# Patient Record
Sex: MALE | Race: WHITE | NOT HISPANIC OR LATINO | Employment: OTHER | URBAN - NONMETROPOLITAN AREA
[De-identification: names, ages, dates, MRNs, and addresses within clinical notes are randomized per-mention and may not be internally consistent; named-entity substitution may affect disease eponyms.]

---

## 2019-02-19 ENCOUNTER — HOSPITAL ENCOUNTER (OUTPATIENT)
Dept: LAB | Facility: MEDICAL CENTER | Age: 59
End: 2019-02-19
Attending: NEUROLOGICAL SURGERY
Payer: OTHER GOVERNMENT

## 2019-02-19 LAB
ANION GAP SERPL CALC-SCNC: 12 MMOL/L (ref 0–11.9)
APPEARANCE UR: CLEAR
APTT PPP: 34.4 SEC (ref 24.7–36)
BASOPHILS # BLD AUTO: 0.8 % (ref 0–1.8)
BASOPHILS # BLD: 0.07 K/UL (ref 0–0.12)
BILIRUB UR QL STRIP.AUTO: NEGATIVE
BUN SERPL-MCNC: 14 MG/DL (ref 8–22)
CALCIUM SERPL-MCNC: 10.2 MG/DL (ref 8.5–10.5)
CHLORIDE SERPL-SCNC: 100 MMOL/L (ref 96–112)
CO2 SERPL-SCNC: 26 MMOL/L (ref 20–33)
COLOR UR: YELLOW
CREAT SERPL-MCNC: 0.95 MG/DL (ref 0.5–1.4)
EOSINOPHIL # BLD AUTO: 0.43 K/UL (ref 0–0.51)
EOSINOPHIL NFR BLD: 5 % (ref 0–6.9)
ERYTHROCYTE [DISTWIDTH] IN BLOOD BY AUTOMATED COUNT: 41.8 FL (ref 35.9–50)
GLUCOSE SERPL-MCNC: 90 MG/DL (ref 65–99)
GLUCOSE UR STRIP.AUTO-MCNC: NEGATIVE MG/DL
HCT VFR BLD AUTO: 49.7 % (ref 42–52)
HGB BLD-MCNC: 16.6 G/DL (ref 14–18)
IMM GRANULOCYTES # BLD AUTO: 0.04 K/UL (ref 0–0.11)
IMM GRANULOCYTES NFR BLD AUTO: 0.5 % (ref 0–0.9)
INR PPP: 0.98 (ref 0.87–1.13)
KETONES UR STRIP.AUTO-MCNC: NEGATIVE MG/DL
LEUKOCYTE ESTERASE UR QL STRIP.AUTO: NEGATIVE
LYMPHOCYTES # BLD AUTO: 2.5 K/UL (ref 1–4.8)
LYMPHOCYTES NFR BLD: 29.2 % (ref 22–41)
MCH RBC QN AUTO: 30.9 PG (ref 27–33)
MCHC RBC AUTO-ENTMCNC: 33.4 G/DL (ref 33.7–35.3)
MCV RBC AUTO: 92.6 FL (ref 81.4–97.8)
MICRO URNS: NORMAL
MONOCYTES # BLD AUTO: 0.98 K/UL (ref 0–0.85)
MONOCYTES NFR BLD AUTO: 11.4 % (ref 0–13.4)
NEUTROPHILS # BLD AUTO: 4.54 K/UL (ref 1.82–7.42)
NEUTROPHILS NFR BLD: 53.1 % (ref 44–72)
NITRITE UR QL STRIP.AUTO: NEGATIVE
NRBC # BLD AUTO: 0 K/UL
NRBC BLD-RTO: 0 /100 WBC
PH UR STRIP.AUTO: 5.5 [PH]
PLATELET # BLD AUTO: 335 K/UL (ref 164–446)
PMV BLD AUTO: 12 FL (ref 9–12.9)
POTASSIUM SERPL-SCNC: 3.9 MMOL/L (ref 3.6–5.5)
PROT UR QL STRIP: NEGATIVE MG/DL
PROTHROMBIN TIME: 13.1 SEC (ref 12–14.6)
RBC # BLD AUTO: 5.37 M/UL (ref 4.7–6.1)
RBC UR QL AUTO: NEGATIVE
SODIUM SERPL-SCNC: 138 MMOL/L (ref 135–145)
SP GR UR STRIP.AUTO: 1.02
UROBILINOGEN UR STRIP.AUTO-MCNC: 0.2 MG/DL
WBC # BLD AUTO: 8.6 K/UL (ref 4.8–10.8)

## 2019-02-19 PROCEDURE — 85610 PROTHROMBIN TIME: CPT

## 2019-02-19 PROCEDURE — 85025 COMPLETE CBC W/AUTO DIFF WBC: CPT

## 2019-02-19 PROCEDURE — 80048 BASIC METABOLIC PNL TOTAL CA: CPT

## 2019-02-19 PROCEDURE — 36415 COLL VENOUS BLD VENIPUNCTURE: CPT

## 2019-02-19 PROCEDURE — 85730 THROMBOPLASTIN TIME PARTIAL: CPT

## 2019-02-19 PROCEDURE — 81003 URINALYSIS AUTO W/O SCOPE: CPT

## 2019-02-26 ENCOUNTER — HOSPITAL ENCOUNTER (INPATIENT)
Facility: MEDICAL CENTER | Age: 59
LOS: 3 days | DRG: 460 | End: 2019-03-01
Attending: NEUROLOGICAL SURGERY | Admitting: NEUROLOGICAL SURGERY
Payer: OTHER GOVERNMENT

## 2019-02-26 ENCOUNTER — APPOINTMENT (OUTPATIENT)
Dept: RADIOLOGY | Facility: MEDICAL CENTER | Age: 59
DRG: 460 | End: 2019-02-26
Attending: NEUROLOGICAL SURGERY
Payer: OTHER GOVERNMENT

## 2019-02-26 DIAGNOSIS — M54.5 BILATERAL LOW BACK PAIN, UNSPECIFIED CHRONICITY, WITH SCIATICA PRESENCE UNSPECIFIED: ICD-10-CM

## 2019-02-26 LAB — EKG IMPRESSION: NORMAL

## 2019-02-26 PROCEDURE — 0JH73DZ INSERTION OF MULTIPLE ARRAY STIMULATOR GENERATOR INTO BACK SUBCUTANEOUS TISSUE AND FASCIA, PERCUTANEOUS APPROACH: ICD-10-PCS | Performed by: NEUROLOGICAL SURGERY

## 2019-02-26 PROCEDURE — 700101 HCHG RX REV CODE 250: Performed by: PHYSICIAN ASSISTANT

## 2019-02-26 PROCEDURE — 0SG3071 FUSION OF LUMBOSACRAL JOINT WITH AUTOLOGOUS TISSUE SUBSTITUTE, POSTERIOR APPROACH, POSTERIOR COLUMN, OPEN APPROACH: ICD-10-PCS | Performed by: NEUROLOGICAL SURGERY

## 2019-02-26 PROCEDURE — 770006 HCHG ROOM/CARE - MED/SURG/GYN SEMI*

## 2019-02-26 PROCEDURE — 93005 ELECTROCARDIOGRAM TRACING: CPT | Performed by: NEUROLOGICAL SURGERY

## 2019-02-26 PROCEDURE — 700105 HCHG RX REV CODE 258

## 2019-02-26 PROCEDURE — 700101 HCHG RX REV CODE 250

## 2019-02-26 PROCEDURE — 500885 HCHG PACK, JACKSON TABLE: Performed by: NEUROLOGICAL SURGERY

## 2019-02-26 PROCEDURE — 95925 SOMATOSENSORY TESTING: CPT | Performed by: NEUROLOGICAL SURGERY

## 2019-02-26 PROCEDURE — 95940 IONM IN OPERATNG ROOM 15 MIN: CPT | Performed by: NEUROLOGICAL SURGERY

## 2019-02-26 PROCEDURE — 00UT07Z SUPPLEMENT SPINAL MENINGES WITH AUTOLOGOUS TISSUE SUBSTITUTE, OPEN APPROACH: ICD-10-PCS | Performed by: NEUROLOGICAL SURGERY

## 2019-02-26 PROCEDURE — A9270 NON-COVERED ITEM OR SERVICE: HCPCS | Performed by: PHYSICIAN ASSISTANT

## 2019-02-26 PROCEDURE — A6222 GAUZE <=16 IN NO W/SAL W/O B: HCPCS | Performed by: NEUROLOGICAL SURGERY

## 2019-02-26 PROCEDURE — 72020 X-RAY EXAM OF SPINE 1 VIEW: CPT

## 2019-02-26 PROCEDURE — 502000 HCHG MISC OR IMPLANTS RC 0278: Performed by: NEUROLOGICAL SURGERY

## 2019-02-26 PROCEDURE — 95937 NEUROMUSCULAR JUNCTION TEST: CPT | Performed by: NEUROLOGICAL SURGERY

## 2019-02-26 PROCEDURE — 500367 HCHG DRAIN KIT, HEMOVAC: Performed by: NEUROLOGICAL SURGERY

## 2019-02-26 PROCEDURE — 01NR0ZZ RELEASE SACRAL NERVE, OPEN APPROACH: ICD-10-PCS | Performed by: NEUROLOGICAL SURGERY

## 2019-02-26 PROCEDURE — 500112 HCHG BONE WAX: Performed by: NEUROLOGICAL SURGERY

## 2019-02-26 PROCEDURE — 501838 HCHG SUTURE GENERAL: Performed by: NEUROLOGICAL SURGERY

## 2019-02-26 PROCEDURE — 95861 NEEDLE EMG 2 EXTREMITIES: CPT | Performed by: NEUROLOGICAL SURGERY

## 2019-02-26 PROCEDURE — 700111 HCHG RX REV CODE 636 W/ 250 OVERRIDE (IP): Performed by: PHYSICIAN ASSISTANT

## 2019-02-26 PROCEDURE — 500866 HCHG NEEDLE, SPINAL 25G: Performed by: NEUROLOGICAL SURGERY

## 2019-02-26 PROCEDURE — 160048 HCHG OR STATISTICAL LEVEL 1-5: Performed by: NEUROLOGICAL SURGERY

## 2019-02-26 PROCEDURE — 93010 ELECTROCARDIOGRAM REPORT: CPT | Performed by: INTERNAL MEDICINE

## 2019-02-26 PROCEDURE — 700111 HCHG RX REV CODE 636 W/ 250 OVERRIDE (IP): Performed by: ANESTHESIOLOGY

## 2019-02-26 PROCEDURE — 160002 HCHG RECOVERY MINUTES (STAT): Performed by: NEUROLOGICAL SURGERY

## 2019-02-26 PROCEDURE — 160036 HCHG PACU - EA ADDL 30 MINS PHASE I: Performed by: NEUROLOGICAL SURGERY

## 2019-02-26 PROCEDURE — C1767 GENERATOR, NEURO NON-RECHARG: HCPCS | Performed by: NEUROLOGICAL SURGERY

## 2019-02-26 PROCEDURE — 160042 HCHG SURGERY MINUTES - EA ADDL 1 MIN LEVEL 5: Performed by: NEUROLOGICAL SURGERY

## 2019-02-26 PROCEDURE — 01NB0ZZ RELEASE LUMBAR NERVE, OPEN APPROACH: ICD-10-PCS | Performed by: NEUROLOGICAL SURGERY

## 2019-02-26 PROCEDURE — 160031 HCHG SURGERY MINUTES - 1ST 30 MINS LEVEL 5: Performed by: NEUROLOGICAL SURGERY

## 2019-02-26 PROCEDURE — 700111 HCHG RX REV CODE 636 W/ 250 OVERRIDE (IP)

## 2019-02-26 PROCEDURE — A4314 CATH W/DRAINAGE 2-WAY LATEX: HCPCS | Performed by: NEUROLOGICAL SURGERY

## 2019-02-26 PROCEDURE — 700102 HCHG RX REV CODE 250 W/ 637 OVERRIDE(OP): Performed by: ANESTHESIOLOGY

## 2019-02-26 PROCEDURE — 770001 HCHG ROOM/CARE - MED/SURG/GYN PRIV*

## 2019-02-26 PROCEDURE — 502240 HCHG MISC OR SUPPLY RC 0272: Performed by: NEUROLOGICAL SURGERY

## 2019-02-26 PROCEDURE — 110371 HCHG SHELL REV 272: Performed by: NEUROLOGICAL SURGERY

## 2019-02-26 PROCEDURE — 160009 HCHG ANES TIME/MIN: Performed by: NEUROLOGICAL SURGERY

## 2019-02-26 PROCEDURE — 160035 HCHG PACU - 1ST 60 MINS PHASE I: Performed by: NEUROLOGICAL SURGERY

## 2019-02-26 PROCEDURE — 700102 HCHG RX REV CODE 250 W/ 637 OVERRIDE(OP): Performed by: PHYSICIAN ASSISTANT

## 2019-02-26 PROCEDURE — A9270 NON-COVERED ITEM OR SERVICE: HCPCS | Performed by: ANESTHESIOLOGY

## 2019-02-26 PROCEDURE — 501837 HCHG SUTURE CV: Performed by: NEUROLOGICAL SURGERY

## 2019-02-26 PROCEDURE — 0JPT3MZ REMOVAL OF STIMULATOR GENERATOR FROM TRUNK SUBCUTANEOUS TISSUE AND FASCIA, PERCUTANEOUS APPROACH: ICD-10-PCS | Performed by: NEUROLOGICAL SURGERY

## 2019-02-26 PROCEDURE — 110454 HCHG SHELL REV 250: Performed by: NEUROLOGICAL SURGERY

## 2019-02-26 DEVICE — IMPLANTABLE DEVICE: Type: IMPLANTABLE DEVICE | Site: BACK | Status: FUNCTIONAL

## 2019-02-26 DEVICE — GRAFT DURAMATRIX ONLAY PLUS 1IN X 3IN OR 2.75CM X 7.5CM: Type: IMPLANTABLE DEVICE | Site: BACK | Status: FUNCTIONAL

## 2019-02-26 DEVICE — DURASEAL SEALANT SYSTEM 5ML - (5/BX): Type: IMPLANTABLE DEVICE | Site: BACK | Status: FUNCTIONAL

## 2019-02-26 RX ORDER — VENLAFAXINE 37.5 MG/1
75 TABLET ORAL 2 TIMES DAILY WITH MEALS
Status: DISCONTINUED | OUTPATIENT
Start: 2019-02-26 | End: 2019-03-01 | Stop reason: HOSPADM

## 2019-02-26 RX ORDER — PROMETHAZINE HYDROCHLORIDE 12.5 MG/1
12.5-25 SUPPOSITORY RECTAL EVERY 4 HOURS PRN
Status: DISCONTINUED | OUTPATIENT
Start: 2019-02-26 | End: 2019-03-01 | Stop reason: HOSPADM

## 2019-02-26 RX ORDER — ONDANSETRON 2 MG/ML
4 INJECTION INTRAMUSCULAR; INTRAVENOUS EVERY 4 HOURS PRN
Status: DISCONTINUED | OUTPATIENT
Start: 2019-02-26 | End: 2019-03-01 | Stop reason: HOSPADM

## 2019-02-26 RX ORDER — AMOXICILLIN 250 MG
1 CAPSULE ORAL
Status: DISCONTINUED | OUTPATIENT
Start: 2019-02-26 | End: 2019-03-01 | Stop reason: HOSPADM

## 2019-02-26 RX ORDER — TAMSULOSIN HYDROCHLORIDE 0.4 MG/1
0.4 CAPSULE ORAL EVERY EVENING
Status: DISCONTINUED | OUTPATIENT
Start: 2019-02-26 | End: 2019-03-01 | Stop reason: HOSPADM

## 2019-02-26 RX ORDER — PANTOPRAZOLE SODIUM 40 MG/1
40 TABLET, DELAYED RELEASE ORAL EVERY EVENING
Status: DISCONTINUED | OUTPATIENT
Start: 2019-02-26 | End: 2019-02-26

## 2019-02-26 RX ORDER — PANTOPRAZOLE SODIUM 40 MG/1
40 TABLET, DELAYED RELEASE ORAL EVERY EVENING
Status: ON HOLD | COMMUNITY
End: 2020-08-01

## 2019-02-26 RX ORDER — BISACODYL 10 MG
10 SUPPOSITORY, RECTAL RECTAL
Status: DISCONTINUED | OUTPATIENT
Start: 2019-02-26 | End: 2019-03-01 | Stop reason: HOSPADM

## 2019-02-26 RX ORDER — LORAZEPAM 2 MG/ML
0.5 INJECTION INTRAMUSCULAR
Status: DISCONTINUED | OUTPATIENT
Start: 2019-02-26 | End: 2019-02-26 | Stop reason: HOSPADM

## 2019-02-26 RX ORDER — CARVEDILOL 6.25 MG/1
6.25 TABLET ORAL 2 TIMES DAILY WITH MEALS
Status: ON HOLD | COMMUNITY
End: 2020-08-12

## 2019-02-26 RX ORDER — TELMISARTAN 80 MG/1
80 TABLET ORAL DAILY
Status: ON HOLD | COMMUNITY
End: 2020-08-12

## 2019-02-26 RX ORDER — CARVEDILOL 6.25 MG/1
6.25 TABLET ORAL 2 TIMES DAILY WITH MEALS
Status: DISCONTINUED | OUTPATIENT
Start: 2019-02-26 | End: 2019-03-01 | Stop reason: HOSPADM

## 2019-02-26 RX ORDER — ONDANSETRON 2 MG/ML
4 INJECTION INTRAMUSCULAR; INTRAVENOUS
Status: DISCONTINUED | OUTPATIENT
Start: 2019-02-26 | End: 2019-02-26 | Stop reason: HOSPADM

## 2019-02-26 RX ORDER — VANCOMYCIN HYDROCHLORIDE 500 MG/10ML
INJECTION, POWDER, LYOPHILIZED, FOR SOLUTION INTRAVENOUS
Status: COMPLETED | OUTPATIENT
Start: 2019-02-26 | End: 2019-02-26

## 2019-02-26 RX ORDER — HALOPERIDOL 5 MG/ML
1 INJECTION INTRAMUSCULAR
Status: DISCONTINUED | OUTPATIENT
Start: 2019-02-26 | End: 2019-02-26 | Stop reason: HOSPADM

## 2019-02-26 RX ORDER — TRAZODONE HYDROCHLORIDE 50 MG/1
100 TABLET ORAL
Status: DISCONTINUED | OUTPATIENT
Start: 2019-02-26 | End: 2019-03-01 | Stop reason: HOSPADM

## 2019-02-26 RX ORDER — CLONIDINE HYDROCHLORIDE 0.1 MG/1
0.1 TABLET ORAL EVERY 4 HOURS PRN
Status: DISCONTINUED | OUTPATIENT
Start: 2019-02-26 | End: 2019-03-01 | Stop reason: HOSPADM

## 2019-02-26 RX ORDER — FINASTERIDE 5 MG/1
5 TABLET, FILM COATED ORAL DAILY
COMMUNITY

## 2019-02-26 RX ORDER — GABAPENTIN 300 MG/1
300 CAPSULE ORAL 2 TIMES DAILY
Status: DISCONTINUED | OUTPATIENT
Start: 2019-02-26 | End: 2019-03-01 | Stop reason: HOSPADM

## 2019-02-26 RX ORDER — POLYETHYLENE GLYCOL 3350 17 G/17G
1 POWDER, FOR SOLUTION ORAL 2 TIMES DAILY PRN
Status: DISCONTINUED | OUTPATIENT
Start: 2019-02-26 | End: 2019-03-01 | Stop reason: HOSPADM

## 2019-02-26 RX ORDER — SODIUM CHLORIDE, SODIUM LACTATE, POTASSIUM CHLORIDE, AND CALCIUM CHLORIDE .6; .31; .03; .02 G/100ML; G/100ML; G/100ML; G/100ML
IRRIGANT IRRIGATION
Status: DISCONTINUED | OUTPATIENT
Start: 2019-02-26 | End: 2019-02-26 | Stop reason: HOSPADM

## 2019-02-26 RX ORDER — AMOXICILLIN 250 MG
1 CAPSULE ORAL NIGHTLY
Status: DISCONTINUED | OUTPATIENT
Start: 2019-02-26 | End: 2019-03-01 | Stop reason: HOSPADM

## 2019-02-26 RX ORDER — CEFAZOLIN SODIUM 2 G/100ML
2 INJECTION, SOLUTION INTRAVENOUS EVERY 8 HOURS
Status: COMPLETED | OUTPATIENT
Start: 2019-02-26 | End: 2019-02-27

## 2019-02-26 RX ORDER — DIPHENHYDRAMINE HYDROCHLORIDE 50 MG/ML
25 INJECTION INTRAMUSCULAR; INTRAVENOUS EVERY 6 HOURS PRN
Status: DISCONTINUED | OUTPATIENT
Start: 2019-02-26 | End: 2019-03-01 | Stop reason: HOSPADM

## 2019-02-26 RX ORDER — TRAZODONE HYDROCHLORIDE 100 MG/1
100 TABLET ORAL
Status: ON HOLD | COMMUNITY
End: 2020-08-12

## 2019-02-26 RX ORDER — TIZANIDINE 4 MG/1
2 TABLET ORAL 3 TIMES DAILY PRN
Status: DISCONTINUED | OUTPATIENT
Start: 2019-02-26 | End: 2019-03-01 | Stop reason: HOSPADM

## 2019-02-26 RX ORDER — FINASTERIDE 5 MG/1
5 TABLET, FILM COATED ORAL EVERY EVENING
Status: DISCONTINUED | OUTPATIENT
Start: 2019-02-26 | End: 2019-03-01 | Stop reason: HOSPADM

## 2019-02-26 RX ORDER — SODIUM CHLORIDE, SODIUM LACTATE, POTASSIUM CHLORIDE, CALCIUM CHLORIDE 600; 310; 30; 20 MG/100ML; MG/100ML; MG/100ML; MG/100ML
INJECTION, SOLUTION INTRAVENOUS CONTINUOUS
Status: DISCONTINUED | OUTPATIENT
Start: 2019-02-26 | End: 2019-02-26 | Stop reason: HOSPADM

## 2019-02-26 RX ORDER — DIPHENHYDRAMINE HCL 25 MG
25 TABLET ORAL EVERY 6 HOURS PRN
Status: DISCONTINUED | OUTPATIENT
Start: 2019-02-26 | End: 2019-03-01 | Stop reason: HOSPADM

## 2019-02-26 RX ORDER — GABAPENTIN 300 MG/1
300 CAPSULE ORAL 2 TIMES DAILY
Status: ON HOLD | COMMUNITY
End: 2020-08-01

## 2019-02-26 RX ORDER — DOCUSATE SODIUM 100 MG/1
100 CAPSULE, LIQUID FILLED ORAL 2 TIMES DAILY
Status: DISCONTINUED | OUTPATIENT
Start: 2019-02-26 | End: 2019-03-01 | Stop reason: HOSPADM

## 2019-02-26 RX ORDER — ATORVASTATIN CALCIUM 80 MG/1
80 TABLET, FILM COATED ORAL NIGHTLY
COMMUNITY
End: 2020-08-20 | Stop reason: SDUPTHER

## 2019-02-26 RX ORDER — ONDANSETRON 4 MG/1
4 TABLET, ORALLY DISINTEGRATING ORAL EVERY 4 HOURS PRN
Status: DISCONTINUED | OUTPATIENT
Start: 2019-02-26 | End: 2019-03-01 | Stop reason: HOSPADM

## 2019-02-26 RX ORDER — HYDROMORPHONE HYDROCHLORIDE 1 MG/ML
0.1 INJECTION, SOLUTION INTRAMUSCULAR; INTRAVENOUS; SUBCUTANEOUS
Status: DISCONTINUED | OUTPATIENT
Start: 2019-02-26 | End: 2019-02-26 | Stop reason: HOSPADM

## 2019-02-26 RX ORDER — BUPIVACAINE HYDROCHLORIDE AND EPINEPHRINE 5; 5 MG/ML; UG/ML
INJECTION, SOLUTION EPIDURAL; INTRACAUDAL; PERINEURAL
Status: DISCONTINUED | OUTPATIENT
Start: 2019-02-26 | End: 2019-02-26 | Stop reason: HOSPADM

## 2019-02-26 RX ORDER — TAMSULOSIN HYDROCHLORIDE 0.4 MG/1
0.4 CAPSULE ORAL DAILY
COMMUNITY

## 2019-02-26 RX ORDER — DEXTROSE MONOHYDRATE, SODIUM CHLORIDE, AND POTASSIUM CHLORIDE 50; 1.49; 4.5 G/1000ML; G/1000ML; G/1000ML
INJECTION, SOLUTION INTRAVENOUS CONTINUOUS
Status: DISCONTINUED | OUTPATIENT
Start: 2019-02-26 | End: 2019-03-01 | Stop reason: HOSPADM

## 2019-02-26 RX ORDER — ATORVASTATIN CALCIUM 40 MG/1
80 TABLET, FILM COATED ORAL NIGHTLY
Status: DISCONTINUED | OUTPATIENT
Start: 2019-02-26 | End: 2019-03-01 | Stop reason: HOSPADM

## 2019-02-26 RX ORDER — HYDROMORPHONE HYDROCHLORIDE 1 MG/ML
0.4 INJECTION, SOLUTION INTRAMUSCULAR; INTRAVENOUS; SUBCUTANEOUS
Status: DISCONTINUED | OUTPATIENT
Start: 2019-02-26 | End: 2019-02-26 | Stop reason: HOSPADM

## 2019-02-26 RX ORDER — DIPHENHYDRAMINE HYDROCHLORIDE 50 MG/ML
12.5 INJECTION INTRAMUSCULAR; INTRAVENOUS
Status: DISCONTINUED | OUTPATIENT
Start: 2019-02-26 | End: 2019-02-26 | Stop reason: HOSPADM

## 2019-02-26 RX ORDER — SODIUM CHLORIDE 9 MG/ML
INJECTION, SOLUTION INTRAVENOUS
Status: COMPLETED
Start: 2019-02-26 | End: 2019-02-26

## 2019-02-26 RX ORDER — HYDRALAZINE HYDROCHLORIDE 20 MG/ML
5 INJECTION INTRAMUSCULAR; INTRAVENOUS
Status: DISCONTINUED | OUTPATIENT
Start: 2019-02-26 | End: 2019-02-26 | Stop reason: HOSPADM

## 2019-02-26 RX ORDER — HYDROMORPHONE HYDROCHLORIDE 1 MG/ML
0.2 INJECTION, SOLUTION INTRAMUSCULAR; INTRAVENOUS; SUBCUTANEOUS
Status: DISCONTINUED | OUTPATIENT
Start: 2019-02-26 | End: 2019-02-26 | Stop reason: HOSPADM

## 2019-02-26 RX ORDER — ALPRAZOLAM 0.25 MG/1
0.25 TABLET ORAL 2 TIMES DAILY PRN
Status: DISCONTINUED | OUTPATIENT
Start: 2019-02-26 | End: 2019-03-01 | Stop reason: HOSPADM

## 2019-02-26 RX ORDER — CALCIUM CARBONATE 500 MG/1
500 TABLET, CHEWABLE ORAL 2 TIMES DAILY
Status: DISCONTINUED | OUTPATIENT
Start: 2019-02-26 | End: 2019-03-01 | Stop reason: HOSPADM

## 2019-02-26 RX ORDER — OMEPRAZOLE 20 MG/1
20 CAPSULE, DELAYED RELEASE ORAL EVERY EVENING
Status: DISCONTINUED | OUTPATIENT
Start: 2019-02-26 | End: 2019-03-01 | Stop reason: HOSPADM

## 2019-02-26 RX ORDER — ENEMA 19; 7 G/133ML; G/133ML
1 ENEMA RECTAL
Status: DISCONTINUED | OUTPATIENT
Start: 2019-02-26 | End: 2019-03-01 | Stop reason: HOSPADM

## 2019-02-26 RX ORDER — PROMETHAZINE HYDROCHLORIDE 25 MG/1
12.5-25 TABLET ORAL EVERY 4 HOURS PRN
Status: DISCONTINUED | OUTPATIENT
Start: 2019-02-26 | End: 2019-03-01 | Stop reason: HOSPADM

## 2019-02-26 RX ORDER — TELMISARTAN 80 MG/1
80 TABLET ORAL DAILY
Status: DISCONTINUED | OUTPATIENT
Start: 2019-02-27 | End: 2019-03-01 | Stop reason: HOSPADM

## 2019-02-26 RX ORDER — BACITRACIN 50000 [IU]/1
INJECTION, POWDER, FOR SOLUTION INTRAMUSCULAR
Status: DISCONTINUED | OUTPATIENT
Start: 2019-02-26 | End: 2019-02-26 | Stop reason: HOSPADM

## 2019-02-26 RX ADMIN — SODIUM CHLORIDE, SODIUM LACTATE, POTASSIUM CHLORIDE, CALCIUM CHLORIDE: 600; 310; 30; 20 INJECTION, SOLUTION INTRAVENOUS at 16:55

## 2019-02-26 RX ADMIN — CLONIDINE HYDROCHLORIDE 0.1 MG: 0.1 TABLET ORAL at 20:21

## 2019-02-26 RX ADMIN — TAMSULOSIN HYDROCHLORIDE 0.4 MG: 0.4 CAPSULE ORAL at 19:23

## 2019-02-26 RX ADMIN — CARVEDILOL 6.25 MG: 6.25 TABLET, FILM COATED ORAL at 19:23

## 2019-02-26 RX ADMIN — GABAPENTIN 300 MG: 300 CAPSULE ORAL at 19:23

## 2019-02-26 RX ADMIN — SODIUM CHLORIDE 500 ML: 9 INJECTION, SOLUTION INTRAVENOUS at 19:44

## 2019-02-26 RX ADMIN — CEFAZOLIN SODIUM 2 G: 2 INJECTION, SOLUTION INTRAVENOUS at 19:22

## 2019-02-26 RX ADMIN — PROCHLORPERAZINE EDISYLATE 10 MG: 5 INJECTION INTRAMUSCULAR; INTRAVENOUS at 20:02

## 2019-02-26 RX ADMIN — ANTACID TABLETS 500 MG: 500 TABLET, CHEWABLE ORAL at 19:24

## 2019-02-26 RX ADMIN — POTASSIUM CHLORIDE, DEXTROSE MONOHYDRATE AND SODIUM CHLORIDE: 150; 5; 450 INJECTION, SOLUTION INTRAVENOUS at 19:26

## 2019-02-26 RX ADMIN — Medication: at 19:44

## 2019-02-26 RX ADMIN — OMEPRAZOLE 20 MG: 20 CAPSULE, DELAYED RELEASE ORAL at 19:24

## 2019-02-26 RX ADMIN — ATORVASTATIN CALCIUM 80 MG: 40 TABLET, FILM COATED ORAL at 21:49

## 2019-02-26 RX ADMIN — FINASTERIDE 5 MG: 5 TABLET, FILM COATED ORAL at 19:23

## 2019-02-26 RX ADMIN — VENLAFAXINE 75 MG: 37.5 TABLET ORAL at 19:23

## 2019-02-26 RX ADMIN — TRAZODONE HYDROCHLORIDE 100 MG: 50 TABLET ORAL at 21:49

## 2019-02-26 RX ADMIN — HYDROCODONE BITARTRATE AND ACETAMINOPHEN 30 ML: 2.5; 108 SOLUTION ORAL at 16:01

## 2019-02-26 ASSESSMENT — LIFESTYLE VARIABLES
EVER_SMOKED: YES
ALCOHOL_USE: NO

## 2019-02-26 ASSESSMENT — COGNITIVE AND FUNCTIONAL STATUS - GENERAL
DRESSING REGULAR UPPER BODY CLOTHING: A LITTLE
SUGGESTED CMS G CODE MODIFIER MOBILITY: CK
DAILY ACTIVITIY SCORE: 21
MOVING FROM LYING ON BACK TO SITTING ON SIDE OF FLAT BED: A LITTLE
SUGGESTED CMS G CODE MODIFIER DAILY ACTIVITY: CJ
CLIMB 3 TO 5 STEPS WITH RAILING: A LITTLE
WALKING IN HOSPITAL ROOM: A LITTLE
MOBILITY SCORE: 19
MOVING TO AND FROM BED TO CHAIR: A LITTLE
HELP NEEDED FOR BATHING: A LITTLE
STANDING UP FROM CHAIR USING ARMS: A LITTLE
DRESSING REGULAR LOWER BODY CLOTHING: A LITTLE

## 2019-02-26 ASSESSMENT — PATIENT HEALTH QUESTIONNAIRE - PHQ9
SUM OF ALL RESPONSES TO PHQ9 QUESTIONS 1 AND 2: 0
1. LITTLE INTEREST OR PLEASURE IN DOING THINGS: NOT AT ALL
2. FEELING DOWN, DEPRESSED, IRRITABLE, OR HOPELESS: NOT AT ALL

## 2019-02-26 NOTE — OR NURSING
Fly  With [facility] [phone].Programmed the [employer] phone, call for questions if necessary. The system is OFF per MINERVA De La Rosa.

## 2019-02-26 NOTE — OR SURGEON
Immediate Post OP Note    PreOp Diagnosis: L4-S1 DDD, left radiculopathy, chronic pain.     PostOp Diagnosis: Same.     Procedure(s):  SPINAL CORD STIMULATOR-  STAGE 1:  RIGHT FLANK BATTERY REPLACEMENT/UPGRADE - Wound Class: Clean  LUMBAR FUSION POSTERIOR-  STAGE 2: ONLAY L5-S1 BONE - Wound Class: Clean with Drain  LAMINOTOMY-  STAGE 2:  REDO LEFT L4-S1 - Wound Class: Clean with Drain    Surgeon(s):  Stepan Hawkins M.D.    Anesthesiologist/Type of Anesthesia:  Anesthesiologist: Zheng Churchill M.D./General    Surgical Staff:  Assistant: Toby Cleveland P.A.-C.  Circulator: Bibi Piper R.N.  Relief Circulator: Maria Guadalupe Sargent R.N.  Relief Scrub: Hiral Horta  Scrub Person: Rory Randall  Radiology Technologist: Kendy Pittman    Specimens removed if any:  * No specimens in log *    Estimated Blood Loss: <100 cc     Findings: L4-S1 DDD, durotomy, see dictation.     Complications: None.          2/26/2019 2:37 PM Toby Cleveland P.A.-C.

## 2019-02-27 LAB
ANION GAP SERPL CALC-SCNC: 6 MMOL/L (ref 0–11.9)
BUN SERPL-MCNC: 19 MG/DL (ref 8–22)
CALCIUM SERPL-MCNC: 8.6 MG/DL (ref 8.5–10.5)
CHLORIDE SERPL-SCNC: 102 MMOL/L (ref 96–112)
CO2 SERPL-SCNC: 24 MMOL/L (ref 20–33)
CREAT SERPL-MCNC: 1.29 MG/DL (ref 0.5–1.4)
EKG IMPRESSION: NORMAL
ERYTHROCYTE [DISTWIDTH] IN BLOOD BY AUTOMATED COUNT: 43.3 FL (ref 35.9–50)
GLUCOSE SERPL-MCNC: 134 MG/DL (ref 65–99)
HCT VFR BLD AUTO: 39.1 % (ref 42–52)
HGB BLD-MCNC: 12.9 G/DL (ref 14–18)
MCH RBC QN AUTO: 31 PG (ref 27–33)
MCHC RBC AUTO-ENTMCNC: 33.3 G/DL (ref 33.7–35.3)
MCV RBC AUTO: 93.1 FL (ref 81.4–97.8)
PLATELET # BLD AUTO: 259 K/UL (ref 164–446)
PMV BLD AUTO: 11.4 FL (ref 9–12.9)
POTASSIUM SERPL-SCNC: 4.5 MMOL/L (ref 3.6–5.5)
RBC # BLD AUTO: 4.19 M/UL (ref 4.7–6.1)
SODIUM SERPL-SCNC: 132 MMOL/L (ref 135–145)
WBC # BLD AUTO: 13.5 K/UL (ref 4.8–10.8)

## 2019-02-27 PROCEDURE — 80048 BASIC METABOLIC PNL TOTAL CA: CPT

## 2019-02-27 PROCEDURE — A9270 NON-COVERED ITEM OR SERVICE: HCPCS | Performed by: PHYSICIAN ASSISTANT

## 2019-02-27 PROCEDURE — 85027 COMPLETE CBC AUTOMATED: CPT

## 2019-02-27 PROCEDURE — 93005 ELECTROCARDIOGRAM TRACING: CPT | Performed by: PHYSICIAN ASSISTANT

## 2019-02-27 PROCEDURE — 700101 HCHG RX REV CODE 250: Performed by: PHYSICIAN ASSISTANT

## 2019-02-27 PROCEDURE — 700102 HCHG RX REV CODE 250 W/ 637 OVERRIDE(OP): Performed by: PHYSICIAN ASSISTANT

## 2019-02-27 PROCEDURE — 770006 HCHG ROOM/CARE - MED/SURG/GYN SEMI*

## 2019-02-27 PROCEDURE — 700112 HCHG RX REV CODE 229: Performed by: PHYSICIAN ASSISTANT

## 2019-02-27 PROCEDURE — 93010 ELECTROCARDIOGRAM REPORT: CPT | Performed by: INTERNAL MEDICINE

## 2019-02-27 PROCEDURE — 700111 HCHG RX REV CODE 636 W/ 250 OVERRIDE (IP): Performed by: PHYSICIAN ASSISTANT

## 2019-02-27 PROCEDURE — 770001 HCHG ROOM/CARE - MED/SURG/GYN PRIV*

## 2019-02-27 PROCEDURE — 36415 COLL VENOUS BLD VENIPUNCTURE: CPT

## 2019-02-27 RX ADMIN — FINASTERIDE 5 MG: 5 TABLET, FILM COATED ORAL at 17:10

## 2019-02-27 RX ADMIN — VENLAFAXINE 75 MG: 37.5 TABLET ORAL at 17:10

## 2019-02-27 RX ADMIN — DOCUSATE SODIUM 100 MG: 100 CAPSULE, LIQUID FILLED ORAL at 17:10

## 2019-02-27 RX ADMIN — TRAZODONE HYDROCHLORIDE 100 MG: 50 TABLET ORAL at 20:00

## 2019-02-27 RX ADMIN — ANTACID TABLETS 500 MG: 500 TABLET, CHEWABLE ORAL at 05:18

## 2019-02-27 RX ADMIN — VENLAFAXINE 75 MG: 37.5 TABLET ORAL at 08:31

## 2019-02-27 RX ADMIN — ATORVASTATIN CALCIUM 80 MG: 40 TABLET, FILM COATED ORAL at 21:11

## 2019-02-27 RX ADMIN — POTASSIUM CHLORIDE, DEXTROSE MONOHYDRATE AND SODIUM CHLORIDE: 150; 5; 450 INJECTION, SOLUTION INTRAVENOUS at 05:18

## 2019-02-27 RX ADMIN — CEFAZOLIN SODIUM 2 G: 2 INJECTION, SOLUTION INTRAVENOUS at 03:43

## 2019-02-27 RX ADMIN — ANTACID TABLETS 500 MG: 500 TABLET, CHEWABLE ORAL at 17:09

## 2019-02-27 RX ADMIN — ENOXAPARIN SODIUM 40 MG: 100 INJECTION SUBCUTANEOUS at 15:49

## 2019-02-27 RX ADMIN — GABAPENTIN 300 MG: 300 CAPSULE ORAL at 17:10

## 2019-02-27 RX ADMIN — OMEPRAZOLE 20 MG: 20 CAPSULE, DELAYED RELEASE ORAL at 17:10

## 2019-02-27 RX ADMIN — POTASSIUM CHLORIDE, DEXTROSE MONOHYDRATE AND SODIUM CHLORIDE: 150; 5; 450 INJECTION, SOLUTION INTRAVENOUS at 15:49

## 2019-02-27 RX ADMIN — TAMSULOSIN HYDROCHLORIDE 0.4 MG: 0.4 CAPSULE ORAL at 17:09

## 2019-02-27 RX ADMIN — GABAPENTIN 300 MG: 300 CAPSULE ORAL at 05:19

## 2019-02-27 RX ADMIN — Medication: at 14:50

## 2019-02-27 NOTE — PROGRESS NOTES
Neurosurgery Progress Note    Subjective:  POD1 SCS battery replacement, redo L4-S1 lamis with onlay fusion, durotomy repair  Notes LBP well controlled  Denies LE pain  Notes mild HA  Has had intermittent tachycardia overnight, no cardiac history other than HTN  Denies chest pain/sob/constitutional symptoms  No drain    Exam:  Motor 5/5 LEs  Dressing dry with slight strike through, no evidence of csf leak  No calf tenderness  Pulse irregular    BP  Min: 102/78  Max: 178/109  Pulse  Av.7  Min: 91  Max: 117  Resp  Avg: 15.7  Min: 10  Max: 22  Temp  Av.9 °C (98.5 °F)  Min: 36.4 °C (97.5 °F)  Max: 37.6 °C (99.7 °F)  SpO2  Av.6 %  Min: 90 %  Max: 97 %    No Data Recorded    Recent Labs      192   WBC  13.5*   RBC  4.19*   HEMOGLOBIN  12.9*   HEMATOCRIT  39.1*   MCV  93.1   MCH  31.0   MCHC  33.3*   RDW  43.3   PLATELETCT  259   MPV  11.4     Recent Labs      19   0412   SODIUM  132*   POTASSIUM  4.5   CHLORIDE  102   CO2  24   GLUCOSE  134*   BUN  19   CREATININE  1.29   CALCIUM  8.6               Intake/Output       19 07 - 19 0659 19 07 - 19 0659       6617-2277 Total  2444-5259 Total       Intake    P.O.  300  -- 300  --  -- --    P.O. 300 -- 300 -- -- --    I.V.  2000  22.5  -- 22.5    Crystalloid Intake 2000 -- -- --    PCA End of Shift Total Volume (ml) -- -- -- 22.5 -- 22.5    Total Intake 2300 -- 2300 22.5 -- 22.5       Output    Urine  300  350 650  --  -- --    Urine Void (mL) 300 350 650 -- -- --    Blood  58  -- 58  --  -- --    Est. Blood Loss (mL) 58 -- 58 -- -- --    Total Output 358 350 708 -- -- --       Net I/O     1942 -350 1592 22.5 -- 22.5            Intake/Output Summary (Last 24 hours) at 19 0830  Last data filed at 19 0720   Gross per 24 hour   Intake           2322.5 ml   Output              708 ml   Net           1614.5 ml            • atorvastatin  80 mg Nightly   • carvedilol  6.25 mg  BID WITH MEALS   • finasteride  5 mg Q EVENING   • gabapentin  300 mg BID   • tamsulosin  0.4 mg Q EVENING   • telmisartan  80 mg DAILY   • traZODone  100 mg QHS   • venlafaxine  75 mg BID WITH MEALS   • Pharmacy Consult Request  1 Each PHARMACY TO DOSE   • MD ALERT...DO NOT ADMINISTER NSAIDS or ASPIRIN unless ORDERED By Neurosurgery  1 Each PRN   • docusate sodium  100 mg BID   • senna-docusate  1 Tab Nightly   • senna-docusate  1 Tab Q24HRS PRN   • polyethylene glycol/lytes  1 Packet BID PRN   • magnesium hydroxide  30 mL QDAY PRN   • bisacodyl  10 mg Q24HRS PRN   • fleet  1 Each Once PRN   • dextrose 5 % and 0.45 % NaCl with KCl 20 mEq   Continuous   • enoxaparin  40 mg DAILY   • diphenhydrAMINE  25 mg Q6HRS PRN    Or   • diphenhydrAMINE  25 mg Q6HRS PRN   • ondansetron  4 mg Q4HRS PRN   • ondansetron  4 mg Q4HRS PRN   • promethazine  12.5-25 mg Q4HRS PRN   • promethazine  12.5-25 mg Q4HRS PRN   • prochlorperazine  5-10 mg Q4HRS PRN   • tizanidine  2 mg TID PRN   • ALPRAZolam  0.25 mg BID PRN   • cloNIDine  0.1 mg Q4HRS PRN   • benzocaine-menthol  1 Lozenge Q2HRS PRN   • calcium carbonate  500 mg BID   • HYDROmorphone   Continuous   • omeprazole  20 mg Q EVENING   • Influenza Vaccine Quad pf  0.5 mL Once       Assessment and Plan:  POD #1  Flat x 48 hours, ok to lie on side  Ice to incision as needed  Will check EKG  Plan to get up tomorrow around noon  Reinforce dressing as needed

## 2019-02-27 NOTE — THERAPY
PT consult received. Pt with active bedrest orders in place. Will initiate PT evaluation once bedrest has been lifted and pt cleared for full OOB mobility. Thanks    Susana Tavares, PT, DPT Pager: [phone]

## 2019-02-27 NOTE — PROGRESS NOTES
Pt arrived to floor, pt is A&Ox4, can move side to side independently. Pts two dressings on the back have small amounts of sanguinous drainage on them. Pts son is in the room and will be staying over.

## 2019-02-27 NOTE — OR NURSING
Family updated by phone.Pt voided in urinal 300 ml  urine.Belongings taken from locker and placed on gurney with pt. 1-bagThe pt is awake and oriented. Respirations are regular and easy. Pain is controlled, the pt is comfortable. Dressings x 2  dry and intact.To floor with Stimulator control in white box, and PCA equipment.Pt to remain flat for 2 days as per Dr Hawkins. [place] RN instructed. The pt may turn side-side.

## 2019-02-27 NOTE — THERAPY
OT consult received, pt has bedrest orders until 28th at 2100. Will attempt OT eval post bedrest as appropriate.

## 2019-02-27 NOTE — CARE PLAN
Problem: Safety  Goal: Will remain free from falls  Outcome: PROGRESSING AS EXPECTED  No fall has occurred. Pt is to be flat in bed until the 28th at 2100.     Problem: Infection  Goal: Will remain free from infection  Outcome: PROGRESSING AS EXPECTED  N s/s of infection. Hands are washed before and after entering the room. Prior to IV access the hub is scrubbed for 15 secs and allowed to ry.

## 2019-02-27 NOTE — DISCHARGE PLANNING
Care Transition Team Assessment    NOK spouse Alesia Woodson [phone]    Information Source  Orientation : Oriented x 4  Information Given By: Other (Comments) (medical record)  Who is responsible for making decisions for patient? : Patient    Readmission Evaluation  Is this a readmission?: No    Elopement Risk  Legal Hold: No  Ambulatory or Self Mobile in Wheelchair: No-Not an Elopement Risk  Elopement Risk: Not at Risk for Elopement    Interdisciplinary Discharge Planning  Patient or legal guardian wants to designate a caregiver (see row info): No    Discharge Preparedness  What is your plan after discharge?: Home with help  What are your discharge supports?: Spouse  Prior Functional Level: Ambulatory, Independent with Activities of Daily Living    Functional Assesment  Prior Functional Level: Ambulatory, Independent with Activities of Daily Living    Finances  Financial Barriers to Discharge: No  Prescription Coverage: Yes    Vision / Hearing Impairment  Vision Impairment : Yes  Right Eye Vision: Impaired, Wears Glasses  Left Eye Vision: Impaired, Wears Glasses  Hearing Impairment : Yes  Hearing Impairment: Both Ears, Hearing Device(s) Available    Values / Beliefs / Concerns  Values / Beliefs Concerns : No         Domestic Abuse  Physical Abuse or Sexual Abuse: No  Verbal Abuse or Emotional Abuse: No    Psychological Assessment  History of Substance Abuse: None  History of Psychiatric Problems: No  Non-compliant with Treatment: No  Newly Diagnosed Illness: No    Discharge Risks or Barriers  Discharge risks or barriers?: No    Anticipated Discharge Information  Anticipated discharge disposition: Home  Discharge Address: [address] [place] [address]  Discharge Contact Phone Number: [phone]

## 2019-02-27 NOTE — PROGRESS NOTES
2 RN skin check was done. Pt has a dressing on his back that had moderate sanguinous drainage and a dressing on the right flank that had moderate sanguinous dressing. The bottom of the pts right foot was red and blanchable.

## 2019-02-27 NOTE — CARE PLAN
Problem: Skin Integrity  Goal: Risk for impaired skin integrity will decrease    Intervention: Assess and monitor skin integrity, appearance and/or temperature  Dressings intact, drainage noted, orders to reinforce if needed, no skin breakdown noted.       Problem: Respiratory:  Goal: Respiratory status will improve    Intervention: Educate and encourage incentive spirometry usage  Orders for strict bed rest, flat, for total of 48 hours. Encouraging frequent use of incentive spirometry, patient motivated, patient able to reach up to 2250 ml. Breath sounds clear to auscultation.

## 2019-02-28 LAB
ANION GAP SERPL CALC-SCNC: 8 MMOL/L (ref 0–11.9)
BUN SERPL-MCNC: 11 MG/DL (ref 8–22)
CALCIUM SERPL-MCNC: 8.8 MG/DL (ref 8.5–10.5)
CHLORIDE SERPL-SCNC: 103 MMOL/L (ref 96–112)
CO2 SERPL-SCNC: 23 MMOL/L (ref 20–33)
CREAT SERPL-MCNC: 0.91 MG/DL (ref 0.5–1.4)
ERYTHROCYTE [DISTWIDTH] IN BLOOD BY AUTOMATED COUNT: 45.8 FL (ref 35.9–50)
GLUCOSE SERPL-MCNC: 124 MG/DL (ref 65–99)
HCT VFR BLD AUTO: 41.8 % (ref 42–52)
HGB BLD-MCNC: 13.1 G/DL (ref 14–18)
MCH RBC QN AUTO: 30.3 PG (ref 27–33)
MCHC RBC AUTO-ENTMCNC: 31.3 G/DL (ref 33.7–35.3)
MCV RBC AUTO: 96.8 FL (ref 81.4–97.8)
PLATELET # BLD AUTO: 221 K/UL (ref 164–446)
PMV BLD AUTO: 11.9 FL (ref 9–12.9)
POTASSIUM SERPL-SCNC: 4.4 MMOL/L (ref 3.6–5.5)
RBC # BLD AUTO: 4.32 M/UL (ref 4.7–6.1)
SODIUM SERPL-SCNC: 134 MMOL/L (ref 135–145)
WBC # BLD AUTO: 12.4 K/UL (ref 4.8–10.8)

## 2019-02-28 PROCEDURE — 700101 HCHG RX REV CODE 250: Performed by: PHYSICIAN ASSISTANT

## 2019-02-28 PROCEDURE — A9270 NON-COVERED ITEM OR SERVICE: HCPCS | Performed by: PHYSICIAN ASSISTANT

## 2019-02-28 PROCEDURE — 85027 COMPLETE CBC AUTOMATED: CPT

## 2019-02-28 PROCEDURE — 36415 COLL VENOUS BLD VENIPUNCTURE: CPT

## 2019-02-28 PROCEDURE — 700102 HCHG RX REV CODE 250 W/ 637 OVERRIDE(OP): Performed by: STUDENT IN AN ORGANIZED HEALTH CARE EDUCATION/TRAINING PROGRAM

## 2019-02-28 PROCEDURE — 700102 HCHG RX REV CODE 250 W/ 637 OVERRIDE(OP): Performed by: PHYSICIAN ASSISTANT

## 2019-02-28 PROCEDURE — 700111 HCHG RX REV CODE 636 W/ 250 OVERRIDE (IP): Performed by: PHYSICIAN ASSISTANT

## 2019-02-28 PROCEDURE — A9270 NON-COVERED ITEM OR SERVICE: HCPCS | Performed by: STUDENT IN AN ORGANIZED HEALTH CARE EDUCATION/TRAINING PROGRAM

## 2019-02-28 PROCEDURE — 700112 HCHG RX REV CODE 229: Performed by: PHYSICIAN ASSISTANT

## 2019-02-28 PROCEDURE — 770001 HCHG ROOM/CARE - MED/SURG/GYN PRIV*

## 2019-02-28 PROCEDURE — 99221 1ST HOSP IP/OBS SF/LOW 40: CPT | Mod: GC | Performed by: INTERNAL MEDICINE

## 2019-02-28 PROCEDURE — 80048 BASIC METABOLIC PNL TOTAL CA: CPT

## 2019-02-28 RX ORDER — HYDROCODONE BITARTRATE AND ACETAMINOPHEN 10; 325 MG/1; MG/1
1-2 TABLET ORAL EVERY 4 HOURS PRN
Status: DISCONTINUED | OUTPATIENT
Start: 2019-02-28 | End: 2019-03-01 | Stop reason: HOSPADM

## 2019-02-28 RX ORDER — MORPHINE SULFATE 15 MG/1
15 TABLET, FILM COATED, EXTENDED RELEASE ORAL EVERY 12 HOURS
Status: DISCONTINUED | OUTPATIENT
Start: 2019-02-28 | End: 2019-03-01 | Stop reason: HOSPADM

## 2019-02-28 RX ORDER — HYDROMORPHONE HYDROCHLORIDE 2 MG/1
2-4 TABLET ORAL
Status: DISCONTINUED | OUTPATIENT
Start: 2019-02-28 | End: 2019-03-01 | Stop reason: HOSPADM

## 2019-02-28 RX ADMIN — ANTACID TABLETS 500 MG: 500 TABLET, CHEWABLE ORAL at 17:25

## 2019-02-28 RX ADMIN — VENLAFAXINE 75 MG: 37.5 TABLET ORAL at 17:26

## 2019-02-28 RX ADMIN — Medication: at 05:41

## 2019-02-28 RX ADMIN — MORPHINE SULFATE 15 MG: 15 TABLET, EXTENDED RELEASE ORAL at 07:57

## 2019-02-28 RX ADMIN — OMEPRAZOLE 20 MG: 20 CAPSULE, DELAYED RELEASE ORAL at 17:25

## 2019-02-28 RX ADMIN — HYDROMORPHONE HYDROCHLORIDE 2 MG: 2 TABLET ORAL at 21:53

## 2019-02-28 RX ADMIN — ATORVASTATIN CALCIUM 80 MG: 40 TABLET, FILM COATED ORAL at 21:53

## 2019-02-28 RX ADMIN — ONDANSETRON 4 MG: 2 INJECTION INTRAMUSCULAR; INTRAVENOUS at 18:34

## 2019-02-28 RX ADMIN — HYDROMORPHONE HYDROCHLORIDE 2 MG: 2 TABLET ORAL at 18:30

## 2019-02-28 RX ADMIN — ENOXAPARIN SODIUM 40 MG: 100 INJECTION SUBCUTANEOUS at 14:46

## 2019-02-28 RX ADMIN — VENLAFAXINE 75 MG: 37.5 TABLET ORAL at 07:56

## 2019-02-28 RX ADMIN — POTASSIUM CHLORIDE, DEXTROSE MONOHYDRATE AND SODIUM CHLORIDE: 150; 5; 450 INJECTION, SOLUTION INTRAVENOUS at 04:28

## 2019-02-28 RX ADMIN — POTASSIUM CHLORIDE, DEXTROSE MONOHYDRATE AND SODIUM CHLORIDE: 150; 5; 450 INJECTION, SOLUTION INTRAVENOUS at 14:45

## 2019-02-28 RX ADMIN — CARVEDILOL 6.25 MG: 6.25 TABLET, FILM COATED ORAL at 17:25

## 2019-02-28 RX ADMIN — DOCUSATE SODIUM 100 MG: 100 CAPSULE, LIQUID FILLED ORAL at 17:25

## 2019-02-28 RX ADMIN — HYDROMORPHONE HYDROCHLORIDE 2 MG: 2 TABLET ORAL at 07:56

## 2019-02-28 RX ADMIN — PROCHLORPERAZINE EDISYLATE 10 MG: 5 INJECTION INTRAMUSCULAR; INTRAVENOUS at 19:00

## 2019-02-28 RX ADMIN — FINASTERIDE 5 MG: 5 TABLET, FILM COATED ORAL at 17:25

## 2019-02-28 RX ADMIN — TELMISARTAN 80 MG: 80 TABLET ORAL at 04:28

## 2019-02-28 RX ADMIN — GABAPENTIN 300 MG: 300 CAPSULE ORAL at 04:28

## 2019-02-28 RX ADMIN — TAMSULOSIN HYDROCHLORIDE 0.4 MG: 0.4 CAPSULE ORAL at 17:25

## 2019-02-28 RX ADMIN — MORPHINE SULFATE 15 MG: 15 TABLET, EXTENDED RELEASE ORAL at 17:25

## 2019-02-28 RX ADMIN — HYDROMORPHONE HYDROCHLORIDE 2 MG: 2 TABLET ORAL at 14:48

## 2019-02-28 RX ADMIN — ANTACID TABLETS 500 MG: 500 TABLET, CHEWABLE ORAL at 04:28

## 2019-02-28 RX ADMIN — GABAPENTIN 300 MG: 300 CAPSULE ORAL at 17:27

## 2019-02-28 RX ADMIN — HYDROCODONE BITARTRATE AND ACETAMINOPHEN 1 TABLET: 10; 325 TABLET ORAL at 20:20

## 2019-02-28 NOTE — PROGRESS NOTES
Clarified with MINERVA Cleveland regarding parameters for carvedilol, received verbal orders to change parameters to hold for HR less than 60.

## 2019-02-28 NOTE — CARE PLAN
Problem: Infection  Goal: Will remain free from infection  Outcome: PROGRESSING AS EXPECTED  No s/s of infection. Hands are washed before and after entering the room. Prior to IV access the hub is scrubbed for 15 secs and allowed to dry.     Problem: Pain Management  Goal: Pain level will decrease to patient's comfort goal  Outcome: PROGRESSING AS EXPECTED  With the pts PCA pump he is staying in a tolerable pain range.

## 2019-02-28 NOTE — PROGRESS NOTES
Neurosurgery Progress Note    Subjective:  POD2 SCS battery replacement, redo L4-S1 lamis with onlay fusion, durotomy repair  Notes LBP well controlled on PCA, but is asking to change to oral pain meds.  Denies LE pain  Denies HA.   Has had intermittent tachycardia overnight again, no cardiac history other than HTN  Denies chest pain/sob/constitutional symptoms.  EKG yesterday showed ventricular bigeminy, and cardiology consult is being placed.  No drain    Exam:  Motor 5/5 LEs  Dressing dry with slight strike through, no evidence of csf leak  No calf tenderness  Pulse irregular    BP  Min: 105/63  Max: 155/79  Pulse  Av  Min: 86  Max: 101  Resp  Av.2  Min: 15  Max: 19  Temp  Av.6 °C (97.9 °F)  Min: 36.3 °C (97.3 °F)  Max: 37 °C (98.6 °F)  SpO2  Av.8 %  Min: 91 %  Max: 97 %    No Data Recorded    Recent Labs      19   0412   WBC  13.5*   RBC  4.19*   HEMOGLOBIN  12.9*   HEMATOCRIT  39.1*   MCV  93.1   MCH  31.0   MCHC  33.3*   RDW  43.3   PLATELETCT  259   MPV  11.4     Recent Labs      19   0412  19   0352   SODIUM  132*  134*   POTASSIUM  4.5  4.4   CHLORIDE  102  103   CO2  24  23   GLUCOSE  134*  124*   BUN  19  11   CREATININE  1.29  0.91   CALCIUM  8.6  8.8               Intake/Output       19 0700 - 19 0659 19 07 - 19 0659      0-0659 Total 7965-6037 9428-1094 Total       Intake    P.O.  782  -- 782  --  -- --    P.O. 782 -- 782 -- -- --    I.V.  1222.5  68 1290.5  6  -- 6    PCA End of Shift Total Volume (ml) 22.5 68 90.5 6 -- 6    Volume (mL) (dextrose 5 % and 0.45 % NaCl with KCl 20 mEq) 1200 -- 1200 -- -- --    Total Intake 2004.5 68 2072.5 6 -- 6       Output    Urine  890  -- 890  --  -- --    Number of Times Voided 4 x -- 4 x -- -- --    Urine Void (mL) 890 -- 890 -- -- --    Stool  --  -- --  --  -- --    Number of Times Stooled 0 x -- 0 x -- -- --    Total Output 890 -- 890 -- -- --       Net I/O     1114.5 68 1182.5 6 -- 6             Intake/Output Summary (Last 24 hours) at 02/28/19 0751  Last data filed at 02/28/19 0707   Gross per 24 hour   Intake             2056 ml   Output              890 ml   Net             1166 ml            • morphine ER  15 mg Q12HRS   • HYDROcodone/acetaminophen  1-2 Tab Q4HRS PRN   • HYDROmorphone  2-4 mg Q3HRS PRN   • atorvastatin  80 mg Nightly   • carvedilol  6.25 mg BID WITH MEALS   • finasteride  5 mg Q EVENING   • gabapentin  300 mg BID   • tamsulosin  0.4 mg Q EVENING   • telmisartan  80 mg DAILY   • traZODone  100 mg QHS   • venlafaxine  75 mg BID WITH MEALS   • Pharmacy Consult Request  1 Each PHARMACY TO DOSE   • MD ALERT...DO NOT ADMINISTER NSAIDS or ASPIRIN unless ORDERED By Neurosurgery  1 Each PRN   • docusate sodium  100 mg BID   • senna-docusate  1 Tab Nightly   • senna-docusate  1 Tab Q24HRS PRN   • polyethylene glycol/lytes  1 Packet BID PRN   • magnesium hydroxide  30 mL QDAY PRN   • bisacodyl  10 mg Q24HRS PRN   • fleet  1 Each Once PRN   • dextrose 5 % and 0.45 % NaCl with KCl 20 mEq   Continuous   • enoxaparin  40 mg DAILY   • diphenhydrAMINE  25 mg Q6HRS PRN    Or   • diphenhydrAMINE  25 mg Q6HRS PRN   • ondansetron  4 mg Q4HRS PRN   • ondansetron  4 mg Q4HRS PRN   • promethazine  12.5-25 mg Q4HRS PRN   • promethazine  12.5-25 mg Q4HRS PRN   • prochlorperazine  5-10 mg Q4HRS PRN   • tizanidine  2 mg TID PRN   • ALPRAZolam  0.25 mg BID PRN   • cloNIDine  0.1 mg Q4HRS PRN   • benzocaine-menthol  1 Lozenge Q2HRS PRN   • calcium carbonate  500 mg BID   • omeprazole  20 mg Q EVENING       Assessment and Plan:  POD #2  Flat until 1200 today, ok to lie on side now.  Ok to elevate HOB to 30 degrees at 1200, then may increase HOB 10 degrees/hour as tolerated until upright.   Ice to incision as needed  Cardiology consult today.   Reinforce dressing as needed

## 2019-02-28 NOTE — CONSULTS
Cardiology Consult    CC: Ventricular bigeminy     HPI:  The patient is a 58-year-old male with past medical history of hypertension, degenerative disc disease was admitted to the hospital on 2/26/2019 for spinal cord stimulator battery replacement, lumbar fusion posterior L5-S1 with laminotomy.  -Postoperatively on telemetry monitoring patient was noted to have ventricular bigeminy and cardiology was consulted.  -Patient reports he has no prior cardiac history, no diagnosis of heart disease in the past.  He was diagnosed with hypertension about 2 years ago and has been on different medications since then, was found to have allergy to hydrochlorothiazide and spironolactone.  -Stated he has had echocardiogram twice in the past 2 years which was normal per patient.  -Currently takes telmisartan and carvedilol daily for hypertension.  He has been compliant to all of his medications.  -Denies any family history significant for premature coronary artery disease or sudden cardiac death.  -He is currently asymptomatic and does not have any cardiovascular symptoms of chest pain, chest discomfort, palpitations, blurry vision, lightheadedness, dizziness, lower extremity edema.  -He is a former smoker and smoked for 3 years.  Denies alcohol or illicit drug use.  MEDICATIONS:    Current Facility-Administered Medications:   •  morphine ER (MS CONTIN) tablet 15 mg, 15 mg, Oral, Q12HRS, Toby Cleveland, P.A.-C., 15 mg at 02/28/19 0757  •  HYDROcodone/acetaminophen (NORCO)  MG per tablet 1-2 Tab, 1-2 Tab, Oral, Q4HRS PRN, Toby Cleveland, P.A.-C.  •  HYDROmorphone (DILAUDID) tablet 2-4 mg, 2-4 mg, Oral, Q3HRS PRN, Toby Cleveland, P.A.-C., 2 mg at 02/28/19 0756  •  atorvastatin (LIPITOR) tablet 80 mg, 80 mg, Oral, Nightly, Toby Cleveland, P.A.-C., 80 mg at 02/27/19 2111  •  carvedilol (COREG) tablet 6.25 mg, 6.25 mg, Oral, BID WITH MEALS, Toby Cleveland, P.A.-C., Stopped at 02/27/19 0730  •  finasteride  (PROSCAR) tablet 5 mg, 5 mg, Oral, Q EVENING, Toby Cleveland, P.A.-C., 5 mg at 02/27/19 1710  •  gabapentin (NEURONTIN) capsule 300 mg, 300 mg, Oral, BID, Toby Cleveland, P.A.-C., 300 mg at 02/28/19 0428  •  tamsulosin (FLOMAX) capsule 0.4 mg, 0.4 mg, Oral, Q EVENING, Toby Cleveland, P.A.-C., 0.4 mg at 02/27/19 1709  •  telmisartan (MICARDIS) tablet 80 mg, 80 mg, Oral, DAILY, Toby Cleveland, P.A.-C., 80 mg at 02/28/19 0428  •  traZODone (DESYREL) tablet 100 mg, 100 mg, Oral, QHS, Toby Cleveland, P.A.-C., 100 mg at 02/27/19 2000  •  venlafaxine (EFFEXOR) tablet 75 mg, 75 mg, Oral, BID WITH MEALS, Toby Cleveland, P.A.-C., 75 mg at 02/28/19 0756  •  Pharmacy Consult Request ...Pain Management Review 1 Each, 1 Each, Other, PHARMACY TO DOSE, Toby Cleveland, P.A.-C.  •  MD ALERT...DO NOT ADMINISTER NSAIDS or ASPIRIN unless ORDERED By Neurosurgery 1 Each, 1 Each, Other, PRN, Toby Cleveland, P.A.-C.  •  docusate sodium (COLACE) capsule 100 mg, 100 mg, Oral, BID, Toby Cleveland, P.A.-C., 100 mg at 02/27/19 1710  •  senna-docusate (PERICOLACE or SENOKOT S) 8.6-50 MG per tablet 1 Tab, 1 Tab, Oral, Nightly, Toby Cleveland P.A.-C.  •  senna-docusate (PERICOLACE or SENOKOT S) 8.6-50 MG per tablet 1 Tab, 1 Tab, Oral, Q24HRS PRN, Toby Cleveland P.A.-C.  •  polyethylene glycol/lytes (MIRALAX) PACKET 1 Packet, 1 Packet, Oral, BID PRN, BALJINDER Wells.A.-C.  •  magnesium hydroxide (MILK OF MAGNESIA) suspension 30 mL, 30 mL, Oral, QDAY PRN, BALJINDER Wells.A.-C.  •  bisacodyl (DULCOLAX) suppository 10 mg, 10 mg, Rectal, Q24HRS PRN, BALJINDER Wells.A.-C.  •  fleet enema 133 mL, 1 Each, Rectal, Once PRN, Toby Cleveland P.A.-C.  •  dextrose 5 % and 0.45 % NaCl with KCl 20 mEq, , Intravenous, Continuous, Toby Cleveland P.A.-C., Last Rate: 100 mL/hr at 02/28/19 0428  •  enoxaparin (LOVENOX) inj 40 mg, 40 mg, Subcutaneous, DAILY, Toby Cleveland P.A.-C., 40 mg at  02/27/19 1549  •  diphenhydrAMINE (BENADRYL) tablet/capsule 25 mg, 25 mg, Oral, Q6HRS PRN **OR** diphenhydrAMINE (BENADRYL) injection 25 mg, 25 mg, Intravenous, Q6HRS PRN, Toby Cleveland, P.A.-C.  •  ondansetron (ZOFRAN) syringe/vial injection 4 mg, 4 mg, Intravenous, Q4HRS PRN, Toby Cleveland, P.A.-C.  •  ondansetron (ZOFRAN ODT) dispertab 4 mg, 4 mg, Oral, Q4HRS PRN, Toby Cleveland, P.A.-C.  •  promethazine (PHENERGAN) tablet 12.5-25 mg, 12.5-25 mg, Oral, Q4HRS PRN, Toby Cleveland, P.A.-C.  •  promethazine (PHENERGAN) suppository 12.5-25 mg, 12.5-25 mg, Rectal, Q4HRS PRN, Toby Cleveland, P.A.-C.  •  prochlorperazine (COMPAZINE) injection 5-10 mg, 5-10 mg, Intravenous, Q4HRS PRN, Toby Cleveland, P.A.-C., 10 mg at 02/26/19 2002  •  tizanidine (ZANAFLEX) tablet 2 mg, 2 mg, Oral, TID PRN, Toby Cleveland, P.A.-C.  •  ALPRAZolam (XANAX) tablet 0.25 mg, 0.25 mg, Oral, BID PRN, Toby Cleveland, P.A.-C.  •  cloNIDine (CATAPRES) tablet 0.1 mg, 0.1 mg, Oral, Q4HRS PRN, Toby Cleveland, P.A.-C., 0.1 mg at 02/26/19 2021  •  benzocaine-menthol (CEPACOL) lozenge 1 Lozenge, 1 Lozenge, Mouth/Throat, Q2HRS PRN, Toby LUCINDA Cleveland P.A.-C.  •  calcium carbonate (TUMS) chewable tab 500 mg, 500 mg, Oral, BID, Toby Cleveland P.A.-C., 500 mg at 02/28/19 0428  •  omeprazole (PRILOSEC) capsule 20 mg, 20 mg, Oral, Q EVENING, Toby Cleveland P.A.-C., 20 mg at 02/27/19 1710    ALLERGIES:   Mr. Wiley  is allergic to hctz [hydrochlorothiazide w-spironolactone] and oxycodone.     SURGERIES:  Mr. Wiley   has a past surgical history that includes spinal cord stimulator (2/26/2019); lumbar fusion posterior (2/26/2019); and laminotomy (2/26/2019).     MEDICAL ILLNESSES:  Mr. Wiley   has a past medical history of Depression; Hyperlipidemia; and Hypertension.     SOCIAL HISTORY:  Mr. Wiley   reports that he has been smoking.  He has a 0.75 pack-year smoking history. He has never used smokeless tobacco. He  "reports that he does not drink alcohol.     FAMILY HISTORY:  Mr.'s Wiley  family history includes Cancer in his mother.      ROS:    Constitutional: Patient has had no fevers or chills or fatigue. Patient has has no significant weight change.  Eyes: Patient had no visual changes or vision loss.  ENT: Patient denies any sore throat or allergic rhinitis.  Respiratory: Patient has had no cough or sputum production. Also, no hemoptysis.  Cardiovascular: As noted above.  GI: Patient denies any nausea, vomiting, or diarrhea. Patient has had no hematemesis or melena.  : Patient denies any urinary urgency, frequency, or dysuria. Patient has noted no hematuria.  Orthopedic: Patient has no particular problem with arthritis. Patient is able to walk and exercise without difficulty.  Neurologic: Patient has had no focal numbness or weakness.   Psychiatric: Patient denies any difficulty with anxiety or depression.  Endocrine: Patient denies any history of thyroid disorder or diabetes. Patient denies any heat or cold intolerance. In addition, patient denies any polydipsia or polyuria.  Hematologic: Patient has had no easy bruising or bleeding. Patient denies any history of anemia.  Skin: Patient denies any unusual rashes or skin lesions.  Allergic/immunologic: Patient denies any difficulty with hayfever or other environmental allergens. Patient denies any food allergies.     PHYSICAL EXAM:    /78   Pulse 94   Temp 37.5 °C (99.5 °F) (Temporal)   Resp 18   Ht 1.727 m (5' 8\")   Wt 114 kg (251 lb 5.2 oz)   SpO2 93%   BMI 38.21 kg/m²      General: in no acute distress.  HEENT: NC, AT, PERRL, EOMI, lids and conjunctiva are clear, moist oral mucosa. No JVD  Respit: CTAB, no wheezing or rales  Cardiac: RRR, no murmurs, S2 S2  Abdomen: soft, nontender, nondistended  Extremities: No clubbing, cyanosis, or edema is present.  Skin: warm  Neurologic: AOx3, No focal neurologic abnormality noted.    No results found for: " CHOLSTRLTOT, LDL, HDL, TRIGLYCERIDE    Lab Results   Component Value Date/Time    SODIUM 134 (L) 2019 03:52 AM    POTASSIUM 4.4 2019 03:52 AM    CHLORIDE 103 2019 03:52 AM    CO2 23 2019 03:52 AM    GLUCOSE 124 (H) 2019 03:52 AM    BUN 11 2019 03:52 AM    CREATININE 0.91 2019 03:52 AM     No results found for: ALKPHOSPHAT, ASTSGOT, ALTSGPT, TBILIRUBIN   No results found for: BNPBTYPENAT    There are no active problems to display for this patient.    Results for orders placed or performed during the hospital encounter of 19   EKG   Result Value Ref Range    Report       [facility]    Test Date:  2019  Pt Name:    TOMMY LUORNE                  Department: RST1  MRN:        [id number]                      Room:       [facility]  Gender:     Male                         Technician: [facility]  :        1960                   Requested By:ADALBERTO QUICK  Order #:    [id number]                    Reading MD: Fernando Martinez MD    Measurements  Intervals                                Axis  Rate:       87                           P:          21  AL:         168                          QRS:        -32  QRSD:       86                           T:          49  QT:         368  QTc:        443    Interpretive Statements  SINUS RHYTHM  VENTRICULAR PREMATURE COMPLEX  LEFT AXIS DEVIATION  BASELINE WANDER IN LEAD(S) III,V4  No previous ECG available for comparison    Electronically Signed On 2019 17:33:25 PST by Fernando Martinez MD     EKG   Result Value Ref Range    Report       [facility]    Test Date:  2019  Pt Name:    TOMMY WILLINGHAM                  Department: 131  MRN:        [id number]                      Room:       [facility]  Gender:     Male                         Technician: [facility]  :        1960                   Requested By:HERLINDA ROMEO  Order #:    [id number]                    Reading MD: Presley Perkins  MD    Measurements  Intervals                                Axis  Rate:       99                           P:          57  KY:         190                          QRS:        4  QRSD:       88                           T:          53  QT:         343  QTc:        441    Interpretive Statements  SINUS TACHYCARDIA  VENTRICULAR BIGEMINY  BORDERLINE LOW VOLTAGE, EXTREMITY LEADS  BASELINE WANDER IN LEAD(S) V3  Compared to ECG 02/26/2019 10:57:33  Sinus rhythm no longer present  Left-axis deviation no longer present    Electronically Signed On 2- 13:28:29 PST by Presley Perkins MD       ASSESSMENT & PLAN:    58-year-old male with past medical history of hypertension who is currently asymptomatic was noted to have PVCs on telemetry monitoring.  -No history of cardiac disease.    #Ventricular quadrigeminy:  #Hypertension    -On review of the EKG strips every third QRS coomplex there is a PVC.  Normal QTC, normal [place] interval.  No ST-T wave changes.  -Records from [place] requested as the patient had a prior echo in the past 1 year.  -Continue carvedilol 6.25 mg twice a day, telmisartan daily.  -Continue telemetry monitoring and monitor blood pressures daily.  -No additional intervention needed at this time.    Patient is advised to talk to his PCP regarding Sleep apnea study as outpatient on discharge as he has high likelihood for sleep apnea      -Please do not hesitate to contact the cardiology team for further questions.

## 2019-02-28 NOTE — CARE PLAN
Problem: Communication  Goal: The ability to communicate needs accurately and effectively will improve    Intervention: Educate patient and significant other/support system about the plan of care, procedures, treatments, medications and allow for questions  PA from neurosurgery at bedside, plan to raise head of bed at 30 degrees and 10 degrees every hour after as tolerated, discussed slow elevation to assess for headache, patient verbalized understanding.       Problem: Pain Management  Goal: Pain level will decrease to patient's comfort goal    Intervention: Follow pain managment plan developed in collaboration with patient and Interdisciplinary Team  PCA discontinued, administered PO dilaudid 2 mg according to MAR, pain management in place.

## 2019-03-01 VITALS
HEIGHT: 68 IN | RESPIRATION RATE: 18 BRPM | OXYGEN SATURATION: 91 % | WEIGHT: 251.32 LBS | TEMPERATURE: 98.4 F | DIASTOLIC BLOOD PRESSURE: 83 MMHG | SYSTOLIC BLOOD PRESSURE: 115 MMHG | BODY MASS INDEX: 38.09 KG/M2 | HEART RATE: 111 BPM

## 2019-03-01 LAB
ANION GAP SERPL CALC-SCNC: 6 MMOL/L (ref 0–11.9)
BUN SERPL-MCNC: 8 MG/DL (ref 8–22)
CALCIUM SERPL-MCNC: 9.2 MG/DL (ref 8.5–10.5)
CHLORIDE SERPL-SCNC: 96 MMOL/L (ref 96–112)
CO2 SERPL-SCNC: 31 MMOL/L (ref 20–33)
CREAT SERPL-MCNC: 0.76 MG/DL (ref 0.5–1.4)
ERYTHROCYTE [DISTWIDTH] IN BLOOD BY AUTOMATED COUNT: 40.8 FL (ref 35.9–50)
GLUCOSE SERPL-MCNC: 150 MG/DL (ref 65–99)
HCT VFR BLD AUTO: 41.1 % (ref 42–52)
HGB BLD-MCNC: 13.5 G/DL (ref 14–18)
MCH RBC QN AUTO: 30.4 PG (ref 27–33)
MCHC RBC AUTO-ENTMCNC: 32.8 G/DL (ref 33.7–35.3)
MCV RBC AUTO: 92.6 FL (ref 81.4–97.8)
PLATELET # BLD AUTO: 221 K/UL (ref 164–446)
PMV BLD AUTO: 11.2 FL (ref 9–12.9)
POTASSIUM SERPL-SCNC: 4.3 MMOL/L (ref 3.6–5.5)
RBC # BLD AUTO: 4.44 M/UL (ref 4.7–6.1)
SODIUM SERPL-SCNC: 133 MMOL/L (ref 135–145)
WBC # BLD AUTO: 9.8 K/UL (ref 4.8–10.8)

## 2019-03-01 PROCEDURE — A9270 NON-COVERED ITEM OR SERVICE: HCPCS | Performed by: PHYSICIAN ASSISTANT

## 2019-03-01 PROCEDURE — 85027 COMPLETE CBC AUTOMATED: CPT

## 2019-03-01 PROCEDURE — 700102 HCHG RX REV CODE 250 W/ 637 OVERRIDE(OP): Performed by: STUDENT IN AN ORGANIZED HEALTH CARE EDUCATION/TRAINING PROGRAM

## 2019-03-01 PROCEDURE — 700112 HCHG RX REV CODE 229: Performed by: PHYSICIAN ASSISTANT

## 2019-03-01 PROCEDURE — 97165 OT EVAL LOW COMPLEX 30 MIN: CPT

## 2019-03-01 PROCEDURE — 36415 COLL VENOUS BLD VENIPUNCTURE: CPT

## 2019-03-01 PROCEDURE — 80048 BASIC METABOLIC PNL TOTAL CA: CPT

## 2019-03-01 PROCEDURE — 700101 HCHG RX REV CODE 250: Performed by: PHYSICIAN ASSISTANT

## 2019-03-01 PROCEDURE — 97161 PT EVAL LOW COMPLEX 20 MIN: CPT

## 2019-03-01 PROCEDURE — A9270 NON-COVERED ITEM OR SERVICE: HCPCS | Performed by: STUDENT IN AN ORGANIZED HEALTH CARE EDUCATION/TRAINING PROGRAM

## 2019-03-01 PROCEDURE — 700102 HCHG RX REV CODE 250 W/ 637 OVERRIDE(OP): Performed by: PHYSICIAN ASSISTANT

## 2019-03-01 RX ORDER — HYDROCODONE BITARTRATE AND ACETAMINOPHEN 10; 325 MG/1; MG/1
1 TABLET ORAL EVERY 4 HOURS PRN
Qty: 84 TAB | Refills: 0 | Status: SHIPPED | OUTPATIENT
Start: 2019-03-01 | End: 2019-03-15

## 2019-03-01 RX ORDER — CEPHALEXIN 500 MG/1
500 CAPSULE ORAL 4 TIMES DAILY
Qty: 20 CAP | Refills: 0 | Status: ON HOLD | OUTPATIENT
Start: 2019-03-01 | End: 2020-08-01

## 2019-03-01 RX ORDER — MORPHINE SULFATE 15 MG/1
15 TABLET, FILM COATED, EXTENDED RELEASE ORAL EVERY 12 HOURS
Qty: 60 TAB | Refills: 0 | Status: SHIPPED | OUTPATIENT
Start: 2019-03-01 | End: 2019-03-31

## 2019-03-01 RX ORDER — TIZANIDINE 2 MG/1
2 TABLET ORAL 3 TIMES DAILY PRN
Qty: 90 TAB | Refills: 0 | Status: ON HOLD | OUTPATIENT
Start: 2019-03-01 | End: 2020-08-12

## 2019-03-01 RX ADMIN — ANTACID TABLETS 500 MG: 500 TABLET, CHEWABLE ORAL at 05:16

## 2019-03-01 RX ADMIN — CARVEDILOL 6.25 MG: 6.25 TABLET, FILM COATED ORAL at 08:45

## 2019-03-01 RX ADMIN — DOCUSATE SODIUM 100 MG: 100 CAPSULE, LIQUID FILLED ORAL at 05:17

## 2019-03-01 RX ADMIN — VENLAFAXINE 75 MG: 37.5 TABLET ORAL at 08:46

## 2019-03-01 RX ADMIN — POTASSIUM CHLORIDE, DEXTROSE MONOHYDRATE AND SODIUM CHLORIDE: 150; 5; 450 INJECTION, SOLUTION INTRAVENOUS at 01:10

## 2019-03-01 RX ADMIN — MORPHINE SULFATE 15 MG: 15 TABLET, EXTENDED RELEASE ORAL at 05:17

## 2019-03-01 RX ADMIN — TELMISARTAN 80 MG: 80 TABLET ORAL at 05:17

## 2019-03-01 RX ADMIN — HYDROMORPHONE HYDROCHLORIDE 2 MG: 2 TABLET ORAL at 05:17

## 2019-03-01 RX ADMIN — GABAPENTIN 300 MG: 300 CAPSULE ORAL at 05:17

## 2019-03-01 ASSESSMENT — COGNITIVE AND FUNCTIONAL STATUS - GENERAL
MOVING FROM LYING ON BACK TO SITTING ON SIDE OF FLAT BED: A LITTLE
DAILY ACTIVITIY SCORE: 23
MOBILITY SCORE: 19
CLIMB 3 TO 5 STEPS WITH RAILING: A LITTLE
STANDING UP FROM CHAIR USING ARMS: A LITTLE
WALKING IN HOSPITAL ROOM: A LITTLE
SUGGESTED CMS G CODE MODIFIER DAILY ACTIVITY: CI
SUGGESTED CMS G CODE MODIFIER MOBILITY: CK
MOVING TO AND FROM BED TO CHAIR: A LITTLE
DRESSING REGULAR LOWER BODY CLOTHING: A LITTLE

## 2019-03-01 ASSESSMENT — GAIT ASSESSMENTS
GAIT LEVEL OF ASSIST: SUPERVISED
DEVIATION: BRADYKINETIC
DISTANCE (FEET): 145
ASSISTIVE DEVICE: FRONT WHEEL WALKER

## 2019-03-01 ASSESSMENT — ACTIVITIES OF DAILY LIVING (ADL): TOILETING: INDEPENDENT

## 2019-03-01 NOTE — DISCHARGE SUMMARY
DATE OF ADMISSION:  02/26/2019    DATE OF DISCHARGE:  03/01/2019    DISCHARGE DIAGNOSES:  1.  Low back pain.  2.  Lower extremity pain.  3.  Lower extremity paresthesia.  4.  Chronic pain syndrome.    SURGERY PERFORMED:  Right flank dorsal column stimulator battery replacement   with redo left L4-S1 laminotomies with foraminotomies with onlay fusion,   performed by Dr. Stepan Hawkins.    COMPLICATIONS:  None.    REASON FOR ADMISSION:  This is a very pleasant 58-year-old male who is   actually well known to our office, as he did have placement of permanent   dorsal column stimulator by Dr. Hawkins in 2010.  He did have prior L2-S1   laminectomies with L4-S1 ALIF in 2009 performed by Dr. Holder.  He did okay   following this, but then needed the both column stimulator and has been   dealing with progressive lower extremity pain and back pain that has been   refractory to conservative treatments including physical therapy and epidural   steroid injections.  His preoperative workup showed postsurgical changes with   recurrent stenosis on the left from L4-S1 with evidence of some   pseudoarthrosis at L5-S1 and the patient was hereby indicated for the above   surgery.    HOSPITAL COURSE:  Patient was admitted on the date of surgery.  He underwent   the above surgery without complication and was moved to orthopedics floor.    Actually, at the time of surgery, he was found to have had a prior spinal   fluid leak with Bangs-Dusty sutures in place still, but there was extension of   the durotomy coming from that upon decompression.  This was directly repaired.    The patient was left flat for 24 hours.  Unfortunately, during that time on   the floor, he did develop an irregular heart rate.  EKG was performed that   showed ventricular bigeminy.  Cardiology consult was placed and he was   evaluated and apparently, the patient had had known history of PVCs.    Cardiology worked him up for this and the patient has been stable and    cardiology has not recommended any further treatments or workup for this, as   the patient is stable.  Here now on postoperative day #3, the patient's pain   is controlled with oral medications.  He is ambulating.  He is voiding.  He is   tolerating oral food and medications well.  He is denying any cardiac   symptoms and he is hereby cleared for discharge home under stable condition.    DISCHARGE INSTRUCTIONS:  The patient is to eat a normal diet as tolerated.  He   is to walk as much as tolerated.  He is to avoid repetitive bending at the   waist.  He is to avoid lifting, pushing or pulling greater than 15 pounds.  It   will be okay for the patient to drive in 2 weeks' time when he is comfortable   not taking narcotic pain medications.  He should take an over-the-counter   stool softener while taking narcotic pain medication.  He should avoid NSAIDs   for 3 months.  He is encouraged to ice his incisions for 10-15 minutes   multiple times per day.  He does have followup appointments in the office of   [facility] in 2 and 6 weeks' time.  He is to keep those appointments.  He   should call our office or go to the nearest emergency department with any   emergent changes.  The patient was given these discharge instructions verbally   and he voiced understanding of them.    DISCHARGE MEDICATIONS:  In addition to the patient's normal home medications,   he will be discharged home on:  1.  MS Contin 15 mg 1 tab p.o. b.i.d. for 30 days, dispensed #60, no refills.  2.  Norco 10/325, 1 tab p.o. q. 4-6 hours p.r.n. pain, dispensed #84, no   refills.  3.  Zanaflex 2 mg 1 tab p.o. t.i.d. p.r.n. spasm, dispensed #90, no refills.  4.  Keflex 500 mg 1 tab p.o. q.i.d. x5 days, dispensed #20, no refills.       ____________________________________     JOSE E LANGLEY / PRINCE    DD:  03/01/2019 08:35:48  DT:  03/01/2019 08:58:47    D#:  [id number]  Job#:  [id number]    cc: Darrell Caal PA-C

## 2019-03-01 NOTE — THERAPY
"Occupational Therapy Evaluation completed.   Functional Status:  Supervision supine to sit.  Pt walked to bathroom w/FWW.  Pt donned underwear and pants with SBA.  Pt required min A to don tennis shoes.  Plan of Care: Patient with no further skilled OT needs in the acute care setting at this time  Discharge Recommendations:  Equipment: No Equipment Needed.     Pt is 57 y/o M seen for OT evaluation. Pt underwent L5-S1 fusion and placement of spinal stimulator. Pt has had multiple back surgeries prior. Pt educated on spinal precautions, ADL's/adaptive strategies, and ADL transfers. Pt to DC home with support from family. Pt reports no further concerns with self-care at this time. Pt with no further acute OT needs.    See \"Rehab Therapy-Acute\" Patient Summary Report for complete documentation.    "

## 2019-03-01 NOTE — DISCHARGE INSTRUCTIONS
Discharge Instructions    Ok to shower.  No NSAID's for 3 months.  Avoid repetitive lifting, pushing, pulling greater than 15 pounds. Follow up with [facility] in 2/6 weeks.  Call with questions.    Discharged to home by car with relative. Discharged via wheelchair, hospital escort: Yes.  Special equipment needed: Walker    Be sure to schedule a follow-up appointment with your primary care doctor or any specialists as instructed.     Discharge Plan:   Influenza Vaccine Indication: Indicated: 9 to 64 years of age    I understand that a diet low in cholesterol, fat, and sodium is recommended for good health. Unless I have been given specific instructions below for another diet, I accept this instruction as my diet prescription.   Other diet: Regular    Special Instructions: None    · Is patient discharged on Warfarin / Coumadin?   No     Laminectomy, Care After  Refer to this sheet in the next few weeks. These instructions provide you with information about caring for yourself after your procedure. Your health care provider may also give you more specific instructions. Your treatment has been planned according to current medical practices, but problems sometimes occur. Call your health care provider if you have any problems or questions after your procedure.  WHAT TO EXPECT AFTER THE PROCEDURE  After your procedure, it is common to have some pain around the incision.  HOME CARE INSTRUCTIONS  Bathing  · You may shower after the bandage (dressing) has been removed or as directed by your health care provider.  · Do not take baths, swim, or use a hot tub for 2 weeks or until your incision has healed completely. Check with your health care provider before you start any of these activities.  Incision Care  · There are many different ways to close and cover an incision, including stitches, skin glue, and adhesive strips. Follow instructions from your health care provider about:  ¨ Incision care.  ¨ Dressing changes and  removal.  ¨ Incision closure removal.  · Check your incision area every day for signs of infection. Watch for:  ¨ Redness, swelling, or pain.  ¨ Fluid, blood, or pus.  Activity  · Return to your normal activities as directed by your health care provider. Ask your health care provider what activities are safe for you.  · Perform breathing exercises if directed by your health care provider.  · Avoid bending or twisting at your waist. Always bend at your knees.  · Do not sit for more than 20-30 minutes at a time. Lie down or walk between periods of sitting.  · Do not lift anything that is heavier than 10 lb (4.5 kg).  · Do not drive for 2 weeks after your procedure or as directed by your health care provider. You may be a passenger for 20-30 minute trips.  · Do not drive or operate heavy machinery while taking pain medicine.  General Instructions  · Take medicines only as directed by your health care provider.  · Keep all follow-up visits as directed by your health care provider. This is important.  SEEK MEDICAL CARE IF:  · You have redness, swelling, or increasing pain in the area of your incision.  · You notice a bad smell coming from the incision or dressing.  · You are not able to return to activities or perform exercises as instructed by your health care provider.  SEEK IMMEDIATE MEDICAL CARE IF:  · You develop a rash.  · You have fluid, blood, or pus coming from your incision.  · You have chills or a fever.  · You have episodes of dizziness or fainting while standing.  · You develop shortness of breath or you have difficulty breathing.  · You lose the ability to control your bladder or bowel.  · You become weak.  · You cannot use your legs.     This information is not intended to replace advice given to you by your health care provider. Make sure you discuss any questions you have with your health care provider.     Document Released: 07/07/2006 Document Revised: 05/03/2016 Document Reviewed: 12/14/2015  DocumentCloud  Interactive Patient Education ©2016 [employer]      Depression / Suicide Risk    As you are discharged from this [facility] facility, it is important to learn how to keep safe from harming yourself.    Recognize the warning signs:  · Abrupt changes in personality, positive or negative- including increase in energy   · Giving away possessions  · Change in eating patterns- significant weight changes-  positive or negative  · Change in sleeping patterns- unable to sleep or sleeping all the time   · Unwillingness or inability to communicate  · Depression  · Unusual sadness, discouragement and loneliness  · Talk of wanting to die  · Neglect of personal appearance   · Rebelliousness- reckless behavior  · Withdrawal from people/activities they love  · Confusion- inability to concentrate     If you or a loved one observes any of these behaviors or has concerns about self-harm, here's what you can do:  · Talk about it- your feelings and reasons for harming yourself  · Remove any means that you might use to hurt yourself (examples: pills, rope, extension cords, firearm)  · Get professional help from the community (Mental Health, Substance Abuse, psychological counseling)  · Do not be alone:Call your Safe Contact- someone whom you trust who will be there for you.  · Call your local CRISIS HOTLINE 824-8626 or 684-955-0476  · Call your local Children's Mobile Crisis Response Team [place] (231) 683-6455 or www.[employer]  · Call the toll free National Suicide Prevention Hotlines   · National Suicide Prevention Lifeline 944-349-QJOW (3281)  · National Hope Line Network 800-SUICIDE (716-8963)

## 2019-03-01 NOTE — CARE PLAN
Problem: Discharge Barriers/Planning  Goal: Patient's continuum of care needs will be met    Intervention: Assess potential discharge barriers on admission and throughout hospital stay  Pending PT and OT evaluation prior to home, patient aware with discharge planning.       Problem: Skin Integrity  Goal: Risk for impaired skin integrity will decrease    Intervention: Assess and monitor skin integrity, appearance and/or temperature  Surgical dressings intact, received verbal order from MINERVA Cleveland to change dressing prior to discharge.

## 2019-03-01 NOTE — CARE PLAN
Problem: Infection  Goal: Will remain free from infection  Outcome: PROGRESSING AS EXPECTED  No s/s of infection. Hands are washed before and after entering the room.     Problem: Bowel/Gastric:  Goal: Normal bowel function is maintained or improved  Outcome: PROGRESSING SLOWER THAN EXPECTED  Pt has had no BM since the 26.

## 2019-03-01 NOTE — THERAPY
"Physical Therapy Evaluation completed.   Bed Mobility:  Supine to Sit: Supervised  Transfers: Sit to Stand: Supervised  Gait: Level Of Assist: Supervised with Front-Wheel Walker   145ft    Plan of Care: Patient with no further skilled PT needs in the acute care setting at this time  Discharge Recommendations: Equipment: No Equipment Needed. Post-acute therapy Recommend outpatient  services for continued physical therapy services.    Mr. Wiley is a 59 y/o male who presents to acute secondary to SCS battery replacement, redo L4-S1 lami with onlay fusion, and durotomy repair. He presents with mild R LE weakness and B LE numbness which he reports are baseline. Reviewed spinal precautions and log roll technique, pt aware of these already from prior spinal surgeries. He was able to perform gait, transfers, and bed mobility without physical assist. Able to don LSO without assist as well. Demonstrated 100% compliance with spinal precautions with all mobility. No additional acute physical therapy needs. Recommend  outpatient PT once cleared by MD to address corest weakness.     See \"Rehab Therapy-Acute\" Patient Summary Report for complete documentation.     "

## 2019-03-01 NOTE — PROGRESS NOTES
Received verbal order to change surgical dressing with gauze and tegaderm once prior to discharge per MINERVA Cleveland 3/1/2019

## 2019-03-01 NOTE — PROGRESS NOTES
Discharged patient to home, reviewed discharge instructions and prescriptions, patient verbalized understanding, escorted by LATOYA Queen via wheelchair.

## 2019-03-01 NOTE — PROGRESS NOTES
Neurosurgery Progress Note    Subjective:  POD3 SCS battery replacement, redo L4-S1 lamis with onlay fusion, durotomy repair  Pain well controlled.  Denies LE pain  Denies HA.   Cardiology has seen and has not recommended any additional treatments, but needs to f/u with his PCP regarding his PVC's.    Exam:  Motor 5/5 LEs  Dressing dry with slight strike through, no evidence of csf leak  No calf tenderness      BP  Min: 114/78  Max: 159/112  Pulse  Av.2  Min: 81  Max: 95  Resp  Avg: 15.7  Min: 12  Max: 18  Temp  Av.9 °C (98.5 °F)  Min: 36.2 °C (97.2 °F)  Max: 37.5 °C (99.5 °F)  SpO2  Av.2 %  Min: 91 %  Max: 93 %    No Data Recorded    Recent Labs      19   0312   WBC  13.5*  12.4*  9.8   RBC  4.19*  4.32*  4.44*   HEMOGLOBIN  12.9*  13.1*  13.5*   HEMATOCRIT  39.1*  41.8*  41.1*   MCV  93.1  96.8  92.6   MCH  31.0  30.3  30.4   MCHC  33.3*  31.3*  32.8*   RDW  43.3  45.8  40.8   PLATELETCT  259  221  221   MPV  11.4  11.9  11.2     Recent Labs      192  19   0311   SODIUM  132*  134*  133*   POTASSIUM  4.5  4.4  4.3   CHLORIDE  102  103  96   CO2  24  23  31   GLUCOSE  134*  124*  150*   BUN  19  11  8   CREATININE  1.29  0.91  0.76   CALCIUM  8.6  8.8  9.2               Intake/Output       19 - 19 - 19 Total  Total       Intake    P.O.  1020  -- 1020  --  -- --    P.O. 1020 -- 1020 -- -- --    I.V.  1206  -- 1206  --  -- --    PCA End of Shift Total Volume (ml) 6 -- 6 -- -- --    Volume (mL) (dextrose 5 % and 0.45 % NaCl with KCl 20 mEq) 1200 -- 1200 -- -- --    Total Intake 2226 -- 2226 -- -- --       Output    Urine  1160  -- 1160  --  -- --    Number of Times Voided 3 x -- 3 x -- -- --    Urine Void (mL) 1160 -- 1160 -- -- --    Stool  --  -- --  --  -- --    Number of Times Stooled 0 x -- 0 x -- -- --    Total Output 1160 -- 1160 --  -- --       Net I/O     1066 -- 1066 -- -- --            Intake/Output Summary (Last 24 hours) at 03/01/19 0824  Last data filed at 02/28/19 1830   Gross per 24 hour   Intake             1720 ml   Output             1160 ml   Net              560 ml            • morphine ER  15 mg Q12HRS   • HYDROcodone/acetaminophen  1-2 Tab Q4HRS PRN   • HYDROmorphone  2-4 mg Q3HRS PRN   • atorvastatin  80 mg Nightly   • carvedilol  6.25 mg BID WITH MEALS   • finasteride  5 mg Q EVENING   • gabapentin  300 mg BID   • tamsulosin  0.4 mg Q EVENING   • telmisartan  80 mg DAILY   • traZODone  100 mg QHS   • venlafaxine  75 mg BID WITH MEALS   • Pharmacy Consult Request  1 Each PHARMACY TO DOSE   • MD ALERT...DO NOT ADMINISTER NSAIDS or ASPIRIN unless ORDERED By Neurosurgery  1 Each PRN   • docusate sodium  100 mg BID   • senna-docusate  1 Tab Nightly   • senna-docusate  1 Tab Q24HRS PRN   • polyethylene glycol/lytes  1 Packet BID PRN   • magnesium hydroxide  30 mL QDAY PRN   • bisacodyl  10 mg Q24HRS PRN   • fleet  1 Each Once PRN   • dextrose 5 % and 0.45 % NaCl with KCl 20 mEq   Continuous   • enoxaparin  40 mg DAILY   • diphenhydrAMINE  25 mg Q6HRS PRN    Or   • diphenhydrAMINE  25 mg Q6HRS PRN   • ondansetron  4 mg Q4HRS PRN   • ondansetron  4 mg Q4HRS PRN   • promethazine  12.5-25 mg Q4HRS PRN   • promethazine  12.5-25 mg Q4HRS PRN   • prochlorperazine  5-10 mg Q4HRS PRN   • tizanidine  2 mg TID PRN   • ALPRAZolam  0.25 mg BID PRN   • cloNIDine  0.1 mg Q4HRS PRN   • benzocaine-menthol  1 Lozenge Q2HRS PRN   • calcium carbonate  500 mg BID   • omeprazole  20 mg Q EVENING       Assessment and Plan:  POD #3  PT/OT today.  D/c home later today.  Has FWW at home already.

## 2019-03-03 NOTE — OP REPORT
DATE OF SERVICE:  02/26/2019    PREOPERATIVE DIAGNOSES:  1.  Bilateral L5-S1 radiculopathies.  2.  L4-L5 and L5-S1 lateral recess and foraminal stenosis.  3.  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  4.  Permanent spinal cord stimulator battery dysfunction.    POSTOPERATIVE DIAGNOSES:  1.  Bilateral L5-S1 radiculopathies.  2.  L4-L5 and L5-S1 lateral recess and foraminal stenosis.  3.  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  4.  Permanent spinal cord stimulator battery dysfunction.    PROCEDURES:  STAGE I PROCEDURE:  1.  Redo left L4 laminotomy and foraminotomy, decompression of left L4 nerve   root.  2.  Redo left L5 laminotomy and foraminotomy, decompression of left L5 nerve   root.  3.  Redo left S1 laminotomy and foraminotomy, decompression of left S1 nerve   root.  4.  Left L5 transpedicular approach far lateral decompression left L5 nerve   root.  5.  Left S1 transpedicular approach with far lateral decompression of left S1   nerve root.  6.  L5-S1 posterior lateral arthrodesis.  7.  Modifier-22 for extra degree of difficulty given the patient's body mass   index of 38 with comorbidities of hypertension consistent with morbid obesity,   which added an extra hour to the standard surgical procedure.  8.  Repair of left-sided durotomy, which was a breakdown from the prior   durotomy repair performed elsewhere.    STAGE II PROCEDURE:  1.  Exchange and replacement of the dorsal column stimulator battery.  2.  Complex testing and analysis of the spinal cord stimulator, which was   intact.    SURGEON:  Stepan Hawkins MD, neurosurgery-spine surgery.    ASSISTANT:  Toby Cleveland PA-C    ANESTHESIA:  General endotracheal.    ANESTHESIOLOGIST:  Zheng Churchill MD    COMPLICATIONS:  None.    ESTIMATED BLOOD LOSS:  Less than 100 mL.    PREOPERATIVE NOTE:  This is a very pleasant 58-year-old male who presents with   predominantly left lower extremity radicular leg pain and low back pain.  MRI   showed L4-S1 lumbar  stenosis in the left with a nonunion at L5-S1.  The   patient had a dorsal column stimulator battery, which had failed and the   patient required the battery replacement.  Previous stimulator was placed in   2010.  I discussed surgical procedure, alternatives, goals, risks and   benefits, complications in detail with patient.  He consents to surgery for   the above listed procedure.  Details are well contained in the office visit   notes.    NARRATIVE DICTATION:  The patient was brought to the operating room and placed   under general endotracheal anesthesia.  He was placed prone on the operating   OSI table with care taken about the bony prominences, peripheral nerves.    Lumbar region was prepped and draped in usual sterile fashion.  Following   localization with cross table fluoroscopy, a midline incision was made, the   left side was exposed.  Self-retaining long Zelpi retractors were applied,   given the patient's size.  Excellent exposure was achieved.  Using Midas Bill   AM-8 drillbit, Kerrison rongeurs and curettes, redo left L4 laminotomy and   foraminotomies completed over the left L4 nerve root and redo left L5   laminotomy and foraminotomies completed over the left L5 nerve root and redo   left S1 laminotomy and foraminotomy was completed over the left S1 nerve   roots.  There was considerable stenosis at L5-S1, given the collapse and hence   I drilled down the left L5 pedicle and the left S1 pedicle for transpedicular   decompression of the left L5-S1 nerve root complex.  This required extra   degree of expertise, effort and time and hence a modifier-59 is required for   separate and distinct procedures.  For separate and distinct procedures for   CPT code 66139-17222.  The edges of bone were bone waxed.  Thrombin Gelfoam   placed in epidural gutters for hemostasis.  I noted an incidental durotomy in   the left side and this was essentially where the prior durotomy was repaired   elsewhere.  I can see  the suture breaking down.  Hence, I placed a 5-0   Dardanelle-Dusty suture in the dura with a muscle patch for durotomy repair over this.    The wound was irrigated with bacitracin irrigation.  Duragen was placed over   this and DuraSeal for watertight seal.  Wound was irrigated with bacitracin   irrigation.  Immaculate hemostasis achieved.  I then decorticated the   transverse process of L5-S1 and packed local morcellized autograft and DBM for   posterolateral arthrodesis.  Given the patient's body habitus with body mass   index of 38 and his comorbidities consistent with morbid obesity, an extra   hour for the standard surgical procedure was required and hence a modifier-22   is required.  Wound was irrigated with bacitracin irrigation.  Wound was   closed over a drain brought through a separate incision with 0 Vicryl deep   fascia, 2-0 Vicryl deep dermal layer and running 3-0 nylon to skin.  Small   sterile dressing was applied.    For stage II procedure, I exposed the right flank and infiltrated the skin   with local anesthetic, a small incision was made and dissection carried out   exposing the prior battery.  The battery was removed from its pocket and   disconnected from the stimulator leads.  I then cleaned the leads   appropriately and made a larger pocket for the larger battery and then I   connected the battery to the leads in the appropriate fashion.  We tested and   stimulated the system and then the battery was placed in the pocket and   complex analysis was achieved and the system was working well.  The wound was   irrigated with bacitracin irrigation and the wound was closed in multiple   layers with 2-0 Vicryl deep dermal layers and 2-0 Vicryl, subcuticular with   Steri-Strips to skin.  Small sterile dressing was applied.  Swabs, needles,   instruments correct by 2 count.  No complications were encountered.  The   patient tolerated procedure, was stable and transferred to recovery room.  The   patient was  neurologically intact in recovery room extremely comfortable and   his battery stimulation was tested and turned on in the recovery room.    The patient will be observed at [facility] for several   days until he meets discharge criteria.  The patient will follow up at [facility] as instructed.       ____________________________________     MD ROBERT MARKS / PRINCE    DD:  03/03/2019 07:22:19  DT:  03/03/2019 08:14:23    D#:  [id number]  Job#:  [id number]    cc: Darrell Caal PA-C

## 2019-03-05 ENCOUNTER — HOSPITAL ENCOUNTER (OUTPATIENT)
Dept: RADIOLOGY | Facility: MEDICAL CENTER | Age: 59
End: 2019-03-05

## 2019-04-16 ENCOUNTER — HOSPITAL ENCOUNTER (OUTPATIENT)
Dept: RADIOLOGY | Facility: MEDICAL CENTER | Age: 59
End: 2019-04-16
Attending: PHYSICIAN ASSISTANT
Payer: OTHER GOVERNMENT

## 2019-04-16 DIAGNOSIS — M96.0 PSEUDARTHROSIS AFTER FUSION OR ARTHRODESIS: ICD-10-CM

## 2019-04-16 PROCEDURE — 72100 X-RAY EXAM L-S SPINE 2/3 VWS: CPT

## 2020-08-01 ENCOUNTER — HOSPITAL ENCOUNTER (OUTPATIENT)
Facility: MEDICAL CENTER | Age: 60
DRG: 270 | End: 2020-08-01
Admitting: HOSPITALIST
Payer: OTHER GOVERNMENT

## 2020-08-01 ENCOUNTER — APPOINTMENT (OUTPATIENT)
Dept: RADIOLOGY | Facility: MEDICAL CENTER | Age: 60
DRG: 270 | End: 2020-08-01
Attending: INTERNAL MEDICINE
Payer: OTHER GOVERNMENT

## 2020-08-01 ENCOUNTER — APPOINTMENT (OUTPATIENT)
Dept: CARDIOLOGY | Facility: MEDICAL CENTER | Age: 60
DRG: 270 | End: 2020-08-01
Attending: INTERNAL MEDICINE
Payer: OTHER GOVERNMENT

## 2020-08-01 ENCOUNTER — HOSPITAL ENCOUNTER (INPATIENT)
Facility: MEDICAL CENTER | Age: 60
LOS: 12 days | DRG: 270 | End: 2020-08-13
Attending: HOSPITALIST | Admitting: INTERNAL MEDICINE
Payer: OTHER GOVERNMENT

## 2020-08-01 ENCOUNTER — APPOINTMENT (OUTPATIENT)
Dept: CARDIOLOGY | Facility: MEDICAL CENTER | Age: 60
DRG: 270 | End: 2020-08-01
Attending: NURSE PRACTITIONER
Payer: OTHER GOVERNMENT

## 2020-08-01 DIAGNOSIS — I21.4 NSTEMI (NON-ST ELEVATED MYOCARDIAL INFARCTION) (HCC): ICD-10-CM

## 2020-08-01 PROBLEM — E66.01 CLASS 2 SEVERE OBESITY DUE TO EXCESS CALORIES WITH SERIOUS COMORBIDITY IN ADULT (HCC): Status: ACTIVE | Noted: 2020-08-01

## 2020-08-01 PROBLEM — I10 HYPERTENSIVE DISEASE: Status: ACTIVE | Noted: 2020-08-01

## 2020-08-01 PROBLEM — E66.812 CLASS 2 SEVERE OBESITY DUE TO EXCESS CALORIES WITH SERIOUS COMORBIDITY IN ADULT (HCC): Status: ACTIVE | Noted: 2020-08-01

## 2020-08-01 PROBLEM — I46.9 CARDIAC ARREST (HCC): Status: ACTIVE | Noted: 2020-08-01

## 2020-08-01 PROBLEM — I21.29: Status: ACTIVE | Noted: 2020-08-01

## 2020-08-01 PROBLEM — Z72.0 TOBACCO USE: Status: ACTIVE | Noted: 2020-08-01

## 2020-08-01 PROBLEM — I16.1 HYPERTENSIVE EMERGENCY: Status: ACTIVE | Noted: 2020-08-01

## 2020-08-01 PROBLEM — E78.5 DYSLIPIDEMIA: Status: ACTIVE | Noted: 2020-08-01

## 2020-08-01 PROBLEM — J96.01 ACUTE RESPIRATORY FAILURE WITH HYPOXIA (HCC): Status: ACTIVE | Noted: 2020-08-01

## 2020-08-01 PROBLEM — N40.0 BPH (BENIGN PROSTATIC HYPERPLASIA): Status: ACTIVE | Noted: 2020-08-01

## 2020-08-01 PROBLEM — I95.9 HYPOTENSION: Status: ACTIVE | Noted: 2020-08-01

## 2020-08-01 PROBLEM — R41.3 MEMORY DIFFICULTIES: Status: ACTIVE | Noted: 2020-08-01

## 2020-08-01 PROBLEM — Z87.898 HISTORY OF PREDIABETES: Status: ACTIVE | Noted: 2020-08-01

## 2020-08-01 PROBLEM — G89.29 CHRONIC BACK PAIN: Status: ACTIVE | Noted: 2020-08-01

## 2020-08-01 PROBLEM — M54.9 CHRONIC BACK PAIN: Status: ACTIVE | Noted: 2020-08-01

## 2020-08-01 LAB
ACT BLD: 290 SEC (ref 74–137)
ACT BLD: 335 SEC (ref 74–137)
ACTION RANGE TRIGGERED IACRT: NO
ALBUMIN SERPL BCP-MCNC: 4 G/DL (ref 3.2–4.9)
ALBUMIN/GLOB SERPL: 1.6 G/DL
ALP SERPL-CCNC: 76 U/L (ref 30–99)
ALT SERPL-CCNC: 18 U/L (ref 2–50)
AMPHET UR QL SCN: NEGATIVE
ANION GAP SERPL CALC-SCNC: 13 MMOL/L (ref 7–16)
ANION GAP SERPL CALC-SCNC: 14 MMOL/L (ref 7–16)
ANION GAP SERPL CALC-SCNC: 17 MMOL/L (ref 7–16)
APTT PPP: 116.7 SEC (ref 24.7–36)
APTT PPP: 132.3 SEC (ref 24.7–36)
APTT PPP: 137.4 SEC (ref 24.7–36)
AST SERPL-CCNC: 17 U/L (ref 12–45)
BARBITURATES UR QL SCN: NEGATIVE
BASE EXCESS BLDA CALC-SCNC: -3 MMOL/L (ref -4–3)
BASE EXCESS BLDA CALC-SCNC: -5 MMOL/L (ref -4–3)
BASE EXCESS BLDA CALC-SCNC: -5 MMOL/L (ref -4–3)
BASOPHILS # BLD AUTO: 0.6 % (ref 0–1.8)
BASOPHILS # BLD: 0.04 K/UL (ref 0–0.12)
BENZODIAZ UR QL SCN: POSITIVE
BILIRUB SERPL-MCNC: 0.6 MG/DL (ref 0.1–1.5)
BODY TEMPERATURE: ABNORMAL DEGREES
BUN SERPL-MCNC: 14 MG/DL (ref 8–22)
BZE UR QL SCN: NEGATIVE
CALCIUM SERPL-MCNC: 8.9 MG/DL (ref 8.5–10.5)
CALCIUM SERPL-MCNC: 9 MG/DL (ref 8.5–10.5)
CALCIUM SERPL-MCNC: 9.8 MG/DL (ref 8.5–10.5)
CANNABINOIDS UR QL SCN: NEGATIVE
CHLORIDE SERPL-SCNC: 100 MMOL/L (ref 96–112)
CHLORIDE SERPL-SCNC: 101 MMOL/L (ref 96–112)
CHLORIDE SERPL-SCNC: 97 MMOL/L (ref 96–112)
CO2 BLDA-SCNC: 17 MMOL/L (ref 20–33)
CO2 BLDA-SCNC: 22 MMOL/L (ref 20–33)
CO2 BLDA-SCNC: 24 MMOL/L (ref 20–33)
CO2 SERPL-SCNC: 20 MMOL/L (ref 20–33)
CO2 SERPL-SCNC: 21 MMOL/L (ref 20–33)
CO2 SERPL-SCNC: 23 MMOL/L (ref 20–33)
CREAT SERPL-MCNC: 0.73 MG/DL (ref 0.5–1.4)
CREAT SERPL-MCNC: 0.78 MG/DL (ref 0.5–1.4)
CREAT SERPL-MCNC: 0.89 MG/DL (ref 0.5–1.4)
EKG IMPRESSION: NORMAL
EOSINOPHIL # BLD AUTO: 0.28 K/UL (ref 0–0.51)
EOSINOPHIL NFR BLD: 3.9 % (ref 0–6.9)
ERYTHROCYTE [DISTWIDTH] IN BLOOD BY AUTOMATED COUNT: 41.8 FL (ref 35.9–50)
ERYTHROCYTE [DISTWIDTH] IN BLOOD BY AUTOMATED COUNT: 42.1 FL (ref 35.9–50)
GLOBULIN SER CALC-MCNC: 2.5 G/DL (ref 1.9–3.5)
GLUCOSE BLD-MCNC: 122 MG/DL (ref 65–99)
GLUCOSE SERPL-MCNC: 137 MG/DL (ref 65–99)
GLUCOSE SERPL-MCNC: 143 MG/DL (ref 65–99)
GLUCOSE SERPL-MCNC: 91 MG/DL (ref 65–99)
HCO3 BLDA-SCNC: 16.5 MMOL/L (ref 17–25)
HCO3 BLDA-SCNC: 21.1 MMOL/L (ref 17–25)
HCO3 BLDA-SCNC: 23.1 MMOL/L (ref 17–25)
HCT VFR BLD AUTO: 42.4 % (ref 42–52)
HCT VFR BLD AUTO: 45.1 % (ref 42–52)
HGB BLD-MCNC: 13.7 G/DL (ref 14–18)
HGB BLD-MCNC: 14.9 G/DL (ref 14–18)
HOROWITZ INDEX BLDA+IHG-RTO: 175 MM[HG]
HOROWITZ INDEX BLDA+IHG-RTO: 272 MM[HG]
HOROWITZ INDEX BLDA+IHG-RTO: 81 MM[HG]
IMM GRANULOCYTES # BLD AUTO: 0.02 K/UL (ref 0–0.11)
IMM GRANULOCYTES NFR BLD AUTO: 0.3 % (ref 0–0.9)
INR PPP: 1 (ref 0.87–1.13)
INR PPP: 1.05 (ref 0.87–1.13)
INR PPP: 1.09 (ref 0.87–1.13)
INST. QUALIFIED PATIENT IIQPT: YES
LACTATE BLD-SCNC: 1.7 MMOL/L (ref 0.5–2)
LACTATE BLD-SCNC: 1.8 MMOL/L (ref 0.5–2)
LV EJECT FRACT  99904: 25
LYMPHOCYTES # BLD AUTO: 2.17 K/UL (ref 1–4.8)
LYMPHOCYTES NFR BLD: 29.9 % (ref 22–41)
MAGNESIUM SERPL-MCNC: 1.5 MG/DL (ref 1.5–2.5)
MAGNESIUM SERPL-MCNC: 1.6 MG/DL (ref 1.5–2.5)
MCH RBC QN AUTO: 30.2 PG (ref 27–33)
MCH RBC QN AUTO: 30.8 PG (ref 27–33)
MCHC RBC AUTO-ENTMCNC: 32.3 G/DL (ref 33.7–35.3)
MCHC RBC AUTO-ENTMCNC: 33 G/DL (ref 33.7–35.3)
MCV RBC AUTO: 93.4 FL (ref 81.4–97.8)
MCV RBC AUTO: 93.6 FL (ref 81.4–97.8)
METHADONE UR QL SCN: NEGATIVE
MONOCYTES # BLD AUTO: 0.66 K/UL (ref 0–0.85)
MONOCYTES NFR BLD AUTO: 9.1 % (ref 0–13.4)
NEUTROPHILS # BLD AUTO: 4.09 K/UL (ref 1.82–7.42)
NEUTROPHILS NFR BLD: 56.2 % (ref 44–72)
NRBC # BLD AUTO: 0 K/UL
NRBC BLD-RTO: 0 /100 WBC
NT-PROBNP SERPL IA-MCNC: 466 PG/ML (ref 0–125)
O2/TOTAL GAS SETTING VFR VENT: 100 %
O2/TOTAL GAS SETTING VFR VENT: 100 %
O2/TOTAL GAS SETTING VFR VENT: 80 %
OPIATES UR QL SCN: POSITIVE
OXYCODONE UR QL SCN: NEGATIVE
PCO2 BLDA: 22.4 MMHG (ref 26–37)
PCO2 BLDA: 42.2 MMHG (ref 26–37)
PCO2 BLDA: 43.8 MMHG (ref 26–37)
PCO2 TEMP ADJ BLDA: 20.3 MMHG (ref 26–37)
PCO2 TEMP ADJ BLDA: 40.4 MMHG (ref 26–37)
PCO2 TEMP ADJ BLDA: 42.4 MMHG (ref 26–37)
PCP UR QL SCN: NEGATIVE
PH BLDA: 7.31 [PH] (ref 7.4–7.5)
PH BLDA: 7.33 [PH] (ref 7.4–7.5)
PH BLDA: 7.47 [PH] (ref 7.4–7.5)
PH TEMP ADJ BLDA: 7.32 [PH] (ref 7.4–7.5)
PH TEMP ADJ BLDA: 7.34 [PH] (ref 7.4–7.5)
PH TEMP ADJ BLDA: 7.51 [PH] (ref 7.4–7.5)
PHOSPHATE SERPL-MCNC: 3.6 MG/DL (ref 2.5–4.5)
PLATELET # BLD AUTO: 214 K/UL (ref 164–446)
PLATELET # BLD AUTO: 278 K/UL (ref 164–446)
PMV BLD AUTO: 11.5 FL (ref 9–12.9)
PMV BLD AUTO: 11.6 FL (ref 9–12.9)
PO2 BLDA: 140 MMHG (ref 64–87)
PO2 BLDA: 272 MMHG (ref 64–87)
PO2 BLDA: 81 MMHG (ref 64–87)
PO2 TEMP ADJ BLDA: 127 MMHG (ref 64–87)
PO2 TEMP ADJ BLDA: 267 MMHG (ref 64–87)
PO2 TEMP ADJ BLDA: 78 MMHG (ref 64–87)
POTASSIUM SERPL-SCNC: 3.8 MMOL/L (ref 3.6–5.5)
POTASSIUM SERPL-SCNC: 4 MMOL/L (ref 3.6–5.5)
POTASSIUM SERPL-SCNC: 4.5 MMOL/L (ref 3.6–5.5)
PROPOXYPH UR QL SCN: NEGATIVE
PROT SERPL-MCNC: 6.5 G/DL (ref 6–8.2)
PROTHROMBIN TIME: 13.5 SEC (ref 12–14.6)
PROTHROMBIN TIME: 14.1 SEC (ref 12–14.6)
PROTHROMBIN TIME: 14.4 SEC (ref 12–14.6)
RBC # BLD AUTO: 4.53 M/UL (ref 4.7–6.1)
RBC # BLD AUTO: 4.83 M/UL (ref 4.7–6.1)
SAO2 % BLDA: 100 % (ref 93–99)
SAO2 % BLDA: 95 % (ref 93–99)
SAO2 % BLDA: 99 % (ref 93–99)
SODIUM SERPL-SCNC: 135 MMOL/L (ref 135–145)
SODIUM SERPL-SCNC: 135 MMOL/L (ref 135–145)
SODIUM SERPL-SCNC: 136 MMOL/L (ref 135–145)
SPECIMEN DRAWN FROM PATIENT: ABNORMAL
TRIGL SERPL-MCNC: 155 MG/DL (ref 0–149)
TRIGL SERPL-MCNC: 217 MG/DL (ref 0–149)
TROPONIN T SERPL-MCNC: 221 NG/L (ref 6–19)
TROPONIN T SERPL-MCNC: 565 NG/L (ref 6–19)
TROPONIN T SERPL-MCNC: 63 NG/L (ref 6–19)
TSH SERPL DL<=0.005 MIU/L-ACNC: 2 UIU/ML (ref 0.38–5.33)
WBC # BLD AUTO: 14.1 K/UL (ref 4.8–10.8)
WBC # BLD AUTO: 7.3 K/UL (ref 4.8–10.8)

## 2020-08-01 PROCEDURE — 02HV33Z INSERTION OF INFUSION DEVICE INTO SUPERIOR VENA CAVA, PERCUTANEOUS APPROACH: ICD-10-PCS | Performed by: INTERNAL MEDICINE

## 2020-08-01 PROCEDURE — 36556 INSERT NON-TUNNEL CV CATH: CPT | Mod: RT,GC | Performed by: INTERNAL MEDICINE

## 2020-08-01 PROCEDURE — 92950 HEART/LUNG RESUSCITATION CPR: CPT

## 2020-08-01 PROCEDURE — 700105 HCHG RX REV CODE 258: Performed by: INTERNAL MEDICINE

## 2020-08-01 PROCEDURE — 5A1945Z RESPIRATORY VENTILATION, 24-96 CONSECUTIVE HOURS: ICD-10-PCS | Performed by: EMERGENCY MEDICINE

## 2020-08-01 PROCEDURE — A9270 NON-COVERED ITEM OR SERVICE: HCPCS | Performed by: INTERNAL MEDICINE

## 2020-08-01 PROCEDURE — 700101 HCHG RX REV CODE 250: Performed by: INTERNAL MEDICINE

## 2020-08-01 PROCEDURE — 36600 WITHDRAWAL OF ARTERIAL BLOOD: CPT

## 2020-08-01 PROCEDURE — 80053 COMPREHEN METABOLIC PANEL: CPT

## 2020-08-01 PROCEDURE — 80307 DRUG TEST PRSMV CHEM ANLYZR: CPT

## 2020-08-01 PROCEDURE — 027135Z DILATION OF CORONARY ARTERY, TWO ARTERIES WITH TWO DRUG-ELUTING INTRALUMINAL DEVICES, PERCUTANEOUS APPROACH: ICD-10-PCS | Performed by: INTERNAL MEDICINE

## 2020-08-01 PROCEDURE — 93010 ELECTROCARDIOGRAM REPORT: CPT | Performed by: INTERNAL MEDICINE

## 2020-08-01 PROCEDURE — 700111 HCHG RX REV CODE 636 W/ 250 OVERRIDE (IP): Performed by: INTERNAL MEDICINE

## 2020-08-01 PROCEDURE — 99153 MOD SED SAME PHYS/QHP EA: CPT

## 2020-08-01 PROCEDURE — 85025 COMPLETE CBC W/AUTO DIFF WBC: CPT

## 2020-08-01 PROCEDURE — 93458 L HRT ARTERY/VENTRICLE ANGIO: CPT | Mod: 26,59 | Performed by: INTERNAL MEDICINE

## 2020-08-01 PROCEDURE — 0BH18EZ INSERTION OF ENDOTRACHEAL AIRWAY INTO TRACHEA, VIA NATURAL OR ARTIFICIAL OPENING ENDOSCOPIC: ICD-10-PCS | Performed by: EMERGENCY MEDICINE

## 2020-08-01 PROCEDURE — 85027 COMPLETE CBC AUTOMATED: CPT

## 2020-08-01 PROCEDURE — B2111ZZ FLUOROSCOPY OF MULTIPLE CORONARY ARTERIES USING LOW OSMOLAR CONTRAST: ICD-10-PCS | Performed by: INTERNAL MEDICINE

## 2020-08-01 PROCEDURE — C1751 CATH, INF, PER/CENT/MIDLINE: HCPCS

## 2020-08-01 PROCEDURE — 84100 ASSAY OF PHOSPHORUS: CPT

## 2020-08-01 PROCEDURE — 700102 HCHG RX REV CODE 250 W/ 637 OVERRIDE(OP)

## 2020-08-01 PROCEDURE — 700101 HCHG RX REV CODE 250

## 2020-08-01 PROCEDURE — 93010 ELECTROCARDIOGRAM REPORT: CPT | Mod: 76 | Performed by: INTERNAL MEDICINE

## 2020-08-01 PROCEDURE — 83880 ASSAY OF NATRIURETIC PEPTIDE: CPT

## 2020-08-01 PROCEDURE — 85610 PROTHROMBIN TIME: CPT | Mod: 91

## 2020-08-01 PROCEDURE — A9270 NON-COVERED ITEM OR SERVICE: HCPCS

## 2020-08-01 PROCEDURE — 92928 PRQ TCAT PLMT NTRAC ST 1 LES: CPT | Mod: RC | Performed by: INTERNAL MEDICINE

## 2020-08-01 PROCEDURE — 99152 MOD SED SAME PHYS/QHP 5/>YRS: CPT | Performed by: INTERNAL MEDICINE

## 2020-08-01 PROCEDURE — 31500 INSERT EMERGENCY AIRWAY: CPT | Performed by: INTERNAL MEDICINE

## 2020-08-01 PROCEDURE — 700102 HCHG RX REV CODE 250 W/ 637 OVERRIDE(OP): Performed by: INTERNAL MEDICINE

## 2020-08-01 PROCEDURE — 92950 HEART/LUNG RESUSCITATION CPR: CPT | Performed by: INTERNAL MEDICINE

## 2020-08-01 PROCEDURE — 92978 ENDOLUMINL IVUS OCT C 1ST: CPT | Mod: 26,RC | Performed by: INTERNAL MEDICINE

## 2020-08-01 PROCEDURE — 71045 X-RAY EXAM CHEST 1 VIEW: CPT

## 2020-08-01 PROCEDURE — 302214 HCHG STAT INTUBATION BOX: Performed by: INTERNAL MEDICINE

## 2020-08-01 PROCEDURE — 93005 ELECTROCARDIOGRAM TRACING: CPT | Performed by: NURSE PRACTITIONER

## 2020-08-01 PROCEDURE — 82803 BLOOD GASES ANY COMBINATION: CPT | Mod: 91

## 2020-08-01 PROCEDURE — 99223 1ST HOSP IP/OBS HIGH 75: CPT | Mod: 25 | Performed by: INTERNAL MEDICINE

## 2020-08-01 PROCEDURE — 700111 HCHG RX REV CODE 636 W/ 250 OVERRIDE (IP)

## 2020-08-01 PROCEDURE — 83735 ASSAY OF MAGNESIUM: CPT

## 2020-08-01 PROCEDURE — 94770 HCHG CO2 EXPIRED GAS DETERMINATION: CPT

## 2020-08-01 PROCEDURE — 70450 CT HEAD/BRAIN W/O DYE: CPT

## 2020-08-01 PROCEDURE — 770022 HCHG ROOM/CARE - ICU (200)

## 2020-08-01 PROCEDURE — 93010 ELECTROCARDIOGRAM REPORT: CPT | Mod: 77 | Performed by: INTERNAL MEDICINE

## 2020-08-01 PROCEDURE — 99222 1ST HOSP IP/OBS MODERATE 55: CPT | Performed by: INTERNAL MEDICINE

## 2020-08-01 PROCEDURE — 93306 TTE W/DOPPLER COMPLETE: CPT | Mod: 26 | Performed by: INTERNAL MEDICINE

## 2020-08-01 PROCEDURE — 94002 VENT MGMT INPAT INIT DAY: CPT

## 2020-08-01 PROCEDURE — 5A12012 PERFORMANCE OF CARDIAC OUTPUT, SINGLE, MANUAL: ICD-10-PCS | Performed by: INTERNAL MEDICINE

## 2020-08-01 PROCEDURE — 5A02210 ASSISTANCE WITH CARDIAC OUTPUT USING BALLOON PUMP, CONTINUOUS: ICD-10-PCS | Performed by: INTERNAL MEDICINE

## 2020-08-01 PROCEDURE — 84443 ASSAY THYROID STIM HORMONE: CPT

## 2020-08-01 PROCEDURE — 82962 GLUCOSE BLOOD TEST: CPT | Mod: 91

## 2020-08-01 PROCEDURE — 85730 THROMBOPLASTIN TIME PARTIAL: CPT | Mod: 91

## 2020-08-01 PROCEDURE — 700117 HCHG RX CONTRAST REV CODE 255: Performed by: INTERNAL MEDICINE

## 2020-08-01 PROCEDURE — 83605 ASSAY OF LACTIC ACID: CPT | Mod: 91

## 2020-08-01 PROCEDURE — 33967 INSERT I-AORT PERCUT DEVICE: CPT | Mod: 78,59 | Performed by: INTERNAL MEDICINE

## 2020-08-01 PROCEDURE — 36556 INSERT NON-TUNNEL CV CATH: CPT

## 2020-08-01 PROCEDURE — 93005 ELECTROCARDIOGRAM TRACING: CPT | Performed by: INTERNAL MEDICINE

## 2020-08-01 PROCEDURE — 80048 BASIC METABOLIC PNL TOTAL CA: CPT | Mod: 91

## 2020-08-01 PROCEDURE — 36620 INSERTION CATHETER ARTERY: CPT | Mod: 78,59 | Performed by: INTERNAL MEDICINE

## 2020-08-01 PROCEDURE — 99292 CRITICAL CARE ADDL 30 MIN: CPT | Mod: 25 | Performed by: INTERNAL MEDICINE

## 2020-08-01 PROCEDURE — 4A023N7 MEASUREMENT OF CARDIAC SAMPLING AND PRESSURE, LEFT HEART, PERCUTANEOUS APPROACH: ICD-10-PCS | Performed by: INTERNAL MEDICINE

## 2020-08-01 PROCEDURE — 93454 CORONARY ARTERY ANGIO S&I: CPT | Mod: 26,78 | Performed by: INTERNAL MEDICINE

## 2020-08-01 PROCEDURE — 84484 ASSAY OF TROPONIN QUANT: CPT | Mod: 91

## 2020-08-01 PROCEDURE — 37799 UNLISTED PX VASCULAR SURGERY: CPT

## 2020-08-01 PROCEDURE — 85347 COAGULATION TIME ACTIVATED: CPT | Mod: 91

## 2020-08-01 PROCEDURE — 93306 TTE W/DOPPLER COMPLETE: CPT

## 2020-08-01 PROCEDURE — 700117 HCHG RX CONTRAST REV CODE 255: Performed by: NURSE PRACTITIONER

## 2020-08-01 PROCEDURE — 84478 ASSAY OF TRIGLYCERIDES: CPT | Mod: 91

## 2020-08-01 PROCEDURE — 700111 HCHG RX REV CODE 636 W/ 250 OVERRIDE (IP): Performed by: NURSE PRACTITIONER

## 2020-08-01 PROCEDURE — 94760 N-INVAS EAR/PLS OXIMETRY 1: CPT

## 2020-08-01 PROCEDURE — 99291 CRITICAL CARE FIRST HOUR: CPT | Mod: 25 | Performed by: INTERNAL MEDICINE

## 2020-08-01 PROCEDURE — 3E043XZ INTRODUCTION OF VASOPRESSOR INTO CENTRAL VEIN, PERCUTANEOUS APPROACH: ICD-10-PCS | Performed by: INTERNAL MEDICINE

## 2020-08-01 RX ORDER — ASPIRIN 81 MG/1
324 TABLET, CHEWABLE ORAL DAILY
Status: DISCONTINUED | OUTPATIENT
Start: 2020-08-01 | End: 2020-08-01

## 2020-08-01 RX ORDER — FAMOTIDINE 20 MG/1
20 TABLET, FILM COATED ORAL EVERY 12 HOURS
Status: DISCONTINUED | OUTPATIENT
Start: 2020-08-01 | End: 2020-08-03

## 2020-08-01 RX ORDER — FUROSEMIDE 10 MG/ML
40 INJECTION INTRAMUSCULAR; INTRAVENOUS ONCE
Status: COMPLETED | OUTPATIENT
Start: 2020-08-01 | End: 2020-08-01

## 2020-08-01 RX ORDER — DEXTROSE MONOHYDRATE 25 G/50ML
50 INJECTION, SOLUTION INTRAVENOUS
Status: DISCONTINUED | OUTPATIENT
Start: 2020-08-01 | End: 2020-08-01

## 2020-08-01 RX ORDER — HEPARIN SODIUM 1000 [USP'U]/ML
3200 INJECTION, SOLUTION INTRAVENOUS; SUBCUTANEOUS PRN
Status: DISCONTINUED | OUTPATIENT
Start: 2020-08-01 | End: 2020-08-02

## 2020-08-01 RX ORDER — SODIUM CHLORIDE 9 MG/ML
INJECTION, SOLUTION INTRAVENOUS
Status: ACTIVE
Start: 2020-08-01 | End: 2020-08-02

## 2020-08-01 RX ORDER — HEPARIN SODIUM,PORCINE 1000/ML
VIAL (ML) INJECTION
Status: COMPLETED
Start: 2020-08-01 | End: 2020-08-01

## 2020-08-01 RX ORDER — HEPARIN SODIUM 5000 [USP'U]/100ML
INJECTION, SOLUTION INTRAVENOUS CONTINUOUS
Status: DISCONTINUED | OUTPATIENT
Start: 2020-08-01 | End: 2020-08-02

## 2020-08-01 RX ORDER — ROCURONIUM BROMIDE 10 MG/ML
100 INJECTION, SOLUTION INTRAVENOUS ONCE
Status: COMPLETED | OUTPATIENT
Start: 2020-08-01 | End: 2020-08-01

## 2020-08-01 RX ORDER — PROMETHAZINE HYDROCHLORIDE 25 MG/1
12.5-25 SUPPOSITORY RECTAL EVERY 4 HOURS PRN
Status: DISCONTINUED | OUTPATIENT
Start: 2020-08-01 | End: 2020-08-13 | Stop reason: HOSPADM

## 2020-08-01 RX ORDER — ASPIRIN 300 MG/1
300 SUPPOSITORY RECTAL DAILY
Status: DISCONTINUED | OUTPATIENT
Start: 2020-08-01 | End: 2020-08-01

## 2020-08-01 RX ORDER — SODIUM CHLORIDE 9 MG/ML
INJECTION, SOLUTION INTRAVENOUS CONTINUOUS
Status: DISCONTINUED | OUTPATIENT
Start: 2020-08-01 | End: 2020-08-08

## 2020-08-01 RX ORDER — ASPIRIN 325 MG
325 TABLET ORAL DAILY
Status: DISCONTINUED | OUTPATIENT
Start: 2020-08-01 | End: 2020-08-01

## 2020-08-01 RX ORDER — PRASUGREL 10 MG/1
10 TABLET, FILM COATED ORAL DAILY
Status: DISCONTINUED | OUTPATIENT
Start: 2020-08-02 | End: 2020-08-05

## 2020-08-01 RX ORDER — MEPERIDINE HYDROCHLORIDE 50 MG/ML
25 INJECTION INTRAMUSCULAR; INTRAVENOUS; SUBCUTANEOUS
Status: DISCONTINUED | OUTPATIENT
Start: 2020-08-01 | End: 2020-08-02

## 2020-08-01 RX ORDER — ROCURONIUM BROMIDE 10 MG/ML
INJECTION, SOLUTION INTRAVENOUS
Status: COMPLETED
Start: 2020-08-01 | End: 2020-08-01

## 2020-08-01 RX ORDER — DEXTROSE MONOHYDRATE 25 G/50ML
25-50 INJECTION, SOLUTION INTRAVENOUS PRN
Status: DISCONTINUED | OUTPATIENT
Start: 2020-08-01 | End: 2020-08-02

## 2020-08-01 RX ORDER — HYDROCODONE BITARTRATE AND ACETAMINOPHEN 10; 325 MG/1; MG/1
1 TABLET ORAL
COMMUNITY

## 2020-08-01 RX ORDER — POTASSIUM CHLORIDE 7.45 MG/ML
10 INJECTION INTRAVENOUS ONCE
Status: COMPLETED | OUTPATIENT
Start: 2020-08-01 | End: 2020-08-01

## 2020-08-01 RX ORDER — PRASUGREL 10 MG/1
TABLET, FILM COATED ORAL
Status: COMPLETED
Start: 2020-08-01 | End: 2020-08-01

## 2020-08-01 RX ORDER — VENLAFAXINE HYDROCHLORIDE 75 MG/1
150 CAPSULE, EXTENDED RELEASE ORAL DAILY
Status: DISCONTINUED | OUTPATIENT
Start: 2020-08-01 | End: 2020-08-01

## 2020-08-01 RX ORDER — NOREPINEPHRINE BITARTRATE 0.03 MG/ML
0-30 INJECTION, SOLUTION INTRAVENOUS CONTINUOUS
Status: DISCONTINUED | OUTPATIENT
Start: 2020-08-01 | End: 2020-08-02

## 2020-08-01 RX ORDER — VERAPAMIL HYDROCHLORIDE 2.5 MG/ML
INJECTION, SOLUTION INTRAVENOUS
Status: COMPLETED
Start: 2020-08-01 | End: 2020-08-01

## 2020-08-01 RX ORDER — HYDROCODONE BITARTRATE AND ACETAMINOPHEN 10; 325 MG/1; MG/1
1 TABLET ORAL
Status: DISCONTINUED | OUTPATIENT
Start: 2020-08-01 | End: 2020-08-01

## 2020-08-01 RX ORDER — POLYETHYLENE GLYCOL 3350 17 G/17G
1 POWDER, FOR SOLUTION ORAL
Status: DISCONTINUED | OUTPATIENT
Start: 2020-08-01 | End: 2020-08-05

## 2020-08-01 RX ORDER — CARVEDILOL 6.25 MG/1
6.25 TABLET ORAL 2 TIMES DAILY WITH MEALS
Status: DISCONTINUED | OUTPATIENT
Start: 2020-08-01 | End: 2020-08-01

## 2020-08-01 RX ORDER — TAMSULOSIN HYDROCHLORIDE 0.4 MG/1
0.4 CAPSULE ORAL EVERY EVENING
Status: DISCONTINUED | OUTPATIENT
Start: 2020-08-01 | End: 2020-08-01

## 2020-08-01 RX ORDER — NITROGLYCERIN 20 MG/100ML
INJECTION INTRAVENOUS
Status: COMPLETED
Start: 2020-08-01 | End: 2020-08-01

## 2020-08-01 RX ORDER — ACETAMINOPHEN 325 MG/1
650 TABLET ORAL EVERY 6 HOURS PRN
Status: CANCELLED | OUTPATIENT
Start: 2020-08-01

## 2020-08-01 RX ORDER — TIZANIDINE 4 MG/1
4 TABLET ORAL 4 TIMES DAILY
Status: DISCONTINUED | OUTPATIENT
Start: 2020-08-01 | End: 2020-08-05

## 2020-08-01 RX ORDER — HEPARIN SODIUM 5000 [USP'U]/100ML
INJECTION, SOLUTION INTRAVENOUS CONTINUOUS
Status: DISCONTINUED | OUTPATIENT
Start: 2020-08-01 | End: 2020-08-01

## 2020-08-01 RX ORDER — TRAZODONE HYDROCHLORIDE 50 MG/1
100 TABLET ORAL
Status: DISCONTINUED | OUTPATIENT
Start: 2020-08-01 | End: 2020-08-01

## 2020-08-01 RX ORDER — POLYETHYLENE GLYCOL 3350 17 G/17G
1 POWDER, FOR SOLUTION ORAL
Status: DISCONTINUED | OUTPATIENT
Start: 2020-08-01 | End: 2020-08-01

## 2020-08-01 RX ORDER — MAGNESIUM SULFATE HEPTAHYDRATE 40 MG/ML
2 INJECTION, SOLUTION INTRAVENOUS ONCE
Status: COMPLETED | OUTPATIENT
Start: 2020-08-01 | End: 2020-08-01

## 2020-08-01 RX ORDER — LIDOCAINE HYDROCHLORIDE 20 MG/ML
INJECTION, SOLUTION INFILTRATION; PERINEURAL
Status: COMPLETED
Start: 2020-08-01 | End: 2020-08-01

## 2020-08-01 RX ORDER — ONDANSETRON 2 MG/ML
4 INJECTION INTRAMUSCULAR; INTRAVENOUS EVERY 4 HOURS PRN
Status: DISCONTINUED | OUTPATIENT
Start: 2020-08-01 | End: 2020-08-13 | Stop reason: HOSPADM

## 2020-08-01 RX ORDER — AMOXICILLIN 250 MG
2 CAPSULE ORAL 2 TIMES DAILY
Status: DISCONTINUED | OUTPATIENT
Start: 2020-08-01 | End: 2020-08-01

## 2020-08-01 RX ORDER — MIDAZOLAM HYDROCHLORIDE 1 MG/ML
INJECTION INTRAMUSCULAR; INTRAVENOUS
Status: COMPLETED
Start: 2020-08-01 | End: 2020-08-01

## 2020-08-01 RX ORDER — EPTIFIBATIDE 2 MG/ML
INJECTION, SOLUTION INTRAVENOUS
Status: COMPLETED
Start: 2020-08-01 | End: 2020-08-01

## 2020-08-01 RX ORDER — HEPARIN SODIUM 200 [USP'U]/100ML
INJECTION, SOLUTION INTRAVENOUS
Status: COMPLETED
Start: 2020-08-01 | End: 2020-08-01

## 2020-08-01 RX ORDER — FINASTERIDE 5 MG/1
5 TABLET, FILM COATED ORAL EVERY EVENING
Status: DISCONTINUED | OUTPATIENT
Start: 2020-08-01 | End: 2020-08-01

## 2020-08-01 RX ORDER — BISACODYL 10 MG
10 SUPPOSITORY, RECTAL RECTAL
Status: DISCONTINUED | OUTPATIENT
Start: 2020-08-01 | End: 2020-08-01

## 2020-08-01 RX ORDER — ACETAMINOPHEN 325 MG/1
650 TABLET ORAL EVERY 6 HOURS PRN
Status: DISCONTINUED | OUTPATIENT
Start: 2020-08-01 | End: 2020-08-01

## 2020-08-01 RX ORDER — PROMETHAZINE HYDROCHLORIDE 25 MG/1
12.5-25 TABLET ORAL EVERY 4 HOURS PRN
Status: DISCONTINUED | OUTPATIENT
Start: 2020-08-01 | End: 2020-08-05

## 2020-08-01 RX ORDER — NITROGLYCERIN 0.4 MG/1
0.4 TABLET SUBLINGUAL
Status: DISCONTINUED | OUTPATIENT
Start: 2020-08-01 | End: 2020-08-13 | Stop reason: HOSPADM

## 2020-08-01 RX ORDER — BISACODYL 10 MG
10 SUPPOSITORY, RECTAL RECTAL
Status: DISCONTINUED | OUTPATIENT
Start: 2020-08-01 | End: 2020-08-05

## 2020-08-01 RX ORDER — PROCHLORPERAZINE EDISYLATE 5 MG/ML
5-10 INJECTION INTRAMUSCULAR; INTRAVENOUS EVERY 4 HOURS PRN
Status: DISCONTINUED | OUTPATIENT
Start: 2020-08-01 | End: 2020-08-13 | Stop reason: HOSPADM

## 2020-08-01 RX ORDER — ONDANSETRON 4 MG/1
4 TABLET, ORALLY DISINTEGRATING ORAL EVERY 4 HOURS PRN
Status: DISCONTINUED | OUTPATIENT
Start: 2020-08-01 | End: 2020-08-01

## 2020-08-01 RX ORDER — NOREPINEPHRINE BITARTRATE 0.03 MG/ML
0-30 INJECTION, SOLUTION INTRAVENOUS CONTINUOUS
Status: DISCONTINUED | OUTPATIENT
Start: 2020-08-01 | End: 2020-08-01

## 2020-08-01 RX ORDER — AMOXICILLIN 250 MG
2 CAPSULE ORAL 2 TIMES DAILY
Status: DISCONTINUED | OUTPATIENT
Start: 2020-08-01 | End: 2020-08-05

## 2020-08-01 RX ORDER — LABETALOL HYDROCHLORIDE 5 MG/ML
10 INJECTION, SOLUTION INTRAVENOUS EVERY 4 HOURS PRN
Status: DISCONTINUED | OUTPATIENT
Start: 2020-08-01 | End: 2020-08-13 | Stop reason: HOSPADM

## 2020-08-01 RX ORDER — ATORVASTATIN CALCIUM 80 MG/1
80 TABLET, FILM COATED ORAL NIGHTLY
Status: DISCONTINUED | OUTPATIENT
Start: 2020-08-01 | End: 2020-08-01

## 2020-08-01 RX ORDER — ADENOSINE 3 MG/ML
INJECTION, SOLUTION INTRAVENOUS
Status: COMPLETED
Start: 2020-08-01 | End: 2020-08-01

## 2020-08-01 RX ORDER — ENALAPRILAT 1.25 MG/ML
1.25 INJECTION INTRAVENOUS EVERY 6 HOURS PRN
Status: DISCONTINUED | OUTPATIENT
Start: 2020-08-01 | End: 2020-08-13 | Stop reason: HOSPADM

## 2020-08-01 RX ORDER — METFORMIN HYDROCHLORIDE 750 MG/1
1000 TABLET, EXTENDED RELEASE ORAL DAILY
COMMUNITY
End: 2022-01-13

## 2020-08-01 RX ORDER — ACETAMINOPHEN 325 MG/1
650 TABLET ORAL EVERY 6 HOURS PRN
Status: DISCONTINUED | OUTPATIENT
Start: 2020-08-01 | End: 2020-08-05

## 2020-08-01 RX ORDER — ONDANSETRON 4 MG/1
4 TABLET, ORALLY DISINTEGRATING ORAL EVERY 4 HOURS PRN
Status: DISCONTINUED | OUTPATIENT
Start: 2020-08-01 | End: 2020-08-05

## 2020-08-01 RX ORDER — ATORVASTATIN CALCIUM 80 MG/1
80 TABLET, FILM COATED ORAL NIGHTLY
Status: DISCONTINUED | OUTPATIENT
Start: 2020-08-01 | End: 2020-08-05

## 2020-08-01 RX ORDER — DIPHENHYDRAMINE HYDROCHLORIDE 50 MG/ML
INJECTION INTRAMUSCULAR; INTRAVENOUS
Status: COMPLETED
Start: 2020-08-01 | End: 2020-08-01

## 2020-08-01 RX ORDER — TELMISARTAN 80 MG/1
80 TABLET ORAL DAILY
Status: DISCONTINUED | OUTPATIENT
Start: 2020-08-01 | End: 2020-08-01

## 2020-08-01 RX ORDER — LABETALOL HYDROCHLORIDE 5 MG/ML
INJECTION, SOLUTION INTRAVENOUS
Status: COMPLETED
Start: 2020-08-01 | End: 2020-08-01

## 2020-08-01 RX ORDER — DEXMEDETOMIDINE HYDROCHLORIDE 4 UG/ML
.1-1.5 INJECTION, SOLUTION INTRAVENOUS CONTINUOUS
Status: DISCONTINUED | OUTPATIENT
Start: 2020-08-01 | End: 2020-08-04

## 2020-08-01 RX ORDER — TIZANIDINE 4 MG/1
4 TABLET ORAL 4 TIMES DAILY
Status: DISCONTINUED | OUTPATIENT
Start: 2020-08-01 | End: 2020-08-01

## 2020-08-01 RX ORDER — ASPIRIN 81 MG/1
81 TABLET, CHEWABLE ORAL DAILY
Status: DISCONTINUED | OUTPATIENT
Start: 2020-08-02 | End: 2020-08-05

## 2020-08-01 RX ORDER — ROCURONIUM BROMIDE 10 MG/ML
50 INJECTION, SOLUTION INTRAVENOUS ONCE
Status: COMPLETED | OUTPATIENT
Start: 2020-08-01 | End: 2020-08-01

## 2020-08-01 RX ORDER — PROMETHAZINE HYDROCHLORIDE 25 MG/1
12.5-25 TABLET ORAL EVERY 4 HOURS PRN
Status: DISCONTINUED | OUTPATIENT
Start: 2020-08-01 | End: 2020-08-01

## 2020-08-01 RX ORDER — TELMISARTAN 80 MG/1
80 TABLET ORAL DAILY
Status: DISCONTINUED | OUTPATIENT
Start: 2020-08-02 | End: 2020-08-04

## 2020-08-01 RX ORDER — HEPARIN SODIUM 1000 [USP'U]/ML
3200 INJECTION, SOLUTION INTRAVENOUS; SUBCUTANEOUS PRN
Status: DISCONTINUED | OUTPATIENT
Start: 2020-08-01 | End: 2020-08-01

## 2020-08-01 RX ORDER — CARVEDILOL 12.5 MG/1
12.5 TABLET ORAL 2 TIMES DAILY WITH MEALS
Status: DISCONTINUED | OUTPATIENT
Start: 2020-08-01 | End: 2020-08-04

## 2020-08-01 RX ORDER — LISINOPRIL 5 MG/1
5 TABLET ORAL TWICE DAILY
Status: DISCONTINUED | OUTPATIENT
Start: 2020-08-01 | End: 2020-08-01

## 2020-08-01 RX ORDER — MAGNESIUM SULFATE HEPTAHYDRATE 40 MG/ML
.5-2 INJECTION, SOLUTION INTRAVENOUS CONTINUOUS
Status: DISCONTINUED | OUTPATIENT
Start: 2020-08-01 | End: 2020-08-02

## 2020-08-01 RX ORDER — MIDAZOLAM HYDROCHLORIDE 1 MG/ML
2 INJECTION INTRAMUSCULAR; INTRAVENOUS ONCE
Status: DISCONTINUED | OUTPATIENT
Start: 2020-08-01 | End: 2020-08-02

## 2020-08-01 RX ORDER — EPINEPHRINE IN SOD CHLOR,ISO 1 MG/10 ML
SYRINGE (ML) INTRAVENOUS
Status: COMPLETED | OUTPATIENT
Start: 2020-08-01 | End: 2020-08-01

## 2020-08-01 RX ORDER — ETOMIDATE 2 MG/ML
20 INJECTION INTRAVENOUS ONCE
Status: COMPLETED | OUTPATIENT
Start: 2020-08-01 | End: 2020-08-01

## 2020-08-01 RX ORDER — MORPHINE SULFATE 15 MG/1
15 TABLET, FILM COATED, EXTENDED RELEASE ORAL 3 TIMES DAILY
Status: DISCONTINUED | OUTPATIENT
Start: 2020-08-01 | End: 2020-08-01

## 2020-08-01 RX ORDER — TRAZODONE HYDROCHLORIDE 50 MG/1
100 TABLET ORAL
Status: DISCONTINUED | OUTPATIENT
Start: 2020-08-01 | End: 2020-08-05

## 2020-08-01 RX ORDER — CARVEDILOL 12.5 MG/1
12.5 TABLET ORAL 2 TIMES DAILY WITH MEALS
Status: DISCONTINUED | OUTPATIENT
Start: 2020-08-01 | End: 2020-08-01

## 2020-08-01 RX ORDER — IPRATROPIUM BROMIDE AND ALBUTEROL SULFATE 2.5; .5 MG/3ML; MG/3ML
3 SOLUTION RESPIRATORY (INHALATION)
Status: DISCONTINUED | OUTPATIENT
Start: 2020-08-01 | End: 2020-08-13 | Stop reason: HOSPADM

## 2020-08-01 RX ORDER — DEXMEDETOMIDINE HYDROCHLORIDE 4 UG/ML
.1-1.5 INJECTION, SOLUTION INTRAVENOUS CONTINUOUS
Status: DISCONTINUED | OUTPATIENT
Start: 2020-08-01 | End: 2020-08-01

## 2020-08-01 RX ORDER — VECURONIUM BROMIDE 1 MG/ML
0.1 INJECTION, POWDER, LYOPHILIZED, FOR SOLUTION INTRAVENOUS ONCE
Status: DISCONTINUED | OUTPATIENT
Start: 2020-08-01 | End: 2020-08-02

## 2020-08-01 RX ADMIN — DIPHENHYDRAMINE HYDROCHLORIDE 50 MG: 50 INJECTION, SOLUTION INTRAMUSCULAR; INTRAVENOUS at 08:00

## 2020-08-01 RX ADMIN — HEPARIN SODIUM: 1000 INJECTION, SOLUTION INTRAVENOUS; SUBCUTANEOUS at 07:37

## 2020-08-01 RX ADMIN — FENTANYL CITRATE 100 MCG: 50 INJECTION INTRAMUSCULAR; INTRAVENOUS at 11:35

## 2020-08-01 RX ADMIN — ROCURONIUM BROMIDE 50 MG: 10 INJECTION, SOLUTION INTRAVENOUS at 11:36

## 2020-08-01 RX ADMIN — MIDAZOLAM HYDROCHLORIDE 2 MG: 1 INJECTION, SOLUTION INTRAMUSCULAR; INTRAVENOUS at 08:15

## 2020-08-01 RX ADMIN — ROCURONIUM BROMIDE 100 MG: 10 INJECTION, SOLUTION INTRAVENOUS at 13:03

## 2020-08-01 RX ADMIN — HEPARIN SODIUM 2000 UNITS: 200 INJECTION, SOLUTION INTRAVENOUS at 14:16

## 2020-08-01 RX ADMIN — FAMOTIDINE 20 MG: 10 INJECTION, SOLUTION INTRAVENOUS at 16:39

## 2020-08-01 RX ADMIN — SODIUM CHLORIDE: 9 INJECTION, SOLUTION INTRAVENOUS at 06:00

## 2020-08-01 RX ADMIN — PROPOFOL 30 MCG/KG/MIN: 10 INJECTION, EMULSION INTRAVENOUS at 09:15

## 2020-08-01 RX ADMIN — HEPARIN SODIUM 2000 UNITS: 200 INJECTION, SOLUTION INTRAVENOUS at 07:41

## 2020-08-01 RX ADMIN — FAMOTIDINE 20 MG: 10 INJECTION, SOLUTION INTRAVENOUS at 09:44

## 2020-08-01 RX ADMIN — LIDOCAINE HYDROCHLORIDE: 20 INJECTION, SOLUTION INFILTRATION; PERINEURAL at 07:37

## 2020-08-01 RX ADMIN — PROPOFOL 30 MCG/KG/MIN: 10 INJECTION, EMULSION INTRAVENOUS at 08:21

## 2020-08-01 RX ADMIN — NITROGLYCERIN 50 MG: 20 INJECTION INTRAVENOUS at 08:22

## 2020-08-01 RX ADMIN — FENTANYL CITRATE 100 MCG: 50 INJECTION INTRAMUSCULAR; INTRAVENOUS at 08:25

## 2020-08-01 RX ADMIN — LABETALOL HYDROCHLORIDE 10 MG: 5 INJECTION INTRAVENOUS at 09:24

## 2020-08-01 RX ADMIN — ASPIRIN 325 MG: 325 TABLET, FILM COATED ORAL at 04:23

## 2020-08-01 RX ADMIN — Medication 50 MCG/HR: at 09:39

## 2020-08-01 RX ADMIN — ETOMIDATE 20 MG: 2 INJECTION INTRAVENOUS at 09:00

## 2020-08-01 RX ADMIN — LABETALOL HYDROCHLORIDE: 5 INJECTION, SOLUTION INTRAVENOUS at 08:17

## 2020-08-01 RX ADMIN — HYDROCODONE BITARTRATE AND ACETAMINOPHEN 1 TABLET: 10; 325 TABLET ORAL at 04:23

## 2020-08-01 RX ADMIN — HEPARIN SODIUM 2000 UNITS: 200 INJECTION, SOLUTION INTRAVENOUS at 13:35

## 2020-08-01 RX ADMIN — DEXMEDETOMIDINE HYDROCHLORIDE 1 MCG/KG/HR: 100 INJECTION, SOLUTION INTRAVENOUS at 11:39

## 2020-08-01 RX ADMIN — ATORVASTATIN CALCIUM 80 MG: 80 TABLET, FILM COATED ORAL at 22:31

## 2020-08-01 RX ADMIN — FENTANYL CITRATE 100 MCG: 50 INJECTION INTRAMUSCULAR; INTRAVENOUS at 14:53

## 2020-08-01 RX ADMIN — HEPARIN SODIUM: 1000 INJECTION, SOLUTION INTRAVENOUS; SUBCUTANEOUS at 13:34

## 2020-08-01 RX ADMIN — VERAPAMIL HYDROCHLORIDE 5 MG: 2.5 INJECTION, SOLUTION INTRAVENOUS at 07:37

## 2020-08-01 RX ADMIN — IOHEXOL 22 ML: 350 INJECTION, SOLUTION INTRAVENOUS at 14:45

## 2020-08-01 RX ADMIN — HEPARIN SODIUM: 1000 INJECTION, SOLUTION INTRAVENOUS; SUBCUTANEOUS at 07:40

## 2020-08-01 RX ADMIN — POTASSIUM CHLORIDE 10 MEQ: 10 INJECTION, SOLUTION INTRAVENOUS at 17:00

## 2020-08-01 RX ADMIN — AMIODARONE HYDROCHLORIDE 150 MG: 1.5 INJECTION, SOLUTION INTRAVENOUS at 17:17

## 2020-08-01 RX ADMIN — NITROGLYCERIN 10 ML: 20 INJECTION INTRAVENOUS at 07:37

## 2020-08-01 RX ADMIN — HEPARIN SODIUM 1200 UNITS/HR: 5000 INJECTION, SOLUTION INTRAVENOUS at 16:35

## 2020-08-01 RX ADMIN — LIDOCAINE HYDROCHLORIDE: 20 INJECTION, SOLUTION INFILTRATION; PERINEURAL at 13:33

## 2020-08-01 RX ADMIN — HUMAN ALBUMIN MICROSPHERES AND PERFLUTREN 3 ML: 10; .22 INJECTION, SOLUTION INTRAVENOUS at 14:30

## 2020-08-01 RX ADMIN — IOHEXOL 80 ML: 350 INJECTION, SOLUTION INTRAVENOUS at 08:49

## 2020-08-01 RX ADMIN — EPTIFIBATIDE 20000 MCG: 2 INJECTION, SOLUTION INTRAVENOUS at 08:23

## 2020-08-01 RX ADMIN — DEXMEDETOMIDINE HYDROCHLORIDE 1.5 MCG/KG/HR: 100 INJECTION, SOLUTION INTRAVENOUS at 19:16

## 2020-08-01 RX ADMIN — MAGNESIUM SULFATE 2 G: 2 INJECTION INTRAVENOUS at 17:06

## 2020-08-01 RX ADMIN — ROCURONIUM BROMIDE 100 MG: 10 INJECTION, SOLUTION INTRAVENOUS at 09:00

## 2020-08-01 RX ADMIN — FENTANYL CITRATE 100 MCG: 50 INJECTION INTRAMUSCULAR; INTRAVENOUS at 08:15

## 2020-08-01 RX ADMIN — MIDAZOLAM HYDROCHLORIDE 2 MG: 1 INJECTION, SOLUTION INTRAMUSCULAR; INTRAVENOUS at 07:36

## 2020-08-01 RX ADMIN — NOREPINEPHRINE BITARTRATE 30 MCG/MIN: 1 INJECTION INTRAVENOUS at 12:30

## 2020-08-01 RX ADMIN — MIDAZOLAM HYDROCHLORIDE 2 MG: 1 INJECTION, SOLUTION INTRAMUSCULAR; INTRAVENOUS at 08:25

## 2020-08-01 RX ADMIN — EPINEPHRINE 1 MG: 0.1 INJECTION, SOLUTION ENDOTRACHEAL; INTRACARDIAC; INTRAVENOUS at 12:31

## 2020-08-01 RX ADMIN — TIZANIDINE 4 MG: 4 TABLET ORAL at 17:47

## 2020-08-01 RX ADMIN — TIZANIDINE 4 MG: 4 TABLET ORAL at 22:30

## 2020-08-01 RX ADMIN — NITROGLYCERIN 10 ML: 20 INJECTION INTRAVENOUS at 13:33

## 2020-08-01 RX ADMIN — MINERAL OIL, PETROLATUM 1 APPLICATION: 425; 573 OINTMENT OPHTHALMIC at 16:49

## 2020-08-01 RX ADMIN — FENTANYL CITRATE 100 MCG: 50 INJECTION INTRAMUSCULAR; INTRAVENOUS at 07:36

## 2020-08-01 RX ADMIN — AMIODARONE HYDROCHLORIDE 0.5 MG/MIN: 1.8 INJECTION, SOLUTION INTRAVENOUS at 23:44

## 2020-08-01 RX ADMIN — DEXMEDETOMIDINE HYDROCHLORIDE 1.5 MCG/KG/HR: 100 INJECTION, SOLUTION INTRAVENOUS at 16:30

## 2020-08-01 RX ADMIN — FUROSEMIDE 40 MG: 10 INJECTION, SOLUTION INTRAMUSCULAR; INTRAVENOUS at 10:45

## 2020-08-01 RX ADMIN — AMIODARONE HYDROCHLORIDE 1 MG/MIN: 1.8 INJECTION, SOLUTION INTRAVENOUS at 18:12

## 2020-08-01 RX ADMIN — PRASUGREL 60 MG: 10 TABLET, FILM COATED ORAL at 08:45

## 2020-08-01 RX ADMIN — MINERAL OIL, PETROLATUM 1 APPLICATION: 425; 573 OINTMENT OPHTHALMIC at 22:35

## 2020-08-01 RX ADMIN — DEXMEDETOMIDINE HYDROCHLORIDE 1.5 MCG/KG/HR: 100 INJECTION, SOLUTION INTRAVENOUS at 22:15

## 2020-08-01 RX ADMIN — MIDAZOLAM HYDROCHLORIDE 2 MG: 1 INJECTION, SOLUTION INTRAMUSCULAR; INTRAVENOUS at 07:41

## 2020-08-01 ASSESSMENT — ENCOUNTER SYMPTOMS
DOUBLE VISION: 0
WEIGHT LOSS: 0
WEAKNESS: 0
TREMORS: 0
NECK PAIN: 0
POLYDIPSIA: 0
NAUSEA: 0
ABDOMINAL PAIN: 0
PALPITATIONS: 0
LIGHT-HEADEDNESS: 0
PHOTOPHOBIA: 0
SPEECH CHANGE: 0
COUGH: 0
HALLUCINATIONS: 0
HEARTBURN: 0
VOMITING: 0
SHORTNESS OF BREATH: 0
FLANK PAIN: 0
NERVOUS/ANXIOUS: 0
BACK PAIN: 0
FOCAL WEAKNESS: 0
BRUISES/BLEEDS EASILY: 0
BLURRED VISION: 0
HEADACHES: 0
DIZZINESS: 0
HEMOPTYSIS: 0
CHILLS: 0
SPUTUM PRODUCTION: 0
CHEST TIGHTNESS: 1
ORTHOPNEA: 0
FEVER: 0

## 2020-08-01 ASSESSMENT — LIFESTYLE VARIABLES
HAVE PEOPLE ANNOYED YOU BY CRITICIZING YOUR DRINKING: NO
CONSUMPTION TOTAL: NEGATIVE
DOES PATIENT WANT TO STOP DRINKING: NO
EVER HAD A DRINK FIRST THING IN THE MORNING TO STEADY YOUR NERVES TO GET RID OF A HANGOVER: NO
EVER FELT BAD OR GUILTY ABOUT YOUR DRINKING: NO
TOTAL SCORE: 0
HAVE YOU EVER FELT YOU SHOULD CUT DOWN ON YOUR DRINKING: NO
ALCOHOL_USE: NO
TOTAL SCORE: 0
TOTAL SCORE: 0
SUBSTANCE_ABUSE: 0
AVERAGE NUMBER OF DAYS PER WEEK YOU HAVE A DRINK CONTAINING ALCOHOL: 0
ON A TYPICAL DAY WHEN YOU DRINK ALCOHOL HOW MANY DRINKS DO YOU HAVE: 0
EVER_SMOKED: NEVER
HOW MANY TIMES IN THE PAST YEAR HAVE YOU HAD 5 OR MORE DRINKS IN A DAY: 0

## 2020-08-01 ASSESSMENT — COGNITIVE AND FUNCTIONAL STATUS - GENERAL
SUGGESTED CMS G CODE MODIFIER MOBILITY: CH
DAILY ACTIVITIY SCORE: 24
SUGGESTED CMS G CODE MODIFIER MOBILITY: CH
SUGGESTED CMS G CODE MODIFIER DAILY ACTIVITY: CH
MOBILITY SCORE: 24
MOBILITY SCORE: 24

## 2020-08-01 ASSESSMENT — PATIENT HEALTH QUESTIONNAIRE - PHQ9
1. LITTLE INTEREST OR PLEASURE IN DOING THINGS: NOT AT ALL
SUM OF ALL RESPONSES TO PHQ9 QUESTIONS 1 AND 2: 0
1. LITTLE INTEREST OR PLEASURE IN DOING THINGS: NOT AT ALL
2. FEELING DOWN, DEPRESSED, IRRITABLE, OR HOPELESS: NOT AT ALL
SUM OF ALL RESPONSES TO PHQ9 QUESTIONS 1 AND 2: 0
2. FEELING DOWN, DEPRESSED, IRRITABLE, OR HOPELESS: NOT AT ALL

## 2020-08-01 ASSESSMENT — COPD QUESTIONNAIRES
IN THE PAST 12 MONTHS DO YOU DO LESS THAN YOU USED TO BECAUSE OF YOUR BREATHING PROBLEMS: DISAGREE/UNSURE
HAVE YOU SMOKED AT LEAST 100 CIGARETTES IN YOUR ENTIRE LIFE: NO/DON'T KNOW
DO YOU EVER COUGH UP ANY MUCUS OR PHLEGM?: NO/ONLY WITH OCCASIONAL COLDS OR INFECTIONS
COPD SCREENING SCORE: 2
DURING THE PAST 4 WEEKS HOW MUCH DID YOU FEEL SHORT OF BREATH: NONE/LITTLE OF THE TIME

## 2020-08-01 NOTE — PROCEDURES
"CARDIAC CATHETERIZATION REPORT    Referring Provider: Reed Vázquez M.D.    PROCEDURE PHYSICIAN: Chilo Crowley MD, [facility]  ASSISTANT: None      IMPRESSIONS:  1.  Acute non-STEMI due to culprit right coronary artery  2.  Successful PCI of the mid right coronary artery using 1 drug-eluting stent and IVUS guidance  3.  Successful PCI of the posterior lateral branch using 1 drug-eluting stent  4.  Normal LV systolic function  5.  Moderately elevated LVEDP (21 mmHg)  6.  Poorly controlled hypertension    Recommendations:  Dual platelet therapy  Routine post procedure/post MI care  Cardene infusion until blood pressure control and initiate ACE inhibitor/beta-blocker    Critical care consulted for vent management    Pre-procedure diagnosis: Non-STEMI  Post-procedure diagnosis: Same    Procedure performed  Selective coronary angiography  Left heart catheterization  Percutaneous coronary intervention - Stent Placement  Percutaneous coronary intervention - Stent Placement (NAOMY to PLB)  Intravascular Ultrasound    Conscious sedation was supervised by myself and administered by trained personnel using fentanyl and versed between 719 and 847. The patient tolerated sedation without complication.     Procedure Description  1. Access: 6 Azeri right radial artery Micropuncture technique was utilized following local anesthesia with lidocaine.  A radial cocktail containing verapamil, heparin and saline was administered in the radial artery sheath    2. Diagnostic description: The catheter was passed to the central circulation with the aide of J tipped 0.35\" wire. 5F TIG 4.0 were used to inject the coronary circulationand inject the left ventricle during invasive hemodynamic monitoring.Once all diagnostic information was obtained the equipment was removed from the body.      3. Description of Intervention: After confirming therapeutic anticoagulation intervention proceeded. A 6F Ikari Right guiding catheter was used. The " "lesion was crossed with a 0.014\" Run Through which was positioned in the posterior lateral branch.  I then dilated the mid right coronary artery with a 3.5 x 15 mm noncompliant balloon.  There was continuous watermelon seeding and during the process of repeated inflation the patient became agitated and unable to be sedated.  My emergency medicine colleague was called for intubation so we could administer general anesthesia.  During the patient's agitation the equipment was dislodging the coronary.  The lesion was rewired and 3.5 x 26 mm Raghav drug-eluting stent was deployed in the midsegment of the vessel.  I then dilated the posterior lateral branch with a 2.0 x 12 mm noncompliant balloon and subsequently deployed a 2.5 x 12 mm Biggers drug-eluting stent at 14 rashaun.  I then performed NISREEN of the RCA stent.  This demonstrated excellent sizing and apposition distally but undersizing proximally.  The proximal edge of the stent was therefore postdilated with a 4.0 x 12 mm noncompliant balloon at 12 rashaun.  Post procedure angiograms demonstrated excellent result and equipment removed from the coronary artery and the procedure terminated.    4. Hemostasis: Radial band      Findings   Hemodynamics: Aorta: 167/105 mmHg  LV: 167/21 mmHg    Coronary Anatomy   Left Main: Minimal luminal irregularities   LAD: Diffuse luminal irregularities with long 40% stenosis proximally and mid 50% stenosis.  The first diagonal has a proximal 50% stenosis   LCx: Diffuse luminal irregularities 40% stenosis in the AV groove.  The OM 1 arises high and is normal.  It is a small vessel.  The second OM is moderate in size with minimal luminal irregularities.  The third obtuse marginal has a proximal 50% stenosis   RCA: Dominant, 40% stenosis proximally.  90% stenosis in the midsegment with thrombus.  The PDA is normal the posterior lateral branch has an 80% stenosis.    Results of intervention to the RCA:  Pre: 90% stenosis and KARO III flow  Post: 0% " residual stenosis and KARO III flow. No dissection or distal embolization.    Results of intervention to the posterior lateral branch:  Pre: 80% stenosis and KARO III flow  Post: 0% residual stenosis and KARO III flow. No dissection or distal embolization.    Left Ventriculography: LVEF= 55%.  Normal wall motion.    Technical Factors  1. Complications: None  2. Estimated Blood Loss: <50 cc  3. Specimens: None  4. Contrast Volume: 80 ml  5. Medications: Radial cocktail (Verapamil 2.5 mg, Nitroglycerin 100 mcg) Heparin to maintain ACT >300 Prasugrel 60 mg Integrillin double bolus Intracoronary nitroglycerin  6. Radiation (Air Kerma): 787 mGy

## 2020-08-01 NOTE — DISCHARGE PLANNING
Responded to CODE BLUE. LSW arrived and verified pt & family informaiton. LSW attempted to call pt's wife, Alesia Wiley [phone], however call went straight to  and mailbox was full. LSW then called pt's son, Zurdo Wiley [phone], who answered. Zurdo was with his mother at time of PC so LSW was able to speak with Alesia. LSW updated spouse of change of medical condition and kept her on the phone providing updates until Dr. Crowley was able to personally speak with her. Emotional support was provided then PC ended.    LSW will continue to assist if needs arise.

## 2020-08-01 NOTE — PROGRESS NOTES
Bed control called Charge RN to notify that pt has ICU transfer orders in place. Family notified that pt will transfer to T601.

## 2020-08-01 NOTE — ASSESSMENT & PLAN NOTE
Multifactorial with AF, medication related, mild intravascular volume depletion  WBC up but afebrile and low suspicion for infection/sepsis  beta-blocker and antihypertensives dosing reduced  Follow chest x-ray, PCT, repeat labs  Continue cefepime for now

## 2020-08-01 NOTE — PROGRESS NOTES
Cortrak Placement    Tube Team verified patient name and medical record number prior to tube placement.  Cortrak tube (55 inches, 10 Russian) placed at 75 cm in right nare.  Per Cortrak picture, tube appears to be in the stomach.  Nursing Instructions: Awaiting KUB to confirm placement before use for medications or feeding. Once placement confirmed, flush tube with 30 ml of water, and then remove and save stylet, in patient medication drawer.

## 2020-08-01 NOTE — ASSESSMENT & PLAN NOTE
PEA arrest secondary to sedation/NSTEMI  ROSC after less than 4 minutes CPR and 1 dose of epinephrine

## 2020-08-01 NOTE — PROGRESS NOTES
Received transfer request from [facility]  Sending Physician: Dr. Ashby  Diagnosis: NSTEMI  Type of transfer requested: ED to Direct Admit  Specialist consulted: Dr. Vázquez  Hospitalist consulted: Dr. Crabtree  Patient Accepted  Accepting Physician: Dr. Crabtree  Patient coming via: [facility]  Room Assignment: T835-2  Nursing to notify On Call Hospitalist upon patient arrival to unit

## 2020-08-01 NOTE — PROGRESS NOTES
Chart Check Complete    Monitor Summary:    Rhythm- SB   Rate- 50  Ectopy- na  Measurements- 0.20/0.10/0.40

## 2020-08-01 NOTE — ASSESSMENT & PLAN NOTE
Status post cardiac cath on 8/1 with PCI to RCA and posterior lateral branch  Cardiology following  On asa/ plavix

## 2020-08-01 NOTE — PROGRESS NOTES
PEA at 1230. CPR performed for 4 min. Dr. Crowley and Dr. Mesa at bedside. Please see MAR for administered medications.

## 2020-08-01 NOTE — PROGRESS NOTES
I evaluated and examined the patient at bedside, he was intubated. D/W Dr Mesa. For details see admission H&P w update below.       60M, PMHx DM, HTD, BPH    Admit w CP / NSTEMI, cards consulted with a plan for cath     Got hypoxemic respiratory failure, with pulmonary edema, transferred to ICU for intubation and forced diuresis.

## 2020-08-01 NOTE — PROGRESS NOTES
Heparin has been running at 1000 units/hr before and since patients arrival from [place]. Instructed by pharmacy to follow first dose change protocol for this last ptt of 116.7, we will keep drip at same rate.

## 2020-08-01 NOTE — CONSULTS
"Cardiology Initial Consultation    Date of Service  8/1/2020    Referring Physician  Omega Crabtree M.D.    Reason for Consultation  N STEMI    History of Presenting Illness  Karan Wiley is a 60 y.o. male from [place], [place] with a past medical history of hypertension, \"prediabetes mellitus\" on metformin, dyslipidemia, ventricular ectopy, recent memory problems and chronic low back pain with prior surgeries who was transferred from [facility] 8/1/2020 for management of a non-ST elevation myocardial infarction.    The patient states that earlier last evening while sitting and watching television with his wife he developed severe nonradiating left parasternal \"crushing\" chest discomfort.  His wife drove him to the emergency room.    In the emergency room the patient's blood pressure was severely elevated with a systolic pressure of up to 230.  He received 1 sublingual nitroglycerin with complete resolution of his chest discomfort.  Serial troponin levels showed slight increased trend.  He was placed on IV heparin.  He is on chronic aspirin therapy.  While waiting to be transferred around 2:00 this morning he again had another episode of chest discomfort which was promptly relieved with nitroglycerin.    The patient has no prior history of coronary artery disease or prior myocardial infarction.  The patient states that he has had 2 episodes of \"atrial flutter\".  The first was noted after his lumbar surgery in February 2019 at [facility] however medical records indicate that he was seen by [facility] for ventricular ectopy with no mention of atrial flutter.  More recently on 7/3/2020 the patient had done some strenuous work loading heavy crates of water.  Shortly afterwards while driving back home with his wife he states that his wife had to yell at him because he was driving erratically.  They went to the local ER in [place] where he was hospitalized overnight " "was told that he had \"atrial flutter\" and \"bigeminy and trigeminy\".  He was told that his cardiac arrhythmia was related to \"heat exhaustion\".    Over the past year the patient reports having memory problems and \"early dementia\" and had been referred to [facility] neurology but the recent COVID-19 restrictions has delayed that evaluation.    Additionally the patient has significant snoring history based on what his wife reports to him.  They both believe that he has \"sleep apnea\" this is not been formally evaluated.    Review of Systems  Review of Systems   Respiratory: Positive for chest tightness. Negative for shortness of breath.    Cardiovascular: Positive for chest pain. Negative for palpitations.   Gastrointestinal: Negative for abdominal pain and nausea.   Neurological: Negative for dizziness, weakness, light-headedness and headaches.   All other systems reviewed and are negative.      Past Medical History   has a past medical history of Depression, Hyperlipidemia, and Hypertension.    Surgical History   has a past surgical history that includes spinal cord stimulator (2/26/2019); lumbar fusion posterior (2/26/2019); and laminotomy (2/26/2019).    Family History  family history includes Cancer in his mother.    Social History   reports that he has been smoking. He has a 0.75 pack-year smoking history. He has never used smokeless tobacco. He reports that he does not drink alcohol.    Medications  Prior to Admission Medications   Prescriptions Last Dose Informant Patient Reported? Taking?   HYDROcodone/acetaminophen (NORCO)  MG Tab 7/31/2020 at 2030  Yes Yes   Sig: Take 1 Tab by mouth 6 Times a Day.   Morphine Sulfate ER 15 MG Tablet Extended Release 12 hour Abuse-Deterrent 7/31/2020 at 2030  Yes Yes   Sig: Take 15 mg by mouth 3 times a day. Abuse deterrent? He takes morphine 15mg er tid   aspirin EC (ECOTRIN) 81 MG TBEC 7/31/2020 at 2030 Patient Yes No   Sig: Take 81 mg by mouth every " day.   atorvastatin (LIPITOR) 80 MG tablet 7/31/2020 at 2030 Patient Yes No   Sig: Take 80 mg by mouth every evening.   carvedilol (COREG) 6.25 MG Tab 7/31/2020 at 2030 Patient Yes No   Sig: Take 6.25 mg by mouth 2 times a day, with meals.   finasteride (PROSCAR) 5 MG Tab 7/31/2020 at 2030 Patient Yes No   Sig: Take 5 mg by mouth every evening.   metFORMIN ER (GLUCOPHAGE XR) 750 MG TABLET SR 24 HR 7/31/2020 at 2030  Yes Yes   Sig: Take 750 mg by mouth every day.   tamsulosin (FLOMAX) 0.4 MG capsule 7/31/2020 at 2030 Patient Yes No   Sig: Take 0.4 mg by mouth every evening.   telmisartan (MICARDIS) 80 MG Tab 7/31/2020 at 2030 Patient Yes No   Sig: Take 80 mg by mouth every day.   tizanidine (ZANAFLEX) 2 MG tablet 7/31/2020 at 2030  No No   Sig: Take 1 Tab by mouth 3 times a day as needed.   Patient taking differently: Take 4 mg by mouth 4 times a day.   traZODone (DESYREL) 100 MG Tab 7/31/2020 at 2030 Patient Yes No   Sig: Take 100 mg by mouth every bedtime.   venlafaxine (EFFEXOR XR) 150 MG XR capsule 7/31/2020 at 2030 Patient Yes No   Sig: Take 150 mg by mouth every day.      Facility-Administered Medications: None       Allergies  Allergies   Allergen Reactions   • Hctz [Hydrochlorothiazide W-Spironolactone]      Cannot breathe   • Oxycodone Swelling     Throat swelling       Vital signs in last 24 hours  Temp:  [36.3 °C (97.3 °F)] 36.3 °C (97.3 °F)  Pulse:  [60] 60  Resp:  [20] 20  BP: (162)/(87) 162/87  SpO2:  [95 %] 95 %    Physical Exam  Physical Exam  Constitutional:       General: He is not in acute distress.  HENT:      Head: Normocephalic.      Mouth/Throat:      Comments: Edentulous.  Eyes:      General: No scleral icterus.     Conjunctiva/sclera: Conjunctivae normal.      Pupils: Pupils are equal, round, and reactive to light.   Neck:      Thyroid: No thyromegaly.      Vascular: No carotid bruit.      Comments: Normal jugular venous pressure.  Cardiovascular:      Rate and Rhythm: Normal rate and regular  rhythm.      Pulses:           Carotid pulses are 2+ on the right side and 2+ on the left side.       Radial pulses are 2+ on the right side and 2+ on the left side.        Femoral pulses are 2+ on the right side and 2+ on the left side.       Posterior tibial pulses are 2+ on the right side and 2+ on the left side.      Heart sounds: S1 normal and S2 normal. No murmur. No friction rub. No gallop.       Comments: No femoral bruits.  Pulmonary:      Effort: Pulmonary effort is normal.      Breath sounds: Normal breath sounds. No wheezing, rhonchi or rales.   Abdominal:      General: Bowel sounds are normal. There is no abdominal bruit.      Palpations: Abdomen is soft. There is no mass or pulsatile mass.      Tenderness: There is no abdominal tenderness.      Comments: Protuberant.   Lymphadenopathy:      Cervical: No cervical adenopathy.   Skin:     General: Skin is warm and dry.      Nails: There is no clubbing.     Neurological:      Mental Status: He is alert and oriented to person, place, and time.   Psychiatric:         Behavior: Behavior normal.       Lab Review  Lab Results   Component Value Date/Time    WBC 7.3 08/01/2020 04:09 AM    RBC 4.53 (L) 08/01/2020 04:09 AM    HEMOGLOBIN 13.7 (L) 08/01/2020 04:09 AM    HEMATOCRIT 42.4 08/01/2020 04:09 AM    MCV 93.6 08/01/2020 04:09 AM    MCH 30.2 08/01/2020 04:09 AM    MCHC 32.3 (L) 08/01/2020 04:09 AM    MPV 11.6 08/01/2020 04:09 AM      Lab Results   Component Value Date/Time    SODIUM 136 08/01/2020 04:09 AM    POTASSIUM 4.5 08/01/2020 04:09 AM    CHLORIDE 100 08/01/2020 04:09 AM    CO2 23 08/01/2020 04:09 AM    GLUCOSE 91 08/01/2020 04:09 AM    BUN 14 08/01/2020 04:09 AM    CREATININE 0.73 08/01/2020 04:09 AM      Lab Results   Component Value Date/Time    ASTSGOT 17 08/01/2020 04:09 AM    ALTSGPT 18 08/01/2020 04:09 AM     Lab Results   Component Value Date/Time    TROPONINT 63 (H) 08/01/2020 04:09 AM       No results for input(s): NTPROBNP in the last 72  "hours.    Cardiac Imaging and Procedures Review  EKG:  My personal interpretation of the EKG dated 08/01/2020 is normal sinus rhythm, rate 53    Imaging  Chest X-Ray: Ending    Assessment/Plan  1.  NSTEMI  2.  Ventricular ectopy  3.  History of \"atrial flutter\"  4.  Hypertension chronic and acute.  5.  \"Prediabetes mellitus\" on metformin  6.  Dyslipidemia  7.  Sleep disorder consistent with obstructive sleep apnea.  8.  Memory problems with early dementia.  9.  Obesity  10.  Lumbar disc disease, laminectomy, spinal cord stimulator.  11.  Chronic low back pain  12.  Chronic narcotic therapy for therapeutic purposes.  13.  Depression    Recommendation Discussion  1.  I did detailed discussion with the patient concerning his current significant cardiac problem.  2.  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  3.  I recommended a cardiac catheterization explained to him the reason for the procedure, the procedure itself and the attendant risks as well as the potential therapeutic scenarios all of which he understood and wished to proceed.  4.  Continue IV heparin along with guideline directed medical therapy    Thank you for allowing me to participate in the care of this patient.    Please contact me with any questions.    Reed Vázquez M.D.   Cardiologist, [facility]  [phone]        "

## 2020-08-01 NOTE — H&P
Hospital Medicine History & Physical Note    Date of Service  8/1/2020    Primary Care Physician  Darrell Caal P.A.-C.    Code Status  Full code    Chief Complaint  Chest pain    History of Presenting Illness  60 y.o. male with history of hypertension, dyslipidemia who presented 8/1/2020 with complaints of chest pain.  Patient was transferred from [facility] in [place] emergency department, where he presented for evaluation of chest pain, 8 out of 10, left sided, crushing, nonradiating, alleviated by nitroglycerin, without aggravating factors.  He presented with hypertension 230/130, on room air, heart rate 56.  EKG was negative for ischemia, sinus rhythm.  Blood pressure and chest pain improved after nitroglycerin.  He was also started on heparin drip and received aspirin.  Initial troponin was negative, repeat troponin was in the indeterminate range 0.05.  Transferring physician consulted with Dr. Serna, who accepted patient for consult.  Currently patient resting in his bed without signs of distress, denies chest pain shortness of breath or palpitation.  Blood pressure is under control.  Review of Systems  Review of Systems   Constitutional: Negative for chills, fever and weight loss.   HENT: Negative for ear pain, hearing loss and tinnitus.    Eyes: Negative for blurred vision, double vision and photophobia.   Respiratory: Negative for cough, hemoptysis and sputum production.    Cardiovascular: Positive for chest pain. Negative for palpitations and orthopnea.   Gastrointestinal: Negative for heartburn, nausea and vomiting.   Genitourinary: Negative for dysuria, flank pain, frequency and hematuria.   Musculoskeletal: Positive for joint pain. Negative for back pain and neck pain.   Skin: Negative for itching and rash.   Neurological: Negative for tremors, speech change, focal weakness and headaches.   Endo/Heme/Allergies: Negative for environmental allergies and polydipsia. Does not bruise/bleed  easily.   Psychiatric/Behavioral: Negative for hallucinations and substance abuse. The patient is not nervous/anxious.        Past Medical History   has a past medical history of Depression, Hyperlipidemia, and Hypertension.    Surgical History   has a past surgical history that includes spinal cord stimulator (2/26/2019); lumbar fusion posterior (2/26/2019); and laminotomy (2/26/2019).     Family History  family history includes Cancer in his mother.     Social History   reports that he has been smoking. He has a 0.75 pack-year smoking history. He has never used smokeless tobacco. He reports that he does not drink alcohol.    Allergies  Allergies   Allergen Reactions   • Hctz [Hydrochlorothiazide W-Spironolactone]      Cannot breathe   • Oxycodone Swelling     Throat swelling       Medications  Prior to Admission Medications   Prescriptions Last Dose Informant Patient Reported? Taking?   aspirin EC (ECOTRIN) 81 MG TBEC  Patient Yes No   Sig: Take 81 mg by mouth every day.   atorvastatin (LIPITOR) 80 MG tablet  Patient Yes No   Sig: Take 80 mg by mouth every evening.   carvedilol (COREG) 6.25 MG Tab  Patient Yes No   Sig: Take 6.25 mg by mouth 2 times a day, with meals.   finasteride (PROSCAR) 5 MG Tab  Patient Yes No   Sig: Take 5 mg by mouth every evening.   tamsulosin (FLOMAX) 0.4 MG capsule  Patient Yes No   Sig: Take 0.4 mg by mouth every evening.   telmisartan (MICARDIS) 80 MG Tab  Patient Yes No   Sig: Take 80 mg by mouth every day.   tizanidine (ZANAFLEX) 2 MG tablet   No No   Sig: Take 1 Tab by mouth 3 times a day as needed.   traZODone (DESYREL) 100 MG Tab  Patient Yes No   Sig: Take 100 mg by mouth every bedtime.   venlafaxine (EFFEXOR XR) 150 MG XR capsule  Patient Yes No   Sig: Take 150 mg by mouth every day.      Facility-Administered Medications: None       Physical Exam       Physical Exam  Vitals signs and nursing note reviewed.   Constitutional:       General: He is not in acute distress.      Appearance: Normal appearance.   HENT:      Head: Normocephalic and atraumatic.      Nose: Nose normal.      Mouth/Throat:      Mouth: Mucous membranes are moist.   Eyes:      Extraocular Movements: Extraocular movements intact.      Pupils: Pupils are equal, round, and reactive to light.   Neck:      Musculoskeletal: Normal range of motion and neck supple.   Cardiovascular:      Rate and Rhythm: Normal rate and regular rhythm.   Pulmonary:      Effort: Pulmonary effort is normal.      Breath sounds: Normal breath sounds.   Abdominal:      General: Abdomen is flat. There is no distension.      Tenderness: There is no abdominal tenderness.   Musculoskeletal: Normal range of motion.         General: No swelling or deformity.   Skin:     General: Skin is warm and dry.   Neurological:      General: No focal deficit present.      Mental Status: He is alert and oriented to person, place, and time.   Psychiatric:         Mood and Affect: Mood normal.         Behavior: Behavior normal.         Laboratory:          No results for input(s): ALTSGPT, ASTSGOT, ALKPHOSPHAT, TBILIRUBIN, DBILIRUBIN, GAMMAGT, AMYLASE, LIPASE, ALB, PREALBUMIN, GLUCOSE in the last 72 hours.      No results for input(s): NTPROBNP in the last 72 hours.      No results for input(s): TROPONINT in the last 72 hours.    Imaging:  No orders to display         Assessment/Plan:  I anticipate this patient will require at least two midnights for appropriate medical management, necessitating inpatient admission.    NSTEMI (non-ST elevated myocardial infarction) (HCC)  Assessment & Plan  Transferred from outside facility with possible non-ST elevation MI, crushing chest pain and elevated troponin, in the setting of hypertensive emergency  Ordered troponin and EKG stat.  We will continue heparin, nitroglycerin as needed for chest pain  Cardiology has been consulted    Hypertensive emergency  Assessment & Plan  Resolved at outside facility  Continue home  medications  IV labetalol as needed

## 2020-08-01 NOTE — ASSESSMENT & PLAN NOTE
New left ventricular thrombus identified via echocardiogram post initial catheterization and subsequent brief PEA arrest  IV heparin drip now discontinued  Follow-up echocardiogram showed improved EF and LV thrombus no longer visualized

## 2020-08-01 NOTE — PROGRESS NOTES
Patient arrived at [facility] from cath lab around 0900. Report received at bedside. Dr. Mesa at bedside.   Pt on Propofol at 30mcg/kg/min, nitro at 24ml/hr.  Dr. Red ordered to stop Nitro and discontinue heparin.   New orders from Dr. Mesa: Fentanyl drip  Currently pt has fentanyl 150mcg/hr and propofol at 30mcg/kg/min

## 2020-08-01 NOTE — PROCEDURES
"CARDIAC CATHETERIZATION REPORT    REFERRING: myself    PROCEDURE PHYSICIAN: Chilo Crowley MD, [facility], [facility]  ASSISTANT: None    IMPRESSIONS:  1. Widely patent coronary arteries  2. Cardiogenic shock, likely due to stress cardiomyopathy  3. Successful placement of IABP  4. Successful placement of left radial artery line    Recommendations:  Maintain IABP. Supportive ICU care. Continue DAPT. Heparin infusion    Pre-procedure diagnosis: PEA arrest, no LV systolic dysfunction  Post-procedure diagnosis: Stress induced cardiomyopathy    Procedure performed  Selective coronary angiography  Insertion of IABP  Insertion of left radial artery line    Deep sedation was supervised by myself and administered by trained personnel using propofol between 1314 and 215. The patient tolerated sedation without complication.     Procedure Description  1. Access: 6 Danish right femoral artery Micropuncture technique was utilized following local anesthesia with lidocaine.  Fluoroscopic guidance was utilized for femoral access A micropuncture catheter was placed in the left radial artery.     2. Diagnostic description: The catheter was passed to the central circulation with the aide of J tipped 0.35\" wire. 6F JR4 and 6F JL4 were used to inject the coronary circulation .Once all diagnostic information was obtained the equipment was removed from the body. The femoral artery was injected with dye in anticipation of closure device. There was marked stagnation in femoral artery. I therefor placed an IABP. I then accessed the left radial artery and secured the micropuncture catheter in place for hemodynamic monitoring.     3. Hemostasis: Sheath secured in place      Findings   Hemodynamics: 114/68    Coronary Anatomy - slow flow throughout     Left Main: Minimal luminal irregularities              LAD: Diffuse luminal irregularities with long 40% stenosis proximally and mid 50% stenosis.  The first diagonal has a proximal 50% stenosis            "   LCx: Diffuse luminal irregularities 40% stenosis in the AV groove.  The OM 1 arises high and is normal.  It is a small vessel.  The second OM is moderate in size with minimal luminal irregularities.  The third obtuse marginal has a proximal 50% stenosis              RCA: Dominant, widely patent stents in the mid RCA and PLB.      Technical Factors  1. Complications: None  2. Estimated Blood Loss: <50 cc  3. Specimens: None  4. Contrast Volume: 22 ml  5. Medications: Heparin 5000 Units

## 2020-08-01 NOTE — PROCEDURES
RCC    Code Blue note    I was present at the bedside post placement of a central line.  Line was placed secondary to hypotension which was in part related to sedation for this patient with NSTEMI status post stenting in the Cath Lab.  On propofol 30 he dropped his pressure and all sedation was stopped and he is blood pressure came back up to very hypertensive range and he was agitated again and was not safe.  He was given fentanyl 100 mcg push and started back on a 50 mcg fentanyl drip along with dexmedetomidine at 0.5.  Propofol has been started back at 20 briefly until we are able to get fentanyl and dexmedetomidine started.  Administered Norcuron 50 mg because he was so agitated and we needed him to be calm for a right IJ central line which was placed without any difficulty once he was relaxed.  Postprocedure his blood pressure dropped into the 50-60s again and all sedation once again was stopped.  Ultrasound of chest revealed normal lung sliding bilaterally and no pneumothorax.  Prasad-Synephrine 100 mcg was ordered IV.  He was being bagged easily without any effort.  Norepinephrine drip at 10 was ordered and subsequently increased to 30.  Ultrasound of his heart revealed decreased or limited movement and CODE BLUE called and CPR initiated.  The patient had less than 2 cycles or 4 minutes of CPR when we obtained ROSC.  He received 1 amp of epinephrine ASAP since I believe he needed catecholamines to counteract his sedation primarily.  Blood pressure was shortly thereafter severely elevated again as he was in the ER in the Cath Lab.  We titrated norepinephrine down and he was briefly off and then restarted at 5 mics.  Fentanyl and dexmedetomidine were started at 50 mcg and 0.5 again respectively.  Echocardiogram was then obtained along with EKG.  EKG did not look like a new STEMI however there were some subtle ST changes.  Echocardiogram however revealed a new LV thrombus and anterior wall now looked down.   Cardiology was present for this evaluation and opted to take him back to the Cath Lab and he is headed there now.  See the epic note for timing and doses of all medications and other actions.

## 2020-08-01 NOTE — PROGRESS NOTES
Patient with PEA during central line insertion. Successfully revived by ICU team. Echo showing new apical akinesis and thrombus in the LV apex. Will plan on re look coronary angiogram.

## 2020-08-01 NOTE — PROGRESS NOTES
Phone call placed to pt's wife to update her on pt's transfer to cath lab for further assessment by Dr. Crowley.

## 2020-08-01 NOTE — PROGRESS NOTES
Patient with ongoing respiratory distress, likely flash pulmonary edema following percutaneous coronary intervention.  He is receiving excellent care in the cardiac intensive care unit with the assistance of the critical care team.  I have tried to contact the patient's wife to update her, unfortunately do not have a reliable telephone number.

## 2020-08-01 NOTE — PROGRESS NOTES
60-year-old male transferring from [facility] due to NSTEMI.  Cardiology has been consulted and accepting for consultation.  Dr. Knapp.

## 2020-08-01 NOTE — PROGRESS NOTES
Pt's sister called for updates. Pt's sister informed that pt is currently getting settled and his tachycardia and hypertension is being treated. Family will be informed when pt is ready to receive visitors.

## 2020-08-01 NOTE — ASSESSMENT & PLAN NOTE
Weight loss to be encouraged when clinically appropriate  Weight loss may help with management of AMPARO, hypertension, dyslipidemia and chronic pain

## 2020-08-01 NOTE — ASSESSMENT & PLAN NOTE
Status post PCI 8/1   DAPT  Statin/beta-blocker/ACE  Monitor for cardiac dysrhythmias, some history of ventricular ectopy/atrial flutter?  Currently in sinus tachycardia with frequent PVCs  Optimize electrolytes  EF 25%  Complicated by LV apical thrombus  Follow-up echocardiogram shows improved EF and resolution of LV thrombus  Heparin held due to hematuria

## 2020-08-01 NOTE — PROGRESS NOTES
Patient taken down to cath procedure. Received report from NOC RN. Per chart review and report, heparin running at 1000 units/hr.

## 2020-08-01 NOTE — PROGRESS NOTES
EMERGENT INTUBATION PROCEDURE NOTE      I was called emergently to the catheterization lab for intubation.  Mr. Wong was having a coronary catheterization.  He was in significant discomfort and requiring increasingly high doses of medications to provide comfort during the procedure.  Due to requirement of multiple sedating medications I was emergently asked to provide services for endotracheal intubation, for airway protection during procedure.    INDICATION: Airway protection during procedure  CONSENT: I was not able to personally consent for intubation due to him being sedated and this being an emergent situation  TECHNIQUE: Direct laryngoscopy  After pre-oxygenating the patient for 5 minutes as best as possible, saturation at time of intubation 78%, a modified rapid-sequence induction was performed using Etomidate and rocuronium with cricoid pressure. Using a Mac 4 blade, a grade 1 view was obtained, and a 7.5 endotracheal tube was placed and secured.  Placement was confirmed with auscultation, CO2 detector, chest rise, saturation 100%  COMPLICATIONS: Not a difficulty intubation. No complications    Luis Batista II, M.D. 8/1/2020 8:26 AM

## 2020-08-01 NOTE — CONSULTS
Critical Care Consultation  (H&P)    Date of consult: 8/1/2020    Referring Physician  Meño Red    Reason for Consultation  NSTEMI intubated in Cath lab    History of Presenting Illness  60 y.o. male with a history of ongoing tobacco abuse, chronic pain and narcotic dependence with spinal stimulator and multiple prior surgeries, hypertension, dyslipidemia, AMPARO, prediabetes and atrial flutter who presented 8/1/2020 with chest pain alleviated with nitroglycerin and associated with severe hypertension (/130) and relative bradycardia HR 50s).  EKG was consistent with NSTEMI cardiology felt appropriate to take him to the Cath Lab.  In the Cath Lab he remained hypertensive and was agitated and hypoxic and was intubated by the emergency room physician and catheterization was performed and he had successful PCI mid right coronary and posterior lateral branch.  He had normal LV systolic function but mildly elevated LVEDP at 21.  Arrival to the [facility] he was still paralyzed from intubation and sedated on propofol but his blood pressure was still well over 200 systolic.  He was hypoxic requiring increase of PEEP from 8->10 and FiO2 was set at 100%.  First blood gas in the ICU was 7.34/42/78.  Multiple antihypertensive medications were ordered and he fortunately responded to a dose of labetalol.  Lasix has been ordered and we will see how he responds to diuresis.  Case reviewed the bedside with cardiology at length, Dr. Crowley.  He was on a heparin drip which was started in the emergency room and this was discontinued on arrival to [facility].    Code Status  Full Code    Review of Systems  Review of Systems   Unable to perform ROS: Acuity of condition     Past Medical History   has a past medical history of Depression, Hyperlipidemia, and Hypertension.  Chronic pain syndrome, narcotic dependence, tobacco abuse, AMPARO, prediabetes    Surgical History   has a past surgical history that includes spinal cord stimulator  (2/26/2019); lumbar fusion posterior (2/26/2019); and laminotomy (2/26/2019).    Family History  family history includes Cancer in his mother.    Social History   reports that he has been smoking. He has a 0.75 pack-year smoking history. He has never used smokeless tobacco. He reports that he does not drink alcohol.    Medications  Home Medications     Reviewed by Donell Lyons R.N. (Registered Nurse) on 08/01/20 at 0335  Med List Status: Complete   Medication Last Dose Status   aspirin EC (ECOTRIN) 81 MG TBEC 7/31/2020 Active   atorvastatin (LIPITOR) 80 MG tablet 7/31/2020 Active   carvedilol (COREG) 6.25 MG Tab 7/31/2020 Active   finasteride (PROSCAR) 5 MG Tab 7/31/2020 Active   HYDROcodone/acetaminophen (NORCO)  MG Tab 7/31/2020 Active   metFORMIN ER (GLUCOPHAGE XR) 750 MG TABLET SR 24 HR 7/31/2020 Active   Morphine Sulfate ER 15 MG Tablet Extended Release 12 hour Abuse-Deterrent 7/31/2020 Active   tamsulosin (FLOMAX) 0.4 MG capsule 7/31/2020 Active   telmisartan (MICARDIS) 80 MG Tab 7/31/2020 Active   tizanidine (ZANAFLEX) 2 MG tablet 7/31/2020 Active   traZODone (DESYREL) 100 MG Tab 7/31/2020 Active   venlafaxine (EFFEXOR XR) 150 MG XR capsule 7/31/2020 Active              Current Facility-Administered Medications   Medication Dose Route Frequency Provider Last Rate Last Dose   • acetaminophen (TYLENOL) tablet 650 mg  650 mg Oral Q6HRS PRN Omega Crabtree M.D.       • senna-docusate (PERICOLACE or SENOKOT S) 8.6-50 MG per tablet 2 Tab  2 Tab Oral BID Meño Red M.D.        And   • polyethylene glycol/lytes (MIRALAX) PACKET 1 Packet  1 Packet Oral QDAY PRN Meño Red M.D.        And   • magnesium hydroxide (MILK OF MAGNESIA) suspension 30 mL  30 mL Oral QDAY PRN Meño Red M.D.        And   • bisacodyl (DULCOLAX) suppository 10 mg  10 mg Rectal QDAY PRN Meño Red M.D.       • ondansetron (ZOFRAN) syringe/vial injection 4 mg  4 mg Intravenous Q4HRS PRN Meño Red M.D.        • ondansetron (ZOFRAN ODT) dispertab 4 mg  4 mg Oral Q4HRS PRN Meño Red M.D.       • promethazine (PHENERGAN) tablet 12.5-25 mg  12.5-25 mg Oral Q4HRS PRN Meño Red M.D.       • promethazine (PHENERGAN) suppository 12.5-25 mg  12.5-25 mg Rectal Q4HRS PRN Meño Red M.D.       • prochlorperazine (COMPAZINE) injection 5-10 mg  5-10 mg Intravenous Q4HRS PRN Meño Red M.D.       • labetalol (NORMODYNE/TRANDATE) injection 10 mg  10 mg Intravenous Q4HRS PRN Meño Red M.D.   10 mg at 08/01/20 0924   • nitroglycerin (NITROSTAT) tablet 0.4 mg  0.4 mg Sublingual Q5 MIN PRN Meño Red M.D.       • atorvastatin (LIPITOR) tablet 80 mg  80 mg Oral Nightly Meño Red M.D.       • tizanidine (ZANAFLEX) tablet 4 mg  4 mg Oral 4X/DAY Meño Red M.D.   Stopped at 08/01/20 0900   • traZODone (DESYREL) tablet 100 mg  100 mg Oral QHS Meño Red M.D.       • insulin regular (HumuLIN R,NovoLIN R) injection  1-6 Units Subcutaneous 4X/DAY CATHERINE Red M.D.   Stopped at 08/01/20 1100    And   • glucose 4 g chewable tablet 16 g  16 g Oral Q15 MIN PRN Meño Red M.D.        And   • dextrose 50% (D50W) injection 50 mL  50 mL Intravenous Q15 MIN PRN Meño Red M.D.       • HYDROcodone/acetaminophen (NORCO)  MG per tablet 1 Tab  1 Tab Oral 6X/DAY Meño Red M.D.   Stopped at 08/01/20 1000   • NS infusion   Intravenous Continuous Reed Mesa M.D. 10 mL/hr at 08/01/20 1024     • niCARdipine (CARDENE) 25 mg in  mL Infusion  0-15 mg/hr Intravenous Continuous Chilo Crowley M.D.   Stopped at 08/01/20 0900   • carvedilol (COREG) tablet 12.5 mg  12.5 mg Oral BID WITH MEALS Chilo Crowley M.D.   Stopped at 08/01/20 0915   • Respiratory Therapy Consult   Nebulization Continuous RT Reed Mesa M.D.       • ipratropium-albuterol (DUONEB) nebulizer solution  3 mL Nebulization Q2HRS PRN (RT) Reed Mesa M.D.       •  famotidine (PEPCID) tablet 20 mg  20 mg Enteral Tube Q12HRS Reed Mesa M.D.        Or   • famotidine (PEPCID) injection 20 mg  20 mg Intravenous Q12HRS Reed Mesa M.D.   20 mg at 08/01/20 0944   • MD Alert...ICU Electrolyte Replacement per Pharmacy   Other PHARMACY TO DOSE Reed Mesa M.D.       • lidocaine (XYLOCAINE) 1 % injection 1-2 mL  1-2 mL Tracheal Tube Q30 MIN PRN Reed Mesa M.D.       • enoxaparin (LOVENOX) inj 40 mg  40 mg Subcutaneous QHS Reed Mesa M.D.       • fentaNYL (SUBLIMAZE) injection 25 mcg  25 mcg Intravenous Q HOUR PRN Reed Mesa M.D.        Or   • fentaNYL (SUBLIMAZE) injection 50 mcg  50 mcg Intravenous Q HOUR PRN Reed Mesa M.D.        Or   • fentaNYL (SUBLIMAZE) injection 100 mcg  100 mcg Intravenous Q HOUR PRN Reed Mesa M.D.   100 mcg at 08/01/20 1135   • fentaNYL (SUBLIMAZE) 50 mcg/mL in 50mL   Intravenous Continuous Reed Mesa M.D. 1 mL/hr at 08/01/20 1140 50 mcg/hr at 08/01/20 1140   • telmisartan (MICARDIS) tablet 80 mg  80 mg Oral DAILY Chilo Crowley M.D.   Stopped at 08/01/20 0945   • enalaprilat (VASOTEC) injection 1.25 mg  1.25 mg Intravenous Q6HRS PRN Reed Mesa M.D.       • [START ON 8/2/2020] aspirin (ASA) chewable tab 81 mg  81 mg Enteral Tube DAILY Geraldo Montana, A.P.N.       • furosemide (LASIX) injection 40 mg  40 mg Intravenous Once Chilo Crowley M.D.       • dexmedetomidine (PRECEDEX) 400 mcg/100mL NS premix infusion  0.1-1.5 mcg/kg/hr Intravenous Continuous Reed Mesa M.D. 13 mL/hr at 08/01/20 1256 0.5 mcg/kg/hr at 08/01/20 1256   • norepinephrine (Levophed) infusion 8 mg/250 mL (premix)  0-30 mcg/min Intravenous Continuous Reed Mesa M.D.   Stopped at 08/01/20 1422   • SODIUM CHLORIDE 0.9 % IV SOLN            • magnesium sulfate IVPB premix 2 g  2 g Intravenous Once Reed Mesa M.D.       • K+ Scale: Goal of 4.5  1 Each Intravenous Q6HRS Reed Mesa M.D.        • ROCURONIUM BROMIDE 50 MG/5ML IV SOLN            • iohexol (OMNIPAQUE) 350 mg/mL  22 mL Intra-arterial Once Chilo Crowley M.D.       • heparin infusion 25,000 units in 500 mL 0.45% NACL   Intravenous Continuous Chilo Crowley M.D.           Allergies  Allergies   Allergen Reactions   • Hctz [Hydrochlorothiazide W-Spironolactone]      Cannot breathe   • Oxycodone Swelling     Throat swelling       Vital Signs last 24 hours  Temp:  [36.3 °C (97.3 °F)-36.6 °C (97.9 °F)] 36.6 °C (97.9 °F)  Pulse:  [] 121  Resp:  [12-31] 22  BP: (102-243)/() 123/96  SpO2:  [88 %-98 %] 88 %    Physical Exam  Physical Exam  Vitals signs reviewed.   Constitutional:       Appearance: He is obese. He is ill-appearing. He is not toxic-appearing.      Interventions: He is intubated.   HENT:      Head: Normocephalic and atraumatic.      Mouth/Throat:      Mouth: Mucous membranes are moist.   Eyes:      General: No scleral icterus.     Pupils: Pupils are equal, round, and reactive to light.   Neck:      Musculoskeletal: Neck supple. No neck rigidity.   Cardiovascular:      Rate and Rhythm: Tachycardia present. Rhythm irregular. Frequent extrasystoles are present.     Pulses: Normal pulses.      Comments: ST  Pulmonary:      Effort: No accessory muscle usage. He is intubated.      Breath sounds: Examination of the right-lower field reveals rales. Examination of the left-lower field reveals rales. Rales present. No wheezing or rhonchi.   Abdominal:      General: Abdomen is protuberant. Bowel sounds are decreased.      Palpations: Abdomen is soft.      Tenderness: There is no guarding.   Genitourinary:     Comments: keith  Lymphadenopathy:      Cervical: No cervical adenopathy.   Skin:     General: Skin is warm and dry.      Capillary Refill: Capillary refill takes less than 2 seconds.      Nails: There is no clubbing.     Neurological:      Comments: Sedate, on the ventilator, cranial nerves intact, starting to move all 4  extremities but not following commands, had received etomidate/paralytic and propofol/fentanyl in the Cath Lab   Psychiatric:      Comments: Unable to assess         Fluids    Intake/Output Summary (Last 24 hours) at 8/1/2020 1436  Last data filed at 8/1/2020 1200  Gross per 24 hour   Intake 358.46 ml   Output 150 ml   Net 208.46 ml       Laboratory  Recent Results (from the past 48 hour(s))   Comp Metabolic Panel    Collection Time: 08/01/20  4:09 AM   Result Value Ref Range    Sodium 136 135 - 145 mmol/L    Potassium 4.5 3.6 - 5.5 mmol/L    Chloride 100 96 - 112 mmol/L    Co2 23 20 - 33 mmol/L    Anion Gap 13.0 7.0 - 16.0    Glucose 91 65 - 99 mg/dL    Bun 14 8 - 22 mg/dL    Creatinine 0.73 0.50 - 1.40 mg/dL    Calcium 9.8 8.5 - 10.5 mg/dL    AST(SGOT) 17 12 - 45 U/L    ALT(SGPT) 18 2 - 50 U/L    Alkaline Phosphatase 76 30 - 99 U/L    Total Bilirubin 0.6 0.1 - 1.5 mg/dL    Albumin 4.0 3.2 - 4.9 g/dL    Total Protein 6.5 6.0 - 8.2 g/dL    Globulin 2.5 1.9 - 3.5 g/dL    A-G Ratio 1.6 g/dL   CBC WITH DIFFERENTIAL    Collection Time: 08/01/20  4:09 AM   Result Value Ref Range    WBC 7.3 4.8 - 10.8 K/uL    RBC 4.53 (L) 4.70 - 6.10 M/uL    Hemoglobin 13.7 (L) 14.0 - 18.0 g/dL    Hematocrit 42.4 42.0 - 52.0 %    MCV 93.6 81.4 - 97.8 fL    MCH 30.2 27.0 - 33.0 pg    MCHC 32.3 (L) 33.7 - 35.3 g/dL    RDW 41.8 35.9 - 50.0 fL    Platelet Count 214 164 - 446 K/uL    MPV 11.6 9.0 - 12.9 fL    Neutrophils-Polys 56.20 44.00 - 72.00 %    Lymphocytes 29.90 22.00 - 41.00 %    Monocytes 9.10 0.00 - 13.40 %    Eosinophils 3.90 0.00 - 6.90 %    Basophils 0.60 0.00 - 1.80 %    Immature Granulocytes 0.30 0.00 - 0.90 %    Nucleated RBC 0.00 /100 WBC    Neutrophils (Absolute) 4.09 1.82 - 7.42 K/uL    Lymphs (Absolute) 2.17 1.00 - 4.80 K/uL    Monos (Absolute) 0.66 0.00 - 0.85 K/uL    Eos (Absolute) 0.28 0.00 - 0.51 K/uL    Baso (Absolute) 0.04 0.00 - 0.12 K/uL    Immature Granulocytes (abs) 0.02 0.00 - 0.11 K/uL    NRBC (Absolute) 0.00  K/uL   APTT    Collection Time: 20  4:09 AM   Result Value Ref Range    APTT 116.7 (HH) 24.7 - 36.0 sec   Prothrombin Time    Collection Time: 20  4:09 AM   Result Value Ref Range    PT 13.5 12.0 - 14.6 sec    INR 1.00 0.87 - 1.13   TROPONIN    Collection Time: 20  4:09 AM   Result Value Ref Range    Troponin T 63 (H) 6 - 19 ng/L   ESTIMATED GFR    Collection Time: 20  4:09 AM   Result Value Ref Range    GFR If African American >60 >60 mL/min/1.73 m 2    GFR If Non African American >60 >60 mL/min/1.73 m 2   EKG - STAT Once    Collection Time: 20  4:17 AM   Result Value Ref Range    Report       [facility]    Test Date:  2020  Pt Name:    TOMMY WILLINGHAM                  Department: 183  MRN:        [id number]                      Room:       [facility]  Gender:     Male                         Technician: YVETTE  :        1960                   Requested By:ALEXANDER DOW  Order #:    [id number]                    Reading MD: Reed Vázquez MD    Measurements  Intervals                                Axis  Rate:       53                           P:          16  NE:         196                          QRS:        -4  QRSD:       92                           T:          57  QT:         431  QTc:        405    Interpretive Statements  SINUS BRADYCARDIA  Compared to ECG 2019 08:48:27  Sinus tachycardia no longer present  Ventricular premature complex(es) no longer present  Electronically Signed On 2020 7:19:24 PDT by Reed Vázquez MD     POC ACT (resulted by nursing)    Collection Time: 20  7:40 AM   Result Value Ref Range    Istat Activated Clotting Time 335 (H) 74 - 137 sec   POC ACT (resulted by nursing)    Collection Time: 20  8:40 AM   Result Value Ref Range    Istat Activated Clotting Time 290 (H) 74 - 137 sec   EKG in four (4) hours    Collection Time: 20  9:11 AM   Result Value Ref Range    Report       [facility]    Test Date:   2020  Pt Name:    TOMMY WILLINGHAM                  Department: 183  MRN:        [id number]                      Room:       T601  Gender:     Male                         Technician: PEP  :        1960                   Requested By:ALEXANDER DOW  Order #:    [id number]                    Reading MD: Cricket Nye MD    Measurements  Intervals                                Axis  Rate:       125                          P:          105  NH:         165                          QRS:        116  QRSD:       82                           T:          -8  QT:         297  QTc:        429    Interpretive Statements  SINUS TACHYCARDIA  LOW VOLTAGE, EXTREMITY LEADS  ANTEROSEPTAL INFARCT, OLD  Compared to ECG 2020 04:17:21  Low QRS voltage now present  Myocardial infarct finding now present  Sinus bradycardia no longer present  Electronically Signed On 2020 13:24:33 PDT by Cricket Nye MD     ISTAT ARTERIAL BLOOD GAS    Collection Time: 20  9:29 AM   Result Value Ref Range    Ph 7.330 (L) 7.400 - 7.500    Pco2 43.8 (H) 26.0 - 37.0 mmHg    Po2 81 64 - 87 mmHg    Tco2 24 20 - 33 mmol/L    S02 95 93 - 99 %    Hco3 23.1 17.0 - 25.0 mmol/L    BE -3 -4 - 3 mmol/L    Body Temp 97.3 F degrees    O2 Therapy 100 %    iPF Ratio 81     Ph Temp [facility] 7.340 (L) 7.400 - 7.500    Pco2 Temp Co 42.4 (H) 26.0 - 37.0 mmHg    Po2 Temp Cor 78 64 - 87 mmHg    Specimen Arterial     Action Range Triggered NO     Inst. Qualified Patient YES    Urine Drug Screen    Collection Time: 20 11:00 AM   Result Value Ref Range    Amphetamines Urine Negative Negative    Barbiturates Negative Negative    Benzodiazepines Positive (A) Negative    Cocaine Metabolite Negative Negative    Methadone Negative Negative    Opiates Positive (A) Negative    Oxycodone Negative Negative    Phencyclidine -Pcp Negative Negative    Propoxyphene Negative Negative    Cannabinoid Metab Negative Negative   Basic Metabolic Panel    Collection Time:  20 11:00 AM   Result Value Ref Range    Sodium 135 135 - 145 mmol/L    Potassium 3.8 3.6 - 5.5 mmol/L    Chloride 101 96 - 112 mmol/L    Co2 20 20 - 33 mmol/L    Glucose 137 (H) 65 - 99 mg/dL    Bun 14 8 - 22 mg/dL    Creatinine 0.78 0.50 - 1.40 mg/dL    Calcium 8.9 8.5 - 10.5 mg/dL    Anion Gap 14.0 7.0 - 16.0   MAGNESIUM    Collection Time: 20 11:00 AM   Result Value Ref Range    Magnesium 1.6 1.5 - 2.5 mg/dL   TSH    Collection Time: 20 11:00 AM   Result Value Ref Range    TSH 2.000 0.380 - 5.330 uIU/mL   proBrain Natriuretic Peptide, NT    Collection Time: 20 11:00 AM   Result Value Ref Range    NT-proBNP 466 (H) 0 - 125 pg/mL   TROPONIN    Collection Time: 20 11:00 AM   Result Value Ref Range    Troponin T 221 (H) 6 - 19 ng/L   Triglyceride    Collection Time: 20 11:00 AM   Result Value Ref Range    Triglycerides 155 (H) 0 - 149 mg/dL   ESTIMATED GFR    Collection Time: 20 11:00 AM   Result Value Ref Range    GFR If African American >60 >60 mL/min/1.73 m 2    GFR If Non African American >60 >60 mL/min/1.73 m 2   EKG    Collection Time: 20 12:37 PM   Result Value Ref Range    Report       [facility]    Test Date:  2020  Pt Name:    TOMMY WILLINGHAM                  Department: 161  MRN:        [id number]                      Room:       [facility]  Gender:     Male                         Technician: PEP  :        1960                   Requested By:JUAN MANUEL HILARIO  Order #:    [id number]                    Reading MD: Cricket Nye MD    Measurements  Intervals                                Axis  Rate:       123                          P:          68  NH:         182                          QRS:        -32  QRSD:       81                           T:          64  QT:         285  QTc:        408    Interpretive Statements  SINUS TACHYCARDIA  PAIRED VENTRICULAR PREMATURE COMPLEXES  LOW VOLTAGE, EXTREMITY LEADS  ABNORMAL INFERIOR Q WAVES  ST ELEVATION,  CONSIDER INFERIOR INJURY  Compared to ECG 08/01/2020 09:11:55  Ventricular premature complex(es) now present  Myocardial infarct finding still present  Electronically Signed On 8-1-2020 13:22:45 PDT by Cricket rainey MD         Imaging  EC-ECHOCARDIOGRAM COMPLETE W/ CONT         DX-CHEST-LIMITED (1 VIEW)   Final Result      Right central line projects over the SVC. No pneumothorax.      Interstitial prominence likely represents interstitial edema.      Right basilar opacity may represent atelectasis or consolidation.      Atherosclerotic plaque.            DX-CHEST-PORTABLE (1 VIEW)   Final Result      Right basilar opacity likely represents atelectasis.      Endotracheal tube projects at the level of the clavicular heads.      Interstitial opacities likely present interstitial edema or pneumonitis.      Atherosclerotic plaque.         CL-LEFT HEART CATHETERIZATION WITH POSSIBLE INTERVENTION    (Results Pending)   DX-ABDOMEN FOR TUBE PLACEMENT    (Results Pending)   CL-LEFT HEART CATHETERIZATION WITH POSSIBLE INTERVENTION    (Results Pending)   CL-LEFT HEART CATHETERIZATION WITH POSSIBLE INTERVENTION    (Results Pending)       Assessment/Plan  * Acute respiratory failure with hypoxia (HCC)  Assessment & Plan  Intubated and Cath Lab  Pulmonary edema on chest x-ray-admitted for NSTEMI and severe hypertension  RT protocols  Ventilator bundle  Serial ABG/chest x-ray/ventilator mechanics review  Titrating up PEEP and diuresing with IV Lasix as BP allows  Control extreme blood pressure  SAT/SBT when clinically prudent    Left ventricular thrombus with acute MI (HCC)  Assessment & Plan  New left ventricular thrombus identified via echocardiogram post initial catheterization and subsequent brief PEA arrest  IV heparin drip  Serial imaging as per cardiology with echocardiogram    Hypotension  Assessment & Plan  In part related to sedation and in part related to NSTEMI  No other clear etiology  Response to holding sedation or  adding catecholamines  Titrate norepinephrine  Bedside cardiac ultrasound as needed, serial reassess volume status  Think is she has sepsis or GI bleeding or other etiology of hypotension      Cardiac arrest (HCC)  Assessment & Plan  PEA arrest secondary to sedation/NSTEMI, query other etiology  ROSC after less than 4 minutes CPR and 1 dose of epinephrine and initiating norepinephrine drip  Odd scenario because the patient was severely agitated and hypertensive with systolic blood pressure over 250 without normal or low-dose sedation.  Echocardiogram after event revealed the anterior wall down which had been good earlier with initial catheterization and probable new LV thrombus and the patient was taken back to the Cath Lab  Follow-up cardiac catheterization revealed open coronary arteries but low blood pressure and poor aortic flow, intra-aortic balloon pump is being placed by cardiology.  Heparin drip being initiated for thrombus.      Hypertensive emergency  Assessment & Plan  No clear hypertensive crisis neurologically from reviewing ER notes, monitor  Transition IV nitro to IV nicardipine if necessary  PRN labetalol/enalapril IV  Lisinopril/Coreg enterally-adjust medicines as clinically prudent  Lasix for pulmonary edema may help blood pressures well  Sedation/analgesia ongoing as well    NSTEMI (non-ST elevated myocardial infarction) (HCC)  Assessment & Plan  Status post PCI times two 8/1   Heparin drip stopped  Prophylactic Lovenox initiated  Dual antiplatelet therapy per cardiology  Nitro drip weaning off  Control blood pressure with ACE/labetalol as needed-add nicardipine infusion if necessary  Statin/beta-blocker/ACE  Monitor for cardiac dysrhythmias, some history of ventricular ectopy/atrial flutter?  Currently in sinus tachycardia with frequent PVCs  Optimize electrolytes    Hypertensive disease  Assessment & Plan  Resume home regimen is clinically appropriate  Post MI patient needs to be on  ACE/beta-blocker per cardiology    Memory difficulties  Assessment & Plan  Gibson issues per records, no formal diagnosis of dementia, work-up delayed secondary to current COVID pandemic apparently    BPH (benign prostatic hyperplasia)  Assessment & Plan  Unknown if symptomatic  Resume Proscar and Flomax when clinically appropriate    Tobacco use  Assessment & Plan  Reportedly smoking daily  RT protocols  Monitor for the need for nebulized treatments  Smoking cessation to be encouraged when clinically appropriate  Eval for COPD?    Class 2 severe obesity due to excess calories with serious comorbidity in adult (HCC)  Assessment & Plan  Weight loss to be encouraged when clinically appropriate  Weight loss may help with management of AMPARO, hypertension, dyslipidemia and chronic pain    Chronic back pain  Assessment & Plan  Patient with multiple prior surgeries  Patient with nerve stimulator  Associated depression?  Resume home regimen including extended release morphine/hydrocodone, Desyrel, Effexor and Zanaflex as clinically appropriate  Mobilize when able  PT if clinically indicated  Weight loss to be encouraged when clinically appropriate    History of prediabetes  Assessment & Plan  SSI/hypoglycemia protocols as needed  Check hemoglobin A1c  Hold metformin for now    Dyslipidemia  Assessment & Plan  Continue statin      Discussed patient condition and risk of morbidity and/or mortality with RN, RT, Pharmacy, Charge nurse / hot rounds, cardiology and hospitalist and Echo technician and Cath Lab team.      Addendum:  Patient had problems with hypotension with low levels of sedation.  Agitation is bad enough he had to receive paralytics again.  Patient had a PEA arrest for less than 4 minutes before we retained ROSC secondary to hypotension.  Blood pressure acceptable now on low-dose norepinephrine.  CVC was placed but there was no pneumothorax.  Follow-up echocardiogram was performed and his anterior wall looked down  and he had a new LV thrombus.  He went to the Cath Lab and coronaries appeared okay but he had persisting hypotension and poor aortic flow per cardiology and a balloon pump was placed.  IV heparin was being started by cardiology as well.  He was purposeful earlier, waiting for paralytic to wear off now and reassess for the need for code chill.  Chest x-ray is consistent pulmonary edema and will diuresis as hemodynamics allow.  Blood gas pending on his return from the Cath Lab to readjust the ventilator.  Case reviewed at length with cardiology at the bedside times several and the patient was serially examined times several.    The patient remains critically ill.  Critical care time = 125 minutes in directly providing and coordinating critical care and extensive data review.  No time overlap and excludes procedures.

## 2020-08-02 ENCOUNTER — APPOINTMENT (OUTPATIENT)
Dept: RADIOLOGY | Facility: MEDICAL CENTER | Age: 60
DRG: 270 | End: 2020-08-02
Attending: INTERNAL MEDICINE
Payer: OTHER GOVERNMENT

## 2020-08-02 LAB
ACTION RANGE TRIGGERED IACRT: NO
ACTION RANGE TRIGGERED IACRT: NO
ANION GAP SERPL CALC-SCNC: 15 MMOL/L (ref 7–16)
ANION GAP SERPL CALC-SCNC: 17 MMOL/L (ref 7–16)
ANION GAP SERPL CALC-SCNC: 20 MMOL/L (ref 7–16)
APTT PPP: 186.7 SEC (ref 24.7–36)
APTT PPP: 22.8 SEC (ref 24.7–36)
APTT PPP: 73.3 SEC (ref 24.7–36)
APTT PPP: 96.7 SEC (ref 24.7–36)
BASE EXCESS BLDA CALC-SCNC: -4 MMOL/L (ref -4–3)
BASE EXCESS BLDA CALC-SCNC: -7 MMOL/L (ref -4–3)
BASOPHILS # BLD AUTO: 0.3 % (ref 0–1.8)
BASOPHILS # BLD AUTO: 0.3 % (ref 0–1.8)
BASOPHILS # BLD: 0.03 K/UL (ref 0–0.12)
BASOPHILS # BLD: 0.03 K/UL (ref 0–0.12)
BODY TEMPERATURE: ABNORMAL DEGREES
BODY TEMPERATURE: ABNORMAL DEGREES
BUN SERPL-MCNC: 12 MG/DL (ref 8–22)
BUN SERPL-MCNC: 12 MG/DL (ref 8–22)
BUN SERPL-MCNC: 13 MG/DL (ref 8–22)
CALCIUM SERPL-MCNC: 8.9 MG/DL (ref 8.5–10.5)
CALCIUM SERPL-MCNC: 8.9 MG/DL (ref 8.5–10.5)
CALCIUM SERPL-MCNC: 9 MG/DL (ref 8.5–10.5)
CHLORIDE SERPL-SCNC: 100 MMOL/L (ref 96–112)
CHLORIDE SERPL-SCNC: 99 MMOL/L (ref 96–112)
CHLORIDE SERPL-SCNC: 99 MMOL/L (ref 96–112)
CO2 BLDA-SCNC: 17 MMOL/L (ref 20–33)
CO2 BLDA-SCNC: 18 MMOL/L (ref 20–33)
CO2 SERPL-SCNC: 15 MMOL/L (ref 20–33)
CO2 SERPL-SCNC: 17 MMOL/L (ref 20–33)
CO2 SERPL-SCNC: 18 MMOL/L (ref 20–33)
CREAT SERPL-MCNC: 0.66 MG/DL (ref 0.5–1.4)
CREAT SERPL-MCNC: 0.67 MG/DL (ref 0.5–1.4)
CREAT SERPL-MCNC: 0.76 MG/DL (ref 0.5–1.4)
EOSINOPHIL # BLD AUTO: 0.02 K/UL (ref 0–0.51)
EOSINOPHIL # BLD AUTO: 0.04 K/UL (ref 0–0.51)
EOSINOPHIL NFR BLD: 0.2 % (ref 0–6.9)
EOSINOPHIL NFR BLD: 0.4 % (ref 0–6.9)
ERYTHROCYTE [DISTWIDTH] IN BLOOD BY AUTOMATED COUNT: 38.7 FL (ref 35.9–50)
ERYTHROCYTE [DISTWIDTH] IN BLOOD BY AUTOMATED COUNT: 39 FL (ref 35.9–50)
GLUCOSE BLD-MCNC: 100 MG/DL (ref 65–99)
GLUCOSE BLD-MCNC: 109 MG/DL (ref 65–99)
GLUCOSE BLD-MCNC: 124 MG/DL (ref 65–99)
GLUCOSE BLD-MCNC: 124 MG/DL (ref 65–99)
GLUCOSE BLD-MCNC: 127 MG/DL (ref 65–99)
GLUCOSE BLD-MCNC: 128 MG/DL (ref 65–99)
GLUCOSE BLD-MCNC: 134 MG/DL (ref 65–99)
GLUCOSE BLD-MCNC: 136 MG/DL (ref 65–99)
GLUCOSE BLD-MCNC: 143 MG/DL (ref 65–99)
GLUCOSE BLD-MCNC: 96 MG/DL (ref 65–99)
GLUCOSE BLD-MCNC: 97 MG/DL (ref 65–99)
GLUCOSE SERPL-MCNC: 132 MG/DL (ref 65–99)
GLUCOSE SERPL-MCNC: 141 MG/DL (ref 65–99)
GLUCOSE SERPL-MCNC: 146 MG/DL (ref 65–99)
HCO3 BLDA-SCNC: 16.2 MMOL/L (ref 17–25)
HCO3 BLDA-SCNC: 16.9 MMOL/L (ref 17–25)
HCT VFR BLD AUTO: 38.4 % (ref 42–52)
HCT VFR BLD AUTO: 40.7 % (ref 42–52)
HGB BLD-MCNC: 13.3 G/DL (ref 14–18)
HGB BLD-MCNC: 13.9 G/DL (ref 14–18)
HOROWITZ INDEX BLDA+IHG-RTO: 180 MM[HG]
HOROWITZ INDEX BLDA+IHG-RTO: 185 MM[HG]
IMM GRANULOCYTES # BLD AUTO: 0.03 K/UL (ref 0–0.11)
IMM GRANULOCYTES # BLD AUTO: 0.04 K/UL (ref 0–0.11)
IMM GRANULOCYTES NFR BLD AUTO: 0.3 % (ref 0–0.9)
IMM GRANULOCYTES NFR BLD AUTO: 0.4 % (ref 0–0.9)
INR PPP: 1.08 (ref 0.87–1.13)
INR PPP: 1.1 (ref 0.87–1.13)
INR PPP: 1.1 (ref 0.87–1.13)
INR PPP: 1.12 (ref 0.87–1.13)
INST. QUALIFIED PATIENT IIQPT: YES
INST. QUALIFIED PATIENT IIQPT: YES
LACTATE BLD-SCNC: 1.1 MMOL/L (ref 0.5–2)
LACTATE BLD-SCNC: 1.5 MMOL/L (ref 0.5–2)
LYMPHOCYTES # BLD AUTO: 0.86 K/UL (ref 1–4.8)
LYMPHOCYTES # BLD AUTO: 1.13 K/UL (ref 1–4.8)
LYMPHOCYTES NFR BLD: 11.6 % (ref 22–41)
LYMPHOCYTES NFR BLD: 9.4 % (ref 22–41)
MAGNESIUM SERPL-MCNC: 2 MG/DL (ref 1.5–2.5)
MAGNESIUM SERPL-MCNC: 2.4 MG/DL (ref 1.5–2.5)
MAGNESIUM SERPL-MCNC: 2.7 MG/DL (ref 1.5–2.5)
MCH RBC QN AUTO: 30.5 PG (ref 27–33)
MCH RBC QN AUTO: 30.9 PG (ref 27–33)
MCHC RBC AUTO-ENTMCNC: 34.2 G/DL (ref 33.7–35.3)
MCHC RBC AUTO-ENTMCNC: 34.6 G/DL (ref 33.7–35.3)
MCV RBC AUTO: 89.1 FL (ref 81.4–97.8)
MCV RBC AUTO: 89.3 FL (ref 81.4–97.8)
MONOCYTES # BLD AUTO: 0.69 K/UL (ref 0–0.85)
MONOCYTES # BLD AUTO: 0.71 K/UL (ref 0–0.85)
MONOCYTES NFR BLD AUTO: 7.3 % (ref 0–13.4)
MONOCYTES NFR BLD AUTO: 7.5 % (ref 0–13.4)
NEUTROPHILS # BLD AUTO: 7.52 K/UL (ref 1.82–7.42)
NEUTROPHILS # BLD AUTO: 7.84 K/UL (ref 1.82–7.42)
NEUTROPHILS NFR BLD: 80.1 % (ref 44–72)
NEUTROPHILS NFR BLD: 82.2 % (ref 44–72)
NRBC # BLD AUTO: 0 K/UL
NRBC # BLD AUTO: 0 K/UL
NRBC BLD-RTO: 0 /100 WBC
NRBC BLD-RTO: 0 /100 WBC
O2/TOTAL GAS SETTING VFR VENT: 50 %
O2/TOTAL GAS SETTING VFR VENT: 60 %
PCO2 BLDA: 22 MMHG (ref 26–37)
PCO2 BLDA: 25.3 MMHG (ref 26–37)
PCO2 TEMP ADJ BLDA: 21.5 MMHG (ref 26–37)
PCO2 TEMP ADJ BLDA: 23.2 MMHG (ref 26–37)
PH BLDA: 7.42 [PH] (ref 7.4–7.5)
PH BLDA: 7.5 [PH] (ref 7.4–7.5)
PH TEMP ADJ BLDA: 7.44 [PH] (ref 7.4–7.5)
PH TEMP ADJ BLDA: 7.5 [PH] (ref 7.4–7.5)
PHOSPHATE SERPL-MCNC: 2.4 MG/DL (ref 2.5–4.5)
PLATELET # BLD AUTO: 195 K/UL (ref 164–446)
PLATELET # BLD AUTO: 204 K/UL (ref 164–446)
PMV BLD AUTO: 11.5 FL (ref 9–12.9)
PMV BLD AUTO: 11.7 FL (ref 9–12.9)
PO2 BLDA: 111 MMHG (ref 64–87)
PO2 BLDA: 90 MMHG (ref 64–87)
PO2 TEMP ADJ BLDA: 108 MMHG (ref 64–87)
PO2 TEMP ADJ BLDA: 79 MMHG (ref 64–87)
POTASSIUM SERPL-SCNC: 4 MMOL/L (ref 3.6–5.5)
POTASSIUM SERPL-SCNC: 4 MMOL/L (ref 3.6–5.5)
POTASSIUM SERPL-SCNC: 4.1 MMOL/L (ref 3.6–5.5)
PROTHROMBIN TIME: 14.3 SEC (ref 12–14.6)
PROTHROMBIN TIME: 14.5 SEC (ref 12–14.6)
PROTHROMBIN TIME: 14.5 SEC (ref 12–14.6)
PROTHROMBIN TIME: 14.7 SEC (ref 12–14.6)
RBC # BLD AUTO: 4.31 M/UL (ref 4.7–6.1)
RBC # BLD AUTO: 4.56 M/UL (ref 4.7–6.1)
SAO2 % BLDA: 97 % (ref 93–99)
SAO2 % BLDA: 99 % (ref 93–99)
SODIUM SERPL-SCNC: 133 MMOL/L (ref 135–145)
SODIUM SERPL-SCNC: 133 MMOL/L (ref 135–145)
SODIUM SERPL-SCNC: 134 MMOL/L (ref 135–145)
SPECIMEN DRAWN FROM PATIENT: ABNORMAL
SPECIMEN DRAWN FROM PATIENT: ABNORMAL
TROPONIN T SERPL-MCNC: 465 NG/L (ref 6–19)
TROPONIN T SERPL-MCNC: 599 NG/L (ref 6–19)
WBC # BLD AUTO: 9.2 K/UL (ref 4.8–10.8)
WBC # BLD AUTO: 9.8 K/UL (ref 4.8–10.8)

## 2020-08-02 PROCEDURE — 99233 SBSQ HOSP IP/OBS HIGH 50: CPT | Performed by: INTERNAL MEDICINE

## 2020-08-02 PROCEDURE — 37799 UNLISTED PX VASCULAR SURGERY: CPT

## 2020-08-02 PROCEDURE — 700105 HCHG RX REV CODE 258: Performed by: INTERNAL MEDICINE

## 2020-08-02 PROCEDURE — 770022 HCHG ROOM/CARE - ICU (200)

## 2020-08-02 PROCEDURE — 82803 BLOOD GASES ANY COMBINATION: CPT | Mod: 91

## 2020-08-02 PROCEDURE — 306637 HCHG MISC ORTHO ITEM RC 0274

## 2020-08-02 PROCEDURE — 84100 ASSAY OF PHOSPHORUS: CPT

## 2020-08-02 PROCEDURE — 83605 ASSAY OF LACTIC ACID: CPT

## 2020-08-02 PROCEDURE — 700111 HCHG RX REV CODE 636 W/ 250 OVERRIDE (IP): Performed by: INTERNAL MEDICINE

## 2020-08-02 PROCEDURE — 85610 PROTHROMBIN TIME: CPT | Mod: 91

## 2020-08-02 PROCEDURE — 83735 ASSAY OF MAGNESIUM: CPT | Mod: 91

## 2020-08-02 PROCEDURE — 700101 HCHG RX REV CODE 250: Performed by: INTERNAL MEDICINE

## 2020-08-02 PROCEDURE — 700102 HCHG RX REV CODE 250 W/ 637 OVERRIDE(OP): Performed by: NURSE PRACTITIONER

## 2020-08-02 PROCEDURE — 85025 COMPLETE CBC W/AUTO DIFF WBC: CPT | Mod: 91

## 2020-08-02 PROCEDURE — 99292 CRITICAL CARE ADDL 30 MIN: CPT | Performed by: INTERNAL MEDICINE

## 2020-08-02 PROCEDURE — 33968 REMOVE AORTIC ASSIST DEVICE: CPT | Performed by: INTERNAL MEDICINE

## 2020-08-02 PROCEDURE — 99291 CRITICAL CARE FIRST HOUR: CPT | Performed by: INTERNAL MEDICINE

## 2020-08-02 PROCEDURE — 82962 GLUCOSE BLOOD TEST: CPT | Mod: 91

## 2020-08-02 PROCEDURE — A9270 NON-COVERED ITEM OR SERVICE: HCPCS | Performed by: NURSE PRACTITIONER

## 2020-08-02 PROCEDURE — 84484 ASSAY OF TROPONIN QUANT: CPT

## 2020-08-02 PROCEDURE — 85730 THROMBOPLASTIN TIME PARTIAL: CPT | Mod: 91

## 2020-08-02 PROCEDURE — 94770 HCHG CO2 EXPIRED GAS DETERMINATION: CPT

## 2020-08-02 PROCEDURE — C1760 CLOSURE DEV, VASC: HCPCS

## 2020-08-02 PROCEDURE — 71045 X-RAY EXAM CHEST 1 VIEW: CPT

## 2020-08-02 PROCEDURE — 94003 VENT MGMT INPAT SUBQ DAY: CPT

## 2020-08-02 PROCEDURE — A9270 NON-COVERED ITEM OR SERVICE: HCPCS | Performed by: INTERNAL MEDICINE

## 2020-08-02 PROCEDURE — 700111 HCHG RX REV CODE 636 W/ 250 OVERRIDE (IP)

## 2020-08-02 PROCEDURE — 700102 HCHG RX REV CODE 250 W/ 637 OVERRIDE(OP): Performed by: INTERNAL MEDICINE

## 2020-08-02 PROCEDURE — 80048 BASIC METABOLIC PNL TOTAL CA: CPT

## 2020-08-02 RX ORDER — HEPARIN SODIUM 1000 [USP'U]/ML
3200 INJECTION, SOLUTION INTRAVENOUS; SUBCUTANEOUS PRN
Status: DISCONTINUED | OUTPATIENT
Start: 2020-08-02 | End: 2020-08-03

## 2020-08-02 RX ORDER — DIPHENHYDRAMINE HYDROCHLORIDE 50 MG/ML
INJECTION INTRAMUSCULAR; INTRAVENOUS
Status: COMPLETED
Start: 2020-08-02 | End: 2020-08-02

## 2020-08-02 RX ORDER — POTASSIUM CHLORIDE 7.45 MG/ML
10 INJECTION INTRAVENOUS ONCE
Status: COMPLETED | OUTPATIENT
Start: 2020-08-02 | End: 2020-08-02

## 2020-08-02 RX ORDER — DIPHENHYDRAMINE HYDROCHLORIDE 50 MG/ML
50 INJECTION INTRAMUSCULAR; INTRAVENOUS ONCE
Status: COMPLETED | OUTPATIENT
Start: 2020-08-02 | End: 2020-08-02

## 2020-08-02 RX ORDER — HALOPERIDOL 5 MG/ML
2-5 INJECTION INTRAMUSCULAR EVERY 4 HOURS PRN
Status: DISCONTINUED | OUTPATIENT
Start: 2020-08-02 | End: 2020-08-04

## 2020-08-02 RX ORDER — LORAZEPAM 2 MG/ML
INJECTION INTRAMUSCULAR
Status: COMPLETED
Start: 2020-08-02 | End: 2020-08-02

## 2020-08-02 RX ORDER — HALOPERIDOL 5 MG/ML
INJECTION INTRAMUSCULAR
Status: COMPLETED
Start: 2020-08-02 | End: 2020-08-02

## 2020-08-02 RX ORDER — NOREPINEPHRINE BITARTRATE 0.03 MG/ML
0-30 INJECTION, SOLUTION INTRAVENOUS CONTINUOUS
Status: DISCONTINUED | OUTPATIENT
Start: 2020-08-02 | End: 2020-08-04

## 2020-08-02 RX ORDER — HEPARIN SODIUM 5000 [USP'U]/100ML
INJECTION, SOLUTION INTRAVENOUS CONTINUOUS
Status: DISCONTINUED | OUTPATIENT
Start: 2020-08-02 | End: 2020-08-03

## 2020-08-02 RX ORDER — LORAZEPAM 2 MG/ML
2 INJECTION INTRAMUSCULAR ONCE
Status: COMPLETED | OUTPATIENT
Start: 2020-08-02 | End: 2020-08-02

## 2020-08-02 RX ADMIN — DIPHENHYDRAMINE HYDROCHLORIDE 50 MG: 50 INJECTION INTRAMUSCULAR; INTRAVENOUS at 18:26

## 2020-08-02 RX ADMIN — DOCUSATE SODIUM 50 MG AND SENNOSIDES 8.6 MG 2 TABLET: 8.6; 5 TABLET, FILM COATED ORAL at 05:52

## 2020-08-02 RX ADMIN — FENTANYL CITRATE 100 MCG: 50 INJECTION INTRAMUSCULAR; INTRAVENOUS at 04:15

## 2020-08-02 RX ADMIN — DEXMEDETOMIDINE HYDROCHLORIDE 1.5 MCG/KG/HR: 100 INJECTION, SOLUTION INTRAVENOUS at 21:23

## 2020-08-02 RX ADMIN — LORAZEPAM 1 MG: 2 INJECTION INTRAMUSCULAR; INTRAVENOUS at 18:30

## 2020-08-02 RX ADMIN — TIZANIDINE 4 MG: 4 TABLET ORAL at 12:09

## 2020-08-02 RX ADMIN — FENTANYL CITRATE 100 MCG: 50 INJECTION INTRAMUSCULAR; INTRAVENOUS at 10:08

## 2020-08-02 RX ADMIN — TRAZODONE HYDROCHLORIDE 100 MG: 50 TABLET ORAL at 21:39

## 2020-08-02 RX ADMIN — ACETAMINOPHEN 650 MG: 325 TABLET, FILM COATED ORAL at 21:54

## 2020-08-02 RX ADMIN — DEXMEDETOMIDINE HYDROCHLORIDE 1.2 MCG/KG/HR: 100 INJECTION, SOLUTION INTRAVENOUS at 03:48

## 2020-08-02 RX ADMIN — Medication 300 MCG/HR: at 12:07

## 2020-08-02 RX ADMIN — HALOPERIDOL LACTATE 5 MG: 5 INJECTION, SOLUTION INTRAMUSCULAR at 18:16

## 2020-08-02 RX ADMIN — DEXMEDETOMIDINE HYDROCHLORIDE 1.5 MCG/KG/HR: 100 INJECTION, SOLUTION INTRAVENOUS at 18:16

## 2020-08-02 RX ADMIN — TIZANIDINE 4 MG: 4 TABLET ORAL at 21:39

## 2020-08-02 RX ADMIN — HALOPERIDOL LACTATE 5 MG: 5 INJECTION, SOLUTION INTRAMUSCULAR at 22:58

## 2020-08-02 RX ADMIN — DEXMEDETOMIDINE HYDROCHLORIDE 1.5 MCG/KG/HR: 100 INJECTION, SOLUTION INTRAVENOUS at 00:57

## 2020-08-02 RX ADMIN — ATORVASTATIN CALCIUM 80 MG: 80 TABLET, FILM COATED ORAL at 21:39

## 2020-08-02 RX ADMIN — DOCUSATE SODIUM 50 MG AND SENNOSIDES 8.6 MG 2 TABLET: 8.6; 5 TABLET, FILM COATED ORAL at 16:40

## 2020-08-02 RX ADMIN — TIZANIDINE 4 MG: 4 TABLET ORAL at 09:37

## 2020-08-02 RX ADMIN — HEPARIN SODIUM 1200 UNITS/HR: 5000 INJECTION, SOLUTION INTRAVENOUS at 16:00

## 2020-08-02 RX ADMIN — LORAZEPAM 1 MG: 2 INJECTION INTRAMUSCULAR at 18:30

## 2020-08-02 RX ADMIN — MAGNESIUM SULFATE IN WATER 0.5 G/HR: 40 INJECTION, SOLUTION INTRAVENOUS at 00:32

## 2020-08-02 RX ADMIN — ASPIRIN 81 MG 81 MG: 81 TABLET ORAL at 05:52

## 2020-08-02 RX ADMIN — FENTANYL CITRATE 50 MCG: 50 INJECTION INTRAMUSCULAR; INTRAVENOUS at 23:00

## 2020-08-02 RX ADMIN — FAMOTIDINE 20 MG: 20 TABLET, FILM COATED ORAL at 16:40

## 2020-08-02 RX ADMIN — TIZANIDINE 4 MG: 4 TABLET ORAL at 16:40

## 2020-08-02 RX ADMIN — POTASSIUM CHLORIDE 10 MEQ: 7.46 INJECTION, SOLUTION INTRAVENOUS at 00:58

## 2020-08-02 RX ADMIN — Medication 200 MCG/HR: at 04:18

## 2020-08-02 RX ADMIN — DEXMEDETOMIDINE HYDROCHLORIDE 0.8 MCG/KG/HR: 100 INJECTION, SOLUTION INTRAVENOUS at 09:37

## 2020-08-02 RX ADMIN — FAMOTIDINE 20 MG: 20 TABLET, FILM COATED ORAL at 05:52

## 2020-08-02 RX ADMIN — HEPARIN SODIUM 1200 UNITS/HR: 5000 INJECTION, SOLUTION INTRAVENOUS at 23:17

## 2020-08-02 RX ADMIN — PRASUGREL 10 MG: 10 TABLET, FILM COATED ORAL at 05:52

## 2020-08-02 NOTE — PROGRESS NOTES
"CARDIOLOGY FOLLOW UP    Impressions:  #. Acute NSTEMI due to thrombotic RCA/PLB lesions   - Successful PCI with NAOMY 8/1  #. Flash pulmonary edema, vent dependent respiratory failure related to agitation during cath 8/1   - O2 requirements substantially less today  #. Stress induced cardiomyopathy- related to agitation during cath 8/1   - LVEF 25%   - cardiogenic shock resolved  #. LV thrombus due to stress cardiomyopathy  #. Intermittent SVT episodes   - No events overnight   - Mild sinus bradycardia  #. Chronic- prediabetes, early dementia, chronic back pain    Recommendations:  IABP out today  Appreciate ICU teams care  Stop amiodarone infusion  Continue ARB, BB    Will follow    SUBJECTIVE/INTERIM EVENTS:  Clinical course stabilizing over the evening. Hemodynamics stable. No further tachyarrhythmias    EXAM:  BP (!) 86/53   Pulse (!) 49   Temp 36.5 °C (97.7 °F) (Bladder)   Resp 20   Ht 1.702 m (5' 7\")   Wt 104.1 kg (229 lb 8 oz)   SpO2 99%   BMI 35.94 kg/m²   Gen: acutely ill appearing  HEENT: Symmetric face. Anicteric sclerae. Moist mucus membranes  NECK: No JVD- RIJ line in place. No lymphadenopathy  CARDIAC: Normal PMI, regular, normal S1, S2.   VASCULATURE: Normal carotid amplitude without bruit. Peripheral pulses normal  RESP: Mechanical breath sounds bilaterally  ABD: Soft, non-tender, non-distended  EXT: No edema, no clubbing or cyanosis  SKIN: Warm and dry  NEURO: No gross deficits   PSYCH: Appropriate affect, participates in conversation    Studies  Lab Results   Component Value Date/Time    SODIUM 133 (L) 08/02/2020 08:50 AM    POTASSIUM 4.0 08/02/2020 08:50 AM    CHLORIDE 100 08/02/2020 08:50 AM    CO2 18 (L) 08/02/2020 08:50 AM    GLUCOSE 132 (H) 08/02/2020 08:50 AM    BUN 12 08/02/2020 08:50 AM    CREATININE 0.67 08/02/2020 08:50 AM       For this encounter I directly reviewed ECG tracings, Echo images and catherization films     I provided 35 minutes of non-contiguous, non-procedural critical " care time managing amiodarone infusion, IABP weaning. If untreated the condition would have a high likelihood of progressing to permanent disability or death    Date of Service:   CPT: 22642 (30-74 minutes)          Current Facility-Administered Medications:   •  fentaNYL (SUBLIMAZE) 50 mcg/mL in 50mL (Continuous Infusion), , Intravenous, Continuous, Reed Mesa M.D., Last Rate: 6 mL/hr at 08/02/20 0945, 300 mcg/hr at 08/02/20 0945  •  fentaNYL (SUBLIMAZE) injection 25 mcg, 25 mcg, Intravenous, Q HOUR PRN **OR** fentaNYL (SUBLIMAZE) injection 50 mcg, 50 mcg, Intravenous, Q HOUR PRN **OR** fentaNYL (SUBLIMAZE) injection 100 mcg, 100 mcg, Intravenous, Q HOUR PRN, Reed Mesa M.D., 100 mcg at 08/02/20 1008  •  norepinephrine (Levophed) infusion 8 mg/250 mL (premix), 0-30 mcg/min, Intravenous, Continuous, Reed Mesa M.D., Stopped at 08/02/20 1013  •  ondansetron (ZOFRAN) syringe/vial injection 4 mg, 4 mg, Intravenous, Q4HRS PRN, Meño Red M.D.  •  promethazine (PHENERGAN) suppository 12.5-25 mg, 12.5-25 mg, Rectal, Q4HRS PRN, Meño Red M.D.  •  prochlorperazine (COMPAZINE) injection 5-10 mg, 5-10 mg, Intravenous, Q4HRS PRN, Meño Red M.D.  •  labetalol (NORMODYNE/TRANDATE) injection 10 mg, 10 mg, Intravenous, Q4HRS PRN, Meño Red M.D., 10 mg at 08/01/20 0924  •  nitroglycerin (NITROSTAT) tablet 0.4 mg, 0.4 mg, Sublingual, Q5 MIN PRN, Meño Red M.D.  •  NS infusion, , Intravenous, Continuous, Reed Mesa M.D., Last Rate: 10 mL/hr at 08/01/20 1024  •  niCARdipine (CARDENE) 25 mg in  mL Infusion, 0-15 mg/hr, Intravenous, Continuous, Chilo Crowley M.D., Stopped at 08/01/20 0900  •  Respiratory Therapy Consult, , Nebulization, Continuous RT, Reed Mesa M.D.  •  ipratropium-albuterol (DUONEB) nebulizer solution, 3 mL, Nebulization, Q2HRS PRN (RT), Reed Mesa M.D.  •  famotidine (PEPCID) tablet 20 mg, 20 mg, Enteral Tube, Q12HRS, 20 mg  at 08/02/20 0552 **OR** famotidine (PEPCID) injection 20 mg, 20 mg, Intravenous, Q12HRS, Reed Mesa M.D., 20 mg at 08/01/20 1639  •  MD Alert...ICU Electrolyte Replacement per Pharmacy, , Other, PHARMACY TO DOSE, Reed Mesa M.D.  •  lidocaine (XYLOCAINE) 1 % injection 1-2 mL, 1-2 mL, Tracheal Tube, Q30 MIN PRN, Reed Mesa M.D.  •  enalaprilat (VASOTEC) injection 1.25 mg, 1.25 mg, Intravenous, Q6HRS PRN, Reed Mesa M.D.  •  aspirin (ASA) chewable tab 81 mg, 81 mg, Enteral Tube, DAILY, Geraldo Montana, A.P.N., 81 mg at 08/02/20 0552  •  heparin injection 3,200 Units, 3,200 Units, Intravenous, PRN **AND** heparin infusion 25,000 units in 500 mL 0.45% NACL, , Intravenous, Continuous, Stopped at 08/02/20 0857 **AND** Protocol 440 Heparin Weight Based DO NOT GIVE ANY HEPARIN BOLUS TO STROKE PATIENT, , , CONTINUOUS **AND** Protocol 440 Heparin Weight Based Discontinue Enoxaparin (Lovenox), Dabigatran (Pradaxa), Rivaroxaban (Xarelto), Apixaban (Eliquis), Edoxaban (Savaysa, Lixiana), Fondaparinux (Arixtra) and Argatroban prior to heparin administration, , , Once **AND** Protocol 440 Heparin Weight Based Draw baseline aPTT, PT, and platelet count if not already done, , , CONTINUOUS **AND** Protocol 440 Heparin Weight Based Draw aPTT 6 hours after beginning infusion. , , , CONTINUOUS **AND** Protocol 440 Heparin Weight Based Record Patient Data, , , CONTINUOUS **AND** Protocol 440 Heparin Weight Based INSTRUCTIONS, , , CONTINUOUS **AND** Protocol 440 Heparin Weight Based Review aPTT results 6 hours after infusion is begun as detailed, , , CONTINUOUS **AND** Protocol 440 Heparin Weight Based Draw Platelet count every three days. Contact MD if platelet is 50% lower than baseline count., , , CONTINUOUS **AND** Protocol 440 Heparin Weight Based Adjust heparin to maintain aPTT between 55-96 sec, , , CONTINUOUS **AND** Protocol 440 Heparin Weight Based Order aPTT 6 hours after any rate change or hold  until aPTT is therapeutic (55-96 seconds), , , CONTINUOUS **AND** Protocol 440 Heparin Weight Based Documentation and verification, , , CONTINUOUS, Geraldo Montana, A.P.N.  •  prasugrel (EFFIENT) tablet 10 mg, 10 mg, Enteral Tube, DAILY, JORDIN Hall.P.N., 10 mg at 08/02/20 0552  •  ondansetron (ZOFRAN ODT) dispertab 4 mg, 4 mg, Enteral Tube, Q4HRS PRN, Reed Mesa M.D.  •  traZODone (DESYREL) tablet 100 mg, 100 mg, Enteral Tube, QHS, Reed Mesa M.D., Stopped at 08/01/20 2100  •  tizanidine (ZANAFLEX) tablet 4 mg, 4 mg, Per NG Tube, 4X/DAY, Reed Mesa M.D., 4 mg at 08/02/20 0937  •  telmisartan (MICARDIS) tablet 80 mg, 80 mg, Enteral Tube, DAILY, Reed Mesa M.D., Stopped at 08/02/20 0600  •  carvedilol (COREG) tablet 12.5 mg, 12.5 mg, Enteral Tube, BID WITH MEALS, Reed Mesa M.D., Stopped at 08/01/20 1730  •  atorvastatin (LIPITOR) tablet 80 mg, 80 mg, Enteral Tube, Nightly, Reed Mesa M.D., 80 mg at 08/01/20 2231  •  acetaminophen (TYLENOL) tablet 650 mg, 650 mg, Enteral Tube, Q6HRS PRN, Reed Mesa M.D.  •  dexmedetomidine (PRECEDEX) 400 mcg/100mL NS premix infusion, 0.1-1.5 mcg/kg/hr, Intravenous, Continuous, Reed Mesa M.D., Last Rate: 20.8 mL/hr at 08/02/20 0937, 0.8 mcg/kg/hr at 08/02/20 0937  •  senna-docusate (PERICOLACE or SENOKOT S) 8.6-50 MG per tablet 2 Tab, 2 Tab, Enteral Tube, BID, 2 Tab at 08/02/20 0552 **AND** polyethylene glycol/lytes (MIRALAX) PACKET 1 Packet, 1 Packet, Enteral Tube, QDAY PRN **AND** magnesium hydroxide (MILK OF MAGNESIA) suspension 30 mL, 30 mL, Enteral Tube, QDAY PRN **AND** bisacodyl (DULCOLAX) suppository 10 mg, 10 mg, Rectal, QDAY PRN, Reed Mesa M.D.  •  promethazine (PHENERGAN) tablet 12.5-25 mg, 12.5-25 mg, Enteral Tube, Q4HRS PRN, Reed Mesa M.D.    Case discussed with Dr. Mesa

## 2020-08-02 NOTE — PROGRESS NOTES
12 hour chart check    Monitor Summary:    SB/SR with 1st degree HB rate: 40-50s, occasional-frequent PACs/PVCs    .26/.10/.52

## 2020-08-02 NOTE — PROGRESS NOTES
Dr Crowley at bedside to assess the patient. Order received to turn off Heparin Gtt at 0900 and balloon pump will be removed at 1000.

## 2020-08-02 NOTE — FLOWSHEET NOTE
08/02/20 1610   Events/Summary/Plan   Events/Summary/Plan Called to bedside pt aggitated extubate per MD. Extubated to 5 LNC tolerating well    Vital Signs   Pulse 66   Respiration 16   Pulse Oximetry 93 %   Breath Sounds   RUL Breath Sounds Clear   RML Breath Sounds Clear   RLL Breath Sounds Clear   DON Breath Sounds Clear   LLL Breath Sounds Clear   Oxygen   O2 (LPM) 5   O2 Delivery Device Silicone Nasal Cannula

## 2020-08-02 NOTE — PROGRESS NOTES
Critical Care Progress Note    Date of admission  8/1/2020    Chief Complaint  60 y.o. male  PMHx Chronic pain syndrome, HTN, DLD, TOB use, AMPARO and prediabetes admitted 8/1/2020 with NSTEMI, intubated in cath lab for agitation and hypoxemia.  Stent to RCA/PDA in cath lab.  Transfer to ICU on vent very hypertensive.    Hospital Course   8/1 3-4 min PEA arrest from hypotension after he cam back from cath lab - sedation? however repeat ECHO w/ LV thrombus and anterior wall now down -> to cath lab again, no new CAD, IABP placed, code chill initiated after neg CT head       Interval Problem Update  Reviewed last 24 hour events:    D/w Cards at bedside at length    Sedated on vent, followed some Code chill discontinued  Follows w/ SAT  DEX 0.8  [id number]  (chronic pain on Norco/Longacting MS and has nerve stim)  SB 50s  SBp 90-110s  NE weaned off  IABP 1:2  ECHO 8/1 - EF 25%, LV thrombus @ apex, focal wall motion probs  UO good  I/Os neg 1069cc/admit to unit  Amiodarone drip 0.5  Heparin drip 950  VentDay#2  CXR CM w/ mild pul edema  PEEP 8, 50%  Min secretions  Acidosis better - ABGs reviewed  Tm 98.4  WBC 9.8  Glucose 146    Cards pulling IABP after stopping heparin drip for awhile  Drop VT 6cc  D/c code chill, following and rewarming  Replete phos  TF after pump pulled  SATs/SBTs    SAT/SBT initiated and the patient within 5 minutes is very agitated and complaining of shortness of breath.  Fentanyl and dexmedetomidine had to be restarted as for people assisted and keeping him calm and in the bed and not pulling on tubes and lines.  Primarily appeared uncomfortable with pain will focus more on the fentanyl than the dexmedetomidine drip.  Of note his mechanics in the ventilator were not bad with decent tidal volumes at times over a liter on CPAP/pressure support 8/5.  He remains off norepinephrine and has tolerated removal of the intra-aortic balloon pump without any bleeding at the groin entry site.  He struggled with  writing us notes.  Will increase sedation and monitor his ability to continue with SBT, given his last 24 hours from a cardiac standpoint I am inclined to be cautious.    Review of Systems  Review of Systems   Unable to perform ROS: Acuity of condition        Vital Signs for last 24 hours   Temp:  [34.1 °C (93.4 °F)-36.9 °C (98.4 °F)] 35.8 °C (96.4 °F)  Pulse:  [] 46  Resp:  [18-26] 20  BP: ()/(47-93) 94/64  SpO2:  [95 %-100 %] 95 %    Hemodynamic parameters for last 24 hours  CVP:  [2 MM HG-12 MM HG] 9 MM HG    Respiratory Information for the last 24 hours  Vent Mode: APVCMV  Rate (breaths/min): 20  Vt Target (mL): 420  PEEP/CPAP: 8  MAP: 11  Control VTE (exp VT): 466    Physical Exam   Physical Exam  Vitals signs reviewed.   Constitutional:       Appearance: He is obese. He is ill-appearing. He is not toxic-appearing.      Interventions: He is sedated, intubated and restrained.   HENT:      Head: Normocephalic and atraumatic.      Mouth/Throat:      Mouth: Mucous membranes are moist.   Eyes:      General: No scleral icterus.     Pupils: Pupils are equal, round, and reactive to light.   Neck:      Musculoskeletal: Neck supple. No neck rigidity.      Comments: R IJ CVC  Cardiovascular:      Rate and Rhythm: Normal rate and regular rhythm. Occasional extrasystoles are present.     Pulses: Normal pulses.      Heart sounds: No murmur. No gallop.       Comments: SR/ST - PVCs  Pulmonary:      Effort: Pulmonary effort is normal. No respiratory distress. He is intubated.      Breath sounds: Rales present. No wheezing or rhonchi.   Abdominal:      General: Bowel sounds are normal. There is no distension.      Palpations: Abdomen is soft. There is no mass.      Tenderness: There is no abdominal tenderness. There is no right CVA tenderness, left CVA tenderness or guarding.   Genitourinary:     Comments: White  Musculoskeletal:      Right lower leg: No edema.      Left lower leg: No edema.   Lymphadenopathy:       Cervical: No cervical adenopathy.   Skin:     General: Skin is warm and dry.      Capillary Refill: Capillary refill takes less than 2 seconds.      Coloration: Skin is not cyanotic.      Nails: There is no clubbing.     Neurological:      Mental Status: He is lethargic.      GCS: GCS eye subscore is 3. GCS verbal subscore is 1. GCS motor subscore is 6.      Comments: Purposeful, starting to follow   Psychiatric:      Comments: Unable to assess       Medications  Current Facility-Administered Medications   Medication Dose Route Frequency Provider Last Rate Last Dose   • fentaNYL (SUBLIMAZE) 50 mcg/mL in 50mL (Continuous Infusion)   Intravenous Continuous Reed Mesa M.D. 4 mL/hr at 08/02/20 1541 200 mcg/hr at 08/02/20 1541   • fentaNYL (SUBLIMAZE) injection 25 mcg  25 mcg Intravenous Q HOUR PRN Reed Mesa M.D.        Or   • fentaNYL (SUBLIMAZE) injection 50 mcg  50 mcg Intravenous Q HOUR PRN Reed Mesa M.D.        Or   • fentaNYL (SUBLIMAZE) injection 100 mcg  100 mcg Intravenous Q HOUR PRN Reed Mesa M.D.   100 mcg at 08/02/20 1008   • norepinephrine (Levophed) infusion 8 mg/250 mL (premix)  0-30 mcg/min Intravenous Continuous Reed Mesa M.D.   Stopped at 08/02/20 1013   • heparin injection 3,200 Units  3,200 Units Intravenous PRN Chilo Crowley M.D.        And   • heparin infusion 25,000 units in 500 mL 0.45% NACL   Intravenous Continuous Chilo Crowley M.D.       • ondansetron (ZOFRAN) syringe/vial injection 4 mg  4 mg Intravenous Q4HRS PRN Meño Red M.D.       • promethazine (PHENERGAN) suppository 12.5-25 mg  12.5-25 mg Rectal Q4HRS PRN Meño Red M.D.       • prochlorperazine (COMPAZINE) injection 5-10 mg  5-10 mg Intravenous Q4HRS PRN Meño Red M.D.       • labetalol (NORMODYNE/TRANDATE) injection 10 mg  10 mg Intravenous Q4HRS PRN Meño Red M.D.   10 mg at 08/01/20 0924   • nitroglycerin (NITROSTAT) tablet 0.4 mg  0.4 mg Sublingual Q5  MIN PRN Meño Red M.D.       • NS infusion   Intravenous Continuous Reed Mesa M.D. 10 mL/hr at 08/01/20 1024     • niCARdipine (CARDENE) 25 mg in  mL Infusion  0-15 mg/hr Intravenous Continuous Chilo Crowley M.D.   Stopped at 08/01/20 0900   • Respiratory Therapy Consult   Nebulization Continuous RT Reed Mesa M.D.       • ipratropium-albuterol (DUONEB) nebulizer solution  3 mL Nebulization Q2HRS PRN (RT) Reed Mesa M.D.       • famotidine (PEPCID) tablet 20 mg  20 mg Enteral Tube Q12HRS Reed Mesa M.D.   20 mg at 08/02/20 0552    Or   • famotidine (PEPCID) injection 20 mg  20 mg Intravenous Q12HRS Reed Mesa M.D.   20 mg at 08/01/20 1639   • MD Alert...ICU Electrolyte Replacement per Pharmacy   Other PHARMACY TO DOSE Reed Mesa M.D.       • lidocaine (XYLOCAINE) 1 % injection 1-2 mL  1-2 mL Tracheal Tube Q30 MIN PRN Reed Mesa M.D.       • enalaprilat (VASOTEC) injection 1.25 mg  1.25 mg Intravenous Q6HRS PRN Reed Mesa M.D.       • aspirin (ASA) chewable tab 81 mg  81 mg Enteral Tube DAILY Geraldo Montana, A.P.N.   81 mg at 08/02/20 0552   • prasugrel (EFFIENT) tablet 10 mg  10 mg Enteral Tube DAILY Geraldo Montana, A.P.N.   10 mg at 08/02/20 0552   • ondansetron (ZOFRAN ODT) dispertab 4 mg  4 mg Enteral Tube Q4HRS PRN Reed Mesa M.D.       • traZODone (DESYREL) tablet 100 mg  100 mg Enteral Tube QHS Reed Mesa M.D.   Stopped at 08/01/20 2100   • tizanidine (ZANAFLEX) tablet 4 mg  4 mg Per NG Tube 4X/DAY Reed Mesa M.D.   4 mg at 08/02/20 1209   • telmisartan (MICARDIS) tablet 80 mg  80 mg Enteral Tube DAILY Reed Mesa M.D.   Stopped at 08/02/20 0600   • carvedilol (COREG) tablet 12.5 mg  12.5 mg Enteral Tube BID WITH MEALS Reed Mesa M.D.   Stopped at 08/01/20 1730   • atorvastatin (LIPITOR) tablet 80 mg  80 mg Enteral Tube Nightly Reed Mesa M.D.   80 mg at 08/01/20 2231   •  acetaminophen (TYLENOL) tablet 650 mg  650 mg Enteral Tube Q6HRS PRN Reed Mesa M.D.       • dexmedetomidine (PRECEDEX) 400 mcg/100mL NS premix infusion  0.1-1.5 mcg/kg/hr Intravenous Continuous Reed Mesa M.D. 26 mL/hr at 08/02/20 1540 1 mcg/kg/hr at 08/02/20 1540   • senna-docusate (PERICOLACE or SENOKOT S) 8.6-50 MG per tablet 2 Tab  2 Tab Enteral Tube BID Reed Mesa M.D.   2 Tab at 08/02/20 0552    And   • polyethylene glycol/lytes (MIRALAX) PACKET 1 Packet  1 Packet Enteral Tube QDAY PRN Reed Mesa M.D.        And   • magnesium hydroxide (MILK OF MAGNESIA) suspension 30 mL  30 mL Enteral Tube QDAY PRN Reed Mesa M.D.        And   • bisacodyl (DULCOLAX) suppository 10 mg  10 mg Rectal QDAY PRN Reed Mesa M.D.       • promethazine (PHENERGAN) tablet 12.5-25 mg  12.5-25 mg Enteral Tube Q4HRS PRN Reed Mesa M.D.           Fluids    Intake/Output Summary (Last 24 hours) at 8/2/2020 1542  Last data filed at 8/2/2020 1300  Gross per 24 hour   Intake 2055.76 ml   Output 3185 ml   Net -1129.24 ml       Laboratory  Recent Labs     08/01/20  2220 08/02/20  0230 08/02/20  0531   ISTATAPH 7.474 7.495 7.415   ISTATAPCO2 22.4* 22.0* 25.3*   ISTATAPO2 140* 111* 90*   ISTATATCO2 17* 18* 17*   JOPHRVJ5WSX 99 99 97   ISTATARTHCO3 16.5* 16.9* 16.2*   ISTATARTBE -5* -4 -7*   ISTATTEMP 34.7 C 36.5 C 35.0 C   ISTATFIO2 80 60 50   ISTATSPEC Arterial Arterial Arterial   ISTATAPHTC 7.509* 7.502* 7.444   [id number] 127* 108* 79         Recent Labs     08/01/20  1526 08/01/20  2329 08/02/20  0500 08/02/20  0850   SODIUM 135 134* 133* 133*   POTASSIUM 4.0 4.0 4.1 4.0   CHLORIDE 97 99 99 100   CO2 21 15* 17* 18*   BUN 14 13 12 12   CREATININE 0.89 0.76 0.66 0.67   MAGNESIUM 1.5 2.0 2.7* 2.4   PHOSPHORUS 3.6  --  2.4*  --    CALCIUM 9.0 8.9 9.0 8.9     Recent Labs     08/01/20  0409  08/01/20  2329 08/02/20  0500 08/02/20  0850   ALTSGPT 18  --   --   --   --    ASTSGOT 17  --   --    --   --    ALKPHOSPHAT 76  --   --   --   --    TBILIRUBIN 0.6  --   --   --   --    GLUCOSE 91   < > 141* 146* 132*    < > = values in this interval not displayed.     Recent Labs     08/01/20  0409 08/01/20  1526 08/02/20  0500 08/02/20  0850   WBC 7.3 14.1* 9.8 9.2   NEUTSPOLYS 56.20  --  80.10* 82.20*   LYMPHOCYTES 29.90  --  11.60* 9.40*   MONOCYTES 9.10  --  7.30 7.50   EOSINOPHILS 3.90  --  0.40 0.20   BASOPHILS 0.60  --  0.30 0.30   ASTSGOT 17  --   --   --    ALTSGPT 18  --   --   --    ALKPHOSPHAT 76  --   --   --    TBILIRUBIN 0.6  --   --   --      Recent Labs     08/01/20  1526 08/01/20 2115 08/02/20 0405 08/02/20  0500 08/02/20  0850   RBC 4.83  --   --  4.56* 4.31*   HEMOGLOBIN 14.9  --   --  13.9* 13.3*   HEMATOCRIT 45.1  --   --  40.7* 38.4*   PLATELETCT 278  --   --  204 195   PROTHROMBTM 14.1 14.4 14.7*  --  14.5   APTT 132.3* 137.4* 186.7*  --  96.7*   INR 1.05 1.09 1.12  --  1.10       Imaging  X-Ray:  I have personally reviewed the images and compared with prior images.  EKG:  I have personally reviewed the images and compared with prior images.  CT:    Reviewed  Echo:   Reviewed    Assessment/Plan  * Acute respiratory failure with hypoxia (HCC)  Assessment & Plan  Intubated and Cath Lab  Pulmonary edema on chest x-ray-admitted for NSTEMI and severe hypertension  RT protocols  Ventilator bundle  Serial ABG/chest x-ray/ventilator mechanics review  Titrating up PEEP and diuresing with IV Lasix as BP allows  Control extreme blood pressure  SAT/SBT when clinically prudent    Left ventricular thrombus with acute MI (HCC)  Assessment & Plan  New left ventricular thrombus identified via echocardiogram post initial catheterization and subsequent brief PEA arrest  IV heparin drip, transition to oral anti-coag later this week  Serial imaging as per cardiology with echocardiogram, repeat before D/c?    Hypotension  Assessment & Plan  Improved  In part related to sedation and in part related to NSTEMI -  EF 25%  No other clear etiology  Responded to holding sedation or adding catecholamines, NE weaned off PM 8/1  IABP  Resume norepinephrine PRN  Bedside cardiac ultrasound as needed, serial reassess volume status    Cardiac arrest (HCC)  Assessment & Plan  PEA arrest secondary to sedation/NSTEMI, query other etiology  ROSC after less than 4 minutes CPR and 1 dose of epinephrine and initiating norepinephrine drip  Odd scenario because the patient was severely agitated and hypertensive with systolic blood pressure over 250 without normal or low-dose sedation.  Echocardiogram after event revealed the anterior wall down which had been good earlier with initial catheterization and probable new LV thrombus and the patient was taken back to the Cath Lab  Follow-up cardiac catheterization revealed open coronary arteries but low blood pressure and poor aortic flow, intra-aortic balloon pump is being placed by cardiology.  Heparin drip being initiated for thrombus.      Hypertensive emergency  Assessment & Plan  No clear hypertensive crisis neurologically from reviewing ER notes, monitor  Transition IV nitro to IV nicardipine if necessary  PRN labetalol/enalapril IV - helped initially -> dropped Bp later  Lisinopril/Coreg enterally-adjust medicines as clinically prudent  Lasix for pulmonary edema as Bp allows  Sedation/analgesia ongoing as well    NSTEMI (non-ST elevated myocardial infarction) ([facility])  Assessment & Plan  Status post PCI times two 8/1   Heparin drip stopped  Prophylactic Lovenox initiated  Dual antiplatelet therapy per cardiology  Nitro drip weaning off  Control blood pressure with ACE/labetalol as needed-add nicardipine infusion if necessary  Statin/beta-blocker/ACE  Monitor for cardiac dysrhythmias, some history of ventricular ectopy/atrial flutter?  Currently in sinus tachycardia with frequent PVCs  Optimize electrolytes  EF 25%  Complicated by LV apical thrombus    Hypertensive disease  Assessment &  Plan  Resume home regimen is clinically appropriate  Post MI patient needs to be on ACE/beta-blocker per cardiology    Memory difficulties  Assessment & Plan  Hardin issues per records, no formal diagnosis of dementia, work-up delayed secondary to current COVID pandemic apparently - more long-term memory issues per wife    BPH (benign prostatic hyperplasia)  Assessment & Plan  Unknown if symptomatic  Resume Proscar and Flomax when clinically appropriate    Tobacco use  Assessment & Plan  Reportedly smoking daily  RT protocols  Monitor for the need for nebulized treatments  Smoking cessation to be encouraged when clinically appropriate  Eval for COPD?    Class 2 severe obesity due to excess calories with serious comorbidity in adult (HCC)  Assessment & Plan  Weight loss to be encouraged when clinically appropriate  Weight loss may help with management of AMPARO, hypertension, dyslipidemia and chronic pain    Chronic back pain  Assessment & Plan  Patient with multiple prior surgeries  Patient with nerve stimulator  Associated depression?  Resume home regimen including extended release morphine/hydrocodone, Desyrel, Effexor and Zanaflex as clinically appropriate  Mobilize when able  PT if clinically indicated  Weight loss to be encouraged when clinically appropriate    History of prediabetes  Assessment & Plan  SSI/hypoglycemia protocols as needed  Check hemoglobin A1c - pending  Hold metformin for now    Dyslipidemia  Assessment & Plan  Continue statin     VTE:  Heparin  Ulcer: H2 Antagonist  Lines: Central Line  Ongoing indication addressed, Arterial Line  Ongoing indication addressed and White Catheter  Ongoing indication addressed    I have performed a physical exam and reviewed and updated ROS and Plan today (8/2/2020). In review of yesterday's note (8/1/2020), there are no changes except as documented above.     Discussed patient condition and risk of morbidity and/or mortality with Family, RN, RT, Pharmacy, Charge  nurse / hot rounds and cardiology     The patient remains critically ill.  Remains on full vent support, not ready for SBT, hopefully pull IABP later today and reassess for readiness. Remains on IV heparin and IV amiodarone drips. NE drip titrating - off today DANILO, monitor for need to restart after ballon pump discontinued.  Critical care time = 55 minutes in directly providing and coordinating critical care and extensive data review.  No time overlap and excludes procedures.

## 2020-08-02 NOTE — PROGRESS NOTES
Dr Shell at bedside to assess patient, patient opening eyes, and following commands. Per MD okay to d/c code chill. [place] Sun set to rewarm to 36.5, magnesium gtt stopped.     Dr Shell also updated on bradycardia mid 40-low 50s. Per MD continue current POC

## 2020-08-02 NOTE — PROGRESS NOTES
Dr Crowley paged and updated on patient HR ranging . Order received for Amiodarone protocol with bolus.

## 2020-08-02 NOTE — PROGRESS NOTES
Updated Dr Sumaya regarding large fluctuations in Arctic Sun water temperature, per MD continue current POC.

## 2020-08-02 NOTE — PROCEDURES
Procedure: IABP removal    Indication: Resolved cardiogenic shock    Performing physician: Chilo Crowley Md    Procedure description  After discontinuation of anticoagulation for greater than 1 hour and trialing the patient in 1:2 Iabp cycle there is determined that the balloon pump could be removed safely.  The balloon pump was paused and removed utilizing a FemStop device which adequately suppressed access site bleeding.    Complications: None  Specimens: None    Plan: Hold heparin for 6 hours and then resume

## 2020-08-02 NOTE — PROGRESS NOTES
Dr Mesa notified of urine output of 30 per hour. Will assess this afternoon if tube feed will be started for intake.

## 2020-08-02 NOTE — PROGRESS NOTES
Critical care progress note:  Patient following commands.  On sedation.  Code chill stopped and will start rewarming, moves all extremities on command,opens eyes and able to sit partially up.

## 2020-08-02 NOTE — PROGRESS NOTES
Intensivist Dr Connelly updated on fluctuation of arctic sun water temperature over the last few hours, per MD continue current plan of care.

## 2020-08-03 ENCOUNTER — APPOINTMENT (OUTPATIENT)
Dept: CARDIOLOGY | Facility: MEDICAL CENTER | Age: 60
DRG: 270 | End: 2020-08-03
Attending: INTERNAL MEDICINE
Payer: OTHER GOVERNMENT

## 2020-08-03 ENCOUNTER — APPOINTMENT (OUTPATIENT)
Dept: RADIOLOGY | Facility: MEDICAL CENTER | Age: 60
DRG: 270 | End: 2020-08-03
Attending: INTERNAL MEDICINE
Payer: OTHER GOVERNMENT

## 2020-08-03 LAB
ALBUMIN SERPL BCP-MCNC: 3.5 G/DL (ref 3.2–4.9)
ALBUMIN/GLOB SERPL: 1.5 G/DL
ALP SERPL-CCNC: 61 U/L (ref 30–99)
ALT SERPL-CCNC: 78 U/L (ref 2–50)
ANION GAP SERPL CALC-SCNC: 11 MMOL/L (ref 7–16)
ANION GAP SERPL CALC-SCNC: 12 MMOL/L (ref 7–16)
APTT PPP: 78.8 SEC (ref 24.7–36)
AST SERPL-CCNC: 52 U/L (ref 12–45)
BASOPHILS # BLD AUTO: 0.3 % (ref 0–1.8)
BASOPHILS # BLD AUTO: 0.4 % (ref 0–1.8)
BASOPHILS # BLD: 0.02 K/UL (ref 0–0.12)
BASOPHILS # BLD: 0.03 K/UL (ref 0–0.12)
BILIRUB SERPL-MCNC: 0.7 MG/DL (ref 0.1–1.5)
BUN SERPL-MCNC: 12 MG/DL (ref 8–22)
BUN SERPL-MCNC: 9 MG/DL (ref 8–22)
CALCIUM SERPL-MCNC: 7.9 MG/DL (ref 8.5–10.5)
CALCIUM SERPL-MCNC: 8.7 MG/DL (ref 8.5–10.5)
CHLORIDE SERPL-SCNC: 100 MMOL/L (ref 96–112)
CHLORIDE SERPL-SCNC: 103 MMOL/L (ref 96–112)
CO2 SERPL-SCNC: 21 MMOL/L (ref 20–33)
CO2 SERPL-SCNC: 22 MMOL/L (ref 20–33)
CREAT SERPL-MCNC: 0.71 MG/DL (ref 0.5–1.4)
CREAT SERPL-MCNC: 0.76 MG/DL (ref 0.5–1.4)
CRP SERPL HS-MCNC: 10.64 MG/DL (ref 0–0.75)
EOSINOPHIL # BLD AUTO: 0.07 K/UL (ref 0–0.51)
EOSINOPHIL # BLD AUTO: 0.13 K/UL (ref 0–0.51)
EOSINOPHIL NFR BLD: 0.9 % (ref 0–6.9)
EOSINOPHIL NFR BLD: 2 % (ref 0–6.9)
ERYTHROCYTE [DISTWIDTH] IN BLOOD BY AUTOMATED COUNT: 41.2 FL (ref 35.9–50)
ERYTHROCYTE [DISTWIDTH] IN BLOOD BY AUTOMATED COUNT: 41.5 FL (ref 35.9–50)
GLOBULIN SER CALC-MCNC: 2.3 G/DL (ref 1.9–3.5)
GLUCOSE SERPL-MCNC: 116 MG/DL (ref 65–99)
GLUCOSE SERPL-MCNC: 144 MG/DL (ref 65–99)
HCT VFR BLD AUTO: 29.3 % (ref 42–52)
HCT VFR BLD AUTO: 36.2 % (ref 42–52)
HGB BLD-MCNC: 12.2 G/DL (ref 14–18)
HGB BLD-MCNC: 9.8 G/DL (ref 14–18)
IMM GRANULOCYTES # BLD AUTO: 0.01 K/UL (ref 0–0.11)
IMM GRANULOCYTES # BLD AUTO: 0.02 K/UL (ref 0–0.11)
IMM GRANULOCYTES NFR BLD AUTO: 0.1 % (ref 0–0.9)
IMM GRANULOCYTES NFR BLD AUTO: 0.3 % (ref 0–0.9)
LV EJECT FRACT  99904: 60
LYMPHOCYTES # BLD AUTO: 1.08 K/UL (ref 1–4.8)
LYMPHOCYTES # BLD AUTO: 1.28 K/UL (ref 1–4.8)
LYMPHOCYTES NFR BLD: 15.8 % (ref 22–41)
LYMPHOCYTES NFR BLD: 16.7 % (ref 22–41)
MAGNESIUM SERPL-MCNC: 2 MG/DL (ref 1.5–2.5)
MCH RBC QN AUTO: 31 PG (ref 27–33)
MCH RBC QN AUTO: 31 PG (ref 27–33)
MCHC RBC AUTO-ENTMCNC: 33.4 G/DL (ref 33.7–35.3)
MCHC RBC AUTO-ENTMCNC: 33.7 G/DL (ref 33.7–35.3)
MCV RBC AUTO: 92.1 FL (ref 81.4–97.8)
MCV RBC AUTO: 92.7 FL (ref 81.4–97.8)
MONOCYTES # BLD AUTO: 0.59 K/UL (ref 0–0.85)
MONOCYTES # BLD AUTO: 0.74 K/UL (ref 0–0.85)
MONOCYTES NFR BLD AUTO: 9.1 % (ref 0–13.4)
MONOCYTES NFR BLD AUTO: 9.2 % (ref 0–13.4)
NEUTROPHILS # BLD AUTO: 4.61 K/UL (ref 1.82–7.42)
NEUTROPHILS # BLD AUTO: 5.95 K/UL (ref 1.82–7.42)
NEUTROPHILS NFR BLD: 71.6 % (ref 44–72)
NEUTROPHILS NFR BLD: 73.6 % (ref 44–72)
NRBC # BLD AUTO: 0 K/UL
NRBC # BLD AUTO: 0 K/UL
NRBC BLD-RTO: 0 /100 WBC
NRBC BLD-RTO: 0 /100 WBC
PHOSPHATE SERPL-MCNC: 1.9 MG/DL (ref 2.5–4.5)
PLATELET # BLD AUTO: 156 K/UL (ref 164–446)
PLATELET # BLD AUTO: 164 K/UL (ref 164–446)
PMV BLD AUTO: 11.9 FL (ref 9–12.9)
PMV BLD AUTO: 12.1 FL (ref 9–12.9)
POTASSIUM SERPL-SCNC: 3.7 MMOL/L (ref 3.6–5.5)
POTASSIUM SERPL-SCNC: 3.8 MMOL/L (ref 3.6–5.5)
PROT SERPL-MCNC: 5.8 G/DL (ref 6–8.2)
RBC # BLD AUTO: 3.16 M/UL (ref 4.7–6.1)
RBC # BLD AUTO: 3.93 M/UL (ref 4.7–6.1)
SODIUM SERPL-SCNC: 133 MMOL/L (ref 135–145)
SODIUM SERPL-SCNC: 136 MMOL/L (ref 135–145)
WBC # BLD AUTO: 6.5 K/UL (ref 4.8–10.8)
WBC # BLD AUTO: 8.1 K/UL (ref 4.8–10.8)

## 2020-08-03 PROCEDURE — 700117 HCHG RX CONTRAST REV CODE 255: Performed by: INTERNAL MEDICINE

## 2020-08-03 PROCEDURE — 85025 COMPLETE CBC W/AUTO DIFF WBC: CPT

## 2020-08-03 PROCEDURE — 80048 BASIC METABOLIC PNL TOTAL CA: CPT

## 2020-08-03 PROCEDURE — 99232 SBSQ HOSP IP/OBS MODERATE 35: CPT | Performed by: INTERNAL MEDICINE

## 2020-08-03 PROCEDURE — 99291 CRITICAL CARE FIRST HOUR: CPT | Performed by: INTERNAL MEDICINE

## 2020-08-03 PROCEDURE — 700102 HCHG RX REV CODE 250 W/ 637 OVERRIDE(OP): Performed by: NURSE PRACTITIONER

## 2020-08-03 PROCEDURE — 85730 THROMBOPLASTIN TIME PARTIAL: CPT

## 2020-08-03 PROCEDURE — 700111 HCHG RX REV CODE 636 W/ 250 OVERRIDE (IP): Performed by: INTERNAL MEDICINE

## 2020-08-03 PROCEDURE — 700102 HCHG RX REV CODE 250 W/ 637 OVERRIDE(OP): Performed by: INTERNAL MEDICINE

## 2020-08-03 PROCEDURE — 770022 HCHG ROOM/CARE - ICU (200)

## 2020-08-03 PROCEDURE — 84100 ASSAY OF PHOSPHORUS: CPT

## 2020-08-03 PROCEDURE — 83735 ASSAY OF MAGNESIUM: CPT

## 2020-08-03 PROCEDURE — 86140 C-REACTIVE PROTEIN: CPT

## 2020-08-03 PROCEDURE — A9270 NON-COVERED ITEM OR SERVICE: HCPCS | Performed by: NURSE PRACTITIONER

## 2020-08-03 PROCEDURE — 700101 HCHG RX REV CODE 250: Performed by: INTERNAL MEDICINE

## 2020-08-03 PROCEDURE — A9270 NON-COVERED ITEM OR SERVICE: HCPCS | Performed by: INTERNAL MEDICINE

## 2020-08-03 PROCEDURE — 93308 TTE F-UP OR LMTD: CPT

## 2020-08-03 PROCEDURE — 71045 X-RAY EXAM CHEST 1 VIEW: CPT

## 2020-08-03 PROCEDURE — 700105 HCHG RX REV CODE 258: Performed by: INTERNAL MEDICINE

## 2020-08-03 PROCEDURE — 93308 TTE F-UP OR LMTD: CPT | Mod: 26 | Performed by: INTERNAL MEDICINE

## 2020-08-03 PROCEDURE — 80053 COMPREHEN METABOLIC PANEL: CPT

## 2020-08-03 RX ORDER — MORPHINE SULFATE 15 MG/1
15 TABLET ORAL EVERY 8 HOURS
Status: DISCONTINUED | OUTPATIENT
Start: 2020-08-03 | End: 2020-08-04

## 2020-08-03 RX ORDER — LORAZEPAM 2 MG/ML
2 INJECTION INTRAMUSCULAR
Status: COMPLETED | OUTPATIENT
Start: 2020-08-03 | End: 2020-08-03

## 2020-08-03 RX ADMIN — FENTANYL CITRATE 100 MCG: 50 INJECTION INTRAMUSCULAR; INTRAVENOUS at 14:24

## 2020-08-03 RX ADMIN — FENTANYL CITRATE 100 MCG: 50 INJECTION INTRAMUSCULAR; INTRAVENOUS at 15:22

## 2020-08-03 RX ADMIN — DEXMEDETOMIDINE HYDROCHLORIDE 1.5 MCG/KG/HR: 100 INJECTION, SOLUTION INTRAVENOUS at 00:16

## 2020-08-03 RX ADMIN — DEXMEDETOMIDINE HYDROCHLORIDE 1.5 MCG/KG/HR: 100 INJECTION, SOLUTION INTRAVENOUS at 19:45

## 2020-08-03 RX ADMIN — DEXMEDETOMIDINE HYDROCHLORIDE 1.5 MCG/KG/HR: 100 INJECTION, SOLUTION INTRAVENOUS at 14:15

## 2020-08-03 RX ADMIN — DEXMEDETOMIDINE HYDROCHLORIDE 1.5 MCG/KG/HR: 100 INJECTION, SOLUTION INTRAVENOUS at 23:30

## 2020-08-03 RX ADMIN — ASPIRIN 81 MG 81 MG: 81 TABLET ORAL at 04:46

## 2020-08-03 RX ADMIN — TRAZODONE HYDROCHLORIDE 100 MG: 50 TABLET ORAL at 21:36

## 2020-08-03 RX ADMIN — MORPHINE SULFATE 15 MG: 15 TABLET ORAL at 10:17

## 2020-08-03 RX ADMIN — HUMAN ALBUMIN MICROSPHERES AND PERFLUTREN 3 ML: 10; .22 INJECTION, SOLUTION INTRAVENOUS at 10:26

## 2020-08-03 RX ADMIN — DEXMEDETOMIDINE HYDROCHLORIDE 1.5 MCG/KG/HR: 100 INJECTION, SOLUTION INTRAVENOUS at 03:39

## 2020-08-03 RX ADMIN — TIZANIDINE 4 MG: 4 TABLET ORAL at 17:14

## 2020-08-03 RX ADMIN — POTASSIUM PHOSPHATE, MONOBASIC AND POTASSIUM PHOSPHATE, DIBASIC 30 MMOL: 224; 236 INJECTION, SOLUTION, CONCENTRATE INTRAVENOUS at 09:52

## 2020-08-03 RX ADMIN — FAMOTIDINE 20 MG: 20 TABLET, FILM COATED ORAL at 04:47

## 2020-08-03 RX ADMIN — DOCUSATE SODIUM 50 MG AND SENNOSIDES 8.6 MG 2 TABLET: 8.6; 5 TABLET, FILM COATED ORAL at 17:15

## 2020-08-03 RX ADMIN — TIZANIDINE 4 MG: 4 TABLET ORAL at 13:50

## 2020-08-03 RX ADMIN — FENTANYL CITRATE 100 MCG: 50 INJECTION INTRAMUSCULAR; INTRAVENOUS at 01:28

## 2020-08-03 RX ADMIN — TELMISARTAN 80 MG: 80 TABLET ORAL at 04:47

## 2020-08-03 RX ADMIN — TIZANIDINE 4 MG: 4 TABLET ORAL at 09:52

## 2020-08-03 RX ADMIN — ATORVASTATIN CALCIUM 80 MG: 80 TABLET, FILM COATED ORAL at 21:36

## 2020-08-03 RX ADMIN — TIZANIDINE 4 MG: 4 TABLET ORAL at 21:36

## 2020-08-03 RX ADMIN — DOCUSATE SODIUM 50 MG AND SENNOSIDES 8.6 MG 2 TABLET: 8.6; 5 TABLET, FILM COATED ORAL at 04:46

## 2020-08-03 RX ADMIN — HALOPERIDOL LACTATE 5 MG: 5 INJECTION, SOLUTION INTRAMUSCULAR at 08:01

## 2020-08-03 RX ADMIN — DEXMEDETOMIDINE HYDROCHLORIDE 1.5 MCG/KG/HR: 100 INJECTION, SOLUTION INTRAVENOUS at 06:43

## 2020-08-03 RX ADMIN — FENTANYL CITRATE 100 MCG: 50 INJECTION INTRAMUSCULAR; INTRAVENOUS at 11:21

## 2020-08-03 RX ADMIN — FENTANYL CITRATE 100 MCG: 50 INJECTION INTRAMUSCULAR; INTRAVENOUS at 04:20

## 2020-08-03 RX ADMIN — FENTANYL CITRATE 100 MCG: 50 INJECTION INTRAMUSCULAR; INTRAVENOUS at 10:27

## 2020-08-03 RX ADMIN — DEXMEDETOMIDINE HYDROCHLORIDE 1.5 MCG/KG/HR: 100 INJECTION, SOLUTION INTRAVENOUS at 11:16

## 2020-08-03 RX ADMIN — FENTANYL CITRATE 100 MCG: 50 INJECTION INTRAMUSCULAR; INTRAVENOUS at 09:26

## 2020-08-03 RX ADMIN — MORPHINE SULFATE 15 MG: 15 TABLET ORAL at 21:37

## 2020-08-03 RX ADMIN — LORAZEPAM 2 MG: 2 INJECTION INTRAMUSCULAR; INTRAVENOUS at 11:35

## 2020-08-03 RX ADMIN — PRASUGREL 10 MG: 10 TABLET, FILM COATED ORAL at 04:47

## 2020-08-03 RX ADMIN — DEXMEDETOMIDINE HYDROCHLORIDE 1.5 MCG/KG/HR: 100 INJECTION, SOLUTION INTRAVENOUS at 17:17

## 2020-08-03 NOTE — PROGRESS NOTES
Late entry:  Dr Connelly paged to patient room due to patient agitation and patient stating he wanted to leave. Order received for 5 mg Haldol and restraints to be reapplied. Medication given to patient. Patient then continued to be very agitated and attempting to kick staff. 4 point soft restraints applied and Dr Connelly paged to room. See MAR for orders.

## 2020-08-03 NOTE — PROGRESS NOTES
"Pulmonary/Critical Care Medicine   Progress Note    Date of service: 8/2/2020  Time: 1821    Called to bedside for severely agitated and delirious patient requesting to leave AMA.  Patient is confused, demanding to leave so he can \"go to the hospital\".  Extensive amount of time spent discussing current condition with the patient and need to remain in hospital.  The patient became progressively more agitated kicking staff.  Dexmedetomidine increased to maximum dosing.  Additional lorazepam and IV antipsychotic ordered for agitation, staff and patient safety.    I spent 25 min in reviewing the patient's condition, physical examination, laboratory and imaging data, prior documentation, in discussion with RN, RT, pharmacy, and in formulating an assessment/plan.    Critical Care time: 25 min. No time overlap, procedures not included in time.  [id number]  "

## 2020-08-03 NOTE — PROGRESS NOTES
ACS Navigator Consult Note    Diagnosis: Acute NSTEMI due to thrombotic RCA/PLB lesions              - Successful PCI with NAOMY 8/1diagnosis.     Management: PCI    Current assessment of LV Function shows: 25%    Reviewed ACS medications:  · DAPT: aspirin + prasugrel (EFFIENT)   · Beta-Blocker:   carvedilol (COREG)  · Statin:atorvastatin (LIPITOR) tablet 80 mg    (needs to be a high intensity statin unless contraindicated: Atorvastatin 80mg or 40mg, or Rosuvastatin 40mg or 20mg  · Consider for aldosterone blockade? - EF is 25%, no HX of DM or HF  · Consider for ACE-I/ARB/ARNI?  telmisartan  -- EF is 25%    Meds to Beds BEDSIDE NURSING RESPONSIBILITIES:    If not already done, please assess patient for Meds to Beds Opt-In which can be found in the admit navigator (see below). Evidence shows that meds to beds improves medication compliance and patient outcomes.          Intensive Cardiac Rehab (ICR) Referral  Referred on 8/1/20; has current inpatient orders for nutrition consult & PT for Phase I ICR    Smoking Cessation?  Indicated? Current everyday smoker  Documented: documentation found: Smoking cessation to be encouraged when clinically appropriate    Demographics  Patient resides in: [place]  Insurance: "[employer]    Inpatient & Discharge Patient Education  Bedside nursing to continually provide patient education on ACS meds, signs and symptoms to monitor for, and risk factor modification.     Also at discharge please complete the “Acute Coronary Syndrome” special instructions on the AVS:          Follow up  Cardiac rehab info placed in AVS to assist finding cardiac rehab in [place] if pt does not come [facility].  No [facility] cardiology apt yet.    Thank you, Jessy Vargas, Heart Failure Program  Coordinator, ACS reviewer [phone]

## 2020-08-03 NOTE — PROGRESS NOTES
Reason for consultation /admission : Inf STEMI    Normotensive on home dose telmisartan and carvedilol 12.5 mg BID  Still on IV heparin for ? LV thrombus on ECHO immediate post cath  LV wall motion was normal at the beginning of cath    Agitated last PM  Being sedated    Review of systems;    Unable to perform due to sedated    Temp:  [35.8 °C (96.4 °F)-36.7 °C (98.1 °F)] 35.8 °C (96.4 °F)  Pulse:  [] 57  Resp:  [13-22] 20  BP: ()/(53-86) 127/70  SpO2:  [93 %-100 %] 98 %  GENERAL not in acute distress, not dyspnic at rest  Head atraumatic, normocephalic  Eyes EOMI  ENT neck supple, no JVD, no carotid bruits or thyromegaly  Lung good expansion, distant sound, no rales or wheezing  Heart RRR, normal rate, no murmur,   no gallop or rub  Abd soft, no tenderness, mass or bruits  Ext no edema  Skin no ecchymosis or petechiae  Musculoskeletal no deformity  Neuro sedtaed    Monitor reviewed by me showed SR/SB rate ,ostly in the 50s, no significant ventricular or atrial dysrhythmias.    Labs: Cr 0.8, K+ 3.7      Assessment and plans    1. Inferior STEMI D#3 s/p RCA stent with acute LV dysfunction and ? Acute LV thrombus  Doing well  I did review his ECHO. :LV apex was not well filled likely mainly from markedly decreased EF and wall motion abnormality/ flow stagnant and may not be true thrombus  Will repeat ECHO to determine the need of continuing anticoagulation  Cont DAPT, statin    2. HTN  Con telmisartan and carvedilol    3. Chronic low back pain  Per priamry      Will follow    Please note that this dictation was created using voice recognition software. I have worked with consultants from the vendor as well as technical experts from [email or web address] to optimize the interface. I have made every reasonable attempt to correct obvious errors, but I expect that there are errors of grammar and possibly content I did not discover before finalizing the note

## 2020-08-03 NOTE — DIETARY
"Nutrition Support/TF Assessment:  Day 2 of admit.  Karan Wiley is a 60 y.o. male with admitting DX of NSTEMI     Current problem list:  1. NSTEMI (non-ST elevated myocardial infarction)   2. Hypertensive emergency   3.  Acute respiratory failure with hypoxia   4. Cardiac arrest   5. Hypotension     6. Left ventricular thrombus with acute MI   7. Hypertensive disease   8. Dyslipidemia    9. History of prediabetes   10. Chronic back pain    11. Class 2 severe obesity due to excess calories with serious comorbidity in adult   12. Tobacco use     13. BPH   14. Memory difficulties             Assessment:  Estimated Nutritional Needs based on:   Height: 170.2 cm (5' 7\")  Weight: 104.1 kg (229 lb 8 oz)  Weight to Use in Calculations: 104.1 kg (229 lb 8 oz)  Ideal Body Weight: 67.1 kg (148 lb)  Percent Ideal Body Weight: 155.1  Body mass index is 35.94 kg/m²., BMI classification: Class II obestity    Calculation/Equation: MSJ X 1.0=8825  Total Calories / day: 2402-4236 (Calories / k-19)  Total Grams Protein / day: 101 - 146  (Grams Protein / k.0 - 1.4 of actual wt, 1.5-2.2 g pro/kg of IBW)     Evaluation:   1. TF consult received, appropriate as pt very agitated with sedation. SLP evaluation pending. Fibersource HN running at 25 ml/hr per TF protocol.   2. Feeding tube confirmed in stomach on  per KUB.   3. Pertinent Labs: Na+ 133, Phos 1.9.   4. Pertinent Meds: Precedex, Levophed (last given yesterday), Zofran PRN, Potassium Phosphate, Compazine/Phenergan PRN, Bowel protocol.   5. No poor PO/wt loss per admit screen, pt appears to have adequate nourishment per observation.  6. Cardiac diet education consult received, RD to follow up for education when appropriate.      Malnutrition Risk: does not meet criteria.      Recommendations/Plan:  1. Begin TF with Replete with Fiber @ 25 ml/hr and advance per TF protocol to goal rate of 75 ml/hr to provide 1800 kcals/day, 115 g pro/day, and 1449 ml free " water/day.   2. Fluids per MD.   3. Monitor phos and replete PRN.  4. Advance diet when determined safe per SLP.     RD following.

## 2020-08-03 NOTE — PROGRESS NOTES
Critical Care Progress Note    Date of admission  8/1/2020    Chief Complaint  60 y.o. male  PMHx Chronic pain syndrome, HTN, DLD, TOB use, AMPARO and prediabetes admitted 8/1/2020 with NSTEMI, intubated in cath lab for agitation and hypoxemia.  Stent to RCA/PDA in cath lab.  Transfer to ICU on vent very hypertensive.    Hospital Course   8/1 3-4 min PEA arrest from hypotension after he cam back from cath lab - sedation? however repeat ECHO w/ LV thrombus and anterior wall now down -> to cath lab again, no new CAD, IABP placed, code chill initiated after neg CT head  8/3 - extubated yesterday, remains on dexmedetomidine, confused intermittently agitated.  5 L oxygen.  Continue heparin drip for LV thrombus and DAPT.  He pulled his own Keith out and had traumatic hematuria and started on CBI, monitor need for urology evaluation and follow-up hemoglobin.  Heparin held transiently with significant hematuria.  Resumed narcotics as he had been on MS Contin and Norco at home.       Interval Problem Update  Reviewed last 24 hour events:  Extubated yesterday  5 lpm oxy mask  Confused/agitated  ox2  Dex 1.5  Heparin gtt for LV thrombus  DAPT  Pulled his keith  SR    ruben TF at 25  Home MS Contin TID/norco - not getting here  No abx  Replace K and Phos  Start IR MSO4 15 TID  PRN Fentanyl    Review of Systems  Review of Systems   Unable to perform ROS: Acuity of condition        Vital Signs for last 24 hours   Temp:  [35.8 °C (96.4 °F)-36.7 °C (98.1 °F)] 35.8 °C (96.4 °F)  Pulse:  [] 69  Resp:  [13-22] 20  BP: ()/(53-86) 130/77  SpO2:  [93 %-100 %] 97 %    Hemodynamic parameters for last 24 hours  CVP:  [3 MM HG-276 MM HG] 272 MM HG    Respiratory Information for the last 24 hours  Vent Mode: Spont  Rate (breaths/min): 20  Vt Target (mL): 420  PEEP/CPAP: 8  P Support: 5  MAP: 11  Control VTE (exp VT): 451    Physical Exam   Physical Exam  Vitals signs and nursing note reviewed.   Constitutional:       Comments:  Confused and agitated   HENT:      Head: Normocephalic and atraumatic.      Mouth/Throat:      Mouth: Mucous membranes are moist.   Neck:      Musculoskeletal: Neck supple. No neck rigidity.   Cardiovascular:      Rate and Rhythm: Normal rate and regular rhythm.      Pulses: Normal pulses.   Pulmonary:      Effort: Pulmonary effort is normal. No respiratory distress.   Abdominal:      General: There is no distension.      Palpations: Abdomen is soft.   Musculoskeletal:         General: No swelling or deformity.   Skin:     General: Skin is warm.      Capillary Refill: Capillary refill takes less than 2 seconds.   Neurological:      Cranial Nerves: No cranial nerve deficit.      Comments: Confused, agitated moves all extremities briskly, not following commands consistently       Medications  Current Facility-Administered Medications   Medication Dose Route Frequency Provider Last Rate Last Dose   • potassium phosphate IVPB 30 mmol in 500 mL D5W (premix)  30 mmol Intravenous Once Jevon Godwin M.D.       • fentaNYL (SUBLIMAZE) 50 mcg/mL in 50mL (Continuous Infusion)   Intravenous Continuous Reed Mesa M.D.   Stopped at 08/02/20 1605   • fentaNYL (SUBLIMAZE) injection 25 mcg  25 mcg Intravenous Q HOUR PRN Reed Mesa M.D.        Or   • fentaNYL (SUBLIMAZE) injection 50 mcg  50 mcg Intravenous Q HOUR PRN Reed Mesa M.D.   50 mcg at 08/02/20 2300    Or   • fentaNYL (SUBLIMAZE) injection 100 mcg  100 mcg Intravenous Q HOUR PRN Reed Mesa M.D.   100 mcg at 08/03/20 0420   • norepinephrine (Levophed) infusion 8 mg/250 mL (premix)  0-30 mcg/min Intravenous Continuous Reed Mesa M.D.   Stopped at 08/02/20 1900   • heparin injection 3,200 Units  3,200 Units Intravenous PRN Chilo Crowley M.D.        And   • heparin infusion 25,000 units in 500 mL 0.45% NACL   Intravenous Continuous Chilo Crowley M.D. 24 mL/hr at 08/03/20 0717 1,200 Units/hr at 08/03/20 0717   • haloperidol lactate  (HALDOL) injection 2-5 mg  2-5 mg Intravenous Q4HRS PRN Lance Connelly Jr., D.OrGace   5 mg at 08/03/20 0801   • Pharmacy Consult: Enteral tube insertion - review meds/change route/product selection  1 Each Other PHARMACY TO DOSE Lance Connelly Jr., D.O.       • ondansetron (ZOFRAN) syringe/vial injection 4 mg  4 mg Intravenous Q4HRS PRN Meño Red M.D.       • promethazine (PHENERGAN) suppository 12.5-25 mg  12.5-25 mg Rectal Q4HRS PRN Meño Red M.D.       • prochlorperazine (COMPAZINE) injection 5-10 mg  5-10 mg Intravenous Q4HRS PRN Meño Red M.D.       • labetalol (NORMODYNE/TRANDATE) injection 10 mg  10 mg Intravenous Q4HRS PRN Meño Red M.D.   10 mg at 08/01/20 0924   • nitroglycerin (NITROSTAT) tablet 0.4 mg  0.4 mg Sublingual Q5 MIN PRN Meño eRd M.D.       • NS infusion   Intravenous Continuous Reed Mesa M.D. 10 mL/hr at 08/01/20 1024     • niCARdipine (CARDENE) 25 mg in  mL Infusion  0-15 mg/hr Intravenous Continuous Chilo Crowley M.D.   Stopped at 08/01/20 0900   • Respiratory Therapy Consult   Nebulization Continuous RT Reed Mesa M.D.       • ipratropium-albuterol (DUONEB) nebulizer solution  3 mL Nebulization Q2HRS PRN (RT) Reed Mesa M.D.       • famotidine (PEPCID) tablet 20 mg  20 mg Enteral Tube Q12HRS Reed Mesa M.D.   20 mg at 08/03/20 0447    Or   • famotidine (PEPCID) injection 20 mg  20 mg Intravenous Q12HRS Reed Mesa M.D.   20 mg at 08/01/20 1639   • MD Alert...ICU Electrolyte Replacement per Pharmacy   Other PHARMACY TO DOSE Reed Mesa M.D.       • lidocaine (XYLOCAINE) 1 % injection 1-2 mL  1-2 mL Tracheal Tube Q30 MIN PRN Reed Mesa M.D.       • enalaprilat (VASOTEC) injection 1.25 mg  1.25 mg Intravenous Q6HRS PRN Reed Mesa M.D.       • aspirin (ASA) chewable tab 81 mg  81 mg Enteral Tube DAILY Geraldo Montana, A.P.N.   81 mg at 08/03/20 0446   • prasugrel (EFFIENT) tablet 10 mg   10 mg Enteral Tube DAILY JEFF HallPPHILLY   10 mg at 08/03/20 0447   • ondansetron (ZOFRAN ODT) dispertab 4 mg  4 mg Enteral Tube Q4HRS PRN Reed Mesa M.D.       • traZODone (DESYREL) tablet 100 mg  100 mg Enteral Tube QHS Reed Mesa M.D.   100 mg at 08/02/20 2139   • tizanidine (ZANAFLEX) tablet 4 mg  4 mg Per NG Tube 4X/DAY Reed Mesa M.D.   4 mg at 08/02/20 2139   • telmisartan (MICARDIS) tablet 80 mg  80 mg Enteral Tube DAILY Reed Mesa M.D.   80 mg at 08/03/20 0447   • carvedilol (COREG) tablet 12.5 mg  12.5 mg Enteral Tube BID WITH MEALS Reed Mesa M.D.   Stopped at 08/01/20 1730   • atorvastatin (LIPITOR) tablet 80 mg  80 mg Enteral Tube Nightly Reed Mesa M.D.   80 mg at 08/02/20 2139   • acetaminophen (TYLENOL) tablet 650 mg  650 mg Enteral Tube Q6HRS PRN Reed Mesa M.D.   650 mg at 08/02/20 2154   • dexmedetomidine (PRECEDEX) 400 mcg/100mL NS premix infusion  0.1-1.5 mcg/kg/hr Intravenous Continuous Reed Mesa M.D. 39 mL/hr at 08/03/20 0643 1.5 mcg/kg/hr at 08/03/20 0643   • senna-docusate (PERICOLACE or SENOKOT S) 8.6-50 MG per tablet 2 Tab  2 Tab Enteral Tube BID Reed Mesa M.D.   2 Tab at 08/03/20 0446    And   • polyethylene glycol/lytes (MIRALAX) PACKET 1 Packet  1 Packet Enteral Tube QDAY PRN Reed Mesa M.D.        And   • magnesium hydroxide (MILK OF MAGNESIA) suspension 30 mL  30 mL Enteral Tube QDAY PRN Reed Mesa M.D.        And   • bisacodyl (DULCOLAX) suppository 10 mg  10 mg Rectal QDAY PRN Reed Mesa M.D.       • promethazine (PHENERGAN) tablet 12.5-25 mg  12.5-25 mg Enteral Tube Q4HRS PRN Reed Mesa M.D.           Fluids    Intake/Output Summary (Last 24 hours) at 8/3/2020 0901  Last data filed at 8/3/2020 0600  Gross per 24 hour   Intake 1406.65 ml   Output 735 ml   Net 671.65 ml       Laboratory  Recent Labs     08/01/20  2220 08/02/20  0230 08/02/20  0531   ISTATAPH 7.474 7.495  7.415   ISTATAPCO2 22.4* 22.0* 25.3*   ISTATAPO2 140* 111* 90*   ISTATATCO2 17* 18* 17*   BWPHDEP7QYN 99 99 97   ISTATARTHCO3 16.5* 16.9* 16.2*   ISTATARTBE -5* -4 -7*   ISTATTEMP 34.7 C 36.5 C 35.0 C   ISTATFIO2 80 60 50   ISTATSPEC Arterial Arterial Arterial   ISTATAPHTC 7.509* 7.502* 7.444   [id number] 127* 108* 79         Recent Labs     08/01/20  1526  08/02/20  0500 08/02/20  0850 08/03/20  0345   SODIUM 135   < > 133* 133* 133*   POTASSIUM 4.0   < > 4.1 4.0 3.7   CHLORIDE 97   < > 99 100 100   CO2 21   < > 17* 18* 21   BUN 14   < > 12 12 12   CREATININE 0.89   < > 0.66 0.67 0.76   MAGNESIUM 1.5   < > 2.7* 2.4 2.0   PHOSPHORUS 3.6  --  2.4*  --  1.9*   CALCIUM 9.0   < > 9.0 8.9 8.7    < > = values in this interval not displayed.     Recent Labs     08/01/20  0409 08/02/20  0500 08/02/20  0850 08/03/20  0345   ALTSGPT 18  --   --   --  78*   ASTSGOT 17  --   --   --  52*   ALKPHOSPHAT 76  --   --   --  61   TBILIRUBIN 0.6  --   --   --  0.7   GLUCOSE 91   < > 146* 132* 116*    < > = values in this interval not displayed.     Recent Labs     08/01/20  0409  08/02/20  0500 08/02/20  0850 08/03/20  0345   WBC 7.3   < > 9.8 9.2 8.1   NEUTSPOLYS 56.20  --  80.10* 82.20* 73.60*   LYMPHOCYTES 29.90  --  11.60* 9.40* 15.80*   MONOCYTES 9.10  --  7.30 7.50 9.20   EOSINOPHILS 3.90  --  0.40 0.20 0.90   BASOPHILS 0.60  --  0.30 0.30 0.40   ASTSGOT 17  --   --   --  52*   ALTSGPT 18  --   --   --  78*   ALKPHOSPHAT 76  --   --   --  61   TBILIRUBIN 0.6  --   --   --  0.7    < > = values in this interval not displayed.     Recent Labs     08/02/20  0500 08/02/20  0850 08/02/20  1549 08/02/20  2200 08/03/20  0345   RBC 4.56* 4.31*  --   --  3.93*   HEMOGLOBIN 13.9* 13.3*  --   --  12.2*   HEMATOCRIT 40.7* 38.4*  --   --  36.2*   PLATELETCT 204 195  --   --  164   PROTHROMBTM  --  14.5 14.3 14.5  --    APTT  --  96.7* 22.8* 73.3* 78.8*   INR  --  1.10 1.08 1.10  --        Imaging  X-Ray:  I have personally reviewed the  images and compared with prior images.  EKG:  I have personally reviewed the images and compared with prior images.  CT:    Reviewed  Echo:   Reviewed    Assessment/Plan  * Acute respiratory failure with hypoxia (HCC)  Assessment & Plan  Intubated in Cath Lab, liberated 8/2  Continue titrated oxygen, RT protocols, monitor need for reintubation with agitated delirium/altered mentation    Left ventricular thrombus with acute MI (HCC)  Assessment & Plan  New left ventricular thrombus identified via echocardiogram post initial catheterization and subsequent brief PEA arrest  IV heparin drip, transition to oral anti-coag in the near future  Serial imaging as per cardiology with echocardiogram, repeat before D/c?    Hypotension  Assessment & Plan  Improved    Cardiac arrest (HCC)  Assessment & Plan  PEA arrest secondary to sedation/NSTEMI, query other etiology  ROSC after less than 4 minutes CPR and 1 dose of epinephrine and initiating norepinephrine drip  Echocardiogram after event revealed the anterior wall hypokinesis  Follow-up LH catheterization revealed open coronary arteries but low blood pressure and poor aortic flow, IABP placed  Heparin drip for LV thrombus.      Hypertensive emergency- (present on admission)  Assessment & Plan  Continue BP management    NSTEMI (non-ST elevated myocardial infarction) ([facility])- (present on admission)  Assessment & Plan  Status post PCI 8/1   DAPT  Statin/beta-blocker/ACE  Monitor for cardiac dysrhythmias, some history of ventricular ectopy/atrial flutter?  Currently in sinus tachycardia with frequent PVCs  Optimize electrolytes  EF 25%  Complicated by LV apical thrombus    Hypertensive disease  Assessment & Plan  Resume home regimen is clinically appropriate  Post MI patient needs to be on ACE/beta-blocker per cardiology    Memory difficulties  Assessment & Plan       BPH (benign prostatic hyperplasia)  Assessment & Plan  Unknown if symptomatic  Resume Proscar and Flomax when  clinically appropriate    Tobacco use  Assessment & Plan       Chronic back pain  Assessment & Plan  Patient with multiple prior surgeries  Patient with nerve stimulator  Home medications include morphine/hydrocodone, Desyrel, Effexor and Zanaflex as clinically appropriate  Resume scheduled narcotics today    History of prediabetes  Assessment & Plan  Glycemic control    Dyslipidemia  Assessment & Plan  statin     Updated plan:  Extubated yesterday, continue titrated oxygen and close monitoring in ICU for need for reintubation  Ongoing agitated delirium multifactorial.  Resume narcotics as he has been narcotic dependent at home  Traumatic hematuria after self removal of White catheter, CBI, follow hemoglobin, urology consultation if needed  Heparin held transiently for hematuria, continue DAPT  Follow closely in ICU    VTE:  Heparin  Ulcer: H2 Antagonist  Lines: Central Line  Ongoing indication addressed, Arterial Line  Ongoing indication addressed and White Catheter  Ongoing indication addressed    I have performed a physical exam and reviewed and updated ROS and Plan today (8/3/2020). In review of yesterday's note (8/2/2020), there are no changes except as documented above.     Discussed patient condition and risk of morbidity and/or mortality with Family, RN, RT, Pharmacy, Charge nurse / hot rounds and cardiology     The patient remains critically ill.  Critical care time = 35 minutes in directly providing and coordinating critical care and extensive data review.  No time overlap and excludes procedures.

## 2020-08-04 ENCOUNTER — APPOINTMENT (OUTPATIENT)
Dept: RADIOLOGY | Facility: MEDICAL CENTER | Age: 60
DRG: 270 | End: 2020-08-04
Attending: INTERNAL MEDICINE
Payer: OTHER GOVERNMENT

## 2020-08-04 PROBLEM — R31.0 GROSS HEMATURIA: Status: ACTIVE | Noted: 2020-08-04

## 2020-08-04 LAB
ANION GAP SERPL CALC-SCNC: 10 MMOL/L (ref 7–16)
BASOPHILS # BLD AUTO: 0.3 % (ref 0–1.8)
BASOPHILS # BLD: 0.02 K/UL (ref 0–0.12)
BUN SERPL-MCNC: 9 MG/DL (ref 8–22)
CALCIUM SERPL-MCNC: 8.4 MG/DL (ref 8.5–10.5)
CHLORIDE SERPL-SCNC: 103 MMOL/L (ref 96–112)
CO2 SERPL-SCNC: 23 MMOL/L (ref 20–33)
CREAT SERPL-MCNC: 0.67 MG/DL (ref 0.5–1.4)
EKG IMPRESSION: NORMAL
EOSINOPHIL # BLD AUTO: 0.21 K/UL (ref 0–0.51)
EOSINOPHIL NFR BLD: 2.8 % (ref 0–6.9)
ERYTHROCYTE [DISTWIDTH] IN BLOOD BY AUTOMATED COUNT: 41.1 FL (ref 35.9–50)
EST. AVERAGE GLUCOSE BLD GHB EST-MCNC: 128 MG/DL
GLUCOSE SERPL-MCNC: 132 MG/DL (ref 65–99)
HBA1C MFR BLD: 6.1 % (ref 0–5.6)
HCT VFR BLD AUTO: 28 % (ref 42–52)
HGB BLD-MCNC: 9.4 G/DL (ref 14–18)
IMM GRANULOCYTES # BLD AUTO: 0.04 K/UL (ref 0–0.11)
IMM GRANULOCYTES NFR BLD AUTO: 0.5 % (ref 0–0.9)
LYMPHOCYTES # BLD AUTO: 1.09 K/UL (ref 1–4.8)
LYMPHOCYTES NFR BLD: 14.7 % (ref 22–41)
MAGNESIUM SERPL-MCNC: 1.8 MG/DL (ref 1.5–2.5)
MCH RBC QN AUTO: 31 PG (ref 27–33)
MCHC RBC AUTO-ENTMCNC: 33.6 G/DL (ref 33.7–35.3)
MCV RBC AUTO: 92.4 FL (ref 81.4–97.8)
MONOCYTES # BLD AUTO: 0.84 K/UL (ref 0–0.85)
MONOCYTES NFR BLD AUTO: 11.4 % (ref 0–13.4)
NEUTROPHILS # BLD AUTO: 5.2 K/UL (ref 1.82–7.42)
NEUTROPHILS NFR BLD: 70.3 % (ref 44–72)
NRBC # BLD AUTO: 0 K/UL
NRBC BLD-RTO: 0 /100 WBC
PHOSPHATE SERPL-MCNC: 2.2 MG/DL (ref 2.5–4.5)
PLATELET # BLD AUTO: 151 K/UL (ref 164–446)
PMV BLD AUTO: 12.2 FL (ref 9–12.9)
POTASSIUM SERPL-SCNC: 3.9 MMOL/L (ref 3.6–5.5)
RBC # BLD AUTO: 3.03 M/UL (ref 4.7–6.1)
SODIUM SERPL-SCNC: 136 MMOL/L (ref 135–145)
WBC # BLD AUTO: 7.4 K/UL (ref 4.8–10.8)

## 2020-08-04 PROCEDURE — 99233 SBSQ HOSP IP/OBS HIGH 50: CPT | Performed by: HOSPITALIST

## 2020-08-04 PROCEDURE — 93010 ELECTROCARDIOGRAM REPORT: CPT | Performed by: INTERNAL MEDICINE

## 2020-08-04 PROCEDURE — A9270 NON-COVERED ITEM OR SERVICE: HCPCS | Performed by: INTERNAL MEDICINE

## 2020-08-04 PROCEDURE — 80048 BASIC METABOLIC PNL TOTAL CA: CPT

## 2020-08-04 PROCEDURE — 700102 HCHG RX REV CODE 250 W/ 637 OVERRIDE(OP): Performed by: NURSE PRACTITIONER

## 2020-08-04 PROCEDURE — 700101 HCHG RX REV CODE 250: Performed by: INTERNAL MEDICINE

## 2020-08-04 PROCEDURE — 700105 HCHG RX REV CODE 258: Performed by: INTERNAL MEDICINE

## 2020-08-04 PROCEDURE — 700102 HCHG RX REV CODE 250 W/ 637 OVERRIDE(OP): Performed by: INTERNAL MEDICINE

## 2020-08-04 PROCEDURE — 93005 ELECTROCARDIOGRAM TRACING: CPT | Performed by: INTERNAL MEDICINE

## 2020-08-04 PROCEDURE — 306565 RIGID MIT RESTRAINT(PAIR): Performed by: HOSPITALIST

## 2020-08-04 PROCEDURE — 51798 US URINE CAPACITY MEASURE: CPT

## 2020-08-04 PROCEDURE — 700111 HCHG RX REV CODE 636 W/ 250 OVERRIDE (IP): Performed by: INTERNAL MEDICINE

## 2020-08-04 PROCEDURE — A9270 NON-COVERED ITEM OR SERVICE: HCPCS | Performed by: NURSE PRACTITIONER

## 2020-08-04 PROCEDURE — 71045 X-RAY EXAM CHEST 1 VIEW: CPT

## 2020-08-04 PROCEDURE — 83036 HEMOGLOBIN GLYCOSYLATED A1C: CPT

## 2020-08-04 PROCEDURE — 770020 HCHG ROOM/CARE - TELE (206)

## 2020-08-04 PROCEDURE — 84100 ASSAY OF PHOSPHORUS: CPT

## 2020-08-04 PROCEDURE — 700111 HCHG RX REV CODE 636 W/ 250 OVERRIDE (IP): Performed by: NURSE PRACTITIONER

## 2020-08-04 PROCEDURE — 85025 COMPLETE CBC W/AUTO DIFF WBC: CPT

## 2020-08-04 PROCEDURE — 83735 ASSAY OF MAGNESIUM: CPT

## 2020-08-04 PROCEDURE — 99232 SBSQ HOSP IP/OBS MODERATE 35: CPT | Performed by: INTERNAL MEDICINE

## 2020-08-04 PROCEDURE — 99291 CRITICAL CARE FIRST HOUR: CPT | Performed by: INTERNAL MEDICINE

## 2020-08-04 RX ORDER — LORAZEPAM 2 MG/ML
1-2 INJECTION INTRAMUSCULAR
Status: DISCONTINUED | OUTPATIENT
Start: 2020-08-04 | End: 2020-08-13 | Stop reason: HOSPADM

## 2020-08-04 RX ORDER — FINASTERIDE 5 MG/1
5 TABLET, FILM COATED ORAL EVERY EVENING
Status: DISCONTINUED | OUTPATIENT
Start: 2020-08-05 | End: 2020-08-13 | Stop reason: HOSPADM

## 2020-08-04 RX ORDER — TAMSULOSIN HYDROCHLORIDE 0.4 MG/1
0.4 CAPSULE ORAL EVERY EVENING
Status: DISCONTINUED | OUTPATIENT
Start: 2020-08-05 | End: 2020-08-13 | Stop reason: HOSPADM

## 2020-08-04 RX ORDER — TELMISARTAN 80 MG/1
80 TABLET ORAL DAILY
Status: DISCONTINUED | OUTPATIENT
Start: 2020-08-04 | End: 2020-08-07

## 2020-08-04 RX ORDER — HYDROCODONE BITARTRATE AND ACETAMINOPHEN 10; 325 MG/1; MG/1
1 TABLET ORAL
Status: DISCONTINUED | OUTPATIENT
Start: 2020-08-04 | End: 2020-08-13 | Stop reason: HOSPADM

## 2020-08-04 RX ORDER — CARVEDILOL 6.25 MG/1
6.25 TABLET ORAL 2 TIMES DAILY WITH MEALS
Status: DISCONTINUED | OUTPATIENT
Start: 2020-08-04 | End: 2020-08-05

## 2020-08-04 RX ORDER — VENLAFAXINE HYDROCHLORIDE 75 MG/1
150 CAPSULE, EXTENDED RELEASE ORAL DAILY
Status: DISCONTINUED | OUTPATIENT
Start: 2020-08-04 | End: 2020-08-13 | Stop reason: HOSPADM

## 2020-08-04 RX ORDER — MAGNESIUM SULFATE HEPTAHYDRATE 40 MG/ML
2 INJECTION, SOLUTION INTRAVENOUS ONCE
Status: COMPLETED | OUTPATIENT
Start: 2020-08-04 | End: 2020-08-04

## 2020-08-04 RX ORDER — MORPHINE SULFATE 15 MG/1
15 TABLET, FILM COATED, EXTENDED RELEASE ORAL 3 TIMES DAILY
Status: DISCONTINUED | OUTPATIENT
Start: 2020-08-05 | End: 2020-08-13 | Stop reason: HOSPADM

## 2020-08-04 RX ADMIN — MORPHINE SULFATE 15 MG: 15 TABLET ORAL at 04:31

## 2020-08-04 RX ADMIN — TIZANIDINE 4 MG: 4 TABLET ORAL at 07:59

## 2020-08-04 RX ADMIN — VENLAFAXINE HYDROCHLORIDE 150 MG: 75 CAPSULE, EXTENDED RELEASE ORAL at 23:36

## 2020-08-04 RX ADMIN — LABETALOL HYDROCHLORIDE 10 MG: 5 INJECTION INTRAVENOUS at 23:12

## 2020-08-04 RX ADMIN — ASPIRIN 81 MG 81 MG: 81 TABLET ORAL at 04:31

## 2020-08-04 RX ADMIN — LORAZEPAM 2 MG: 2 INJECTION INTRAMUSCULAR; INTRAVENOUS at 22:08

## 2020-08-04 RX ADMIN — FENTANYL CITRATE 50 MCG: 50 INJECTION INTRAMUSCULAR; INTRAVENOUS at 00:44

## 2020-08-04 RX ADMIN — POLYETHYLENE GLYCOL 3350 1 PACKET: 17 POWDER, FOR SOLUTION ORAL at 07:59

## 2020-08-04 RX ADMIN — FENTANYL CITRATE 50 MCG: 50 INJECTION INTRAMUSCULAR; INTRAVENOUS at 04:31

## 2020-08-04 RX ADMIN — POTASSIUM PHOSPHATE, MONOBASIC AND POTASSIUM PHOSPHATE, DIBASIC 15 MMOL: 224; 236 INJECTION, SOLUTION, CONCENTRATE INTRAVENOUS at 09:43

## 2020-08-04 RX ADMIN — PRASUGREL 10 MG: 10 TABLET, FILM COATED ORAL at 04:31

## 2020-08-04 RX ADMIN — ENOXAPARIN SODIUM 40 MG: 40 INJECTION SUBCUTANEOUS at 09:43

## 2020-08-04 RX ADMIN — HYDROCODONE BITARTRATE AND ACETAMINOPHEN 1 TABLET: 10; 325 TABLET ORAL at 23:06

## 2020-08-04 RX ADMIN — MORPHINE SULFATE 15 MG: 15 TABLET ORAL at 14:00

## 2020-08-04 RX ADMIN — DOCUSATE SODIUM 50 MG AND SENNOSIDES 8.6 MG 2 TABLET: 8.6; 5 TABLET, FILM COATED ORAL at 04:31

## 2020-08-04 RX ADMIN — ATORVASTATIN CALCIUM 80 MG: 80 TABLET, FILM COATED ORAL at 19:59

## 2020-08-04 RX ADMIN — DEXMEDETOMIDINE HYDROCHLORIDE 1.5 MCG/KG/HR: 100 INJECTION, SOLUTION INTRAVENOUS at 07:15

## 2020-08-04 RX ADMIN — MAGNESIUM SULFATE 2 G: 2 INJECTION INTRAVENOUS at 09:42

## 2020-08-04 RX ADMIN — DEXMEDETOMIDINE HYDROCHLORIDE 1.5 MCG/KG/HR: 100 INJECTION, SOLUTION INTRAVENOUS at 02:19

## 2020-08-04 RX ADMIN — TELMISARTAN 80 MG: 80 TABLET ORAL at 23:36

## 2020-08-04 RX ADMIN — TIZANIDINE 4 MG: 4 TABLET ORAL at 13:54

## 2020-08-04 RX ADMIN — TRAZODONE HYDROCHLORIDE 100 MG: 50 TABLET ORAL at 19:59

## 2020-08-04 RX ADMIN — MORPHINE SULFATE 15 MG: 15 TABLET ORAL at 21:14

## 2020-08-04 RX ADMIN — FENTANYL CITRATE 50 MCG: 50 INJECTION INTRAMUSCULAR; INTRAVENOUS at 19:37

## 2020-08-04 RX ADMIN — DEXMEDETOMIDINE HYDROCHLORIDE 1.5 MCG/KG/HR: 100 INJECTION, SOLUTION INTRAVENOUS at 08:40

## 2020-08-04 RX ADMIN — TIZANIDINE 4 MG: 4 TABLET ORAL at 17:42

## 2020-08-04 RX ADMIN — CARVEDILOL 6.25 MG: 6.25 TABLET, FILM COATED ORAL at 17:30

## 2020-08-04 RX ADMIN — TIZANIDINE 4 MG: 4 TABLET ORAL at 19:59

## 2020-08-04 ASSESSMENT — ENCOUNTER SYMPTOMS
SHORTNESS OF BREATH: 0
FEVER: 0
PALPITATIONS: 0
ABDOMINAL PAIN: 0
VOMITING: 0
COUGH: 0
CHILLS: 0

## 2020-08-04 NOTE — PROGRESS NOTES
Discussed plan of care in interdisciplinary rounds. Pt in 3 point restraints and on precedex, will titrate as tolerated. VSS. CBI light pink, will D/C when clear. TF at goal.

## 2020-08-04 NOTE — PROGRESS NOTES
Reason for consultation /admission : Inf STEMI    Sedated on high dose Dex  Now also on MS contin  BP has been low sometimes in the 80s systolic  Occ bradycardic as well  No cardiac complaints    ECHO yesterday showed NL EF and no LV thrombus  Off IV heparin now    Review of systems; sedated, difficult to obtain    General: No fever, chills,  HENT: No sore throat  Heart: No palpitation, no leg swelling  Lung: No productive cough, no hemoptysis  Abdomen: No abdominal pain, no nausea vomiting diarrhea   : No dysuria, no hematuria  Musculoskeletal:  back pain better  Hematology: No easy bruising  Skin: No rash or itching  Neurological: No headache, no new focal weakness or numbness  Psychological: Denies depression, anxiety or insomnia  All other review of systems are negative      Temp:  [36.1 °C (97 °F)-36.7 °C (98 °F)] 36.1 °C (97 °F)  Pulse:  [48-69] 58  Resp:  [12-25] 15  BP: ()/(49-87) 98/50  SpO2:  [96 %-100 %] 99 %  GENERAL not in acute distress, not dyspnic at rest  Head atraumatic, normocephalic  Eyes EOMI  ENT neck supple, no JVD, no carotid bruits or thyromegaly  Lung good expansion, distant sound, no rales or wheezing  Heart RRR, bradycardic no murmur,   no gallop or rub  Abd soft, no tenderness, mass or bruits  Ext no edema  Skin no ecchymosis or petechiae  Musculoskeletal no deformity  Neuro grossly intact  Psych sedated    Monitor reviewed by me showed no significant ventricular or atrial dysrhythmias.    Labs: Cr 0.67, K+ 3.9  Hb down to 9, plt 151    Assessment and plans     1. Inferior STEMI D#4 s/p RCA stent with acute LV dysfunction  LV function has normalized  BP somewhat low but sedated and on higher dose carvedilol  Will d/c telmisartan for now and reduce carvedilol dose to home dose  Cont DAPT, statin     2. History of HTN, BP has been low lately likely due to heavy sedation  As above    3. Anemia, acute?  Off IV heparin since yesterday    4.Chronic low back pain  Per primary    Will  follow    Please note that this dictation was created using voice recognition software. I have worked with consultants from the vendor as well as technical experts from [facility] to optimize the interface. I have made every reasonable attempt to correct obvious errors, but I expect that there are errors of grammar and possibly content I did not discover before finalizing the note

## 2020-08-04 NOTE — ASSESSMENT & PLAN NOTE
Due to flash pulmonary edema post PCI  Had required intubation  Continues to improve  Weaned down to 2L  following

## 2020-08-04 NOTE — ASSESSMENT & PLAN NOTE
due to patient traumatically removing White catheter   apixaban held as a result - now restarting  Resolving  following

## 2020-08-04 NOTE — PROGRESS NOTES
Received report and assumed pt care. Pt is in 3 point restraints (bilateral wrist and left ankle). Pt combative overnight. Pt currently resting. VSS. 5L oxymask. CBI in place.

## 2020-08-04 NOTE — PROGRESS NOTES
[employer] OFFICER ICU TRANSFER-OUT ACCEPTANCE NOTE:    - This is a 60 y.o. male with NSTEMI s/p PCI 8/1, PEA arrest, LV thrombus, and agitate delirium. Now off precedex. Has keith trauma on CBI. Patient ready to be transferred out of the ICU. Transfer of service to hospitalists requested by intensivist Dr. Godwin.  - Will assign to Dr. Sears. Attending physician of record updated.   - Please call attending hospitalist for cross-coverage issues.

## 2020-08-04 NOTE — THERAPY
PT cardiac rehab consult received. Per RN, pt is very combative today. Asked to defer PT cardiac rehab eval today. Will reattempt as able and appropriate. Thanks    Susana Tavares, PT, DPT Phone [phone]

## 2020-08-04 NOTE — PROGRESS NOTES
Discussed pt status with Dr. Godwin. Pt is alert and oriented, pt it pleasant. Off dex since 1100. Bedside swallow completed. Okay to remove cor track and order diet per Dr. Godwin. Restraints off and mobility completed.

## 2020-08-04 NOTE — PROGRESS NOTES
Critical Care Progress Note    Date of admission  8/1/2020    Chief Complaint  60 y.o. male  PMHx Chronic pain syndrome, HTN, DLD, TOB use, AMPARO and prediabetes admitted 8/1/2020 with NSTEMI, intubated in cath lab for agitation and hypoxemia.  Stent to RCA/PDA in cath lab.  Transfer to ICU on vent very hypertensive.    Hospital Course   8/1 3-4 min PEA arrest from hypotension after he cam back from cath lab - sedation? however repeat ECHO w/ LV thrombus and anterior wall now down -> to cath lab again, no new CAD, IABP placed, code chill initiated after neg CT head  8/3 - extubated yesterday, remains on dexmedetomidine, confused intermittently agitated.  5 L oxygen.  Continue heparin drip for LV thrombus and DAPT.  He pulled his own White out and had traumatic hematuria and started on CBI, monitor need for urology evaluation and follow-up hemoglobin.  Heparin held transiently with significant hematuria.  Resumed narcotics as he had been on MS Contin and Norco at home.   8/4  -agitation and confusion now resolved.  Dexmedetomidine infusion at 1.5 but titrating down today.      Interval Problem Update  Reviewed last 24 hour events:  Extubated 8/2 and IABP out  Awake, follows, oriented; aggressvie and verbally abusive  On chronic narcotic therapy and short acting morphine added 8/3  fentany prn  Dex gtt 1.5  SR/SB  SBP 90 - 100  ruben TF  I/O = 2.8/2.8  CBI ongoing; clearing, hemoglobin dropped 12.2-9.8 but stable  5 lpm oxy  Heparin gtt off; no thrombus on repeat echo  DAPT  No abx  Replace K, Mg, Phos  Start VTE ppx with lovenox        Review of Systems  Review of Systems   Unable to perform ROS: Acuity of condition        Vital Signs for last 24 hours   Temp:  [36.1 °C (97 °F)-36.7 °C (98 °F)] 36.1 °C (97 °F)  Pulse:  [48-58] 58  Resp:  [12-25] 15  BP: ()/(49-81) 98/50  SpO2:  [96 %-100 %] 99 %    Hemodynamic parameters for last 24 hours       Respiratory Information for the last 24 hours       Physical Exam    Physical Exam  Vitals signs and nursing note reviewed.   Constitutional:       Comments: Now calm and cooperative   HENT:      Head: Normocephalic and atraumatic.      Mouth/Throat:      Mouth: Mucous membranes are moist.   Neck:      Musculoskeletal: Neck supple. No neck rigidity.   Cardiovascular:      Rate and Rhythm: Normal rate and regular rhythm.      Pulses: Normal pulses.   Pulmonary:      Effort: Pulmonary effort is normal. No respiratory distress.   Abdominal:      General: There is no distension.      Palpations: Abdomen is soft.   Musculoskeletal:         General: No swelling or deformity.   Skin:     General: Skin is warm.      Capillary Refill: Capillary refill takes less than 2 seconds.   Neurological:      Cranial Nerves: No cranial nerve deficit.      Comments: More calm and cooperative today, titrating down dexmedetomidine, moves all extremities symmetrically with no focal deficits and answers questions appropriately       Medications  Current Facility-Administered Medications   Medication Dose Route Frequency Provider Last Rate Last Dose   • carvedilol (COREG) tablet 6.25 mg  6.25 mg Enteral Tube BID WITH MEALS Fernando Martinez M.D.       • potassium phosphate 15 mmol in D5W 250 mL ivpb  15 mmol Intravenous Once Jevon Godwin M.D.       • magnesium sulfate IVPB premix 2 g  2 g Intravenous Once Jevon Godwin M.D.       • morphine (MS IR) tablet 15 mg  15 mg Enteral Tube Q8HRS Jevon Godwin M.D.   15 mg at 08/04/20 0431   • fentaNYL (SUBLIMAZE) injection  mcg   mcg Intravenous Q HOUR PRN Jevon Gowdin M.D.   50 mcg at 08/04/20 0431   • norepinephrine (Levophed) infusion 8 mg/250 mL (premix)  0-30 mcg/min Intravenous Continuous Reed Mesa M.D.   Stopped at 08/02/20 1900   • haloperidol lactate (HALDOL) injection 2-5 mg  2-5 mg Intravenous Q4HRS PRN Lance Connelly Jr., D.O.   5 mg at 08/03/20 0801   • Pharmacy Consult: Enteral tube insertion - review meds/change  route/product selection  1 Each Other PHARMACY TO DOSE Lance Connelly Jr., D.O.       • ondansetron (ZOFRAN) syringe/vial injection 4 mg  4 mg Intravenous Q4HRS PRN Meño Red M.D.       • promethazine (PHENERGAN) suppository 12.5-25 mg  12.5-25 mg Rectal Q4HRS PRN Meño Red M.D.       • prochlorperazine (COMPAZINE) injection 5-10 mg  5-10 mg Intravenous Q4HRS PRN Meño Red M.D.       • labetalol (NORMODYNE/TRANDATE) injection 10 mg  10 mg Intravenous Q4HRS PRN Meño Red M.D.   10 mg at 08/01/20 0924   • nitroglycerin (NITROSTAT) tablet 0.4 mg  0.4 mg Sublingual Q5 MIN PRN Meño Red M.D.       • NS infusion   Intravenous Continuous Reed Mesa M.D. 10 mL/hr at 08/01/20 1024     • niCARdipine (CARDENE) 25 mg in  mL Infusion  0-15 mg/hr Intravenous Continuous Chilo Crowley M.D.   Stopped at 08/01/20 0900   • Respiratory Therapy Consult   Nebulization Continuous RT Reed Mesa M.D.       • ipratropium-albuterol (DUONEB) nebulizer solution  3 mL Nebulization Q2HRS PRN (RT) Reed Mesa M.D.       • MD Alert...ICU Electrolyte Replacement per Pharmacy   Other PHARMACY TO DOSE Reed Mesa M.D.       • enalaprilat (VASOTEC) injection 1.25 mg  1.25 mg Intravenous Q6HRS PRN Reed Mesa M.D.       • aspirin (ASA) chewable tab 81 mg  81 mg Enteral Tube DAILY Geraldo Montana, A.P.N.   81 mg at 08/04/20 0431   • prasugrel (EFFIENT) tablet 10 mg  10 mg Enteral Tube DAILY Geraldo Montana, A.P.N.   10 mg at 08/04/20 0431   • ondansetron (ZOFRAN ODT) dispertab 4 mg  4 mg Enteral Tube Q4HRS PRN Reed Mesa M.D.       • traZODone (DESYREL) tablet 100 mg  100 mg Enteral Tube QHS Reed Mesa M.D.   100 mg at 08/03/20 2136   • tizanidine (ZANAFLEX) tablet 4 mg  4 mg Per NG Tube 4X/DAY Reed Mesa M.D.   4 mg at 08/04/20 0759   • atorvastatin (LIPITOR) tablet 80 mg  80 mg Enteral Tube Nightly Reed Mesa M.D.   80 mg at 08/03/20  2136   • acetaminophen (TYLENOL) tablet 650 mg  650 mg Enteral Tube Q6HRS PRN Reed Mesa M.D.   650 mg at 08/02/20 2154   • dexmedetomidine (PRECEDEX) 400 mcg/100mL NS premix infusion  0.1-1.5 mcg/kg/hr Intravenous Continuous Reed Mesa M.D. 39 mL/hr at 08/04/20 0840 1.5 mcg/kg/hr at 08/04/20 0840   • senna-docusate (PERICOLACE or SENOKOT S) 8.6-50 MG per tablet 2 Tab  2 Tab Enteral Tube BID Reed Mesa M.D.   2 Tab at 08/04/20 0431    And   • polyethylene glycol/lytes (MIRALAX) PACKET 1 Packet  1 Packet Enteral Tube QDAY PRN Reed Mesa M.D.   1 Packet at 08/04/20 0759    And   • magnesium hydroxide (MILK OF MAGNESIA) suspension 30 mL  30 mL Enteral Tube QDAY PRN Reed Mesa M.D.        And   • bisacodyl (DULCOLAX) suppository 10 mg  10 mg Rectal QDAY PRN Reed Mesa M.D.       • promethazine (PHENERGAN) tablet 12.5-25 mg  12.5-25 mg Enteral Tube Q4HRS PRN Reed Mesa M.D.           Fluids    Intake/Output Summary (Last 24 hours) at 8/4/2020 0900  Last data filed at 8/4/2020 0600  Gross per 24 hour   Intake 2786.85 ml   Output 2575 ml   Net 211.85 ml       Laboratory  Recent Labs     08/01/20  2220 08/02/20  0230 08/02/20  0531   ISTATAPH 7.474 7.495 7.415   ISTATAPCO2 22.4* 22.0* 25.3*   ISTATAPO2 140* 111* 90*   ISTATATCO2 17* 18* 17*   VPASEEH8IQW 99 99 97   ISTATARTHCO3 16.5* 16.9* 16.2*   ISTATARTBE -5* -4 -7*   ISTATTEMP 34.7 C 36.5 C 35.0 C   ISTATFIO2 80 60 50   ISTATSPEC Arterial Arterial Arterial   ISTATAPHTC 7.509* 7.502* 7.444   YSFQRFHT1MR 127* 108* 79         Recent Labs     08/02/20  0500 08/02/20  0850 08/03/20  0345 08/03/20  1545 08/04/20  0220   SODIUM 133* 133* 133* 136 136   POTASSIUM 4.1 4.0 3.7 3.8 3.9   CHLORIDE 99 100 100 103 103   CO2 17* 18* 21 22 23   BUN 12 12 12 9 9   CREATININE 0.66 0.67 0.76 0.71 0.67   MAGNESIUM 2.7* 2.4 2.0  --  1.8   PHOSPHORUS 2.4*  --  1.9*  --  2.2*   CALCIUM 9.0 8.9 8.7 7.9* 8.4*     Recent Labs      08/03/20  0345 08/03/20  1545 08/04/20  0220   ALTSGPT 78*  --   --    ASTSGOT 52*  --   --    ALKPHOSPHAT 61  --   --    TBILIRUBIN 0.7  --   --    GLUCOSE 116* 144* 132*     Recent Labs     08/03/20  0345 08/03/20  1545 08/04/20  0220   WBC 8.1 6.5 7.4   NEUTSPOLYS 73.60* 71.60 70.30   LYMPHOCYTES 15.80* 16.70* 14.70*   MONOCYTES 9.20 9.10 11.40   EOSINOPHILS 0.90 2.00 2.80   BASOPHILS 0.40 0.30 0.30   ASTSGOT 52*  --   --    ALTSGPT 78*  --   --    ALKPHOSPHAT 61  --   --    TBILIRUBIN 0.7  --   --      Recent Labs     08/02/20  0850 08/02/20  1549 08/02/20  2200 08/03/20 0345 08/03/20  1545 08/04/20  0220   RBC 4.31*  --   --  3.93* 3.16* 3.03*   HEMOGLOBIN 13.3*  --   --  12.2* 9.8* 9.4*   HEMATOCRIT 38.4*  --   --  36.2* 29.3* 28.0*   PLATELETCT 195  --   --  164 156* 151*   PROTHROMBTM 14.5 14.3 14.5  --   --   --    APTT 96.7* 22.8* 73.3* 78.8*  --   --    INR 1.10 1.08 1.10  --   --   --        Imaging  X-Ray:  I have personally reviewed the images and compared with prior images.    Assessment/Plan  * Acute respiratory failure with hypoxia (HCC)  Assessment & Plan  Intubated in Cath Lab, liberated 8/2  Continue titrated oxygen, RT protocols, monitor need for reintubation with agitated delirium/altered mentation    Left ventricular thrombus with acute MI (HCC)  Assessment & Plan  New left ventricular thrombus identified via echocardiogram post initial catheterization and subsequent brief PEA arrest  IV heparin drip now discontinued  Follow-up echocardiogram showed improved EF and LV thrombus no longer visualized    Hypotension  Assessment & Plan  Improved    Cardiac arrest (HCC)  Assessment & Plan  PEA arrest secondary to sedation/NSTEMI, query other etiology  ROSC after less than 4 minutes CPR and 1 dose of epinephrine and initiating norepinephrine drip  Echocardiogram after event revealed the anterior wall hypokinesis  Follow-up LH catheterization revealed open coronary arteries but low blood pressure and  poor aortic flow, IABP placed, removed  Heparin drip for LV thrombus.    EF improved on follow-up echo, LV thrombus no longer visualized, heparin stopped    Hypertensive emergency- (present on admission)  Assessment & Plan  Continue BP management    NSTEMI (non-ST elevated myocardial infarction) (HCC)- (present on admission)  Assessment & Plan  Status post PCI 8/1   DAPT  Statin/beta-blocker/ACE  Monitor for cardiac dysrhythmias, some history of ventricular ectopy/atrial flutter?  Currently in sinus tachycardia with frequent PVCs  Optimize electrolytes  EF 25%  Complicated by LV apical thrombus  Follow-up echocardiogram shows improved EF and resolution of LV thrombus    Hypertensive disease  Assessment & Plan  Resume home regimen is clinically appropriate  Post MI patient needs to be on ACE/beta-blocker per cardiology    Gross hematuria  Assessment & Plan  Secondary to White trauma with severe agitation  CBI initiated 8/4  Some ongoing hematuria and clots but significantly improving  Continue CBI 1 further day then discontinue if continues to clear  Continue antiplatelet therapy, heparin infusion discontinued  Urology consultation if not clearing as expected    Memory difficulties  Assessment & Plan       BPH (benign prostatic hyperplasia)  Assessment & Plan  Unknown if symptomatic  Resume Proscar and Flomax when clinically appropriate    Tobacco use  Assessment & Plan       Chronic back pain  Assessment & Plan  Patient with multiple prior surgeries  Patient with nerve stimulator  Home medications include morphine/hydrocodone, Desyrel, Effexor and Zanaflex as clinically appropriate  Mental status/agitation significantly improved with initiation of scheduled morphine    History of prediabetes  Assessment & Plan  Glycemic control    Dyslipidemia  Assessment & Plan  statin     Updated plan:  Continue titrated dexmedetomidine infusion, CBI, medications reviewed in detail.    VTE:  Heparin  Ulcer: H2 Antagonist  Lines:  Central Line  Ongoing indication addressed, Arterial Line  Ongoing indication addressed and White Catheter  Ongoing indication addressed    I have performed a physical exam and reviewed and updated ROS and Plan today (8/4/2020). In review of yesterday's note (8/3/2020), there are no changes except as documented above.     Discussed patient condition and risk of morbidity and/or mortality with Family, RN, RT, Pharmacy, Charge nurse / hot rounds and cardiology     The patient remains critically ill.  Critical care time = 33 minutes in directly providing and coordinating critical care and extensive data review.  No time overlap and excludes procedures.

## 2020-08-04 NOTE — CARE PLAN
Problem: Pain Management  Goal: Pain level will decrease to patient's comfort goal  Outcome: PROGRESSING AS EXPECTED  Pt on home MS contin dose, tolerating well, denies pain     Problem: Bowel/Gastric:  Goal: Will not experience complications related to bowel motility  Outcome: PROGRESSING SLOWER THAN EXPECTED  Miralax given for constipation, pt now having loose stool, will hold stool softener

## 2020-08-04 NOTE — ASSESSMENT & PLAN NOTE
Initially noted after PEA arrest  Repeat echocardiogram on 8/3 with no further thrombus  Heparin drip was subsequently discontinued  Cardiology following

## 2020-08-04 NOTE — PROGRESS NOTES
[facility] Medicine Daily Progress Note    Date of Service  8/4/2020    Chief Complaint  60 y.o. male admitted 8/1/2020 with chest pain    Hospital Course    60-year-old male with past medical history of hypertension, dyslipidemia presenting with chest pain.  Found to have a systolic blood pressure of 230 on admission with a slightly elevated troponin.  He was admitted for NSTEMI and underwent cardiac cath requiring PCI of RCA and PCI of posterior lateral branch.  He subsequently developed acute hypoxic respiratory failure with flash pulmonary edema following PCI requiring intubation and was transferred to ICU.  Patient had PEA cardiac arrest however obtained Rask within 2 cycles of CPR.  Echocardiogram showed akinetic apex with new LV thrombus.  Patient was again taken to Cath Lab post cardiac arrest and felt to be in cardiogenic shock likely due to stress cardiomyopathy and had successful placement of IABP.  Cardiogenic shock resolved by 8/2 and IABP was removed.  Repeat echocardiogram on 8/3 showed no LV thrombus so his IV heparin was discontinued.  Patient did have agitation while in ICU temporarily requiring Precedex.      Interval Problem Update  Weaned off of Precedex this morning  Having some intermittent chest pain  Denies SOB    Consultants/Specialty  Critical care  Cardiology    Code Status  Full code    Disposition  TBD    Review of Systems  Review of Systems   Constitutional: Negative for chills and fever.   Respiratory: Negative for cough and shortness of breath.    Cardiovascular: Positive for chest pain. Negative for palpitations.   Gastrointestinal: Negative for abdominal pain and vomiting.   Genitourinary: Negative for dysuria and urgency.   All other systems reviewed and are negative.       Physical Exam  Temp:  [36.1 °C (97 °F)-36.4 °C (97.6 °F)] 36.2 °C (97.1 °F)  Pulse:  [48-69] 69  Resp:  [14-25] 17  BP: ()/(49-69) 95/52  SpO2:  [98 %-100 %] 98 %    Physical Exam  Vitals signs and nursing  note reviewed.   Constitutional:       Appearance: Normal appearance.   Cardiovascular:      Rate and Rhythm: Normal rate and regular rhythm.      Heart sounds: No murmur.   Pulmonary:      Effort: Pulmonary effort is normal. No respiratory distress.      Breath sounds: Normal breath sounds.   Neurological:      General: No focal deficit present.      Mental Status: He is alert and oriented to person, place, and time.         Fluids    Intake/Output Summary (Last 24 hours) at 8/4/2020 1549  Last data filed at 8/4/2020 1400  Gross per 24 hour   Intake 3563.06 ml   Output 1275 ml   Net 2288.06 ml       Laboratory  Recent Labs     08/03/20  0345 08/03/20  1545 08/04/20  0220   WBC 8.1 6.5 7.4   RBC 3.93* 3.16* 3.03*   HEMOGLOBIN 12.2* 9.8* 9.4*   HEMATOCRIT 36.2* 29.3* 28.0*   MCV 92.1 92.7 92.4   MCH 31.0 31.0 31.0   MCHC 33.7 33.4* 33.6*   RDW 41.2 41.5 41.1   PLATELETCT 164 156* 151*   MPV 11.9 12.1 12.2     Recent Labs     08/03/20  0345 08/03/20  1545 08/04/20  0220   SODIUM 133* 136 136   POTASSIUM 3.7 3.8 3.9   CHLORIDE 100 103 103   CO2 21 22 23   GLUCOSE 116* 144* 132*   BUN 12 9 9   CREATININE 0.76 0.71 0.67   CALCIUM 8.7 7.9* 8.4*     Recent Labs     08/02/20  0850 08/02/20  1549 08/02/20  2200 08/03/20  0345   APTT 96.7* 22.8* 73.3* 78.8*   INR 1.10 1.08 1.10  --                Imaging  DX-CHEST-PORTABLE (1 VIEW)   Final Result         1.  No acute cardiopulmonary disease.   2.  Atherosclerosis      EC-ECHOCARDIOGRAM LTD W/ CONT   Final Result      DX-CHEST-PORTABLE (1 VIEW)   Final Result         1.  Mild pulmonary edema and/or infiltrates.   2.  Cardiomegaly      DX-CHEST-PORTABLE (1 VIEW)   Final Result         1. No significant interval change.      DX-ABDOMEN FOR TUBE PLACEMENT   Final Result      Enteric tubes project over the stomach.      Calcific densities projecting over the right kidney likely represent renal calculi.      CT-HEAD W/O   Final Result      No acute intracranial abnormality is  identified.      Paranasal sinus disease.         EC-ECHOCARDIOGRAM COMPLETE W/ CONT   Final Result      DX-CHEST-LIMITED (1 VIEW)   Final Result      Right central line projects over the SVC. No pneumothorax.      Interstitial prominence likely represents interstitial edema.      Right basilar opacity may represent atelectasis or consolidation.      Atherosclerotic plaque.            DX-CHEST-PORTABLE (1 VIEW)   Final Result      Right basilar opacity likely represents atelectasis.      Endotracheal tube projects at the level of the clavicular heads.      Interstitial opacities likely present interstitial edema or pneumonitis.      Atherosclerotic plaque.         CL-LEFT HEART CATHETERIZATION WITH POSSIBLE INTERVENTION    (Results Pending)   CL-LEFT HEART CATHETERIZATION WITH POSSIBLE INTERVENTION    (Results Pending)        Assessment/Plan  * Acute respiratory failure with hypoxia ([facility])  Assessment & Plan  Due to flash pulmonary edema post PCI  Had required intubation  Improving, now down to 5 L    Left ventricular thrombus with acute MI ([facility])  Assessment & Plan  Initially noted after PEA arrest  Repeat echocardiogram on 8/3 with no further thrombus  Heparin drip was subsequently discontinued    Cardiac arrest ([facility])  Assessment & Plan  PEA arrest after central line placement  Obtained ROSC within 2 CPR cycles    Hypertensive emergency- (present on admission)  Assessment & Plan  Resolved at outside facility  Continue home medications  IV labetalol as needed    NSTEMI (non-ST elevated myocardial infarction) ([facility])- (present on admission)  Assessment & Plan  Status post cardiac cath on 8/1 with PCI to RCA and posterior lateral branch    Gross hematuria  Assessment & Plan  due to patient traumatically removing White catheter while on heparin drip    Class 2 severe obesity due to excess calories with serious comorbidity in adult ([facility])  Assessment & Plan  Outpatient nutrition referral       VTE prophylaxis: Lovenox

## 2020-08-05 LAB
ANION GAP SERPL CALC-SCNC: 13 MMOL/L (ref 7–16)
BASOPHILS # BLD AUTO: 0.2 % (ref 0–1.8)
BASOPHILS # BLD: 0.02 K/UL (ref 0–0.12)
BUN SERPL-MCNC: 8 MG/DL (ref 8–22)
CALCIUM SERPL-MCNC: 9.1 MG/DL (ref 8.5–10.5)
CHLORIDE SERPL-SCNC: 102 MMOL/L (ref 96–112)
CO2 SERPL-SCNC: 23 MMOL/L (ref 20–33)
CREAT SERPL-MCNC: 0.71 MG/DL (ref 0.5–1.4)
EOSINOPHIL # BLD AUTO: 0.02 K/UL (ref 0–0.51)
EOSINOPHIL NFR BLD: 0.2 % (ref 0–6.9)
ERYTHROCYTE [DISTWIDTH] IN BLOOD BY AUTOMATED COUNT: 40.8 FL (ref 35.9–50)
GLUCOSE SERPL-MCNC: 115 MG/DL (ref 65–99)
HCT VFR BLD AUTO: 29.6 % (ref 42–52)
HGB BLD-MCNC: 10 G/DL (ref 14–18)
IMM GRANULOCYTES # BLD AUTO: 0.03 K/UL (ref 0–0.11)
IMM GRANULOCYTES NFR BLD AUTO: 0.4 % (ref 0–0.9)
LYMPHOCYTES # BLD AUTO: 0.87 K/UL (ref 1–4.8)
LYMPHOCYTES NFR BLD: 10.6 % (ref 22–41)
MAGNESIUM SERPL-MCNC: 1.9 MG/DL (ref 1.5–2.5)
MCH RBC QN AUTO: 30.9 PG (ref 27–33)
MCHC RBC AUTO-ENTMCNC: 33.8 G/DL (ref 33.7–35.3)
MCV RBC AUTO: 91.4 FL (ref 81.4–97.8)
MONOCYTES # BLD AUTO: 0.72 K/UL (ref 0–0.85)
MONOCYTES NFR BLD AUTO: 8.8 % (ref 0–13.4)
NEUTROPHILS # BLD AUTO: 6.51 K/UL (ref 1.82–7.42)
NEUTROPHILS NFR BLD: 79.8 % (ref 44–72)
NRBC # BLD AUTO: 0 K/UL
NRBC BLD-RTO: 0 /100 WBC
PHOSPHATE SERPL-MCNC: 2.8 MG/DL (ref 2.5–4.5)
PLATELET # BLD AUTO: 196 K/UL (ref 164–446)
PMV BLD AUTO: 11.9 FL (ref 9–12.9)
POTASSIUM SERPL-SCNC: 4 MMOL/L (ref 3.6–5.5)
RBC # BLD AUTO: 3.24 M/UL (ref 4.7–6.1)
SODIUM SERPL-SCNC: 138 MMOL/L (ref 135–145)
WBC # BLD AUTO: 8.2 K/UL (ref 4.8–10.8)

## 2020-08-05 PROCEDURE — A9270 NON-COVERED ITEM OR SERVICE: HCPCS | Performed by: INTERNAL MEDICINE

## 2020-08-05 PROCEDURE — 700102 HCHG RX REV CODE 250 W/ 637 OVERRIDE(OP): Performed by: INTERNAL MEDICINE

## 2020-08-05 PROCEDURE — 51798 US URINE CAPACITY MEASURE: CPT

## 2020-08-05 PROCEDURE — 97161 PT EVAL LOW COMPLEX 20 MIN: CPT

## 2020-08-05 PROCEDURE — 99232 SBSQ HOSP IP/OBS MODERATE 35: CPT | Performed by: INTERNAL MEDICINE

## 2020-08-05 PROCEDURE — 700102 HCHG RX REV CODE 250 W/ 637 OVERRIDE(OP): Performed by: NURSE PRACTITIONER

## 2020-08-05 PROCEDURE — A9270 NON-COVERED ITEM OR SERVICE: HCPCS | Performed by: NURSE PRACTITIONER

## 2020-08-05 PROCEDURE — 99232 SBSQ HOSP IP/OBS MODERATE 35: CPT | Performed by: HOSPITALIST

## 2020-08-05 PROCEDURE — 770020 HCHG ROOM/CARE - TELE (206)

## 2020-08-05 PROCEDURE — 83735 ASSAY OF MAGNESIUM: CPT

## 2020-08-05 PROCEDURE — A9270 NON-COVERED ITEM OR SERVICE: HCPCS | Performed by: HOSPITALIST

## 2020-08-05 PROCEDURE — 700102 HCHG RX REV CODE 250 W/ 637 OVERRIDE(OP): Performed by: HOSPITALIST

## 2020-08-05 PROCEDURE — 84100 ASSAY OF PHOSPHORUS: CPT

## 2020-08-05 PROCEDURE — 700111 HCHG RX REV CODE 636 W/ 250 OVERRIDE (IP): Performed by: INTERNAL MEDICINE

## 2020-08-05 PROCEDURE — 80048 BASIC METABOLIC PNL TOTAL CA: CPT

## 2020-08-05 PROCEDURE — 85025 COMPLETE CBC W/AUTO DIFF WBC: CPT

## 2020-08-05 RX ORDER — TRAZODONE HYDROCHLORIDE 100 MG/1
100 TABLET ORAL
Status: DISCONTINUED | OUTPATIENT
Start: 2020-08-05 | End: 2020-08-13 | Stop reason: HOSPADM

## 2020-08-05 RX ORDER — CARVEDILOL 6.25 MG/1
6.25 TABLET ORAL 2 TIMES DAILY WITH MEALS
Status: DISCONTINUED | OUTPATIENT
Start: 2020-08-05 | End: 2020-08-07

## 2020-08-05 RX ORDER — TIZANIDINE 4 MG/1
4 TABLET ORAL 4 TIMES DAILY
Status: DISCONTINUED | OUTPATIENT
Start: 2020-08-05 | End: 2020-08-08

## 2020-08-05 RX ORDER — PRASUGREL 10 MG/1
10 TABLET, FILM COATED ORAL DAILY
Status: DISCONTINUED | OUTPATIENT
Start: 2020-08-06 | End: 2020-08-07

## 2020-08-05 RX ORDER — AMOXICILLIN 250 MG
2 CAPSULE ORAL 2 TIMES DAILY
Status: DISCONTINUED | OUTPATIENT
Start: 2020-08-05 | End: 2020-08-13 | Stop reason: HOSPADM

## 2020-08-05 RX ORDER — ATORVASTATIN CALCIUM 80 MG/1
80 TABLET, FILM COATED ORAL NIGHTLY
Status: DISCONTINUED | OUTPATIENT
Start: 2020-08-05 | End: 2020-08-13 | Stop reason: HOSPADM

## 2020-08-05 RX ORDER — ASPIRIN 81 MG/1
81 TABLET, CHEWABLE ORAL DAILY
Status: DISCONTINUED | OUTPATIENT
Start: 2020-08-06 | End: 2020-08-11

## 2020-08-05 RX ORDER — ACETAMINOPHEN 325 MG/1
650 TABLET ORAL EVERY 6 HOURS PRN
Status: DISCONTINUED | OUTPATIENT
Start: 2020-08-05 | End: 2020-08-13 | Stop reason: HOSPADM

## 2020-08-05 RX ORDER — PROMETHAZINE HYDROCHLORIDE 25 MG/1
12.5-25 TABLET ORAL EVERY 4 HOURS PRN
Status: DISCONTINUED | OUTPATIENT
Start: 2020-08-05 | End: 2020-08-13 | Stop reason: HOSPADM

## 2020-08-05 RX ORDER — POLYETHYLENE GLYCOL 3350 17 G/17G
1 POWDER, FOR SOLUTION ORAL
Status: DISCONTINUED | OUTPATIENT
Start: 2020-08-05 | End: 2020-08-13 | Stop reason: HOSPADM

## 2020-08-05 RX ORDER — BISACODYL 10 MG
10 SUPPOSITORY, RECTAL RECTAL
Status: DISCONTINUED | OUTPATIENT
Start: 2020-08-05 | End: 2020-08-13 | Stop reason: HOSPADM

## 2020-08-05 RX ORDER — ONDANSETRON 4 MG/1
4 TABLET, ORALLY DISINTEGRATING ORAL EVERY 4 HOURS PRN
Status: DISCONTINUED | OUTPATIENT
Start: 2020-08-05 | End: 2020-08-13 | Stop reason: HOSPADM

## 2020-08-05 RX ADMIN — CARVEDILOL 6.25 MG: 6.25 TABLET, FILM COATED ORAL at 17:49

## 2020-08-05 RX ADMIN — VENLAFAXINE HYDROCHLORIDE 150 MG: 75 CAPSULE, EXTENDED RELEASE ORAL at 17:53

## 2020-08-05 RX ADMIN — ASPIRIN 81 MG 81 MG: 81 TABLET ORAL at 05:26

## 2020-08-05 RX ADMIN — ATORVASTATIN CALCIUM 80 MG: 80 TABLET, FILM COATED ORAL at 19:55

## 2020-08-05 RX ADMIN — FINASTERIDE 5 MG: 5 TABLET, FILM COATED ORAL at 17:51

## 2020-08-05 RX ADMIN — PRASUGREL 10 MG: 10 TABLET, FILM COATED ORAL at 06:00

## 2020-08-05 RX ADMIN — TELMISARTAN 80 MG: 80 TABLET ORAL at 17:54

## 2020-08-05 RX ADMIN — TRAZODONE HYDROCHLORIDE 100 MG: 100 TABLET ORAL at 19:55

## 2020-08-05 RX ADMIN — TIZANIDINE 4 MG: 4 TABLET ORAL at 19:55

## 2020-08-05 RX ADMIN — CARVEDILOL 6.25 MG: 6.25 TABLET, FILM COATED ORAL at 08:47

## 2020-08-05 RX ADMIN — TIZANIDINE 4 MG: 4 TABLET ORAL at 17:54

## 2020-08-05 RX ADMIN — DOCUSATE SODIUM 50 MG AND SENNOSIDES 8.6 MG 2 TABLET: 8.6; 5 TABLET, FILM COATED ORAL at 17:47

## 2020-08-05 RX ADMIN — TIZANIDINE 4 MG: 4 TABLET ORAL at 13:13

## 2020-08-05 RX ADMIN — TIZANIDINE 4 MG: 4 TABLET ORAL at 08:48

## 2020-08-05 RX ADMIN — MORPHINE SULFATE 15 MG: 15 TABLET, FILM COATED, EXTENDED RELEASE ORAL at 05:25

## 2020-08-05 RX ADMIN — MORPHINE SULFATE 15 MG: 15 TABLET, FILM COATED, EXTENDED RELEASE ORAL at 17:51

## 2020-08-05 RX ADMIN — ENOXAPARIN SODIUM 40 MG: 40 INJECTION SUBCUTANEOUS at 05:25

## 2020-08-05 RX ADMIN — TAMSULOSIN HYDROCHLORIDE 0.4 MG: 0.4 CAPSULE ORAL at 17:47

## 2020-08-05 RX ADMIN — MORPHINE SULFATE 15 MG: 15 TABLET, FILM COATED, EXTENDED RELEASE ORAL at 13:13

## 2020-08-05 ASSESSMENT — COGNITIVE AND FUNCTIONAL STATUS - GENERAL
CLIMB 3 TO 5 STEPS WITH RAILING: A LITTLE
STANDING UP FROM CHAIR USING ARMS: A LITTLE
MOVING FROM LYING ON BACK TO SITTING ON SIDE OF FLAT BED: A LITTLE
WALKING IN HOSPITAL ROOM: A LITTLE
TURNING FROM BACK TO SIDE WHILE IN FLAT BAD: A LITTLE
MOBILITY SCORE: 18
SUGGESTED CMS G CODE MODIFIER MOBILITY: CK
MOVING TO AND FROM BED TO CHAIR: A LITTLE

## 2020-08-05 ASSESSMENT — ENCOUNTER SYMPTOMS
FEVER: 0
SHORTNESS OF BREATH: 0
VOMITING: 0
CHILLS: 0
ABDOMINAL PAIN: 0
PALPITATIONS: 0
COUGH: 0

## 2020-08-05 ASSESSMENT — GAIT ASSESSMENTS
DISTANCE (FEET): 125
GAIT LEVEL OF ASSIST: MINIMAL ASSIST
ASSISTIVE DEVICE: FRONT WHEEL WALKER

## 2020-08-05 ASSESSMENT — FIBROSIS 4 INDEX: FIB4 SCORE: 1.8

## 2020-08-05 NOTE — DIETARY
Nutrition Services: TF D/c'd. Pt passed BSE 8/4. Cortrak was removed, cardiac diet ordered. PO intake % of dinner last night. No indication of nutritional problems PTA. RD will see pt for cardiac rehab diet education.

## 2020-08-05 NOTE — PROGRESS NOTES
Hospital Medicine Daily Progress Note    Date of Service  8/5/2020    Chief Complaint  60 y.o. male admitted 8/1/2020 with chest pain    Hospital Course    60-year-old male with past medical history of hypertension, dyslipidemia presenting with chest pain.  Found to have a systolic blood pressure of 230 on admission with a slightly elevated troponin.  He was admitted for NSTEMI and underwent cardiac cath requiring PCI of RCA and PCI of posterior lateral branch.  He subsequently developed acute hypoxic respiratory failure with flash pulmonary edema following PCI requiring intubation and was transferred to ICU.  Patient had PEA cardiac arrest however obtained Rask within 2 cycles of CPR.  Echocardiogram showed akinetic apex with new LV thrombus.  Patient was again taken to Cath Lab post cardiac arrest and felt to be in cardiogenic shock likely due to stress cardiomyopathy and had successful placement of IABP.  Cardiogenic shock resolved by 8/2 and IABP was removed.  Repeat echocardiogram on 8/3 showed no LV thrombus so his IV heparin was discontinued.  Patient did have agitation while in ICU temporarily requiring Precedex.      Interval Problem Update  Pt reportedly pulled out his keith twice last night resulting in hematuria and need for CBI, did again this am - nursing is now following, he did void in urinal recently with a few clots, will follow with bladder scans today  He is blunt behavior inappropriate but axox3  He denies chest pain  Denies sob  Denies dizziness, dysuria, no n/v  Ros otherwise negative    Consultants/Specialty  Critical care  Cardiology    Code Status  Full code    Disposition  TBD    Review of Systems  Review of Systems   Constitutional: Negative for chills and fever.   HENT: Negative for hearing loss.    Respiratory: Negative for cough and shortness of breath.    Cardiovascular: Negative for chest pain and palpitations.   Gastrointestinal: Negative for abdominal pain and vomiting.    Genitourinary: Negative for dysuria and urgency.   All other systems reviewed and are negative.       Physical Exam  Temp:  [36 °C (96.8 °F)-37 °C (98.6 °F)] 37 °C (98.6 °F)  Pulse:  [] 89  Resp:  [13-26] 15  BP: ()/() 155/100  SpO2:  [90 %-100 %] 100 %    Physical Exam  Vitals signs and nursing note reviewed.   Constitutional:       Appearance: Normal appearance.   Cardiovascular:      Rate and Rhythm: Normal rate and regular rhythm.      Heart sounds: No murmur.   Pulmonary:      Effort: Pulmonary effort is normal. No respiratory distress.      Breath sounds: Normal breath sounds.   Neurological:      General: No focal deficit present.      Mental Status: He is alert and oriented to person, place, and time.   Psychiatric:         Mood and Affect: Mood is anxious.         Judgment: Judgment is inappropriate.         Fluids    Intake/Output Summary (Last 24 hours) at 8/5/2020 1457  Last data filed at 8/5/2020 0600  Gross per 24 hour   Intake 311.58 ml   Output 750 ml   Net -438.42 ml       Laboratory  Recent Labs     08/03/20  1545 08/04/20  0220 08/05/20  0400   WBC 6.5 7.4 8.2   RBC 3.16* 3.03* 3.24*   HEMOGLOBIN 9.8* 9.4* 10.0*   HEMATOCRIT 29.3* 28.0* 29.6*   MCV 92.7 92.4 91.4   MCH 31.0 31.0 30.9   MCHC 33.4* 33.6* 33.8   RDW 41.5 41.1 40.8   PLATELETCT 156* 151* 196   MPV 12.1 12.2 11.9     Recent Labs     08/03/20  1545 08/04/20  0220 08/05/20  0400   SODIUM 136 136 138   POTASSIUM 3.8 3.9 4.0   CHLORIDE 103 103 102   CO2 22 23 23   GLUCOSE 144* 132* 115*   BUN 9 9 8   CREATININE 0.71 0.67 0.71   CALCIUM 7.9* 8.4* 9.1     Recent Labs     08/02/20  1549 08/02/20  2200 08/03/20  0345   APTT 22.8* 73.3* 78.8*   INR 1.08 1.10  --                Imaging  DX-CHEST-PORTABLE (1 VIEW)   Final Result         1.  No acute cardiopulmonary disease.   2.  Atherosclerosis      EC-ECHOCARDIOGRAM LTD W/ CONT   Final Result      DX-CHEST-PORTABLE (1 VIEW)   Final Result         1.  Mild pulmonary edema  and/or infiltrates.   2.  Cardiomegaly      DX-CHEST-PORTABLE (1 VIEW)   Final Result         1. No significant interval change.      DX-ABDOMEN FOR TUBE PLACEMENT   Final Result      Enteric tubes project over the stomach.      Calcific densities projecting over the right kidney likely represent renal calculi.      CT-HEAD W/O   Final Result      No acute intracranial abnormality is identified.      Paranasal sinus disease.         EC-ECHOCARDIOGRAM COMPLETE W/ CONT   Final Result      DX-CHEST-LIMITED (1 VIEW)   Final Result      Right central line projects over the SVC. No pneumothorax.      Interstitial prominence likely represents interstitial edema.      Right basilar opacity may represent atelectasis or consolidation.      Atherosclerotic plaque.            DX-CHEST-PORTABLE (1 VIEW)   Final Result      Right basilar opacity likely represents atelectasis.      Endotracheal tube projects at the level of the clavicular heads.      Interstitial opacities likely present interstitial edema or pneumonitis.      Atherosclerotic plaque.         CL-LEFT HEART CATHETERIZATION WITH POSSIBLE INTERVENTION    (Results Pending)   CL-LEFT HEART CATHETERIZATION WITH POSSIBLE INTERVENTION    (Results Pending)        Assessment/Plan  * Acute respiratory failure with hypoxia (HCC)  Assessment & Plan  Due to flash pulmonary edema post PCI  Had required intubation  Improving, now down to 5 L    Left ventricular thrombus with acute MI ([facility])  Assessment & Plan  Initially noted after PEA arrest  Repeat echocardiogram on 8/3 with no further thrombus  Heparin drip was subsequently discontinued    Cardiac arrest ([facility])  Assessment & Plan  PEA arrest after central line placement  Obtained ROSC within 2 CPR cycles    Hypertensive emergency- (present on admission)  Assessment & Plan  Resolved at outside facility  Continue home medications  IV labetalol as needed    NSTEMI (non-ST elevated myocardial infarction) ([facility])- (present on  admission)  Assessment & Plan  Status post cardiac cath on 8/1 with PCI to RCA and posterior lateral branch    Gross hematuria  Assessment & Plan  due to patient traumatically removing White catheter while on heparin drip    Class 2 severe obesity due to excess calories with serious comorbidity in adult (HCC)  Assessment & Plan  Outpatient nutrition referral       VTE prophylaxis: Lovenox

## 2020-08-05 NOTE — PROGRESS NOTES
Called to bedside due to worsening agitation.  Patient had pulled out his White, this is the second time he has done so.  Patient was agitated prior, had been on a Precedex drip which was stopped at 10 this morning.  I did look through his home medications, there are multiple he has not been on, resume home meds, adjust his pain meds, he does have chronic pain is on significant pain meds.  PRN Ativan has also been started.  If no improvement with resuming home pain meds, I will consider Seroquel versus Risperdal to see if this calms him down.  At this time, I do not feel he needs a Precedex drip again.  His blood pressure is currently uncontrolled, significant elevation of his systolic blood pressure, resume home telmisartan.  Continue PRN labetalol and enalapril.  Restart home Flomax and Proscar.  Continue White, currently CBI is now working, bedside RN told to flush frequently and try to get CBI working again.  He did have a bladder scan with less than 300 mL present.  If patient's agitation worsens and he does require a drip I will discuss the case with the intensivist.

## 2020-08-05 NOTE — PROGRESS NOTES
Patient pulled out keith catheter yet again but was able to urinate 300mL worth of extremely bloody, clotted urine via urinal. Paged Dr. Horta to update as well as question if he would like me to reinsert new catheter, awaiting response.     Dr. Horta at b/s approximately 2350, okay with keith out as long as patient continues to urinate using urinal.

## 2020-08-05 NOTE — CARE PLAN
Problem: Communication  Goal: The ability to communicate needs accurately and effectively will improve  Outcome: PROGRESSING AS EXPECTED  Note: Patient extremely hard of hearing, L hearing aide present. Patient orientedx4, educated on use of call light, patient voiced understanding. Patient encouraged to voice any questions at this time for which he had none. Call light in reach, patient resting in bed.      Problem: Bowel/Gastric:  Goal: Normal bowel function is maintained or improved  Outcome: PROGRESSING AS EXPECTED  Note: Per day shift, patient had 6 large, loose bowel movements. Stool softeners held. Bowel sounds normoactivex4.

## 2020-08-05 NOTE — PROGRESS NOTES
Dr. Horta at b/s, made aware of patient's changes. Dr. Horta spoke with patient and plans to adjust patient's pain medication to make patient more comfortable, at this time, he is not very concerned with CBI not draining adequately because patient has only 262mL in bladder upon bladder scan. Instructed to frequently irrigate CBI catheter after patient's pain is more under control.

## 2020-08-05 NOTE — THERAPY
Physical Therapy   Initial Evaluation     Patient Name: Karan Wiley  Age:  60 y.o., Sex:  male  Medical Record #: [id number]  Today's Date: 8/5/2020     Precautions: Fall Risk    Assessment  Patient is 60 y.o. male with a diagnosis of NSTEMI s/p PCI with EF of 60%, with very complex medical course. Pt presents to physical therapy with impairments in balance, gait, and attention to task.  Pt demonstrated fair safety awareness in understanding need to use FWW during gait due to lateral LOB. Tolerated ambulation x125 ft with FWW and Min A, multiple LOB during ambulation requiring assist to correct as pt with delayed balance reactions. Initiated PT cardiac rehab education; however, pt easily distracted and tangential during education. Will need follow up regarding cardiac rehab/exercise concepts prior to DC home.  Recommend SLP cognitive linguistic evaluation (due to above and PEA following cath). PT to follow while in house.    Plan    Recommend Physical Therapy 4 times per week until therapy goals are met for the following treatments:  Equipment, Gait Training, Neuro Re-Education / Balance, Self Care/Home Evaluation, Stair Training, Therapeutic Activities and Therapeutic Exercises    DC Equipment Recommendations: Front-Wheel Walker  Discharge Recommendations:  Pt is not safe to DC home from a PT standpoint at this time. Currently, recommend post-acute placement for additional physical therapy services prior to discharge home. However, will continue to assess with subsequent PT visits       08/05/20 4487   Precautions   Precautions Fall Risk   Vitals   O2 (LPM) 4   O2 Delivery Device Silicone Nasal Cannula   Pain 0 - 10 Group   Therapist Pain Assessment During Activity;Nurse Notified  (no pain reported)   Prior Living Situation   Prior Services Home-Independent   Housing / Facility 1 Story House   Steps Into Home 1   Steps In Home 0   Equipment Owned None   Lives with - Patient's Self Care Capacity Spouse;Adult  Children  (Son)   Comments pt is from [place], [place]. Spouse able to assist upon DC home. Pt is retired  ([employer])   Prior Level of Functional Mobility   Bed Mobility Independent   Transfer Status Independent   Ambulation Independent   Distance Ambulation (Feet)   (community)   Assistive Devices Used None   Stairs Independent   Comments pt reports he walks approx 2 miles, 3 days/wk prior to admission   Cognition    Speech/ Communication Delayed Responses   Level of Consciousness Alert   Attention Impaired   Comments slight safety awareness concerns, some impulsivity noted with OOB mobility. Pt does acknowledge need to use FWW during gait   Active ROM Lower Body    Active ROM Lower Body  WDL   Strength Lower Body   Lower Body Strength  WDL   Balance Assessment   Sitting Balance (Static) Fair   Sitting Balance (Dynamic) Fair   Standing Balance (Static) Fair -   Standing Balance (Dynamic) Poor +   Weight Shift Sitting Fair   Weight Shift Standing Poor   Comments w/ FWW. demonstrated 3-4 lateral LOB during gait requiring PT assist to stabilize   Gait Analysis   Gait Level Of Assist Minimal Assist   Assistive Device Front Wheel Walker   Distance (Feet) 125   # of Times Distance was Traveled 1   Deviation   (increased lateral sway with variable step length)   # of Stairs Climbed 0   Weight Bearing Status No restrictions   Comments LOB appeared to occur most when in distracted environment and with turns.    Bed Mobility    Supine to Sit Supervised   Sit to Supine Supervised   Scooting Supervised   Functional Mobility   Sit to Stand Minimal Assist  (slight posterior LOB upon standing at EOB, pt able to correc)   Bed, Chair, Wheelchair Transfer Minimal Assist  (sits before reaching stable surface)   Patient / Family Goals    Patient / Family Goal #1 to return home   Short Term Goals    Short Term Goal # 1 pt will perform sit <> stand and functional transfers with SPV to improve mobility independence in 6 visits    Short Term Goal # 2 pt will ambulate > 200 ft with LRAD and SPV to access community in 6 visits   Short Term Goal # 3 pt will negotiate 1 step to access home environment with SPV in 6 visits   Short Term Goal # 4 pt will verbalize understanding of cardiac rehab education to PT in 2 visits   Anticipated Discharge Equipment and Recommendations   DC Equipment Recommendations Front-Wheel Walker   Discharge Recommendations Recommend post-acute placement for additional physical therapy services prior to discharge home

## 2020-08-05 NOTE — PROGRESS NOTES
Late entry:    Patient extremely agitated, originally ripped CBI keith catheter in half, in which the catheter was no longer patent and warranted a keith catheter replacement. Shortly after keith was placed, patient removed keith yet again, this time removing keith balloon and all. Upon removal, a large, golf-ball sized clot was excreted from the penis. Dr. Erlin Mccullough, Hospitalist service, was paged and notified that patient was placed in BUE soft wrist restraints. 2mg PRN IV ativan ordered and administered with no relief. Patient became increasingly agitated, shouting and as a result, became tachycardic into the 150s. Dr. Larios notified again, she reported she is going to speak with ICU Intensivist about transfer back to ICU services. In addition, CBI no longer patent and bladder scan showed 262 mL in bladder. ICU doctor notified of keith no longer patent as well.

## 2020-08-05 NOTE — PROGRESS NOTES
Reason for consultation /admission : STEMI    BP is now somewhat elevated after off sedation  Back on telmisartan and carvedilol  Awake and alert  Denies CP or SOB with ambulation    Monitor showed frequent PACs but no complexed vent arrhythmias      Review of systems;    General: No fever, chills, no weakness  HENT: No sore throat, no neck pain  Eyes: No blurred vision or double vision  Heart: No palpitation, no PND or orthopnea, no claudication, no leg swelling  Lung: No productive cough, no hemoptysis  Abdomen: No abdominal pain, no nausea vomiting diarrhea or blood in stool  : No dysuria, no frequency or hesitancy, no hematuria  Musculoskeletal: No myalgia, no back pain, some joint pain  Hematology: No easy bruising  Skin: No rash or itching  Neurological: No headache, no new focal weakness or numbness  Psychological: Denies depression, anxiety or insomnia  All other review of systems are negative      Temp:  [36 °C (96.8 °F)-37 °C (98.6 °F)] 37 °C (98.6 °F)  Pulse:  [] 89  Resp:  [13-26] 15  BP: ()/() 155/100  SpO2:  [90 %-100 %] 100 %  GENERAL not in acute distress, not dyspnic at rest  Head atraumatic, normocephalic  Eyes EOMI  ENT neck supple, no JVD, no carotid bruits or thyromegaly  Lung good expansion, distant sound, no rales or wheezing  Heart RRR, normal rate, no murmur,   no gallop or rub  Abd soft, no tenderness, mass or bruits  Ext no edema  Skin no ecchymosis or petechiae  Musculoskeletal no deformity  Neuro grossly intact    Monitor reviewed by me showed no complexed ventricular or atrial dysrhythmias.    Labs: Cr 0.7, K+ 4      Assessment and plans    1. Inferior STEMI D#5 s/p RCA stent with acute LV dysfunction  LV function has normalized  BP now high off sedation  Agree with placing him back on carvedilol and telmisartan  Cont DAPT, statin     2. History of HTN,   As above     3. Anemia, acute?  stable    4.Chronic low back pain  Per primary    May transfer to  telemetry  Should be able to d/c home tomorrow from our standpoint  Will follow    Please note that this dictation was created using voice recognition software. I have worked with consultants from the vendor as well as technical experts from [facility] to optimize the interface. I have made every reasonable attempt to correct obvious errors, but I expect that there are errors of grammar and possibly content I did not discover before finalizing the note

## 2020-08-06 ENCOUNTER — APPOINTMENT (OUTPATIENT)
Dept: RADIOLOGY | Facility: MEDICAL CENTER | Age: 60
DRG: 270 | End: 2020-08-06
Attending: INTERNAL MEDICINE
Payer: OTHER GOVERNMENT

## 2020-08-06 LAB
ANION GAP SERPL CALC-SCNC: 15 MMOL/L (ref 7–16)
BASOPHILS # BLD AUTO: 0.4 % (ref 0–1.8)
BASOPHILS # BLD: 0.04 K/UL (ref 0–0.12)
BUN SERPL-MCNC: 7 MG/DL (ref 8–22)
CALCIUM SERPL-MCNC: 9.4 MG/DL (ref 8.5–10.5)
CHLORIDE SERPL-SCNC: 101 MMOL/L (ref 96–112)
CO2 SERPL-SCNC: 23 MMOL/L (ref 20–33)
CREAT SERPL-MCNC: 0.73 MG/DL (ref 0.5–1.4)
EOSINOPHIL # BLD AUTO: 0.35 K/UL (ref 0–0.51)
EOSINOPHIL NFR BLD: 3.9 % (ref 0–6.9)
ERYTHROCYTE [DISTWIDTH] IN BLOOD BY AUTOMATED COUNT: 42.7 FL (ref 35.9–50)
GLUCOSE SERPL-MCNC: 114 MG/DL (ref 65–99)
HCT VFR BLD AUTO: 29.7 % (ref 42–52)
HGB BLD-MCNC: 9.9 G/DL (ref 14–18)
IMM GRANULOCYTES # BLD AUTO: 0.03 K/UL (ref 0–0.11)
IMM GRANULOCYTES NFR BLD AUTO: 0.3 % (ref 0–0.9)
LYMPHOCYTES # BLD AUTO: 1.58 K/UL (ref 1–4.8)
LYMPHOCYTES NFR BLD: 17.6 % (ref 22–41)
MCH RBC QN AUTO: 31 PG (ref 27–33)
MCHC RBC AUTO-ENTMCNC: 33.3 G/DL (ref 33.7–35.3)
MCV RBC AUTO: 93.1 FL (ref 81.4–97.8)
MONOCYTES # BLD AUTO: 0.95 K/UL (ref 0–0.85)
MONOCYTES NFR BLD AUTO: 10.6 % (ref 0–13.4)
NEUTROPHILS # BLD AUTO: 6.01 K/UL (ref 1.82–7.42)
NEUTROPHILS NFR BLD: 67.2 % (ref 44–72)
NRBC # BLD AUTO: 0 K/UL
NRBC BLD-RTO: 0 /100 WBC
PLATELET # BLD AUTO: 236 K/UL (ref 164–446)
PMV BLD AUTO: 11.4 FL (ref 9–12.9)
POTASSIUM SERPL-SCNC: 3.6 MMOL/L (ref 3.6–5.5)
RBC # BLD AUTO: 3.19 M/UL (ref 4.7–6.1)
SODIUM SERPL-SCNC: 139 MMOL/L (ref 135–145)
WBC # BLD AUTO: 9 K/UL (ref 4.8–10.8)

## 2020-08-06 PROCEDURE — 700102 HCHG RX REV CODE 250 W/ 637 OVERRIDE(OP): Performed by: INTERNAL MEDICINE

## 2020-08-06 PROCEDURE — A9270 NON-COVERED ITEM OR SERVICE: HCPCS | Performed by: INTERNAL MEDICINE

## 2020-08-06 PROCEDURE — 97166 OT EVAL MOD COMPLEX 45 MIN: CPT

## 2020-08-06 PROCEDURE — 99232 SBSQ HOSP IP/OBS MODERATE 35: CPT | Performed by: HOSPITALIST

## 2020-08-06 PROCEDURE — A9270 NON-COVERED ITEM OR SERVICE: HCPCS | Performed by: HOSPITALIST

## 2020-08-06 PROCEDURE — 700111 HCHG RX REV CODE 636 W/ 250 OVERRIDE (IP): Performed by: INTERNAL MEDICINE

## 2020-08-06 PROCEDURE — 51798 US URINE CAPACITY MEASURE: CPT

## 2020-08-06 PROCEDURE — 80048 BASIC METABOLIC PNL TOTAL CA: CPT

## 2020-08-06 PROCEDURE — 85025 COMPLETE CBC W/AUTO DIFF WBC: CPT

## 2020-08-06 PROCEDURE — 700102 HCHG RX REV CODE 250 W/ 637 OVERRIDE(OP): Performed by: HOSPITALIST

## 2020-08-06 PROCEDURE — 770020 HCHG ROOM/CARE - TELE (206)

## 2020-08-06 PROCEDURE — 99232 SBSQ HOSP IP/OBS MODERATE 35: CPT | Performed by: INTERNAL MEDICINE

## 2020-08-06 RX ADMIN — PRASUGREL 10 MG: 10 TABLET, FILM COATED ORAL at 05:26

## 2020-08-06 RX ADMIN — ATORVASTATIN CALCIUM 80 MG: 80 TABLET, FILM COATED ORAL at 20:05

## 2020-08-06 RX ADMIN — TRAZODONE HYDROCHLORIDE 100 MG: 100 TABLET ORAL at 20:05

## 2020-08-06 RX ADMIN — TIZANIDINE 4 MG: 4 TABLET ORAL at 16:50

## 2020-08-06 RX ADMIN — TIZANIDINE 4 MG: 4 TABLET ORAL at 12:51

## 2020-08-06 RX ADMIN — CARVEDILOL 6.25 MG: 6.25 TABLET, FILM COATED ORAL at 07:29

## 2020-08-06 RX ADMIN — MORPHINE SULFATE 15 MG: 15 TABLET, FILM COATED, EXTENDED RELEASE ORAL at 05:26

## 2020-08-06 RX ADMIN — FINASTERIDE 5 MG: 5 TABLET, FILM COATED ORAL at 16:50

## 2020-08-06 RX ADMIN — TIZANIDINE 4 MG: 4 TABLET ORAL at 20:04

## 2020-08-06 RX ADMIN — ASPIRIN 81 MG 81 MG: 81 TABLET ORAL at 05:26

## 2020-08-06 RX ADMIN — TELMISARTAN 80 MG: 80 TABLET ORAL at 16:51

## 2020-08-06 RX ADMIN — VENLAFAXINE HYDROCHLORIDE 150 MG: 75 CAPSULE, EXTENDED RELEASE ORAL at 16:51

## 2020-08-06 RX ADMIN — MORPHINE SULFATE 15 MG: 15 TABLET, FILM COATED, EXTENDED RELEASE ORAL at 17:00

## 2020-08-06 RX ADMIN — CARVEDILOL 6.25 MG: 6.25 TABLET, FILM COATED ORAL at 16:50

## 2020-08-06 RX ADMIN — ENOXAPARIN SODIUM 40 MG: 40 INJECTION SUBCUTANEOUS at 05:26

## 2020-08-06 RX ADMIN — MORPHINE SULFATE 15 MG: 15 TABLET, FILM COATED, EXTENDED RELEASE ORAL at 13:02

## 2020-08-06 RX ADMIN — TIZANIDINE 4 MG: 4 TABLET ORAL at 07:29

## 2020-08-06 RX ADMIN — TAMSULOSIN HYDROCHLORIDE 0.4 MG: 0.4 CAPSULE ORAL at 16:51

## 2020-08-06 ASSESSMENT — ENCOUNTER SYMPTOMS
FEVER: 0
PALPITATIONS: 0
SHORTNESS OF BREATH: 0
VOMITING: 0
COUGH: 0
ABDOMINAL PAIN: 0
CHILLS: 0

## 2020-08-06 ASSESSMENT — COGNITIVE AND FUNCTIONAL STATUS - GENERAL
DAILY ACTIVITIY SCORE: 17
SUGGESTED CMS G CODE MODIFIER DAILY ACTIVITY: CK
HELP NEEDED FOR BATHING: A LITTLE
PERSONAL GROOMING: A LITTLE
EATING MEALS: A LITTLE
DRESSING REGULAR UPPER BODY CLOTHING: A LITTLE
DRESSING REGULAR LOWER BODY CLOTHING: A LOT
TOILETING: A LITTLE

## 2020-08-06 ASSESSMENT — FIBROSIS 4 INDEX: FIB4 SCORE: 1.5

## 2020-08-06 ASSESSMENT — ACTIVITIES OF DAILY LIVING (ADL): TOILETING: INDEPENDENT

## 2020-08-06 NOTE — PROGRESS NOTES
Received care of pt from NOC RN this am. Pt is A+O x 4, needs some prompts for date. Denies need for pain medication. He is noted to be impulsive, very active in bed, frequently jumping on to his knees, HR elevated. Educated to call for RN and move slowly while in bed, he verbalizes understanding. Reinforcing education throughout the day as necessary. Found red tinged urine in urinal at start of shift, pt has voided twice yellow urine, once w/ some small red clots. Bladder scan ordered to check for retention.

## 2020-08-06 NOTE — DISCHARGE PLANNING
"Anticipated Discharge Disposition: SNF    Action: spoke w/ pt at bedside. Pt [place] w/ hearing aid in. Pt states ok to talk to wife. Explain SNF to wife. [place] choice obtained ([facility] [employer]) and faxed to [facility]    Barriers to Discharge: SNF acceptance    Plan: 1619: pt accepted by [facility] for 2 weeks. Attempted to tiger text Dr. Abdi but status says \"do not disturb.\"  Care Transition Team Assessment  Spoke w/ wife Alesia [phone]. Pt lives w/ wife and son Zurdo [phone]. PCP in [facility]. Independent w/ ADL/IADL's. Wife states pt had hx of falls due to pt trying to get out of bed after taking his meds that may cause drowsiness. Wife states pt stubborn and won't stay in bed after taking his meds. Verified facesheet info w/ wife.    Information Source  Orientation : Oriented x 4  Information Given By: Spouse         Elopement Risk  Legal Hold: No  Ambulatory or Self Mobile in Wheelchair: Yes  Disoriented: No  Psychiatric Symptoms: None  History of Wandering: No  Elopement this Admit: No  Vocalizing Wanting to Leave: No  Displays Behaviors, Body Language Wanting to Leave: No-Not at Risk for Elopement  Elopement Risk: Not at Risk for Elopement    Interdisciplinary Discharge Planning  Primary Care Physician: [facility] in [place]  Lives with - Patient's Self Care Capacity: Spouse, Adult Children  Patient or legal guardian wants to designate a caregiver (see row info): No  Caregiver name: Alesia Emerson Wiley  Caregiver relationship to patient: Wife  Support Systems: Children, Family Member(s), Spouse / Significant Other  Housing / Facility: 1 Story House  Able to Return to Previous ADL's: Future Time w/Therapy  Mobility Issues: No  Prior Services: Home-Independent    Discharge Preparedness  What is your plan after discharge?: Skilled nursing facility  What are your discharge supports?: Child, Spouse  Prior Functional Level: Ambulatory, Independent with Activities of Daily " Living    Functional Assesment  Prior Functional Level: Ambulatory, Independent with Activities of Daily Living    Finances  Financial Barriers to Discharge: No    Vision / Hearing Impairment  Vision Impairment : Yes  Right Eye Vision: Impaired, Wears Glasses  Left Eye Vision: Impaired, Wears Glasses  Hearing Impairment : Yes  Hearing Impairment: Right Ear, Hearing Device Not Available, Left Ear, Hearing Device(s) Available  Does Pt Need Special Equipment for the Hearing Impaired?: No         Advance Directive  Advance Directive?: None    Domestic Abuse  Have you ever been the victim of abuse or violence?: No  Physical Abuse or Sexual Abuse: No  Verbal Abuse or Emotional Abuse: No  Possible Abuse Reported to:: Not Applicable         Discharge Risks or Barriers  Discharge risks or barriers?: No    Anticipated Discharge Information  Discharge Address: [address] [place] [address]  Discharge Contact Phone Number: [phone]

## 2020-08-06 NOTE — CARE PLAN
Problem: Knowledge Deficit  Goal: Knowledge of disease process/condition, treatment plan, diagnostic tests, and medications will improve  Outcome: PROGRESSING AS EXPECTED    Discussed plan of care for today w/ pt this am, he is A+O x 4 with prompts, he verbalizes understanding of his plan of care, no questions. Emotional support given. Reinforcing education as necessary. Rounds done with cardiology today, discussed pt's HR with cardiology       Problem: Pain Management  Goal: Pain level will decrease to patient's comfort goal  Outcome: PROGRESSING AS EXPECTED    Pt denies need for pain medication. Assessing every four hrs for pain per protocol; see doc flowsheets

## 2020-08-06 NOTE — CARE PLAN
Problem: Safety  Goal: Will remain free from injury  Outcome: PROGRESSING AS EXPECTED  Note: Fall precautions in place. Bed in lowest position. Non-skid socks in place. Personal possessions within reach. Mobility sign on door. Bed-alarm on. Call light within reach. Pt educated regarding fall prevention and states understanding.     Problem: Mobility  Goal: Risk for activity intolerance will decrease  Outcome: PROGRESSING AS EXPECTED  Note: PT/OT ordered. Ambulation as tolerated. Mobility encouraged

## 2020-08-06 NOTE — PROGRESS NOTES
Reason for consultation /admission : STEMI, RCA stent    The patient is doing well from cardiac standpoint.   Resting  Per RN HR high at time when agitated  No reported chest pain, palpitation or heart failure type symptoms.  Ambulate with a walker usually    Review of systems;    General: No fever, chills, + weakness  HENT: No sore throat, no neck pain  Eyes: No blurred vision or double vision  Heart: No palpitation, no PND or orthopnea, no claudication, no leg swelling  Lung: No productive cough, no hemoptysis  Abdomen: No abdominal pain, no nausea vomiting diarrhea or blood in stool  : No dysuria, no frequency or hesitancy, no hematuria  Musculoskeletal: + back pain, some joint pain  Hematology: No easy bruising  Skin: No rash or itching  Neurological: No headache,   Psychological: Denies depression, anxiety or insomnia  All other review of systems are negative      Temp:  [36.1 °C (97 °F)-36.9 °C (98.4 °F)] 36.5 °C (97.7 °F)  Pulse:  [] 109  Resp:  [20-27] 20  BP: (122-166)/() 127/80  SpO2:  [93 %-100 %] 94 %  GENERAL not in acute distress, not dyspnic at rest  Head atraumatic, normocephalic  Eyes EOMI  ENT neck supple, no JVD, no carotid bruits or thyromegaly  Lung good expansion, distant sound, no rales or wheezing  Heart RRR, normal rate, no murmur,   no gallop or rub  Abd soft, no tenderness, mass or bruits  Ext no edema  Skin no ecchymosis or petechiae  Musculoskeletal no deformity  Neuro grossly intact  Psych normal mood, normal affect    Monitor reviewed by me showed no significant ventricular or atrial dysrhythmias.    Labs: Cr 0.7, K+ 3.6  Hb 10    Assessment and plans    1. Inferior STEMI D#5 s/p RCA stent with acute LV dysfunction  LV function has normalized  Back on carvedilol and telmisartan  BP and HR in acceptable range  Cont DAPT, statin and current BP meds     2. History of HTN,   As above     3. Anemia, acute blood loss  stable     4.Chronic low back pain  Per primary    May d/c  from our standpoint  Will sign off  Please arrange for f/u in 1-2 weeks    Please note that this dictation was created using voice recognition software. I have worked with consultants from the vendor as well as technical experts from [facility] [employer] to optimize the interface. I have made every reasonable attempt to correct obvious errors, but I expect that there are errors of grammar and possibly content I did not discover before finalizing the note

## 2020-08-06 NOTE — PROGRESS NOTES
Pt resting in bed, eyes closed, resps even, no s/s of distress. Rounds done with Dr. Abdi, Hospitalist. Discussed pt's care with her.

## 2020-08-06 NOTE — PROGRESS NOTES
Hospital Medicine Daily Progress Note    Date of Service  8/6/2020    Chief Complaint  60 y.o. male admitted 8/1/2020 with chest pain    Hospital Course    60-year-old male with past medical history of hypertension, dyslipidemia presenting with chest pain.  Found to have a systolic blood pressure of 230 on admission with a slightly elevated troponin.  He was admitted for NSTEMI and underwent cardiac cath requiring PCI of RCA and PCI of posterior lateral branch.  He subsequently developed acute hypoxic respiratory failure with flash pulmonary edema following PCI requiring intubation and was transferred to ICU.  Patient had PEA cardiac arrest however obtained Rask within 2 cycles of CPR.  Echocardiogram showed akinetic apex with new LV thrombus.  Patient was again taken to Cath Lab post cardiac arrest and felt to be in cardiogenic shock likely due to stress cardiomyopathy and had successful placement of IABP.  Cardiogenic shock resolved by 8/2 and IABP was removed.  Repeat echocardiogram on 8/3 showed no LV thrombus so his IV heparin was discontinued.  Patient did have agitation while in ICU temporarily requiring Precedex.      Interval Problem Update  No significant hematuria today, voiding without difficulty  Hr improved  Behavior odd but currently axox3, pending cognitive eval  Denies pain, no sob  Ros otherwise negative    Consultants/Specialty  Critical care  Cardiology    Code Status  Full code    Disposition  TBD    Review of Systems  Review of Systems   Constitutional: Negative for chills and fever.   HENT: Negative for hearing loss.    Respiratory: Negative for cough and shortness of breath.    Cardiovascular: Negative for chest pain and palpitations.   Gastrointestinal: Negative for abdominal pain and vomiting.   Genitourinary: Negative for dysuria and urgency.   All other systems reviewed and are negative.       Physical Exam  Temp:  [36.1 °C (96.9 °F)-36.5 °C (97.7 °F)] 36.4 °C (97.5 °F)  Pulse:  []  87  Resp:  [20-22] 20  BP: (122-168)/(43-94) 168/91  SpO2:  [92 %-99 %] 97 %    Physical Exam  Vitals signs and nursing note reviewed.   Constitutional:       Appearance: Normal appearance.   Cardiovascular:      Rate and Rhythm: Normal rate and regular rhythm.      Heart sounds: No murmur.   Pulmonary:      Effort: Pulmonary effort is normal. No respiratory distress.      Breath sounds: Normal breath sounds.   Neurological:      General: No focal deficit present.      Mental Status: He is alert and oriented to person, place, and time.   Psychiatric:         Mood and Affect: Mood is not anxious.         Judgment: Judgment is inappropriate.         Fluids    Intake/Output Summary (Last 24 hours) at 8/6/2020 1658  Last data filed at 8/6/2020 1400  Gross per 24 hour   Intake 400 ml   Output 1550 ml   Net -1150 ml       Laboratory  Recent Labs     08/04/20 0220 08/05/20  0400 08/06/20  0330   WBC 7.4 8.2 9.0   RBC 3.03* 3.24* 3.19*   HEMOGLOBIN 9.4* 10.0* 9.9*   HEMATOCRIT 28.0* 29.6* 29.7*   MCV 92.4 91.4 93.1   MCH 31.0 30.9 31.0   MCHC 33.6* 33.8 33.3*   RDW 41.1 40.8 42.7   PLATELETCT 151* 196 236   MPV 12.2 11.9 11.4     Recent Labs     08/04/20 0220 08/05/20  0400 08/06/20  0330   SODIUM 136 138 139   POTASSIUM 3.9 4.0 3.6   CHLORIDE 103 102 101   CO2 23 23 23   GLUCOSE 132* 115* 114*   BUN 9 8 7*   CREATININE 0.67 0.71 0.73   CALCIUM 8.4* 9.1 9.4                   Imaging  DX-CHEST-PORTABLE (1 VIEW)   Final Result         1.  No acute cardiopulmonary disease.   2.  Atherosclerosis      EC-ECHOCARDIOGRAM LTD W/ CONT   Final Result      DX-CHEST-PORTABLE (1 VIEW)   Final Result         1.  Mild pulmonary edema and/or infiltrates.   2.  Cardiomegaly      DX-CHEST-PORTABLE (1 VIEW)   Final Result         1. No significant interval change.      DX-ABDOMEN FOR TUBE PLACEMENT   Final Result      Enteric tubes project over the stomach.      Calcific densities projecting over the right kidney likely represent renal  calculi.      CT-HEAD W/O   Final Result      No acute intracranial abnormality is identified.      Paranasal sinus disease.         EC-ECHOCARDIOGRAM COMPLETE W/ CONT   Final Result      DX-CHEST-LIMITED (1 VIEW)   Final Result      Right central line projects over the SVC. No pneumothorax.      Interstitial prominence likely represents interstitial edema.      Right basilar opacity may represent atelectasis or consolidation.      Atherosclerotic plaque.            DX-CHEST-PORTABLE (1 VIEW)   Final Result      Right basilar opacity likely represents atelectasis.      Endotracheal tube projects at the level of the clavicular heads.      Interstitial opacities likely present interstitial edema or pneumonitis.      Atherosclerotic plaque.         CL-LEFT HEART CATHETERIZATION WITH POSSIBLE INTERVENTION    (Results Pending)   CL-LEFT HEART CATHETERIZATION WITH POSSIBLE INTERVENTION    (Results Pending)        Assessment/Plan  * Acute respiratory failure with hypoxia (HCC)  Assessment & Plan  Due to flash pulmonary edema post PCI  Had required intubation  Continues to improve  Weaned down to 2L  following    Left ventricular thrombus with acute MI ([facility])  Assessment & Plan  Initially noted after PEA arrest  Repeat echocardiogram on 8/3 with no further thrombus  Heparin drip was subsequently discontinued  Cardiology following    Cardiac arrest ([facility])  Assessment & Plan  PEA arrest after central line placement  Obtained ROSC within 2 CPR cycles  Cardiology following    Hypertensive emergency- (present on admission)  Assessment & Plan  Resolved at outside facility  Bp much improved  Continue to follow  Continue current regimen    NSTEMI (non-ST elevated myocardial infarction) ([facility])- (present on admission)  Assessment & Plan  Status post cardiac cath on 8/1 with PCI to RCA and posterior lateral branch  Cardiology following    Gross hematuria  Assessment & Plan  due to patient traumatically removing White catheter while on  heparin drip  Resolved  Continue to follow    Tobacco use  Assessment & Plan  Cessation discussed and recommended    Class 2 severe obesity due to excess calories with serious comorbidity in adult (HCC)  Assessment & Plan  Outpatient nutrition referral       VTE prophylaxis: Lovenox

## 2020-08-06 NOTE — DISCHARGE PLANNING
Received [facility] form at 1330  Agency/Facility Name: [facility] referral sent for [facility]  Referral sent per [facility] form at 1334    1605- [place] accepted

## 2020-08-07 ENCOUNTER — APPOINTMENT (OUTPATIENT)
Dept: RADIOLOGY | Facility: MEDICAL CENTER | Age: 60
DRG: 270 | End: 2020-08-07
Attending: INTERNAL MEDICINE
Payer: OTHER GOVERNMENT

## 2020-08-07 PROBLEM — I48.0 PAROXYSMAL ATRIAL FIBRILLATION (HCC): Status: ACTIVE | Noted: 2020-08-07

## 2020-08-07 PROBLEM — I21.29: Status: RESOLVED | Noted: 2020-08-01 | Resolved: 2020-08-07

## 2020-08-07 PROBLEM — Z79.01 CURRENT USE OF LONG TERM ANTICOAGULATION: Status: ACTIVE | Noted: 2020-08-07

## 2020-08-07 PROBLEM — I48.91 ATRIAL FIBRILLATION WITH RVR (HCC): Status: ACTIVE | Noted: 2020-08-07

## 2020-08-07 LAB
ALBUMIN SERPL BCP-MCNC: 3.4 G/DL (ref 3.2–4.9)
ALBUMIN/GLOB SERPL: 1.4 G/DL
ALP SERPL-CCNC: 57 U/L (ref 30–99)
ALT SERPL-CCNC: 23 U/L (ref 2–50)
ANION GAP SERPL CALC-SCNC: 12 MMOL/L (ref 7–16)
ANION GAP SERPL CALC-SCNC: 12 MMOL/L (ref 7–16)
ANION GAP SERPL CALC-SCNC: 17 MMOL/L (ref 7–16)
APTT PPP: 32.2 SEC (ref 24.7–36)
AST SERPL-CCNC: 15 U/L (ref 12–45)
BASOPHILS # BLD AUTO: 0.3 % (ref 0–1.8)
BASOPHILS # BLD AUTO: 0.3 % (ref 0–1.8)
BASOPHILS # BLD: 0.04 K/UL (ref 0–0.12)
BASOPHILS # BLD: 0.05 K/UL (ref 0–0.12)
BILIRUB SERPL-MCNC: 0.8 MG/DL (ref 0.1–1.5)
BUN SERPL-MCNC: 10 MG/DL (ref 8–22)
BUN SERPL-MCNC: 12 MG/DL (ref 8–22)
BUN SERPL-MCNC: 8 MG/DL (ref 8–22)
CALCIUM SERPL-MCNC: 9.1 MG/DL (ref 8.5–10.5)
CALCIUM SERPL-MCNC: 9.4 MG/DL (ref 8.5–10.5)
CALCIUM SERPL-MCNC: 9.7 MG/DL (ref 8.5–10.5)
CHLORIDE SERPL-SCNC: 103 MMOL/L (ref 96–112)
CHLORIDE SERPL-SCNC: 103 MMOL/L (ref 96–112)
CHLORIDE SERPL-SCNC: 99 MMOL/L (ref 96–112)
CO2 SERPL-SCNC: 23 MMOL/L (ref 20–33)
CO2 SERPL-SCNC: 23 MMOL/L (ref 20–33)
CO2 SERPL-SCNC: 25 MMOL/L (ref 20–33)
CREAT SERPL-MCNC: 0.81 MG/DL (ref 0.5–1.4)
CREAT SERPL-MCNC: 0.95 MG/DL (ref 0.5–1.4)
CREAT SERPL-MCNC: 1 MG/DL (ref 0.5–1.4)
EKG IMPRESSION: NORMAL
EOSINOPHIL # BLD AUTO: 0.03 K/UL (ref 0–0.51)
EOSINOPHIL # BLD AUTO: 0.1 K/UL (ref 0–0.51)
EOSINOPHIL NFR BLD: 0.2 % (ref 0–6.9)
EOSINOPHIL NFR BLD: 0.7 % (ref 0–6.9)
ERYTHROCYTE [DISTWIDTH] IN BLOOD BY AUTOMATED COUNT: 41.9 FL (ref 35.9–50)
ERYTHROCYTE [DISTWIDTH] IN BLOOD BY AUTOMATED COUNT: 43.7 FL (ref 35.9–50)
GLOBULIN SER CALC-MCNC: 2.5 G/DL (ref 1.9–3.5)
GLUCOSE BLD-MCNC: 114 MG/DL (ref 65–99)
GLUCOSE SERPL-MCNC: 104 MG/DL (ref 65–99)
GLUCOSE SERPL-MCNC: 149 MG/DL (ref 65–99)
GLUCOSE SERPL-MCNC: 165 MG/DL (ref 65–99)
HCT VFR BLD AUTO: 28 % (ref 42–52)
HCT VFR BLD AUTO: 30.4 % (ref 42–52)
HGB BLD-MCNC: 10.1 G/DL (ref 14–18)
HGB BLD-MCNC: 9.3 G/DL (ref 14–18)
IMM GRANULOCYTES # BLD AUTO: 0.06 K/UL (ref 0–0.11)
IMM GRANULOCYTES # BLD AUTO: 0.07 K/UL (ref 0–0.11)
IMM GRANULOCYTES NFR BLD AUTO: 0.4 % (ref 0–0.9)
IMM GRANULOCYTES NFR BLD AUTO: 0.5 % (ref 0–0.9)
INR PPP: 1.09 (ref 0.87–1.13)
LACTATE BLD-SCNC: 0.7 MMOL/L (ref 0.5–2)
LACTATE BLD-SCNC: 1.2 MMOL/L (ref 0.5–2)
LACTATE BLD-SCNC: 1.5 MMOL/L (ref 0.5–2)
LACTATE BLD-SCNC: 1.6 MMOL/L (ref 0.5–2)
LYMPHOCYTES # BLD AUTO: 0.54 K/UL (ref 1–4.8)
LYMPHOCYTES # BLD AUTO: 0.74 K/UL (ref 1–4.8)
LYMPHOCYTES NFR BLD: 3.5 % (ref 22–41)
LYMPHOCYTES NFR BLD: 5.1 % (ref 22–41)
MAGNESIUM SERPL-MCNC: 2 MG/DL (ref 1.5–2.5)
MAGNESIUM SERPL-MCNC: 2.1 MG/DL (ref 1.5–2.5)
MCH RBC QN AUTO: 31 PG (ref 27–33)
MCH RBC QN AUTO: 31.3 PG (ref 27–33)
MCHC RBC AUTO-ENTMCNC: 33.2 G/DL (ref 33.7–35.3)
MCHC RBC AUTO-ENTMCNC: 33.2 G/DL (ref 33.7–35.3)
MCV RBC AUTO: 93.3 FL (ref 81.4–97.8)
MCV RBC AUTO: 94.3 FL (ref 81.4–97.8)
MONOCYTES # BLD AUTO: 1.12 K/UL (ref 0–0.85)
MONOCYTES # BLD AUTO: 1.18 K/UL (ref 0–0.85)
MONOCYTES NFR BLD AUTO: 7.7 % (ref 0–13.4)
MONOCYTES NFR BLD AUTO: 7.7 % (ref 0–13.4)
NEUTROPHILS # BLD AUTO: 12.42 K/UL (ref 1.82–7.42)
NEUTROPHILS # BLD AUTO: 13.43 K/UL (ref 1.82–7.42)
NEUTROPHILS NFR BLD: 85.7 % (ref 44–72)
NEUTROPHILS NFR BLD: 87.9 % (ref 44–72)
NRBC # BLD AUTO: 0 K/UL
NRBC # BLD AUTO: 0 K/UL
NRBC BLD-RTO: 0 /100 WBC
NRBC BLD-RTO: 0 /100 WBC
PHOSPHATE SERPL-MCNC: 2.2 MG/DL (ref 2.5–4.5)
PLATELET # BLD AUTO: 250 K/UL (ref 164–446)
PLATELET # BLD AUTO: 260 K/UL (ref 164–446)
PMV BLD AUTO: 11.1 FL (ref 9–12.9)
PMV BLD AUTO: 11.1 FL (ref 9–12.9)
POTASSIUM SERPL-SCNC: 3.2 MMOL/L (ref 3.6–5.5)
POTASSIUM SERPL-SCNC: 3.8 MMOL/L (ref 3.6–5.5)
POTASSIUM SERPL-SCNC: 3.9 MMOL/L (ref 3.6–5.5)
PROCALCITONIN SERPL-MCNC: 1.27 NG/ML
PROT SERPL-MCNC: 5.9 G/DL (ref 6–8.2)
PROTHROMBIN TIME: 14.5 SEC (ref 12–14.6)
RBC # BLD AUTO: 2.97 M/UL (ref 4.7–6.1)
RBC # BLD AUTO: 3.26 M/UL (ref 4.7–6.1)
SODIUM SERPL-SCNC: 138 MMOL/L (ref 135–145)
SODIUM SERPL-SCNC: 139 MMOL/L (ref 135–145)
SODIUM SERPL-SCNC: 140 MMOL/L (ref 135–145)
UFH PPP CHRO-ACNC: <0.1 IU/ML
WBC # BLD AUTO: 14.5 K/UL (ref 4.8–10.8)
WBC # BLD AUTO: 15.3 K/UL (ref 4.8–10.8)

## 2020-08-07 PROCEDURE — 80048 BASIC METABOLIC PNL TOTAL CA: CPT

## 2020-08-07 PROCEDURE — 84145 PROCALCITONIN (PCT): CPT | Mod: 91

## 2020-08-07 PROCEDURE — 93010 ELECTROCARDIOGRAM REPORT: CPT | Mod: 76 | Performed by: INTERNAL MEDICINE

## 2020-08-07 PROCEDURE — 700102 HCHG RX REV CODE 250 W/ 637 OVERRIDE(OP): Performed by: HOSPITALIST

## 2020-08-07 PROCEDURE — 83735 ASSAY OF MAGNESIUM: CPT | Mod: 91

## 2020-08-07 PROCEDURE — 700111 HCHG RX REV CODE 636 W/ 250 OVERRIDE (IP): Performed by: INTERNAL MEDICINE

## 2020-08-07 PROCEDURE — 84100 ASSAY OF PHOSPHORUS: CPT

## 2020-08-07 PROCEDURE — 85730 THROMBOPLASTIN TIME PARTIAL: CPT

## 2020-08-07 PROCEDURE — 770022 HCHG ROOM/CARE - ICU (200)

## 2020-08-07 PROCEDURE — 85610 PROTHROMBIN TIME: CPT

## 2020-08-07 PROCEDURE — A9270 NON-COVERED ITEM OR SERVICE: HCPCS | Performed by: INTERNAL MEDICINE

## 2020-08-07 PROCEDURE — 700101 HCHG RX REV CODE 250: Performed by: NURSE PRACTITIONER

## 2020-08-07 PROCEDURE — 83605 ASSAY OF LACTIC ACID: CPT | Mod: 91

## 2020-08-07 PROCEDURE — 700111 HCHG RX REV CODE 636 W/ 250 OVERRIDE (IP): Performed by: NURSE PRACTITIONER

## 2020-08-07 PROCEDURE — 80053 COMPREHEN METABOLIC PANEL: CPT

## 2020-08-07 PROCEDURE — 700102 HCHG RX REV CODE 250 W/ 637 OVERRIDE(OP): Performed by: INTERNAL MEDICINE

## 2020-08-07 PROCEDURE — 99291 CRITICAL CARE FIRST HOUR: CPT | Performed by: INTERNAL MEDICINE

## 2020-08-07 PROCEDURE — 82962 GLUCOSE BLOOD TEST: CPT

## 2020-08-07 PROCEDURE — 93005 ELECTROCARDIOGRAM TRACING: CPT | Performed by: INTERNAL MEDICINE

## 2020-08-07 PROCEDURE — A9270 NON-COVERED ITEM OR SERVICE: HCPCS | Performed by: HOSPITALIST

## 2020-08-07 PROCEDURE — 85025 COMPLETE CBC W/AUTO DIFF WBC: CPT

## 2020-08-07 PROCEDURE — 93005 ELECTROCARDIOGRAM TRACING: CPT | Performed by: HOSPITALIST

## 2020-08-07 PROCEDURE — 99233 SBSQ HOSP IP/OBS HIGH 50: CPT | Performed by: INTERNAL MEDICINE

## 2020-08-07 PROCEDURE — 700111 HCHG RX REV CODE 636 W/ 250 OVERRIDE (IP): Performed by: HOSPITALIST

## 2020-08-07 PROCEDURE — 700101 HCHG RX REV CODE 250: Performed by: INTERNAL MEDICINE

## 2020-08-07 PROCEDURE — 700105 HCHG RX REV CODE 258: Performed by: INTERNAL MEDICINE

## 2020-08-07 PROCEDURE — 71045 X-RAY EXAM CHEST 1 VIEW: CPT

## 2020-08-07 PROCEDURE — 85520 HEPARIN ASSAY: CPT

## 2020-08-07 RX ORDER — CARVEDILOL 12.5 MG/1
12.5 TABLET ORAL 2 TIMES DAILY WITH MEALS
Status: DISCONTINUED | OUTPATIENT
Start: 2020-08-07 | End: 2020-08-07

## 2020-08-07 RX ORDER — POTASSIUM CHLORIDE 20 MEQ/1
40 TABLET, EXTENDED RELEASE ORAL ONCE
Status: COMPLETED | OUTPATIENT
Start: 2020-08-07 | End: 2020-08-07

## 2020-08-07 RX ORDER — CLOPIDOGREL BISULFATE 75 MG/1
300 TABLET ORAL ONCE
Status: COMPLETED | OUTPATIENT
Start: 2020-08-08 | End: 2020-08-08

## 2020-08-07 RX ORDER — CARVEDILOL 3.12 MG/1
3.12 TABLET ORAL 2 TIMES DAILY WITH MEALS
Status: DISCONTINUED | OUTPATIENT
Start: 2020-08-07 | End: 2020-08-08

## 2020-08-07 RX ORDER — POTASSIUM CHLORIDE 29.8 MG/ML
40 INJECTION INTRAVENOUS ONCE
Status: COMPLETED | OUTPATIENT
Start: 2020-08-07 | End: 2020-08-07

## 2020-08-07 RX ORDER — SODIUM CHLORIDE 9 MG/ML
INJECTION, SOLUTION INTRAVENOUS
Status: ACTIVE
Start: 2020-08-07 | End: 2020-08-07

## 2020-08-07 RX ORDER — SODIUM CHLORIDE 9 MG/ML
1000 INJECTION, SOLUTION INTRAVENOUS ONCE
Status: COMPLETED | OUTPATIENT
Start: 2020-08-07 | End: 2020-08-07

## 2020-08-07 RX ORDER — DEXTROSE MONOHYDRATE 50 MG/ML
INJECTION, SOLUTION INTRAVENOUS CONTINUOUS
Status: DISCONTINUED | OUTPATIENT
Start: 2020-08-07 | End: 2020-08-07

## 2020-08-07 RX ORDER — TELMISARTAN 40 MG/1
40 TABLET ORAL DAILY
Status: DISCONTINUED | OUTPATIENT
Start: 2020-08-07 | End: 2020-08-07

## 2020-08-07 RX ORDER — METOPROLOL TARTRATE 1 MG/ML
5 INJECTION, SOLUTION INTRAVENOUS EVERY 6 HOURS PRN
Status: DISCONTINUED | OUTPATIENT
Start: 2020-08-07 | End: 2020-08-13 | Stop reason: HOSPADM

## 2020-08-07 RX ORDER — METOPROLOL TARTRATE 1 MG/ML
5 INJECTION, SOLUTION INTRAVENOUS ONCE
Status: COMPLETED | OUTPATIENT
Start: 2020-08-07 | End: 2020-08-07

## 2020-08-07 RX ORDER — NOREPINEPHRINE BITARTRATE 0.03 MG/ML
0.5-3 INJECTION, SOLUTION INTRAVENOUS CONTINUOUS
Status: DISCONTINUED | OUTPATIENT
Start: 2020-08-07 | End: 2020-08-08

## 2020-08-07 RX ORDER — HEPARIN SODIUM 5000 [USP'U]/100ML
0-25 INJECTION, SOLUTION INTRAVENOUS CONTINUOUS
Status: DISCONTINUED | OUTPATIENT
Start: 2020-08-07 | End: 2020-08-07

## 2020-08-07 RX ORDER — MAGNESIUM SULFATE HEPTAHYDRATE 40 MG/ML
2 INJECTION, SOLUTION INTRAVENOUS ONCE
Status: COMPLETED | OUTPATIENT
Start: 2020-08-07 | End: 2020-08-07

## 2020-08-07 RX ORDER — NOREPINEPHRINE BITARTRATE 0.03 MG/ML
INJECTION, SOLUTION INTRAVENOUS
Status: ACTIVE
Start: 2020-08-07 | End: 2020-08-07

## 2020-08-07 RX ORDER — CLOPIDOGREL BISULFATE 75 MG/1
75 TABLET ORAL DAILY
Status: DISCONTINUED | OUTPATIENT
Start: 2020-08-08 | End: 2020-08-07

## 2020-08-07 RX ORDER — CLOPIDOGREL BISULFATE 75 MG/1
75 TABLET ORAL DAILY
Status: DISCONTINUED | OUTPATIENT
Start: 2020-08-09 | End: 2020-08-13 | Stop reason: HOSPADM

## 2020-08-07 RX ADMIN — HYDROCODONE BITARTRATE AND ACETAMINOPHEN 1 TABLET: 10; 325 TABLET ORAL at 02:56

## 2020-08-07 RX ADMIN — TAMSULOSIN HYDROCHLORIDE 0.4 MG: 0.4 CAPSULE ORAL at 17:12

## 2020-08-07 RX ADMIN — APIXABAN 5 MG: 5 TABLET, FILM COATED ORAL at 17:11

## 2020-08-07 RX ADMIN — MORPHINE SULFATE 15 MG: 15 TABLET, FILM COATED, EXTENDED RELEASE ORAL at 05:06

## 2020-08-07 RX ADMIN — ATORVASTATIN CALCIUM 80 MG: 80 TABLET, FILM COATED ORAL at 20:55

## 2020-08-07 RX ADMIN — TIZANIDINE 4 MG: 4 TABLET ORAL at 09:00

## 2020-08-07 RX ADMIN — ENOXAPARIN SODIUM 40 MG: 40 INJECTION SUBCUTANEOUS at 05:06

## 2020-08-07 RX ADMIN — AMIODARONE HYDROCHLORIDE 0.5 MG/MIN: 1.8 INJECTION, SOLUTION INTRAVENOUS at 17:13

## 2020-08-07 RX ADMIN — AMIODARONE HYDROCHLORIDE 150 MG: 1.5 INJECTION, SOLUTION INTRAVENOUS at 09:16

## 2020-08-07 RX ADMIN — ASPIRIN 81 MG 81 MG: 81 TABLET ORAL at 05:06

## 2020-08-07 RX ADMIN — TRAZODONE HYDROCHLORIDE 100 MG: 100 TABLET ORAL at 20:55

## 2020-08-07 RX ADMIN — METOPROLOL TARTRATE 5 MG: 5 INJECTION, SOLUTION INTRAVENOUS at 01:27

## 2020-08-07 RX ADMIN — NOREPINEPHRINE BITARTRATE 5 MCG/MIN: 1 INJECTION INTRAVENOUS at 10:17

## 2020-08-07 RX ADMIN — CARVEDILOL 6.25 MG: 6.25 TABLET, FILM COATED ORAL at 08:16

## 2020-08-07 RX ADMIN — PRASUGREL 10 MG: 10 TABLET, FILM COATED ORAL at 05:06

## 2020-08-07 RX ADMIN — SODIUM CHLORIDE 1000 ML: 9 INJECTION, SOLUTION INTRAVENOUS at 10:10

## 2020-08-07 RX ADMIN — MAGNESIUM SULFATE 2 G: 2 INJECTION INTRAVENOUS at 08:21

## 2020-08-07 RX ADMIN — VENLAFAXINE HYDROCHLORIDE 150 MG: 75 CAPSULE, EXTENDED RELEASE ORAL at 17:11

## 2020-08-07 RX ADMIN — TIZANIDINE 4 MG: 4 TABLET ORAL at 17:12

## 2020-08-07 RX ADMIN — POTASSIUM CHLORIDE 40 MEQ: 29.8 INJECTION, SOLUTION INTRAVENOUS at 10:46

## 2020-08-07 RX ADMIN — TIZANIDINE 4 MG: 4 TABLET ORAL at 13:24

## 2020-08-07 RX ADMIN — APIXABAN 5 MG: 5 TABLET, FILM COATED ORAL at 09:21

## 2020-08-07 RX ADMIN — FINASTERIDE 5 MG: 5 TABLET, FILM COATED ORAL at 17:11

## 2020-08-07 RX ADMIN — TIZANIDINE 4 MG: 4 TABLET ORAL at 20:55

## 2020-08-07 RX ADMIN — AMIODARONE HYDROCHLORIDE 1 MG/MIN: 1.8 INJECTION, SOLUTION INTRAVENOUS at 09:30

## 2020-08-07 RX ADMIN — POTASSIUM CHLORIDE 40 MEQ: 1500 TABLET, EXTENDED RELEASE ORAL at 08:16

## 2020-08-07 NOTE — PROGRESS NOTES
IV metoprolol given. Patient converted back into SR, rates 90s-100s. Confirmed with EKG. GERARDO brady texted for updates. Heparin gtt order canceled. Will continue to monitor for any further changes

## 2020-08-07 NOTE — PROGRESS NOTES
Pt seen   On exam afib with mild hypotension 80/50  He denies symptoms  He is hard of hearing  axox3  Denies dizziness, no cp  Cardiology adjusted his meds this am and started him on amio  Intermittent hypotension since - cardiology readjusting regimen and will transfer him to ICU care  Discussed with Dr. Staples who has assumed care  Pt now starting levophed

## 2020-08-07 NOTE — PROGRESS NOTES
Critical Care Progress Note    Date of admission  8/1/2020    Chief Complaint  60 y.o. male  PMHx Chronic pain syndrome, HTN, DLD, TOB use, AMPARO and prediabetes admitted 8/1/2020 with NSTEMI, intubated in cath lab for agitation and hypoxemia.  Stent to RCA/PDA in cath lab.  Transfer to ICU on vent very hypertensive.    Hospital Course   8/1 3-4 min PEA arrest from hypotension after he cam back from cath lab - sedation? however repeat ECHO w/ LV thrombus and anterior wall now down -> to cath lab again, no new CAD, IABP placed, code chill initiated after neg CT head  8/3 - extubated yesterday, remains on dexmedetomidine, confused intermittently agitated.  5 L oxygen.  Continue heparin drip for LV thrombus and DAPT.  He pulled his own White out and had traumatic hematuria and started on CBI, monitor need for urology evaluation and follow-up hemoglobin.  Heparin held transiently with significant hematuria.  Resumed narcotics as he had been on MS Contin and Norco at home.   8/4 - agitation and confusion now resolved.  Dexmedetomidine infusion at 1.5 but titrating down today.  8/7 -asked to sign back on today with hypotension, suspect medication related, given IV fluid and vasopressor        Interval Problem Update  Reviewed last 24 hour events:  Sign back on today with hypotension after atrial fibrillation and multiple antihypertensives  I started the patient on norepinephrine and gave an additional liter of saline  Reviewed with cardiology at bedside  Patient is asymptomatic.  Denies chest pain  Started on amiodarone for atrial fibrillation, now converted back to sinus rhythm      Review of Systems  Review of Systems   Unable to perform ROS: Acuity of condition        Vital Signs for last 24 hours   Temp:  [36.4 °C (97.5 °F)-36.9 °C (98.5 °F)] 36.8 °C (98.2 °F)  Pulse:  [] 60  Resp:  [14-39] 26  BP: ()/() 110/66  SpO2:  [89 %-100 %] 100 %    Hemodynamic parameters for last 24 hours  CVP:  [8 MM HG-10 MM  HG] 10 MM HG    Respiratory Information for the last 24 hours       Physical Exam   Physical Exam  Vitals signs and nursing note reviewed.   HENT:      Head: Normocephalic and atraumatic.      Mouth/Throat:      Mouth: Mucous membranes are moist.   Eyes:      Pupils: Pupils are equal, round, and reactive to light.   Neck:      Musculoskeletal: Neck supple. No neck rigidity.   Cardiovascular:      Rate and Rhythm: Normal rate and regular rhythm.      Pulses: Normal pulses.   Pulmonary:      Effort: Pulmonary effort is normal. No respiratory distress.   Abdominal:      General: There is no distension.      Palpations: Abdomen is soft.   Musculoskeletal:         General: No swelling or deformity.   Skin:     General: Skin is warm.      Capillary Refill: Capillary refill takes less than 2 seconds.   Neurological:      Cranial Nerves: No cranial nerve deficit.      Comments: A/O x4       Medications  Current Facility-Administered Medications   Medication Dose Route Frequency Provider Last Rate Last Dose   • apixaban (ELIQUIS) tablet 5 mg  5 mg Oral BID Fernando Martinez M.D.   5 mg at 08/07/20 0921   • [START ON 8/8/2020] clopidogrel (PLAVIX) tablet 300 mg  300 mg Oral Once Fernando Martinez M.D.       • amiodarone (NEXTERONE) 360 mg/200 mL infusion  0.5-1 mg/min Intravenous Continuous Fernando Martinez M.D. 33 mL/hr at 08/07/20 1108 1 mg/min at 08/07/20 1108   • Metoprolol Tartrate (LOPRESSOR) injection 5 mg  5 mg Intravenous Q6HRS PRN Fernando Martinez M.D.       • SODIUM CHLORIDE 0.9 % IV SOLN            • carvedilol (COREG) tablet 3.125 mg  3.125 mg Oral BID WITH MEALS Fernando Martinez M.D.       • SODIUM CHLORIDE 0.9 % IV SOLN            • NOREPINEPHRINE-SODIUM CHLORIDE 8-0.9 MG/250ML-% IV SOLN            • norepinephrine (Levophed) infusion 8 mg/250 mL (premix)  0.5-30 mcg/min Intravenous Continuous Jevon Godwin M.D. 15 mL/hr at 08/07/20 1447 8 mcg/min at 08/07/20 1447   • SODIUM CHLORIDE  0.9 % IV SOLN            • [START ON 8/9/2020] clopidogrel (PLAVIX) tablet 75 mg  75 mg Oral DAILY Fernando Martinez M.D.       • aspirin (ASA) chewable tab 81 mg  81 mg Oral DAILY Tatum Abdi M.D.   81 mg at 08/07/20 0506   • atorvastatin (LIPITOR) tablet 80 mg  80 mg Oral Nightly Tatum Abdi M.D.   80 mg at 08/06/20 2005   • senna-docusate (PERICOLACE or SENOKOT S) 8.6-50 MG per tablet 2 Tab  2 Tab Oral BID Tatum Abdi M.D.   Stopped at 08/06/20 1800    And   • polyethylene glycol/lytes (MIRALAX) PACKET 1 Packet  1 Packet Oral QDAY PRN Tatum Abdi M.D.        And   • magnesium hydroxide (MILK OF MAGNESIA) suspension 30 mL  30 mL Oral QDAY PRN Tatum Abdi M.D.        And   • bisacodyl (DULCOLAX) suppository 10 mg  10 mg Rectal QDAY PRN Tatum Abdi M.D.       • tizanidine (ZANAFLEX) tablet 4 mg  4 mg Oral 4X/DAY Tatum Abdi M.D.   4 mg at 08/07/20 1324   • traZODone (DESYREL) tablet 100 mg  100 mg Oral QHS Tatum Abdi M.D.   100 mg at 08/06/20 2005   • acetaminophen (TYLENOL) tablet 650 mg  650 mg Oral Q6HRS PRN Tatum Abdi M.D.       • ondansetron (ZOFRAN ODT) dispertab 4 mg  4 mg Oral Q4HRS PRN Tatum Abdi M.D.       • promethazine (PHENERGAN) tablet 12.5-25 mg  12.5-25 mg Oral Q4HRS PRN Tatum Abdi M.D.       • fentaNYL (SUBLIMAZE) injection  mcg   mcg Intravenous Q4HRS PRN Jevon Godwin M.D.   50 mcg at 08/04/20 1937   • LORazepam (ATIVAN) injection 1-2 mg  1-2 mg Intravenous Q3HRS PRN Erma Larios, A.P.R.N.   2 mg at 08/04/20 2208   • finasteride (PROSCAR) tablet 5 mg  5 mg Oral Q EVENING BILLY VasquezOGrace   5 mg at 08/06/20 1650   • HYDROcodone/acetaminophen (NORCO)  MG per tablet 1 Tab  1 Tab Oral Q3HRS PRN BILLY VasquezOGrace   1 Tab at 08/07/20 0256   • morphine ER (MS CONTIN) tablet 15 mg  15 mg Oral TID BILLY VasquezO.   Stopped at 08/07/20 1200   • tamsulosin (FLOMAX) capsule 0.4 mg  0.4 mg Oral Q EVENING BILLY VasquezOGrace   0.4 mg at  08/06/20 1651   • venlafaxine XR (EFFEXOR XR) capsule 150 mg  150 mg Oral DAILY Reed Horta D.O.   150 mg at 08/06/20 1651   • ondansetron (ZOFRAN) syringe/vial injection 4 mg  4 mg Intravenous Q4HRS PRN Meño Red M.D.       • promethazine (PHENERGAN) suppository 12.5-25 mg  12.5-25 mg Rectal Q4HRS PRN Meño Red M.D.       • prochlorperazine (COMPAZINE) injection 5-10 mg  5-10 mg Intravenous Q4HRS PRN Meño Red M.D.       • labetalol (NORMODYNE/TRANDATE) injection 10 mg  10 mg Intravenous Q4HRS PRN Meño Red M.D.   10 mg at 08/04/20 2312   • nitroglycerin (NITROSTAT) tablet 0.4 mg  0.4 mg Sublingual Q5 MIN PRN Meño Red M.D.       • NS infusion   Intravenous Continuous Reed Mesa M.D. 10 mL/hr at 08/01/20 1024     • Respiratory Therapy Consult   Nebulization Continuous RT Reed Mesa M.D.       • ipratropium-albuterol (DUONEB) nebulizer solution  3 mL Nebulization Q2HRS PRN (RT) Reed Mesa M.D.       • enalaprilat (VASOTEC) injection 1.25 mg  1.25 mg Intravenous Q6HRS PRN Reed Mesa M.D.           Fluids    Intake/Output Summary (Last 24 hours) at 8/7/2020 1548  Last data filed at 8/7/2020 1200  Gross per 24 hour   Intake 2026.13 ml   Output 650 ml   Net 1376.13 ml       Laboratory          Recent Labs     08/05/20  0400 08/06/20  0330 08/07/20  0130 08/07/20  1325   SODIUM 138 139 139 138   POTASSIUM 4.0 3.6 3.2* 3.9   CHLORIDE 102 101 99 103   CO2 23 23 23 23   BUN 8 7* 8 10   CREATININE 0.71 0.73 0.81 1.00   MAGNESIUM 1.9  --   --  2.0   PHOSPHORUS 2.8  --   --  2.2*   CALCIUM 9.1 9.4 9.7 9.1     Recent Labs     08/06/20  0330 08/07/20  0130 08/07/20  1325   ALTSGPT  --   --  23   ASTSGOT  --   --  15   ALKPHOSPHAT  --   --  57   TBILIRUBIN  --   --  0.8   GLUCOSE 114* 104* 165*     Recent Labs     08/06/20 0330 08/07/20 0130 08/07/20  1325   WBC 9.0 15.3* 14.5*   NEUTSPOLYS 67.20 87.90* 85.70*   LYMPHOCYTES 17.60* 3.50* 5.10*    MONOCYTES 10.60 7.70 7.70   EOSINOPHILS 3.90 0.20 0.70   BASOPHILS 0.40 0.30 0.30   ASTSGOT  --   --  15   ALTSGPT  --   --  23   ALKPHOSPHAT  --   --  57   TBILIRUBIN  --   --  0.8     Recent Labs     08/06/20  0330 08/07/20  0130 08/07/20  1325   RBC 3.19* 3.26* 2.97*   HEMOGLOBIN 9.9* 10.1* 9.3*   HEMATOCRIT 29.7* 30.4* 28.0*   PLATELETCT 236 250 260   PROTHROMBTM  --  14.5  --    APTT  --  32.2  --    INR  --  1.09  --        Imaging  X-Ray:  I have personally reviewed the images and compared with prior images.    Assessment/Plan  * Acute respiratory failure with hypoxia (HCC)  Assessment & Plan  Intubated in Cath Lab, liberated 8/2  Resolved    Hypotension  Assessment & Plan  Multifactorial with AF, medication related, mild intravascular volume depletion  WBC up but afebrile and low suspicion for infection/sepsis  Given additional crystalloid today and dose reduction in beta-blocker and antihypertensives  Norepinephrine today as needed, follow  Follow chest x-ray, PCT, repeat labs    Cardiac arrest ([facility])  Assessment & Plan  PEA arrest secondary to sedation/NSTEMI  ROSC after less than 4 minutes CPR and 1 dose of epinephrine     Hypertensive emergency- (present on admission)  Assessment & Plan  Continue BP management    NSTEMI (non-ST elevated myocardial infarction) ([facility])- (present on admission)  Assessment & Plan  Status post PCI 8/1   DAPT  Statin/beta-blocker/ACE  Monitor for cardiac dysrhythmias, some history of ventricular ectopy/atrial flutter?  Currently in sinus tachycardia with frequent PVCs  Optimize electrolytes  EF 25%  Complicated by LV apical thrombus  Follow-up echocardiogram shows improved EF and resolution of LV thrombus    Hypertensive disease  Assessment & Plan  Beta-blocker dose reduced with hypotension today   Cardiology following    Atrial fibrillation with RVR ([facility])  Assessment & Plan  Back in sinus rhythm, continue amiodarone, cardiology following    Gross hematuria  Assessment &  Plan  Secondary to White trauma with severe agitation  Resolved    Memory difficulties  Assessment & Plan       BPH (benign prostatic hyperplasia)  Assessment & Plan  Proscar and Flomax     Tobacco use  Assessment & Plan       Class 2 severe obesity due to excess calories with serious comorbidity in adult (HCC)  Assessment & Plan       Chronic back pain  Assessment & Plan  Patient with multiple prior surgeries  Medications reviewed    History of prediabetes  Assessment & Plan  Glycemic control    Dyslipidemia  Assessment & Plan  statin     Updated plan:  Patient developed hypotension today requiring vasopressors, crystalloid and reduction in antihypertensives  He is chest pain-free mentating normally  Lower suspicion for infectious etiology/sepsis  Follow closely in ICU with risk for deterioration.  Reviewed with cardiology in detail    VTE:  Heparin  Ulcer: H2 Antagonist  Lines: Central Line  Ongoing indication addressed, Arterial Line  Ongoing indication addressed and White Catheter  Ongoing indication addressed    I have performed a physical exam and reviewed and updated ROS and Plan today (8/7/2020). In review of yesterday's note (8/6/2020), there are no changes except as documented above.     Discussed patient condition and risk of morbidity and/or mortality with Family, RN, RT, Pharmacy, Charge nurse / hot rounds and cardiology     The patient remains critically ill.  Critical care time = 36 minutes in directly providing and coordinating critical care and extensive data review.  No time overlap and excludes procedures.

## 2020-08-07 NOTE — PROGRESS NOTES
Tele:  SR - ST 85 to 110, HR up to 180's this am, cardiology aware. PVC's, bigeminy.     Pt w/o c/o. Wife visiting. OT here to work with pt. Asked pt if he felt clearer after his nap today. Pt agrees. He denies needs. Will pass care to NOC RN @ [facility].

## 2020-08-07 NOTE — PROGRESS NOTES
Patients heart rhythm converted to Afib with the rates of 120-150's.  Patient reports feeling fine, 117/59, 95 on 2 liters.     EKG ordered.      Dr. Carroll paged.      9332- Dr. Carroll at the bedside. Orders received.

## 2020-08-07 NOTE — THERAPY
"Occupational Therapy   Initial Evaluation     Patient Name: Karan Wiley  Age:  60 y.o., Sex:  male  Medical Record #: [id number]  Today's Date: 8/6/2020     Precautions  Precautions: Fall Risk    Assessment  Patient is 60 y.o. male with a diagnosis of NSTEMI reporting I with ADLs and mobility prior to admission. Patient, upon eval, presents with decreased ADLs, mobility, endurance, tolerance, balance, strength necessitating Mod A LB ADLs and Min A with FWW.     Plan    Recommend Occupational Therapy 4 times per week until therapy goals are met for the following treatments:  Self Care/Activities of Daily Living, Therapeutic Activities and Therapeutic Exercises. Recommend post-acute placement for additional occupational therapy services prior to discharge home.    DC Equipment Recommendations: Unable to determine at this time.        Subjective    \"I can't go home because we both would need help then. I like the rehab idea\"     Objective       08/06/20 3575   Prior Living Situation   Prior Services Home-Independent   Housing / Facility 1 Story House   Steps Into Home 1   Steps In Home 0   Bathroom Set up Bathtub / Shower Combination   Equipment Owned None   Lives with - Patient's Self Care Capacity Spouse;Adult Children   Comments pt is from [facility]. Spouse able to assist upon DC home. Pt is retired  ([employer]). Wife arrived during session and verified information.    Prior Level of ADL Function   Self Feeding Independent   Grooming / Hygiene Independent   Bathing Independent   Dressing Independent   Toileting Independent   Prior Level of IADL Function   Comments able to perform at baseline. Wife able to assist as need. Reports son able to assist too   Precautions   Precautions Fall Risk   Vitals   O2 (LPM) 2   O2 Delivery Device Silicone Nasal Cannula   Cognition    Level of Consciousness Alert   Attention Impaired   Comments self reports possible dementia. pleasant, agreeable, motivated. " recognizes safety concerns   Balance Assessment   Weight Shift Standing Poor   Comments FWW   Bed Mobility    Supine to Sit Supervised   ADL Assessment   Eating Supervision   Grooming Minimal Assist;Standing   Upper Body Dressing Minimal Assist   Lower Body Dressing Moderate Assist   Toileting Minimal Assist   Comments endurance paired with wanting to complete to visit with wife   Functional Mobility   Toilet Transfers Minimal Assist   Mobility FWW   Comments Min/CG as session progressed. Patient reports fatigue   Patient / Family Goals   Patient / Family Goal #1 Get better   Short Term Goals   Short Term Goal # 1 S toileting   Short Term Goal # 2 S toilet transfer   Short Term Goal # 3 S tub transfer   Short Term Goal # 4 10 minute stand for adls   Anticipated Discharge Equipment and Recommendations   DC Equipment Recommendations Unable to determine at this time

## 2020-08-07 NOTE — CARE PLAN
Problem: Communication  Goal: The ability to communicate needs accurately and effectively will improve  Outcome: PROGRESSING AS EXPECTED     Problem: Safety  Goal: Will remain free from injury  Outcome: PROGRESSING AS EXPECTED     Problem: Safety  Goal: Will remain free from falls  Outcome: MET

## 2020-08-07 NOTE — DISCHARGE PLANNING
[facility] accepted pt.  [facility] accepted.    1455- Liaison at [facility] came by for an onsite visit.

## 2020-08-07 NOTE — PROGRESS NOTES
Monitor summary:  SB-ST 50s-110s  ([id number]) AFib RVR 140s-170s  Bigeminal PVCs  .20/.08/.30    12 hour chart check

## 2020-08-07 NOTE — PROGRESS NOTES
Reason for consultation /admission : STEMI  Reconsulted for AF with RVR    Went into AF with RVR earlier this AM lasted about 45 minutes, converted to SR after IV metoprolol  But just went back into A. fib again about 15 minutes ago  Denies CP, SOB or palpitations  On 6.25 mg BID carvedilol and telmisartan 80 for HTN (home dose)    ECHO 8/3 EF 60%    Had hematuria a few days ago but from trauma (pulled his White out)  No hematuria the last few days  No other bleeding    Review of systems;    General: No fever, chills, no weakness  HENT: No sore throat, no neck pain  Eyes: No blurred vision or double vision  Heart: No palpitation, no PND or orthopnea, no claudication, no leg swelling  Lung: No productive cough, no hemoptysis  Abdomen: No abdominal pain, no nausea vomiting diarrhea or blood in stool  : No dysuria, no frequency or hesitancy, no hematuria  Musculoskeletal: No myalgia, + back pain  Hematology: No easy bruising  Skin: No rash or itching  Neurological: No headache, no new focal weakness or numbness  Psychological: Denies depression, anxiety or insomnia  All other review of systems are negative      Temp:  [36.1 °C (96.9 °F)-36.4 °C (97.6 °F)] 36.4 °C (97.6 °F)  Pulse:  [] 117  Resp:  [20-22] 20  BP: ()/(59-93) 103/59  SpO2:  [89 %-97 %] 92 %  GENERAL not in acute distress, not dyspnic at rest  Head atraumatic, normocephalic  Eyes EOMI  ENT neck supple, no JVD, no carotid bruits or thyromegaly  Lung good expansion, distant sound, no rales or wheezing  Heart irregularly irregular, tachycardic, no murmur,   no gallop or rub  Abd soft, no tenderness, mass or bruits  Ext no edema  Skin no ecchymosis or petechiae  Musculoskeletal no deformity  Neuro grossly intact  Psych normal mood, normal affect    Monitor reviewed by me showed no significant ventricular or atrial dysrhythmias.    Labs: Cr 0.8, K+ 3.2  Hb 10    Assessment and plans    1. PAF, with RVR  Will start him on amiodarone and up-titrate  carvedilol  We will reduce telmisartan to 40 mg daily and d/c if BP <100  We will start apixaban and monitor for signs of bleeding  Continue on DAPT for now.  Will discontinue aspirin in a couple weeks.     2. S/p Inferior STEMI and RCA stent with transient LV dysfunction  LV function has normalized  On carvedilol and telmisartan, DAPT, statin   Cont Rx with plan on discontinue aspirin in a couple week as above  Adjust as above     3. History of HTN,   As above     4. Anemia, acute blood loss  stable    5. Hypokalemia  Replace  Keep K>4      Will follow    Please note that this dictation was created using voice recognition software. I have worked with consultants from the vendor as well as technical experts from [employer] to optimize the interface. I have made every reasonable attempt to correct obvious errors, but I expect that there are errors of grammar and possibly content I did not discover before finalizing the note

## 2020-08-07 NOTE — DISCHARGE PLANNING
Care Transition Team Discharge Planning    Anticipated Discharge Disposition: d/c to SNF for rehab    Action: Lsw spoke to liaison for [facility]. She indicates they have accepted pt and can take him when he is medically cleared for d/c. She went to pt's room to introduce herself.    Barriers to Discharge: medical clearance, transport     Plan: f/u w/ medical team, etc

## 2020-08-07 NOTE — CARE PLAN
Problem: Safety  Goal: Will remain free from injury  Outcome: PROGRESSING AS EXPECTED  Note: Fall precautions in place. Bed in lowest position. Non-skid socks in place. Personal possessions within reach. Mobility sign on door. Bed-alarm on. Call light within reach. Pt educated regarding fall prevention and states understanding.      Problem: Mobility  Goal: Risk for activity intolerance will decrease  Outcome: PROGRESSING AS EXPECTED  Note: PT/OT following. Ambulation encouraged,

## 2020-08-07 NOTE — PROGRESS NOTES
Patient converted in A Fib RVR, rate 140s-170s. Confirmed with EKG. GERARDO Gee paged. x1 IV metoprolol ordered. Keep HR <130 and start Heparin gtt

## 2020-08-07 NOTE — PROGRESS NOTES
Notified BP dropped into 50 systolic  Received IV amiodarone bolus, still on drip at 1 mg/min  Along with 5 mg IV metoprolol  HR down in the 70 but still in AF  Looked somewhat pale  Denies CP, SOB or dizziness  BP in the low 70s 60 after 1 liter IV fluid bolus  Will change to ICU status  Start vasopressor  Reduce carvedilol and hold telmisartan  WBC up to 15 K with left shift??  Consult intensivist (Dr. Staples)  Repeat ECHO if hypotension persists and no other explanation

## 2020-08-07 NOTE — PROGRESS NOTES
0950- SBP down in the 50-60's. Pt reports feeling fine but looks pale and diaphoretic.  Dr. Abdi paged.  IV amio is off per Dr. CAREY. RN is to restart amio once SBP is established.     0957- Dr. Carroll at the bedside. An order for 1 liter NS received.     1000- Dr Godwin at the bedside and order for levophed received.

## 2020-08-07 NOTE — ASSESSMENT & PLAN NOTE
8/7 EKG  Improved rate control on amiodarone  Cardiology following  apixaban was held, restarted yesterda- monitor for further hematuria, so far stable

## 2020-08-08 ENCOUNTER — APPOINTMENT (OUTPATIENT)
Dept: RADIOLOGY | Facility: MEDICAL CENTER | Age: 60
DRG: 270 | End: 2020-08-08
Attending: INTERNAL MEDICINE
Payer: OTHER GOVERNMENT

## 2020-08-08 PROBLEM — Z95.5 STATUS POST INSERTION OF DRUG-ELUTING STENT INTO RIGHT CORONARY ARTERY FOR CORONARY ARTERY DISEASE: Status: ACTIVE | Noted: 2020-08-08

## 2020-08-08 LAB
ANION GAP SERPL CALC-SCNC: 12 MMOL/L (ref 7–16)
BASOPHILS # BLD AUTO: 0.5 % (ref 0–1.8)
BASOPHILS # BLD: 0.05 K/UL (ref 0–0.12)
BUN SERPL-MCNC: 11 MG/DL (ref 8–22)
CALCIUM SERPL-MCNC: 9.5 MG/DL (ref 8.5–10.5)
CHLORIDE SERPL-SCNC: 99 MMOL/L (ref 96–112)
CO2 SERPL-SCNC: 25 MMOL/L (ref 20–33)
CREAT SERPL-MCNC: 0.84 MG/DL (ref 0.5–1.4)
EOSINOPHIL # BLD AUTO: 0.13 K/UL (ref 0–0.51)
EOSINOPHIL NFR BLD: 1.2 % (ref 0–6.9)
ERYTHROCYTE [DISTWIDTH] IN BLOOD BY AUTOMATED COUNT: 44.1 FL (ref 35.9–50)
GLUCOSE BLD-MCNC: 81 MG/DL (ref 65–99)
GLUCOSE SERPL-MCNC: 135 MG/DL (ref 65–99)
HCT VFR BLD AUTO: 27.3 % (ref 42–52)
HGB BLD-MCNC: 8.9 G/DL (ref 14–18)
IMM GRANULOCYTES # BLD AUTO: 0.05 K/UL (ref 0–0.11)
IMM GRANULOCYTES NFR BLD AUTO: 0.5 % (ref 0–0.9)
LYMPHOCYTES # BLD AUTO: 1.22 K/UL (ref 1–4.8)
LYMPHOCYTES NFR BLD: 11.3 % (ref 22–41)
MAGNESIUM SERPL-MCNC: 1.9 MG/DL (ref 1.5–2.5)
MCH RBC QN AUTO: 30.8 PG (ref 27–33)
MCHC RBC AUTO-ENTMCNC: 32.6 G/DL (ref 33.7–35.3)
MCV RBC AUTO: 94.5 FL (ref 81.4–97.8)
MONOCYTES # BLD AUTO: 1.39 K/UL (ref 0–0.85)
MONOCYTES NFR BLD AUTO: 12.9 % (ref 0–13.4)
NEUTROPHILS # BLD AUTO: 7.95 K/UL (ref 1.82–7.42)
NEUTROPHILS NFR BLD: 73.6 % (ref 44–72)
NRBC # BLD AUTO: 0 K/UL
NRBC BLD-RTO: 0 /100 WBC
PLATELET # BLD AUTO: 240 K/UL (ref 164–446)
PMV BLD AUTO: 11.1 FL (ref 9–12.9)
POTASSIUM SERPL-SCNC: 3.8 MMOL/L (ref 3.6–5.5)
PROCALCITONIN SERPL-MCNC: 0.47 NG/ML
PROCALCITONIN SERPL-MCNC: 0.79 NG/ML
RBC # BLD AUTO: 2.89 M/UL (ref 4.7–6.1)
SODIUM SERPL-SCNC: 136 MMOL/L (ref 135–145)
WBC # BLD AUTO: 10.8 K/UL (ref 4.8–10.8)

## 2020-08-08 PROCEDURE — A9270 NON-COVERED ITEM OR SERVICE: HCPCS | Performed by: HOSPITALIST

## 2020-08-08 PROCEDURE — 83735 ASSAY OF MAGNESIUM: CPT

## 2020-08-08 PROCEDURE — 80048 BASIC METABOLIC PNL TOTAL CA: CPT

## 2020-08-08 PROCEDURE — 700102 HCHG RX REV CODE 250 W/ 637 OVERRIDE(OP): Performed by: INTERNAL MEDICINE

## 2020-08-08 PROCEDURE — A9270 NON-COVERED ITEM OR SERVICE: HCPCS | Performed by: INTERNAL MEDICINE

## 2020-08-08 PROCEDURE — 99233 SBSQ HOSP IP/OBS HIGH 50: CPT | Performed by: INTERNAL MEDICINE

## 2020-08-08 PROCEDURE — 700105 HCHG RX REV CODE 258: Performed by: INTERNAL MEDICINE

## 2020-08-08 PROCEDURE — 84145 PROCALCITONIN (PCT): CPT

## 2020-08-08 PROCEDURE — 71045 X-RAY EXAM CHEST 1 VIEW: CPT

## 2020-08-08 PROCEDURE — 770022 HCHG ROOM/CARE - ICU (200)

## 2020-08-08 PROCEDURE — 82962 GLUCOSE BLOOD TEST: CPT

## 2020-08-08 PROCEDURE — 99291 CRITICAL CARE FIRST HOUR: CPT | Performed by: INTERNAL MEDICINE

## 2020-08-08 PROCEDURE — 93005 ELECTROCARDIOGRAM TRACING: CPT | Performed by: INTERNAL MEDICINE

## 2020-08-08 PROCEDURE — 51798 US URINE CAPACITY MEASURE: CPT

## 2020-08-08 PROCEDURE — 302307 BAG FECAL MANAGEMENT TEMPORARY COLLECTION WITH FILTER - SEAL SIGNAL FMS: Performed by: INTERNAL MEDICINE

## 2020-08-08 PROCEDURE — 700101 HCHG RX REV CODE 250: Performed by: INTERNAL MEDICINE

## 2020-08-08 PROCEDURE — 700111 HCHG RX REV CODE 636 W/ 250 OVERRIDE (IP): Performed by: INTERNAL MEDICINE

## 2020-08-08 PROCEDURE — 85025 COMPLETE CBC W/AUTO DIFF WBC: CPT

## 2020-08-08 PROCEDURE — 700102 HCHG RX REV CODE 250 W/ 637 OVERRIDE(OP): Performed by: HOSPITALIST

## 2020-08-08 RX ORDER — AMIODARONE HYDROCHLORIDE 200 MG/1
400 TABLET ORAL TWICE DAILY
Status: DISCONTINUED | OUTPATIENT
Start: 2020-08-08 | End: 2020-08-12

## 2020-08-08 RX ORDER — NOREPINEPHRINE BITARTRATE 0.03 MG/ML
0-30 INJECTION, SOLUTION INTRAVENOUS CONTINUOUS
Status: DISCONTINUED | OUTPATIENT
Start: 2020-08-08 | End: 2020-08-10

## 2020-08-08 RX ORDER — SODIUM CHLORIDE, SODIUM LACTATE, POTASSIUM CHLORIDE, CALCIUM CHLORIDE 600; 310; 30; 20 MG/100ML; MG/100ML; MG/100ML; MG/100ML
INJECTION, SOLUTION INTRAVENOUS
Status: ACTIVE
Start: 2020-08-08 | End: 2020-08-09

## 2020-08-08 RX ORDER — SODIUM CHLORIDE, SODIUM LACTATE, POTASSIUM CHLORIDE, AND CALCIUM CHLORIDE .6; .31; .03; .02 G/100ML; G/100ML; G/100ML; G/100ML
500 INJECTION, SOLUTION INTRAVENOUS ONCE
Status: COMPLETED | OUTPATIENT
Start: 2020-08-08 | End: 2020-08-08

## 2020-08-08 RX ADMIN — MORPHINE SULFATE 15 MG: 15 TABLET, FILM COATED, EXTENDED RELEASE ORAL at 17:52

## 2020-08-08 RX ADMIN — TIZANIDINE 4 MG: 4 TABLET ORAL at 08:27

## 2020-08-08 RX ADMIN — CLOPIDOGREL BISULFATE 300 MG: 75 TABLET ORAL at 05:02

## 2020-08-08 RX ADMIN — TRAZODONE HYDROCHLORIDE 100 MG: 100 TABLET ORAL at 20:10

## 2020-08-08 RX ADMIN — ASPIRIN 81 MG 81 MG: 81 TABLET ORAL at 05:02

## 2020-08-08 RX ADMIN — NOREPINEPHRINE BITARTRATE 5 MCG/MIN: 1 INJECTION INTRAVENOUS at 10:35

## 2020-08-08 RX ADMIN — VENLAFAXINE HYDROCHLORIDE 150 MG: 75 CAPSULE, EXTENDED RELEASE ORAL at 18:07

## 2020-08-08 RX ADMIN — AMIODARONE HYDROCHLORIDE 400 MG: 200 TABLET ORAL at 17:52

## 2020-08-08 RX ADMIN — FINASTERIDE 5 MG: 5 TABLET, FILM COATED ORAL at 17:51

## 2020-08-08 RX ADMIN — CARVEDILOL 3.12 MG: 3.12 TABLET, FILM COATED ORAL at 07:32

## 2020-08-08 RX ADMIN — ATORVASTATIN CALCIUM 80 MG: 80 TABLET, FILM COATED ORAL at 20:10

## 2020-08-08 RX ADMIN — METOPROLOL TARTRATE 5 MG: 5 INJECTION, SOLUTION INTRAVENOUS at 20:35

## 2020-08-08 RX ADMIN — APIXABAN 5 MG: 5 TABLET, FILM COATED ORAL at 17:52

## 2020-08-08 RX ADMIN — TAMSULOSIN HYDROCHLORIDE 0.4 MG: 0.4 CAPSULE ORAL at 17:51

## 2020-08-08 RX ADMIN — AMIODARONE HYDROCHLORIDE 0.5 MG/MIN: 1.8 INJECTION, SOLUTION INTRAVENOUS at 02:28

## 2020-08-08 RX ADMIN — SODIUM CHLORIDE, POTASSIUM CHLORIDE, SODIUM LACTATE AND CALCIUM CHLORIDE 500 ML: 600; 310; 30; 20 INJECTION, SOLUTION INTRAVENOUS at 17:49

## 2020-08-08 RX ADMIN — APIXABAN 5 MG: 5 TABLET, FILM COATED ORAL at 05:03

## 2020-08-08 RX ADMIN — AMIODARONE HYDROCHLORIDE 400 MG: 200 TABLET ORAL at 10:37

## 2020-08-08 RX ADMIN — MORPHINE SULFATE 15 MG: 15 TABLET, FILM COATED, EXTENDED RELEASE ORAL at 05:03

## 2020-08-08 ASSESSMENT — FIBROSIS 4 INDEX: FIB4 SCORE: 0.78

## 2020-08-08 ASSESSMENT — CHA2DS2 SCORE
AGE 75 OR GREATER: NO
VASCULAR DISEASE: YES
DIABETES: YES
SEX: MALE
HYPERTENSION: YES
CHF OR LEFT VENTRICULAR DYSFUNCTION: NO
PRIOR STROKE OR TIA OR THROMBOEMBOLISM: NO
AGE 65 TO 74: NO
CHA2DS2 VASC SCORE: 3

## 2020-08-08 NOTE — PROGRESS NOTES
Reason for consultation /admission : Inf STEMI    Went into atrial fibrillation with rapid rate today morning.    Became hypotensive required vasopressor and IV fluids temporarily after IV metoprolol during IV amiodarone  Converted back to sinus rhythm before noon and has been in sinus rhythm ever since.    Blood pressure has been in the normal range overnight  Denies chest pain or shortness of breath    Review of systems;    General: No fever, chills, no weakness  HENT: No sore throat, no neck pain  Eyes: No blurred vision or double vision  Heart: No palpitation, no PND or orthopnea, no claudication, no leg swelling  Lung: No productive cough, no hemoptysis  Abdomen: No abdominal pain, no nausea vomiting diarrhea or blood in stool  : No dysuria, no frequency or hesitancy, no hematuria  Musculoskeletal: No myalgia, no back pain, some joint pain  Hematology: No easy bruising  Skin: No rash or itching  Neurological: No headache, no new focal weakness or numbness  Psychological: Denies depression, anxiety or insomnia  All other review of systems are negative      Temp:  [36.3 °C (97.3 °F)-36.8 °C (98.2 °F)] 36.4 °C (97.6 °F)  Pulse:  [] 84  Resp:  [9-39] 18  BP: ()/(38-98) 130/63  SpO2:  [85 %-100 %] 95 %  GENERAL not in acute distress, not dyspnic at rest  Head atraumatic, normocephalic  Eyes EOMI  ENT neck supple, no JVD, no carotid bruits or thyromegaly  Lung good expansion, distant sound, no rales or wheezing  Heart RRR, normal rate, no murmur,   no gallop or rub  Abd soft, no tenderness, mass or bruits  Ext no edema  Skin no ecchymosis or petechiae  Musculoskeletal no deformity  Neuro grossly intact  Psych normal mood, normal affect    Monitor reviewed by me showed no frequent PVCs .    Labs: Cr 0.8, K+ 3.8  Hb 8.9  Assessment and plans    1. PAF with RVR  In SR overnight, will switch to oral amiodarone  Continue carvedilol as BP allows  Hold off on telmisartan for now  Continue apixaban and monitor  for signs of bleeding  Will switch from prasugrel to clopidogrel with loading this AM  Continue on DAPT and discontinue aspirin in a couple weeks.      2. S/p Inferior STEMI and RCA ([place] NAOMY) stent with transient LV dysfunction  LV function has normalized  On carvedilol and DAPT as above  Cont statin      3. Anticoagulation for AF  As above    4. Anemia, acute blood loss  Hb down slightly  Cont monitor, now with triple Rx     5. Hypokalemia, improved    May transfer to telemetry  Will follow    Please note that this dictation was created using voice recognition software. I have worked with consultants from the vendor as well as technical experts from [employer] to optimize the interface. I have made every reasonable attempt to correct obvious errors, but I expect that there are errors of grammar and possibly content I did not discover before finalizing the note

## 2020-08-08 NOTE — PROGRESS NOTES
Critical Care Progress Note    Date of admission  8/1/2020    Chief Complaint  60 y.o. male  PMHx Chronic pain syndrome, HTN, DLD, TOB use, AMPARO and prediabetes admitted 8/1/2020 with NSTEMI, intubated in cath lab for agitation and hypoxemia.  Stent to RCA/PDA in cath lab.  Transfer to ICU on vent very hypertensive.    Hospital Course   8/1 3-4 min PEA arrest from hypotension after he cam back from cath lab - sedation? however repeat ECHO w/ LV thrombus and anterior wall now down -> to cath lab again, no new CAD, IABP placed, code chill initiated after neg CT head  8/3 - extubated yesterday, remains on dexmedetomidine, confused intermittently agitated.  5 L oxygen.  Continue heparin drip for LV thrombus and DAPT.  He pulled his own White out and had traumatic hematuria and started on CBI, monitor need for urology evaluation and follow-up hemoglobin.  Heparin held transiently with significant hematuria.  Resumed narcotics as he had been on MS Contin and Norco at home.   8/4 - agitation and confusion now resolved.  Dexmedetomidine infusion at 1.5 but titrating down today.  8/7 -asked to sign back on today with hypotension, suspect medication related, given IV fluid and vasopressor        Interval Problem Update  Reviewed last 24 hour events:  Tm = 98.2  Titrated off Norepi 2 am  SbP 100 - 130's  WBC 10.8  No abx  Hgb 8.9  LA 1.2 - 1.5 - 0.7 - 1.6  I/O2.5/1.2  Wt admit 104.1 -> current 104  A/o x 4; impulsive  SR/SB, PVCs  amio gtt - to PO  2 lpm  apixaban  Plavix    Addendum: Patient became hypotensive again today.?  Related to Zanaflex.  Will hold Zanaflex, vasopressors as needed today    Review of Systems  Review of Systems   Unable to perform ROS: Acuity of condition        Vital Signs for last 24 hours   Temp:  [36.3 °C (97.3 °F)-36.8 °C (98.2 °F)] 36.3 °C (97.3 °F)  Pulse:  [] 60  Resp:  [9-39] 19  BP: ()/() 116/71  SpO2:  [85 %-100 %] 98 %    Hemodynamic parameters for last 24 hours  CVP:  [2 MM  HG-71 MM HG] 7 MM HG    Respiratory Information for the last 24 hours       Physical Exam   Physical Exam  Vitals signs and nursing note reviewed.   HENT:      Head: Normocephalic and atraumatic.      Mouth/Throat:      Mouth: Mucous membranes are moist.   Eyes:      Pupils: Pupils are equal, round, and reactive to light.   Neck:      Musculoskeletal: Neck supple. No neck rigidity.   Cardiovascular:      Rate and Rhythm: Normal rate and regular rhythm.      Pulses: Normal pulses.   Pulmonary:      Effort: Pulmonary effort is normal. No respiratory distress.   Abdominal:      General: There is no distension.      Palpations: Abdomen is soft.   Musculoskeletal:         General: No swelling or deformity.   Skin:     General: Skin is warm.      Capillary Refill: Capillary refill takes less than 2 seconds.   Neurological:      Cranial Nerves: No cranial nerve deficit.      Comments: A/O x4       Medications  Current Facility-Administered Medications   Medication Dose Route Frequency Provider Last Rate Last Dose   • apixaban (ELIQUIS) tablet 5 mg  5 mg Oral BID Fernando Martinez M.D.   5 mg at 08/08/20 0503   • amiodarone (NEXTERONE) 360 mg/200 mL infusion  0.5-1 mg/min Intravenous Continuous Fernando Martinez M.D. 17 mL/hr at 08/08/20 0228 0.5 mg/min at 08/08/20 0228   • Metoprolol Tartrate (LOPRESSOR) injection 5 mg  5 mg Intravenous Q6HRS PRN Fernando Martinez M.D.       • carvedilol (COREG) tablet 3.125 mg  3.125 mg Oral BID WITH MEALS Fernando Martinez M.D.   3.125 mg at 08/08/20 0732   • norepinephrine (Levophed) infusion 8 mg/250 mL (premix)  0.5-30 mcg/min Intravenous Continuous Jevon Godwin M.D.   Stopped at 08/08/20 0232   • [START ON 8/9/2020] clopidogrel (PLAVIX) tablet 75 mg  75 mg Oral DAILY Fernando Martinez M.D.       • aspirin (ASA) chewable tab 81 mg  81 mg Oral DAILY Tatum Abdi M.D.   81 mg at 08/08/20 0502   • atorvastatin (LIPITOR) tablet 80 mg  80 mg Oral Nightly Tatum KRUGER  JORJE Abdi   80 mg at 08/07/20 2055   • senna-docusate (PERICOLACE or SENOKOT S) 8.6-50 MG per tablet 2 Tab  2 Tab Oral BID Tatum Abdi M.D.   Stopped at 08/06/20 1800    And   • polyethylene glycol/lytes (MIRALAX) PACKET 1 Packet  1 Packet Oral QDAY PRN Tatum Abdi M.D.        And   • magnesium hydroxide (MILK OF MAGNESIA) suspension 30 mL  30 mL Oral QDAY PRN Tatum Abdi M.D.        And   • bisacodyl (DULCOLAX) suppository 10 mg  10 mg Rectal QDAY PRN Tatum Abdi M.D.       • tizanidine (ZANAFLEX) tablet 4 mg  4 mg Oral 4X/DAY Tatum Abdi M.D.   4 mg at 08/07/20 2055   • traZODone (DESYREL) tablet 100 mg  100 mg Oral QHS Tatum Abdi M.D.   100 mg at 08/07/20 2055   • acetaminophen (TYLENOL) tablet 650 mg  650 mg Oral Q6HRS PRN Tatum Abdi M.D.       • ondansetron (ZOFRAN ODT) dispertab 4 mg  4 mg Oral Q4HRS PRN Tatum Abdi M.D.       • promethazine (PHENERGAN) tablet 12.5-25 mg  12.5-25 mg Oral Q4HRS PRN Tatum Abdi M.D.       • fentaNYL (SUBLIMAZE) injection  mcg   mcg Intravenous Q4HRS PRN Jevon Godwin M.D.   50 mcg at 08/04/20 1937   • LORazepam (ATIVAN) injection 1-2 mg  1-2 mg Intravenous Q3HRS PRN JEFF GalarzaP.RGraceN.   2 mg at 08/04/20 2208   • finasteride (PROSCAR) tablet 5 mg  5 mg Oral Q EVENING BILLY VasquezO.   5 mg at 08/07/20 1711   • HYDROcodone/acetaminophen (NORCO)  MG per tablet 1 Tab  1 Tab Oral Q3HRS PRN BILLY VasquezO.   1 Tab at 08/07/20 0256   • morphine ER (MS CONTIN) tablet 15 mg  15 mg Oral TID STEVEN Vasquez.O.   15 mg at 08/08/20 0503   • tamsulosin (FLOMAX) capsule 0.4 mg  0.4 mg Oral Q EVENING STEVEN Vasquez.O.   0.4 mg at 08/07/20 1712   • venlafaxine XR (EFFEXOR XR) capsule 150 mg  150 mg Oral DAILY BILLY VasquezO.   150 mg at 08/07/20 1711   • ondansetron (ZOFRAN) syringe/vial injection 4 mg  4 mg Intravenous Q4HRS PRN Meño Red M.D.       • promethazine (PHENERGAN) suppository 12.5-25 mg  12.5-25 mg  Rectal Q4HRS PRN Meño Red M.D.       • prochlorperazine (COMPAZINE) injection 5-10 mg  5-10 mg Intravenous Q4HRS PRN Meño Red M.D.       • labetalol (NORMODYNE/TRANDATE) injection 10 mg  10 mg Intravenous Q4HRS PRN Meño Red M.D.   10 mg at 08/04/20 2312   • nitroglycerin (NITROSTAT) tablet 0.4 mg  0.4 mg Sublingual Q5 MIN PRN Meño Red M.D.       • NS infusion   Intravenous Continuous Reed Mesa M.D. 10 mL/hr at 08/01/20 1024     • Respiratory Therapy Consult   Nebulization Continuous RT Reed Mesa M.D.       • ipratropium-albuterol (DUONEB) nebulizer solution  3 mL Nebulization Q2HRS PRN (RT) Reed Mesa M.D.       • enalaprilat (VASOTEC) injection 1.25 mg  1.25 mg Intravenous Q6HRS PRN Reed Mesa M.D.           Fluids    Intake/Output Summary (Last 24 hours) at 8/8/2020 0825  Last data filed at 8/8/2020 0600  Gross per 24 hour   Intake 2259.74 ml   Output 1050 ml   Net 1209.74 ml       Laboratory          Recent Labs     08/07/20  0130 08/07/20  1325 08/07/20  2100   SODIUM 139 138 140   POTASSIUM 3.2* 3.9 3.8   CHLORIDE 99 103 103   CO2 23 23 25   BUN 8 10 12   CREATININE 0.81 1.00 0.95   MAGNESIUM  --  2.0 2.1   PHOSPHORUS  --  2.2*  --    CALCIUM 9.7 9.1 9.4     Recent Labs     08/07/20  0130 08/07/20  1325 08/07/20  2100   ALTSGPT  --  23  --    ASTSGOT  --  15  --    ALKPHOSPHAT  --  57  --    TBILIRUBIN  --  0.8  --    GLUCOSE 104* 165* 149*     Recent Labs     08/07/20  0130 08/07/20  1325 08/08/20  0355   WBC 15.3* 14.5* 10.8   NEUTSPOLYS 87.90* 85.70* 73.60*   LYMPHOCYTES 3.50* 5.10* 11.30*   MONOCYTES 7.70 7.70 12.90   EOSINOPHILS 0.20 0.70 1.20   BASOPHILS 0.30 0.30 0.50   ASTSGOT  --  15  --    ALTSGPT  --  23  --    ALKPHOSPHAT  --  57  --    TBILIRUBIN  --  0.8  --      Recent Labs     08/07/20  0130 08/07/20  1325 08/08/20  0355   RBC 3.26* 2.97* 2.89*   HEMOGLOBIN 10.1* 9.3* 8.9*   HEMATOCRIT 30.4* 28.0* 27.3*   PLATELETCT 250 260  240   PROTHROMBTM 14.5  --   --    APTT 32.2  --   --    INR 1.09  --   --        Imaging  X-Ray:  I have personally reviewed the images and compared with prior images.    Assessment/Plan  * Acute respiratory failure with hypoxia ([facility])  Assessment & Plan  Intubated in Cath Lab, liberated 8/2  Resolved    Hypotension  Assessment & Plan  Multifactorial with AF, medication related, mild intravascular volume depletion  WBC up but afebrile and low suspicion for infection/sepsis  Given additional crystalloid today and dose reduction in beta-blocker and antihypertensives  Norepinephrine today as needed, follow  Follow chest x-ray, PCT, repeat labs    Cardiac arrest ([facility])  Assessment & Plan  PEA arrest secondary to sedation/NSTEMI  ROSC after less than 4 minutes CPR and 1 dose of epinephrine     Hypertensive emergency- (present on admission)  Assessment & Plan  Continue BP management    NSTEMI (non-ST elevated myocardial infarction) ([facility])- (present on admission)  Assessment & Plan  Status post PCI 8/1   DAPT  Statin/beta-blocker/ACE  Monitor for cardiac dysrhythmias, some history of ventricular ectopy/atrial flutter?  Currently in sinus tachycardia with frequent PVCs  Optimize electrolytes  EF 25%  Complicated by LV apical thrombus  Follow-up echocardiogram shows improved EF and resolution of LV thrombus    Hypertensive disease  Assessment & Plan  Beta-blocker dose reduced with hypotension today   Cardiology following    Atrial fibrillation with RVR ([facility])  Assessment & Plan  Back in sinus rhythm, continue amiodarone, cardiology following    Gross hematuria  Assessment & Plan  Secondary to White trauma with severe agitation  Resolved    Memory difficulties  Assessment & Plan       BPH (benign prostatic hyperplasia)  Assessment & Plan  Proscar and Flomax     Tobacco use  Assessment & Plan       Class 2 severe obesity due to excess calories with serious comorbidity in adult ([facility])  Assessment & Plan       Chronic back  pain  Assessment & Plan  Patient with multiple prior surgeries  Medications reviewed    History of prediabetes  Assessment & Plan  Glycemic control    Dyslipidemia  Assessment & Plan  statin     Updated plan:  Hypotensive again today, largely asymptomatic but systolic blood pressures maintaining down in the 70s and 80s, restarted back on norepinephrine infusion.  Will discontinue Zanaflex.  Follow closely in ICU.  Reviewed with cardiology    VTE:  Heparin  Ulcer: H2 Antagonist  Lines: Central Line  Ongoing indication addressed, Arterial Line  Ongoing indication addressed and White Catheter  Ongoing indication addressed    I have performed a physical exam and reviewed and updated ROS and Plan today (8/8/2020). In review of yesterday's note (8/7/2020), there are no changes except as documented above.     Discussed patient condition and risk of morbidity and/or mortality with Family, RN, RT, Pharmacy, Charge nurse / hot rounds and cardiology     The patient remains critically ill.  Critical care time = 32 minutes in directly providing and coordinating critical care and extensive data review.  No time overlap and excludes procedures.

## 2020-08-09 ENCOUNTER — APPOINTMENT (OUTPATIENT)
Dept: CARDIOLOGY | Facility: MEDICAL CENTER | Age: 60
DRG: 270 | End: 2020-08-09
Attending: INTERNAL MEDICINE
Payer: OTHER GOVERNMENT

## 2020-08-09 ENCOUNTER — APPOINTMENT (OUTPATIENT)
Dept: RADIOLOGY | Facility: MEDICAL CENTER | Age: 60
DRG: 270 | End: 2020-08-09
Attending: INTERNAL MEDICINE
Payer: OTHER GOVERNMENT

## 2020-08-09 PROBLEM — R31.9 HEMATURIA: Status: ACTIVE | Noted: 2020-08-09

## 2020-08-09 PROBLEM — I16.1 HYPERTENSIVE EMERGENCY: Status: RESOLVED | Noted: 2020-08-01 | Resolved: 2020-08-09

## 2020-08-09 PROBLEM — J96.01 ACUTE RESPIRATORY FAILURE WITH HYPOXIA (HCC): Status: RESOLVED | Noted: 2020-08-01 | Resolved: 2020-08-09

## 2020-08-09 PROBLEM — I46.9 CARDIAC ARREST (HCC): Status: RESOLVED | Noted: 2020-08-01 | Resolved: 2020-08-09

## 2020-08-09 LAB
ANION GAP SERPL CALC-SCNC: 13 MMOL/L (ref 7–16)
ANISOCYTOSIS BLD QL SMEAR: ABNORMAL
BASOPHILS # BLD AUTO: 0 % (ref 0–1.8)
BASOPHILS # BLD: 0 K/UL (ref 0–0.12)
BUN SERPL-MCNC: 12 MG/DL (ref 8–22)
CALCIUM SERPL-MCNC: 9.1 MG/DL (ref 8.5–10.5)
CHLORIDE SERPL-SCNC: 98 MMOL/L (ref 96–112)
CO2 SERPL-SCNC: 25 MMOL/L (ref 20–33)
CREAT SERPL-MCNC: 0.91 MG/DL (ref 0.5–1.4)
EKG IMPRESSION: NORMAL
EOSINOPHIL # BLD AUTO: 0 K/UL (ref 0–0.51)
EOSINOPHIL NFR BLD: 0 % (ref 0–6.9)
ERYTHROCYTE [DISTWIDTH] IN BLOOD BY AUTOMATED COUNT: 43.4 FL (ref 35.9–50)
GLUCOSE SERPL-MCNC: 123 MG/DL (ref 65–99)
HCT VFR BLD AUTO: 26.6 % (ref 42–52)
HGB BLD-MCNC: 8.9 G/DL (ref 14–18)
LV EJECT FRACT  99904: 60
LV EJECT FRACT MOD 2C 99903: 71.6
LV EJECT FRACT MOD 4C 99902: 59.24
LV EJECT FRACT MOD BP 99901: 67.03
LYMPHOCYTES # BLD AUTO: 0.25 K/UL (ref 1–4.8)
LYMPHOCYTES NFR BLD: 0.9 % (ref 22–41)
MACROCYTES BLD QL SMEAR: ABNORMAL
MANUAL DIFF BLD: ABNORMAL
MCH RBC QN AUTO: 31.1 PG (ref 27–33)
MCHC RBC AUTO-ENTMCNC: 33.5 G/DL (ref 33.7–35.3)
MCV RBC AUTO: 93 FL (ref 81.4–97.8)
METAMYELOCYTES NFR BLD MANUAL: 0.9 %
MICROCYTES BLD QL SMEAR: ABNORMAL
MONOCYTES # BLD AUTO: 0.25 K/UL (ref 0–0.85)
MONOCYTES NFR BLD AUTO: 0.9 % (ref 0–13.4)
MORPHOLOGY BLD-IMP: NORMAL
MRSA DNA SPEC QL NAA+PROBE: NORMAL
MYELOCYTES NFR BLD MANUAL: 1.7 %
NEUTROPHILS # BLD AUTO: 26.89 K/UL (ref 1.82–7.42)
NEUTROPHILS NFR BLD: 77.4 % (ref 44–72)
NEUTS BAND NFR BLD MANUAL: 18.3 % (ref 0–10)
NRBC # BLD AUTO: 0 K/UL
NRBC BLD-RTO: 0 /100 WBC
OVALOCYTES BLD QL SMEAR: NORMAL
PLATELET # BLD AUTO: 267 K/UL (ref 164–446)
PLATELET BLD QL SMEAR: NORMAL
PMV BLD AUTO: 11.1 FL (ref 9–12.9)
POIKILOCYTOSIS BLD QL SMEAR: NORMAL
POLYCHROMASIA BLD QL SMEAR: NORMAL
POTASSIUM SERPL-SCNC: 3.9 MMOL/L (ref 3.6–5.5)
PROCALCITONIN SERPL-MCNC: 17.88 NG/ML
RBC # BLD AUTO: 2.86 M/UL (ref 4.7–6.1)
RBC BLD AUTO: PRESENT
SIGNIFICANT IND 70042: NORMAL
SITE SITE: NORMAL
SODIUM SERPL-SCNC: 136 MMOL/L (ref 135–145)
SOURCE SOURCE: NORMAL
WBC # BLD AUTO: 28.1 K/UL (ref 4.8–10.8)

## 2020-08-09 PROCEDURE — 80048 BASIC METABOLIC PNL TOTAL CA: CPT

## 2020-08-09 PROCEDURE — A9270 NON-COVERED ITEM OR SERVICE: HCPCS | Performed by: INTERNAL MEDICINE

## 2020-08-09 PROCEDURE — 85027 COMPLETE CBC AUTOMATED: CPT

## 2020-08-09 PROCEDURE — 87040 BLOOD CULTURE FOR BACTERIA: CPT

## 2020-08-09 PROCEDURE — 84145 PROCALCITONIN (PCT): CPT

## 2020-08-09 PROCEDURE — A9270 NON-COVERED ITEM OR SERVICE: HCPCS | Performed by: HOSPITALIST

## 2020-08-09 PROCEDURE — 700111 HCHG RX REV CODE 636 W/ 250 OVERRIDE (IP): Performed by: INTERNAL MEDICINE

## 2020-08-09 PROCEDURE — 93010 ELECTROCARDIOGRAM REPORT: CPT | Performed by: INTERNAL MEDICINE

## 2020-08-09 PROCEDURE — 93308 TTE F-UP OR LMTD: CPT | Mod: 26 | Performed by: INTERNAL MEDICINE

## 2020-08-09 PROCEDURE — 770022 HCHG ROOM/CARE - ICU (200)

## 2020-08-09 PROCEDURE — 85007 BL SMEAR W/DIFF WBC COUNT: CPT

## 2020-08-09 PROCEDURE — 87641 MR-STAPH DNA AMP PROBE: CPT

## 2020-08-09 PROCEDURE — 700102 HCHG RX REV CODE 250 W/ 637 OVERRIDE(OP): Performed by: INTERNAL MEDICINE

## 2020-08-09 PROCEDURE — 99233 SBSQ HOSP IP/OBS HIGH 50: CPT | Performed by: INTERNAL MEDICINE

## 2020-08-09 PROCEDURE — 71045 X-RAY EXAM CHEST 1 VIEW: CPT

## 2020-08-09 PROCEDURE — 700105 HCHG RX REV CODE 258: Performed by: INTERNAL MEDICINE

## 2020-08-09 PROCEDURE — 93308 TTE F-UP OR LMTD: CPT

## 2020-08-09 PROCEDURE — 700102 HCHG RX REV CODE 250 W/ 637 OVERRIDE(OP): Performed by: HOSPITALIST

## 2020-08-09 PROCEDURE — 99291 CRITICAL CARE FIRST HOUR: CPT | Performed by: INTERNAL MEDICINE

## 2020-08-09 RX ORDER — DIGOXIN 0.25 MG/ML
500 INJECTION INTRAMUSCULAR; INTRAVENOUS ONCE
Status: COMPLETED | OUTPATIENT
Start: 2020-08-09 | End: 2020-08-09

## 2020-08-09 RX ORDER — SODIUM CHLORIDE 9 MG/ML
INJECTION, SOLUTION INTRAVENOUS
Status: ACTIVE
Start: 2020-08-09 | End: 2020-08-10

## 2020-08-09 RX ORDER — DIGOXIN 0.25 MG/ML
250 INJECTION INTRAMUSCULAR; INTRAVENOUS ONCE
Status: COMPLETED | OUTPATIENT
Start: 2020-08-09 | End: 2020-08-09

## 2020-08-09 RX ADMIN — VANCOMYCIN HYDROCHLORIDE 2500 MG: 500 INJECTION, POWDER, LYOPHILIZED, FOR SOLUTION INTRAVENOUS at 13:09

## 2020-08-09 RX ADMIN — MORPHINE SULFATE 15 MG: 15 TABLET, FILM COATED, EXTENDED RELEASE ORAL at 12:10

## 2020-08-09 RX ADMIN — CLOPIDOGREL BISULFATE 75 MG: 75 TABLET ORAL at 05:43

## 2020-08-09 RX ADMIN — TRAZODONE HYDROCHLORIDE 100 MG: 100 TABLET ORAL at 20:52

## 2020-08-09 RX ADMIN — AMIODARONE HYDROCHLORIDE 400 MG: 200 TABLET ORAL at 17:42

## 2020-08-09 RX ADMIN — DIGOXIN 250 MCG: 0.25 INJECTION INTRAMUSCULAR; INTRAVENOUS at 06:08

## 2020-08-09 RX ADMIN — AMIODARONE HYDROCHLORIDE 400 MG: 200 TABLET ORAL at 05:43

## 2020-08-09 RX ADMIN — CEFEPIME 2 G: 2 INJECTION, POWDER, FOR SOLUTION INTRAVENOUS at 20:52

## 2020-08-09 RX ADMIN — MORPHINE SULFATE 15 MG: 15 TABLET, FILM COATED, EXTENDED RELEASE ORAL at 06:00

## 2020-08-09 RX ADMIN — MORPHINE SULFATE 15 MG: 15 TABLET, FILM COATED, EXTENDED RELEASE ORAL at 17:42

## 2020-08-09 RX ADMIN — CEFEPIME 2 G: 2 INJECTION, POWDER, FOR SOLUTION INTRAVENOUS at 13:08

## 2020-08-09 RX ADMIN — FINASTERIDE 5 MG: 5 TABLET, FILM COATED ORAL at 17:42

## 2020-08-09 RX ADMIN — ATORVASTATIN CALCIUM 80 MG: 80 TABLET, FILM COATED ORAL at 20:52

## 2020-08-09 RX ADMIN — VENLAFAXINE HYDROCHLORIDE 150 MG: 75 CAPSULE, EXTENDED RELEASE ORAL at 17:42

## 2020-08-09 RX ADMIN — TAMSULOSIN HYDROCHLORIDE 0.4 MG: 0.4 CAPSULE ORAL at 17:42

## 2020-08-09 RX ADMIN — DIGOXIN 500 MCG: 0.25 INJECTION INTRAMUSCULAR; INTRAVENOUS at 00:41

## 2020-08-09 RX ADMIN — ASPIRIN 81 MG 81 MG: 81 TABLET ORAL at 05:43

## 2020-08-09 RX ADMIN — APIXABAN 5 MG: 5 TABLET, FILM COATED ORAL at 05:43

## 2020-08-09 ASSESSMENT — FIBROSIS 4 INDEX: FIB4 SCORE: 0.7

## 2020-08-09 NOTE — CARE PLAN
Problem: Knowledge Deficit  Goal: Knowledge of disease process/condition, treatment plan, diagnostic tests, and medications will improve  Outcome: PROGRESSING AS EXPECTED  Intervention: Explain information regarding disease process/condition, treatment plan, diagnostic tests, and medications and document in education  Note: CBI treatment plan discussed with patient and wife.      Problem: Safety  Goal: Will remain free from injury  Intervention: Collaborate with Interdisciplinary Team for safe transfer and mobilization techniques  Flowsheets (Taken 8/8/2020 2200)  Assistive Devices: Hand held assist  Note: Pt will call for assistance ambulating.

## 2020-08-09 NOTE — PROGRESS NOTES
Reason for consultation /admission : Inf STEMI/PAF    Denies chest pain or shortness of breath  Has intermittent A. fib at times with rapid rate with some palpitation  Now on oral amiodarone 40 mg twice a day  Blood pressure borderline this morning currently off carvedilol    Developed niko hematuria yesterday after started on apixaban the day before  Was also taking clopidogrel and aspirin for his stent  Had to have White catheter placed back in  Urine relatively clear this morning      Review of systems;    General: No fever, chills, no weakness  HENT: No sore throat, no neck pain  Eyes: No blurred vision or double vision  Heart: No palpitation, no PND or orthopnea, no leg swelling  Lung: No productive cough, no hemoptysis  Abdomen: No nausea vomiting diarrhea or blood in stool  : No dysuria, + hematuria  Musculoskeletal: No myalgia, + chronic back pain  Hematology: + easy bruising  Skin: No rash or itching  Neurological: No headache, no new focal weakness or numbness  Psychological: Denies depression, anxiety or insomnia  All other review of systems are negative      Temp:  [35.9 °C (96.7 °F)-36.4 °C (97.6 °F)] 36.4 °C (97.6 °F)  Pulse:  [] 88  Resp:  [13-32] 16  BP: ()/() 161/64  SpO2:  [79 %-100 %] 90 %  GENERAL not in acute distress, not dyspnic at rest  Head atraumatic, normocephalic  Eyes EOMI  ENT neck supple, no JVD, no carotid bruits or thyromegaly  Lung good expansion, distant sound, no rales or wheezing  Heart RRR, normal rate, no systolic murmur,   no gallop or rub  Abd soft, no tenderness, mass or bruits  Ext no edema  Skin no ecchymosis or petechiae  Musculoskeletal no deformity  Neuro grossly intact  Psych normal mood, normal affect    Monitor reviewed by me as above.    Labs: Cr 0.9, K+ 3.9  WBC 28,000  Hemoglobin 8.9 platelet 267      Assessment and plans    1. PAF with brief recurrences and RVR, BP borderline low   On loading dose of oral amiodarone  Off carvedilol due to  BP  Will d/c apixaban due to hematuria and monitor for signs of bleeding  Continue on DAPT for now and discontinue aspirin in a couple weeks.   Cont PRN IV metoprolol for high rate     2. S/p Inferior STEMI and RCA ([place] NAOMY) stent with transient LV dysfunction  LV function has normalized  OnDAPT as above  Cont statin      3. Need for anticoagulation for AF  As above off currently     4. Hematuria  Per PCP    5. Hypokalemia, improved    6. Leukocytosis  Procalcitonin markedly elevated  Per primary      Will follow    Please note that this dictation was created using voice recognition software. I have worked with consultants from the vendor as well as technical experts from [facility] to optimize the interface. I have made every reasonable attempt to correct obvious errors, but I expect that there are errors of grammar and possibly content I did not discover before finalizing the note

## 2020-08-09 NOTE — DISCHARGE PLANNING
Anticipated Discharge Disposition: SNF    Action: RN JOHN discussed patient with BS RN, Caryn, and he is not medically cleared to go to SNF at this time.  Patient has been accepted to [facility] and [facility].    Barriers to Discharge: medical clearance    Plan: Case coordination to f/u with treatment team for medical clearance

## 2020-08-09 NOTE — PROGRESS NOTES
Patient wife came out and got this RN. Stated  is complaining of lower back pain, he is shakey and complaining of being very closed. This RN assessed patient found him to be pale, diaphoretic, shaking and BP was unable to cycle. Notified MD. Assessment complete. New orders placed in EPIC

## 2020-08-09 NOTE — PROGRESS NOTES
Critical Care Progress Note    Date of admission  8/1/2020    Chief Complaint  60 y.o. male  PMHx Chronic pain syndrome, HTN, DLD, TOB use, AMPARO and prediabetes admitted 8/1/2020 with NSTEMI, intubated in cath lab for agitation and hypoxemia.  Stent to RCA/PDA in cath lab 8/1.  Transfer to ICU on vent very hypertensive.      Hospital Course      8/1 3-4 min PEA arrest from hypotension after he cam back from cath lab - sedation? however repeat ECHO w/ LV thrombus and anterior wall now down -> to cath lab again, no new CAD, IABP placed, code chill initiated after neg CT head  8/3 - extubated yesterday, remains on dexmedetomidine, confused intermittently agitated.  5 L oxygen.  Continue heparin drip for LV thrombus and DAPT.  He pulled his own White out and had traumatic hematuria and started on CBI, monitor need for urology evaluation and follow-up hemoglobin.  Heparin held transiently with significant hematuria.  Resumed narcotics as he had been on MS Contin and Norco at home.   8/4 - agitation and confusion now resolved.  Dexmedetomidine infusion at 1.5 but titrating down today.  8/7 -asked to sign back on today with hypotension, suspect medication related, given IV fluid and vasopressor  8/8 - transient hypotension; briefly back on norepi; Zanaflex stopped as temporally related to hypotension;   8/9 - Afebrile but WBC up to 28.1 with 18% bands; hematuria on CBI; CXR with LLL atx (mild), 2 lpm oxygen; PCT 17.8; start abx with cefepime, vanco x 1 dose to cover for ? pna vs UTI.  Sent nasal MRSA, BCs, UA when off CBI.        Interval Problem Update  Reviewed last 24 hour events:  Hematuria overnight; CBI  Int AF overnight; dig  Tm = 97.6  WBC up 14.8 -> 10.8 -> 28.1 18% bands  Hypotensive yesterday again; briefly on norepi  Zanaflex stopped ? Worsening hypotension  Labile BP  Cr 0.91  I/O = 800/2.4 (-1.6L) - on CBI  No diuretic  eliquis stopped for hematuria  Repeat CXR, PCT, limited echo  Hgb stable  2 lpm  oxygen  DAPT for stent      Review of Systems  Review of Systems   Unable to perform ROS: Acuity of condition        Vital Signs for last 24 hours   Temp:  [35.9 °C (96.7 °F)-36.4 °C (97.6 °F)] (P) 36.4 °C (97.6 °F)  Pulse:  [] (P) 109  Resp:  [13-32] (P) 26  BP: ()/() (P) 99/70  SpO2:  [91 %-100 %] (P) 96 %    Hemodynamic parameters for last 24 hours       Respiratory Information for the last 24 hours       Physical Exam   Physical Exam  Vitals signs and nursing note reviewed.   HENT:      Head: Normocephalic and atraumatic.      Mouth/Throat:      Mouth: Mucous membranes are moist.   Eyes:      Pupils: Pupils are equal, round, and reactive to light.   Neck:      Musculoskeletal: Neck supple. No neck rigidity.   Cardiovascular:      Rate and Rhythm: Normal rate and regular rhythm.      Pulses: Normal pulses.   Pulmonary:      Effort: Pulmonary effort is normal. No respiratory distress.   Abdominal:      General: There is no distension.      Palpations: Abdomen is soft.   Musculoskeletal:         General: No swelling or deformity.   Skin:     General: Skin is warm.      Capillary Refill: Capillary refill takes less than 2 seconds.   Neurological:      Cranial Nerves: No cranial nerve deficit.      Comments: A/O x4       Medications  Current Facility-Administered Medications   Medication Dose Route Frequency Provider Last Rate Last Dose   • amiodarone (CORDARONE) tablet 400 mg  400 mg Enteral Tube TWICE DAILY Fernando Martinez M.D.   400 mg at 08/09/20 0543   • norepinephrine (Levophed) infusion 8 mg/250 mL (premix)  0-30 mcg/min Intravenous Continuous Jevon Godwin M.D.   Stopped at 08/08/20 1330   • Metoprolol Tartrate (LOPRESSOR) injection 5 mg  5 mg Intravenous Q6HRS PRN Fernando Martinez M.D.   5 mg at 08/08/20 2035   • clopidogrel (PLAVIX) tablet 75 mg  75 mg Oral DAILY Fernando Martinez M.D.   75 mg at 08/09/20 0543   • aspirin (ASA) chewable tab 81 mg  81 mg Oral DAILY Tatum KRUGER  JORJE Abdi   81 mg at 08/09/20 0543   • atorvastatin (LIPITOR) tablet 80 mg  80 mg Oral Nightly Tautm Abdi M.D.   80 mg at 08/08/20 2010   • senna-docusate (PERICOLACE or SENOKOT S) 8.6-50 MG per tablet 2 Tab  2 Tab Oral BID Tatum Abdi M.D.   Stopped at 08/06/20 1800    And   • polyethylene glycol/lytes (MIRALAX) PACKET 1 Packet  1 Packet Oral QDAY PRN Tatum Abdi M.D.        And   • magnesium hydroxide (MILK OF MAGNESIA) suspension 30 mL  30 mL Oral QDAY PRN Tatum Abdi M.D.        And   • bisacodyl (DULCOLAX) suppository 10 mg  10 mg Rectal QDAY PRN Tatum Abdi M.D.       • traZODone (DESYREL) tablet 100 mg  100 mg Oral QHS Tatum Abdi M.D.   100 mg at 08/08/20 2010   • acetaminophen (TYLENOL) tablet 650 mg  650 mg Oral Q6HRS PRN Tatum Abdi M.D.       • ondansetron (ZOFRAN ODT) dispertab 4 mg  4 mg Oral Q4HRS PRN Tatum Abdi M.D.       • promethazine (PHENERGAN) tablet 12.5-25 mg  12.5-25 mg Oral Q4HRS PRN Tatum Abdi M.D.       • fentaNYL (SUBLIMAZE) injection  mcg   mcg Intravenous Q4HRS PRN Jevno Godwin M.D.   50 mcg at 08/04/20 1937   • LORazepam (ATIVAN) injection 1-2 mg  1-2 mg Intravenous Q3HRS PRN JEFF GalarzaP.RGraceN.   2 mg at 08/04/20 2208   • finasteride (PROSCAR) tablet 5 mg  5 mg Oral Q EVENING BILLY VasquezOGrace   5 mg at 08/08/20 1751   • HYDROcodone/acetaminophen (NORCO)  MG per tablet 1 Tab  1 Tab Oral Q3HRS PRN BILLY VasquezO.   1 Tab at 08/07/20 0256   • morphine ER (MS CONTIN) tablet 15 mg  15 mg Oral TID BILLY VasquezO.   15 mg at 08/09/20 0600   • tamsulosin (FLOMAX) capsule 0.4 mg  0.4 mg Oral Q EVENING BILLY VasquezO.   0.4 mg at 08/08/20 1751   • venlafaxine XR (EFFEXOR XR) capsule 150 mg  150 mg Oral DAILY BILLY VasquezO.   150 mg at 08/08/20 1807   • ondansetron (ZOFRAN) syringe/vial injection 4 mg  4 mg Intravenous Q4HRS PRN Meño Red M.D.       • promethazine (PHENERGAN) suppository 12.5-25 mg   12.5-25 mg Rectal Q4HRS PRN Meño Red M.D.       • prochlorperazine (COMPAZINE) injection 5-10 mg  5-10 mg Intravenous Q4HRS PRN Meño Red M.D.       • labetalol (NORMODYNE/TRANDATE) injection 10 mg  10 mg Intravenous Q4HRS PRN Meño Red M.D.   10 mg at 08/04/20 2312   • nitroglycerin (NITROSTAT) tablet 0.4 mg  0.4 mg Sublingual Q5 MIN PRN Meño Red M.D.       • Respiratory Therapy Consult   Nebulization Continuous RT Reed Mesa M.D.       • ipratropium-albuterol (DUONEB) nebulizer solution  3 mL Nebulization Q2HRS PRN (RT) Reed Mesa M.D.       • enalaprilat (VASOTEC) injection 1.25 mg  1.25 mg Intravenous Q6HRS PRN Reed Mesa M.D.           Fluids    Intake/Output Summary (Last 24 hours) at 8/9/2020 0824  Last data filed at 8/9/2020 0600  Gross per 24 hour   Intake 565.49 ml   Output 2202 ml   Net -1636.51 ml       Laboratory          Recent Labs     08/07/20  1325 08/07/20  2100 08/08/20  0828 08/09/20  0558   SODIUM 138 140 136 136   POTASSIUM 3.9 3.8 3.8 3.9   CHLORIDE 103 103 99 98   CO2 23 25 25 25   BUN 10 12 11 12   CREATININE 1.00 0.95 0.84 0.91   MAGNESIUM 2.0 2.1 1.9  --    PHOSPHORUS 2.2*  --   --   --    CALCIUM 9.1 9.4 9.5 9.1     Recent Labs     08/07/20  1325 08/07/20  2100 08/08/20  0828 08/09/20  0558   ALTSGPT 23  --   --   --    ASTSGOT 15  --   --   --    ALKPHOSPHAT 57  --   --   --    TBILIRUBIN 0.8  --   --   --    GLUCOSE 165* 149* 135* 123*     Recent Labs     08/07/20  1325 08/08/20  0355 08/09/20  0558   WBC 14.5* 10.8 28.1*   NEUTSPOLYS 85.70* 73.60* 77.40*   LYMPHOCYTES 5.10* 11.30* 0.90*   MONOCYTES 7.70 12.90 0.90   EOSINOPHILS 0.70 1.20 0.00   BASOPHILS 0.30 0.50 0.00   ASTSGOT 15  --   --    ALTSGPT 23  --   --    ALKPHOSPHAT 57  --   --    TBILIRUBIN 0.8  --   --      Recent Labs     08/07/20  0130 08/07/20  1325 08/08/20  0355 08/09/20  0558   RBC 3.26* 2.97* 2.89* 2.86*   HEMOGLOBIN 10.1* 9.3* 8.9* 8.9*   HEMATOCRIT 30.4*  28.0* 27.3* 26.6*   PLATELETCT 250 260 240 267   PROTHROMBTM 14.5  --   --   --    APTT 32.2  --   --   --    INR 1.09  --   --   --        Imaging  X-Ray:  I have personally reviewed the images and compared with prior images.    Assessment/Plan  Hypotension  Assessment & Plan  Multifactorial with AF, medication related, mild intravascular volume depletion  WBC up but afebrile and low suspicion for infection/sepsis  Given additional crystalloid today and dose reduction in beta-blocker and antihypertensives  Norepinephrine today as needed, follow  Follow chest x-ray, PCT, repeat labs    NSTEMI (non-ST elevated myocardial infarction) ([facility])- (present on admission)  Assessment & Plan  Status post PCI 8/1   DAPT  Statin/beta-blocker/ACE  Monitor for cardiac dysrhythmias, some history of ventricular ectopy/atrial flutter?  Currently in sinus tachycardia with frequent PVCs  Optimize electrolytes  EF 25%  Complicated by LV apical thrombus  Follow-up echocardiogram shows improved EF and resolution of LV thrombus    Hypertensive disease  Assessment & Plan  Beta-blocker dose reduced with hypotension today   Cardiology following    Atrial fibrillation with RVR ([facility])  Assessment & Plan  Back in sinus rhythm, continue amiodarone, cardiology following    Gross hematuria  Assessment & Plan  Secondary to White trauma with severe agitation  Resolved    Memory difficulties  Assessment & Plan       BPH (benign prostatic hyperplasia)  Assessment & Plan  Proscar and Flomax     Tobacco use  Assessment & Plan       Class 2 severe obesity due to excess calories with serious comorbidity in adult ([facility])  Assessment & Plan       Chronic back pain  Assessment & Plan  Patient with multiple prior surgeries  Medications reviewed    History of prediabetes  Assessment & Plan  Glycemic control    Dyslipidemia  Assessment & Plan  statin     Updated plan:  He developed significant hypotension of unclear etiology last 72 hours intermittently on  norepinephrine.  He is now developed significant leukocytosis WBC up to 20.1 today with 18% bands, PCT 17.8  He is 8 days post RCA stent x2  Chest x-ray shows minimal left lower lobe atelectasis and he remains on 2 L oxygen via nasal cannula without respiratory distress  He did develop new hematuria  I am initiating broad-spectrum antibiotics today with cefepime and vancomycin x1,  Blood cultures x2 urinalysis when off CBI, nasal swab for MRSA  Repeat limited echocardiogram to evaluate for ventricular function, effusion, valvular function  Continue to follow closely in ICU.  Meds reviewed and reviewed with cardiology    VTE:  Heparin  Ulcer: H2 Antagonist  Lines: Central Line  Ongoing indication addressed, Arterial Line  Ongoing indication addressed and White Catheter  Ongoing indication addressed    I have performed a physical exam and reviewed and updated ROS and Plan today (8/9/2020). In review of yesterday's note (8/8/2020), there are no changes except as documented above.     Discussed patient condition and risk of morbidity and/or mortality with Family, RN, RT, Pharmacy, Charge nurse / hot rounds and cardiology     The patient remains critically ill.  Critical care time = 41 minutes in directly providing and coordinating critical care and extensive data review.  No time overlap and excludes procedures.

## 2020-08-09 NOTE — PROGRESS NOTES
Lab called with critical result of Procalcitonin 17.54  at 1128. Critical lab result read back to .   Dr. Staples notified of critical lab result at 1130.  Critical lab result read back by Dr. Staples.

## 2020-08-10 PROBLEM — E87.6 HYPOKALEMIA: Status: ACTIVE | Noted: 2020-08-10

## 2020-08-10 PROBLEM — I69.911 MEMORY DEFICIT AFTER CEREBROVASCULAR DISEASE: Status: ACTIVE | Noted: 2020-08-01

## 2020-08-10 PROBLEM — J18.9 PNEUMONIA: Status: ACTIVE | Noted: 2020-08-10

## 2020-08-10 LAB
ANION GAP SERPL CALC-SCNC: 11 MMOL/L (ref 7–16)
BASOPHILS # BLD AUTO: 0.6 % (ref 0–1.8)
BASOPHILS # BLD: 0.1 K/UL (ref 0–0.12)
BUN SERPL-MCNC: 14 MG/DL (ref 8–22)
CALCIUM SERPL-MCNC: 8.8 MG/DL (ref 8.5–10.5)
CHLORIDE SERPL-SCNC: 101 MMOL/L (ref 96–112)
CO2 SERPL-SCNC: 26 MMOL/L (ref 20–33)
CREAT SERPL-MCNC: 0.74 MG/DL (ref 0.5–1.4)
EOSINOPHIL # BLD AUTO: 0.29 K/UL (ref 0–0.51)
EOSINOPHIL NFR BLD: 1.6 % (ref 0–6.9)
ERYTHROCYTE [DISTWIDTH] IN BLOOD BY AUTOMATED COUNT: 43 FL (ref 35.9–50)
GLUCOSE SERPL-MCNC: 129 MG/DL (ref 65–99)
HCT VFR BLD AUTO: 26.3 % (ref 42–52)
HGB BLD-MCNC: 8.8 G/DL (ref 14–18)
IMM GRANULOCYTES # BLD AUTO: 0.05 K/UL (ref 0–0.11)
IMM GRANULOCYTES NFR BLD AUTO: 0.3 % (ref 0–0.9)
LYMPHOCYTES # BLD AUTO: 1.28 K/UL (ref 1–4.8)
LYMPHOCYTES NFR BLD: 7.2 % (ref 22–41)
MCH RBC QN AUTO: 30.9 PG (ref 27–33)
MCHC RBC AUTO-ENTMCNC: 33.5 G/DL (ref 33.7–35.3)
MCV RBC AUTO: 92.3 FL (ref 81.4–97.8)
MONOCYTES # BLD AUTO: 1.45 K/UL (ref 0–0.85)
MONOCYTES NFR BLD AUTO: 8.2 % (ref 0–13.4)
NEUTROPHILS # BLD AUTO: 14.62 K/UL (ref 1.82–7.42)
NEUTROPHILS NFR BLD: 82.1 % (ref 44–72)
NRBC # BLD AUTO: 0.02 K/UL
NRBC BLD-RTO: 0.1 /100 WBC
PLATELET # BLD AUTO: 268 K/UL (ref 164–446)
PMV BLD AUTO: 11 FL (ref 9–12.9)
POTASSIUM SERPL-SCNC: 3.2 MMOL/L (ref 3.6–5.5)
RBC # BLD AUTO: 2.85 M/UL (ref 4.7–6.1)
SODIUM SERPL-SCNC: 138 MMOL/L (ref 135–145)
WBC # BLD AUTO: 17.8 K/UL (ref 4.8–10.8)

## 2020-08-10 PROCEDURE — 700105 HCHG RX REV CODE 258: Performed by: INTERNAL MEDICINE

## 2020-08-10 PROCEDURE — A9270 NON-COVERED ITEM OR SERVICE: HCPCS | Performed by: INTERNAL MEDICINE

## 2020-08-10 PROCEDURE — 92523 SPEECH SOUND LANG COMPREHEN: CPT

## 2020-08-10 PROCEDURE — 700102 HCHG RX REV CODE 250 W/ 637 OVERRIDE(OP): Performed by: INTERNAL MEDICINE

## 2020-08-10 PROCEDURE — 99233 SBSQ HOSP IP/OBS HIGH 50: CPT | Performed by: INTERNAL MEDICINE

## 2020-08-10 PROCEDURE — 99232 SBSQ HOSP IP/OBS MODERATE 35: CPT | Performed by: HOSPITALIST

## 2020-08-10 PROCEDURE — 700102 HCHG RX REV CODE 250 W/ 637 OVERRIDE(OP): Performed by: HOSPITALIST

## 2020-08-10 PROCEDURE — 700111 HCHG RX REV CODE 636 W/ 250 OVERRIDE (IP): Performed by: INTERNAL MEDICINE

## 2020-08-10 PROCEDURE — 80048 BASIC METABOLIC PNL TOTAL CA: CPT

## 2020-08-10 PROCEDURE — A9270 NON-COVERED ITEM OR SERVICE: HCPCS | Performed by: HOSPITALIST

## 2020-08-10 PROCEDURE — 85025 COMPLETE CBC W/AUTO DIFF WBC: CPT

## 2020-08-10 PROCEDURE — 770020 HCHG ROOM/CARE - TELE (206)

## 2020-08-10 PROCEDURE — 700101 HCHG RX REV CODE 250: Performed by: INTERNAL MEDICINE

## 2020-08-10 PROCEDURE — 97535 SELF CARE MNGMENT TRAINING: CPT

## 2020-08-10 PROCEDURE — 97116 GAIT TRAINING THERAPY: CPT

## 2020-08-10 RX ORDER — POTASSIUM CHLORIDE 20 MEQ/1
40 TABLET, EXTENDED RELEASE ORAL 2 TIMES DAILY
Status: COMPLETED | OUTPATIENT
Start: 2020-08-10 | End: 2020-08-10

## 2020-08-10 RX ADMIN — CEFEPIME 2 G: 2 INJECTION, POWDER, FOR SOLUTION INTRAVENOUS at 05:07

## 2020-08-10 RX ADMIN — MORPHINE SULFATE 15 MG: 15 TABLET, FILM COATED, EXTENDED RELEASE ORAL at 11:37

## 2020-08-10 RX ADMIN — TAMSULOSIN HYDROCHLORIDE 0.4 MG: 0.4 CAPSULE ORAL at 18:10

## 2020-08-10 RX ADMIN — POTASSIUM CHLORIDE 40 MEQ: 1500 TABLET, EXTENDED RELEASE ORAL at 18:10

## 2020-08-10 RX ADMIN — ATORVASTATIN CALCIUM 80 MG: 80 TABLET, FILM COATED ORAL at 20:15

## 2020-08-10 RX ADMIN — CEFEPIME 2 G: 2 INJECTION, POWDER, FOR SOLUTION INTRAVENOUS at 20:18

## 2020-08-10 RX ADMIN — TRAZODONE HYDROCHLORIDE 100 MG: 100 TABLET ORAL at 20:15

## 2020-08-10 RX ADMIN — MORPHINE SULFATE 15 MG: 15 TABLET, FILM COATED, EXTENDED RELEASE ORAL at 05:08

## 2020-08-10 RX ADMIN — MORPHINE SULFATE 15 MG: 15 TABLET, FILM COATED, EXTENDED RELEASE ORAL at 18:10

## 2020-08-10 RX ADMIN — CLOPIDOGREL BISULFATE 75 MG: 75 TABLET ORAL at 05:07

## 2020-08-10 RX ADMIN — AMIODARONE HYDROCHLORIDE 400 MG: 200 TABLET ORAL at 05:07

## 2020-08-10 RX ADMIN — AMIODARONE HYDROCHLORIDE 400 MG: 200 TABLET ORAL at 18:10

## 2020-08-10 RX ADMIN — VENLAFAXINE HYDROCHLORIDE 150 MG: 75 CAPSULE, EXTENDED RELEASE ORAL at 20:15

## 2020-08-10 RX ADMIN — ASPIRIN 81 MG 81 MG: 81 TABLET ORAL at 05:08

## 2020-08-10 RX ADMIN — POTASSIUM CHLORIDE 40 MEQ: 1500 TABLET, EXTENDED RELEASE ORAL at 10:22

## 2020-08-10 RX ADMIN — HYDROCODONE BITARTRATE AND ACETAMINOPHEN 1 TABLET: 10; 325 TABLET ORAL at 01:52

## 2020-08-10 RX ADMIN — FINASTERIDE 5 MG: 5 TABLET, FILM COATED ORAL at 18:10

## 2020-08-10 RX ADMIN — CEFEPIME 2 G: 2 INJECTION, POWDER, FOR SOLUTION INTRAVENOUS at 14:43

## 2020-08-10 RX ADMIN — METOPROLOL TARTRATE 5 MG: 5 INJECTION, SOLUTION INTRAVENOUS at 03:54

## 2020-08-10 ASSESSMENT — GAIT ASSESSMENTS
DISTANCE (FEET): 470
ASSISTIVE DEVICE: FRONT WHEEL WALKER;NONE
GAIT LEVEL OF ASSIST: MINIMAL ASSIST

## 2020-08-10 ASSESSMENT — COGNITIVE AND FUNCTIONAL STATUS - GENERAL
TURNING FROM BACK TO SIDE WHILE IN FLAT BAD: A LITTLE
SUGGESTED CMS G CODE MODIFIER DAILY ACTIVITY: CK
MOBILITY SCORE: 18
CLIMB 3 TO 5 STEPS WITH RAILING: A LITTLE
HELP NEEDED FOR BATHING: A LITTLE
SUGGESTED CMS G CODE MODIFIER MOBILITY: CK
DAILY ACTIVITIY SCORE: 19
DRESSING REGULAR LOWER BODY CLOTHING: A LOT
WALKING IN HOSPITAL ROOM: A LITTLE
DRESSING REGULAR UPPER BODY CLOTHING: A LITTLE
MOVING TO AND FROM BED TO CHAIR: A LITTLE
TOILETING: A LITTLE
MOVING FROM LYING ON BACK TO SITTING ON SIDE OF FLAT BED: A LITTLE
STANDING UP FROM CHAIR USING ARMS: A LITTLE

## 2020-08-10 ASSESSMENT — ENCOUNTER SYMPTOMS
PALPITATIONS: 0
RESPIRATORY NEGATIVE: 1
VOMITING: 0
HALLUCINATIONS: 0
BRUISES/BLEEDS EASILY: 0
WEAKNESS: 0
ABDOMINAL PAIN: 0
EYES NEGATIVE: 1
HEADACHES: 0
NAUSEA: 0
GASTROINTESTINAL NEGATIVE: 1
PSYCHIATRIC NEGATIVE: 1
DIZZINESS: 0
CONSTITUTIONAL NEGATIVE: 1
CARDIOVASCULAR NEGATIVE: 1
WEAKNESS: 1
MYALGIAS: 0
NERVOUS/ANXIOUS: 0
SHORTNESS OF BREATH: 0
CHILLS: 0
NEUROLOGICAL NEGATIVE: 1
COUGH: 0
MUSCULOSKELETAL NEGATIVE: 1
FEVER: 0

## 2020-08-10 ASSESSMENT — FIBROSIS 4 INDEX: FIB4 SCORE: 0.7

## 2020-08-10 NOTE — THERAPY
Physical Therapy   Daily Treatment     Patient Name: Karan Wiley  Age:  60 y.o., Sex:  male  Medical Record #: [id number]  Today's Date: 8/10/2020     Precautions: (P) Fall Risk    Assessment    Pt seen for PT treatment session, with OT in bathroom upon PT arrival. Pt movtivate to work with therapy and demonstrated improved balance, gait and overall activity tolerance. Pt ambulated ~470 ft with FWW, steady gait with decreased step length. Trialed ambulation w/o FWW x30 ft with same short step length but wider FRANCISCO to accomodate balance deficits. Pt continues to demonstrate delayed responses and processing of commands. PT will continue to follow while in house; however, pt appears more functionally capable of DC home with spouse assist.     Plan    Continue current treatment plan.    DC Equipment Recommendations: (P) Front-Wheel Walker  Discharge Recommendations: Recommend home health for continued physical therapy services         08/10/20 1205   Precautions   Precautions Fall Risk   Vitals   O2 (LPM) 2   O2 Delivery Device Silicone Nasal Cannula   Pain 0 - 10 Group   Therapist Pain Assessment During Activity;Nurse Notified  (no pain reported)   Cognition    Speech/ Communication Hard of Hearing;Delayed Responses  (hears best in L ear)   Level of Consciousness Alert   Comments delayed initiation, unknown if due to hearing or cognitive delay.   Active ROM Lower Body    Active ROM Lower Body  WDL   Strength Lower Body   Lower Body Strength  WDL   Balance   Sitting Balance (Static) Fair +   Sitting Balance (Dynamic) Fair +   Standing Balance (Static) Fair   Standing Balance (Dynamic) Fair -   Weight Shift Sitting Fair   Weight Shift Standing Fair   Skilled Intervention Compensatory Strategies;Verbal Cuing   Comments w/ FWW   Gait Analysis   Gait Level Of Assist Minimal Assist  (w/o FWW to SPV with FWW)   Assistive Device Front Wheel Walker;None   Distance (Feet) 470  (with FWW, 30 ft without AD)   Deviation    (decreased step length. Wide FRANCISCO w/o FWW)   # of Stairs Climbed 0   Weight Bearing Status No restrictions   Skilled Intervention Verbal Cuing;Compensatory Strategies   Comments Demonstrated overall improved gait mechanics and balance with FWW today. No overt LOB without FWW for short distances. Recommend continued use of FWW at this time   Bed Mobility    Supine to Sit   (up with OT upon PT arrival)   Sit to Supine   (seated in chair at end of session)   Scooting Supervised   Functional Mobility   Bed, Chair, Wheelchair Transfer Supervised   Transfer Method Stand Pivot   Patient / Family Goals    Patient / Family Goal #1 to return home   Goal #1 Outcome Goal not met   Short Term Goals    Short Term Goal # 1 pt will perform sit <> stand and functional transfers with SPV to improve mobility independence in 6 visits   Goal Outcome # 1 Progressing as expected   Short Term Goal # 2 pt will ambulate > 200 ft with LRAD and SPV to access community in 6 visits   Goal Outcome # 2 Goal met, new goal added   Short Term Goal # 2 B  Pt will ambulate > 200 ft without AD and SPV to return to independent PLOF baseline in 6 visits   Short Term Goal # 3 pt will negotiate 1 step to access home environment with SPV in 6 visits   Goal Outcome # 3 Goal not met  (NT, pt reports no concerns with step negotation upon DC  giovanna)   Anticipated Discharge Equipment and Recommendations   DC Equipment Recommendations Front-Wheel Walker   Discharge Recommendations Recommend home health for continued physical therapy services

## 2020-08-10 NOTE — CARE PLAN
Problem: Communication  Goal: The ability to communicate needs accurately and effectively will improve  Outcome: PROGRESSING AS EXPECTED     Problem: Safety  Goal: Will remain free from injury  Outcome: PROGRESSING AS EXPECTED     Problem: Infection  Goal: Will remain free from infection  Outcome: PROGRESSING AS EXPECTED     Problem: Venous Thromboembolism (VTW)/Deep Vein Thrombosis (DVT) Prevention:  Goal: Patient will participate in Venous Thrombosis (VTE)/Deep Vein Thrombosis (DVT)Prevention Measures  Outcome: PROGRESSING AS EXPECTED     Problem: Bowel/Gastric:  Goal: Will not experience complications related to bowel motility  Outcome: PROGRESSING AS EXPECTED     Problem: Knowledge Deficit  Goal: Knowledge of disease process/condition, treatment plan, diagnostic tests, and medications will improve  Outcome: PROGRESSING AS EXPECTED

## 2020-08-10 NOTE — PROGRESS NOTES
Critical Care Progress Note    Date of admission  8/1/2020    Chief Complaint  60 y.o. male  PMHx Chronic pain syndrome, HTN, DLD, TOB use, AMPARO and prediabetes admitted 8/1/2020 with NSTEMI, intubated in cath lab for agitation and hypoxemia.  Stent to RCA/PDA in cath lab 8/1.  Transfer to ICU on vent very hypertensive.      Hospital Course    8/1 3-4 min PEA arrest from hypotension after he cam back from cath lab - sedation? however repeat ECHO w/ LV thrombus and anterior wall now down -> to cath lab again, no new CAD, IABP placed, code chill initiated after neg CT head  8/3 - extubated yesterday, remains on dexmedetomidine, confused intermittently agitated.  5 L oxygen.  Continue heparin drip for LV thrombus and DAPT.  He pulled his own White out and had traumatic hematuria and started on CBI, monitor need for urology evaluation and follow-up hemoglobin.  Heparin held transiently with significant hematuria.  Resumed narcotics as he had been on MS Contin and Norco at home.   8/4 - agitation and confusion now resolved.  Dexmedetomidine infusion at 1.5 but titrating down today.  8/7 -asked to sign back on today with hypotension, suspect medication related, given IV fluid and vasopressor  8/8 - transient hypotension; briefly back on norepi; Zanaflex stopped as temporally related to hypotension;   8/9 - Afebrile but WBC up to 28.1 with 18% bands; hematuria on CBI; CXR with LLL atx (mild), 2 lpm oxygen; PCT 17.8; start abx with cefepime, vanco x 1 dose to cover for ? pna vs UTI.  Sent nasal MRSA, BCs, UA when off CBI.       Interval Problem Update  Reviewed last 24 hour events:   - off pressors since 8/8/20, WBC down trending 28.1 -> 17.8   - Neuro: AOx4   - HR: 70s-130s   - SBP: 100s-160s   - GI: tolerating diet   - UOP: CBI clear -> stop CBI today   - White: yes, CBI   - Tm: 36.4   - Lines: CVC -> out today   - PPx: GI not indicated, DVT held due to hematuria   - 3L NC   - CXR (personally reviewed and compared to  prior): LLL opacity   - cefepime day 2/5, MRSA nares negative   - Kdur 40 mEq for hypokalemia    Yesterday  Hematuria overnight; CBI  Int AF overnight; dig  Tm = 97.6  WBC up 14.8 -> 10.8 -> 28.1 18% bands  Hypotensive yesterday again; briefly on norepi  Zanaflex stopped ? Worsening hypotension  Labile BP  Cr 0.91  I/O = 800/2.4 (-1.6L) - on CBI  No diuretic  eliquis stopped for hematuria  Repeat CXR, PCT, limited echo  Hgb stable  2 lpm oxygen  DAPT for stent      Review of Systems  Review of Systems   Constitutional: Negative for chills and fever.   HENT: Negative for hearing loss.    Respiratory: Negative for cough and shortness of breath.    Cardiovascular: Negative for chest pain and palpitations.   Gastrointestinal: Negative for abdominal pain and vomiting.   Genitourinary: Negative for dysuria and urgency.   Neurological: Positive for weakness (generalized).   Psychiatric/Behavioral: Negative for hallucinations.        Vital Signs for last 24 hours   Temp:  [36.1 °C (97 °F)-36.4 °C (97.6 °F)] 36.2 °C (97.2 °F)  Pulse:  [] 76  Resp:  [11-25] 17  BP: ()/() 106/69  SpO2:  [84 %-99 %] 95 %    Hemodynamic parameters for last 24 hours       Respiratory Information for the last 24 hours       Physical Exam   Physical Exam  Vitals signs and nursing note reviewed.   HENT:      Head: Normocephalic and atraumatic.      Right Ear: External ear normal.      Left Ear: External ear normal.      Nose: Nose normal.      Mouth/Throat:      Mouth: Mucous membranes are moist.   Eyes:      Extraocular Movements: Extraocular movements intact.      Conjunctiva/sclera: Conjunctivae normal.   Neck:      Musculoskeletal: Neck supple. No neck rigidity.   Cardiovascular:      Rate and Rhythm: Normal rate and regular rhythm.      Pulses: Normal pulses.   Pulmonary:      Effort: Pulmonary effort is normal. No respiratory distress.   Abdominal:      General: There is no distension.      Palpations: Abdomen is soft.    Musculoskeletal:         General: No swelling or deformity.   Skin:     General: Skin is warm.      Capillary Refill: Capillary refill takes less than 2 seconds.   Neurological:      General: No focal deficit present.      Mental Status: He is oriented to person, place, and time.      Cranial Nerves: No cranial nerve deficit.   Psychiatric:         Mood and Affect: Mood normal.         Behavior: Behavior normal.       Medications  Current Facility-Administered Medications   Medication Dose Route Frequency Provider Last Rate Last Dose   • cefepime (MAXIPIME) 2 g in  mL IVPB  2 g Intravenous Q8HRS Jevon Godwin M.D.   Stopped at 08/10/20 0537   • amiodarone (CORDARONE) tablet 400 mg  400 mg Enteral Tube TWICE DAILY Fernando Martinez M.D.   400 mg at 08/10/20 0507   • norepinephrine (Levophed) infusion 8 mg/250 mL (premix)  0-30 mcg/min Intravenous Continuous Jevon Godwin M.D.   Stopped at 08/08/20 1330   • Metoprolol Tartrate (LOPRESSOR) injection 5 mg  5 mg Intravenous Q6HRS PRN Fernando Martinez M.D.   5 mg at 08/10/20 0354   • clopidogrel (PLAVIX) tablet 75 mg  75 mg Oral DAILY Fernando Martinez M.D.   75 mg at 08/10/20 0507   • aspirin (ASA) chewable tab 81 mg  81 mg Oral DAILY Tatum Abdi M.D.   81 mg at 08/10/20 0508   • atorvastatin (LIPITOR) tablet 80 mg  80 mg Oral Nightly Tatum Abdi M.D.   80 mg at 08/09/20 2052   • senna-docusate (PERICOLACE or SENOKOT S) 8.6-50 MG per tablet 2 Tab  2 Tab Oral BID Tatum Abdi M.D.   Stopped at 08/06/20 1800    And   • polyethylene glycol/lytes (MIRALAX) PACKET 1 Packet  1 Packet Oral QDAY PRN Tatum Abdi M.D.        And   • magnesium hydroxide (MILK OF MAGNESIA) suspension 30 mL  30 mL Oral QDAY PRN Tatum Abdi M.D.        And   • bisacodyl (DULCOLAX) suppository 10 mg  10 mg Rectal QDAY PRN Tatum Abdi M.D.       • traZODone (DESYREL) tablet 100 mg  100 mg Oral QHS Tatum Abdi M.D.   100 mg at 08/09/20 2052   • acetaminophen  (TYLENOL) tablet 650 mg  650 mg Oral Q6HRS PRN Tatum Abdi M.D.       • ondansetron (ZOFRAN ODT) dispertab 4 mg  4 mg Oral Q4HRS PRN Tatum Abdi M.D.       • promethazine (PHENERGAN) tablet 12.5-25 mg  12.5-25 mg Oral Q4HRS PRN Tatum Abdi M.D.       • fentaNYL (SUBLIMAZE) injection  mcg   mcg Intravenous Q4HRS PRN Jevon Godwin M.D.   50 mcg at 08/04/20 1937   • LORazepam (ATIVAN) injection 1-2 mg  1-2 mg Intravenous Q3HRS PRN JEFF GalarzaP.RGraceNGrace   2 mg at 08/04/20 2208   • finasteride (PROSCAR) tablet 5 mg  5 mg Oral Q EVENING STEVEN Vasquez.O.   5 mg at 08/09/20 1742   • HYDROcodone/acetaminophen (NORCO)  MG per tablet 1 Tab  1 Tab Oral Q3HRS PRN Reed Horta D.O.   1 Tab at 08/10/20 0152   • morphine ER (MS CONTIN) tablet 15 mg  15 mg Oral TID STEVEN Vasquez.O.   15 mg at 08/10/20 0508   • tamsulosin (FLOMAX) capsule 0.4 mg  0.4 mg Oral Q EVENING STEVEN Vasquez.O.   0.4 mg at 08/09/20 1742   • venlafaxine XR (EFFEXOR XR) capsule 150 mg  150 mg Oral DAILY Reed Horta D.O.   150 mg at 08/09/20 1742   • ondansetron (ZOFRAN) syringe/vial injection 4 mg  4 mg Intravenous Q4HRS PRN Meño Red M.D.       • promethazine (PHENERGAN) suppository 12.5-25 mg  12.5-25 mg Rectal Q4HRS PRN Meño Red M.D.       • prochlorperazine (COMPAZINE) injection 5-10 mg  5-10 mg Intravenous Q4HRS PRN Meño Red M.D.       • labetalol (NORMODYNE/TRANDATE) injection 10 mg  10 mg Intravenous Q4HRS PRN Meño Red M.D.   10 mg at 08/04/20 7574   • nitroglycerin (NITROSTAT) tablet 0.4 mg  0.4 mg Sublingual Q5 MIN PRN Meño Red M.D.       • Respiratory Therapy Consult   Nebulization Continuous RT Reed Mesa M.D.       • ipratropium-albuterol (DUONEB) nebulizer solution  3 mL Nebulization Q2HRS PRN (RT) Reed Mesa M.D.       • enalaprilat (VASOTEC) injection 1.25 mg  1.25 mg Intravenous Q6HRS PRN Reed Mesa M.D.            Fluids    Intake/Output Summary (Last 24 hours) at 8/10/2020 0919  Last data filed at 8/10/2020 0800  Gross per 24 hour   Intake 1430.11 ml   Output 3150 ml   Net -1719.89 ml       Laboratory          Recent Labs     08/07/20  1325 08/07/20  2100 08/08/20  0828 08/09/20 0558 08/10/20  0514   SODIUM 138 140 136 136 138   POTASSIUM 3.9 3.8 3.8 3.9 3.2*   CHLORIDE 103 103 99 98 101   CO2 23 25 25 25 26   BUN 10 12 11 12 14   CREATININE 1.00 0.95 0.84 0.91 0.74   MAGNESIUM 2.0 2.1 1.9  --   --    PHOSPHORUS 2.2*  --   --   --   --    CALCIUM 9.1 9.4 9.5 9.1 8.8     Recent Labs     08/07/20 1325 08/08/20 0828 08/09/20 0558 08/10/20  0514   ALTSGPT 23  --   --   --   --    ASTSGOT 15  --   --   --   --    ALKPHOSPHAT 57  --   --   --   --    TBILIRUBIN 0.8  --   --   --   --    GLUCOSE 165*   < > 135* 123* 129*    < > = values in this interval not displayed.     Recent Labs     08/07/20  1325 08/08/20  0355 08/09/20  0558 08/10/20  0514   WBC 14.5* 10.8 28.1* 17.8*   NEUTSPOLYS 85.70* 73.60* 77.40* 82.10*   LYMPHOCYTES 5.10* 11.30* 0.90* 7.20*   MONOCYTES 7.70 12.90 0.90 8.20   EOSINOPHILS 0.70 1.20 0.00 1.60   BASOPHILS 0.30 0.50 0.00 0.60   ASTSGOT 15  --   --   --    ALTSGPT 23  --   --   --    ALKPHOSPHAT 57  --   --   --    TBILIRUBIN 0.8  --   --   --      Recent Labs     08/08/20  0355 08/09/20  0558 08/10/20  0514   RBC 2.89* 2.86* 2.85*   HEMOGLOBIN 8.9* 8.9* 8.8*   HEMATOCRIT 27.3* 26.6* 26.3*   PLATELETCT 240 267 268       Imaging  X-Ray:  I have personally reviewed the images and compared with prior images.    Assessment/Plan  Hypotension  Assessment & Plan  Multifactorial with AF, medication related, mild intravascular volume depletion  WBC up but afebrile and low suspicion for infection/sepsis  beta-blocker and antihypertensives dosing reduced  Follow chest x-ray, PCT, repeat labs  Continue cefepime for now    NSTEMI (non-ST elevated myocardial infarction) (HCC)- (present on admission)  Assessment  & Plan  Status post PCI 8/1   DAPT  Statin/beta-blocker/ACE  Monitor for cardiac dysrhythmias, some history of ventricular ectopy/atrial flutter?  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  Assessment & Plan       BPH (benign prostatic hyperplasia)- (present on admission)  Assessment & Plan  Continue Proscar and Flomax    Tobacco use  Assessment & Plan       Class 2 severe obesity due to excess calories with serious comorbidity in adult ([facility])  Assessment & Plan       Chronic back pain- (present on admission)  Assessment & Plan  Patient with multiple prior surgeries  Continue PRN Norco, morphine ER, Effexor and PRN fentanyl    History of prediabetes  Assessment & Plan  Glycemic control    Dyslipidemia- (present on admission)  Assessment & Plan  Continue Lipitor 80 mg       VTE:  Heparin  Ulcer: H2 Antagonist  Lines: Central Line  Ongoing indication addressed, Arterial Line  Ongoing indication addressed and White Catheter  Ongoing indication addressed    I have performed a physical exam and reviewed and updated ROS and Plan today (8/10/2020). In review of yesterday's note (8/9/2020), there are no changes except as documented above.     Patient will be transferred out of ICU today.  I have discussed this case with hospitalist Dr. Abdi she will assume care at this time. [facility] will sign off. Please call with any questions.    Discussed  patient condition and risk of morbidity and/or mortality with Family, RN, RT, Pharmacy, Charge nurse / hot rounds and cardiology

## 2020-08-10 NOTE — CARE PLAN
Problem: Safety  Goal: Will remain free from injury  Outcome: PROGRESSING AS EXPECTED  Intervention: Provide assistance with mobility  Note: Pt will call for assistance ambulating.     Problem: Infection  Goal: Will remain free from infection  Outcome: PROGRESSING SLOWER THAN EXPECTED  Intervention: Give CDC handouts for infection prevention (infection prevention/hand washing, disease specific, and device specific) and document in Education  Note: Hand washing completed before and after patient care. Proper PPE worn.

## 2020-08-10 NOTE — THERAPY
Occupational Therapy  Daily Treatment     Patient Name: Karan Wiley  Age:  60 y.o., Sex:  male  Medical Record #: [id number]  Today's Date: 8/10/2020     Precautions  Precautions: Fall Risk    Assessment    Pt demonstrated improvement with ADL participation, standing balance, and activity tolerance this session. Pt will benefit from continued acute OT to address LE dressing, toileting, and ADL transfers.    Plan    Continue current treatment plan.    DC Equipment Recommendations: Unable to determine at this time  Discharge Recommendations: Recommend home health for continued occupational therapy services    Subjective    Pt alert, pleasant, and cooperative.      Objective       08/10/20 1150   Cognition    Speech/ Communication Hard of Hearing   Level of Consciousness Alert   Comments Pleasant, cooperative, receptive to education   Balance   Comments improved standing balance this session   Activities of Daily Living   Grooming Supervision;Standing   Functional Mobility   Sit to Stand Supervised   Mobility in room/bathroom with spv and FWW   Patient / Family Goals   Patient / Family Goal #1 Get better   Short Term Goals   Short Term Goal # 1 S toileting   Goal Outcome # 1 Goal not met   Short Term Goal # 2 S toilet transfer   Goal Outcome # 2 Goal not met   Short Term Goal # 3 S tub transfer   Goal Outcome # 3 Goal not met   Short Term Goal # 4 10 minute stand for adls   Goal Outcome # 4 Progressing as expected

## 2020-08-10 NOTE — PROGRESS NOTES
Cardiology Follow Up Progress Note    Date of Service  8/10/2020    Attending Physician  Tatum Abdi M.D.    Chief Complaint   NSTEMI, percutaneous intervention with stent placement, transient left ventricular dysystolic function, atrial fibrillation, hematuria with keith catheter.    MAYKEL Wiley is a 60 y.o. male admitted 8/1/2020 with above.    Interim Events  No significant changes noted from cardiac standpoint within the past 24 hours.    Review of Systems  Review of Systems   Constitutional: Negative.  Negative for chills and fever.   HENT: Negative.  Negative for hearing loss.    Eyes: Negative.    Respiratory: Negative.  Negative for cough and shortness of breath.    Cardiovascular: Negative.  Negative for chest pain, palpitations and leg swelling.   Gastrointestinal: Negative.  Negative for abdominal pain, nausea and vomiting.   Genitourinary: Negative.  Negative for dysuria and urgency.   Musculoskeletal: Negative.  Negative for myalgias.   Skin: Negative.  Negative for rash.   Neurological: Negative.  Negative for dizziness, weakness and headaches.   Hematological: Does not bruise/bleed easily.   Psychiatric/Behavioral: Negative.  The patient is not nervous/anxious.        Vital signs in last 24 hours  Temp:  [36.1 °C (97 °F)-36.5 °C (97.7 °F)] 36.5 °C (97.7 °F)  Pulse:  [] 87  Resp:  [11-25] 20  BP: ()/() 122/85  SpO2:  [84 %-99 %] 92 %    Physical Exam  Physical Exam  Constitutional:       Appearance: He is well-developed.   HENT:      Head: Normocephalic and atraumatic.   Eyes:      Conjunctiva/sclera: Conjunctivae normal.      Pupils: Pupils are equal, round, and reactive to light.   Neck:      Musculoskeletal: Normal range of motion and neck supple.   Cardiovascular:      Rate and Rhythm: Normal rate and regular rhythm.   Pulmonary:      Effort: Pulmonary effort is normal.      Breath sounds: Normal breath sounds.   Abdominal:      General: Bowel sounds are normal.       Palpations: Abdomen is soft.   Musculoskeletal: Normal range of motion.   Skin:     General: Skin is warm and dry.   Neurological:      Mental Status: He is alert and oriented to person, place, and time.         Lab Review  Lab Results   Component Value Date/Time    WBC 17.8 (H) 08/10/2020 05:14 AM    RBC 2.85 (L) 08/10/2020 05:14 AM    HEMOGLOBIN 8.8 (L) 08/10/2020 05:14 AM    HEMATOCRIT 26.3 (L) 08/10/2020 05:14 AM    MCV 92.3 08/10/2020 05:14 AM    MCH 30.9 08/10/2020 05:14 AM    MCHC 33.5 (L) 08/10/2020 05:14 AM    MPV 11.0 08/10/2020 05:14 AM      Lab Results   Component Value Date/Time    SODIUM 138 08/10/2020 05:14 AM    POTASSIUM 3.2 (L) 08/10/2020 05:14 AM    CHLORIDE 101 08/10/2020 05:14 AM    CO2 26 08/10/2020 05:14 AM    GLUCOSE 129 (H) 08/10/2020 05:14 AM    BUN 14 08/10/2020 05:14 AM    CREATININE 0.74 08/10/2020 05:14 AM      Lab Results   Component Value Date/Time    ASTSGOT 15 08/07/2020 01:25 PM    ALTSGPT 23 08/07/2020 01:25 PM     Lab Results   Component Value Date/Time    TRIGLYCERIDE 217 (H) 08/01/2020 03:26 PM    TROPONINT 465 (H) 08/02/2020 08:50 AM       No results for input(s): NTPROBNP in the last 72 hours.  (Above labs reviewed.)       Current Facility-Administered Medications:   •  potassium chloride SA (Kdur) tablet 40 mEq, 40 mEq, Oral, BID, Lance Connelly Jr., D.O., 40 mEq at 08/10/20 1022  •  cefepime (MAXIPIME) 2 g in  mL IVPB, 2 g, Intravenous, Q8HRS, Jevon Godwin M.D., Stopped at 08/10/20 0537  •  amiodarone (CORDARONE) tablet 400 mg, 400 mg, Enteral Tube, TWICE DAILY, Fernando Martinez M.D., 400 mg at 08/10/20 0507  •  Metoprolol Tartrate (LOPRESSOR) injection 5 mg, 5 mg, Intravenous, Q6HRS PRN, Fernando Martinez M.D., 5 mg at 08/10/20 0354  •  clopidogrel (PLAVIX) tablet 75 mg, 75 mg, Oral, DAILY, Fernando Martinez M.D., 75 mg at 08/10/20 0507  •  aspirin (ASA) chewable tab 81 mg, 81 mg, Oral, DAILY, Tatum Abdi M.D., 81 mg at 08/10/20 0508  •   atorvastatin (LIPITOR) tablet 80 mg, 80 mg, Oral, Nightly, Tatum Abdi M.D., 80 mg at 08/09/20 2052  •  senna-docusate (PERICOLACE or SENOKOT S) 8.6-50 MG per tablet 2 Tab, 2 Tab, Oral, BID, Stopped at 08/06/20 1800 **AND** polyethylene glycol/lytes (MIRALAX) PACKET 1 Packet, 1 Packet, Oral, QDAY PRN **AND** magnesium hydroxide (MILK OF MAGNESIA) suspension 30 mL, 30 mL, Oral, QDAY PRN **AND** bisacodyl (DULCOLAX) suppository 10 mg, 10 mg, Rectal, QDAY PRN, Tatum Abdi M.D.  •  traZODone (DESYREL) tablet 100 mg, 100 mg, Oral, QHS, Tatum Abdi M.D., 100 mg at 08/09/20 2052  •  acetaminophen (TYLENOL) tablet 650 mg, 650 mg, Oral, Q6HRS PRN, Tatum Abdi M.D.  •  ondansetron (ZOFRAN ODT) dispertab 4 mg, 4 mg, Oral, Q4HRS PRN, Tatum Abdi M.D.  •  promethazine (PHENERGAN) tablet 12.5-25 mg, 12.5-25 mg, Oral, Q4HRS PRN, Tatum Abdi M.D.  •  fentaNYL (SUBLIMAZE) injection  mcg,  mcg, Intravenous, Q4HRS PRN, Jevon Godwin M.D., 50 mcg at 08/04/20 1937  •  LORazepam (ATIVAN) injection 1-2 mg, 1-2 mg, Intravenous, Q3HRS PRN, JEFF GalarzaP.RGraceN., 2 mg at 08/04/20 2208  •  finasteride (PROSCAR) tablet 5 mg, 5 mg, Oral, Q EVENING, BILLY VasquezO., 5 mg at 08/09/20 1742  •  HYDROcodone/acetaminophen (NORCO)  MG per tablet 1 Tab, 1 Tab, Oral, Q3HRS PRN, BILLY VasquezO., 1 Tab at 08/10/20 0152  •  morphine ER (MS CONTIN) tablet 15 mg, 15 mg, Oral, TID, BILLY VasquezO., 15 mg at 08/10/20 1137  •  tamsulosin (FLOMAX) capsule 0.4 mg, 0.4 mg, Oral, Q EVENING, BILLY VasquezO., 0.4 mg at 08/09/20 1742  •  venlafaxine XR (EFFEXOR XR) capsule 150 mg, 150 mg, Oral, DAILY, BILLY VasquezO., 150 mg at 08/09/20 1742  •  ondansetron (ZOFRAN) syringe/vial injection 4 mg, 4 mg, Intravenous, Q4HRS PRN, Meño Red M.D.  •  promethazine (PHENERGAN) suppository 12.5-25 mg, 12.5-25 mg, Rectal, Q4HRS PRN, Meño Red M.D.  •  prochlorperazine (COMPAZINE) injection 5-10  mg, 5-10 mg, Intravenous, Q4HRS PRN, Meño Red M.D.  •  labetalol (NORMODYNE/TRANDATE) injection 10 mg, 10 mg, Intravenous, Q4HRS PRN, Meño Red M.D., 10 mg at 08/04/20 4237  •  nitroglycerin (NITROSTAT) tablet 0.4 mg, 0.4 mg, Sublingual, Q5 MIN PRN, Meño Red M.D.  •  Respiratory Therapy Consult, , Nebulization, Continuous RT, Reed Mesa M.D.  •  ipratropium-albuterol (DUONEB) nebulizer solution, 3 mL, Nebulization, Q2HRS PRN (RT), Rede Mesa M.D.  •  enalaprilat (VASOTEC) injection 1.25 mg, 1.25 mg, Intravenous, Q6HRS PRN, Reed Mesa M.D.  (Medications reviewed.)    Cardiac Imaging and Procedures Review  CARDIAC STUDIES/PROCEDURES:    CARDIAC CATHETERIZATION CONCLUSIONS by Chilo Solis (08/01/20)  1. Widely patent coronary arteries  2. Cardiogenic shock, likely due to stress cardiomyopathy  3. Successful placement of IABP  4. Successful placement of left radial artery line  (study result reviewed)    CARDIAC CATHETERIZATION CONCLUSIONS by Chilo Solis (08/01/20)  1.  Acute non-STEMI due to culprit right coronary artery  2.  Successful PCI of the mid right coronary artery using 1 drug-eluting stent and IVUS guidance  3.  Successful PCI of the posterior lateral branch using 1 drug-eluting stent  4.  Normal LV systolic function  5.  Moderately elevated LVEDP (21 mmHg)  6.  Poorly controlled hypertension  (study result reviewed)    ECHOCARDIOGRAM CONCLUSIONS (08/09/20)  Compared to the images of the prior study done 8/3/2020, the EF appears improved.  Left ventricular ejection fraction is visually estimated to be 60%.  (study result reviewed)    ECHOCARDIOGRAM CONCLUSIONS (08/01/20)  Technically difficult due to body habitus, positioning, pt vented and   in restraints but adequate study for interpretation.   Contrast was used to enhance definition of the endocardial borders.   Severely reduced left ventricular systolic function.  Left ventricular ejection  fraction is visually estimated to be 25%.  The apex is akinetic.  Thrombus is observed in the left ventricular apex.  (study result reviewed)    EKG performed on (08/08/20) was reviewed: EKG personally interpreted shows sinus tachycardia and trigeminy.    Assessment/Plan  1. NSTEMI, Coronary artery disease with stent placement: He is clinically doing well without recurrence of his angina.  We will continue with current medical care including aspirin, clopidogrel, atorvastatin.  2. Atrial fibrillation off chronic anticoagulation therapy due to hematuria: Sinus rhythm is maintained without evidence of atrial fibrillation episodes on amiodarone.  3. Hyperlipidemia: He is doing well on statin therapy without myalgia symptoms.    Thank you for allowing me to participate in the care of this patient.  I will continue to follow this patient    Please contact me with any questions.    Servando Mckinney M.D.   Cardiologist, [facility]  [phone]

## 2020-08-10 NOTE — PROGRESS NOTES
ICU rounds - plan to d/c CVC after peripheral access; clamp SBI and re-assess later today for discontinuation; replace potassium. Transfer to [facility] status.

## 2020-08-10 NOTE — THERAPY
"Speech Language Pathology   Initial Assessment     Patient Name: Karan Wiley  AGE:  60 y.o., SEX:  male  Medical Record #: [id number]  Today's Date: 8/10/2020     Precautions  Precautions: (P) Fall Risk    Assessment    60-year-old male presented with chest pain.  Found to have a systolic blood pressure of 230 on admission with a slightly elevated troponin.  He was admitted for NSTEMI and underwent cardiac cath requiring PCI of RCA and PCI of posterior lateral branch.  He developed acute hypoxic respiratory failure with flash pulmonary edema following PCI requiring intubation.  Patient had PEA cardiac arrest however obtained Rask within 2 cycles of CPR.    RN reports pt was confused and combative s/p extubation, however he is now alert and oriented.  Upon entering the room, pt was pleasant and agreeable to participate.  He was AAOx4, and reported, \"I was confused at first but now I feel normal\".  The Cognistat was administered in whole, which assesses functioning in the following cognitive domains: LOC, Orientation, Attention, Language (Comprehension, Repetition and Naming), Visual Construction, Memory, Calculations and Reasoning (Similarities and Judgement).  Pt was WNL for all domains.  In addition to Cognistat, pt was given non-standardized assessments of functional reading, in which pt was also WNL.  Of note, however, pt reported at the end of the evaluation that he is scheduled to go for testing to rule out dementia as his father had it, and he is concerned about getting it as well.  He reports being forgetful at times, however also reports good support from his spouse at home.)  Given results of evaluation today and the fact that pt appears to be at his baseline, he does not need any further skilled SLP services in the acute care setting.        Plan    Recommend Speech Therapy for Evaluation only.    Discharge Recommendations: (P) Anticipate that the patient will have no further speech therapy needs after " discharge from the hospital      Objective     08/10/20 7865   Prior Living Situation   Prior Services Home-Independent   Housing / Facility 1 Story House   Lives with - Patient's Self Care Capacity Spouse;Adult Children   Comments Pt is from [place], [place]. Spouse able to assist upon DC home. Pt is retired  ([employer]).    Prior Level Of Function   Communication Within Functional Limits   Swallow Within Functional Limits   Hearing Impaired Both Ears   Hearing Aid Left   Vision Wears Corrective Lenses   Patient's Primary Language English   Occupation (Pre-Hospital Vocational) Retired Due To Age   Verbal Expression   Verbal Expression / Aphasia Eval (WDL) WDL   Verbal Output Conversation Within Functional Limits (6-7)   Verbal Output Functional Within Functional Limits (6-7)   Repetition: Sentences Within Functional Limits (6-7)   Naming Within Functional Limits (6-7)   Auditory Comprehension   Auditory Comprehension (WDL) WDL   Yes / No Questions: Personal Information Within Functional Limits (6-7)   Yes / No Questions: General Information Within Functional Limits (6-7)   Yes / No Questions: Abstract Within Functional Limits (6-7)   Identifies Objects Within Functional Limits (6-7)   Identifies Pictures Within Functional Limits (6-7)   Follows Three Unit Commands Within Functional Limits (6-7)   Understands Simple, Structured Conversation  Within Functional Limits (6-7)   Understands Complex Conversation Within Functional Limits (6-7)   Reading Comprehension   Reading Comprehension (WDL) WDL   Reading Long / Multi Paragraph Material Within Functional Limits (6-7)   Cognitive-Linguistic   Cognitive-Linguistic (WDL) WDL   Level of Consciousness Alert   Orientation Level Oriented x 4   Sustained Attention Within Functional Limits (6-7)   Short Term Memory Within Functional Limits (6-7)   Simple Reasoning / Problem Solving Within Functional Limits (6-7)   Complex Reasoning  / Problem Solving Within Functional  Limits (6-7)   Safety Awareness Within Functional Limits (6-7)   Insight into Deficits Within Functional Limits (6-7)   Social / Pragmatic Communication   Social / Pragmatic Communication WDL   OTHER   Visual Short Term Memory Within Functional Limits (6-7)

## 2020-08-10 NOTE — PROGRESS NOTES
"Hospital Medicine Daily Progress Note    Date of Service  8/10/2020    Chief Complaint  60 y.o. male admitted 8/1/2020 with chest pain    Hospital Course    60-year-old male with past medical history of hypertension, dyslipidemia presenting with chest pain.  Found to have a systolic blood pressure of 230 on admission with a slightly elevated troponin.  He was admitted for NSTEMI and underwent cardiac cath requiring PCI of RCA and PCI of posterior lateral branch.  He subsequently developed acute hypoxic respiratory failure with flash pulmonary edema following PCI requiring intubation and was transferred to ICU.  Patient had PEA cardiac arrest however obtained Rask within 2 cycles of CPR.  Echocardiogram showed akinetic apex with new LV thrombus.  Patient was again taken to Cath Lab post cardiac arrest and felt to be in cardiogenic shock likely due to stress cardiomyopathy and had successful placement of IABP.  Cardiogenic shock resolved by 8/2 and IABP was removed.  Repeat echocardiogram on 8/3 showed no LV thrombus so his IV heparin was discontinued.  Patient did have agitation while in ICU temporarily requiring Precedex.  He developed hypotension and several days of IV pressors and this then improved. During his ICU stay he was found to have suspected left sided pneumonia for which he was started on cefepime.  His afib has been rate controlled on amiodaroone.  He did develop hematuria so his anticoagulation was held and CBI was initiated, this is now improving.      Interval Problem Update  No significant hematuria overnight  Confusion has resolved  Hearing aids in place  He is axox3  He reports mild chest wall pain with palpation \"where they did cpr\" but otherwise okay  Minimal cough, no productive  Denies n/v  No h/a  Discussed with critical care and nursing  Ros otherwise negative    Consultants/Specialty  Critical care  Cardiology    Code Status  Full code    Disposition  TBD    Review of Systems  Review of " Systems   Constitutional: Negative for chills and fever.   HENT: Negative for hearing loss.    Respiratory: Negative for cough and shortness of breath.    Cardiovascular: Negative for chest pain and palpitations.   Gastrointestinal: Negative for abdominal pain and vomiting.   Genitourinary: Negative for dysuria and urgency.   All other systems reviewed and are negative.       Physical Exam  Temp:  [36.1 °C (97 °F)-36.4 °C (97.5 °F)] 36.2 °C (97.2 °F)  Pulse:  [] 86  Resp:  [11-25] 13  BP: ()/() 120/95  SpO2:  [84 %-99 %] 95 %    Physical Exam  Vitals signs and nursing note reviewed. Exam conducted with a chaperone present.   Constitutional:       General: He is not in acute distress.     Appearance: Normal appearance. He is not diaphoretic.   HENT:      Head: Normocephalic.      Nose: Nose normal.      Mouth/Throat:      Mouth: Mucous membranes are moist.   Eyes:      Pupils: Pupils are equal, round, and reactive to light.   Cardiovascular:      Rate and Rhythm: Normal rate. Rhythm irregular.      Pulses: Normal pulses.      Heart sounds: Normal heart sounds. No murmur.   Pulmonary:      Effort: Pulmonary effort is normal. No respiratory distress.      Comments: Decreased at bases  Abdominal:      General: Abdomen is flat. Bowel sounds are normal.      Palpations: Abdomen is soft.   Genitourinary:     Comments: keith  Musculoskeletal: Normal range of motion.         General: No swelling or deformity.   Skin:     General: Skin is warm and dry.      Capillary Refill: Capillary refill takes less than 2 seconds.   Neurological:      General: No focal deficit present.      Mental Status: He is alert and oriented to person, place, and time.      Cranial Nerves: No cranial nerve deficit.   Psychiatric:         Mood and Affect: Mood normal. Mood is not anxious.         Behavior: Behavior normal.         Judgment: Judgment is not inappropriate.         Fluids    Intake/Output Summary (Last 24 hours) at  8/10/2020 1209  Last data filed at 8/10/2020 0935  Gross per 24 hour   Intake 1580.11 ml   Output 2700 ml   Net -1119.89 ml       Laboratory  Recent Labs     08/08/20  0355 08/09/20  0558 08/10/20  0514   WBC 10.8 28.1* 17.8*   RBC 2.89* 2.86* 2.85*   HEMOGLOBIN 8.9* 8.9* 8.8*   HEMATOCRIT 27.3* 26.6* 26.3*   MCV 94.5 93.0 92.3   MCH 30.8 31.1 30.9   MCHC 32.6* 33.5* 33.5*   RDW 44.1 43.4 43.0   PLATELETCT 240 267 268   MPV 11.1 11.1 11.0     Recent Labs     08/08/20  0828 08/09/20 0558 08/10/20  0514   SODIUM 136 136 138   POTASSIUM 3.8 3.9 3.2*   CHLORIDE 99 98 101   CO2 25 25 26   GLUCOSE 135* 123* 129*   BUN 11 12 14   CREATININE 0.84 0.91 0.74   CALCIUM 9.5 9.1 8.8                   Imaging  EC-ECHOCARDIOGRAM LTD W/O CONT   Final Result      DX-CHEST-PORTABLE (1 VIEW)   Final Result      1.  Linear atelectasis within the left lung base.      2.  Mild cardiomegaly.      DX-CHEST-PORTABLE (1 VIEW)   Final Result      Stable chest without acute/new abnormality.      DX-CHEST-PORTABLE (1 VIEW)   Final Result         1.  No acute cardiopulmonary disease.   2.  Atherosclerosis      EC-ECHOCARDIOGRAM LTD W/ CONT   Final Result      DX-CHEST-PORTABLE (1 VIEW)   Final Result         1.  Mild pulmonary edema and/or infiltrates.   2.  Cardiomegaly      DX-CHEST-PORTABLE (1 VIEW)   Final Result         1. No significant interval change.      DX-ABDOMEN FOR TUBE PLACEMENT   Final Result      Enteric tubes project over the stomach.      Calcific densities projecting over the right kidney likely represent renal calculi.      CT-HEAD W/O   Final Result      No acute intracranial abnormality is identified.      Paranasal sinus disease.         EC-ECHOCARDIOGRAM COMPLETE W/ CONT   Final Result      DX-CHEST-LIMITED (1 VIEW)   Final Result      Right central line projects over the SVC. No pneumothorax.      Interstitial prominence likely represents interstitial edema.      Right basilar opacity may represent atelectasis or  consolidation.      Atherosclerotic plaque.            DX-CHEST-PORTABLE (1 VIEW)   Final Result      Right basilar opacity likely represents atelectasis.      Endotracheal tube projects at the level of the clavicular heads.      Interstitial opacities likely present interstitial edema or pneumonitis.      Atherosclerotic plaque.         CL-LEFT HEART CATHETERIZATION WITH POSSIBLE INTERVENTION    (Results Pending)   CL-LEFT HEART CATHETERIZATION WITH POSSIBLE INTERVENTION    (Results Pending)        Assessment/Plan  Hypotension  Assessment & Plan  Resolved, now off pressors  following    NSTEMI (non-ST elevated myocardial infarction) ([facility])- (present on admission)  Assessment & Plan  Status post cardiac cath on 8/1 with PCI to RCA and posterior lateral branch  Cardiology following  On asa/ plavix    Hypokalemia  Assessment & Plan  Replacing  following    Pneumonia  Assessment & Plan  Suspected left sided  On cefepime  Following  Continue supportive care    Atrial fibrillation with RVR ([facility])  Assessment & Plan  8/7 EKG  Improved rate control on amiodarone  Cardiology following  apixaban held due to hematuria, following    Gross hematuria  Assessment & Plan  due to patient traumatically removing White catheter   apixaban held as a result  Resolving  following    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

## 2020-08-11 LAB
ANION GAP SERPL CALC-SCNC: 11 MMOL/L (ref 7–16)
BASOPHILS # BLD AUTO: 0.5 % (ref 0–1.8)
BASOPHILS # BLD: 0.05 K/UL (ref 0–0.12)
BUN SERPL-MCNC: 11 MG/DL (ref 8–22)
CALCIUM SERPL-MCNC: 8.8 MG/DL (ref 8.5–10.5)
CHLORIDE SERPL-SCNC: 102 MMOL/L (ref 96–112)
CO2 SERPL-SCNC: 24 MMOL/L (ref 20–33)
CREAT SERPL-MCNC: 0.69 MG/DL (ref 0.5–1.4)
EOSINOPHIL # BLD AUTO: 0.27 K/UL (ref 0–0.51)
EOSINOPHIL NFR BLD: 2.5 % (ref 0–6.9)
ERYTHROCYTE [DISTWIDTH] IN BLOOD BY AUTOMATED COUNT: 43.6 FL (ref 35.9–50)
GLUCOSE SERPL-MCNC: 129 MG/DL (ref 65–99)
HCT VFR BLD AUTO: 26.9 % (ref 42–52)
HGB BLD-MCNC: 8.7 G/DL (ref 14–18)
IMM GRANULOCYTES # BLD AUTO: 0.11 K/UL (ref 0–0.11)
IMM GRANULOCYTES NFR BLD AUTO: 1 % (ref 0–0.9)
LYMPHOCYTES # BLD AUTO: 1.45 K/UL (ref 1–4.8)
LYMPHOCYTES NFR BLD: 13.3 % (ref 22–41)
MAGNESIUM SERPL-MCNC: 1.9 MG/DL (ref 1.5–2.5)
MCH RBC QN AUTO: 30.3 PG (ref 27–33)
MCHC RBC AUTO-ENTMCNC: 32.3 G/DL (ref 33.7–35.3)
MCV RBC AUTO: 93.7 FL (ref 81.4–97.8)
MONOCYTES # BLD AUTO: 0.95 K/UL (ref 0–0.85)
MONOCYTES NFR BLD AUTO: 8.7 % (ref 0–13.4)
NEUTROPHILS # BLD AUTO: 8.11 K/UL (ref 1.82–7.42)
NEUTROPHILS NFR BLD: 74 % (ref 44–72)
NRBC # BLD AUTO: 0 K/UL
NRBC BLD-RTO: 0 /100 WBC
PLATELET # BLD AUTO: 265 K/UL (ref 164–446)
PMV BLD AUTO: 11.1 FL (ref 9–12.9)
POTASSIUM SERPL-SCNC: 3.6 MMOL/L (ref 3.6–5.5)
RBC # BLD AUTO: 2.87 M/UL (ref 4.7–6.1)
SODIUM SERPL-SCNC: 137 MMOL/L (ref 135–145)
WBC # BLD AUTO: 10.9 K/UL (ref 4.8–10.8)

## 2020-08-11 PROCEDURE — A9270 NON-COVERED ITEM OR SERVICE: HCPCS | Performed by: PHYSICIAN ASSISTANT

## 2020-08-11 PROCEDURE — 700102 HCHG RX REV CODE 250 W/ 637 OVERRIDE(OP): Performed by: HOSPITALIST

## 2020-08-11 PROCEDURE — 99232 SBSQ HOSP IP/OBS MODERATE 35: CPT | Performed by: HOSPITALIST

## 2020-08-11 PROCEDURE — 700111 HCHG RX REV CODE 636 W/ 250 OVERRIDE (IP): Performed by: INTERNAL MEDICINE

## 2020-08-11 PROCEDURE — A9270 NON-COVERED ITEM OR SERVICE: HCPCS | Performed by: INTERNAL MEDICINE

## 2020-08-11 PROCEDURE — 85025 COMPLETE CBC W/AUTO DIFF WBC: CPT

## 2020-08-11 PROCEDURE — 83735 ASSAY OF MAGNESIUM: CPT

## 2020-08-11 PROCEDURE — A9270 NON-COVERED ITEM OR SERVICE: HCPCS | Performed by: HOSPITALIST

## 2020-08-11 PROCEDURE — 700102 HCHG RX REV CODE 250 W/ 637 OVERRIDE(OP): Performed by: PHYSICIAN ASSISTANT

## 2020-08-11 PROCEDURE — 80048 BASIC METABOLIC PNL TOTAL CA: CPT

## 2020-08-11 PROCEDURE — 700105 HCHG RX REV CODE 258: Performed by: INTERNAL MEDICINE

## 2020-08-11 PROCEDURE — 770020 HCHG ROOM/CARE - TELE (206)

## 2020-08-11 PROCEDURE — 99232 SBSQ HOSP IP/OBS MODERATE 35: CPT | Performed by: INTERNAL MEDICINE

## 2020-08-11 PROCEDURE — 700102 HCHG RX REV CODE 250 W/ 637 OVERRIDE(OP): Performed by: INTERNAL MEDICINE

## 2020-08-11 RX ORDER — AMIODARONE HYDROCHLORIDE 200 MG/1
200 TABLET ORAL
Status: DISCONTINUED | OUTPATIENT
Start: 2020-08-15 | End: 2020-08-13 | Stop reason: HOSPADM

## 2020-08-11 RX ADMIN — DOCUSATE SODIUM 50 MG AND SENNOSIDES 8.6 MG 2 TABLET: 8.6; 5 TABLET, FILM COATED ORAL at 18:19

## 2020-08-11 RX ADMIN — VENLAFAXINE HYDROCHLORIDE 150 MG: 75 CAPSULE, EXTENDED RELEASE ORAL at 19:40

## 2020-08-11 RX ADMIN — APIXABAN 5 MG: 5 TABLET, FILM COATED ORAL at 18:19

## 2020-08-11 RX ADMIN — CLOPIDOGREL BISULFATE 75 MG: 75 TABLET ORAL at 05:33

## 2020-08-11 RX ADMIN — MORPHINE SULFATE 15 MG: 15 TABLET, FILM COATED, EXTENDED RELEASE ORAL at 05:33

## 2020-08-11 RX ADMIN — CEFEPIME 2 G: 2 INJECTION, POWDER, FOR SOLUTION INTRAVENOUS at 21:23

## 2020-08-11 RX ADMIN — ASPIRIN 81 MG 81 MG: 81 TABLET ORAL at 05:33

## 2020-08-11 RX ADMIN — FINASTERIDE 5 MG: 5 TABLET, FILM COATED ORAL at 18:19

## 2020-08-11 RX ADMIN — CEFEPIME 2 G: 2 INJECTION, POWDER, FOR SOLUTION INTRAVENOUS at 05:33

## 2020-08-11 RX ADMIN — ATORVASTATIN CALCIUM 80 MG: 80 TABLET, FILM COATED ORAL at 21:23

## 2020-08-11 RX ADMIN — HYDROCODONE BITARTRATE AND ACETAMINOPHEN 1 TABLET: 10; 325 TABLET ORAL at 01:27

## 2020-08-11 RX ADMIN — MORPHINE SULFATE 15 MG: 15 TABLET, FILM COATED, EXTENDED RELEASE ORAL at 18:19

## 2020-08-11 RX ADMIN — MORPHINE SULFATE 15 MG: 15 TABLET, FILM COATED, EXTENDED RELEASE ORAL at 13:28

## 2020-08-11 RX ADMIN — AMIODARONE HYDROCHLORIDE 400 MG: 200 TABLET ORAL at 05:33

## 2020-08-11 RX ADMIN — TRAZODONE HYDROCHLORIDE 100 MG: 100 TABLET ORAL at 21:23

## 2020-08-11 RX ADMIN — TAMSULOSIN HYDROCHLORIDE 0.4 MG: 0.4 CAPSULE ORAL at 18:18

## 2020-08-11 RX ADMIN — CEFEPIME 2 G: 2 INJECTION, POWDER, FOR SOLUTION INTRAVENOUS at 13:28

## 2020-08-11 RX ADMIN — AMIODARONE HYDROCHLORIDE 400 MG: 200 TABLET ORAL at 18:18

## 2020-08-11 ASSESSMENT — ENCOUNTER SYMPTOMS
PALPITATIONS: 0
MUSCULOSKELETAL NEGATIVE: 1
RESPIRATORY NEGATIVE: 1
PSYCHIATRIC NEGATIVE: 1
COUGH: 0
NEUROLOGICAL NEGATIVE: 1
CARDIOVASCULAR NEGATIVE: 1
GASTROINTESTINAL NEGATIVE: 1
WEAKNESS: 0
CHILLS: 0
MYALGIAS: 0
FEVER: 0
BRUISES/BLEEDS EASILY: 0
EYES NEGATIVE: 1
CONSTITUTIONAL NEGATIVE: 1
ABDOMINAL PAIN: 0
DIZZINESS: 0
HEADACHES: 0
NERVOUS/ANXIOUS: 0
VOMITING: 0
NAUSEA: 0
SHORTNESS OF BREATH: 0

## 2020-08-11 ASSESSMENT — FIBROSIS 4 INDEX: FIB4 SCORE: 0.71

## 2020-08-11 NOTE — PROGRESS NOTES
Hospital Medicine Daily Progress Note    Date of Service  8/11/2020    Chief Complaint  60 y.o. male admitted 8/1/2020 with chest pain    Hospital Course    60-year-old male with past medical history of hypertension, dyslipidemia presenting with chest pain.  Found to have a systolic blood pressure of 230 on admission with a slightly elevated troponin.  He was admitted for NSTEMI and underwent cardiac cath requiring PCI of RCA and PCI of posterior lateral branch.  He subsequently developed acute hypoxic respiratory failure with flash pulmonary edema following PCI requiring intubation and was transferred to ICU.  Patient had PEA cardiac arrest however obtained Rask within 2 cycles of CPR.  Echocardiogram showed akinetic apex with new LV thrombus.  Patient was again taken to Cath Lab post cardiac arrest and felt to be in cardiogenic shock likely due to stress cardiomyopathy and had successful placement of IABP.  Cardiogenic shock resolved by 8/2 and IABP was removed.  Repeat echocardiogram on 8/3 showed no LV thrombus so his IV heparin was discontinued.  Patient did have agitation while in ICU temporarily requiring Precedex.  He developed hypotension and several days of IV pressors and this then improved. During his ICU stay he was found to have suspected left sided pneumonia for which he was started on cefepime.  His afib has been rate controlled on amiodaroone.  He did develop hematuria so his anticoagulation was held and CBI was initiated, this is now improving.      Interval Problem Update  Remains with no further hematuria - AC restarted today, will leave keith in to monitor for further bleeding  Reports mild chest wall pain from site of cpr  Denies sob, no cough  Did well with PT  Did well with cognitive veronika  No n/v  Ros otherwise negative  axox3    Consultants/Specialty  Critical care  Cardiology    Code Status  Full code    Disposition  TBD    Review of Systems  Review of Systems   Constitutional: Negative for  chills and fever.   HENT: Negative for hearing loss.    Respiratory: Negative for cough and shortness of breath.    Cardiovascular: Negative for chest pain and palpitations.   Gastrointestinal: Negative for abdominal pain and vomiting.   Genitourinary: Negative for dysuria and urgency.   All other systems reviewed and are negative.       Physical Exam  Temp:  [36.3 °C (97.4 °F)-37 °C (98.6 °F)] 36.9 °C (98.5 °F)  Pulse:  [76-97] 76  Resp:  [16-20] 16  BP: (133-169)/(71-93) 169/93  SpO2:  [92 %-97 %] 93 %    Physical Exam  Vitals signs and nursing note reviewed. Exam conducted with a chaperone present.   Constitutional:       General: He is not in acute distress.     Appearance: Normal appearance. He is not diaphoretic.   HENT:      Head: Normocephalic.      Nose: Nose normal.      Mouth/Throat:      Mouth: Mucous membranes are moist.   Eyes:      Pupils: Pupils are equal, round, and reactive to light.   Cardiovascular:      Rate and Rhythm: Normal rate. Rhythm irregular.      Pulses: Normal pulses.      Heart sounds: Normal heart sounds. No murmur.   Pulmonary:      Effort: Pulmonary effort is normal. No respiratory distress.      Comments: Decreased at bases  Abdominal:      General: Abdomen is flat. Bowel sounds are normal.      Palpations: Abdomen is soft.   Genitourinary:     Comments: keith  Musculoskeletal: Normal range of motion.         General: No swelling or deformity.   Skin:     General: Skin is warm and dry.      Capillary Refill: Capillary refill takes less than 2 seconds.   Neurological:      General: No focal deficit present.      Mental Status: He is alert and oriented to person, place, and time.      Cranial Nerves: No cranial nerve deficit.   Psychiatric:         Mood and Affect: Mood normal. Mood is not anxious.         Behavior: Behavior normal.         Judgment: Judgment is not inappropriate.         Fluids    Intake/Output Summary (Last 24 hours) at 8/11/2020 1534  Last data filed at 8/11/2020  0900  Gross per 24 hour   Intake 850 ml   Output 1105 ml   Net -255 ml       Laboratory  Recent Labs     08/09/20  0558 08/10/20  0514 08/11/20  0137   WBC 28.1* 17.8* 10.9*   RBC 2.86* 2.85* 2.87*   HEMOGLOBIN 8.9* 8.8* 8.7*   HEMATOCRIT 26.6* 26.3* 26.9*   MCV 93.0 92.3 93.7   MCH 31.1 30.9 30.3   MCHC 33.5* 33.5* 32.3*   RDW 43.4 43.0 43.6   PLATELETCT 267 268 265   MPV 11.1 11.0 11.1     Recent Labs     08/09/20  0558 08/10/20  0514 08/11/20  0137   SODIUM 136 138 137   POTASSIUM 3.9 3.2* 3.6   CHLORIDE 98 101 102   CO2 25 26 24   GLUCOSE 123* 129* 129*   BUN 12 14 11   CREATININE 0.91 0.74 0.69   CALCIUM 9.1 8.8 8.8                   Imaging  EC-ECHOCARDIOGRAM LTD W/O CONT   Final Result      DX-CHEST-PORTABLE (1 VIEW)   Final Result      1.  Linear atelectasis within the left lung base.      2.  Mild cardiomegaly.      DX-CHEST-PORTABLE (1 VIEW)   Final Result      Stable chest without acute/new abnormality.      DX-CHEST-PORTABLE (1 VIEW)   Final Result         1.  No acute cardiopulmonary disease.   2.  Atherosclerosis      EC-ECHOCARDIOGRAM LTD W/ CONT   Final Result      DX-CHEST-PORTABLE (1 VIEW)   Final Result         1.  Mild pulmonary edema and/or infiltrates.   2.  Cardiomegaly      DX-CHEST-PORTABLE (1 VIEW)   Final Result         1. No significant interval change.      DX-ABDOMEN FOR TUBE PLACEMENT   Final Result      Enteric tubes project over the stomach.      Calcific densities projecting over the right kidney likely represent renal calculi.      CT-HEAD W/O   Final Result      No acute intracranial abnormality is identified.      Paranasal sinus disease.         EC-ECHOCARDIOGRAM COMPLETE W/ CONT   Final Result      DX-CHEST-LIMITED (1 VIEW)   Final Result      Right central line projects over the SVC. No pneumothorax.      Interstitial prominence likely represents interstitial edema.      Right basilar opacity may represent atelectasis or consolidation.      Atherosclerotic plaque.             DX-CHEST-PORTABLE (1 VIEW)   Final Result      Right basilar opacity likely represents atelectasis.      Endotracheal tube projects at the level of the clavicular heads.      Interstitial opacities likely present interstitial edema or pneumonitis.      Atherosclerotic plaque.         CL-LEFT HEART CATHETERIZATION WITH POSSIBLE INTERVENTION    (Results Pending)   CL-LEFT HEART CATHETERIZATION WITH POSSIBLE INTERVENTION    (Results Pending)        Assessment/Plan  Hypotension  Assessment & Plan  Resolved, now off pressors  following    NSTEMI (non-ST elevated myocardial infarction) ([facility])- (present on admission)  Assessment & Plan  Status post cardiac cath on 8/1 with PCI to RCA and posterior lateral branch  Cardiology following  On asa/ plavix    Hypokalemia  Assessment & Plan  Resolved  following    Pneumonia  Assessment & Plan  Suspected left sided  Now off abx  Sx resolved  Continue to follow  Continue supportive care    Atrial fibrillation with RVR ([facility])- (present on admission)  Assessment & Plan  8/7 EKG  Improved rate control on amiodarone  Cardiology following  apixaban was held, restarted this am - monitor for further hematuria    Gross hematuria  Assessment & Plan  due to patient traumatically removing White catheter   apixaban held as a result - now restarting  Resolving  following    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

## 2020-08-11 NOTE — CARE PLAN
Problem: Communication  Goal: The ability to communicate needs accurately and effectively will improve  Outcome: PROGRESSING AS EXPECTED     Problem: Safety  Goal: Will remain free from injury  Outcome: PROGRESSING AS EXPECTED  Intervention: Provide assistance with mobility  Flowsheets (Taken 8/10/2020 2114)  Ambulation Tolerance: General Weakness  Note: Instructed call light use prior to getting oob to prevent fall.      Problem: Venous Thromboembolism (VTW)/Deep Vein Thrombosis (DVT) Prevention:  Goal: Patient will participate in Venous Thrombosis (VTE)/Deep Vein Thrombosis (DVT)Prevention Measures  Outcome: PROGRESSING AS EXPECTED  Flowsheets (Taken 8/10/2020 2114)  SCDs, Sequential Compression Device: On  Note: Scd's on. Plavix per MD.

## 2020-08-11 NOTE — PROGRESS NOTES
Assumed care of patient and report received from previous RN. Patient educated on importance of calling, bed controls on, bed locked and in lowest position, bed alarm on, call light with patient. Appropriate fall precautions in place. Belongings and bedside table within reach. No needs at this time

## 2020-08-11 NOTE — PROGRESS NOTES
Pt transported with RN and CNA via wheelchair, on tele with oxygen and belongings. No issues. Bedside hand off with CAYETANO Marlow.

## 2020-08-11 NOTE — PROGRESS NOTES
Pt a&o x4. On 2L o2 nc. Respirations equal and unlabored. Denies pain. Generalized weakness. Up x1 assist with walker per report. Generalized edema noted. Keith to dd with qs aparna output. No hematuria noted. Repositions self in bed. SR 77 on tele monitor. Good po intake without any s/sx of aspiration noted. HUDSON.  Plan of care reviewed including: IV antibiotics, keith care, fall precautions, labs, vitals frequency, and medications. Verbalized understanding and agrees. White board updated. Instructed to use call light prior to getting oob to prevent falls. Able to make needs known.  Call light within reach.

## 2020-08-11 NOTE — PROGRESS NOTES
Report called to 8 tele nurse - CAYETANO Marlow. Report given and questions answered. Will transport pt on tele box with belongings and oxygen via wheelchair when transport arrives.

## 2020-08-11 NOTE — CARE PLAN
Problem: Communication  Goal: The ability to communicate needs accurately and effectively will improve  Outcome: PROGRESSING AS EXPECTED     Problem: Safety  Goal: Will remain free from injury  Outcome: PROGRESSING AS EXPECTED     Problem: Infection  Goal: Will remain free from infection  Outcome: PROGRESSING AS EXPECTED     Problem: Venous Thromboembolism (VTW)/Deep Vein Thrombosis (DVT) Prevention:  Goal: Patient will participate in Venous Thrombosis (VTE)/Deep Vein Thrombosis (DVT)Prevention Measures  Outcome: PROGRESSING AS EXPECTED     Problem: Bowel/Gastric:  Goal: Normal bowel function is maintained or improved  Outcome: PROGRESSING AS EXPECTED  Goal: Will not experience complications related to bowel motility  Outcome: PROGRESSING AS EXPECTED     Problem: Knowledge Deficit  Goal: Knowledge of disease process/condition, treatment plan, diagnostic tests, and medications will improve  Outcome: PROGRESSING AS EXPECTED  Goal: Knowledge of the prescribed therapeutic regimen will improve  Outcome: PROGRESSING AS EXPECTED     Problem: Discharge Barriers/Planning  Goal: Patient's continuum of care needs will be met  Outcome: PROGRESSING AS EXPECTED     Problem: Fluid Volume:  Goal: Will maintain balanced intake and output  Outcome: PROGRESSING AS EXPECTED     Problem: Pain Management  Goal: Pain level will decrease to patient's comfort goal  Outcome: PROGRESSING AS EXPECTED     Problem: Respiratory:  Goal: Respiratory status will improve  Outcome: PROGRESSING AS EXPECTED     Problem: Skin Integrity  Goal: Risk for impaired skin integrity will decrease  Outcome: PROGRESSING AS EXPECTED     Problem: Safety - Medical Restraint  Goal: Remains free of injury from restraints (Restraint for Interference with Medical Device)  Description: INTERVENTIONS:  1. Determine that other, less restrictive measures have been tried or would not be effective before applying the restraint  2. Evaluate the patient's condition at the time  of restraint application  3. Inform patient/family regarding the reason for restraint  4. Q2H: Monitor safety, psychosocial status, comfort, nutrition and hydration  Outcome: PROGRESSING AS EXPECTED  Goal: Free from restraint(s) (Restraint for Interference with Medical Device)  Description: INTERVENTIONS:  1. ONCE/SHIFT or MINIMUM Q12H: Assess and document the continuing need for restraints  2. Q24H: Continued use of restraint requires LIP to perform face to face examination and written order  3. Identify and implement measures to help patient regain control  Outcome: PROGRESSING AS EXPECTED     Problem: Psychosocial Needs:  Goal: Level of anxiety will decrease  Outcome: PROGRESSING AS EXPECTED     Problem: Mobility  Goal: Risk for activity intolerance will decrease  Outcome: PROGRESSING AS EXPECTED

## 2020-08-11 NOTE — DISCHARGE PLANNING
Anticipated Discharge Disposition: home health    Action: spoke w/ lloyd Peterson at [facility] yesterday and insurance authorization was still pending. Received message from [facility]ET[facility]aya stating Susan davis/ Gerald ([phone]) has approved authorization for SNF. Per PT/OT notes 8/10, home health is recommended. Updated Dr. Abdi. Pt being transferred to T8.    Barriers to Discharge: TBD    Plan: TBD

## 2020-08-11 NOTE — FACE TO FACE
Face to Face Supporting Documentation - Home Health    The encounter with this patient was in whole or in part the primary reason for home health admission.    Date of encounter:   Patient:                    MRN:                       YOB: 2020  Karan Wiley  [id number]  1960     Home health to see patient for:  Skilled Nursing care for assessment, interventions & education    Skilled need for:  Recent Deterioration of Health Status PEA    Skilled nursing interventions to include:  Comment: vitals, exam, PT    Homebound status evidenced by:  Needs the assistance of another person in order to leave the home. Leaving home requires a considerable and taxing effort. There is a normal inability to leave the home.    Community Physician to provide follow up care: Darrell Caal P.A.-C.     Optional Interventions? No      I certify the face to face encounter for this home health care referral meets the CMS requirements and the encounter/clinical assessment with the patient was, in whole, or in part, for the medical condition(s) listed above, which is the primary reason for home health care. Based on my clinical findings: the service(s) are medically necessary, support the need for home health care, and the homebound criteria are met.  I certify that this patient has had a face to face encounter by myself.  Tatum Abdi M.D. - NPI: [id number]

## 2020-08-11 NOTE — PROGRESS NOTES
Cardiology Follow Up Progress Note    Date of Service  8/11/2020    Attending Physician  Tatum Abdi M.D.    Chief Complaint   NSTEMI, percutaneous intervention with stent placement, transient left ventricular dysystolic function, atrial fibrillation, hematuria with keith catheter.     HPI  Karan Wiley is a 60 y.o. male admitted 8/1/2020 with above.    Interim Events  Hematuria has improved.    Review of Systems  Review of Systems   Constitutional: Negative.  Negative for chills and fever.   HENT: Negative.  Negative for hearing loss.    Eyes: Negative.    Respiratory: Negative.  Negative for cough and shortness of breath.    Cardiovascular: Negative.  Negative for chest pain, palpitations and leg swelling.   Gastrointestinal: Negative.  Negative for abdominal pain, nausea and vomiting.   Genitourinary: Negative.  Negative for dysuria and urgency.   Musculoskeletal: Negative.  Negative for myalgias.   Skin: Negative.  Negative for rash.   Neurological: Negative.  Negative for dizziness, weakness and headaches.   Hematological: Does not bruise/bleed easily.   Psychiatric/Behavioral: Negative.  The patient is not nervous/anxious.        Vital signs in last 24 hours  Temp:  [36.3 °C (97.4 °F)-37 °C (98.6 °F)] 36.6 °C (97.8 °F)  Pulse:  [84-99] 88  Resp:  [16-20] 19  BP: (119-156)/(71-95) 152/73  SpO2:  [92 %-97 %] 95 %    Physical Exam  Physical Exam  Constitutional:       Appearance: He is well-developed.   HENT:      Head: Normocephalic and atraumatic.   Eyes:      Conjunctiva/sclera: Conjunctivae normal.      Pupils: Pupils are equal, round, and reactive to light.   Neck:      Musculoskeletal: Normal range of motion and neck supple.   Cardiovascular:      Rate and Rhythm: Normal rate and regular rhythm.   Pulmonary:      Effort: Pulmonary effort is normal.      Breath sounds: Normal breath sounds.   Abdominal:      General: Bowel sounds are normal.      Palpations: Abdomen is soft.   Musculoskeletal: Normal  range of motion.   Skin:     General: Skin is warm and dry.   Neurological:      Mental Status: He is alert and oriented to person, place, and time.         Lab Review  Lab Results   Component Value Date/Time    WBC 10.9 (H) 08/11/2020 01:37 AM    RBC 2.87 (L) 08/11/2020 01:37 AM    HEMOGLOBIN 8.7 (L) 08/11/2020 01:37 AM    HEMATOCRIT 26.9 (L) 08/11/2020 01:37 AM    MCV 93.7 08/11/2020 01:37 AM    MCH 30.3 08/11/2020 01:37 AM    MCHC 32.3 (L) 08/11/2020 01:37 AM    MPV 11.1 08/11/2020 01:37 AM      Lab Results   Component Value Date/Time    SODIUM 137 08/11/2020 01:37 AM    POTASSIUM 3.6 08/11/2020 01:37 AM    CHLORIDE 102 08/11/2020 01:37 AM    CO2 24 08/11/2020 01:37 AM    GLUCOSE 129 (H) 08/11/2020 01:37 AM    BUN 11 08/11/2020 01:37 AM    CREATININE 0.69 08/11/2020 01:37 AM      Lab Results   Component Value Date/Time    ASTSGOT 15 08/07/2020 01:25 PM    ALTSGPT 23 08/07/2020 01:25 PM     Lab Results   Component Value Date/Time    TRIGLYCERIDE 217 (H) 08/01/2020 03:26 PM    TROPONINT 465 (H) 08/02/2020 08:50 AM       No results for input(s): NTPROBNP in the last 72 hours.  (Above labs reviewed.)       Current Facility-Administered Medications:   •  apixaban (ELIQUIS) tablet 5 mg, 5 mg, Oral, BID, KACY SaundersA.-C.  •  [START ON 8/15/2020] amiodarone (CORDARONE) tablet 200 mg, 200 mg, Oral, Q DAY, KACY SaundersA.-C.  •  cefepime (MAXIPIME) 2 g in  mL IVPB, 2 g, Intravenous, Q8HRS, Jevon Godwin M.D., Stopped at 08/11/20 0603  •  amiodarone (CORDARONE) tablet 400 mg, 400 mg, Enteral Tube, TWICE DAILY, MIKEY Saunders.-CGrace, 400 mg at 08/11/20 0533  •  Metoprolol Tartrate (LOPRESSOR) injection 5 mg, 5 mg, Intravenous, Q6HRS PRN, Fernando Martinez M.D., 5 mg at 08/10/20 0354  •  clopidogrel (PLAVIX) tablet 75 mg, 75 mg, Oral, DAILY, Fernando Martinez M.D., 75 mg at 08/11/20 0522  •  atorvastatin (LIPITOR) tablet 80 mg, 80 mg, Oral, Nightly, Tatum Abdi M.D., 80 mg at  08/10/20 2015  •  senna-docusate (PERICOLACE or SENOKOT S) 8.6-50 MG per tablet 2 Tab, 2 Tab, Oral, BID, Stopped at 08/06/20 1800 **AND** polyethylene glycol/lytes (MIRALAX) PACKET 1 Packet, 1 Packet, Oral, QDAY PRN **AND** magnesium hydroxide (MILK OF MAGNESIA) suspension 30 mL, 30 mL, Oral, QDAY PRN **AND** bisacodyl (DULCOLAX) suppository 10 mg, 10 mg, Rectal, QDAY PRN, Tatum Abdi M.D.  •  traZODone (DESYREL) tablet 100 mg, 100 mg, Oral, QHS, Tatum Abdi M.D., 100 mg at 08/10/20 2015  •  acetaminophen (TYLENOL) tablet 650 mg, 650 mg, Oral, Q6HRS PRN, Tatum Abdi M.D.  •  ondansetron (ZOFRAN ODT) dispertab 4 mg, 4 mg, Oral, Q4HRS PRN, Tatum Abdi M.D.  •  promethazine (PHENERGAN) tablet 12.5-25 mg, 12.5-25 mg, Oral, Q4HRS PRN, Tatum Abdi M.D.  •  fentaNYL (SUBLIMAZE) injection  mcg,  mcg, Intravenous, Q4HRS PRN, Jevon Godwin M.D., 50 mcg at 08/04/20 1937  •  LORazepam (ATIVAN) injection 1-2 mg, 1-2 mg, Intravenous, Q3HRS PRN, JULIO GalarzaRGraceNGrace, 2 mg at 08/04/20 2208  •  finasteride (PROSCAR) tablet 5 mg, 5 mg, Oral, Q EVENING, BILLY VasquezO., 5 mg at 08/10/20 1810  •  HYDROcodone/acetaminophen (NORCO)  MG per tablet 1 Tab, 1 Tab, Oral, Q3HRS PRN, BILLY VasquezO., 1 Tab at 08/11/20 0127  •  morphine ER (MS CONTIN) tablet 15 mg, 15 mg, Oral, TID, Reed Horta D.O., 15 mg at 08/11/20 0533  •  tamsulosin (FLOMAX) capsule 0.4 mg, 0.4 mg, Oral, Q EVENING, Reed Horta D.O., 0.4 mg at 08/10/20 1810  •  venlafaxine XR (EFFEXOR XR) capsule 150 mg, 150 mg, Oral, DAILY, Reed Horta D.O., 150 mg at 08/10/20 2015  •  ondansetron (ZOFRAN) syringe/vial injection 4 mg, 4 mg, Intravenous, Q4HRS PRN, Meño Red M.D.  •  promethazine (PHENERGAN) suppository 12.5-25 mg, 12.5-25 mg, Rectal, Q4HRS PRN, Meño Red M.D.  •  prochlorperazine (COMPAZINE) injection 5-10 mg, 5-10 mg, Intravenous, Q4HRS PRN, Meño Red M.D.  •  labetalol  (NORMODYNE/TRANDATE) injection 10 mg, 10 mg, Intravenous, Q4HRS PRN, Meño Red M.D., 10 mg at 08/04/20 2312  •  nitroglycerin (NITROSTAT) tablet 0.4 mg, 0.4 mg, Sublingual, Q5 MIN PRN, Meño Red M.D.  •  Respiratory Therapy Consult, , Nebulization, Continuous RT, Reed Mesa M.D.  •  ipratropium-albuterol (DUONEB) nebulizer solution, 3 mL, Nebulization, Q2HRS PRN (RT), Reed Mesa M.D.  •  enalaprilat (VASOTEC) injection 1.25 mg, 1.25 mg, Intravenous, Q6HRS PRN, Reed Mesa M.D.  (Medications reviewed.)    Cardiac Imaging and Procedures Review  CARDIAC STUDIES/PROCEDURES:     CARDIAC CATHETERIZATION CONCLUSIONS by Chilo Solis (08/01/20)  1. Widely patent coronary arteries  2. Cardiogenic shock, likely due to stress cardiomyopathy  3. Successful placement of IABP  4. Successful placement of left radial artery line     CARDIAC CATHETERIZATION CONCLUSIONS by Chilo Solis (08/01/20)  1.  Acute non-STEMI due to culprit right coronary artery  2.  Successful PCI of the mid right coronary artery using 1 drug-eluting stent and IVUS guidance  3.  Successful PCI of the posterior lateral branch using 1 drug-eluting stent  4.  Normal LV systolic function  5.  Moderately elevated LVEDP (21 mmHg)  6.  Poorly controlled hypertension    ECHOCARDIOGRAM CONCLUSIONS (08/09/20)  Compared to the images of the prior study done 8/3/2020, the EF appears improved.  Left ventricular ejection fraction is visually estimated to be 60%.    ECHOCARDIOGRAM CONCLUSIONS (08/01/20)  Technically difficult due to body habitus, positioning, pt vented and   in restraints but adequate study for interpretation.   Contrast was used to enhance definition of the endocardial borders.   Severely reduced left ventricular systolic function.  Left ventricular ejection fraction is visually estimated to be 25%.  The apex is akinetic.  Thrombus is observed in the left ventricular apex.    EKG performed on (08/08/20)  EKG shows sinus tachycardia and trigeminy.    Assessment/Plan  1. NSTEMI, coronary artery disease with stent placement: He is clinically doing well without recurrence of his angina.  We will continue with current medical care including clopidogrel and atorvastatin.  2. Atrial fibrillation on chronic anticoagulation therapy (Eliquis): Sinus rhythm is maintained without evidence of atrial fibrillation episodes on amiodarone. NOAC has been started.  3. Hyperlipidemia: He is doing well on statin therapy without myalgia symptoms.    Thank you for allowing me to participate in the care of this patient.  Cardiology will sign off on this patient    Please contact me with any questions.    Servando Mckinney M.D.   Cardiologist, [facility]  [phone]

## 2020-08-12 PROBLEM — R31.0 GROSS HEMATURIA: Status: RESOLVED | Noted: 2020-08-04 | Resolved: 2020-08-12

## 2020-08-12 PROBLEM — I95.9 HYPOTENSION: Status: RESOLVED | Noted: 2020-08-01 | Resolved: 2020-08-12

## 2020-08-12 PROBLEM — I21.4 NSTEMI (NON-ST ELEVATED MYOCARDIAL INFARCTION) (HCC): Status: RESOLVED | Noted: 2020-08-01 | Resolved: 2020-08-12

## 2020-08-12 PROBLEM — R31.9 HEMATURIA: Status: RESOLVED | Noted: 2020-08-09 | Resolved: 2020-08-12

## 2020-08-12 PROBLEM — J18.9 PNEUMONIA: Status: RESOLVED | Noted: 2020-08-10 | Resolved: 2020-08-12

## 2020-08-12 LAB
ANION GAP SERPL CALC-SCNC: 13 MMOL/L (ref 7–16)
BASOPHILS # BLD AUTO: 0.6 % (ref 0–1.8)
BASOPHILS # BLD: 0.05 K/UL (ref 0–0.12)
BUN SERPL-MCNC: 7 MG/DL (ref 8–22)
CALCIUM SERPL-MCNC: 9.8 MG/DL (ref 8.5–10.5)
CHLORIDE SERPL-SCNC: 100 MMOL/L (ref 96–112)
CO2 SERPL-SCNC: 27 MMOL/L (ref 20–33)
CREAT SERPL-MCNC: 0.71 MG/DL (ref 0.5–1.4)
EKG IMPRESSION: NORMAL
EOSINOPHIL # BLD AUTO: 0.21 K/UL (ref 0–0.51)
EOSINOPHIL NFR BLD: 2.3 % (ref 0–6.9)
ERYTHROCYTE [DISTWIDTH] IN BLOOD BY AUTOMATED COUNT: 41.9 FL (ref 35.9–50)
GLUCOSE SERPL-MCNC: 94 MG/DL (ref 65–99)
HCT VFR BLD AUTO: 28.5 % (ref 42–52)
HGB BLD-MCNC: 9.4 G/DL (ref 14–18)
IMM GRANULOCYTES # BLD AUTO: 0.24 K/UL (ref 0–0.11)
IMM GRANULOCYTES NFR BLD AUTO: 2.7 % (ref 0–0.9)
LYMPHOCYTES # BLD AUTO: 1.79 K/UL (ref 1–4.8)
LYMPHOCYTES NFR BLD: 19.8 % (ref 22–41)
MAGNESIUM SERPL-MCNC: 2 MG/DL (ref 1.5–2.5)
MCH RBC QN AUTO: 30.5 PG (ref 27–33)
MCHC RBC AUTO-ENTMCNC: 33 G/DL (ref 33.7–35.3)
MCV RBC AUTO: 92.5 FL (ref 81.4–97.8)
MONOCYTES # BLD AUTO: 0.91 K/UL (ref 0–0.85)
MONOCYTES NFR BLD AUTO: 10.1 % (ref 0–13.4)
NEUTROPHILS # BLD AUTO: 5.84 K/UL (ref 1.82–7.42)
NEUTROPHILS NFR BLD: 64.5 % (ref 44–72)
NRBC # BLD AUTO: 0.02 K/UL
NRBC BLD-RTO: 0.2 /100 WBC
PLATELET # BLD AUTO: 332 K/UL (ref 164–446)
PMV BLD AUTO: 10.8 FL (ref 9–12.9)
POTASSIUM SERPL-SCNC: 3.6 MMOL/L (ref 3.6–5.5)
RBC # BLD AUTO: 3.08 M/UL (ref 4.7–6.1)
SODIUM SERPL-SCNC: 140 MMOL/L (ref 135–145)
WBC # BLD AUTO: 9 K/UL (ref 4.8–10.8)

## 2020-08-12 PROCEDURE — 83735 ASSAY OF MAGNESIUM: CPT

## 2020-08-12 PROCEDURE — 700102 HCHG RX REV CODE 250 W/ 637 OVERRIDE(OP): Performed by: NURSE PRACTITIONER

## 2020-08-12 PROCEDURE — 700102 HCHG RX REV CODE 250 W/ 637 OVERRIDE(OP): Performed by: INTERNAL MEDICINE

## 2020-08-12 PROCEDURE — 700102 HCHG RX REV CODE 250 W/ 637 OVERRIDE(OP): Performed by: HOSPITALIST

## 2020-08-12 PROCEDURE — 85025 COMPLETE CBC W/AUTO DIFF WBC: CPT

## 2020-08-12 PROCEDURE — 93005 ELECTROCARDIOGRAM TRACING: CPT | Performed by: INTERNAL MEDICINE

## 2020-08-12 PROCEDURE — A9270 NON-COVERED ITEM OR SERVICE: HCPCS | Performed by: NURSE PRACTITIONER

## 2020-08-12 PROCEDURE — 700102 HCHG RX REV CODE 250 W/ 637 OVERRIDE(OP): Performed by: PHYSICIAN ASSISTANT

## 2020-08-12 PROCEDURE — A9270 NON-COVERED ITEM OR SERVICE: HCPCS | Performed by: PHYSICIAN ASSISTANT

## 2020-08-12 PROCEDURE — 36415 COLL VENOUS BLD VENIPUNCTURE: CPT

## 2020-08-12 PROCEDURE — 93010 ELECTROCARDIOGRAM REPORT: CPT | Performed by: INTERNAL MEDICINE

## 2020-08-12 PROCEDURE — 80048 BASIC METABOLIC PNL TOTAL CA: CPT

## 2020-08-12 PROCEDURE — A9270 NON-COVERED ITEM OR SERVICE: HCPCS | Performed by: INTERNAL MEDICINE

## 2020-08-12 PROCEDURE — A9270 NON-COVERED ITEM OR SERVICE: HCPCS | Performed by: HOSPITALIST

## 2020-08-12 PROCEDURE — 700111 HCHG RX REV CODE 636 W/ 250 OVERRIDE (IP): Performed by: INTERNAL MEDICINE

## 2020-08-12 PROCEDURE — 700105 HCHG RX REV CODE 258: Performed by: INTERNAL MEDICINE

## 2020-08-12 PROCEDURE — 97535 SELF CARE MNGMENT TRAINING: CPT

## 2020-08-12 PROCEDURE — 770020 HCHG ROOM/CARE - TELE (206)

## 2020-08-12 PROCEDURE — 99232 SBSQ HOSP IP/OBS MODERATE 35: CPT | Performed by: HOSPITALIST

## 2020-08-12 PROCEDURE — 97116 GAIT TRAINING THERAPY: CPT

## 2020-08-12 RX ORDER — CLOPIDOGREL BISULFATE 75 MG/1
75 TABLET ORAL DAILY
Qty: 30 TAB | Refills: 0 | Status: SHIPPED | OUTPATIENT
Start: 2020-08-13 | End: 2020-08-20 | Stop reason: SDUPTHER

## 2020-08-12 RX ORDER — POTASSIUM CHLORIDE 20 MEQ/1
40 TABLET, EXTENDED RELEASE ORAL ONCE
Status: COMPLETED | OUTPATIENT
Start: 2020-08-12 | End: 2020-08-12

## 2020-08-12 RX ORDER — AMIODARONE HYDROCHLORIDE 200 MG/1
200 TABLET ORAL DAILY
Qty: 30 TAB | Refills: 0 | Status: SHIPPED | OUTPATIENT
Start: 2020-08-15 | End: 2020-08-20 | Stop reason: SDUPTHER

## 2020-08-12 RX ORDER — AMIODARONE HYDROCHLORIDE 200 MG/1
400 TABLET ORAL TWICE DAILY
Status: DISCONTINUED | OUTPATIENT
Start: 2020-08-12 | End: 2020-08-13 | Stop reason: HOSPADM

## 2020-08-12 RX ORDER — AMIODARONE HYDROCHLORIDE 400 MG/1
400 TABLET ORAL 2 TIMES DAILY
Qty: 6 TAB | Refills: 0 | Status: SHIPPED
Start: 2020-08-12 | End: 2020-08-13

## 2020-08-12 RX ADMIN — HYDROCODONE BITARTRATE AND ACETAMINOPHEN 1 TABLET: 10; 325 TABLET ORAL at 01:09

## 2020-08-12 RX ADMIN — VENLAFAXINE HYDROCHLORIDE 150 MG: 75 CAPSULE, EXTENDED RELEASE ORAL at 17:55

## 2020-08-12 RX ADMIN — APIXABAN 5 MG: 5 TABLET, FILM COATED ORAL at 17:56

## 2020-08-12 RX ADMIN — TAMSULOSIN HYDROCHLORIDE 0.4 MG: 0.4 CAPSULE ORAL at 17:55

## 2020-08-12 RX ADMIN — DOCUSATE SODIUM 50 MG AND SENNOSIDES 8.6 MG 2 TABLET: 8.6; 5 TABLET, FILM COATED ORAL at 17:54

## 2020-08-12 RX ADMIN — APIXABAN 5 MG: 5 TABLET, FILM COATED ORAL at 06:00

## 2020-08-12 RX ADMIN — AMIODARONE HYDROCHLORIDE 400 MG: 200 TABLET ORAL at 17:55

## 2020-08-12 RX ADMIN — FINASTERIDE 5 MG: 5 TABLET, FILM COATED ORAL at 17:54

## 2020-08-12 RX ADMIN — AMIODARONE HYDROCHLORIDE 400 MG: 200 TABLET ORAL at 04:08

## 2020-08-12 RX ADMIN — MORPHINE SULFATE 15 MG: 15 TABLET, FILM COATED, EXTENDED RELEASE ORAL at 11:48

## 2020-08-12 RX ADMIN — ATORVASTATIN CALCIUM 80 MG: 80 TABLET, FILM COATED ORAL at 19:58

## 2020-08-12 RX ADMIN — CEFEPIME 2 G: 2 INJECTION, POWDER, FOR SOLUTION INTRAVENOUS at 20:48

## 2020-08-12 RX ADMIN — CEFEPIME 2 G: 2 INJECTION, POWDER, FOR SOLUTION INTRAVENOUS at 14:45

## 2020-08-12 RX ADMIN — MORPHINE SULFATE 15 MG: 15 TABLET, FILM COATED, EXTENDED RELEASE ORAL at 04:08

## 2020-08-12 RX ADMIN — CLOPIDOGREL BISULFATE 75 MG: 75 TABLET ORAL at 04:09

## 2020-08-12 RX ADMIN — CEFEPIME 2 G: 2 INJECTION, POWDER, FOR SOLUTION INTRAVENOUS at 04:10

## 2020-08-12 RX ADMIN — TRAZODONE HYDROCHLORIDE 100 MG: 100 TABLET ORAL at 20:06

## 2020-08-12 RX ADMIN — MORPHINE SULFATE 15 MG: 15 TABLET, FILM COATED, EXTENDED RELEASE ORAL at 18:02

## 2020-08-12 RX ADMIN — DOCUSATE SODIUM 50 MG AND SENNOSIDES 8.6 MG 2 TABLET: 8.6; 5 TABLET, FILM COATED ORAL at 04:08

## 2020-08-12 RX ADMIN — ACETAMINOPHEN 650 MG: 325 TABLET, FILM COATED ORAL at 21:34

## 2020-08-12 RX ADMIN — POTASSIUM CHLORIDE 40 MEQ: 1500 TABLET, EXTENDED RELEASE ORAL at 04:10

## 2020-08-12 ASSESSMENT — COGNITIVE AND FUNCTIONAL STATUS - GENERAL
CLIMB 3 TO 5 STEPS WITH RAILING: A LITTLE
DRESSING REGULAR LOWER BODY CLOTHING: A LITTLE
SUGGESTED CMS G CODE MODIFIER DAILY ACTIVITY: CJ
SUGGESTED CMS G CODE MODIFIER MOBILITY: CK
HELP NEEDED FOR BATHING: A LITTLE
WALKING IN HOSPITAL ROOM: A LITTLE
DAILY ACTIVITIY SCORE: 22
MOVING FROM LYING ON BACK TO SITTING ON SIDE OF FLAT BED: A LITTLE
MOBILITY SCORE: 19
TURNING FROM BACK TO SIDE WHILE IN FLAT BAD: A LITTLE
MOVING TO AND FROM BED TO CHAIR: A LITTLE

## 2020-08-12 ASSESSMENT — ENCOUNTER SYMPTOMS
CHILLS: 0
PALPITATIONS: 0
ABDOMINAL PAIN: 0
FEVER: 0
VOMITING: 0
SHORTNESS OF BREATH: 0
COUGH: 0

## 2020-08-12 ASSESSMENT — GAIT ASSESSMENTS
GAIT LEVEL OF ASSIST: SUPERVISED
DISTANCE (FEET): 400
DEVIATION: NO DEVIATION
ASSISTIVE DEVICE: FRONT WHEEL WALKER

## 2020-08-12 ASSESSMENT — FIBROSIS 4 INDEX: FIB4 SCORE: 0.57

## 2020-08-12 NOTE — CARE PLAN
Problem: Communication  Goal: The ability to communicate needs accurately and effectively will improve  Outcome: PROGRESSING AS EXPECTED     Problem: Safety  Goal: Will remain free from injury  Outcome: PROGRESSING AS EXPECTED     Problem: Bowel/Gastric:  Goal: Normal bowel function is maintained or improved  Outcome: PROGRESSING AS EXPECTED     Problem: Pain Management  Goal: Pain level will decrease to patient's comfort goal  Outcome: PROGRESSING AS EXPECTED     Problem: Communication  Goal: The ability to communicate needs accurately and effectively will improve  Outcome: PROGRESSING AS EXPECTED     Problem: Safety  Goal: Will remain free from injury  Outcome: PROGRESSING AS EXPECTED     Problem: Bowel/Gastric:  Goal: Normal bowel function is maintained or improved  Outcome: PROGRESSING AS EXPECTED     Problem: Pain Management  Goal: Pain level will decrease to patient's comfort goal  Outcome: PROGRESSING AS EXPECTED

## 2020-08-12 NOTE — PROGRESS NOTES
Report received from day shift RN. Pt is in bed in lowest locked position with bed side table and call light within reach. Assessment performed. No further needs stated at this time. Pt is A&Ox4 on 2 L. Bed alarm on, fall education provided. Hourly rounding in place.

## 2020-08-12 NOTE — DIETARY
Nutrition Services: Update   Day 11 of admit.  Karan Wiley is a 60 y.o. male with admitting DX of NSTEMI  NSTEMI (non-ST elevated myocardial infarction)    Pt is currently on Cardiac diet. Wt 8/11: 104kg kg via bed scale - remains stable since Tele 8 admin. 1+ edema present. Pt seen by RD for LOS. Missing recent PO information. Recorded dietary intake of % x 2,  25-50%. Pt reported no concerns regarding appetite or meals and stated eating most of the food. Per RN, pt ate <50% of lunch and no breakfast. Added boost plus supplement order.    Malnutrition Risk: Criteria not met    Recommendations/Plan:  1. Continue Cardiac diet with boost plus at meals.    2. Encourage intake of meals.  3. Document intake of all meals and supplements as % taken in ADL's to provide interdisciplinary communication across all shifts.   4. Monitor weight.  5. Nutrition rep will continue to see patient for ongoing meal and snack preferences.    RD following

## 2020-08-12 NOTE — PROGRESS NOTES
"Hospital Medicine Daily Progress Note    Date of Service  8/12/2020    Chief Complaint  60 y.o. male admitted 8/1/2020 with chest pain    Hospital Course    60-year-old male with past medical history of hypertension, dyslipidemia presenting with chest pain.  Found to have a systolic blood pressure of 230 on admission with a slightly elevated troponin.  He was admitted for NSTEMI and underwent cardiac cath requiring PCI of RCA and PCI of posterior lateral branch.  He subsequently developed acute hypoxic respiratory failure with flash pulmonary edema following PCI requiring intubation and was transferred to ICU.  Patient had PEA cardiac arrest however obtained Rask within 2 cycles of CPR.  Echocardiogram showed akinetic apex with new LV thrombus.  Patient was again taken to Cath Lab post cardiac arrest and felt to be in cardiogenic shock likely due to stress cardiomyopathy and had successful placement of IABP.  Cardiogenic shock resolved by 8/2 and IABP was removed.  Repeat echocardiogram on 8/3 showed no LV thrombus so his IV heparin was discontinued.  Patient did have agitation while in ICU temporarily requiring Precedex.  He developed hypotension and several days of IV pressors and this then improved. During his ICU stay he was found to have suspected left sided pneumonia for which he was started on cefepime.  His afib has been rate controlled on amiodaroone.  He did develop hematuria so his anticoagulation was held and CBI was initiated, this is now improving.      Interval Problem Update  No pain, denies sob, no cp  No further bleeding overnight  Discussed with nursing, trail without keith today  No n/v  Remains axox3  Ros otherwise negative  Discussed with bedside nursing - wife called and asked for an update, I tried twice to reach her Bee at [phone] - no answer and message \"no voice mail set up\"    Consultants/Specialty  Critical care  Cardiology    Code Status  Full code    Disposition  Home tomorrow " with home health if stable    Review of Systems  Review of Systems   Constitutional: Negative for chills and fever.   HENT: Negative for hearing loss.    Respiratory: Negative for cough and shortness of breath.    Cardiovascular: Negative for chest pain and palpitations.   Gastrointestinal: Negative for abdominal pain and vomiting.   Genitourinary: Negative for dysuria and urgency.   All other systems reviewed and are negative.       Physical Exam  Temp:  [36.2 °C (97.2 °F)-37.3 °C (99.1 °F)] 36.7 °C (98 °F)  Pulse:  [] 90  Resp:  [16-17] 16  BP: (143-169)/() 143/85  SpO2:  [92 %-97 %] 95 %    Physical Exam  Vitals signs and nursing note reviewed. Exam conducted with a chaperone present.   Constitutional:       General: He is not in acute distress.     Appearance: Normal appearance. He is not diaphoretic.   HENT:      Head: Normocephalic.      Nose: Nose normal.      Mouth/Throat:      Mouth: Mucous membranes are moist.   Eyes:      Pupils: Pupils are equal, round, and reactive to light.   Cardiovascular:      Rate and Rhythm: Normal rate. Rhythm irregular.      Pulses: Normal pulses.      Heart sounds: Normal heart sounds. No murmur.   Pulmonary:      Effort: Pulmonary effort is normal. No respiratory distress.      Comments: Decreased at bases  Abdominal:      General: Abdomen is flat. Bowel sounds are normal.      Palpations: Abdomen is soft.   Genitourinary:     Comments: keith  Musculoskeletal: Normal range of motion.         General: No swelling or deformity.   Skin:     General: Skin is warm and dry.      Capillary Refill: Capillary refill takes less than 2 seconds.   Neurological:      General: No focal deficit present.      Mental Status: He is alert and oriented to person, place, and time.      Cranial Nerves: No cranial nerve deficit.   Psychiatric:         Mood and Affect: Mood normal. Mood is not anxious.         Behavior: Behavior normal.         Judgment: Judgment is not inappropriate.          Fluids    Intake/Output Summary (Last 24 hours) at 8/12/2020 1211  Last data filed at 8/12/2020 0700  Gross per 24 hour   Intake --   Output 600 ml   Net -600 ml       Laboratory  Recent Labs     08/10/20  0514 08/11/20 0137 08/12/20  0029   WBC 17.8* 10.9* 9.0   RBC 2.85* 2.87* 3.08*   HEMOGLOBIN 8.8* 8.7* 9.4*   HEMATOCRIT 26.3* 26.9* 28.5*   MCV 92.3 93.7 92.5   MCH 30.9 30.3 30.5   MCHC 33.5* 32.3* 33.0*   RDW 43.0 43.6 41.9   PLATELETCT 268 265 332   MPV 11.0 11.1 10.8     Recent Labs     08/10/20  0514 08/11/20  0137 08/12/20  0031   SODIUM 138 137 140   POTASSIUM 3.2* 3.6 3.6   CHLORIDE 101 102 100   CO2 26 24 27   GLUCOSE 129* 129* 94   BUN 14 11 7*   CREATININE 0.74 0.69 0.71   CALCIUM 8.8 8.8 9.8                   Imaging  EC-ECHOCARDIOGRAM LTD W/O CONT   Final Result      DX-CHEST-PORTABLE (1 VIEW)   Final Result      1.  Linear atelectasis within the left lung base.      2.  Mild cardiomegaly.      DX-CHEST-PORTABLE (1 VIEW)   Final Result      Stable chest without acute/new abnormality.      DX-CHEST-PORTABLE (1 VIEW)   Final Result         1.  No acute cardiopulmonary disease.   2.  Atherosclerosis      EC-ECHOCARDIOGRAM LTD W/ CONT   Final Result      DX-CHEST-PORTABLE (1 VIEW)   Final Result         1.  Mild pulmonary edema and/or infiltrates.   2.  Cardiomegaly      DX-CHEST-PORTABLE (1 VIEW)   Final Result         1. No significant interval change.      DX-ABDOMEN FOR TUBE PLACEMENT   Final Result      Enteric tubes project over the stomach.      Calcific densities projecting over the right kidney likely represent renal calculi.      CT-HEAD W/O   Final Result      No acute intracranial abnormality is identified.      Paranasal sinus disease.         EC-ECHOCARDIOGRAM COMPLETE W/ CONT   Final Result      DX-CHEST-LIMITED (1 VIEW)   Final Result      Right central line projects over the SVC. No pneumothorax.      Interstitial prominence likely represents interstitial edema.      Right  basilar opacity may represent atelectasis or consolidation.      Atherosclerotic plaque.            DX-CHEST-PORTABLE (1 VIEW)   Final Result      Right basilar opacity likely represents atelectasis.      Endotracheal tube projects at the level of the clavicular heads.      Interstitial opacities likely present interstitial edema or pneumonitis.      Atherosclerotic plaque.         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

## 2020-08-12 NOTE — PROGRESS NOTES
Spoke with wife at length regarding pt. She states she would like an update from the doctor. Dr Abdi given pts number and states she will call her.

## 2020-08-12 NOTE — DISCHARGE PLANNING
Received [employer] form at 2191  Agency/Facility Name: [facility]  Referral sent per Choice form @ 5003

## 2020-08-12 NOTE — CARE PLAN
Problem: Nutritional:  Goal: Achieve adequate nutritional intake  Description: Patient will consume > 50% of meals  Outcome: NOT MET   See RD note.

## 2020-08-12 NOTE — DISCHARGE PLANNING
"Hospital Care Management Discharge Planning Note       Anticipated Discharge Disposition:  · Home with HHC     Action:  · This RN CM spoke with with patient at bedside to obtain HHC choice.   · Per choice form, pt's preference is as follows:  · (1) [facility].  · (2) [facility] ([place] Office).  · This RN CM faxed choice form to [facility].  · Fax confirmation received at [phone].     Barriers to Discharge:  · Medical Clearance.  · HHC acceptance.     Plan:  · Patient to be discharged home with home health once accepted and medically cleared.  · Follow up with [facility] regarding acceptance as needed.   · Hospital Care Management Team to continue to provide support services and assistance with discharge planning as needed.       Current Expected Discharge Date: Unknown      For further assistance please contact the assigned RN Case Manager or  at the extension listed under \"Treatment Teams\".          "

## 2020-08-12 NOTE — PROGRESS NOTES
2 RN skin check complete with Adonis  Devices in place -none  Right thigh & chest bruising, right ear scabbed at top-no drainage, right neck & right grin Tegaderm & gauze dsg cd&I.

## 2020-08-12 NOTE — CARE PLAN
Problem: Communication  Goal: The ability to communicate needs accurately and effectively will improve  Outcome: PROGRESSING AS EXPECTED  Intervention: [place] patient and significant other/support system to call light to alert staff of needs  Note: Pt educated on POC and medications. Pt verbalized understanding.      Problem: Safety  Goal: Will remain free from injury  Outcome: PROGRESSING AS EXPECTED  Note: Pt bedside table and call-light are within reach, bed in lowest position and locked. Treaded socks on and pt has been educated on call light use and asked to call before getting up.

## 2020-08-12 NOTE — PROGRESS NOTES
Pt converted from sinus rhythm to Aflutter. Notified MD of changes. Stat EKG ordered to confirm rhythm change.

## 2020-08-12 NOTE — THERAPY
Physical Therapy   Daily Treatment     Patient Name: Karan Wiley  Age:  60 y.o., Sex:  male  Medical Record #: [id number]  Today's Date: 8/12/2020     Precautions: Fall Risk    Assessment    Pt seen for follow up PT tx. Pt demonstrated improved ambulation with FWW and SPV. Pt ambulated in hallway with no LOB and no safety concerns. Pt does not have any steps at home and prefers to ambulate with FWW rather than no AD, therefore pt will no longer be followed by PT services. Patient will not be actively followed for physical therapy services at this time, however may be seen if requested by physician for 1 more visit within 30 days to address any discharge or equipment needs      Plan    Discharge secondary to goals met.    DC Equipment Recommendations: Front-Wheel Walker  Discharge Recommendations: Recommend home health for continued physical therapy services       08/12/20 1511   Cognition    Speech/ Communication Hard of Hearing   Level of Consciousness Alert   Gait Analysis   Gait Level Of Assist Supervised   Assistive Device Front Wheel Walker   Distance (Feet) 400   # of Times Distance was Traveled 1   Deviation No deviation   Weight Bearing Status No restrictions   Comments no LOB, pt very safe ambulating with FWW and pt has 4WW at home   Bed Mobility    Supine to Sit   (in chair)   Sit to Supine   (in chair)   Scooting Supervised   Functional Mobility   Sit to Stand Supervised   Bed, Chair, Wheelchair Transfer Supervised   Mobility in hallway with FWW   Patient / Family Goals    Patient / Family Goal #1 to return home   Goal #1 Outcome Progressing as expected   Short Term Goals    Short Term Goal # 1 pt will perform sit <> stand and functional transfers with SPV to improve mobility independence in 6 visits   Goal Outcome # 1 Goal met   Short Term Goal # 2 pt will ambulate > 200 ft with LRAD and SPV to access community in 6 visits   Goal Outcome # 2 Goal met   Short Term Goal # 2 B  Pt will ambulate > 200 ft  without AD and SPV to return to independent PLOF baseline in 6 visits   Goal Outcome # 2 B Goal not met   Short Term Goal # 3 pt will negotiate 1 step to access home environment with SPV in 6 visits   Goal Outcome # 3 Goal not met   Short Term Goal # 4 pt will verbalize understanding of cardiac rehab education to PT in 2 visits   Goal Outcome # 4 Goal met   Anticipated Discharge Equipment and Recommendations   DC Equipment Recommendations Front-Wheel Walker   Discharge Recommendations Recommend home health for continued physical therapy services

## 2020-08-12 NOTE — THERAPY
"Occupational Therapy  Daily Treatment     Patient Name: Karan Wiley  Age:  60 y.o., Sex:  male  Medical Record #: [id number]  Today's Date: 8/12/2020     Precautions  Precautions: Fall Risk    Assessment    Pt making functional progress towards acute OT goals. Pt demo'd LB dressing, toileting, toilet txf w/ SPV. Recommend DC home w/ HH.     Plan    Continue current treatment plan.    DC Equipment Recommendations: Unable to determine at this time  Discharge Recommendations: (P) Recommend home health for continued occupational therapy services    Subjective    \"I have no concern about going home\"     Objective       08/12/20 1501   Cognition    Speech/ Communication Hard of Hearing;Delayed Responses   Level of Consciousness Alert   Comments pleasent and cooperative   Balance   Sitting Balance (Static) Good   Sitting Balance (Dynamic) Good   Standing Balance (Static) Fair +   Standing Balance (Dynamic) Fair   Weight Shift Sitting Good   Weight Shift Standing Fair   Skilled Intervention Verbal Cuing   Comments w/ FWW   Activities of Daily Living   Grooming Supervision;Standing   Lower Body Dressing Supervision   Toileting Supervision   Skilled Intervention Verbal Cuing   Functional Mobility   Sit to Stand Supervised   Toilet Transfers Supervised   Transfer Method Stand Step   Mobility within room and bathroom    Skilled Intervention Tactile Cuing;Verbal Cuing;Facilitation   Activity Tolerance   Sitting in Chair functional    Standing 6 min   Patient / Family Goals   Patient / Family Goal #1 Get better   Short Term Goals   Short Term Goal # 1 S toileting   Goal Outcome # 1 Goal met   Short Term Goal # 2 S toilet transfer   Goal Outcome # 2 Goal met   Short Term Goal # 3 S tub transfer   Goal Outcome # 3 Goal not met   Short Term Goal # 4 10 minute stand for adls   Goal Outcome # 4 Goal met   Education Group   Role of Occupational Therapist Patient Response Patient;Acceptance;Explanation;Verbal Demonstration   ADL " Patient Response Patient;Acceptance;Explanation;Verbal Demonstration;Action Demonstration   Anticipated Discharge Equipment and Recommendations   Discharge Recommendations Recommend home health for continued occupational therapy services   Interdisciplinary Plan of Care Collaboration   IDT Collaboration with  Nursing   Patient Position at End of Therapy Call Light within Reach;Tray Table within Reach;Phone within Reach;In Bed;Bed Alarm On   Collaboration Comments report given   Session Information   Date / Session Number  8/12, 3 (3/4, 8/12)   Priority 2

## 2020-08-13 ENCOUNTER — PATIENT OUTREACH (OUTPATIENT)
Dept: HEALTH INFORMATION MANAGEMENT | Facility: OTHER | Age: 60
End: 2020-08-13

## 2020-08-13 VITALS
TEMPERATURE: 98.4 F | SYSTOLIC BLOOD PRESSURE: 146 MMHG | HEART RATE: 67 BPM | DIASTOLIC BLOOD PRESSURE: 96 MMHG | OXYGEN SATURATION: 91 % | HEIGHT: 67 IN | WEIGHT: 217.59 LBS | BODY MASS INDEX: 34.15 KG/M2 | RESPIRATION RATE: 16 BRPM

## 2020-08-13 LAB
BASOPHILS # BLD AUTO: 0.6 % (ref 0–1.8)
BASOPHILS # BLD: 0.06 K/UL (ref 0–0.12)
EOSINOPHIL # BLD AUTO: 0.26 K/UL (ref 0–0.51)
EOSINOPHIL NFR BLD: 2.8 % (ref 0–6.9)
ERYTHROCYTE [DISTWIDTH] IN BLOOD BY AUTOMATED COUNT: 41.3 FL (ref 35.9–50)
HCT VFR BLD AUTO: 31.8 % (ref 42–52)
HGB BLD-MCNC: 10.4 G/DL (ref 14–18)
IMM GRANULOCYTES # BLD AUTO: 0.44 K/UL (ref 0–0.11)
IMM GRANULOCYTES NFR BLD AUTO: 4.7 % (ref 0–0.9)
LYMPHOCYTES # BLD AUTO: 2.11 K/UL (ref 1–4.8)
LYMPHOCYTES NFR BLD: 22.5 % (ref 22–41)
MCH RBC QN AUTO: 30.1 PG (ref 27–33)
MCHC RBC AUTO-ENTMCNC: 32.7 G/DL (ref 33.7–35.3)
MCV RBC AUTO: 91.9 FL (ref 81.4–97.8)
MONOCYTES # BLD AUTO: 1.03 K/UL (ref 0–0.85)
MONOCYTES NFR BLD AUTO: 11 % (ref 0–13.4)
NEUTROPHILS # BLD AUTO: 5.49 K/UL (ref 1.82–7.42)
NEUTROPHILS NFR BLD: 58.4 % (ref 44–72)
NRBC # BLD AUTO: 0.03 K/UL
NRBC BLD-RTO: 0.3 /100 WBC
PLATELET # BLD AUTO: 400 K/UL (ref 164–446)
PMV BLD AUTO: 10.5 FL (ref 9–12.9)
RBC # BLD AUTO: 3.46 M/UL (ref 4.7–6.1)
WBC # BLD AUTO: 9.4 K/UL (ref 4.8–10.8)

## 2020-08-13 PROCEDURE — A9270 NON-COVERED ITEM OR SERVICE: HCPCS | Performed by: PHYSICIAN ASSISTANT

## 2020-08-13 PROCEDURE — 700102 HCHG RX REV CODE 250 W/ 637 OVERRIDE(OP): Performed by: INTERNAL MEDICINE

## 2020-08-13 PROCEDURE — 36415 COLL VENOUS BLD VENIPUNCTURE: CPT

## 2020-08-13 PROCEDURE — 700102 HCHG RX REV CODE 250 W/ 637 OVERRIDE(OP): Performed by: HOSPITALIST

## 2020-08-13 PROCEDURE — 99239 HOSP IP/OBS DSCHRG MGMT >30: CPT | Performed by: HOSPITALIST

## 2020-08-13 PROCEDURE — 700101 HCHG RX REV CODE 250: Performed by: INTERNAL MEDICINE

## 2020-08-13 PROCEDURE — 700102 HCHG RX REV CODE 250 W/ 637 OVERRIDE(OP): Performed by: PHYSICIAN ASSISTANT

## 2020-08-13 PROCEDURE — 700111 HCHG RX REV CODE 636 W/ 250 OVERRIDE (IP): Performed by: INTERNAL MEDICINE

## 2020-08-13 PROCEDURE — A9270 NON-COVERED ITEM OR SERVICE: HCPCS | Performed by: INTERNAL MEDICINE

## 2020-08-13 PROCEDURE — 85025 COMPLETE CBC W/AUTO DIFF WBC: CPT

## 2020-08-13 PROCEDURE — A9270 NON-COVERED ITEM OR SERVICE: HCPCS | Performed by: HOSPITALIST

## 2020-08-13 PROCEDURE — 700105 HCHG RX REV CODE 258: Performed by: INTERNAL MEDICINE

## 2020-08-13 PROCEDURE — 51798 US URINE CAPACITY MEASURE: CPT

## 2020-08-13 RX ORDER — AMIODARONE HYDROCHLORIDE 400 MG/1
400 TABLET ORAL 2 TIMES DAILY
Qty: 2 TAB | Refills: 0 | Status: SHIPPED | OUTPATIENT
Start: 2020-08-13 | End: 2020-08-14

## 2020-08-13 RX ADMIN — HYDROCODONE BITARTRATE AND ACETAMINOPHEN 1 TABLET: 10; 325 TABLET ORAL at 01:30

## 2020-08-13 RX ADMIN — CEFEPIME 2 G: 2 INJECTION, POWDER, FOR SOLUTION INTRAVENOUS at 04:31

## 2020-08-13 RX ADMIN — DOCUSATE SODIUM 50 MG AND SENNOSIDES 8.6 MG 2 TABLET: 8.6; 5 TABLET, FILM COATED ORAL at 04:32

## 2020-08-13 RX ADMIN — AMIODARONE HYDROCHLORIDE 400 MG: 200 TABLET ORAL at 04:32

## 2020-08-13 RX ADMIN — APIXABAN 5 MG: 5 TABLET, FILM COATED ORAL at 04:31

## 2020-08-13 RX ADMIN — MORPHINE SULFATE 15 MG: 15 TABLET, FILM COATED, EXTENDED RELEASE ORAL at 04:32

## 2020-08-13 RX ADMIN — LABETALOL HYDROCHLORIDE 10 MG: 5 INJECTION INTRAVENOUS at 04:31

## 2020-08-13 RX ADMIN — CLOPIDOGREL BISULFATE 75 MG: 75 TABLET ORAL at 04:32

## 2020-08-13 NOTE — PROGRESS NOTES
Assumed care of patient and report received from previous RN. Patient educated on importance of calling, bed controls on, bed locked and in lowest position, call light with patient. Appropriate fall precautions in place. Belongings and bedside table within reach. No needs at this time

## 2020-08-13 NOTE — DISCHARGE SUMMARY
Discharge Summary    CHIEF COMPLAINT ON ADMISSION  No chief complaint on file.      Reason for Admission  NSTEMI     Admission Date  8/1/2020    CODE STATUS  Prior    HPI & HOSPITAL COURSE   60-year-old male with past medical history of hypertension, dyslipidemia presenting with chest pain.  Found to have a systolic blood pressure of 230 on admission with a slightly elevated troponin.  He was admitted for NSTEMI and underwent cardiac cath requiring PCI of RCA and PCI of posterior lateral branch.  He subsequently developed acute hypoxic respiratory failure with flash pulmonary edema following PCI requiring intubation and was transferred to ICU.  Patient had PEA cardiac arrest however obtained Rask within 2 cycles of CPR.  Echocardiogram showed akinetic apex with new LV thrombus.  Patient was again taken to Cath Lab post cardiac arrest and felt to be in cardiogenic shock likely due to stress cardiomyopathy and had successful placement of IABP.  Cardiogenic shock resolved by 8/2 and IABP was removed.  Repeat echocardiogram on 8/3 showed no LV thrombus so his IV heparin was discontinued.  Patient did have agitation while in ICU temporarily requiring Precedex.  He developed hypotension and several days of IV pressors and this then improved. During his ICU stay he was found to have suspected left sided pneumonia for which he was started on cefepime.  His afib has been rate controlled on amiodaroone.  He did develop hematuria so his anticoagulation was held and CBI was initiated - this resolved.  He is doing well without a keith and his anticoagulation was subsequently restarted and he has had no further signs of bleeding.  He has some confusion while in the ICU and this has completely resolved, he did very well with a cognitive evaluation.  His afib is currently rate controlled and he is on an amiodarone taper with plans for close cardiology follow up.  At time of discharge he is stable on room air and will be discharged  with home health.  He agrees to return to the er if needed and agrees to watch for recurrent bleeding.    Aspirin was not added to his regimen due to high bleeding risk and already on eliquis and plavix and he did not tolerate beta blockers during this hospital stay.  Cardiology to re evaluate regimen in clinic.    Therefore, he is discharged in fair and stable condition to home with organized home healthcare and close outpatient follow-up.    The patient met 2-midnight criteria for an inpatient stay at the time of discharge.    Discharge Date  8/13/20    FOLLOW UP ITEMS POST DISCHARGE  Cardiology  Pcp    DISCHARGE DIAGNOSES  Principal Problem (Resolved):    Acute respiratory failure with hypoxia (HCC) POA: Unknown  Active Problems:    Current use of long term anticoagulation POA: No      Overview: For AF    Hypertensive disease POA: Unknown    Paroxysmal atrial fibrillation (HCC) POA: No    Atrial fibrillation with RVR ([facility]) POA: Yes    Status post insertion of drug-eluting stent into right coronary artery for coronary artery disease POA: Unknown      Overview: 8/1/20 for inf STEMI      Raghav 3.5 mm to mid RCA, 2.5 mm to PLB    Hypokalemia POA: Unknown    Dyslipidemia POA: Yes    History of prediabetes POA: Unknown    Chronic back pain POA: Yes    Class 2 severe obesity due to excess calories with serious comorbidity in adult ([facility]) POA: Unknown    Tobacco use POA: Unknown    BPH (benign prostatic hyperplasia) POA: Yes    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  [facility]  [address]  [address]  [place]  [id number] [phone]  Call  Your doctor has referred you to Cardiac Rehab, which is important to your recovery. Please call to make an appointment, or speak to your local doctor to find a program in your area.    Darrell Caal P.A.-C.  [address]  [address]  [place] [address]  [phone]    In 1 week  Please call to schedule your hospital follow up with your primary care physician as discussed. Thank you       MEDICATIONS ON DISCHARGE     Medication List      START taking these medications      Instructions   * amiodarone 400 MG tablet  Commonly known as: PACERONE   Take 1 Tab by mouth 2 Times a Day for 1 day.  Dose: 400 mg     * amiodarone 200 MG Tabs  Start taking on: August 15, 2020  Commonly known as: CORDARONE   Take 1 Tab by mouth every day.  Dose: 200 mg     apixaban 5mg Tabs  Commonly known as: ELIQUIS   Take 1 Tab by mouth 2 Times a Day. Indications: Thromboembolism secondary to Atrial Fibrillation  Dose: 5 mg     clopidogrel 75 MG Tabs  Commonly known as: PLAVIX   Take 1 Tab by mouth every day.  Dose: 75 mg         * This list has 2 medication(s) that are the same as other medications prescribed for you. Read the directions carefully, and ask your doctor or other care provider to review them with you.            CONTINUE taking these medications      Instructions   Effexor  MG extended-release capsule  Generic drug: venlafaxine   Take 150 mg by mouth every day.  Dose: 150 mg     finasteride 5 MG Tabs  Commonly known as: PROSCAR   Take 5 mg by mouth every evening.  Dose: 5 mg     HYDROcodone/acetaminophen  MG Tabs  Commonly known as: NORCO   Take 1 Tab by mouth 6 Times a Day.  Dose: 1 Tab     Lipitor 80 MG tablet  Generic drug: atorvastatin   Take 80 mg by mouth every evening.  Dose: 80 mg     metFORMIN  MG Tb24  Commonly known as: GLUCOPHAGE XR   Take 750 mg by mouth every day.  Dose: 750 mg     Morphine Sulfate ER 15 MG T12a   Take 15 mg by mouth 3 times a day. Abuse  deterrent? He takes morphine 15mg er tid  Dose: 15 mg     tamsulosin 0.4 MG capsule  Commonly known as: FLOMAX   Take 0.4 mg by mouth every evening.  Dose: 0.4 mg        STOP taking these medications    aspirin EC 81 MG Tbec  Commonly known as: ECOTRIN     carvedilol 6.25 MG Tabs  Commonly known as: COREG     telmisartan 80 MG Tabs  Commonly known as: MICARDIS     tizanidine 2 MG tablet  Commonly known as: ZANAFLEX     traZODone 100 MG Tabs  Commonly known as: DESYREL            Allergies  Allergies   Allergen Reactions   • Hctz [Hydrochlorothiazide W-Spironolactone]      Cannot breathe   • Oxycodone Swelling     Throat swelling       DIET  cardiac  No orders of the defined types were placed in this encounter.      ACTIVITY  As tolerated.  Weight bearing as tolerated    CONSULTATIONS  Cardiology   Critical care    PROCEDURES  EC-ECHOCARDIOGRAM LTD W/O CONT   Final Result      DX-CHEST-PORTABLE (1 VIEW)   Final Result      1.  Linear atelectasis within the left lung base.      2.  Mild cardiomegaly.      DX-CHEST-PORTABLE (1 VIEW)   Final Result      Stable chest without acute/new abnormality.      DX-CHEST-PORTABLE (1 VIEW)   Final Result         1.  No acute cardiopulmonary disease.   2.  Atherosclerosis      EC-ECHOCARDIOGRAM LTD W/ CONT   Final Result      DX-CHEST-PORTABLE (1 VIEW)   Final Result         1.  Mild pulmonary edema and/or infiltrates.   2.  Cardiomegaly      DX-CHEST-PORTABLE (1 VIEW)   Final Result         1. No significant interval change.      DX-ABDOMEN FOR TUBE PLACEMENT   Final Result      Enteric tubes project over the stomach.      Calcific densities projecting over the right kidney likely represent renal calculi.      CT-HEAD W/O   Final Result      No acute intracranial abnormality is identified.      Paranasal sinus disease.         EC-ECHOCARDIOGRAM COMPLETE W/ CONT   Final Result      DX-CHEST-LIMITED (1 VIEW)   Final Result      Right central line projects over the SVC. No  pneumothorax.      Interstitial prominence likely represents interstitial edema.      Right basilar opacity may represent atelectasis or consolidation.      Atherosclerotic plaque.            DX-CHEST-PORTABLE (1 VIEW)   Final Result      Right basilar opacity likely represents atelectasis.      Endotracheal tube projects at the level of the clavicular heads.      Interstitial opacities likely present interstitial edema or pneumonitis.      Atherosclerotic plaque.         CL-LEFT HEART CATHETERIZATION WITH POSSIBLE INTERVENTION    (Results Pending)   CL-LEFT HEART CATHETERIZATION WITH POSSIBLE INTERVENTION    (Results Pending)     r    LABORATORY  Lab Results   Component Value Date    SODIUM 140 08/12/2020    POTASSIUM 3.6 08/12/2020    CHLORIDE 100 08/12/2020    CO2 27 08/12/2020    GLUCOSE 94 08/12/2020    BUN 7 (L) 08/12/2020    CREATININE 0.71 08/12/2020        Lab Results   Component Value Date    WBC 9.4 08/13/2020    HEMOGLOBIN 10.4 (L) 08/13/2020    HEMATOCRIT 31.8 (L) 08/13/2020    PLATELETCT 400 08/13/2020        Total time of the discharge process exceeds 45 minutes.

## 2020-08-13 NOTE — DISCHARGE INSTRUCTIONS
Discharge Instructions    Discharged to home by car with relative. Discharged via wheelchair, hospital escort: Yes.  Special equipment needed: Not Applicable    Be sure to schedule a follow-up appointment with your primary care doctor or any specialists as instructed.     Discharge Plan:   Diet Plan: Discussed  Activity Level: Discussed  Confirmed Follow up Appointment: Patient to Call and Schedule Appointment  Confirmed Symptoms Management: Discussed  Medication Reconciliation Updated: Yes    I understand that a diet low in cholesterol, fat, and sodium is recommended for good health. Unless I have been given specific instructions below for another diet, I accept this instruction as my diet prescription.   Other diet: Cardiac    Special Instructions: Diagnosis:  Acute Coronary Syndrome (ACS) is a diagnosis that encompasses cardiac-related chest pain and heart attack. ACS occurs when the blood flow to the heart muscle is severely reduced or cut off completely due to a slow process called atherosclerosis.  Atherosclerosis is a disease in which the coronary arteries become narrow from a buildup of fat, cholesterol, and other substances that combine to form plaque. If the plaque breaks, a blood clot will form and block the blood flow to the heart muscle. This lack of blood flow can cause damage or death to the heart muscle which is called a heart attack or Myocardial Infarction (MI). There are two different types of MIs:  ST Elevation Myocardial Infarction or STEMI (the most severe type of heart attack) and Non-ST Elevation Myocardial Infarction or NSTEMI.    Treatment Plan:  · Cardiac Diet  - Low fat, low salt, low cholesterol   · Cardiac Rehab  - Your doctor has ordered you a referral to Psychiatric Rehab.  Call [phone] to schedule an appointment.  · Attend my follow-up appointment with my Cardiologist.  · Take my medications as prescribed by my doctor  · Exercise daily  · Lower my bad cholesterol and raise my good  cholesterol and lower my blood pressure    Medications:  Certain medications are used to treat ACS.  Remember to always take medications as prescribed and never stop talking medications unless told by your doctor.    You have been prescribed the following medicatons:    Statin - Statin *Lipitor** is used to lower cholesterol.    · Is patient discharged on Warfarin / Coumadin?   No     Depression / Suicide Risk    As you are discharged from this [facility], it is important to learn how to keep safe from harming yourself.    Recognize the warning signs:  · Abrupt changes in personality, positive or negative- including increase in energy   · Giving away possessions  · Change in eating patterns- significant weight changes-  positive or negative  · Change in sleeping patterns- unable to sleep or sleeping all the time   · Unwillingness or inability to communicate  · Depression  · Unusual sadness, discouragement and loneliness  · Talk of wanting to die  · Neglect of personal appearance   · Rebelliousness- reckless behavior  · Withdrawal from people/activities they love  · Confusion- inability to concentrate     If you or a loved one observes any of these behaviors or has concerns about self-harm, here's what you can do:  · Talk about it- your feelings and reasons for harming yourself  · Remove any means that you might use to hurt yourself (examples: pills, rope, extension cords, firearm)  · Get professional help from the community (Mental Health, Substance Abuse, psychological counseling)  · Do not be alone:Call your Safe Contact- someone whom you trust who will be there for you.  · Call your local CRISIS HOTLINE 553-5036 or 748-308-2912  · Call your local Children's Mobile Crisis Response Team [place] (401) 329-8786 or www.[place]  · Call the toll free National Suicide Prevention Hotlines   · National Suicide Prevention Lifeline 236-515-AZPG (6665)  · National Hope Line Network 800-MHIHEAT  (610-4476)        Heart Attack  The heart is a muscle that needs oxygen to survive. A heart attack is a condition that occurs when your heart does not get enough oxygen. When this happens, the heart muscle begins to die. This can cause permanent damage if not treated right away. A heart attack is a medical emergency.  This condition may be called a myocardial infarction, or MI. It is also known as acute coronary syndrome (ACS). ACS is a term used to describe a group of conditions that affect blood flow to the heart.  What are the causes?  This condition may be caused by:  · Atherosclerosis. This occurs when a fatty substance called plaque builds up in the arteries and blocks or reduces blood supply to the heart.  · A blood clot. A blood clot can develop suddenly when plaque breaks up within an artery and blocks blood flow to the heart.  · Low blood pressure.  · An abnormal heartbeat (arrhythmia).  · Conditions that cause a decrease of oxygen to the heart, such as anemiaorrespiratory failure.  · A spasm, or severe tightening, of a blood vessel that cuts off blood flow to the heart.  · Tearing of a coronary artery (spontaneous coronary artery dissection).  · High blood pressure.  What increases the risk?  The following factors may make you more likely to develop this condition:  · Aging. The older you are, the higher your risk.  · Having a personal or family history of chest pain, heart attack, stroke, or narrowing of the arteries in the legs, arms, head, or stomach (peripheral artery disease).  · Being male.  · Smoking.  · Not getting regular exercise.  · Being overweight or obese.  · Having high blood pressure.  · Having high cholesterol (hypercholesterolemia).  · Having diabetes.  · Drinking too much alcohol.  · Using illegal drugs, such as cocaine or methamphetamine.  What are the signs or symptoms?  Symptoms of this condition may vary, depending on factors like gender and age. Symptoms may include:  · Chest pain.  It may feel like:  ? Crushing or squeezing.  ? Tightness, pressure, fullness, or heaviness.  · Pain in the arm, neck, jaw, back, or upper body.  · Shortness of breath.  · Heartburn or upset stomach.  · Nausea.  · Sudden cold sweats.  · Feeling tired.  · Sudden light-headedness.  How is this diagnosed?  This condition may be diagnosed through tests, such as:  · Electrocardiogram (ECG) to measure the electrical activity of your heart.  · Blood tests to check for cardiac markers. These chemicals are released by a damaged heart muscle.  · A test to evaluate blood flow and heart function (coronary angiogram).  · CT scan to see the heart more clearly.  · A test to evaluate the pumping action of the heart (echocardiogram).  How is this treated?  A heart attack must be treated as soon as possible. Treatment may include:  · Medicines to:  ? Break up or dissolve blood clots (fibrinolytic therapy).  ? Thin blood and help prevent blood clots.  ? Treat blood pressure.  ? Improve blood flow to the heart.  ? Reduce pain.  ? Reduce cholesterol.  · Angioplasty and stent placement. These are procedures to widen a blocked artery and keep it open.  · Coronary artery bypass graft, CABG, or open heart surgery. This enables blood to flow to the heart by going around the blocked part of the artery.  · Oxygen therapy if needed.  · Cardiac rehabilitation. This improves your health and well-being through exercise, education, and counseling.  Follow these instructions at home:  Medicines  · Take over-the-counter and prescription medicines only as told by your health care provider.  · Do not take the following medicines unless your health care provider says it is okay to take them:  ? NSAIDs, such as ibuprofen.  ? Supplements that contain vitamin A, vitamin E, or both.  ? Hormone replacement therapy that contains estrogen with or without progestin.  Lifestyle    · Do not use any products that contain nicotine or tobacco, such as cigarettes,  e-cigarettes, and chewing tobacco. If you need help quitting, ask your health care provider.  · Avoid secondhand smoke.  · Exercise regularly. Ask your health care provider about participating in a cardiac rehabilitation program that helps you start exercising safely after a heart attack.  · Eat a heart-healthy diet. Your health care provider will tell you what foods to eat.  · Maintain a healthy weight.  · Learn ways to manage stress.  · Do not use illegal drugs.  Alcohol use  · Do not drink alcohol if:  ? Your health care provider tells you not to drink.  ? You are pregnant, may be pregnant, or are planning to become pregnant.  · If you drink alcohol:  ? Limit how much you use to:  § 0-1 drink a day for women.  § 0-2 drinks a day for men.  ? Be aware of how much alcohol is in your drink. In the [place] one drink equals one 12 oz bottle of beer (355 mL), one 5 oz glass of wine (148 mL), or one 1½ oz glass of hard liquor (44 mL).  General instructions  · Work with your health care provider to manage any other conditions you have, such as high blood pressure or diabetes. These conditions affect your heart.  · Get screened for depression, and seek treatment if needed.  · Keep your vaccinations up to date. Get the flu vaccine every year.  · Keep all follow-up visits as told by your health care provider. This is important.  Contact a health care provider if:  · You feel overwhelmed or sad.  · You have trouble doing your daily activities.  Get help right away if:  · You have sudden, unexplained discomfort in your chest, arms, back, neck, jaw, or upper body.  · You have shortness of breath.  · You suddenly start to sweat or your skin gets clammy.  · You feel nauseous or you vomit.  · You have unexplained tiredness or weakness.  · You suddenly feel light-headed or dizzy.  · You notice your heart starts to beat fast or feels like it is skipping beats.  · You have blood pressure that is higher than 180/120.  These symptoms may  represent a serious problem that is an emergency. Do not wait to see if the symptoms will go away. Get medical help right away. Call your local emergency services (911 in the [place] Do not drive yourself to the hospital.  Summary  · A heart attack, also called myocardial infarction, is a condition that occurs when your heart does not get enough oxygen. This is caused by anything that blocks or reduces blood flow to the heart.  · Treatment is a combination of medicines and surgeries, if needed, to open the blocked arteries and restore blood flow to the heart.  · A heart attack is an emergency. Get help right away if you have sudden discomfort in your chest, arms, back, neck, jaw, or upper body. Seek help if you feel nauseous, you vomit, or you feel light-headed or dizzy.  This information is not intended to replace advice given to you by your health care provider. Make sure you discuss any questions you have with your health care provider.  Document Released: 12/18/2006 Document Revised: 03/26/2020 Document Reviewed: 03/30/2020  Elsevier Patient Education © 2020 [employer]      Cardiac Rehabilitation  What is cardiac rehabilitation?  Cardiac rehabilitation is a treatment program that helps improve the health and well-being of people who have heart problems. Cardiac rehabilitation includes exercise training, education, and counseling to help you get stronger and return to an active lifestyle. This program can help you get better faster and reduce any future hospital stays.  Why might I need cardiac rehabilitation?  Cardiac rehabilitation programs can help when you have or have had:  · A heart attack.  · Heart failure.  · Peripheral artery disease.  · Coronary artery disease.  · Angina.  · Lung or breathing problems.  Cardiac rehabilitation programs are also used when you have had:  · Coronary artery bypass graft surgery.  · Heart valve replacement.  · Heart stent placement.  · Heart transplant.  · Aneurysm  repair.  What are the benefits of cardiac rehabilitation?  Cardiac rehabilitation can help you:  · Reduce problems like chest pain and trouble breathing.  · Change risk factors that contribute to heart disease, such as:  ? Smoking.  ? High blood pressure.  ? High cholesterol.  ? Diabetes.  ? Being inactive.  ? Weighing over 30% more than your ideal weight.  ? Diet.  · Improve your emotional outlook so you feel:  ? More hopeful.  ? Better about yourself.  ? More confident about taking care of yourself.  · Get support from health experts as well as other people with similar problems.  · Learn healthy ways to manage stress.  · Learn how to manage and understand your medicines.  · Teach your family about your condition and how to participate in your recovery.  What happens in cardiac rehabilitation?  You will be assessed by a cardiac rehabilitation team. They will check your health history and do a physical exam. You may need blood tests, exercise stress tests, and other evaluations to make sure that you are ready to start cardiac rehabilitation.  The cardiac rehabilitation team works with you to make a plan based on your health and goals. Your program will be tailored to fit you and your needs and may change as you progress. You may work with a health care team that includes:  · Doctors.  · Nurses.  · Dietitians.  · Psychologists.  · Exercise specialists.  · Physical and occupational therapists.  What are the phases of cardiac rehabilitation?  A cardiac rehabilitation program is often divided into phases. You advance from one phase to the next.  Phase 1  This phase starts while you are still in the hospital. You may:  · Start by walking in your room and then in the rendon.  · Do some simple exercises with a therapist.    Phase 2  This phase begins when you go home or to another facility. You will travel to a cardiac rehabilitation center or another place where rehabilitation is offered. This phase may last 8-12 weeks.  During this phase:  · You will slowly increase your activity level while being closely watched by a nurse or therapist.  · You will have medical tests and exams to monitor your progress.  · Your exercises may include strength or resistance training along with activities that cause your heart to beat faster (aerobic exercises), such as walking on a treadmill.  · Your condition will determine how often and how long these sessions last.  · You may learn how to:  ? Cook heart-healthy meals.  ? Control your blood sugar, if this applies.  ? Stop smoking.  ? Manage your medicines. You may need help with scheduling or planning how and when to take your medicines. If you have questions about your medicines, it is very important that you talk with your health care provider.    Phase 3  This phase continues for the rest of your life. In this phase:  · There will be less supervision.  · You may continue to participate in cardiac rehabilitation activities or become part of a group in your community.  · You may benefit from talking about your experience with other people who are facing similar challenges.  Follow these instructions at home:  · Take over-the-counter and prescription medicines only as told by your health care provider.  · Keep all follow-up visits as told by your health care provider. This is important.  Get help right away if:  · You have severe chest discomfort, especially if the pain is crushing or pressure-like and spreads to your arms, back, neck, or jaw. Do not wait to see if the pain will go away.  · You have weakness or numbness in your face, arms, or legs, especially on one side of the body.  · Your speech is slurred.  · You are confused.  · You have a sudden, severe headache or loss of vision.  · You have shortness of breath.  · You are sweating and have nausea.  · You feel dizzy or faint.  · You are fatigued.  These symptoms may represent a serious problem that is an emergency. Do not wait to see if the  symptoms will go away. Get medical help right away. Call your local emergency services (911 in the [place] Do not drive yourself to the hospital.  Summary  · Cardiac rehabilitation is a treatment program that helps improve the health and well-being of people who have heart problems.  · A cardiac rehabilitation program is often divided into phases. You advance from one phase to the next.  · The cardiac rehabilitation team works with you to make a plan based on your health and goals.  · Cardiac rehabilitation includes exercise training, education, and counseling to help you get stronger and return to an active lifestyle.  This information is not intended to replace advice given to you by your health care provider. Make sure you discuss any questions you have with your health care provider.  Document Released: 09/26/2009 Document Revised: 04/08/2020 Document Reviewed: 10/17/2019  Elsevier Patient Education © 2020 [employer]  Heart Attack  The heart is a muscle that needs oxygen to survive. A heart attack is a condition that occurs when your heart does not get enough oxygen. When this happens, the heart muscle begins to die. This can cause permanent damage if not treated right away. A heart attack is a medical emergency.  This condition may be called a myocardial infarction, or MI. It is also known as acute coronary syndrome (ACS). ACS is a term used to describe a group of conditions that affect blood flow to the heart.  What are the causes?  This condition may be caused by:  · Atherosclerosis. This occurs when a fatty substance called plaque builds up in the arteries and blocks or reduces blood supply to the heart.  · A blood clot. A blood clot can develop suddenly when plaque breaks up within an artery and blocks blood flow to the heart.  · Low blood pressure.  · An abnormal heartbeat (arrhythmia).  · Conditions that cause a decrease of oxygen to the heart, such as anemiaorrespiratory failure.  · A spasm, or severe  tightening, of a blood vessel that cuts off blood flow to the heart.  · Tearing of a coronary artery (spontaneous coronary artery dissection).  · High blood pressure.  What increases the risk?  The following factors may make you more likely to develop this condition:  · Aging. The older you are, the higher your risk.  · Having a personal or family history of chest pain, heart attack, stroke, or narrowing of the arteries in the legs, arms, head, or stomach (peripheral artery disease).  · Being male.  · Smoking.  · Not getting regular exercise.  · Being overweight or obese.  · Having high blood pressure.  · Having high cholesterol (hypercholesterolemia).  · Having diabetes.  · Drinking too much alcohol.  · Using illegal drugs, such as cocaine or methamphetamine.  What are the signs or symptoms?  Symptoms of this condition may vary, depending on factors like gender and age. Symptoms may include:  · Chest pain. It may feel like:  ? Crushing or squeezing.  ? Tightness, pressure, fullness, or heaviness.  · Pain in the arm, neck, jaw, back, or upper body.  · Shortness of breath.  · Heartburn or upset stomach.  · Nausea.  · Sudden cold sweats.  · Feeling tired.  · Sudden light-headedness.  How is this diagnosed?  This condition may be diagnosed through tests, such as:  · Electrocardiogram (ECG) to measure the electrical activity of your heart.  · Blood tests to check for cardiac markers. These chemicals are released by a damaged heart muscle.  · A test to evaluate blood flow and heart function (coronary angiogram).  · CT scan to see the heart more clearly.  · A test to evaluate the pumping action of the heart (echocardiogram).  How is this treated?  A heart attack must be treated as soon as possible. Treatment may include:  · Medicines to:  ? Break up or dissolve blood clots (fibrinolytic therapy).  ? Thin blood and help prevent blood clots.  ? Treat blood pressure.  ? Improve blood flow to the heart.  ? Reduce  pain.  ? Reduce cholesterol.  · Angioplasty and stent placement. These are procedures to widen a blocked artery and keep it open.  · Coronary artery bypass graft, CABG, or open heart surgery. This enables blood to flow to the heart by going around the blocked part of the artery.  · Oxygen therapy if needed.  · Cardiac rehabilitation. This improves your health and well-being through exercise, education, and counseling.  Follow these instructions at home:  Medicines  · Take over-the-counter and prescription medicines only as told by your health care provider.  · Do not take the following medicines unless your health care provider says it is okay to take them:  ? NSAIDs, such as ibuprofen.  ? Supplements that contain vitamin A, vitamin E, or both.  ? Hormone replacement therapy that contains estrogen with or without progestin.  Lifestyle    · Do not use any products that contain nicotine or tobacco, such as cigarettes, e-cigarettes, and chewing tobacco. If you need help quitting, ask your health care provider.  · Avoid secondhand smoke.  · Exercise regularly. Ask your health care provider about participating in a cardiac rehabilitation program that helps you start exercising safely after a heart attack.  · Eat a heart-healthy diet. Your health care provider will tell you what foods to eat.  · Maintain a healthy weight.  · Learn ways to manage stress.  · Do not use illegal drugs.  Alcohol use  · Do not drink alcohol if:  ? Your health care provider tells you not to drink.  ? You are pregnant, may be pregnant, or are planning to become pregnant.  · If you drink alcohol:  ? Limit how much you use to:  § 0-1 drink a day for women.  § 0-2 drinks a day for men.  ? Be aware of how much alcohol is in your drink. In the [place] one drink equals one 12 oz bottle of beer (355 mL), one 5 oz glass of wine (148 mL), or one 1½ oz glass of hard liquor (44 mL).  General instructions  · Work with your health care provider to manage any  other conditions you have, such as high blood pressure or diabetes. These conditions affect your heart.  · Get screened for depression, and seek treatment if needed.  · Keep your vaccinations up to date. Get the flu vaccine every year.  · Keep all follow-up visits as told by your health care provider. This is important.  Contact a health care provider if:  · You feel overwhelmed or sad.  · You have trouble doing your daily activities.  Get help right away if:  · You have sudden, unexplained discomfort in your chest, arms, back, neck, jaw, or upper body.  · You have shortness of breath.  · You suddenly start to sweat or your skin gets clammy.  · You feel nauseous or you vomit.  · You have unexplained tiredness or weakness.  · You suddenly feel light-headed or dizzy.  · You notice your heart starts to beat fast or feels like it is skipping beats.  · You have blood pressure that is higher than 180/120.  These symptoms may represent a serious problem that is an emergency. Do not wait to see if the symptoms will go away. Get medical help right away. Call your local emergency services (911 in the [place] Do not drive yourself to the hospital.  Summary  · A heart attack, also called myocardial infarction, is a condition that occurs when your heart does not get enough oxygen. This is caused by anything that blocks or reduces blood flow to the heart.  · Treatment is a combination of medicines and surgeries, if needed, to open the blocked arteries and restore blood flow to the heart.  · A heart attack is an emergency. Get help right away if you have sudden discomfort in your chest, arms, back, neck, jaw, or upper body. Seek help if you feel nauseous, you vomit, or you feel light-headed or dizzy.  This information is not intended to replace advice given to you by your health care provider. Make sure you discuss any questions you have with your health care provider.  Document Released: 12/18/2006 Document Revised: 03/26/2020  Document Reviewed: 03/30/2020  Elsevier Patient Education © 2020 [employer]

## 2020-08-13 NOTE — CARE PLAN
Problem: Communication  Goal: The ability to communicate needs accurately and effectively will improve  Outcome: PROGRESSING AS EXPECTED  Intervention: [place] patient and significant other/support system to call light to alert staff of needs  Note: Pt educated on POC and medications. Pt verbalized understanding.      Problem: Safety  Goal: Will remain free from injury  Outcome: PROGRESSING AS EXPECTED  Intervention: Provide assistance with mobility  Note: Pt bedside table and call-light are within reach, bed in lowest position and locked. Treaded socks on and pt has been educated on call light use and asked to call before getting up.

## 2020-08-13 NOTE — PROGRESS NOTES
Report received from day shift RN. Pt is in bed in lowest locked position with bed side table and call light within reach. Assessment performed. No further needs stated at this time. Pt is A&Ox4. Hourly rounding in place.

## 2020-08-13 NOTE — DISCHARGE PLANNING
"Hospital Care Management Discharge Planning       Anticipated Discharge Disposition:   · Home with Home Health     Action:   · Patient discharging home today.   · Home health has been arranged with [facility]. This RN CM requested BEV Flores to update [facility] about patient's discharge date.   · No further DC planning needs identified at this time. CM team to sign off.      Barriers to Discharge:   · None.     Plan:   · Patient to discharge home on service with [facility] today.   · Hospital Care Management Team to continue to provide support services and assistance with discharge planning as needed.       Current Expected Day of Discharge: 8/13/2020      Thank you for allowing me the pleasure of participating in this patient's care coordination and discharge planning.       For further assistance please contact the assigned RN Case Manager or  at the extension listed under \"Treatment Teams\".      "

## 2020-08-13 NOTE — PROGRESS NOTES
Reviewed d/c paperwork, meds and rx's with pt and wife. Pt instructed to f/u with doctor and pickup rx's. Stent information and packet given. Pt wheeled out to car

## 2020-08-14 LAB
BACTERIA BLD CULT: NORMAL
BACTERIA BLD CULT: NORMAL
SIGNIFICANT IND 70042: NORMAL
SIGNIFICANT IND 70042: NORMAL
SITE SITE: NORMAL
SITE SITE: NORMAL
SOURCE SOURCE: NORMAL
SOURCE SOURCE: NORMAL

## 2020-08-19 ENCOUNTER — HOSPITAL ENCOUNTER (OUTPATIENT)
Dept: RADIOLOGY | Facility: MEDICAL CENTER | Age: 60
End: 2020-08-19
Payer: OTHER GOVERNMENT

## 2020-08-20 ENCOUNTER — OFFICE VISIT (OUTPATIENT)
Dept: CARDIOLOGY | Facility: PHYSICIAN GROUP | Age: 60
End: 2020-08-20
Payer: OTHER GOVERNMENT

## 2020-08-20 VITALS
BODY MASS INDEX: 33.27 KG/M2 | HEIGHT: 67 IN | SYSTOLIC BLOOD PRESSURE: 108 MMHG | HEART RATE: 104 BPM | DIASTOLIC BLOOD PRESSURE: 78 MMHG | RESPIRATION RATE: 16 BRPM | WEIGHT: 212 LBS | OXYGEN SATURATION: 94 %

## 2020-08-20 DIAGNOSIS — E78.5 DYSLIPIDEMIA: ICD-10-CM

## 2020-08-20 DIAGNOSIS — E66.01 CLASS 2 SEVERE OBESITY DUE TO EXCESS CALORIES WITH SERIOUS COMORBIDITY AND BODY MASS INDEX (BMI) OF 35.0 TO 35.9 IN ADULT (HCC): ICD-10-CM

## 2020-08-20 DIAGNOSIS — I10 ESSENTIAL HYPERTENSION: ICD-10-CM

## 2020-08-20 DIAGNOSIS — M54.9 CHRONIC BACK PAIN, UNSPECIFIED BACK LOCATION, UNSPECIFIED BACK PAIN LATERALITY: ICD-10-CM

## 2020-08-20 DIAGNOSIS — Z79.01 CURRENT USE OF LONG TERM ANTICOAGULATION: ICD-10-CM

## 2020-08-20 DIAGNOSIS — I48.0 PAROXYSMAL ATRIAL FIBRILLATION (HCC): ICD-10-CM

## 2020-08-20 DIAGNOSIS — Z95.5 STATUS POST INSERTION OF DRUG-ELUTING STENT INTO RIGHT CORONARY ARTERY FOR CORONARY ARTERY DISEASE: ICD-10-CM

## 2020-08-20 DIAGNOSIS — I25.10 CORONARY ARTERY DISEASE INVOLVING NATIVE CORONARY ARTERY OF NATIVE HEART WITHOUT ANGINA PECTORIS: ICD-10-CM

## 2020-08-20 DIAGNOSIS — G89.29 CHRONIC BACK PAIN, UNSPECIFIED BACK LOCATION, UNSPECIFIED BACK PAIN LATERALITY: ICD-10-CM

## 2020-08-20 PROBLEM — E87.6 HYPOKALEMIA: Status: RESOLVED | Noted: 2020-08-10 | Resolved: 2020-08-20

## 2020-08-20 PROBLEM — I48.91 ATRIAL FIBRILLATION WITH RVR (HCC): Status: RESOLVED | Noted: 2020-08-07 | Resolved: 2020-08-20

## 2020-08-20 PROCEDURE — 99214 OFFICE O/P EST MOD 30 MIN: CPT | Performed by: NURSE PRACTITIONER

## 2020-08-20 RX ORDER — NITROGLYCERIN 0.4 MG/1
0.4 TABLET SUBLINGUAL PRN
Qty: 25 TAB | Refills: 3 | Status: SHIPPED | OUTPATIENT
Start: 2020-08-20

## 2020-08-20 RX ORDER — CARVEDILOL 6.25 MG/1
6.25 TABLET ORAL
Qty: 90 TAB | Refills: 3 | Status: SHIPPED | OUTPATIENT
Start: 2020-08-20 | End: 2021-07-08

## 2020-08-20 RX ORDER — CLOPIDOGREL BISULFATE 75 MG/1
75 TABLET ORAL DAILY
Qty: 90 TAB | Refills: 3 | Status: SHIPPED | OUTPATIENT
Start: 2020-08-20 | End: 2021-07-08

## 2020-08-20 RX ORDER — AMIODARONE HYDROCHLORIDE 200 MG/1
200 TABLET ORAL DAILY
Qty: 90 TAB | Refills: 3 | Status: SHIPPED
Start: 2020-08-20 | End: 2021-01-07

## 2020-08-20 RX ORDER — ATORVASTATIN CALCIUM 80 MG/1
80 TABLET, FILM COATED ORAL DAILY
Qty: 90 TAB | Refills: 3 | Status: SHIPPED | OUTPATIENT
Start: 2020-08-20 | End: 2022-07-21

## 2020-08-20 ASSESSMENT — ENCOUNTER SYMPTOMS
ABDOMINAL PAIN: 0
BRUISES/BLEEDS EASILY: 0
BACK PAIN: 1
NAUSEA: 0
COUGH: 0
INSOMNIA: 0
SHORTNESS OF BREATH: 0
FEVER: 0
ORTHOPNEA: 0
CHILLS: 0
HEADACHES: 0
PALPITATIONS: 0
LOSS OF CONSCIOUSNESS: 0
DIZZINESS: 0
PND: 0
MYALGIAS: 0

## 2020-08-20 ASSESSMENT — FIBROSIS 4 INDEX: FIB4 SCORE: 0.47

## 2020-08-20 NOTE — PROGRESS NOTES
"Chief Complaint   Patient presents with   • Hospital Follow-up   • Coronary Artery Disease   • Atrial Fibrillation   • HTN (Controlled)   • Hyperlipidemia       Subjective:   Karan Wiley is a 60 y.o. male who presents today for hospital follow-up of STEMI and PCI/NAOMY x 2.    Karan is a 60 year old male with history of hypertension, dyslipidemia who presented to [facility] (transferred from [facility]) on 8/1/2020 with chest pain and /130. He was admitted for STEMI and underwent cardiac cath requiring PCI of RCA and PCI of posterior lateral branch.  He developed acute hypoxic respiratory failure with flash pulmonary edema following PCI requiring intubation and was transferred to ICU.  Patient had PEA cardiac arrest however obtained ROSC within 2 cycles of CPR.  Echocardiogram showed akinetic apex with new LV thrombus.  Patient was again taken to Cath Lab post cardiac arrest and felt to be in cardiogenic shock likely due to stress cardiomyopathy and had successful placement of IABP.  Cardiogenic shock resolved by 8/2 and IABP was removed.  Repeat echocardiogram on 8/3 showed no LV thrombus so his IV heparin was discontinued. He did also have left sided pneumonia treated with antibiotics.  His afib has been rate controlled on amiodaroone. Aspirin was not added to his regimen due to high bleeding risk and already on eliquis and plavix and he did not tolerate beta blockers during this hospital stay.     He is here today for follow-up. He did see his PCP this Tuesday, who said he \"saw something on his EKG\" but he went to the ER at [facility] and was told everything was fine. Patient denies any chest pain, pressure or discomfort; no symptomatic palpitations; no shortness of breath, orthopnea or PND; no dizziness or syncope; no LE edema. HR has been running 100-110bpm. He is not currently on a BB.    Past Medical History:   Diagnosis Date   • CAD (coronary artery disease) 08/2020    STEMI. PCI/NAOMY (Raghav 3.5 x 25mm) the mid RCA, " and PCI/NAOMY (Raghav 2.5 x 12mm) the PDA.   • Chronic anticoagulation    • Depression    • Hyperlipidemia    • Hypertension    • Low back pain    • Paroxysmal atrial fibrillation (HCC)      Past Surgical History:   Procedure Laterality Date   • SPINAL CORD STIMULATOR  2019    Procedure: SPINAL CORD STIMULATOR-  STAGE 1:  RIGHT FLANK BATTERY REPLACEMENT/UPGRADE;  Surgeon: Stepan Hawkins M.D.;  Location: SURGERY [facility];  Service: Neurosurgery   • LUMBAR FUSION POSTERIOR  2019    Procedure: LUMBAR FUSION POSTERIOR-  STAGE 2: ONLAY L5-S1 BONE;  Surgeon: Stepan Hawkins M.D.;  Location: SURGERY [facility];  Service: Neurosurgery   • LAMINOTOMY  2019    Procedure: LAMINOTOMY-  STAGE 2:  REDO LEFT L4-S1;  Surgeon: Stepan Hawkins M.D.;  Location: SURGERY [facility];  Service: Neurosurgery     Family History   Problem Relation Age of Onset   • Cancer Mother         breast     Social History     Socioeconomic History   • Marital status:      Spouse name: Not on file   • Number of children: Not on file   • Years of education: Not on file   • Highest education level: Not on file   Occupational History   • Not on file   Social Needs   • Financial resource strain: Not on file   • Food insecurity     Worry: Not on file     Inability: Not on file   • Transportation needs     Medical: Not on file     Non-medical: Not on file   Tobacco Use   • Smoking status: Former Smoker     Packs/day: 0.25     Years: 3.00     Pack years: 0.75     Quit date:      Years since quittin.6   • Smokeless tobacco: Never Used   Substance and Sexual Activity   • Alcohol use: No   • Drug use: Not on file   • Sexual activity: Not on file   Lifestyle   • Physical activity     Days per week: Not on file     Minutes per session: Not on file   • Stress: Not on file   Relationships   • Social connections     Talks on phone: Not on file     Gets together: Not on file     Attends Alevism service: Not on file     Active  member of club or organization: Not on file     Attends meetings of clubs or organizations: Not on file     Relationship status: Not on file   • Intimate partner violence     Fear of current or ex partner: Not on file     Emotionally abused: Not on file     Physically abused: Not on file     Forced sexual activity: Not on file   Other Topics Concern   • Not on file   Social History Narrative   • Not on file     Allergies   Allergen Reactions   • Hctz [Hydrochlorothiazide W-Spironolactone]      Cannot breathe   • Oxycodone Swelling     Throat swelling     Outpatient Encounter Medications as of 8/20/2020   Medication Sig Dispense Refill   • amiodarone (CORDARONE) 200 MG Tab Take 1 Tab by mouth every day. 90 Tab 3   • apixaban (ELIQUIS) 5mg Tab Take 1 Tab by mouth 2 Times a Day. Indications: Thromboembolism secondary to Atrial Fibrillation 180 Tab 3   • clopidogrel (PLAVIX) 75 MG Tab Take 1 Tab by mouth every day. 90 Tab 3   • atorvastatin (LIPITOR) 80 MG tablet Take 1 Tab by mouth every day. 90 Tab 3   • nitroglycerin (NITROSTAT) 0.4 MG SL Tab Place 1 Tab under tongue as needed for Chest Pain. 25 Tab 3   • carvedilol (COREG) 6.25 MG Tab Take 1 Tab by mouth every bedtime. 90 Tab 3   • Morphine Sulfate ER 15 MG Tablet Extended Release 12 hour Abuse-Deterrent Take 15 mg by mouth 3 times a day. Abuse deterrent? He takes morphine 15mg er tid     • HYDROcodone/acetaminophen (NORCO)  MG Tab Take 1 Tab by mouth 6 Times a Day.     • metFORMIN ER (GLUCOPHAGE XR) 750 MG TABLET SR 24 HR Take 750 mg by mouth every day.     • finasteride (PROSCAR) 5 MG Tab Take 5 mg by mouth every evening.     • tamsulosin (FLOMAX) 0.4 MG capsule Take 0.4 mg by mouth every evening.     • venlafaxine (EFFEXOR XR) 150 MG XR capsule Take 150 mg by mouth every day.     • [DISCONTINUED] amiodarone (CORDARONE) 200 MG Tab Take 1 Tab by mouth every day. 30 Tab 0   • [DISCONTINUED] apixaban (ELIQUIS) 5mg Tab Take 1 Tab by mouth 2 Times a Day.  "Indications: Thromboembolism secondary to Atrial Fibrillation 60 Tab 0   • [DISCONTINUED] clopidogrel (PLAVIX) 75 MG Tab Take 1 Tab by mouth every day. 30 Tab 0   • [DISCONTINUED] atorvastatin (LIPITOR) 80 MG tablet Take 80 mg by mouth every evening.       No facility-administered encounter medications on file as of 8/20/2020.      Review of Systems   Constitutional: Positive for malaise/fatigue. Negative for chills and fever.   HENT: Negative for congestion.    Respiratory: Negative for cough and shortness of breath.    Cardiovascular: Negative for chest pain, palpitations, orthopnea, leg swelling and PND.   Gastrointestinal: Negative for abdominal pain and nausea.   Musculoskeletal: Positive for back pain and joint pain. Negative for myalgias.   Skin: Negative for rash.   Neurological: Negative for dizziness, loss of consciousness and headaches.   Endo/Heme/Allergies: Does not bruise/bleed easily.   Psychiatric/Behavioral: The patient does not have insomnia.         Objective:   /78 (BP Location: Left arm, Patient Position: Sitting, BP Cuff Size: Adult)   Pulse (!) 104   Resp 16   Ht 1.702 m (5' 7\")   Wt 96.2 kg (212 lb)   SpO2 94%   BMI 33.20 kg/m²     Physical Exam   Constitutional: He is oriented to person, place, and time. He appears well-developed and well-nourished.   HENT:   Head: Normocephalic.   Eyes: EOM are normal.   Neck: Normal range of motion. Neck supple. No JVD present.   Cardiovascular: Normal rate, regular rhythm and normal heart sounds.   Pulmonary/Chest: Effort normal and breath sounds normal. No respiratory distress. He has no wheezes. He has no rales.   Abdominal: Soft. Bowel sounds are normal. He exhibits no distension. There is no abdominal tenderness.   Musculoskeletal: Normal range of motion.         General: No edema.   Neurological: He is alert and oriented to person, place, and time.   Skin: Skin is warm and dry. No rash noted.   Psychiatric: He has a normal mood and " affect.     EKG ordered and interpreted by me today reveals sinus tachycardia of 101bpm.    IMPRESSIONS OF CORONARY ANGIOGRAM OF 8/1/2020:  1.  Acute non-STEMI due to culprit right coronary artery  2.  Successful PCI of the mid right coronary artery using 1 drug-eluting stent and IVUS guidance  3.  Successful PCI of the posterior lateral branch using 1 drug-eluting stent  4.  Normal LV systolic function  5.  Moderately elevated LVEDP (21 mmHg)  6.  Poorly controlled hypertension     CONCLUSIONS OF ECHOCARDIOGRAM OF 8/9/2020:  Compared to the images of the prior study done 8/3/2020, the EF appears   improved.  Left ventricular ejection fraction is visually estimated to be 60%.    CONCLUSIONS OF ECHOCARDIOGRAM OF 8/3/2020:  Limited contrast study to reassess for LV thrombus.  Thrombus is not observed in the left ventricular apex.  Left ventricular ejection fraction is visually estimated to be 60%.  Mild hypokinesis of the apex.    Lab Results   Component Value Date/Time    WBC 9.4 08/13/2020 12:28 AM    RBC 3.46 (L) 08/13/2020 12:28 AM    HEMOGLOBIN 10.4 (L) 08/13/2020 12:28 AM    HEMATOCRIT 31.8 (L) 08/13/2020 12:28 AM    MCV 91.9 08/13/2020 12:28 AM    MCH 30.1 08/13/2020 12:28 AM    MCHC 32.7 (L) 08/13/2020 12:28 AM    MPV 10.5 08/13/2020 12:28 AM      Lab Results   Component Value Date/Time    SODIUM 140 08/12/2020 12:31 AM    POTASSIUM 3.6 08/12/2020 12:31 AM    CHLORIDE 100 08/12/2020 12:31 AM    CO2 27 08/12/2020 12:31 AM    GLUCOSE 94 08/12/2020 12:31 AM    BUN 7 (L) 08/12/2020 12:31 AM    CREATININE 0.71 08/12/2020 12:31 AM     Lab Results   Component Value Date/Time    TRIGLYCERIDE 217 (H) 08/01/2020 03:26 PM         Assessment:     1. Coronary artery disease involving native coronary artery of native heart without angina pectoris     2. Status post insertion of drug-eluting stent into right coronary artery for coronary artery disease  clopidogrel (PLAVIX) 75 MG Tab    atorvastatin (LIPITOR) 80 MG tablet     nitroglycerin (NITROSTAT) 0.4 MG SL Tab    carvedilol (COREG) 6.25 MG Tab   3. Paroxysmal atrial fibrillation (HCC)  amiodarone (CORDARONE) 200 MG Tab    apixaban (ELIQUIS) 5mg Tab   4. Current use of long term anticoagulation     5. Essential hypertension  CBC WITH DIFFERENTIAL    Basic Metabolic Panel   6. Dyslipidemia     7. Class 2 severe obesity due to excess calories with serious comorbidity and body mass index (BMI) of 35.0 to 35.9 in adult (HCC)     8. Chronic back pain, unspecified back location, unspecified back pain laterality         Medical Decision Making:  Today's Assessment / Status / Plan:     1. Recent NSTEMI, with PCI/NAOMY to the mid RCA and PLB, on Plavix and statin. Will eventually consider cardiac rehab.    2. Paroxysmal atrial fibrillation, in sinus rhythm on Amiodarone 200mg once daily. To restart Coreg 6.25mg once daily QHS to help with rate control.    3. Chronic anticoagulation with Eliquis. No bleeding issues.    4. Hypertension, not currently on any therapy. As above, to restart Coreg 6.25mg once daily.    5. Hyperlipidemia, treated with Lipitor.    6. Obesity, to work on weight loss and diet.    7. Chronic back pain, on Norco and muscle relaxers.    8. Insomnia, can OTC Melatonin.    To check CBC and BMP today. As above, to restart Coreg 6.25mg QHS. FU with me in 3 weeks, sooner if clinical condition changes. Will discuss cardiac rehab at that visit.    Collaborating MD: Luisito

## 2020-08-21 DIAGNOSIS — I10 ESSENTIAL HYPERTENSION: ICD-10-CM

## 2020-08-24 ENCOUNTER — TELEPHONE (OUTPATIENT)
Dept: CARDIOLOGY | Facility: MEDICAL CENTER | Age: 60
End: 2020-08-24

## 2020-08-24 DIAGNOSIS — E87.6 HYPOKALEMIA: ICD-10-CM

## 2020-08-24 RX ORDER — POTASSIUM CHLORIDE 750 MG/1
10 CAPSULE, EXTENDED RELEASE ORAL DAILY
Qty: 90 CAP | Refills: 3 | Status: SHIPPED | OUTPATIENT
Start: 2020-08-24 | End: 2022-07-21

## 2020-08-24 NOTE — TELEPHONE ENCOUNTER
----- Message from MARCELA Richardson sent at 8/24/2020 11:49 AM PDT -----  CBC is better, but K+ is low. Start KCL 10mEq once daily, and repeat BMP in 1-2 weeks.  Thanks, AB

## 2020-08-24 NOTE — TELEPHONE ENCOUNTER
Called pt., spoke to wife to give results and recommendations. New RX sent to pharmacy. Lab order mailed.

## 2020-08-25 NOTE — TELEPHONE ENCOUNTER
"Pharmacy needs clarification on the order, she'd like to change the \"Extended Release\" [phone]  "

## 2020-08-28 DIAGNOSIS — E87.6 HYPOKALEMIA: ICD-10-CM

## 2020-08-31 ENCOUNTER — TELEPHONE (OUTPATIENT)
Dept: CARDIOLOGY | Facility: MEDICAL CENTER | Age: 60
End: 2020-08-31

## 2020-08-31 NOTE — TELEPHONE ENCOUNTER
AB pt reports he's having chest pain, he has already taken 2 nitro meds & doesn't know if he needs to go to the hospital. [phone]

## 2020-08-31 NOTE — TELEPHONE ENCOUNTER
Pt. Had previous NSTEMI 30 days ago.  He said he felt pain like his previous chest pain (substernal, non-radiating). He took first dose of Nitro. Pt. Waited 5 min. , pain persisted, so he took a second dose. Now chest pain has resolved. He denied dyspnea, diaphoresis.   Nurse asked pt. To have his wife  check his BP. Pt. Was lying in bed but when he tried to sit up he became very lightheaded. Pt. Was advised to call EMS for paramedics to evaluate him.

## 2020-09-01 ENCOUNTER — TELEPHONE (OUTPATIENT)
Dept: CARDIOLOGY | Facility: MEDICAL CENTER | Age: 60
End: 2020-09-01

## 2020-09-01 NOTE — TELEPHONE ENCOUNTER
----- Message from MARCELA Richardson sent at 8/31/2020 11:51 AM PDT -----  BMP is all stable - this was the patient that was to consult the paramedics (earlier message).  Thanks, AB

## 2020-09-01 NOTE — TELEPHONE ENCOUNTER
Pt. Called 911 and was taken to [facility] ER yesterday. (Records will be obtained and scanned.) Per pt., EKG was okay. Troponins were normal/stable X 2. He is feeling much better today and was happy with his care at [facility].

## 2020-09-01 NOTE — TELEPHONE ENCOUNTER
AB/kinsey    Pt calling to give you details of what happened at the E/R yesterday.    Please call Karan for details, [phone]

## 2020-09-16 ENCOUNTER — TELEPHONE (OUTPATIENT)
Dept: CARDIOLOGY | Facility: MEDICAL CENTER | Age: 60
End: 2020-09-16

## 2020-09-16 NOTE — TELEPHONE ENCOUNTER
"Patient calls.  He has had \"very low BP\" all day and is lightheaded/dizzy.  BP is currently 88/67.  Does not know HR.  He is SOB speaking to me.  His wife is with him.  I asked him to call 911 and get them to assess him and he will comply.    "

## 2020-09-17 ENCOUNTER — OFFICE VISIT (OUTPATIENT)
Dept: CARDIOLOGY | Facility: PHYSICIAN GROUP | Age: 60
End: 2020-09-17
Payer: OTHER GOVERNMENT

## 2020-09-17 VITALS
SYSTOLIC BLOOD PRESSURE: 124 MMHG | OXYGEN SATURATION: 94 % | DIASTOLIC BLOOD PRESSURE: 80 MMHG | HEART RATE: 86 BPM | BODY MASS INDEX: 34.21 KG/M2 | HEIGHT: 67 IN | WEIGHT: 218 LBS

## 2020-09-17 DIAGNOSIS — M54.9 CHRONIC BACK PAIN, UNSPECIFIED BACK LOCATION, UNSPECIFIED BACK PAIN LATERALITY: ICD-10-CM

## 2020-09-17 DIAGNOSIS — G47.00 INSOMNIA, UNSPECIFIED TYPE: ICD-10-CM

## 2020-09-17 DIAGNOSIS — Z79.01 CURRENT USE OF LONG TERM ANTICOAGULATION: ICD-10-CM

## 2020-09-17 DIAGNOSIS — I48.0 PAROXYSMAL ATRIAL FIBRILLATION (HCC): ICD-10-CM

## 2020-09-17 DIAGNOSIS — G89.29 CHRONIC BACK PAIN, UNSPECIFIED BACK LOCATION, UNSPECIFIED BACK PAIN LATERALITY: ICD-10-CM

## 2020-09-17 DIAGNOSIS — I10 ESSENTIAL HYPERTENSION: ICD-10-CM

## 2020-09-17 DIAGNOSIS — Z95.5 STATUS POST INSERTION OF DRUG-ELUTING STENT INTO RIGHT CORONARY ARTERY FOR CORONARY ARTERY DISEASE: ICD-10-CM

## 2020-09-17 DIAGNOSIS — E87.6 HYPOKALEMIA: ICD-10-CM

## 2020-09-17 DIAGNOSIS — I25.10 CORONARY ARTERY DISEASE INVOLVING NATIVE CORONARY ARTERY OF NATIVE HEART WITHOUT ANGINA PECTORIS: ICD-10-CM

## 2020-09-17 DIAGNOSIS — D64.9 ANEMIA, UNSPECIFIED TYPE: ICD-10-CM

## 2020-09-17 DIAGNOSIS — E78.5 DYSLIPIDEMIA: ICD-10-CM

## 2020-09-17 PROCEDURE — 99214 OFFICE O/P EST MOD 30 MIN: CPT | Performed by: NURSE PRACTITIONER

## 2020-09-17 ASSESSMENT — ENCOUNTER SYMPTOMS
NAUSEA: 0
BRUISES/BLEEDS EASILY: 0
CHILLS: 0
PND: 0
HEADACHES: 0
COUGH: 0
ORTHOPNEA: 0
PALPITATIONS: 0
MYALGIAS: 0
BACK PAIN: 1
DIZZINESS: 0
INSOMNIA: 1
LOSS OF CONSCIOUSNESS: 0
SHORTNESS OF BREATH: 0
ABDOMINAL PAIN: 0
FEVER: 0

## 2020-09-17 ASSESSMENT — FIBROSIS 4 INDEX: FIB4 SCORE: 0.47

## 2020-09-17 NOTE — TELEPHONE ENCOUNTER
I called and spoke to his wife.  They called 911 yesterday but did not go to the hospital.  She said that his EKG was ok and BP was fine, and patient was feeling better.  They will see Caryn today in [place].

## 2020-09-17 NOTE — LETTER
[facility] [address]  Phone: [phone]  Fax: [phone]              Karan Wiley  1960    Encounter Date: 9/17/2020    MARCELA Richardson          PROGRESS NOTE:  Chief Complaint   Patient presents with   • Follow-Up   • Coronary Artery Disease   • Atrial Fibrillation   • Anticoagulation   • Hyperlipidemia   • Insomnia       Subjective:   Karan Wiley is a 60 y.o. male who presents today for four week follow-up of CAD and recent PCI.    Karan is a 60 year old male with history of hypertension, dyslipidemia who presented to [facility] on 8/1/2020 with STEMI and underwent cardiac cath requiring PCI of RCA and PCI of posterior lateral branch.  He did have post-procedural complications, including acute hypoxic respiratory failure with flash pulmonary edema, and PEA cardiac arrest, however obtained ROSC within 2 cycles of CPR.  Echocardiogram showed akinetic apex with new LV thrombus.  Patient was again taken to Cath Lab post cardiac arrest and felt to be in cardiogenic shock likely due to stress cardiomyopathy and had successful placement of IABP.  Cardiogenic shock resolved and IABP was removed.  Repeat echocardiogram on 8/3 showed no LV thrombus so his IV heparin was discontinued. He did also have left sided pneumonia treated with antibiotics. Amiodarone was added for AFib. Aspirin was not added to his regimen due to high bleeding risk and already on eliquis and plavix.    At follow-up with me, I added Coreg 6.25mg QHS to help with rate control and BP.     He is here today for four week follow-up. BP is running  systolic at home. HR is running 60-120bpm. Patient denies any chest pain, pressure or discomfort; occasional symptomatic palpitations; no shortness of breath, orthopnea or PND; no dizziness or syncope; no LE edema. He does have chronic back pain and takes Morphine and Norco; he does also have trouble  sleeping. He uses Trazodone 100mg; his PCP is wondering if this can be increased. He did call 911 yesterday due to dizziness; rhythm strip was normal, and vital signs were normal. He did NOT go to the hospital. He is feeling better today.    Past Medical History:   Diagnosis Date   • CAD (coronary artery disease) 2020    STEMI. PCI/NAOMY ([place] 3.5 x 25mm) the mid RCA, and PCI/NAOMY ([place] 2.5 x 12mm) the PDA.   • Chronic anticoagulation    • Depression    • Hyperlipidemia    • Hypertension    • Low back pain    • Paroxysmal atrial fibrillation (HCC)      Past Surgical History:   Procedure Laterality Date   • SPINAL CORD STIMULATOR  2019    Procedure: SPINAL CORD STIMULATOR-  STAGE 1:  RIGHT FLANK BATTERY REPLACEMENT/UPGRADE;  Surgeon: Stepan Hawkins M.D.;  Location: SURGERY [facility];  Service: Neurosurgery   • LUMBAR FUSION POSTERIOR  2019    Procedure: LUMBAR FUSION POSTERIOR-  STAGE 2: ONLAY L5-S1 BONE;  Surgeon: Stepan Hawkins M.D.;  Location: SURGERY [facility];  Service: Neurosurgery   • LAMINOTOMY  2019    Procedure: LAMINOTOMY-  STAGE 2:  REDO LEFT L4-S1;  Surgeon: Stepan Hawkins M.D.;  Location: SURGERY [facility];  Service: Neurosurgery     Family History   Problem Relation Age of Onset   • Cancer Mother         breast     Social History     Socioeconomic History   • Marital status:      Spouse name: Not on file   • Number of children: Not on file   • Years of education: Not on file   • Highest education level: Not on file   Occupational History   • Not on file   Social Needs   • Financial resource strain: Not on file   • Food insecurity     Worry: Not on file     Inability: Not on file   • Transportation needs     Medical: Not on file     Non-medical: Not on file   Tobacco Use   • Smoking status: Former Smoker     Packs/day: 0.25     Years: 3.00     Pack years: 0.75     Quit date: 1980     Years since quittin.7   • Smokeless tobacco: Never Used   Substance and  Sexual Activity   • Alcohol use: No   • Drug use: Not on file   • Sexual activity: Not on file   Lifestyle   • Physical activity     Days per week: Not on file     Minutes per session: Not on file   • Stress: Not on file   Relationships   • Social connections     Talks on phone: Not on file     Gets together: Not on file     Attends Caodaism service: Not on file     Active member of club or organization: Not on file     Attends meetings of clubs or organizations: Not on file     Relationship status: Not on file   • Intimate partner violence     Fear of current or ex partner: Not on file     Emotionally abused: Not on file     Physically abused: Not on file     Forced sexual activity: Not on file   Other Topics Concern   • Not on file   Social History Narrative   • Not on file     Allergies   Allergen Reactions   • Hctz [Hydrochlorothiazide W-Spironolactone]      Cannot breathe   • Oxycodone Swelling     Throat swelling     Outpatient Encounter Medications as of 9/17/2020   Medication Sig Dispense Refill   • potassium chloride (MICRO-K) 10 MEQ capsule Take 1 Cap by mouth every day. 90 Cap 3   • amiodarone (CORDARONE) 200 MG Tab Take 1 Tab by mouth every day. 90 Tab 3   • apixaban (ELIQUIS) 5mg Tab Take 1 Tab by mouth 2 Times a Day. Indications: Thromboembolism secondary to Atrial Fibrillation 180 Tab 3   • clopidogrel (PLAVIX) 75 MG Tab Take 1 Tab by mouth every day. 90 Tab 3   • atorvastatin (LIPITOR) 80 MG tablet Take 1 Tab by mouth every day. 90 Tab 3   • nitroglycerin (NITROSTAT) 0.4 MG SL Tab Place 1 Tab under tongue as needed for Chest Pain. 25 Tab 3   • carvedilol (COREG) 6.25 MG Tab Take 1 Tab by mouth every bedtime. 90 Tab 3   • Morphine Sulfate ER 15 MG Tablet Extended Release 12 hour Abuse-Deterrent Take 15 mg by mouth 3 times a day. Abuse deterrent? He takes morphine 15mg er tid     • HYDROcodone/acetaminophen (NORCO)  MG Tab Take 1 Tab by mouth 6 Times a Day.     • metFORMIN ER (GLUCOPHAGE XR) 750  "MG TABLET SR 24 HR Take 750 mg by mouth every day.     • finasteride (PROSCAR) 5 MG Tab Take 5 mg by mouth every evening.     • tamsulosin (FLOMAX) 0.4 MG capsule Take 0.4 mg by mouth every evening.     • venlafaxine (EFFEXOR XR) 150 MG XR capsule Take 150 mg by mouth every day.       No facility-administered encounter medications on file as of 9/17/2020.      Review of Systems   Constitutional: Positive for malaise/fatigue. Negative for chills and fever.   HENT: Negative for congestion.    Respiratory: Negative for cough and shortness of breath.    Cardiovascular: Negative for chest pain, palpitations, orthopnea, leg swelling and PND.   Gastrointestinal: Negative for abdominal pain and nausea.   Musculoskeletal: Positive for back pain and joint pain. Negative for myalgias.   Skin: Negative for rash.   Neurological: Negative for dizziness, loss of consciousness and headaches.   Endo/Heme/Allergies: Does not bruise/bleed easily.   Psychiatric/Behavioral: The patient has insomnia.         Objective:   /80 (BP Location: Left arm, Patient Position: Sitting, BP Cuff Size: Adult)   Pulse 86   Ht 1.702 m (5' 7\")   Wt 98.9 kg (218 lb)   SpO2 94%   BMI 34.14 kg/m²     Physical Exam   Constitutional: He is oriented to person, place, and time. He appears well-developed and well-nourished.   Color looks better.   HENT:   Head: Normocephalic.   Eyes: EOM are normal.   Neck: Normal range of motion. Neck supple. No JVD present.   Cardiovascular: Normal rate, regular rhythm and normal heart sounds.   Pulmonary/Chest: Effort normal and breath sounds normal. No respiratory distress. He has no wheezes. He has no rales.   Abdominal: Soft. Bowel sounds are normal. He exhibits no distension. There is no abdominal tenderness.   Musculoskeletal: Normal range of motion.         General: No edema.   Neurological: He is alert and oriented to person, place, and time.   Skin: Skin is warm and dry. No rash noted.   Psychiatric: He has " a normal mood and affect.     Rhythm strip from EMS from yesterday: sinus rhythm at 60bpm.    LABS AS OF 8/20/2020:  WBC 5.5  RBC 3.96  Hgb 12.2  Hct 37.4  Platelets 539  Glucose 128  BUN 13  Creatinine 1.21  GFR 65  Potassium 3.1    Lab Results   Component Value Date/Time    WBC 9.4 08/13/2020 12:28 AM    RBC 3.46 (L) 08/13/2020 12:28 AM    HEMOGLOBIN 10.4 (L) 08/13/2020 12:28 AM    HEMATOCRIT 31.8 (L) 08/13/2020 12:28 AM    MCV 91.9 08/13/2020 12:28 AM    MCH 30.1 08/13/2020 12:28 AM    MCHC 32.7 (L) 08/13/2020 12:28 AM    MPV 10.5 08/13/2020 12:28 AM      Lab Results   Component Value Date/Time    SODIUM 140 08/12/2020 12:31 AM    POTASSIUM 3.6 08/12/2020 12:31 AM    CHLORIDE 100 08/12/2020 12:31 AM    CO2 27 08/12/2020 12:31 AM    GLUCOSE 94 08/12/2020 12:31 AM    BUN 7 (L) 08/12/2020 12:31 AM    CREATININE 0.71 08/12/2020 12:31 AM       IMPRESSIONS OF CORONARY ANGIOGRAM OF 8/1/2020:  1.  Acute non-STEMI due to culprit right coronary artery  2.  Successful PCI of the mid right coronary artery using 1 drug-eluting stent and IVUS guidance  3.  Successful PCI of the posterior lateral branch using 1 drug-eluting stent  4.  Normal LV systolic function  5.  Moderately elevated LVEDP (21 mmHg)  6.  Poorly controlled hypertension     CONCLUSIONS OF ECHOCARDIOGRAM OF 8/9/2020:  Compared to the images of the prior study done 8/3/2020, the EF appears   improved.  Left ventricular ejection fraction is visually estimated to be 60%.     CONCLUSIONS OF ECHOCARDIOGRAM OF 8/3/2020:  Limited contrast study to reassess for LV thrombus.  Thrombus is not observed in the left ventricular apex.  Left ventricular ejection fraction is visually estimated to be 60%.  Mild hypokinesis of the apex.    Assessment:     1. Hypokalemia  Basic Metabolic Panel   2. Anemia, unspecified type  CBC WITH DIFFERENTIAL   3. Coronary artery disease involving native coronary artery of native heart without angina pectoris     4. Status post insertion of  drug-eluting stent into right coronary artery for coronary artery disease     5. Paroxysmal atrial fibrillation (HCC)     6. Current use of long term anticoagulation     7. Essential hypertension     8. Dyslipidemia     9. Chronic back pain, unspecified back location, unspecified back pain laterality     10. Insomnia, unspecified type         Medical Decision Making:  Today's Assessment / Status / Plan:     1. Hypokalemia, now on KCl. To repeat BMP.    2. Anemia, improving. To repeat CBC.    3. CAD, status post PCI/NAOMY to the RCA and PL branch. On Plavix, BB and statin. He has been referred to cardiac rehab at [facility], which I agree with.    4. Paroxysmal atrial fibrillation, in sinus rhythm on Amiodarone. Will discuss titrating this downwards at next follow-up. Goal is to get him off of this.    5. Chronic anticoagulation with Eliquis. No current bleeding issues.    6. History of HTN, currently stable on Coreg 6.25mg once daily. To monitor BP at home. To make sure he stays well hydrated when BP is low.    7. Chronic back pain, on Morphine and Norco.    8. Insomnia, on Trazodone 100mg. Can increase to 150mg QHS, but wouldn't go higher than that.    As above, repeat BMP and CBC today. Monitor BP and HR at home. FU with me in 3-4 weeks. Will work on discontinuing Amiodarone after that.      No Recipients

## 2020-09-17 NOTE — PROGRESS NOTES
Chief Complaint   Patient presents with   • Follow-Up   • Coronary Artery Disease   • Atrial Fibrillation   • Anticoagulation   • Hyperlipidemia   • Insomnia       Subjective:   Karan Wiley is a 60 y.o. male who presents today for four week follow-up of CAD and recent PCI.    Karan is a 60 year old male with history of hypertension, dyslipidemia who presented to [facility] on 8/1/2020 with STEMI and underwent cardiac cath requiring PCI of RCA and PCI of posterior lateral branch.  He did have post-procedural complications, including acute hypoxic respiratory failure with flash pulmonary edema, and PEA cardiac arrest, however obtained ROSC within 2 cycles of CPR.  Echocardiogram showed akinetic apex with new LV thrombus.  Patient was again taken to Cath Lab post cardiac arrest and felt to be in cardiogenic shock likely due to stress cardiomyopathy and had successful placement of IABP.  Cardiogenic shock resolved and IABP was removed.  Repeat echocardiogram on 8/3 showed no LV thrombus so his IV heparin was discontinued. He did also have left sided pneumonia treated with antibiotics. Amiodarone was added for AFib. Aspirin was not added to his regimen due to high bleeding risk and already on eliquis and plavix.    At follow-up with me, I added Coreg 6.25mg QHS to help with rate control and BP.     He is here today for four week follow-up. BP is running  systolic at home. HR is running 60-120bpm. Patient denies any chest pain, pressure or discomfort; occasional symptomatic palpitations; no shortness of breath, orthopnea or PND; no dizziness or syncope; no LE edema. He does have chronic back pain and takes Morphine and Norco; he does also have trouble sleeping. He uses Trazodone 100mg; his PCP is wondering if this can be increased. He did call 911 yesterday due to dizziness; rhythm strip was normal, and vital signs were normal. He did NOT go to the hospital. He is feeling better today.    Past Medical History:    Diagnosis Date   • CAD (coronary artery disease) 2020    STEMI. PCI/NAOMY ([place] 3.5 x 25mm) the mid RCA, and PCI/NAOMY ([place] 2.5 x 12mm) the PDA.   • Chronic anticoagulation    • Depression    • Hyperlipidemia    • Hypertension    • Low back pain    • Paroxysmal atrial fibrillation (HCC)      Past Surgical History:   Procedure Laterality Date   • SPINAL CORD STIMULATOR  2019    Procedure: SPINAL CORD STIMULATOR-  STAGE 1:  RIGHT FLANK BATTERY REPLACEMENT/UPGRADE;  Surgeon: Stepan Hawkins M.D.;  Location: SURGERY [facility];  Service: Neurosurgery   • LUMBAR FUSION POSTERIOR  2019    Procedure: LUMBAR FUSION POSTERIOR-  STAGE 2: ONLAY L5-S1 BONE;  Surgeon: Stepan Hawkins M.D.;  Location: SURGERY [facility];  Service: Neurosurgery   • LAMINOTOMY  2019    Procedure: LAMINOTOMY-  STAGE 2:  REDO LEFT L4-S1;  Surgeon: Stepan Hawkins M.D.;  Location: SURGERY [facility];  Service: Neurosurgery     Family History   Problem Relation Age of Onset   • Cancer Mother         breast     Social History     Socioeconomic History   • Marital status:      Spouse name: Not on file   • Number of children: Not on file   • Years of education: Not on file   • Highest education level: Not on file   Occupational History   • Not on file   Social Needs   • Financial resource strain: Not on file   • Food insecurity     Worry: Not on file     Inability: Not on file   • Transportation needs     Medical: Not on file     Non-medical: Not on file   Tobacco Use   • Smoking status: Former Smoker     Packs/day: 0.25     Years: 3.00     Pack years: 0.75     Quit date: 1980     Years since quittin.7   • Smokeless tobacco: Never Used   Substance and Sexual Activity   • Alcohol use: No   • Drug use: Not on file   • Sexual activity: Not on file   Lifestyle   • Physical activity     Days per week: Not on file     Minutes per session: Not on file   • Stress: Not on file   Relationships   • Social connections      Talks on phone: Not on file     Gets together: Not on file     Attends Yarsanism service: Not on file     Active member of club or organization: Not on file     Attends meetings of clubs or organizations: Not on file     Relationship status: Not on file   • Intimate partner violence     Fear of current or ex partner: Not on file     Emotionally abused: Not on file     Physically abused: Not on file     Forced sexual activity: Not on file   Other Topics Concern   • Not on file   Social History Narrative   • Not on file     Allergies   Allergen Reactions   • Hctz [Hydrochlorothiazide W-Spironolactone]      Cannot breathe   • Oxycodone Swelling     Throat swelling     Outpatient Encounter Medications as of 9/17/2020   Medication Sig Dispense Refill   • potassium chloride (MICRO-K) 10 MEQ capsule Take 1 Cap by mouth every day. 90 Cap 3   • amiodarone (CORDARONE) 200 MG Tab Take 1 Tab by mouth every day. 90 Tab 3   • apixaban (ELIQUIS) 5mg Tab Take 1 Tab by mouth 2 Times a Day. Indications: Thromboembolism secondary to Atrial Fibrillation 180 Tab 3   • clopidogrel (PLAVIX) 75 MG Tab Take 1 Tab by mouth every day. 90 Tab 3   • atorvastatin (LIPITOR) 80 MG tablet Take 1 Tab by mouth every day. 90 Tab 3   • nitroglycerin (NITROSTAT) 0.4 MG SL Tab Place 1 Tab under tongue as needed for Chest Pain. 25 Tab 3   • carvedilol (COREG) 6.25 MG Tab Take 1 Tab by mouth every bedtime. 90 Tab 3   • Morphine Sulfate ER 15 MG Tablet Extended Release 12 hour Abuse-Deterrent Take 15 mg by mouth 3 times a day. Abuse deterrent? He takes morphine 15mg er tid     • HYDROcodone/acetaminophen (NORCO)  MG Tab Take 1 Tab by mouth 6 Times a Day.     • metFORMIN ER (GLUCOPHAGE XR) 750 MG TABLET SR 24 HR Take 750 mg by mouth every day.     • finasteride (PROSCAR) 5 MG Tab Take 5 mg by mouth every evening.     • tamsulosin (FLOMAX) 0.4 MG capsule Take 0.4 mg by mouth every evening.     • venlafaxine (EFFEXOR XR) 150 MG XR capsule Take 150 mg by  "mouth every day.       No facility-administered encounter medications on file as of 9/17/2020.      Review of Systems   Constitutional: Positive for malaise/fatigue. Negative for chills and fever.   HENT: Negative for congestion.    Respiratory: Negative for cough and shortness of breath.    Cardiovascular: Negative for chest pain, palpitations, orthopnea, leg swelling and PND.   Gastrointestinal: Negative for abdominal pain and nausea.   Musculoskeletal: Positive for back pain and joint pain. Negative for myalgias.   Skin: Negative for rash.   Neurological: Negative for dizziness, loss of consciousness and headaches.   Endo/Heme/Allergies: Does not bruise/bleed easily.   Psychiatric/Behavioral: The patient has insomnia.         Objective:   /80 (BP Location: Left arm, Patient Position: Sitting, BP Cuff Size: Adult)   Pulse 86   Ht 1.702 m (5' 7\")   Wt 98.9 kg (218 lb)   SpO2 94%   BMI 34.14 kg/m²     Physical Exam   Constitutional: He is oriented to person, place, and time. He appears well-developed and well-nourished.   Color looks better.   HENT:   Head: Normocephalic.   Eyes: EOM are normal.   Neck: Normal range of motion. Neck supple. No JVD present.   Cardiovascular: Normal rate, regular rhythm and normal heart sounds.   Pulmonary/Chest: Effort normal and breath sounds normal. No respiratory distress. He has no wheezes. He has no rales.   Abdominal: Soft. Bowel sounds are normal. He exhibits no distension. There is no abdominal tenderness.   Musculoskeletal: Normal range of motion.         General: No edema.   Neurological: He is alert and oriented to person, place, and time.   Skin: Skin is warm and dry. No rash noted.   Psychiatric: He has a normal mood and affect.     Rhythm strip from EMS from yesterday: sinus rhythm at 60bpm.    LABS AS OF 8/20/2020:  WBC 5.5  RBC 3.96  Hgb 12.2  Hct 37.4  Platelets 539  Glucose 128  BUN 13  Creatinine 1.21  GFR 65  Potassium 3.1    Lab Results   Component " Value Date/Time    WBC 9.4 08/13/2020 12:28 AM    RBC 3.46 (L) 08/13/2020 12:28 AM    HEMOGLOBIN 10.4 (L) 08/13/2020 12:28 AM    HEMATOCRIT 31.8 (L) 08/13/2020 12:28 AM    MCV 91.9 08/13/2020 12:28 AM    MCH 30.1 08/13/2020 12:28 AM    MCHC 32.7 (L) 08/13/2020 12:28 AM    MPV 10.5 08/13/2020 12:28 AM      Lab Results   Component Value Date/Time    SODIUM 140 08/12/2020 12:31 AM    POTASSIUM 3.6 08/12/2020 12:31 AM    CHLORIDE 100 08/12/2020 12:31 AM    CO2 27 08/12/2020 12:31 AM    GLUCOSE 94 08/12/2020 12:31 AM    BUN 7 (L) 08/12/2020 12:31 AM    CREATININE 0.71 08/12/2020 12:31 AM       IMPRESSIONS OF CORONARY ANGIOGRAM OF 8/1/2020:  1.  Acute non-STEMI due to culprit right coronary artery  2.  Successful PCI of the mid right coronary artery using 1 drug-eluting stent and IVUS guidance  3.  Successful PCI of the posterior lateral branch using 1 drug-eluting stent  4.  Normal LV systolic function  5.  Moderately elevated LVEDP (21 mmHg)  6.  Poorly controlled hypertension     CONCLUSIONS OF ECHOCARDIOGRAM OF 8/9/2020:  Compared to the images of the prior study done 8/3/2020, the EF appears   improved.  Left ventricular ejection fraction is visually estimated to be 60%.     CONCLUSIONS OF ECHOCARDIOGRAM OF 8/3/2020:  Limited contrast study to reassess for LV thrombus.  Thrombus is not observed in the left ventricular apex.  Left ventricular ejection fraction is visually estimated to be 60%.  Mild hypokinesis of the apex.    Assessment:     1. Hypokalemia  Basic Metabolic Panel   2. Anemia, unspecified type  CBC WITH DIFFERENTIAL   3. Coronary artery disease involving native coronary artery of native heart without angina pectoris     4. Status post insertion of drug-eluting stent into right coronary artery for coronary artery disease     5. Paroxysmal atrial fibrillation (HCC)     6. Current use of long term anticoagulation     7. Essential hypertension     8. Dyslipidemia     9. Chronic back pain, unspecified back  location, unspecified back pain laterality     10. Insomnia, unspecified type         Medical Decision Making:  Today's Assessment / Status / Plan:     1. Hypokalemia, now on KCl. To repeat BMP.    2. Anemia, improving. To repeat CBC.    3. CAD, status post PCI/NAOMY to the RCA and PL branch. On Plavix, BB and statin. He has been referred to cardiac rehab at [facility], which I agree with.    4. Paroxysmal atrial fibrillation, in sinus rhythm on Amiodarone. Will discuss titrating this downwards at next follow-up. Goal is to get him off of this.    5. Chronic anticoagulation with Eliquis. No current bleeding issues.    6. History of HTN, currently stable on Coreg 6.25mg once daily. To monitor BP at home. To make sure he stays well hydrated when BP is low.    7. Chronic back pain, on Morphine and Norco.    8. Insomnia, on Trazodone 100mg. Can increase to 150mg QHS, but wouldn't go higher than that.    As above, repeat BMP and CBC today. Monitor BP and HR at home. FU with me in 3-4 weeks. Will work on discontinuing Amiodarone after that.

## 2020-09-18 DIAGNOSIS — E87.6 HYPOKALEMIA: ICD-10-CM

## 2020-09-18 DIAGNOSIS — D64.9 ANEMIA, UNSPECIFIED TYPE: ICD-10-CM

## 2020-09-21 ENCOUNTER — TELEPHONE (OUTPATIENT)
Dept: CARDIOLOGY | Facility: MEDICAL CENTER | Age: 60
End: 2020-09-21

## 2020-09-21 NOTE — TELEPHONE ENCOUNTER
----- Message from MARCELA Richardson sent at 9/21/2020  8:52 AM PDT -----  Anemia is much improved - good news!  Continue same meds and keep FU as scheduled.  Thanks, AB

## 2020-10-05 ENCOUNTER — TELEPHONE (OUTPATIENT)
Dept: CARDIOLOGY | Facility: MEDICAL CENTER | Age: 60
End: 2020-10-05

## 2020-10-05 NOTE — TELEPHONE ENCOUNTER
"Spoke to pt. He was admitted overnight to [facility] for chest pain, \"likely non-cardiac\" per records. Troponins were negative and EKGs showed no acute changes. Apparently pt. Had a large BM the evening of admission and chest pain resolved. Records will be scanned.   Pt. Has FV with Amt next week in [place]. He will see PCP Dr. Kong in [place] this week. If PCP thinks he should see a cardiologist this week pt. Will call and get scheduled for appointment here in [place].  "

## 2020-10-05 NOTE — TELEPHONE ENCOUNTER
AB/kinsey    Pt calling to report hospitalized over the weekend at [facility] in [place] for chest pain, needs to discuss what transpired and get AB's opinion/advice.    Please call Karan .

## 2020-10-15 ENCOUNTER — OFFICE VISIT (OUTPATIENT)
Dept: CARDIOLOGY | Facility: PHYSICIAN GROUP | Age: 60
End: 2020-10-15
Payer: OTHER GOVERNMENT

## 2020-10-15 VITALS
DIASTOLIC BLOOD PRESSURE: 80 MMHG | HEART RATE: 64 BPM | SYSTOLIC BLOOD PRESSURE: 132 MMHG | WEIGHT: 216 LBS | BODY MASS INDEX: 33.9 KG/M2 | HEIGHT: 67 IN | OXYGEN SATURATION: 97 %

## 2020-10-15 DIAGNOSIS — Z79.01 CURRENT USE OF LONG TERM ANTICOAGULATION: ICD-10-CM

## 2020-10-15 DIAGNOSIS — Z95.5 STATUS POST INSERTION OF DRUG-ELUTING STENT INTO RIGHT CORONARY ARTERY FOR CORONARY ARTERY DISEASE: ICD-10-CM

## 2020-10-15 DIAGNOSIS — I48.0 PAROXYSMAL ATRIAL FIBRILLATION (HCC): ICD-10-CM

## 2020-10-15 DIAGNOSIS — E78.5 DYSLIPIDEMIA: ICD-10-CM

## 2020-10-15 DIAGNOSIS — I25.10 CORONARY ARTERY DISEASE INVOLVING NATIVE CORONARY ARTERY OF NATIVE HEART WITHOUT ANGINA PECTORIS: ICD-10-CM

## 2020-10-15 DIAGNOSIS — Z91.89 OTHER SPECIFIED PERSONAL RISK FACTORS, NOT ELSEWHERE CLASSIFIED: ICD-10-CM

## 2020-10-15 DIAGNOSIS — I10 ESSENTIAL HYPERTENSION: ICD-10-CM

## 2020-10-15 PROCEDURE — 99214 OFFICE O/P EST MOD 30 MIN: CPT | Performed by: NURSE PRACTITIONER

## 2020-10-15 RX ORDER — MORPHINE SULFATE 15 MG/1
TABLET, FILM COATED, EXTENDED RELEASE ORAL
COMMUNITY
Start: 2020-10-09 | End: 2020-10-15

## 2020-10-15 ASSESSMENT — ENCOUNTER SYMPTOMS
CHILLS: 0
NAUSEA: 0
DIZZINESS: 1
MYALGIAS: 0
PALPITATIONS: 0
INSOMNIA: 1
HEADACHES: 0
SHORTNESS OF BREATH: 0
COUGH: 0
LOSS OF CONSCIOUSNESS: 0
PND: 0
ORTHOPNEA: 0
BACK PAIN: 1
ABDOMINAL PAIN: 0
BRUISES/BLEEDS EASILY: 0
FEVER: 0

## 2020-10-15 ASSESSMENT — FIBROSIS 4 INDEX: FIB4 SCORE: 0.47

## 2020-10-15 NOTE — PROGRESS NOTES
Chief Complaint   Patient presents with   • Hospital Follow-up   • Chest Pain   • Coronary Artery Disease   • HTN (Controlled)   • Hyperlipidemia       Subjective:   Karan Wiley is a 60 y.o. male who presents today for ER follow-up of chest pain.    Karan is a 60 year old male with history of hypertension, dyslipidemia who presented to [facility] [place] in August 2020 with STEMI and underwent cardiac cath requiring PCI of RCA and PCI of posterior lateral branch.  He did have post-procedural complications, including acute hypoxic respiratory failure with flash pulmonary edema, and PEA cardiac arrest, however obtained ROSC within 2 cycles of CPR.  Echocardiogram showed akinetic apex with new LV thrombus.  Patient was again taken to Cath Lab post cardiac arrest and felt to be in cardiogenic shock likely due to stress cardiomyopathy and had successful placement of IABP.  Cardiogenic shock resolved and IABP was removed.  Repeat echocardiogram on 8/3 showed no LV thrombus. Amiodarone was added for AFib. Aspirin was not added to his regimen due to high bleeding risk and already on eliquis and plavix.    I have since seen him twice on 8/20/2020 and 9/17/2020. Coreg was added for better HR and BP control.    On 10/2/2020, he presented to [facility] ER with chest pain and dizziness. EKG was normal and troponins were also normal. He did have a large bowel movement, which seems to help his symptoms. He was declined transfer to [facility] [place], as his symptoms did not sounds cardiac in etiology. Outpatient stress test was mentioned.     He is here today for follow-up. Patient is still having a lot of dizziness; he does note some occasional palpitations.No recurrence of chest pain, pressure or discomfort; no shortness of breath, orthopnea or PND; no LE edema. He does have chronic back pain and takes Morphine and Norco.    Past Medical History:   Diagnosis Date   • CAD (coronary artery disease) 08/2020    STEMI. PCI/NAOMY ([place] 3.5 x 25mm) the mid RCA,  and PCI/NAOMY ([place]aghav 2.5 x 12mm) the PDA.   • Chronic anticoagulation    • Depression    • Hyperlipidemia    • Hypertension    • Low back pain    • Paroxysmal atrial fibrillation (HCC)      Past Surgical History:   Procedure Laterality Date   • SPINAL CORD STIMULATOR  2019    Procedure: SPINAL CORD STIMULATOR-  STAGE 1:  RIGHT FLANK BATTERY REPLACEMENT/UPGRADE;  Surgeon: Stepan Hawkins M.D.;  Location: SURGERY [facility];  Service: Neurosurgery   • LUMBAR FUSION POSTERIOR  2019    Procedure: LUMBAR FUSION POSTERIOR-  STAGE 2: ONLAY L5-S1 BONE;  Surgeon: Stepan Hawkins M.D.;  Location: SURGERY [facility];  Service: Neurosurgery   • LAMINOTOMY  2019    Procedure: LAMINOTOMY-  STAGE 2:  REDO LEFT L4-S1;  Surgeon: Stepan Hawkins M.D.;  Location: SURGERY [facility];  Service: Neurosurgery     Family History   Problem Relation Age of Onset   • Cancer Mother         breast     Social History     Socioeconomic History   • Marital status:      Spouse name: Not on file   • Number of children: Not on file   • Years of education: Not on file   • Highest education level: Not on file   Occupational History   • Not on file   Social Needs   • Financial resource strain: Not on file   • Food insecurity     Worry: Not on file     Inability: Not on file   • Transportation needs     Medical: Not on file     Non-medical: Not on file   Tobacco Use   • Smoking status: Former Smoker     Packs/day: 0.25     Years: 3.00     Pack years: 0.75     Quit date:      Years since quittin.8   • Smokeless tobacco: Never Used   Substance and Sexual Activity   • Alcohol use: No   • Drug use: Not on file   • Sexual activity: Not on file   Lifestyle   • Physical activity     Days per week: Not on file     Minutes per session: Not on file   • Stress: Not on file   Relationships   • Social connections     Talks on phone: Not on file     Gets together: Not on file     Attends Catholic service: Not on file     Active  member of club or organization: Not on file     Attends meetings of clubs or organizations: Not on file     Relationship status: Not on file   • Intimate partner violence     Fear of current or ex partner: Not on file     Emotionally abused: Not on file     Physically abused: Not on file     Forced sexual activity: Not on file   Other Topics Concern   • Not on file   Social History Narrative   • Not on file     Allergies   Allergen Reactions   • Hctz [Hydrochlorothiazide W-Spironolactone]      Cannot breathe   • Oxycodone Swelling     Throat swelling     Outpatient Encounter Medications as of 10/15/2020   Medication Sig Dispense Refill   • potassium chloride (MICRO-K) 10 MEQ capsule Take 1 Cap by mouth every day. 90 Cap 3   • amiodarone (CORDARONE) 200 MG Tab Take 1 Tab by mouth every day. 90 Tab 3   • apixaban (ELIQUIS) 5mg Tab Take 1 Tab by mouth 2 Times a Day. Indications: Thromboembolism secondary to Atrial Fibrillation 180 Tab 3   • clopidogrel (PLAVIX) 75 MG Tab Take 1 Tab by mouth every day. 90 Tab 3   • atorvastatin (LIPITOR) 80 MG tablet Take 1 Tab by mouth every day. 90 Tab 3   • nitroglycerin (NITROSTAT) 0.4 MG SL Tab Place 1 Tab under tongue as needed for Chest Pain. 25 Tab 3   • carvedilol (COREG) 6.25 MG Tab Take 1 Tab by mouth every bedtime. 90 Tab 3   • Morphine Sulfate ER 15 MG Tablet Extended Release 12 hour Abuse-Deterrent Take 15 mg by mouth 3 times a day. Abuse deterrent? He takes morphine 15mg er tid     • HYDROcodone/acetaminophen (NORCO)  MG Tab Take 1 Tab by mouth 6 Times a Day.     • metFORMIN ER (GLUCOPHAGE XR) 750 MG TABLET SR 24 HR Take 750 mg by mouth every day.     • finasteride (PROSCAR) 5 MG Tab Take 5 mg by mouth every evening.     • tamsulosin (FLOMAX) 0.4 MG capsule Take 0.4 mg by mouth every evening.     • venlafaxine (EFFEXOR XR) 150 MG XR capsule Take 150 mg by mouth every day.     • [DISCONTINUED] morphine ER (MS CONTIN) 15 MG Tab CR tablet        No  "facility-administered encounter medications on file as of 10/15/2020.      Review of Systems   Constitutional: Positive for malaise/fatigue. Negative for chills and fever.   HENT: Negative for congestion.    Respiratory: Negative for cough and shortness of breath.    Cardiovascular: Negative for chest pain, palpitations, orthopnea, leg swelling and PND.   Gastrointestinal: Negative for abdominal pain and nausea.   Musculoskeletal: Positive for back pain and joint pain. Negative for myalgias.   Skin: Negative for rash.   Neurological: Positive for dizziness. Negative for loss of consciousness and headaches.   Endo/Heme/Allergies: Does not bruise/bleed easily.   Psychiatric/Behavioral: The patient has insomnia.         Objective:   /80 (BP Location: Left arm, Patient Position: Sitting, BP Cuff Size: Adult)   Pulse 64   Ht 1.702 m (5' 7\")   Wt 98 kg (216 lb)   SpO2 97%   BMI 33.83 kg/m²     Physical Exam   Constitutional: He is oriented to person, place, and time. He appears well-developed and well-nourished.   Color looks better.   HENT:   Head: Normocephalic.   Eyes: EOM are normal.   Neck: Normal range of motion. Neck supple. No JVD present.   Cardiovascular: Normal rate, regular rhythm and normal heart sounds.   Pulmonary/Chest: Effort normal and breath sounds normal. No respiratory distress. He has no wheezes. He has no rales.   Abdominal: Soft. Bowel sounds are normal. He exhibits no distension. There is no abdominal tenderness.   Musculoskeletal: Normal range of motion.         General: No edema.   Neurological: He is alert and oriented to person, place, and time.   Skin: Skin is warm and dry. No rash noted.   Psychiatric: He has a normal mood and affect.     Reviewed hospital/ER notes of 10/2-10/3/2020:    LABS AS OF 10/3/2020:  GLUCOSE 98  Bun 10  Creatinine 0.84  AST 16  ALT 24  WBC 6.2  RBC 4.32  Hgb 12.5  Hct 38.6  Troponin <0.02    LABS AS OF 9/17/2020:  Glucose 101  BUN 11  Creatinine 0.94  GFR " 88  Potassium 4.1  WBC 7.9  RBC 4.38  Hgb 12.9  Hct 40.3  Platelets 386    IMPRESSIONS OF CORONARY ANGIOGRAM OF 8/1/2020:  1.  Acute non-STEMI due to culprit right coronary artery  2.  Successful PCI of the mid right coronary artery using 1 drug-eluting stent and IVUS guidance  3.  Successful PCI of the posterior lateral branch using 1 drug-eluting stent  4.  Normal LV systolic function  5.  Moderately elevated LVEDP (21 mmHg)  6.  Poorly controlled hypertension     CONCLUSIONS OF ECHOCARDIOGRAM OF 8/9/2020:  Compared to the images of the prior study done 8/3/2020, the EF appears   improved.  Left ventricular ejection fraction is visually estimated to be 60%.     CONCLUSIONS OF ECHOCARDIOGRAM OF 8/3/2020:  Limited contrast study to reassess for LV thrombus.  Thrombus is not observed in the left ventricular apex.  Left ventricular ejection fraction is visually estimated to be 60%.  Mild hypokinesis of the apex.       Assessment:     1. Coronary artery disease involving native coronary artery of native heart without angina pectoris  NM-HEART MUSCLE IMAGE,SPECT MULT   2. Status post insertion of drug-eluting stent into right coronary artery for coronary artery disease  NM-HEART MUSCLE IMAGE,SPECT MULT   3. Paroxysmal atrial fibrillation (HCC)  Cardiac Event Monitor   4. Current use of long term anticoagulation     5. Essential hypertension     6. Dyslipidemia     7. Other specified personal risk factors, not elsewhere classified  NM-HEART MUSCLE IMAGE,SPECT MULT       Medical Decision Making:  Today's Assessment / Status / Plan:     1. Ongoing dizziness and fatigue, to obtain ZioPatch to assess for AFib burden. He is on Amiodarone and Eliquis. IF no AFib episodes, consider stopping Amiodarone.    2. CAD, status post PCI/NAOMY to the RCA, with ongoing chest pain, but negative EKG and troponins in ER. To check MPI here at [facility]. He remains on Plavix, BB, and statin.    3. Chronic anticoagulation with Eliquis. No bleeding  problems. CBC is normal/stable.    4. Hypertension, treated and stable. BP was initially elevated today, but came down nicely.    5. Hyperlipidemia, treated with statin.    Will determine follow-up based on above studies. Same medications for now.

## 2020-10-26 ENCOUNTER — TELEPHONE (OUTPATIENT)
Dept: CARDIOLOGY | Facility: MEDICAL CENTER | Age: 60
End: 2020-10-26

## 2020-10-26 NOTE — TELEPHONE ENCOUNTER
Spoke with pt regarding Heart Monitor. Let him know we could not get auth. Pt states he will get auth for upcoming appts. Looked in chart today, still not authed.

## 2020-11-09 ENCOUNTER — APPOINTMENT (OUTPATIENT)
Dept: CARDIOLOGY | Facility: PHYSICIAN GROUP | Age: 60
End: 2020-11-09
Payer: OTHER GOVERNMENT

## 2020-11-09 ENCOUNTER — TELEPHONE (OUTPATIENT)
Dept: CARDIOLOGY | Facility: PHYSICIAN GROUP | Age: 60
End: 2020-11-09

## 2020-11-09 NOTE — TELEPHONE ENCOUNTER
Patient has been enrolled in the 14 day Zio patch program. Ordered per NEDRA Richardson  In office hook up, currently pending EOS.

## 2020-11-20 ENCOUNTER — TELEPHONE (OUTPATIENT)
Dept: CARDIOLOGY | Facility: MEDICAL CENTER | Age: 60
End: 2020-11-20

## 2020-11-20 NOTE — TELEPHONE ENCOUNTER
Calledpt., spoke with wife to give results. FV scheduled 1-7-20 to discuss monitor and MPI results.    FW: Edit  Received: 2 days ago  Message Contents   MARCELA Richardson L.P.N.             Please let patient know that MPI was normal - no ischemia or infarct noted.   Suggest FU with me next month, just to assess symptoms.   Thanks, AB

## 2020-11-20 NOTE — TELEPHONE ENCOUNTER
FW: Edit  Received: 2 days ago  Message Contents   VANDANA Richardson.  Vilma Klein L.P.N.             Please let patient know that MPI was normal - no ischemia or infarct noted.   Suggest FU with me next month, just to assess symptoms.   Thanks, AB

## 2020-12-01 ENCOUNTER — TELEPHONE (OUTPATIENT)
Dept: CARDIOLOGY | Facility: MEDICAL CENTER | Age: 60
End: 2020-12-01

## 2020-12-01 NOTE — TELEPHONE ENCOUNTER
----- Message from MARCELA Richardson sent at 12/1/2020  3:27 PM PST -----  ZioPatch showed sinus rhythm and 2 brief episodes of SVT.  He can try stopping Amiodarone, and keep FU with me in January 2021.  Thanks, AB

## 2020-12-01 NOTE — TELEPHONE ENCOUNTER
Called pt. To give results, recommendations.  Pt. Asked if he can return to cardiac rehab.  To Dimple

## 2020-12-02 NOTE — TELEPHONE ENCOUNTER
Abdulaziz from [facility] in [place] called to give us the fax # for the clearance. F# [phone].  Ph# [phone]    Thanks

## 2020-12-02 NOTE — TELEPHONE ENCOUNTER
Called pt. To advise.  He was enrolled in cardiac rehab at [facility] [phone] phone.   Left message with Abdulaziz, director,  to call to advise (requested fax in case he needs a faxed letter/order).

## 2021-01-07 ENCOUNTER — OFFICE VISIT (OUTPATIENT)
Dept: CARDIOLOGY | Facility: PHYSICIAN GROUP | Age: 61
End: 2021-01-07
Payer: OTHER GOVERNMENT

## 2021-01-07 VITALS
DIASTOLIC BLOOD PRESSURE: 72 MMHG | OXYGEN SATURATION: 96 % | WEIGHT: 219 LBS | BODY MASS INDEX: 34.37 KG/M2 | HEIGHT: 67 IN | SYSTOLIC BLOOD PRESSURE: 126 MMHG | HEART RATE: 64 BPM

## 2021-01-07 DIAGNOSIS — M54.9 CHRONIC BACK PAIN, UNSPECIFIED BACK LOCATION, UNSPECIFIED BACK PAIN LATERALITY: ICD-10-CM

## 2021-01-07 DIAGNOSIS — N52.9 ERECTILE DYSFUNCTION, UNSPECIFIED ERECTILE DYSFUNCTION TYPE: ICD-10-CM

## 2021-01-07 DIAGNOSIS — Z95.5 STATUS POST INSERTION OF DRUG-ELUTING STENT INTO RIGHT CORONARY ARTERY FOR CORONARY ARTERY DISEASE: ICD-10-CM

## 2021-01-07 DIAGNOSIS — I25.10 CORONARY ARTERY DISEASE INVOLVING NATIVE CORONARY ARTERY OF NATIVE HEART WITHOUT ANGINA PECTORIS: ICD-10-CM

## 2021-01-07 DIAGNOSIS — I10 ESSENTIAL HYPERTENSION: ICD-10-CM

## 2021-01-07 DIAGNOSIS — G47.00 INSOMNIA, UNSPECIFIED TYPE: ICD-10-CM

## 2021-01-07 DIAGNOSIS — G89.29 CHRONIC BACK PAIN, UNSPECIFIED BACK LOCATION, UNSPECIFIED BACK PAIN LATERALITY: ICD-10-CM

## 2021-01-07 DIAGNOSIS — E78.5 DYSLIPIDEMIA: ICD-10-CM

## 2021-01-07 DIAGNOSIS — I48.0 PAROXYSMAL ATRIAL FIBRILLATION (HCC): ICD-10-CM

## 2021-01-07 DIAGNOSIS — Z79.01 CURRENT USE OF LONG TERM ANTICOAGULATION: ICD-10-CM

## 2021-01-07 PROCEDURE — 99214 OFFICE O/P EST MOD 30 MIN: CPT | Performed by: NURSE PRACTITIONER

## 2021-01-07 RX ORDER — SILDENAFIL 50 MG/1
50 TABLET, FILM COATED ORAL PRN
Qty: 10 TAB | Refills: 3 | Status: ON HOLD | OUTPATIENT
Start: 2021-01-07 | End: 2022-08-25

## 2021-01-07 RX ORDER — TRAZODONE HYDROCHLORIDE 100 MG/1
150 TABLET ORAL
Qty: 135 TAB | Refills: 1 | Status: SHIPPED | OUTPATIENT
Start: 2021-01-07 | End: 2022-10-31

## 2021-01-07 ASSESSMENT — ENCOUNTER SYMPTOMS
FEVER: 0
ABDOMINAL PAIN: 0
PALPITATIONS: 0
HEADACHES: 0
ORTHOPNEA: 0
COUGH: 0
DIZZINESS: 0
LOSS OF CONSCIOUSNESS: 0
CHILLS: 0
SHORTNESS OF BREATH: 0
PND: 0
BACK PAIN: 1
INSOMNIA: 1
NAUSEA: 0
MYALGIAS: 0
BRUISES/BLEEDS EASILY: 0

## 2021-01-07 ASSESSMENT — FIBROSIS 4 INDEX: FIB4 SCORE: 0.47

## 2021-01-07 NOTE — PROGRESS NOTES
Chief Complaint   Patient presents with   • Follow-Up   • Coronary Artery Disease   • Atrial Fibrillation   • Anticoagulation   • Hyperlipidemia   • Low Back Pain   • Insomnia       Subjective:   Karan Wiley is a 60 y.o. male who presents today for three month follow-up of CAD, post-procedure atrial fibrillation, anticoagulation, hypertension and hyperlipidemia.    Karan is a 60 year old male with history of hypertension, dyslipidemia who presented to [facility] in August 2020 with STEMI with PCI of RCA and PCI of posterior lateral branch.  He did have post-procedural complications, including acute hypoxic respiratory failure with flash pulmonary edema, and PEA cardiac arrest, however obtained ROSC within 2 cycles of CPR.  Echocardiogram showed akinetic apex with new LV thrombus.  Patient was again taken to Cath Lab post cardiac arrest and felt to be in cardiogenic shock likely due to stress cardiomyopathy and had successful placement of IABP.  Cardiogenic shock resolved and IABP was removed.  Repeat echocardiogram on 8/3 showed no LV thrombus. Amiodarone was added for AFib. Aspirin was not added to his regimen due to high bleeding risk and already on eliquis and plavix.    I have followed him closely in August, September and October 2020.     In October 2020, he presented to [facility] ER with chest pain and dizziness. EKG was normal and troponins were also normal. He did have a large bowel movement and his symptoms resolved. Outpatient stress test was done and came back normal.     He is here today for three month follow-up. Patient is finally feeling better. No recurrence of chest pain, pressure or discomfort; no shortness of breath, orthopnea or PND; no LE edema. He does continue to struggle with chronic back pain for which he takes pain medication. He is considering a nerve block/ablation.n Increased dose of Trazodone is helping him sleep better.    Past Medical History:   Diagnosis Date   • CAD (coronary artery  disease) 2020    STEMI. PCI/NAOMY ([place] 3.5 x 25mm) the mid RCA, and PCI/NAOMY (Raghav 2.5 x 12mm) the PDA.   • Chronic anticoagulation    • Depression    • Hyperlipidemia    • Hypertension    • Low back pain    • Paroxysmal atrial fibrillation (HCC)      Past Surgical History:   Procedure Laterality Date   • SPINAL CORD STIMULATOR  2019    Procedure: SPINAL CORD STIMULATOR-  STAGE 1:  RIGHT FLANK BATTERY REPLACEMENT/UPGRADE;  Surgeon: Stepan Hawkins M.D.;  Location: SURGERY [facility];  Service: Neurosurgery   • LUMBAR FUSION POSTERIOR  2019    Procedure: LUMBAR FUSION POSTERIOR-  STAGE 2: ONLAY L5-S1 BONE;  Surgeon: Stepan Hawkins M.D.;  Location: SURGERY [facility];  Service: Neurosurgery   • LAMINOTOMY  2019    Procedure: LAMINOTOMY-  STAGE 2:  REDO LEFT L4-S1;  Surgeon: Stepan Hawkins M.D.;  Location: SURGERY [facility];  Service: Neurosurgery     Family History   Problem Relation Age of Onset   • Cancer Mother         breast     Social History     Socioeconomic History   • Marital status:      Spouse name: Not on file   • Number of children: Not on file   • Years of education: Not on file   • Highest education level: Not on file   Occupational History   • Not on file   Social Needs   • Financial resource strain: Not on file   • Food insecurity     Worry: Not on file     Inability: Not on file   • Transportation needs     Medical: Not on file     Non-medical: Not on file   Tobacco Use   • Smoking status: Former Smoker     Packs/day: 0.25     Years: 3.00     Pack years: 0.75     Quit date:      Years since quittin.0   • Smokeless tobacco: Never Used   Substance and Sexual Activity   • Alcohol use: No   • Drug use: Not on file   • Sexual activity: Not on file   Lifestyle   • Physical activity     Days per week: Not on file     Minutes per session: Not on file   • Stress: Not on file   Relationships   • Social connections     Talks on phone: Not on file     Gets together:  Not on file     Attends Hinduism service: Not on file     Active member of club or organization: Not on file     Attends meetings of clubs or organizations: Not on file     Relationship status: Not on file   • Intimate partner violence     Fear of current or ex partner: Not on file     Emotionally abused: Not on file     Physically abused: Not on file     Forced sexual activity: Not on file   Other Topics Concern   • Not on file   Social History Narrative   • Not on file     Allergies   Allergen Reactions   • Hctz [Hydrochlorothiazide W-Spironolactone]      Cannot breathe   • Oxycodone Swelling     Throat swelling     Outpatient Encounter Medications as of 1/7/2021   Medication Sig Dispense Refill   • traZODone (DESYREL) 100 MG Tab Take 1.5 Tabs by mouth every bedtime. 135 Tab 1   • sildenafil citrate (VIAGRA) 50 MG tablet Take 1 Tab by mouth as needed for Erectile Dysfunction. 10 Tab 3   • potassium chloride (MICRO-K) 10 MEQ capsule Take 1 Cap by mouth every day. 90 Cap 3   • apixaban (ELIQUIS) 5mg Tab Take 1 Tab by mouth 2 Times a Day. Indications: Thromboembolism secondary to Atrial Fibrillation 180 Tab 3   • clopidogrel (PLAVIX) 75 MG Tab Take 1 Tab by mouth every day. 90 Tab 3   • atorvastatin (LIPITOR) 80 MG tablet Take 1 Tab by mouth every day. 90 Tab 3   • nitroglycerin (NITROSTAT) 0.4 MG SL Tab Place 1 Tab under tongue as needed for Chest Pain. 25 Tab 3   • carvedilol (COREG) 6.25 MG Tab Take 1 Tab by mouth every bedtime. (Patient taking differently: Take 6.25 mg by mouth 2 times a day.) 90 Tab 3   • Morphine Sulfate ER 15 MG Tablet Extended Release 12 hour Abuse-Deterrent Take 15 mg by mouth 2 (two) times a day. Abuse deterrent? He takes morphine 15mg er tid      • HYDROcodone/acetaminophen (NORCO)  MG Tab Take 1 Tab by mouth 6 Times a Day.     • metFORMIN ER (GLUCOPHAGE XR) 750 MG TABLET SR 24 HR Take 1,000 mg by mouth every day.     • finasteride (PROSCAR) 5 MG Tab Take 5 mg by mouth every  "evening.     • tamsulosin (FLOMAX) 0.4 MG capsule Take 0.4 mg by mouth every evening.     • venlafaxine (EFFEXOR XR) 150 MG XR capsule Take 150 mg by mouth every day.     • [DISCONTINUED] amiodarone (CORDARONE) 200 MG Tab Take 1 Tab by mouth every day. (Patient not taking: Reported on 1/7/2021) 90 Tab 3     No facility-administered encounter medications on file as of 1/7/2021.      Review of Systems   Constitutional: Negative for chills, fever and malaise/fatigue.   HENT: Negative for congestion.    Respiratory: Negative for cough and shortness of breath.    Cardiovascular: Negative for chest pain, palpitations, orthopnea, leg swelling and PND.   Gastrointestinal: Negative for abdominal pain and nausea.   Musculoskeletal: Positive for back pain and joint pain. Negative for myalgias.   Skin: Negative for rash.   Neurological: Negative for dizziness, loss of consciousness and headaches.   Endo/Heme/Allergies: Does not bruise/bleed easily.   Psychiatric/Behavioral: The patient has insomnia.         Objective:   /72 (BP Location: Left arm, Patient Position: Sitting, BP Cuff Size: Adult)   Pulse 64   Ht 1.702 m (5' 7\")   Wt 99.3 kg (219 lb)   SpO2 96%   BMI 34.30 kg/m²     Physical Exam   Constitutional: He is oriented to person, place, and time. He appears well-developed and well-nourished.   Color looks better.   HENT:   Head: Normocephalic.   Eyes: EOM are normal.   Neck: Normal range of motion. Neck supple. No JVD present.   Cardiovascular: Normal rate, regular rhythm and normal heart sounds.   Pulmonary/Chest: Effort normal and breath sounds normal. No respiratory distress. He has no wheezes. He has no rales.   Abdominal: Soft. Bowel sounds are normal. He exhibits no distension. There is no abdominal tenderness.   Musculoskeletal: Normal range of motion.         General: No edema.   Neurological: He is alert and oriented to person, place, and time.   Skin: Skin is warm and dry. No rash noted. "   Psychiatric: He has a normal mood and affect.     RESULTS OF MPI OF 11/17/2020:  Normal MPI, normal wall motion, no ischemia or infarct.    ZIOPATCH OF November 2020:  Sinus rhythm with 2 brief episodes of SVT  No AT/AFib    LABS AS OF 10/3/2020:  GLUCOSE 98  Bun 10  Creatinine 0.84  AST 16  ALT 24  WBC 6.2  RBC 4.32  Hgb 12.5  Hct 38.6  Troponin <0.02     LABS AS OF 9/17/2020:  Glucose 101  BUN 11  Creatinine 0.94  GFR 88  Potassium 4.1  WBC 7.9  RBC 4.38  Hgb 12.9  Hct 40.3  Platelets 386    IMPRESSIONS OF CORONARY ANGIOGRAM OF 8/1/2020:  1.  Acute non-STEMI due to culprit right coronary artery  2.  Successful PCI of the mid right coronary artery using 1 drug-eluting stent and IVUS guidance  3.  Successful PCI of the posterior lateral branch using 1 drug-eluting stent  4.  Normal LV systolic function  5.  Moderately elevated LVEDP (21 mmHg)  6.  Poorly controlled hypertension     CONCLUSIONS OF ECHOCARDIOGRAM OF 8/9/2020:  Compared to the images of the prior study done 8/3/2020, the EF appears   improved.  Left ventricular ejection fraction is visually estimated to be 60%.    Assessment:     1. Coronary artery disease involving native coronary artery of native heart without angina pectoris  Comp Metabolic Panel    Lipid Profile   2. Status post insertion of drug-eluting stent into right coronary artery for coronary artery disease     3. Paroxysmal atrial fibrillation (HCC)     4. Current use of long term anticoagulation     5. Essential hypertension     6. Dyslipidemia     7. Chronic back pain, unspecified back location, unspecified back pain laterality     8. Insomnia, unspecified type  traZODone (DESYREL) 100 MG Tab   9. Erectile dysfunction, unspecified erectile dysfunction type  sildenafil citrate (VIAGRA) 50 MG tablet       Medical Decision Making:  Today's Assessment / Status / Plan:     1. CAD, status post PCI/NAOMY x 2 to the mid RCA and PDA, stable. No further angina or shortness of breath. He remains on  Plavix, Coreg, and LIpitor.    2. Paroxysmal atrial fibrillation, in sinus rhythm on Coreg.    3. Chronic anticoagulation with Eliquis. No bleeding problems.    4. Hypertension, treated with Coreg, stable. BP is good.    5. Hyperlipidemia, treated with Lipitor. To repeat lipid panel before next follow-up.    6. Chronic back pain, considering nerve ablation/block. To have neurosurgery contact us for clearance.    7. Erectile dysfunction, can use Viagra sparingly, and NOT with NTG. He states understanding.    8. Insomnia, good response to Trazodone 150mg once at bedtime PRN.    Same medications for now. FU with me in 6 months, sooner if clinical condition changes.

## 2021-02-17 ENCOUNTER — TELEPHONE (OUTPATIENT)
Dept: CARDIOLOGY | Facility: MEDICAL CENTER | Age: 61
End: 2021-02-17

## 2021-02-17 NOTE — TELEPHONE ENCOUNTER
Zanaflex is a muscle relaxer, so this should be fine. Just make sure he watches his BP (so it doesn't go too low, with all of his other meds).  Thanks, AB

## 2021-02-17 NOTE — TELEPHONE ENCOUNTER
AB    Patient called to see if it was okay to go back on tizanidine (ZANAFLEX) tablet 4 mg. Please call Pt back at [phone].    Thank you,  Caryn MACE

## 2021-03-26 NOTE — TELEPHONE ENCOUNTER
PT called back looking for an update. Please call Pt back to see if okay to take the testostrone topical gel, rub on shoulders? PT says they have reservation on it because of the heart attack.  Pt can be reached at [phone].

## 2021-03-26 NOTE — TELEPHONE ENCOUNTER
Generally, we recommend against testosterone in patients with CAD, but topical is better than oral formulation.  IF he decides to use it, just understand there is a possibility of clot development, but he is also on Eliquis (DOAC).  Thanks, AB

## 2021-06-21 ENCOUNTER — TELEPHONE (OUTPATIENT)
Dept: CARDIOLOGY | Facility: MEDICAL CENTER | Age: 61
End: 2021-06-21

## 2021-06-21 NOTE — TELEPHONE ENCOUNTER
Called patient in regards to upcoming appt scheduled on 07/08/2021 with AB.  Friendly reminder to please complete fasting blood work before visit with provider. Patient was aware and stated he will complete sometime this week at [facility] before visit. Patient was appreciative of call.     Confirmed appointment date, time, & location. Advised to please wear a mask and to call main office if their were any questions or concerns.

## 2021-07-08 ENCOUNTER — TELEPHONE (OUTPATIENT)
Dept: CARDIOLOGY | Facility: MEDICAL CENTER | Age: 61
End: 2021-07-08

## 2021-07-08 ENCOUNTER — OFFICE VISIT (OUTPATIENT)
Dept: CARDIOLOGY | Facility: PHYSICIAN GROUP | Age: 61
End: 2021-07-08
Payer: OTHER GOVERNMENT

## 2021-07-08 VITALS
HEART RATE: 79 BPM | OXYGEN SATURATION: 94 % | DIASTOLIC BLOOD PRESSURE: 66 MMHG | SYSTOLIC BLOOD PRESSURE: 102 MMHG | HEIGHT: 67 IN | BODY MASS INDEX: 34.69 KG/M2 | WEIGHT: 221 LBS

## 2021-07-08 DIAGNOSIS — I48.0 PAROXYSMAL ATRIAL FIBRILLATION (HCC): ICD-10-CM

## 2021-07-08 DIAGNOSIS — Z87.898 HISTORY OF PREDIABETES: ICD-10-CM

## 2021-07-08 DIAGNOSIS — E78.5 DYSLIPIDEMIA: ICD-10-CM

## 2021-07-08 DIAGNOSIS — Z95.5 STATUS POST INSERTION OF DRUG-ELUTING STENT INTO RIGHT CORONARY ARTERY FOR CORONARY ARTERY DISEASE: ICD-10-CM

## 2021-07-08 DIAGNOSIS — Z79.01 CURRENT USE OF LONG TERM ANTICOAGULATION: ICD-10-CM

## 2021-07-08 DIAGNOSIS — I25.10 CORONARY ARTERY DISEASE INVOLVING NATIVE CORONARY ARTERY OF NATIVE HEART WITHOUT ANGINA PECTORIS: ICD-10-CM

## 2021-07-08 DIAGNOSIS — I10 ESSENTIAL HYPERTENSION: ICD-10-CM

## 2021-07-08 PROCEDURE — 99214 OFFICE O/P EST MOD 30 MIN: CPT | Performed by: NURSE PRACTITIONER

## 2021-07-08 RX ORDER — ASPIRIN 81 MG/1
81 TABLET, CHEWABLE ORAL DAILY
Qty: 100 TABLET | Refills: 3 | Status: ON HOLD | OUTPATIENT
Start: 2021-07-08 | End: 2022-09-09

## 2021-07-08 RX ORDER — CARVEDILOL 6.25 MG/1
6.25 TABLET ORAL 2 TIMES DAILY
Qty: 200 TABLET | Refills: 3
Start: 2021-07-08 | End: 2022-07-21

## 2021-07-08 ASSESSMENT — ENCOUNTER SYMPTOMS
NAUSEA: 0
CHILLS: 0
BRUISES/BLEEDS EASILY: 0
DIZZINESS: 0
LOSS OF CONSCIOUSNESS: 0
SHORTNESS OF BREATH: 0
INSOMNIA: 1
PALPITATIONS: 0
ABDOMINAL PAIN: 0
MYALGIAS: 0
ORTHOPNEA: 0
COUGH: 0
FEVER: 0
BACK PAIN: 1
PND: 0
HEADACHES: 0

## 2021-07-08 ASSESSMENT — FIBROSIS 4 INDEX: FIB4 SCORE: 0.47

## 2021-07-08 NOTE — PROGRESS NOTES
Chief Complaint   Patient presents with   • Follow-Up   • Coronary Artery Disease   • HTN (Controlled)   • Hyperlipidemia       Subjective:   Karan Wiley is a 60 y.o. male who presents today for six month follow-up of CAD, hypertension and hyperlipidemia.    Karan is a 60 year old male with history of hypertension, dyslipidemia and CAD, with STEMI in August 2020 with PCI of RCA and PCI of posterior lateral branch.  He did have post-procedural complications, including acute hypoxic respiratory failure with flash pulmonary edema, and PEA cardiac arrest.  Amiodarone was added, which was stopped in December 2020, after ZioPatch showed no arrythmias. He is still on Plavix and Eliquis.     Outpatient stress test in November 2020 came back normal.     He is here today for six month follow-up. Patient is generally feeling very good. He did have one episode of chest pressure on 6/26/2021, that woke him up, relieved with 3 NTG. He had had a quite stressful event the night before, that was very upsetting to him (involving a misunderstanding at the [facility]). No recurrence of chest pain, pressure or discomfort; no shortness of breath, orthopnea or PND; no LE edema. He does continue to struggle with chronic back pain for which he takes pain medication. He does also have a history of kidney stones, followed by Dr. Gayle (urology), so he very occasionally has some hematuria, but none recently.    Past Medical History:   Diagnosis Date   • CAD (coronary artery disease) 08/2020    STEMI. PCI/NAOMY (Raghav 3.5 x 25mm) the mid RCA, and PCI/NAOMY (Raghav 2.5 x 12mm) the PDA.   • Chronic anticoagulation    • Depression    • Hyperlipidemia    • Hypertension    • Low back pain    • Paroxysmal atrial fibrillation (HCC)      Past Surgical History:   Procedure Laterality Date   • SPINAL CORD STIMULATOR  2/26/2019    Procedure: SPINAL CORD STIMULATOR-  STAGE 1:  RIGHT FLANK BATTERY REPLACEMENT/UPGRADE;  Surgeon: Stepan Hawkins M.D.;   Location: SURGERY [facility];  Service: Neurosurgery   • LUMBAR FUSION POSTERIOR  2019    Procedure: LUMBAR FUSION POSTERIOR-  STAGE 2: ONLAY L5-S1 BONE;  Surgeon: Stepan Hawkins M.D.;  Location: SURGERY [facility];  Service: Neurosurgery   • LAMINOTOMY  2019    Procedure: LAMINOTOMY-  STAGE 2:  REDO LEFT L4-S1;  Surgeon: Stepan Hawkins M.D.;  Location: SURGERY [facility];  Service: Neurosurgery     Family History   Problem Relation Age of Onset   • Cancer Mother         breast     Social History     Socioeconomic History   • Marital status:      Spouse name: Not on file   • Number of children: Not on file   • Years of education: Not on file   • Highest education level: Not on file   Occupational History   • Not on file   Tobacco Use   • Smoking status: Former Smoker     Packs/day: 0.25     Years: 3.00     Pack years: 0.75     Quit date:      Years since quittin.5   • Smokeless tobacco: Never Used   Substance and Sexual Activity   • Alcohol use: No   • Drug use: Not on file   • Sexual activity: Not on file   Other Topics Concern   • Not on file   Social History Narrative   • Not on file     Social Determinants of Health     Financial Resource Strain:    • Difficulty of Paying Living Expenses:    Food Insecurity:    • Worried About Running Out of Food in the Last Year:    • Ran Out of Food in the Last Year:    Transportation Needs:    • Lack of Transportation (Medical):    • Lack of Transportation (Non-Medical):    Physical Activity:    • Days of Exercise per Week:    • Minutes of Exercise per Session:    Stress:    • Feeling of Stress :    Social Connections:    • Frequency of Communication with Friends and Family:    • Frequency of Social Gatherings with Friends and Family:    • Attends [employer] Services:    • Active Member of Clubs or Organizations:    • Attends Club or Organization Meetings:    • Marital Status:    Intimate Partner Violence:    • Fear of Current or  Ex-Partner:    • Emotionally Abused:    • Physically Abused:    • Sexually Abused:      Allergies   Allergen Reactions   • Hctz [Hydrochlorothiazide W-Spironolactone]      Cannot breathe   • Oxycodone Swelling     Throat swelling     Outpatient Encounter Medications as of 7/8/2021   Medication Sig Dispense Refill   • carvedilol (COREG) 6.25 MG Tab Take 1 tablet by mouth 2 times a day. 200 tablet 3   • aspirin (ASA) 81 MG Chew Tab chewable tablet Chew 1 tablet every day. 100 tablet 3   • traZODone (DESYREL) 100 MG Tab Take 1.5 Tabs by mouth every bedtime. 135 Tab 1   • sildenafil citrate (VIAGRA) 50 MG tablet Take 1 Tab by mouth as needed for Erectile Dysfunction. 10 Tab 3   • potassium chloride (MICRO-K) 10 MEQ capsule Take 1 Cap by mouth every day. 90 Cap 3   • apixaban (ELIQUIS) 5mg Tab Take 1 Tab by mouth 2 Times a Day. Indications: Thromboembolism secondary to Atrial Fibrillation 180 Tab 3   • atorvastatin (LIPITOR) 80 MG tablet Take 1 Tab by mouth every day. 90 Tab 3   • nitroglycerin (NITROSTAT) 0.4 MG SL Tab Place 1 Tab under tongue as needed for Chest Pain. 25 Tab 3   • [DISCONTINUED] clopidogrel (PLAVIX) 75 MG Tab Take 1 Tab by mouth every day. 90 Tab 3   • [DISCONTINUED] carvedilol (COREG) 6.25 MG Tab Take 1 Tab by mouth every bedtime. (Patient taking differently: Take 6.25 mg by mouth 2 times a day.) 90 Tab 3   • Morphine Sulfate ER 15 MG Tablet Extended Release 12 hour Abuse-Deterrent Take 15 mg by mouth 2 (two) times a day. Abuse deterrent? He takes morphine 15mg er tid      • HYDROcodone/acetaminophen (NORCO)  MG Tab Take 1 Tab by mouth 6 Times a Day.     • metFORMIN ER (GLUCOPHAGE XR) 750 MG TABLET SR 24 HR Take 1,000 mg by mouth every day.     • finasteride (PROSCAR) 5 MG Tab Take 5 mg by mouth every evening.     • tamsulosin (FLOMAX) 0.4 MG capsule Take 0.4 mg by mouth every evening.     • venlafaxine (EFFEXOR XR) 150 MG XR capsule Take 150 mg by mouth every day.       No facility-administered  "encounter medications on file as of 7/8/2021.     Review of Systems   Constitutional: Negative for chills, fever and malaise/fatigue.   HENT: Negative for congestion.    Respiratory: Negative for cough and shortness of breath.    Cardiovascular: Negative for chest pain, palpitations, orthopnea, leg swelling and PND.   Gastrointestinal: Negative for abdominal pain and nausea.   Musculoskeletal: Positive for back pain and joint pain. Negative for myalgias.   Skin: Negative for rash.   Neurological: Negative for dizziness, loss of consciousness and headaches.   Endo/Heme/Allergies: Does not bruise/bleed easily.   Psychiatric/Behavioral: The patient has insomnia.         Objective:   /66 (BP Location: Left arm, Patient Position: Sitting)   Pulse 79   Ht 1.702 m (5' 7\")   Wt 100 kg (221 lb)   SpO2 94%   BMI 34.61 kg/m²     Physical Exam   Constitutional: He is oriented to person, place, and time. He appears well-developed.   Color looks better.   HENT:   Head: Normocephalic.   Neck: No JVD present.   Cardiovascular: Normal rate, regular rhythm and normal heart sounds.   Pulmonary/Chest: Effort normal and breath sounds normal. No respiratory distress. He has no wheezes. He has no rales.   Abdominal: Soft. Bowel sounds are normal. He exhibits no distension. There is no abdominal tenderness.   Musculoskeletal:         General: Normal range of motion.      Cervical back: Normal range of motion and neck supple.   Neurological: He is alert and oriented to person, place, and time.   Skin: Skin is warm and dry. No rash noted.     Cardiac Event Recorder; Debbie 11/9/2020-11/23/2020:  1.  Sinus rhythm, average rate 63 bpm.   2.  Rare PVCs   3.  Rare PACs.   4.  Symptoms of heart racing, fluttering, skipping dizziness associated with sinus rhythm and normal rate.   5.  One triggered non-specific event associated with singular PVCs.   6.  No atrial fibrillation.     MPI OF 11/17/2020:  Normal myocardial perfusion " study  No ischemia or infarct, normal wall motion    IMPRESSIONS OF CORONARY ANGIOGRAM OF 8/1/2020:  1.  Acute non-STEMI due to culprit right coronary artery  2.  Successful PCI of the mid right coronary artery using 1 drug-eluting stent and IVUS guidance  3.  Successful PCI of the posterior lateral branch using 1 drug-eluting stent  4.  Normal LV systolic function  5.  Moderately elevated LVEDP (21 mmHg)  6.  Poorly controlled hypertension     CONCLUSIONS OF ECHOCARDIOGRAM OF 8/9/2020:  Compared to the images of the prior study done 8/3/2020, the EF appears   improved.  Left ventricular ejection fraction is visually estimated to be 60%.     CONCLUSIONS OF ECHOCARDIOGRAM OF 8/3/2020:  Limited contrast study to reassess for LV thrombus.  Thrombus is not observed in the left ventricular apex.  Left ventricular ejection fraction is visually estimated to be 60%.  Mild hypokinesis of the apex.    Assessment:     1. Coronary artery disease involving native coronary artery of native heart without angina pectoris  carvedilol (COREG) 6.25 MG Tab    aspirin (ASA) 81 MG Chew Tab chewable tablet   2. Status post insertion of drug-eluting stent into right coronary artery for coronary artery disease  carvedilol (COREG) 6.25 MG Tab    aspirin (ASA) 81 MG Chew Tab chewable tablet   3. Paroxysmal atrial fibrillation (HCC)     4. Current use of long term anticoagulation     5. Essential hypertension     6. Dyslipidemia     7. History of prediabetes         Medical Decision Making:  Today's Assessment / Status / Plan:     1. CAD, status post PCI/NAOMY x 2 in August 2020, with normal MPI in November 2020. One recent episode of chest pain/angina, mostly likely due to stressful/emotional event. No further episodes. He is on Plavix, but can stop next month, and replace with ASA 81mg once daily. Continue Coreg and Lipitor. IF he has any further episodes of angina, to notify our office. Consider addition of long acting nitrate (Imdur).    2.  Paroxysmal atrial fibrillation, none seen on ZioPatch recently.    3. Chronic anticoagulation with Eliquis. He is [id number] score 4 (CAD, HTN, DM and CHF).    4. Hypertension, treated with Coreg, stable. BP is good today.    5. Hyperlipidemia, treated with Lipitor. He is reminded about getting labs done.    6. Diabetes mellitus, treated with Metformin, followed by PCP.    As above, can stop Plavix next month, and replace with ASA 81mg once daily. Continue other medications for now. Labs as ordered previously; we will make changes based on these results. FU in 6 months, sooner if clinical condition changes.

## 2021-07-08 NOTE — TELEPHONE ENCOUNTER
Patient Update  Received: Today  MARCELA Richardson R.N. Alex,     I saw this patient when Epic was done, so I didn't have access to his info.     Now that Epic is back up, Can you please call him and let him know that he can STOP Plavix at the end of August 2021 (as it will have been a year since PCI). He should add ASA 81mg once daily.     Continue Eliquis.     Thank you!   Caryn   ---------------------------------------------------------------------    Contacted pt and notified him of the above. He states understanding and all of his questions were answered. .

## 2021-10-21 ENCOUNTER — TELEPHONE (OUTPATIENT)
Dept: SCHEDULING | Facility: IMAGING CENTER | Age: 61
End: 2021-10-21

## 2021-10-21 NOTE — TELEPHONE ENCOUNTER
AB        Pt called and wants some clarification on his medication apixaban (ELIQUIS) 5mg Tab.      Best contact for pt:  PH:[phone]    Thank you,  Jen HERNANDEZ

## 2021-10-21 NOTE — TELEPHONE ENCOUNTER
Called pt [phone]  Verified order is for Eliquis 5mg 2x daily.  Pt verbalized understanding and is appreciative of call.

## 2022-01-13 ENCOUNTER — OFFICE VISIT (OUTPATIENT)
Dept: CARDIOLOGY | Facility: PHYSICIAN GROUP | Age: 62
End: 2022-01-13
Payer: OTHER GOVERNMENT

## 2022-01-13 VITALS
SYSTOLIC BLOOD PRESSURE: 126 MMHG | DIASTOLIC BLOOD PRESSURE: 88 MMHG | HEART RATE: 98 BPM | RESPIRATION RATE: 16 BRPM | BODY MASS INDEX: 34.53 KG/M2 | WEIGHT: 220 LBS | OXYGEN SATURATION: 94 % | HEIGHT: 67 IN

## 2022-01-13 DIAGNOSIS — I25.10 CORONARY ARTERY DISEASE INVOLVING NATIVE CORONARY ARTERY OF NATIVE HEART WITHOUT ANGINA PECTORIS: ICD-10-CM

## 2022-01-13 DIAGNOSIS — E66.01 CLASS 2 SEVERE OBESITY DUE TO EXCESS CALORIES WITH SERIOUS COMORBIDITY AND BODY MASS INDEX (BMI) OF 35.0 TO 35.9 IN ADULT (HCC): ICD-10-CM

## 2022-01-13 DIAGNOSIS — I10 ESSENTIAL HYPERTENSION: ICD-10-CM

## 2022-01-13 DIAGNOSIS — Z79.01 CURRENT USE OF LONG TERM ANTICOAGULATION: ICD-10-CM

## 2022-01-13 DIAGNOSIS — Z95.5 STATUS POST INSERTION OF DRUG-ELUTING STENT INTO RIGHT CORONARY ARTERY FOR CORONARY ARTERY DISEASE: ICD-10-CM

## 2022-01-13 DIAGNOSIS — Z87.898 HISTORY OF PREDIABETES: ICD-10-CM

## 2022-01-13 DIAGNOSIS — I48.0 PAROXYSMAL ATRIAL FIBRILLATION (HCC): ICD-10-CM

## 2022-01-13 DIAGNOSIS — E78.5 DYSLIPIDEMIA: ICD-10-CM

## 2022-01-13 PROCEDURE — 99214 OFFICE O/P EST MOD 30 MIN: CPT | Performed by: NURSE PRACTITIONER

## 2022-01-13 RX ORDER — TIZANIDINE HYDROCHLORIDE 4 MG/1
4 CAPSULE, GELATIN COATED ORAL 3 TIMES DAILY
COMMUNITY
Start: 2022-01-07

## 2022-01-13 RX ORDER — MORPHINE SULFATE 15 MG/1
TABLET, FILM COATED, EXTENDED RELEASE ORAL
COMMUNITY
Start: 2021-12-28 | End: 2022-01-13

## 2022-01-13 RX ORDER — ONDANSETRON HYDROCHLORIDE 8 MG/1
TABLET, FILM COATED ORAL
COMMUNITY
Start: 2021-11-02 | End: 2022-01-13

## 2022-01-13 RX ORDER — POTASSIUM CHLORIDE 750 MG/1
TABLET, FILM COATED, EXTENDED RELEASE ORAL
COMMUNITY
Start: 2021-11-19 | End: 2022-01-13

## 2022-01-13 RX ORDER — METFORMIN HYDROCHLORIDE 500 MG/1
1000 TABLET, EXTENDED RELEASE ORAL DAILY
COMMUNITY
Start: 2021-11-23 | End: 2022-10-31

## 2022-01-13 RX ORDER — OFLOXACIN 3 MG/ML
SOLUTION AURICULAR (OTIC)
COMMUNITY
Start: 2021-10-29 | End: 2022-01-13

## 2022-01-13 RX ORDER — SULFAMETHOXAZOLE AND TRIMETHOPRIM 800; 160 MG/1; MG/1
1 TABLET ORAL 2 TIMES DAILY
COMMUNITY
Start: 2021-11-05 | End: 2022-01-13

## 2022-01-13 RX ORDER — PROMETHAZINE HYDROCHLORIDE 25 MG/1
TABLET ORAL
COMMUNITY
Start: 2021-11-11 | End: 2022-01-13

## 2022-01-13 ASSESSMENT — ENCOUNTER SYMPTOMS
LOSS OF CONSCIOUSNESS: 0
DIZZINESS: 0
BACK PAIN: 1
CHILLS: 0
FEVER: 0
NAUSEA: 0
ABDOMINAL PAIN: 0
PND: 0
ORTHOPNEA: 0
INSOMNIA: 1
BRUISES/BLEEDS EASILY: 0
COUGH: 0
PALPITATIONS: 0
MYALGIAS: 0
HEADACHES: 0
SHORTNESS OF BREATH: 0

## 2022-01-13 ASSESSMENT — FIBROSIS 4 INDEX: FIB4 SCORE: 0.48

## 2022-01-13 NOTE — PROGRESS NOTES
Chief Complaint   Patient presents with   • Follow-Up   • Coronary Artery Disease   • Atrial Fibrillation   • Anticoagulation   • HTN (Controlled)   • Hyperlipidemia   • Diabetes Mellitus       Subjective     Karan Wiley is a 61 y.o. male who presents today for six month follow-up of CAD, PAFib, anticoagulation, HTN, and hyperlipidemia.    Karan is a 61 year old male with history of hypertension, dyslipidemia and CAD, with STEMI in August 2020 with PCI of RCA and PCI of posterior lateral branch.  He did have post-procedural complications, including acute hypoxic respiratory failure with flash pulmonary edema, and PEA cardiac arrest.  Amiodarone was added, which was stopped in December 2020, after ZioPatch showed no arrythmias.    Outpatient stress test in November 2020 came back normal.    At last follow-up, he was able to stop Plavix in August 2021; he is on ASA 81mg once daily and Eliquis.    He did have a colonoscopy in October 2021, which included a couple of biopsies, which are precancerous; he will have FU colonoscopy in 5 years.     He is here today for six month follow-up. At our last follow-up, he had an isolated episode of chest pain in June 2021, after a stressful/emotional encounter. Since then, no recurrence of chest pain, pressure or discomfort; no shortness of breath, dyspnea, orthopnea or PND; no LE edema. No dizziness or syncope. BP is stable at home. He is compliant with his medications.    He does still struggle with chronic back pain, for which he takes Norco. He is also followed closely by his PCP at the [facility].    He and his wife are trying to possibly buy a home, but this has been very stressful. They are considering relocating closer to family, but they haven't made any decisions yet.    Past Medical History:   Diagnosis Date   • CAD (coronary artery disease) 08/2020    STEMI. PCI/NAOMY (Raghav 3.5 x 25mm) the mid RCA, and PCI/NAOMY (Raghav 2.5 x 12mm) the PDA.   • Chronic  anticoagulation    • Depression    • Hyperlipidemia    • Hypertension    • Low back pain    • Paroxysmal atrial fibrillation (HCC)      Past Surgical History:   Procedure Laterality Date   • SPINAL CORD STIMULATOR  2019    Procedure: SPINAL CORD STIMULATOR-  STAGE 1:  RIGHT FLANK BATTERY REPLACEMENT/UPGRADE;  Surgeon: Stepan Hawkins M.D.;  Location: SURGERY [facility];  Service: Neurosurgery   • FUSION, SPINE, LUMBAR, PLIF  2019    Procedure: LUMBAR FUSION POSTERIOR-  STAGE 2: ONLAY L5-S1 BONE;  Surgeon: Stepan Hawkins M.D.;  Location: SURGERY [facility];  Service: Neurosurgery   • LAMINOTOMY  2019    Procedure: LAMINOTOMY-  STAGE 2:  REDO LEFT L4-S1;  Surgeon: Stepan Hawkins M.D.;  Location: SURGERY [facility];  Service: Neurosurgery     Family History   Problem Relation Age of Onset   • Cancer Mother         breast     Social History     Socioeconomic History   • Marital status:      Spouse name: Not on file   • Number of children: Not on file   • Years of education: Not on file   • Highest education level: Not on file   Occupational History   • Not on file   Tobacco Use   • Smoking status: Former Smoker     Packs/day: 0.25     Years: 3.00     Pack years: 0.75     Quit date:      Years since quittin.0   • Smokeless tobacco: Never Used   Substance and Sexual Activity   • Alcohol use: No   • Drug use: Never   • Sexual activity: Not on file   Other Topics Concern   • Not on file   Social History Narrative   • Not on file     Social Determinants of Health     Financial Resource Strain:    • Difficulty of Paying Living Expenses: Not on file   Food Insecurity:    • Worried About Running Out of Food in the Last Year: Not on file   • Ran Out of Food in the Last Year: Not on file   Transportation Needs:    • Lack of Transportation (Medical): Not on file   • Lack of Transportation (Non-Medical): Not on file   Physical Activity:    • Days of Exercise per Week: Not on file   •  Minutes of Exercise per Session: Not on file   Stress:    • Feeling of Stress : Not on file   Social Connections:    • Frequency of Communication with Friends and Family: Not on file   • Frequency of Social Gatherings with Friends and Family: Not on file   • Attends Faith Services: Not on file   • Active Member of Clubs or Organizations: Not on file   • Attends Club or Organization Meetings: Not on file   • Marital Status: Not on file   Intimate Partner Violence:    • Fear of Current or Ex-Partner: Not on file   • Emotionally Abused: Not on file   • Physically Abused: Not on file   • Sexually Abused: Not on file   Housing Stability:    • Unable to Pay for Housing in the Last Year: Not on file   • Number of Places Lived in the Last Year: Not on file   • Unstable Housing in the Last Year: Not on file     Allergies   Allergen Reactions   • Hctz [Hydrochlorothiazide W-Spironolactone]      Cannot breathe   • Oxycodone Swelling     Throat swelling     Outpatient Encounter Medications as of 1/13/2022   Medication Sig Dispense Refill   • tizanidine (ZANAFLEX) 4 MG capsule TAKE 1 CAPSULE BY MOUTH UP TO THREE TIMES DAILY AS NEEDED FOR MUSCLE SPASM     • metFORMIN ER (GLUCOPHAGE XR) 500 MG TABLET SR 24 HR Take 1,000 mg by mouth every day.     • carvedilol (COREG) 6.25 MG Tab Take 1 tablet by mouth 2 times a day. 200 tablet 3   • aspirin (ASA) 81 MG Chew Tab chewable tablet Chew 1 tablet every day. 100 tablet 3   • traZODone (DESYREL) 100 MG Tab Take 1.5 Tabs by mouth every bedtime. 135 Tab 1   • sildenafil citrate (VIAGRA) 50 MG tablet Take 1 Tab by mouth as needed for Erectile Dysfunction. 10 Tab 3   • potassium chloride (MICRO-K) 10 MEQ capsule Take 1 Cap by mouth every day. 90 Cap 3   • apixaban (ELIQUIS) 5mg Tab Take 1 Tab by mouth 2 Times a Day. Indications: Thromboembolism secondary to Atrial Fibrillation 180 Tab 3   • atorvastatin (LIPITOR) 80 MG tablet Take 1 Tab by mouth every day. 90 Tab 3   • nitroglycerin  (NITROSTAT) 0.4 MG SL Tab Place 1 Tab under tongue as needed for Chest Pain. 25 Tab 3   • Morphine Sulfate ER 15 MG Tablet Extended Release 12 hour Abuse-Deterrent Take 15 mg by mouth 2 (two) times a day. Abuse deterrent? He takes morphine 15mg er tid      • HYDROcodone/acetaminophen (NORCO)  MG Tab Take 1 Tab by mouth 6 Times a Day.     • finasteride (PROSCAR) 5 MG Tab Take 5 mg by mouth every evening.     • tamsulosin (FLOMAX) 0.4 MG capsule Take 0.4 mg by mouth every evening.     • venlafaxine (EFFEXOR XR) 150 MG XR capsule Take 150 mg by mouth every day.     • [DISCONTINUED] ofloxacin otic sol (FLOXIN OTIC) 0.3 % Solution INSTILL 5 DROPS INTO BOTH EARS TWICE DAILY (Patient not taking: Reported on 1/13/2022)     • [DISCONTINUED] ondansetron (ZOFRAN) 8 MG Tab  (Patient not taking: Reported on 1/13/2022)     • [DISCONTINUED] promethazine (PHENERGAN) 25 MG Tab  (Patient not taking: Reported on 1/13/2022)     • [DISCONTINUED] sulfamethoxazole-trimethoprim (BACTRIM DS) 800-160 MG tablet Take 1 Tablet by mouth 2 times a day. (Patient not taking: Reported on 1/13/2022)     • [DISCONTINUED] morphine ER (MS CONTIN) 15 MG Tab CR tablet  (Patient not taking: Reported on 1/13/2022)     • [DISCONTINUED] KLOR-CON 10 10 MEQ tablet  (Patient not taking: Reported on 1/13/2022)     • [DISCONTINUED] metFORMIN ER (GLUCOPHAGE XR) 750 MG TABLET SR 24 HR Take 1,000 mg by mouth every day. (Patient not taking: Reported on 1/13/2022)       No facility-administered encounter medications on file as of 1/13/2022.     Review of Systems   Constitutional: Negative for chills, fever and malaise/fatigue.   HENT: Negative for congestion.    Respiratory: Negative for cough and shortness of breath.    Cardiovascular: Negative for chest pain, palpitations, orthopnea, leg swelling and PND.   Gastrointestinal: Negative for abdominal pain and nausea.   Musculoskeletal: Positive for back pain and joint pain. Negative for myalgias.   Skin: Negative  "for rash.   Neurological: Negative for dizziness, loss of consciousness and headaches.   Endo/Heme/Allergies: Does not bruise/bleed easily.   Psychiatric/Behavioral: The patient has insomnia.               Objective     /88 (BP Location: Left arm, Patient Position: Sitting, BP Cuff Size: Adult)   Pulse 98   Resp 16   Ht 1.702 m (5' 7\")   Wt 99.8 kg (220 lb)   SpO2 94%   BMI 34.46 kg/m²     Physical Exam  Constitutional:       Appearance: He is well-developed.   HENT:      Head: Normocephalic.   Neck:      Vascular: No JVD.   Cardiovascular:      Rate and Rhythm: Normal rate and regular rhythm.      Heart sounds: Normal heart sounds.   Pulmonary:      Effort: Pulmonary effort is normal. No respiratory distress.      Breath sounds: Normal breath sounds. No wheezing or rales.   Abdominal:      General: Bowel sounds are normal. There is no distension.      Palpations: Abdomen is soft.      Tenderness: There is no abdominal tenderness.   Musculoskeletal:         General: Normal range of motion.      Cervical back: Normal range of motion and neck supple.   Skin:     General: Skin is warm and dry.      Findings: No rash.   Neurological:      Mental Status: He is alert and oriented to person, place, and time.       LABS AS OF 9/15/2021:  WBC 6.1  RBC 4.85  Hgb 14.7  Hct 44.1  Platelets 287  Glucose 131  BUN 14  Creatinine 0.88  GFR 93  Potassium 4.0  AST 15  ALT 26  Cholesterol 188  Triglycerides 144  HDL 75  LDL 87  PSA 0.29    Cardiac Event Recorder; Preen.Me 11/9/2020-11/23/2020:  1.  Sinus rhythm, average rate 63 bpm.   2.  Rare PVCs   3.  Rare PACs.   4.  Symptoms of heart racing, fluttering, skipping dizziness associated with sinus rhythm and normal rate.   5.  One triggered non-specific event associated with singular PVCs.   6.  No atrial fibrillation.      MPI OF 11/17/2020:  Normal myocardial perfusion study  No ischemia or infarct, normal wall motion     IMPRESSIONS OF CORONARY ANGIOGRAM OF " 8/1/2020:  1.  Acute non-STEMI due to culprit right coronary artery  2.  Successful PCI of the mid right coronary artery using 1 drug-eluting stent and IVUS guidance  3.  Successful PCI of the posterior lateral branch using 1 drug-eluting stent  4.  Normal LV systolic function  5.  Moderately elevated LVEDP (21 mmHg)  6.  Poorly controlled hypertension     CONCLUSIONS OF ECHOCARDIOGRAM OF 8/9/2020:  Compared to the images of the prior study done 8/3/2020, the EF appears   improved.  Left ventricular ejection fraction is visually estimated to be 60%.     CONCLUSIONS OF ECHOCARDIOGRAM OF 8/3/2020:  Limited contrast study to reassess for LV thrombus.  Thrombus is not observed in the left ventricular apex.  Left ventricular ejection fraction is visually estimated to be 60%.  Mild hypokinesis of the apex.         Assessment & Plan     1. Coronary artery disease involving native coronary artery of native heart without angina pectoris  EC-ECHOCARDIOGRAM COMPLETE W/O CONT   2. Status post insertion of drug-eluting stent into right coronary artery for coronary artery disease  EC-ECHOCARDIOGRAM COMPLETE W/O CONT   3. Paroxysmal atrial fibrillation (HCC)  EC-ECHOCARDIOGRAM COMPLETE W/O CONT   4. Current use of long term anticoagulation     5. Essential hypertension     6. Dyslipidemia     7. History of prediabetes     8. Class 2 severe obesity due to excess calories with serious comorbidity and body mass index (BMI) of 35.0 to 35.9 in adult ([facility])         Medical Decision Making: Today's Assessment/Status/Plan:      1. CAD, status post PCI/NAOMY x 2 in August 2020, with normal MPI in November 2020. He has not had any further angina. He remains on:  ASA 81mg once daily  Coreg 6.25mg twice daily  Lipitor 80mg once daily    2. Paroxysmal atrial fibrillation, in the setting of cardiac arrest, now on Coreg only. Follow-up echocardiogram showed normal LV size and function. Will repeat echo prior to next follow-up.    3. Chronic  anticoagulation with Eliquis, no bleeding problems.    4. Hypertension, treated with Coreg 6.25mg twice daily. He is NOT on ACEI or ARB. Consider addition of low dose ACEI or ARB. He was previously on Micardis, but it was stopped due to low BP.    5. Diabetes mellitus, treated with Metformin, stable.    6. Chronic low back pain, ongoing, managed by pain management.    As above, same medications for now. If BP is borderline, consider re-addition of ACEI or ARB. Echocardiogram prior to next follow-up. Follow-up sooner if clinical condition changes.

## 2022-02-04 ENCOUNTER — TELEPHONE (OUTPATIENT)
Dept: CARDIOLOGY | Facility: MEDICAL CENTER | Age: 62
End: 2022-02-04

## 2022-02-04 NOTE — TELEPHONE ENCOUNTER
AB    Morning Jody,    This patient called and wanted to know if its okay to use a arthritis cream prescribed by his PCP for his arthritis. He wants to know if it will affect his blood thinners. Can you please call him back at [phone].      Thank you,    SARBJIT

## 2022-02-04 NOTE — TELEPHONE ENCOUNTER
Called pt [phone]  No answer, cannot leave VM, no VM set up  Regarding arthritis cream, need more info(name/dose/frequency)

## 2022-02-11 NOTE — TELEPHONE ENCOUNTER
Called pt and unable to reach or leave a voicemail. [employer] message sent that most topical creams are okay as they are very minimally absorbed into the blood stream.

## 2022-05-17 ENCOUNTER — TELEPHONE (OUTPATIENT)
Dept: CARDIOLOGY | Facility: MEDICAL CENTER | Age: 62
End: 2022-05-17

## 2022-05-17 NOTE — TELEPHONE ENCOUNTER
AB    Caller: Karan  Topic/issue: Karan, wanted to let AB know, that he was in [facility] ER today with Chest Pain  Callback Number: [phone]    Thank you,   Elida ALLEN

## 2022-07-13 ENCOUNTER — TELEPHONE (OUTPATIENT)
Dept: CARDIOLOGY | Facility: MEDICAL CENTER | Age: 62
End: 2022-07-13
Payer: OTHER GOVERNMENT

## 2022-07-13 NOTE — TELEPHONE ENCOUNTER
Records of patient's echocardiogram and last ER visit requested from [facility] at fax # [phone]. Fax confirmation received. Records pending.

## 2022-07-21 ENCOUNTER — TELEPHONE (OUTPATIENT)
Dept: CARDIOLOGY | Facility: MEDICAL CENTER | Age: 62
End: 2022-07-21

## 2022-07-21 ENCOUNTER — OFFICE VISIT (OUTPATIENT)
Dept: CARDIOLOGY | Facility: PHYSICIAN GROUP | Age: 62
End: 2022-07-21
Payer: OTHER GOVERNMENT

## 2022-07-21 VITALS
OXYGEN SATURATION: 95 % | HEART RATE: 82 BPM | DIASTOLIC BLOOD PRESSURE: 98 MMHG | HEIGHT: 67 IN | SYSTOLIC BLOOD PRESSURE: 138 MMHG | RESPIRATION RATE: 16 BRPM | BODY MASS INDEX: 35.31 KG/M2 | WEIGHT: 225 LBS

## 2022-07-21 DIAGNOSIS — I25.10 CORONARY ARTERY DISEASE INVOLVING NATIVE CORONARY ARTERY OF NATIVE HEART WITHOUT ANGINA PECTORIS: ICD-10-CM

## 2022-07-21 DIAGNOSIS — Z95.5 STATUS POST INSERTION OF DRUG-ELUTING STENT INTO RIGHT CORONARY ARTERY FOR CORONARY ARTERY DISEASE: ICD-10-CM

## 2022-07-21 DIAGNOSIS — I48.0 PAROXYSMAL ATRIAL FIBRILLATION (HCC): ICD-10-CM

## 2022-07-21 PROCEDURE — 99214 OFFICE O/P EST MOD 30 MIN: CPT | Performed by: INTERNAL MEDICINE

## 2022-07-21 RX ORDER — CARVEDILOL 6.25 MG/1
6.25 TABLET ORAL 2 TIMES DAILY
Qty: 200 TABLET | Refills: 3 | Status: SHIPPED | OUTPATIENT
Start: 2022-07-21 | End: 2022-10-31

## 2022-07-21 RX ORDER — DOCUSATE SODIUM 100 MG/1
100 CAPSULE, LIQUID FILLED ORAL 2 TIMES DAILY
COMMUNITY
Start: 2022-07-11

## 2022-07-21 RX ORDER — POTASSIUM CHLORIDE 750 MG/1
10 TABLET, EXTENDED RELEASE ORAL 2 TIMES DAILY
COMMUNITY
Start: 2022-05-20 | End: 2022-10-31

## 2022-07-21 RX ORDER — ATORVASTATIN CALCIUM 80 MG/1
80 TABLET, FILM COATED ORAL DAILY
Qty: 90 TABLET | Refills: 3 | Status: SHIPPED | OUTPATIENT
Start: 2022-07-21 | End: 2022-12-01

## 2022-07-21 RX ORDER — MORPHINE SULFATE 15 MG/1
15 TABLET, FILM COATED, EXTENDED RELEASE ORAL EVERY 12 HOURS
COMMUNITY
Start: 2022-06-23 | End: 2022-10-31

## 2022-07-21 ASSESSMENT — FIBROSIS 4 INDEX: FIB4 SCORE: 0.48

## 2022-07-21 ASSESSMENT — ENCOUNTER SYMPTOMS
MEMORY LOSS: 1
BACK PAIN: 1

## 2022-07-21 NOTE — LETTER
[facility] [address]  Phone: [phone]  Fax: [phone]              Karan Wiley  1960    Encounter Date: 7/21/2022    Omega Zhou M.D.          PROGRESS NOTE:      Cardiology Follow-up Consultation Note    Date of note:    7/21/2022    Primary Care Provider: Henri Kong M.D.  Referring Provider: Henri Kong M.D.      Patient Name: Karan Wiley   YOB: 1960  MRN:              [id number]    Chief Complaint: chest pan    History of Present Illness: Karan Wiley is a 61 y.o. male who is here for follow-up.     History of hypertension, dyslipidemia and CAD, with STEMI in August 2020 with PCI of RCA and PCI of posterior lateral branch.  He did have post-procedural complications, including acute hypoxic respiratory failure with flash pulmonary edema, and PEA cardiac arrest.  Amiodarone was added, which was stopped in December 2020, after ZioPatch showed no arrythmias.    Outpatient stress test in November 2020 came back normal.    Last seen by Caryn Mckinney on 1/13/2022.     Interim Events:  Presented to  [facility] 5/2022 with chest pain. Did have two negative troponins. Pain improved with toradol, no affect by nitroglycerin.     No further chest pain. Can walk into the store and walk around with a cart without symptoms. Does not exercise.     Did have some BPPV, rare symptoms.     Bps in the 120s/80s normally.       Review of Systems   HENT: Positive for hearing loss.    Musculoskeletal: Positive for back pain.   Psychiatric/Behavioral: Positive for memory loss.     All other systems reviewed and discussed using a comprehensive questionnaire and are negative.       Past Medical History:   Diagnosis Date   • CAD (coronary artery disease) 08/2020    STEMI. PCI/NAOMY (Rag[place] 3.5 x 25mm) the mid RCA, and PCI/NAOMY (R[place]hav 2.5 x 12mm) the PDA.   • Chronic anticoagulation    • Depression    •  Hyperlipidemia    • Hypertension    • Low back pain    • Paroxysmal atrial fibrillation (HCC)          Past Surgical History:   Procedure Laterality Date   • SPINAL CORD STIMULATOR  2/26/2019    Procedure: SPINAL CORD STIMULATOR-  STAGE 1:  RIGHT FLANK BATTERY REPLACEMENT/UPGRADE;  Surgeon: Stepan Hawkins M.D.;  Location: SURGERY [facility];  Service: Neurosurgery   • FUSION, SPINE, LUMBAR, PLIF  2/26/2019    Procedure: LUMBAR FUSION POSTERIOR-  STAGE 2: ONLAY L5-S1 BONE;  Surgeon: Stepan Hawkins M.D.;  Location: SURGERY [facility];  Service: Neurosurgery   • LAMINOTOMY  2/26/2019    Procedure: LAMINOTOMY-  STAGE 2:  REDO LEFT L4-S1;  Surgeon: Stepan Hawkins M.D.;  Location: SURGERY [facility];  Service: Neurosurgery         Current Outpatient Medications   Medication Sig Dispense Refill   • docusate sodium (COLACE) 100 MG Cap Take 100 mg by mouth 2 times a day.     • morphine ER (MS CONTIN) 15 MG Tab CR tablet Take 15 mg by mouth every 12 hours.     • tizanidine (ZANAFLEX) 4 MG capsule Take 4 mg by mouth 3 times a day.     • metFORMIN ER (GLUCOPHAGE XR) 500 MG TABLET SR 24 HR Take 1,000 mg by mouth every day.     • carvedilol (COREG) 6.25 MG Tab Take 1 tablet by mouth 2 times a day. 200 tablet 3   • aspirin (ASA) 81 MG Chew Tab chewable tablet Chew 1 tablet every day. 100 tablet 3   • traZODone (DESYREL) 100 MG Tab Take 1.5 Tabs by mouth every bedtime. 135 Tab 1   • sildenafil citrate (VIAGRA) 50 MG tablet Take 1 Tab by mouth as needed for Erectile Dysfunction. 10 Tab 3   • potassium chloride (MICRO-K) 10 MEQ capsule Take 1 Cap by mouth every day. 90 Cap 3   • apixaban (ELIQUIS) 5mg Tab Take 1 Tab by mouth 2 Times a Day. Indications: Thromboembolism secondary to Atrial Fibrillation 180 Tab 3   • atorvastatin (LIPITOR) 80 MG tablet Take 1 Tab by mouth every day. 90 Tab 3   • nitroglycerin (NITROSTAT) 0.4 MG SL Tab Place 1 Tab under tongue as needed for Chest Pain. 25 Tab 3   •  HYDROcodone/acetaminophen (NORCO)  MG Tab Take 1 Tab by mouth 6 Times a Day.     • finasteride (PROSCAR) 5 MG Tab Take 5 mg by mouth every evening.     • tamsulosin (FLOMAX) 0.4 MG capsule Take 0.4 mg by mouth every evening.     • venlafaxine (EFFEXOR-XR) 150 MG extended-release capsule Take 150 mg by mouth every day.     • KLOR-CON M10 10 MEQ Tab CR  (Patient not taking: Reported on 2022)     • Morphine Sulfate ER 15 MG Tablet Extended Release 12 hour Abuse-Deterrent Take 15 mg by mouth 2 (two) times a day. Abuse deterrent? He takes morphine 15mg er tid  (Patient not taking: Reported on 2022)       No current facility-administered medications for this visit.         Allergies   Allergen Reactions   • Hctz [Hydrochlorothiazide W-Spironolactone]      Cannot breathe   • Oxycodone Swelling     Throat swelling         Family History   Problem Relation Age of Onset   • Cancer Mother         breast         Social History     Socioeconomic History   • Marital status:      Spouse name: Not on file   • Number of children: Not on file   • Years of education: Not on file   • Highest education level: Not on file   Occupational History   • Not on file   Tobacco Use   • Smoking status: Former Smoker     Packs/day: 0.25     Years: 3.00     Pack years: 0.75     Quit date:      Years since quittin.5   • Smokeless tobacco: Never Used   Substance and Sexual Activity   • Alcohol use: No   • Drug use: Never   • Sexual activity: Not on file   Other Topics Concern   • Not on file   Social History Narrative   • Not on file     Social Determinants of Health     Financial Resource Strain: Not on file   Food Insecurity: Not on file   Transportation Needs: Not on file   Physical Activity: Not on file   Stress: Not on file   Social Connections: Not on file   Intimate Partner Violence: Not on file   Housing Stability: Not on file         Physical Exam:  Ambulatory Vitals  BP (!) 138/98 (BP Location: Left arm,  "Patient Position: Sitting, BP Cuff Size: Adult)   Pulse 82   Resp 16   Ht 1.702 m (5' 7\")   Wt 102 kg (225 lb)   SpO2 95%    Oxygen Therapy:  Pulse Oximetry: 95 %  BP Readings from Last 4 Encounters:   07/21/22 (!) 138/98   01/13/22 126/88   07/08/21 102/66   01/07/21 126/72       Weight/BMI: Body mass index is 35.24 kg/m².  Wt Readings from Last 4 Encounters:   07/21/22 102 kg (225 lb)   01/13/22 99.8 kg (220 lb)   07/08/21 100 kg (221 lb)   01/07/21 99.3 kg (219 lb)       General: No apparent distress  Eyes: nl conjunctiva  ENT: OP covered by mask.   Neck: JVP <8 cm H2O, no carotid bruits  Lungs: normal respiratory effort, CTAB  Heart: RRR, no murmurs, no rubs or gallops, trace edema bilateral lower extremities. No LV/RV heave on cardiac palpatation. 2+ bilateral radial pulses.    Abdomen: soft, non tender, non distended, no masses, normal bowel sounds.  No HSM.  Extremities/MSK: no clubbing, no cyanosis  Neurological: No focal sensory deficits  Psychiatric: Appropriate affect, A/O x 3, intact judgement and insight  Skin: Warm extremities    Lab Data Review:  Lab Results   Component Value Date/Time    TRIGLYCERIDE 217 (H) 08/01/2020 03:26 PM       Lab Results   Component Value Date/Time    SODIUM 140 08/12/2020 12:31 AM    POTASSIUM 3.6 08/12/2020 12:31 AM    CHLORIDE 100 08/12/2020 12:31 AM    CO2 27 08/12/2020 12:31 AM    GLUCOSE 94 08/12/2020 12:31 AM    BUN 7 (L) 08/12/2020 12:31 AM    CREATININE 0.71 08/12/2020 12:31 AM     Lab Results   Component Value Date/Time    ALKPHOSPHAT 57 08/07/2020 01:25 PM    ASTSGOT 15 08/07/2020 01:25 PM    ALTSGPT 23 08/07/2020 01:25 PM    TBILIRUBIN 0.8 08/07/2020 01:25 PM      Lab Results   Component Value Date/Time    WBC 9.4 08/13/2020 12:28 AM     No components found for: HBGA1C  No components found for: TROPONIN  No results found for: BNP      Cardiac Imaging and Procedures Review:    LABS AS OF 9/15/2021:  WBC 6.1  RBC 4.85  Hgb 14.7  Hct 44.1  Platelets 287  Glucose " 131  BUN 14  Creatinine 0.88  GFR 93  Potassium 4.0  AST 15  ALT 26  Cholesterol 188  Triglycerides 144  HDL 75  LDL 87  PSA 0.29     Cardiac Event Recorder; Debbie 2020-2020:  1.  Sinus rhythm, average rate 63 bpm.   2.  Rare PVCs   3.  Rare PACs.   4.  Symptoms of heart racing, fluttering, skipping dizziness associated with sinus rhythm and normal rate.   5.  One triggered non-specific event associated with singular PVCs.   6.  No atrial fibrillation.      MPI OF 2020:  Normal myocardial perfusion study  No ischemia or infarct, normal wall motion     IMPRESSIONS OF CORONARY ANGIOGRAM OF 2020:  1.  Acute non-STEMI due to culprit right coronary artery  2.  Successful PCI of the mid right coronary artery using 1 drug-eluting stent and IVUS guidance  3.  Successful PCI of the posterior lateral branch using 1 drug-eluting stent  4.  Normal LV systolic function  5.  Moderately elevated LVEDP (21 mmHg)  6.  Poorly controlled hypertension     CONCLUSIONS OF ECHOCARDIOGRAM OF 2020:  Compared to the images of the prior study done 8/3/2020, the EF appears   improved.  Left ventricular ejection fraction is visually estimated to be 60%.     CONCLUSIONS OF ECHOCARDIOGRAM OF 8/3/2020:  Limited contrast study to reassess for LV thrombus.  Thrombus is not observed in the left ventricular apex.  Left ventricular ejection fraction is visually estimated to be 60%.  Mild hypokinesis of the apex.    Echo dated 2022 from [facility]:  Nl LV/RV function and wall motion per report.     Medical Decision Makin. CAD, status post PCI/NAOMY x 2 in 2020, with normal MPI in 2020. He has not had any further angina. One chest pain episode 2022 which was not consistent with angina, likely MSK He remains on:  -ASA 81mg once daily (mainly for rectal polyp prevention, he prefers to stay on this despite being on eliquis). ;  -Coreg 6.25mg twice daily  -Lipitor 80mg once daily. Check [facility] labs to ensure adequate  cholesterol control.      2. Paroxysmal atrial fibrillation, in the setting of cardiac arrest for 2+ weeks in the hospital.  Also FH of a fib.  -continue coreg, eliquis.   -[id number] score of 3. May hold anticoagulation for 2-3 days prior to any procedure without bridge and restart as soon as possible after procedure or blood draws.      3. Hypertension, treated with Coreg 6.25mg twice daily. He is NOT on ACEI or ARB due to hypotension previously.      5. Diabetes mellitus, treated with Metformin, stable.     6. Chronic low back pain, ongoing, managed by pain management.    F[facility]u [facility] blood tests.   Also sees Dr. Jace [facility].     Return in about 1 year (around 7/21/2023).      Omega Zhou MD, [facility]  [facility]  [address] [address]  Phone: [phone]  Fax: [phone]                    Henri Kong M.D.  [address] [address]  Via Fax: [phone]

## 2022-07-21 NOTE — TELEPHONE ENCOUNTER
Records requested from Dr. Gayle at [facility] at fax # [phone]. Records of lab work requested from [facility] and the [facility] at fax # [phone]. Fax confirmations received. Records pending.

## 2022-07-21 NOTE — PROGRESS NOTES
Cardiology Follow-up Consultation Note    Date of note:    7/21/2022    Primary Care Provider: Henri Kong M.D.  Referring Provider: Henri Kong M.D.      Patient Name: Karan Wiley   YOB: 1960  MRN:              [id number]    Chief Complaint: chest pan    History of Present Illness: Karan Wiley is a 61 y.o. male who is here for follow-up.     History of hypertension, dyslipidemia and CAD, with STEMI in August 2020 with PCI of RCA and PCI of posterior lateral branch.  He did have post-procedural complications, including acute hypoxic respiratory failure with flash pulmonary edema, and PEA cardiac arrest.  Amiodarone was added, which was stopped in December 2020, after ZioPatch showed no arrythmias.    Outpatient stress test in November 2020 came back normal.    Last seen by Caryn Mckinney on 1/13/2022.     Interim Events:  Presented to  [facility] 5/2022 with chest pain. Did have two negative troponins. Pain improved with toradol, no affect by nitroglycerin.     No further chest pain. Can walk into the store and walk around with a cart without symptoms. Does not exercise.     Did have some BPPV, rare symptoms.     Bps in the 120s/80s normally.       Review of Systems   HENT: Positive for hearing loss.    Musculoskeletal: Positive for back pain.   Psychiatric/Behavioral: Positive for memory loss.     All other systems reviewed and discussed using a comprehensive questionnaire and are negative.       Past Medical History:   Diagnosis Date   • CAD (coronary artery disease) 08/2020    STEMI. PCI/NAOMY ([place] 3.5 x 25mm) the mid RCA, and PCI/NAOMY (Raghav 2.5 x 12mm) the PDA.   • Chronic anticoagulation    • Depression    • Hyperlipidemia    • Hypertension    • Low back pain    • Paroxysmal atrial fibrillation (HCC)          Past Surgical History:   Procedure Laterality Date   • SPINAL CORD STIMULATOR  2/26/2019    Procedure: SPINAL CORD STIMULATOR-  STAGE 1:  RIGHT FLANK BATTERY REPLACEMENT/UPGRADE;   Surgeon: Stepan Hawkins M.D.;  Location: SURGERY [facility];  Service: Neurosurgery   • FUSION, SPINE, LUMBAR, PLIF  2/26/2019    Procedure: LUMBAR FUSION POSTERIOR-  STAGE 2: ONLAY L5-S1 BONE;  Surgeon: Stepan Hawkins M.D.;  Location: SURGERY [facility];  Service: Neurosurgery   • LAMINOTOMY  2/26/2019    Procedure: LAMINOTOMY-  STAGE 2:  REDO LEFT L4-S1;  Surgeon: Stepan Hawkins M.D.;  Location: SURGERY [facility];  Service: Neurosurgery         Current Outpatient Medications   Medication Sig Dispense Refill   • docusate sodium (COLACE) 100 MG Cap Take 100 mg by mouth 2 times a day.     • morphine ER (MS CONTIN) 15 MG Tab CR tablet Take 15 mg by mouth every 12 hours.     • tizanidine (ZANAFLEX) 4 MG capsule Take 4 mg by mouth 3 times a day.     • metFORMIN ER (GLUCOPHAGE XR) 500 MG TABLET SR 24 HR Take 1,000 mg by mouth every day.     • carvedilol (COREG) 6.25 MG Tab Take 1 tablet by mouth 2 times a day. 200 tablet 3   • aspirin (ASA) 81 MG Chew Tab chewable tablet Chew 1 tablet every day. 100 tablet 3   • traZODone (DESYREL) 100 MG Tab Take 1.5 Tabs by mouth every bedtime. 135 Tab 1   • sildenafil citrate (VIAGRA) 50 MG tablet Take 1 Tab by mouth as needed for Erectile Dysfunction. 10 Tab 3   • potassium chloride (MICRO-K) 10 MEQ capsule Take 1 Cap by mouth every day. 90 Cap 3   • apixaban (ELIQUIS) 5mg Tab Take 1 Tab by mouth 2 Times a Day. Indications: Thromboembolism secondary to Atrial Fibrillation 180 Tab 3   • atorvastatin (LIPITOR) 80 MG tablet Take 1 Tab by mouth every day. 90 Tab 3   • nitroglycerin (NITROSTAT) 0.4 MG SL Tab Place 1 Tab under tongue as needed for Chest Pain. 25 Tab 3   • HYDROcodone/acetaminophen (NORCO)  MG Tab Take 1 Tab by mouth 6 Times a Day.     • finasteride (PROSCAR) 5 MG Tab Take 5 mg by mouth every evening.     • tamsulosin (FLOMAX) 0.4 MG capsule Take 0.4 mg by mouth every evening.     • venlafaxine (EFFEXOR-XR) 150 MG extended-release capsule Take 150 mg  "by mouth every day.     • KLOR-CON M10 10 MEQ Tab CR  (Patient not taking: Reported on 2022)     • Morphine Sulfate ER 15 MG Tablet Extended Release 12 hour Abuse-Deterrent Take 15 mg by mouth 2 (two) times a day. Abuse deterrent? He takes morphine 15mg er tid  (Patient not taking: Reported on 2022)       No current facility-administered medications for this visit.         Allergies   Allergen Reactions   • Hctz [Hydrochlorothiazide W-Spironolactone]      Cannot breathe   • Oxycodone Swelling     Throat swelling         Family History   Problem Relation Age of Onset   • Cancer Mother         breast         Social History     Socioeconomic History   • Marital status:      Spouse name: Not on file   • Number of children: Not on file   • Years of education: Not on file   • Highest education level: Not on file   Occupational History   • Not on file   Tobacco Use   • Smoking status: Former Smoker     Packs/day: 0.25     Years: 3.00     Pack years: 0.75     Quit date:      Years since quittin.5   • Smokeless tobacco: Never Used   Substance and Sexual Activity   • Alcohol use: No   • Drug use: Never   • Sexual activity: Not on file   Other Topics Concern   • Not on file   Social History Narrative   • Not on file     Social Determinants of Health     Financial Resource Strain: Not on file   Food Insecurity: Not on file   Transportation Needs: Not on file   Physical Activity: Not on file   Stress: Not on file   Social Connections: Not on file   Intimate Partner Violence: Not on file   Housing Stability: Not on file         Physical Exam:  Ambulatory Vitals  BP (!) 138/98 (BP Location: Left arm, Patient Position: Sitting, BP Cuff Size: Adult)   Pulse 82   Resp 16   Ht 1.702 m (5' 7\")   Wt 102 kg (225 lb)   SpO2 95%    Oxygen Therapy:  Pulse Oximetry: 95 %  BP Readings from Last 4 Encounters:   22 (!) 138/98   22 126/88   21 102/66   21 126/72       Weight/BMI: Body mass " index is 35.24 kg/m².  Wt Readings from Last 4 Encounters:   07/21/22 102 kg (225 lb)   01/13/22 99.8 kg (220 lb)   07/08/21 100 kg (221 lb)   01/07/21 99.3 kg (219 lb)       General: No apparent distress  Eyes: nl conjunctiva  ENT: OP covered by mask.   Neck: JVP <8 cm H2O, no carotid bruits  Lungs: normal respiratory effort, CTAB  Heart: RRR, no murmurs, no rubs or gallops, trace edema bilateral lower extremities. No LV/RV heave on cardiac palpatation. 2+ bilateral radial pulses.    Abdomen: soft, non tender, non distended, no masses, normal bowel sounds.  No HSM.  Extremities/MSK: no clubbing, no cyanosis  Neurological: No focal sensory deficits  Psychiatric: Appropriate affect, A/O x 3, intact judgement and insight  Skin: Warm extremities    Lab Data Review:  Lab Results   Component Value Date/Time    TRIGLYCERIDE 217 (H) 08/01/2020 03:26 PM       Lab Results   Component Value Date/Time    SODIUM 140 08/12/2020 12:31 AM    POTASSIUM 3.6 08/12/2020 12:31 AM    CHLORIDE 100 08/12/2020 12:31 AM    CO2 27 08/12/2020 12:31 AM    GLUCOSE 94 08/12/2020 12:31 AM    BUN 7 (L) 08/12/2020 12:31 AM    CREATININE 0.71 08/12/2020 12:31 AM     Lab Results   Component Value Date/Time    ALKPHOSPHAT 57 08/07/2020 01:25 PM    ASTSGOT 15 08/07/2020 01:25 PM    ALTSGPT 23 08/07/2020 01:25 PM    TBILIRUBIN 0.8 08/07/2020 01:25 PM      Lab Results   Component Value Date/Time    WBC 9.4 08/13/2020 12:28 AM     No components found for: HBGA1C  No components found for: TROPONIN  No results found for: BNP      Cardiac Imaging and Procedures Review:    LABS AS OF 9/15/2021:  WBC 6.1  RBC 4.85  Hgb 14.7  Hct 44.1  Platelets 287  Glucose 131  BUN 14  Creatinine 0.88  GFR 93  Potassium 4.0  AST 15  ALT 26  Cholesterol 188  Triglycerides 144  HDL 75  LDL 87  PSA 0.29     Cardiac Event Recorder; Debbie 11/9/2020-11/23/2020:  1.  Sinus rhythm, average rate 63 bpm.   2.  Rare PVCs   3.  Rare PACs.   4.  Symptoms of heart racing, fluttering,  skipping dizziness associated with sinus rhythm and normal rate.   5.  One triggered non-specific event associated with singular PVCs.   6.  No atrial fibrillation.      MPI OF 2020:  Normal myocardial perfusion study  No ischemia or infarct, normal wall motion     IMPRESSIONS OF CORONARY ANGIOGRAM OF 2020:  1.  Acute non-STEMI due to culprit right coronary artery  2.  Successful PCI of the mid right coronary artery using 1 drug-eluting stent and IVUS guidance  3.  Successful PCI of the posterior lateral branch using 1 drug-eluting stent  4.  Normal LV systolic function  5.  Moderately elevated LVEDP (21 mmHg)  6.  Poorly controlled hypertension     CONCLUSIONS OF ECHOCARDIOGRAM OF 2020:  Compared to the images of the prior study done 8/3/2020, the EF appears   improved.  Left ventricular ejection fraction is visually estimated to be 60%.     CONCLUSIONS OF ECHOCARDIOGRAM OF 8/3/2020:  Limited contrast study to reassess for LV thrombus.  Thrombus is not observed in the left ventricular apex.  Left ventricular ejection fraction is visually estimated to be 60%.  Mild hypokinesis of the apex.    Echo dated 2022 from [facility]:  Nl LV/RV function and wall motion per report.     Medical Decision Makin. CAD, status post PCI/NAOMY x 2 in 2020, with normal MPI in 2020. He has not had any further angina. One chest pain episode 2022 which was not consistent with angina, likely MSK He remains on:  -ASA 81mg once daily (mainly for rectal polyp prevention, he prefers to stay on this despite being on eliquis). ;  -Coreg 6.25mg twice daily  -Lipitor 80mg once daily. Check [facility] labs to ensure adequate cholesterol control.      2. Paroxysmal atrial fibrillation, in the setting of cardiac arrest for 2+ weeks in the hospital.  Also FH of a fib.  -continue coreg, eliquis.   -[id number] score of 3. May hold anticoagulation for 2-3 days prior to any procedure without bridge and restart as soon as possible  after procedure or blood draws.      3. Hypertension, treated with Coreg 6.25mg twice daily. He is NOT on ACEI or ARB due to hypotension previously.      5. Diabetes mellitus, treated with Metformin, stable.     6. Chronic low back pain, ongoing, managed by pain management.    F[facility]u [facility] tests.   Also sees Dr. Jace Gayle, [facility].     Return in about 1 year (around 7/21/2023).      Omega Zhou MD, [facility], [facility]  [address] [address]  Phone: [phone]  Fax: [phone]

## 2022-07-29 DIAGNOSIS — I48.0 PAROXYSMAL ATRIAL FIBRILLATION (HCC): ICD-10-CM

## 2022-07-29 DIAGNOSIS — Z95.5 STATUS POST INSERTION OF DRUG-ELUTING STENT INTO RIGHT CORONARY ARTERY FOR CORONARY ARTERY DISEASE: ICD-10-CM

## 2022-07-29 DIAGNOSIS — I25.10 CORONARY ARTERY DISEASE INVOLVING NATIVE CORONARY ARTERY OF NATIVE HEART WITHOUT ANGINA PECTORIS: ICD-10-CM

## 2022-08-25 ENCOUNTER — ANESTHESIA (OUTPATIENT)
Dept: SURGERY | Facility: MEDICAL CENTER | Age: 62
DRG: 003 | End: 2022-08-25
Payer: OTHER GOVERNMENT

## 2022-08-25 ENCOUNTER — HOSPITAL ENCOUNTER (INPATIENT)
Facility: MEDICAL CENTER | Age: 62
LOS: 15 days | DRG: 003 | End: 2022-09-09
Attending: EMERGENCY MEDICINE | Admitting: NEUROLOGICAL SURGERY
Payer: OTHER GOVERNMENT

## 2022-08-25 ENCOUNTER — APPOINTMENT (OUTPATIENT)
Dept: RADIOLOGY | Facility: MEDICAL CENTER | Age: 62
DRG: 003 | End: 2022-08-25
Attending: STUDENT IN AN ORGANIZED HEALTH CARE EDUCATION/TRAINING PROGRAM
Payer: OTHER GOVERNMENT

## 2022-08-25 ENCOUNTER — HOSPITAL ENCOUNTER (OUTPATIENT)
Dept: RADIOLOGY | Facility: MEDICAL CENTER | Age: 62
End: 2022-08-25

## 2022-08-25 ENCOUNTER — APPOINTMENT (OUTPATIENT)
Dept: RADIOLOGY | Facility: MEDICAL CENTER | Age: 62
DRG: 003 | End: 2022-08-25
Attending: NEUROLOGICAL SURGERY
Payer: OTHER GOVERNMENT

## 2022-08-25 ENCOUNTER — APPOINTMENT (OUTPATIENT)
Dept: RADIOLOGY | Facility: MEDICAL CENTER | Age: 62
DRG: 003 | End: 2022-08-25
Attending: EMERGENCY MEDICINE
Payer: OTHER GOVERNMENT

## 2022-08-25 ENCOUNTER — APPOINTMENT (OUTPATIENT)
Dept: RADIOLOGY | Facility: MEDICAL CENTER | Age: 62
DRG: 003 | End: 2022-08-25
Attending: SURGERY
Payer: OTHER GOVERNMENT

## 2022-08-25 DIAGNOSIS — Z86.79 HISTORY OF CORONARY ARTERY DISEASE: ICD-10-CM

## 2022-08-25 DIAGNOSIS — J95.821 RESPIRATORY FAILURE FOLLOWING TRAUMA AND SURGERY (HCC): ICD-10-CM
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
## 2022-08-25 DIAGNOSIS — R13.12 OROPHARYNGEAL DYSPHAGIA: ICD-10-CM

## 2022-08-25 DIAGNOSIS — R55 SYNCOPE, UNSPECIFIED SYNCOPE TYPE: ICD-10-CM

## 2022-08-25 DIAGNOSIS — I10 UNCONTROLLED HYPERTENSION: ICD-10-CM

## 2022-08-25 DIAGNOSIS — Z86.79 HISTORY OF ATRIAL FIBRILLATION: ICD-10-CM

## 2022-08-25 DIAGNOSIS — Z86.39 HISTORY OF DIABETES MELLITUS: ICD-10-CM

## 2022-08-25 DIAGNOSIS — S06.5X0A SUBDURAL HEMATOMA WITHOUT COMA, WITHOUT LOSS OF CONSCIOUSNESS, INITIAL ENCOUNTER (HCC): ICD-10-CM

## 2022-08-25 DIAGNOSIS — S06.5XAA TRAUMATIC SUBDURAL HEMATOMA, INITIAL ENCOUNTER (HCC): ICD-10-CM

## 2022-08-25 PROBLEM — Z79.02 ANTIPLATELET OR ANTITHROMBOTIC LONG-TERM USE: Status: ACTIVE | Noted: 2022-08-25

## 2022-08-25 PROBLEM — Z53.09 CONTRAINDICATION TO ANTICOAGULATION THERAPY: Status: ACTIVE | Noted: 2022-08-25

## 2022-08-25 PROBLEM — E11.9 TYPE 2 DIABETES MELLITUS WITHOUT COMPLICATION, WITHOUT LONG-TERM CURRENT USE OF INSULIN (HCC): Status: ACTIVE | Noted: 2020-08-01

## 2022-08-25 PROBLEM — T14.90XA TRAUMA: Status: ACTIVE | Noted: 2022-08-25

## 2022-08-25 LAB
ABO + RH BLD: NORMAL
ABO GROUP BLD: NORMAL
ALBUMIN SERPL BCP-MCNC: 5.1 G/DL (ref 3.2–4.9)
ALBUMIN/GLOB SERPL: 1.8 G/DL
ALP SERPL-CCNC: 80 U/L (ref 30–99)
ALT SERPL-CCNC: 14 U/L (ref 2–50)
ANION GAP SERPL CALC-SCNC: 17 MMOL/L (ref 7–16)
ANION GAP SERPL CALC-SCNC: 18 MMOL/L (ref 7–16)
APTT PPP: 36.3 SEC (ref 24.7–36)
APTT PPP: 37.9 SEC (ref 24.7–36)
AST SERPL-CCNC: 23 U/L (ref 12–45)
BASE EXCESS BLDA CALC-SCNC: -7 MMOL/L (ref -4–3)
BASOPHILS # BLD AUTO: 0 % (ref 0–1.8)
BASOPHILS # BLD: 0 K/UL (ref 0–0.12)
BILIRUB SERPL-MCNC: 0.6 MG/DL (ref 0.1–1.5)
BLD GP AB SCN SERPL QL: NORMAL
BODY TEMPERATURE: ABNORMAL DEGREES
BREATHS SETTING VENT: 26
BUN SERPL-MCNC: 12 MG/DL (ref 8–22)
BUN SERPL-MCNC: 18 MG/DL (ref 8–22)
CALCIUM SERPL-MCNC: 10.5 MG/DL (ref 8.5–10.5)
CALCIUM SERPL-MCNC: 9.2 MG/DL (ref 8.5–10.5)
CFT BLD TEG: 8 MIN (ref 4.6–9.1)
CFT BLD TEG: 9.2 MIN (ref 4.6–9.1)
CFT BLD TEG: 9.3 MIN (ref 4.6–9.1)
CFT P HPASE BLD TEG: 7.6 MIN (ref 4.3–8.3)
CFT P HPASE BLD TEG: 8.2 MIN (ref 4.3–8.3)
CFT P HPASE BLD TEG: 8.7 MIN (ref 4.3–8.3)
CHLORIDE SERPL-SCNC: 103 MMOL/L (ref 96–112)
CHLORIDE SERPL-SCNC: 104 MMOL/L (ref 96–112)
CLOT ANGLE BLD TEG: 75.9 DEGREES (ref 63–78)
CLOT ANGLE BLD TEG: 76.1 DEGREES (ref 63–78)
CLOT ANGLE BLD TEG: 76.8 DEGREES (ref 63–78)
CLOT LYSIS 30M P MA LENFR BLD TEG: 0 % (ref 0–2.6)
CLOT LYSIS 30M P MA LENFR BLD TEG: 1.4 % (ref 0–2.6)
CLOT LYSIS 30M P MA LENFR BLD TEG: 2.1 % (ref 0–2.6)
CO2 BLDA-SCNC: 18 MMOL/L (ref 20–33)
CO2 SERPL-SCNC: 16 MMOL/L (ref 20–33)
CO2 SERPL-SCNC: 18 MMOL/L (ref 20–33)
CORTIS SERPL-MCNC: 29.9 UG/DL (ref 0–23)
CREAT SERPL-MCNC: 0.77 MG/DL (ref 0.5–1.4)
CREAT SERPL-MCNC: 0.98 MG/DL (ref 0.5–1.4)
CT.EXTRINSIC BLD ROTEM: 0.9 MIN (ref 0.8–2.1)
CT.EXTRINSIC BLD ROTEM: 1 MIN (ref 0.8–2.1)
CT.EXTRINSIC BLD ROTEM: 1.1 MIN (ref 0.8–2.1)
DELSYS IDSYS: ABNORMAL
EKG IMPRESSION: NORMAL
END TIDAL CARBON DIOXIDE IECO2: 30 MMHG
EOSINOPHIL # BLD AUTO: 0 K/UL (ref 0–0.51)
EOSINOPHIL NFR BLD: 0 % (ref 0–6.9)
ERYTHROCYTE [DISTWIDTH] IN BLOOD BY AUTOMATED COUNT: 44.3 FL (ref 35.9–50)
ERYTHROCYTE [DISTWIDTH] IN BLOOD BY AUTOMATED COUNT: 45.7 FL (ref 35.9–50)
EST. AVERAGE GLUCOSE BLD GHB EST-MCNC: 131 MG/DL
ETHANOL BLD-MCNC: <10.1 MG/DL
FIBRINOGEN PPP-MCNC: 444 MG/DL (ref 215–460)
GFR SERPLBLD CREATININE-BSD FMLA CKD-EPI: 101 ML/MIN/1.73 M 2
GFR SERPLBLD CREATININE-BSD FMLA CKD-EPI: 87 ML/MIN/1.73 M 2
GLOBULIN SER CALC-MCNC: 2.9 G/DL (ref 1.9–3.5)
GLUCOSE BLD STRIP.AUTO-MCNC: 147 MG/DL (ref 65–99)
GLUCOSE SERPL-MCNC: 116 MG/DL (ref 65–99)
GLUCOSE SERPL-MCNC: 166 MG/DL (ref 65–99)
HBA1C MFR BLD: 6.2 % (ref 4–5.6)
HCO3 BLDA-SCNC: 17.5 MMOL/L (ref 17–25)
HCT VFR BLD AUTO: 38.9 % (ref 42–52)
HCT VFR BLD AUTO: 42.3 % (ref 42–52)
HGB BLD-MCNC: 13.1 G/DL (ref 14–18)
HGB BLD-MCNC: 14.2 G/DL (ref 14–18)
HOROWITZ INDEX BLDA+IHG-RTO: 243 MM[HG]
IMM GRANULOCYTES # BLD AUTO: 0.05 K/UL (ref 0–0.11)
IMM GRANULOCYTES NFR BLD AUTO: 0.6 % (ref 0–0.9)
INR PPP: 1.12 (ref 0.87–1.13)
INR PPP: 1.34 (ref 0.87–1.13)
LACTATE SERPL-SCNC: 0.8 MMOL/L (ref 0.5–2)
LYMPHOCYTES # BLD AUTO: 0.48 K/UL (ref 1–4.8)
LYMPHOCYTES NFR BLD: 5.6 % (ref 22–41)
MAGNESIUM SERPL-MCNC: 1.7 MG/DL (ref 1.5–2.5)
MCF BLD TEG: 64.9 MM (ref 52–69)
MCF BLD TEG: 66.4 MM (ref 52–69)
MCF BLD TEG: 67.3 MM (ref 52–69)
MCF.PLATELET INHIB BLD ROTEM: 28.6 MM (ref 15–32)
MCF.PLATELET INHIB BLD ROTEM: 28.7 MM (ref 15–32)
MCF.PLATELET INHIB BLD ROTEM: 30.8 MM (ref 15–32)
MCH RBC QN AUTO: 29.6 PG (ref 27–33)
MCH RBC QN AUTO: 30.1 PG (ref 27–33)
MCHC RBC AUTO-ENTMCNC: 33.6 G/DL (ref 33.7–35.3)
MCHC RBC AUTO-ENTMCNC: 33.7 G/DL (ref 33.7–35.3)
MCV RBC AUTO: 88.1 FL (ref 81.4–97.8)
MCV RBC AUTO: 89.4 FL (ref 81.4–97.8)
MODE IMODE: ABNORMAL
MONOCYTES # BLD AUTO: 0.17 K/UL (ref 0–0.85)
MONOCYTES NFR BLD AUTO: 2 % (ref 0–13.4)
NEUTROPHILS # BLD AUTO: 7.81 K/UL (ref 1.82–7.42)
NEUTROPHILS NFR BLD: 91.8 % (ref 44–72)
NRBC # BLD AUTO: 0 K/UL
NRBC BLD-RTO: 0 /100 WBC
O2/TOTAL GAS SETTING VFR VENT: 40 %
PA AA BLD-ACNC: 95.7 % (ref 0–11)
PA AA BLD-ACNC: 95.8 % (ref 0–11)
PA AA BLD-ACNC: 96.1 % (ref 0–11)
PA ADP BLD-ACNC: 20 % (ref 0–17)
PA ADP BLD-ACNC: 43.5 % (ref 0–17)
PA ADP BLD-ACNC: 64.1 % (ref 0–17)
PCO2 BLDA: 33.3 MMHG (ref 26–37)
PCO2 TEMP ADJ BLDA: 33.7 MMHG (ref 26–37)
PEEP END EXPIRATORY PRESSURE IPEEP: 8 CMH20
PH BLDA: 7.33 [PH] (ref 7.4–7.5)
PH TEMP ADJ BLDA: 7.33 [PH] (ref 7.4–7.5)
PHOSPHATE SERPL-MCNC: 2.5 MG/DL (ref 2.5–4.5)
PLATELET # BLD AUTO: 301 K/UL (ref 164–446)
PLATELET # BLD AUTO: 302 K/UL (ref 164–446)
PMV BLD AUTO: 11.1 FL (ref 9–12.9)
PMV BLD AUTO: 11.4 FL (ref 9–12.9)
PO2 BLDA: 97 MMHG (ref 64–87)
PO2 TEMP ADJ BLDA: 98 MMHG (ref 64–87)
POTASSIUM SERPL-SCNC: 3.9 MMOL/L (ref 3.6–5.5)
POTASSIUM SERPL-SCNC: 3.9 MMOL/L (ref 3.6–5.5)
PROT SERPL-MCNC: 8 G/DL (ref 6–8.2)
PROTHROMBIN TIME: 14.3 SEC (ref 12–14.6)
PROTHROMBIN TIME: 16.3 SEC (ref 12–14.6)
RBC # BLD AUTO: 4.35 M/UL (ref 4.7–6.1)
RBC # BLD AUTO: 4.8 M/UL (ref 4.7–6.1)
RH BLD: NORMAL
SAO2 % BLDA: 97 % (ref 93–99)
SODIUM SERPL-SCNC: 136 MMOL/L (ref 135–145)
SODIUM SERPL-SCNC: 140 MMOL/L (ref 135–145)
SPECIMEN DRAWN FROM PATIENT: ABNORMAL
TEG ALGORITHM TGALG: ABNORMAL
TIDAL VOLUME IVT: 400 ML
TRIGL SERPL-MCNC: 233 MG/DL (ref 0–149)
WBC # BLD AUTO: 12.4 K/UL (ref 4.8–10.8)
WBC # BLD AUTO: 8.5 K/UL (ref 4.8–10.8)

## 2022-08-25 PROCEDURE — 30233R1 TRANSFUSION OF NONAUTOLOGOUS PLATELETS INTO PERIPHERAL VEIN, PERCUTANEOUS APPROACH: ICD-10-PCS | Performed by: NEUROLOGICAL SURGERY

## 2022-08-25 PROCEDURE — 94002 VENT MGMT INPAT INIT DAY: CPT

## 2022-08-25 PROCEDURE — 160048 HCHG OR STATISTICAL LEVEL 1-5: Performed by: NEUROLOGICAL SURGERY

## 2022-08-25 PROCEDURE — 96375 TX/PRO/DX INJ NEW DRUG ADDON: CPT | Mod: XU

## 2022-08-25 PROCEDURE — 36620 INSERTION CATHETER ARTERY: CPT | Mod: 59,RT | Performed by: ANESTHESIOLOGY

## 2022-08-25 PROCEDURE — A9270 NON-COVERED ITEM OR SERVICE: HCPCS | Performed by: SURGERY

## 2022-08-25 PROCEDURE — 700105 HCHG RX REV CODE 258: Performed by: NURSE PRACTITIONER

## 2022-08-25 PROCEDURE — 700111 HCHG RX REV CODE 636 W/ 250 OVERRIDE (IP): Mod: JG | Performed by: EMERGENCY MEDICINE

## 2022-08-25 PROCEDURE — 700105 HCHG RX REV CODE 258: Performed by: ANESTHESIOLOGY

## 2022-08-25 PROCEDURE — 700111 HCHG RX REV CODE 636 W/ 250 OVERRIDE (IP)

## 2022-08-25 PROCEDURE — 94799 UNLISTED PULMONARY SVC/PX: CPT

## 2022-08-25 PROCEDURE — 700102 HCHG RX REV CODE 250 W/ 637 OVERRIDE(OP): Performed by: NURSE PRACTITIONER

## 2022-08-25 PROCEDURE — 93005 ELECTROCARDIOGRAM TRACING: CPT | Performed by: SURGERY

## 2022-08-25 PROCEDURE — 80053 COMPREHEN METABOLIC PANEL: CPT

## 2022-08-25 PROCEDURE — 93005 ELECTROCARDIOGRAM TRACING: CPT | Performed by: NEUROLOGICAL SURGERY

## 2022-08-25 PROCEDURE — 85384 FIBRINOGEN ACTIVITY: CPT

## 2022-08-25 PROCEDURE — 85025 COMPLETE CBC W/AUTO DIFF WBC: CPT

## 2022-08-25 PROCEDURE — 85730 THROMBOPLASTIN TIME PARTIAL: CPT

## 2022-08-25 PROCEDURE — G0390 TRAUMA RESPONS W/HOSP CRITI: HCPCS

## 2022-08-25 PROCEDURE — 110454 HCHG SHELL REV 250: Performed by: NEUROLOGICAL SURGERY

## 2022-08-25 PROCEDURE — 85027 COMPLETE CBC AUTOMATED: CPT

## 2022-08-25 PROCEDURE — 82533 TOTAL CORTISOL: CPT

## 2022-08-25 PROCEDURE — 93010 ELECTROCARDIOGRAM REPORT: CPT | Performed by: INTERNAL MEDICINE

## 2022-08-25 PROCEDURE — 86900 BLOOD TYPING SEROLOGIC ABO: CPT

## 2022-08-25 PROCEDURE — 96366 THER/PROPH/DIAG IV INF ADDON: CPT | Mod: XU

## 2022-08-25 PROCEDURE — 94003 VENT MGMT INPAT SUBQ DAY: CPT

## 2022-08-25 PROCEDURE — 700101 HCHG RX REV CODE 250: Performed by: NEUROLOGICAL SURGERY

## 2022-08-25 PROCEDURE — 99233 SBSQ HOSP IP/OBS HIGH 50: CPT | Performed by: SURGERY

## 2022-08-25 PROCEDURE — 36430 TRANSFUSION BLD/BLD COMPNT: CPT

## 2022-08-25 PROCEDURE — 700101 HCHG RX REV CODE 250: Performed by: ANESTHESIOLOGY

## 2022-08-25 PROCEDURE — 82803 BLOOD GASES ANY COMBINATION: CPT

## 2022-08-25 PROCEDURE — 700101 HCHG RX REV CODE 250: Performed by: EMERGENCY MEDICINE

## 2022-08-25 PROCEDURE — 700111 HCHG RX REV CODE 636 W/ 250 OVERRIDE (IP): Performed by: ANESTHESIOLOGY

## 2022-08-25 PROCEDURE — 110371 HCHG SHELL REV 272: Performed by: NEUROLOGICAL SURGERY

## 2022-08-25 PROCEDURE — 83036 HEMOGLOBIN GLYCOSYLATED A1C: CPT

## 2022-08-25 PROCEDURE — 85384 FIBRINOGEN ACTIVITY: CPT | Mod: 91

## 2022-08-25 PROCEDURE — 160029 HCHG SURGERY MINUTES - 1ST 30 MINS LEVEL 4: Performed by: NEUROLOGICAL SURGERY

## 2022-08-25 PROCEDURE — 700111 HCHG RX REV CODE 636 W/ 250 OVERRIDE (IP): Performed by: SURGERY

## 2022-08-25 PROCEDURE — P9034 PLATELETS, PHERESIS: HCPCS

## 2022-08-25 PROCEDURE — 86901 BLOOD TYPING SEROLOGIC RH(D): CPT

## 2022-08-25 PROCEDURE — 80048 BASIC METABOLIC PNL TOTAL CA: CPT

## 2022-08-25 PROCEDURE — 85610 PROTHROMBIN TIME: CPT

## 2022-08-25 PROCEDURE — 160009 HCHG ANES TIME/MIN: Performed by: NEUROLOGICAL SURGERY

## 2022-08-25 PROCEDURE — 160041 HCHG SURGERY MINUTES - EA ADDL 1 MIN LEVEL 4: Performed by: NEUROLOGICAL SURGERY

## 2022-08-25 PROCEDURE — 5A1955Z RESPIRATORY VENTILATION, GREATER THAN 96 CONSECUTIVE HOURS: ICD-10-PCS | Performed by: NEUROLOGICAL SURGERY

## 2022-08-25 PROCEDURE — 700102 HCHG RX REV CODE 250 W/ 637 OVERRIDE(OP): Performed by: SURGERY

## 2022-08-25 PROCEDURE — 00C40ZZ EXTIRPATION OF MATTER FROM INTRACRANIAL SUBDURAL SPACE, OPEN APPROACH: ICD-10-PCS | Performed by: NEUROLOGICAL SURGERY

## 2022-08-25 PROCEDURE — 99291 CRITICAL CARE FIRST HOUR: CPT | Performed by: SURGERY

## 2022-08-25 PROCEDURE — 700111 HCHG RX REV CODE 636 W/ 250 OVERRIDE (IP): Performed by: NEUROLOGICAL SURGERY

## 2022-08-25 PROCEDURE — 00211 ANES ICR PX CRNEC/CRNOT HMTM: CPT | Performed by: ANESTHESIOLOGY

## 2022-08-25 PROCEDURE — 36415 COLL VENOUS BLD VENIPUNCTURE: CPT

## 2022-08-25 PROCEDURE — A9270 NON-COVERED ITEM OR SERVICE: HCPCS | Performed by: NEUROLOGICAL SURGERY

## 2022-08-25 PROCEDURE — 70450 CT HEAD/BRAIN W/O DYE: CPT

## 2022-08-25 PROCEDURE — 82962 GLUCOSE BLOOD TEST: CPT | Mod: 91

## 2022-08-25 PROCEDURE — 700101 HCHG RX REV CODE 250: Performed by: NURSE PRACTITIONER

## 2022-08-25 PROCEDURE — C1713 ANCHOR/SCREW BN/BN,TIS/BN: HCPCS | Performed by: NEUROLOGICAL SURGERY

## 2022-08-25 PROCEDURE — 700101 HCHG RX REV CODE 250

## 2022-08-25 PROCEDURE — 84478 ASSAY OF TRIGLYCERIDES: CPT

## 2022-08-25 PROCEDURE — 37799 UNLISTED PX VASCULAR SURGERY: CPT

## 2022-08-25 PROCEDURE — 700105 HCHG RX REV CODE 258: Performed by: EMERGENCY MEDICINE

## 2022-08-25 PROCEDURE — 99291 CRITICAL CARE FIRST HOUR: CPT

## 2022-08-25 PROCEDURE — 85576 BLOOD PLATELET AGGREGATION: CPT | Mod: 91

## 2022-08-25 PROCEDURE — 96365 THER/PROPH/DIAG IV INF INIT: CPT | Mod: XU

## 2022-08-25 PROCEDURE — 82077 ASSAY SPEC XCP UR&BREATH IA: CPT

## 2022-08-25 PROCEDURE — 700111 HCHG RX REV CODE 636 W/ 250 OVERRIDE (IP): Performed by: NURSE PRACTITIONER

## 2022-08-25 PROCEDURE — 85347 COAGULATION TIME ACTIVATED: CPT | Mod: 91

## 2022-08-25 PROCEDURE — 86850 RBC ANTIBODY SCREEN: CPT

## 2022-08-25 PROCEDURE — 83735 ASSAY OF MAGNESIUM: CPT

## 2022-08-25 PROCEDURE — 770022 HCHG ROOM/CARE - ICU (200)

## 2022-08-25 PROCEDURE — 700101 HCHG RX REV CODE 250: Performed by: SURGERY

## 2022-08-25 PROCEDURE — 83605 ASSAY OF LACTIC ACID: CPT

## 2022-08-25 PROCEDURE — 700102 HCHG RX REV CODE 250 W/ 637 OVERRIDE(OP): Performed by: NEUROLOGICAL SURGERY

## 2022-08-25 PROCEDURE — 30283B1 TRANSFUSION OF NONAUTOLOGOUS 4-FACTOR PROTHROMBIN COMPLEX CONCENTRATE INTO VEIN, PERCUTANEOUS APPROACH: ICD-10-PCS | Performed by: EMERGENCY MEDICINE

## 2022-08-25 PROCEDURE — 84100 ASSAY OF PHOSPHORUS: CPT

## 2022-08-25 DEVICE — SCREW STRYK NC 1.5X5MM (6NCX40=240) (80EA/PK) CONSIGNED QTY 240 PRE-LOAD: Type: IMPLANTABLE DEVICE | Site: CRANIAL | Status: FUNCTIONAL

## 2022-08-25 DEVICE — PLATE NC BURR HOLE COVER 10MM (6NCX6=36): Type: IMPLANTABLE DEVICE | Site: CRANIAL | Status: FUNCTIONAL

## 2022-08-25 DEVICE — PLATE NC DOGBONE 2-HOLE RIGID GOLD 0.6MM (6NCX4=24): Type: IMPLANTABLE DEVICE | Site: CRANIAL | Status: FUNCTIONAL

## 2022-08-25 RX ORDER — LIDOCAINE HYDROCHLORIDE 20 MG/ML
INJECTION, SOLUTION EPIDURAL; INFILTRATION; INTRACAUDAL; PERINEURAL PRN
Status: DISCONTINUED | OUTPATIENT
Start: 2022-08-25 | End: 2022-08-25 | Stop reason: SURG

## 2022-08-25 RX ORDER — VENLAFAXINE HYDROCHLORIDE 75 MG/1
150 CAPSULE, EXTENDED RELEASE ORAL DAILY
Status: DISCONTINUED | OUTPATIENT
Start: 2022-08-25 | End: 2022-08-25

## 2022-08-25 RX ORDER — ACETAMINOPHEN 650 MG/1
975 SUPPOSITORY RECTAL EVERY 6 HOURS PRN
Status: DISCONTINUED | OUTPATIENT
Start: 2022-08-30 | End: 2022-08-26

## 2022-08-25 RX ORDER — CEFAZOLIN SODIUM 2 G/100ML
2 INJECTION, SOLUTION INTRAVENOUS EVERY 8 HOURS
Status: COMPLETED | OUTPATIENT
Start: 2022-08-25 | End: 2022-08-26

## 2022-08-25 RX ORDER — ONDANSETRON 4 MG/1
4 TABLET, ORALLY DISINTEGRATING ORAL EVERY 4 HOURS PRN
Status: DISCONTINUED | OUTPATIENT
Start: 2022-08-25 | End: 2022-09-09 | Stop reason: HOSPADM

## 2022-08-25 RX ORDER — AMOXICILLIN 250 MG
1 CAPSULE ORAL NIGHTLY
Status: DISCONTINUED | OUTPATIENT
Start: 2022-08-25 | End: 2022-08-25

## 2022-08-25 RX ORDER — HYDROMORPHONE HYDROCHLORIDE 1 MG/ML
INJECTION, SOLUTION INTRAMUSCULAR; INTRAVENOUS; SUBCUTANEOUS
Status: COMPLETED | OUTPATIENT
Start: 2022-08-25 | End: 2022-08-25

## 2022-08-25 RX ORDER — TAMSULOSIN HYDROCHLORIDE 0.4 MG/1
0.4 CAPSULE ORAL EVERY EVENING
Status: DISCONTINUED | OUTPATIENT
Start: 2022-08-25 | End: 2022-08-25

## 2022-08-25 RX ORDER — LABETALOL HYDROCHLORIDE 5 MG/ML
10 INJECTION, SOLUTION INTRAVENOUS EVERY 4 HOURS PRN
Status: DISCONTINUED | OUTPATIENT
Start: 2022-08-25 | End: 2022-09-08

## 2022-08-25 RX ORDER — AMOXICILLIN 250 MG
1 CAPSULE ORAL
Status: DISCONTINUED | OUTPATIENT
Start: 2022-08-25 | End: 2022-08-25

## 2022-08-25 RX ORDER — LEVETIRACETAM 500 MG/5ML
1000 INJECTION, SOLUTION, CONCENTRATE INTRAVENOUS EVERY 12 HOURS
Status: DISCONTINUED | OUTPATIENT
Start: 2022-08-25 | End: 2022-08-27

## 2022-08-25 RX ORDER — ATORVASTATIN CALCIUM 80 MG/1
80 TABLET, FILM COATED ORAL EVERY EVENING
Status: DISCONTINUED | OUTPATIENT
Start: 2022-08-26 | End: 2022-09-09 | Stop reason: HOSPADM

## 2022-08-25 RX ORDER — LABETALOL HYDROCHLORIDE 5 MG/ML
INJECTION, SOLUTION INTRAVENOUS
Status: COMPLETED | OUTPATIENT
Start: 2022-08-25 | End: 2022-08-25

## 2022-08-25 RX ORDER — ACETAMINOPHEN 325 MG/1
650 TABLET ORAL EVERY 6 HOURS
Status: DISCONTINUED | OUTPATIENT
Start: 2022-08-25 | End: 2022-08-25

## 2022-08-25 RX ORDER — VANCOMYCIN HYDROCHLORIDE 1 G/20ML
INJECTION, POWDER, LYOPHILIZED, FOR SOLUTION INTRAVENOUS
Status: COMPLETED | OUTPATIENT
Start: 2022-08-25 | End: 2022-08-25

## 2022-08-25 RX ORDER — DEXAMETHASONE SODIUM PHOSPHATE 4 MG/ML
INJECTION, SOLUTION INTRA-ARTICULAR; INTRALESIONAL; INTRAMUSCULAR; INTRAVENOUS; SOFT TISSUE PRN
Status: DISCONTINUED | OUTPATIENT
Start: 2022-08-25 | End: 2022-08-25 | Stop reason: SURG

## 2022-08-25 RX ORDER — BISACODYL 10 MG
10 SUPPOSITORY, RECTAL RECTAL
Status: DISCONTINUED | OUTPATIENT
Start: 2022-08-25 | End: 2022-09-09 | Stop reason: HOSPADM

## 2022-08-25 RX ORDER — ACETAMINOPHEN 650 MG/1
650 SUPPOSITORY RECTAL EVERY 4 HOURS PRN
Status: DISCONTINUED | OUTPATIENT
Start: 2022-08-25 | End: 2022-08-26

## 2022-08-25 RX ORDER — FINASTERIDE 5 MG/1
5 TABLET, FILM COATED ORAL EVERY EVENING
Status: DISCONTINUED | OUTPATIENT
Start: 2022-08-25 | End: 2022-08-26

## 2022-08-25 RX ORDER — ACETAMINOPHEN 325 MG/1
650 TABLET ORAL EVERY 6 HOURS PRN
Status: DISCONTINUED | OUTPATIENT
Start: 2022-08-30 | End: 2022-08-25

## 2022-08-25 RX ORDER — ACETAMINOPHEN 650 MG/1
975 SUPPOSITORY RECTAL EVERY 6 HOURS
Status: DISCONTINUED | OUTPATIENT
Start: 2022-08-25 | End: 2022-08-26

## 2022-08-25 RX ORDER — ACETAMINOPHEN 325 MG/1
650 TABLET ORAL EVERY 4 HOURS PRN
Status: DISCONTINUED | OUTPATIENT
Start: 2022-08-25 | End: 2022-08-25

## 2022-08-25 RX ORDER — DOCUSATE SODIUM 50 MG/5ML
100 LIQUID ORAL 2 TIMES DAILY
Status: DISCONTINUED | OUTPATIENT
Start: 2022-08-26 | End: 2022-09-09 | Stop reason: HOSPADM

## 2022-08-25 RX ORDER — HYDROMORPHONE HYDROCHLORIDE 1 MG/ML
0.5 INJECTION, SOLUTION INTRAMUSCULAR; INTRAVENOUS; SUBCUTANEOUS
Status: DISCONTINUED | OUTPATIENT
Start: 2022-08-25 | End: 2022-08-25

## 2022-08-25 RX ORDER — SODIUM CHLORIDE AND POTASSIUM CHLORIDE 150; 900 MG/100ML; MG/100ML
INJECTION, SOLUTION INTRAVENOUS CONTINUOUS
Status: DISCONTINUED | OUTPATIENT
Start: 2022-08-25 | End: 2022-09-03

## 2022-08-25 RX ORDER — LEVETIRACETAM 500 MG/5ML
500 INJECTION, SOLUTION, CONCENTRATE INTRAVENOUS EVERY 12 HOURS
Status: DISCONTINUED | OUTPATIENT
Start: 2022-08-25 | End: 2022-08-25

## 2022-08-25 RX ORDER — LEVETIRACETAM 500 MG/5ML
1000 INJECTION, SOLUTION, CONCENTRATE INTRAVENOUS ONCE
Status: COMPLETED | OUTPATIENT
Start: 2022-08-25 | End: 2022-08-25

## 2022-08-25 RX ORDER — DOCUSATE SODIUM 100 MG/1
100 CAPSULE, LIQUID FILLED ORAL 2 TIMES DAILY
Status: DISCONTINUED | OUTPATIENT
Start: 2022-08-25 | End: 2022-08-25

## 2022-08-25 RX ORDER — MIDAZOLAM HYDROCHLORIDE 1 MG/ML
INJECTION INTRAMUSCULAR; INTRAVENOUS PRN
Status: DISCONTINUED | OUTPATIENT
Start: 2022-08-25 | End: 2022-08-25 | Stop reason: SURG

## 2022-08-25 RX ORDER — MIDAZOLAM HYDROCHLORIDE 1 MG/ML
2 INJECTION INTRAMUSCULAR; INTRAVENOUS ONCE
Status: COMPLETED | OUTPATIENT
Start: 2022-08-25 | End: 2022-08-25

## 2022-08-25 RX ORDER — BUPIVACAINE HYDROCHLORIDE AND EPINEPHRINE 5; 5 MG/ML; UG/ML
INJECTION, SOLUTION EPIDURAL; INTRACAUDAL; PERINEURAL
Status: DISCONTINUED | OUTPATIENT
Start: 2022-08-25 | End: 2022-08-25 | Stop reason: HOSPADM

## 2022-08-25 RX ORDER — PHENYLEPHRINE HYDROCHLORIDE 10 MG/ML
INJECTION, SOLUTION INTRAMUSCULAR; INTRAVENOUS; SUBCUTANEOUS PRN
Status: DISCONTINUED | OUTPATIENT
Start: 2022-08-25 | End: 2022-08-25 | Stop reason: SURG

## 2022-08-25 RX ORDER — VENLAFAXINE 75 MG/1
75 TABLET ORAL 2 TIMES DAILY
Status: DISCONTINUED | OUTPATIENT
Start: 2022-08-26 | End: 2022-09-09 | Stop reason: HOSPADM

## 2022-08-25 RX ORDER — LABETALOL HYDROCHLORIDE 5 MG/ML
INJECTION, SOLUTION INTRAVENOUS
Status: COMPLETED
Start: 2022-08-25 | End: 2022-08-25

## 2022-08-25 RX ORDER — MIDAZOLAM HYDROCHLORIDE 1 MG/ML
INJECTION INTRAMUSCULAR; INTRAVENOUS
Status: ACTIVE
Start: 2022-08-25 | End: 2022-08-25

## 2022-08-25 RX ORDER — ACETAMINOPHEN 325 MG/1
650 TABLET ORAL EVERY 4 HOURS PRN
Status: DISCONTINUED | OUTPATIENT
Start: 2022-08-25 | End: 2022-08-26

## 2022-08-25 RX ORDER — METOPROLOL TARTRATE 1 MG/ML
5 INJECTION, SOLUTION INTRAVENOUS
Status: DISCONTINUED | OUTPATIENT
Start: 2022-08-25 | End: 2022-08-26

## 2022-08-25 RX ORDER — AMOXICILLIN 250 MG
1 CAPSULE ORAL NIGHTLY
Status: DISCONTINUED | OUTPATIENT
Start: 2022-08-26 | End: 2022-09-09 | Stop reason: HOSPADM

## 2022-08-25 RX ORDER — CLONIDINE HYDROCHLORIDE 0.1 MG/1
0.1 TABLET ORAL EVERY 4 HOURS PRN
Status: DISCONTINUED | OUTPATIENT
Start: 2022-08-25 | End: 2022-09-05

## 2022-08-25 RX ORDER — CEFAZOLIN SODIUM 1 G/3ML
INJECTION, POWDER, FOR SOLUTION INTRAMUSCULAR; INTRAVENOUS PRN
Status: DISCONTINUED | OUTPATIENT
Start: 2022-08-25 | End: 2022-08-25 | Stop reason: SURG

## 2022-08-25 RX ORDER — MIDAZOLAM HYDROCHLORIDE 1 MG/ML
INJECTION INTRAMUSCULAR; INTRAVENOUS
Status: COMPLETED
Start: 2022-08-25 | End: 2022-08-25

## 2022-08-25 RX ORDER — HYDRALAZINE HYDROCHLORIDE 20 MG/ML
INJECTION INTRAMUSCULAR; INTRAVENOUS
Status: COMPLETED
Start: 2022-08-25 | End: 2022-08-25

## 2022-08-25 RX ORDER — ACETAMINOPHEN 650 MG/1
650 SUPPOSITORY RECTAL EVERY 4 HOURS PRN
Status: DISCONTINUED | OUTPATIENT
Start: 2022-08-25 | End: 2022-08-25

## 2022-08-25 RX ORDER — ONDANSETRON 2 MG/ML
4 INJECTION INTRAMUSCULAR; INTRAVENOUS EVERY 4 HOURS PRN
Status: DISCONTINUED | OUTPATIENT
Start: 2022-08-25 | End: 2022-09-09 | Stop reason: HOSPADM

## 2022-08-25 RX ORDER — POLYETHYLENE GLYCOL 3350 17 G/17G
1 POWDER, FOR SOLUTION ORAL 2 TIMES DAILY
Status: DISCONTINUED | OUTPATIENT
Start: 2022-08-26 | End: 2022-09-09 | Stop reason: HOSPADM

## 2022-08-25 RX ORDER — ATORVASTATIN CALCIUM 40 MG/1
80 TABLET, FILM COATED ORAL EVERY EVENING
Status: DISCONTINUED | OUTPATIENT
Start: 2022-08-25 | End: 2022-08-25

## 2022-08-25 RX ORDER — HYDRALAZINE HYDROCHLORIDE 20 MG/ML
INJECTION INTRAMUSCULAR; INTRAVENOUS
Status: COMPLETED | OUTPATIENT
Start: 2022-08-25 | End: 2022-08-25

## 2022-08-25 RX ORDER — SODIUM CHLORIDE 9 MG/ML
INJECTION, SOLUTION INTRAVENOUS CONTINUOUS
Status: DISCONTINUED | OUTPATIENT
Start: 2022-08-25 | End: 2022-08-25

## 2022-08-25 RX ORDER — MORPHINE SULFATE 4 MG/ML
INJECTION INTRAVENOUS
Status: COMPLETED | OUTPATIENT
Start: 2022-08-25 | End: 2022-08-25

## 2022-08-25 RX ORDER — CARVEDILOL 6.25 MG/1
6.25 TABLET ORAL 2 TIMES DAILY
Status: DISCONTINUED | OUTPATIENT
Start: 2022-08-25 | End: 2022-08-25

## 2022-08-25 RX ORDER — FAMOTIDINE 20 MG/1
20 TABLET, FILM COATED ORAL 2 TIMES DAILY
Status: DISCONTINUED | OUTPATIENT
Start: 2022-08-25 | End: 2022-08-27

## 2022-08-25 RX ORDER — ENEMA 19; 7 G/133ML; G/133ML
1 ENEMA RECTAL
Status: DISCONTINUED | OUTPATIENT
Start: 2022-08-25 | End: 2022-09-09 | Stop reason: HOSPADM

## 2022-08-25 RX ORDER — ROCURONIUM BROMIDE 10 MG/ML
INJECTION, SOLUTION INTRAVENOUS PRN
Status: DISCONTINUED | OUTPATIENT
Start: 2022-08-25 | End: 2022-08-25 | Stop reason: SURG

## 2022-08-25 RX ORDER — POLYETHYLENE GLYCOL 3350 17 G/17G
1 POWDER, FOR SOLUTION ORAL 2 TIMES DAILY
Status: DISCONTINUED | OUTPATIENT
Start: 2022-08-25 | End: 2022-08-25

## 2022-08-25 RX ORDER — HYDROMORPHONE HYDROCHLORIDE 1 MG/ML
1 INJECTION, SOLUTION INTRAMUSCULAR; INTRAVENOUS; SUBCUTANEOUS
Status: DISCONTINUED | OUTPATIENT
Start: 2022-08-25 | End: 2022-08-25

## 2022-08-25 RX ORDER — LEVETIRACETAM 500 MG/1
500 TABLET ORAL EVERY 12 HOURS
Status: DISCONTINUED | OUTPATIENT
Start: 2022-08-25 | End: 2022-08-25

## 2022-08-25 RX ORDER — CARVEDILOL 6.25 MG/1
6.25 TABLET ORAL 2 TIMES DAILY
Status: DISCONTINUED | OUTPATIENT
Start: 2022-08-26 | End: 2022-08-25

## 2022-08-25 RX ORDER — AMOXICILLIN 250 MG
1 CAPSULE ORAL
Status: DISCONTINUED | OUTPATIENT
Start: 2022-08-25 | End: 2022-09-09 | Stop reason: HOSPADM

## 2022-08-25 RX ORDER — ONDANSETRON 4 MG/1
4 TABLET, ORALLY DISINTEGRATING ORAL EVERY 4 HOURS PRN
Status: DISCONTINUED | OUTPATIENT
Start: 2022-08-25 | End: 2022-08-25

## 2022-08-25 RX ORDER — HYDRALAZINE HYDROCHLORIDE 20 MG/ML
10 INJECTION INTRAMUSCULAR; INTRAVENOUS
Status: DISCONTINUED | OUTPATIENT
Start: 2022-08-25 | End: 2022-09-09 | Stop reason: HOSPADM

## 2022-08-25 RX ORDER — CLONIDINE HYDROCHLORIDE 0.1 MG/1
0.1 TABLET ORAL EVERY 4 HOURS PRN
Status: DISCONTINUED | OUTPATIENT
Start: 2022-08-25 | End: 2022-08-25

## 2022-08-25 RX ORDER — CARVEDILOL 6.25 MG/1
6.25 TABLET ORAL 2 TIMES DAILY
Status: DISCONTINUED | OUTPATIENT
Start: 2022-08-25 | End: 2022-08-29

## 2022-08-25 RX ADMIN — HYDROMORPHONE HYDROCHLORIDE 1 MG: 1 INJECTION, SOLUTION INTRAMUSCULAR; INTRAVENOUS; SUBCUTANEOUS at 05:25

## 2022-08-25 RX ADMIN — PHENYLEPHRINE HYDROCHLORIDE 200 MCG: 10 INJECTION INTRAVENOUS at 18:06

## 2022-08-25 RX ADMIN — HYDRALAZINE HYDROCHLORIDE: 20 INJECTION INTRAMUSCULAR; INTRAVENOUS at 05:30

## 2022-08-25 RX ADMIN — PROPOFOL 150 MG: 10 INJECTION, EMULSION INTRAVENOUS at 17:53

## 2022-08-25 RX ADMIN — CEFAZOLIN SODIUM 2 G: 2 INJECTION, SOLUTION INTRAVENOUS at 22:26

## 2022-08-25 RX ADMIN — LEVETIRACETAM 1000 MG: 100 INJECTION, SOLUTION INTRAVENOUS at 17:56

## 2022-08-25 RX ADMIN — CARVEDILOL 6.25 MG: 6.25 TABLET, FILM COATED ORAL at 23:28

## 2022-08-25 RX ADMIN — LABETALOL HYDROCHLORIDE: 5 INJECTION, SOLUTION INTRAVENOUS at 05:30

## 2022-08-25 RX ADMIN — DEXAMETHASONE SODIUM PHOSPHATE 10 MG: 4 INJECTION, SOLUTION INTRA-ARTICULAR; INTRALESIONAL; INTRAMUSCULAR; INTRAVENOUS; SOFT TISSUE at 17:53

## 2022-08-25 RX ADMIN — HYDROMORPHONE HYDROCHLORIDE 1 MG: 1 INJECTION, SOLUTION INTRAMUSCULAR; INTRAVENOUS; SUBCUTANEOUS at 09:10

## 2022-08-25 RX ADMIN — CEFAZOLIN 2 G: 330 INJECTION, POWDER, FOR SOLUTION INTRAMUSCULAR; INTRAVENOUS at 17:53

## 2022-08-25 RX ADMIN — HYDROMORPHONE HYDROCHLORIDE 1 MG: 1 INJECTION, SOLUTION INTRAMUSCULAR; INTRAVENOUS; SUBCUTANEOUS at 21:05

## 2022-08-25 RX ADMIN — FENTANYL CITRATE 50 MCG: 50 INJECTION, SOLUTION INTRAMUSCULAR; INTRAVENOUS at 23:54

## 2022-08-25 RX ADMIN — ACETAMINOPHEN 975 MG: 650 SUPPOSITORY RECTAL at 23:13

## 2022-08-25 RX ADMIN — LABETALOL HYDROCHLORIDE 10 MG: 5 INJECTION, SOLUTION INTRAVENOUS at 20:36

## 2022-08-25 RX ADMIN — PROPOFOL 50 MG: 10 INJECTION, EMULSION INTRAVENOUS at 18:23

## 2022-08-25 RX ADMIN — LABETALOL HYDROCHLORIDE 20 MG: 5 INJECTION, SOLUTION INTRAVENOUS at 05:26

## 2022-08-25 RX ADMIN — MIDAZOLAM 2 MG: 1 INJECTION INTRAMUSCULAR; INTRAVENOUS at 10:30

## 2022-08-25 RX ADMIN — FENTANYL CITRATE 150 MCG: 50 INJECTION, SOLUTION INTRAMUSCULAR; INTRAVENOUS at 18:26

## 2022-08-25 RX ADMIN — LEVETIRACETAM 1000 MG: 100 INJECTION, SOLUTION INTRAVENOUS at 06:29

## 2022-08-25 RX ADMIN — PROTHROMBIN, COAGULATION FACTOR VII HUMAN, COAGULATION FACTOR IX HUMAN, COAGULATION FACTOR X HUMAN, PROTEIN C, PROTEIN S HUMAN, AND WATER 5000 UNITS: KIT at 05:55

## 2022-08-25 RX ADMIN — ROCURONIUM BROMIDE 50 MG: 10 INJECTION, SOLUTION INTRAVENOUS at 18:40

## 2022-08-25 RX ADMIN — MIDAZOLAM HYDROCHLORIDE 2 MG: 1 INJECTION INTRAMUSCULAR; INTRAVENOUS at 10:30

## 2022-08-25 RX ADMIN — LIDOCAINE HYDROCHLORIDE 100 MG: 20 INJECTION, SOLUTION EPIDURAL; INFILTRATION; INTRACAUDAL at 17:53

## 2022-08-25 RX ADMIN — HYDRALAZINE HYDROCHLORIDE 10 MG: 20 INJECTION INTRAMUSCULAR; INTRAVENOUS at 05:20

## 2022-08-25 RX ADMIN — ROCURONIUM BROMIDE 50 MG: 10 INJECTION, SOLUTION INTRAVENOUS at 19:17

## 2022-08-25 RX ADMIN — MINERAL OIL, PETROLATUM 1 APPLICATION: 425; 573 OINTMENT OPHTHALMIC at 22:26

## 2022-08-25 RX ADMIN — POTASSIUM CHLORIDE AND SODIUM CHLORIDE: 900; 150 INJECTION, SOLUTION INTRAVENOUS at 22:26

## 2022-08-25 RX ADMIN — ROCURONIUM BROMIDE 50 MG: 10 INJECTION, SOLUTION INTRAVENOUS at 17:53

## 2022-08-25 RX ADMIN — HYDROMORPHONE HYDROCHLORIDE 1 MG: 1 INJECTION, SOLUTION INTRAMUSCULAR; INTRAVENOUS; SUBCUTANEOUS at 14:47

## 2022-08-25 RX ADMIN — MORPHINE SULFATE 4 MG: 4 INJECTION INTRAVENOUS at 05:20

## 2022-08-25 RX ADMIN — HYDROMORPHONE HYDROCHLORIDE 1 MG: 1 INJECTION, SOLUTION INTRAMUSCULAR; INTRAVENOUS; SUBCUTANEOUS at 10:51

## 2022-08-25 RX ADMIN — NICARDIPINE HYDROCHLORIDE 7.5 MG/HR: 25 INJECTION, SOLUTION INTRAVENOUS at 11:39

## 2022-08-25 RX ADMIN — MIDAZOLAM HYDROCHLORIDE 2 MG: 1 INJECTION, SOLUTION INTRAMUSCULAR; INTRAVENOUS at 17:51

## 2022-08-25 RX ADMIN — HYDROMORPHONE HYDROCHLORIDE 1 MG: 1 INJECTION, SOLUTION INTRAMUSCULAR; INTRAVENOUS; SUBCUTANEOUS at 17:00

## 2022-08-25 RX ADMIN — PROPOFOL 50 MCG/KG/MIN: 10 INJECTION, EMULSION INTRAVENOUS at 18:03

## 2022-08-25 RX ADMIN — FENTANYL CITRATE 100 MCG: 50 INJECTION, SOLUTION INTRAMUSCULAR; INTRAVENOUS at 17:51

## 2022-08-25 RX ADMIN — SODIUM CHLORIDE: 9 INJECTION, SOLUTION INTRAVENOUS at 13:54

## 2022-08-25 RX ADMIN — PHENYLEPHRINE HYDROCHLORIDE 100 MCG/MIN: 10 INJECTION INTRAVENOUS at 18:00

## 2022-08-25 RX ADMIN — HYDROMORPHONE HYDROCHLORIDE 1 MG: 1 INJECTION, SOLUTION INTRAMUSCULAR; INTRAVENOUS; SUBCUTANEOUS at 16:03

## 2022-08-25 RX ADMIN — NICARDIPINE HYDROCHLORIDE 5 MG/HR: 25 INJECTION, SOLUTION INTRAVENOUS at 20:51

## 2022-08-25 RX ADMIN — PROPOFOL 15 MCG/KG/MIN: 10 INJECTION, EMULSION INTRAVENOUS at 20:15

## 2022-08-25 RX ADMIN — FAMOTIDINE 20 MG: 10 INJECTION, SOLUTION INTRAVENOUS at 13:54

## 2022-08-25 RX ADMIN — HYDROMORPHONE HYDROCHLORIDE 1 MG: 1 INJECTION, SOLUTION INTRAMUSCULAR; INTRAVENOUS; SUBCUTANEOUS at 22:55

## 2022-08-25 RX ADMIN — PHENYLEPHRINE HYDROCHLORIDE 200 MCG: 10 INJECTION INTRAVENOUS at 17:56

## 2022-08-25 RX ADMIN — NICARDIPINE HYDROCHLORIDE 7.5 MG/HR: 25 INJECTION, SOLUTION INTRAVENOUS at 15:28

## 2022-08-25 RX ADMIN — INSULIN HUMAN 1 UNITS: 100 INJECTION, SOLUTION PARENTERAL at 23:18

## 2022-08-25 RX ADMIN — NICARDIPINE HYDROCHLORIDE 5 MG/HR: 25 INJECTION, SOLUTION INTRAVENOUS at 05:42

## 2022-08-25 RX ADMIN — METOPROLOL TARTRATE 5 MG: 1 INJECTION, SOLUTION INTRAVENOUS at 23:13

## 2022-08-25 ASSESSMENT — COGNITIVE AND FUNCTIONAL STATUS - GENERAL
TOILETING: A LITTLE
DRESSING REGULAR UPPER BODY CLOTHING: A LITTLE
SUGGESTED CMS G CODE MODIFIER DAILY ACTIVITY: CK
DRESSING REGULAR LOWER BODY CLOTHING: A LITTLE
EATING MEALS: A LOT
PERSONAL GROOMING: A LOT
WALKING IN HOSPITAL ROOM: A LITTLE
STANDING UP FROM CHAIR USING ARMS: A LITTLE
HELP NEEDED FOR BATHING: A LITTLE
SUGGESTED CMS G CODE MODIFIER MOBILITY: CJ
MOBILITY SCORE: 22
DAILY ACTIVITIY SCORE: 16

## 2022-08-25 ASSESSMENT — PAIN DESCRIPTION - PAIN TYPE
TYPE: ACUTE PAIN
TYPE: ACUTE PAIN
TYPE: ACUTE PAIN;SURGICAL PAIN
TYPE: ACUTE PAIN
TYPE: ACUTE PAIN
TYPE: ACUTE PAIN;SURGICAL PAIN
TYPE: ACUTE PAIN
TYPE: ACUTE PAIN;SURGICAL PAIN
TYPE: ACUTE PAIN
TYPE: ACUTE PAIN;SURGICAL PAIN
TYPE: ACUTE PAIN;SURGICAL PAIN
TYPE: ACUTE PAIN
TYPE: ACUTE PAIN;SURGICAL PAIN
TYPE: ACUTE PAIN
TYPE: ACUTE PAIN;SURGICAL PAIN
TYPE: ACUTE PAIN

## 2022-08-25 ASSESSMENT — ENCOUNTER SYMPTOMS: HEADACHES: 1

## 2022-08-25 ASSESSMENT — LIFESTYLE VARIABLES
TOTAL SCORE: 0
EVER FELT BAD OR GUILTY ABOUT YOUR DRINKING: NO
CONSUMPTION TOTAL: NEGATIVE
DOES PATIENT WANT TO STOP DRINKING: NO
TOTAL SCORE: 0
ON A TYPICAL DAY WHEN YOU DRINK ALCOHOL HOW MANY DRINKS DO YOU HAVE: 0
HAVE PEOPLE ANNOYED YOU BY CRITICIZING YOUR DRINKING: NO
ALCOHOL_USE: NO
EVER HAD A DRINK FIRST THING IN THE MORNING TO STEADY YOUR NERVES TO GET RID OF A HANGOVER: NO
AVERAGE NUMBER OF DAYS PER WEEK YOU HAVE A DRINK CONTAINING ALCOHOL: 0
HAVE YOU EVER FELT YOU SHOULD CUT DOWN ON YOUR DRINKING: NO
TOTAL SCORE: 0
HOW MANY TIMES IN THE PAST YEAR HAVE YOU HAD 5 OR MORE DRINKS IN A DAY: 0

## 2022-08-25 ASSESSMENT — FIBROSIS 4 INDEX: FIB4 SCORE: 1.26

## 2022-08-25 ASSESSMENT — PAIN SCALES - GENERAL: PAIN_LEVEL: 0

## 2022-08-25 NOTE — ASSESSMENT & PLAN NOTE
Chronic condition treated with carvedilol and apixaban (Eliquis) anticoagulation.  Carvedilol resumed upon admission.  Systemic anticoagulation held on admission.

## 2022-08-25 NOTE — ED NOTES
Pt found walking around room, states that he was told he if fine and his wife is coming to pick him up. Pt educated on his results and that he is being admitted. Pt reoriented, cooperative and back in [facility]. Iv on left AC was accidentally pulled out while getting up. New IV placed.

## 2022-08-25 NOTE — ASSESSMENT & PLAN NOTE
Drug-eluting stents placed in the mid RCA and PDA August 2020.   Daily aspirin administration.  Maintenance medication held on admission

## 2022-08-25 NOTE — ED NOTES
Unable to obtain med rec at this time  Patient states his wife, Alesia, handles all his medications and we will need to contact her. Patient provided number: [phone]

## 2022-08-25 NOTE — H&P
CHIEF COMPLAINT: Ground-level fall, intracranial hemorrhage.     HISTORY OF PRESENT ILLNESS: The patient is a 62 year-old man who was injured in a fall. The patient suffered a mechanical ground-level fall. The patient had an unknown loss of consciousness. The patient is chronically anticoagulated with apixaban (Eliquis) for atrial fibrillation and takes aspirin antiplatelet therapy for history of coronary artery disease and prior placement of drug-eluting cardiac stents. His last dose of medication was taken yesterday.  He complains of headache.    TRIAGE CATEGORY: The patient was triaged as a Trauma Yellow Transfer activation. The patient was initially evaluated at [facility] in [place], [place] where CT imaging demonstrated an acute traumatic subdural hemorrhage with shift and mass-effect. The patient was transported to [facility] in [place], [place] for a definitive neurotrauma evaluation. An expeditious primary and secondary survey with required adjuncts was conducted. See Trauma Narrator for full details.    PAST MEDICAL HISTORY:  has a past medical history of CAD (coronary artery disease) (08/2020), Chronic anticoagulation, Depression, Hyperlipidemia, Hypertension, Low back pain, and Paroxysmal atrial fibrillation (HCC).    PAST SURGICAL HISTORY:  has a past surgical history that includes spinal cord stimulator (2/26/2019); fusion, spine, lumbar, plif (2/26/2019); and laminotomy (2/26/2019).    ALLERGIES:   Allergies   Allergen Reactions    Hctz [Hydrochlorothiazide W-Spironolactone]      Cannot breathe    Oxycodone Swelling     Throat swelling     CURRENT MEDICATIONS:   Home Medications    **Home medications have not yet been reviewed for this encounter**       FAMILY HISTORY: family history includes Cancer in his mother.    SOCIAL HISTORY:  reports that he quit smoking about 42 years ago. He has a 0.75 pack-year smoking history. He has never used smokeless tobacco. He  "reports that he does not drink alcohol and does not use drugs.    REVIEW OF SYSTEMS: Comprehensive review of systems is negative with the exception of the aforementioned HPI, PMH, and PSH bullets in accordance with CMS guidelines.    PHYSICAL EXAMINATION:      Vital Signs: BP (!) 177/101   Pulse 74   Temp 35.9 °C (96.7 °F)   Resp (!) 9   Ht 1.702 m (5' 7\")   Wt 97.5 kg (215 lb)   SpO2 95%   Physical Exam  Vitals and nursing note reviewed.   HENT:      Head: Normocephalic.      Right Ear: Tympanic membrane normal.      Left Ear: Tympanic membrane normal.      Nose: Nose normal.      Mouth/Throat:      Mouth: Mucous membranes are moist.      Pharynx: Oropharynx is clear.   Eyes:      Extraocular Movements: Extraocular movements intact.      Conjunctiva/sclera: Conjunctivae normal.      Pupils: Pupils are equal, round, and reactive to light.   Cardiovascular:      Rate and Rhythm: Tachycardia present. Rhythm irregular.      Pulses: Normal pulses.   Pulmonary:      Effort: Pulmonary effort is normal.      Breath sounds: Normal breath sounds.   Chest:      Chest wall: No tenderness.   Abdominal:      Palpations: Abdomen is soft.      Tenderness: There is no abdominal tenderness. There is no guarding.   Musculoskeletal:         General: No swelling, deformity or signs of injury. Normal range of motion.      Cervical back: Normal range of motion and neck supple. No tenderness.   Skin:     General: Skin is warm and dry.      Capillary Refill: Capillary refill takes less than 2 seconds.   Neurological:      Mental Status: He is alert and oriented to person, place, and time.      GCS: GCS eye subscore is 4. GCS verbal subscore is 5. GCS motor subscore is 6.      Cranial Nerves: Cranial nerves 2-12 are intact.      Sensory: Sensation is intact.      Motor: Motor function is intact.      Coordination: Coordination is intact.   Psychiatric:         Mood and Affect: Mood normal.         Behavior: Behavior normal. "     LABORATORY VALUES:   Recent Labs     08/25/22 0521   WBC 12.4*   RBC 4.80   HEMOGLOBIN 14.2   HEMATOCRIT 42.3   MCV 88.1   MCH 29.6   MCHC 33.6*   RDW 44.3   PLATELETCT 302   MPV 11.4     Recent Labs     08/25/22 0521   SODIUM 140   POTASSIUM 3.9   CHLORIDE 104   CO2 18*   GLUCOSE 116*   BUN 18   CREATININE 0.98   CALCIUM 10.5     Recent Labs     08/25/22 0521   ASTSGOT 23   ALTSGPT 14   TBILIRUBIN 0.6   ALKPHOSPHAT 80   GLOBULIN 2.9   INR 1.34*     Recent Labs     08/25/22 0521   APTT 37.9*   INR 1.34*        IMAGING:   Review of the patient's transfer CT imaging of the brain demonstrating left-sided holohemispheric subdural hemorrhage measuring approximately 9 mm in greatest dimensions with some mild or early mass-effect.    ASSESSMENT AND PLAN:     Trauma  Mechanical ground-level fall.  Intracranial hemorrhage with systemic anticoagulation.  Trauma Yellow Transfer Activation from [facility].  Macario Winn MD. Trauma Surgery.    Contraindication to anticoagulation therapy  Prophylactic anticoagulation for thrombotic prevention initially contraindicated secondary to elevated bleeding risk.  8/25 Trauma surveillance venous duplex scanning ordered.    CAD (coronary artery disease)  History of coronary artery disease with STEMI in August 2020.  Drug-eluting stents placed in the mid RCA and PDA.    Daily aspirin administration.    Status post insertion of drug-eluting stent into right coronary artery for coronary artery disease  Drug-eluting stents placed in the mid RCA and PDA August 2020.     Daily aspirin administration.  Admission thromboelastography with platelet mapping to assess platelet function.    Paroxysmal atrial fibrillation (HCC)  Chronic condition treated with carvedilol and apixaban (Eliquis) anticoagulation.  Apixaban reversed with Kcentra on admission.  Systemic anticoagulation held on admission.     Dyslipidemia  Chronic condition treated with  atorvastatin.  Resumed maintenance medication on admission.    Hypertensive disease  Chronic condition treated with carvedilol.  Resumed maintenance medication on admission.    Type 2 diabetes mellitus without complication, without long-term current use of insulin ([facility])  Chronic condition treated with metformin.  Hemoglobin A1c sent on admission.  Resumed maintenance medication on admission.    Traumatic subdural hematoma, initial encounter ([facility])  Left-sided holohemispheric traumatic subdural hemorrhage associated with systemic anticoagulation and antiplatelet therapy.  Apixaban anticoagulation reversed with Kcentra on admission.  Thromboelastography with platelet mapping pending.  Definitive plan pending.  Post traumatic pharmacologic seizure prophylaxis for 1 week.  Speech Language Pathology cognitive evaluation.  Tesfaye Luther MD. Neurosurgeon. [facility].      DISPOSITION: Trauma ICU.  Trauma tertiary survey.    CRITICAL CARE TIME: 38 minutes excluding procedures.       ____________________________________     Macario Winn M.D.    DD: 8/25/2022  4:58 AM

## 2022-08-25 NOTE — OR NURSING
Alesia Wiley-wife [phone], son, Zurdo [phone],  Wife's cell # [phone] does not work at this time, have updated wife regarding her  having surgery later today, will telephone wife to obtain consent.

## 2022-08-25 NOTE — PROGRESS NOTES
Neurosurgery Progress Note    Subjective:  62 y.o. male on Eliquis (reversal agent given) who presented 2022 with an acute left SDH and transfer from an outside hospital.    In ED he reported a fell at approximately 2200 hrs on 2022.    There was new expressive aphasia this AM in the ED noted by nursing at about 0700.     Subjective history unattainable given agitation and increasing expressive aphasia.   Per nursing, patient is also not following commands now, new within the last 30 minutes.     Initial head CT with modest sized SDH, 2.5 mm midline shift.      Exam:  GCS: E4V3M5.  Patient awake, agitated, intermittent thrashing, tremulous in all 4 extremities.   Soft restraints in place.    Face symmetric.  Pupils 3mm, PERRL.  Incoherent single words randomly spoken, not in response to questions, expressive aphasia.   Moving all extremities spontaneously.   Not following commands.      BP  Min: 117/73  Max: 242/124  Pulse  Av.7  Min: 74  Max: 102  Resp  Avg: 15.8  Min: 8  Max: 31  Temp  Av.5 °C (97.7 °F)  Min: 35.9 °C (96.7 °F)  Max: 37 °C (98.6 °F)  SpO2  Av.6 %  Min: 89 %  Max: 100 %    No data recorded    Recent Labs     22  05   WBC 12.4*   RBC 4.80   HEMOGLOBIN 14.2   HEMATOCRIT 42.3   MCV 88.1   MCH 29.6   MCHC 33.6*   RDW 44.3   PLATELETCT 302   MPV 11.4     Recent Labs     22  0521   SODIUM 140   POTASSIUM 3.9   CHLORIDE 104   CO2 18*   GLUCOSE 116*   BUN 18   CREATININE 0.98   CALCIUM 10.5     Recent Labs     22  0521   APTT 37.9*   INR 1.34*     Recent Labs     22  0521   REACTMIN 9.3*   CLOTKINET 0.9   CLOTANGL 76.8   MAXCLOTS 67.3   [id number] 2.1   PRCINADP 64.1*   PRCINAA 96.1*       Intake/Output                         22 07 - 22 0659 (Not Admitted) 22 - 22 0659     [id number] Total [id number] [id number] Total                 Intake    I.V.  --  0 0  --  -- --    Pre-Hospital Volume -- 0 0 -- -- --    Trauma  Resuscitation Volume -- 0 0 -- -- --    Blood  --  0 0  --  -- --    PRBC Total Volume (Non-Barcoded) -- 0 0 -- -- --    FFP Total Volume (Non-Barcoded) -- 0 0 -- -- --    Platelets Total Volume (Non-Barcoded) -- 0 0 -- -- --    Cryoprecipitate (Pooled) Total Volume (Non-Barcoded) -- 0 0 -- -- --    Total Intake -- 0 0 -- -- --       Output    Other  --  0 0  --  -- --    Pre-Hospital Output -- 0 0 -- -- --    Trauma Resuscitation Output -- 0 0 -- -- --    Blood  --  0 0  --  -- --    Est. Blood Loss -- 0 0 -- -- --    Total Output -- 0 0 -- -- --       Net I/O     -- 0 0 -- -- --              Intake/Output Summary (Last 24 hours) at 8/25/2022 0970  Last data filed at 8/25/2022 0515  Gross per 24 hour   Intake 0 ml   Output 0 ml   Net 0 ml             niCARdipine infusion  0-15 mg/hr Continuous    atorvastatin  80 mg Q EVENING    carvedilol  6.25 mg BID    finasteride  5 mg Q EVENING    tamsulosin  0.4 mg Q EVENING    venlafaxine XR  150 mg DAILY    levETIRAcetam (Keppra) IV  1,000 mg Q12HRS    Respiratory Therapy Consult   Continuous RT    Pharmacy Consult Request  1 Each PHARMACY TO DOSE    ondansetron  4 mg Q4HRS PRN    ondansetron  4 mg Q4HRS PRN    docusate sodium  100 mg BID    senna-docusate  1 Tablet Nightly    senna-docusate  1 Tablet Q24HRS PRN    polyethylene glycol/lytes  1 Packet BID    magnesium hydroxide  30 mL DAILY    bisacodyl  10 mg Q24HRS PRN    sodium phosphate  1 Each Once PRN    famotidine  20 mg BID    Or    famotidine  20 mg BID    acetaminophen  650 mg Q4HRS PRN    Or    acetaminophen  650 mg Q4HRS PRN    insulin regular  1-6 Units Q6HRS    And    dextrose bolus  25 g Q15 MIN PRN    NS   Continuous    acetaminophen  650 mg Q6HRS    Followed by    [START ON 8/30/2022] acetaminophen  650 mg Q6HRS PRN    HYDROmorphone  0.5 mg Q HOUR PRN    Or    HYDROmorphone  1 mg Q HOUR PRN    labetalol  10 mg Q4HRS PRN       Assessment and Plan:  Hospital day # 2  Patient with left SDH s/p fall on Eliquis.    Decline in neuro exam with worsening expressive aphasia, agitation, not following commands.   STAT head CT ordered, radiology called.  Urgent brain MRI ordered prior for risk of embolic stroke, pending for now, will obtain CT first.   Keppra 1000 mg BID.    Q1 neuro checks.   NPO.  Following.      Chemical prophylactic DVT therapy: No  Start date/time: Pending

## 2022-08-25 NOTE — THERAPY
"Missed Therapy     Patient Name: Karan Wiley  Age:  62 y.o., Sex:  male  Medical Record #: [id number]  Today's Date: 8/25/2022    Per chart review, pt to have surgery later today. Reported \"progression of the left SDH and slight increase in MLS.\" SLP will follow to assess for cognition and aphasia when pt is medically appropriate.      08/25/22 1342   Treatment Variance   Reason For Missed Therapy Medical - Patient  in Procedure   Interdisciplinary Plan of Care Collaboration   Collaboration Comments Per chart review     "

## 2022-08-25 NOTE — PROGRESS NOTES
"0840 - Pt arrived from ED via gurney on monitor and nicardipine gtt @ 2.5 mg/hr. Pt moved self over to ICU w/ moderate assistance and placed on icu monitor. Pt repetitive and yelling \"lets go!\" And expletives. Placed in vest, bilateral wrist restraints and LLE ankle    4 Eyes Skin Assessment Completed by Rupal RN and Rita RN.    Head WDL  Ears WDL  Nose WDL  Mouth WDL  Neck WDL  Breast/Chest WDL  Shoulder Blades WDL  Spine WDL  (R) Arm/Elbow/Hand Bruising  (L) Arm/Elbow/Hand Redness, Blanching, and Abrasion  Abdomen Abrasion  Groin WDL  Scrotum/Coccyx/Buttocks WDL  (R) Leg WDL  (L) Leg WDL  (R) Heel/Foot/Toe WDL  (L) Heel/Foot/Toe WDL          Devices In Places ECG, Blood Pressure Cuff, Pulse Ox, and SCD's      Interventions In Place TAP System, Pillows, Q2 Turns, Heels Loaded W/Pillows, and Pressure Redistribution Mattress    Possible Skin Injury No    Pictures Uploaded Into Epic N/A  Wound Consult Placed N/A  RN Wound Prevention Protocol Ordered No       Temp: 98.4F  Weight: 98.3kg    Belongings:   blue tank top  2 slippers   Black shorts  "

## 2022-08-25 NOTE — ASSESSMENT & PLAN NOTE
History of coronary artery disease with STEMI in August 2020.  Drug-eluting stents placed in the mid RCA and PDA.  Daily aspirin administration.

## 2022-08-25 NOTE — PROGRESS NOTES
Repeat head CT reviewed by Dr. Luther and myself. There is some progression of the left SDH and slight increase in MLS, though exact measurement of progression is somewhat difficult with motion artifact.   Neuro exam remains unchanged from my prior exam at this point.   Will require decompression per Dr. Luther given decline in exam from admission, plan pending but will likely go at 1730 this evening.  Keep NPO.   Q1 neuro checks.   Hold sedation as able for reliable neuro exam.     Please call me on Voalte for any concern regarding decline in neuro exam. Thank you.

## 2022-08-25 NOTE — PROGRESS NOTES
Trauma / Surgical Daily Progress Note    Date of Service  8/25/2022    Chief Complaint  62 y.o. male admitted 8/25/2022 with GLF w/SDH    Interval Events  Worsening mental status.    GCS E4V3M5  BP nicardiipine.  144.    IV at 100  K-centra   reversed.    Eliquis   Transfused platelets.    Tremulous,  Place inna  Pre op craniotomy        Review of Systems  Review of Systems     Vital Signs for last 24 hours  Temp:  [35.9 °C (96.7 °F)-37 °C (98.6 °F)] 37 °C (98.6 °F)  Pulse:  [] 102  Resp:  [8-31] 20  BP: (117-242)/() 126/78  SpO2:  [89 %-100 %] 94 %    Hemodynamic parameters for last 24 hours       Respiratory Data     Respiration: 20, Pulse Oximetry: 94 %             Physical Exam  Physical Exam  HENT:      Head: Normocephalic.   Eyes:      Pupils: Pupils are equal, round, and reactive to light.   Cardiovascular:      Rate and Rhythm: Regular rhythm.   Pulmonary:      Effort: No respiratory distress.   Abdominal:      Palpations: Abdomen is soft.   Neurological:      Mental Status: He is alert.      GCS: GCS eye subscore is 4. GCS verbal subscore is 3. GCS motor subscore is 5.       Laboratory  Recent Results (from the past 24 hour(s))   HEMOGLOBIN A1C    Collection Time: 08/25/22  5:21 AM   Result Value Ref Range    Glycohemoglobin 6.2 (H) 4.0 - 5.6 %    Est Avg Glucose 131 mg/dL   DIAGNOSTIC ALCOHOL    Collection Time: 08/25/22  5:21 AM   Result Value Ref Range    Diagnostic Alcohol <10.1 <10.1 mg/dL   CBC WITHOUT DIFFERENTIAL    Collection Time: 08/25/22  5:21 AM   Result Value Ref Range    WBC 12.4 (H) 4.8 - 10.8 K/uL    RBC 4.80 4.70 - 6.10 M/uL    Hemoglobin 14.2 14.0 - 18.0 g/dL    Hematocrit 42.3 42.0 - 52.0 %    MCV 88.1 81.4 - 97.8 fL    MCH 29.6 27.0 - 33.0 pg    MCHC 33.6 (L) 33.7 - 35.3 g/dL    RDW 44.3 35.9 - 50.0 fL    Platelet Count 302 164 - 446 K/uL    MPV 11.4 9.0 - 12.9 fL   Comp Metabolic Panel    Collection Time: 08/25/22  5:21 AM   Result Value Ref Range    Sodium 140 135 - 145  mmol/L    Potassium 3.9 3.6 - 5.5 mmol/L    Chloride 104 96 - 112 mmol/L    Co2 18 (L) 20 - 33 mmol/L    Anion Gap 18.0 (H) 7.0 - 16.0    Glucose 116 (H) 65 - 99 mg/dL    Bun 18 8 - 22 mg/dL    Creatinine 0.98 0.50 - 1.40 mg/dL    Calcium 10.5 8.5 - 10.5 mg/dL    AST(SGOT) 23 12 - 45 U/L    ALT(SGPT) 14 2 - 50 U/L    Alkaline Phosphatase 80 30 - 99 U/L    Total Bilirubin 0.6 0.1 - 1.5 mg/dL    Albumin 5.1 (H) 3.2 - 4.9 g/dL    Total Protein 8.0 6.0 - 8.2 g/dL    Globulin 2.9 1.9 - 3.5 g/dL    A-G Ratio 1.8 g/dL   Prothrombin Time    Collection Time: 08/25/22  5:21 AM   Result Value Ref Range    PT 16.3 (H) 12.0 - 14.6 sec    INR 1.34 (H) 0.87 - 1.13   APTT    Collection Time: 08/25/22  5:21 AM   Result Value Ref Range    APTT 37.9 (H) 24.7 - 36.0 sec   PLATELET MAPPING WITH BASIC TEG    Collection Time: 08/25/22  5:21 AM   Result Value Ref Range    Reaction Time Initial-R 9.3 (H) 4.6 - 9.1 min    React Time Initial Hep 8.2 4.3 - 8.3 min    Clot Kinetics-K 0.9 0.8 - 2.1 min    Clot Angle-Angle 76.8 63.0 - 78.0 degrees    Maximum Clot Strength-MA 67.3 52.0 - 69.0 mm    TEG Functional Fibrinogen(MA) 30.8 15.0 - 32.0 mm    Lysis 30 minutes-LY30 2.1 0.0 - 2.6 %    % Inhibition ADP 64.1 (H) 0.0 - 17.0 %    % Inhibition AA 96.1 (H) 0.0 - 11.0 %    TEG Algorithm Link Algorithm    COD - Adult (Type and Screen)    Collection Time: 08/25/22  5:21 AM   Result Value Ref Range    ABO Grouping Only O     Rh Grouping Only POS     Antibody Screen-Cod NEG    ESTIMATED GFR    Collection Time: 08/25/22  5:21 AM   Result Value Ref Range    GFR (CKD-EPI) 87 >60 mL/min/1.73 m 2   ABO Rh Confirm    Collection Time: 08/25/22  5:23 AM   Result Value Ref Range    ABO Rh Confirm O POS    PLATELETS REQUEST    Collection Time: 08/25/22  7:09 AM   Result Value Ref Range    Component P       P71                 Plts,Pheresis       [id number]   issued       08/25/22   07:29      Product Type Platelets Pheresis LR     Dispense Status issued      Unit Number (Barcoded) [id number]     Product Code (Barcoded) N1341P06     Blood Type (Barcoded) 0600        Fluids    Intake/Output Summary (Last 24 hours) at 8/25/2022 1042  Last data filed at 8/25/2022 0515  Gross per 24 hour   Intake 0 ml   Output 0 ml   Net 0 ml       Core Measures & Quality Metrics  Labs reviewed, Medications reviewed and Radiology images reviewed  White catheter: Critically Ill - Requiring Accurate Measurement of Urinary Output      DVT Prophylaxis: Contraindicated - High bleeding risk          RAP Score Total: 6  CAGE Results: not completed Blood Alcohol>0.08: no     Assessment/Plan  Traumatic subdural hematoma, initial encounter (HCC)- (present on admission)  Assessment & Plan  Left-sided holohemispheric traumatic subdural hemorrhage associated with systemic anticoagulation and antiplatelet therapy.  Definitive plan pending.  Post traumatic pharmacologic seizure prophylaxis for 1 week.  Speech Language Pathology cognitive evaluation.  Tesfaye Luther MD. Neurosurgeon. [facility].    Antiplatelet or antithrombotic long-term use- (present on admission)  Assessment & Plan  Daily ASA and Apixaban.  Received KCentra on admit.  AA 96.1%, transfused 1 unit platelet pheresis in ED.  Follow up TEG with platelet mapping ordered.    Status post insertion of drug-eluting stent into right coronary artery for coronary artery disease- (present on admission)  Assessment & Plan  Drug-eluting stents placed in the mid RCA and PDA August 2020.   Daily aspirin administration.  Maintenance medication held on admission.    Paroxysmal atrial fibrillation (HCC)- (present on admission)  Assessment & Plan  Chronic condition treated with carvedilol and apixaban (Eliquis) anticoagulation.  Carvedilol resumed upon admission.  Systemic anticoagulation held on admission.     CAD (coronary artery disease)  Assessment & Plan  History of coronary artery disease with STEMI in August 2020.  Drug-eluting stents placed  in the mid RCA and PDA.  Daily aspirin administration.    Type 2 diabetes mellitus without complication, without long-term current use of insulin (HCC)- (present on admission)  Assessment & Plan  Chronic condition treated with metformin.  Glycohemoglobin 6.2 (131).  Holding maintenance metformin for 48 hours following intravenous contrast administration.  Insulin sliding scale coverage.    Dyslipidemia- (present on admission)  Assessment & Plan  Chronic condition treated with atorvastatin.  Resumed maintenance medication on admission.    Hypertensive disease- (present on admission)  Assessment & Plan  Chronic condition treated with carvedilol.  Resumed maintenance medication on admission.    Depression- (present on admission)  Assessment & Plan  Chronic condition treated with Effexor.  Resumed maintenance medication on admission.    Trauma- (present on admission)  Assessment & Plan  Mechanical ground-level fall.  Intracranial hemorrhage with systemic anticoagulation.  Trauma Yellow Transfer Activation from [facility].  Macario Winn MD. Trauma Surgery.    BPH (benign prostatic hyperplasia)- (present on admission)  Assessment & Plan  Chronic condition treated with finasteride and tamsulosin.  Resumed maintenance medication on admission.        Discussed patient condition with RN, RT, Pharmacy, and Dietary.  CRITICAL CARE TIME EXCLUDING PROCEDURES: 35    minutes

## 2022-08-25 NOTE — ED NOTES
BIB [facility], patient fell at home, [facility] found SDH with midline shift, c/o pain in head 9/10 and nausea, neuro exam intact   No meds given in route

## 2022-08-25 NOTE — ASSESSMENT & PLAN NOTE
Chronic condition treated with finasteride and tamsulosin.  Resumed maintenance medication on admission.

## 2022-08-25 NOTE — ANESTHESIA PREPROCEDURE EVALUATION
Case: [id number] Date/Time: 08/25/22 1754    Procedure: CRANIOTOMY FOR SUBDURAL HEMATOMA (Left)    Location: [facility] OR 04 / SURGERY [facility]    Surgeons: Tesfaye Luther M.D.        63y/o M with SDH for decompresion    Relevant Problems   CARDIAC   (positive) CAD (coronary artery disease)   (positive) Hypertensive disease   (positive) Paroxysmal atrial fibrillation (HCC)      ENDO   (positive) Type 2 diabetes mellitus without complication, without long-term current use of insulin (HCC)      Other   (positive) Antiplatelet or antithrombotic long-term use   (positive) Chronic back pain   (positive) Dyslipidemia   (positive) Status post insertion of drug-eluting stent into right coronary artery for coronary artery disease   (positive) Tobacco use   (positive) Traumatic subdural hematoma, initial encounter ([facility])   - CAD, NAOMY placed 8/2020  - Echo 8/2020  CONCLUSIONS  Compared to the images of the prior study done 8/3/2020, the EF appears   improved.  Left ventricular ejection fraction is visually estimated to be 60%.    Physical Exam    Airway - unable to assess       Cardiovascular   Rhythm: irregular  Rate: abnormal  (-) murmur     Dental     Unable to assess dental       Pulmonary - normal exam  (+) decreased breath sounds     Abdominal   (+) obese     Neurological     Comments: Confused/agitated             Anesthesia Plan    ASA 3   ASA physical status 3 criteria: CAD/stents (> 3 months)    Plan - general       Airway plan will be ETT    (Arterial line. Likely to remain intubated postoperatively.)      Induction: intravenous    Postoperative Plan: Postoperative administration of opioids is intended.    Pertinent diagnostic labs and testing reviewed    Informed Consent:    Anesthetic plan and risks discussed with spouse.    Use of blood products discussed with: spouse whom consented to blood products.

## 2022-08-25 NOTE — ASSESSMENT & PLAN NOTE
Chronic condition treated with metformin.  Glycohemoglobin 6.2 (131).  Insulin sliding scale coverage.

## 2022-08-25 NOTE — ASSESSMENT & PLAN NOTE
Mechanical ground-level fall.    Intracranial hemorrhage with systemic anticoagulation.  Trauma Yellow Transfer Activation from [facility].  Macario Winn MD. Trauma Surgery.

## 2022-08-25 NOTE — ED NOTES
Report from Sai MONTEIRO, will assume care at this time. Pt is lying on gurney resting comfortable. Pt awaiting consult and admission bed assignment.

## 2022-08-25 NOTE — ASSESSMENT & PLAN NOTE
Left-sided holohemispheric traumatic subdural hemorrhage associated with systemic anticoagulation and antiplatelet therapy.  8/25 Left craniotomy with evacuation of SDH.  8/28 Repeat CT overall improved.  Unable to obtain MRI brain due to SCS.  8/31 CT improved / No neurologic improvement / start amantadine  9/2 Increased amantadine. Post traumatic pharmacologic seizure prophylaxis for 1 week completed.  Speech Language Pathology cognitive evaluation when able.  Tesfaye Luther MD. Neurosurgeon. [facility].

## 2022-08-25 NOTE — CONSULTS
Surgery Neurosurgery Consultation    Date of Service  8/25/2022    Referring Physician  Licha Brewer D.O.    Consulting Physician  Tesfaye Luther M.D.    Reason for Consultation  Acute left subdural hematoma    History of Presenting Illness  62 y.o. male who presented 8/25/2022 with an acute left subdural hematoma and transfer from an outside hospital.  He states that he fell at approximately 2200 hrs. on 08/24/2022.  He does endorse headache.  He does have some expressive aphasia which the nurse states is new, within the last 30 to 60 minutes.  Currently reversal for Eliquis is being given.    Review of Systems  Review of Systems   Unable to perform ROS: Mental acuity   Neurological:  Positive for headaches.     Past Medical History   has a past medical history of CAD (coronary artery disease) (08/2020), Chronic anticoagulation, Depression, Hyperlipidemia, Hypertension, Low back pain, and Paroxysmal atrial fibrillation (HCC).    Surgical History   has a past surgical history that includes spinal cord stimulator (2/26/2019); fusion, spine, lumbar, plif (2/26/2019); and laminotomy (2/26/2019).    Family History  family history includes Cancer in his mother.    Social History   reports that he quit smoking about 42 years ago. He has a 0.75 pack-year smoking history. He has never used smokeless tobacco. He reports that he does not drink alcohol and does not use drugs.    Medications  Prior to Admission Medications   Prescriptions Last Dose Informant Patient Reported? Taking?   HYDROcodone/acetaminophen (NORCO)  MG Tab   Yes No   Sig: Take 1 Tab by mouth 6 Times a Day.   KLOR-CON M10 10 MEQ Tab CR   Yes No   Morphine Sulfate ER 15 MG Tablet Extended Release 12 hour Abuse-Deterrent   Yes No   Sig: Take 15 mg by mouth 2 (two) times a day. Abuse deterrent? He takes morphine 15mg er tid    Patient not taking: Reported on 7/21/2022   apixaban (ELIQUIS) 5mg Tab 8/24/2022 at PM  No No   Sig: Take 1 Tablet by mouth 2  times a day. Indications: Thromboembolism secondary to Atrial Fibrillation   aspirin (ASA) 81 MG Chew Tab chewable tablet 8/24/2022 at AM  No No   Sig: Chew 1 tablet every day.   atorvastatin (LIPITOR) 80 MG tablet   No No   Sig: Take 1 Tablet by mouth every day.   carvedilol (COREG) 6.25 MG Tab   No No   Sig: Take 1 Tablet by mouth 2 times a day.   docusate sodium (COLACE) 100 MG Cap   Yes No   Sig: Take 100 mg by mouth 2 times a day.   finasteride (PROSCAR) 5 MG Tab  Patient Yes No   Sig: Take 5 mg by mouth every evening.   metFORMIN ER (GLUCOPHAGE XR) 500 MG TABLET SR 24 HR   Yes No   Sig: Take 1,000 mg by mouth every day.   morphine ER (MS CONTIN) 15 MG Tab CR tablet   Yes No   Sig: Take 15 mg by mouth every 12 hours.   nitroglycerin (NITROSTAT) 0.4 MG SL Tab   No No   Sig: Place 1 Tab under tongue as needed for Chest Pain.   sildenafil citrate (VIAGRA) 50 MG tablet   No No   Sig: Take 1 Tab by mouth as needed for Erectile Dysfunction.   tamsulosin (FLOMAX) 0.4 MG capsule  Patient Yes No   Sig: Take 0.4 mg by mouth every evening.   tizanidine (ZANAFLEX) 4 MG capsule   Yes No   Sig: Take 4 mg by mouth 3 times a day.   traZODone (DESYREL) 100 MG Tab   No No   Sig: Take 1.5 Tabs by mouth every bedtime.   venlafaxine (EFFEXOR-XR) 150 MG extended-release capsule  Patient Yes No   Sig: Take 150 mg by mouth every day.      Facility-Administered Medications: None       Allergies  Allergies   Allergen Reactions    Hctz [Hydrochlorothiazide W-Spironolactone]      Cannot breathe    Oxycodone Anaphylaxis and Swelling     Throat swelling    Pectin Anaphylaxis       Physical Exam  Temp:  [35.9 °C (96.7 °F)-37 °C (98.6 °F)] 37 °C (98.6 °F)  Pulse:  [] 102  Resp:  [8-22] 22  BP: (121-242)/() 124/71  SpO2:  [89 %-100 %] 93 %    Physical Exam  Constitutional:       Appearance: Normal appearance. He is obese.   HENT:      Head: Normocephalic and atraumatic.      Right Ear: Ear canal and external ear normal.      Left  Ear: Ear canal and external ear normal.      Mouth/Throat:      Mouth: Mucous membranes are moist.      Pharynx: Oropharynx is clear.   Eyes:      Extraocular Movements: Extraocular movements intact.      Conjunctiva/sclera: Conjunctivae normal.      Pupils: Pupils are equal, round, and reactive to light.   Musculoskeletal:         General: Normal range of motion.      Cervical back: Normal range of motion and neck supple.   Neurological:      Mental Status: He is alert and oriented to person, place, and time.      GCS: GCS eye subscore is 4. GCS verbal subscore is 5. GCS motor subscore is 6.      Sensory: Sensation is intact.      Motor: Motor function is intact.      Comments: Answering yes and no questions appropriately but does have expressive aphasia with more complex words       Fluids      Laboratory  Recent Labs     08/25/22  0521   WBC 12.4*   RBC 4.80   HEMOGLOBIN 14.2   HEMATOCRIT 42.3   MCV 88.1   MCH 29.6   MCHC 33.6*   RDW 44.3   PLATELETCT 302   MPV 11.4     Recent Labs     08/25/22 0521   SODIUM 140   POTASSIUM 3.9   CHLORIDE 104   CO2 18*   GLUCOSE 116*   BUN 18   CREATININE 0.98   CALCIUM 10.5     Recent Labs     08/25/22  0521   APTT 37.9*   INR 1.34*                 Imaging  OUTSIDE IMAGES-CT HEAD   Final Result      US-ABORTED US PROCEDURE    (Results Pending)   MR-BRAIN-W/O    (Results Pending)       Assessment/Plan  Acute left subdural hematoma, status post fall, patient on Eliquis.  He is currently a GCS of 15 although expressive aphasia is noted with more complex conversation.  He is able to answer yes/no questions appropriately.  Nurse in the ER states that this is new, starting after 7 AM.  His initial evaluation noted him to be neurologically intact.  Given this new finding and his risk for embolic stroke, will order MRI.  Other etiologies include compression related from the subdural hematoma or seizure related.  I have started Keppra, 1000 mg twice a day.  The CT from the outside  hospital shows a modest sized subdural hematoma with 2.5 mm of shift at the level of the third ventricle.    Admit to ICU with acute 2-hour neuro exam    N.p.o. until follow-up imaging obtained    His outside head CT and exam would generally be managed nonoperatively but we will have to see what his follow-up imaging shows.    We will continue to monitor closely    I spoke with Dr. Brewer regarding this patient at 0545, saw the patient at bedside at 0750

## 2022-08-25 NOTE — ED PROVIDER NOTES
ED Provider Note     Scribed for Licha Brewer D.O. by Ce Lewis. 8/25/2022, 5:21 AM.     Primary care provider: Henri Kong M.D.  Means of arrival: EMS         History obtained from: Patient and EMS  History limited by: None    CHIEF COMPLAINT  Trauma yellow Transfer    HPI  Karan Wiley is a 62 y.o. male, with prior history of Hypertension, CAD, and DM, who presents to the emergency Department via EMS to trauma bay as a trauma yellow transfer from [facility], following a mechanical ground level fall and subsequent subdural diagnosis. The patient has been experiencing fatigue, weakness, syncopal episodes, and multiple falls over the past week. Tonight he had another fall and had positive head strike. He presented to [facility] ED complaining of nausea, vomiting, and head pain that was exacerbated with movement. On outside imaging he was found to have a 9 mm subdural with 8 mm midline shift. He is on blood thinners daily. He is currently complaining of 9/10 head pain. He was administered 4 mg Zofran at outside facility, but he was denied pain management to evaluate ongoing mental status. En route he was GCS 15, CMS was intact, and he was A&O x4. The last blood pressure recorded by EMS was 201/111. He was not given any medications to bring his blood pressure down in outside facility or en route. He remembers the incidents from the day, and denies drinking any alcohol. He further denies any neck pain, numbness or tingling.     REVIEW OF SYSTEMS  Pertinent positives include nausea, vomiting, headache, fall, fatigue, weakness. Pertinent negatives include no neck pain, numbness or tingling.   See HPI for further details. All other systems are negative.    PAST MEDICAL HISTORY  Past Medical History:   Diagnosis Date    CAD (coronary artery disease) 08/2020    STEMI. PCI/NAOMY (Raghav 3.5 x 25mm) the mid RCA, and PCI/NAOMY ([place] 2.5 x 12mm) the PDA.    Chronic anticoagulation     Depression      Hyperlipidemia     Hypertension     Low back pain     Paroxysmal atrial fibrillation (HCC)        FAMILY HISTORY  Family History   Problem Relation Age of Onset    Cancer Mother         breast       SOCIAL HISTORY  Social History     Tobacco Use    Smoking status: Former     Packs/day: 0.25     Years: 3.00     Pack years: 0.75     Types: Cigarettes     Quit date:      Years since quittin.6    Smokeless tobacco: Never   Substance Use Topics    Alcohol use: No    Drug use: Never      Social History     Substance and Sexual Activity   Drug Use Never       SURGICAL HISTORY  Past Surgical History:   Procedure Laterality Date    SPINAL CORD STIMULATOR  2019    Procedure: SPINAL CORD STIMULATOR-  STAGE 1:  RIGHT FLANK BATTERY REPLACEMENT/UPGRADE;  Surgeon: Stepan Hawkins M.D.;  Location: SURGERY [facility];  Service: Neurosurgery    FUSION, SPINE, LUMBAR, PLIF  2019    Procedure: LUMBAR FUSION POSTERIOR-  STAGE 2: ONLAY L5-S1 BONE;  Surgeon: Stepan Hawkins M.D.;  Location: SURGERY [facility];  Service: Neurosurgery    LAMINOTOMY  2019    Procedure: LAMINOTOMY-  STAGE 2:  REDO LEFT L4-S1;  Surgeon: Stepan Hawkins M.D.;  Location: SURGERY [facility];  Service: Neurosurgery       CURRENT MEDICATIONS    Current Facility-Administered Medications:     morphine 4 MG/ML injection, , Intravenous, ED ONCE PRN, Licha Brewer D.O., 4 mg at 22 0520    HYDRALAZINE HCL 20 MG/ML INJ SOLN, , , ,     prothrombin complex conc human (Kcentra) 1000 units KIT 5,000 Units, 50 Units/kg, Intravenous, Once, Licha Brewer D.O.    hydrALAZINE (APRESOLINE) injection, , , ED ONCE PRN, Licha Brewer D.O., 10 mg at 22 0520    Current Outpatient Medications:     docusate sodium (COLACE) 100 MG Cap, Take 100 mg by mouth 2 times a day., Disp: , Rfl:     KLOR-CON M10 10 MEQ Tab CR, , Disp: , Rfl:     morphine ER (MS CONTIN) 15 MG Tab CR tablet, Take 15 mg by mouth every 12 hours., Disp: , Rfl:      "carvedilol (COREG) 6.25 MG Tab, Take 1 Tablet by mouth 2 times a day., Disp: 200 Tablet, Rfl: 3    atorvastatin (LIPITOR) 80 MG tablet, Take 1 Tablet by mouth every day., Disp: 90 Tablet, Rfl: 3    apixaban (ELIQUIS) 5mg Tab, Take 1 Tablet by mouth 2 times a day. Indications: Thromboembolism secondary to Atrial Fibrillation, Disp: 180 Tablet, Rfl: 3    tizanidine (ZANAFLEX) 4 MG capsule, Take 4 mg by mouth 3 times a day., Disp: , Rfl:     metFORMIN ER (GLUCOPHAGE XR) 500 MG TABLET SR 24 HR, Take 1,000 mg by mouth every day., Disp: , Rfl:     aspirin (ASA) 81 MG Chew Tab chewable tablet, Chew 1 tablet every day., Disp: 100 tablet, Rfl: 3    traZODone (DESYREL) 100 MG Tab, Take 1.5 Tabs by mouth every bedtime., Disp: 135 Tab, Rfl: 1    sildenafil citrate (VIAGRA) 50 MG tablet, Take 1 Tab by mouth as needed for Erectile Dysfunction., Disp: 10 Tab, Rfl: 3    nitroglycerin (NITROSTAT) 0.4 MG SL Tab, Place 1 Tab under tongue as needed for Chest Pain., Disp: 25 Tab, Rfl: 3    Morphine Sulfate ER 15 MG Tablet Extended Release 12 hour Abuse-Deterrent, Take 15 mg by mouth 2 (two) times a day. Abuse deterrent? He takes morphine 15mg er tid  (Patient not taking: Reported on 7/21/2022), Disp: , Rfl:     HYDROcodone/acetaminophen (NORCO)  MG Tab, Take 1 Tab by mouth 6 Times a Day., Disp: , Rfl:     finasteride (PROSCAR) 5 MG Tab, Take 5 mg by mouth every evening., Disp: , Rfl:     tamsulosin (FLOMAX) 0.4 MG capsule, Take 0.4 mg by mouth every evening., Disp: , Rfl:     venlafaxine (EFFEXOR-XR) 150 MG extended-release capsule, Take 150 mg by mouth every day., Disp: , Rfl:     ALLERGIES  Allergies   Allergen Reactions    Hctz [Hydrochlorothiazide W-Spironolactone]      Cannot breathe    Oxycodone Swelling     Throat swelling       PHYSICAL EXAM  VITAL SIGNS: BP (!) 229/119   Pulse 94   Temp 35.9 °C (96.7 °F)   Resp 18   Ht 1.702 m (5' 7\")   Wt 97.5 kg (215 lb)   SpO2 98%   BMI 33.67 kg/m²   Constitutional: Patient is " well developed, well nourished.  Severe distress from his headache.  HENT: Normocephalic, atraumatic. Nose normal with no mucosal edema or drainage. Oropharynx moist without erythema or exudates.  Eyes: pupils are 2 mm and reactive to light, EOMI, Conjunctiva without subconjunctival hemorrhage  Neck: Supple with Normal range of motion in flexion, extension and lateral rotation. No tenderness along the bony prominences or paraspinal muscles.   Lymphatic: No lymphadenopathy noted.   Cardiovascular: Normal heart rate and Regular rhythm. No murmur  Thorax & Lungs: Clear and equal breath sounds with good excursion. No respiratory distress, no rhonchi, wheezing or rales. No chest tenderness, or signs of trauma .  Abdomen: Bowel sounds normal in all four quadrants. Soft,nontender, no rebound , guarding, palpable masses.   Skin: Warm, Dry, No contusions or abrasions.  Back: No cervical, thoracic, or lumbosacral tenderness.  Extremities: Peripheral pulses 4/4 No edema, No tenderness  Musculoskeletal: Normal range of motion in all major joints. No tenderness to palpation or major deformities noted.   Neurologic: Alert & oriented x 3, Normal motor function, Normal sensory function, No lateralizing or focal deficits noted. DTR's 4/4 bilaterally.  No facial droop, normal biceps, triceps, normal dorsi and plantar flexion.  Psychiatric: Affect normal, Judgment normal, Mood normal.     DIAGNOSTICS/PROCEDURES    LABS  Results for orders placed or performed during the hospital encounter of 08/25/22   HEMOGLOBIN A1C   Result Value Ref Range    Glycohemoglobin 6.2 (H) 4.0 - 5.6 %    Est Avg Glucose 131 mg/dL   DIAGNOSTIC ALCOHOL   Result Value Ref Range    Diagnostic Alcohol <10.1 <10.1 mg/dL   CBC WITHOUT DIFFERENTIAL   Result Value Ref Range    WBC 12.4 (H) 4.8 - 10.8 K/uL    RBC 4.80 4.70 - 6.10 M/uL    Hemoglobin 14.2 14.0 - 18.0 g/dL    Hematocrit 42.3 42.0 - 52.0 %    MCV 88.1 81.4 - 97.8 fL    MCH 29.6 27.0 - 33.0 pg    MCHC  33.6 (L) 33.7 - 35.3 g/dL    RDW 44.3 35.9 - 50.0 fL    Platelet Count 302 164 - 446 K/uL    MPV 11.4 9.0 - 12.9 fL   Comp Metabolic Panel   Result Value Ref Range    Sodium 140 135 - 145 mmol/L    Potassium 3.9 3.6 - 5.5 mmol/L    Chloride 104 96 - 112 mmol/L    Co2 18 (L) 20 - 33 mmol/L    Anion Gap 18.0 (H) 7.0 - 16.0    Glucose 116 (H) 65 - 99 mg/dL    Bun 18 8 - 22 mg/dL    Creatinine 0.98 0.50 - 1.40 mg/dL    Calcium 10.5 8.5 - 10.5 mg/dL    AST(SGOT) 23 12 - 45 U/L    ALT(SGPT) 14 2 - 50 U/L    Alkaline Phosphatase 80 30 - 99 U/L    Total Bilirubin 0.6 0.1 - 1.5 mg/dL    Albumin 5.1 (H) 3.2 - 4.9 g/dL    Total Protein 8.0 6.0 - 8.2 g/dL    Globulin 2.9 1.9 - 3.5 g/dL    A-G Ratio 1.8 g/dL   Prothrombin Time   Result Value Ref Range    PT 16.3 (H) 12.0 - 14.6 sec    INR 1.34 (H) 0.87 - 1.13   APTT   Result Value Ref Range    APTT 37.9 (H) 24.7 - 36.0 sec   COD - Adult (Type and Screen)   Result Value Ref Range    ABO Grouping Only O     Rh Grouping Only POS     Antibody Screen-Cod NEG    ABO Rh Confirm   Result Value Ref Range    ABO Rh Confirm O POS    ESTIMATED GFR   Result Value Ref Range    GFR (CKD-EPI) 87 >60 mL/min/1.73 m 2     Labs reviewed by me    RADIOLOGY/PROCEDURES  OUTSIDE IMAGES-CT HEAD   Final Result      US-ABORTED US PROCEDURE    (Results Pending)       Results and radiologist interpretation reviewed by me.     COURSE & MEDICAL DECISION MAKING  Pertinent Labs & Imaging studies reviewed. (See chart for details)    5:16 AM - Patient seen and evaluated at trauma bay. Patient will be treated with Prothrombin Complex conc human, Cardene 25 mg, Labetalol, Hydromorphone injection, Apresoline injection, and Morphine 4 mg/ml for his symptoms. Differential diagnoses include, but are not limited to, subdural. Dr. Winn (Trauma surgery) will hospitalize the patient following neurosurgery consult.     5:19 AM- Paged Neuro surgery.    5:39 AM I discussed the patient's case and the above findings with   Ashkan (Neuro) who wants us to make sure the reversal agent is given, and agrees to come evaluate the patient.     6:40 am- Patient required several doses of pain medication to get his headache under control but he is comfortable at this time.  He is currently on a nicardipine drip titrating for blood pressure systolic around 150.    DISPOSITION:  Patient will be hospitalized by Dr. Winn in critical condition.    FINAL IMPRESSION  1. Fall, initial encounter    2. Syncope, unspecified syncope type    3. Subdural hematoma without coma, without loss of consciousness, initial encounter ([facility])    4. Uncontrolled hypertension    5. History of diabetes mellitus    6. History of coronary artery disease    7. History of atrial fibrillation    8.  Critical care time 45 minutes.     Ce CARIAS (Scribe), am scribing for, and in the presence of, Licha Brewer D.O..    Electronically signed by: Ce Lewis (Scribe), 8/25/2022    ILicha D.O. personally performed the services described in this documentation, as scribed by Ce Lewis in my presence, and it is both accurate and complete.    The note accurately reflects work and decisions made by me.  Licha Brewer D.O.  8/25/2022  6:51 AM

## 2022-08-26 ENCOUNTER — APPOINTMENT (OUTPATIENT)
Dept: RADIOLOGY | Facility: MEDICAL CENTER | Age: 62
DRG: 003 | End: 2022-08-26
Attending: SURGERY
Payer: OTHER GOVERNMENT

## 2022-08-26 PROBLEM — J95.821 RESPIRATORY FAILURE FOLLOWING TRAUMA AND SURGERY (HCC): Status: ACTIVE | Noted: 2022-08-26

## 2022-08-26 LAB
ALBUMIN SERPL BCP-MCNC: 4.5 G/DL (ref 3.2–4.9)
ALBUMIN/GLOB SERPL: 1.6 G/DL
ALP SERPL-CCNC: 69 U/L (ref 30–99)
ALT SERPL-CCNC: 12 U/L (ref 2–50)
ANION GAP SERPL CALC-SCNC: 15 MMOL/L (ref 7–16)
AST SERPL-CCNC: 18 U/L (ref 12–45)
BARCODED ABORH UBTYP: 600
BARCODED ABORH UBTYP: 600
BARCODED ABORH UBTYP: 7300
BARCODED ABORH UBTYP: 7300
BARCODED PRD CODE UBPRD: NORMAL
BARCODED UNIT NUM UBUNT: NORMAL
BASE EXCESS BLDA CALC-SCNC: -5 MMOL/L (ref -4–3)
BASE EXCESS BLDA CALC-SCNC: -8 MMOL/L (ref -4–3)
BASOPHILS # BLD AUTO: 0 % (ref 0–1.8)
BASOPHILS # BLD: 0 K/UL (ref 0–0.12)
BILIRUB SERPL-MCNC: 0.4 MG/DL (ref 0.1–1.5)
BODY TEMPERATURE: ABNORMAL DEGREES
BODY TEMPERATURE: ABNORMAL DEGREES
BREATHS SETTING VENT: 24
BREATHS SETTING VENT: 24
BUN SERPL-MCNC: 11 MG/DL (ref 8–22)
CALCIUM SERPL-MCNC: 9.4 MG/DL (ref 8.5–10.5)
CFT BLD TEG: 6.8 MIN (ref 4.6–9.1)
CFT P HPASE BLD TEG: 5.9 MIN (ref 4.3–8.3)
CHLORIDE SERPL-SCNC: 104 MMOL/L (ref 96–112)
CLOT ANGLE BLD TEG: 77.8 DEGREES (ref 63–78)
CLOT LYSIS 30M P MA LENFR BLD TEG: 0 % (ref 0–2.6)
CO2 BLDA-SCNC: 17 MMOL/L (ref 20–33)
CO2 BLDA-SCNC: 19 MMOL/L (ref 20–33)
CO2 SERPL-SCNC: 17 MMOL/L (ref 20–33)
COMPONENT P 8504P: NORMAL
CREAT SERPL-MCNC: 0.75 MG/DL (ref 0.5–1.4)
CRP SERPL HS-MCNC: 2.5 MG/DL (ref 0–0.75)
CT.EXTRINSIC BLD ROTEM: 0.9 MIN (ref 0.8–2.1)
DELSYS IDSYS: ABNORMAL
DELSYS IDSYS: ABNORMAL
EKG IMPRESSION: NORMAL
END TIDAL CARBON DIOXIDE IECO2: 27 MMHG
END TIDAL CARBON DIOXIDE IECO2: 28 MMHG
EOSINOPHIL # BLD AUTO: 0 K/UL (ref 0–0.51)
EOSINOPHIL NFR BLD: 0 % (ref 0–6.9)
ERYTHROCYTE [DISTWIDTH] IN BLOOD BY AUTOMATED COUNT: 46 FL (ref 35.9–50)
GFR SERPLBLD CREATININE-BSD FMLA CKD-EPI: 102 ML/MIN/1.73 M 2
GLOBULIN SER CALC-MCNC: 2.9 G/DL (ref 1.9–3.5)
GLUCOSE BLD STRIP.AUTO-MCNC: 128 MG/DL (ref 65–99)
GLUCOSE BLD STRIP.AUTO-MCNC: 129 MG/DL (ref 65–99)
GLUCOSE BLD STRIP.AUTO-MCNC: 130 MG/DL (ref 65–99)
GLUCOSE BLD STRIP.AUTO-MCNC: 154 MG/DL (ref 65–99)
GLUCOSE SERPL-MCNC: 153 MG/DL (ref 65–99)
HCO3 BLDA-SCNC: 16.5 MMOL/L (ref 17–25)
HCO3 BLDA-SCNC: 17.9 MMOL/L (ref 17–25)
HCT VFR BLD AUTO: 36.6 % (ref 42–52)
HGB BLD-MCNC: 12.3 G/DL (ref 14–18)
HOROWITZ INDEX BLDA+IHG-RTO: 267 MM[HG]
HOROWITZ INDEX BLDA+IHG-RTO: 273 MM[HG]
IMM GRANULOCYTES # BLD AUTO: 0.05 K/UL (ref 0–0.11)
IMM GRANULOCYTES NFR BLD AUTO: 0.5 % (ref 0–0.9)
LYMPHOCYTES # BLD AUTO: 0.77 K/UL (ref 1–4.8)
LYMPHOCYTES NFR BLD: 8.1 % (ref 22–41)
MCF BLD TEG: 67.6 MM (ref 52–69)
MCF.PLATELET INHIB BLD ROTEM: 32 MM (ref 15–32)
MCH RBC QN AUTO: 30.1 PG (ref 27–33)
MCHC RBC AUTO-ENTMCNC: 33.6 G/DL (ref 33.7–35.3)
MCV RBC AUTO: 89.5 FL (ref 81.4–97.8)
MODE IMODE: ABNORMAL
MODE IMODE: ABNORMAL
MONOCYTES # BLD AUTO: 0.52 K/UL (ref 0–0.85)
MONOCYTES NFR BLD AUTO: 5.5 % (ref 0–13.4)
NEUTROPHILS # BLD AUTO: 8.16 K/UL (ref 1.82–7.42)
NEUTROPHILS NFR BLD: 85.9 % (ref 44–72)
NRBC # BLD AUTO: 0 K/UL
NRBC BLD-RTO: 0 /100 WBC
O2/TOTAL GAS SETTING VFR VENT: 30 %
O2/TOTAL GAS SETTING VFR VENT: 30 %
PA AA BLD-ACNC: 91.9 % (ref 0–11)
PA ADP BLD-ACNC: 21.2 % (ref 0–17)
PCO2 BLDA: 28.1 MMHG (ref 26–37)
PCO2 BLDA: 30 MMHG (ref 26–37)
PCO2 TEMP ADJ BLDA: 28.8 MMHG (ref 26–37)
PCO2 TEMP ADJ BLDA: 30.8 MMHG (ref 26–37)
PEEP END EXPIRATORY PRESSURE IPEEP: 8 CMH20
PEEP END EXPIRATORY PRESSURE IPEEP: 8 CMH20
PH BLDA: 7.35 [PH] (ref 7.4–7.5)
PH BLDA: 7.41 [PH] (ref 7.4–7.5)
PH TEMP ADJ BLDA: 7.34 [PH] (ref 7.4–7.5)
PH TEMP ADJ BLDA: 7.41 [PH] (ref 7.4–7.5)
PLATELET # BLD AUTO: 313 K/UL (ref 164–446)
PMV BLD AUTO: 11.3 FL (ref 9–12.9)
PO2 BLDA: 80 MMHG (ref 64–87)
PO2 BLDA: 82 MMHG (ref 64–87)
PO2 TEMP ADJ BLDA: 83 MMHG (ref 64–87)
PO2 TEMP ADJ BLDA: 85 MMHG (ref 64–87)
POTASSIUM SERPL-SCNC: 3.9 MMOL/L (ref 3.6–5.5)
PREALB SERPL-MCNC: 24.6 MG/DL (ref 18–38)
PRODUCT TYPE UPROD: NORMAL
PROT SERPL-MCNC: 7.4 G/DL (ref 6–8.2)
RBC # BLD AUTO: 4.09 M/UL (ref 4.7–6.1)
SAO2 % BLDA: 96 % (ref 93–99)
SAO2 % BLDA: 96 % (ref 93–99)
SODIUM SERPL-SCNC: 136 MMOL/L (ref 135–145)
SPECIMEN DRAWN FROM PATIENT: ABNORMAL
SPECIMEN DRAWN FROM PATIENT: ABNORMAL
TEG ALGORITHM TGALG: ABNORMAL
TIDAL VOLUME IVT: 400 ML
TIDAL VOLUME IVT: 400 ML
UNIT STATUS USTAT: NORMAL
WBC # BLD AUTO: 9.5 K/UL (ref 4.8–10.8)

## 2022-08-26 PROCEDURE — 94799 UNLISTED PULMONARY SVC/PX: CPT

## 2022-08-26 PROCEDURE — 86140 C-REACTIVE PROTEIN: CPT

## 2022-08-26 PROCEDURE — 82962 GLUCOSE BLOOD TEST: CPT | Mod: 91

## 2022-08-26 PROCEDURE — 71045 X-RAY EXAM CHEST 1 VIEW: CPT

## 2022-08-26 PROCEDURE — 770022 HCHG ROOM/CARE - ICU (200)

## 2022-08-26 PROCEDURE — 700102 HCHG RX REV CODE 250 W/ 637 OVERRIDE(OP): Performed by: NEUROLOGICAL SURGERY

## 2022-08-26 PROCEDURE — 700102 HCHG RX REV CODE 250 W/ 637 OVERRIDE(OP): Performed by: SURGERY

## 2022-08-26 PROCEDURE — A9270 NON-COVERED ITEM OR SERVICE: HCPCS | Performed by: SURGERY

## 2022-08-26 PROCEDURE — 82803 BLOOD GASES ANY COMBINATION: CPT

## 2022-08-26 PROCEDURE — 99291 CRITICAL CARE FIRST HOUR: CPT | Performed by: SURGERY

## 2022-08-26 PROCEDURE — 700111 HCHG RX REV CODE 636 W/ 250 OVERRIDE (IP): Performed by: SURGERY

## 2022-08-26 PROCEDURE — 700102 HCHG RX REV CODE 250 W/ 637 OVERRIDE(OP): Performed by: NURSE PRACTITIONER

## 2022-08-26 PROCEDURE — 80053 COMPREHEN METABOLIC PANEL: CPT

## 2022-08-26 PROCEDURE — 37799 UNLISTED PX VASCULAR SURGERY: CPT

## 2022-08-26 PROCEDURE — 85347 COAGULATION TIME ACTIVATED: CPT

## 2022-08-26 PROCEDURE — 700105 HCHG RX REV CODE 258: Performed by: EMERGENCY MEDICINE

## 2022-08-26 PROCEDURE — 93010 ELECTROCARDIOGRAM REPORT: CPT | Performed by: INTERNAL MEDICINE

## 2022-08-26 PROCEDURE — 94003 VENT MGMT INPAT SUBQ DAY: CPT

## 2022-08-26 PROCEDURE — 85384 FIBRINOGEN ACTIVITY: CPT

## 2022-08-26 PROCEDURE — 36430 TRANSFUSION BLD/BLD COMPNT: CPT

## 2022-08-26 PROCEDURE — 700101 HCHG RX REV CODE 250: Performed by: NEUROLOGICAL SURGERY

## 2022-08-26 PROCEDURE — 700101 HCHG RX REV CODE 250: Performed by: SURGERY

## 2022-08-26 PROCEDURE — A9270 NON-COVERED ITEM OR SERVICE: HCPCS | Performed by: NEUROLOGICAL SURGERY

## 2022-08-26 PROCEDURE — 84134 ASSAY OF PREALBUMIN: CPT

## 2022-08-26 PROCEDURE — 85025 COMPLETE CBC W/AUTO DIFF WBC: CPT

## 2022-08-26 PROCEDURE — 85576 BLOOD PLATELET AGGREGATION: CPT | Mod: 91

## 2022-08-26 PROCEDURE — 700101 HCHG RX REV CODE 250: Performed by: EMERGENCY MEDICINE

## 2022-08-26 PROCEDURE — A9270 NON-COVERED ITEM OR SERVICE: HCPCS | Performed by: NURSE PRACTITIONER

## 2022-08-26 PROCEDURE — P9034 PLATELETS, PHERESIS: HCPCS

## 2022-08-26 PROCEDURE — 700111 HCHG RX REV CODE 636 W/ 250 OVERRIDE (IP): Performed by: NEUROLOGICAL SURGERY

## 2022-08-26 RX ORDER — ACETAMINOPHEN 500 MG
1000 TABLET ORAL EVERY 6 HOURS
Status: DISPENSED | OUTPATIENT
Start: 2022-08-26 | End: 2022-08-29

## 2022-08-26 RX ORDER — HYDROMORPHONE HYDROCHLORIDE 2 MG/1
2 TABLET ORAL
Status: DISCONTINUED | OUTPATIENT
Start: 2022-08-26 | End: 2022-09-09 | Stop reason: HOSPADM

## 2022-08-26 RX ORDER — HYDROMORPHONE HYDROCHLORIDE 2 MG/1
1 TABLET ORAL
Status: DISCONTINUED | OUTPATIENT
Start: 2022-08-26 | End: 2022-09-09 | Stop reason: HOSPADM

## 2022-08-26 RX ORDER — METOPROLOL TARTRATE 1 MG/ML
5 INJECTION, SOLUTION INTRAVENOUS EVERY 4 HOURS PRN
Status: DISCONTINUED | OUTPATIENT
Start: 2022-08-26 | End: 2022-09-09 | Stop reason: HOSPADM

## 2022-08-26 RX ORDER — AMLODIPINE BESYLATE 10 MG/1
5 TABLET ORAL
Status: DISCONTINUED | OUTPATIENT
Start: 2022-08-26 | End: 2022-08-29

## 2022-08-26 RX ORDER — ACETAMINOPHEN 500 MG
1000 TABLET ORAL EVERY 6 HOURS PRN
Status: ACTIVE | OUTPATIENT
Start: 2022-08-29 | End: 2022-09-01

## 2022-08-26 RX ADMIN — MAGNESIUM HYDROXIDE 30 ML: 400 SUSPENSION ORAL at 05:07

## 2022-08-26 RX ADMIN — METOPROLOL TARTRATE 5 MG: 5 INJECTION INTRAVENOUS at 15:00

## 2022-08-26 RX ADMIN — FENTANYL CITRATE 50 MCG: 50 INJECTION, SOLUTION INTRAMUSCULAR; INTRAVENOUS at 04:00

## 2022-08-26 RX ADMIN — PROPOFOL 65 MCG/KG/MIN: 10 INJECTION, EMULSION INTRAVENOUS at 23:47

## 2022-08-26 RX ADMIN — FENTANYL CITRATE 100 MCG: 50 INJECTION, SOLUTION INTRAMUSCULAR; INTRAVENOUS at 13:24

## 2022-08-26 RX ADMIN — LEVETIRACETAM 1000 MG: 100 INJECTION, SOLUTION INTRAVENOUS at 18:39

## 2022-08-26 RX ADMIN — HYDROMORPHONE HYDROCHLORIDE 2 MG: 2 TABLET ORAL at 18:40

## 2022-08-26 RX ADMIN — PROPOFOL 65 MCG/KG/MIN: 10 INJECTION, EMULSION INTRAVENOUS at 16:19

## 2022-08-26 RX ADMIN — POTASSIUM CHLORIDE AND SODIUM CHLORIDE: 900; 150 INJECTION, SOLUTION INTRAVENOUS at 07:58

## 2022-08-26 RX ADMIN — CEFAZOLIN SODIUM 2 G: 2 INJECTION, SOLUTION INTRAVENOUS at 05:07

## 2022-08-26 RX ADMIN — FENTANYL CITRATE 50 MCG: 50 INJECTION, SOLUTION INTRAMUSCULAR; INTRAVENOUS at 06:28

## 2022-08-26 RX ADMIN — FENTANYL CITRATE 100 MCG: 50 INJECTION, SOLUTION INTRAMUSCULAR; INTRAVENOUS at 16:19

## 2022-08-26 RX ADMIN — PROPOFOL 65 MCG/KG/MIN: 10 INJECTION, EMULSION INTRAVENOUS at 04:14

## 2022-08-26 RX ADMIN — MINERAL OIL, PETROLATUM 1 APPLICATION: 425; 573 OINTMENT OPHTHALMIC at 13:24

## 2022-08-26 RX ADMIN — PROPOFOL 65 MCG/KG/MIN: 10 INJECTION, EMULSION INTRAVENOUS at 06:33

## 2022-08-26 RX ADMIN — VENLAFAXINE 75 MG: 75 TABLET ORAL at 18:41

## 2022-08-26 RX ADMIN — FENTANYL CITRATE 50 MCG: 50 INJECTION, SOLUTION INTRAMUSCULAR; INTRAVENOUS at 01:37

## 2022-08-26 RX ADMIN — VENLAFAXINE 75 MG: 75 TABLET ORAL at 05:09

## 2022-08-26 RX ADMIN — ACETAMINOPHEN 975 MG: 650 SUPPOSITORY RECTAL at 05:08

## 2022-08-26 RX ADMIN — PROPOFOL 65 MCG/KG/MIN: 10 INJECTION, EMULSION INTRAVENOUS at 12:42

## 2022-08-26 RX ADMIN — POTASSIUM CHLORIDE AND SODIUM CHLORIDE: 900; 150 INJECTION, SOLUTION INTRAVENOUS at 18:08

## 2022-08-26 RX ADMIN — ATORVASTATIN CALCIUM 80 MG: 80 TABLET, FILM COATED ORAL at 18:39

## 2022-08-26 RX ADMIN — ACETAMINOPHEN 1000 MG: 500 TABLET ORAL at 23:48

## 2022-08-26 RX ADMIN — MINERAL OIL, PETROLATUM 1 APPLICATION: 425; 573 OINTMENT OPHTHALMIC at 05:09

## 2022-08-26 RX ADMIN — ACETAMINOPHEN 1000 MG: 500 TABLET ORAL at 18:00

## 2022-08-26 RX ADMIN — MINERAL OIL, PETROLATUM 1 APPLICATION: 425; 573 OINTMENT OPHTHALMIC at 22:00

## 2022-08-26 RX ADMIN — PROPOFOL 65 MCG/KG/MIN: 10 INJECTION, EMULSION INTRAVENOUS at 18:40

## 2022-08-26 RX ADMIN — ACETAMINOPHEN 650 MG: 325 TABLET ORAL at 12:42

## 2022-08-26 RX ADMIN — AMLODIPINE BESYLATE 5 MG: 10 TABLET ORAL at 10:49

## 2022-08-26 RX ADMIN — FAMOTIDINE 20 MG: 20 TABLET, FILM COATED ORAL at 05:08

## 2022-08-26 RX ADMIN — POLYETHYLENE GLYCOL 3350 1 PACKET: 17 POWDER, FOR SOLUTION ORAL at 05:07

## 2022-08-26 RX ADMIN — CARVEDILOL 6.25 MG: 6.25 TABLET, FILM COATED ORAL at 05:08

## 2022-08-26 RX ADMIN — PROPOFOL 65 MCG/KG/MIN: 10 INJECTION, EMULSION INTRAVENOUS at 21:08

## 2022-08-26 RX ADMIN — NICARDIPINE HYDROCHLORIDE 7.5 MG/HR: 25 INJECTION, SOLUTION INTRAVENOUS at 13:20

## 2022-08-26 RX ADMIN — PROPOFOL 65 MCG/KG/MIN: 10 INJECTION, EMULSION INTRAVENOUS at 09:33

## 2022-08-26 RX ADMIN — METOPROLOL TARTRATE 5 MG: 5 INJECTION INTRAVENOUS at 02:02

## 2022-08-26 RX ADMIN — LEVETIRACETAM 1000 MG: 100 INJECTION, SOLUTION INTRAVENOUS at 05:09

## 2022-08-26 RX ADMIN — NICARDIPINE HYDROCHLORIDE 5 MG/HR: 25 INJECTION, SOLUTION INTRAVENOUS at 07:58

## 2022-08-26 RX ADMIN — DOCUSATE SODIUM 100 MG: 50 LIQUID ORAL at 05:07

## 2022-08-26 RX ADMIN — NICARDIPINE HYDROCHLORIDE 2.5 MG/HR: 25 INJECTION, SOLUTION INTRAVENOUS at 19:42

## 2022-08-26 RX ADMIN — METOPROLOL TARTRATE 5 MG: 5 INJECTION INTRAVENOUS at 10:49

## 2022-08-26 RX ADMIN — CARVEDILOL 6.25 MG: 6.25 TABLET, FILM COATED ORAL at 18:40

## 2022-08-26 RX ADMIN — METOPROLOL TARTRATE 5 MG: 5 INJECTION INTRAVENOUS at 06:27

## 2022-08-26 RX ADMIN — PROPOFOL 45 MCG/KG/MIN: 10 INJECTION, EMULSION INTRAVENOUS at 00:47

## 2022-08-26 RX ADMIN — FAMOTIDINE 20 MG: 20 TABLET, FILM COATED ORAL at 18:40

## 2022-08-26 ASSESSMENT — PAIN DESCRIPTION - PAIN TYPE
TYPE: ACUTE PAIN;SURGICAL PAIN
TYPE: ACUTE PAIN
TYPE: ACUTE PAIN;SURGICAL PAIN
TYPE: ACUTE PAIN
TYPE: ACUTE PAIN;SURGICAL PAIN
TYPE: ACUTE PAIN
TYPE: ACUTE PAIN
TYPE: ACUTE PAIN;SURGICAL PAIN
TYPE: ACUTE PAIN;SURGICAL PAIN
TYPE: ACUTE PAIN
TYPE: ACUTE PAIN
TYPE: ACUTE PAIN;SURGICAL PAIN
TYPE: INTRACTABLE PAIN
TYPE: ACUTE PAIN
TYPE: ACUTE PAIN;SURGICAL PAIN
TYPE: ACUTE PAIN

## 2022-08-26 ASSESSMENT — FIBROSIS 4 INDEX: FIB4 SCORE: 1.27

## 2022-08-26 NOTE — PROGRESS NOTES
Trauma / Surgical Daily Progress Note    Date of Service  8/26/2022    Chief Complaint  62 y.o. male admitted 8/25/2022 with GLF w/SDH on anti-coagulation. Required emergent craniotomy    Interval Events  Hospital day # 2  Critically ill  Requires continued ICU and hospital admission  Seen on rounds and discussed with multidisciplinary team  Injuries and physiologic derangements pose a significant risk of mortality and long term morbidity  Critical care interventions include:  integration of multiple data points and associated complex medical decisions    Mgt of ventilator-weaning as able  Pain control  Supportive care for TBI  Ongoing nutrition with tube feeds    Review of Systems  Review of Systems   Unable to perform ROS: Intubated      Vital Signs for last 24 hours  Temp:  [36.6 °C (97.8 °F)-37.7 °C (99.9 °F)] 37.7 °C (99.9 °F)  Pulse:  [] 77  Resp:  [14-26] 24  BP: ()/() 110/69  SpO2:  [94 %-100 %] 95 %    Hemodynamic parameters for last 24 hours       Respiratory Data     Respiration: (!) 24, Pulse Oximetry: 95 %     Work Of Breathing / Effort: Vented  RUL Breath Sounds: Clear, RML Breath Sounds: Clear, RLL Breath Sounds: Diminished, DON Breath Sounds: Clear, LLL Breath Sounds: Diminished    Physical Exam  Physical Exam  Constitutional:       Appearance: He is ill-appearing. He is not toxic-appearing.   HENT:      Head:      Comments: Dressings in place     Right Ear: External ear normal.      Left Ear: External ear normal.   Eyes:      General: No scleral icterus.     Extraocular Movements: Extraocular movements intact.      Pupils: Pupils are equal, round, and reactive to light.   Cardiovascular:      Rate and Rhythm: Normal rate and regular rhythm.      Pulses: Normal pulses.   Pulmonary:      Breath sounds: No wheezing.      Comments: On vent  Abdominal:      General: There is no distension.      Palpations: Abdomen is soft.      Tenderness: no abdominal tenderness   Genitourinary:      Comments: White in place  Musculoskeletal:         General: Normal range of motion.      Cervical back: Normal range of motion.   Skin:     General: Skin is warm and dry.   Neurological:      General: No focal deficit present.      Mental Status: He is alert.      Cranial Nerves: No cranial nerve deficit.      Comments: Minimal following   Psychiatric:      Comments: Unable to assess       Laboratory  Recent Results (from the past 24 hour(s))   Triglycerides Starting now and then Every 3 Days    Collection Time: 22  8:30 PM   Result Value Ref Range    Triglycerides 233 (H) 0 - 149 mg/dL   POCT arterial blood gas device results    Collection Time: 22  8:33 PM   Result Value Ref Range    Ph 7.328 (L) 7.400 - 7.500    Pco2 33.3 26.0 - 37.0 mmHg    Po2 97 (H) 64 - 87 mmHg    Tco2 18 (L) 20 - 33 mmol/L    S02 97 93 - 99 %    Hco3 17.5 17.0 - 25.0 mmol/L    BE -7 (L) -4 - 3 mmol/L    Body Temp 99.0 F degrees    O2 Therapy 40 %    iPF Ratio 243     Ph Temp Kaiser 7.325 (L) 7.400 - 7.500    Pco2 Temp Co 33.7 26.0 - 37.0 mmHg    Po2 Temp Cor 98 (H) 64 - 87 mmHg    Specimen Arterial     DelSys Vent     End Tidal Carbon Dioxide 30 mmhg    Tidal Volume 400 mL    Peep End Expiratory Pressure 8 cmh20    Set Rate 26     Mode APV-CMV    EKG    Collection Time: 22  9:23 PM   Result Value Ref Range    Report       [facility]    Test Date:  2022  Pt Name:    TOMMY WILLINGHAM                  Department: 19  MRN:        [id number]                      Room:       [facility]  Gender:     Male                         Technician: TAYLOR  :        1960                   Requested By:TOMMY COLLINS  Order #:    [id number]                    Reading MD: Titus Fritz MD    Measurements  Intervals                                Axis  Rate:       122                          P:  NM:                                      QRS:        -49  QRSD:       145                          T:          -18  QT:         316  QTc:         451    Interpretive Statements  Atrial flutter/fibrillation  Nonspecific IVCD with LAD  Electronically Signed On 8- 8:24:59 PDT by Titus Fritz MD     CBC With Differential    Collection Time: 08/25/22  9:53 PM   Result Value Ref Range    WBC 8.5 4.8 - 10.8 K/uL    RBC 4.35 (L) 4.70 - 6.10 M/uL    Hemoglobin 13.1 (L) 14.0 - 18.0 g/dL    Hematocrit 38.9 (L) 42.0 - 52.0 %    MCV 89.4 81.4 - 97.8 fL    MCH 30.1 27.0 - 33.0 pg    MCHC 33.7 33.7 - 35.3 g/dL    RDW 45.7 35.9 - 50.0 fL    Platelet Count 301 164 - 446 K/uL    MPV 11.1 9.0 - 12.9 fL    Neutrophils-Polys 91.80 (H) 44.00 - 72.00 %    Lymphocytes 5.60 (L) 22.00 - 41.00 %    Monocytes 2.00 0.00 - 13.40 %    Eosinophils 0.00 0.00 - 6.90 %    Basophils 0.00 0.00 - 1.80 %    Immature Granulocytes 0.60 0.00 - 0.90 %    Nucleated RBC 0.00 /100 WBC    Neutrophils (Absolute) 7.81 (H) 1.82 - 7.42 K/uL    Lymphs (Absolute) 0.48 (L) 1.00 - 4.80 K/uL    Monos (Absolute) 0.17 0.00 - 0.85 K/uL    Eos (Absolute) 0.00 0.00 - 0.51 K/uL    Baso (Absolute) 0.00 0.00 - 0.12 K/uL    Immature Granulocytes (abs) 0.05 0.00 - 0.11 K/uL    NRBC (Absolute) 0.00 K/uL   Magnesium    Collection Time: 08/25/22  9:53 PM   Result Value Ref Range    Magnesium 1.7 1.5 - 2.5 mg/dL   Phosphorus    Collection Time: 08/25/22  9:53 PM   Result Value Ref Range    Phosphorus 2.5 2.5 - 4.5 mg/dL   aPTT    Collection Time: 08/25/22  9:53 PM   Result Value Ref Range    APTT 36.3 (H) 24.7 - 36.0 sec   Fibrinogen    Collection Time: 08/25/22  9:53 PM   Result Value Ref Range    Fibrinogen 444 215 - 460 mg/dL   Lactic Acid    Collection Time: 08/25/22  9:53 PM   Result Value Ref Range    Lactic Acid 0.8 0.5 - 2.0 mmol/L   Cortisol    Collection Time: 08/25/22  9:53 PM   Result Value Ref Range    Cortisol 29.9 (H) 0.0 - 23.0 ug/dL   Prothrombin Time    Collection Time: 08/25/22  9:53 PM   Result Value Ref Range    PT 14.3 12.0 - 14.6 sec    INR 1.12 0.87 - 1.13   Platelet Mapping (TEG) - Order if  platelet inhibitions are required    Collection Time: 08/25/22  9:53 PM   Result Value Ref Range    Reaction Time Initial-R 9.2 (H) 4.6 - 9.1 min    React Time Initial Hep 8.7 (H) 4.3 - 8.3 min    Clot Kinetics-K 1.1 0.8 - 2.1 min    Clot Angle-Angle 75.9 63.0 - 78.0 degrees    Maximum Clot Strength-MA 64.9 52.0 - 69.0 mm    TEG Functional Fibrinogen(MA) 28.6 15.0 - 32.0 mm    Lysis 30 minutes-LY30 1.4 0.0 - 2.6 %    % Inhibition ADP 43.5 (H) 0.0 - 17.0 %    % Inhibition AA 95.8 (H) 0.0 - 11.0 %    TEG Algorithm Link Algorithm    Basic Metabolic Panel    Collection Time: 08/25/22  9:53 PM   Result Value Ref Range    Sodium 136 135 - 145 mmol/L    Potassium 3.9 3.6 - 5.5 mmol/L    Chloride 103 96 - 112 mmol/L    Co2 16 (L) 20 - 33 mmol/L    Glucose 166 (H) 65 - 99 mg/dL    Bun 12 8 - 22 mg/dL    Creatinine 0.77 0.50 - 1.40 mg/dL    Calcium 9.2 8.5 - 10.5 mg/dL    Anion Gap 17.0 (H) 7.0 - 16.0   ESTIMATED GFR    Collection Time: 08/25/22  9:53 PM   Result Value Ref Range    GFR (CKD-EPI) 101 >60 mL/min/1.73 m 2   POCT glucose device results    Collection Time: 08/25/22 11:17 PM   Result Value Ref Range    POC Glucose, Blood 154 (H) 65 - 99 mg/dL   POCT arterial blood gas device results    Collection Time: 08/25/22 11:47 PM   Result Value Ref Range    Ph 7.350 (L) 7.400 - 7.500    Pco2 30.0 26.0 - 37.0 mmHg    Po2 82 64 - 87 mmHg    Tco2 17 (L) 20 - 33 mmol/L    S02 96 93 - 99 %    Hco3 16.5 (L) 17.0 - 25.0 mmol/L    BE -8 (L) -4 - 3 mmol/L    Body Temp 99.7 F degrees    O2 Therapy 30 %    iPF Ratio 273     Ph Temp Kaiser 7.341 (L) 7.400 - 7.500    Pco2 Temp Co 30.8 26.0 - 37.0 mmHg    Po2 Temp Cor 85 64 - 87 mmHg    Specimen Arterial     DelSys Vent     End Tidal Carbon Dioxide 28 mmhg    Tidal Volume 400 mL    Peep End Expiratory Pressure 8 cmh20    Set Rate 24     Mode APV-CMV    POCT arterial blood gas device results    Collection Time: 08/26/22  1:23 AM   Result Value Ref Range    Ph 7.414 7.400 - 7.500    Pco2 28.1  26.0 - 37.0 mmHg    Po2 80 64 - 87 mmHg    Tco2 19 (L) 20 - 33 mmol/L    S02 96 93 - 99 %    Hco3 17.9 17.0 - 25.0 mmol/L    BE -5 (L) -4 - 3 mmol/L    Body Temp 99.7 F degrees    O2 Therapy 30 %    iPF Ratio 267     Ph Temp Kaiser 7.405 7.400 - 7.500    Pco2 Temp Co 28.8 26.0 - 37.0 mmHg    Po2 Temp Cor 83 64 - 87 mmHg    Specimen Arterial     DelSys Vent     End Tidal Carbon Dioxide 27 mmhg    Tidal Volume 400 mL    Peep End Expiratory Pressure 8 cmh20    Set Rate 24     Mode APV-CMV    CBC with Differential: Tomorrow AM    Collection Time: 08/26/22  4:15 AM   Result Value Ref Range    WBC 9.5 4.8 - 10.8 K/uL    RBC 4.09 (L) 4.70 - 6.10 M/uL    Hemoglobin 12.3 (L) 14.0 - 18.0 g/dL    Hematocrit 36.6 (L) 42.0 - 52.0 %    MCV 89.5 81.4 - 97.8 fL    MCH 30.1 27.0 - 33.0 pg    MCHC 33.6 (L) 33.7 - 35.3 g/dL    RDW 46.0 35.9 - 50.0 fL    Platelet Count 313 164 - 446 K/uL    MPV 11.3 9.0 - 12.9 fL    Neutrophils-Polys 85.90 (H) 44.00 - 72.00 %    Lymphocytes 8.10 (L) 22.00 - 41.00 %    Monocytes 5.50 0.00 - 13.40 %    Eosinophils 0.00 0.00 - 6.90 %    Basophils 0.00 0.00 - 1.80 %    Immature Granulocytes 0.50 0.00 - 0.90 %    Nucleated RBC 0.00 /100 WBC    Neutrophils (Absolute) 8.16 (H) 1.82 - 7.42 K/uL    Lymphs (Absolute) 0.77 (L) 1.00 - 4.80 K/uL    Monos (Absolute) 0.52 0.00 - 0.85 K/uL    Eos (Absolute) 0.00 0.00 - 0.51 K/uL    Baso (Absolute) 0.00 0.00 - 0.12 K/uL    Immature Granulocytes (abs) 0.05 0.00 - 0.11 K/uL    NRBC (Absolute) 0.00 K/uL   Comp Metabolic Panel (CMP): Tomorrow AM    Collection Time: 08/26/22  4:15 AM   Result Value Ref Range    Sodium 136 135 - 145 mmol/L    Potassium 3.9 3.6 - 5.5 mmol/L    Chloride 104 96 - 112 mmol/L    Co2 17 (L) 20 - 33 mmol/L    Anion Gap 15.0 7.0 - 16.0    Glucose 153 (H) 65 - 99 mg/dL    Bun 11 8 - 22 mg/dL    Creatinine 0.75 0.50 - 1.40 mg/dL    Calcium 9.4 8.5 - 10.5 mg/dL    AST(SGOT) 18 12 - 45 U/L    ALT(SGPT) 12 2 - 50 U/L    Alkaline Phosphatase 69 30 - 99 U/L     Total Bilirubin 0.4 0.1 - 1.5 mg/dL    Albumin 4.5 3.2 - 4.9 g/dL    Total Protein 7.4 6.0 - 8.2 g/dL    Globulin 2.9 1.9 - 3.5 g/dL    A-G Ratio 1.6 g/dL   PLATELET MAPPING WITH BASIC TEG    Collection Time: 08/26/22  4:15 AM   Result Value Ref Range    Reaction Time Initial-R 6.8 4.6 - 9.1 min    React Time Initial Hep 5.9 4.3 - 8.3 min    Clot Kinetics-K 0.9 0.8 - 2.1 min    Clot Angle-Angle 77.8 63.0 - 78.0 degrees    Maximum Clot Strength-MA 67.6 52.0 - 69.0 mm    TEG Functional Fibrinogen(MA) 32.0 15.0 - 32.0 mm    Lysis 30 minutes-LY30 0.0 0.0 - 2.6 %    % Inhibition ADP 21.2 (H) 0.0 - 17.0 %    % Inhibition AA 91.9 (H) 0.0 - 11.0 %    TEG Algorithm Link Algorithm    CRP QUANTITIVE (NON-CARDIAC)    Collection Time: 08/26/22  4:15 AM   Result Value Ref Range    Stat C-Reactive Protein 2.50 (H) 0.00 - 0.75 mg/dL   PREALBUMIN    Collection Time: 08/26/22  4:15 AM   Result Value Ref Range    Pre-Albumin 24.6 18.0 - 38.0 mg/dL   ESTIMATED GFR    Collection Time: 08/26/22  4:15 AM   Result Value Ref Range    GFR (CKD-EPI) 102 >60 mL/min/1.73 m 2   POCT glucose device results    Collection Time: 08/26/22  5:25 AM   Result Value Ref Range    POC Glucose, Blood 129 (H) 65 - 99 mg/dL   POCT glucose device results    Collection Time: 08/26/22 12:27 PM   Result Value Ref Range    POC Glucose, Blood 130 (H) 65 - 99 mg/dL       Fluids    Intake/Output Summary (Last 24 hours) at 8/26/2022 1556  Last data filed at 8/26/2022 1400  Gross per 24 hour   Intake 5393.88 ml   Output 4075 ml   Net 1318.88 ml       Core Measures & Quality Metrics  Labs reviewed and Radiology images reviewed  White catheter: Critically Ill - Requiring Accurate Measurement of Urinary Output      DVT Prophylaxis: Contraindicated - High bleeding risk  DVT prophylaxis - mechanical: SCDs  Ulcer prophylaxis: Yes      RAP Score Total: 6  CAGE Results: not completed Blood Alcohol>0.08: no     Assessment/Plan  Traumatic subdural hematoma, initial encounter  (HCC)- (present on admission)  Assessment & Plan  Left-sided holohemispheric traumatic subdural hemorrhage associated with systemic anticoagulation and antiplatelet therapy.  craniotomy  Post traumatic pharmacologic seizure prophylaxis for 1 week.  Speech Language Pathology cognitive evaluation.  Tesfaye Luther MD. Neurosurgeon. [facility].    Antiplatelet or antithrombotic long-term use- (present on admission)  Assessment & Plan  Daily ASA and Apixaban.  Received KCentra on admit.  AA 96.1%, transfused 1 unit platelet pheresis in ED.  Follow up TEG with platelet mapping ordered.    Status post insertion of drug-eluting stent into right coronary artery for coronary artery disease- (present on admission)  Assessment & Plan  Drug-eluting stents placed in the mid RCA and PDA August 2020.   Daily aspirin administration.  Maintenance medication held on admission.    Paroxysmal atrial fibrillation (HCC)- (present on admission)  Assessment & Plan  Chronic condition treated with carvedilol and apixaban (Eliquis) anticoagulation.  Carvedilol resumed upon admission.  Systemic anticoagulation held on admission.     CAD (coronary artery disease)  Assessment & Plan  History of coronary artery disease with STEMI in August 2020.  Drug-eluting stents placed in the mid RCA and PDA.  Daily aspirin administration.    Type 2 diabetes mellitus without complication, without long-term current use of insulin (HCC)- (present on admission)  Assessment & Plan  Chronic condition treated with metformin.  Glycohemoglobin 6.2 (131).  Holding maintenance metformin for 48 hours following intravenous contrast administration.  Insulin sliding scale coverage.    Dyslipidemia- (present on admission)  Assessment & Plan  Chronic condition treated with atorvastatin.  Resumed maintenance medication on admission.    Hypertensive disease- (present on admission)  Assessment & Plan  Chronic condition treated with carvedilol.  Resumed maintenance  medication on admission.    Respiratory failure following trauma and surgery (HCC)  Assessment & Plan  Returned from OR on vent  Weaning as able    Depression- (present on admission)  Assessment & Plan  Chronic condition treated with Effexor.  Resumed maintenance medication on admission.    Trauma- (present on admission)  Assessment & Plan  Mechanical ground-level fall.  Intracranial hemorrhage with systemic anticoagulation.  Trauma Yellow [facility] from [facility].  Macario Winn MD. Trauma Surgery.    BPH (benign prostatic hyperplasia)- (present on admission)  Assessment & Plan  Chronic condition treated with finasteride and tamsulosin.  Resumed maintenance medication on admission.        Discussed patient condition with RN, RT, Therapies, Pharmacy, Dietary, and .  CRITICAL CARE TIME EXCLUDING PROCEDURES: 33    minutes

## 2022-08-26 NOTE — PROGRESS NOTES
Neurosurgery Progress Note    Subjective:  62 y.o. male on Eliquis (reversal agent given) who presented 2022 with an acute left SDH and transfer from an outside hospital. Initially presented with GCS 15. Neuro exam subsequently declined with worsening expressive aphasia, agitation, no command following. Repeat CT with progression of SDH and worsening MLS.   POD#1 Left craniotomy for evacuation of acute SDH.   Post-op head CT  with evacuation of SDH and decreased MLS.    No acute events overnight.   Patient remains intubated and sedated.   Per nursing, when sedation held patient opens eyes spontaneously, withdraws x4, occasional spontaneous extremity movement.       Exam:  GCS: [id number].  Intubated, sedation held >10 min for exam.   Patient woke up slowly and appears relatively alert when given time off sedation.   Soft restraints in place.    Face symmetric.  Spontaneous eye opening.   Pupils 3mm, PERRL.  Some trace spontaneous movement to LUE and RLE.   Withdraws x4, trace reaction to RUE and LLE.   Not following commands.  +grimace to central and peripheral stimulation.   +gag, +cough.   Subgaleal Hemovac with 160cc/8 hours.   Dressing CDI with moderate strikethrough.       BP  Min: 99/70  Max: 172/75  Pulse  Av.2  Min: 63  Max: 146  Resp  Av.3  Min: 13  Max: 34  Temp  Av.4 °C (99.3 °F)  Min: 36.6 °C (97.8 °F)  Max: 37.7 °C (99.9 °F)  Monitored Temp 2  Av.6 °C (99.6 °F)  Min: 37.2 °C (99 °F)  Max: 37.9 °C (100.2 °F)  SpO2  Av.9 %  Min: 89 %  Max: 100 %    No data recorded    Recent Labs     22  0521 22  2153 22  0415   WBC 12.4* 8.5 9.5   RBC 4.80 4.35* 4.09*   HEMOGLOBIN 14.2 13.1* 12.3*   HEMATOCRIT 42.3 38.9* 36.6*   MCV 88.1 89.4 89.5   MCH 29.6 30.1 30.1   MCHC 33.6* 33.7 33.6*   RDW 44.3 45.7 46.0   PLATELETCT 302 301 313   MPV 11.4 11.1 11.3       Recent Labs     22  0521 22  2153 22  0415   SODIUM 140 136 136   POTASSIUM 3.9 3.9 3.9    CHLORIDE 104 103 104   CO2 18* 16* 17*   GLUCOSE 116* 166* 153*   BUN 18 12 11   CREATININE 0.98 0.77 0.75   CALCIUM 10.5 9.2 9.4       Recent Labs     08/25/22  0521 08/25/22 2153   APTT 37.9* 36.3*   INR 1.34* 1.12       Recent Labs     08/25/22  1300 08/25/22  2153 08/26/22  0415   REACTMIN 8.0 9.2* 6.8   CLOTKINET 1.0 1.1 0.9   CLOTANGL 76.1 75.9 77.8   MAXCLOTS 66.4 64.9 67.6   CKM52AKI 0.0 1.4 0.0   PRCINADP 20.0* 43.5* 21.2*   PRCINAA 95.7* 95.8* 91.9*         Intake/Output                         08/25/22 0700 - 08/26/22 0659 08/26/22 0700 - 08/27/22 0659     0700-1859 1900-0659 Total 0700-1859 1900-0659 Total                 Intake    I.V.  1062.5  2364.1 3426.6  --  -- --    Cardene Volume  -- -- --    Propofol Volume -- 367.5 367.5 -- -- --    Volume (mL) (NS infusion) 387.5 700 1087.5 -- -- --    Volume (mL) (0.9 % NaCl with KCl 20 mEq infusion) -- 756.7 756.7 -- -- --    Blood  181.7  350 531.7  100  -- 100    Volume (RELEASE PLATELET PHERESIS) 56.7 -- 56.7 -- -- --    Volume (RELEASE PLATELET PHERESIS) 125 -- 125 -- -- --    Volume (RELEASE PLATELET PHERESIS) -- 350 350 -- -- --    Volume (RELEASE PLATELET PHERESIS) -- -- -- 100 -- 100    IV Piggyback  --  276.7 276.7  --  -- --    Volume (mL) (ceFAZolin in dextrose (Ancef) IVPB premix 2 g) -- 276.7 276.7 -- -- --    Enteral  --  150 150  --  -- --    Free Water / Tube Flush -- 150 150 -- -- --    Total Intake 1244.2 3140.8 4385 100 -- 100       Output    Urine  1300  2075 3375  --  -- --    Output (mL) (Urethral Catheter Non-latex;Temperature probe 16 Fr.) 1300 2075 3375 -- -- --    Drains  --  160 160  --  -- --    Output (mL) (Closed/Suction Drain 1 Left Scalp Hemovac 10 Fr.) -- 160 160 -- -- --    Stool  --  -- --  --  -- --    Number of Times Stooled -- 1 x 1 x -- -- --    Blood  --  200 200  --  -- --    Est. Blood Loss -- 200 200 -- -- --    Total Output 1300 2435 3735 -- -- --       Net I/O     -55.8 705.8 650 100 -- 100               Intake/Output Summary (Last 24 hours) at 8/26/2022 1053  Last data filed at 8/26/2022 0900  Gross per 24 hour   Intake 4278.31 ml   Output 3735 ml   Net 543.31 ml               Metoprolol Tartrate  5 mg Q4HRS PRN    fentaNYL  50 mcg Q HOUR PRN    Or    fentaNYL  100 mcg Q HOUR PRN    HYDROmorphone  1 mg Q3HRS PRN    Or    HYDROmorphone  2 mg Q3HRS PRN    amLODIPine  5 mg Q DAY    niCARdipine infusion  0-15 mg/hr Continuous    levETIRAcetam (Keppra) IV  1,000 mg Q12HRS    ondansetron  4 mg Q4HRS PRN    bisacodyl  10 mg Q24HRS PRN    sodium phosphate  1 Each Once PRN    famotidine  20 mg BID    Or    famotidine  20 mg BID    insulin regular  1-6 Units Q6HRS    And    dextrose bolus  25 g Q15 MIN PRN    labetalol  10 mg Q4HRS PRN    Pharmacy Consult Request  1 Each PHARMACY TO DOSE    MD ALERT...DO NOT ADMINISTER NSAIDS or ASPIRIN unless ORDERED By Neurosurgery  1 Each PRN    hydrALAZINE  10 mg Q HOUR PRN    artificial tears  1 Application Q8HRS    Respiratory Therapy Consult   Continuous RT    0.9 % NaCl with KCl 20 mEq 1,000 mL   Continuous    acetaminophen  975 mg Q6HRS    Followed by    [START ON 8/30/2022] acetaminophen  975 mg Q6HRS PRN    propofol  0-80 mcg/kg/min Continuous    Pharmacy  1 Each PHARMACY TO DOSE    acetaminophen  650 mg Q4HRS PRN    Or    acetaminophen  650 mg Q4HRS PRN    atorvastatin  80 mg Q EVENING    cloNIDine  0.1 mg Q4HRS PRN    magnesium hydroxide  30 mL DAILY    ondansetron  4 mg Q4HRS PRN    polyethylene glycol/lytes  1 Packet BID    senna-docusate  1 Tablet Nightly    senna-docusate  1 Tablet Q24HRS PRN    docusate sodium  100 mg BID    venlafaxine  75 mg BID    carvedilol  6.25 mg BID       Assessment and Plan:  Hospital day #3  POD#1   Post-op head CT improved.   Keep subgaleal Hemovac today, ice incision.   Q1 neuro checks.    Keppra 1000 mg BID.    OK for tube feeds.  Wean sedation and extubate as able.   Following.      Chemical prophylactic DVT therapy: Yes  Start  date/time: Tomorrow, 8/27, per intensivist.

## 2022-08-26 NOTE — DIETARY
"Nutrition Support Assessment:  Day 1 of admit.  Karan Wiley is a 62 y.o. male with admitting DX of Trauma, Subdural hematoma.     Current problem list:  Trauma - GLF  Traumatic subdural hematoma  CAD (coronary artery disease)  Paroxysmal atrial fibrillation  Dyslipidemia  Hypertensive disease  Type 2 diabetes mellitus     Assessment:  Estimated Nutritional Needs based on:   Height: 170.2 cm (5' 7\")  Weight: 100 kg (220 lb 7.4 oz)  Weight to Use in Calculations: 98.3 kg (216 lb 11.4 oz) (first bed scale weight.)  Ideal Body Weight: 67.1 kg (148 lb)  Body mass index is 34.53 kg/m²., BMI classification: Class 1 Obesity    Calculation/Equation: PSU (VE 7.5, Tmax 37.9C) = 1853 kcal, 11 -14 kcal/kg per ASPEN guidelines for critically ill obese = 1081 - 1375 kcal  Total Calories / day: 1080 - 1375  (Calories / k - 14)  Total Grams Protein / day: 134+  (Grams Protein / kg IBW: 2+)     Evaluation:   Pt intubated. Strict NPO. OK to start tube feed per MD in IDT rounds.  Gastric cortrak placed .  Medications include Propofol at 65 mcg/kg/min providing 1011 kcal in 24 hours, Cardene drip, SSI.  Labs  include Na 136, K+ 3.9, Glu 153 (H), BUN 11, Creat 0.75, Alb 4.5, Pre-Alb 24.6, CRP 2.5 (H). : A1C 6.2 (H)  LBM   IV fluids: 0.9% NaCl with KCl 20 mEq at 100 ml/hour.  High protein formula Peptamen Intense VHP appropriate to meet protein needs while pt sedated on high rate of propofol.     Malnutrition Risk: Criteria not noted, but unable to fully assess.     Recommendations/Plan:  Start Peptamen Intense VHP and advance to goal rate of 60 ml/hour to provide 1440 kcal, 132 gm protein, 109 gm CHO, and 1209 ml free water. Same goal rate with and without propofol.  Fluids per MD.  Monitor weight, labs, propofol rate.    RD following            "

## 2022-08-26 NOTE — DISCHARGE PLANNING
Care Transition Team Assessment     LMSW met with patient's sister, Xenia Brunson, and obtained the following information used in this assessment. Verified accuracy of facesheet.      Patient is a  62 year old male who was admitted after a ground level fall. Xenia reports that pt and spouse recent moved into a new home in [place], [place]. They were at their new house when pt accidentally ran into a wall causing him to fall backwards and hitting hit head. Spouse & adult son are involved with care & will support patient upon dc. Xenia is a [employer] employee ([facility]) and will also assist upon dc. No ACP documents.     Prior to current hospitalization, pt was completely independent in ADLS/IADLS. Patient has  coverage. PCP and pharmacy are updated in chart. No SA or MH dx.    Plan: Continue to assist with social/dc needs     Care Transition Team Assessment    Information Source  Orientation Level: Unable to assess  Information Given By: Relative  Informant's Name: sister  Who is responsible for making decisions for patient? : Legal next of kin  Name(s) of Primary Decision Maker: spouse    Readmission Evaluation  Is this a readmission?: No    Elopement Risk  Legal Hold: No  Ambulatory or Self Mobile in Wheelchair: No-Not an Elopement Risk  Elopement Risk: Not at Risk for Elopement    Interdisciplinary Discharge Planning  Primary Care Physician: Henri Kong  Lives with - Patient's Self Care Capacity: Spouse  Patient or legal guardian wants to designate a caregiver: No  Support Systems: Family Member(s), Friends / Neighbors, Spouse / Significant Other  Housing / Facility: [address]  Durable Medical Equipment: Not Applicable    Discharge Preparedness  What is your plan after discharge?: Uncertain - pending medical team collaboration  What are your discharge supports?: Spouse, Sibling  Prior Functional Level: Ambulatory, Independent with Activities of Daily Living, Independent with Medication  Management    Functional Assesment  Prior Functional Level: Ambulatory, Independent with Activities of Daily Living, Independent with Medication Management    Finances  Financial Barriers to Discharge: No  Prescription Coverage: Yes    Vision / Hearing Impairment  Vision Impairment : No  Hearing Impairment : Yes  Hearing Impairment: Both Ears, Hearing Device(s) Available    Advance Directive  Advance Directive?: Clinically incapacitated / Inappropriate    Domestic Abuse  Have you ever been the victim of abuse or violence?: No  Physical Abuse or Sexual Abuse: No  Verbal Abuse or Emotional Abuse: No  Possible Abuse/Neglect Reported to:: Not Applicable    Psychological Assessment  History of Substance Abuse: None  History of Psychiatric Problems: No  Non-compliant with Treatment: No  Newly Diagnosed Illness: Yes    Discharge Risks or Barriers  Discharge risks or barriers?: Post-acute placement / services, Complex medical needs  Patient risk factors: Complex medical needs    Anticipated Discharge Information  Discharge Disposition: Disch to  rehab facility or distinct part unit

## 2022-08-26 NOTE — THERAPY
Occupational Therapy Contact Note      Patient Name: Karan Wiley  Age:  62 y.o., Sex:  male  Medical Record #: [id number]  Today's Date: 8/26/2022 08/26/22 0734   Interdisciplinary Plan of Care Collaboration   Collaboration Comments OT order received, per chart pt on strict bed rest, will monitor and re approach as able and appropriate to participate in therapy

## 2022-08-26 NOTE — PROGRESS NOTES
"    Limited tertiary exam due to patient's mental status and clinical condition.     Mental status adequate for full examination?: No    Spine cleared (radiologically and/or clinically): Yes    PHYSICAL EXAMINATION:  Vitals: /71   Pulse (!) 120   Temp 36.6 °C (97.8 °F) (Temporal)   Resp (!) 24   Ht 1.702 m (5' 7.01\")   Wt 98.3 kg (216 lb 11.4 oz)   SpO2 100%   BMI 33.93 kg/m²   Constitutional:     General Appearance: appears stated age, is well developed and well nourished, intubated and sedated at the time of exam.  HEENT:    Surgical post craniotomy dressing in place. Hemovac in place . The pupils are equal, round, and reactive to light bilaterally. The midface and jaw are stable.  Neck:    Unable to assess for c spine tenderness .  Respiratory:   Inspection: Mechanically ventilated.   Palpation:  The chest is . The clavicles are non deformed bilaterally..   Auscultation: normal, clear to auscultation.  Cardiovascular:   Auscultation: irregularly irregular.   Peripheral Pulses: Normal.   Abdomen:   Abdomen is soft, without organomegaly or masses.  Genitourinary:   (MALE): White catheter in place.  Musculoskeletal:   The pelvis is stable.  No significant angulation, deformity, or soft tissue injury involving the upper and lower extremities. Normal range of motion.   Back:   The thoracolumbar spine was examined. Examination is remarkable for no significant swelling, or deformity in the thoracolumbar region.  Skin:   The skin is warm and dry.  Neurologic:    Trinh Coma Scale (GCS) 6 E1V1M5. Neurologic examination revealed withdrawal of all extremities to painful stimuli. Intact cranial reflexes.  Psychiatric:   The patient is intubated and sedated.    IMAGING:  DX-ABDOMEN FOR TUBE PLACEMENT   Final Result         1.  Nonspecific bowel gas pattern.   2.  Dobbhoff tube tip terminates overlying the expected location of the gastric body.   3.  Atherosclerosis      CT-HEAD W/O   Final Result       "   Postsurgical change from left frontal craniotomy and evacuation of the left subdural hematoma.      Small residual hematoma and gas in the subdural space. Decreased mass effect and left-to-right midline shift.                        CT-HEAD W/O   Final Result      1.  Stable LEFT hemispheric acute subdural hematoma with associated mass effect and 8 mm LEFT to RIGHT midline shift.   2.  No significant change from prior.         OUTSIDE IMAGES-CT HEAD   Final Result      US-ABORTED US PROCEDURE    (Results Pending)   MR-BRAIN-W/O    (Results Pending)   US-TRAUMA VEIN SCREEN LOWER BILAT EXTREMITY    (Results Pending)     All current laboratory studies/radiology exams reviewed: Yes    Completed Consultations:  Neurosurgery      Pending Consultations:  none    Newly Identified Diagnoses and Injuries:  none    RAP Score Total: 6    CAGE Results: not completed Blood Alcohol>0.08: no  CAGE not completed / patient intubated

## 2022-08-26 NOTE — ANESTHESIA POSTPROCEDURE EVALUATION
Patient: Karan Wiley    Procedure Summary     Date: 08/25/22 Room / Location: [facility] 04 / SURGERY [facility]    Anesthesia Start: 1743 Anesthesia Stop: 1947    Procedure: CRANIOTOMY FOR SUBDURAL HEMATOMA (Left: Head) Diagnosis: (LEFT SUBDURAL HEMATOMA)    Surgeons: Tesfaye Luther M.D. Responsible Provider: Luis Angel Justice M.D.    Anesthesia Type: general ASA Status: 3          Final Anesthesia Type: general  Last vitals  BP   Blood Pressure: 130/71    Temp   36.6 °C (97.8 °F)    Pulse   (!) 112   Resp   18    SpO2   99 %      Anesthesia Post Evaluation    Patient location during evaluation: ICU  Patient participation: complete - patient cannot participate  Level of consciousness: obtunded/minimal responses  Pain score: 0    Airway patency: patent  Anesthetic complications: no  Cardiovascular status: tachycardic and hemodynamically stable  Respiratory status: ETT, intubated and ventilator  Hydration status: balanced    PONV: none          There were no known notable events for this encounter.     Nurse Pain Score: 6 (NPRS)

## 2022-08-26 NOTE — PROGRESS NOTES
Cortrak Placement    Tube Team verified patient name and medical record number prior to tube placement.  Cortrak tube (55 inches, 10 Cameroonian) placed at 65 cm in right nare.  Per Cortrak picture, tube appears to be in the stomach.    Nursing Instructions: Awaiting KUB to confirm placement before use for medications or feeding. Once placement confirmed, flush tube with 30 ml of water, and then remove and save stylet, in patient medication drawer.

## 2022-08-26 NOTE — ASSESSMENT & PLAN NOTE
Mechanically ventilated following admission craniotomy.  9/4 Percutaneous tracheostomy.  9/6 T piece.  9/8 Capping and speaking valve trials.

## 2022-08-26 NOTE — PROGRESS NOTES
12 Hour Chart Check   Agree with measurements above.   -150 PVCs  Paroxysmal Afib up to 180 at times.  MD aware.

## 2022-08-26 NOTE — ANESTHESIA PROCEDURE NOTES
Arterial Line  Performed by: Luis Angel Justice M.D.  Authorized by: Luis Angel Justice M.D.     Start Time:  8/25/2022 6:01 PM  Localization: ultrasound guidance  Image captured, interpreted and electronically stored.  Patient Location:  OR  Indication: continuous blood pressure monitoring        Catheter Size:  20 G  Seldinger Technique?: Yes    Site:  Radial artery  Line Secured:  Antimicrobial disc, tape and transparent dressing  Events: patient tolerated procedure well with no complications     Placed with one attempt

## 2022-08-26 NOTE — ANESTHESIA TIME REPORT
Anesthesia Start and Stop Event Times     Date Time Event    8/25/2022 1737 Ready for Procedure     1743 Anesthesia Start     1947 Anesthesia Stop        Responsible Staff  08/25/22    Name Role Begin End    Luis Angel Justice M.D. Anesth 1743 1947        Overtime Reason:  no overtime (within assigned shift)    Comments:

## 2022-08-26 NOTE — OR NURSING
Wife, Alesia Wiley, telephoned states she does not want her  to have visitors at this time, she states she does not want pt sister, Xenia Nguyen,to visit pt or have information regarding pt care, without her consent.

## 2022-08-26 NOTE — PROGRESS NOTES
Patient arrived from OR with OR team on transport monitor.      Vital signs:      /86 Art line  Resp 22  SpO2 99 Vented  Temp         4 Eye skin assessment with Rita primary RN       Lt Crani site,  No other new changes since last skin check

## 2022-08-26 NOTE — OP REPORT
Date of surgery: 08/25/2022    Surgeon: Tesfaye Luther MD    Anesthesiologist: Luis Angel Justice MD    Anesthesia: GETA     Preoperative diagnosis  1.  Left acute subdural hematoma  2.  Cerebral herniation    Postoperative diagnosis  1.  Left acute subdural hematoma  2.  Cerebral herniation    Procedure performed   1.  Left craniotomy, evacuation of acute left subdural hematoma    Indication for procedure  This is a 62-year-old male who presented approximately 12 hours ago to the hospital in transfer from an outside facility.  He had a modest acute left-sided subdural hematoma and he was on Eliquis preoperatively for atrial fibrillation.  He had sustained a fall at home.  He had minimal shift at the level of the third ventricle on his initial head CT, 2.5 mm.  While in the emergency department, he started experiencing expressive aphasia, see my consult note for details.  I had ordered him an MRI at that point however, he started to get agitated and we ordered him a stat follow-up head CT.  The radiologist read this is unchanged however he clearly had coalescence and enlargement of his subdural hematoma and I measure his shift at the level of the third ventricle at 7 to 8 mm as opposed to his outside hospital scan which was 2.5 mm.  He was given reversal for his Xarelto.  He remained stable.  I was able to speak to his wife regarding his imaging findings, neurologic exam and surgical recommendations.  She understood risk, benefits, alternatives to surgery and wished to proceed    Procedure in detail  Patient was brought to the operating room and intubated by the anesthesiologist.  He was placed supine on a regular OR table with his head in a donut, the left side of his head turned upwards.  Left side of his head was shaved and marked for a standard left frontotemporoparietal craniotomy.  He was prepped and draped in usual sterile fashion.  Preprocedure timeout was performed.  0.5% Marcaine with epinephrine was  infiltrated in the skin.  A 10 blade was used to sharply incise the skin and subcutaneous tissue, down through the galea to the periosteum of the skull.  Bipolar electrocautery and Vonnie clips were used for meticulous hemostasis.  A myocutaneous flap was elevated anteriorly with monopolar electrocautery.  The myocutaneous flap was held anteriorly with fishhooks.  A high-speed [employer] drill, perforating drill bit was used to make a bur hole in the left frontal bone as well as a left temporal bone.  A craniotome was used to make a craniotomy flap over the left frontal, temporal, parietal bones.  The dura was intimately adhered to the overlying skull.  I was able to elevate the craniotomy flap.  The dura was elevated on the undersurface of the flap as well.  The dura was so adherent that there was no significant epidural space.  Bipolar electrocautery was used for hemostasis along the middle meningeal artery.  Surgiflo was used along the bone edge for hemostasis after it was thoroughly irrigated.  The subdural hematoma was well coalesced.  The hematoma was removed with a combination of suction and irrigation.  I was able to use patties circumferentially around the edges of the frontal, parietal, temporal lobes in order to very gently depress the brain, resecting the subdural hematoma with suction and irrigation.  There is no acute bleeding noted.  The brain was noted to be slack by the completion of the evacuation of the hematoma.  There was no acute bleeding.  The craniotomy flap was cleaned and a Leibinger cranial plating system was used to affix bur hole covers of the bur hole sites and a dog bone posteriorly.  The subdural space was evaluated again on there was no bleeding whatsoever noted.  A piece of Gelfoam, the size of the craniotomy flap was placed against the undersurface of the craniotomy flap.  The craniotomy flap was reapproximated to the native skull with the Leibinger cranial plating system.  A medium  Hemovac was placed in the subgaleal space, tunneled through a separate stab incision and affixed to the skin.  The galea was reapproximated and closed with 2-0 interrupted Vicryl suture.  Staples were used to reapproximate the scalp edges.  A sterile dressing was applied.  The patient was left intubated due to his preoperative agitation, aphasia.  After discussion with Dr. Justice, we felt that this was the safest decision moving forward given his preoperative neurologic status.  He was transferred back to the ICU in critical but stable condition.  He will receive a stat head CT postoperatively    Estimated blood loss: 50 mL    Complications: None noted

## 2022-08-26 NOTE — ANESTHESIA PROCEDURE NOTES
Airway    Date/Time: 8/25/2022 5:54 PM  Performed by: Luis Angel Justice M.D.  Authorized by: Luis Angel Justice M.D.     Location:  OR  Urgency:  Elective  Indications for Airway Management:  Anesthesia      Spontaneous Ventilation: absent    Sedation Level:  Deep  Preoxygenated: Yes    Patient Position:  Sniffing  Final Airway Type:  Endotracheal airway  Final Endotracheal Airway:  ETT  Cuffed: Yes    Technique Used for Successful ETT Placement:  Video laryngoscopy  Devices/Methods Used in Placement:  Intubating stylet    Insertion Site:  Oral  Blade Type:  Glide  Laryngoscope Blade/Videolaryngoscope Blade Size:  4  ETT Size (mm):  8.0  Measured from:  Gums  ETT to Gums (cm):  23  Placement Verified by: auscultation and capnometry    Cormack-Lehane Classification:  Grade I - full view of glottis  Number of Attempts at Approach:  1

## 2022-08-26 NOTE — THERAPY
Missed Therapy     Patient Name: Karan Wiley  Age:  62 y.o., Sex:  male  Medical Record #: [id number]  Today's Date: 8/26/2022    Discussed missed therapy with RN. Pt intubated and not medically stable for cognitive evaluation at this time. Speech will follow.       08/26/22 0896   Treatment Variance   Reason For Missed Therapy Medical - Patient Is Not Medically Stable   Interdisciplinary Plan of Care Collaboration   IDT Collaboration with  Nursing

## 2022-08-26 NOTE — THERAPY
Physical Therapy Contact Note    PT order received and chart reviewed. Pt with strict bed rest orders. Will follow up as able and appropriate to participate.    Lani Valentin, PT, DPT

## 2022-08-26 NOTE — CARE PLAN
Problem: Pain - Standard  Goal: Alleviation of pain or a reduction in pain to the patient’s comfort goal  Outcome: Progressing     Problem: Knowledge Deficit - Standard  Goal: Patient and family/care givers will demonstrate understanding of plan of care, disease process/condition, diagnostic tests and medications  Outcome: Progressing     Problem: Safety - Medical Restraint  Goal: Remains free of injury from restraints (Restraint for Interference with Medical Device)  Outcome: Progressing  Goal: Free from restraint(s) (Restraint for Interference with Medical Device)  Outcome: Progressing     Problem: Skin Integrity  Goal: Skin integrity is maintained or improved  Outcome: Progressing     Problem: Fall Risk  Goal: Patient will remain free from falls  Outcome: Progressing   The patient is Watcher - Medium risk of patient condition declining or worsening    Shift Goals  Clinical Goals: Safety, OR  Patient Goals: FELIPA  Family Goals: FELIPA    Progress made toward(s) clinical / shift goals:  Patient remained safe during my shift.  Continue to maintain all safety precautions.    Patient taken to OR.

## 2022-08-26 NOTE — CARE PLAN
Ventilator Daily Summary    Vent Day # 2    ETT 8.0 @ 24    Ventilator settings changed this shift:yes based on ABGs    APVcmv 24 400 8 30%    Weaning trials:no    Respiratory Procedures:no    Plan: Continue current ventilator settings and wean mechanical ventilation as tolerated per physician orders.

## 2022-08-27 ENCOUNTER — APPOINTMENT (OUTPATIENT)
Dept: RADIOLOGY | Facility: MEDICAL CENTER | Age: 62
DRG: 003 | End: 2022-08-27
Attending: SURGERY
Payer: OTHER GOVERNMENT

## 2022-08-27 LAB
ALBUMIN SERPL BCP-MCNC: 4 G/DL (ref 3.2–4.9)
ALBUMIN/GLOB SERPL: 1.5 G/DL
ALP SERPL-CCNC: 64 U/L (ref 30–99)
ALT SERPL-CCNC: 11 U/L (ref 2–50)
ANION GAP SERPL CALC-SCNC: 11 MMOL/L (ref 7–16)
AST SERPL-CCNC: 19 U/L (ref 12–45)
BASE EXCESS BLDA CALC-SCNC: -4 MMOL/L (ref -4–3)
BASOPHILS # BLD AUTO: 0.2 % (ref 0–1.8)
BASOPHILS # BLD: 0.02 K/UL (ref 0–0.12)
BILIRUB SERPL-MCNC: 0.3 MG/DL (ref 0.1–1.5)
BODY TEMPERATURE: ABNORMAL DEGREES
BREATHS SETTING VENT: 24
BUN SERPL-MCNC: 12 MG/DL (ref 8–22)
CALCIUM SERPL-MCNC: 9 MG/DL (ref 8.5–10.5)
CHLORIDE SERPL-SCNC: 109 MMOL/L (ref 96–112)
CO2 BLDA-SCNC: 20 MMOL/L (ref 20–33)
CO2 SERPL-SCNC: 19 MMOL/L (ref 20–33)
CREAT SERPL-MCNC: 0.63 MG/DL (ref 0.5–1.4)
DELSYS IDSYS: ABNORMAL
END TIDAL CARBON DIOXIDE IECO2: 32 MMHG
EOSINOPHIL # BLD AUTO: 0.01 K/UL (ref 0–0.51)
EOSINOPHIL NFR BLD: 0.1 % (ref 0–6.9)
ERYTHROCYTE [DISTWIDTH] IN BLOOD BY AUTOMATED COUNT: 46.9 FL (ref 35.9–50)
GFR SERPLBLD CREATININE-BSD FMLA CKD-EPI: 107 ML/MIN/1.73 M 2
GLOBULIN SER CALC-MCNC: 2.7 G/DL (ref 1.9–3.5)
GLUCOSE BLD STRIP.AUTO-MCNC: 108 MG/DL (ref 65–99)
GLUCOSE BLD STRIP.AUTO-MCNC: 113 MG/DL (ref 65–99)
GLUCOSE BLD STRIP.AUTO-MCNC: 118 MG/DL (ref 65–99)
GLUCOSE BLD STRIP.AUTO-MCNC: 91 MG/DL (ref 65–99)
GLUCOSE SERPL-MCNC: 127 MG/DL (ref 65–99)
HCO3 BLDA-SCNC: 19.4 MMOL/L (ref 17–25)
HCT VFR BLD AUTO: 34.2 % (ref 42–52)
HGB BLD-MCNC: 11.6 G/DL (ref 14–18)
HOROWITZ INDEX BLDA+IHG-RTO: 300 MM[HG]
IMM GRANULOCYTES # BLD AUTO: 0.04 K/UL (ref 0–0.11)
IMM GRANULOCYTES NFR BLD AUTO: 0.3 % (ref 0–0.9)
LYMPHOCYTES # BLD AUTO: 2.12 K/UL (ref 1–4.8)
LYMPHOCYTES NFR BLD: 16.9 % (ref 22–41)
MCH RBC QN AUTO: 30.1 PG (ref 27–33)
MCHC RBC AUTO-ENTMCNC: 33.9 G/DL (ref 33.7–35.3)
MCV RBC AUTO: 88.6 FL (ref 81.4–97.8)
MODE IMODE: ABNORMAL
MONOCYTES # BLD AUTO: 1.91 K/UL (ref 0–0.85)
MONOCYTES NFR BLD AUTO: 15.3 % (ref 0–13.4)
NEUTROPHILS # BLD AUTO: 8.41 K/UL (ref 1.82–7.42)
NEUTROPHILS NFR BLD: 67.2 % (ref 44–72)
NRBC # BLD AUTO: 0 K/UL
NRBC BLD-RTO: 0 /100 WBC
O2/TOTAL GAS SETTING VFR VENT: 30 %
PCO2 BLDA: 28.8 MMHG (ref 26–37)
PCO2 TEMP ADJ BLDA: 29.6 MMHG (ref 26–37)
PEEP END EXPIRATORY PRESSURE IPEEP: 8 CMH20
PH BLDA: 7.43 [PH] (ref 7.4–7.5)
PH TEMP ADJ BLDA: 7.43 [PH] (ref 7.4–7.5)
PLATELET # BLD AUTO: 356 K/UL (ref 164–446)
PMV BLD AUTO: 11.2 FL (ref 9–12.9)
PO2 BLDA: 90 MMHG (ref 64–87)
PO2 TEMP ADJ BLDA: 93 MMHG (ref 64–87)
POTASSIUM SERPL-SCNC: 3.7 MMOL/L (ref 3.6–5.5)
PROT SERPL-MCNC: 6.7 G/DL (ref 6–8.2)
RBC # BLD AUTO: 3.86 M/UL (ref 4.7–6.1)
SAO2 % BLDA: 97 % (ref 93–99)
SODIUM SERPL-SCNC: 139 MMOL/L (ref 135–145)
SPECIMEN DRAWN FROM PATIENT: ABNORMAL
TIDAL VOLUME IVT: 400 ML
TRIGL SERPL-MCNC: 203 MG/DL (ref 0–149)
WBC # BLD AUTO: 12.5 K/UL (ref 4.8–10.8)

## 2022-08-27 PROCEDURE — 80053 COMPREHEN METABOLIC PANEL: CPT

## 2022-08-27 PROCEDURE — 99291 CRITICAL CARE FIRST HOUR: CPT | Performed by: SURGERY

## 2022-08-27 PROCEDURE — 700101 HCHG RX REV CODE 250: Performed by: EMERGENCY MEDICINE

## 2022-08-27 PROCEDURE — 700105 HCHG RX REV CODE 258: Performed by: EMERGENCY MEDICINE

## 2022-08-27 PROCEDURE — 700102 HCHG RX REV CODE 250 W/ 637 OVERRIDE(OP): Performed by: SURGERY

## 2022-08-27 PROCEDURE — 700101 HCHG RX REV CODE 250: Performed by: NEUROLOGICAL SURGERY

## 2022-08-27 PROCEDURE — A9270 NON-COVERED ITEM OR SERVICE: HCPCS | Performed by: SURGERY

## 2022-08-27 PROCEDURE — 700102 HCHG RX REV CODE 250 W/ 637 OVERRIDE(OP): Performed by: NURSE PRACTITIONER

## 2022-08-27 PROCEDURE — 700102 HCHG RX REV CODE 250 W/ 637 OVERRIDE(OP): Performed by: NEUROLOGICAL SURGERY

## 2022-08-27 PROCEDURE — 84478 ASSAY OF TRIGLYCERIDES: CPT

## 2022-08-27 PROCEDURE — 94799 UNLISTED PULMONARY SVC/PX: CPT

## 2022-08-27 PROCEDURE — 700111 HCHG RX REV CODE 636 W/ 250 OVERRIDE (IP): Performed by: NEUROLOGICAL SURGERY

## 2022-08-27 PROCEDURE — 94003 VENT MGMT INPAT SUBQ DAY: CPT

## 2022-08-27 PROCEDURE — 82962 GLUCOSE BLOOD TEST: CPT | Mod: 91

## 2022-08-27 PROCEDURE — 71045 X-RAY EXAM CHEST 1 VIEW: CPT

## 2022-08-27 PROCEDURE — 37799 UNLISTED PX VASCULAR SURGERY: CPT

## 2022-08-27 PROCEDURE — A9270 NON-COVERED ITEM OR SERVICE: HCPCS | Performed by: NURSE PRACTITIONER

## 2022-08-27 PROCEDURE — 700111 HCHG RX REV CODE 636 W/ 250 OVERRIDE (IP): Performed by: SURGERY

## 2022-08-27 PROCEDURE — A9270 NON-COVERED ITEM OR SERVICE: HCPCS | Performed by: NEUROLOGICAL SURGERY

## 2022-08-27 PROCEDURE — 770022 HCHG ROOM/CARE - ICU (200)

## 2022-08-27 PROCEDURE — 700101 HCHG RX REV CODE 250: Performed by: SURGERY

## 2022-08-27 PROCEDURE — 82803 BLOOD GASES ANY COMBINATION: CPT

## 2022-08-27 PROCEDURE — 85025 COMPLETE CBC W/AUTO DIFF WBC: CPT

## 2022-08-27 RX ORDER — LEVETIRACETAM 500 MG/1
1000 TABLET ORAL 2 TIMES DAILY
Status: DISCONTINUED | OUTPATIENT
Start: 2022-08-27 | End: 2022-09-01

## 2022-08-27 RX ORDER — FAMOTIDINE 20 MG/1
20 TABLET, FILM COATED ORAL 2 TIMES DAILY
Status: DISCONTINUED | OUTPATIENT
Start: 2022-08-27 | End: 2022-09-09 | Stop reason: HOSPADM

## 2022-08-27 RX ADMIN — MINERAL OIL, PETROLATUM 1 APPLICATION: 425; 573 OINTMENT OPHTHALMIC at 05:49

## 2022-08-27 RX ADMIN — FENTANYL CITRATE 100 MCG: 50 INJECTION, SOLUTION INTRAMUSCULAR; INTRAVENOUS at 09:58

## 2022-08-27 RX ADMIN — PROPOFOL 45 MCG/KG/MIN: 10 INJECTION, EMULSION INTRAVENOUS at 11:09

## 2022-08-27 RX ADMIN — MINERAL OIL, PETROLATUM 1 APPLICATION: 425; 573 OINTMENT OPHTHALMIC at 13:20

## 2022-08-27 RX ADMIN — HYDROMORPHONE HYDROCHLORIDE 1 MG: 2 TABLET ORAL at 00:44

## 2022-08-27 RX ADMIN — NICARDIPINE HYDROCHLORIDE 2.5 MG/HR: 25 INJECTION, SOLUTION INTRAVENOUS at 07:22

## 2022-08-27 RX ADMIN — PROPOFOL 80 MCG/KG/MIN: 10 INJECTION, EMULSION INTRAVENOUS at 17:20

## 2022-08-27 RX ADMIN — PROPOFOL 65 MCG/KG/MIN: 10 INJECTION, EMULSION INTRAVENOUS at 22:59

## 2022-08-27 RX ADMIN — CARVEDILOL 6.25 MG: 6.25 TABLET, FILM COATED ORAL at 17:19

## 2022-08-27 RX ADMIN — FENTANYL CITRATE 100 MCG: 50 INJECTION, SOLUTION INTRAMUSCULAR; INTRAVENOUS at 13:17

## 2022-08-27 RX ADMIN — HYDROMORPHONE HYDROCHLORIDE 1 MG: 2 TABLET ORAL at 14:49

## 2022-08-27 RX ADMIN — LEVETIRACETAM 1000 MG: 100 INJECTION, SOLUTION INTRAVENOUS at 05:48

## 2022-08-27 RX ADMIN — FAMOTIDINE 20 MG: 20 TABLET, FILM COATED ORAL at 05:48

## 2022-08-27 RX ADMIN — METOPROLOL TARTRATE 5 MG: 5 INJECTION INTRAVENOUS at 08:37

## 2022-08-27 RX ADMIN — FENTANYL CITRATE 50 MCG: 50 INJECTION, SOLUTION INTRAMUSCULAR; INTRAVENOUS at 15:40

## 2022-08-27 RX ADMIN — POTASSIUM CHLORIDE AND SODIUM CHLORIDE: 900; 150 INJECTION, SOLUTION INTRAVENOUS at 13:53

## 2022-08-27 RX ADMIN — LEVETIRACETAM 1000 MG: 500 TABLET, FILM COATED ORAL at 17:19

## 2022-08-27 RX ADMIN — FENTANYL CITRATE 100 MCG: 50 INJECTION, SOLUTION INTRAMUSCULAR; INTRAVENOUS at 03:57

## 2022-08-27 RX ADMIN — POTASSIUM CHLORIDE AND SODIUM CHLORIDE: 900; 150 INJECTION, SOLUTION INTRAVENOUS at 04:27

## 2022-08-27 RX ADMIN — VENLAFAXINE 75 MG: 75 TABLET ORAL at 05:52

## 2022-08-27 RX ADMIN — METOPROLOL TARTRATE 5 MG: 5 INJECTION INTRAVENOUS at 00:43

## 2022-08-27 RX ADMIN — FENTANYL CITRATE 100 MCG: 50 INJECTION, SOLUTION INTRAMUSCULAR; INTRAVENOUS at 17:02

## 2022-08-27 RX ADMIN — METOPROLOL TARTRATE 5 MG: 5 INJECTION INTRAVENOUS at 14:55

## 2022-08-27 RX ADMIN — VENLAFAXINE 75 MG: 75 TABLET ORAL at 17:19

## 2022-08-27 RX ADMIN — MINERAL OIL, PETROLATUM 1 APPLICATION: 425; 573 OINTMENT OPHTHALMIC at 22:10

## 2022-08-27 RX ADMIN — CARVEDILOL 6.25 MG: 6.25 TABLET, FILM COATED ORAL at 05:49

## 2022-08-27 RX ADMIN — AMLODIPINE BESYLATE 5 MG: 10 TABLET ORAL at 05:49

## 2022-08-27 RX ADMIN — PROPOFOL 65 MCG/KG/MIN: 10 INJECTION, EMULSION INTRAVENOUS at 02:56

## 2022-08-27 RX ADMIN — FAMOTIDINE 20 MG: 20 TABLET, FILM COATED ORAL at 17:27

## 2022-08-27 RX ADMIN — PROPOFOL 55 MCG/KG/MIN: 10 INJECTION, EMULSION INTRAVENOUS at 14:54

## 2022-08-27 RX ADMIN — ACETAMINOPHEN 1000 MG: 500 TABLET ORAL at 05:48

## 2022-08-27 RX ADMIN — ACETAMINOPHEN 1000 MG: 500 TABLET ORAL at 17:19

## 2022-08-27 RX ADMIN — ACETAMINOPHEN 1000 MG: 500 TABLET ORAL at 11:42

## 2022-08-27 RX ADMIN — HYDRALAZINE HYDROCHLORIDE 10 MG: 20 INJECTION INTRAMUSCULAR; INTRAVENOUS at 14:44

## 2022-08-27 RX ADMIN — PROPOFOL 65 MCG/KG/MIN: 10 INJECTION, EMULSION INTRAVENOUS at 05:48

## 2022-08-27 RX ADMIN — PROPOFOL 65 MCG/KG/MIN: 10 INJECTION, EMULSION INTRAVENOUS at 19:42

## 2022-08-27 RX ADMIN — ATORVASTATIN CALCIUM 80 MG: 80 TABLET, FILM COATED ORAL at 17:19

## 2022-08-27 RX ADMIN — HYDROMORPHONE HYDROCHLORIDE 2 MG: 2 TABLET ORAL at 21:26

## 2022-08-27 ASSESSMENT — PAIN DESCRIPTION - PAIN TYPE
TYPE: ACUTE PAIN
TYPE: CHRONIC PAIN
TYPE: ACUTE PAIN
TYPE: CHRONIC PAIN;ACUTE PAIN
TYPE: ACUTE PAIN

## 2022-08-27 ASSESSMENT — FIBROSIS 4 INDEX: FIB4 SCORE: 1.03

## 2022-08-27 NOTE — CARE PLAN
Problem: Pain - Standard  Goal: Alleviation of pain or a reduction in pain to the patient’s comfort goal  Outcome: Progressing     Problem: Safety - Medical Restraint  Goal: Remains free of injury from restraints (Restraint for Interference with Medical Device)  Outcome: Progressing  Goal: Free from restraint(s) (Restraint for Interference with Medical Device)  Outcome: Not Progressing   The patient is Watcher - Medium risk of patient condition declining or worsening    Shift Goals  Clinical Goals: Neuro stability  Patient Goals: FELIPA  Family Goals: no family present    Progress made toward(s) clinical / shift goals:  Neuro assessment every two hours. Restraints assessed every two hours. Pt pain assessed every two hours. Pt given pain medication per scale. Pt resting comfortably after pain medication.     Patient is not progressing towards the following goals:      Problem: Safety - Medical Restraint  Goal: Free from restraint(s) (Restraint for Interference with Medical Device)  Outcome: Not Progressing

## 2022-08-27 NOTE — CARE PLAN
Problem: Pain - Standard  Goal: Alleviation of pain or a reduction in pain to the patient’s comfort goal  Outcome: Progressing     Problem: Knowledge Deficit - Standard  Goal: Patient and family/care givers will demonstrate understanding of plan of care, disease process/condition, diagnostic tests and medications  Outcome: Progressing     Problem: Safety - Medical Restraint  Goal: Remains free of injury from restraints (Restraint for Interference with Medical Device)  Outcome: Progressing  Goal: Free from restraint(s) (Restraint for Interference with Medical Device)  Outcome: Progressing     Problem: Skin Integrity  Goal: Skin integrity is maintained or improved  Outcome: Progressing     Problem: Fall Risk  Goal: Patient will remain free from falls  Outcome: Progressing   The patient is Watcher - Medium risk of patient condition declining or worsening    Shift Goals  Clinical Goals: Improve Neuro Exam  Patient Goals: FELIPA  Family Goals: FELIPA    Progress made toward(s) clinical / shift goals:  Stable Neuro Exam.  Continue q 2 H neuro checks.

## 2022-08-27 NOTE — PROGRESS NOTES
Trauma / Surgical Daily Progress Note    Date of Service  8/27/2022    Chief Complaint  62 y.o. male admitted 8/25/2022 with GLF w/SDH on anti-coagulation. Required emergent craniotomu    Interval Events  Hospital day #3  Critically ill  Requires continued ICU and hospital admission  Seen on rounds and discussed with multidisciplinary team  Injuries and physiologic derangements pose a significant risk of mortality and long term morbidity  Critical care interventions include:  integration of multiple data points and associated complex medical decisions    Mgt of ventilator-weaning as able-slow progress  Pain control  Supportive care for TBI  Ongoing nutrition with tube feeds.    Review of Systems  Review of Systems   Unable to perform ROS: Intubated      Vital Signs for last 24 hours  Pulse:  [] 90  Resp:  [9-38] 15  BP: (110-149)/(66-92) 119/72  SpO2:  [94 %-100 %] 97 %    Hemodynamic parameters for last 24 hours       Respiratory Data     Respiration: 15, Pulse Oximetry: 97 %     Work Of Breathing / Effort: Vented  RUL Breath Sounds: Clear, RML Breath Sounds: Clear, RLL Breath Sounds: Diminished, DON Breath Sounds: Clear, LLL Breath Sounds: Diminished    Physical Exam  Physical Exam  Constitutional:       Appearance: He is ill-appearing. He is not toxic-appearing.   HENT:      Head:      Comments: Dressings in place     Right Ear: External ear normal.      Left Ear: External ear normal.   Eyes:      General:         Right eye: No discharge.         Left eye: No discharge.      Extraocular Movements: Extraocular movements intact.      Pupils: Pupils are equal, round, and reactive to light.   Cardiovascular:      Rate and Rhythm: Normal rate and regular rhythm.      Pulses: Normal pulses.      Heart sounds: Normal heart sounds.   Pulmonary:      Breath sounds: No wheezing or rales.      Comments: On vent  Abdominal:      General: There is no distension.      Palpations: Abdomen is soft.      Tenderness: no  abdominal tenderness   Genitourinary:     Comments: White in place  Musculoskeletal:         General: Normal range of motion.      Cervical back: Normal range of motion.   Skin:     General: Skin is warm and dry.      Coloration: Skin is not jaundiced.      Findings: No bruising.   Neurological:      General: No focal deficit present.      Mental Status: He is alert.      Cranial Nerves: No cranial nerve deficit.      Comments: Minimal following  Moves all   Psychiatric:      Comments: Unable to assess       Laboratory  Recent Results (from the past 24 hour(s))   POCT glucose device results    Collection Time: 08/26/22  6:13 PM   Result Value Ref Range    POC Glucose, Blood 128 (H) 65 - 99 mg/dL   POCT glucose device results    Collection Time: 08/26/22 11:40 PM   Result Value Ref Range    POC Glucose, Blood 108 (H) 65 - 99 mg/dL   POCT arterial blood gas device results    Collection Time: 08/27/22  1:29 AM   Result Value Ref Range    Ph 7.435 7.400 - 7.500    Pco2 28.8 26.0 - 37.0 mmHg    Po2 90 (H) 64 - 87 mmHg    Tco2 20 20 - 33 mmol/L    S02 97 93 - 99 %    Hco3 19.4 17.0 - 25.0 mmol/L    BE -4 -4 - 3 mmol/L    Body Temp 99.7 F degrees    O2 Therapy 30 %    iPF Ratio 300     Ph Temp Kaiser 7.426 7.400 - 7.500    Pco2 Temp Co 29.6 26.0 - 37.0 mmHg    Po2 Temp Cor 93 (H) 64 - 87 mmHg    Specimen Arterial     DelSys Vent     End Tidal Carbon Dioxide 32 mmhg    Tidal Volume 400 mL    Peep End Expiratory Pressure 8 cmh20    Set Rate 24     Mode APV-CMV    CBC with Differential: Tomorrow AM    Collection Time: 08/27/22  6:10 AM   Result Value Ref Range    WBC 12.5 (H) 4.8 - 10.8 K/uL    RBC 3.86 (L) 4.70 - 6.10 M/uL    Hemoglobin 11.6 (L) 14.0 - 18.0 g/dL    Hematocrit 34.2 (L) 42.0 - 52.0 %    MCV 88.6 81.4 - 97.8 fL    MCH 30.1 27.0 - 33.0 pg    MCHC 33.9 33.7 - 35.3 g/dL    RDW 46.9 35.9 - 50.0 fL    Platelet Count 356 164 - 446 K/uL    MPV 11.2 9.0 - 12.9 fL    Neutrophils-Polys 67.20 44.00 - 72.00 %    Lymphocytes  16.90 (L) 22.00 - 41.00 %    Monocytes 15.30 (H) 0.00 - 13.40 %    Eosinophils 0.10 0.00 - 6.90 %    Basophils 0.20 0.00 - 1.80 %    Immature Granulocytes 0.30 0.00 - 0.90 %    Nucleated RBC 0.00 /100 WBC    Neutrophils (Absolute) 8.41 (H) 1.82 - 7.42 K/uL    Lymphs (Absolute) 2.12 1.00 - 4.80 K/uL    Monos (Absolute) 1.91 (H) 0.00 - 0.85 K/uL    Eos (Absolute) 0.01 0.00 - 0.51 K/uL    Baso (Absolute) 0.02 0.00 - 0.12 K/uL    Immature Granulocytes (abs) 0.04 0.00 - 0.11 K/uL    NRBC (Absolute) 0.00 K/uL   Comp Metabolic Panel (CMP): Tomorrow AM    Collection Time: 08/27/22  6:10 AM   Result Value Ref Range    Sodium 139 135 - 145 mmol/L    Potassium 3.7 3.6 - 5.5 mmol/L    Chloride 109 96 - 112 mmol/L    Co2 19 (L) 20 - 33 mmol/L    Anion Gap 11.0 7.0 - 16.0    Glucose 127 (H) 65 - 99 mg/dL    Bun 12 8 - 22 mg/dL    Creatinine 0.63 0.50 - 1.40 mg/dL    Calcium 9.0 8.5 - 10.5 mg/dL    AST(SGOT) 19 12 - 45 U/L    ALT(SGPT) 11 2 - 50 U/L    Alkaline Phosphatase 64 30 - 99 U/L    Total Bilirubin 0.3 0.1 - 1.5 mg/dL    Albumin 4.0 3.2 - 4.9 g/dL    Total Protein 6.7 6.0 - 8.2 g/dL    Globulin 2.7 1.9 - 3.5 g/dL    A-G Ratio 1.5 g/dL   Triglyceride    Collection Time: 08/27/22  6:10 AM   Result Value Ref Range    Triglycerides 203 (H) 0 - 149 mg/dL   ESTIMATED GFR    Collection Time: 08/27/22  6:10 AM   Result Value Ref Range    GFR (CKD-EPI) 107 >60 mL/min/1.73 m 2   POCT glucose device results    Collection Time: 08/27/22  6:15 AM   Result Value Ref Range    POC Glucose, Blood 118 (H) 65 - 99 mg/dL   POCT glucose device results    Collection Time: 08/27/22 11:36 AM   Result Value Ref Range    POC Glucose, Blood 113 (H) 65 - 99 mg/dL       Fluids    Intake/Output Summary (Last 24 hours) at 8/27/2022 1359  Last data filed at 8/27/2022 1307  Gross per 24 hour   Intake 3975.55 ml   Output 2575 ml   Net 1400.55 ml         Core Measures & Quality Metrics  Labs reviewed and Radiology images reviewed  White catheter: Critically  Ill - Requiring Accurate Measurement of Urinary Output      DVT Prophylaxis: Contraindicated - High bleeding risk  DVT prophylaxis - mechanical: SCDs  Ulcer prophylaxis: Yes      RAP Score Total: 6  CAGE Results: not completed Blood Alcohol>0.08: no     Assessment/Plan  Traumatic subdural hematoma, initial encounter ([facility])- (present on admission)  Assessment & Plan  Left-sided holohemispheric traumatic subdural hemorrhage associated with systemic anticoagulation and antiplatelet therapy.  Craniotomy  8/27 significant deficits persist  Post traumatic pharmacologic seizure prophylaxis for 1 week.  Speech Language Pathology cognitive evaluation.  Tesfaye Luther MD. Neurosurgeon. [facility].    Antiplatelet or antithrombotic long-term use- (present on admission)  Assessment & Plan  Daily ASA and Apixaban.  Received KCentra on admit.  AA 96.1%, transfused 1 unit platelet pheresis in ED.  Follow up TEG with platelet mapping ordered.    Status post insertion of drug-eluting stent into right coronary artery for coronary artery disease- (present on admission)  Assessment & Plan  Drug-eluting stents placed in the mid RCA and PDA August 2020.   Daily aspirin administration.  Maintenance medication held on admission.    Paroxysmal atrial fibrillation ([facility])- (present on admission)  Assessment & Plan  Chronic condition treated with carvedilol and apixaban (Eliquis) anticoagulation.  Carvedilol resumed upon admission.  Systemic anticoagulation held on admission.     CAD (coronary artery disease)  Assessment & Plan  History of coronary artery disease with STEMI in August 2020.  Drug-eluting stents placed in the mid RCA and PDA.  Daily aspirin administration.    Type 2 diabetes mellitus without complication, without long-term current use of insulin ([facility])- (present on admission)  Assessment & Plan  Chronic condition treated with metformin.  Glycohemoglobin 6.2 (131).  Holding maintenance metformin for 48 hours following  intravenous contrast administration.  Insulin sliding scale coverage.    Dyslipidemia- (present on admission)  Assessment & Plan  Chronic condition treated with atorvastatin.  Resumed maintenance medication on admission.    Hypertensive disease- (present on admission)  Assessment & Plan  Chronic condition treated with carvedilol.  Resumed maintenance medication on admission.    Respiratory failure following trauma and surgery (HCC)  Assessment & Plan  Returned from OR on vent  Weaning as able    Depression- (present on admission)  Assessment & Plan  Chronic condition treated with Effexor.  Resumed maintenance medication on admission.    Trauma- (present on admission)  Assessment & Plan  Mechanical ground-level fall.  Intracranial hemorrhage with systemic anticoagulation.  Trauma Yellow Transfer Activation from [facility].  Macario Winn MD. Trauma Surgery.    BPH (benign prostatic hyperplasia)- (present on admission)  Assessment & Plan  Chronic condition treated with finasteride and tamsulosin.  Resumed maintenance medication on admission.        Discussed patient condition with RN, RT, Therapies, Pharmacy, Dietary, and .  CRITICAL CARE TIME EXCLUDING PROCEDURES: 32    minutes

## 2022-08-27 NOTE — PROGRESS NOTES
Neurosurgery Progress Note    Subjective:  62 y.o. male on Eliquis (reversal agent given) who presented 2022 with an acute left SDH and transfer from an outside hospital. Initially presented with GCS 15. Neuro exam subsequently declined with worsening expressive aphasia, agitation, no command following. Repeat CT with progression of SDH and worsening MLS.     POD#2 Left craniotomy for evacuation of acute SDH.   Post-op head CT  with evacuation of SDH and decreased MLS.    No acute events overnight.   Patient remains intubated and sedated.     Exam:  GCS: [id number].  Intubated  Soft restraints in place.    Face symmetric.  Spontaneous eye opening.   Pupils 3mm, PERRL.  Withdraws x4, trace reaction to RUE and LLE.   Not following commands.  +grimace to central and peripheral stimulation.   +gag, +cough.   Subgaleal Hemovac with 30cc/8 hours.   Dressing CDI       BP  Min: 106/67  Max: 149/84  Pulse  Av.5  Min: 54  Max: 118  Resp  Av  Min: 9  Max: 35  Monitored Temp 2  Av.4 °C (99.3 °F)  Min: 37 °C (98.6 °F)  Max: 38.1 °C (100.6 °F)  SpO2  Av.5 %  Min: 94 %  Max: 100 %    No data recorded    Recent Labs     225 22  0610   WBC 8.5 9.5 12.5*   RBC 4.35* 4.09* 3.86*   HEMOGLOBIN 13.1* 12.3* 11.6*   HEMATOCRIT 38.9* 36.6* 34.2*   MCV 89.4 89.5 88.6   MCH 30.1 30.1 30.1   MCHC 33.7 33.6* 33.9   RDW 45.7 46.0 46.9   PLATELETCT 301 313 356   MPV 11.1 11.3 11.2       Recent Labs     22  0415 22  0610   SODIUM 136 136 139   POTASSIUM 3.9 3.9 3.7   CHLORIDE 103 104 109   CO2 16* 17* 19*   GLUCOSE 166* 153* 127*   BUN 12 11 12   CREATININE 0.77 0.75 0.63   CALCIUM 9.2 9.4 9.0       Recent Labs     22  0521 22   APTT 37.9* 36.3*   INR 1.34* 1.12       Recent Labs     22  1300 223 22  0415   REACTMIN 8.0 9.2* 6.8   CLOTKINET 1.0 1.1 0.9   CLOTANGL 76.1 75.9 77.8   MAXCLOTS 66.4 64.9 67.6   FHC40HVR 0.0 1.4  0.0   PRCINADP 20.0* 43.5* 21.2*   PRCINAA 95.7* 95.8* 91.9*         Intake/Output                         08/26/22 0700 - 08/27/22 0659 08/27/22 0700 - 08/28/22 0659     0700-1859 1900-0659 Total 0700-1859 1900-0659 Total                 Intake    I.V.  2276.3  1941.6 4217.9  62.5  -- 62.5    Cardene Volume 616.7 292.5 909.2 62.5 -- 62.5    Propofol Volume 459.6 449.1 908.7 0 -- 0    Volume (mL) (0.9 % NaCl with KCl 20 mEq infusion) 1200 1200 2400 -- -- --    Blood  100  -- 100  --  -- --    Volume (RELEASE PLATELET PHERESIS) 100 -- 100 -- -- --    Other  --  -- --  --  -- --    Balloon Inflated (mL) (Rectal Tube) -- 2 x 2 x -- -- --    NG/GT  55  400 455  100  -- 100    Intake (mL) (Enteral Tube 08/26/22 Cortrak - Gastric 10 Fr. Right nare) 55 400 455 100 -- 100    Enteral  --  150 150  --  -- --    Free Water / Tube Flush -- 150 150 -- -- --    Total Intake 2431.3 2491.6 4922.9 162.5 -- 162.5       Output    Urine  1900  975 2875  400  -- 400    Output (mL) (Urethral Catheter Non-latex;Temperature probe 16 Fr.) 6359 791 1470 400 -- 400    Drains  140  30 170  --  -- --    Output (mL) (Closed/Suction Drain 1 Left Scalp Hemovac 10 Fr.) 140 30 170 -- -- --    Stool  --  -- --  --  -- --    Number of Times Stooled 2 x 5 x 7 x -- -- --    Total Output 2040 1005 3045 400 -- 400       Net I/O     391.3 1486.6 1877.9 -237.5 -- -237.5              Intake/Output Summary (Last 24 hours) at 8/27/2022 1102  Last data filed at 8/27/2022 0800  Gross per 24 hour   Intake 4202.15 ml   Output 2815 ml   Net 1387.15 ml               Pharmacy  1 Each PHARMACY TO DOSE    Respiratory Therapy Consult   Continuous RT    propofol  0-80 mcg/kg/min Continuous    famotidine  20 mg BID    Or    famotidine  20 mg BID    Metoprolol Tartrate  5 mg Q4HRS PRN    fentaNYL  50 mcg Q HOUR PRN    Or    fentaNYL  100 mcg Q HOUR PRN    HYDROmorphone  1 mg Q3HRS PRN    Or    HYDROmorphone  2 mg Q3HRS PRN    amLODIPine  5 mg Q DAY    acetaminophen  1,000  mg Q6HRS    Followed by    [START ON 8/29/2022] acetaminophen  1,000 mg Q6HRS PRN    niCARdipine infusion  0-15 mg/hr Continuous    levETIRAcetam (Keppra) IV  1,000 mg Q12HRS    ondansetron  4 mg Q4HRS PRN    bisacodyl  10 mg Q24HRS PRN    sodium phosphate  1 Each Once PRN    insulin regular  1-6 Units Q6HRS    And    dextrose bolus  25 g Q15 MIN PRN    labetalol  10 mg Q4HRS PRN    Pharmacy Consult Request  1 Each PHARMACY TO DOSE    MD ALERT...DO NOT ADMINISTER NSAIDS or ASPIRIN unless ORDERED By Neurosurgery  1 Each PRN    hydrALAZINE  10 mg Q HOUR PRN    artificial tears  1 Application Q8HRS    0.9 % NaCl with KCl 20 mEq 1,000 mL   Continuous    atorvastatin  80 mg Q EVENING    cloNIDine  0.1 mg Q4HRS PRN    magnesium hydroxide  30 mL DAILY    ondansetron  4 mg Q4HRS PRN    polyethylene glycol/lytes  1 Packet BID    senna-docusate  1 Tablet Nightly    senna-docusate  1 Tablet Q24HRS PRN    docusate sodium  100 mg BID    venlafaxine  75 mg BID    carvedilol  6.25 mg BID       Assessment and Plan:  Hospital day #4  POD#2  Post-op head CT improved.   Keep subgaleal Hemovac today, ice incision.   Q2 neuro checks.    Keppra 1000 mg BID.    OK for tube feeds.  Wean sedation and extubate as able.   Following.      Chemical prophylactic DVT therapy: Yes  Start date/time: Today is ok from a neurosurgery standpoint

## 2022-08-27 NOTE — CARE PLAN
Ventilator Daily Summary    Vent Day # 3    ETT 8.0 @ 24    Ventilator settings changed this shift:no    APVcmv  24 400 8 30%    Weaning trials:no    Respiratory Procedures:no    Plan: Continue current ventilator settings and wean mechanical ventilation as tolerated per physician orders.

## 2022-08-27 NOTE — WOUND TEAM
Received consult BMS has been placed.  No contraindications seen per charge review.  Orders in.  Consult completed.

## 2022-08-27 NOTE — FLOWSHEET NOTE
08/27/22 0638   Spontaneous Breathing Trial (SBT)   Safety screen spontaneous breathing trial (SBT) Proceed with SBT - no exclusion criteria met   Spontaneous breathing trial (SBT) outcome Pt weaned for 1 hour and returned to rest settings per protocol - SBT Pass   Length of Weaning Trial (Hours) 1.5   Pt passed SBT but not ready to extubate Not ready - continue daily assessments for extubation   Weaning Parameters   RR (bpm) 12   $ FVC / Vital Capacity (liters)    (unable to follow commands)   NIF (cm H2O)    (unable to follow)   Rapid Shallow Breathing Index (RR/VT) 12   Spontaneous VE 8.4   Spontaneous

## 2022-08-28 ENCOUNTER — APPOINTMENT (OUTPATIENT)
Dept: RADIOLOGY | Facility: MEDICAL CENTER | Age: 62
DRG: 003 | End: 2022-08-28
Attending: SURGERY
Payer: OTHER GOVERNMENT

## 2022-08-28 ENCOUNTER — APPOINTMENT (OUTPATIENT)
Dept: RADIOLOGY | Facility: MEDICAL CENTER | Age: 62
DRG: 003 | End: 2022-08-28
Attending: NURSE PRACTITIONER
Payer: OTHER GOVERNMENT

## 2022-08-28 ENCOUNTER — APPOINTMENT (OUTPATIENT)
Dept: RADIOLOGY | Facility: MEDICAL CENTER | Age: 62
DRG: 003 | End: 2022-08-28
Attending: PHYSICIAN ASSISTANT
Payer: OTHER GOVERNMENT

## 2022-08-28 PROBLEM — Z53.09: Status: ACTIVE | Noted: 2022-08-28

## 2022-08-28 LAB
ALBUMIN SERPL BCP-MCNC: 3.6 G/DL (ref 3.2–4.9)
ALBUMIN/GLOB SERPL: 1.4 G/DL
ALP SERPL-CCNC: 64 U/L (ref 30–99)
ALT SERPL-CCNC: 11 U/L (ref 2–50)
ANION GAP SERPL CALC-SCNC: 11 MMOL/L (ref 7–16)
AST SERPL-CCNC: 15 U/L (ref 12–45)
BASOPHILS # BLD AUTO: 0.3 % (ref 0–1.8)
BASOPHILS # BLD: 0.03 K/UL (ref 0–0.12)
BILIRUB SERPL-MCNC: 0.2 MG/DL (ref 0.1–1.5)
BUN SERPL-MCNC: 15 MG/DL (ref 8–22)
CALCIUM SERPL-MCNC: 9.3 MG/DL (ref 8.5–10.5)
CHLORIDE SERPL-SCNC: 109 MMOL/L (ref 96–112)
CO2 SERPL-SCNC: 19 MMOL/L (ref 20–33)
CREAT SERPL-MCNC: 0.63 MG/DL (ref 0.5–1.4)
EOSINOPHIL # BLD AUTO: 0.03 K/UL (ref 0–0.51)
EOSINOPHIL NFR BLD: 0.3 % (ref 0–6.9)
ERYTHROCYTE [DISTWIDTH] IN BLOOD BY AUTOMATED COUNT: 46.1 FL (ref 35.9–50)
GFR SERPLBLD CREATININE-BSD FMLA CKD-EPI: 107 ML/MIN/1.73 M 2
GLOBULIN SER CALC-MCNC: 2.6 G/DL (ref 1.9–3.5)
GLUCOSE BLD STRIP.AUTO-MCNC: 103 MG/DL (ref 65–99)
GLUCOSE BLD STRIP.AUTO-MCNC: 83 MG/DL (ref 65–99)
GLUCOSE BLD STRIP.AUTO-MCNC: 83 MG/DL (ref 65–99)
GLUCOSE BLD STRIP.AUTO-MCNC: 99 MG/DL (ref 65–99)
GLUCOSE SERPL-MCNC: 119 MG/DL (ref 65–99)
HCT VFR BLD AUTO: 32.8 % (ref 42–52)
HGB BLD-MCNC: 11.1 G/DL (ref 14–18)
IMM GRANULOCYTES # BLD AUTO: 0.05 K/UL (ref 0–0.11)
IMM GRANULOCYTES NFR BLD AUTO: 0.5 % (ref 0–0.9)
LYMPHOCYTES # BLD AUTO: 2.18 K/UL (ref 1–4.8)
LYMPHOCYTES NFR BLD: 23 % (ref 22–41)
MCH RBC QN AUTO: 29.9 PG (ref 27–33)
MCHC RBC AUTO-ENTMCNC: 33.8 G/DL (ref 33.7–35.3)
MCV RBC AUTO: 88.4 FL (ref 81.4–97.8)
MONOCYTES # BLD AUTO: 1.25 K/UL (ref 0–0.85)
MONOCYTES NFR BLD AUTO: 13.2 % (ref 0–13.4)
NEUTROPHILS # BLD AUTO: 5.95 K/UL (ref 1.82–7.42)
NEUTROPHILS NFR BLD: 62.7 % (ref 44–72)
NRBC # BLD AUTO: 0 K/UL
NRBC BLD-RTO: 0 /100 WBC
PLATELET # BLD AUTO: 306 K/UL (ref 164–446)
PMV BLD AUTO: 10.9 FL (ref 9–12.9)
POTASSIUM SERPL-SCNC: 3.4 MMOL/L (ref 3.6–5.5)
PROT SERPL-MCNC: 6.2 G/DL (ref 6–8.2)
RBC # BLD AUTO: 3.71 M/UL (ref 4.7–6.1)
SODIUM SERPL-SCNC: 139 MMOL/L (ref 135–145)
WBC # BLD AUTO: 9.5 K/UL (ref 4.8–10.8)

## 2022-08-28 PROCEDURE — 94799 UNLISTED PULMONARY SVC/PX: CPT

## 2022-08-28 PROCEDURE — 700102 HCHG RX REV CODE 250 W/ 637 OVERRIDE(OP): Performed by: SURGERY

## 2022-08-28 PROCEDURE — A9270 NON-COVERED ITEM OR SERVICE: HCPCS | Performed by: SURGERY

## 2022-08-28 PROCEDURE — 94003 VENT MGMT INPAT SUBQ DAY: CPT

## 2022-08-28 PROCEDURE — 82962 GLUCOSE BLOOD TEST: CPT

## 2022-08-28 PROCEDURE — 700101 HCHG RX REV CODE 250: Performed by: NEUROLOGICAL SURGERY

## 2022-08-28 PROCEDURE — 99291 CRITICAL CARE FIRST HOUR: CPT | Performed by: SURGERY

## 2022-08-28 PROCEDURE — 93970 EXTREMITY STUDY: CPT

## 2022-08-28 PROCEDURE — 94760 N-INVAS EAR/PLS OXIMETRY 1: CPT

## 2022-08-28 PROCEDURE — 700102 HCHG RX REV CODE 250 W/ 637 OVERRIDE(OP): Performed by: NEUROLOGICAL SURGERY

## 2022-08-28 PROCEDURE — 700111 HCHG RX REV CODE 636 W/ 250 OVERRIDE (IP): Performed by: SURGERY

## 2022-08-28 PROCEDURE — 80053 COMPREHEN METABOLIC PANEL: CPT

## 2022-08-28 PROCEDURE — 700101 HCHG RX REV CODE 250: Performed by: NURSE PRACTITIONER

## 2022-08-28 PROCEDURE — 770022 HCHG ROOM/CARE - ICU (200)

## 2022-08-28 PROCEDURE — 71045 X-RAY EXAM CHEST 1 VIEW: CPT

## 2022-08-28 PROCEDURE — 700101 HCHG RX REV CODE 250: Performed by: SURGERY

## 2022-08-28 PROCEDURE — 85025 COMPLETE CBC W/AUTO DIFF WBC: CPT

## 2022-08-28 PROCEDURE — 93970 EXTREMITY STUDY: CPT | Mod: 26,GZ | Performed by: INTERNAL MEDICINE

## 2022-08-28 PROCEDURE — 70450 CT HEAD/BRAIN W/O DYE: CPT

## 2022-08-28 PROCEDURE — A9270 NON-COVERED ITEM OR SERVICE: HCPCS | Performed by: NEUROLOGICAL SURGERY

## 2022-08-28 RX ORDER — ENOXAPARIN SODIUM 100 MG/ML
30 INJECTION SUBCUTANEOUS EVERY 12 HOURS
Status: DISCONTINUED | OUTPATIENT
Start: 2022-08-28 | End: 2022-09-09 | Stop reason: HOSPADM

## 2022-08-28 RX ADMIN — POTASSIUM CHLORIDE AND SODIUM CHLORIDE: 900; 150 INJECTION, SOLUTION INTRAVENOUS at 10:05

## 2022-08-28 RX ADMIN — MINERAL OIL, PETROLATUM 1 APPLICATION: 425; 573 OINTMENT OPHTHALMIC at 21:53

## 2022-08-28 RX ADMIN — PROPOFOL 65 MCG/KG/MIN: 10 INJECTION, EMULSION INTRAVENOUS at 01:34

## 2022-08-28 RX ADMIN — ENOXAPARIN SODIUM 30 MG: 30 INJECTION SUBCUTANEOUS at 18:40

## 2022-08-28 RX ADMIN — LEVETIRACETAM 1000 MG: 500 TABLET, FILM COATED ORAL at 17:51

## 2022-08-28 RX ADMIN — PROPOFOL 50 MCG/KG/MIN: 10 INJECTION, EMULSION INTRAVENOUS at 16:30

## 2022-08-28 RX ADMIN — VENLAFAXINE 75 MG: 75 TABLET ORAL at 05:37

## 2022-08-28 RX ADMIN — POTASSIUM BICARBONATE 50 MEQ: 977.5 TABLET, EFFERVESCENT ORAL at 10:53

## 2022-08-28 RX ADMIN — FAMOTIDINE 20 MG: 20 TABLET, FILM COATED ORAL at 05:36

## 2022-08-28 RX ADMIN — FAMOTIDINE 20 MG: 20 TABLET, FILM COATED ORAL at 17:50

## 2022-08-28 RX ADMIN — PROPOFOL 60 MCG/KG/MIN: 10 INJECTION, EMULSION INTRAVENOUS at 10:53

## 2022-08-28 RX ADMIN — HYDROMORPHONE HYDROCHLORIDE 2 MG: 2 TABLET ORAL at 13:20

## 2022-08-28 RX ADMIN — PROPOFOL 65 MCG/KG/MIN: 10 INJECTION, EMULSION INTRAVENOUS at 04:15

## 2022-08-28 RX ADMIN — HYDROMORPHONE HYDROCHLORIDE 2 MG: 2 TABLET ORAL at 21:17

## 2022-08-28 RX ADMIN — LABETALOL HYDROCHLORIDE 10 MG: 5 INJECTION, SOLUTION INTRAVENOUS at 07:58

## 2022-08-28 RX ADMIN — ACETAMINOPHEN 1000 MG: 500 TABLET ORAL at 13:21

## 2022-08-28 RX ADMIN — PROPOFOL 60 MCG/KG/MIN: 10 INJECTION, EMULSION INTRAVENOUS at 13:22

## 2022-08-28 RX ADMIN — ACETAMINOPHEN 1000 MG: 500 TABLET ORAL at 02:56

## 2022-08-28 RX ADMIN — FENTANYL CITRATE 100 MCG: 50 INJECTION, SOLUTION INTRAMUSCULAR; INTRAVENOUS at 23:43

## 2022-08-28 RX ADMIN — PROPOFOL 50 MCG/KG/MIN: 10 INJECTION, EMULSION INTRAVENOUS at 21:52

## 2022-08-28 RX ADMIN — ATORVASTATIN CALCIUM 80 MG: 80 TABLET, FILM COATED ORAL at 17:51

## 2022-08-28 RX ADMIN — FENTANYL CITRATE 50 MCG: 50 INJECTION, SOLUTION INTRAMUSCULAR; INTRAVENOUS at 07:57

## 2022-08-28 RX ADMIN — MINERAL OIL, PETROLATUM 1 APPLICATION: 425; 573 OINTMENT OPHTHALMIC at 05:36

## 2022-08-28 RX ADMIN — CARVEDILOL 6.25 MG: 6.25 TABLET, FILM COATED ORAL at 05:36

## 2022-08-28 RX ADMIN — ACETAMINOPHEN 1000 MG: 500 TABLET ORAL at 17:52

## 2022-08-28 RX ADMIN — POTASSIUM CHLORIDE AND SODIUM CHLORIDE: 900; 150 INJECTION, SOLUTION INTRAVENOUS at 20:33

## 2022-08-28 RX ADMIN — VENLAFAXINE 75 MG: 75 TABLET ORAL at 17:51

## 2022-08-28 RX ADMIN — HYDROMORPHONE HYDROCHLORIDE 2 MG: 2 TABLET ORAL at 17:50

## 2022-08-28 RX ADMIN — AMLODIPINE BESYLATE 5 MG: 10 TABLET ORAL at 05:37

## 2022-08-28 RX ADMIN — HYDROMORPHONE HYDROCHLORIDE 1 MG: 2 TABLET ORAL at 01:58

## 2022-08-28 RX ADMIN — ENOXAPARIN SODIUM 30 MG: 30 INJECTION SUBCUTANEOUS at 13:21

## 2022-08-28 RX ADMIN — FENTANYL CITRATE 100 MCG: 50 INJECTION, SOLUTION INTRAMUSCULAR; INTRAVENOUS at 10:52

## 2022-08-28 RX ADMIN — MINERAL OIL, PETROLATUM 1 APPLICATION: 425; 573 OINTMENT OPHTHALMIC at 16:31

## 2022-08-28 RX ADMIN — METOPROLOL TARTRATE 5 MG: 5 INJECTION INTRAVENOUS at 01:13

## 2022-08-28 RX ADMIN — POTASSIUM CHLORIDE AND SODIUM CHLORIDE: 900; 150 INJECTION, SOLUTION INTRAVENOUS at 00:19

## 2022-08-28 RX ADMIN — LEVETIRACETAM 1000 MG: 500 TABLET, FILM COATED ORAL at 05:36

## 2022-08-28 RX ADMIN — PROPOFOL 65 MCG/KG/MIN: 10 INJECTION, EMULSION INTRAVENOUS at 06:20

## 2022-08-28 RX ADMIN — PROPOFOL 50 MCG/KG/MIN: 10 INJECTION, EMULSION INTRAVENOUS at 18:45

## 2022-08-28 RX ADMIN — CARVEDILOL 6.25 MG: 6.25 TABLET, FILM COATED ORAL at 17:50

## 2022-08-28 ASSESSMENT — PAIN DESCRIPTION - PAIN TYPE
TYPE: ACUTE PAIN

## 2022-08-28 ASSESSMENT — FIBROSIS 4 INDEX: FIB4 SCORE: 0.92

## 2022-08-28 NOTE — CARE PLAN
The patient is Watcher - Medium risk of patient condition declining or worsening    Shift Goals  Clinical Goals: hemodynamic stability, pain control, stable or improved neuro status  Patient Goals: FELIPA  Family Goals: no family present    Progress made toward(s) clinical / shift goals:      Patients pain is controlled with scheduled and PRN medications in addition to repositioning and promoting rest.

## 2022-08-28 NOTE — ASSESSMENT & PLAN NOTE
Prophylactic anticoagulation for thrombotic prevention initially contraindicated secondary to elevated bleeding risk.  8/28 Trauma screening bilateral lower extremity venous duplex negative for above knee DVT.  8/28 Prophylactic dose enoxaparin initiated.

## 2022-08-28 NOTE — CARE PLAN
Problem: Pain - Standard  Goal: Alleviation of pain or a reduction in pain to the patient’s comfort goal  Outcome: Progressing     Problem: Knowledge Deficit - Standard  Goal: Patient and family/care givers will demonstrate understanding of plan of care, disease process/condition, diagnostic tests and medications  Outcome: Progressing     Problem: Safety - Medical Restraint  Goal: Remains free of injury from restraints (Restraint for Interference with Medical Device)  Outcome: Progressing  Goal: Free from restraint(s) (Restraint for Interference with Medical Device)  Outcome: Progressing     Problem: Skin Integrity  Goal: Skin integrity is maintained or improved  Outcome: Progressing     Problem: Fall Risk  Goal: Patient will remain free from falls  Outcome: Progressing   The patient is Watcher - Medium risk of patient condition declining or worsening    Shift Goals  Clinical Goals: hemodynamic stability, pain control, stable or improved neuro status  Patient Goals: FELIPA  Family Goals: no family present    Progress made toward(s) clinical / shift goals:  Patient remains hemodynamically stable.  Patient still needing improved neuro exam to be extubated.  With continue with sedation vacations.     Patient is not progressing towards the following goals:

## 2022-08-28 NOTE — PROGRESS NOTES
Trauma / Surgical Daily Progress Note    Date of Service  8/28/2022    Chief Complaint  62 y.o. male admitted 8/25/2022 with GLF w/SDH on anti-coagulation. Required emergent craniotomu    Interval Events  Hospital day #4  Critically ill  Requires continued ICU and hospital admission  Seen on rounds and discussed with multidisciplinary team  Injuries and physiologic derangements pose a significant risk of mortality and long term morbidity  Critical care interventions include:  integration of multiple data points and associated complex medical decisions    Mgt of ventilator-weaning as able-slow progress  Pain control  Supportive care for TBI  Repeat CT today with improvement  Lovenox initiated  Ongoing nutrition with tube feeds.    Review of Systems  Review of Systems   Unable to perform ROS: Intubated      Vital Signs for last 24 hours  Pulse:  [] 93  Resp:  [10-40] 31  BP: ()/(53-97) 159/97  SpO2:  [94 %-100 %] 98 %    Hemodynamic parameters for last 24 hours       Respiratory Data     Respiration: (!) 31, Pulse Oximetry: 98 %     Work Of Breathing / Effort: Vented  RUL Breath Sounds: Clear, RML Breath Sounds: Clear;Diminished, RLL Breath Sounds: Clear;Diminished, DON Breath Sounds: Clear, LLL Breath Sounds: Clear;Diminished    Physical Exam  Physical Exam  Constitutional:       Appearance: He is ill-appearing. He is not toxic-appearing.   HENT:      Head:      Comments: Dressings in place     Right Ear: External ear normal.      Left Ear: External ear normal.   Eyes:      General:         Right eye: No discharge.         Left eye: No discharge.      Extraocular Movements: Extraocular movements intact.      Pupils: Pupils are equal, round, and reactive to light.   Cardiovascular:      Rate and Rhythm: Normal rate and regular rhythm.      Pulses: Normal pulses.      Heart sounds: Normal heart sounds.   Pulmonary:      Breath sounds: No wheezing or rales.      Comments: On vent  Abdominal:      General:  There is no distension.      Palpations: Abdomen is soft.      Tenderness: There is no abdominal tenderness. There is no guarding.   Genitourinary:     Comments: White in place  Musculoskeletal:         General: Normal range of motion.      Cervical back: Normal range of motion.   Skin:     General: Skin is warm and dry.      Coloration: Skin is not jaundiced.      Findings: No bruising.   Neurological:      General: No focal deficit present.      Mental Status: He is alert.      Cranial Nerves: No cranial nerve deficit.      Comments: Not following  Moves all  Not purposeful   Psychiatric:      Comments: Unable to assess       Laboratory  Recent Results (from the past 24 hour(s))   POCT glucose device results    Collection Time: 08/27/22  5:29 PM   Result Value Ref Range    POC Glucose, Blood 91 65 - 99 mg/dL   POCT glucose device results    Collection Time: 08/27/22 11:47 PM   Result Value Ref Range    POC Glucose, Blood 99 65 - 99 mg/dL   CBC with Differential: Tomorrow AM    Collection Time: 08/28/22  5:27 AM   Result Value Ref Range    WBC 9.5 4.8 - 10.8 K/uL    RBC 3.71 (L) 4.70 - 6.10 M/uL    Hemoglobin 11.1 (L) 14.0 - 18.0 g/dL    Hematocrit 32.8 (L) 42.0 - 52.0 %    MCV 88.4 81.4 - 97.8 fL    MCH 29.9 27.0 - 33.0 pg    MCHC 33.8 33.7 - 35.3 g/dL    RDW 46.1 35.9 - 50.0 fL    Platelet Count 306 164 - 446 K/uL    MPV 10.9 9.0 - 12.9 fL    Neutrophils-Polys 62.70 44.00 - 72.00 %    Lymphocytes 23.00 22.00 - 41.00 %    Monocytes 13.20 0.00 - 13.40 %    Eosinophils 0.30 0.00 - 6.90 %    Basophils 0.30 0.00 - 1.80 %    Immature Granulocytes 0.50 0.00 - 0.90 %    Nucleated RBC 0.00 /100 WBC    Neutrophils (Absolute) 5.95 1.82 - 7.42 K/uL    Lymphs (Absolute) 2.18 1.00 - 4.80 K/uL    Monos (Absolute) 1.25 (H) 0.00 - 0.85 K/uL    Eos (Absolute) 0.03 0.00 - 0.51 K/uL    Baso (Absolute) 0.03 0.00 - 0.12 K/uL    Immature Granulocytes (abs) 0.05 0.00 - 0.11 K/uL    NRBC (Absolute) 0.00 K/uL   Comp Metabolic Panel (CMP):  Tomorrow AM    Collection Time: 08/28/22  5:27 AM   Result Value Ref Range    Sodium 139 135 - 145 mmol/L    Potassium 3.4 (L) 3.6 - 5.5 mmol/L    Chloride 109 96 - 112 mmol/L    Co2 19 (L) 20 - 33 mmol/L    Anion Gap 11.0 7.0 - 16.0    Glucose 119 (H) 65 - 99 mg/dL    Bun 15 8 - 22 mg/dL    Creatinine 0.63 0.50 - 1.40 mg/dL    Calcium 9.3 8.5 - 10.5 mg/dL    AST(SGOT) 15 12 - 45 U/L    ALT(SGPT) 11 2 - 50 U/L    Alkaline Phosphatase 64 30 - 99 U/L    Total Bilirubin 0.2 0.1 - 1.5 mg/dL    Albumin 3.6 3.2 - 4.9 g/dL    Total Protein 6.2 6.0 - 8.2 g/dL    Globulin 2.6 1.9 - 3.5 g/dL    A-G Ratio 1.4 g/dL   ESTIMATED GFR    Collection Time: 08/28/22  5:27 AM   Result Value Ref Range    GFR (CKD-EPI) 107 >60 mL/min/1.73 m 2   POCT glucose device results    Collection Time: 08/28/22  5:29 AM   Result Value Ref Range    POC Glucose, Blood 103 (H) 65 - 99 mg/dL   POCT glucose device results    Collection Time: 08/28/22  1:11 PM   Result Value Ref Range    POC Glucose, Blood 83 65 - 99 mg/dL       Fluids    Intake/Output Summary (Last 24 hours) at 8/28/2022 1553  Last data filed at 8/28/2022 0600  Gross per 24 hour   Intake 4763.95 ml   Output 1850 ml   Net 2913.95 ml         Core Measures & Quality Metrics  Labs reviewed and Radiology images reviewed  White catheter: Critically Ill - Requiring Accurate Measurement of Urinary Output      DVT Prophylaxis: Enoxaparin (Lovenox)  DVT prophylaxis - mechanical: SCDs  Ulcer prophylaxis: Yes      RAP Score Total: 6  CAGE Results: not completed Blood Alcohol>0.08: no     Assessment/Plan  Contraindications for pharmacologic prophylaxis  Assessment & Plan  Prophylactic anticoagulation for thrombotic prevention initially contraindicated secondary to elevated bleeding risk.  8/28 Trauma screening bilateral lower extremity venous duplex negative for above knee DVT.   8/28 cleared for lovenox    Traumatic subdural hematoma, initial encounter (HCC)- (present on admission)  Assessment &  Plan  Left-sided holohemispheric traumatic subdural hemorrhage associated with systemic anticoagulation and antiplatelet therapy.  Craniotomy  8/28 significant deficits persist.  8/28 f/u CT overall improved  Post traumatic pharmacologic seizure prophylaxis for 1 week.  Speech Language Pathology cognitive evaluation.  Tesfaye Luther MD. Neurosurgeon. [facility].    Antiplatelet or antithrombotic long-term use- (present on admission)  Assessment & Plan  Daily ASA and Apixaban.  Received KCentra on admit.  AA 96.1%, transfused 1 unit platelet pheresis in ED.  Follow up TEG with platelet mapping ordered.    Status post insertion of drug-eluting stent into right coronary artery for coronary artery disease- (present on admission)  Assessment & Plan  Drug-eluting stents placed in the mid RCA and PDA August 2020.   Daily aspirin administration.  Maintenance medication held on admission    Paroxysmal atrial fibrillation (HCC)- (present on admission)  Assessment & Plan  Chronic condition treated with carvedilol and apixaban (Eliquis) anticoagulation.  Carvedilol resumed upon admission.  Systemic anticoagulation held on admission.     CAD (coronary artery disease)  Assessment & Plan  History of coronary artery disease with STEMI in August 2020.  Drug-eluting stents placed in the mid RCA and PDA.  Daily aspirin administration.    Type 2 diabetes mellitus without complication, without long-term current use of insulin (HCC)- (present on admission)  Assessment & Plan  Chronic condition treated with metformin.  Glycohemoglobin 6.2 (131).  Holding maintenance metformin for 48 hours following intravenous contrast administration.  Insulin sliding scale coverage.    Dyslipidemia- (present on admission)  Assessment & Plan  Chronic condition treated with atorvastatin.  Resumed maintenance medication on admission.    Hypertensive disease- (present on admission)  Assessment & Plan  Chronic condition treated with carvedilol.  Resumed  maintenance medication on admission.    Respiratory failure following trauma and surgery (HCC)  Assessment & Plan  Returned from OR on vent  Weaning as able    Depression- (present on admission)  Assessment & Plan  Chronic condition treated with Effexor.  Resumed maintenance medication on admission.    Trauma- (present on admission)  Assessment & Plan  Mechanical ground-level fall.  Intracranial hemorrhage with systemic anticoagulation.  Trauma Yellow Transfer Activation from [facility].  Macario Winn MD. Trauma Surgery.    BPH (benign prostatic hyperplasia)- (present on admission)  Assessment & Plan  Chronic condition treated with finasteride and tamsulosin.  Resumed maintenance medication on admission.        Discussed patient condition with RN, RT, Therapies, Pharmacy, Dietary, and .  CRITICAL CARE TIME EXCLUDING PROCEDURES: 31   minutes

## 2022-08-28 NOTE — CARE PLAN
The patient is Watcher - Medium risk of patient condition declining or worsening    Shift Goals  Clinical Goals: hemodynamic stability, pain control, stable or improved neuro status  Patient Goals: FELIPA  Family Goals: no family present    Progress made toward(s) clinical / shift goals:      Patient remains free from injury from restraints with frequent assessments per protocol. Skin integrity maintained with assessments and positioning.     Problem: Safety - Medical Restraint  Goal: Remains free of injury from restraints (Restraint for Interference with Medical Device)  Outcome: Progressing     Problem: Skin Integrity  Goal: Skin integrity is maintained or improved  Outcome: Progressing

## 2022-08-28 NOTE — CARE PLAN
Ventilator Daily Summary    Vent Day # 4    ETT 8.0 @ 24    Ventilator settings changed this shift:no     8 30%    Weaning trials:no    Respiratory Procedures:no    Plan: Continue current ventilator settings and wean mechanical ventilation as tolerated per physician orders.

## 2022-08-28 NOTE — PROGRESS NOTES
Neurosurgery Progress Note    Subjective:  62 y.o. male on Eliquis (reversal agent given) who presented 2022 with an acute left SDH and transfer from an outside hospital. Initially presented with GCS 15. Neuro exam subsequently declined with worsening expressive aphasia, agitation, no command following. Repeat CT with progression of SDH and worsening MLS.     POD#3 Left craniotomy for evacuation of acute SDH.   Post-op head CT  with evacuation of SDH and decreased MLS.    No acute events overnight.   Patient remains intubated and sedated.   Per RN, patient moves LUE and RUE spontaneously when off sedation, slightly weaker response on the left    Exam:  Intubated  Soft restraints in place.    Face symmetric.  Spontaneous eye opening.   Pupils 3mm, PERRL.  Not tracking  Withdraws x4, trace reaction to RUE and LLE.   Not following commands.  +grimace to central and peripheral stimulation.   +gag, +cough.   Subgaleal Hemovac with 15cc/8 hours.   Dressing with strikethrough      BP  Min: 88/53  Max: 158/85  Pulse  Av.4  Min: 57  Max: 117  Resp  Av  Min: 11  Max: 38  Monitored Temp 2  Av.4 °C (99.4 °F)  Min: 37 °C (98.6 °F)  Max: 38 °C (100.4 °F)  SpO2  Av.1 %  Min: 94 %  Max: 100 %    No data recorded    Recent Labs     22  0610 22  0527   WBC 9.5 12.5* 9.5   RBC 4.09* 3.86* 3.71*   HEMOGLOBIN 12.3* 11.6* 11.1*   HEMATOCRIT 36.6* 34.2* 32.8*   MCV 89.5 88.6 88.4   MCH 30.1 30.1 29.9   MCHC 33.6* 33.9 33.8   RDW 46.0 46.9 46.1   PLATELETCT 313 356 306   MPV 11.3 11.2 10.9       Recent Labs     22  0610 22  0527   SODIUM 136 139 139   POTASSIUM 3.9 3.7 3.4*   CHLORIDE 104 109 109   CO2 17* 19* 19*   GLUCOSE 153* 127* 119*   BUN 11 12 15   CREATININE 0.75 0.63 0.63   CALCIUM 9.4 9.0 9.3       Recent Labs     22  2153   APTT 36.3*   INR 1.12       Recent Labs     22  1300 223 22  0415   REACTMIN 8.0 9.2* 6.8    CLOTKINET 1.0 1.1 0.9   CLOTANGL 76.1 75.9 77.8   MAXCLOTS 66.4 64.9 67.6   [id number] 0.0 1.4 0.0   PRCINADP 20.0* 43.5* 21.2*   PRCINAA 95.7* 95.8* 91.9*         Intake/Output                         08/27/22 0700 - 08/28/22 0659 08/28/22 0700 - 08/29/22 0659     0700-1859 1900-0659 Total 0700-1859 1900-0659 Total                 Intake    I.V.  1710.3  1726.2 3436.5  --  -- --    Cardene Volume 247.1 -- 247.1 -- -- --    Propofol Volume 294.9 494.5 789.4 -- -- --    Volume (mL) (0.9 % NaCl with KCl 20 mEq infusion) 1168.3 1231.7 2400 -- -- --    Other  200  -- 200  --  -- --    Medications (PO/Enteral Liquids) 200 -- 200 -- -- --    NG/GT  600  720 1320  --  -- --    Intake (mL) (Enteral Tube 08/26/22 Cortrak - Gastric 10 Fr. Right nare)  -- -- --    Enteral  330  90 420  --  -- --    Free Water / Tube Flush 330 90 420 -- -- --    Total Intake 2840.3 2536.2 5376.5 -- -- --       Output    Urine  1750  860 2610  --  -- --    Output (mL) (Urethral Catheter Non-latex;Temperature probe 16 Fr.) 0172 349 9358 -- -- --    Drains  5  10 15  --  -- --    Output (mL) (Closed/Suction Drain 1 Left Scalp Hemovac 10 Fr.) 5 10 15 -- -- --    Stool  250  175 425  --  -- --    Rectal Tube Output (Rectal Tube) 250 175 425 -- -- --    Total Output 2005 1045 3050 -- -- --       Net I/O     835.3 1491.2 2326.5 -- -- --              Intake/Output Summary (Last 24 hours) at 8/28/2022 0955  Last data filed at 8/28/2022 0600  Gross per 24 hour   Intake 5183.95 ml   Output 2650 ml   Net 2533.95 ml               Pharmacy  1 Each PHARMACY TO DOSE    Respiratory Therapy Consult   Continuous RT    propofol  0-80 mcg/kg/min Continuous    famotidine  20 mg BID    Or    famotidine  20 mg BID    levETIRAcetam  1,000 mg BID    Metoprolol Tartrate  5 mg Q4HRS PRN    fentaNYL  50 mcg Q HOUR PRN    Or    fentaNYL  100 mcg Q HOUR PRN    HYDROmorphone  1 mg Q3HRS PRN    Or    HYDROmorphone  2 mg Q3HRS PRN    amLODIPine  5 mg Q DAY     acetaminophen  1,000 mg Q6HRS    Followed by    [START ON 8/29/2022] acetaminophen  1,000 mg Q6HRS PRN    niCARdipine infusion  0-15 mg/hr Continuous    ondansetron  4 mg Q4HRS PRN    bisacodyl  10 mg Q24HRS PRN    sodium phosphate  1 Each Once PRN    insulin regular  1-6 Units Q6HRS    And    dextrose bolus  25 g Q15 MIN PRN    labetalol  10 mg Q4HRS PRN    Pharmacy Consult Request  1 Each PHARMACY TO DOSE    MD ALERT...DO NOT ADMINISTER NSAIDS or ASPIRIN unless ORDERED By Neurosurgery  1 Each PRN    hydrALAZINE  10 mg Q HOUR PRN    artificial tears  1 Application Q8HRS    0.9 % NaCl with KCl 20 mEq 1,000 mL   Continuous    atorvastatin  80 mg Q EVENING    cloNIDine  0.1 mg Q4HRS PRN    magnesium hydroxide  30 mL DAILY    ondansetron  4 mg Q4HRS PRN    polyethylene glycol/lytes  1 Packet BID    senna-docusate  1 Tablet Nightly    senna-docusate  1 Tablet Q24HRS PRN    docusate sodium  100 mg BID    venlafaxine  75 mg BID    carvedilol  6.25 mg BID       Assessment and Plan:  Hospital day #5  POD#3  Post-op head CT improved, repeat CT ordered for today as patient is not improving neurologically   Keep subgaleal Hemovac today, ice incision.   Q2 neuro checks.    Keppra 1000 mg BID.    OK for tube feeds.  Wean sedation and extubate as able.   Following.      Chemical prophylactic DVT therapy: Yes  Start date/time: Today is ok from a neurosurgery standpoint

## 2022-08-28 NOTE — CARE PLAN
Problem: Pain - Standard  Goal: Alleviation of pain or a reduction in pain to the patient’s comfort goal  8/28/2022 0501 by Jen Bledsoe, R.N.  Outcome: Progressing  8/28/2022 0458 by Jen Bledsoe, R.N.  Outcome: Progressing     The patient is Watcher - Medium risk of patient condition declining or worsening    Shift Goals  Clinical Goals: hemodynamic stability, pain control, stable or improved neuro status  Patient Goals: FELIPA  Family Goals: no family present    Progress made toward(s) clinical / shift goals:      Patient's pain is controlled with scheduled and PRN medication in addition to repositioning and frequent assessment.

## 2022-08-29 ENCOUNTER — APPOINTMENT (OUTPATIENT)
Dept: RADIOLOGY | Facility: MEDICAL CENTER | Age: 62
DRG: 003 | End: 2022-08-29
Attending: SURGERY
Payer: OTHER GOVERNMENT

## 2022-08-29 PROBLEM — E66.9 OBESE: Status: ACTIVE | Noted: 2020-08-01

## 2022-08-29 LAB
ALBUMIN SERPL BCP-MCNC: 3.3 G/DL (ref 3.2–4.9)
ALBUMIN/GLOB SERPL: 1.3 G/DL
ALP SERPL-CCNC: 55 U/L (ref 30–99)
ALT SERPL-CCNC: 12 U/L (ref 2–50)
ANION GAP SERPL CALC-SCNC: 9 MMOL/L (ref 7–16)
AST SERPL-CCNC: 16 U/L (ref 12–45)
BASOPHILS # BLD AUTO: 0.4 % (ref 0–1.8)
BASOPHILS # BLD: 0.03 K/UL (ref 0–0.12)
BILIRUB SERPL-MCNC: 0.2 MG/DL (ref 0.1–1.5)
BUN SERPL-MCNC: 15 MG/DL (ref 8–22)
CALCIUM SERPL-MCNC: 9.1 MG/DL (ref 8.5–10.5)
CHLORIDE SERPL-SCNC: 109 MMOL/L (ref 96–112)
CO2 SERPL-SCNC: 20 MMOL/L (ref 20–33)
CREAT SERPL-MCNC: 0.55 MG/DL (ref 0.5–1.4)
EOSINOPHIL # BLD AUTO: 0.08 K/UL (ref 0–0.51)
EOSINOPHIL NFR BLD: 1.2 % (ref 0–6.9)
ERYTHROCYTE [DISTWIDTH] IN BLOOD BY AUTOMATED COUNT: 45.9 FL (ref 35.9–50)
GFR SERPLBLD CREATININE-BSD FMLA CKD-EPI: 112 ML/MIN/1.73 M 2
GLOBULIN SER CALC-MCNC: 2.6 G/DL (ref 1.9–3.5)
GLUCOSE BLD STRIP.AUTO-MCNC: 107 MG/DL (ref 65–99)
GLUCOSE BLD STRIP.AUTO-MCNC: 109 MG/DL (ref 65–99)
GLUCOSE BLD STRIP.AUTO-MCNC: 113 MG/DL (ref 65–99)
GLUCOSE BLD STRIP.AUTO-MCNC: 84 MG/DL (ref 65–99)
GLUCOSE BLD STRIP.AUTO-MCNC: 85 MG/DL (ref 65–99)
GLUCOSE SERPL-MCNC: 127 MG/DL (ref 65–99)
HCT VFR BLD AUTO: 28.8 % (ref 42–52)
HGB BLD-MCNC: 9.9 G/DL (ref 14–18)
IMM GRANULOCYTES # BLD AUTO: 0.02 K/UL (ref 0–0.11)
IMM GRANULOCYTES NFR BLD AUTO: 0.3 % (ref 0–0.9)
LYMPHOCYTES # BLD AUTO: 1.82 K/UL (ref 1–4.8)
LYMPHOCYTES NFR BLD: 27.1 % (ref 22–41)
MAGNESIUM SERPL-MCNC: 1.7 MG/DL (ref 1.5–2.5)
MCH RBC QN AUTO: 30.7 PG (ref 27–33)
MCHC RBC AUTO-ENTMCNC: 34.4 G/DL (ref 33.7–35.3)
MCV RBC AUTO: 89.4 FL (ref 81.4–97.8)
MONOCYTES # BLD AUTO: 0.8 K/UL (ref 0–0.85)
MONOCYTES NFR BLD AUTO: 11.9 % (ref 0–13.4)
NEUTROPHILS # BLD AUTO: 3.97 K/UL (ref 1.82–7.42)
NEUTROPHILS NFR BLD: 59.1 % (ref 44–72)
NRBC # BLD AUTO: 0 K/UL
NRBC BLD-RTO: 0 /100 WBC
PHOSPHATE SERPL-MCNC: 3.1 MG/DL (ref 2.5–4.5)
PLATELET # BLD AUTO: 231 K/UL (ref 164–446)
PMV BLD AUTO: 11.3 FL (ref 9–12.9)
POTASSIUM SERPL-SCNC: 3.9 MMOL/L (ref 3.6–5.5)
PROT SERPL-MCNC: 5.9 G/DL (ref 6–8.2)
RBC # BLD AUTO: 3.22 M/UL (ref 4.7–6.1)
SODIUM SERPL-SCNC: 138 MMOL/L (ref 135–145)
WBC # BLD AUTO: 6.7 K/UL (ref 4.8–10.8)

## 2022-08-29 PROCEDURE — 85025 COMPLETE CBC W/AUTO DIFF WBC: CPT

## 2022-08-29 PROCEDURE — 700111 HCHG RX REV CODE 636 W/ 250 OVERRIDE (IP): Performed by: NEUROLOGICAL SURGERY

## 2022-08-29 PROCEDURE — 700111 HCHG RX REV CODE 636 W/ 250 OVERRIDE (IP): Performed by: SURGERY

## 2022-08-29 PROCEDURE — 94760 N-INVAS EAR/PLS OXIMETRY 1: CPT

## 2022-08-29 PROCEDURE — A9270 NON-COVERED ITEM OR SERVICE: HCPCS | Performed by: SURGERY

## 2022-08-29 PROCEDURE — 700101 HCHG RX REV CODE 250: Performed by: PHYSICIAN ASSISTANT

## 2022-08-29 PROCEDURE — 97167 OT EVAL HIGH COMPLEX 60 MIN: CPT

## 2022-08-29 PROCEDURE — 700101 HCHG RX REV CODE 250: Performed by: NEUROLOGICAL SURGERY

## 2022-08-29 PROCEDURE — 94799 UNLISTED PULMONARY SVC/PX: CPT

## 2022-08-29 PROCEDURE — 700102 HCHG RX REV CODE 250 W/ 637 OVERRIDE(OP): Performed by: NEUROLOGICAL SURGERY

## 2022-08-29 PROCEDURE — 700102 HCHG RX REV CODE 250 W/ 637 OVERRIDE(OP): Performed by: SURGERY

## 2022-08-29 PROCEDURE — 80053 COMPREHEN METABOLIC PANEL: CPT

## 2022-08-29 PROCEDURE — 97163 PT EVAL HIGH COMPLEX 45 MIN: CPT

## 2022-08-29 PROCEDURE — 83735 ASSAY OF MAGNESIUM: CPT

## 2022-08-29 PROCEDURE — 94003 VENT MGMT INPAT SUBQ DAY: CPT

## 2022-08-29 PROCEDURE — 82962 GLUCOSE BLOOD TEST: CPT | Mod: 91

## 2022-08-29 PROCEDURE — 71045 X-RAY EXAM CHEST 1 VIEW: CPT

## 2022-08-29 PROCEDURE — A9270 NON-COVERED ITEM OR SERVICE: HCPCS | Performed by: NEUROLOGICAL SURGERY

## 2022-08-29 PROCEDURE — 84100 ASSAY OF PHOSPHORUS: CPT

## 2022-08-29 PROCEDURE — 770022 HCHG ROOM/CARE - ICU (200)

## 2022-08-29 PROCEDURE — 99291 CRITICAL CARE FIRST HOUR: CPT | Performed by: SURGERY

## 2022-08-29 PROCEDURE — 302307 BAG FECAL MANAGEMENT TEMPORARY COLLECTION WITH FILTER - SEAL SIGNAL FMS: Performed by: SURGERY

## 2022-08-29 RX ORDER — AMLODIPINE BESYLATE 5 MG/1
10 TABLET ORAL
Status: DISCONTINUED | OUTPATIENT
Start: 2022-08-30 | End: 2022-09-09 | Stop reason: HOSPADM

## 2022-08-29 RX ORDER — CARVEDILOL 12.5 MG/1
12.5 TABLET ORAL 2 TIMES DAILY
Status: DISCONTINUED | OUTPATIENT
Start: 2022-08-29 | End: 2022-09-04

## 2022-08-29 RX ORDER — BACITRACIN ZINC AND POLYMYXIN B SULFATE 500; 1000 [USP'U]/G; [USP'U]/G
OINTMENT TOPICAL 2 TIMES DAILY
Status: DISPENSED | OUTPATIENT
Start: 2022-08-29 | End: 2022-08-31

## 2022-08-29 RX ORDER — AMLODIPINE BESYLATE 10 MG/1
5 TABLET ORAL ONCE
Status: COMPLETED | OUTPATIENT
Start: 2022-08-29 | End: 2022-08-29

## 2022-08-29 RX ORDER — MAGNESIUM SULFATE HEPTAHYDRATE 40 MG/ML
2 INJECTION, SOLUTION INTRAVENOUS ONCE
Status: COMPLETED | OUTPATIENT
Start: 2022-08-29 | End: 2022-08-29

## 2022-08-29 RX ADMIN — AMLODIPINE BESYLATE 5 MG: 10 TABLET ORAL at 12:09

## 2022-08-29 RX ADMIN — MAGNESIUM SULFATE HEPTAHYDRATE 2 G: 40 INJECTION, SOLUTION INTRAVENOUS at 09:30

## 2022-08-29 RX ADMIN — ACETAMINOPHEN 1000 MG: 500 TABLET ORAL at 00:26

## 2022-08-29 RX ADMIN — PROPOFOL 70 MCG/KG/MIN: 10 INJECTION, EMULSION INTRAVENOUS at 18:49

## 2022-08-29 RX ADMIN — AMLODIPINE BESYLATE 5 MG: 10 TABLET ORAL at 05:21

## 2022-08-29 RX ADMIN — HYDROMORPHONE HYDROCHLORIDE 2 MG: 2 TABLET ORAL at 02:03

## 2022-08-29 RX ADMIN — FENTANYL CITRATE 100 MCG: 50 INJECTION, SOLUTION INTRAMUSCULAR; INTRAVENOUS at 13:11

## 2022-08-29 RX ADMIN — VENLAFAXINE 75 MG: 75 TABLET ORAL at 17:10

## 2022-08-29 RX ADMIN — PROPOFOL 60 MCG/KG/MIN: 10 INJECTION, EMULSION INTRAVENOUS at 21:07

## 2022-08-29 RX ADMIN — VENLAFAXINE 75 MG: 75 TABLET ORAL at 05:24

## 2022-08-29 RX ADMIN — POTASSIUM CHLORIDE AND SODIUM CHLORIDE: 900; 150 INJECTION, SOLUTION INTRAVENOUS at 06:17

## 2022-08-29 RX ADMIN — POTASSIUM CHLORIDE AND SODIUM CHLORIDE: 900; 150 INJECTION, SOLUTION INTRAVENOUS at 15:53

## 2022-08-29 RX ADMIN — PROPOFOL 60 MCG/KG/MIN: 10 INJECTION, EMULSION INTRAVENOUS at 09:30

## 2022-08-29 RX ADMIN — HYDROMORPHONE HYDROCHLORIDE 2 MG: 2 TABLET ORAL at 16:53

## 2022-08-29 RX ADMIN — ACETAMINOPHEN 1000 MG: 500 TABLET ORAL at 05:23

## 2022-08-29 RX ADMIN — ATORVASTATIN CALCIUM 80 MG: 80 TABLET, FILM COATED ORAL at 17:09

## 2022-08-29 RX ADMIN — LEVETIRACETAM 1000 MG: 500 TABLET, FILM COATED ORAL at 17:09

## 2022-08-29 RX ADMIN — CARVEDILOL 12.5 MG: 12.5 TABLET, FILM COATED ORAL at 17:09

## 2022-08-29 RX ADMIN — PROPOFOL 60 MCG/KG/MIN: 10 INJECTION, EMULSION INTRAVENOUS at 03:31

## 2022-08-29 RX ADMIN — FAMOTIDINE 20 MG: 20 TABLET, FILM COATED ORAL at 17:09

## 2022-08-29 RX ADMIN — LABETALOL HYDROCHLORIDE 10 MG: 5 INJECTION, SOLUTION INTRAVENOUS at 10:05

## 2022-08-29 RX ADMIN — ENOXAPARIN SODIUM 30 MG: 30 INJECTION SUBCUTANEOUS at 05:21

## 2022-08-29 RX ADMIN — MINERAL OIL, PETROLATUM 1 APPLICATION: 425; 573 OINTMENT OPHTHALMIC at 14:00

## 2022-08-29 RX ADMIN — PROPOFOL 50 MCG/KG/MIN: 10 INJECTION, EMULSION INTRAVENOUS at 00:38

## 2022-08-29 RX ADMIN — MINERAL OIL, PETROLATUM 1 APPLICATION: 425; 573 OINTMENT OPHTHALMIC at 05:23

## 2022-08-29 RX ADMIN — HYDRALAZINE HYDROCHLORIDE 10 MG: 20 INJECTION INTRAMUSCULAR; INTRAVENOUS at 11:24

## 2022-08-29 RX ADMIN — LABETALOL HYDROCHLORIDE 10 MG: 5 INJECTION, SOLUTION INTRAVENOUS at 03:01

## 2022-08-29 RX ADMIN — LEVETIRACETAM 1000 MG: 500 TABLET, FILM COATED ORAL at 05:21

## 2022-08-29 RX ADMIN — PROPOFOL 60 MCG/KG/MIN: 10 INJECTION, EMULSION INTRAVENOUS at 11:27

## 2022-08-29 RX ADMIN — FENTANYL CITRATE 100 MCG: 50 INJECTION, SOLUTION INTRAMUSCULAR; INTRAVENOUS at 06:03

## 2022-08-29 RX ADMIN — MINERAL OIL, PETROLATUM 1 APPLICATION: 425; 573 OINTMENT OPHTHALMIC at 21:08

## 2022-08-29 RX ADMIN — FAMOTIDINE 20 MG: 20 TABLET, FILM COATED ORAL at 05:23

## 2022-08-29 RX ADMIN — PROPOFOL 60 MCG/KG/MIN: 10 INJECTION, EMULSION INTRAVENOUS at 23:30

## 2022-08-29 RX ADMIN — ENOXAPARIN SODIUM 30 MG: 30 INJECTION SUBCUTANEOUS at 17:09

## 2022-08-29 RX ADMIN — DOCUSATE SODIUM 50 MG AND SENNOSIDES 8.6 MG 1 TABLET: 8.6; 5 TABLET, FILM COATED ORAL at 20:26

## 2022-08-29 RX ADMIN — CARVEDILOL 6.25 MG: 6.25 TABLET, FILM COATED ORAL at 05:23

## 2022-08-29 RX ADMIN — FENTANYL CITRATE 100 MCG: 50 INJECTION, SOLUTION INTRAMUSCULAR; INTRAVENOUS at 03:33

## 2022-08-29 RX ADMIN — HYDROMORPHONE HYDROCHLORIDE 2 MG: 2 TABLET ORAL at 10:12

## 2022-08-29 RX ADMIN — PROPOFOL 70 MCG/KG/MIN: 10 INJECTION, EMULSION INTRAVENOUS at 14:00

## 2022-08-29 RX ADMIN — CLONIDINE HYDROCHLORIDE 0.1 MG: 0.1 TABLET ORAL at 13:14

## 2022-08-29 RX ADMIN — PROPOFOL 70 MCG/KG/MIN: 10 INJECTION, EMULSION INTRAVENOUS at 15:52

## 2022-08-29 RX ADMIN — Medication 1 EACH: at 20:27

## 2022-08-29 ASSESSMENT — COGNITIVE AND FUNCTIONAL STATUS - GENERAL
MOBILITY SCORE: 6
TOILETING: TOTAL
MOVING FROM LYING ON BACK TO SITTING ON SIDE OF FLAT BED: UNABLE
STANDING UP FROM CHAIR USING ARMS: TOTAL
DAILY ACTIVITIY SCORE: 6
MOVING TO AND FROM BED TO CHAIR: UNABLE
PERSONAL GROOMING: TOTAL
CLIMB 3 TO 5 STEPS WITH RAILING: TOTAL
DRESSING REGULAR UPPER BODY CLOTHING: TOTAL
EATING MEALS: TOTAL
TURNING FROM BACK TO SIDE WHILE IN FLAT BAD: UNABLE
SUGGESTED CMS G CODE MODIFIER DAILY ACTIVITY: CN
SUGGESTED CMS G CODE MODIFIER MOBILITY: CN
HELP NEEDED FOR BATHING: TOTAL
WALKING IN HOSPITAL ROOM: TOTAL
DRESSING REGULAR LOWER BODY CLOTHING: TOTAL

## 2022-08-29 ASSESSMENT — PAIN DESCRIPTION - PAIN TYPE
TYPE: ACUTE PAIN

## 2022-08-29 ASSESSMENT — ACTIVITIES OF DAILY LIVING (ADL): TOILETING: UNABLE TO DETERMINE AT THIS TIME

## 2022-08-29 ASSESSMENT — GAIT ASSESSMENTS: GAIT LEVEL OF ASSIST: UNABLE TO PARTICIPATE

## 2022-08-29 NOTE — CARE PLAN
Problem: Ventilation  Goal: Ability to achieve and maintain unassisted ventilation or tolerate decreased levels of ventilator support  Description: Target End Date:  4 days     Document on Vent flowsheet    1.  Support and monitor invasive and noninvasive mechanical ventilation  2.  Monitor ventilator weaning response  3.  Perform ventilator associated pneumonia prevention interventions  4.  Manage ventilation therapy by monitoring diagnostic test results  Outcome: Not Met      Ventilator Daily Summary    Vent Day #5    Ventilator settings changed this shift: No (130/+8/30%)    Weaning trials: SBT x 1, not following.     Respiratory Procedures: No    Plan: Continue current ventilator settings and wean mechanical ventilation as tolerated per physician orders.

## 2022-08-29 NOTE — PROGRESS NOTES
Neurosurgery Progress Note    Subjective:  62 y.o. male on Eliquis (reversal agent given) who presented 2022 with an acute left SDH and transfer from an outside hospital. Initially presented with GCS 15. Neuro exam subsequently declined with worsening expressive aphasia, agitation, no command following. Repeat CT with progression of SDH and worsening MLS.     POD#4 Left craniotomy for evacuation of acute SDH.   Post-op head CT  with evacuation of SDH and decreased MLS.    No acute events overnight.   Patient remains intubated and sedated.   Per RN, patient moves LUE and RUE spontaneously when off sedation, slightly weaker response on the left    Exam:  Intubated  Soft restraints in place.    Face symmetric.  Spontaneous eye opening.   Pupils 3mm, PERRL.  Not tracking  Withdraws x4, trace reaction to RUE and LLE.   Not following commands.  +grimace to central and peripheral stimulation.   +gag, +cough.   Subgaleal Hemovac with 5cc/8 hours.   Dressing with strikethrough      BP  Min: 116/77  Max: 188/95  Pulse  Av  Min: 70  Max: 99  Resp  Av.9  Min: 12  Max: 34  Monitored Temp 2  Av.4 °C (99.3 °F)  Min: 36.8 °C (98.2 °F)  Max: 38 °C (100.4 °F)  SpO2  Av %  Min: 93 %  Max: 100 %    No data recorded    Recent Labs     22  0610 22  0527 22  0520   WBC 12.5* 9.5 6.7   RBC 3.86* 3.71* 3.22*   HEMOGLOBIN 11.6* 11.1* 9.9*   HEMATOCRIT 34.2* 32.8* 28.8*   MCV 88.6 88.4 89.4   MCH 30.1 29.9 30.7   MCHC 33.9 33.8 34.4   RDW 46.9 46.1 45.9   PLATELETCT 356 306 231   MPV 11.2 10.9 11.3       Recent Labs     22  0610 22  0527 22  0520   SODIUM 139 139 138   POTASSIUM 3.7 3.4* 3.9   CHLORIDE 109 109 109   CO2 19* 19* 20   GLUCOSE 127* 119* 127*   BUN 12 15 15   CREATININE 0.63 0.63 0.55   CALCIUM 9.0 9.3 9.1                     Intake/Output                         22 - 22 0659 22 - 22 [address] 8489-7467 Total 3465-4985  8910-3939 Total                 Intake    I.V.  1538.6  1578.8 3117.4  450.7  -- 450.7    Magnesium Sulfate Volume -- -- -- 12.5 -- 12.5    Propofol Volume 338.6 378.8 717.4 38.2 -- 38.2    Volume (mL) (0.9 % NaCl with KCl 20 mEq infusion) 1200 1200 2400 400 -- 400    Other  --  -- --  --  -- --    Balloon Inflated (mL) (Rectal Tube) 1 x -- 1 x -- -- --    NG/GT  720  720 1440  240  -- 240    Intake (mL) (Enteral Tube 08/26/22 Cortrak - Gastric 10 Fr. Right nare)  240 -- 240    Enteral  --  150 150  30  -- 30    Free Water / Tube Flush -- 150 150 30 -- 30    Total Intake 2258.6 2448.8 4707.4 720.7 -- 720.7       Output    Urine  1300  1065 2365  1000  -- 1000    Output (mL) (Urethral Catheter Non-latex;Temperature probe 16 Fr.) 1300 1065 2365 1000 -- 1000    Drains  30  10 40  --  -- --    Output (mL) (Closed/Suction Drain 1 Left Scalp Hemovac 10 Fr.) 30 10 40 -- -- --    Stool  400  25 425  --  -- --    Rectal Tube Output (Rectal Tube) 400 25 425 -- -- --    Total Output 1730 1100 2830 1000 -- 1000       Net I/O     528.6 1348.8 1877.4 -279.3 -- -279.3              Intake/Output Summary (Last 24 hours) at 8/29/2022 1043  Last data filed at 8/29/2022 1000  Gross per 24 hour   Intake 4727.48 ml   Output 3500 ml   Net 1227.48 ml               magnesium sulfate  2 g Once    bacitracin-polymyxin b   BID    [START ON 8/30/2022] MD Alert...Total Body Iron Replacement per Pharmacy   PRN    enoxaparin (LOVENOX) injection  30 mg Q12HRS    Pharmacy  1 Each PHARMACY TO DOSE    Respiratory Therapy Consult   Continuous RT    propofol  0-80 mcg/kg/min Continuous    famotidine  20 mg BID    Or    famotidine  20 mg BID    levETIRAcetam  1,000 mg BID    Metoprolol Tartrate  5 mg Q4HRS PRN    fentaNYL  50 mcg Q HOUR PRN    Or    fentaNYL  100 mcg Q HOUR PRN    HYDROmorphone  1 mg Q3HRS PRN    Or    HYDROmorphone  2 mg Q3HRS PRN    amLODIPine  5 mg Q DAY    acetaminophen  1,000 mg Q6HRS    Followed by    acetaminophen   1,000 mg Q6HRS PRN    ondansetron  4 mg Q4HRS PRN    bisacodyl  10 mg Q24HRS PRN    sodium phosphate  1 Each Once PRN    insulin regular  1-6 Units Q6HRS    And    dextrose bolus  25 g Q15 MIN PRN    labetalol  10 mg Q4HRS PRN    Pharmacy Consult Request  1 Each PHARMACY TO DOSE    MD ALERT...DO NOT ADMINISTER NSAIDS or ASPIRIN unless ORDERED By Neurosurgery  1 Each PRN    hydrALAZINE  10 mg Q HOUR PRN    artificial tears  1 Application Q8HRS    0.9 % NaCl with KCl 20 mEq 1,000 mL   Continuous    atorvastatin  80 mg Q EVENING    cloNIDine  0.1 mg Q4HRS PRN    magnesium hydroxide  30 mL DAILY    ondansetron  4 mg Q4HRS PRN    polyethylene glycol/lytes  1 Packet BID    senna-docusate  1 Tablet Nightly    senna-docusate  1 Tablet Q24HRS PRN    docusate sodium  100 mg BID    venlafaxine  75 mg BID    carvedilol  6.25 mg BID       Assessment and Plan:  Hospital day #6  POD#4  Head CT reviewed, shows improvement in midline shift  Drain removed  Q2 neuro checks.    Keppra 1000 mg BID.    OK for tube feeds.  Wean sedation and extubate as able.  Brain MRI pending    Following.      Chemical prophylactic DVT therapy: Yes  Start date/time: OK from a neurosurgery standpoint

## 2022-08-29 NOTE — CARE PLAN
The patient is Watcher - Medium risk of patient condition declining or worsening    Shift Goals  Clinical Goals: hemodynamic stability, stable or improved neuro exam  Patient Goals: FELIPA  Family Goals: no family at bedside    Progress made toward(s) clinical / shift goals:      Patient's pain is controlled with PRN pain medication in addition to repositioning. Patient remains free from injury and maintains overall skin integrity with frequent assessments, repositioning, and elimination containment devices.     Problem: Pain - Standard  Goal: Alleviation of pain or a reduction in pain to the patient’s comfort goal  Outcome: Progressing     Problem: Safety - Medical Restraint  Goal: Remains free of injury from restraints (Restraint for Interference with Medical Device)  Outcome: Progressing     Problem: Skin Integrity  Goal: Skin integrity is maintained or improved  Outcome: Progressing

## 2022-08-29 NOTE — DIETARY
Nutrition Services Brief Update:    Problem: Nutritional:  Goal: Nutrition support tolerated and meeting greater than 85% of estimated needs  Outcome: Progressing    Peptamen Intense VHP running at goal of 60 ml/hr on propofol.

## 2022-08-29 NOTE — CARE PLAN
Problem: Ventilation  Goal: Ability to achieve and maintain unassisted ventilation or tolerate decreased levels of ventilator support  Description: Target End Date:  4 days     Document on Vent flowsheet    1.  Support and monitor invasive and noninvasive mechanical ventilation  2.  Monitor ventilator weaning response  3.  Perform ventilator associated pneumonia prevention interventions  4.  Manage ventilation therapy by monitoring diagnostic test results  Outcome: Not Progressing      Ventilator Daily Summary    Vent Day #4    Ventilator settings changed this shift: No (130%/+8/30%)    Weaning trials: SBT x 1    Respiratory Procedures: No    Plan: Continue current ventilator settings and wean mechanical ventilation as tolerated per physician orders.

## 2022-08-29 NOTE — PROGRESS NOTES
Trauma / Surgical Daily Progress Note    Date of Service  8/29/2022    Chief Complaint  62 y.o. male admitted 8/25/2022 with GLF w/SDH    Interval Events    No significant neurologic change  Neuro status limiting vent weaning  If no improvement will need a  trach later in week  Increase Norvasc and Coreg  On lovenox    Review of Systems  Review of Systems   Unable to perform ROS: Intubated      Vital Signs for last 24 hours  Pulse:  [70-99] 83  Resp:  [12-34] 14  BP: (102-193)/() 102/66  SpO2:  [93 %-100 %] 97 %    Hemodynamic parameters for last 24 hours       Respiratory Data     Respiration: 14, Pulse Oximetry: 97 %     Work Of Breathing / Effort: Vented  RUL Breath Sounds: Crackles, RML Breath Sounds: Crackles;Diminished, RLL Breath Sounds: Crackles;Diminished, DON Breath Sounds: Crackles, LLL Breath Sounds: Crackles;Diminished    Physical Exam  Physical Exam  Vitals and nursing note reviewed.   Constitutional:       Appearance: He is obese. He is ill-appearing. He is not toxic-appearing.   HENT:      Head:      Comments: Dressings in place     Right Ear: External ear normal.      Left Ear: External ear normal.      Mouth/Throat:      Mouth: Mucous membranes are moist.   Eyes:      General: No scleral icterus.        Right eye: No discharge.         Left eye: No discharge.      Extraocular Movements: Extraocular movements intact.      Pupils: Pupils are equal, round, and reactive to light.   Cardiovascular:      Rate and Rhythm: Normal rate and regular rhythm.      Pulses: Normal pulses.      Heart sounds: Normal heart sounds.   Pulmonary:      Effort: Pulmonary effort is normal.      Breath sounds: Normal breath sounds. No wheezing or rales.   Abdominal:      General: Abdomen is flat. Bowel sounds are normal. There is no distension.      Palpations: Abdomen is soft.      Tenderness: There is no abdominal tenderness. There is no guarding or rebound.   Genitourinary:     Comments: Christopher to  gravity.  Musculoskeletal:         General: Normal range of motion.      Cervical back: Normal range of motion.   Skin:     General: Skin is warm and dry.      Capillary Refill: Capillary refill takes 2 to 3 seconds.      Coloration: Skin is not jaundiced.      Findings: No bruising.   Neurological:      General: No focal deficit present.      Mental Status: He is alert.      Cranial Nerves: No cranial nerve deficit.      Comments: Not following commands  Moves all  Not purposeful   Psychiatric:      Comments: Unable to assess       Laboratory  Recent Results (from the past 24 hour(s))   POCT glucose device results    Collection Time: 08/28/22  5:49 PM   Result Value Ref Range    POC Glucose, Blood 83 65 - 99 mg/dL   POCT glucose device results    Collection Time: 08/29/22 12:27 AM   Result Value Ref Range    POC Glucose, Blood 109 (H) 65 - 99 mg/dL   CBC with Differential: Tomorrow AM    Collection Time: 08/29/22  5:20 AM   Result Value Ref Range    WBC 6.7 4.8 - 10.8 K/uL    RBC 3.22 (L) 4.70 - 6.10 M/uL    Hemoglobin 9.9 (L) 14.0 - 18.0 g/dL    Hematocrit 28.8 (L) 42.0 - 52.0 %    MCV 89.4 81.4 - 97.8 fL    MCH 30.7 27.0 - 33.0 pg    MCHC 34.4 33.7 - 35.3 g/dL    RDW 45.9 35.9 - 50.0 fL    Platelet Count 231 164 - 446 K/uL    MPV 11.3 9.0 - 12.9 fL    Neutrophils-Polys 59.10 44.00 - 72.00 %    Lymphocytes 27.10 22.00 - 41.00 %    Monocytes 11.90 0.00 - 13.40 %    Eosinophils 1.20 0.00 - 6.90 %    Basophils 0.40 0.00 - 1.80 %    Immature Granulocytes 0.30 0.00 - 0.90 %    Nucleated RBC 0.00 /100 WBC    Neutrophils (Absolute) 3.97 1.82 - 7.42 K/uL    Lymphs (Absolute) 1.82 1.00 - 4.80 K/uL    Monos (Absolute) 0.80 0.00 - 0.85 K/uL    Eos (Absolute) 0.08 0.00 - 0.51 K/uL    Baso (Absolute) 0.03 0.00 - 0.12 K/uL    Immature Granulocytes (abs) 0.02 0.00 - 0.11 K/uL    NRBC (Absolute) 0.00 K/uL   Comp Metabolic Panel (CMP): Tomorrow AM    Collection Time: 08/29/22  5:20 AM   Result Value Ref Range    Sodium 138 135 -  145 mmol/L    Potassium 3.9 3.6 - 5.5 mmol/L    Chloride 109 96 - 112 mmol/L    Co2 20 20 - 33 mmol/L    Anion Gap 9.0 7.0 - 16.0    Glucose 127 (H) 65 - 99 mg/dL    Bun 15 8 - 22 mg/dL    Creatinine 0.55 0.50 - 1.40 mg/dL    Calcium 9.1 8.5 - 10.5 mg/dL    AST(SGOT) 16 12 - 45 U/L    ALT(SGPT) 12 2 - 50 U/L    Alkaline Phosphatase 55 30 - 99 U/L    Total Bilirubin 0.2 0.1 - 1.5 mg/dL    Albumin 3.3 3.2 - 4.9 g/dL    Total Protein 5.9 (L) 6.0 - 8.2 g/dL    Globulin 2.6 1.9 - 3.5 g/dL    A-G Ratio 1.3 g/dL   Magnesium: Every Monday and Thursday AM    Collection Time: 08/29/22  5:20 AM   Result Value Ref Range    Magnesium 1.7 1.5 - 2.5 mg/dL   Phosphorus: Every Monday and Thursday AM    Collection Time: 08/29/22  5:20 AM   Result Value Ref Range    Phosphorus 3.1 2.5 - 4.5 mg/dL   ESTIMATED GFR    Collection Time: 08/29/22  5:20 AM   Result Value Ref Range    GFR (CKD-EPI) 112 >60 mL/min/1.73 m 2   POCT glucose device results    Collection Time: 08/29/22  5:26 AM   Result Value Ref Range    POC Glucose, Blood 113 (H) 65 - 99 mg/dL   POCT glucose device results    Collection Time: 08/29/22 11:21 AM   Result Value Ref Range    POC Glucose, Blood 107 (H) 65 - 99 mg/dL       Fluids    Intake/Output Summary (Last 24 hours) at 8/29/2022 1505  Last data filed at 8/29/2022 1400  Gross per 24 hour   Intake 4782.97 ml   Output 4775 ml   Net 7.97 ml       Core Measures & Quality Metrics  Core Measures & Quality Metrics  RAP Score Total: 6  CAGE Results: not completed Blood Alcohol>0.08: no     Assessment/Plan  Respiratory failure following trauma and surgery (HCC)- (present on admission)  Assessment & Plan  Continue full mechanical ventilatory support.   Ventilator bundle and Trauma weaning protocol.  Returned from OR on vent  Weaning as able    Traumatic subdural hematoma, initial encounter (HCC)- (present on admission)  Assessment & Plan  Left-sided holohemispheric traumatic subdural hemorrhage associated with systemic  anticoagulation and antiplatelet therapy.  8/'25  Left craniotomy with evacuation of SDH  8/28 significant deficits persist.  8/28 f/u CT overall improved  MRI brain pending  Post traumatic pharmacologic seizure prophylaxis for 1 week.  Speech Language Pathology cognitive evaluation.  Tesfaye Luther MD. Neurosurgeon. [facility].    Antiplatelet or antithrombotic long-term use- (present on admission)  Assessment & Plan  Daily ASA and Apixaban.  Received KCentra on admit.  AA 96.1%, transfused 1 unit platelets in ED.    CAD (coronary artery disease)  Assessment & Plan  History of coronary artery disease with STEMI in August 2020.  Drug-eluting stents placed in the mid RCA and PDA.  Daily aspirin administration.    Status post insertion of drug-eluting stent into right coronary artery for coronary artery disease- (present on admission)  Assessment & Plan  Drug-eluting stents placed in the mid RCA and PDA August 2020.   Daily aspirin administration.  Maintenance medication held on admission    Paroxysmal atrial fibrillation (HCC)- (present on admission)  Assessment & Plan  Chronic condition treated with carvedilol and apixaban (Eliquis) anticoagulation.  Carvedilol resumed upon admission.  Systemic anticoagulation held on admission.     Obese- (present on admission)  Assessment & Plan  8/29 BMI 34.94    Type 2 diabetes mellitus without complication, without long-term current use of insulin (HCC)- (present on admission)  Assessment & Plan  Chronic condition treated with metformin.  Glycohemoglobin 6.2 (131).  Holding maintenance metformin for 48 hours following intravenous contrast administration.  Insulin sliding scale coverage.    Hypertensive disease- (present on admission)  Assessment & Plan  Chronic condition treated with carvedilol.  Resumed maintenance medication on admission.    Contraindications for pharmacologic prophylaxis- (present on admission)  Assessment & Plan  Prophylactic anticoagulation for  thrombotic prevention initially contraindicated secondary to elevated bleeding risk.  8/28 Trauma screening bilateral lower extremity venous duplex negative for above knee DVT.   8/28 cleared for lovenox    Depression- (present on admission)  Assessment & Plan  Chronic condition treated with Effexor.  Resumed maintenance medication on admission.    Trauma- (present on admission)  Assessment & Plan  Mechanical ground-level fall.    Intracranial hemorrhage with systemic anticoagulation.  Trauma Yellow Transfer Activation from [facility].  Macario Winn MD. Trauma Surgery.    BPH (benign prostatic hyperplasia)- (present on admission)  Assessment & Plan  Chronic condition treated with finasteride and tamsulosin.  Resumed maintenance medication on admission.    Dyslipidemia- (present on admission)  Assessment & Plan  Chronic condition treated with atorvastatin.  Resumed maintenance medication on admission.      Discussed patient condition with RN, RT, Pharmacy, , and Patient.  CRITICAL CARE TIME EXCLUDING PROCEDURES: 40  minutes

## 2022-08-29 NOTE — CARE PLAN
Problem: Ventilation  Goal: Ability to achieve and maintain unassisted ventilation or tolerate decreased levels of ventilator support  Description: Target End Date:  4 days     Document on Vent flowsheet    1.  Support and monitor invasive and noninvasive mechanical ventilation  2.  Monitor ventilator weaning response  3.  Perform ventilator associated pneumonia prevention interventions  4.  Manage ventilation therapy by monitoring diagnostic test results  Outcome: Not Progressing                                     Ventilator Daily Summary     Vent Day #5     Ventilator settings changed this shift: No (130%/+8/30%)     Weaning trials: none this shift     Respiratory Procedures: No     Plan: Continue current ventilator settings and wean mechanical ventilation as tolerated per physician orders.

## 2022-08-29 NOTE — CARE PLAN
The patient is Watcher - Medium risk of patient condition declining or worsening    Shift Goals  Clinical Goals: Stable/improved neuro exam, hemodynamic stability  Patient Goals: Unable to assess  Family Goals: No family present    Progress made toward(s) clinical / shift goals:    Problem: Pain - Standard  Goal: Alleviation of pain or a reduction in pain to the patient’s comfort goal  Outcome: Progressing   Medicated for pain per MAR.     Problem: Safety - Medical Restraint  Goal: Remains free of injury from restraints (Restraint for Interference with Medical Device)  Outcome: Progressing   Q2h restraint monitoring.     Problem: Skin Integrity  Goal: Skin integrity is maintained or improved  Outcome: Progressing   Q2h turns. Pillows and wedges in use for support/positioning. Tubing and equipment repositioned as needed to prevent unnecessary pressure on patient's skin.     Problem: Fall Risk  Goal: Patient will remain free from falls  Outcome: Progressing   Fall precautions in place.

## 2022-08-29 NOTE — THERAPY
Occupational Therapy   Initial Evaluation     Patient Name: Karan Wiley  Age:  62 y.o., Sex:  male  Medical Record #: [id number]  Today's Date: 8/29/2022     Precautions: Fall Risk  Comments: orally intubated    Assessment  Patient is 62 y.o. male admitted from outside hospital 2/2 GLF found to have L SDH now s/p emergent L craniotomy for evacuation of hematoma with Hx of CAD, s/p drug eluting stents, HTN, T2DM. Pt presented at total A with mobility and self cares. He responds to pain and demonstrated BUE spontaneous movements. Pt not following commands at this time. PLOF obtained via chart, need to verify. Will follow for skilled OT services.    Plan    Recommend Occupational Therapy 3 times per week until therapy goals are met for the following treatments:  Adaptive Equipment, Cognitive Skill Development, Neuro Re-Education / Balance, Self Care/Activities of Daily Living, Therapeutic Activities, and Therapeutic Exercises.    DC Equipment Recommendations: (P) Unable to determine at this time  Discharge Recommendations: (P) Recommend post-acute placement for additional occupational therapy services prior to discharge home        08/29/22 0920   Prior Living Situation   Housing / Facility [facility]   Lives with - Patient's Self Care Capacity Spouse   Comments Per chart, pt and his spouse recently moved to [place], and pt's spouse and son are his support   Prior Level of ADL Function   Self Feeding Unable To Determine At This Time   Grooming / Hygiene Unable To Determine At This Time   Bathing Unable To Determine At This Time   Dressing Unable To Determine At This Time   Toileting Unable To Determine At This Time   Prior Level of IADL Function   Medication Management Unable To Determine At This Time   History of Falls   History of Falls Yes   Date of Last Fall   (reason for admit, and hx of recent falls)   Precautions   Precautions Fall Risk   Cognition    Level of Consciousness Responds to pain   Comments Not  following commands, eyes wandering, not tracking consistently   Active ROM Upper Body   Active ROM Upper Body  X   Comments Spontaneous BUE movements, no purposeful movements noted   Strength Upper Body   Upper Body Strength  X   Comments same as above   Balance Assessment   Sitting Balance (Static) Dependent   Sitting Balance (Dynamic) Dependent   Weight Shift Sitting Absent   Bed Mobility    Supine to Sit Total Assist   Sit to Supine Total Assist   Scooting Total Assist   ADL Assessment   Upper Body Dressing Total Assist   Lower Body Dressing Total Assist   Toileting Total Assist   How much help from another person does the patient currently need...   6 Clicks Daily Activity Score 6   Functional Mobility   Sit to Stand Unable to Participate   Bed, Chair, Wheelchair Transfer Unable to Participate   Patient / Family Goals   Patient / Family Goal #1 none stated   Short Term Goals   Short Term Goal # 1 Pt will follow 1 step commands with 100% accuracy   Short Term Goal # 2 Pt will demonstrate purposeful movement of BUE   Short Term Goal # 3 Pt will accurately visually track items in the L and R field   Education Group   Role of Occupational Therapist Patient Response Patient;Acceptance   Problem List   Problem List Decreased Active Daily Living Skills;Decreased Homemaking Skills;Decreased Upper Extremity Strength Right;Decreased Upper Extremity Strength Left;Decreased Upper Extremity AROM Right;Decreased Upper Extremity AROM Left;Decreased Functional Mobility;Decreased Activity Tolerance;Safety Awareness Deficits / Cognition;Impaired Posture / Trunk Alignment;Impaired Cognitive Function;Impaired Postural Control / Balance;Impaired Vision   Anticipated Discharge Equipment and Recommendations   DC Equipment Recommendations Unable to determine at this time   Discharge Recommendations Recommend post-acute placement for additional occupational therapy services prior to discharge home

## 2022-08-29 NOTE — THERAPY
Physical Therapy   Initial Evaluation     Patient Name: Karan Wiley  Age:  62 y.o., Sex:  male  Medical Record #: [id number]  Today's Date: 8/29/2022     Precautions  Precautions: Fall Risk    Assessment  Patient is 62 y.o. male admitted post GLF with acute L SDH now POD #4 L craniotomy for evacuation of SDH. PMH includes CAD s/p stents, HTN, DM2. No purposeful command following noted during evaluation. Occasional spontaneous movement noted in B UE> LE. Total assist required for supine<>sitting and EOB seated balance. PT will cont while pt is in acute care setting to address strength, ROM, balance, activity tolerance, and mobility.     Plan    Recommend Physical Therapy 3 times per week until therapy goals are met for the following treatments:  Bed Mobility, Gait Training, Neuro Re-Education / Balance, Self Care/Home Evaluation, Therapeutic Activities, and Therapeutic Exercises    DC Equipment Recommendations: Unable to determine at this time  Discharge Recommendations: Recommend post-acute placement for additional physical therapy services prior to discharge home        08/29/22 0921   Vitals   O2 Delivery Device Ventilator   Prior Living Situation   Prior Services Unable To Determine At This Time   Housing / Facility [facility]   Lives with - Patient's Self Care Capacity Spouse   Comments per chart   Prior Level of Functional Mobility   Bed Mobility Unable To Determine At This Time   Comments suspect ambulatory   History of Falls   History of Falls Yes   Cognition    Cognition / Consciousness X   Speech/ Communication Intubated / Trached   Level of Consciousness Unresponsive   Ability To Follow Commands Unable to Follow 1 Step Commands   Initiation Impaired   Comments no command following noted   Passive ROM Lower Body   Passive ROM Lower Body X   Comments lacking terminal knee extension in B LE   Active ROM Lower Body    Active ROM Lower Body  X   Comments occaisional spontaneous movements noted but  nothing purposeful   Strength Lower Body   Lower Body Strength  X   Comments unable to assess due to lack of command following   Sensation Lower Body   Lower Extremity Sensation   Not Tested   Lower Body Muscle Tone   Lower Body Muscle Tone  X   Rt Lower Extremity Muscle Tone Rigidity   Lt Lower Extremity Muscle Tone Rigidity   Coordination Lower Body    Coordination Lower Body  Not Tested   Balance Assessment   Sitting Balance (Static) Dependent   Sitting Balance (Dynamic) Dependent   Weight Shift Sitting Absent   Gait Analysis   Gait Level Of Assist Unable to Participate   Bed Mobility    Supine to Sit Total Assist   Sit to Supine Total Assist   Scooting Total Assist   Functional Mobility   Sit to Stand Unable to Participate   Bed, Chair, Wheelchair Transfer Unable to Participate   Mobility EOB only   Edema / Skin Assessment   Edema / Skin  Not Assessed   Patient / Family Goals    Patient / Family Goal #1 unable to state   Short Term Goals    Short Term Goal # 1 pt will be able to complete supine<>Sitting with mod assist in 6tx in order to demonstrate progress   Short Term Goal # 2 pt will be able to sit EOB with fair - balance in 6tx in order to decrease fall risk   Short Term Goal # 3 pt will be able to complete functional transfers with mod assist in 6tx in order to increase OOB time   Education Group   Education Provided Role of Physical Therapist   Role of Physical Therapist Patient Response Patient;Nonacceptance;Explanation;No Learning Evidence   Anticipated Discharge Equipment and Recommendations   DC Equipment Recommendations Unable to determine at this time   Discharge Recommendations Recommend post-acute placement for additional physical therapy services prior to discharge home

## 2022-08-30 ENCOUNTER — APPOINTMENT (OUTPATIENT)
Dept: RADIOLOGY | Facility: MEDICAL CENTER | Age: 62
DRG: 003 | End: 2022-08-30
Attending: SURGERY
Payer: OTHER GOVERNMENT

## 2022-08-30 LAB
ALBUMIN SERPL BCP-MCNC: 3.1 G/DL (ref 3.2–4.9)
ALBUMIN/GLOB SERPL: 1.2 G/DL
ALP SERPL-CCNC: 60 U/L (ref 30–99)
ALT SERPL-CCNC: 13 U/L (ref 2–50)
ANION GAP SERPL CALC-SCNC: 10 MMOL/L (ref 7–16)
AST SERPL-CCNC: 20 U/L (ref 12–45)
BASOPHILS # BLD AUTO: 0.6 % (ref 0–1.8)
BASOPHILS # BLD: 0.04 K/UL (ref 0–0.12)
BILIRUB SERPL-MCNC: 0.2 MG/DL (ref 0.1–1.5)
BUN SERPL-MCNC: 16 MG/DL (ref 8–22)
CALCIUM SERPL-MCNC: 9.2 MG/DL (ref 8.5–10.5)
CHLORIDE SERPL-SCNC: 107 MMOL/L (ref 96–112)
CO2 SERPL-SCNC: 21 MMOL/L (ref 20–33)
CREAT SERPL-MCNC: 0.47 MG/DL (ref 0.5–1.4)
EOSINOPHIL # BLD AUTO: 0.23 K/UL (ref 0–0.51)
EOSINOPHIL NFR BLD: 3.4 % (ref 0–6.9)
ERYTHROCYTE [DISTWIDTH] IN BLOOD BY AUTOMATED COUNT: 46.2 FL (ref 35.9–50)
GFR SERPLBLD CREATININE-BSD FMLA CKD-EPI: 117 ML/MIN/1.73 M 2
GLOBULIN SER CALC-MCNC: 2.6 G/DL (ref 1.9–3.5)
GLUCOSE BLD STRIP.AUTO-MCNC: 122 MG/DL (ref 65–99)
GLUCOSE BLD STRIP.AUTO-MCNC: 89 MG/DL (ref 65–99)
GLUCOSE BLD STRIP.AUTO-MCNC: 94 MG/DL (ref 65–99)
GLUCOSE SERPL-MCNC: 111 MG/DL (ref 65–99)
HCT VFR BLD AUTO: 30.5 % (ref 42–52)
HGB BLD-MCNC: 10.3 G/DL (ref 14–18)
IMM GRANULOCYTES # BLD AUTO: 0.04 K/UL (ref 0–0.11)
IMM GRANULOCYTES NFR BLD AUTO: 0.6 % (ref 0–0.9)
IRON SATN MFR SERPL: 40 % (ref 15–55)
IRON SERPL-MCNC: 98 UG/DL (ref 50–180)
LYMPHOCYTES # BLD AUTO: 1.64 K/UL (ref 1–4.8)
LYMPHOCYTES NFR BLD: 24.2 % (ref 22–41)
MAGNESIUM SERPL-MCNC: 1.9 MG/DL (ref 1.5–2.5)
MCH RBC QN AUTO: 30.1 PG (ref 27–33)
MCHC RBC AUTO-ENTMCNC: 33.8 G/DL (ref 33.7–35.3)
MCV RBC AUTO: 89.2 FL (ref 81.4–97.8)
MONOCYTES # BLD AUTO: 0.91 K/UL (ref 0–0.85)
MONOCYTES NFR BLD AUTO: 13.4 % (ref 0–13.4)
NEUTROPHILS # BLD AUTO: 3.92 K/UL (ref 1.82–7.42)
NEUTROPHILS NFR BLD: 57.8 % (ref 44–72)
NRBC # BLD AUTO: 0 K/UL
NRBC BLD-RTO: 0 /100 WBC
PHOSPHATE SERPL-MCNC: 3 MG/DL (ref 2.5–4.5)
PLATELET # BLD AUTO: 273 K/UL (ref 164–446)
PMV BLD AUTO: 11.6 FL (ref 9–12.9)
POTASSIUM SERPL-SCNC: 3.9 MMOL/L (ref 3.6–5.5)
PROT SERPL-MCNC: 5.7 G/DL (ref 6–8.2)
RBC # BLD AUTO: 3.42 M/UL (ref 4.7–6.1)
SODIUM SERPL-SCNC: 138 MMOL/L (ref 135–145)
SP GR UR STRIP.AUTO: 1.01
TIBC SERPL-MCNC: 246 UG/DL (ref 250–450)
UIBC SERPL-MCNC: 148 UG/DL (ref 110–370)
WBC # BLD AUTO: 6.8 K/UL (ref 4.8–10.8)

## 2022-08-30 PROCEDURE — 85025 COMPLETE CBC W/AUTO DIFF WBC: CPT

## 2022-08-30 PROCEDURE — 84100 ASSAY OF PHOSPHORUS: CPT

## 2022-08-30 PROCEDURE — 700102 HCHG RX REV CODE 250 W/ 637 OVERRIDE(OP): Performed by: SURGERY

## 2022-08-30 PROCEDURE — 700101 HCHG RX REV CODE 250: Performed by: SURGERY

## 2022-08-30 PROCEDURE — 83735 ASSAY OF MAGNESIUM: CPT

## 2022-08-30 PROCEDURE — 94799 UNLISTED PULMONARY SVC/PX: CPT

## 2022-08-30 PROCEDURE — 83540 ASSAY OF IRON: CPT

## 2022-08-30 PROCEDURE — 80053 COMPREHEN METABOLIC PANEL: CPT

## 2022-08-30 PROCEDURE — 700111 HCHG RX REV CODE 636 W/ 250 OVERRIDE (IP): Performed by: SURGERY

## 2022-08-30 PROCEDURE — 99291 CRITICAL CARE FIRST HOUR: CPT | Performed by: SURGERY

## 2022-08-30 PROCEDURE — 700101 HCHG RX REV CODE 250: Performed by: NEUROLOGICAL SURGERY

## 2022-08-30 PROCEDURE — 82962 GLUCOSE BLOOD TEST: CPT

## 2022-08-30 PROCEDURE — 94760 N-INVAS EAR/PLS OXIMETRY 1: CPT

## 2022-08-30 PROCEDURE — A9270 NON-COVERED ITEM OR SERVICE: HCPCS | Performed by: SURGERY

## 2022-08-30 PROCEDURE — 700101 HCHG RX REV CODE 250: Performed by: PHYSICIAN ASSISTANT

## 2022-08-30 PROCEDURE — 700102 HCHG RX REV CODE 250 W/ 637 OVERRIDE(OP): Performed by: NEUROLOGICAL SURGERY

## 2022-08-30 PROCEDURE — 700111 HCHG RX REV CODE 636 W/ 250 OVERRIDE (IP): Performed by: NEUROLOGICAL SURGERY

## 2022-08-30 PROCEDURE — 770022 HCHG ROOM/CARE - ICU (200)

## 2022-08-30 PROCEDURE — 71045 X-RAY EXAM CHEST 1 VIEW: CPT

## 2022-08-30 PROCEDURE — 94003 VENT MGMT INPAT SUBQ DAY: CPT

## 2022-08-30 PROCEDURE — 81002 URINALYSIS NONAUTO W/O SCOPE: CPT

## 2022-08-30 PROCEDURE — 83550 IRON BINDING TEST: CPT

## 2022-08-30 PROCEDURE — A9270 NON-COVERED ITEM OR SERVICE: HCPCS | Performed by: NEUROLOGICAL SURGERY

## 2022-08-30 RX ADMIN — ENOXAPARIN SODIUM 30 MG: 30 INJECTION SUBCUTANEOUS at 05:32

## 2022-08-30 RX ADMIN — AMLODIPINE BESYLATE 10 MG: 10 TABLET ORAL at 05:31

## 2022-08-30 RX ADMIN — ATORVASTATIN CALCIUM 80 MG: 80 TABLET, FILM COATED ORAL at 17:35

## 2022-08-30 RX ADMIN — MINERAL OIL, PETROLATUM 1 APPLICATION: 425; 573 OINTMENT OPHTHALMIC at 23:42

## 2022-08-30 RX ADMIN — HYDRALAZINE HYDROCHLORIDE 10 MG: 20 INJECTION INTRAMUSCULAR; INTRAVENOUS at 10:12

## 2022-08-30 RX ADMIN — MAGNESIUM HYDROXIDE 30 ML: 400 SUSPENSION ORAL at 05:31

## 2022-08-30 RX ADMIN — PROPOFOL 70 MCG/KG/MIN: 10 INJECTION, EMULSION INTRAVENOUS at 01:54

## 2022-08-30 RX ADMIN — FAMOTIDINE 20 MG: 20 TABLET, FILM COATED ORAL at 17:35

## 2022-08-30 RX ADMIN — FENTANYL CITRATE 100 MCG: 50 INJECTION, SOLUTION INTRAMUSCULAR; INTRAVENOUS at 02:49

## 2022-08-30 RX ADMIN — VENLAFAXINE 75 MG: 75 TABLET ORAL at 17:35

## 2022-08-30 RX ADMIN — LEVETIRACETAM 1000 MG: 500 TABLET, FILM COATED ORAL at 05:31

## 2022-08-30 RX ADMIN — DOCUSATE SODIUM 100 MG: 50 LIQUID ORAL at 05:31

## 2022-08-30 RX ADMIN — PROPOFOL 40 MCG/KG/MIN: 10 INJECTION, EMULSION INTRAVENOUS at 19:25

## 2022-08-30 RX ADMIN — VENLAFAXINE 75 MG: 75 TABLET ORAL at 05:37

## 2022-08-30 RX ADMIN — FENTANYL CITRATE 100 MCG: 50 INJECTION, SOLUTION INTRAMUSCULAR; INTRAVENOUS at 11:26

## 2022-08-30 RX ADMIN — MINERAL OIL, PETROLATUM 1 APPLICATION: 425; 573 OINTMENT OPHTHALMIC at 14:15

## 2022-08-30 RX ADMIN — LEVETIRACETAM 1000 MG: 500 TABLET, FILM COATED ORAL at 17:35

## 2022-08-30 RX ADMIN — PROPOFOL 70 MCG/KG/MIN: 10 INJECTION, EMULSION INTRAVENOUS at 13:55

## 2022-08-30 RX ADMIN — PROPOFOL 60 MCG/KG/MIN: 10 INJECTION, EMULSION INTRAVENOUS at 04:11

## 2022-08-30 RX ADMIN — Medication 1 EACH: at 05:31

## 2022-08-30 RX ADMIN — POTASSIUM CHLORIDE AND SODIUM CHLORIDE: 900; 150 INJECTION, SOLUTION INTRAVENOUS at 11:29

## 2022-08-30 RX ADMIN — PROPOFOL 60 MCG/KG/MIN: 10 INJECTION, EMULSION INTRAVENOUS at 16:35

## 2022-08-30 RX ADMIN — PROPOFOL 60 MCG/KG/MIN: 10 INJECTION, EMULSION INTRAVENOUS at 11:31

## 2022-08-30 RX ADMIN — LABETALOL HYDROCHLORIDE 10 MG: 5 INJECTION, SOLUTION INTRAVENOUS at 01:23

## 2022-08-30 RX ADMIN — FENTANYL CITRATE 100 MCG: 50 INJECTION, SOLUTION INTRAMUSCULAR; INTRAVENOUS at 08:11

## 2022-08-30 RX ADMIN — ENOXAPARIN SODIUM 30 MG: 30 INJECTION SUBCUTANEOUS at 17:36

## 2022-08-30 RX ADMIN — HYDROMORPHONE HYDROCHLORIDE 2 MG: 2 TABLET ORAL at 09:31

## 2022-08-30 RX ADMIN — POLYETHYLENE GLYCOL 3350 1 PACKET: 17 POWDER, FOR SOLUTION ORAL at 05:31

## 2022-08-30 RX ADMIN — FAMOTIDINE 20 MG: 20 TABLET, FILM COATED ORAL at 05:32

## 2022-08-30 RX ADMIN — MINERAL OIL, PETROLATUM 1 APPLICATION: 425; 573 OINTMENT OPHTHALMIC at 05:37

## 2022-08-30 RX ADMIN — CLONIDINE HYDROCHLORIDE 0.1 MG: 0.1 TABLET ORAL at 06:51

## 2022-08-30 RX ADMIN — CARVEDILOL 12.5 MG: 12.5 TABLET, FILM COATED ORAL at 17:35

## 2022-08-30 RX ADMIN — CARVEDILOL 12.5 MG: 12.5 TABLET, FILM COATED ORAL at 05:32

## 2022-08-30 RX ADMIN — PROPOFOL 40 MCG/KG/MIN: 10 INJECTION, EMULSION INTRAVENOUS at 23:54

## 2022-08-30 RX ADMIN — POTASSIUM CHLORIDE AND SODIUM CHLORIDE: 900; 150 INJECTION, SOLUTION INTRAVENOUS at 01:54

## 2022-08-30 RX ADMIN — Medication 1 EACH: at 17:35

## 2022-08-30 RX ADMIN — LABETALOL HYDROCHLORIDE 10 MG: 5 INJECTION, SOLUTION INTRAVENOUS at 09:01

## 2022-08-30 RX ADMIN — HYDROMORPHONE HYDROCHLORIDE 2 MG: 2 TABLET ORAL at 16:38

## 2022-08-30 ASSESSMENT — PAIN DESCRIPTION - PAIN TYPE
TYPE: ACUTE PAIN

## 2022-08-30 NOTE — CARE PLAN
The patient is Watcher - Medium risk of patient condition declining or worsening    Shift Goals  Clinical Goals: Improved neuro exam, hemodynamic stability, pain control  Patient Goals: Unable to assess  Family Goals: No family present    Progress made toward(s) clinical / shift goals:    Problem: Pain - Standard  Goal: Alleviation of pain or a reduction in pain to the patient’s comfort goal  Outcome: Progressing   Medicated for pain per CPOT assessment.     Problem: Safety - Medical Restraint  Goal: Remains free of injury from restraints (Restraint for Interference with Medical Device)  Outcome: Progressing   Q2h restraint monitoring.     Problem: Skin Integrity  Goal: Skin integrity is maintained or improved  Outcome: Progressing   Q2h turns. Pillows, wedges, z-flow, and bilateral heel float boots in use for support and positioning. Tubing and equipment repositioned as needed to prevent unnecessary pressure on patient's skin.     Problem: Fall Risk  Goal: Patient will remain free from falls  Outcome: Progressing   Fall precautions in place.

## 2022-08-30 NOTE — CARE PLAN
Problem: Ventilation  Goal: Ability to achieve and maintain unassisted ventilation or tolerate decreased levels of ventilator support  Description: Target End Date:  4 days     Document on Vent flowsheet    1.  Support and monitor invasive and noninvasive mechanical ventilation  2.  Monitor ventilator weaning response  3.  Perform ventilator associated pneumonia prevention interventions  4.  Manage ventilation therapy by monitoring diagnostic test results  Outcome: Not Met      Ventilator Daily Summary    Vent Day #6    Ventilator settings changed this shift:  No (130%/+8/30%)    Weaning trials: SBT x 1 (not following)    Respiratory Procedures: No    Plan: Continue current ventilator settings and wean mechanical ventilation as tolerated per physician orders.

## 2022-08-30 NOTE — CARE PLAN
The patient is Watcher - Medium risk of patient condition declining or worsening    Shift Goals  Clinical Goals: Stable/Improving neuro exam  Patient Goals: FELIPA  Family Goals: FELIPA    Progress made toward(s) clinical / shift goals:  No acute events overnight. Pt HUDSON, Withdraws to pain, does not follow commands.  Adequate urine output.  BMS draining brown liquid stool.    Patient is not progressing towards the following goals:      Problem: Knowledge Deficit - Standard  Goal: Patient and family/care givers will demonstrate understanding of plan of care, disease process/condition, diagnostic tests and medications  Outcome: Not Progressing         Problem: Pain - Standard  Goal: Alleviation of pain or a reduction in pain to the patient’s comfort goal  Outcome: Progressing     Problem: Safety - Medical Restraint  Goal: Remains free of injury from restraints (Restraint for Interference with Medical Device)  Outcome: Progressing  Goal: Free from restraint(s) (Restraint for Interference with Medical Device)  Outcome: Progressing     Problem: Skin Integrity  Goal: Skin integrity is maintained or improved  Outcome: Progressing     Problem: Fall Risk  Goal: Patient will remain free from falls  Outcome: Progressing

## 2022-08-30 NOTE — CARE PLAN
Problem: Ventilation  Goal: Ability to achieve and maintain unassisted ventilation or tolerate decreased levels of ventilator support  Description: Target End Date:  4 days     Document on Vent flowsheet    1.  Support and monitor invasive and noninvasive mechanical ventilation  2.  Monitor ventilator weaning response  3.  Perform ventilator associated pneumonia prevention interventions  4.  Manage ventilation therapy by monitoring diagnostic test results  Outcome: Not Met                                   Ventilator Daily Summary     Vent Day #6     Ventilator settings changed this shift: No (130/+8/30%)     Weaning trials: None this shift     Respiratory Procedures: No     Plan: Continue current ventilator settings and wean mechanical ventilation as tolerated per physician orders.

## 2022-08-30 NOTE — PROGRESS NOTES
Neurosurgery Progress Note    Subjective:  62 y.o. male on Eliquis (reversal agent given) who presented 2022 with an acute left SDH and transfer from an outside hospital. Initially presented with GCS 15. Neuro exam subsequently declined with worsening expressive aphasia, agitation, no command following. Repeat CT with progression of SDH and worsening MLS.     POD#5 Left craniotomy for evacuation of acute SDH.   Post-op head CT  with evacuation of SDH and decreased MLS.    No acute events overnight.   Patient remains intubated and sedated.   Per RN, patient moves LUE and RUE spontaneously when off sedation, slightly weaker response on the left. Pt was breathing spontaneously yesterday but not fc    Unable to perform MRI due to SCS    Exam:  Intubated  Soft restraints in place.    Face symmetric.  Spontaneous eye opening.   Pupils 1mm, PERRL.  Not tracking  Withdraws x4, trace reaction to RUE and LLE.   Not following commands.  +grimace to central and peripheral stimulation.   +gag, +cough.   Incision CDI with staples in place      BP  Min: 96/63  Max: 193/94  Pulse  Av.7  Min: 72  Max: 101  Resp  Av.2  Min: 13  Max: 30  Monitored Temp 2  Av.3 °C (99.1 °F)  Min: 37 °C (98.6 °F)  Max: 37.6 °C (99.7 °F)  SpO2  Av.1 %  Min: 93 %  Max: 100 %    No data recorded    Recent Labs     22  0520 22  0605   WBC 9.5 6.7 6.8   RBC 3.71* 3.22* 3.42*   HEMOGLOBIN 11.1* 9.9* 10.3*   HEMATOCRIT 32.8* 28.8* 30.5*   MCV 88.4 89.4 89.2   MCH 29.9 30.7 30.1   MCHC 33.8 34.4 33.8   RDW 46.1 45.9 46.2   PLATELETCT 306 231 273   MPV 10.9 11.3 11.6       Recent Labs     22  0522  0520 22  0605   SODIUM 139 138 138   POTASSIUM 3.4* 3.9 3.9   CHLORIDE 109 109 107   CO2 19* 20 21   GLUCOSE 119* 127* 111*   BUN 15 15 16   CREATININE 0.63 0.55 0.47*   CALCIUM 9.3 9.1 9.2                     Intake/Output                         22 0700 - 22 0659 22 0700 -  08/31/22 0659     [id number] 1900-0659 Total [id number] 1900-0659 Total                 Intake    I.V.  1618.2  1669.2 3287.4  423.1  -- 423.1    Magnesium Sulfate Volume 50 -- 50 -- -- --    Propofol Volume 368.2 469.2 837.4 23.1 -- 23.1    Volume (mL) (0.9 % NaCl with KCl 20 mEq infusion) 1200 1200 2400 400 -- 400    NG/GT  720  600 1320  240  -- 240    Intake (mL) (Enteral Tube 08/26/22 Cortrak - Gastric 10 Fr. Right nare)  240 -- 240    Enteral  90  110 200  --  -- --    Free Water / Tube Flush 90 110 200 -- -- --    Total Intake 2428.2 2379.2 4807.4 663.1 -- 663.1       Output    Urine  3150  1400 4550  2100  -- 2100    Output (mL) (Urethral Catheter Non-latex;Temperature probe 16 Fr.) 3150 1400 4550 2100 -- 2100    Stool  50  -- 50  --  -- --    Rectal Tube Output (Rectal Tube) 50 -- 50 -- -- --    Total Output 3200 1400 4600 2100 -- 2100       Net I/O     -771.8 979.2 207.4 -1437 -- -1437              Intake/Output Summary (Last 24 hours) at 8/30/2022 1212  Last data filed at 8/30/2022 1000  Gross per 24 hour   Intake 4324.15 ml   Output 4700 ml   Net -375.85 ml               bacitracin-polymyxin b   BID    amLODIPine  10 mg Q DAY    carvedilol  12.5 mg BID    enoxaparin (LOVENOX) injection  30 mg Q12HRS    Pharmacy  1 Each PHARMACY TO DOSE    Respiratory Therapy Consult   Continuous RT    propofol  0-80 mcg/kg/min Continuous    famotidine  20 mg BID    Or    famotidine  20 mg BID    levETIRAcetam  1,000 mg BID    Metoprolol Tartrate  5 mg Q4HRS PRN    fentaNYL  50 mcg Q HOUR PRN    Or    fentaNYL  100 mcg Q HOUR PRN    HYDROmorphone  1 mg Q3HRS PRN    Or    HYDROmorphone  2 mg Q3HRS PRN    acetaminophen  1,000 mg Q6HRS PRN    ondansetron  4 mg Q4HRS PRN    bisacodyl  10 mg Q24HRS PRN    sodium phosphate  1 Each Once PRN    insulin regular  1-6 Units Q6HRS    And    dextrose bolus  25 g Q15 MIN PRN    labetalol  10 mg Q4HRS PRN    Pharmacy Consult Request  1 Each PHARMACY TO DOSE    MD ALERT...DO  NOT ADMINISTER NSAIDS or ASPIRIN unless ORDERED By Neurosurgery  1 Each PRN    hydrALAZINE  10 mg Q HOUR PRN    artificial tears  1 Application Q8HRS    0.9 % NaCl with KCl 20 mEq 1,000 mL   Continuous    atorvastatin  80 mg Q EVENING    cloNIDine  0.1 mg Q4HRS PRN    magnesium hydroxide  30 mL DAILY    ondansetron  4 mg Q4HRS PRN    polyethylene glycol/lytes  1 Packet BID    senna-docusate  1 Tablet Nightly    senna-docusate  1 Tablet Q24HRS PRN    docusate sodium  100 mg BID    venlafaxine  75 mg BID       Assessment and Plan:  Hospital day #7  POD#5  Head CT reviewed, shows improvement in midline shift  Q4 neuro checks.    Keppra 1000 mg BID.    OK for tube feeds.  Wean sedation and extubate as able.  Brain MRI cancelled due to incompatible SCS  Remove staples POD #14  Following.      Chemical prophylactic DVT therapy: Yes  Start date/time: OK from a neurosurgery standpoint

## 2022-08-31 ENCOUNTER — APPOINTMENT (OUTPATIENT)
Dept: RADIOLOGY | Facility: MEDICAL CENTER | Age: 62
DRG: 003 | End: 2022-08-31
Attending: SURGERY
Payer: OTHER GOVERNMENT

## 2022-08-31 ENCOUNTER — APPOINTMENT (OUTPATIENT)
Dept: RADIOLOGY | Facility: MEDICAL CENTER | Age: 62
DRG: 003 | End: 2022-08-31
Attending: PHYSICIAN ASSISTANT
Payer: OTHER GOVERNMENT

## 2022-08-31 LAB
ALBUMIN SERPL BCP-MCNC: 3.7 G/DL (ref 3.2–4.9)
ALBUMIN/GLOB SERPL: 1.3 G/DL
ALP SERPL-CCNC: 69 U/L (ref 30–99)
ALT SERPL-CCNC: 16 U/L (ref 2–50)
ANION GAP SERPL CALC-SCNC: 12 MMOL/L (ref 7–16)
AST SERPL-CCNC: 19 U/L (ref 12–45)
BASOPHILS # BLD AUTO: 0.6 % (ref 0–1.8)
BASOPHILS # BLD: 0.05 K/UL (ref 0–0.12)
BILIRUB SERPL-MCNC: 0.2 MG/DL (ref 0.1–1.5)
BUN SERPL-MCNC: 17 MG/DL (ref 8–22)
CALCIUM SERPL-MCNC: 9.7 MG/DL (ref 8.5–10.5)
CHLORIDE SERPL-SCNC: 103 MMOL/L (ref 96–112)
CO2 SERPL-SCNC: 22 MMOL/L (ref 20–33)
CREAT SERPL-MCNC: 0.53 MG/DL (ref 0.5–1.4)
EOSINOPHIL # BLD AUTO: 0.14 K/UL (ref 0–0.51)
EOSINOPHIL NFR BLD: 1.6 % (ref 0–6.9)
ERYTHROCYTE [DISTWIDTH] IN BLOOD BY AUTOMATED COUNT: 45.3 FL (ref 35.9–50)
GFR SERPLBLD CREATININE-BSD FMLA CKD-EPI: 113 ML/MIN/1.73 M 2
GLOBULIN SER CALC-MCNC: 2.8 G/DL (ref 1.9–3.5)
GLUCOSE BLD STRIP.AUTO-MCNC: 118 MG/DL (ref 65–99)
GLUCOSE SERPL-MCNC: 132 MG/DL (ref 65–99)
HCT VFR BLD AUTO: 32.8 % (ref 42–52)
HGB BLD-MCNC: 11.1 G/DL (ref 14–18)
IMM GRANULOCYTES # BLD AUTO: 0.07 K/UL (ref 0–0.11)
IMM GRANULOCYTES NFR BLD AUTO: 0.8 % (ref 0–0.9)
LYMPHOCYTES # BLD AUTO: 1.4 K/UL (ref 1–4.8)
LYMPHOCYTES NFR BLD: 15.9 % (ref 22–41)
MAGNESIUM SERPL-MCNC: 2 MG/DL (ref 1.5–2.5)
MCH RBC QN AUTO: 30.2 PG (ref 27–33)
MCHC RBC AUTO-ENTMCNC: 33.8 G/DL (ref 33.7–35.3)
MCV RBC AUTO: 89.1 FL (ref 81.4–97.8)
MONOCYTES # BLD AUTO: 1.01 K/UL (ref 0–0.85)
MONOCYTES NFR BLD AUTO: 11.5 % (ref 0–13.4)
NEUTROPHILS # BLD AUTO: 6.14 K/UL (ref 1.82–7.42)
NEUTROPHILS NFR BLD: 69.6 % (ref 44–72)
NRBC # BLD AUTO: 0 K/UL
NRBC BLD-RTO: 0 /100 WBC
PHOSPHATE SERPL-MCNC: 3.2 MG/DL (ref 2.5–4.5)
PLATELET # BLD AUTO: 319 K/UL (ref 164–446)
PMV BLD AUTO: 11.4 FL (ref 9–12.9)
POTASSIUM SERPL-SCNC: 3.6 MMOL/L (ref 3.6–5.5)
PROT SERPL-MCNC: 6.5 G/DL (ref 6–8.2)
RBC # BLD AUTO: 3.68 M/UL (ref 4.7–6.1)
SODIUM SERPL-SCNC: 137 MMOL/L (ref 135–145)
WBC # BLD AUTO: 8.8 K/UL (ref 4.8–10.8)

## 2022-08-31 PROCEDURE — 70450 CT HEAD/BRAIN W/O DYE: CPT

## 2022-08-31 PROCEDURE — 84100 ASSAY OF PHOSPHORUS: CPT

## 2022-08-31 PROCEDURE — 71045 X-RAY EXAM CHEST 1 VIEW: CPT

## 2022-08-31 PROCEDURE — A9270 NON-COVERED ITEM OR SERVICE: HCPCS | Performed by: SURGERY

## 2022-08-31 PROCEDURE — 700102 HCHG RX REV CODE 250 W/ 637 OVERRIDE(OP): Performed by: SURGERY

## 2022-08-31 PROCEDURE — 82962 GLUCOSE BLOOD TEST: CPT

## 2022-08-31 PROCEDURE — 94799 UNLISTED PULMONARY SVC/PX: CPT

## 2022-08-31 PROCEDURE — 770022 HCHG ROOM/CARE - ICU (200)

## 2022-08-31 PROCEDURE — 85025 COMPLETE CBC W/AUTO DIFF WBC: CPT

## 2022-08-31 PROCEDURE — 94760 N-INVAS EAR/PLS OXIMETRY 1: CPT

## 2022-08-31 PROCEDURE — 80053 COMPREHEN METABOLIC PANEL: CPT

## 2022-08-31 PROCEDURE — 700101 HCHG RX REV CODE 250: Performed by: SURGERY

## 2022-08-31 PROCEDURE — 700111 HCHG RX REV CODE 636 W/ 250 OVERRIDE (IP): Performed by: SURGERY

## 2022-08-31 PROCEDURE — 83735 ASSAY OF MAGNESIUM: CPT

## 2022-08-31 PROCEDURE — A9270 NON-COVERED ITEM OR SERVICE: HCPCS | Performed by: NEUROLOGICAL SURGERY

## 2022-08-31 PROCEDURE — 95816 EEG AWAKE AND DROWSY: CPT | Mod: 26 | Performed by: PSYCHIATRY & NEUROLOGY

## 2022-08-31 PROCEDURE — 94150 VITAL CAPACITY TEST: CPT

## 2022-08-31 PROCEDURE — 94003 VENT MGMT INPAT SUBQ DAY: CPT

## 2022-08-31 PROCEDURE — 700102 HCHG RX REV CODE 250 W/ 637 OVERRIDE(OP): Performed by: NEUROLOGICAL SURGERY

## 2022-08-31 PROCEDURE — 4A00X4Z MEASUREMENT OF CENTRAL NERVOUS ELECTRICAL ACTIVITY, EXTERNAL APPROACH: ICD-10-PCS | Performed by: PSYCHIATRY & NEUROLOGY

## 2022-08-31 PROCEDURE — 99291 CRITICAL CARE FIRST HOUR: CPT | Performed by: SURGERY

## 2022-08-31 PROCEDURE — 95816 EEG AWAKE AND DROWSY: CPT | Performed by: PSYCHIATRY & NEUROLOGY

## 2022-08-31 RX ORDER — AMANTADINE HYDROCHLORIDE 100 MG/1
100 TABLET ORAL 2 TIMES DAILY
Status: DISCONTINUED | OUTPATIENT
Start: 2022-08-31 | End: 2022-09-02

## 2022-08-31 RX ORDER — HYDRALAZINE HYDROCHLORIDE 25 MG/1
25 TABLET, FILM COATED ORAL EVERY 8 HOURS
Status: DISCONTINUED | OUTPATIENT
Start: 2022-08-31 | End: 2022-09-09 | Stop reason: HOSPADM

## 2022-08-31 RX ADMIN — PROPOFOL 40 MCG/KG/MIN: 10 INJECTION, EMULSION INTRAVENOUS at 12:28

## 2022-08-31 RX ADMIN — CARVEDILOL 12.5 MG: 12.5 TABLET, FILM COATED ORAL at 05:54

## 2022-08-31 RX ADMIN — VENLAFAXINE 75 MG: 75 TABLET ORAL at 05:54

## 2022-08-31 RX ADMIN — CARVEDILOL 12.5 MG: 12.5 TABLET, FILM COATED ORAL at 17:13

## 2022-08-31 RX ADMIN — VENLAFAXINE 75 MG: 75 TABLET ORAL at 17:14

## 2022-08-31 RX ADMIN — MINERAL OIL, PETROLATUM 1 APPLICATION: 425; 573 OINTMENT OPHTHALMIC at 05:55

## 2022-08-31 RX ADMIN — LEVETIRACETAM 1000 MG: 500 TABLET, FILM COATED ORAL at 05:54

## 2022-08-31 RX ADMIN — LABETALOL HYDROCHLORIDE 10 MG: 5 INJECTION, SOLUTION INTRAVENOUS at 10:35

## 2022-08-31 RX ADMIN — PROPOFOL 60 MCG/KG/MIN: 10 INJECTION, EMULSION INTRAVENOUS at 05:56

## 2022-08-31 RX ADMIN — HYDROMORPHONE HYDROCHLORIDE 2 MG: 2 TABLET ORAL at 00:11

## 2022-08-31 RX ADMIN — FAMOTIDINE 20 MG: 20 TABLET, FILM COATED ORAL at 05:54

## 2022-08-31 RX ADMIN — HYDRALAZINE HYDROCHLORIDE 25 MG: 25 TABLET, FILM COATED ORAL at 14:39

## 2022-08-31 RX ADMIN — LEVETIRACETAM 1000 MG: 500 TABLET, FILM COATED ORAL at 17:13

## 2022-08-31 RX ADMIN — AMLODIPINE BESYLATE 10 MG: 10 TABLET ORAL at 05:54

## 2022-08-31 RX ADMIN — PROPOFOL 40 MCG/KG/MIN: 10 INJECTION, EMULSION INTRAVENOUS at 23:12

## 2022-08-31 RX ADMIN — ENOXAPARIN SODIUM 30 MG: 30 INJECTION SUBCUTANEOUS at 17:14

## 2022-08-31 RX ADMIN — POTASSIUM CHLORIDE AND SODIUM CHLORIDE: 900; 150 INJECTION, SOLUTION INTRAVENOUS at 08:44

## 2022-08-31 RX ADMIN — FAMOTIDINE 20 MG: 20 TABLET, FILM COATED ORAL at 17:13

## 2022-08-31 RX ADMIN — LABETALOL HYDROCHLORIDE 10 MG: 5 INJECTION, SOLUTION INTRAVENOUS at 17:02

## 2022-08-31 RX ADMIN — PROPOFOL 40 MCG/KG/MIN: 10 INJECTION, EMULSION INTRAVENOUS at 17:24

## 2022-08-31 RX ADMIN — PROPOFOL 60 MCG/KG/MIN: 10 INJECTION, EMULSION INTRAVENOUS at 03:01

## 2022-08-31 RX ADMIN — ENOXAPARIN SODIUM 30 MG: 30 INJECTION SUBCUTANEOUS at 05:54

## 2022-08-31 RX ADMIN — HYDRALAZINE HYDROCHLORIDE 25 MG: 25 TABLET, FILM COATED ORAL at 21:51

## 2022-08-31 RX ADMIN — AMANTADINE HYDROCHLORIDE 100 MG: 100 TABLET ORAL at 17:19

## 2022-08-31 RX ADMIN — MINERAL OIL, PETROLATUM 1 APPLICATION: 425; 573 OINTMENT OPHTHALMIC at 21:50

## 2022-08-31 RX ADMIN — ATORVASTATIN CALCIUM 80 MG: 80 TABLET, FILM COATED ORAL at 17:13

## 2022-08-31 RX ADMIN — AMANTADINE HYDROCHLORIDE 100 MG: 100 TABLET ORAL at 12:29

## 2022-08-31 RX ADMIN — MINERAL OIL, PETROLATUM 1 APPLICATION: 425; 573 OINTMENT OPHTHALMIC at 14:40

## 2022-08-31 ASSESSMENT — PAIN DESCRIPTION - PAIN TYPE
TYPE: ACUTE PAIN

## 2022-08-31 ASSESSMENT — PULMONARY FUNCTION TESTS: FVC: 0

## 2022-08-31 ASSESSMENT — FIBROSIS 4 INDEX: FIB4 SCORE: 1.26

## 2022-08-31 NOTE — PROGRESS NOTES
1040: CEDRIC PA at bedside to assess patient.     1055:  Patient presented in interdisciplinary trauma rounds with Dr. Nichols and trauma team.  Plan to start patient on Amantadine.    Patient's wife called for an update.  Discussed plan.  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  RN updated Susannah, .  Susannah will follow up with Alesia.  Alesia will come to see Karan on Friday.     1135:  Called CT.  Head CT scheduled for 1230.

## 2022-08-31 NOTE — CARE PLAN
Problem: Ventilation  Goal: Ability to achieve and maintain unassisted ventilation or tolerate decreased levels of ventilator support  Description: Target End Date:  4 days     Document on Vent flowsheet    1.  Support and monitor invasive and noninvasive mechanical ventilation  2.  Monitor ventilator weaning response  3.  Perform ventilator associated pneumonia prevention interventions  4.  Manage ventilation therapy by monitoring diagnostic test results  Outcome: Not Met                                   Ventilator Daily Summary     Vent Day #7     Ventilator settings changed this shift:  No (130%/+8/30%)     Weaning trials: none this shift     Respiratory Procedures: No     Plan: Continue current ventilator settings and wean mechanical ventilation as tolerated per physician orders.

## 2022-08-31 NOTE — CARE PLAN
Problem: Ventilation  Goal: Ability to achieve and maintain unassisted ventilation or tolerate decreased levels of ventilator support  Description: Target End Date:  4 days     Document on Vent flowsheet    1.  Support and monitor invasive and noninvasive mechanical ventilation  2.  Monitor ventilator weaning response  3.  Perform ventilator associated pneumonia prevention interventions  4.  Manage ventilation therapy by monitoring diagnostic test results  Outcome: Not Progressing      Ventilator Daily Summary    Vent Day # 7    Ventilator settings changed this shift: no    Weaning trials: sbt, does not follow    Respiratory Procedures: no    Plan: Continue current ventilator settings and wean mechanical ventilation as tolerated per physician orders.

## 2022-08-31 NOTE — CARE PLAN
The patient is Watcher - Medium risk of patient condition declining or worsening    Shift Goals  Clinical Goals: Hemodynamic stability, pain control.  Patient Goals: FELIPA  Family Goals: FELIPA    Progress made toward(s) clinical / shift goals:  The patient has been maintaining under heart rate and systolic blood pressure goals.     Problem: Pain - Standard  Goal: Alleviation of pain or a reduction in pain to the patient’s comfort goal  Outcome: Progressing     Problem: Safety - Medical Restraint  Goal: Remains free of injury from restraints (Restraint for Interference with Medical Device)  Outcome: Progressing     Problem: Skin Integrity  Goal: Skin integrity is maintained or improved  Outcome: Progressing     Problem: Fall Risk  Goal: Patient will remain free from falls  Outcome: Progressing       Patient is not progressing towards the following goals:    Problem: Knowledge Deficit - Standard  Goal: Patient and family/care givers will demonstrate understanding of plan of care, disease process/condition, diagnostic tests and medications  Outcome: Not Progressing     Problem: Safety - Medical Restraint  Goal: Free from restraint(s) (Restraint for Interference with Medical Device)  Outcome: Not Progressing

## 2022-08-31 NOTE — CARE PLAN
The patient is Watcher - Medium risk of patient condition declining or worsening    Shift Goals  Clinical Goals: SBP , improve mentation  Patient Goals: unable to assess, intubated  Family Goals: no family present    Progress made toward(s) clinical / shift goals:  Patient hypertensive.  Increased oral antihypertensives.  PRN labetalol administered.     Patient is not progressing towards the following goals:    Problem: Knowledge Deficit - Standard  Goal: Patient and family/care givers will demonstrate understanding of plan of care, disease process/condition, diagnostic tests and medications  Outcome: Not Progressing

## 2022-08-31 NOTE — PROGRESS NOTES
Neurosurgery Progress Note    Subjective:  62 y.o. male on Eliquis (reversal agent given) who presented 2022 with an acute left SDH and transfer from an outside hospital. Initially presented with GCS 15. Neuro exam subsequently declined with worsening expressive aphasia, agitation, no command following. Repeat CT with progression of SDH and worsening MLS.     POD#6 Left craniotomy for evacuation of acute SDH.   Post-op head CT  with evacuation of SDH and decreased MLS.    No acute events overnight.   Patient remains intubated and sedated.   Per RN, patient moves LUE and RUE spontaneously when off sedation, slightly weaker response on the left. Pt was breathing spontaneously yesterday but not fc    Unable to perform MRI due to SCS    Exam:  Intubated  Soft restraints in place.    Face symmetric.  Spontaneous eye opening.   Pupils 1mm, PERRL.  Not tracking  Withdraws x4, trace reaction to RUE and LLE.   Not following commands.  +grimace to central and peripheral stimulation.   +gag, +cough.   Incision CDI with staples in place      BP  Min: 108/64  Max: 193/89  Pulse  Av.4  Min: 85  Max: 101  Resp  Av.7  Min: 14  Max: 32  Monitored Temp 2  Av.7 °C (99.8 °F)  Min: 37.1 °C (98.8 °F)  Max: 38.2 °C (100.8 °F)  SpO2  Av.4 %  Min: 90 %  Max: 99 %    No data recorded    Recent Labs     22  0520 22  0605 22  0615   WBC 6.7 6.8 8.8   RBC 3.22* 3.42* 3.68*   HEMOGLOBIN 9.9* 10.3* 11.1*   HEMATOCRIT 28.8* 30.5* 32.8*   MCV 89.4 89.2 89.1   MCH 30.7 30.1 30.2   MCHC 34.4 33.8 33.8   RDW 45.9 46.2 45.3   PLATELETCT 231 273 319   MPV 11.3 11.6 11.4       Recent Labs     22  0520 22  0605 22  0615   SODIUM 138 138 137   POTASSIUM 3.9 3.9 3.6   CHLORIDE 109 107 103   CO2 20 21 22   GLUCOSE 127* 111* 132*   BUN 15 16 17   CREATININE 0.55 0.47* 0.53   CALCIUM 9.1 9.2 9.7                     Intake/Output                         22 0700 - 22 0659 22 0700  - 09/01/22 0659     [id number] 1900-0659 Total 0700-1859 1900-0659 Total                 Intake    I.V.  1127.9  957.1 2085  --  -- --    Propofol Volume 274.5 357.1 631.6 -- -- --    Volume (mL) (0.9 % NaCl with KCl 20 mEq infusion) 853.3 600 1453.3 -- -- --    Other  --  180 180  --  -- --    Medications (PO/Enteral Liquids) -- 180 180 -- -- --    Balloon Inflated (mL) (Rectal Tube) -- 1 x 1 x -- -- --    NG/GT  720  720 1440  --  -- --    Intake (mL) (Enteral Tube 08/26/22 Cortrak - Gastric 10 Fr. Right nare)  -- -- --    Enteral  90  -- 90  --  -- --    Free Water / Tube Flush 90 -- 90 -- -- --    Total Intake 1937.9 1857.1 3795 -- -- --       Output    Urine  3650  1200 4850  --  -- --    Output (mL) (Urethral Catheter Non-latex;Temperature probe 16 Fr.) 3650 1200 4850 -- -- --    Stool  400  150 550  --  -- --    Rectal Tube Output (Rectal Tube) 400 150 550 -- -- --    Total Output 4050 1350 5400 -- -- --       Net I/O     -2112.1 507.1 -1605 -- -- --              Intake/Output Summary (Last 24 hours) at 8/31/2022 1042  Last data filed at 8/31/2022 0600  Gross per 24 hour   Intake 3101.91 ml   Output 3300 ml   Net -198.09 ml               amLODIPine  10 mg Q DAY    carvedilol  12.5 mg BID    enoxaparin (LOVENOX) injection  30 mg Q12HRS    Pharmacy  1 Each PHARMACY TO DOSE    Respiratory Therapy Consult   Continuous RT    propofol  0-80 mcg/kg/min Continuous    famotidine  20 mg BID    levETIRAcetam  1,000 mg BID    Metoprolol Tartrate  5 mg Q4HRS PRN    fentaNYL  50 mcg Q HOUR PRN    Or    fentaNYL  100 mcg Q HOUR PRN    HYDROmorphone  1 mg Q3HRS PRN    Or    HYDROmorphone  2 mg Q3HRS PRN    acetaminophen  1,000 mg Q6HRS PRN    ondansetron  4 mg Q4HRS PRN    bisacodyl  10 mg Q24HRS PRN    sodium phosphate  1 Each Once PRN    labetalol  10 mg Q4HRS PRN    Pharmacy Consult Request  1 Each PHARMACY TO DOSE    MD ALERT...DO NOT ADMINISTER NSAIDS or ASPIRIN unless ORDERED By Neurosurgery  1 Each PRN     hydrALAZINE  10 mg Q HOUR PRN    artificial tears  1 Application Q8HRS    0.9 % NaCl with KCl 20 mEq 1,000 mL   Continuous    atorvastatin  80 mg Q EVENING    cloNIDine  0.1 mg Q4HRS PRN    magnesium hydroxide  30 mL DAILY    ondansetron  4 mg Q4HRS PRN    polyethylene glycol/lytes  1 Packet BID    senna-docusate  1 Tablet Nightly    senna-docusate  1 Tablet Q24HRS PRN    docusate sodium  100 mg BID    venlafaxine  75 mg BID       Assessment and Plan:  Hospital day #8  POD#6  Head CT reviewed, shows improvement in midline shift  Q4 neuro checks.    Keppra 1000 mg BID.    OK for tube feeds.  Wean sedation and extubate as able.  Brain MRI cancelled due to incompatible SCS  Repeat CT scan today   EEG after CT scan complete   Remove staples POD #14  Following.      Chemical prophylactic DVT therapy: Yes  Start date/time: OK from a neurosurgery standpoint

## 2022-08-31 NOTE — PROGRESS NOTES
1250:  Patient transported to CT accompanied by RN x2 and RT.      1315:  Patient transported to S103. Tolerated CT scan well.     1530:  EEG in progress

## 2022-08-31 NOTE — PROGRESS NOTES
Trauma / Surgical Daily Progress Note    Date of Service  8/31/2022    Chief Complaint  62 y.o. male admitted 8/25/2022 with GLF w/SDH    Interval Events    No significant neurologic improvement  Trial vent weaning - SBT  Trial amantadine  CT head stable  EEG today   If no improvement will need a trach    Review of Systems  Review of Systems   Unable to perform ROS: Intubated      Vital Signs for last 24 hours  Pulse:  [] 90  Resp:  [15-32] 17  BP: (129-193)/() 169/97  SpO2:  [94 %-99 %] 98 %    Hemodynamic parameters for last 24 hours       Respiratory Data     Respiration: 17, Pulse Oximetry: 98 %     Work Of Breathing / Effort: Vented  RUL Breath Sounds: Crackles, RML Breath Sounds: Crackles, RLL Breath Sounds: Diminished, DON Breath Sounds: Crackles, LLL Breath Sounds: Diminished    Physical Exam  Physical Exam  Vitals and nursing note reviewed.   Constitutional:       Appearance: He is obese. He is not ill-appearing or toxic-appearing.   HENT:      Head:      Comments: Dressings in place     Right Ear: External ear normal.      Left Ear: External ear normal.      Mouth/Throat:      Mouth: Mucous membranes are moist.   Eyes:      General: No scleral icterus.        Right eye: No discharge.         Left eye: No discharge.      Extraocular Movements: Extraocular movements intact.      Pupils: Pupils are equal, round, and reactive to light.   Cardiovascular:      Rate and Rhythm: Normal rate and regular rhythm.      Pulses: Normal pulses.      Heart sounds: Normal heart sounds.   Pulmonary:      Effort: Pulmonary effort is normal.      Breath sounds: Normal breath sounds. No wheezing or rales.   Abdominal:      General: Abdomen is flat. Bowel sounds are normal. There is no distension.      Palpations: Abdomen is soft.      Tenderness: There is no abdominal tenderness. There is no guarding or rebound.   Genitourinary:     Comments: White to gravity.  Musculoskeletal:         General: Normal range of  motion.      Cervical back: Normal range of motion.   Skin:     General: Skin is warm and dry.      Capillary Refill: Capillary refill takes 2 to 3 seconds.      Coloration: Skin is not jaundiced.      Findings: No bruising.   Neurological:      General: No focal deficit present.      Mental Status: He is alert.      Cranial Nerves: No cranial nerve deficit.      Comments: Not following commands  Intermittent eye opening  Non purposeful       Laboratory  Recent Results (from the past 24 hour(s))   POCT glucose device results    Collection Time: 08/30/22  5:48 PM   Result Value Ref Range    POC Glucose, Blood 89 65 - 99 mg/dL   POCT glucose device results    Collection Time: 08/31/22 12:00 AM   Result Value Ref Range    POC Glucose, Blood 118 (H) 65 - 99 mg/dL   CBC with Differential: Tomorrow AM    Collection Time: 08/31/22  6:15 AM   Result Value Ref Range    WBC 8.8 4.8 - 10.8 K/uL    RBC 3.68 (L) 4.70 - 6.10 M/uL    Hemoglobin 11.1 (L) 14.0 - 18.0 g/dL    Hematocrit 32.8 (L) 42.0 - 52.0 %    MCV 89.1 81.4 - 97.8 fL    MCH 30.2 27.0 - 33.0 pg    MCHC 33.8 33.7 - 35.3 g/dL    RDW 45.3 35.9 - 50.0 fL    Platelet Count 319 164 - 446 K/uL    MPV 11.4 9.0 - 12.9 fL    Neutrophils-Polys 69.60 44.00 - 72.00 %    Lymphocytes 15.90 (L) 22.00 - 41.00 %    Monocytes 11.50 0.00 - 13.40 %    Eosinophils 1.60 0.00 - 6.90 %    Basophils 0.60 0.00 - 1.80 %    Immature Granulocytes 0.80 0.00 - 0.90 %    Nucleated RBC 0.00 /100 WBC    Neutrophils (Absolute) 6.14 1.82 - 7.42 K/uL    Lymphs (Absolute) 1.40 1.00 - 4.80 K/uL    Monos (Absolute) 1.01 (H) 0.00 - 0.85 K/uL    Eos (Absolute) 0.14 0.00 - 0.51 K/uL    Baso (Absolute) 0.05 0.00 - 0.12 K/uL    Immature Granulocytes (abs) 0.07 0.00 - 0.11 K/uL    NRBC (Absolute) 0.00 K/uL   Comp Metabolic Panel (CMP): Tomorrow AM    Collection Time: 08/31/22  6:15 AM   Result Value Ref Range    Sodium 137 135 - 145 mmol/L    Potassium 3.6 3.6 - 5.5 mmol/L    Chloride 103 96 - 112 mmol/L    Co2  22 20 - 33 mmol/L    Anion Gap 12.0 7.0 - 16.0    Glucose 132 (H) 65 - 99 mg/dL    Bun 17 8 - 22 mg/dL    Creatinine 0.53 0.50 - 1.40 mg/dL    Calcium 9.7 8.5 - 10.5 mg/dL    AST(SGOT) 19 12 - 45 U/L    ALT(SGPT) 16 2 - 50 U/L    Alkaline Phosphatase 69 30 - 99 U/L    Total Bilirubin 0.2 0.1 - 1.5 mg/dL    Albumin 3.7 3.2 - 4.9 g/dL    Total Protein 6.5 6.0 - 8.2 g/dL    Globulin 2.8 1.9 - 3.5 g/dL    A-G Ratio 1.3 g/dL   MAGNESIUM    Collection Time: 08/31/22  6:15 AM   Result Value Ref Range    Magnesium 2.0 1.5 - 2.5 mg/dL   PHOSPHORUS    Collection Time: 08/31/22  6:15 AM   Result Value Ref Range    Phosphorus 3.2 2.5 - 4.5 mg/dL   ESTIMATED GFR    Collection Time: 08/31/22  6:15 AM   Result Value Ref Range    GFR (CKD-EPI) 113 >60 mL/min/1.73 m 2       Fluids    Intake/Output Summary (Last 24 hours) at 8/31/2022 1504  Last data filed at 8/31/2022 1200  Gross per 24 hour   Intake 2758.52 ml   Output 2425 ml   Net 333.52 ml       Core Measures & Quality Metrics  Radiology images reviewed, Labs reviewed and Medications reviewed  White catheter: Critically Ill - Requiring Accurate Measurement of Urinary Output      DVT Prophylaxis: Enoxaparin (Lovenox)  DVT prophylaxis - mechanical: Contra-indicated and SCDs  Ulcer prophylaxis: Yes      RAP Score Total: 6  CAGE Results: not completed Blood Alcohol>0.08: no     Assessment/Plan  Respiratory failure following trauma and surgery (HCC)- (present on admission)  Assessment & Plan  Mechanically ventilated following admission craniotomy.  Continue full mechanical ventilatory support.   Ventilator bundle and Trauma weaning protocol.    Traumatic subdural hematoma, initial encounter (HCC)- (present on admission)  Assessment & Plan  Left-sided holohemispheric traumatic subdural hemorrhage associated with systemic anticoagulation and antiplatelet therapy.  8/'25  Left craniotomy with evacuation of SDH  8/28 significant deficits persist.  8/28 f/u CT overall improved  MRI brain  unable due to SCS  8/31CT improved / No neurologic improvement  Post traumatic pharmacologic seizure prophylaxis for 1 week.  Speech Language Pathology cognitive evaluation.  Tesfaye Luther MD. Neurosurgeon. [facility].    Antiplatelet or antithrombotic long-term use- (present on admission)  Assessment & Plan  Daily ASA and Apixaban.  Received KCentra on admit.  AA 96.1%, transfused 1 unit platelets in ED.    CAD (coronary artery disease)  Assessment & Plan  History of coronary artery disease with STEMI in August 2020.  Drug-eluting stents placed in the mid RCA and PDA.  Daily aspirin administration.    Status post insertion of drug-eluting stent into right coronary artery for coronary artery disease- (present on admission)  Assessment & Plan  Drug-eluting stents placed in the mid RCA and PDA August 2020.   Daily aspirin administration.  Maintenance medication held on admission    Paroxysmal atrial fibrillation (HCC)- (present on admission)  Assessment & Plan  Chronic condition treated with carvedilol and apixaban (Eliquis) anticoagulation.  Carvedilol resumed upon admission.  Systemic anticoagulation held on admission.     Obese- (present on admission)  Assessment & Plan  8/29 BMI 34.94    Type 2 diabetes mellitus without complication, without long-term current use of insulin (HCC)- (present on admission)  Assessment & Plan  Chronic condition treated with metformin.  Glycohemoglobin 6.2 (131).  Holding maintenance metformin for 48 hours following intravenous contrast administration.  Insulin sliding scale coverage.    Hypertensive disease- (present on admission)  Assessment & Plan  Chronic condition treated with carvedilol.  Resumed maintenance medication on admission.    Contraindications for pharmacologic prophylaxis- (present on admission)  Assessment & Plan  Prophylactic anticoagulation for thrombotic prevention initially contraindicated secondary to elevated bleeding risk.  8/28 Trauma screening  bilateral lower extremity venous duplex negative for above knee DVT.  8/28 Prophylactic dose enoxaparin initiated.      Depression- (present on admission)  Assessment & Plan  Chronic condition treated with Effexor.  Resumed maintenance medication on admission.    Trauma- (present on admission)  Assessment & Plan  Mechanical ground-level fall.    Intracranial hemorrhage with systemic anticoagulation.  Trauma Yellow Transfer Activation from [facility].  Macario Winn MD. Trauma Surgery.    BPH (benign prostatic hyperplasia)- (present on admission)  Assessment & Plan  Chronic condition treated with finasteride and tamsulosin.  Resumed maintenance medication on admission.    Dyslipidemia- (present on admission)  Assessment & Plan  Chronic condition treated with atorvastatin.  Resumed maintenance medication on admission.        Discussed patient condition with RN, RT, Pharmacy, , and trauma surgery.  CRITICAL CARE TIME EXCLUDING PROCEDURES: 40  minutes

## 2022-08-31 NOTE — PROGRESS NOTES
Trauma / Surgical Daily Progress Note    Date of Service  8/30/2022    Chief Complaint  62 y.o. male admitted 8/25/2022 with GLF w/SDH    Interval Events    Some eye-opening today but no following commands  No significant neurologic improvement  Neurologic status limiting ventilator weaning  If no improvement will need a trach later this week  MRI canceled due to the presence of noncompatible spinal cord stimulator    Review of Systems  Review of Systems   Unable to perform ROS: Intubated      Vital Signs for last 24 hours  Pulse:  [] 96  Resp:  [13-30] 19  BP: (108-191)/(64-96) 156/84  SpO2:  [90 %-100 %] 97 %    Hemodynamic parameters for last 24 hours       Respiratory Data     Respiration: 19, Pulse Oximetry: 97 %     Work Of Breathing / Effort: Vented  RUL Breath Sounds: Crackles, RML Breath Sounds: Crackles;Diminished, RLL Breath Sounds: Crackles;Diminished, DON Breath Sounds: Crackles, LLL Breath Sounds: Crackles;Diminished    Physical Exam  Physical Exam  Vitals and nursing note reviewed.   Constitutional:       Appearance: He is obese. He is not ill-appearing or toxic-appearing.   HENT:      Head:      Comments: Dressings in place     Right Ear: External ear normal.      Left Ear: External ear normal.      Mouth/Throat:      Mouth: Mucous membranes are moist.   Eyes:      General: No scleral icterus.        Right eye: No discharge.         Left eye: No discharge.      Extraocular Movements: Extraocular movements intact.      Pupils: Pupils are equal, round, and reactive to light.   Cardiovascular:      Rate and Rhythm: Normal rate and regular rhythm.      Pulses: Normal pulses.      Heart sounds: Normal heart sounds.   Pulmonary:      Effort: Pulmonary effort is normal.      Breath sounds: Normal breath sounds. No wheezing or rales.   Abdominal:      General: Abdomen is flat. Bowel sounds are normal. There is no distension.      Palpations: Abdomen is soft.      Tenderness: There is no abdominal  tenderness. There is no guarding or rebound.   Genitourinary:     Comments: White to gravity.  Musculoskeletal:         General: Normal range of motion.      Cervical back: Normal range of motion.   Skin:     General: Skin is warm and dry.      Capillary Refill: Capillary refill takes 2 to 3 seconds.      Coloration: Skin is not jaundiced.      Findings: No bruising.   Neurological:      General: No focal deficit present.      Mental Status: He is alert.      Cranial Nerves: No cranial nerve deficit.      Comments: Not following commands  Intermittent eye opening  Moves all  Not purposeful       Laboratory  Recent Results (from the past 24 hour(s))   POCT glucose device results    Collection Time: 08/29/22  5:03 PM   Result Value Ref Range    POC Glucose, Blood 85 65 - 99 mg/dL   POCT glucose device results    Collection Time: 08/29/22 11:32 PM   Result Value Ref Range    POC Glucose, Blood 84 65 - 99 mg/dL   CBC with Differential: Tomorrow AM    Collection Time: 08/30/22  6:05 AM   Result Value Ref Range    WBC 6.8 4.8 - 10.8 K/uL    RBC 3.42 (L) 4.70 - 6.10 M/uL    Hemoglobin 10.3 (L) 14.0 - 18.0 g/dL    Hematocrit 30.5 (L) 42.0 - 52.0 %    MCV 89.2 81.4 - 97.8 fL    MCH 30.1 27.0 - 33.0 pg    MCHC 33.8 33.7 - 35.3 g/dL    RDW 46.2 35.9 - 50.0 fL    Platelet Count 273 164 - 446 K/uL    MPV 11.6 9.0 - 12.9 fL    Neutrophils-Polys 57.80 44.00 - 72.00 %    Lymphocytes 24.20 22.00 - 41.00 %    Monocytes 13.40 0.00 - 13.40 %    Eosinophils 3.40 0.00 - 6.90 %    Basophils 0.60 0.00 - 1.80 %    Immature Granulocytes 0.60 0.00 - 0.90 %    Nucleated RBC 0.00 /100 WBC    Neutrophils (Absolute) 3.92 1.82 - 7.42 K/uL    Lymphs (Absolute) 1.64 1.00 - 4.80 K/uL    Monos (Absolute) 0.91 (H) 0.00 - 0.85 K/uL    Eos (Absolute) 0.23 0.00 - 0.51 K/uL    Baso (Absolute) 0.04 0.00 - 0.12 K/uL    Immature Granulocytes (abs) 0.04 0.00 - 0.11 K/uL    NRBC (Absolute) 0.00 K/uL   Comp Metabolic Panel (CMP): Tomorrow AM    Collection Time:  08/30/22  6:05 AM   Result Value Ref Range    Sodium 138 135 - 145 mmol/L    Potassium 3.9 3.6 - 5.5 mmol/L    Chloride 107 96 - 112 mmol/L    Co2 21 20 - 33 mmol/L    Anion Gap 10.0 7.0 - 16.0    Glucose 111 (H) 65 - 99 mg/dL    Bun 16 8 - 22 mg/dL    Creatinine 0.47 (L) 0.50 - 1.40 mg/dL    Calcium 9.2 8.5 - 10.5 mg/dL    AST(SGOT) 20 12 - 45 U/L    ALT(SGPT) 13 2 - 50 U/L    Alkaline Phosphatase 60 30 - 99 U/L    Total Bilirubin 0.2 0.1 - 1.5 mg/dL    Albumin 3.1 (L) 3.2 - 4.9 g/dL    Total Protein 5.7 (L) 6.0 - 8.2 g/dL    Globulin 2.6 1.9 - 3.5 g/dL    A-G Ratio 1.2 g/dL   MAGNESIUM    Collection Time: 08/30/22  6:05 AM   Result Value Ref Range    Magnesium 1.9 1.5 - 2.5 mg/dL   PHOSPHORUS    Collection Time: 08/30/22  6:05 AM   Result Value Ref Range    Phosphorus 3.0 2.5 - 4.5 mg/dL   IRON/TOTAL IRON BIND    Collection Time: 08/30/22  6:05 AM   Result Value Ref Range    Iron 98 50 - 180 ug/dL   ESTIMATED GFR    Collection Time: 08/30/22  6:05 AM   Result Value Ref Range    GFR (CKD-EPI) 117 >60 mL/min/1.73 m 2   POCT glucose device results    Collection Time: 08/30/22  6:49 AM   Result Value Ref Range    POC Glucose, Blood 94 65 - 99 mg/dL   SPECIFIC GRAVITY    Collection Time: 08/30/22 11:35 AM   Result Value Ref Range    Specific Gravity 1.010 <1.035   POCT glucose device results    Collection Time: 08/30/22 12:46 PM   Result Value Ref Range    POC Glucose, Blood 122 (H) 65 - 99 mg/dL       Fluids    Intake/Output Summary (Last 24 hours) at 8/30/2022 1702  Last data filed at 8/30/2022 1600  Gross per 24 hour   Intake 4336.07 ml   Output 5300 ml   Net -963.93 ml       Core Measures & Quality Metrics  Radiology images reviewed, Labs reviewed and Medications reviewed  White catheter: Critically Ill - Requiring Accurate Measurement of Urinary Output      DVT Prophylaxis: Enoxaparin (Lovenox)  DVT prophylaxis - mechanical: Contra-indicated and SCDs  Ulcer prophylaxis: Yes      RAP Score Total: 6  CAGE Results:  not completed Blood Alcohol>0.08: no     Assessment/Plan  Respiratory failure following trauma and surgery (HCC)- (present on admission)  Assessment & Plan  Mechanically ventilated following admission craniotomy.  Continue full mechanical ventilatory support.   Ventilator bundle and Trauma weaning protocol.    Traumatic subdural hematoma, initial encounter ([facility])- (present on admission)  Assessment & Plan  Left-sided holohemispheric traumatic subdural hemorrhage associated with systemic anticoagulation and antiplatelet therapy.  8/'25  Left craniotomy with evacuation of SDH  8/28 significant deficits persist.  8/28 f/u CT overall improved  MRI brain unable due to SCS  Post traumatic pharmacologic seizure prophylaxis for 1 week.  Speech Language Pathology cognitive evaluation.  Tesfaye Luther MD. Neurosurgeon. [facility].    Antiplatelet or antithrombotic long-term use- (present on admission)  Assessment & Plan  Daily ASA and Apixaban.  Received KCentra on admit.  AA 96.1%, transfused 1 unit platelets in ED.    CAD (coronary artery disease)  Assessment & Plan  History of coronary artery disease with STEMI in August 2020.  Drug-eluting stents placed in the mid RCA and PDA.  Daily aspirin administration.    Status post insertion of drug-eluting stent into right coronary artery for coronary artery disease- (present on admission)  Assessment & Plan  Drug-eluting stents placed in the mid RCA and PDA August 2020.   Daily aspirin administration.  Maintenance medication held on admission    Paroxysmal atrial fibrillation (HCC)- (present on admission)  Assessment & Plan  Chronic condition treated with carvedilol and apixaban (Eliquis) anticoagulation.  Carvedilol resumed upon admission.  Systemic anticoagulation held on admission.     Obese- (present on admission)  Assessment & Plan  8/29 BMI 34.94    Type 2 diabetes mellitus without complication, without long-term current use of insulin ([facility])- (present on  admission)  Assessment & Plan  Chronic condition treated with metformin.  Glycohemoglobin 6.2 (131).  Holding maintenance metformin for 48 hours following intravenous contrast administration.  Insulin sliding scale coverage.    Hypertensive disease- (present on admission)  Assessment & Plan  Chronic condition treated with carvedilol.  Resumed maintenance medication on admission.    Contraindications for pharmacologic prophylaxis- (present on admission)  Assessment & Plan  Prophylactic anticoagulation for thrombotic prevention initially contraindicated secondary to elevated bleeding risk.  8/28 Trauma screening bilateral lower extremity venous duplex negative for above knee DVT.  8/28 Prophylactic dose enoxaparin initiated.      Depression- (present on admission)  Assessment & Plan  Chronic condition treated with Effexor.  Resumed maintenance medication on admission.    Trauma- (present on admission)  Assessment & Plan  Mechanical ground-level fall.    Intracranial hemorrhage with systemic anticoagulation.  Trauma Yellow Transfer Activation from [facility].  Macario Winn MD. Trauma Surgery.    BPH (benign prostatic hyperplasia)- (present on admission)  Assessment & Plan  Chronic condition treated with finasteride and tamsulosin.  Resumed maintenance medication on admission.    Dyslipidemia- (present on admission)  Assessment & Plan  Chronic condition treated with atorvastatin.  Resumed maintenance medication on admission.        Discussed patient condition with RN, RT, Pharmacy, and neurosurgery and trauma surgery.  CRITICAL CARE TIME EXCLUDING PROCEDURES: 40  minutes

## 2022-08-31 NOTE — PROCEDURES
VIDEO ELECTROENCEPHALOGRAM REPORT      Referring provider: Dr. Nichols    DOS:  08/31/22  (total recording of 25  minutes).     INDICATION:  Karan Wiley 62 y.o. male presenting with altered mental status     CURRENT ANTIEPILEPTIC REGIMEN: LEV    TECHNIQUE: 30 channel video electroencephalogram (EEG) was performed in accordance with the international 10-20 system. The study was reviewed in bipolar and referential montages. The recording examined the patient during obtunded state.     DESCRIPTION OF THE RECORD:  The background consisted of diffuse theta range slowing at 5-7 Hz.No well-formed posterior dominant rhythm or anterior-posterior gradient was seen.  No drowsiness or sleep attained.     ACTIVATION PROCEDURE:  hyperventilation was not performed    Intermittent Photic stimulation was performed in a stepwise fashion from 1 to 30 Hz and elicited no photic driving response.     ICTAL AND/OR INTERICTAL FINDINGS:   No focal or generalized epileptiform activity noted.   Left hemisphere intermittent regional slowing was seen during this routine study.  No clinical events or seizures were reported or recorded during the study.     EKG: sampling of the EKG recording demonstrated sinus rhythm.     EVENTS: upward gaze and sometime left gaze deviation were noted, no ictal EEG correlate.     INTERPRETATION:    This is an abnormal video EEG recording in the obtunded state.   1)The diffuse background slowing is consistent with moderate encephalopathy.   2)The presence of intermittent left hemisphere slowing is suggestive of focal neuronal dysfunction.   3) Upward gaze and sometime left gaze deviation were noted, no ictal EEG correlate. No seizure seen.   4) No epileptiform discharges or seizures were seen. This does not preclude a diagnosis of epilepsy.         Marco Stevenson MD  Diplomate in Neurology&Epilepsy  Office: [phone]  Fax: [phone]

## 2022-09-01 ENCOUNTER — APPOINTMENT (OUTPATIENT)
Dept: RADIOLOGY | Facility: MEDICAL CENTER | Age: 62
DRG: 003 | End: 2022-09-01
Attending: SURGERY
Payer: OTHER GOVERNMENT

## 2022-09-01 PROBLEM — R13.12 OROPHARYNGEAL DYSPHAGIA: Status: ACTIVE | Noted: 2022-09-01

## 2022-09-01 PROBLEM — Z78.9 NO CONTRAINDICATION TO DEEP VEIN THROMBOSIS (DVT) PROPHYLAXIS: Status: ACTIVE | Noted: 2022-08-28

## 2022-09-01 LAB
ALBUMIN SERPL BCP-MCNC: 4 G/DL (ref 3.2–4.9)
ALBUMIN/GLOB SERPL: 1.3 G/DL
ALP SERPL-CCNC: 78 U/L (ref 30–99)
ALT SERPL-CCNC: 22 U/L (ref 2–50)
ANION GAP SERPL CALC-SCNC: 9 MMOL/L (ref 7–16)
AST SERPL-CCNC: 27 U/L (ref 12–45)
BASOPHILS # BLD AUTO: 0.5 % (ref 0–1.8)
BASOPHILS # BLD: 0.05 K/UL (ref 0–0.12)
BILIRUB SERPL-MCNC: 0.3 MG/DL (ref 0.1–1.5)
BUN SERPL-MCNC: 21 MG/DL (ref 8–22)
CALCIUM SERPL-MCNC: 10.1 MG/DL (ref 8.5–10.5)
CHLORIDE SERPL-SCNC: 101 MMOL/L (ref 96–112)
CO2 SERPL-SCNC: 26 MMOL/L (ref 20–33)
CREAT SERPL-MCNC: 0.63 MG/DL (ref 0.5–1.4)
EOSINOPHIL # BLD AUTO: 0.19 K/UL (ref 0–0.51)
EOSINOPHIL NFR BLD: 1.9 % (ref 0–6.9)
ERYTHROCYTE [DISTWIDTH] IN BLOOD BY AUTOMATED COUNT: 45.5 FL (ref 35.9–50)
GFR SERPLBLD CREATININE-BSD FMLA CKD-EPI: 107 ML/MIN/1.73 M 2
GLOBULIN SER CALC-MCNC: 3 G/DL (ref 1.9–3.5)
GLUCOSE SERPL-MCNC: 133 MG/DL (ref 65–99)
HCT VFR BLD AUTO: 37 % (ref 42–52)
HGB BLD-MCNC: 12.5 G/DL (ref 14–18)
IMM GRANULOCYTES # BLD AUTO: 0.1 K/UL (ref 0–0.11)
IMM GRANULOCYTES NFR BLD AUTO: 1 % (ref 0–0.9)
LYMPHOCYTES # BLD AUTO: 1.62 K/UL (ref 1–4.8)
LYMPHOCYTES NFR BLD: 16.1 % (ref 22–41)
MAGNESIUM SERPL-MCNC: 2 MG/DL (ref 1.5–2.5)
MCH RBC QN AUTO: 30.1 PG (ref 27–33)
MCHC RBC AUTO-ENTMCNC: 33.8 G/DL (ref 33.7–35.3)
MCV RBC AUTO: 89.2 FL (ref 81.4–97.8)
MONOCYTES # BLD AUTO: 1.28 K/UL (ref 0–0.85)
MONOCYTES NFR BLD AUTO: 12.7 % (ref 0–13.4)
NEUTROPHILS # BLD AUTO: 6.84 K/UL (ref 1.82–7.42)
NEUTROPHILS NFR BLD: 67.8 % (ref 44–72)
NRBC # BLD AUTO: 0 K/UL
NRBC BLD-RTO: 0 /100 WBC
PHOSPHATE SERPL-MCNC: 3.3 MG/DL (ref 2.5–4.5)
PLATELET # BLD AUTO: 373 K/UL (ref 164–446)
PMV BLD AUTO: 10.9 FL (ref 9–12.9)
POTASSIUM SERPL-SCNC: 3.6 MMOL/L (ref 3.6–5.5)
PROT SERPL-MCNC: 7 G/DL (ref 6–8.2)
RBC # BLD AUTO: 4.15 M/UL (ref 4.7–6.1)
SODIUM SERPL-SCNC: 136 MMOL/L (ref 135–145)
TRIGL SERPL-MCNC: 229 MG/DL (ref 0–149)
WBC # BLD AUTO: 10.1 K/UL (ref 4.8–10.8)

## 2022-09-01 PROCEDURE — 97530 THERAPEUTIC ACTIVITIES: CPT

## 2022-09-01 PROCEDURE — 83735 ASSAY OF MAGNESIUM: CPT

## 2022-09-01 PROCEDURE — 71045 X-RAY EXAM CHEST 1 VIEW: CPT

## 2022-09-01 PROCEDURE — 770022 HCHG ROOM/CARE - ICU (200)

## 2022-09-01 PROCEDURE — 94760 N-INVAS EAR/PLS OXIMETRY 1: CPT

## 2022-09-01 PROCEDURE — 84100 ASSAY OF PHOSPHORUS: CPT

## 2022-09-01 PROCEDURE — 700101 HCHG RX REV CODE 250: Performed by: SURGERY

## 2022-09-01 PROCEDURE — A9270 NON-COVERED ITEM OR SERVICE: HCPCS | Performed by: SURGERY

## 2022-09-01 PROCEDURE — 85025 COMPLETE CBC W/AUTO DIFF WBC: CPT

## 2022-09-01 PROCEDURE — 94799 UNLISTED PULMONARY SVC/PX: CPT

## 2022-09-01 PROCEDURE — 80053 COMPREHEN METABOLIC PANEL: CPT

## 2022-09-01 PROCEDURE — A9270 NON-COVERED ITEM OR SERVICE: HCPCS | Performed by: NEUROLOGICAL SURGERY

## 2022-09-01 PROCEDURE — 700102 HCHG RX REV CODE 250 W/ 637 OVERRIDE(OP): Performed by: NEUROLOGICAL SURGERY

## 2022-09-01 PROCEDURE — 700102 HCHG RX REV CODE 250 W/ 637 OVERRIDE(OP): Performed by: SURGERY

## 2022-09-01 PROCEDURE — 84478 ASSAY OF TRIGLYCERIDES: CPT

## 2022-09-01 PROCEDURE — 94003 VENT MGMT INPAT SUBQ DAY: CPT

## 2022-09-01 PROCEDURE — 700111 HCHG RX REV CODE 636 W/ 250 OVERRIDE (IP): Performed by: SURGERY

## 2022-09-01 PROCEDURE — 99291 CRITICAL CARE FIRST HOUR: CPT | Performed by: SURGERY

## 2022-09-01 RX ADMIN — CARVEDILOL 12.5 MG: 12.5 TABLET, FILM COATED ORAL at 06:00

## 2022-09-01 RX ADMIN — FAMOTIDINE 20 MG: 20 TABLET, FILM COATED ORAL at 05:59

## 2022-09-01 RX ADMIN — MINERAL OIL, PETROLATUM 1 APPLICATION: 425; 573 OINTMENT OPHTHALMIC at 14:14

## 2022-09-01 RX ADMIN — LEVETIRACETAM 1000 MG: 500 TABLET, FILM COATED ORAL at 05:59

## 2022-09-01 RX ADMIN — PROPOFOL 40 MCG/KG/MIN: 10 INJECTION, EMULSION INTRAVENOUS at 02:35

## 2022-09-01 RX ADMIN — VENLAFAXINE 75 MG: 75 TABLET ORAL at 06:00

## 2022-09-01 RX ADMIN — CARVEDILOL 12.5 MG: 12.5 TABLET, FILM COATED ORAL at 17:17

## 2022-09-01 RX ADMIN — ATORVASTATIN CALCIUM 80 MG: 80 TABLET, FILM COATED ORAL at 17:17

## 2022-09-01 RX ADMIN — AMANTADINE HYDROCHLORIDE 100 MG: 100 TABLET ORAL at 17:17

## 2022-09-01 RX ADMIN — HYDRALAZINE HYDROCHLORIDE 25 MG: 25 TABLET, FILM COATED ORAL at 14:14

## 2022-09-01 RX ADMIN — HYDRALAZINE HYDROCHLORIDE 25 MG: 25 TABLET, FILM COATED ORAL at 06:00

## 2022-09-01 RX ADMIN — VENLAFAXINE 75 MG: 75 TABLET ORAL at 17:17

## 2022-09-01 RX ADMIN — AMLODIPINE BESYLATE 10 MG: 10 TABLET ORAL at 06:01

## 2022-09-01 RX ADMIN — HYDRALAZINE HYDROCHLORIDE 25 MG: 25 TABLET, FILM COATED ORAL at 21:47

## 2022-09-01 RX ADMIN — ENOXAPARIN SODIUM 30 MG: 30 INJECTION SUBCUTANEOUS at 17:17

## 2022-09-01 RX ADMIN — FAMOTIDINE 20 MG: 20 TABLET, FILM COATED ORAL at 17:17

## 2022-09-01 RX ADMIN — POTASSIUM BICARBONATE 50 MEQ: 977.5 TABLET, EFFERVESCENT ORAL at 09:55

## 2022-09-01 RX ADMIN — MINERAL OIL, PETROLATUM 1 APPLICATION: 425; 573 OINTMENT OPHTHALMIC at 21:47

## 2022-09-01 RX ADMIN — HYDROMORPHONE HYDROCHLORIDE 2 MG: 2 TABLET ORAL at 23:06

## 2022-09-01 RX ADMIN — ENOXAPARIN SODIUM 30 MG: 30 INJECTION SUBCUTANEOUS at 05:59

## 2022-09-01 RX ADMIN — AMANTADINE HYDROCHLORIDE 100 MG: 100 TABLET ORAL at 06:01

## 2022-09-01 RX ADMIN — PROPOFOL 40 MCG/KG/MIN: 10 INJECTION, EMULSION INTRAVENOUS at 06:01

## 2022-09-01 RX ADMIN — MINERAL OIL, PETROLATUM 1 APPLICATION: 425; 573 OINTMENT OPHTHALMIC at 06:00

## 2022-09-01 RX ADMIN — POTASSIUM CHLORIDE AND SODIUM CHLORIDE: 900; 150 INJECTION, SOLUTION INTRAVENOUS at 22:24

## 2022-09-01 ASSESSMENT — PAIN DESCRIPTION - PAIN TYPE
TYPE: ACUTE PAIN

## 2022-09-01 ASSESSMENT — COGNITIVE AND FUNCTIONAL STATUS - GENERAL
DRESSING REGULAR LOWER BODY CLOTHING: TOTAL
PERSONAL GROOMING: TOTAL
MOVING FROM LYING ON BACK TO SITTING ON SIDE OF FLAT BED: UNABLE
CLIMB 3 TO 5 STEPS WITH RAILING: TOTAL
TOILETING: TOTAL
MOBILITY SCORE: 6
STANDING UP FROM CHAIR USING ARMS: TOTAL
SUGGESTED CMS G CODE MODIFIER MOBILITY: CN
HELP NEEDED FOR BATHING: TOTAL
DAILY ACTIVITIY SCORE: 6
DRESSING REGULAR UPPER BODY CLOTHING: TOTAL
WALKING IN HOSPITAL ROOM: TOTAL
TURNING FROM BACK TO SIDE WHILE IN FLAT BAD: UNABLE
EATING MEALS: TOTAL
MOVING TO AND FROM BED TO CHAIR: UNABLE
SUGGESTED CMS G CODE MODIFIER DAILY ACTIVITY: CN

## 2022-09-01 ASSESSMENT — GAIT ASSESSMENTS: GAIT LEVEL OF ASSIST: UNABLE TO PARTICIPATE

## 2022-09-01 ASSESSMENT — FIBROSIS 4 INDEX: FIB4 SCORE: 0.92

## 2022-09-01 NOTE — CARE PLAN
The patient is Watcher - Medium risk of patient condition declining or worsening    Shift Goals  Clinical Goals: Systolic blood pressures within parameters and pain management.  Patient Goals: FELIPA  Family Goals: FELIPA    Progress made toward(s) clinical / shift goals:  The patient's blood pressure and heart rate have been less than goal. The patient is not showing any signs of pain at this time.     Problem: Pain - Standard  Goal: Alleviation of pain or a reduction in pain to the patient’s comfort goal  Outcome: Progressing     Problem: Safety - Medical Restraint  Goal: Remains free of injury from restraints (Restraint for Interference with Medical Device)  Outcome: Progressing     Problem: Skin Integrity  Goal: Skin integrity is maintained or improved  Outcome: Progressing     Problem: Fall Risk  Goal: Patient will remain free from falls  Outcome: Progressing     Patient is not progressing towards the following goals:    Problem: Safety - Medical Restraint  Goal: Free from restraint(s) (Restraint for Interference with Medical Device)  Outcome: Not Progressing   The patient is still in restraints due to risk of removal of ETT.

## 2022-09-01 NOTE — THERAPY
Occupational Therapy  Daily Treatment     Patient Name: Karan Wiley  Age:  62 y.o., Sex:  male  Medical Record #: [id number]  Today's Date: 9/1/2022     Precautions  Precautions: Fall Risk    Assessment    Pt currently limited by decreased functional mobility, activity tolerance, cognition, sensation, strength, AROM, coordination, balance, and pain which are affecting pt's ability to complete ADLs/IADLs at baseline. Pt would benefit from OT services in the acute care setting to maximize functional recovery.      Plan    Continue current treatment plan.    DC Equipment Recommendations: Unable to determine at this time  Discharge Recommendations: (P) Recommend post-acute placement for additional occupational therapy services prior to discharge home         09/01/22 1011   Activities of Daily Living   Upper Body Dressing Total Assist   Lower Body Dressing Total Assist   Toileting Total Assist   Functional Mobility   Sit to Stand Unable to Participate   Bed, Chair, Wheelchair Transfer Unable to Participate   Short Term Goals   Short Term Goal # 1 Pt will follow 1 step commands with 100% accuracy   Goal Outcome # 1 Progressing slower than expected   Short Term Goal # 2 Pt will demonstrate purposeful movement of BUE   Goal Outcome # 2 Progressing slower than expected   Short Term Goal # 3 Pt will accurately visually track items in the L and R field   Goal Outcome # 3 Goal not met   Anticipated Discharge Equipment and Recommendations   Discharge Recommendations Recommend post-acute placement for additional occupational therapy services prior to discharge home

## 2022-09-01 NOTE — DISCHARGE PLANNING
LMSW updated that patient's wife, Alesia, expressed some concerns and is appears very overwhelmed regarding pt's hospitalization. LMSW called Alesia (P: [phone]) who confirmed she had questions for this writer. Alesia reports that she is planning on coming to the hospital tomorrow afternoon and would like to speak in person about her needs. She denied any immediate concerns at time of phone call. Phone call ended.

## 2022-09-01 NOTE — CARE PLAN
The patient is Watcher - Medium risk of patient condition declining or worsening    Shift Goals  Clinical Goals: Systolic blood pressures within parameters and pain management.  Patient Goals: FELIPA  Family Goals: FELIPA    Progress made toward(s) clinical / shift goals:    Problem: Pain - Standard  Goal: Alleviation of pain or a reduction in pain to the patient’s comfort goal  Outcome: Progressing     Problem: Knowledge Deficit - Standard  Goal: Patient and family/care givers will demonstrate understanding of plan of care, disease process/condition, diagnostic tests and medications  Outcome: Progressing     Problem: Safety - Medical Restraint  Goal: Remains free of injury from restraints (Restraint for Interference with Medical Device)  Outcome: Progressing  Goal: Free from restraint(s) (Restraint for Interference with Medical Device)  Outcome: Progressing     Problem: Skin Integrity  Goal: Skin integrity is maintained or improved  Outcome: Progressing     Problem: Fall Risk  Goal: Patient will remain free from falls  Outcome: Progressing

## 2022-09-01 NOTE — PROGRESS NOTES
1. Caller Name: Nell                      Call Back Number: [phone]     2. Message: pt called and stated that the higher dosage of the omeprazole 40 mgs is not working. She is still having acid reflux.  Can something else be prescribed?  Pt is leaving on vacation early tomorrow morning and would like to  new prescription today if posible.  Please advise     3. Patient approves office to leave a detailed voicemail/MyChart message: N\A       Neurosurgery Progress Note    Subjective:  62 y.o. male on Eliquis (reversal agent given) who presented 2022 with an acute left SDH and transfer from an outside hospital. Initially presented with GCS 15. Neuro exam subsequently declined with worsening expressive aphasia, agitation, no command following. Repeat CT with progression of SDH and worsening MLS.     POD#7 Left craniotomy for evacuation of acute SDH.   Post-op head CT stable x 3  No acute events overnight.   Patient remains intubated and sedated.   Unable to perform MRI due to SCS  EEG completed and reviewed, no seizure activity appreciated     Exam:  Intubated  Soft restraints in place.    Face symmetric.  Spontaneous eye opening.   Pupils 2mm, PERRL.  Tracking  Withdraws x4  Following commands in LLE  Moves upper extremities spontaneously   +grimace to central and peripheral stimulation.   +gag, +cough.   Incision CDI with staples in place      BP  Min: 127/84  Max: 192/104  Pulse  Av.6  Min: 74  Max: 94  Resp  Av.5  Min: 13  Max: 29  Monitored Temp 2  Av.6 °C (99.7 °F)  Min: 37.4 °C (99.3 °F)  Max: 37.9 °C (100.2 °F)  SpO2  Av %  Min: 93 %  Max: 100 %    No data recorded    Recent Labs     22  0605 22  0615 22  0440   WBC 6.8 8.8 10.1   RBC 3.42* 3.68* 4.15*   HEMOGLOBIN 10.3* 11.1* 12.5*   HEMATOCRIT 30.5* 32.8* 37.0*   MCV 89.2 89.1 89.2   MCH 30.1 30.2 30.1   MCHC 33.8 33.8 33.8   RDW 46.2 45.3 45.5   PLATELETCT 273 319 373   MPV 11.6 11.4 10.9       Recent Labs     22  0605 22  0615 22  0440   SODIUM 138 137 136   POTASSIUM 3.9 3.6 3.6   CHLORIDE 107 103 101   CO2 21 22 26   GLUCOSE 111* 132* 133*   BUN 16 17 21   CREATININE 0.47* 0.53 0.63   CALCIUM 9.2 9.7 10.1                     Intake/Output                         22 0700 - 22 - 22 0659     1900-0659 Total  Total                 Intake    I.V.  627.4  590.4 1217.8  56.1   -- 56.1    Propofol Volume 206.6 290.4 497 6.1 -- 6.1    Volume (mL) (0.9 % NaCl with KCl 20 mEq infusion) 420.8 300 720.8 50 -- 50    Other  --  220 220  --  -- --    Medications (PO/Enteral Liquids) -- 220 220 -- -- --    NG/GT  240  720 960  240  -- 240    Intake (mL) (Enteral Tube 08/26/22 Cortrak - Gastric 10 Fr. Right nare) 240 720 960 240 -- 240    Total Intake 867.4 1530.4 2397.8 296.1 -- 296.1       Output    Urine  1815  1000 2815  135  -- 135    Output (mL) (Urethral Catheter Non-latex;Temperature probe 16 Fr.) 1815 1000 2815 135 -- 135    Stool  25  100 125  --  -- --    Rectal Tube Output ([REMOVED] Rectal Tube) 25 100 125 -- -- --    Total Output 1840 1100 2940 135 -- 135       Net I/O     -972.6 430.4 -542.2 161.1 -- 161.1              Intake/Output Summary (Last 24 hours) at 9/1/2022 1038  Last data filed at 9/1/2022 0900  Gross per 24 hour   Intake 2445.39 ml   Output 2420 ml   Net 25.39 ml               potassium bicarbonate  25 mEq Once    amantadine  100 mg BID    hydrALAZINE  25 mg Q8HRS    amLODIPine  10 mg Q DAY    carvedilol  12.5 mg BID    enoxaparin (LOVENOX) injection  30 mg Q12HRS    Pharmacy  1 Each PHARMACY TO DOSE    Respiratory Therapy Consult   Continuous RT    propofol  0-80 mcg/kg/min Continuous    famotidine  20 mg BID    levETIRAcetam  1,000 mg BID    Metoprolol Tartrate  5 mg Q4HRS PRN    fentaNYL  50 mcg Q HOUR PRN    Or    fentaNYL  100 mcg Q HOUR PRN    HYDROmorphone  1 mg Q3HRS PRN    Or    HYDROmorphone  2 mg Q3HRS PRN    acetaminophen  1,000 mg Q6HRS PRN    ondansetron  4 mg Q4HRS PRN    bisacodyl  10 mg Q24HRS PRN    sodium phosphate  1 Each Once PRN    labetalol  10 mg Q4HRS PRN    Pharmacy Consult Request  1 Each PHARMACY TO DOSE    MD ALERT...DO NOT ADMINISTER NSAIDS or ASPIRIN unless ORDERED By Neurosurgery  1 Each PRN    hydrALAZINE  10 mg Q HOUR PRN    artificial tears  1 Application Q8HRS    0.9 % NaCl with KCl 20 mEq 1,000 mL   Continuous    atorvastatin  80 mg Q  EVENING    cloNIDine  0.1 mg Q4HRS PRN    magnesium hydroxide  30 mL DAILY    ondansetron  4 mg Q4HRS PRN    polyethylene glycol/lytes  1 Packet BID    senna-docusate  1 Tablet Nightly    senna-docusate  1 Tablet Q24HRS PRN    docusate sodium  100 mg BID    venlafaxine  75 mg BID       Assessment and Plan:  Hospital day #9  POD#7  Head CT reviewed, shows improvement in midline shift  Q4 neuro checks.    OK to stop keppra  OK for tube feeds.  Wean sedation and extubate as able.  Brain MRI cancelled due to incompatible SCS  EEG and CT reviewed   No further surgical intervention anticipated  OK for BP goals per primary team   Remove staples POD #14  Following.      Chemical prophylactic DVT therapy: Yes  Start date/time: OK from a neurosurgery standpoint

## 2022-09-01 NOTE — THERAPY
Physical Therapy   Daily Treatment     Patient Name: Karan Wiley  Age:  62 y.o., Sex:  male  Medical Record #: [id number]  Today's Date: 9/1/2022     Precautions  Precautions: Fall Risk    Assessment    Pt seen for PT treatment session. Pt alert, but still following < 25% of commands throughout session. Volitionally moving all extremities, not necessarily to command, with R > L. Impaired balance reaction with no trunk activation noted. PT will continue to follow.    Plan    Continue current treatment plan.    DC Equipment Recommendations: Unable to determine at this time  Discharge Recommendations: Recommend post-acute placement for additional physical therapy services prior to discharge home     09/01/22 1048   Precautions   Precautions Fall Risk   Vitals   O2 Delivery Device Ventilator   Pain 0 - 10 Group   Therapist Pain Assessment During Activity;Nurse Notified  (no expression of pain noted during session)   Cognition    Cognition / Consciousness X   Speech/ Communication Intubated / Trached   Level of Consciousness Alert   Ability To Follow Commands 1 Step  (approx 25% of time)   Initiation Impaired   Comments intermittent, and brief, visual fixation to command but no visual tracking. followed command to kick R LE > L but volitionally moving both again R > L.   Active ROM Lower Body    Comments limited by weakness and tone   Strength Lower Body   Lower Body Strength  X   Comments R LE demonstrated 3-/5 with LAQ, L LE 2+/5. Unable to formally assess due to impaired command following.   Neuro-Muscular Treatments   Neuro-Muscular Treatments Weight Shift Left;Weight Shift Right;Anterior weight shift;Verbal Cuing   Comments Volitionally moving B UE, primarily scapular retraction and extension in apparent balance reaction but unable to actively recover balance   Balance   Sitting Balance (Static) Dependent   Sitting Balance (Dynamic) Dependent   Weight Shift Sitting Poor   Skilled Intervention Verbal  Cuing;Postural Facilitation;Compensatory Strategies   Gait Analysis   Gait Level Of Assist Unable to Participate   Bed Mobility    Supine to Sit Total Assist   Sit to Supine Total Assist   Scooting Total Assist   Skilled Intervention Verbal Cuing;Compensatory Strategies   Comments x2 people   Functional Mobility   Sit to Stand Unable to Participate   ICU Target Mobility Level   ICU Mobility - Targeted Level Level 2   How much difficulty does the patient currently have...   Turning over in bed (including adjusting bedclothes, sheets and blankets)? 1   Sitting down on and standing up from a chair with arms (e.g., wheelchair, bedside commode, etc.) 1   Moving from lying on back to sitting on the side of the bed? 1   How much help from another person does the patient currently need...   Moving to and from a bed to a chair (including a wheelchair)? 1   Need to walk in a hospital room? 1   Climbing 3-5 steps with a railing? 1   6 clicks Mobility Score 6   Activity Tolerance   Sitting Edge of Bed 15 min   Short Term Goals    Short Term Goal # 1 pt will be able to complete supine<>Sitting with mod assist in 6tx in order to demonstrate progress   Goal Outcome # 1 goal not met   Short Term Goal # 2 pt will be able to sit EOB with fair - balance in 6tx in order to decrease fall risk   Goal Outcome # 2 Goal not met   Short Term Goal # 3 pt will be able to complete functional transfers with mod assist in 6tx in order to increase OOB time   Goal Outcome # 3 Goal not met   Anticipated Discharge Equipment and Recommendations   Discharge Recommendations Recommend post-acute placement for additional physical therapy services prior to discharge home

## 2022-09-01 NOTE — PROGRESS NOTES
Trauma / Surgical Daily Progress Note    Date of Service  9/1/2022    Chief Complaint  62 y.o. male admitted 8/25/2022 with GLF w/SDH    Interval Events    More alert and intermittently following  commands since addition of amantadine  Tolerating tube feeds at goal  Tolerating vent weaning - SBT  I expect will need a trach - wife will be here tomorrow    Review of Systems  Review of Systems   Unable to perform ROS: Intubated      Vital Signs for last 24 hours  Pulse:  [75-94] 79  Resp:  [13-29] 21  BP: (127-192)/() 150/90  SpO2:  [93 %-100 %] 94 %    Hemodynamic parameters for last 24 hours       Respiratory Data     Respiration: (!) 21, Pulse Oximetry: 94 %     Work Of Breathing / Effort: Vented  RUL Breath Sounds: Clear, RML Breath Sounds: Clear, RLL Breath Sounds: Diminished, DON Breath Sounds: Clear, LLL Breath Sounds: Diminished    Physical Exam  Physical Exam  Vitals and nursing note reviewed.   Constitutional:       Appearance: He is obese. He is not ill-appearing or toxic-appearing.   HENT:      Head:      Comments: Dressings in place     Right Ear: External ear normal.      Left Ear: External ear normal.      Mouth/Throat:      Mouth: Mucous membranes are moist.   Eyes:      General: No scleral icterus.        Right eye: No discharge.         Left eye: No discharge.      Extraocular Movements: Extraocular movements intact.      Pupils: Pupils are equal, round, and reactive to light.   Cardiovascular:      Rate and Rhythm: Normal rate. Rhythm irregular.      Pulses: Normal pulses.      Heart sounds: Normal heart sounds.   Pulmonary:      Effort: Pulmonary effort is normal.      Breath sounds: Normal breath sounds. No wheezing or rales.   Abdominal:      General: Abdomen is flat. Bowel sounds are normal. There is no distension.      Palpations: Abdomen is soft.      Tenderness: There is no abdominal tenderness. There is no guarding or rebound.   Genitourinary:     Comments: Christopher hernandez  gravity.  Musculoskeletal:         General: Normal range of motion.      Cervical back: Normal range of motion.   Skin:     General: Skin is warm and dry.      Capillary Refill: Capillary refill takes 2 to 3 seconds.      Coloration: Skin is not jaundiced.      Findings: No bruising.   Neurological:      General: No focal deficit present.      Mental Status: He is alert.      Cranial Nerves: No cranial nerve deficit.      Comments: Increased eye opening  Tracks  Intermittent following commands       Laboratory  Recent Results (from the past 24 hour(s))   CBC with Differential: Tomorrow AM    Collection Time: 09/01/22  4:40 AM   Result Value Ref Range    WBC 10.1 4.8 - 10.8 K/uL    RBC 4.15 (L) 4.70 - 6.10 M/uL    Hemoglobin 12.5 (L) 14.0 - 18.0 g/dL    Hematocrit 37.0 (L) 42.0 - 52.0 %    MCV 89.2 81.4 - 97.8 fL    MCH 30.1 27.0 - 33.0 pg    MCHC 33.8 33.7 - 35.3 g/dL    RDW 45.5 35.9 - 50.0 fL    Platelet Count 373 164 - 446 K/uL    MPV 10.9 9.0 - 12.9 fL    Neutrophils-Polys 67.80 44.00 - 72.00 %    Lymphocytes 16.10 (L) 22.00 - 41.00 %    Monocytes 12.70 0.00 - 13.40 %    Eosinophils 1.90 0.00 - 6.90 %    Basophils 0.50 0.00 - 1.80 %    Immature Granulocytes 1.00 (H) 0.00 - 0.90 %    Nucleated RBC 0.00 /100 WBC    Neutrophils (Absolute) 6.84 1.82 - 7.42 K/uL    Lymphs (Absolute) 1.62 1.00 - 4.80 K/uL    Monos (Absolute) 1.28 (H) 0.00 - 0.85 K/uL    Eos (Absolute) 0.19 0.00 - 0.51 K/uL    Baso (Absolute) 0.05 0.00 - 0.12 K/uL    Immature Granulocytes (abs) 0.10 0.00 - 0.11 K/uL    NRBC (Absolute) 0.00 K/uL   Comp Metabolic Panel (CMP): Tomorrow AM    Collection Time: 09/01/22  4:40 AM   Result Value Ref Range    Sodium 136 135 - 145 mmol/L    Potassium 3.6 3.6 - 5.5 mmol/L    Chloride 101 96 - 112 mmol/L    Co2 26 20 - 33 mmol/L    Anion Gap 9.0 7.0 - 16.0    Glucose 133 (H) 65 - 99 mg/dL    Bun 21 8 - 22 mg/dL    Creatinine 0.63 0.50 - 1.40 mg/dL    Calcium 10.1 8.5 - 10.5 mg/dL    AST(SGOT) 27 12 - 45 U/L     ALT(SGPT) 22 2 - 50 U/L    Alkaline Phosphatase 78 30 - 99 U/L    Total Bilirubin 0.3 0.1 - 1.5 mg/dL    Albumin 4.0 3.2 - 4.9 g/dL    Total Protein 7.0 6.0 - 8.2 g/dL    Globulin 3.0 1.9 - 3.5 g/dL    A-G Ratio 1.3 g/dL   Magnesium: Every Monday and Thursday AM    Collection Time: 09/01/22  4:40 AM   Result Value Ref Range    Magnesium 2.0 1.5 - 2.5 mg/dL   Phosphorus: Every Monday and Thursday AM    Collection Time: 09/01/22  4:40 AM   Result Value Ref Range    Phosphorus 3.3 2.5 - 4.5 mg/dL   Triglyceride    Collection Time: 09/01/22  4:40 AM   Result Value Ref Range    Triglycerides 229 (H) 0 - 149 mg/dL   ESTIMATED GFR    Collection Time: 09/01/22  4:40 AM   Result Value Ref Range    GFR (CKD-EPI) 107 >60 mL/min/1.73 m 2       Fluids    Intake/Output Summary (Last 24 hours) at 9/1/2022 1156  Last data filed at 9/1/2022 1000  Gross per 24 hour   Intake 2615.39 ml   Output 2620 ml   Net -4.61 ml       Core Measures & Quality Metrics  Radiology images reviewed, Labs reviewed and Medications reviewed  White catheter: Critically Ill - Requiring Accurate Measurement of Urinary Output      DVT Prophylaxis: Enoxaparin (Lovenox)  DVT prophylaxis - mechanical: Contra-indicated and SCDs  Ulcer prophylaxis: Yes      RAP Score Total: 6  CAGE Results: not completed Blood Alcohol>0.08: no     Assessment/Plan  Respiratory failure following trauma and surgery (HCC)- (present on admission)  Assessment & Plan  Mechanically ventilated following admission craniotomy.  Continue full mechanical ventilatory support.   Ventilator bundle and Trauma weaning protocol.  9/1 not awake enough / will need trach / wife will be here on Friday      Traumatic subdural hematoma, initial encounter (HCC)- (present on admission)  Assessment & Plan  Left-sided holohemispheric traumatic subdural hemorrhage associated with systemic anticoagulation and antiplatelet therapy.  8/'25  Left craniotomy with evacuation of SDH  8/28 significant deficits  persist.  8/28 f/u CT overall improved  MRI brain unable due to SCS  8/31 CT improved / No neurologic improvement / start amantadine  9/1 eye opening, tracking, some following with BLE  Post traumatic pharmacologic seizure prophylaxis for 1 week.  Speech Language Pathology cognitive evaluation.  Tesfaye Luther MD. Neurosurgeon. [facility].    Antiplatelet or antithrombotic long-term use- (present on admission)  Assessment & Plan  Daily ASA and Apixaban.  Received KCentra on admit.  AA 96.1%, transfused 1 unit platelets in ED.    CAD (coronary artery disease)  Assessment & Plan  History of coronary artery disease with STEMI in August 2020.  Drug-eluting stents placed in the mid RCA and PDA.  Daily aspirin administration.    Status post insertion of drug-eluting stent into right coronary artery for coronary artery disease- (present on admission)  Assessment & Plan  Drug-eluting stents placed in the mid RCA and PDA August 2020.   Daily aspirin administration.  Maintenance medication held on admission    Paroxysmal atrial fibrillation (HCC)- (present on admission)  Assessment & Plan  Chronic condition treated with carvedilol and apixaban (Eliquis) anticoagulation.  Carvedilol resumed upon admission.  Systemic anticoagulation held on admission.     Obese- (present on admission)  Assessment & Plan  8/29 BMI 34.94    Type 2 diabetes mellitus without complication, without long-term current use of insulin (HCC)- (present on admission)  Assessment & Plan  Chronic condition treated with metformin.  Glycohemoglobin 6.2 (131).  Holding maintenance metformin for 48 hours following intravenous contrast administration.    Insulin sliding scale coverage.    Hypertensive disease- (present on admission)  Assessment & Plan  Chronic condition treated with carvedilol.  Resumed maintenance medication on admission.    Contraindications for pharmacologic prophylaxis- (present on admission)  Assessment & Plan  Prophylactic  anticoagulation for thrombotic prevention initially contraindicated secondary to elevated bleeding risk.  8/28 Trauma screening bilateral lower extremity venous duplex negative for above knee DVT.  8/28 Prophylactic dose enoxaparin initiated.      Depression- (present on admission)  Assessment & Plan  Chronic condition treated with Effexor.  Resumed maintenance medication on admission.    Trauma- (present on admission)  Assessment & Plan  Mechanical ground-level fall.    Intracranial hemorrhage with systemic anticoagulation.  Trauma Yellow Transfer Activation from [facility].  Macario Winn MD. Trauma Surgery.    BPH (benign prostatic hyperplasia)- (present on admission)  Assessment & Plan  Chronic condition treated with finasteride and tamsulosin.  Resumed maintenance medication on admission.    Dyslipidemia- (present on admission)  Assessment & Plan  Chronic condition treated with atorvastatin.  Resumed maintenance medication on admission.        Discussed patient condition with RN, RT, Pharmacy, , and neurosurgery and trauma surgery.  CRITICAL CARE TIME EXCLUDING PROCEDURES: 40  minutes

## 2022-09-01 NOTE — PROGRESS NOTES
Neil from Lab called with critical result of AST of 1041 at 0650. Critical lab result read back to Neil. Within paramettes ( Trending down).

## 2022-09-01 NOTE — PROGRESS NOTES
Cortrak Placement    1310:  Tube Team verified patient name and medical record number prior to tube placement.  Cortrak tube (55 inches, 10 Burkinan) placed at 60 cm in right nare.  Per Cortrak picture, tube appears to be in the stomach.  Nursing Instructions: Awaiting KUB to confirm placement before use for medications or feeding. Once placement confirmed, flush tube with 30 ml of water, and then remove and save stylet, in patient medication drawer.     1440:  Abdomen xray shows cortrak at GE juncture.  Advanced cortrak to 100cm.  Cortrak appears to be in small bowel.  Abdomen xray ordered.

## 2022-09-02 ENCOUNTER — APPOINTMENT (OUTPATIENT)
Dept: RADIOLOGY | Facility: MEDICAL CENTER | Age: 62
DRG: 003 | End: 2022-09-02
Attending: SURGERY
Payer: OTHER GOVERNMENT

## 2022-09-02 LAB
ALBUMIN SERPL BCP-MCNC: 4.1 G/DL (ref 3.2–4.9)
ALBUMIN/GLOB SERPL: 1.4 G/DL
ALP SERPL-CCNC: 83 U/L (ref 30–99)
ALT SERPL-CCNC: 23 U/L (ref 2–50)
ANION GAP SERPL CALC-SCNC: 14 MMOL/L (ref 7–16)
AST SERPL-CCNC: 22 U/L (ref 12–45)
BASE EXCESS BLDA CALC-SCNC: 3 MMOL/L (ref -4–3)
BASOPHILS # BLD AUTO: 0.6 % (ref 0–1.8)
BASOPHILS # BLD: 0.07 K/UL (ref 0–0.12)
BILIRUB SERPL-MCNC: 0.4 MG/DL (ref 0.1–1.5)
BODY TEMPERATURE: ABNORMAL DEGREES
BUN SERPL-MCNC: 23 MG/DL (ref 8–22)
CALCIUM SERPL-MCNC: 9.8 MG/DL (ref 8.5–10.5)
CHLORIDE SERPL-SCNC: 102 MMOL/L (ref 96–112)
CO2 BLDA-SCNC: 27 MMOL/L (ref 20–33)
CO2 SERPL-SCNC: 23 MMOL/L (ref 20–33)
CREAT SERPL-MCNC: 0.48 MG/DL (ref 0.5–1.4)
DELSYS IDSYS: ABNORMAL
END TIDAL CARBON DIOXIDE IECO2: 32 MMHG
EOSINOPHIL # BLD AUTO: 0.17 K/UL (ref 0–0.51)
EOSINOPHIL NFR BLD: 1.4 % (ref 0–6.9)
ERYTHROCYTE [DISTWIDTH] IN BLOOD BY AUTOMATED COUNT: 45.1 FL (ref 35.9–50)
GFR SERPLBLD CREATININE-BSD FMLA CKD-EPI: 117 ML/MIN/1.73 M 2
GLOBULIN SER CALC-MCNC: 3 G/DL (ref 1.9–3.5)
GLUCOSE SERPL-MCNC: 137 MG/DL (ref 65–99)
HCO3 BLDA-SCNC: 26.2 MMOL/L (ref 17–25)
HCT VFR BLD AUTO: 35.7 % (ref 42–52)
HGB BLD-MCNC: 12 G/DL (ref 14–18)
HOROWITZ INDEX BLDA+IHG-RTO: 230 MM[HG]
IMM GRANULOCYTES # BLD AUTO: 0.11 K/UL (ref 0–0.11)
IMM GRANULOCYTES NFR BLD AUTO: 0.9 % (ref 0–0.9)
LYMPHOCYTES # BLD AUTO: 1.87 K/UL (ref 1–4.8)
LYMPHOCYTES NFR BLD: 15.8 % (ref 22–41)
MCH RBC QN AUTO: 29.9 PG (ref 27–33)
MCHC RBC AUTO-ENTMCNC: 33.6 G/DL (ref 33.7–35.3)
MCV RBC AUTO: 88.8 FL (ref 81.4–97.8)
MODE IMODE: ABNORMAL
MONOCYTES # BLD AUTO: 1.42 K/UL (ref 0–0.85)
MONOCYTES NFR BLD AUTO: 12 % (ref 0–13.4)
NEUTROPHILS # BLD AUTO: 8.2 K/UL (ref 1.82–7.42)
NEUTROPHILS NFR BLD: 69.3 % (ref 44–72)
NRBC # BLD AUTO: 0 K/UL
NRBC BLD-RTO: 0 /100 WBC
O2/TOTAL GAS SETTING VFR VENT: 30 %
PCO2 BLDA: 34.2 MMHG (ref 26–37)
PEEP END EXPIRATORY PRESSURE IPEEP: 8 CMH20
PERCENT MINUTE VOLUME IPMV: 130
PH BLDA: 7.49 [PH] (ref 7.4–7.5)
PLATELET # BLD AUTO: 426 K/UL (ref 164–446)
PMV BLD AUTO: 11.2 FL (ref 9–12.9)
PO2 BLDA: 69 MMHG (ref 64–87)
POTASSIUM SERPL-SCNC: 3.3 MMOL/L (ref 3.6–5.5)
PROT SERPL-MCNC: 7.1 G/DL (ref 6–8.2)
RBC # BLD AUTO: 4.02 M/UL (ref 4.7–6.1)
SAO2 % BLDA: 95 % (ref 93–99)
SODIUM SERPL-SCNC: 139 MMOL/L (ref 135–145)
SPECIMEN DRAWN FROM PATIENT: ABNORMAL
WBC # BLD AUTO: 11.8 K/UL (ref 4.8–10.8)

## 2022-09-02 PROCEDURE — 99291 CRITICAL CARE FIRST HOUR: CPT | Performed by: SURGERY

## 2022-09-02 PROCEDURE — A9270 NON-COVERED ITEM OR SERVICE: HCPCS | Performed by: SURGERY

## 2022-09-02 PROCEDURE — 700102 HCHG RX REV CODE 250 W/ 637 OVERRIDE(OP): Performed by: NEUROLOGICAL SURGERY

## 2022-09-02 PROCEDURE — 80053 COMPREHEN METABOLIC PANEL: CPT

## 2022-09-02 PROCEDURE — 770022 HCHG ROOM/CARE - ICU (200)

## 2022-09-02 PROCEDURE — 700102 HCHG RX REV CODE 250 W/ 637 OVERRIDE(OP): Performed by: SURGERY

## 2022-09-02 PROCEDURE — 36600 WITHDRAWAL OF ARTERIAL BLOOD: CPT

## 2022-09-02 PROCEDURE — A9270 NON-COVERED ITEM OR SERVICE: HCPCS | Performed by: NEUROLOGICAL SURGERY

## 2022-09-02 PROCEDURE — 82803 BLOOD GASES ANY COMBINATION: CPT

## 2022-09-02 PROCEDURE — 85025 COMPLETE CBC W/AUTO DIFF WBC: CPT

## 2022-09-02 PROCEDURE — 94003 VENT MGMT INPAT SUBQ DAY: CPT

## 2022-09-02 PROCEDURE — 71045 X-RAY EXAM CHEST 1 VIEW: CPT

## 2022-09-02 PROCEDURE — 97530 THERAPEUTIC ACTIVITIES: CPT

## 2022-09-02 PROCEDURE — 700111 HCHG RX REV CODE 636 W/ 250 OVERRIDE (IP): Performed by: NEUROLOGICAL SURGERY

## 2022-09-02 PROCEDURE — 94799 UNLISTED PULMONARY SVC/PX: CPT

## 2022-09-02 PROCEDURE — 700111 HCHG RX REV CODE 636 W/ 250 OVERRIDE (IP): Performed by: SURGERY

## 2022-09-02 RX ORDER — AMANTADINE HYDROCHLORIDE 100 MG/1
100 TABLET ORAL 2 TIMES DAILY
Status: DISCONTINUED | OUTPATIENT
Start: 2022-09-02 | End: 2022-09-09 | Stop reason: HOSPADM

## 2022-09-02 RX ORDER — ACETAMINOPHEN 650 MG/1
650 SUPPOSITORY RECTAL EVERY 4 HOURS PRN
Status: DISCONTINUED | OUTPATIENT
Start: 2022-09-02 | End: 2022-09-03

## 2022-09-02 RX ORDER — ACETAMINOPHEN 325 MG/1
650 TABLET ORAL EVERY 4 HOURS PRN
Status: DISCONTINUED | OUTPATIENT
Start: 2022-09-02 | End: 2022-09-03

## 2022-09-02 RX ADMIN — CARVEDILOL 12.5 MG: 12.5 TABLET, FILM COATED ORAL at 17:20

## 2022-09-02 RX ADMIN — DOCUSATE SODIUM 50 MG AND SENNOSIDES 8.6 MG 1 TABLET: 8.6; 5 TABLET, FILM COATED ORAL at 20:15

## 2022-09-02 RX ADMIN — MINERAL OIL, PETROLATUM 1 APPLICATION: 425; 573 OINTMENT OPHTHALMIC at 05:52

## 2022-09-02 RX ADMIN — MINERAL OIL, PETROLATUM 1 APPLICATION: 425; 573 OINTMENT OPHTHALMIC at 22:18

## 2022-09-02 RX ADMIN — HYDRALAZINE HYDROCHLORIDE 25 MG: 25 TABLET, FILM COATED ORAL at 05:52

## 2022-09-02 RX ADMIN — LABETALOL HYDROCHLORIDE 10 MG: 5 INJECTION, SOLUTION INTRAVENOUS at 14:47

## 2022-09-02 RX ADMIN — POTASSIUM BICARBONATE 50 MEQ: 977.5 TABLET, EFFERVESCENT ORAL at 11:31

## 2022-09-02 RX ADMIN — CARVEDILOL 12.5 MG: 12.5 TABLET, FILM COATED ORAL at 05:52

## 2022-09-02 RX ADMIN — VENLAFAXINE 75 MG: 75 TABLET ORAL at 17:20

## 2022-09-02 RX ADMIN — AMLODIPINE BESYLATE 10 MG: 10 TABLET ORAL at 05:52

## 2022-09-02 RX ADMIN — AMANTADINE HYDROCHLORIDE 100 MG: 100 TABLET ORAL at 11:31

## 2022-09-02 RX ADMIN — FAMOTIDINE 20 MG: 20 TABLET, FILM COATED ORAL at 05:52

## 2022-09-02 RX ADMIN — HYDROMORPHONE HYDROCHLORIDE 2 MG: 2 TABLET ORAL at 20:14

## 2022-09-02 RX ADMIN — HYDRALAZINE HYDROCHLORIDE 25 MG: 25 TABLET, FILM COATED ORAL at 14:23

## 2022-09-02 RX ADMIN — AMANTADINE HYDROCHLORIDE 100 MG: 100 TABLET ORAL at 05:53

## 2022-09-02 RX ADMIN — VENLAFAXINE 75 MG: 75 TABLET ORAL at 05:52

## 2022-09-02 RX ADMIN — HYDRALAZINE HYDROCHLORIDE 25 MG: 25 TABLET, FILM COATED ORAL at 22:18

## 2022-09-02 RX ADMIN — FAMOTIDINE 20 MG: 20 TABLET, FILM COATED ORAL at 17:20

## 2022-09-02 RX ADMIN — LABETALOL HYDROCHLORIDE 10 MG: 5 INJECTION, SOLUTION INTRAVENOUS at 00:07

## 2022-09-02 RX ADMIN — ATORVASTATIN CALCIUM 80 MG: 80 TABLET, FILM COATED ORAL at 17:20

## 2022-09-02 RX ADMIN — HYDRALAZINE HYDROCHLORIDE 10 MG: 20 INJECTION INTRAMUSCULAR; INTRAVENOUS at 15:49

## 2022-09-02 RX ADMIN — MINERAL OIL, PETROLATUM 1 APPLICATION: 425; 573 OINTMENT OPHTHALMIC at 14:23

## 2022-09-02 RX ADMIN — HYDRALAZINE HYDROCHLORIDE 10 MG: 20 INJECTION INTRAMUSCULAR; INTRAVENOUS at 17:32

## 2022-09-02 RX ADMIN — ENOXAPARIN SODIUM 30 MG: 30 INJECTION SUBCUTANEOUS at 05:51

## 2022-09-02 RX ADMIN — ENOXAPARIN SODIUM 30 MG: 30 INJECTION SUBCUTANEOUS at 17:20

## 2022-09-02 ASSESSMENT — PAIN DESCRIPTION - PAIN TYPE
TYPE: ACUTE PAIN

## 2022-09-02 ASSESSMENT — COGNITIVE AND FUNCTIONAL STATUS - GENERAL
MOBILITY SCORE: 6
TURNING FROM BACK TO SIDE WHILE IN FLAT BAD: UNABLE
CLIMB 3 TO 5 STEPS WITH RAILING: TOTAL
MOVING FROM LYING ON BACK TO SITTING ON SIDE OF FLAT BED: UNABLE
MOVING TO AND FROM BED TO CHAIR: UNABLE
STANDING UP FROM CHAIR USING ARMS: TOTAL
WALKING IN HOSPITAL ROOM: TOTAL
SUGGESTED CMS G CODE MODIFIER MOBILITY: CN

## 2022-09-02 ASSESSMENT — GAIT ASSESSMENTS: GAIT LEVEL OF ASSIST: UNABLE TO PARTICIPATE

## 2022-09-02 ASSESSMENT — FIBROSIS 4 INDEX: FIB4 SCORE: 0.96

## 2022-09-02 NOTE — PROGRESS NOTES
Neurosurgery Progress Note    Subjective:  62 y.o. male on Eliquis (reversal agent given) who presented 2022 with an acute left SDH and transfer from an outside hospital. Initially presented with GCS 15. Neuro exam subsequently declined with worsening expressive aphasia, agitation, no command following. Repeat CT with progression of SDH and worsening MLS.     POD#8 Left craniotomy for evacuation of acute SDH.   Post-op head CT stable x 3  No acute events overnight.   Patient remains intubated, sedation weaned yesterday  Unable to perform MRI due to SCS  EEG completed and reviewed, no seizure activity appreciated     Exam:  Intubated  Soft restraints in place.    Face symmetric.  Spontaneous eye opening.   Pupils 2mm, PERRL.  Tracking  Withdraws x4  Following commands in LLE  Moves upper extremities spontaneously   +grimace to central and peripheral stimulation.   +gag, +cough.   Incision CDI with staples in place      BP  Min: 128/76  Max: 173/106  Pulse  Av.7  Min: 64  Max: 90  Resp  Av.2  Min: 12  Max: 27  Monitored Temp 2  Av.5 °C (99.5 °F)  Min: 36.9 °C (98.4 °F)  Max: 37.8 °C (100 °F)  SpO2  Av.5 %  Min: 92 %  Max: 100 %    No data recorded    Recent Labs     22  0435   WBC 8.8 10.1 11.8*   RBC 3.68* 4.15* 4.02*   HEMOGLOBIN 11.1* 12.5* 12.0*   HEMATOCRIT 32.8* 37.0* 35.7*   MCV 89.1 89.2 88.8   MCH 30.2 30.1 29.9   MCHC 33.8 33.8 33.6*   RDW 45.3 45.5 45.1   PLATELETCT 319 373 426   MPV 11.4 10.9 11.2       Recent Labs     22  0435   SODIUM 137 136 139   POTASSIUM 3.6 3.6 3.3*   CHLORIDE 103 101 102   CO2 22 26 23   GLUCOSE 132* 133* 137*   BUN 17 21 23*   CREATININE 0.53 0.63 0.48*   CALCIUM 9.7 10.1 9.8                     Intake/Output                         09700 - 22 - 22 0659     1900-0659 Total 1900-0659 Total                 Intake    I.V.  256.1   350 606.1  100  -- 100    Propofol Volume 6.1 -- 6.1 -- -- --    Volume (mL) (0.9 % NaCl with KCl 20 mEq infusion) 250 350 600 100 -- 100    Other  120  280 400  --  -- --    Medications (PO/Enteral Liquids) 120 280 400 -- -- --    NG/GT  480  720 1200  240  -- 240    Intake (mL) ([REMOVED] Enteral Tube 08/26/22 Cortrak - Gastric 10 Fr. Right nare) 360 -- 360 -- -- --    Intake (mL) (Enteral Tube 09/01/22 Cortrak - Small Bowel/Transpyloric 10 Fr. Right nare) 120 720 840 240 -- 240    Total Intake 856.1 1350 2206.1 340 -- 340       Output    Urine  910  900 1810  340  -- 340    Output (mL) (Urethral Catheter Non-latex;Temperature probe 16 Fr.)  340 -- 340    Stool  --  -- --  --  -- --    Number of Times Stooled 3 x 1 x 4 x -- -- --    Total Output  340 -- 340       Net I/O     -54 450 396.1 0 -- 0              Intake/Output Summary (Last 24 hours) at 9/2/2022 1139  Last data filed at 9/2/2022 1000  Gross per 24 hour   Intake 2080 ml   Output 1815 ml   Net 265 ml               amantadine  100 mg BID    hydrALAZINE  25 mg Q8HRS    amLODIPine  10 mg Q DAY    carvedilol  12.5 mg BID    enoxaparin (LOVENOX) injection  30 mg Q12HRS    Pharmacy  1 Each PHARMACY TO DOSE    Respiratory Therapy Consult   Continuous RT    famotidine  20 mg BID    Metoprolol Tartrate  5 mg Q4HRS PRN    HYDROmorphone  1 mg Q3HRS PRN    Or    HYDROmorphone  2 mg Q3HRS PRN    ondansetron  4 mg Q4HRS PRN    bisacodyl  10 mg Q24HRS PRN    sodium phosphate  1 Each Once PRN    labetalol  10 mg Q4HRS PRN    Pharmacy Consult Request  1 Each PHARMACY TO DOSE    MD ALERT...DO NOT ADMINISTER NSAIDS or ASPIRIN unless ORDERED By Neurosurgery  1 Each PRN    hydrALAZINE  10 mg Q HOUR PRN    artificial tears  1 Application Q8HRS    0.9 % NaCl with KCl 20 mEq 1,000 mL   Continuous    atorvastatin  80 mg Q EVENING    cloNIDine  0.1 mg Q4HRS PRN    magnesium hydroxide  30 mL DAILY    ondansetron  4 mg Q4HRS PRN    polyethylene glycol/lytes   1 Packet BID    senna-docusate  1 Tablet Nightly    senna-docusate  1 Tablet Q24HRS PRN    docusate sodium  100 mg BID    venlafaxine  75 mg BID       Assessment and Plan:  Hospital day #10  POD#8  Head CT reviewed, no re accumulation of hematoma or compression on the brain  Q4 neuro checks.    Keppra stopped POD7  OK for tube feeds.  Brain MRI cancelled due to incompatible SCS  EEG negative for ongoing seizure activity  No further surgical intervention anticipated  OK for BP goals per primary team   Per Dr Luther, options at this point include tracheostomy and PEG with continued time for recovery versus discussion with palliative care  Remove staples POD #14   NS to sign off, please re-consult with questions, concerns, or changes       Chemical prophylactic DVT therapy: Yes  Start date/time: OK from a neurosurgery standpoint

## 2022-09-02 NOTE — CARE PLAN
The patient is Watcher - Medium risk of patient condition declining or worsening    Shift Goals  Clinical Goals: Systolic blood pressures and heart rate within parameters.  Patient Goals: FELIPA  Family Goals: FELIPA    Progress made toward(s) clinical / shift goals:  Family updated on plan of care.     Problem: Pain - Standard  Goal: Alleviation of pain or a reduction in pain to the patient’s comfort goal  Outcome: Progressing     Problem: Knowledge Deficit - Standard  Goal: Patient and family/care givers will demonstrate understanding of plan of care, disease process/condition, diagnostic tests and medications  Outcome: Progressing     Problem: Safety - Medical Restraint  Goal: Remains free of injury from restraints (Restraint for Interference with Medical Device)  Outcome: Progressing     Problem: Skin Integrity  Goal: Skin integrity is maintained or improved  Outcome: Progressing     Problem: Fall Risk  Goal: Patient will remain free from falls  Outcome: Progressing       Patient is not progressing towards the following goals:    Problem: Safety - Medical Restraint  Goal: Free from restraint(s) (Restraint for Interference with Medical Device)  Outcome: Not Progressing   Patient still in restraints due to risk for ETT removal.

## 2022-09-02 NOTE — THERAPY
Physical Therapy   Daily Treatment     Patient Name: Karan Wiley  Age:  62 y.o., Sex:  male  Medical Record #: [id number]  Today's Date: 9/2/2022     Precautions  Precautions: Fall Risk  Comments: ETT    Assessment    Pt received in bed and alert throughout PT tx session. Pt continues to be intubated and intermittently following commands. Pt lifting BLE against gravity volitionally in bed. Pt required total A for bed mobility to achieve seated position. Pt was able to progress with sitting balance with SBA using UE for support consistently. Pt was limited by fatigue and ended up leaning himself to lay back down. Will continue to follow for acute PT to progress.    Plan    Continue current treatment plan.    DC Equipment Recommendations: Unable to determine at this time  Discharge Recommendations: Recommend post-acute placement for additional physical therapy services prior to discharge home     Objective       09/02/22 1001   Precautions   Precautions Fall Risk   Comments ETT   Vitals   O2 Delivery Device Ventilator   Vitals Comments Vitals stable during session   Pain 0 - 10 Group   Therapist Pain Assessment During Activity;Nurse Notified  (no signs of pain during session)   Cognition    Speech/ Communication Intubated / Trached   Level of Consciousness Alert   Ability To Follow Commands Unable to Follow 1 Step Commands   Comments Able to track visually and make eye contact. Intermittent following of commands, better with visual and tactile cues than verbal cues. Moving BLE volitionally, R>L   Neuro-Muscular Treatments   Neuro-Muscular Treatments Weight Shift Left;Weight Shift Right;Anterior weight shift;Verbal Cuing   Comments Able to use UE to maintain seated balance this session via propping back on extended arms.   Balance   Sitting Balance (Static) Fair -   Sitting Balance (Dynamic) Poor +   Weight Shift Sitting Fair   Skilled Intervention Verbal Cuing;Tactile Cuing   Comments Initially required min to mod  A for maintaining sitting balance. Once pt using arms to sit, he was able to maintain with close SBA without loss of balance.   Gait Analysis   Gait Level Of Assist Unable to Participate   Bed Mobility    Supine to Sit Total Assist   Sit to Supine Total Assist   Scooting Total Assist   Skilled Intervention Tactile Cuing;Facilitation   Comments 2P assist for safety.   ICU Target Mobility Level   ICU Mobility - Targeted Level Level 2   How much difficulty does the patient currently have...   Turning over in bed (including adjusting bedclothes, sheets and blankets)? 1   Sitting down on and standing up from a chair with arms (e.g., wheelchair, bedside commode, etc.) 1   Moving from lying on back to sitting on the side of the bed? 1   How much help from another person does the patient currently need...   Moving to and from a bed to a chair (including a wheelchair)? 1   Need to walk in a hospital room? 1   Climbing 3-5 steps with a railing? 1   6 clicks Mobility Score 6   Activity Tolerance   Sitting Edge of Bed 12 min   Short Term Goals    Short Term Goal # 1 pt will be able to complete supine<>Sitting with mod assist in 6tx in order to demonstrate progress   Goal Outcome # 1 goal not met   Short Term Goal # 2 pt will be able to sit EOB with fair - balance in 6tx in order to decrease fall risk   Goal Outcome # 2 Progressing as expected  (will keep unmet to assess for consistency)   Short Term Goal # 3 pt will be able to complete functional transfers with mod assist in 6tx in order to increase OOB time   Goal Outcome # 3 Goal not met   Anticipated Discharge Equipment and Recommendations   DC Equipment Recommendations Unable to determine at this time   Discharge Recommendations Recommend post-acute placement for additional physical therapy services prior to discharge home   Interdisciplinary Plan of Care Collaboration   IDT Collaboration with  Nursing   Patient Position at End of Therapy In Bed;Wrist Restraints  Applied;Call Light within Reach   Collaboration Comments RN present during session   Session Information   Date / Session Number  9/2-3(3/3, 9/4)

## 2022-09-02 NOTE — DIETARY
Nutrition support weekly update:  Day 8 of admit.  Karan Wiley is a 62 y.o. male with admitting DX of Trauma, subdural hematoma.  Tube feeding initiated on 8/26. Current TF via small bowel cortrak is Peptamen Intense VHP at goal rate of 60 ml/hour providing 1440 kcal, 132 gm protein, 109 gm CHO, and 1209 ml free water in 24 hours.    Assessment:  Weight 9/2: 98.1 kg via bed scale. Wt stable from admit.  Re-estimate of nutritional needs not indicated at this time.     Evaluation:   Tube feed with Peptamen Intense VHP running at goal rate of 60 ml/hour.  Labs 9/2 include Na 139, K+ 3.3 (L), Glu 137 (H), BUN 23 (H), Creat 0.48 (L), Alb 4.1.   Medications include Amantadine, Norvasc, Lipitor. Propofol discontinued today.  LBM 9/2, loose.  Pt remains intubated. May need trach per MD note.  Current specialized formula remains appropriate for critically ill obese pt.    Malnutrition risk: No new criteria noted.    Recommendations/Plan:  Continue Peptamen Intense VHP at goal rate of 60 ml/hour.  Fluids per MD.  Monitor weight.    Problem: Nutritional:  Goal: Nutrition support tolerated and meeting greater than 85% of estimated needs  Outcome: Met    RD following

## 2022-09-02 NOTE — DISCHARGE PLANNING
Family conference held with wife, sister and son. Dr. Cortez updated family and discussed plan and goals of care. Family is agreeable with trach. MD answered & addressed all their questions/concerns. LMSW then met with family after and discussed anticipated dc options more in dept. Family agreeable with LTAC or IPR depending on what patient will need.      [facility] choice - [facility]   IP[facility] choice - [facility]   Choice forms obtained.   Will forward to DPA once final determination of dc plan will be.     Alesia then inquired about medical & financial POA. LMSW educated her that at this time POA can not be completed. Educated the family on temp/emergency guardianship which they voiced understanding to. Family plans to gt temp guardianship to help manage pt's finances while he recovers.     LMSW provided family with contact number in case future needs arise.   Meeting ended.

## 2022-09-02 NOTE — CARE PLAN
The patient is Watcher - Medium risk of patient condition declining or worsening    Shift Goals  Clinical Goals: hemodynamic stability, mobility  Patient Goals: FELIPA  Family Goals: FELIPA    Progress made toward(s) clinical / shift goals:    Problem: Pain - Standard  Goal: Alleviation of pain or a reduction in pain to the patient’s comfort goal  Outcome: Progressing     Problem: Knowledge Deficit - Standard  Goal: Patient and family/care givers will demonstrate understanding of plan of care, disease process/condition, diagnostic tests and medications  Outcome: Progressing     Problem: Safety - Medical Restraint  Goal: Remains free of injury from restraints (Restraint for Interference with Medical Device)  Outcome: Progressing     Problem: Skin Integrity  Goal: Skin integrity is maintained or improved  Outcome: Progressing       Patient is not progressing towards the following goals:

## 2022-09-03 ENCOUNTER — APPOINTMENT (OUTPATIENT)
Dept: RADIOLOGY | Facility: MEDICAL CENTER | Age: 62
DRG: 003 | End: 2022-09-03
Attending: SURGERY
Payer: OTHER GOVERNMENT

## 2022-09-03 LAB
ALBUMIN SERPL BCP-MCNC: 4.1 G/DL (ref 3.2–4.9)
ALBUMIN/GLOB SERPL: 1.3 G/DL
ALP SERPL-CCNC: 85 U/L (ref 30–99)
ALT SERPL-CCNC: 32 U/L (ref 2–50)
ANION GAP SERPL CALC-SCNC: 14 MMOL/L (ref 7–16)
AST SERPL-CCNC: 27 U/L (ref 12–45)
BASOPHILS # BLD AUTO: 0.5 % (ref 0–1.8)
BASOPHILS # BLD: 0.07 K/UL (ref 0–0.12)
BILIRUB SERPL-MCNC: 0.4 MG/DL (ref 0.1–1.5)
BUN SERPL-MCNC: 25 MG/DL (ref 8–22)
CALCIUM SERPL-MCNC: 9.9 MG/DL (ref 8.5–10.5)
CHLORIDE SERPL-SCNC: 102 MMOL/L (ref 96–112)
CO2 SERPL-SCNC: 23 MMOL/L (ref 20–33)
CREAT SERPL-MCNC: 0.55 MG/DL (ref 0.5–1.4)
EOSINOPHIL # BLD AUTO: 0.11 K/UL (ref 0–0.51)
EOSINOPHIL NFR BLD: 0.8 % (ref 0–6.9)
ERYTHROCYTE [DISTWIDTH] IN BLOOD BY AUTOMATED COUNT: 44.7 FL (ref 35.9–50)
GFR SERPLBLD CREATININE-BSD FMLA CKD-EPI: 112 ML/MIN/1.73 M 2
GLOBULIN SER CALC-MCNC: 3.2 G/DL (ref 1.9–3.5)
GLUCOSE SERPL-MCNC: 135 MG/DL (ref 65–99)
HCT VFR BLD AUTO: 37.9 % (ref 42–52)
HGB BLD-MCNC: 12.7 G/DL (ref 14–18)
IMM GRANULOCYTES # BLD AUTO: 0.13 K/UL (ref 0–0.11)
IMM GRANULOCYTES NFR BLD AUTO: 0.9 % (ref 0–0.9)
LYMPHOCYTES # BLD AUTO: 1.6 K/UL (ref 1–4.8)
LYMPHOCYTES NFR BLD: 10.9 % (ref 22–41)
MCH RBC QN AUTO: 30 PG (ref 27–33)
MCHC RBC AUTO-ENTMCNC: 33.5 G/DL (ref 33.7–35.3)
MCV RBC AUTO: 89.4 FL (ref 81.4–97.8)
MONOCYTES # BLD AUTO: 1.82 K/UL (ref 0–0.85)
MONOCYTES NFR BLD AUTO: 12.4 % (ref 0–13.4)
NEUTROPHILS # BLD AUTO: 10.92 K/UL (ref 1.82–7.42)
NEUTROPHILS NFR BLD: 74.5 % (ref 44–72)
NRBC # BLD AUTO: 0 K/UL
NRBC BLD-RTO: 0 /100 WBC
PLATELET # BLD AUTO: 491 K/UL (ref 164–446)
PMV BLD AUTO: 11.4 FL (ref 9–12.9)
POTASSIUM SERPL-SCNC: 3.4 MMOL/L (ref 3.6–5.5)
PROT SERPL-MCNC: 7.3 G/DL (ref 6–8.2)
RBC # BLD AUTO: 4.24 M/UL (ref 4.7–6.1)
SODIUM SERPL-SCNC: 139 MMOL/L (ref 135–145)
WBC # BLD AUTO: 14.7 K/UL (ref 4.8–10.8)

## 2022-09-03 PROCEDURE — 85025 COMPLETE CBC W/AUTO DIFF WBC: CPT

## 2022-09-03 PROCEDURE — 700111 HCHG RX REV CODE 636 W/ 250 OVERRIDE (IP): Performed by: NEUROLOGICAL SURGERY

## 2022-09-03 PROCEDURE — 71045 X-RAY EXAM CHEST 1 VIEW: CPT

## 2022-09-03 PROCEDURE — 80053 COMPREHEN METABOLIC PANEL: CPT

## 2022-09-03 PROCEDURE — 99291 CRITICAL CARE FIRST HOUR: CPT | Performed by: SURGERY

## 2022-09-03 PROCEDURE — A9270 NON-COVERED ITEM OR SERVICE: HCPCS | Performed by: SURGERY

## 2022-09-03 PROCEDURE — 700102 HCHG RX REV CODE 250 W/ 637 OVERRIDE(OP): Performed by: SURGERY

## 2022-09-03 PROCEDURE — 94003 VENT MGMT INPAT SUBQ DAY: CPT

## 2022-09-03 PROCEDURE — 700111 HCHG RX REV CODE 636 W/ 250 OVERRIDE (IP): Performed by: SURGERY

## 2022-09-03 PROCEDURE — 770022 HCHG ROOM/CARE - ICU (200)

## 2022-09-03 PROCEDURE — 94799 UNLISTED PULMONARY SVC/PX: CPT

## 2022-09-03 PROCEDURE — A9270 NON-COVERED ITEM OR SERVICE: HCPCS | Performed by: NEUROLOGICAL SURGERY

## 2022-09-03 PROCEDURE — 700102 HCHG RX REV CODE 250 W/ 637 OVERRIDE(OP): Performed by: NEUROLOGICAL SURGERY

## 2022-09-03 RX ORDER — ACETAMINOPHEN 325 MG/1
650 TABLET ORAL EVERY 4 HOURS PRN
Status: DISCONTINUED | OUTPATIENT
Start: 2022-09-03 | End: 2022-09-09 | Stop reason: HOSPADM

## 2022-09-03 RX ORDER — ROCURONIUM BROMIDE 10 MG/ML
100 INJECTION, SOLUTION INTRAVENOUS
Status: DISCONTINUED | OUTPATIENT
Start: 2022-09-03 | End: 2022-09-04

## 2022-09-03 RX ORDER — ACETAMINOPHEN 650 MG/1
650 SUPPOSITORY RECTAL EVERY 4 HOURS PRN
Status: DISCONTINUED | OUTPATIENT
Start: 2022-09-03 | End: 2022-09-05

## 2022-09-03 RX ADMIN — HYDROMORPHONE HYDROCHLORIDE 2 MG: 2 TABLET ORAL at 22:18

## 2022-09-03 RX ADMIN — MINERAL OIL, PETROLATUM 1 APPLICATION: 425; 573 OINTMENT OPHTHALMIC at 14:52

## 2022-09-03 RX ADMIN — ENOXAPARIN SODIUM 30 MG: 30 INJECTION SUBCUTANEOUS at 05:08

## 2022-09-03 RX ADMIN — CARVEDILOL 12.5 MG: 12.5 TABLET, FILM COATED ORAL at 05:07

## 2022-09-03 RX ADMIN — ACETAMINOPHEN 650 MG: 325 TABLET ORAL at 23:58

## 2022-09-03 RX ADMIN — AMANTADINE HYDROCHLORIDE 100 MG: 100 TABLET ORAL at 05:06

## 2022-09-03 RX ADMIN — VENLAFAXINE 75 MG: 75 TABLET ORAL at 05:08

## 2022-09-03 RX ADMIN — DOCUSATE SODIUM 50 MG AND SENNOSIDES 8.6 MG 1 TABLET: 8.6; 5 TABLET, FILM COATED ORAL at 22:18

## 2022-09-03 RX ADMIN — HYDROMORPHONE HYDROCHLORIDE 2 MG: 2 TABLET ORAL at 01:08

## 2022-09-03 RX ADMIN — HYDROMORPHONE HYDROCHLORIDE 2 MG: 2 TABLET ORAL at 14:52

## 2022-09-03 RX ADMIN — FAMOTIDINE 20 MG: 20 TABLET, FILM COATED ORAL at 05:06

## 2022-09-03 RX ADMIN — CARVEDILOL 12.5 MG: 12.5 TABLET, FILM COATED ORAL at 17:00

## 2022-09-03 RX ADMIN — HYDROMORPHONE HYDROCHLORIDE 2 MG: 2 TABLET ORAL at 08:44

## 2022-09-03 RX ADMIN — HYDRALAZINE HYDROCHLORIDE 25 MG: 25 TABLET, FILM COATED ORAL at 14:50

## 2022-09-03 RX ADMIN — PROPOFOL 20 MCG/KG/MIN: 10 INJECTION, EMULSION INTRAVENOUS at 11:23

## 2022-09-03 RX ADMIN — AMLODIPINE BESYLATE 10 MG: 10 TABLET ORAL at 05:06

## 2022-09-03 RX ADMIN — HYDRALAZINE HYDROCHLORIDE 10 MG: 20 INJECTION INTRAMUSCULAR; INTRAVENOUS at 23:46

## 2022-09-03 RX ADMIN — HYDRALAZINE HYDROCHLORIDE 25 MG: 25 TABLET, FILM COATED ORAL at 05:07

## 2022-09-03 RX ADMIN — POTASSIUM BICARBONATE 50 MEQ: 977.5 TABLET, EFFERVESCENT ORAL at 11:51

## 2022-09-03 RX ADMIN — HYDRALAZINE HYDROCHLORIDE 10 MG: 20 INJECTION INTRAMUSCULAR; INTRAVENOUS at 15:39

## 2022-09-03 RX ADMIN — MINERAL OIL, PETROLATUM 1 APPLICATION: 425; 573 OINTMENT OPHTHALMIC at 22:18

## 2022-09-03 RX ADMIN — ATORVASTATIN CALCIUM 80 MG: 80 TABLET, FILM COATED ORAL at 17:00

## 2022-09-03 RX ADMIN — VENLAFAXINE 75 MG: 75 TABLET ORAL at 17:00

## 2022-09-03 RX ADMIN — HYDROMORPHONE HYDROCHLORIDE 2 MG: 2 TABLET ORAL at 04:21

## 2022-09-03 RX ADMIN — ACETAMINOPHEN 650 MG: 325 TABLET ORAL at 11:51

## 2022-09-03 RX ADMIN — FAMOTIDINE 20 MG: 20 TABLET, FILM COATED ORAL at 17:00

## 2022-09-03 RX ADMIN — HYDRALAZINE HYDROCHLORIDE 10 MG: 20 INJECTION INTRAMUSCULAR; INTRAVENOUS at 01:07

## 2022-09-03 RX ADMIN — MINERAL OIL, PETROLATUM 1 APPLICATION: 425; 573 OINTMENT OPHTHALMIC at 22:19

## 2022-09-03 RX ADMIN — HYDRALAZINE HYDROCHLORIDE 25 MG: 25 TABLET, FILM COATED ORAL at 22:18

## 2022-09-03 RX ADMIN — ENOXAPARIN SODIUM 30 MG: 30 INJECTION SUBCUTANEOUS at 17:00

## 2022-09-03 ASSESSMENT — PAIN DESCRIPTION - PAIN TYPE
TYPE: ACUTE PAIN

## 2022-09-03 ASSESSMENT — FIBROSIS 4 INDEX: FIB4 SCORE: 0.67

## 2022-09-03 NOTE — PROGRESS NOTES
Trauma / Surgical Daily Progress Note    Date of Service  9/2/2022    Chief Complaint  62 y.o. male admitted 8/25/2022 with GLF w/SDH status postcraniotomy    Interval Events  CRITICAL CARE DECISION MAKING:    Patient examined and discussed with team at bedside.    Last CT scan reviewed.  Multiple age layering hygroma and old subdural hematoma in addition to craniotomy.  No significant compression.  Mental status reported to be improving after starting amantadine.  We will allow this to ramp up another day before increasing dose.  Continuing serial neuro assessments.  Will need transition to long-term acute care before rehab.    Addressed pulmonary hygiene concerns as well as oxygenation/ventilation.  Difficult making progress of ventilator secondary to mental status.  Plan for tracheostomy tomorrow.  This was discussed with the family including its attendant risks and then will sign consent.    Labs reviewed, electrolytes addressed, renal function assessed: Labs reviewed  Reviewed nutrition strategies, recent indices: Tolerating core track feeding.  May need durable feeding access prior to transition to rehab  Addressed GI prophylaxis and bowel frequency: Pepcid and bowel protocol  Assessed/discussed/titrated analgesics and need for sedatives: Minimize sedatives/analgesics  Addressed DVT prophylaxis: SCDs and Lovenox  Addressed line days, keith catheter days, access needs  Addressed family and discharge concerns: Family meeting today with discussion regarding multiple issues including evidence of chronic intracranial injuries after falls.  Patient is on long-term Eliquis for atrial fibrillation and this will continue to be problematic.  Wife does corroborate that patient falls frequently at home.  She attributes this to multiple issues including his medications and chronic spine issues.  She states he has frequent orthostasis at home 2.  This will need to be addressed in the long-term.  I explained that  continuing care will likely result in patient transition to long-term acute care hospital which could potentially lead to nursing home or rehab depending on how he progresses.  We are full treat for now.  CODE STATUS not changed.      Review of Systems  Review of Systems   Unable to perform ROS: Intubated      Vital Signs for last 24 hours  Pulse:  [] 103  Resp:  [12-48] 17  BP: (136-181)/() 147/78  SpO2:  [92 %-99 %] 93 %    Hemodynamic parameters for last 24 hours       Respiratory Data     Respiration: 17, Pulse Oximetry: 93 %     Work Of Breathing / Effort: Vented  RUL Breath Sounds: Clear, RML Breath Sounds: Diminished, RLL Breath Sounds: Diminished, DON Breath Sounds: Clear, LLL Breath Sounds: Diminished    Physical Exam  Physical Exam  Vitals reviewed.   Constitutional:       General: He is awake.      Interventions: He is intubated and restrained.   HENT:      Head:      Comments: Wound CDI     Nose:      Comments: Core track     Mouth/Throat:      Mouth: Mucous membranes are dry.      Pharynx: Oropharynx is clear.   Eyes:      Extraocular Movements: Extraocular movements intact.      Conjunctiva/sclera: Conjunctivae normal.      Pupils: Pupils are equal, round, and reactive to light.   Cardiovascular:      Rate and Rhythm: Tachycardia present. Rhythm irregular.      Pulses: Normal pulses.   Pulmonary:      Effort: He is intubated.   Abdominal:      General: There is no distension.      Palpations: Abdomen is soft.      Tenderness: There is no abdominal tenderness.   Genitourinary:     Comments: Christopher  Musculoskeletal:         General: Normal range of motion.      Cervical back: Neck supple.      Right lower leg: Edema present.      Left lower leg: Edema present.   Skin:     General: Skin is warm and dry.      Coloration: Skin is not pale.   Neurological:      Mental Status: He is alert. He is disoriented.      GCS: GCS eye subscore is 4. GCS verbal subscore is 1. GCS motor subscore is 5.    Psychiatric:         Behavior: Behavior is uncooperative.       Laboratory  Recent Results (from the past 24 hour(s))   POCT arterial blood gas device results    Collection Time: 09/02/22  3:40 AM   Result Value Ref Range    Ph 7.491 7.400 - 7.500    Pco2 34.2 26.0 - 37.0 mmHg    Po2 69 64 - 87 mmHg    Tco2 27 20 - 33 mmol/L    S02 95 93 - 99 %    Hco3 26.2 (H) 17.0 - 25.0 mmol/L    BE 3 -4 - 3 mmol/L    Body Temp see below degrees    O2 Therapy 30 %    iPF Ratio 230     Specimen Arterial     DelSys Vent     End Tidal Carbon Dioxide 32 mmhg    Peep End Expiratory Pressure 8 cmh20    Percent Minute Volume 130     Mode ASV    CBC with Differential: Tomorrow AM    Collection Time: 09/02/22  4:35 AM   Result Value Ref Range    WBC 11.8 (H) 4.8 - 10.8 K/uL    RBC 4.02 (L) 4.70 - 6.10 M/uL    Hemoglobin 12.0 (L) 14.0 - 18.0 g/dL    Hematocrit 35.7 (L) 42.0 - 52.0 %    MCV 88.8 81.4 - 97.8 fL    MCH 29.9 27.0 - 33.0 pg    MCHC 33.6 (L) 33.7 - 35.3 g/dL    RDW 45.1 35.9 - 50.0 fL    Platelet Count 426 164 - 446 K/uL    MPV 11.2 9.0 - 12.9 fL    Neutrophils-Polys 69.30 44.00 - 72.00 %    Lymphocytes 15.80 (L) 22.00 - 41.00 %    Monocytes 12.00 0.00 - 13.40 %    Eosinophils 1.40 0.00 - 6.90 %    Basophils 0.60 0.00 - 1.80 %    Immature Granulocytes 0.90 0.00 - 0.90 %    Nucleated RBC 0.00 /100 WBC    Neutrophils (Absolute) 8.20 (H) 1.82 - 7.42 K/uL    Lymphs (Absolute) 1.87 1.00 - 4.80 K/uL    Monos (Absolute) 1.42 (H) 0.00 - 0.85 K/uL    Eos (Absolute) 0.17 0.00 - 0.51 K/uL    Baso (Absolute) 0.07 0.00 - 0.12 K/uL    Immature Granulocytes (abs) 0.11 0.00 - 0.11 K/uL    NRBC (Absolute) 0.00 K/uL   Comp Metabolic Panel (CMP): Tomorrow AM    Collection Time: 09/02/22  4:35 AM   Result Value Ref Range    Sodium 139 135 - 145 mmol/L    Potassium 3.3 (L) 3.6 - 5.5 mmol/L    Chloride 102 96 - 112 mmol/L    Co2 23 20 - 33 mmol/L    Anion Gap 14.0 7.0 - 16.0    Glucose 137 (H) 65 - 99 mg/dL    Bun 23 (H) 8 - 22 mg/dL    Creatinine  0.48 (L) 0.50 - 1.40 mg/dL    Calcium 9.8 8.5 - 10.5 mg/dL    AST(SGOT) 22 12 - 45 U/L    ALT(SGPT) 23 2 - 50 U/L    Alkaline Phosphatase 83 30 - 99 U/L    Total Bilirubin 0.4 0.1 - 1.5 mg/dL    Albumin 4.1 3.2 - 4.9 g/dL    Total Protein 7.1 6.0 - 8.2 g/dL    Globulin 3.0 1.9 - 3.5 g/dL    A-G Ratio 1.4 g/dL   ESTIMATED GFR    Collection Time: 09/02/22  4:35 AM   Result Value Ref Range    GFR (CKD-EPI) 117 >60 mL/min/1.73 m 2       Fluids    Intake/Output Summary (Last 24 hours) at 9/2/2022 1932  Last data filed at 9/2/2022 1800  Gross per 24 hour   Intake 2460 ml   Output 2165 ml   Net 295 ml       Core Measures & Quality Metrics  Labs reviewed, Medications reviewed, EKG reviewed and Radiology images reviewed  White catheter: Critically Ill - Requiring Accurate Measurement of Urinary Output      DVT Prophylaxis: Enoxaparin (Lovenox)  DVT prophylaxis - mechanical: SCDs  Ulcer prophylaxis: Yes    Assessed for rehab: Patient was assess for and/or received rehabilitation services during this hospitalization  RAP Score Total: 6  CAGE Results: not completed Blood Alcohol>0.08: no     Assessment/Plan  Respiratory failure following trauma and surgery (HCC)- (present on admission)  Assessment & Plan  Mechanically ventilated following admission craniotomy.  Continue full mechanical ventilatory support.   Ventilator bundle and Trauma weaning protocol.  9/1 not awake enough / will need trach / wife will be here on Friday      Traumatic subdural hematoma, initial encounter (HCC)- (present on admission)  Assessment & Plan  Left-sided holohemispheric traumatic subdural hemorrhage associated with systemic anticoagulation and antiplatelet therapy.  8/'25  Left craniotomy with evacuation of SDH  8/28 significant deficits persist.  8/28 f/u CT overall improved  MRI brain unable due to SCS  8/31 CT improved / No neurologic improvement / start amantadine  9/1 eye opening, tracking, some following with BLE  9/2 Increased  amantadine.  Post traumatic pharmacologic seizure prophylaxis for 1 week.  Speech Language Pathology cognitive evaluation.  Tesfaye Luther MD. Neurosurgeon. [facility].    Oropharyngeal dysphagia- (present on admission)  Assessment & Plan  Cortrak with TF initiated while intubated.    Antiplatelet or antithrombotic long-term use- (present on admission)  Assessment & Plan  Daily ASA and Apixaban.  Received KCentra on admit.  AA 96.1%, transfused 1 unit platelets in ED.    CAD (coronary artery disease)  Assessment & Plan  History of coronary artery disease with STEMI in August 2020.  Drug-eluting stents placed in the mid RCA and PDA.  Daily aspirin administration.    Hypokalemia- (present on admission)  Assessment & Plan  9/2 Replete and trend.    Status post insertion of drug-eluting stent into right coronary artery for coronary artery disease- (present on admission)  Assessment & Plan  Drug-eluting stents placed in the mid RCA and PDA August 2020.   Daily aspirin administration.  Maintenance medication held on admission    Paroxysmal atrial fibrillation (HCC)- (present on admission)  Assessment & Plan  Chronic condition treated with carvedilol and apixaban (Eliquis) anticoagulation.  Carvedilol resumed upon admission.  Systemic anticoagulation held on admission.     Obese- (present on admission)  Assessment & Plan  8/29 BMI 34.94    Type 2 diabetes mellitus without complication, without long-term current use of insulin (HCC)- (present on admission)  Assessment & Plan  Chronic condition treated with metformin.  Glycohemoglobin 6.2 (131).  Holding maintenance metformin for 48 hours following intravenous contrast administration.    Insulin sliding scale coverage.    Hypertensive disease- (present on admission)  Assessment & Plan  Chronic condition treated with carvedilol.  Resumed maintenance medication on admission.    No contraindication to deep vein thrombosis (DVT) prophylaxis- (present on  admission)  Assessment & Plan  Prophylactic anticoagulation for thrombotic prevention initially contraindicated secondary to elevated bleeding risk.  8/28 Trauma screening bilateral lower extremity venous duplex negative for above knee DVT.  8/28 Prophylactic dose enoxaparin initiated.      Depression- (present on admission)  Assessment & Plan  Chronic condition treated with Effexor.  Resumed maintenance medication on admission.    Trauma- (present on admission)  Assessment & Plan  Mechanical ground-level fall.    Intracranial hemorrhage with systemic anticoagulation.  Trauma Yellow Transfer Activation from [facility].  Macario Winn MD. Trauma Surgery.    BPH (benign prostatic hyperplasia)- (present on admission)  Assessment & Plan  Chronic condition treated with finasteride and tamsulosin.  Resumed maintenance medication on admission.    Dyslipidemia- (present on admission)  Assessment & Plan  Chronic condition treated with atorvastatin.  Resumed maintenance medication on admission.        Discussed patient condition with Family, RN, RT, Pharmacy, and Dietary.  CRITICAL CARE TIME EXCLUDING PROCEDURES: 38    minutes  Family meeting was 30 minutes

## 2022-09-03 NOTE — CARE PLAN
The patient is Watcher - Medium risk of patient condition declining or worsening    Shift Goals  Clinical Goals: stable neuro, sleep/wake cycle, pain control  Patient Goals: FELIPA  Family Goals: FELIPA    Progress made toward(s) clinical / shift goals:    Problem: Pain - Standard  Goal: Alleviation of pain or a reduction in pain to the patient’s comfort goal  Outcome: Progressing     Problem: Safety - Medical Restraint  Goal: Remains free of injury from restraints (Restraint for Interference with Medical Device)  Outcome: Progressing     Problem: Skin Integrity  Goal: Skin integrity is maintained or improved  Outcome: Progressing     Problem: Nutrition  Goal: Patient's nutritional and fluid intake will be adequate or improve  Outcome: Progressing       Patient is not progressing towards the following goals:

## 2022-09-03 NOTE — PROGRESS NOTES
Trauma / Surgical Daily Progress Note    Date of Service  9/3/2022    Chief Complaint  62 y.o. male admitted 8/25/2022 with GLF w/SDH    Interval Events  CRITICAL CARE DECISION MAKING:    Patient examined and discussed with team at bedside.    Patient seems more awake today but is still not reliably following commands.  He is now tracking but not overly interactive.  Concern that extubation will result in lack of hygiene and gradual respiratory deterioration with potential hypoxia and hypercarbia so decision made to proceed with tracheostomy.  Schedule today is somewhat full so we will delay this until tomorrow.  Continue neurochecks.  Continue amantadine at current dose.  Continue as needed propofol.    Addressed pulmonary hygiene concerns as well as oxygenation/ventilation.  Mental status is primary barrier to ventilator liberation.    White count slowly increasing: X-ray reasonably clear.  Continue to trend.  If increasing or patient starts to have fevers, will plan for cultures/antibiotics    Labs reviewed, electrolytes addressed, renal function assessed: Potassium replacement  Reviewed nutrition strategies, recent indices: Core track feeding  Addressed GI prophylaxis and bowel frequency: Pepcid and bowel protocol with  Assessed/discussed/titrated analgesics and need for sedatives  Addressed DVT prophylaxis: Lovenox and SCDs  Addressed line days, keith catheter days, access needs  Addressed family and discharge concerns    Review of Systems  Review of Systems   Unable to perform ROS: Intubated      Vital Signs for last 24 hours  Pulse:  [] 90  Resp:  [12-41] 15  BP: (110-167)/(68-97) 110/68  SpO2:  [92 %-96 %] 94 %    Hemodynamic parameters for last 24 hours       Respiratory Data     Respiration: 15, Pulse Oximetry: 94 %     Work Of Breathing / Effort: Vented  RUL Breath Sounds: Crackles;Clear After Suction, RML Breath Sounds: Diminished, RLL Breath Sounds: Diminished, DON Breath Sounds: Crackles;Clear  After Suction, LLL Breath Sounds: Diminished    Physical Exam  Physical Exam  Vitals and nursing note reviewed.   Constitutional:       General: He is awake.      Interventions: He is intubated and restrained.   HENT:      Head:      Comments: Flap soft, wound intact     Nose:      Comments: Core track     Mouth/Throat:      Mouth: Mucous membranes are moist.      Pharynx: Oropharynx is clear.   Eyes:      Extraocular Movements: Extraocular movements intact.      Conjunctiva/sclera: Conjunctivae normal.      Pupils: Pupils are equal, round, and reactive to light.   Cardiovascular:      Rate and Rhythm: Normal rate and regular rhythm.      Pulses: Normal pulses.   Pulmonary:      Effort: No respiratory distress. He is intubated.      Breath sounds: No stridor. No wheezing.   Abdominal:      General: There is no distension.      Palpations: Abdomen is soft.      Tenderness: There is no abdominal tenderness.   Genitourinary:     Comments: White  Musculoskeletal:         General: No deformity or signs of injury. Normal range of motion.      Cervical back: Neck supple.   Skin:     General: Skin is warm and dry.      Coloration: Skin is not pale.   Neurological:      Mental Status: He is disoriented.      GCS: GCS eye subscore is 4. GCS verbal subscore is 1. GCS motor subscore is 5.      Comments: Intermittently following commands   Psychiatric:         Behavior: Behavior is cooperative.       Laboratory  Recent Results (from the past 24 hour(s))   CBC with Differential: Tomorrow AM    Collection Time: 09/03/22  5:21 AM   Result Value Ref Range    WBC 14.7 (H) 4.8 - 10.8 K/uL    RBC 4.24 (L) 4.70 - 6.10 M/uL    Hemoglobin 12.7 (L) 14.0 - 18.0 g/dL    Hematocrit 37.9 (L) 42.0 - 52.0 %    MCV 89.4 81.4 - 97.8 fL    MCH 30.0 27.0 - 33.0 pg    MCHC 33.5 (L) 33.7 - 35.3 g/dL    RDW 44.7 35.9 - 50.0 fL    Platelet Count 491 (H) 164 - 446 K/uL    MPV 11.4 9.0 - 12.9 fL    Neutrophils-Polys 74.50 (H) 44.00 - 72.00 %     Lymphocytes 10.90 (L) 22.00 - 41.00 %    Monocytes 12.40 0.00 - 13.40 %    Eosinophils 0.80 0.00 - 6.90 %    Basophils 0.50 0.00 - 1.80 %    Immature Granulocytes 0.90 0.00 - 0.90 %    Nucleated RBC 0.00 /100 WBC    Neutrophils (Absolute) 10.92 (H) 1.82 - 7.42 K/uL    Lymphs (Absolute) 1.60 1.00 - 4.80 K/uL    Monos (Absolute) 1.82 (H) 0.00 - 0.85 K/uL    Eos (Absolute) 0.11 0.00 - 0.51 K/uL    Baso (Absolute) 0.07 0.00 - 0.12 K/uL    Immature Granulocytes (abs) 0.13 (H) 0.00 - 0.11 K/uL    NRBC (Absolute) 0.00 K/uL   Comp Metabolic Panel (CMP): Tomorrow AM    Collection Time: 09/03/22  5:21 AM   Result Value Ref Range    Sodium 139 135 - 145 mmol/L    Potassium 3.4 (L) 3.6 - 5.5 mmol/L    Chloride 102 96 - 112 mmol/L    Co2 23 20 - 33 mmol/L    Anion Gap 14.0 7.0 - 16.0    Glucose 135 (H) 65 - 99 mg/dL    Bun 25 (H) 8 - 22 mg/dL    Creatinine 0.55 0.50 - 1.40 mg/dL    Calcium 9.9 8.5 - 10.5 mg/dL    AST(SGOT) 27 12 - 45 U/L    ALT(SGPT) 32 2 - 50 U/L    Alkaline Phosphatase 85 30 - 99 U/L    Total Bilirubin 0.4 0.1 - 1.5 mg/dL    Albumin 4.1 3.2 - 4.9 g/dL    Total Protein 7.3 6.0 - 8.2 g/dL    Globulin 3.2 1.9 - 3.5 g/dL    A-G Ratio 1.3 g/dL   ESTIMATED GFR    Collection Time: 09/03/22  5:21 AM   Result Value Ref Range    GFR (CKD-EPI) 112 >60 mL/min/1.73 m 2       Fluids    Intake/Output Summary (Last 24 hours) at 9/3/2022 1514  Last data filed at 9/3/2022 0800  Gross per 24 hour   Intake 1680 ml   Output 1015 ml   Net 665 ml       Core Measures & Quality Metrics  Labs reviewed, Medications reviewed, EKG reviewed and Radiology images reviewed  White catheter: Critically Ill - Requiring Accurate Measurement of Urinary Output      DVT Prophylaxis: Enoxaparin (Lovenox)  DVT prophylaxis - mechanical: SCDs  Ulcer prophylaxis: Yes    Assessed for rehab: Patient was assess for and/or received rehabilitation services during this hospitalization  RAP Score Total: 6  CAGE Results: not completed Blood Alcohol>0.08: no      Assessment/Plan  Respiratory failure following trauma and surgery (HCC)- (present on admission)  Assessment & Plan  Mechanically ventilated following admission craniotomy.  Continue full mechanical ventilatory support.   Ventilator bundle and Trauma weaning protocol.  9/1 not awake enough / will need trach / wife will be here on Friday      Traumatic subdural hematoma, initial encounter (HCC)- (present on admission)  Assessment & Plan  Left-sided holohemispheric traumatic subdural hemorrhage associated with systemic anticoagulation and antiplatelet therapy.  8/'25  Left craniotomy with evacuation of SDH  8/28 significant deficits persist.  8/28 f/u CT overall improved  MRI brain unable due to SCS  8/31 CT improved / No neurologic improvement / start amantadine  9/1 eye opening, tracking, some following with BLE  9/2 Increased amantadine.  Post traumatic pharmacologic seizure prophylaxis for 1 week.  Speech Language Pathology cognitive evaluation.  Tesfaye Luther MD. Neurosurgeon. [facility].    Oropharyngeal dysphagia- (present on admission)  Assessment & Plan  Cortrak with TF initiated while intubated.    Antiplatelet or antithrombotic long-term use- (present on admission)  Assessment & Plan  Daily ASA and Apixaban.  Received KCentra on admit.  AA 96.1%, transfused 1 unit platelets in ED.    CAD (coronary artery disease)  Assessment & Plan  History of coronary artery disease with STEMI in August 2020.  Drug-eluting stents placed in the mid RCA and PDA.  Daily aspirin administration.    Hypokalemia- (present on admission)  Assessment & Plan  9/2 Replete and trend.    Status post insertion of drug-eluting stent into right coronary artery for coronary artery disease- (present on admission)  Assessment & Plan  Drug-eluting stents placed in the mid RCA and PDA August 2020.   Daily aspirin administration.  Maintenance medication held on admission    Paroxysmal atrial fibrillation (HCC)- (present on  admission)  Assessment & Plan  Chronic condition treated with carvedilol and apixaban (Eliquis) anticoagulation.  Carvedilol resumed upon admission.  Systemic anticoagulation held on admission.     Obese- (present on admission)  Assessment & Plan  8/29 BMI 34.94    Type 2 diabetes mellitus without complication, without long-term current use of insulin (HCC)- (present on admission)  Assessment & Plan  Chronic condition treated with metformin.  Glycohemoglobin 6.2 (131).  Holding maintenance metformin for 48 hours following intravenous contrast administration.    Insulin sliding scale coverage.    Hypertensive disease- (present on admission)  Assessment & Plan  Chronic condition treated with carvedilol.  Resumed maintenance medication on admission.    No contraindication to deep vein thrombosis (DVT) prophylaxis- (present on admission)  Assessment & Plan  Prophylactic anticoagulation for thrombotic prevention initially contraindicated secondary to elevated bleeding risk.  8/28 Trauma screening bilateral lower extremity venous duplex negative for above knee DVT.  8/28 Prophylactic dose enoxaparin initiated.      Depression- (present on admission)  Assessment & Plan  Chronic condition treated with Effexor.  Resumed maintenance medication on admission.    Trauma- (present on admission)  Assessment & Plan  Mechanical ground-level fall.    Intracranial hemorrhage with systemic anticoagulation.  Trauma Yellow Transfer Activation from [facility].  Macario Winn MD. Trauma Surgery.    BPH (benign prostatic hyperplasia)- (present on admission)  Assessment & Plan  Chronic condition treated with finasteride and tamsulosin.  Resumed maintenance medication on admission.    Dyslipidemia- (present on admission)  Assessment & Plan  Chronic condition treated with atorvastatin.  Resumed maintenance medication on admission.        Discussed patient condition with Family, RN, RT, and Pharmacy.  CRITICAL CARE  TIME EXCLUDING PROCEDURES: 37    minutes

## 2022-09-04 ENCOUNTER — APPOINTMENT (OUTPATIENT)
Dept: RADIOLOGY | Facility: MEDICAL CENTER | Age: 62
DRG: 003 | End: 2022-09-04
Attending: SURGERY
Payer: OTHER GOVERNMENT

## 2022-09-04 LAB
ALBUMIN SERPL BCP-MCNC: 4.1 G/DL (ref 3.2–4.9)
ALBUMIN/GLOB SERPL: 1.1 G/DL
ALP SERPL-CCNC: 96 U/L (ref 30–99)
ALT SERPL-CCNC: 46 U/L (ref 2–50)
ANION GAP SERPL CALC-SCNC: 15 MMOL/L (ref 7–16)
AST SERPL-CCNC: 37 U/L (ref 12–45)
BASOPHILS # BLD AUTO: 0.4 % (ref 0–1.8)
BASOPHILS # BLD: 0.08 K/UL (ref 0–0.12)
BILIRUB SERPL-MCNC: 0.5 MG/DL (ref 0.1–1.5)
BUN SERPL-MCNC: 22 MG/DL (ref 8–22)
CALCIUM SERPL-MCNC: 10.1 MG/DL (ref 8.5–10.5)
CHLORIDE SERPL-SCNC: 99 MMOL/L (ref 96–112)
CO2 SERPL-SCNC: 23 MMOL/L (ref 20–33)
CREAT SERPL-MCNC: 0.62 MG/DL (ref 0.5–1.4)
EKG IMPRESSION: NORMAL
EOSINOPHIL # BLD AUTO: 0.12 K/UL (ref 0–0.51)
EOSINOPHIL NFR BLD: 0.6 % (ref 0–6.9)
ERYTHROCYTE [DISTWIDTH] IN BLOOD BY AUTOMATED COUNT: 44.8 FL (ref 35.9–50)
GFR SERPLBLD CREATININE-BSD FMLA CKD-EPI: 108 ML/MIN/1.73 M 2
GLOBULIN SER CALC-MCNC: 3.7 G/DL (ref 1.9–3.5)
GLUCOSE SERPL-MCNC: 150 MG/DL (ref 65–99)
GRAM STN SPEC: NORMAL
HCT VFR BLD AUTO: 39.8 % (ref 42–52)
HGB BLD-MCNC: 13.3 G/DL (ref 14–18)
IMM GRANULOCYTES # BLD AUTO: 0.13 K/UL (ref 0–0.11)
IMM GRANULOCYTES NFR BLD AUTO: 0.7 % (ref 0–0.9)
LYMPHOCYTES # BLD AUTO: 1.52 K/UL (ref 1–4.8)
LYMPHOCYTES NFR BLD: 7.7 % (ref 22–41)
MCH RBC QN AUTO: 30 PG (ref 27–33)
MCHC RBC AUTO-ENTMCNC: 33.4 G/DL (ref 33.7–35.3)
MCV RBC AUTO: 89.6 FL (ref 81.4–97.8)
MONOCYTES # BLD AUTO: 2.2 K/UL (ref 0–0.85)
MONOCYTES NFR BLD AUTO: 11.1 % (ref 0–13.4)
NEUTROPHILS # BLD AUTO: 15.69 K/UL (ref 1.82–7.42)
NEUTROPHILS NFR BLD: 79.5 % (ref 44–72)
NRBC # BLD AUTO: 0 K/UL
NRBC BLD-RTO: 0 /100 WBC
PLATELET # BLD AUTO: 506 K/UL (ref 164–446)
PMV BLD AUTO: 11.3 FL (ref 9–12.9)
POTASSIUM SERPL-SCNC: 4.1 MMOL/L (ref 3.6–5.5)
PROT SERPL-MCNC: 7.8 G/DL (ref 6–8.2)
RBC # BLD AUTO: 4.44 M/UL (ref 4.7–6.1)
RHODAMINE-AURAMINE STN SPEC: NORMAL
SCCMEC + MECA PNL NOSE NAA+PROBE: NEGATIVE
SIGNIFICANT IND 70042: NORMAL
SIGNIFICANT IND 70042: NORMAL
SITE SITE: NORMAL
SITE SITE: NORMAL
SODIUM SERPL-SCNC: 137 MMOL/L (ref 135–145)
SOURCE SOURCE: NORMAL
SOURCE SOURCE: NORMAL
WBC # BLD AUTO: 19.7 K/UL (ref 4.8–10.8)

## 2022-09-04 PROCEDURE — 700102 HCHG RX REV CODE 250 W/ 637 OVERRIDE(OP): Performed by: SURGERY

## 2022-09-04 PROCEDURE — A9270 NON-COVERED ITEM OR SERVICE: HCPCS | Performed by: NEUROLOGICAL SURGERY

## 2022-09-04 PROCEDURE — 87186 SC STD MICRODIL/AGAR DIL: CPT

## 2022-09-04 PROCEDURE — 80053 COMPREHEN METABOLIC PANEL: CPT

## 2022-09-04 PROCEDURE — 71045 X-RAY EXAM CHEST 1 VIEW: CPT

## 2022-09-04 PROCEDURE — 700111 HCHG RX REV CODE 636 W/ 250 OVERRIDE (IP): Performed by: SURGERY

## 2022-09-04 PROCEDURE — A9270 NON-COVERED ITEM OR SERVICE: HCPCS | Performed by: SURGERY

## 2022-09-04 PROCEDURE — 93005 ELECTROCARDIOGRAM TRACING: CPT | Performed by: SURGERY

## 2022-09-04 PROCEDURE — 31600 PLANNED TRACHEOSTOMY: CPT | Performed by: SURGERY

## 2022-09-04 PROCEDURE — 87116 MYCOBACTERIA CULTURE: CPT

## 2022-09-04 PROCEDURE — 85025 COMPLETE CBC W/AUTO DIFF WBC: CPT

## 2022-09-04 PROCEDURE — 99152 MOD SED SAME PHYS/QHP 5/>YRS: CPT

## 2022-09-04 PROCEDURE — 700105 HCHG RX REV CODE 258: Performed by: SURGERY

## 2022-09-04 PROCEDURE — 700102 HCHG RX REV CODE 250 W/ 637 OVERRIDE(OP): Performed by: NEUROLOGICAL SURGERY

## 2022-09-04 PROCEDURE — 87040 BLOOD CULTURE FOR BACTERIA: CPT

## 2022-09-04 PROCEDURE — 0B113F4 BYPASS TRACHEA TO CUTANEOUS WITH TRACHEOSTOMY DEVICE, PERCUTANEOUS APPROACH: ICD-10-PCS | Performed by: SURGERY

## 2022-09-04 PROCEDURE — 87641 MR-STAPH DNA AMP PROBE: CPT

## 2022-09-04 PROCEDURE — 87206 SMEAR FLUORESCENT/ACID STAI: CPT

## 2022-09-04 PROCEDURE — 0B9M8ZX DRAINAGE OF BILATERAL LUNGS, VIA NATURAL OR ARTIFICIAL OPENING ENDOSCOPIC, DIAGNOSTIC: ICD-10-PCS | Performed by: SURGERY

## 2022-09-04 PROCEDURE — 87205 SMEAR GRAM STAIN: CPT

## 2022-09-04 PROCEDURE — 94003 VENT MGMT INPAT SUBQ DAY: CPT

## 2022-09-04 PROCEDURE — 31624 DX BRONCHOSCOPE/LAVAGE: CPT | Performed by: SURGERY

## 2022-09-04 PROCEDURE — 700111 HCHG RX REV CODE 636 W/ 250 OVERRIDE (IP): Performed by: NEUROLOGICAL SURGERY

## 2022-09-04 PROCEDURE — 700101 HCHG RX REV CODE 250: Performed by: SURGERY

## 2022-09-04 PROCEDURE — 87077 CULTURE AEROBIC IDENTIFY: CPT

## 2022-09-04 PROCEDURE — 99291 CRITICAL CARE FIRST HOUR: CPT | Mod: 25 | Performed by: SURGERY

## 2022-09-04 PROCEDURE — 302132 K THERMIA MOTOR: Performed by: SURGERY

## 2022-09-04 PROCEDURE — 93010 ELECTROCARDIOGRAM REPORT: CPT | Performed by: INTERNAL MEDICINE

## 2022-09-04 PROCEDURE — 302978 HCHG BRONCHOSCOPY-DIAGNOSTIC

## 2022-09-04 PROCEDURE — 87070 CULTURE OTHR SPECIMN AEROBIC: CPT

## 2022-09-04 PROCEDURE — 87015 SPECIMEN INFECT AGNT CONCNTJ: CPT

## 2022-09-04 PROCEDURE — 770022 HCHG ROOM/CARE - ICU (200)

## 2022-09-04 PROCEDURE — 94799 UNLISTED PULMONARY SVC/PX: CPT

## 2022-09-04 PROCEDURE — 31600 PLANNED TRACHEOSTOMY: CPT

## 2022-09-04 RX ORDER — CARVEDILOL 6.25 MG/1
25 TABLET ORAL 2 TIMES DAILY
Status: DISCONTINUED | OUTPATIENT
Start: 2022-09-04 | End: 2022-09-09 | Stop reason: HOSPADM

## 2022-09-04 RX ORDER — ROCURONIUM BROMIDE 10 MG/ML
50 INJECTION, SOLUTION INTRAVENOUS ONCE
Status: COMPLETED | OUTPATIENT
Start: 2022-09-04 | End: 2022-09-04

## 2022-09-04 RX ORDER — PROPOFOL 10 MG/ML
200 INJECTION, EMULSION INTRAVENOUS ONCE
Status: COMPLETED | OUTPATIENT
Start: 2022-09-04 | End: 2022-09-04

## 2022-09-04 RX ADMIN — LABETALOL HYDROCHLORIDE 10 MG: 5 INJECTION, SOLUTION INTRAVENOUS at 05:38

## 2022-09-04 RX ADMIN — MINERAL OIL, PETROLATUM 1 APPLICATION: 425; 573 OINTMENT OPHTHALMIC at 05:31

## 2022-09-04 RX ADMIN — CARVEDILOL 12.5 MG: 12.5 TABLET, FILM COATED ORAL at 05:30

## 2022-09-04 RX ADMIN — ENOXAPARIN SODIUM 30 MG: 30 INJECTION SUBCUTANEOUS at 05:32

## 2022-09-04 RX ADMIN — FENTANYL CITRATE 100 MCG: 50 INJECTION, SOLUTION INTRAMUSCULAR; INTRAVENOUS at 11:02

## 2022-09-04 RX ADMIN — HYDRALAZINE HYDROCHLORIDE 25 MG: 25 TABLET, FILM COATED ORAL at 22:20

## 2022-09-04 RX ADMIN — PIPERACILLIN AND TAZOBACTAM 4.5 G: 4; .5 INJECTION, POWDER, LYOPHILIZED, FOR SOLUTION INTRAVENOUS; PARENTERAL at 20:16

## 2022-09-04 RX ADMIN — ENOXAPARIN SODIUM 30 MG: 30 INJECTION SUBCUTANEOUS at 17:25

## 2022-09-04 RX ADMIN — PIPERACILLIN AND TAZOBACTAM 4.5 G: 4; .5 INJECTION, POWDER, LYOPHILIZED, FOR SOLUTION INTRAVENOUS; PARENTERAL at 12:00

## 2022-09-04 RX ADMIN — PROPOFOL 100 MG: 10 INJECTION, EMULSION INTRAVENOUS at 11:02

## 2022-09-04 RX ADMIN — FAMOTIDINE 20 MG: 20 TABLET, FILM COATED ORAL at 17:25

## 2022-09-04 RX ADMIN — FAMOTIDINE 20 MG: 20 TABLET, FILM COATED ORAL at 05:29

## 2022-09-04 RX ADMIN — AMLODIPINE BESYLATE 10 MG: 10 TABLET ORAL at 05:30

## 2022-09-04 RX ADMIN — HYDRALAZINE HYDROCHLORIDE 25 MG: 25 TABLET, FILM COATED ORAL at 13:25

## 2022-09-04 RX ADMIN — VENLAFAXINE 75 MG: 75 TABLET ORAL at 17:25

## 2022-09-04 RX ADMIN — HYDRALAZINE HYDROCHLORIDE 10 MG: 20 INJECTION INTRAMUSCULAR; INTRAVENOUS at 03:41

## 2022-09-04 RX ADMIN — AMANTADINE HYDROCHLORIDE 100 MG: 100 TABLET ORAL at 05:30

## 2022-09-04 RX ADMIN — PIPERACILLIN AND TAZOBACTAM 4.5 G: 4; .5 INJECTION, POWDER, LYOPHILIZED, FOR SOLUTION INTRAVENOUS; PARENTERAL at 16:28

## 2022-09-04 RX ADMIN — ACETAMINOPHEN 650 MG: 325 TABLET ORAL at 04:24

## 2022-09-04 RX ADMIN — MINERAL OIL, PETROLATUM 1 APPLICATION: 425; 573 OINTMENT OPHTHALMIC at 22:20

## 2022-09-04 RX ADMIN — CARVEDILOL 25 MG: 6.25 TABLET, FILM COATED ORAL at 17:26

## 2022-09-04 RX ADMIN — VANCOMYCIN HYDROCHLORIDE 2500 MG: 500 INJECTION, POWDER, LYOPHILIZED, FOR SOLUTION INTRAVENOUS at 11:16

## 2022-09-04 RX ADMIN — HYDRALAZINE HYDROCHLORIDE 25 MG: 25 TABLET, FILM COATED ORAL at 05:30

## 2022-09-04 RX ADMIN — AMANTADINE HYDROCHLORIDE 100 MG: 100 TABLET ORAL at 13:25

## 2022-09-04 RX ADMIN — METOPROLOL TARTRATE 5 MG: 5 INJECTION INTRAVENOUS at 07:52

## 2022-09-04 RX ADMIN — PROPOFOL 50 MG: 10 INJECTION, EMULSION INTRAVENOUS at 11:12

## 2022-09-04 RX ADMIN — VENLAFAXINE 75 MG: 75 TABLET ORAL at 05:31

## 2022-09-04 RX ADMIN — ATORVASTATIN CALCIUM 80 MG: 80 TABLET, FILM COATED ORAL at 17:25

## 2022-09-04 RX ADMIN — ROCURONIUM BROMIDE 50 MG: 10 INJECTION, SOLUTION INTRAVENOUS at 11:05

## 2022-09-04 RX ADMIN — VANCOMYCIN HYDROCHLORIDE 1500 MG: 500 INJECTION, POWDER, LYOPHILIZED, FOR SOLUTION INTRAVENOUS at 23:50

## 2022-09-04 RX ADMIN — NYSTATIN 500000 UNITS: 100000 SUSPENSION ORAL at 20:16

## 2022-09-04 ASSESSMENT — PAIN DESCRIPTION - PAIN TYPE: TYPE: ACUTE PAIN

## 2022-09-04 ASSESSMENT — FIBROSIS 4 INDEX: FIB4 SCORE: 0.67

## 2022-09-04 NOTE — PROGRESS NOTES
"  Trauma / Surgical Daily Progress Note    Date of Service  9/4/2022    Chief Complaint  62 y.o. male admitted 8/25/2022 with GLF w/SDH    Interval Events  CRITICAL CARE DECISION MAKING:    Patient examined and discussed with team at bedside.    Severe traumatic brain injury: Patient seems to be waking up more but still unable to participate with pulmonary hygiene and weaning strategies.  Continue to trend, increase therapies as tolerated.  Continue serial neuro assessments.    Addressed pulmonary hygiene concerns as well as oxygenation/ventilation.  Percutaneous tracheostomy done.  More aggressive vent weaning.  Strategy discussed with respiratory therapy    Labs reviewed, electrolytes addressed, renal function assessed  Reviewed nutrition strategies, recent indices  Addressed GI prophylaxis and bowel frequency  Assessed/discussed/titrated analgesics and need for sedatives  Addressed DVT prophylaxis  Addressed line days, keith catheter days, access needs  Addressed family and discharge concerns    Review of Systems  Review of Systems     Vital Signs for last 24 hours  Pulse:  [] 94  Resp:  [14-27] 14  BP: ()/() 134/77  SpO2:  [92 %-100 %] 100 %    Hemodynamic parameters for last 24 hours   /77   Pulse 94   Temp 37.7 °C (99.9 °F)   Resp 14   Ht 1.702 m (5' 7.01\")   Wt 101 kg (222 lb 3.6 oz)   SpO2 100%   BMI 34.80 kg/m²       Respiratory Data     Respiration: 14, Pulse Oximetry: 100 %     Work Of Breathing / Effort: Vented  RUL Breath Sounds: Clear, RML Breath Sounds: Clear;Diminished, RLL Breath Sounds: Clear;Diminished, DON Breath Sounds: Clear, LLL Breath Sounds: Clear;Diminished    Physical Exam  Physical Exam  Vitals and nursing note reviewed.   Constitutional:       General: He is awake.      Interventions: He is intubated and restrained.   HENT:      Head:      Comments: Flap soft  Incision intact with staples     Nose:      Comments: Core track     Mouth/Throat:      Mouth: " Mucous membranes are moist.      Pharynx: Oropharynx is clear.   Eyes:      Extraocular Movements: Extraocular movements intact.      Conjunctiva/sclera: Conjunctivae normal.      Pupils: Pupils are equal, round, and reactive to light.   Cardiovascular:      Rate and Rhythm: Normal rate and regular rhythm.      Pulses: Normal pulses.   Pulmonary:      Effort: He is intubated.   Genitourinary:     Comments: White  Musculoskeletal:         General: No deformity or signs of injury. Normal range of motion.      Cervical back: Neck supple.   Skin:     General: Skin is warm and dry.   Neurological:      Mental Status: He is disoriented.      GCS: GCS eye subscore is 4. GCS verbal subscore is 1. GCS motor subscore is 5.      Motor: No weakness.   Psychiatric:         Behavior: Behavior is uncooperative.       Laboratory  Recent Results (from the past 24 hour(s))   CBC with Differential: Tomorrow AM    Collection Time: 09/04/22  4:48 AM   Result Value Ref Range    WBC 19.7 (H) 4.8 - 10.8 K/uL    RBC 4.44 (L) 4.70 - 6.10 M/uL    Hemoglobin 13.3 (L) 14.0 - 18.0 g/dL    Hematocrit 39.8 (L) 42.0 - 52.0 %    MCV 89.6 81.4 - 97.8 fL    MCH 30.0 27.0 - 33.0 pg    MCHC 33.4 (L) 33.7 - 35.3 g/dL    RDW 44.8 35.9 - 50.0 fL    Platelet Count 506 (H) 164 - 446 K/uL    MPV 11.3 9.0 - 12.9 fL    Neutrophils-Polys 79.50 (H) 44.00 - 72.00 %    Lymphocytes 7.70 (L) 22.00 - 41.00 %    Monocytes 11.10 0.00 - 13.40 %    Eosinophils 0.60 0.00 - 6.90 %    Basophils 0.40 0.00 - 1.80 %    Immature Granulocytes 0.70 0.00 - 0.90 %    Nucleated RBC 0.00 /100 WBC    Neutrophils (Absolute) 15.69 (H) 1.82 - 7.42 K/uL    Lymphs (Absolute) 1.52 1.00 - 4.80 K/uL    Monos (Absolute) 2.20 (H) 0.00 - 0.85 K/uL    Eos (Absolute) 0.12 0.00 - 0.51 K/uL    Baso (Absolute) 0.08 0.00 - 0.12 K/uL    Immature Granulocytes (abs) 0.13 (H) 0.00 - 0.11 K/uL    NRBC (Absolute) 0.00 K/uL   Comp Metabolic Panel (CMP): Tomorrow AM    Collection Time: 09/04/22  4:48 AM    Result Value Ref Range    Sodium 137 135 - 145 mmol/L    Potassium 4.1 3.6 - 5.5 mmol/L    Chloride 99 96 - 112 mmol/L    Co2 23 20 - 33 mmol/L    Anion Gap 15.0 7.0 - 16.0    Glucose 150 (H) 65 - 99 mg/dL    Bun 22 8 - 22 mg/dL    Creatinine 0.62 0.50 - 1.40 mg/dL    Calcium 10.1 8.5 - 10.5 mg/dL    AST(SGOT) 37 12 - 45 U/L    ALT(SGPT) 46 2 - 50 U/L    Alkaline Phosphatase 96 30 - 99 U/L    Total Bilirubin 0.5 0.1 - 1.5 mg/dL    Albumin 4.1 3.2 - 4.9 g/dL    Total Protein 7.8 6.0 - 8.2 g/dL    Globulin 3.7 (H) 1.9 - 3.5 g/dL    A-G Ratio 1.1 g/dL   ESTIMATED GFR    Collection Time: 22  4:48 AM   Result Value Ref Range    GFR (CKD-EPI) 108 >60 mL/min/1.73 m 2   EKG    Collection Time: 22  5:40 AM   Result Value Ref Range    Report       [facility]    Test Date:  2022  Pt Name:    TOMMY WILLINGHAM                  Department: 19  MRN:        [id number]                      Room:       S103  Gender:     Male                         Technician: ROSIE  :        1960                   Requested By:FRANTZ HANSEN  Order #:    [id number]                    Reading MD: Theodore Myles MD    Measurements  Intervals                                Axis  Rate:       135                          P:  CT:                                      QRS:        -53  QRSD:       99                           T:          71  QT:         311  QTc:        467    Interpretive Statements  Atrial fibrillation  Ventricular premature complex  Inferior infarct, old  Baseline wander in lead(s) V5  Compared to ECG 2022 21:23:55  Ventricular premature complex(es) now present  Myocardial infarct finding now present  Atrial flutter no longer present  Intraventricular conduction delay no longer present  Electronically Signed On 2022  7:53:09 PDT by Theodore Myles MD     AFB Culture - BAL    Collection Time: 22 11:10 AM    Specimen: Bronchoalveolar Lavage; Respirate   Result Value Ref Range    Significant  Indicator NEG     Source RESP     Site BRONCHOALVEOLAR LAVAGE     Culture Result Culture in progress.     AFB Smear Results -    QUANT BRONCHIAL WASHING    Collection Time: 09/04/22 11:10 AM    Specimen: Respirate   Result Value Ref Range    Significant Indicator NEG     Source RESP     Site BRONCHOALVEOLAR LAVAGE     Culture Result -     Gram Stain Result -        Fluids    Intake/Output Summary (Last 24 hours) at 9/4/2022 1438  Last data filed at 9/4/2022 0600  Gross per 24 hour   Intake 927.17 ml   Output 930 ml   Net -2.83 ml       Core Measures & Quality Metrics  Labs reviewed  White catheter: Critically Ill - Requiring Accurate Measurement of Urinary Output      DVT Prophylaxis: Enoxaparin (Lovenox)  DVT prophylaxis - mechanical: SCDs  Ulcer prophylaxis: Yes    Assessed for rehab: Patient was assess for and/or received rehabilitation services during this hospitalization  RAP Score Total: 6  CAGE Results: not completed Blood Alcohol>0.08: no     Assessment/Plan  Respiratory failure following trauma and surgery (HCC)- (present on admission)  Assessment & Plan  Mechanically ventilated following admission craniotomy.  9/4 Percutaneous tracheostomy.  Continue full mechanical ventilatory support. Ventilator bundle and Trauma weaning protocol.    Traumatic subdural hematoma, initial encounter ([facility])- (present on admission)  Assessment & Plan  Left-sided holohemispheric traumatic subdural hemorrhage associated with systemic anticoagulation and antiplatelet therapy.  8/25 Left craniotomy with evacuation of SDH.  8/28 Repeat CT overall improved.  Unable to obtain MRI brain due to SCS.  8/31 CT improved / No neurologic improvement / start amantadine  9/2 Increased amantadine. Post traumatic pharmacologic seizure prophylaxis for 1 week completed.  Speech Language Pathology cognitive evaluation when able.  Tesfaye Luther MD. Neurosurgeon. [facility].    Oropharyngeal dysphagia- (present on admission)  Assessment &  Plan  Cortrak with TF initiated while intubated.    CAD (coronary artery disease)  Assessment & Plan  History of coronary artery disease with STEMI in August 2020.  Drug-eluting stents placed in the mid RCA and PDA.  Daily aspirin administration.    Hypokalemia- (present on admission)  Assessment & Plan  9/2 Replete and trend.    Status post insertion of drug-eluting stent into right coronary artery for coronary artery disease- (present on admission)  Assessment & Plan  Drug-eluting stents placed in the mid RCA and PDA August 2020.   Daily aspirin administration.  Maintenance medication held on admission    Paroxysmal atrial fibrillation (HCC)- (present on admission)  Assessment & Plan  Chronic condition treated with carvedilol and apixaban (Eliquis) anticoagulation.  Carvedilol resumed upon admission.  Systemic anticoagulation held on admission.    Obese- (present on admission)  Assessment & Plan  8/29 BMI 34.94    Type 2 diabetes mellitus without complication, without long-term current use of insulin (HCC)- (present on admission)  Assessment & Plan  Chronic condition treated with metformin.  Glycohemoglobin 6.2 (131).  Holding maintenance metformin for 48 hours following intravenous contrast administration.    Insulin sliding scale coverage.    No contraindication to deep vein thrombosis (DVT) prophylaxis- (present on admission)  Assessment & Plan  Prophylactic anticoagulation for thrombotic prevention initially contraindicated secondary to elevated bleeding risk.  8/28 Trauma screening bilateral lower extremity venous duplex negative for above knee DVT.  8/28 Prophylactic dose enoxaparin initiated.      Depression- (present on admission)  Assessment & Plan  Chronic condition treated with Effexor.  Resumed maintenance medication on admission.    Antiplatelet or antithrombotic long-term use- (present on admission)  Assessment & Plan  Daily ASA and Apixaban.  Received KCentra on admit.  AA 96.1%, transfused 1 unit  platelets in ED.    Trauma- (present on admission)  Assessment & Plan  Mechanical ground-level fall.    Intracranial hemorrhage with systemic anticoagulation.  Trauma Yellow Transfer Activation from [facility].  Macario Winn MD. Trauma Surgery.    BPH (benign prostatic hyperplasia)- (present on admission)  Assessment & Plan  Chronic condition treated with finasteride and tamsulosin.  Resumed maintenance medication on admission.    Dyslipidemia- (present on admission)  Assessment & Plan  Chronic condition treated with atorvastatin.  Resumed maintenance medication on admission.    Hypertensive disease- (present on admission)  Assessment & Plan  Chronic condition treated with carvedilol.  Resumed maintenance medication on admission.        Discussed patient condition with RN, RT, Pharmacy, and trauma surgery.  CRITICAL CARE TIME EXCLUDING PROCEDURES: 37    minutes

## 2022-09-04 NOTE — PROCEDURES
DATE OF OPERATION:  9/4/2022    PREOPERATIVE DIAGNOSIS: purulent secretions, fever, leukocytosis, and acute respiratory failure secondary to traumatic brain injury .    POSTOPERATIVE DIAGNOSIS: purulent secretions, fever, leukocytosis, and acute respiratory failure secondary to traumatic brain injury .    PROCEDURE PERFORMED: Diagnostic flexible fiberoptic bronchoscopy with bronchoalveolar lavage. Diagnostic flexible fiberoptic bronchoscopy with direct visualization for percutaneous tracheostomy..    SURGEON:   Michi Awan M.D.    ANESTHESIA:  Anesthesia consisting of intravenous sedation, parenteral analgesia, and pharmacologic restraint was administered.    INDICATIONS:  The patient is a 62 year-old man with acute respiratory failure and purulent secretions, fever, leukocytosis, and acute respiratory failure secondary to traumatic brain injury . Diagnostic flexible fiberoptic bronchoscopy with bronchoalveolar lavage. Diagnostic flexible fiberoptic bronchoscopy with direct visualization for percutaneous tracheostomy. is performed in the ICU.    FINDINGS:  Normal anatomy.  Moderate Secretions: at the alton, right mainstem bronchus, right lower lobe bronchus, left mainstem bronchus, and left lower lobe bronchus.    SPECIMEN:   Bronchoalveolar lavage for quantitative culture. BAL location bilateral lungs..    PROCEDURE: Following informed consent, the patient was properly identified and optimally positioned in bed. He was preoxygenated with 100% oxygen and placed on a regular ventilatory rate. Anesthesia consisting of intravenous sedation, parenteral analgesia, and pharmacologic restraint was administered. A timeout was conducted with the full attention and participation of all procedure personnel.    The fiberoptic bronchoscope was advance through the indwelling endotracheal tube. The endotracheal tube was repositioned just above the vocal cords under direct vision. Satisfactory ventilation and delivered tidal  volumes were confirmed. The anterior neck was transilluminated at the surface landmark site for the tracheostomy. The trachea was cannulated in the midline with an angiocatheter midway between the thyroid cartilage and suprasternal notch under direct bronchoscopic vision.  The percutaneous tracheostomy tube was placed.  Please see Dr Cortez's dictation for full details of the tracheostomy.    The fiberoptic bronchoscope was advanced through the endotracheal tube. Diagnostic bronchoscopy with bronchoalveolar lavage was performed. All airways were evaluated to the sub-segmental level. The airways were systematically and sequentially inspected and lavaged with sterile saline using a wedged alveolar technique.  The pooled specimen effluent was collected in a sterile specimen trap and submitted to the laboratory for quantitative cultures.      The patient tolerated the procedure well. There were no apparent complications.         ____________________________________     Michi Awan M.D.    DD: 9/4/2022  11:33 AM

## 2022-09-04 NOTE — CARE PLAN
Problem: Ventilation  Goal: Ability to achieve and maintain unassisted ventilation or tolerate decreased levels of ventilator support  Description: Target End Date:  4 days     Document on Vent flowsheet    1.  Support and monitor invasive and noninvasive mechanical ventilation  2.  Monitor ventilator weaning response  3.  Perform ventilator associated pneumonia prevention interventions  4.  Manage ventilation therapy by monitoring diagnostic test results  Outcome: Progressing      Ventilator Daily Summary    Vent Day #11   Trach Day #1    Ventilator settings changed this shift: No (130/+8/30%)    Weaning trials: SBT x 2,    Respiratory Procedures: No    Plan: Continue current ventilator settings and wean mechanical ventilation as tolerated per physician orders.

## 2022-09-04 NOTE — CARE PLAN
The patient is Watcher - Medium risk of patient condition declining or worsening    Shift Goals  Clinical Goals: rest, follow commands, bp control  Patient Goals: maliha  Family Goals: malhia    Progress made toward(s) clinical / shift goals:    Problem: Pain - Standard  Goal: Alleviation of pain or a reduction in pain to the patient’s comfort goal  Outcome: Progressing     Problem: Knowledge Deficit - Standard  Goal: Patient and family/care givers will demonstrate understanding of plan of care, disease process/condition, diagnostic tests and medications  Outcome: Not Progressing     Problem: Safety - Medical Restraint  Goal: Remains free of injury from restraints (Restraint for Interference with Medical Device)  Outcome: Not Progressing     Problem: Skin Integrity  Goal: Skin integrity is maintained or improved  Outcome: Progressing     Problem: Mechanical Ventilation  Goal: Safe management of artificial airway and ventilation  Outcome: Progressing       Patient is not progressing towards the following goals:      Problem: Knowledge Deficit - Standard  Goal: Patient and family/care givers will demonstrate understanding of plan of care, disease process/condition, diagnostic tests and medications  Outcome: Not Progressing     Problem: Safety - Medical Restraint  Goal: Remains free of injury from restraints (Restraint for Interference with Medical Device)  Outcome: Not Progressing

## 2022-09-04 NOTE — PROGRESS NOTES
Patient went into a fib with RVR at 0530, BP remained stable-hypertensive. Stat EKG was ordered per protocol and completed at 0540, confirming a fib. Dr Chirinos was contacted 0546- no new orders.

## 2022-09-04 NOTE — PROCEDURES
"Perc Trach Op Note    Date of operation: 9/4/2022    Preoperative diagnosis: Acute on chronic respiratory failure    Postoperative diagnosis: Same    Operation performed: Percutaneous tracheostomy    Surgeon: Dr. Cortez    Assistant/endoscopist: Dr. Awan    Anesthesia: local anesthesia, Intravenous analgesia, sedation, and pharmacologic restraint     Indications: The patient is a 62 y.o. male with persistent ventilator dependent respiratory failure secondary to traumatic brain injury with developing pneumonia. Percutaneous tracheostomy is carried out in the SICU under  bronchoscopic guidance.    Findings: Bronchoscopic findings included     Specimen: purulent sputum (786.4).    Procedure: Following informed consent, the patient was properly identified and optimally positioned in bed.  The patient was preoxygenated with 100% oxygen and placed on a set ventilator rate. A roll was placed between the patient's shoulders and the neck was slightly extended.  A \"time out\" was initiated. The correct patient, procedure and operative site were verified. The patient's allergy status was assessed. Procedural modifications were made as required. local anesthesia, Intravenous analgesia, sedation, and pharmacologic restraint  was administered. The surgical site was prepped with chlorhexidine.     Dr. Awan  performed the bronchoscopy. Please see dictation for full details. The fiberoptic bronchoscope was advanced through the indwelling endotracheal tube. The tube was withdrawn to the level of the vocal cords under direct vision. The anterior trachea was transilluminated through the end of the endotracheal tube and the surface landmarks for the tracheostomy were established. The scope was withdrawn prior to cannulation of the trachea  to prevent damage to the bronchoscope.    The anterior trachea was cannulated percutaneously midway between the thyroid cartilage and the suprasternal notch. The needle was withdrawn from the " angiocatheter and a wire was passed without resistance under direct bronchoscopic vision. A 1 cm vertical incision was made and the starter dilator was passed. The single graduated dilator was passed over the wire under direct vision. An 8mm cuffed Portex  tracheostomy tube was passed over the wire under direct visualization. The tracheostomy tube was connected to the ventilator circuit and the cuff was inflated. Satisfactory oxygenation and ventilation was observed. The tracheostomy tube was secured to the neck with interrupted sutures. A tracheostomy strap was applied.    The patient tolerated the procedure well and there were no apparent complications.

## 2022-09-04 NOTE — PROGRESS NOTES
Pharmacy Vancomycin Kinetics Note for 9/4/2022     62 y.o. male on Vancomycin day # 1     Vancomycin Indication (AUC Dosing): S. aureus pneumonia  Provider specified end date:  (TBD)    Active Antibiotics (From admission, onward)      Ordered     Ordering Provider       Sun Sep 4, 2022 10:50 AM    09/04/22 1050  vancomycin (VANCOCIN) 2,500 mg in  mL IVPB  (vancomycin (VANCOCIN) IV (LD + Maintenance))  ONCE         ACE Alfaro Sep 4, 2022 10:50 AM    09/04/22 1050  MD Alert...Vancomycin per Pharmacy  PHARMACY TO DOSE        Question:  Indication(s) for vancomycin?  Answer:  Pneumonia    Neto Cortez D.O.    09/04/22 1050  piperacillin-tazobactam (Zosyn) 4.5 g in  mL IVPB  (piperacillin-tazobactam (ZOSYN) IV (Extended-infusion) PANEL )  ONCE        See Hyperspace for full Linked Orders Report.    Neto Cortez D.O.    09/04/22 1050  piperacillin-tazobactam (Zosyn) 4.5 g in  mL IVPB  (piperacillin-tazobactam (ZOSYN) IV (Extended-infusion) PANEL )  EVERY 8 HOURS        See Hyperspace for full Linked Orders Report.    Neto Cortez D.O.            Dosing Weight: 101 kg (222 lb 10.6 oz)      Admission History: Admitted on 8/25/2022 for Trauma [T14.90XA]  Subdural hematoma (HCC) [S06.5X9A]  Pertinent history: Admitted 8/25 following a GLF, found to have SDH. Of note, patient was intubated upon admission and has required mechanical ventilatory suport ever since. Broad spectrum antibiotics are being initiated following tracheostomy and bronchoscopy for possible pnuemonia.    Allergies:     Hctz [hydrochlorothiazide w-spironolactone], Oxycodone, and Pectin     Pertinent cultures to date:     Results       Procedure Component Value Units Date/Time    QUANT BRONCHIAL WASHING [[id number]] Collected: 09/04/22 1110    Order Status: No result Specimen: Respirate Updated: 09/04/22 1311     Significant Indicator NEG     Source RESP     Site BRONCHOALVEOLAR LAVAGE     Culture Result -     Gram  "Stain Result -    Narrative:      Collected By: [id number] PHI DUMONT  Collected By: [id number] PHI DUMONT    BLOOD CULTURE [[id number]] Collected: 09/04/22 1244    Order Status: Sent Specimen: Blood from Peripheral Updated: 09/04/22 1311    Narrative:      Per Hospital Policy: Only change Specimen Src: to \"Line\" if  specified by physician order.    AFB Culture - BAL [[id number]] Collected: 09/04/22 1110    Order Status: Sent Specimen: Respirate from Bronchoalveolar Lavage Updated: 09/04/22 1311    Narrative:      Collected By: [id number] PHI DUMONT    BLOOD CULTURE [[id number]] Collected: 09/04/22 1200    Order Status: Sent Specimen: Blood from Peripheral Updated: 09/04/22 1222    Narrative:      Per Hospital Policy: Only change Specimen Src: to \"Line\" if  specified by physician order.    CULTURE RESPIRATORY W/ GRM STN [[id number]] Collected: 09/04/22 1110    Order Status: Canceled Specimen: Other from Bronchoalveolar Lavage     Quant Bronchial Washing [[id number]] Collected: 09/04/22 1110    Order Status: Sent Specimen: Respirate from Bronchoalveolar Lavage     Culture Respiratory W/ Grm Stn - BAL [[id number]] Collected: 09/04/22 1110    Order Status: Completed Specimen: Respirate from Bronchoalveolar Lavage     MRSA By PCR (Amp) [[id number]]     Order Status: No result Specimen: Respirate from Nares             Labs:     Estimated Creatinine Clearance: 140 mL/min (by C-G formula based on SCr of 0.62 mg/dL).  Recent Labs     09/02/22 0435 09/03/22 0521 09/04/22 0448   WBC 11.8* 14.7* 19.7*   NEUTSPOLYS 69.30 74.50* 79.50*     Recent Labs     09/02/22  0435 09/03/22  0521 09/04/22  0448   BUN 23* 25* 22   CREATININE 0.48* 0.55 0.62   ALBUMIN 4.1 4.1 4.1       Intake/Output Summary (Last 24 hours) at 9/4/2022 1403  Last data filed at 9/4/2022 0600  Gross per 24 hour   Intake 927.17 ml   Output 930 ml   Net -2.83 ml      /77   Pulse 86   Temp 37.7 °C (99.9 °F)   Resp 20   Ht 1.702 m (5' 7.01\")   Wt 101 " kg (222 lb 3.6 oz)   SpO2 98%  No data recorded.      List concerns for Vancomycin clearance:     Age;BUN/Scr ratio greater than 20:1    Pharmacokinetics:     AUC kinetics:   Ke (hr ^-1): 0.1339 hr^-1  Half life: 5.18 hr  Clearance: 5.778  Estimated TDD: 2889  Estimated Dose: 867  Estimated interval: 7.2    A/P:     -  Vancomycin dose: 1500mg iv q12h (1100, 2300)    -  Next vancomycin level(s):    -9/6  @ 1400   -9/6 Vt @ 2230     -  Predicted vancomycin AUC from initial AUC test calculator: 519 mg·hr/L    -  Comments: Cultures in process. Renal function at baseline, adequate UO (>0.5mL/kg/hr). Will initiate vancomycin at 15mg/kg iv q12h and tentatively plan to check levels once at steady state. Pharmacy will continue to follow.     Gisella Julio, PharmD, BCCCP

## 2022-09-05 ENCOUNTER — APPOINTMENT (OUTPATIENT)
Dept: RADIOLOGY | Facility: MEDICAL CENTER | Age: 62
DRG: 003 | End: 2022-09-05
Attending: SURGERY
Payer: OTHER GOVERNMENT

## 2022-09-05 LAB
ALBUMIN SERPL BCP-MCNC: 3.7 G/DL (ref 3.2–4.9)
ALBUMIN/GLOB SERPL: 1.2 G/DL
ALP SERPL-CCNC: 86 U/L (ref 30–99)
ALT SERPL-CCNC: 39 U/L (ref 2–50)
ANION GAP SERPL CALC-SCNC: 11 MMOL/L (ref 7–16)
AST SERPL-CCNC: 22 U/L (ref 12–45)
BASOPHILS # BLD AUTO: 0.4 % (ref 0–1.8)
BASOPHILS # BLD: 0.07 K/UL (ref 0–0.12)
BILIRUB SERPL-MCNC: 0.6 MG/DL (ref 0.1–1.5)
BUN SERPL-MCNC: 22 MG/DL (ref 8–22)
CALCIUM SERPL-MCNC: 9.6 MG/DL (ref 8.5–10.5)
CHLORIDE SERPL-SCNC: 101 MMOL/L (ref 96–112)
CO2 SERPL-SCNC: 25 MMOL/L (ref 20–33)
CREAT SERPL-MCNC: 0.7 MG/DL (ref 0.5–1.4)
EOSINOPHIL # BLD AUTO: 0.26 K/UL (ref 0–0.51)
EOSINOPHIL NFR BLD: 1.6 % (ref 0–6.9)
ERYTHROCYTE [DISTWIDTH] IN BLOOD BY AUTOMATED COUNT: 46.6 FL (ref 35.9–50)
GFR SERPLBLD CREATININE-BSD FMLA CKD-EPI: 104 ML/MIN/1.73 M 2
GLOBULIN SER CALC-MCNC: 3.2 G/DL (ref 1.9–3.5)
GLUCOSE SERPL-MCNC: 145 MG/DL (ref 65–99)
HCT VFR BLD AUTO: 36.1 % (ref 42–52)
HGB BLD-MCNC: 12 G/DL (ref 14–18)
IMM GRANULOCYTES # BLD AUTO: 0.13 K/UL (ref 0–0.11)
IMM GRANULOCYTES NFR BLD AUTO: 0.8 % (ref 0–0.9)
LYMPHOCYTES # BLD AUTO: 1.48 K/UL (ref 1–4.8)
LYMPHOCYTES NFR BLD: 9.3 % (ref 22–41)
MAGNESIUM SERPL-MCNC: 1.9 MG/DL (ref 1.5–2.5)
MCH RBC QN AUTO: 30.2 PG (ref 27–33)
MCHC RBC AUTO-ENTMCNC: 33.2 G/DL (ref 33.7–35.3)
MCV RBC AUTO: 90.7 FL (ref 81.4–97.8)
MONOCYTES # BLD AUTO: 1.55 K/UL (ref 0–0.85)
MONOCYTES NFR BLD AUTO: 9.7 % (ref 0–13.4)
NEUTROPHILS # BLD AUTO: 12.45 K/UL (ref 1.82–7.42)
NEUTROPHILS NFR BLD: 78.2 % (ref 44–72)
NRBC # BLD AUTO: 0 K/UL
NRBC BLD-RTO: 0 /100 WBC
PHOSPHATE SERPL-MCNC: 2.8 MG/DL (ref 2.5–4.5)
PLATELET # BLD AUTO: 456 K/UL (ref 164–446)
PMV BLD AUTO: 11.1 FL (ref 9–12.9)
POTASSIUM SERPL-SCNC: 3.3 MMOL/L (ref 3.6–5.5)
PROT SERPL-MCNC: 6.9 G/DL (ref 6–8.2)
RBC # BLD AUTO: 3.98 M/UL (ref 4.7–6.1)
SODIUM SERPL-SCNC: 137 MMOL/L (ref 135–145)
TRIGL SERPL-MCNC: 113 MG/DL (ref 0–149)
WBC # BLD AUTO: 15.9 K/UL (ref 4.8–10.8)

## 2022-09-05 PROCEDURE — 71045 X-RAY EXAM CHEST 1 VIEW: CPT

## 2022-09-05 PROCEDURE — 94640 AIRWAY INHALATION TREATMENT: CPT

## 2022-09-05 PROCEDURE — 85025 COMPLETE CBC W/AUTO DIFF WBC: CPT

## 2022-09-05 PROCEDURE — 80053 COMPREHEN METABOLIC PANEL: CPT

## 2022-09-05 PROCEDURE — 700102 HCHG RX REV CODE 250 W/ 637 OVERRIDE(OP): Performed by: SURGERY

## 2022-09-05 PROCEDURE — 84100 ASSAY OF PHOSPHORUS: CPT

## 2022-09-05 PROCEDURE — 83735 ASSAY OF MAGNESIUM: CPT

## 2022-09-05 PROCEDURE — 94003 VENT MGMT INPAT SUBQ DAY: CPT

## 2022-09-05 PROCEDURE — A9270 NON-COVERED ITEM OR SERVICE: HCPCS | Performed by: SURGERY

## 2022-09-05 PROCEDURE — 700111 HCHG RX REV CODE 636 W/ 250 OVERRIDE (IP): Performed by: SURGERY

## 2022-09-05 PROCEDURE — 86140 C-REACTIVE PROTEIN: CPT

## 2022-09-05 PROCEDURE — 770022 HCHG ROOM/CARE - ICU (200)

## 2022-09-05 PROCEDURE — A9270 NON-COVERED ITEM OR SERVICE: HCPCS | Performed by: NEUROLOGICAL SURGERY

## 2022-09-05 PROCEDURE — 99291 CRITICAL CARE FIRST HOUR: CPT | Performed by: SURGERY

## 2022-09-05 PROCEDURE — 84478 ASSAY OF TRIGLYCERIDES: CPT

## 2022-09-05 PROCEDURE — 97530 THERAPEUTIC ACTIVITIES: CPT

## 2022-09-05 PROCEDURE — 94799 UNLISTED PULMONARY SVC/PX: CPT

## 2022-09-05 PROCEDURE — 700105 HCHG RX REV CODE 258: Performed by: SURGERY

## 2022-09-05 PROCEDURE — 700102 HCHG RX REV CODE 250 W/ 637 OVERRIDE(OP): Performed by: NEUROLOGICAL SURGERY

## 2022-09-05 RX ORDER — MAGNESIUM SULFATE HEPTAHYDRATE 40 MG/ML
2 INJECTION, SOLUTION INTRAVENOUS ONCE
Status: COMPLETED | OUTPATIENT
Start: 2022-09-05 | End: 2022-09-05

## 2022-09-05 RX ADMIN — AMANTADINE HYDROCHLORIDE 100 MG: 100 TABLET ORAL at 05:51

## 2022-09-05 RX ADMIN — AMANTADINE HYDROCHLORIDE 100 MG: 100 TABLET ORAL at 12:01

## 2022-09-05 RX ADMIN — ENOXAPARIN SODIUM 30 MG: 30 INJECTION SUBCUTANEOUS at 05:50

## 2022-09-05 RX ADMIN — CARVEDILOL 25 MG: 6.25 TABLET, FILM COATED ORAL at 05:51

## 2022-09-05 RX ADMIN — PIPERACILLIN AND TAZOBACTAM 4.5 G: 4; .5 INJECTION, POWDER, LYOPHILIZED, FOR SOLUTION INTRAVENOUS; PARENTERAL at 14:57

## 2022-09-05 RX ADMIN — PIPERACILLIN AND TAZOBACTAM 4.5 G: 4; .5 INJECTION, POWDER, LYOPHILIZED, FOR SOLUTION INTRAVENOUS; PARENTERAL at 04:37

## 2022-09-05 RX ADMIN — ENOXAPARIN SODIUM 30 MG: 30 INJECTION SUBCUTANEOUS at 18:12

## 2022-09-05 RX ADMIN — HYDRALAZINE HYDROCHLORIDE 25 MG: 25 TABLET, FILM COATED ORAL at 05:51

## 2022-09-05 RX ADMIN — ATORVASTATIN CALCIUM 80 MG: 80 TABLET, FILM COATED ORAL at 18:10

## 2022-09-05 RX ADMIN — NYSTATIN 500000 UNITS: 100000 SUSPENSION ORAL at 09:00

## 2022-09-05 RX ADMIN — CARVEDILOL 25 MG: 6.25 TABLET, FILM COATED ORAL at 18:12

## 2022-09-05 RX ADMIN — NYSTATIN 500000 UNITS: 100000 SUSPENSION ORAL at 17:00

## 2022-09-05 RX ADMIN — POTASSIUM BICARBONATE 50 MEQ: 977.5 TABLET, EFFERVESCENT ORAL at 12:01

## 2022-09-05 RX ADMIN — HYDROMORPHONE HYDROCHLORIDE 2 MG: 2 TABLET ORAL at 18:10

## 2022-09-05 RX ADMIN — VENLAFAXINE 75 MG: 75 TABLET ORAL at 05:51

## 2022-09-05 RX ADMIN — HYDRALAZINE HYDROCHLORIDE 25 MG: 25 TABLET, FILM COATED ORAL at 14:57

## 2022-09-05 RX ADMIN — FAMOTIDINE 20 MG: 20 TABLET, FILM COATED ORAL at 05:51

## 2022-09-05 RX ADMIN — MAGNESIUM SULFATE HEPTAHYDRATE 2 G: 40 INJECTION, SOLUTION INTRAVENOUS at 12:01

## 2022-09-05 RX ADMIN — VENLAFAXINE 75 MG: 75 TABLET ORAL at 18:12

## 2022-09-05 RX ADMIN — PIPERACILLIN AND TAZOBACTAM 4.5 G: 4; .5 INJECTION, POWDER, LYOPHILIZED, FOR SOLUTION INTRAVENOUS; PARENTERAL at 21:45

## 2022-09-05 RX ADMIN — MINERAL OIL, PETROLATUM 1 APPLICATION: 425; 573 OINTMENT OPHTHALMIC at 05:51

## 2022-09-05 RX ADMIN — NYSTATIN 500000 UNITS: 100000 SUSPENSION ORAL at 13:00

## 2022-09-05 RX ADMIN — AMLODIPINE BESYLATE 10 MG: 10 TABLET ORAL at 05:51

## 2022-09-05 RX ADMIN — HYDROMORPHONE HYDROCHLORIDE 2 MG: 2 TABLET ORAL at 08:32

## 2022-09-05 RX ADMIN — HYDROMORPHONE HYDROCHLORIDE 2 MG: 2 TABLET ORAL at 14:57

## 2022-09-05 RX ADMIN — HYDRALAZINE HYDROCHLORIDE 25 MG: 25 TABLET, FILM COATED ORAL at 21:45

## 2022-09-05 RX ADMIN — NYSTATIN 500000 UNITS: 100000 SUSPENSION ORAL at 21:46

## 2022-09-05 RX ADMIN — FAMOTIDINE 20 MG: 20 TABLET, FILM COATED ORAL at 18:10

## 2022-09-05 ASSESSMENT — COGNITIVE AND FUNCTIONAL STATUS - GENERAL
MOVING TO AND FROM BED TO CHAIR: UNABLE
TURNING FROM BACK TO SIDE WHILE IN FLAT BAD: UNABLE
MOVING FROM LYING ON BACK TO SITTING ON SIDE OF FLAT BED: UNABLE
SUGGESTED CMS G CODE MODIFIER MOBILITY: CM
MOBILITY SCORE: 7
CLIMB 3 TO 5 STEPS WITH RAILING: TOTAL
STANDING UP FROM CHAIR USING ARMS: A LOT
WALKING IN HOSPITAL ROOM: TOTAL

## 2022-09-05 ASSESSMENT — PAIN DESCRIPTION - PAIN TYPE
TYPE: ACUTE PAIN

## 2022-09-05 ASSESSMENT — GAIT ASSESSMENTS: GAIT LEVEL OF ASSIST: UNABLE TO PARTICIPATE

## 2022-09-05 NOTE — PROGRESS NOTES
"  Trauma / Surgical Daily Progress Note    Date of Service  9/5/2022    Chief Complaint  62 y.o. male admitted 8/25/2022 with GLF w/SDH    Interval Events  CRITICAL CARE DECISION MAKING:    Patient examined and discussed with team at bedside.    Cultures pending: Continuing antibiotics for presumed respiratory tract infection    Mental status continues to improve: Continue amantadine.  Continue aggressive mobilization.  Trend neuro exams    Addressed pulmonary hygiene concerns as well as oxygenation/ventilation.  Working more quickly towards ventilator liberation with tracheostomy in place.  Starting T-piece trials.  Labs reviewed, electrolytes addressed, renal function assessed  Reviewed nutrition strategies, recent indices: Core track feeding  Addressed GI prophylaxis and bowel frequency: Pepcid and bowel protocol  Assessed/discussed/titrated analgesics and need for sedatives: Minimize sedatives  Addressed DVT prophylaxis: Lovenox and SCDs  Addressed line days, keith catheter days, access needs  Addressed family and discharge concerns      Review of Systems  Review of Systems   Unable to perform ROS: Intubated      Vital Signs for last 24 hours  Pulse:  [74-97] 86  Resp:  [13-41] 31  BP: ()/(54-96) 141/83  SpO2:  [91 %-98 %] 97 %    Hemodynamic parameters for last 24 hours   BP (!) 141/83   Pulse 86   Temp 37.7 °C (99.9 °F)   Resp (!) 31   Ht 1.702 m (5' 7.01\")   Wt 101 kg (222 lb 3.6 oz)   SpO2 97%   BMI 34.80 kg/m²     Respiratory Data     Respiration: (!) 31, Pulse Oximetry: 97 %     Work Of Breathing / Effort: Within Normal Limits  RUL Breath Sounds: Clear, RML Breath Sounds: Clear;Diminished, RLL Breath Sounds: Clear;Diminished, DON Breath Sounds: Clear, LLL Breath Sounds: Clear;Diminished    Physical Exam  Physical Exam  Constitutional:       General: He is awake.      Interventions: He is intubated and restrained.   HENT:      Nose:      Comments: Core track     Mouth/Throat:      Mouth: Mucous " membranes are moist.      Pharynx: Oropharynx is clear.   Eyes:      Extraocular Movements: Extraocular movements intact.      Conjunctiva/sclera: Conjunctivae normal.      Pupils: Pupils are equal, round, and reactive to light.   Cardiovascular:      Rate and Rhythm: Normal rate and regular rhythm.      Pulses: Normal pulses.   Pulmonary:      Effort: Pulmonary effort is normal. He is intubated.      Breath sounds: No stridor. No wheezing.   Abdominal:      Palpations: Abdomen is soft.      Tenderness: There is no abdominal tenderness.   Genitourinary:     Comments: White  Musculoskeletal:         General: Normal range of motion.      Cervical back: Neck supple.      Right lower leg: No edema.      Left lower leg: No edema.   Skin:     General: Skin is warm and dry.   Neurological:      Mental Status: He is disoriented.      GCS: GCS eye subscore is 4. GCS verbal subscore is 1. GCS motor subscore is 6.   Psychiatric:         Behavior: Behavior is cooperative.       Laboratory  Recent Results (from the past 24 hour(s))   MRSA By PCR (Amp)    Collection Time: 09/04/22  4:25 PM    Specimen: Nares; Respirate   Result Value Ref Range    MRSA by PCR Negative Negative   CBC with Differential: Tomorrow AM    Collection Time: 09/05/22  4:15 AM   Result Value Ref Range    WBC 15.9 (H) 4.8 - 10.8 K/uL    RBC 3.98 (L) 4.70 - 6.10 M/uL    Hemoglobin 12.0 (L) 14.0 - 18.0 g/dL    Hematocrit 36.1 (L) 42.0 - 52.0 %    MCV 90.7 81.4 - 97.8 fL    MCH 30.2 27.0 - 33.0 pg    MCHC 33.2 (L) 33.7 - 35.3 g/dL    RDW 46.6 35.9 - 50.0 fL    Platelet Count 456 (H) 164 - 446 K/uL    MPV 11.1 9.0 - 12.9 fL    Neutrophils-Polys 78.20 (H) 44.00 - 72.00 %    Lymphocytes 9.30 (L) 22.00 - 41.00 %    Monocytes 9.70 0.00 - 13.40 %    Eosinophils 1.60 0.00 - 6.90 %    Basophils 0.40 0.00 - 1.80 %    Immature Granulocytes 0.80 0.00 - 0.90 %    Nucleated RBC 0.00 /100 WBC    Neutrophils (Absolute) 12.45 (H) 1.82 - 7.42 K/uL    Lymphs (Absolute) 1.48 1.00  - 4.80 K/uL    Monos (Absolute) 1.55 (H) 0.00 - 0.85 K/uL    Eos (Absolute) 0.26 0.00 - 0.51 K/uL    Baso (Absolute) 0.07 0.00 - 0.12 K/uL    Immature Granulocytes (abs) 0.13 (H) 0.00 - 0.11 K/uL    NRBC (Absolute) 0.00 K/uL   Comp Metabolic Panel (CMP): Tomorrow AM    Collection Time: 09/05/22  4:15 AM   Result Value Ref Range    Sodium 137 135 - 145 mmol/L    Potassium 3.3 (L) 3.6 - 5.5 mmol/L    Chloride 101 96 - 112 mmol/L    Co2 25 20 - 33 mmol/L    Anion Gap 11.0 7.0 - 16.0    Glucose 145 (H) 65 - 99 mg/dL    Bun 22 8 - 22 mg/dL    Creatinine 0.70 0.50 - 1.40 mg/dL    Calcium 9.6 8.5 - 10.5 mg/dL    AST(SGOT) 22 12 - 45 U/L    ALT(SGPT) 39 2 - 50 U/L    Alkaline Phosphatase 86 30 - 99 U/L    Total Bilirubin 0.6 0.1 - 1.5 mg/dL    Albumin 3.7 3.2 - 4.9 g/dL    Total Protein 6.9 6.0 - 8.2 g/dL    Globulin 3.2 1.9 - 3.5 g/dL    A-G Ratio 1.2 g/dL   Magnesium: Every Monday and Thursday AM    Collection Time: 09/05/22  4:15 AM   Result Value Ref Range    Magnesium 1.9 1.5 - 2.5 mg/dL   Phosphorus: Every Monday and Thursday AM    Collection Time: 09/05/22  4:15 AM   Result Value Ref Range    Phosphorus 2.8 2.5 - 4.5 mg/dL   Triglyceride    Collection Time: 09/05/22  4:15 AM   Result Value Ref Range    Triglycerides 113 0 - 149 mg/dL   ESTIMATED GFR    Collection Time: 09/05/22  4:15 AM   Result Value Ref Range    GFR (CKD-EPI) 104 >60 mL/min/1.73 m 2       Fluids    Intake/Output Summary (Last 24 hours) at 9/5/2022 1457  Last data filed at 9/5/2022 1200  Gross per 24 hour   Intake 2425 ml   Output 1250 ml   Net 1175 ml       Core Measures & Quality Metrics  Labs reviewed  White catheter: Critically Ill - Requiring Accurate Measurement of Urinary Output      DVT Prophylaxis: Enoxaparin (Lovenox)  DVT prophylaxis - mechanical: SCDs  Ulcer prophylaxis: Yes    Assessed for rehab: Patient was assess for and/or received rehabilitation services during this hospitalization  RAP Score Total: 6  CAGE Results: not completed  Blood Alcohol>0.08: no     Assessment/Plan  Respiratory failure following trauma and surgery (HCC)- (present on admission)  Assessment & Plan  Mechanically ventilated following admission craniotomy.  9/4 Percutaneous tracheostomy.  Continue full mechanical ventilatory support. Ventilator bundle and Trauma weaning protocol.    Traumatic subdural hematoma, initial encounter (HCC)- (present on admission)  Assessment & Plan  Left-sided holohemispheric traumatic subdural hemorrhage associated with systemic anticoagulation and antiplatelet therapy.  8/25 Left craniotomy with evacuation of SDH.  8/28 Repeat CT overall improved.  Unable to obtain MRI brain due to SCS.  8/31 CT improved / No neurologic improvement / start amantadine  9/2 Increased amantadine. Post traumatic pharmacologic seizure prophylaxis for 1 week completed.  Speech Language Pathology cognitive evaluation when able.  Tesfaye Luther MD. Neurosurgeon. [facility].    Oropharyngeal dysphagia- (present on admission)  Assessment & Plan  Cortrak with TF initiated while intubated.    CAD (coronary artery disease)  Assessment & Plan  History of coronary artery disease with STEMI in August 2020.  Drug-eluting stents placed in the mid RCA and PDA.  Daily aspirin administration.    Hypokalemia- (present on admission)  Assessment & Plan  9/2 Replete and trend.    Status post insertion of drug-eluting stent into right coronary artery for coronary artery disease- (present on admission)  Assessment & Plan  Drug-eluting stents placed in the mid RCA and PDA August 2020.   Daily aspirin administration.  Maintenance medication held on admission    Paroxysmal atrial fibrillation (HCC)- (present on admission)  Assessment & Plan  Chronic condition treated with carvedilol and apixaban (Eliquis) anticoagulation.  Carvedilol resumed upon admission.  Systemic anticoagulation held on admission.    Obese- (present on admission)  Assessment & Plan  8/29 BMI 34.94    Type 2  diabetes mellitus without complication, without long-term current use of insulin (HCC)- (present on admission)  Assessment & Plan  Chronic condition treated with metformin.  Glycohemoglobin 6.2 (131).  Holding maintenance metformin for 48 hours following intravenous contrast administration.    Insulin sliding scale coverage.    No contraindication to deep vein thrombosis (DVT) prophylaxis- (present on admission)  Assessment & Plan  Prophylactic anticoagulation for thrombotic prevention initially contraindicated secondary to elevated bleeding risk.  8/28 Trauma screening bilateral lower extremity venous duplex negative for above knee DVT.  8/28 Prophylactic dose enoxaparin initiated.      Depression- (present on admission)  Assessment & Plan  Chronic condition treated with Effexor.  Resumed maintenance medication on admission.    Antiplatelet or antithrombotic long-term use- (present on admission)  Assessment & Plan  Daily ASA and Apixaban.  Received KCentra on admit.  AA 96.1%, transfused 1 unit platelets in ED.    Trauma- (present on admission)  Assessment & Plan  Mechanical ground-level fall.    Intracranial hemorrhage with systemic anticoagulation.  Trauma Yellow Transfer Activation from [facility].  Macario Winn MD. Trauma Surgery.    BPH (benign prostatic hyperplasia)- (present on admission)  Assessment & Plan  Chronic condition treated with finasteride and tamsulosin.  Resumed maintenance medication on admission.    Dyslipidemia- (present on admission)  Assessment & Plan  Chronic condition treated with atorvastatin.  Resumed maintenance medication on admission.    Hypertensive disease- (present on admission)  Assessment & Plan  Chronic condition treated with carvedilol.  Resumed maintenance medication on admission.        Discussed patient condition with RN, RT, Pharmacy, and .  CRITICAL CARE TIME EXCLUDING PROCEDURES: 37    minutes

## 2022-09-05 NOTE — THERAPY
Physical Therapy   Daily Treatment     Patient Name: Karan Wiley  Age:  62 y.o., Sex:  male  Medical Record #: [id number]  Today's Date: 9/5/2022     Precautions  Precautions: Fall Risk;Tracheostomy     Assessment    Pt seen for follow up PT tx. Improvement noted in command following and participation with therapy. With max cues pt able to initiate bed mobility requiring max assist to achieve supine>sitting. Once EOB, pt able to support self with UE use. Max assist required for STS but once in standing pt able to hold standing posture for ~15 sec with cues for upright posture. PT will cont while pt is in acute care setting to address deficits.     Plan    Continue current treatment plan.    DC Equipment Recommendations: Unable to determine at this time  Discharge Recommendations: Recommend post-acute placement for additional physical therapy services prior to discharge home         09/05/22 1603   Vitals   O2 Delivery Device T-Piece   Vitals Comments vitals stable throughout   Cognition    Cognition / Consciousness X   Speech/ Communication Intubated / Trached   Level of Consciousness Alert   Ability To Follow Commands 1 Step   Comments improved participation, very [place]   Neuro-Muscular Treatments   Neuro-Muscular Treatments Weight Shift Left;Weight Shift Right;Verbal Cuing;Anterior weight shift;Compensatory Strategies   Comments EOB and standing   Balance   Sitting Balance (Static) Fair -   Sitting Balance (Dynamic) Poor +   Standing Balance (Static) Poor   Standing Balance (Dynamic) Poor   Weight Shift Sitting Fair   Weight Shift Standing Good   Skilled Intervention Verbal Cuing;Compensatory Strategies;Sequencing   Comments cues for balance provided with some improvement in sitting and standing   Gait Analysis   Gait Level Of Assist Unable to Participate   Bed Mobility    Supine to Sit Maximal Assist   Sit to Supine Maximal Assist   Scooting Moderate Assist   Rolling Moderate Assist to Rt.   Skilled  Intervention Verbal Cuing;Compensatory Strategies   Comments poor motor planning and sequencing but able to assist   Functional Mobility   Sit to Stand Maximal Assist   Mobility EOB, STS   Skilled Intervention Verbal Cuing;Compensatory Strategies   Short Term Goals    Short Term Goal # 1 pt will be able to complete supine<>Sitting with mod assist in 6tx in order to demonstrate progress   Goal Outcome # 1 goal not met   Short Term Goal # 2 pt will be able to sit EOB with fair - balance in 6tx in order to decrease fall risk   Goal Outcome # 2 Goal met   Short Term Goal # 3 pt will be able to complete functional transfers with mod assist in 6tx in order to increase OOB time   Goal Outcome # 3 Goal not met   Short Term Goal # 4 pt will be able to ambulate 25ft with min assist and FWW in 6tx in order to progress to home   Anticipated Discharge Equipment and Recommendations   DC Equipment Recommendations Unable to determine at this time   Discharge Recommendations Recommend post-acute placement for additional physical therapy services prior to discharge home

## 2022-09-05 NOTE — CARE PLAN
The patient is Watcher - Medium risk of patient condition declining or worsening    Shift Goals  Clinical Goals: stable vitals, HR < 120, SBP < 160, vent wean, trach/bronch, safety  Patient Goals: rest  Family Goals: haircut    Progress made toward(s) clinical / shift goals: PRN given for HR > 150 with effect. BP stable no PRN meds required. Pt had bedside trach and bronchoscopy and tolerated well.     Patient is not progressing towards the following goals:      Problem: Knowledge Deficit - Standard  Goal: Patient and family/care givers will demonstrate understanding of plan of care, disease process/condition, diagnostic tests and medications  Outcome: Not Met     Problem: Skin Integrity  Goal: Skin integrity is maintained or improved  Outcome: Not Met     Problem: Mechanical Ventilation  Goal: Patient will be able to express needs and understand communication  Outcome: Not Met   SBT x2 and tolerated well.

## 2022-09-05 NOTE — CARE PLAN
Problem: Mechanical Ventilation  Goal: Safe management of artificial airway and ventilation  Description: Target End Date:  when vent discontinued    Document on VAP flowsheet and Airway LDA    1.  Daily awakening trials per provider order/policy  2.  Suctioning and care of ET/Trach tube (document on LDA)  3.  Collaborate with RT to administer medications/treatments  4.  Ambu bag at bedside and available for transport  5.  Trach patient - replacement trach at bedside  6.  Provide communication tools if applicable  Outcome: Progressing  Note:    Ventilator Daily Summary    Vent Day #12 T2  130%/8/30%    Ventilator settings changed this shift:No    Weaning trials: No    Respiratory Procedures: None    Plan: Continue current ventilator settings and wean mechanical ventilation as tolerated per physician orders.

## 2022-09-05 NOTE — CARE PLAN
Problem: Ventilation  Goal: Ability to achieve and maintain unassisted ventilation or tolerate decreased levels of ventilator support  Description: Target End Date:  4 days     Document on Vent flowsheet    1.  Support and monitor invasive and noninvasive mechanical ventilation  2.  Monitor ventilator weaning response  3.  Perform ventilator associated pneumonia prevention interventions  4.  Manage ventilation therapy by monitoring diagnostic test results  Outcome: Progressing      Ventilator Daily Summary    Vent Day #12   Trach Day #2    Ventilator settings changed this shift: No (130/+8/30%)    Weaning trials: Yes, initiated T-piece trial 5 lpm, 30%. Continue T-piece as tolerated per MD orders w/goal of 24 hrs.     Respiratory Procedures: No    Plan: Continue current ventilator settings and wean mechanical ventilation as tolerated per physician orders.

## 2022-09-06 ENCOUNTER — APPOINTMENT (OUTPATIENT)
Dept: RADIOLOGY | Facility: MEDICAL CENTER | Age: 62
DRG: 003 | End: 2022-09-06
Attending: SURGERY
Payer: OTHER GOVERNMENT

## 2022-09-06 LAB
ALBUMIN SERPL BCP-MCNC: 3.6 G/DL (ref 3.2–4.9)
ALBUMIN/GLOB SERPL: 1.1 G/DL
ALP SERPL-CCNC: 100 U/L (ref 30–99)
ALT SERPL-CCNC: 74 U/L (ref 2–50)
ANION GAP SERPL CALC-SCNC: 12 MMOL/L (ref 7–16)
AST SERPL-CCNC: 47 U/L (ref 12–45)
BASOPHILS # BLD AUTO: 0.4 % (ref 0–1.8)
BASOPHILS # BLD: 0.06 K/UL (ref 0–0.12)
BILIRUB SERPL-MCNC: 0.5 MG/DL (ref 0.1–1.5)
BUN SERPL-MCNC: 19 MG/DL (ref 8–22)
CALCIUM SERPL-MCNC: 9.6 MG/DL (ref 8.5–10.5)
CHLORIDE SERPL-SCNC: 100 MMOL/L (ref 96–112)
CO2 SERPL-SCNC: 26 MMOL/L (ref 20–33)
CREAT SERPL-MCNC: 0.63 MG/DL (ref 0.5–1.4)
CRP SERPL HS-MCNC: 8.98 MG/DL (ref 0–0.75)
EOSINOPHIL # BLD AUTO: 0.43 K/UL (ref 0–0.51)
EOSINOPHIL NFR BLD: 2.8 % (ref 0–6.9)
ERYTHROCYTE [DISTWIDTH] IN BLOOD BY AUTOMATED COUNT: 44.8 FL (ref 35.9–50)
GFR SERPLBLD CREATININE-BSD FMLA CKD-EPI: 107 ML/MIN/1.73 M 2
GLOBULIN SER CALC-MCNC: 3.3 G/DL (ref 1.9–3.5)
GLUCOSE SERPL-MCNC: 134 MG/DL (ref 65–99)
HCT VFR BLD AUTO: 33 % (ref 42–52)
HGB BLD-MCNC: 11 G/DL (ref 14–18)
IMM GRANULOCYTES # BLD AUTO: 0.11 K/UL (ref 0–0.11)
IMM GRANULOCYTES NFR BLD AUTO: 0.7 % (ref 0–0.9)
LYMPHOCYTES # BLD AUTO: 1.4 K/UL (ref 1–4.8)
LYMPHOCYTES NFR BLD: 9 % (ref 22–41)
MCH RBC QN AUTO: 30.2 PG (ref 27–33)
MCHC RBC AUTO-ENTMCNC: 33.3 G/DL (ref 33.7–35.3)
MCV RBC AUTO: 90.7 FL (ref 81.4–97.8)
MONOCYTES # BLD AUTO: 1.35 K/UL (ref 0–0.85)
MONOCYTES NFR BLD AUTO: 8.7 % (ref 0–13.4)
NEUTROPHILS # BLD AUTO: 12.15 K/UL (ref 1.82–7.42)
NEUTROPHILS NFR BLD: 78.4 % (ref 44–72)
NRBC # BLD AUTO: 0 K/UL
NRBC BLD-RTO: 0 /100 WBC
PLATELET # BLD AUTO: 467 K/UL (ref 164–446)
PMV BLD AUTO: 11.4 FL (ref 9–12.9)
POTASSIUM SERPL-SCNC: 3.3 MMOL/L (ref 3.6–5.5)
PROT SERPL-MCNC: 6.9 G/DL (ref 6–8.2)
RBC # BLD AUTO: 3.64 M/UL (ref 4.7–6.1)
SODIUM SERPL-SCNC: 138 MMOL/L (ref 135–145)
WBC # BLD AUTO: 15.5 K/UL (ref 4.8–10.8)

## 2022-09-06 PROCEDURE — 700102 HCHG RX REV CODE 250 W/ 637 OVERRIDE(OP): Performed by: SURGERY

## 2022-09-06 PROCEDURE — 700111 HCHG RX REV CODE 636 W/ 250 OVERRIDE (IP): Performed by: SURGERY

## 2022-09-06 PROCEDURE — A9270 NON-COVERED ITEM OR SERVICE: HCPCS | Performed by: NEUROLOGICAL SURGERY

## 2022-09-06 PROCEDURE — 85025 COMPLETE CBC W/AUTO DIFF WBC: CPT

## 2022-09-06 PROCEDURE — 770022 HCHG ROOM/CARE - ICU (200)

## 2022-09-06 PROCEDURE — 80053 COMPREHEN METABOLIC PANEL: CPT

## 2022-09-06 PROCEDURE — 99233 SBSQ HOSP IP/OBS HIGH 50: CPT | Performed by: SURGERY

## 2022-09-06 PROCEDURE — A9270 NON-COVERED ITEM OR SERVICE: HCPCS | Performed by: SURGERY

## 2022-09-06 PROCEDURE — 71045 X-RAY EXAM CHEST 1 VIEW: CPT

## 2022-09-06 PROCEDURE — 700105 HCHG RX REV CODE 258: Performed by: SURGERY

## 2022-09-06 PROCEDURE — 94640 AIRWAY INHALATION TREATMENT: CPT

## 2022-09-06 PROCEDURE — 700102 HCHG RX REV CODE 250 W/ 637 OVERRIDE(OP): Performed by: NEUROLOGICAL SURGERY

## 2022-09-06 RX ADMIN — HYDROMORPHONE HYDROCHLORIDE 2 MG: 2 TABLET ORAL at 12:25

## 2022-09-06 RX ADMIN — VENLAFAXINE 75 MG: 75 TABLET ORAL at 18:53

## 2022-09-06 RX ADMIN — PIPERACILLIN AND TAZOBACTAM 4.5 G: 4; .5 INJECTION, POWDER, LYOPHILIZED, FOR SOLUTION INTRAVENOUS; PARENTERAL at 21:06

## 2022-09-06 RX ADMIN — FAMOTIDINE 20 MG: 20 TABLET, FILM COATED ORAL at 06:18

## 2022-09-06 RX ADMIN — ENOXAPARIN SODIUM 30 MG: 30 INJECTION SUBCUTANEOUS at 06:18

## 2022-09-06 RX ADMIN — ENOXAPARIN SODIUM 30 MG: 30 INJECTION SUBCUTANEOUS at 18:54

## 2022-09-06 RX ADMIN — CARVEDILOL 25 MG: 6.25 TABLET, FILM COATED ORAL at 06:18

## 2022-09-06 RX ADMIN — POTASSIUM BICARBONATE 50 MEQ: 977.5 TABLET, EFFERVESCENT ORAL at 12:27

## 2022-09-06 RX ADMIN — VENLAFAXINE 75 MG: 75 TABLET ORAL at 06:21

## 2022-09-06 RX ADMIN — HYDROMORPHONE HYDROCHLORIDE 1 MG: 2 TABLET ORAL at 00:46

## 2022-09-06 RX ADMIN — HYDRALAZINE HYDROCHLORIDE 25 MG: 25 TABLET, FILM COATED ORAL at 21:07

## 2022-09-06 RX ADMIN — HYDROMORPHONE HYDROCHLORIDE 2 MG: 2 TABLET ORAL at 21:55

## 2022-09-06 RX ADMIN — PIPERACILLIN AND TAZOBACTAM 4.5 G: 4; .5 INJECTION, POWDER, LYOPHILIZED, FOR SOLUTION INTRAVENOUS; PARENTERAL at 04:48

## 2022-09-06 RX ADMIN — NYSTATIN 500000 UNITS: 100000 SUSPENSION ORAL at 13:00

## 2022-09-06 RX ADMIN — HYDROMORPHONE HYDROCHLORIDE 2 MG: 2 TABLET ORAL at 18:52

## 2022-09-06 RX ADMIN — NYSTATIN 500000 UNITS: 100000 SUSPENSION ORAL at 12:26

## 2022-09-06 RX ADMIN — AMLODIPINE BESYLATE 10 MG: 10 TABLET ORAL at 06:18

## 2022-09-06 RX ADMIN — HYDRALAZINE HYDROCHLORIDE 25 MG: 25 TABLET, FILM COATED ORAL at 06:19

## 2022-09-06 RX ADMIN — FAMOTIDINE 20 MG: 20 TABLET, FILM COATED ORAL at 18:52

## 2022-09-06 RX ADMIN — PIPERACILLIN AND TAZOBACTAM 4.5 G: 4; .5 INJECTION, POWDER, LYOPHILIZED, FOR SOLUTION INTRAVENOUS; PARENTERAL at 12:26

## 2022-09-06 RX ADMIN — AMANTADINE HYDROCHLORIDE 100 MG: 100 TABLET ORAL at 06:18

## 2022-09-06 RX ADMIN — HYDROMORPHONE HYDROCHLORIDE 2 MG: 2 TABLET ORAL at 04:46

## 2022-09-06 RX ADMIN — NYSTATIN 500000 UNITS: 100000 SUSPENSION ORAL at 17:00

## 2022-09-06 RX ADMIN — HYDRALAZINE HYDROCHLORIDE 25 MG: 25 TABLET, FILM COATED ORAL at 14:00

## 2022-09-06 RX ADMIN — NYSTATIN 500000 UNITS: 100000 SUSPENSION ORAL at 21:08

## 2022-09-06 RX ADMIN — AMANTADINE HYDROCHLORIDE 100 MG: 100 TABLET ORAL at 12:25

## 2022-09-06 RX ADMIN — ATORVASTATIN CALCIUM 80 MG: 80 TABLET, FILM COATED ORAL at 18:52

## 2022-09-06 RX ADMIN — POTASSIUM BICARBONATE 50 MEQ: 977.5 TABLET, EFFERVESCENT ORAL at 18:53

## 2022-09-06 RX ADMIN — CARVEDILOL 25 MG: 6.25 TABLET, FILM COATED ORAL at 18:52

## 2022-09-06 ASSESSMENT — PAIN DESCRIPTION - PAIN TYPE
TYPE: ACUTE PAIN

## 2022-09-06 ASSESSMENT — FIBROSIS 4 INDEX: FIB4 SCORE: 0.48

## 2022-09-06 NOTE — CARE PLAN
Problem: Pain - Standard  Goal: Alleviation of pain or a reduction in pain to the patient’s comfort goal  Outcome: Progressing     Problem: Knowledge Deficit - Standard  Goal: Patient and family/care givers will demonstrate understanding of plan of care, disease process/condition, diagnostic tests and medications  Outcome: Progressing     Problem: Safety - Medical Restraint  Goal: Remains free of injury from restraints (Restraint for Interference with Medical Device)  Outcome: Progressing  Goal: Free from restraint(s) (Restraint for Interference with Medical Device)  Outcome: Progressing     Problem: Skin Integrity  Goal: Skin integrity is maintained or improved  Outcome: Progressing     Problem: Fall Risk  Goal: Patient will remain free from falls  Outcome: Progressing     Problem: Psychosocial  Goal: Patient's level of anxiety will decrease  Outcome: Progressing  Goal: Patient's ability to verbalize feelings about condition will improve  Outcome: Progressing  Goal: Patient's ability to re-evaluate and adapt role responsibilities will improve  Outcome: Progressing  Goal: Patient and family will demonstrate ability to cope with life altering diagnosis and/or procedure  Outcome: Progressing  Goal: Spiritual and cultural needs incorporated into hospitalization  Outcome: Progressing     Problem: Hemodynamics  Goal: Patient's hemodynamics, fluid balance and neurologic status will be stable or improve  Outcome: Progressing     Problem: Respiratory  Goal: Patient will achieve/maintain optimum respiratory ventilation and gas exchange  Outcome: Progressing     Problem: Mechanical Ventilation  Goal: Safe management of artificial airway and ventilation  Outcome: Progressing  Goal: Successful weaning off mechanical ventilator, spontaneously maintains adequate gas exchange  Outcome: Progressing  Goal: Patient will be able to express needs and understand communication  Outcome: Progressing     Problem: Risk for  Aspiration  Goal: Patient's risk for aspiration will be absent or decrease  Outcome: Progressing     Problem: Urinary - Renal Perfusion  Goal: Ability to achieve and maintain adequate renal perfusion and functioning will improve  Outcome: Progressing     Problem: Venous Thromboembolism (VTE) Prevention  Goal: The patient will remain free from venous thromboembolism (VTE)  Outcome: Progressing     Problem: Nutrition  Goal: Patient's nutritional and fluid intake will be adequate or improve  Outcome: Progressing  Goal: Enteral nutrition will be maintained or improve  Outcome: Progressing  Goal: Enteral nutrition will be maintained or improve  Outcome: Progressing     Problem: Urinary Elimination  Goal: Establish and maintain regular urinary output  Outcome: Progressing     Problem: Bowel Elimination  Goal: Establish and maintain regular bowel function  Outcome: Progressing   The patient is Watcher - Medium risk of patient condition declining or worsening    Shift Goals  Clinical Goals: Wean from Vent, Improve Neuro Status  Patient Goals: FELIPA  Family Goals: FELIPA    Progress made toward(s) clinical / shift goals: Patient successfully weaned from vent.

## 2022-09-06 NOTE — DISCHARGE PLANNING
Spoke with Alexandra with [facility] and clarified her question regarding guardianship. Informed her that spouse is obtaining temp/emergency guardianship to help with their finances and social security. Alexandra reports that they accept patient. DPA aware. Pending bed availability.     Plan: Follow up with [facility] tomorrow for bed availability

## 2022-09-06 NOTE — DISCHARGE PLANNING
RITASW received phone call from patient's sister, Xenia. Xenia reports that her and Alesia are going to the Guardianship office today to file for temp/emergency guardianship. Xenia requested a letter confirming pt's admission. LMSW e-mailed letter to Xenia.     Dr. Cortez also placed LTAC order. Choice for [facility] ([facility]) per family. Choice faxed to DPA for processing. Family updated.     Plan: Follow up with DPA regarding LTAC referral & continue to support & work with family regarding social/dc needs.

## 2022-09-06 NOTE — DISCHARGE PLANNING
Received Choice form at 0647  Agency/Facility Name: [facility]  Referral sent per Choice form @ 8325     1312:  Agency/Facility Name: [facility]   Spoke To: Alexandra   Outcome: Per Alexandra, she left a voicemail regarding Pt's guardianship for LMSW Susannah. DPA was also notified that she will give an answer on referral tomorrow morning.

## 2022-09-06 NOTE — CARE PLAN
The patient is Stable - Low risk of patient condition declining or worsening    Shift Goals  Clinical Goals: wean from vent, sleep  Patient Goals: FELIPA  Family Goals: FELIPA    Progress made toward(s) clinical / shift goals:  PT remains on t-piece without issue, PT slept 2-3 hours    Patient is not progressing towards the following goals:  PT remains confised    Problem: Knowledge Deficit - Standard  Goal: Patient and family/care givers will demonstrate understanding of plan of care, disease process/condition, diagnostic tests and medications  Outcome: Not Progressing

## 2022-09-07 ENCOUNTER — APPOINTMENT (OUTPATIENT)
Dept: RADIOLOGY | Facility: MEDICAL CENTER | Age: 62
DRG: 003 | End: 2022-09-07
Attending: SURGERY
Payer: OTHER GOVERNMENT

## 2022-09-07 LAB
ALBUMIN SERPL BCP-MCNC: 3.5 G/DL (ref 3.2–4.9)
ALBUMIN/GLOB SERPL: 1.1 G/DL
ALP SERPL-CCNC: 103 U/L (ref 30–99)
ALT SERPL-CCNC: 77 U/L (ref 2–50)
ANION GAP SERPL CALC-SCNC: 11 MMOL/L (ref 7–16)
AST SERPL-CCNC: 40 U/L (ref 12–45)
BACTERIA BRONCH AEROBE CULT: ABNORMAL
BACTERIA BRONCH AEROBE CULT: ABNORMAL
BASOPHILS # BLD AUTO: 0.4 % (ref 0–1.8)
BASOPHILS # BLD: 0.05 K/UL (ref 0–0.12)
BILIRUB SERPL-MCNC: 0.4 MG/DL (ref 0.1–1.5)
BUN SERPL-MCNC: 19 MG/DL (ref 8–22)
CALCIUM SERPL-MCNC: 9.6 MG/DL (ref 8.5–10.5)
CHLORIDE SERPL-SCNC: 97 MMOL/L (ref 96–112)
CO2 SERPL-SCNC: 26 MMOL/L (ref 20–33)
CREAT SERPL-MCNC: 0.51 MG/DL (ref 0.5–1.4)
EOSINOPHIL # BLD AUTO: 0.5 K/UL (ref 0–0.51)
EOSINOPHIL NFR BLD: 4.4 % (ref 0–6.9)
ERYTHROCYTE [DISTWIDTH] IN BLOOD BY AUTOMATED COUNT: 44.4 FL (ref 35.9–50)
GFR SERPLBLD CREATININE-BSD FMLA CKD-EPI: 115 ML/MIN/1.73 M 2
GLOBULIN SER CALC-MCNC: 3.1 G/DL (ref 1.9–3.5)
GLUCOSE SERPL-MCNC: 137 MG/DL (ref 65–99)
GRAM STN SPEC: ABNORMAL
HCT VFR BLD AUTO: 34.3 % (ref 42–52)
HGB BLD-MCNC: 11.4 G/DL (ref 14–18)
IMM GRANULOCYTES # BLD AUTO: 0.07 K/UL (ref 0–0.11)
IMM GRANULOCYTES NFR BLD AUTO: 0.6 % (ref 0–0.9)
LYMPHOCYTES # BLD AUTO: 1.38 K/UL (ref 1–4.8)
LYMPHOCYTES NFR BLD: 12 % (ref 22–41)
MCH RBC QN AUTO: 30.1 PG (ref 27–33)
MCHC RBC AUTO-ENTMCNC: 33.2 G/DL (ref 33.7–35.3)
MCV RBC AUTO: 90.5 FL (ref 81.4–97.8)
MONOCYTES # BLD AUTO: 1.12 K/UL (ref 0–0.85)
MONOCYTES NFR BLD AUTO: 9.7 % (ref 0–13.4)
NEUTROPHILS # BLD AUTO: 8.37 K/UL (ref 1.82–7.42)
NEUTROPHILS NFR BLD: 72.9 % (ref 44–72)
NRBC # BLD AUTO: 0 K/UL
NRBC BLD-RTO: 0 /100 WBC
PLATELET # BLD AUTO: 493 K/UL (ref 164–446)
PMV BLD AUTO: 11 FL (ref 9–12.9)
POTASSIUM SERPL-SCNC: 3.7 MMOL/L (ref 3.6–5.5)
PROT SERPL-MCNC: 6.6 G/DL (ref 6–8.2)
RBC # BLD AUTO: 3.79 M/UL (ref 4.7–6.1)
SIGNIFICANT IND 70042: ABNORMAL
SITE SITE: ABNORMAL
SODIUM SERPL-SCNC: 134 MMOL/L (ref 135–145)
SOURCE SOURCE: ABNORMAL
WBC # BLD AUTO: 11.5 K/UL (ref 4.8–10.8)

## 2022-09-07 PROCEDURE — 700102 HCHG RX REV CODE 250 W/ 637 OVERRIDE(OP): Performed by: SURGERY

## 2022-09-07 PROCEDURE — A9270 NON-COVERED ITEM OR SERVICE: HCPCS | Performed by: SURGERY

## 2022-09-07 PROCEDURE — 97530 THERAPEUTIC ACTIVITIES: CPT

## 2022-09-07 PROCEDURE — 80053 COMPREHEN METABOLIC PANEL: CPT

## 2022-09-07 PROCEDURE — 71045 X-RAY EXAM CHEST 1 VIEW: CPT

## 2022-09-07 PROCEDURE — 94640 AIRWAY INHALATION TREATMENT: CPT

## 2022-09-07 PROCEDURE — 770022 HCHG ROOM/CARE - ICU (200)

## 2022-09-07 PROCEDURE — 99233 SBSQ HOSP IP/OBS HIGH 50: CPT | Performed by: SURGERY

## 2022-09-07 PROCEDURE — 700105 HCHG RX REV CODE 258: Performed by: SURGERY

## 2022-09-07 PROCEDURE — 700102 HCHG RX REV CODE 250 W/ 637 OVERRIDE(OP): Performed by: NEUROLOGICAL SURGERY

## 2022-09-07 PROCEDURE — 306565 RIGID MIT RESTRAINT(PAIR): Performed by: SURGERY

## 2022-09-07 PROCEDURE — A9270 NON-COVERED ITEM OR SERVICE: HCPCS | Performed by: NEUROLOGICAL SURGERY

## 2022-09-07 PROCEDURE — 700111 HCHG RX REV CODE 636 W/ 250 OVERRIDE (IP): Performed by: SURGERY

## 2022-09-07 PROCEDURE — 85025 COMPLETE CBC W/AUTO DIFF WBC: CPT

## 2022-09-07 RX ORDER — SODIUM CHLORIDE 1 G/1
1 TABLET ORAL EVERY 8 HOURS
Status: DISCONTINUED | OUTPATIENT
Start: 2022-09-07 | End: 2022-09-08

## 2022-09-07 RX ORDER — CEFAZOLIN SODIUM 2 G/100ML
2 INJECTION, SOLUTION INTRAVENOUS EVERY 8 HOURS
Status: DISCONTINUED | OUTPATIENT
Start: 2022-09-07 | End: 2022-09-09 | Stop reason: HOSPADM

## 2022-09-07 RX ADMIN — NYSTATIN 500000 UNITS: 100000 SUSPENSION ORAL at 10:35

## 2022-09-07 RX ADMIN — VENLAFAXINE 75 MG: 75 TABLET ORAL at 17:25

## 2022-09-07 RX ADMIN — ENOXAPARIN SODIUM 30 MG: 30 INJECTION SUBCUTANEOUS at 05:21

## 2022-09-07 RX ADMIN — NYSTATIN 500000 UNITS: 100000 SUSPENSION ORAL at 21:33

## 2022-09-07 RX ADMIN — HYDRALAZINE HYDROCHLORIDE 25 MG: 25 TABLET, FILM COATED ORAL at 21:57

## 2022-09-07 RX ADMIN — NYSTATIN 500000 UNITS: 100000 SUSPENSION ORAL at 13:03

## 2022-09-07 RX ADMIN — FAMOTIDINE 20 MG: 20 TABLET, FILM COATED ORAL at 05:22

## 2022-09-07 RX ADMIN — PIPERACILLIN AND TAZOBACTAM 4.5 G: 4; .5 INJECTION, POWDER, LYOPHILIZED, FOR SOLUTION INTRAVENOUS; PARENTERAL at 05:21

## 2022-09-07 RX ADMIN — SODIUM CHLORIDE 1 G: 1 TABLET ORAL at 10:34

## 2022-09-07 RX ADMIN — CEFAZOLIN SODIUM 2 G: 2 INJECTION, SOLUTION INTRAVENOUS at 13:03

## 2022-09-07 RX ADMIN — VENLAFAXINE 75 MG: 75 TABLET ORAL at 05:22

## 2022-09-07 RX ADMIN — DOCUSATE SODIUM 50 MG AND SENNOSIDES 8.6 MG 1 TABLET: 8.6; 5 TABLET, FILM COATED ORAL at 21:33

## 2022-09-07 RX ADMIN — CEFAZOLIN SODIUM 2 G: 2 INJECTION, SOLUTION INTRAVENOUS at 21:32

## 2022-09-07 RX ADMIN — AMANTADINE HYDROCHLORIDE 100 MG: 100 TABLET ORAL at 13:03

## 2022-09-07 RX ADMIN — AMLODIPINE BESYLATE 10 MG: 10 TABLET ORAL at 05:22

## 2022-09-07 RX ADMIN — CARVEDILOL 25 MG: 6.25 TABLET, FILM COATED ORAL at 17:25

## 2022-09-07 RX ADMIN — AMANTADINE HYDROCHLORIDE 100 MG: 100 TABLET ORAL at 05:22

## 2022-09-07 RX ADMIN — ATORVASTATIN CALCIUM 80 MG: 80 TABLET, FILM COATED ORAL at 17:25

## 2022-09-07 RX ADMIN — POTASSIUM BICARBONATE 50 MEQ: 977.5 TABLET, EFFERVESCENT ORAL at 10:34

## 2022-09-07 RX ADMIN — SODIUM CHLORIDE 1 G: 1 TABLET ORAL at 21:33

## 2022-09-07 RX ADMIN — FAMOTIDINE 20 MG: 20 TABLET, FILM COATED ORAL at 17:25

## 2022-09-07 RX ADMIN — HYDRALAZINE HYDROCHLORIDE 25 MG: 25 TABLET, FILM COATED ORAL at 05:22

## 2022-09-07 RX ADMIN — HYDRALAZINE HYDROCHLORIDE 25 MG: 25 TABLET, FILM COATED ORAL at 13:08

## 2022-09-07 RX ADMIN — SODIUM CHLORIDE 1 G: 1 TABLET ORAL at 13:08

## 2022-09-07 RX ADMIN — CARVEDILOL 25 MG: 6.25 TABLET, FILM COATED ORAL at 05:22

## 2022-09-07 RX ADMIN — NYSTATIN 500000 UNITS: 100000 SUSPENSION ORAL at 17:25

## 2022-09-07 RX ADMIN — ENOXAPARIN SODIUM 30 MG: 30 INJECTION SUBCUTANEOUS at 17:25

## 2022-09-07 ASSESSMENT — COGNITIVE AND FUNCTIONAL STATUS - GENERAL
TOILETING: TOTAL
MOVING FROM LYING ON BACK TO SITTING ON SIDE OF FLAT BED: A LOT
STANDING UP FROM CHAIR USING ARMS: A LOT
WALKING IN HOSPITAL ROOM: A LOT
PERSONAL GROOMING: A LOT
CLIMB 3 TO 5 STEPS WITH RAILING: TOTAL
MOVING TO AND FROM BED TO CHAIR: UNABLE
DRESSING REGULAR LOWER BODY CLOTHING: TOTAL
SUGGESTED CMS G CODE MODIFIER MOBILITY: CM
SUGGESTED CMS G CODE MODIFIER DAILY ACTIVITY: CM
HELP NEEDED FOR BATHING: TOTAL
EATING MEALS: TOTAL
MOBILITY SCORE: 9
TURNING FROM BACK TO SIDE WHILE IN FLAT BAD: UNABLE
DRESSING REGULAR UPPER BODY CLOTHING: A LOT
DAILY ACTIVITIY SCORE: 8

## 2022-09-07 ASSESSMENT — GAIT ASSESSMENTS
ASSISTIVE DEVICE: HAND HELD ASSIST
DISTANCE (FEET): 5
GAIT LEVEL OF ASSIST: MODERATE ASSIST

## 2022-09-07 ASSESSMENT — PAIN DESCRIPTION - PAIN TYPE
TYPE: ACUTE PAIN

## 2022-09-07 ASSESSMENT — FIBROSIS 4 INDEX: FIB4 SCORE: 0.57

## 2022-09-07 NOTE — CARE PLAN
Problem: Ventilation  Goal: Ability to achieve and maintain unassisted ventilation or tolerate decreased levels of ventilator support  Description: Target End Date:  4 days     Document on Vent flowsheet    1.  Support and monitor invasive and noninvasive mechanical ventilation  2.  Monitor ventilator weaning response  3.  Perform ventilator associated pneumonia prevention interventions  4.  Manage ventilation therapy by monitoring diagnostic test results  Outcome: Met     Reached 24 hour T-piece goal, vent removed.     Problem: Aerosol Therapy  Goal: Improved hydration/ability to mobilize secretions and/or decreased airway edema  Description: Target End Date:  resolve prior to discharge or when underlying condition is resolved/stabilized    1.  Implement heated or cool aerosol therapy  2.  Assessed for optimal hydration, decreased edema and/or improved ability to mobilize secretions  Outcome: Progressing     4 lpm/28%, tolerating well. Minimal secretions.

## 2022-09-07 NOTE — CARE PLAN
The patient is Stable - Low risk of patient condition declining or worsening    Shift Goals  Clinical Goals: Improve sleep wake cycle, mobility, improve alertness  Patient Goals: Unable to assess  Family Goals: No family present at this time    Progress made toward(s) clinical / shift goals:  Lights kept on during the day, pt to work with PT OT. Pt more alert today, tv on to encourage awakeness. Pt requiring medical restraints still at this time for safety and line pulling.    Problem: Safety - Medical Restraint  Goal: Free from restraint(s) (Restraint for Interference with Medical Device)  Outcome: Not Met     Problem: Pain - Standard  Goal: Alleviation of pain or a reduction in pain to the patient’s comfort goal  Outcome: Progressing     Problem: Knowledge Deficit - Standard  Goal: Patient and family/care givers will demonstrate understanding of plan of care, disease process/condition, diagnostic tests and medications  Outcome: Progressing     Problem: Safety - Medical Restraint  Goal: Remains free of injury from restraints (Restraint for Interference with Medical Device)  Outcome: Progressing  Goal: Free from restraint(s) (Restraint for Interference with Medical Device)  Outcome: Not Met     Problem: Skin Integrity  Goal: Skin integrity is maintained or improved  Outcome: Progressing     Problem: Fall Risk  Goal: Patient will remain free from falls  Outcome: Progressing     Problem: Psychosocial  Goal: Patient's level of anxiety will decrease  Outcome: Progressing  Goal: Patient's ability to verbalize feelings about condition will improve  Outcome: Progressing  Goal: Patient's ability to re-evaluate and adapt role responsibilities will improve  Outcome: Progressing  Goal: Patient and family will demonstrate ability to cope with life altering diagnosis and/or procedure  Outcome: Progressing  Goal: Spiritual and cultural needs incorporated into hospitalization  Outcome: Progressing     Problem: Hemodynamics  Goal:  Patient's hemodynamics, fluid balance and neurologic status will be stable or improve  Outcome: Progressing     Problem: Respiratory  Goal: Patient will achieve/maintain optimum respiratory ventilation and gas exchange  Outcome: Progressing     Problem: Urinary Elimination  Goal: Establish and maintain regular urinary output  Outcome: Progressing     Problem: Bowel Elimination  Goal: Establish and maintain regular bowel function  Outcome: Progressing

## 2022-09-07 NOTE — DISCHARGE PLANNING
Patient was discussed in IDT rounds with Dr. Chirinos. MD reports that patient is medically cleared for LTAC placement. DPA was made aware.     Plan: HCM will continue to follow up with [facility] regarding bed availability     1422: Pending [facility] authorization & bed availability

## 2022-09-07 NOTE — THERAPY
Physical Therapy   Daily Treatment     Patient Name: Karan Wiley  Age:  62 y.o., Sex:  male  Medical Record #: [id number]  Today's Date: 9/7/2022     Precautions  Precautions: Fall Risk  Comments: ETT    Assessment    Patient continues to be primarily limited by cognition. He was able to stand and take steps along EOB; he required facilitation of weight shifting and limb advancement. He appeared to follow commands intermittently. Will continue to follow.    Plan    Continue current treatment plan.    DC Equipment Recommendations: Unable to determine at this time  Discharge Recommendations: Recommend post-acute placement for additional physical therapy services prior to discharge home     Objective       09/07/22 1348   Charge Group   Charges  Yes   PT Therapeutic Activities 2  (30 min)   Precautions   Precautions Fall Risk   Comments ETT   Vitals   O2 Delivery Device T-Piece   Vitals Comments VSS   Cognition    Cognition / Consciousness X   Speech/ Communication Intubated / Trached   Level of Consciousness Alert   Ability To Follow Commands   (intermittently appeared to follow commands)   Comments unclear if patient following commands intermittently or able to perform procedural tasks   Balance   Sitting Balance (Static) Fair   Sitting Balance (Dynamic) Fair -   Standing Balance (Static) Fair -   Standing Balance (Dynamic) Poor +   Weight Shift Sitting Fair   Weight Shift Standing Poor   Skilled Intervention Verbal Cuing;Compensatory Strategies;Facilitation;Sequencing;Postural Facilitation   Comments standing with HHA x2, no overt LOB, reaching for UE support as able   Gait Analysis   Gait Level Of Assist Moderate Assist   Assistive Device Hand Held Assist   Distance (Feet) 5   # of Times Distance was Traveled 1   Deviation   (facilitation for weight shifting and limb advancement)   # of Stairs Climbed 0   Weight Bearing Status no restrictions   Vision Deficits Impacting Mobility NT   Skilled Intervention Verbal  Cuing;Compensatory Strategies;Facilitation;Sequencing   Bed Mobility    Supine to Sit Moderate Assist   Sit to Supine Maximal Assist   Scooting Moderate Assist  (seated)   Rolling Moderate Assist to Lt.;Moderate Assist to Rt.   Skilled Intervention Verbal Cuing;Compensatory Strategies;Facilitation   Functional Mobility   Sit to Stand Minimal Assist   Transfer Method Stand Step   Skilled Intervention Verbal Cuing;Compensatory Strategies;Facilitation;Sequencing   ICU Target Mobility Level   ICU Mobility - Targeted Level Level 4   How much difficulty does the patient currently have...   Turning over in bed (including adjusting bedclothes, sheets and blankets)? 1   Sitting down on and standing up from a chair with arms (e.g., wheelchair, bedside commode, etc.) 2   Moving from lying on back to sitting on the side of the bed? 1   How much help from another person does the patient currently need...   Moving to and from a bed to a chair (including a wheelchair)? 2   Need to walk in a hospital room? 2   Climbing 3-5 steps with a railing? 1   6 clicks Mobility Score 9   Activity Tolerance   Sitting in Chair NT   Sitting Edge of Bed 15 min   Standing 10 min   Comments limited by cognition   Short Term Goals    Short Term Goal # 1 pt will be able to complete supine<>Sitting with mod assist in 6tx in order to demonstrate progress   Goal Outcome # 1 Progressing as expected   Short Term Goal # 2 pt will be able to sit EOB with fair - balance in 6tx in order to decrease fall risk   Goal Outcome # 2 Goal met   Short Term Goal # 3 pt will be able to complete functional transfers with mod assist in 6tx in order to increase OOB time   Goal Outcome # 3 Progressing as expected   Short Term Goal # 4 pt will be able to ambulate 25ft with min assist and FWW in 6tx in order to progress to home   Goal Outcome # 4 Progressing as expected   Anticipated Discharge Equipment and Recommendations   DC Equipment Recommendations Unable to determine at  this time   Discharge Recommendations Recommend post-acute placement for additional physical therapy services prior to discharge home   Interdisciplinary Plan of Care Collaboration   IDT Collaboration with  Nursing;Therapy Tech   Patient Position at End of Therapy In Bed;Bed Alarm On;Wrist Restraints Applied;Call Light within Reach   Collaboration Comments RN aware of visit, response   Session Information   Date / Session Number  9/7-5 (2/3, 9/11)

## 2022-09-07 NOTE — DISCHARGE PLANNING
Agency/Facility Name: [facility]   Outcome: DPA left voicemail regarding bed availability today. DPA requesting a call back.     1127:  Agency/Facility Name: [facility]   Spoke To: Alexandra   Outcome: DPA notified Alexandra that Pt is medically cleared today. Per Alexandra, waiting on auth insurance. DPA will follow up when Pt auth is approved.

## 2022-09-07 NOTE — PROGRESS NOTES
Trauma / Surgical Daily Progress Note    Date of Service  9/6/2022    Chief Complaint  62 y.o. male admitted 8/25/2022 with GLF w/SDH on anti-coagulation. Required emergent craniotomu    Interval Events  Hospital day #12  Critically ill  Requires continued ICU and hospital admission  Seen on rounds and discussed with multidisciplinary team  Injuries and physiologic derangements pose a significant risk of mortality and long term morbidity  Critical care interventions include:  integration of multiple data points and associated complex medical decisions    Pulmonary toilet-remains on t-piece  Pain control  Supportive care for TBI  Will need LTAC    Review of Systems  Review of Systems   Unable to perform ROS: Patient nonverbal      Vital Signs for last 24 hours  Temp:  [37.3 °C (99.1 °F)] 37.3 °C (99.1 °F)  Pulse:  [] 77  Resp:  [16-42] 26  BP: (112-167)/(68-94) 129/73  SpO2:  [90 %-98 %] 93 %    Hemodynamic parameters for last 24 hours       Respiratory Data     Respiration: (!) 26, Pulse Oximetry: 93 %     Work Of Breathing / Effort: Within Normal Limits  RUL Breath Sounds: Clear, RML Breath Sounds: Diminished, RLL Breath Sounds: Diminished, DON Breath Sounds: Clear, LLL Breath Sounds: Diminished    Physical Exam  Physical Exam  Constitutional:       General: He is not in acute distress.     Appearance: He is not toxic-appearing.   HENT:      Head: Normocephalic.      Comments: Dressings in place     Right Ear: External ear normal.      Left Ear: External ear normal.      Mouth/Throat:      Mouth: Mucous membranes are moist.   Eyes:      General: No scleral icterus.     Extraocular Movements: Extraocular movements intact.      Pupils: Pupils are equal, round, and reactive to light.   Cardiovascular:      Rate and Rhythm: Normal rate and regular rhythm.      Pulses: Normal pulses.      Heart sounds: Normal heart sounds.   Pulmonary:      Effort: No respiratory distress.      Breath sounds: No wheezing.       Comments: On t-piece  Abdominal:      General: There is no distension.      Palpations: Abdomen is soft.      Tenderness: There is no abdominal tenderness. There is no guarding.   Genitourinary:     Comments: White in place  Musculoskeletal:         General: Normal range of motion.      Cervical back: Normal range of motion.   Skin:     General: Skin is warm and dry.      Coloration: Skin is not jaundiced.      Findings: No bruising.   Neurological:      General: No focal deficit present.      Mental Status: He is alert.      Cranial Nerves: No cranial nerve deficit.      Comments: follows   Psychiatric:      Comments: Unable to assess       Laboratory  Recent Results (from the past 24 hour(s))   CBC with Differential: Tomorrow AM    Collection Time: 09/06/22  5:24 AM   Result Value Ref Range    WBC 15.5 (H) 4.8 - 10.8 K/uL    RBC 3.64 (L) 4.70 - 6.10 M/uL    Hemoglobin 11.0 (L) 14.0 - 18.0 g/dL    Hematocrit 33.0 (L) 42.0 - 52.0 %    MCV 90.7 81.4 - 97.8 fL    MCH 30.2 27.0 - 33.0 pg    MCHC 33.3 (L) 33.7 - 35.3 g/dL    RDW 44.8 35.9 - 50.0 fL    Platelet Count 467 (H) 164 - 446 K/uL    MPV 11.4 9.0 - 12.9 fL    Neutrophils-Polys 78.40 (H) 44.00 - 72.00 %    Lymphocytes 9.00 (L) 22.00 - 41.00 %    Monocytes 8.70 0.00 - 13.40 %    Eosinophils 2.80 0.00 - 6.90 %    Basophils 0.40 0.00 - 1.80 %    Immature Granulocytes 0.70 0.00 - 0.90 %    Nucleated RBC 0.00 /100 WBC    Neutrophils (Absolute) 12.15 (H) 1.82 - 7.42 K/uL    Lymphs (Absolute) 1.40 1.00 - 4.80 K/uL    Monos (Absolute) 1.35 (H) 0.00 - 0.85 K/uL    Eos (Absolute) 0.43 0.00 - 0.51 K/uL    Baso (Absolute) 0.06 0.00 - 0.12 K/uL    Immature Granulocytes (abs) 0.11 0.00 - 0.11 K/uL    NRBC (Absolute) 0.00 K/uL   Comp Metabolic Panel (CMP): Tomorrow AM    Collection Time: 09/06/22  5:24 AM   Result Value Ref Range    Sodium 138 135 - 145 mmol/L    Potassium 3.3 (L) 3.6 - 5.5 mmol/L    Chloride 100 96 - 112 mmol/L    Co2 26 20 - 33 mmol/L    Anion Gap 12.0 7.0 -  16.0    Glucose 134 (H) 65 - 99 mg/dL    Bun 19 8 - 22 mg/dL    Creatinine 0.63 0.50 - 1.40 mg/dL    Calcium 9.6 8.5 - 10.5 mg/dL    AST(SGOT) 47 (H) 12 - 45 U/L    ALT(SGPT) 74 (H) 2 - 50 U/L    Alkaline Phosphatase 100 (H) 30 - 99 U/L    Total Bilirubin 0.5 0.1 - 1.5 mg/dL    Albumin 3.6 3.2 - 4.9 g/dL    Total Protein 6.9 6.0 - 8.2 g/dL    Globulin 3.3 1.9 - 3.5 g/dL    A-G Ratio 1.1 g/dL   ESTIMATED GFR    Collection Time: 09/06/22  5:24 AM   Result Value Ref Range    GFR (CKD-EPI) 107 >60 mL/min/1.73 m 2       Fluids    Intake/Output Summary (Last 24 hours) at 9/6/2022 1704  Last data filed at 9/6/2022 0800  Gross per 24 hour   Intake 1180 ml   Output 1375 ml   Net -195 ml         Core Measures & Quality Metrics  Labs reviewed and Radiology images reviewed  White catheter: Critically Ill - Requiring Accurate Measurement of Urinary Output      DVT Prophylaxis: Enoxaparin (Lovenox)  DVT prophylaxis - mechanical: SCDs  Ulcer prophylaxis: Yes      RAP Score Total: 6  CAGE Results: not completed Blood Alcohol>0.08: no     Assessment/Plan  Respiratory failure following trauma and surgery (HCC)- (present on admission)  Assessment & Plan  Mechanically ventilated following admission craniotomy.  9/4 Percutaneous tracheostomy.  9/6 on t-piece-liberated from the ventilator    Traumatic subdural hematoma, initial encounter (HCC)- (present on admission)  Assessment & Plan  Left-sided holohemispheric traumatic subdural hemorrhage associated with systemic anticoagulation and antiplatelet therapy.  8/25 Left craniotomy with evacuation of SDH.  8/28 Repeat CT overall improved.  Unable to obtain MRI brain due to SCS.  8/31 CT improved / No neurologic improvement / start amantadine  9/2 Increased amantadine. Post traumatic pharmacologic seizure prophylaxis for 1 week completed.  Speech Language Pathology cognitive evaluation when able  Tesfaye Luther MD. Neurosurgeon. [facility].    Oropharyngeal dysphagia- (present on  admission)  Assessment & Plan  Cortrak with TF initiated while intubated.    CAD (coronary artery disease)  Assessment & Plan  History of coronary artery disease with STEMI in August 2020.  Drug-eluting stents placed in the mid RCA and PDA.  Daily aspirin administration.    Hypokalemia- (present on admission)  Assessment & Plan  9/2 Replete and trend.    Status post insertion of drug-eluting stent into right coronary artery for coronary artery disease- (present on admission)  Assessment & Plan  Drug-eluting stents placed in the mid RCA and PDA August 2020.   Daily aspirin administration.  Maintenance medication held on admission    Paroxysmal atrial fibrillation (HCC)- (present on admission)  Assessment & Plan  Chronic condition treated with carvedilol and apixaban (Eliquis) anticoagulation.  Carvedilol resumed upon admission.  Systemic anticoagulation held on admission.    Obese- (present on admission)  Assessment & Plan  8/29 BMI 34.94    Type 2 diabetes mellitus without complication, without long-term current use of insulin (HCC)- (present on admission)  Assessment & Plan  Chronic condition treated with metformin.  Glycohemoglobin 6.2 (131).  Insulin sliding scale coverage.    No contraindication to deep vein thrombosis (DVT) prophylaxis- (present on admission)  Assessment & Plan  Prophylactic anticoagulation for thrombotic prevention initially contraindicated secondary to elevated bleeding risk.  8/28 Trauma screening bilateral lower extremity venous duplex negative for above knee DVT.  8/28 Prophylactic dose enoxaparin initiated.      Depression- (present on admission)  Assessment & Plan  Chronic condition treated with Effexor.  Resumed maintenance medication on admission.    Antiplatelet or antithrombotic long-term use- (present on admission)  Assessment & Plan  Daily ASA and Apixaban.  Received KCentra on admit.  AA 96.1%, transfused 1 unit platelets in ED.    Trauma- (present on admission)  Assessment &  Plan  Mechanical ground-level fall.    Intracranial hemorrhage with systemic anticoagulation.  Trauma Yellow Transfer Activation from [facility].  Macario Winn MD. Trauma Surgery.    BPH (benign prostatic hyperplasia)- (present on admission)  Assessment & Plan  Chronic condition treated with finasteride and tamsulosin.  Resumed maintenance medication on admission.    Dyslipidemia- (present on admission)  Assessment & Plan  Chronic condition treated with atorvastatin.  Resumed maintenance medication on admission.    Hypertensive disease- (present on admission)  Assessment & Plan  Chronic condition treated with carvedilol.  Resumed maintenance medication on admission.        Discussed patient condition with RN, RT, Therapies, Pharmacy, Dietary, and .  CRITICAL CARE TIME EXCLUDING PROCEDURES: 31   minutes

## 2022-09-07 NOTE — CARE PLAN
Problem: Pain - Standard  Goal: Alleviation of pain or a reduction in pain to the patient’s comfort goal  Outcome: Progressing     Problem: Knowledge Deficit - Standard  Goal: Patient and family/care givers will demonstrate understanding of plan of care, disease process/condition, diagnostic tests and medications  Outcome: Progressing     Problem: Safety - Medical Restraint  Goal: Remains free of injury from restraints (Restraint for Interference with Medical Device)  Outcome: Progressing  Goal: Free from restraint(s) (Restraint for Interference with Medical Device)  Outcome: Progressing     Problem: Skin Integrity  Goal: Skin integrity is maintained or improved  Outcome: Progressing     Problem: Fall Risk  Goal: Patient will remain free from falls  Outcome: Progressing     Problem: Psychosocial  Goal: Patient's level of anxiety will decrease  Outcome: Progressing  Goal: Patient's ability to verbalize feelings about condition will improve  Outcome: Progressing  Goal: Patient's ability to re-evaluate and adapt role responsibilities will improve  Outcome: Progressing  Goal: Patient and family will demonstrate ability to cope with life altering diagnosis and/or procedure  Outcome: Progressing  Goal: Spiritual and cultural needs incorporated into hospitalization  Outcome: Progressing     Problem: Hemodynamics  Goal: Patient's hemodynamics, fluid balance and neurologic status will be stable or improve  Outcome: Progressing     Problem: Respiratory  Goal: Patient will achieve/maintain optimum respiratory ventilation and gas exchange  Outcome: Progressing     Problem: Mechanical Ventilation  Goal: Safe management of artificial airway and ventilation  Outcome: Progressing  Goal: Successful weaning off mechanical ventilator, spontaneously maintains adequate gas exchange  Outcome: Progressing  Goal: Patient will be able to express needs and understand communication  Outcome: Progressing     Problem: Risk for  Aspiration  Goal: Patient's risk for aspiration will be absent or decrease  Outcome: Progressing     Problem: Urinary - Renal Perfusion  Goal: Ability to achieve and maintain adequate renal perfusion and functioning will improve  Outcome: Progressing     Problem: Venous Thromboembolism (VTE) Prevention  Goal: The patient will remain free from venous thromboembolism (VTE)  Outcome: Progressing     Problem: Nutrition  Goal: Patient's nutritional and fluid intake will be adequate or improve  Outcome: Progressing  Goal: Enteral nutrition will be maintained or improve  Outcome: Progressing  Goal: Enteral nutrition will be maintained or improve  Outcome: Progressing     Problem: Urinary Elimination  Goal: Establish and maintain regular urinary output  Outcome: Progressing     Problem: Bowel Elimination  Goal: Establish and maintain regular bowel function  Outcome: Progressing   The patient is Stable - Low risk of patient condition declining or worsening    Shift Goals  Clinical Goals: OOB to chair, Stand  Patient Goals: FELIPA  Family Goals: FELIPA    Progress made toward(s) clinical / shift goals:  Patient was able to stand and transfer to bed.   Due to safety concerns patients stayed in chair limited time. .  Patient tolerating fast wean from vent and doing well on t piece continue with good pulmonary hygiene.

## 2022-09-07 NOTE — THERAPY
Occupational Therapy  Daily Treatment     Patient Name: Karan Wiley  Age:  62 y.o., Sex:  male  Medical Record #: [id number]  Today's Date: 9/7/2022     Precautions  Precautions: Fall Risk, Tracheostomy   Comments: ETT    Assessment     Pt currently limited by decreased functional mobility, activity tolerance, cognition, sensation, strength, AROM, coordination, balance,  which are affecting pt's ability to complete ADLs/IADLs at baseline. Pt would benefit from OT services in the acute care setting to maximize functional recovery.      Plan    Continue current treatment plan.    DC Equipment Recommendations: Unable to determine at this time  Discharge Recommendations: (P) Recommend post-acute placement for additional occupational therapy services prior to discharge home         09/07/22 1142   Activities of Daily Living   Upper Body Dressing Maximal Assist   Lower Body Dressing Total Assist   Toileting Total Assist   Functional Mobility   Sit to Stand Moderate Assist   Bed, Chair, Wheelchair Transfer Maximal Assist   Short Term Goals   Short Term Goal # 1 Pt will follow 1 step commands with 100% accuracy   Goal Outcome # 1 Progressing slower than expected   Short Term Goal # 2 Pt will demonstrate purposeful movement of BUE   Goal Outcome # 2 Progressing slower than expected   Anticipated Discharge Equipment and Recommendations   Discharge Recommendations Recommend post-acute placement for additional occupational therapy services prior to discharge home

## 2022-09-07 NOTE — CARE PLAN
The patient is Stable - Low risk of patient condition declining or worsening    Shift Goals  Clinical Goals: OOB to chair, Stand  Patient Goals: FELIPA  Family Goals: FELIPA    Progress made toward(s) clinical / shift goals:    PT remains on t-piece through shift, PT slept during shift

## 2022-09-08 ENCOUNTER — APPOINTMENT (OUTPATIENT)
Dept: RADIOLOGY | Facility: MEDICAL CENTER | Age: 62
DRG: 003 | End: 2022-09-08
Attending: SURGERY
Payer: OTHER GOVERNMENT

## 2022-09-08 PROBLEM — B37.0 ORAL THRUSH: Status: ACTIVE | Noted: 2022-09-08

## 2022-09-08 PROBLEM — D72.829 LEUKOCYTOSIS: Status: ACTIVE | Noted: 2022-09-08

## 2022-09-08 PROBLEM — E87.1 CEREBRAL HYPONATREMIA: Status: ACTIVE | Noted: 2022-09-08

## 2022-09-08 LAB
ALBUMIN SERPL BCP-MCNC: 3.9 G/DL (ref 3.2–4.9)
ALBUMIN/GLOB SERPL: 1.1 G/DL
ALP SERPL-CCNC: 121 U/L (ref 30–99)
ALT SERPL-CCNC: 80 U/L (ref 2–50)
ANION GAP SERPL CALC-SCNC: 12 MMOL/L (ref 7–16)
AST SERPL-CCNC: 37 U/L (ref 12–45)
BASOPHILS # BLD AUTO: 0.7 % (ref 0–1.8)
BASOPHILS # BLD: 0.1 K/UL (ref 0–0.12)
BILIRUB SERPL-MCNC: 0.4 MG/DL (ref 0.1–1.5)
BUN SERPL-MCNC: 18 MG/DL (ref 8–22)
CALCIUM SERPL-MCNC: 10.3 MG/DL (ref 8.5–10.5)
CHLORIDE SERPL-SCNC: 97 MMOL/L (ref 96–112)
CO2 SERPL-SCNC: 25 MMOL/L (ref 20–33)
CREAT SERPL-MCNC: 0.59 MG/DL (ref 0.5–1.4)
EOSINOPHIL # BLD AUTO: 0.37 K/UL (ref 0–0.51)
EOSINOPHIL NFR BLD: 2.6 % (ref 0–6.9)
ERYTHROCYTE [DISTWIDTH] IN BLOOD BY AUTOMATED COUNT: 44.6 FL (ref 35.9–50)
GFR SERPLBLD CREATININE-BSD FMLA CKD-EPI: 110 ML/MIN/1.73 M 2
GLOBULIN SER CALC-MCNC: 3.6 G/DL (ref 1.9–3.5)
GLUCOSE SERPL-MCNC: 137 MG/DL (ref 65–99)
HCT VFR BLD AUTO: 36.7 % (ref 42–52)
HGB BLD-MCNC: 12.2 G/DL (ref 14–18)
IMM GRANULOCYTES # BLD AUTO: 0.07 K/UL (ref 0–0.11)
IMM GRANULOCYTES NFR BLD AUTO: 0.5 % (ref 0–0.9)
LYMPHOCYTES # BLD AUTO: 1.65 K/UL (ref 1–4.8)
LYMPHOCYTES NFR BLD: 11.7 % (ref 22–41)
MAGNESIUM SERPL-MCNC: 2 MG/DL (ref 1.5–2.5)
MCH RBC QN AUTO: 30 PG (ref 27–33)
MCHC RBC AUTO-ENTMCNC: 33.2 G/DL (ref 33.7–35.3)
MCV RBC AUTO: 90.4 FL (ref 81.4–97.8)
MONOCYTES # BLD AUTO: 0.88 K/UL (ref 0–0.85)
MONOCYTES NFR BLD AUTO: 6.3 % (ref 0–13.4)
NEUTROPHILS # BLD AUTO: 10.98 K/UL (ref 1.82–7.42)
NEUTROPHILS NFR BLD: 78.2 % (ref 44–72)
NRBC # BLD AUTO: 0 K/UL
NRBC BLD-RTO: 0 /100 WBC
PHOSPHATE SERPL-MCNC: 3.1 MG/DL (ref 2.5–4.5)
PLATELET # BLD AUTO: 598 K/UL (ref 164–446)
PMV BLD AUTO: 11.3 FL (ref 9–12.9)
POTASSIUM SERPL-SCNC: 3.3 MMOL/L (ref 3.6–5.5)
PROT SERPL-MCNC: 7.5 G/DL (ref 6–8.2)
RBC # BLD AUTO: 4.06 M/UL (ref 4.7–6.1)
SODIUM SERPL-SCNC: 134 MMOL/L (ref 135–145)
WBC # BLD AUTO: 14.1 K/UL (ref 4.8–10.8)

## 2022-09-08 PROCEDURE — L8501 TRACHEOSTOMY SPEAKING VALVE: HCPCS

## 2022-09-08 PROCEDURE — A9270 NON-COVERED ITEM OR SERVICE: HCPCS | Performed by: SURGERY

## 2022-09-08 PROCEDURE — 99233 SBSQ HOSP IP/OBS HIGH 50: CPT | Performed by: NURSE PRACTITIONER

## 2022-09-08 PROCEDURE — 83735 ASSAY OF MAGNESIUM: CPT

## 2022-09-08 PROCEDURE — 700102 HCHG RX REV CODE 250 W/ 637 OVERRIDE(OP): Performed by: SURGERY

## 2022-09-08 PROCEDURE — 92597 ORAL SPEECH DEVICE EVAL: CPT

## 2022-09-08 PROCEDURE — 84100 ASSAY OF PHOSPHORUS: CPT

## 2022-09-08 PROCEDURE — 700102 HCHG RX REV CODE 250 W/ 637 OVERRIDE(OP): Performed by: NEUROLOGICAL SURGERY

## 2022-09-08 PROCEDURE — 71045 X-RAY EXAM CHEST 1 VIEW: CPT

## 2022-09-08 PROCEDURE — A9270 NON-COVERED ITEM OR SERVICE: HCPCS | Performed by: NEUROLOGICAL SURGERY

## 2022-09-08 PROCEDURE — 700111 HCHG RX REV CODE 636 W/ 250 OVERRIDE (IP): Performed by: SURGERY

## 2022-09-08 PROCEDURE — 85025 COMPLETE CBC W/AUTO DIFF WBC: CPT

## 2022-09-08 PROCEDURE — 700102 HCHG RX REV CODE 250 W/ 637 OVERRIDE(OP): Performed by: NURSE PRACTITIONER

## 2022-09-08 PROCEDURE — 700111 HCHG RX REV CODE 636 W/ 250 OVERRIDE (IP): Performed by: NEUROLOGICAL SURGERY

## 2022-09-08 PROCEDURE — 305246 HCHG SPEAKING VALVE ADAPTER

## 2022-09-08 PROCEDURE — A9270 NON-COVERED ITEM OR SERVICE: HCPCS | Performed by: NURSE PRACTITIONER

## 2022-09-08 PROCEDURE — 770001 HCHG ROOM/CARE - MED/SURG/GYN PRIV*

## 2022-09-08 PROCEDURE — 94640 AIRWAY INHALATION TREATMENT: CPT

## 2022-09-08 PROCEDURE — 80053 COMPREHEN METABOLIC PANEL: CPT

## 2022-09-08 PROCEDURE — 51798 US URINE CAPACITY MEASURE: CPT

## 2022-09-08 RX ORDER — POTASSIUM CHLORIDE 7.45 MG/ML
10 INJECTION INTRAVENOUS ONCE
Status: COMPLETED | OUTPATIENT
Start: 2022-09-08 | End: 2022-09-08

## 2022-09-08 RX ORDER — SODIUM CHLORIDE 1 G/1
1 TABLET ORAL 2 TIMES DAILY
Status: DISCONTINUED | OUTPATIENT
Start: 2022-09-08 | End: 2022-09-09

## 2022-09-08 RX ORDER — POTASSIUM CHLORIDE 7.45 MG/ML
10 INJECTION INTRAVENOUS
Status: DISPENSED | OUTPATIENT
Start: 2022-09-08 | End: 2022-09-08

## 2022-09-08 RX ADMIN — CEFAZOLIN SODIUM 2 G: 2 INJECTION, SOLUTION INTRAVENOUS at 05:38

## 2022-09-08 RX ADMIN — DOCUSATE SODIUM 100 MG: 50 LIQUID ORAL at 05:37

## 2022-09-08 RX ADMIN — CARVEDILOL 25 MG: 6.25 TABLET, FILM COATED ORAL at 05:37

## 2022-09-08 RX ADMIN — CARVEDILOL 25 MG: 6.25 TABLET, FILM COATED ORAL at 18:13

## 2022-09-08 RX ADMIN — AMANTADINE HYDROCHLORIDE 100 MG: 100 TABLET ORAL at 05:37

## 2022-09-08 RX ADMIN — HYDRALAZINE HYDROCHLORIDE 25 MG: 25 TABLET, FILM COATED ORAL at 15:25

## 2022-09-08 RX ADMIN — ENOXAPARIN SODIUM 30 MG: 30 INJECTION SUBCUTANEOUS at 18:13

## 2022-09-08 RX ADMIN — HYDRALAZINE HYDROCHLORIDE 25 MG: 25 TABLET, FILM COATED ORAL at 22:25

## 2022-09-08 RX ADMIN — ATORVASTATIN CALCIUM 80 MG: 80 TABLET, FILM COATED ORAL at 18:13

## 2022-09-08 RX ADMIN — CEFAZOLIN SODIUM 2 G: 2 INJECTION, SOLUTION INTRAVENOUS at 22:26

## 2022-09-08 RX ADMIN — FAMOTIDINE 20 MG: 20 TABLET, FILM COATED ORAL at 18:13

## 2022-09-08 RX ADMIN — NYSTATIN 500000 UNITS: 100000 SUSPENSION ORAL at 16:39

## 2022-09-08 RX ADMIN — VENLAFAXINE 75 MG: 75 TABLET ORAL at 18:13

## 2022-09-08 RX ADMIN — POTASSIUM CHLORIDE 10 MEQ: 7.46 INJECTION, SOLUTION INTRAVENOUS at 11:36

## 2022-09-08 RX ADMIN — POTASSIUM CHLORIDE 10 MEQ: 7.46 INJECTION, SOLUTION INTRAVENOUS at 16:43

## 2022-09-08 RX ADMIN — MAGNESIUM HYDROXIDE 30 ML: 400 SUSPENSION ORAL at 05:37

## 2022-09-08 RX ADMIN — POTASSIUM BICARBONATE 50 MEQ: 978 TABLET, EFFERVESCENT ORAL at 18:13

## 2022-09-08 RX ADMIN — HYDRALAZINE HYDROCHLORIDE 10 MG: 20 INJECTION INTRAMUSCULAR; INTRAVENOUS at 04:05

## 2022-09-08 RX ADMIN — SODIUM CHLORIDE 1 G: 1 TABLET ORAL at 18:16

## 2022-09-08 RX ADMIN — POLYETHYLENE GLYCOL 3350 1 PACKET: 17 POWDER, FOR SOLUTION ORAL at 05:37

## 2022-09-08 RX ADMIN — SODIUM CHLORIDE 1 G: 1 TABLET ORAL at 05:37

## 2022-09-08 RX ADMIN — POTASSIUM CHLORIDE 10 MEQ: 7.46 INJECTION, SOLUTION INTRAVENOUS at 13:05

## 2022-09-08 RX ADMIN — CEFAZOLIN SODIUM 2 G: 2 INJECTION, SOLUTION INTRAVENOUS at 15:18

## 2022-09-08 RX ADMIN — ENOXAPARIN SODIUM 30 MG: 30 INJECTION SUBCUTANEOUS at 05:38

## 2022-09-08 RX ADMIN — POTASSIUM CHLORIDE 10 MEQ: 7.46 INJECTION, SOLUTION INTRAVENOUS at 15:19

## 2022-09-08 RX ADMIN — VENLAFAXINE 75 MG: 75 TABLET ORAL at 06:07

## 2022-09-08 RX ADMIN — NYSTATIN 500000 UNITS: 100000 SUSPENSION ORAL at 09:05

## 2022-09-08 RX ADMIN — NYSTATIN 500000 UNITS: 100000 SUSPENSION ORAL at 15:18

## 2022-09-08 RX ADMIN — HYDRALAZINE HYDROCHLORIDE 25 MG: 25 TABLET, FILM COATED ORAL at 05:37

## 2022-09-08 RX ADMIN — AMLODIPINE BESYLATE 10 MG: 10 TABLET ORAL at 05:37

## 2022-09-08 RX ADMIN — AMANTADINE HYDROCHLORIDE 100 MG: 100 TABLET ORAL at 11:36

## 2022-09-08 RX ADMIN — FAMOTIDINE 20 MG: 20 TABLET, FILM COATED ORAL at 05:36

## 2022-09-08 RX ADMIN — HYDRALAZINE HYDROCHLORIDE 10 MG: 20 INJECTION INTRAMUSCULAR; INTRAVENOUS at 00:10

## 2022-09-08 ASSESSMENT — PAIN DESCRIPTION - PAIN TYPE
TYPE: ACUTE PAIN

## 2022-09-08 ASSESSMENT — ENCOUNTER SYMPTOMS
ABDOMINAL PAIN: 0
MUSCULOSKELETAL NEGATIVE: 1
NAUSEA: 0
DIARRHEA: 0
HEADACHES: 0
CONSTIPATION: 0
RESPIRATORY NEGATIVE: 1
VOMITING: 0
FEVER: 0

## 2022-09-08 NOTE — PROGRESS NOTES
Trauma / Surgical Daily Progress Note    Date of Service  9/8/2022    Chief Complaint  62 y.o. male admitted 8/25/2022 with GLF w/SDH  8/25 Left craniotomy, evacuation of acute left subdural hematoma.   Interval Events  Patient to start capping and speaking valve trials  Remains with leukocytosis, on ABX  On Lovenox for chemical DVT prophylaxis     -Transfer to neurological chirinos.   -Speak for diet and speaking valve    Therapies  Counseled  Disposition to LTACH pending     Review of Systems  Review of Systems   Constitutional:  Negative for fever and malaise/fatigue.   Respiratory: Negative.     Gastrointestinal:  Negative for abdominal pain, constipation, diarrhea, nausea and vomiting.   Musculoskeletal: Negative.    Neurological:  Negative for headaches.      Vital Signs  Temp:  [36.3 °C (97.3 °F)-37.9 °C (100.2 °F)] 36.4 °C (97.5 °F)  Pulse:  [61-94] 74  Resp:  [18-48] 24  BP: (107-175)/() 128/79  SpO2:  [91 %-96 %] 94 %    Physical Exam  Physical Exam  Constitutional:       General: He is not in acute distress.     Appearance: He is not toxic-appearing.   HENT:      Head: Normocephalic.      Comments: Dressings in place     Nose:      Comments: Core trak  Eyes:      General:         Right eye: No discharge.         Left eye: No discharge.   Neck:      Comments: Trach in place with T piece   Cardiovascular:      Rate and Rhythm: Normal rate and regular rhythm.      Pulses: Normal pulses.      Heart sounds: No murmur heard.    No friction rub. No gallop.   Pulmonary:      Effort: No respiratory distress.      Breath sounds: No wheezing.      Comments: On t-piece  Abdominal:      General: There is no distension.      Palpations: Abdomen is soft.      Tenderness: There is no guarding.   Genitourinary:     Comments: Condom cath in place.  Musculoskeletal:         General: Normal range of motion.      Cervical back: Normal range of motion.      Comments: Generalized weakness   Skin:     General: Skin is warm  and dry.      Capillary Refill: Capillary refill takes less than 2 seconds.      Coloration: Skin is not jaundiced or pale.      Findings: No bruising.   Neurological:      General: No focal deficit present.      Mental Status: He is alert.      Cranial Nerves: No cranial nerve deficit.      Comments: follows       Laboratory  Recent Results (from the past 24 hour(s))   CBC with Differential: Tomorrow AM    Collection Time: 09/08/22  5:07 AM   Result Value Ref Range    WBC 14.1 (H) 4.8 - 10.8 K/uL    RBC 4.06 (L) 4.70 - 6.10 M/uL    Hemoglobin 12.2 (L) 14.0 - 18.0 g/dL    Hematocrit 36.7 (L) 42.0 - 52.0 %    MCV 90.4 81.4 - 97.8 fL    MCH 30.0 27.0 - 33.0 pg    MCHC 33.2 (L) 33.7 - 35.3 g/dL    RDW 44.6 35.9 - 50.0 fL    Platelet Count 598 (H) 164 - 446 K/uL    MPV 11.3 9.0 - 12.9 fL    Neutrophils-Polys 78.20 (H) 44.00 - 72.00 %    Lymphocytes 11.70 (L) 22.00 - 41.00 %    Monocytes 6.30 0.00 - 13.40 %    Eosinophils 2.60 0.00 - 6.90 %    Basophils 0.70 0.00 - 1.80 %    Immature Granulocytes 0.50 0.00 - 0.90 %    Nucleated RBC 0.00 /100 WBC    Neutrophils (Absolute) 10.98 (H) 1.82 - 7.42 K/uL    Lymphs (Absolute) 1.65 1.00 - 4.80 K/uL    Monos (Absolute) 0.88 (H) 0.00 - 0.85 K/uL    Eos (Absolute) 0.37 0.00 - 0.51 K/uL    Baso (Absolute) 0.10 0.00 - 0.12 K/uL    Immature Granulocytes (abs) 0.07 0.00 - 0.11 K/uL    NRBC (Absolute) 0.00 K/uL   Comp Metabolic Panel (CMP): Tomorrow AM    Collection Time: 09/08/22  5:07 AM   Result Value Ref Range    Sodium 134 (L) 135 - 145 mmol/L    Potassium 3.3 (L) 3.6 - 5.5 mmol/L    Chloride 97 96 - 112 mmol/L    Co2 25 20 - 33 mmol/L    Anion Gap 12.0 7.0 - 16.0    Glucose 137 (H) 65 - 99 mg/dL    Bun 18 8 - 22 mg/dL    Creatinine 0.59 0.50 - 1.40 mg/dL    Calcium 10.3 8.5 - 10.5 mg/dL    AST(SGOT) 37 12 - 45 U/L    ALT(SGPT) 80 (H) 2 - 50 U/L    Alkaline Phosphatase 121 (H) 30 - 99 U/L    Total Bilirubin 0.4 0.1 - 1.5 mg/dL    Albumin 3.9 3.2 - 4.9 g/dL    Total Protein 7.5 6.0 -  8.2 g/dL    Globulin 3.6 (H) 1.9 - 3.5 g/dL    A-G Ratio 1.1 g/dL   Magnesium: Every Monday and Thursday AM    Collection Time: 09/08/22  5:07 AM   Result Value Ref Range    Magnesium 2.0 1.5 - 2.5 mg/dL   Phosphorus: Every Monday and Thursday AM    Collection Time: 09/08/22  5:07 AM   Result Value Ref Range    Phosphorus 3.1 2.5 - 4.5 mg/dL   ESTIMATED GFR    Collection Time: 09/08/22  5:07 AM   Result Value Ref Range    GFR (CKD-EPI) 110 >60 mL/min/1.73 m 2       Fluids    Intake/Output Summary (Last 24 hours) at 9/8/2022 1336  Last data filed at 9/8/2022 1200  Gross per 24 hour   Intake 2220 ml   Output 2565 ml   Net -345 ml       Core Measures & Quality Metrics  Labs reviewed and Radiology images reviewed  White catheter: No White      DVT Prophylaxis: Enoxaparin (Lovenox)  DVT prophylaxis - mechanical: SCDs  Ulcer prophylaxis: Yes      RAP Score Total: 6  CAGE Results: not completed Blood Alcohol>0.08: no     Assessment/Plan  Respiratory failure following trauma and surgery (HCC)- (present on admission)  Assessment & Plan  Mechanically ventilated following admission craniotomy.  9/4 Percutaneous tracheostomy.  9/6 T piece.  9/8 Capping and speaking valve trials.     Traumatic subdural hematoma, initial encounter (HCC)- (present on admission)  Assessment & Plan  Left-sided holohemispheric traumatic subdural hemorrhage associated with systemic anticoagulation and antiplatelet therapy.  8/25 Left craniotomy with evacuation of SDH.  8/28 Repeat CT overall improved.  Unable to obtain MRI brain due to SCS.  8/31 CT improved / No neurologic improvement / start amantadine  9/2 Increased amantadine. Post traumatic pharmacologic seizure prophylaxis for 1 week completed.  Speech Language Pathology cognitive evaluation when able.  Tesfaye Luther MD. Neurosurgeon. [facility].    Cerebral hyponatremia- (present on admission)  Assessment & Plan  9/7  Na tabs initiated.    Oral thrush  Assessment & Plan  9/4 Nystatin  initiated.     Oropharyngeal dysphagia- (present on admission)  Assessment & Plan  Cortrak with TF initiated while intubated.  SLP evaluation for swallow.     CAD (coronary artery disease)  Assessment & Plan  History of coronary artery disease with STEMI in August 2020.  Drug-eluting stents placed in the mid RCA and PDA.  Daily aspirin administration.    Hypokalemia- (present on admission)  Assessment & Plan  9/2 Replete and trend.    Status post insertion of drug-eluting stent into right coronary artery for coronary artery disease- (present on admission)  Assessment & Plan  Drug-eluting stents placed in the mid RCA and PDA August 2020.   Daily aspirin administration.  Maintenance medication held on admission    Paroxysmal atrial fibrillation (HCC)- (present on admission)  Assessment & Plan  Chronic condition treated with carvedilol and apixaban (Eliquis) anticoagulation.  Carvedilol resumed upon admission.  Systemic anticoagulation held on admission.    Obese- (present on admission)  Assessment & Plan  8/29 BMI 34.94    Type 2 diabetes mellitus without complication, without long-term current use of insulin (HCC)- (present on admission)  Assessment & Plan  Chronic condition treated with metformin.  Glycohemoglobin 6.2 (131).  Insulin sliding scale coverage.    Leukocytosis  Assessment & Plan  Elevated WBC with MSSA noted in BAL      No contraindication to deep vein thrombosis (DVT) prophylaxis- (present on admission)  Assessment & Plan  Prophylactic anticoagulation for thrombotic prevention initially contraindicated secondary to elevated bleeding risk.  8/28 Trauma screening bilateral lower extremity venous duplex negative for above knee DVT.  8/28 Prophylactic dose enoxaparin initiated.      Depression- (present on admission)  Assessment & Plan  Chronic condition treated with Effexor.  Resumed maintenance medication on admission.    Antiplatelet or antithrombotic long-term use- (present on admission)  Assessment &  Plan  Daily ASA and Apixaban.  Received KCentra on admit.  AA 96.1%, transfused 1 unit platelets in ED.    Trauma- (present on admission)  Assessment & Plan  Mechanical ground-level fall.    Intracranial hemorrhage with systemic anticoagulation.  Trauma Yellow Transfer Activation from [facility].  Macario Winn MD. Trauma Surgery.    BPH (benign prostatic hyperplasia)- (present on admission)  Assessment & Plan  Chronic condition treated with finasteride and tamsulosin.  Resumed maintenance medication on admission.    Dyslipidemia- (present on admission)  Assessment & Plan  Chronic condition treated with atorvastatin.  Resumed maintenance medication on admission.    Hypertensive disease- (present on admission)  Assessment & Plan  Chronic condition treated with carvedilol.  Resumed maintenance medication on admission.      Discussed patient condition with RN, Patient, and trauma surgery Dr. Chirinos .

## 2022-09-08 NOTE — CARE PLAN
Problem: Aerosol Therapy  Goal: Improved hydration/ability to mobilize secretions and/or decreased airway edema  Description: Target End Date:  resolve prior to discharge or when underlying condition is resolved/stabilized    1.  Implement heated or cool aerosol therapy  2.  Assessed for optimal hydration, decreased edema and/or improved ability to mobilize secretions  Outcome: Progressing     T-piece: 5 lpm/30%

## 2022-09-08 NOTE — CARE PLAN
The patient is Stable - Low risk of patient condition declining or worsening    Shift Goals  Clinical Goals: improve communication, sit in the chair  Patient Goals: FELIPA  Family Goals: FELIPA    Progress made toward(s) clinical / shift goals:  speaking eval done and approved for 15 minute speaking valve. Sat in the chair for 2 hours.    Patient is not progressing towards the following goals:    Problem: Skin Integrity  Goal: Skin integrity is maintained or improved  Outcome: Not Progressing     Problem: Fall Risk  Goal: Patient will remain free from falls  Outcome: Not Progressing     Problem: Hemodynamics  Goal: Patient's hemodynamics, fluid balance and neurologic status will be stable or improve  Outcome: Not Progressing

## 2022-09-08 NOTE — ASSESSMENT & PLAN NOTE
Elevated WBC with MSSA noted in BAL  9/9 WBC 16.4. Afebrile, nontoxic appearance.  - Urinalysis ordered.  - Continue Ancef.

## 2022-09-08 NOTE — PROGRESS NOTES
Trauma / Surgical Daily Progress Note    Date of Service  9/7/2022    Chief Complaint  62 y.o. male admitted 8/25/2022 with GLF w/SDH on anti-coagulation. Required emergent craniotomu    Interval Events  Hospital day #13  Critically ill  Requires continued ICU and hospital admission  Seen on rounds and discussed with multidisciplinary team  Injuries and physiologic derangements pose a significant risk of mortality and long term morbidity  Critical care interventions include:  integration of multiple data points and associated complex medical decisions    Pulmonary toilet-tolerating t-piece  Pain control  Supportive care for TBI  Awaiting  LTAC    Review of Systems  Review of Systems   Unable to perform ROS: Patient nonverbal      Vital Signs for last 24 hours  Temp:  [36.3 °C (97.3 °F)-37.6 °C (99.7 °F)] 36.8 °C (98.2 °F)  Pulse:  [54-92] 75  Resp:  [15-50] 21  BP: (104-188)/(55-97) 145/83  SpO2:  [90 %-97 %] 95 %    Hemodynamic parameters for last 24 hours       Respiratory Data     Respiration: (!) 21, Pulse Oximetry: 95 %     Work Of Breathing / Effort: (P) Within Normal Limits  RUL Breath Sounds: (P) Expiratory Wheezes;Rhonchi, RML Breath Sounds: (P) Diminished, RLL Breath Sounds: (P) Diminished, DON Breath Sounds: (P) Clear;Expiratory Wheezes, LLL Breath Sounds: (P) Diminished    Physical Exam  Physical Exam  Constitutional:       General: He is not in acute distress.     Appearance: He is not toxic-appearing.   HENT:      Head: Normocephalic.      Comments: Dressings in place     Right Ear: External ear normal.      Left Ear: External ear normal.      Mouth/Throat:      Mouth: Mucous membranes are moist.   Eyes:      General:         Right eye: No discharge.         Left eye: No discharge.      Extraocular Movements: Extraocular movements intact.      Pupils: Pupils are equal, round, and reactive to light.   Cardiovascular:      Rate and Rhythm: Normal rate and regular rhythm.      Pulses: Normal pulses.       Heart sounds: No murmur heard.    No friction rub. No gallop.   Pulmonary:      Effort: No respiratory distress.      Breath sounds: No wheezing.      Comments: On t-piece  Abdominal:      General: There is no distension.      Palpations: Abdomen is soft.      Tenderness: There is no abdominal tenderness. There is no guarding.   Genitourinary:     Comments: White in place  Musculoskeletal:         General: Normal range of motion.      Cervical back: Normal range of motion.   Skin:     General: Skin is warm and dry.      Coloration: Skin is not jaundiced.      Findings: No bruising.   Neurological:      General: No focal deficit present.      Mental Status: He is alert.      Cranial Nerves: No cranial nerve deficit.      Comments: follows   Psychiatric:      Comments: Unable to assess       Laboratory  Recent Results (from the past 24 hour(s))   CBC with Differential: Tomorrow AM    Collection Time: 09/07/22  5:10 AM   Result Value Ref Range    WBC 11.5 (H) 4.8 - 10.8 K/uL    RBC 3.79 (L) 4.70 - 6.10 M/uL    Hemoglobin 11.4 (L) 14.0 - 18.0 g/dL    Hematocrit 34.3 (L) 42.0 - 52.0 %    MCV 90.5 81.4 - 97.8 fL    MCH 30.1 27.0 - 33.0 pg    MCHC 33.2 (L) 33.7 - 35.3 g/dL    RDW 44.4 35.9 - 50.0 fL    Platelet Count 493 (H) 164 - 446 K/uL    MPV 11.0 9.0 - 12.9 fL    Neutrophils-Polys 72.90 (H) 44.00 - 72.00 %    Lymphocytes 12.00 (L) 22.00 - 41.00 %    Monocytes 9.70 0.00 - 13.40 %    Eosinophils 4.40 0.00 - 6.90 %    Basophils 0.40 0.00 - 1.80 %    Immature Granulocytes 0.60 0.00 - 0.90 %    Nucleated RBC 0.00 /100 WBC    Neutrophils (Absolute) 8.37 (H) 1.82 - 7.42 K/uL    Lymphs (Absolute) 1.38 1.00 - 4.80 K/uL    Monos (Absolute) 1.12 (H) 0.00 - 0.85 K/uL    Eos (Absolute) 0.50 0.00 - 0.51 K/uL    Baso (Absolute) 0.05 0.00 - 0.12 K/uL    Immature Granulocytes (abs) 0.07 0.00 - 0.11 K/uL    NRBC (Absolute) 0.00 K/uL   Comp Metabolic Panel (CMP): Tomorrow AM    Collection Time: 09/07/22  5:10 AM   Result Value Ref  Range    Sodium 134 (L) 135 - 145 mmol/L    Potassium 3.7 3.6 - 5.5 mmol/L    Chloride 97 96 - 112 mmol/L    Co2 26 20 - 33 mmol/L    Anion Gap 11.0 7.0 - 16.0    Glucose 137 (H) 65 - 99 mg/dL    Bun 19 8 - 22 mg/dL    Creatinine 0.51 0.50 - 1.40 mg/dL    Calcium 9.6 8.5 - 10.5 mg/dL    AST(SGOT) 40 12 - 45 U/L    ALT(SGPT) 77 (H) 2 - 50 U/L    Alkaline Phosphatase 103 (H) 30 - 99 U/L    Total Bilirubin 0.4 0.1 - 1.5 mg/dL    Albumin 3.5 3.2 - 4.9 g/dL    Total Protein 6.6 6.0 - 8.2 g/dL    Globulin 3.1 1.9 - 3.5 g/dL    A-G Ratio 1.1 g/dL   ESTIMATED GFR    Collection Time: 09/07/22  5:10 AM   Result Value Ref Range    GFR (CKD-EPI) 115 >60 mL/min/1.73 m 2       Fluids    Intake/Output Summary (Last 24 hours) at 9/7/2022 1725  Last data filed at 9/7/2022 1400  Gross per 24 hour   Intake 1850 ml   Output 1855 ml   Net -5 ml         Core Measures & Quality Metrics  Labs reviewed and Radiology images reviewed  White catheter: Critically Ill - Requiring Accurate Measurement of Urinary Output      DVT Prophylaxis: Enoxaparin (Lovenox)  DVT prophylaxis - mechanical: SCDs  Ulcer prophylaxis: Yes      RAP Score Total: 6  CAGE Results: not completed Blood Alcohol>0.08: no     Assessment/Plan  Respiratory failure following trauma and surgery ([facility])- (present on admission)  Assessment & Plan  Mechanically ventilated following admission craniotomy.  9/4 Percutaneous tracheostomy.  9/6 on t-piece-liberated from the ventilator    Traumatic subdural hematoma, initial encounter ([facility])- (present on admission)  Assessment & Plan  Left-sided holohemispheric traumatic subdural hemorrhage associated with systemic anticoagulation and antiplatelet therapy.  8/25 Left craniotomy with evacuation of SDH.  8/28 Repeat CT overall improved.  Unable to obtain MRI brain due to SCS.  8/31 CT improved / No neurologic improvement / start amantadine  9/2 Increased amantadine. Post traumatic pharmacologic seizure prophylaxis for 1 week  completed.  Speech Language Pathology cognitive evaluation when able.  Tesfaye Luther MD. Neurosurgeon. [facility].    Oropharyngeal dysphagia- (present on admission)  Assessment & Plan  Cortrak with TF initiated while intubated.    CAD (coronary artery disease)  Assessment & Plan  History of coronary artery disease with STEMI in August 2020.  Drug-eluting stents placed in the mid RCA and PDA.  Daily aspirin administration.    Hypokalemia- (present on admission)  Assessment & Plan  9/2 Replete and trend.    Status post insertion of drug-eluting stent into right coronary artery for coronary artery disease- (present on admission)  Assessment & Plan  Drug-eluting stents placed in the mid RCA and PDA August 2020.   Daily aspirin administration.  Maintenance medication held on admission    Paroxysmal atrial fibrillation (HCC)- (present on admission)  Assessment & Plan  Chronic condition treated with carvedilol and apixaban (Eliquis) anticoagulation.  Carvedilol resumed upon admission.  Systemic anticoagulation held on admission.    Obese- (present on admission)  Assessment & Plan  8/29 BMI 34.94    Type 2 diabetes mellitus without complication, without long-term current use of insulin (HCC)- (present on admission)  Assessment & Plan  Chronic condition treated with metformin.  Glycohemoglobin 6.2 (131).  Insulin sliding scale coverage.    No contraindication to deep vein thrombosis (DVT) prophylaxis- (present on admission)  Assessment & Plan  Prophylactic anticoagulation for thrombotic prevention initially contraindicated secondary to elevated bleeding risk.  8/28 Trauma screening bilateral lower extremity venous duplex negative for above knee DVT.  8/28 Prophylactic dose enoxaparin initiated.      Depression- (present on admission)  Assessment & Plan  Chronic condition treated with Effexor.  Resumed maintenance medication on admission.    Antiplatelet or antithrombotic long-term use- (present on  admission)  Assessment & Plan  Daily ASA and Apixaban.  Received KCentra on admit.  AA 96.1%, transfused 1 unit platelets in ED.    Trauma- (present on admission)  Assessment & Plan  Mechanical ground-level fall.    Intracranial hemorrhage with systemic anticoagulation.  Trauma Yellow Transfer Activation from [facility].  Macario Winn MD. Trauma Surgery.    BPH (benign prostatic hyperplasia)- (present on admission)  Assessment & Plan  Chronic condition treated with finasteride and tamsulosin.  Resumed maintenance medication on admission.    Dyslipidemia- (present on admission)  Assessment & Plan  Chronic condition treated with atorvastatin.  Resumed maintenance medication on admission.    Hypertensive disease- (present on admission)  Assessment & Plan  Chronic condition treated with carvedilol.  Resumed maintenance medication on admission.        Discussed patient condition with RN, RT, Therapies, Pharmacy, Dietary, and .  CRITICAL CARE TIME EXCLUDING PROCEDURES: 32   minutes

## 2022-09-08 NOTE — CARE PLAN
Problem: Mechanical Ventilation  Goal: Patient will be able to express needs and understand communication  Outcome: Not Progressing     Problem: Safety - Medical Restraint  Goal: Remains free of injury from restraints (Restraint for Interference with Medical Device)  Outcome: Progressing  Restraints remain medically necessary at this time, as patient has demonstrated pulling lines and tubes this shift. However, as he is more alert and directable, he does not require both SWR and mitts. Mitts have been Dc'd this shift. Necessity for SWR and mitts will continuously be evaluated, as patient's neuro status evolves.      Problem: Fall Risk  Goal: Patient will remain free from falls  Outcome: Progressing    Patient remains a fall risk at this time. Bed alarm in place, nonskid footwear when mobilizing and EOB/OOB, room in view of nurse's station, frequent re-orientation, clear concise communication utilized.      Problem: Psychosocial  Goal: Patient's level of anxiety will decrease  Outcome: Progressing  Goal: Patient's ability to verbalize feelings about condition will improve  Outcome: Progressing  Goal: Patient's ability to re-evaluate and adapt role responsibilities will improve  Outcome: Progressing  Goal: Patient and family will demonstrate ability to cope with life altering diagnosis and/or procedure  Outcome: Progressing  Goal: Spiritual and cultural needs incorporated into hospitalization  Outcome: Progressing     Problem: Urinary Elimination  Goal: Establish and maintain regular urinary output  Outcome: Progressing     Problem: Bowel Elimination  Goal: Establish and maintain regular bowel function  Outcome: Progressing     Problem: Mechanical Ventilation  Goal: Successful weaning off mechanical ventilator, spontaneously maintains adequate gas exchange  Outcome: Met   The patient is Stable - Low risk of patient condition declining or worsening    Shift Goals  Clinical Goals: Improve sleep/wake cycle, improve  communication methods with staff and patient  Patient Goals: FELIPA  Family Goals: FELIPA    Progress made toward(s) clinical / shift goals: Patient is more interactive and is able to communicate his needs with thumbs up and head shaking. He also mouthed words successfully this shift. Communication between him and staff improved from last shift.     Patient is not progressing towards the following goals: Was not able to mobilize further than EOB given pt's fatigue and lack of engagement once he was up on the EOB.       Problem: Mechanical Ventilation  Goal: Patient will be able to express needs and understand communication  Outcome: Not Progressing

## 2022-09-09 ENCOUNTER — APPOINTMENT (OUTPATIENT)
Dept: RADIOLOGY | Facility: MEDICAL CENTER | Age: 62
DRG: 003 | End: 2022-09-09
Attending: NURSE PRACTITIONER
Payer: OTHER GOVERNMENT

## 2022-09-09 ENCOUNTER — HOSPITAL ENCOUNTER (INPATIENT)
Dept: RADIOLOGY | Facility: MEDICAL CENTER | Age: 62
DRG: 003 | End: 2022-09-09
Attending: NURSE PRACTITIONER | Admitting: SURGERY
Payer: OTHER GOVERNMENT

## 2022-09-09 VITALS
RESPIRATION RATE: 18 BRPM | HEART RATE: 90 BPM | OXYGEN SATURATION: 96 % | DIASTOLIC BLOOD PRESSURE: 81 MMHG | SYSTOLIC BLOOD PRESSURE: 134 MMHG | BODY MASS INDEX: 34.05 KG/M2 | WEIGHT: 216.93 LBS | HEIGHT: 67 IN | TEMPERATURE: 99.1 F

## 2022-09-09 LAB
APPEARANCE UR: CLEAR
BACTERIA BLD CULT: NORMAL
BACTERIA BLD CULT: NORMAL
BASOPHILS # BLD AUTO: 0.6 % (ref 0–1.8)
BASOPHILS # BLD: 0.1 K/UL (ref 0–0.12)
BILIRUB UR QL STRIP.AUTO: NEGATIVE
COLOR UR: YELLOW
EOSINOPHIL # BLD AUTO: 0.47 K/UL (ref 0–0.51)
EOSINOPHIL NFR BLD: 2.9 % (ref 0–6.9)
ERYTHROCYTE [DISTWIDTH] IN BLOOD BY AUTOMATED COUNT: 44.8 FL (ref 35.9–50)
GLUCOSE UR STRIP.AUTO-MCNC: NEGATIVE MG/DL
GRAM STN SPEC: NORMAL
HCT VFR BLD AUTO: 36.1 % (ref 42–52)
HGB BLD-MCNC: 11.8 G/DL (ref 14–18)
IMM GRANULOCYTES # BLD AUTO: 0.14 K/UL (ref 0–0.11)
IMM GRANULOCYTES NFR BLD AUTO: 0.9 % (ref 0–0.9)
KETONES UR STRIP.AUTO-MCNC: ABNORMAL MG/DL
LEUKOCYTE ESTERASE UR QL STRIP.AUTO: NEGATIVE
LYMPHOCYTES # BLD AUTO: 1.82 K/UL (ref 1–4.8)
LYMPHOCYTES NFR BLD: 11.1 % (ref 22–41)
MCH RBC QN AUTO: 29.9 PG (ref 27–33)
MCHC RBC AUTO-ENTMCNC: 32.7 G/DL (ref 33.7–35.3)
MCV RBC AUTO: 91.6 FL (ref 81.4–97.8)
MICRO URNS: ABNORMAL
MONOCYTES # BLD AUTO: 1.59 K/UL (ref 0–0.85)
MONOCYTES NFR BLD AUTO: 9.7 % (ref 0–13.4)
NEUTROPHILS # BLD AUTO: 12.32 K/UL (ref 1.82–7.42)
NEUTROPHILS NFR BLD: 74.8 % (ref 44–72)
NITRITE UR QL STRIP.AUTO: NEGATIVE
NRBC # BLD AUTO: 0 K/UL
NRBC BLD-RTO: 0 /100 WBC
PH UR STRIP.AUTO: 6 [PH] (ref 5–8)
PLATELET # BLD AUTO: 713 K/UL (ref 164–446)
PMV BLD AUTO: 11 FL (ref 9–12.9)
PROT UR QL STRIP: NEGATIVE MG/DL
RBC # BLD AUTO: 3.94 M/UL (ref 4.7–6.1)
RBC UR QL AUTO: NEGATIVE
SIGNIFICANT IND 70042: NORMAL
SITE SITE: NORMAL
SOURCE SOURCE: NORMAL
SP GR UR STRIP.AUTO: 1.02
UROBILINOGEN UR STRIP.AUTO-MCNC: 0.2 MG/DL
WBC # BLD AUTO: 16.4 K/UL (ref 4.8–10.8)

## 2022-09-09 PROCEDURE — 87070 CULTURE OTHR SPECIMN AEROBIC: CPT

## 2022-09-09 PROCEDURE — A9270 NON-COVERED ITEM OR SERVICE: HCPCS | Performed by: SURGERY

## 2022-09-09 PROCEDURE — A9270 NON-COVERED ITEM OR SERVICE: HCPCS | Performed by: NEUROLOGICAL SURGERY

## 2022-09-09 PROCEDURE — 700102 HCHG RX REV CODE 250 W/ 637 OVERRIDE(OP): Performed by: SURGERY

## 2022-09-09 PROCEDURE — 700111 HCHG RX REV CODE 636 W/ 250 OVERRIDE (IP): Performed by: NURSE PRACTITIONER

## 2022-09-09 PROCEDURE — 87205 SMEAR GRAM STAIN: CPT

## 2022-09-09 PROCEDURE — 700102 HCHG RX REV CODE 250 W/ 637 OVERRIDE(OP): Performed by: NURSE PRACTITIONER

## 2022-09-09 PROCEDURE — A9270 NON-COVERED ITEM OR SERVICE: HCPCS

## 2022-09-09 PROCEDURE — 94640 AIRWAY INHALATION TREATMENT: CPT

## 2022-09-09 PROCEDURE — 81003 URINALYSIS AUTO W/O SCOPE: CPT

## 2022-09-09 PROCEDURE — 85025 COMPLETE CBC W/AUTO DIFF WBC: CPT

## 2022-09-09 PROCEDURE — 87186 SC STD MICRODIL/AGAR DIL: CPT

## 2022-09-09 PROCEDURE — 700102 HCHG RX REV CODE 250 W/ 637 OVERRIDE(OP): Performed by: NEUROLOGICAL SURGERY

## 2022-09-09 PROCEDURE — 71045 X-RAY EXAM CHEST 1 VIEW: CPT

## 2022-09-09 PROCEDURE — 87077 CULTURE AEROBIC IDENTIFY: CPT

## 2022-09-09 PROCEDURE — 99239 HOSP IP/OBS DSCHRG MGMT >30: CPT

## 2022-09-09 PROCEDURE — 700102 HCHG RX REV CODE 250 W/ 637 OVERRIDE(OP)

## 2022-09-09 PROCEDURE — A9270 NON-COVERED ITEM OR SERVICE: HCPCS | Performed by: NURSE PRACTITIONER

## 2022-09-09 PROCEDURE — 700111 HCHG RX REV CODE 636 W/ 250 OVERRIDE (IP): Performed by: SURGERY

## 2022-09-09 PROCEDURE — 36415 COLL VENOUS BLD VENIPUNCTURE: CPT

## 2022-09-09 RX ORDER — ONDANSETRON 2 MG/ML
4 INJECTION INTRAMUSCULAR; INTRAVENOUS EVERY 4 HOURS PRN
Qty: 84 ML | Status: SHIPPED
Start: 2022-09-09 | End: 2022-10-31

## 2022-09-09 RX ORDER — AMLODIPINE BESYLATE 10 MG/1
10 TABLET ORAL DAILY
Qty: 30 TABLET | Status: SHIPPED
Start: 2022-09-10 | End: 2022-11-03

## 2022-09-09 RX ORDER — METOPROLOL TARTRATE 1 MG/ML
5 INJECTION, SOLUTION INTRAVENOUS EVERY 4 HOURS PRN
Qty: 15 ML | Status: SHIPPED
Start: 2022-09-09 | End: 2022-10-31

## 2022-09-09 RX ORDER — CEFAZOLIN SODIUM 2 G/100ML
2 INJECTION, SOLUTION INTRAVENOUS EVERY 8 HOURS
Qty: 3000 ML | Status: SHIPPED
Start: 2022-09-09 | End: 2022-10-31

## 2022-09-09 RX ORDER — AMANTADINE HYDROCHLORIDE 100 MG/1
100 TABLET ORAL 2 TIMES DAILY
Qty: 60 TABLET | Refills: 0 | Status: SHIPPED
Start: 2022-09-10 | End: 2022-12-01

## 2022-09-09 RX ORDER — AMOXICILLIN 250 MG
1 CAPSULE ORAL NIGHTLY
Qty: 30 TABLET | Refills: 0 | Status: SHIPPED
Start: 2022-09-09 | End: 2022-10-31

## 2022-09-09 RX ORDER — BISACODYL 10 MG
10 SUPPOSITORY, RECTAL RECTAL
Refills: 0 | Status: SHIPPED
Start: 2022-09-09 | End: 2022-10-31

## 2022-09-09 RX ORDER — HYDROMORPHONE HYDROCHLORIDE 2 MG/1
1-2 TABLET ORAL
Qty: 24 TABLET | Refills: 0 | Status: SHIPPED | OUTPATIENT
Start: 2022-09-09 | End: 2022-09-12

## 2022-09-09 RX ORDER — SODIUM CHLORIDE 1 G/1
2 TABLET ORAL 2 TIMES DAILY WITH MEALS
Status: DISCONTINUED | OUTPATIENT
Start: 2022-09-09 | End: 2022-09-09 | Stop reason: HOSPADM

## 2022-09-09 RX ORDER — ENOXAPARIN SODIUM 100 MG/ML
30 INJECTION SUBCUTANEOUS EVERY 12 HOURS
Status: SHIPPED
Start: 2022-09-09 | End: 2022-10-31

## 2022-09-09 RX ORDER — POLYETHYLENE GLYCOL 3350 17 G/17G
17 POWDER, FOR SOLUTION ORAL 2 TIMES DAILY
Refills: 3 | Status: SHIPPED
Start: 2022-09-09 | End: 2022-10-31

## 2022-09-09 RX ORDER — AMOXICILLIN 250 MG
1 CAPSULE ORAL
Qty: 30 TABLET | Refills: 0 | Status: SHIPPED
Start: 2022-09-09 | End: 2022-10-31

## 2022-09-09 RX ORDER — SODIUM CHLORIDE 1 G/1
2 TABLET ORAL 2 TIMES DAILY WITH MEALS
Qty: 90 TABLET | Refills: 3 | Status: SHIPPED
Start: 2022-09-09 | End: 2022-10-31

## 2022-09-09 RX ORDER — HYDRALAZINE HYDROCHLORIDE 25 MG/1
25 TABLET, FILM COATED ORAL EVERY 8 HOURS
Qty: 90 TABLET | Status: SHIPPED
Start: 2022-09-09 | End: 2022-10-31

## 2022-09-09 RX ORDER — HYDRALAZINE HYDROCHLORIDE 20 MG/ML
10 INJECTION INTRAMUSCULAR; INTRAVENOUS
Refills: 0 | Status: SHIPPED
Start: 2022-09-09 | End: 2022-10-31

## 2022-09-09 RX ORDER — ENEMA 19; 7 G/133ML; G/133ML
1 ENEMA RECTAL
Status: SHIPPED
Start: 2022-09-09 | End: 2022-10-31

## 2022-09-09 RX ORDER — FAMOTIDINE 20 MG/1
20 TABLET, FILM COATED ORAL 2 TIMES DAILY
Qty: 60 TABLET | Status: SHIPPED
Start: 2022-09-09 | End: 2022-10-31

## 2022-09-09 RX ORDER — ONDANSETRON 4 MG/1
4 TABLET, ORALLY DISINTEGRATING ORAL EVERY 4 HOURS PRN
Qty: 10 TABLET | Refills: 0 | Status: SHIPPED
Start: 2022-09-09 | End: 2022-10-31

## 2022-09-09 RX ORDER — ACETAMINOPHEN 325 MG/1
650 TABLET ORAL EVERY 4 HOURS PRN
Qty: 30 TABLET | Refills: 0 | Status: SHIPPED
Start: 2022-09-09 | End: 2023-01-12

## 2022-09-09 RX ADMIN — VENLAFAXINE 75 MG: 75 TABLET ORAL at 17:27

## 2022-09-09 RX ADMIN — ENOXAPARIN SODIUM 30 MG: 30 INJECTION SUBCUTANEOUS at 17:27

## 2022-09-09 RX ADMIN — SODIUM CHLORIDE 2 G: 1 TABLET ORAL at 17:27

## 2022-09-09 RX ADMIN — HYDROMORPHONE HYDROCHLORIDE 2 MG: 2 TABLET ORAL at 11:37

## 2022-09-09 RX ADMIN — NYSTATIN 500000 UNITS: 100000 SUSPENSION ORAL at 15:50

## 2022-09-09 RX ADMIN — CARVEDILOL 25 MG: 6.25 TABLET, FILM COATED ORAL at 17:26

## 2022-09-09 RX ADMIN — POTASSIUM BICARBONATE 50 MEQ: 978 TABLET, EFFERVESCENT ORAL at 17:28

## 2022-09-09 RX ADMIN — ATORVASTATIN CALCIUM 80 MG: 80 TABLET, FILM COATED ORAL at 17:27

## 2022-09-09 RX ADMIN — AMLODIPINE BESYLATE 10 MG: 10 TABLET ORAL at 08:17

## 2022-09-09 RX ADMIN — CEFAZOLIN SODIUM 2 G: 2 INJECTION, SOLUTION INTRAVENOUS at 07:31

## 2022-09-09 RX ADMIN — NYSTATIN 500000 UNITS: 100000 SUSPENSION ORAL at 08:18

## 2022-09-09 RX ADMIN — AMANTADINE HYDROCHLORIDE 100 MG: 100 TABLET ORAL at 08:17

## 2022-09-09 RX ADMIN — NYSTATIN 500000 UNITS: 100000 SUSPENSION ORAL at 17:26

## 2022-09-09 RX ADMIN — SODIUM CHLORIDE 1 G: 1 TABLET ORAL at 08:17

## 2022-09-09 RX ADMIN — FAMOTIDINE 20 MG: 20 TABLET, FILM COATED ORAL at 17:27

## 2022-09-09 RX ADMIN — POTASSIUM BICARBONATE 50 MEQ: 978 TABLET, EFFERVESCENT ORAL at 08:17

## 2022-09-09 RX ADMIN — HYDROMORPHONE HYDROCHLORIDE 1 MG: 2 TABLET ORAL at 08:17

## 2022-09-09 RX ADMIN — ONDANSETRON 4 MG: 2 INJECTION INTRAMUSCULAR; INTRAVENOUS at 04:18

## 2022-09-09 RX ADMIN — FAMOTIDINE 20 MG: 20 TABLET, FILM COATED ORAL at 08:17

## 2022-09-09 RX ADMIN — CEFAZOLIN SODIUM 2 G: 2 INJECTION, SOLUTION INTRAVENOUS at 15:51

## 2022-09-09 RX ADMIN — CARVEDILOL 25 MG: 6.25 TABLET, FILM COATED ORAL at 08:17

## 2022-09-09 RX ADMIN — HYDRALAZINE HYDROCHLORIDE 25 MG: 25 TABLET, FILM COATED ORAL at 15:57

## 2022-09-09 RX ADMIN — ENOXAPARIN SODIUM 30 MG: 30 INJECTION SUBCUTANEOUS at 07:31

## 2022-09-09 RX ADMIN — VENLAFAXINE 75 MG: 75 TABLET ORAL at 08:17

## 2022-09-09 RX ADMIN — HYDRALAZINE HYDROCHLORIDE 25 MG: 25 TABLET, FILM COATED ORAL at 08:17

## 2022-09-09 RX ADMIN — HYDROMORPHONE HYDROCHLORIDE 2 MG: 2 TABLET ORAL at 15:51

## 2022-09-09 ASSESSMENT — ENCOUNTER SYMPTOMS
SPUTUM PRODUCTION: 1
ABDOMINAL PAIN: 0
MUSCULOSKELETAL NEGATIVE: 1
EYES NEGATIVE: 1
HEADACHES: 0
COUGH: 1
VOMITING: 1
CARDIOVASCULAR NEGATIVE: 1
DIZZINESS: 0
FOCAL WEAKNESS: 0
CHILLS: 0
FEVER: 0
NAUSEA: 0

## 2022-09-09 ASSESSMENT — PAIN DESCRIPTION - PAIN TYPE
TYPE: ACUTE PAIN;CHRONIC PAIN
TYPE: CHRONIC PAIN
TYPE: CHRONIC PAIN
TYPE: ACUTE PAIN;CHRONIC PAIN

## 2022-09-09 NOTE — THERAPY
Physical Therapy Contact Note    PT treatment attempted. RN reported patient pending DC today. Will hold PT today and resume tomorrow if DC unsuccessful.    Melissa Doran, PT, DPT  [phone]

## 2022-09-09 NOTE — CARE PLAN
The patient is Unstable - High likelihood or risk of patient condition declining or worsening    Shift Goals  Clinical Goals: safety; maintain neuro status; improve respiratory status  Patient Goals: maliha  Family Goals: maliha    Progress made toward(s) clinical / shift goals:  Oxygen in use,  in place, head of bed >30°, encouraged deep breathing and coughing, frequent oral care and suctioning in use.   Hourly rounding and Q2 turns in place. ROM performed. Skin assessed under restraints. Pt denies any pain or discomfort from restraints.   Patient at risk for falls due SDH, bed alarm on, walker out of sight, nonskid socks on, call light within reach, personal belongings within reach, toileting offered.   Q4 neuro checks and hourly rounding in place.         Patient is not progressing towards the following goals:      Problem: Knowledge Deficit - Standard  Goal: Patient and family/care givers will demonstrate understanding of plan of care, disease process/condition, diagnostic tests and medications  Outcome: Not Progressing     Problem: Safety - Medical Restraint  Goal: Free from restraint(s) (Restraint for Interference with Medical Device)  Outcome: Not Progressing

## 2022-09-09 NOTE — PROGRESS NOTES
Hold swallow eval and tx today per RN. Pt with increased secretions and had emesis early this AM. Trauma APN aware swallow eval to be held.

## 2022-09-09 NOTE — PROGRESS NOTES
"BP (!) 170/106   Pulse 66   Temp 37.1 °C (98.8 °F) (Temporal)   Resp (!) 22   Ht 1.702 m (5' 7.01\")   Wt 98.4 kg (216 lb 14.9 oz)   SpO2 94%     Notified of patients emesis x2 and coughing with subsequent displacement of cortrak.  Tube feeds stopped.   Chart reviewed.     Patient seen and assessed.   Resting in bed in no respiratory distress. No changes in supplemental oxygen requirements. Zofran given prior to my arrival.     Plan:  Replace cotrak.   Chest xray this AM     Please notify Trauma APRN with any further concerns.  "

## 2022-09-09 NOTE — DISCHARGE PLANNING
DC Transport Scheduled    Received request at: [phone]    Transport Company Scheduled:  [employer]  Spoke with Shilpi at [facility] to schedule transport.      Scheduled Date: 09/09/2022  Scheduled Time: 1815    Destination: [facility]     Notified care team of scheduled transport via Vo[employer].     If there are any changes needed to the DC transportation scheduled, please contact [facility] at ext. [phone] between the hours of 7700-4997 Mon-Fri. If outside those hours, contact the ED Case Manager at ext. [phone].

## 2022-09-09 NOTE — PROGRESS NOTES
Trauma / Surgical Daily Progress Note    Date of Service  9/9/2022    Chief Complaint  62 y.o. male admitted 8/25/2022 with GLF w/SDH    8/25 Left craniotomy, evacuation of acute left subdural hematoma.     Interval Events  Transferred to [facility] from ICU.  Vomited early this morning.  Increased in oxygen demand.  Leukocytosis. Afebrile. Nontoxic in appearance. On Ancef.    - Restart TF at 25 cc/hr. Call for any vomiting.  - Urinalysis ordered.  - Increased dose of salt tabs.  - Mobilize to edge of bed or chair.    Review of Systems  Review of Systems   Constitutional:  Negative for chills and fever.   HENT: Negative.     Eyes: Negative.    Respiratory:  Positive for cough and sputum production.    Cardiovascular: Negative.    Gastrointestinal:  Positive for vomiting. Negative for abdominal pain and nausea.   Musculoskeletal: Negative.    Skin: Negative.    Neurological:  Negative for dizziness, focal weakness and headaches.   All other systems reviewed and are negative.     Vital Signs  Temp:  [36.4 °C (97.5 °F)-37.3 °C (99.1 °F)] 37.3 °C (99.1 °F)  Pulse:  [59-89] 82  Resp:  [16-25] 18  BP: (108-171)/() 170/106  SpO2:  [91 %-97 %] 97 %    Physical Exam  Physical Exam  Vitals and nursing note reviewed.   Constitutional:       General: He is awake. He is not in acute distress.     Appearance: He is not ill-appearing or toxic-appearing.   HENT:      Head:      Comments: Staples to surgical incision. Well approximated.     Nose:      Comments: Core trak     Mouth/Throat:      Comments: White coated  Neck:      Comments: Trach in place with T piece   Cardiovascular:      Rate and Rhythm: Normal rate and regular rhythm.      Pulses: Normal pulses.   Pulmonary:      Effort: No respiratory distress.      Breath sounds: No wheezing.      Comments: On t-piece  Abdominal:      General: Bowel sounds are normal. There is no distension.      Palpations: Abdomen is soft.      Tenderness: There is no abdominal tenderness.       Comments: Last BM 9/8   Genitourinary:     Comments: Condom cath in place.  Musculoskeletal:         General: Normal range of motion.      Cervical back: Normal range of motion.      Comments: Generalized weakness   Skin:     General: Skin is warm and dry.      Capillary Refill: Capillary refill takes less than 2 seconds.      Findings: Bruising present.   Neurological:      General: No focal deficit present.      Mental Status: He is alert.      Comments: follows   Psychiatric:         Behavior: Behavior is cooperative.       Laboratory  Recent Results (from the past 24 hour(s))   CBC with Differential: Tomorrow AM    Collection Time: 09/09/22  2:44 AM   Result Value Ref Range    WBC 16.4 (H) 4.8 - 10.8 K/uL    RBC 3.94 (L) 4.70 - 6.10 M/uL    Hemoglobin 11.8 (L) 14.0 - 18.0 g/dL    Hematocrit 36.1 (L) 42.0 - 52.0 %    MCV 91.6 81.4 - 97.8 fL    MCH 29.9 27.0 - 33.0 pg    MCHC 32.7 (L) 33.7 - 35.3 g/dL    RDW 44.8 35.9 - 50.0 fL    Platelet Count 713 (H) 164 - 446 K/uL    MPV 11.0 9.0 - 12.9 fL    Neutrophils-Polys 74.80 (H) 44.00 - 72.00 %    Lymphocytes 11.10 (L) 22.00 - 41.00 %    Monocytes 9.70 0.00 - 13.40 %    Eosinophils 2.90 0.00 - 6.90 %    Basophils 0.60 0.00 - 1.80 %    Immature Granulocytes 0.90 0.00 - 0.90 %    Nucleated RBC 0.00 /100 WBC    Neutrophils (Absolute) 12.32 (H) 1.82 - 7.42 K/uL    Lymphs (Absolute) 1.82 1.00 - 4.80 K/uL    Monos (Absolute) 1.59 (H) 0.00 - 0.85 K/uL    Eos (Absolute) 0.47 0.00 - 0.51 K/uL    Baso (Absolute) 0.10 0.00 - 0.12 K/uL    Immature Granulocytes (abs) 0.14 (H) 0.00 - 0.11 K/uL    NRBC (Absolute) 0.00 K/uL       Fluids    Intake/Output Summary (Last 24 hours) at 9/9/2022 0929  Last data filed at 9/9/2022 0000  Gross per 24 hour   Intake 828.33 ml   Output 300 ml   Net 528.33 ml       Core Measures & Quality Metrics  Labs reviewed, Radiology images reviewed and Medications reviewed  White catheter: No White      DVT Prophylaxis: Enoxaparin (Lovenox)  DVT prophylaxis  - mechanical: SCDs  Ulcer prophylaxis: Yes    Assessed for rehab: Patient was assess for and/or received rehabilitation services during this hospitalization  RAP Score Total: 6  CAGE Results: negative Blood Alcohol>0.08: no     Assessment/Plan  Leukocytosis  Assessment & Plan  Elevated WBC with MSSA noted in BAL  9/9 WBC 16.4. Afebrile, nontoxic appearance.  - Urinalysis ordered.  - Continue Ancef.    Respiratory failure following trauma and surgery ([facility])- (present on admission)  Assessment & Plan  Mechanically ventilated following admission craniotomy.  9/4 Percutaneous tracheostomy.  9/6 T piece.  9/8 Capping and speaking valve trials.     Traumatic subdural hematoma, initial encounter ([facility])- (present on admission)  Assessment & Plan  Left-sided holohemispheric traumatic subdural hemorrhage associated with systemic anticoagulation and antiplatelet therapy.  8/25 Left craniotomy with evacuation of SDH.  8/28 Repeat CT overall improved.  Unable to obtain MRI brain due to SCS.  8/31 CT improved / No neurologic improvement / start amantadine  9/2 Increased amantadine. Post traumatic pharmacologic seizure prophylaxis for 1 week completed.  Speech Language Pathology cognitive evaluation when able.  Tesfaye Luther MD. Neurosurgeon. [facility].    Cerebral hyponatremia- (present on admission)  Assessment & Plan  9/7  Na tabs initiated.  9/9 Increased Na tabs, 2 grams BID.    Oral thrush  Assessment & Plan  9/4 Nystatin initiated.     Oropharyngeal dysphagia- (present on admission)  Assessment & Plan  Cortrak with TF initiated while intubated.  SLP evaluation for swallow.     CAD (coronary artery disease)  Assessment & Plan  History of coronary artery disease with STEMI in August 2020.  Drug-eluting stents placed in the mid RCA and PDA.  Daily aspirin administration.    Hypokalemia- (present on admission)  Assessment & Plan  9/2 Replete and trend.    Status post insertion of drug-eluting stent into right coronary  artery for coronary artery disease- (present on admission)  Assessment & Plan  Drug-eluting stents placed in the mid RCA and PDA August 2020.   Daily aspirin administration.  Maintenance medication held on admission    Paroxysmal atrial fibrillation (HCC)- (present on admission)  Assessment & Plan  Chronic condition treated with carvedilol and apixaban (Eliquis) anticoagulation.  Carvedilol resumed upon admission.  Systemic anticoagulation held on admission.    Obese- (present on admission)  Assessment & Plan  8/29 BMI 34.94    Type 2 diabetes mellitus without complication, without long-term current use of insulin (HCC)- (present on admission)  Assessment & Plan  Chronic condition treated with metformin.  Glycohemoglobin 6.2 (131).  Insulin sliding scale coverage.    No contraindication to deep vein thrombosis (DVT) prophylaxis- (present on admission)  Assessment & Plan  Prophylactic anticoagulation for thrombotic prevention initially contraindicated secondary to elevated bleeding risk.  8/28 Trauma screening bilateral lower extremity venous duplex negative for above knee DVT.  8/28 Prophylactic dose enoxaparin initiated.      Depression- (present on admission)  Assessment & Plan  Chronic condition treated with Effexor.  Resumed maintenance medication on admission.    Antiplatelet or antithrombotic long-term use- (present on admission)  Assessment & Plan  Daily ASA and Apixaban.  Received KCentra on admit.  AA 96.1%, transfused 1 unit platelets in ED.    Trauma- (present on admission)  Assessment & Plan  Mechanical ground-level fall.    Intracranial hemorrhage with systemic anticoagulation.  Trauma Yellow Transfer Activation from [facility].  Macario Winn MD. Trauma Surgery.    BPH (benign prostatic hyperplasia)- (present on admission)  Assessment & Plan  Chronic condition treated with finasteride and tamsulosin.  Resumed maintenance medication on admission.    Dyslipidemia- (present on  admission)  Assessment & Plan  Chronic condition treated with atorvastatin.  Resumed maintenance medication on admission.    Hypertensive disease- (present on admission)  Assessment & Plan  Chronic condition treated with carvedilol.  Resumed maintenance medication on admission.      Discussed patient condition with RN, Patient, and trauma surgery, Dr. Winn.

## 2022-09-09 NOTE — CARE PLAN
The patient is Watcher - Medium risk of patient condition declining or worsening    Shift Goals  Clinical Goals: safety, stable neuro exam  Patient Goals: maliha  Family Goals: maliha    Progress made toward(s) clinical / shift goals: bed in lowest locked position, appropriate restraints in place, neuro exam remains unchanged   Problem: Safety - Medical Restraint  Goal: Remains free of injury from restraints (Restraint for Interference with Medical Device)  Outcome: Progressing     Problem: Nutrition  Goal: Enteral nutrition will be maintained or improve  Outcome: Progressing  Enteral continuous tube feed running       Patient is not progressing towards the following goals:

## 2022-09-09 NOTE — DIETARY
"Nutrition support weekly update:  Day 15 of admit.  Karan Wiley is a 62 y.o. male with admitting DX of Trauma and SDH.      Tube feeding initiated on 8/26. Current TF via gastric cortrak replaced today is Peptamen Intense VHP with goal rate 60 ml/hr to provide 1440 kcals, 132 gm protein, and 1209 ml free water per day. Current free water order is 75 ml Q 4 hours.    Assessment:  Weight 9/7: 98.4 kg via unknown weight scale. Weight is overall stable from admit.   Height: 5' 7 \" (170.2 cm), BMI: 33.97 (obesity class I)  IBW: 148 lbs (67.3 kg)    Calculation/Equation: MSJ x 1.0 = 1744 kcals  Calories/day: 1800 - 2000 (18 - 20 kcals/kg)  Grams Protein/day: 98 - 118 (1.0 - 1.2 gm/kg of ABW, 1.5 - 1.8 gm/kg of IBW)    Evaluation:   TF stopped overnight 2/2 emesis and coughing with subsequent displacement of cortrak and TF were held. Cortrak replaced and confirmed on KUB in stomach. TF now back at goal.  SLP following for speaking valve therapy not following for CSE yet.  Skin: incision head, no edema noted.  Labs: Na 134, K 3.3, glucose 137, ALT 80, Alk phos 121  Meds: lipitor, colace, pepcid, milk of mag, nystatin, miralax, Klyte, colace, salt tabs, zofran prn, colace,   Last BM: 9/8  CH-controlled formula appropriate to meet estimated needs with blood sugar control. Hx of T2DM.    Malnutrition risk: No new risk identified.     Recommendations/Plan:  Change TF to Diabetisource AC, start at 25 ml/hr with re-introduction of fiber and advance per protocol to goal rate of 65 ml/hr to provide 1872 kcals, 94 gm protein, and 1279 ml free water per day.  Fluids per MD.  Diet upgrades per SLP.    RD following.             "

## 2022-09-09 NOTE — DISCHARGE PLANNING
Agency/Facility Name: [facility]   Outcome: DPA left a voicemail for Alexandra regarding pending insurance auth update. DPA awaiting call back.

## 2022-09-10 NOTE — DISCHARGE PLANNING
Case Management Discharge Planning    Admission Date: 8/25/2022  GMLOS: 22.4  ALOS: 15    6-Clicks ADL Score: 8  6-Clicks Mobility Score: 9    Anticipated Discharge Dispo: Discharge Disposition: Disch to a long term care facility (63)    Action(s) Taken: Per Kathleen, Liaison with Post Acute Medical, pt accepted by GERARDO Hand to [facility].  Transportation confirmed with [facility] for 1830 today.  Pts spouse, Alesia agreeable.  Verbal for Cobra obtained.    Cobra signed by GERARDO Goncalves.    DC packet given to bedside RNMisael.    Medically Clear: Yes    Next Steps: DC    Barriers to Discharge: None

## 2022-09-10 NOTE — DISCHARGE INSTRUCTIONS
- Call or seek medical attention for questions or concerns  - Follow up with the [facility] RETURN: as needed.  - Follow up with Dr. Luis in 4 weeks time, avoid all blood thinners including aspirin or NSAIDs (ibuprophen, Advil, Aleve, Motrin) for at least two weeks  - Follow up with primary care provider within one weeks time  - Resume regular diet  - May take over the counter acetaminophen as needed for pain  - Continue daily over the counter stool softener while on narcotics  - No operation of machinery or motorized vehicles while under the influence of narcotics  - No alcohol, marijuana or illicit drug use while under the influence of narcotics  - In the event of a narcotic overdose naloxone (Narcan) is available without a prescription from any [facility] or [facility]  - No swimming, hot tubs, baths or wound submersion until cleared by outpatient provider. May shower  - No contact sports, strenuous activities, or heavy lifting until cleared by outpatient provider  - If respiratory decompensation, persistent or worsening pain, neurological decompensation, or signs or symptoms of infection occur seek medical attention

## 2022-09-10 NOTE — DISCHARGE SUMMARY
Trauma Discharge Summary    DATE OF ADMISSION: 8/25/2022    DATE OF DISCHARGE: 9/9/2022    LENGTH OF STAY: 15 days    ATTENDING PHYSICIAN: Macario Winn M.D.    CONSULTING PHYSICIAN:   1. Dr. Tesfaye Luis M.D., Neurosurgery.  2. Dr. Marco Stevenson M.D., Neurology.    DISCHARGE DIAGNOSIS:  Active Problems:    Traumatic subdural hematoma, initial encounter ([facility]) POA: Yes    Respiratory failure following trauma and surgery ([facility]) POA: Yes    Leukocytosis POA: Unknown    Type 2 diabetes mellitus without complication, without long-term current use of insulin ([facility]) POA: Yes    Obese POA: Yes    Paroxysmal atrial fibrillation ([facility]) POA: Yes    Status post insertion of drug-eluting stent into right coronary artery for coronary artery disease POA: Yes    Hypokalemia POA: Yes    Oropharyngeal dysphagia POA: Yes    Oral thrush POA: Unknown    Cerebral hyponatremia POA: Yes    Hypertensive disease POA: Yes    Dyslipidemia POA: Yes    BPH (benign prostatic hyperplasia) POA: Yes    Trauma POA: Yes    Antiplatelet or antithrombotic long-term use POA: Yes    Depression POA: Yes    No contraindication to deep vein thrombosis (DVT) prophylaxis POA: Yes  Resolved Problems:    * No resolved hospital problems. *      PROCEDURES:  1. Date of procedure 8/25/2022, performed by Dr. Luis, Neurosurgery.  -  Left craniotomy, evacuation of acute left subdural hematoma.    HISTORY OF PRESENT ILLNESS: The patient is a 62 y.o. male who was reportedly injured in a mechanical ground-level fall.  Unknown loss of consciousness.  He is chronically anticoagulated with apixaban for atrial fibrillation and takes aspirin and antiplatelet therapy for history of coronary artery disease and a prior placement of drug-eluting cardiac stents.  Patient was initially seen at [facility], where CT imaging demonstrated an acute traumatic subdural hemorrhage with a shift and mass-effect. He was transferred to [facility]  in [place], [place].    HOSPITAL COURSE: The patient was triaged as a partial trauma activation. The patient was transported to the trauma intensive care unit. CT imaging demonstrated a left-sided holohemispheric traumatic subdural hemorrhage.  Dr. Luis was consulted for this injury.  The patient was taken to the operating suite for a left craniotomy with evacuation of subdural hemorrhage and returned to the ICU intubated. Following head CT's were stable and improved.  Despite this, there was no neurological improvement therefore amantadine was started.  EEG was completed and reviewed, no seizure activity was noted.  The patient was unable to be extubated therefore a percutaneous tracheostomy was done.  He is currently on T-piece.    His hospital stay was complicated by leukocytosis.  A bronchoalveolar lavage came back with Methicillin sensitive staphylococcus aureus. He was placed on antibiotic therapy.  He has multiple chronic conditions, most of his maintenance medications were resumed on admission.    He received physical and occupational therapy while in the hospital.  They recommended postacute placement for additional therapy services prior to discharge home.    On the day of discharge to the long term facility, the patient was a [place] Coma Score 11 with no focal neurological findings. He has adequate pain control.  He was afebrile and nontoxic in appearance.  He was tolerating tube feeds and the tracheostomy.    HOSPITAL PROBLEM LIST:  Leukocytosis  Assessment & Plan  Elevated WBC with MSSA noted in BAL  9/9 WBC 16.4. Afebrile, nontoxic appearance.  - Urinalysis ordered.  - Continue Ancef.    Respiratory failure following trauma and surgery ([facility])- (present on admission)  Assessment & Plan  Mechanically ventilated following admission craniotomy.  9/4 Percutaneous tracheostomy.  9/6 T piece.  9/8 Capping and speaking valve trials.     Traumatic subdural hematoma, initial encounter ([facility])- (present on  admission)  Assessment & Plan  Left-sided holohemispheric traumatic subdural hemorrhage associated with systemic anticoagulation and antiplatelet therapy.  8/25 Left craniotomy with evacuation of SDH.  8/28 Repeat CT overall improved.  Unable to obtain MRI brain due to SCS.  8/31 CT improved / No neurologic improvement / start amantadine  9/2 Increased amantadine. Post traumatic pharmacologic seizure prophylaxis for 1 week completed.  Speech Language Pathology cognitive evaluation when able.  Tesfaye Luther MD. Neurosurgeon. [facility].    Cerebral hyponatremia- (present on admission)  Assessment & Plan  9/7  Na tabs initiated.  9/9 Increased Na tabs, 2 grams BID.    Oral thrush  Assessment & Plan  9/4 Nystatin initiated.     Oropharyngeal dysphagia- (present on admission)  Assessment & Plan  Cortrak with TF initiated while intubated.  SLP evaluation for swallow.     CAD (coronary artery disease)  Assessment & Plan  History of coronary artery disease with STEMI in August 2020.  Drug-eluting stents placed in the mid RCA and PDA.  Daily aspirin administration.    Hypokalemia- (present on admission)  Assessment & Plan  9/2 Replete and trend.    Status post insertion of drug-eluting stent into right coronary artery for coronary artery disease- (present on admission)  Assessment & Plan  Drug-eluting stents placed in the mid RCA and PDA August 2020.   Daily aspirin administration.  Maintenance medication held on admission    Paroxysmal atrial fibrillation (HCC)- (present on admission)  Assessment & Plan  Chronic condition treated with carvedilol and apixaban (Eliquis) anticoagulation.  Carvedilol resumed upon admission.  Systemic anticoagulation held on admission.    Obese- (present on admission)  Assessment & Plan  8/29 BMI 34.94    Type 2 diabetes mellitus without complication, without long-term current use of insulin (HCC)- (present on admission)  Assessment & Plan  Chronic condition treated with  metformin.  Glycohemoglobin 6.2 (131).  Insulin sliding scale coverage.    No contraindication to deep vein thrombosis (DVT) prophylaxis- (present on admission)  Assessment & Plan  Prophylactic anticoagulation for thrombotic prevention initially contraindicated secondary to elevated bleeding risk.  8/28 Trauma screening bilateral lower extremity venous duplex negative for above knee DVT.  8/28 Prophylactic dose enoxaparin initiated.      Depression- (present on admission)  Assessment & Plan  Chronic condition treated with Effexor.  Resumed maintenance medication on admission.    Antiplatelet or antithrombotic long-term use- (present on admission)  Assessment & Plan  Daily ASA and Apixaban.  Received KCentra on admit.  AA 96.1%, transfused 1 unit platelets in ED.    Trauma- (present on admission)  Assessment & Plan  Mechanical ground-level fall.    Intracranial hemorrhage with systemic anticoagulation.  Trauma Yellow Transfer Activation from [facility].  Macario Winn MD. Trauma Surgery.    BPH (benign prostatic hyperplasia)- (present on admission)  Assessment & Plan  Chronic condition treated with finasteride and tamsulosin.  Resumed maintenance medication on admission.    Dyslipidemia- (present on admission)  Assessment & Plan  Chronic condition treated with atorvastatin.  Resumed maintenance medication on admission.    Hypertensive disease- (present on admission)  Assessment & Plan  Chronic condition treated with carvedilol.  Resumed maintenance medication on admission.        DISPOSITION: Discharged in stable condition to long-term facility, [facility] on 9/9/2022. The patient and family were counseled and questions were answered. Specifically, signs and symptoms of infection, respiratory decompensation, neurological decompensation and persistent or worsening pain were discussed and the patient agrees to seek medical attention if any of these develop.    DISCHARGE MEDICATIONS:  The patients  controlled substance history was reviewed and a controlled substance use informed consent (if applicable) was provided by [facility] and the patient has been prescribed.     Medication List        START taking these medications        Instructions   acetaminophen 325 MG Tabs  Commonly known as: Tylenol   2 Tablets by Enteral Tube route every four hours as needed for Mild Pain or Fever.  Dose: 650 mg     amantadine 100 MG Tabs  Start taking on: September 10, 2022  Commonly known as: Symmetrel   1 Tablet by Enteral Tube route 2 times a day.  Dose: 100 mg     amLODIPine 10 MG Tabs  Start taking on: September 10, 2022  Commonly known as: NORVASC   1 Tablet by Enteral Tube route every day.  Dose: 10 mg     bisacodyl 10 MG Supp  Commonly known as: DULCOLAX   Insert 1 Suppository into the rectum every 24 hours as needed (if magnesium hydroxide ineffective).  Dose: 10 mg     ceFAZolin in dextrose 2-4 GM/100ML-% IVPB  Commonly known as: Ancef   Infuse 100 mL into a venous catheter every 8 hours.  Dose: 2 g     enoxaparin 30 MG/0.3ML Sosy inj  Commonly known as: Lovenox   Inject 0.3 mL under the skin every 12 hours.  Dose: 30 mg     famotidine 20 MG Tabs  Commonly known as: PEPCID   1 Tablet by Enteral Tube route 2 times a day.  Dose: 20 mg     * hydrALAZINE 20 MG/ML Soln  Commonly known as: APRESOLINE   Infuse 0.5 mL into a venous catheter every 1 hour as needed (SBP greater than 160 mmHg if labetalol ineffective, not ordered, or patient unable to tolerate.).  Dose: 10 mg     * hydrALAZINE 25 MG Tabs  Commonly known as: APRESOLINE   1 Tablet by Enteral Tube route every 8 hours.  Dose: 25 mg     HYDROmorphone 2 MG Tabs  Commonly known as: DILAUDID   0.5-1 Tablets by Enteral Tube route every 3 hours as needed for Severe Pain for up to 3 days.  Dose: 1-2 mg     magnesium hydroxide 400 MG/5ML Susp  Start taking on: September 10, 2022  Commonly known as: MILK OF MAGNESIA   30 mL by Enteral Tube route every  day.  Dose: 30 mL     Metoprolol Tartrate 5 MG/5ML Soln  Commonly known as: LOPRESSOR   Infuse 5 mL into a venous catheter every four hours as needed (HR > 120, hold for SBP<100).  Dose: 5 mg     nystatin 846391 UNIT/ML Susp  Commonly known as: MYCOSTATIN   Take 5 mL by mouth 4 times a day.  Dose: 5 mL     ondansetron 4 MG Tbdp  Commonly known as: ZOFRAN ODT   1 Tablet by Enteral Tube route every four hours as needed for Nausea.  Dose: 4 mg     ondansetron 4 MG/2ML Soln injection  Commonly known as: ZOFRAN   Infuse 2 mL into a venous catheter every four hours as needed for Nausea.  Dose: 4 mg     polyethylene glycol/lytes 17 g Pack  Commonly known as: MIRALAX   1 Packet by Enteral Tube route 2 times a day.  Dose: 17 g     potassium bicarbonate 25 MEQ tablet  Commonly known as: KLYTE   2 Tablets by Enteral Tube route 2 times a day.  Dose: 50 mEq     * senna-docusate 8.6-50 MG Tabs  Commonly known as: PERICOLACE or SENOKOT S   1 Tablet by Enteral Tube route every evening.  Dose: 1 Tablet     * senna-docusate 8.6-50 MG Tabs  Commonly known as: PERICOLACE or SENOKOT S   1 Tablet by Enteral Tube route every 24 hours as needed for Constipation.  Dose: 1 Tablet     sodium chloride 1 GM Tabs  Commonly known as: SALT   2 Tablets by Enteral Tube route 2 times a day with meals.  Dose: 2 g     sodium phosphate 7-19 GM/118ML Enem   Insert 133 mL into the rectum one time as needed (if bisacodyl ineffective).  Dose: 1 Each           * This list has 4 medication(s) that are the same as other medications prescribed for you. Read the directions carefully, and ask your doctor or other care provider to review them with you.                CONTINUE taking these medications        Instructions   atorvastatin 80 MG tablet  Commonly known as: Lipitor   Take 1 Tablet by mouth every day.  Dose: 80 mg     carvedilol 6.25 MG Tabs  Commonly known as: COREG   Take 1 Tablet by mouth 2 times a day.  Dose: 6.25 mg     docusate sodium 100 MG  Caps  Commonly known as: COLACE   Take 100 mg by mouth 2 times a day.  Dose: 100 mg     finasteride 5 MG Tabs  Commonly known as: PROSCAR   Take 5 mg by mouth every day.  Dose: 5 mg     HYDROcodone/acetaminophen  MG Tabs  Commonly known as: NORCO   Take 1 Tab by mouth 6 Times a Day.  Dose: 1 Tablet     Klor-Con M10 10 MEQ Tbcr  Generic drug: potassium chloride SA   Take 10 mEq by mouth 2 times a day.  Dose: 10 mEq     metFORMIN  MG Tb24  Commonly known as: GLUCOPHAGE XR   Take 1,000 mg by mouth every day.  Dose: 1,000 mg     morphine ER 15 MG Tbcr tablet  Commonly known as: MS CONTIN   Take 15 mg by mouth every 12 hours.  Dose: 15 mg     nitroglycerin 0.4 MG Subl  Commonly known as: NITROSTAT   Place 1 Tab under tongue as needed for Chest Pain.  Dose: 0.4 mg     tamsulosin 0.4 MG capsule  Commonly known as: FLOMAX   Take 0.4 mg by mouth every day.  Dose: 0.4 mg     tizanidine 4 MG capsule  Commonly known as: ZANAFLEX   Take 4 mg by mouth 3 times a day.  Dose: 4 mg     traZODone 100 MG Tabs  Commonly known as: DESYREL   Take 1.5 Tabs by mouth every bedtime.  Dose: 150 mg     venlafaxine 150 MG extended-release capsule  Commonly known as: EFFEXOR-XR   Take 150 mg by mouth every day.  Dose: 150 mg            STOP taking these medications      apixaban 5mg Tabs  Commonly known as: ELIQUIS     aspirin 81 MG Chew chewable tablet  Commonly known as: ASA              ACTIVITY:  Activity as tolerated.    WOUND CARE:  Keep surgical wound clean and dry.    DIET:  Orders Placed This Encounter   Procedures    Diet: Diet Tube Feed; Formula: Diabetisource AC; Goal Rate (mL/Hour): 65; Duration: 24 HR     Start at 25 ml/hour with re-introduction of fiber and advance per protocol to goal rate.     Standing Status:   Standing     Number of Occurrences:   1     Order Specific Question:   Diet     Answer:   Diet Tube Feed [35]     Order Specific Question:   Formula:     Answer:   Diabetisource AC     Order Specific Question:    Goal Rate (mL/Hour)     Answer:   65     Order Specific Question:   Route     Answer:   Cortrak     Order Specific Question:   Duration     Answer:   24 HR       FOLLOW UP:  Tesfaye Luther M.D.  [address]  [address]  [place] [address]  [phone]    Follow up  Follow up in 4 weeks.    [facility]  [address]  [place] [address]  [phone]      Follow up in Trauma clinic as needed.    Henri Kong M.D.  [address]  [address]  [facility] [address]  [phone]    Follow up  As needed    TIME SPENT ON DISCHARGE: 35 minutes      ____________________________________________  Meera Goncalves D.N.P.    DD: 9/9/2022 5:01 PM

## 2022-09-12 LAB
BACTERIA SPEC RESP CULT: ABNORMAL
BACTERIA SPEC RESP CULT: ABNORMAL
GRAM STN SPEC: ABNORMAL
SIGNIFICANT IND 70042: ABNORMAL
SITE SITE: ABNORMAL
SOURCE SOURCE: ABNORMAL

## 2022-10-16 LAB
MYCOBACTERIUM SPEC CULT: NORMAL
RHODAMINE-AURAMINE STN SPEC: NORMAL
SIGNIFICANT IND 70042: NORMAL
SITE SITE: NORMAL
SOURCE SOURCE: NORMAL

## 2022-10-18 ENCOUNTER — TELEPHONE (OUTPATIENT)
Dept: CARDIOLOGY | Facility: MEDICAL CENTER | Age: 62
End: 2022-10-18
Payer: OTHER GOVERNMENT

## 2022-10-18 NOTE — TELEPHONE ENCOUNTER
TRIP    Caller: - Karan    Topic/issue: Karan has questions on his medications, since having his Subdural Hemo and being in the hospital for quite a while.  They took him off some, and he is not sure what TRIP would like him to continue.     Callback Number: [phone]    Thank you,   Elida ALLEN

## 2022-10-19 NOTE — TELEPHONE ENCOUNTER
Called pt and he discussed the following medication changes. He was in the hospital for a subdural hematoma and was recommended a FV with our office to discuss his medications:    Eliquis d/c  Carvedilol increased  Lipitor decreased  Micardis d/c  Hydralazine started    Other changes and pt follow up with PCP:  Depakote started  Effexor dose changed  Flomax d/c    He was told by his PCP he shouldn't be on hydralazine, should be on ARB/ACE. He is an  [facility] PP in [place] and requested a FV with [facility] odette available. Advised will reach out to schedulers to give him a call set this up. He verbalized understanding and was appreciative.    To Schedulers - please contact pt and schedule soonest available with [facility] in [place], or place him on call back list for any cancellations. Thank you!

## 2022-10-20 NOTE — TELEPHONE ENCOUNTER
LM for PT to schedule w/ [facility] in [place] next available and added PT to providers cancellation list.    SLC

## 2022-10-31 ENCOUNTER — OFFICE VISIT (OUTPATIENT)
Dept: CARDIOLOGY | Facility: MEDICAL CENTER | Age: 62
End: 2022-10-31
Payer: OTHER GOVERNMENT

## 2022-10-31 VITALS
RESPIRATION RATE: 20 BRPM | BODY MASS INDEX: 34.21 KG/M2 | WEIGHT: 218 LBS | OXYGEN SATURATION: 96 % | DIASTOLIC BLOOD PRESSURE: 70 MMHG | SYSTOLIC BLOOD PRESSURE: 120 MMHG | HEART RATE: 72 BPM | HEIGHT: 67 IN

## 2022-10-31 DIAGNOSIS — Z79.01 CURRENT USE OF LONG TERM ANTICOAGULATION: ICD-10-CM

## 2022-10-31 DIAGNOSIS — S06.5XAA TRAUMATIC SUBDURAL HEMATOMA, INITIAL ENCOUNTER (HCC): ICD-10-CM

## 2022-10-31 DIAGNOSIS — I25.10 CORONARY ARTERY DISEASE INVOLVING NATIVE CORONARY ARTERY OF NATIVE HEART WITHOUT ANGINA PECTORIS: ICD-10-CM

## 2022-10-31 DIAGNOSIS — I48.0 PAROXYSMAL ATRIAL FIBRILLATION (HCC): ICD-10-CM

## 2022-10-31 DIAGNOSIS — E78.2 MIXED HYPERLIPIDEMIA: ICD-10-CM

## 2022-10-31 DIAGNOSIS — I10 ESSENTIAL HYPERTENSION, BENIGN: ICD-10-CM

## 2022-10-31 DIAGNOSIS — Z95.5 STATUS POST INSERTION OF DRUG-ELUTING STENT INTO RIGHT CORONARY ARTERY FOR CORONARY ARTERY DISEASE: ICD-10-CM

## 2022-10-31 DIAGNOSIS — E11.9 TYPE 2 DIABETES MELLITUS WITHOUT COMPLICATION, WITHOUT LONG-TERM CURRENT USE OF INSULIN (HCC): ICD-10-CM

## 2022-10-31 PROBLEM — E87.6 HYPOKALEMIA: Status: RESOLVED | Noted: 2020-08-10 | Resolved: 2022-10-31

## 2022-10-31 PROBLEM — M47.816 LUMBAR SPONDYLOSIS: Status: ACTIVE | Noted: 2022-10-04

## 2022-10-31 PROBLEM — E87.1 CEREBRAL HYPONATREMIA: Status: RESOLVED | Noted: 2022-09-08 | Resolved: 2022-10-31

## 2022-10-31 PROBLEM — D72.829 LEUKOCYTOSIS: Status: RESOLVED | Noted: 2022-09-08 | Resolved: 2022-10-31

## 2022-10-31 PROBLEM — R13.12 OROPHARYNGEAL DYSPHAGIA: Status: RESOLVED | Noted: 2022-09-01 | Resolved: 2022-10-31

## 2022-10-31 PROBLEM — B37.0 ORAL THRUSH: Status: RESOLVED | Noted: 2022-09-08 | Resolved: 2022-10-31

## 2022-10-31 PROCEDURE — 99215 OFFICE O/P EST HI 40 MIN: CPT | Performed by: NURSE PRACTITIONER

## 2022-10-31 RX ORDER — CARVEDILOL 25 MG/1
25 TABLET ORAL 2 TIMES DAILY WITH MEALS
COMMUNITY
End: 2022-11-03

## 2022-10-31 RX ORDER — CARVEDILOL 25 MG/1
TABLET ORAL
COMMUNITY
Start: 2022-10-19 | End: 2022-10-31

## 2022-10-31 RX ORDER — TELMISARTAN 40 MG/1
40 TABLET ORAL DAILY
Qty: 30 TABLET | Refills: 3 | Status: SHIPPED | OUTPATIENT
Start: 2022-10-31 | End: 2022-11-21

## 2022-10-31 RX ORDER — VENLAFAXINE 75 MG/1
TABLET ORAL
COMMUNITY
Start: 2022-10-19 | End: 2022-10-31

## 2022-10-31 RX ORDER — VENLAFAXINE 75 MG/1
150 TABLET ORAL DAILY
COMMUNITY
End: 2022-11-03

## 2022-10-31 ASSESSMENT — ENCOUNTER SYMPTOMS
LOSS OF CONSCIOUSNESS: 0
FEVER: 0
BRUISES/BLEEDS EASILY: 0
ABDOMINAL PAIN: 0
PALPITATIONS: 0
COUGH: 0
DIZZINESS: 0
NAUSEA: 0
CHILLS: 0
INSOMNIA: 0
PND: 0
MYALGIAS: 0
ORTHOPNEA: 0
SHORTNESS OF BREATH: 0
HEADACHES: 0

## 2022-10-31 ASSESSMENT — FIBROSIS 4 INDEX: FIB4 SCORE: 0.36

## 2022-10-31 NOTE — PROGRESS NOTES
Chief Complaint   Patient presents with    Hospital Follow-up    Trauma    Coronary Artery Disease    Atrial Fibrillation    Anticoagulation    HTN (Controlled)    Hyperlipidemia       Subjective     Karan Wiley is a 62 y.o. male who presents today for prolonged hospital stay for subdural hematoma.    Karan is a 62 year old male with history of hypertension, dyslipidemia and CAD, with STEMI in August 2020 with PCI of RCA and PCI of posterior lateral branch.  He did have post-procedural complications, including acute hypoxic respiratory failure with flash pulmonary edema, and PEA cardiac arrest.  Amiodarone was added, which was stopped in December 2020, after ZioPatch showed no arrythmias. Outpatient stress test in November 2020 came back normal. He does also PAFib, and is anticoagulated with Eliquis.    He was last seen by Dr. Zhou in July 2022, and things were stable.    On 8/25/2022, he suffered a mechanical ground leve fall, with unknown loss of consciousness; he was first seen at [facility] where CT scan showed subdural hemorrhage with a shift and mass effect; he was transferred to [facility]. There, ASA and Eliquis were held; he had left craniotomy by Dr. Luis, and was transferred to the ICU; serial CT scans of the head shows improvement. He did have some leukocystosis and bronchial levage came back with MSSA, and he was treated with antibiotics.     [facility]. He only stayed there for a few days, as there were some patient issues, and he left early; but he was making good progress with PT/OT.     He is here today for hospital follow-up. He is very concerned about being off of ASA and Eliquis; BP has been mostly stable, and he is taking Coreg 25mg twice daily, Amlodipine 5mg once daily and Hydralazine 25mg three times daily.  He is also quite emotional/tearful. He denies any chest pain, pressure or discomfort; no symptomatic palpitations; breathing is stable with no orthopnea  or PND, but he does have a cough; no fever or chills; no dizziness or syncope; he does have some balance issues; he is using a walker. No LE edema.    He did see neurosurgery earlier today; he did have a CT scan done in [place] recently.    Past Medical History:   Diagnosis Date    CAD (coronary artery disease) 2020    STEMI. PCI/NAOMY (Raghav 3.5 x 25mm) the mid RCA, and PCI/NAOMY (Racine 2.5 x 12mm) the PDA.    Chronic anticoagulation     Depression     Hyperlipidemia     Hypertension     Low back pain     Paroxysmal atrial fibrillation (HCC)      Past Surgical History:   Procedure Laterality Date    CRANIOTOMY Left 2022    Procedure: CRANIOTOMY FOR SUBDURAL HEMATOMA;  Surgeon: Tesfaye Luther M.D.;  Location: SURGERY [facility];  Service: Neurosurgery    SPINAL CORD STIMULATOR  2019    Procedure: SPINAL CORD STIMULATOR-  STAGE 1:  RIGHT FLANK BATTERY REPLACEMENT/UPGRADE;  Surgeon: Stepan Hawkins M.D.;  Location: SURGERY [facility];  Service: Neurosurgery    FUSION, SPINE, LUMBAR, PLIF  2019    Procedure: LUMBAR FUSION POSTERIOR-  STAGE 2: ONLAY L5-S1 BONE;  Surgeon: Stepan Hawkins M.D.;  Location: SURGERY [facility];  Service: Neurosurgery    LAMINOTOMY  2019    Procedure: LAMINOTOMY-  STAGE 2:  REDO LEFT L4-S1;  Surgeon: Stepan Hawkins M.D.;  Location: SURGERY [facility];  Service: Neurosurgery     Family History   Problem Relation Age of Onset    Cancer Mother         breast     Social History     Socioeconomic History    Marital status:      Spouse name: Not on file    Number of children: Not on file    Years of education: Not on file    Highest education level: Not on file   Occupational History    Not on file   Tobacco Use    Smoking status: Former     Packs/day: 0.25     Years: 3.00     Pack years: 0.75     Types: Cigarettes     Quit date:      Years since quittin.8    Smokeless tobacco: Never   Vaping Use    Vaping Use: Never used   Substance and Sexual  Activity    Alcohol use: No    Drug use: Never    Sexual activity: Not on file   Other Topics Concern    Not on file   Social History Narrative    Not on file     Social Determinants of Health     Financial Resource Strain: Not on file   Food Insecurity: Not on file   Transportation Needs: Not on file   Physical Activity: Not on file   Stress: Not on file   Social Connections: Not on file   Intimate Partner Violence: Not on file   Housing Stability: Not on file     Allergies   Allergen Reactions    Hctz [Hydrochlorothiazide W-Spironolactone]      Cannot breathe    Oxycodone Anaphylaxis and Swelling     Throat swelling    Pectin Anaphylaxis    Hydrochlorothiazide     Zolpidem      Outpatient Encounter Medications as of 10/31/2022   Medication Sig Dispense Refill    venlafaxine (EFFEXOR) 75 MG Tab venlafaxine 75 mg tablet      carvedilol (COREG) 25 MG Tab Take 25 mg by mouth 2 times a day with meals.      telmisartan (MICARDIS) 40 MG Tab Take 1 Tablet by mouth every day. 30 Tablet 3    acetaminophen (TYLENOL) 325 MG Tab 2 Tablets by Enteral Tube route every four hours as needed for Mild Pain or Fever. 30 Tablet 0    amLODIPine (NORVASC) 10 MG Tab 1 Tablet by Enteral Tube route every day. (Patient taking differently: Take 5 mg by mouth every day.) 30 Tablet     docusate sodium (COLACE) 100 MG Cap Take 100 mg by mouth 2 times a day.      atorvastatin (LIPITOR) 80 MG tablet Take 1 Tablet by mouth every day. 90 Tablet 3    tizanidine (ZANAFLEX) 4 MG capsule Take 4 mg by mouth 3 times a day.      nitroglycerin (NITROSTAT) 0.4 MG SL Tab Place 1 Tab under tongue as needed for Chest Pain. 25 Tab 3    HYDROcodone/acetaminophen (NORCO)  MG Tab Take 1 Tab by mouth 6 Times a Day.      finasteride (PROSCAR) 5 MG Tab Take 5 mg by mouth every day.      tamsulosin (FLOMAX) 0.4 MG capsule Take 0.4 mg by mouth every day.      [DISCONTINUED] venlafaxine (EFFEXOR) 75 MG Tab  (Patient not taking: Reported on 10/31/2022)       [DISCONTINUED] carvedilol (COREG) 25 MG Tab  (Patient not taking: Reported on 10/31/2022)      amantadine (SYMMETREL) 100 MG Tab 1 Tablet by Enteral Tube route 2 times a day. 60 Tablet 0    [DISCONTINUED] bisacodyl (DULCOLAX) 10 MG Suppos Insert 1 Suppository into the rectum every 24 hours as needed (if magnesium hydroxide ineffective). (Patient not taking: Reported on 10/31/2022)  0    [DISCONTINUED] ceFAZolin in dextrose (ANCEF) 2-4 GM/100ML-% IVPB Infuse 100 mL into a venous catheter every 8 hours. (Patient not taking: Reported on 10/31/2022) 3000 mL     [DISCONTINUED] enoxaparin (LOVENOX) 30 MG/0.3ML Solution Prefilled Syringe inj Inject 0.3 mL under the skin every 12 hours.      [DISCONTINUED] famotidine (PEPCID) 20 MG Tab 1 Tablet by Enteral Tube route 2 times a day. (Patient not taking: Reported on 10/31/2022) 60 Tablet     [DISCONTINUED] hydrALAZINE (APRESOLINE) 20 MG/ML Solution Infuse 0.5 mL into a venous catheter every 1 hour as needed (SBP greater than 160 mmHg if labetalol ineffective, not ordered, or patient unable to tolerate.). (Patient not taking: Reported on 10/31/2022)  0    [DISCONTINUED] hydrALAZINE (APRESOLINE) 25 MG Tab 1 Tablet by Enteral Tube route every 8 hours. (Patient taking differently: Take 25 mg by mouth 3 times a day.) 90 Tablet     [DISCONTINUED] magnesium hydroxide (MILK OF MAGNESIA) 400 MG/5ML Suspension 30 mL by Enteral Tube route every day. (Patient not taking: Reported on 10/31/2022)      [DISCONTINUED] Metoprolol Tartrate (LOPRESSOR) 5 MG/5ML Solution Infuse 5 mL into a venous catheter every four hours as needed (HR > 120, hold for SBP<100). (Patient not taking: Reported on 10/31/2022) 15 mL     [DISCONTINUED] nystatin (MYCOSTATIN) 482640 UNIT/ML Suspension Take 5 mL by mouth 4 times a day. (Patient not taking: Reported on 10/31/2022)      [DISCONTINUED] ondansetron (ZOFRAN ODT) 4 MG TABLET DISPERSIBLE 1 Tablet by Enteral Tube route every four hours as needed for Nausea.  (Patient not taking: Reported on 10/31/2022) 10 Tablet 0    [DISCONTINUED] ondansetron (ZOFRAN) 4 MG/2ML Solution injection Infuse 2 mL into a venous catheter every four hours as needed for Nausea. (Patient not taking: Reported on 10/31/2022) 84 mL     [DISCONTINUED] polyethylene glycol/lytes (MIRALAX) 17 g Pack 1 Packet by Enteral Tube route 2 times a day. (Patient not taking: Reported on 10/31/2022)  3    [DISCONTINUED] potassium bicarbonate (KLYTE) 25 MEQ tablet 2 Tablets by Enteral Tube route 2 times a day. (Patient not taking: Reported on 10/31/2022) 60 Tablet 11    [DISCONTINUED] senna-docusate (PERICOLACE OR SENOKOT S) 8.6-50 MG Tab 1 Tablet by Enteral Tube route every evening. (Patient not taking: Reported on 10/31/2022) 30 Tablet 0    [DISCONTINUED] senna-docusate (PERICOLACE OR SENOKOT S) 8.6-50 MG Tab 1 Tablet by Enteral Tube route every 24 hours as needed for Constipation. (Patient not taking: Reported on 10/31/2022) 30 Tablet 0    [DISCONTINUED] sodium chloride (SALT) 1 GM Tab 2 Tablets by Enteral Tube route 2 times a day with meals. (Patient not taking: Reported on 10/31/2022) 90 Tablet 3    [DISCONTINUED] sodium phosphate (FLEET) 7-19 GM/118ML Enema Insert 133 mL into the rectum one time as needed (if bisacodyl ineffective). (Patient not taking: Reported on 10/31/2022)      [DISCONTINUED] KLOR-CON M10 10 MEQ Tab CR Take 10 mEq by mouth 2 times a day. (Patient not taking: Reported on 10/31/2022)      [DISCONTINUED] morphine ER (MS CONTIN) 15 MG Tab CR tablet Take 15 mg by mouth every 12 hours. (Patient not taking: Reported on 10/31/2022)      [DISCONTINUED] carvedilol (COREG) 6.25 MG Tab Take 1 Tablet by mouth 2 times a day. (Patient taking differently: Take 25 mg by mouth 2 times a day. 50 MG daily) 200 Tablet 3    [DISCONTINUED] metFORMIN ER (GLUCOPHAGE XR) 500 MG TABLET SR 24 HR Take 1,000 mg by mouth every day. (Patient not taking: Reported on 10/31/2022)      [DISCONTINUED] traZODone (DESYREL) 100  "MG Tab Take 1.5 Tabs by mouth every bedtime. (Patient taking differently: Take 50 mg by mouth at bedtime.) 135 Tab 1    [DISCONTINUED] venlafaxine (EFFEXOR-XR) 150 MG extended-release capsule Take 75 mg by mouth every day. (Patient not taking: Reported on 10/31/2022)       No facility-administered encounter medications on file as of 10/31/2022.     Review of Systems   Constitutional:  Positive for malaise/fatigue. Negative for chills and fever.   HENT:  Negative for congestion.    Respiratory:  Negative for cough and shortness of breath.    Cardiovascular:  Negative for chest pain, palpitations, orthopnea, leg swelling and PND.   Gastrointestinal:  Negative for abdominal pain and nausea.   Musculoskeletal:  Negative for myalgias.   Skin:  Negative for rash.   Neurological:  Negative for dizziness, loss of consciousness and headaches.        Balances issues   Endo/Heme/Allergies:  Does not bruise/bleed easily.   Psychiatric/Behavioral:  The patient does not have insomnia.         More tearful than previous            Objective     /70 (BP Location: Left arm, Patient Position: Sitting, BP Cuff Size: Large adult)   Pulse 72   Resp 20   Ht 1.702 m (5' 7.01\")   Wt 98.9 kg (218 lb)   SpO2 96%   BMI 34.13 kg/m²     Physical Exam  Constitutional:       Appearance: He is well-developed.   HENT:      Head: Normocephalic.   Neck:      Vascular: No JVD.   Cardiovascular:      Rate and Rhythm: Normal rate and regular rhythm.      Heart sounds: Normal heart sounds.   Pulmonary:      Effort: Pulmonary effort is normal. No respiratory distress.      Breath sounds: Normal breath sounds. No wheezing or rales.   Abdominal:      General: Bowel sounds are normal. There is no distension.      Palpations: Abdomen is soft.      Tenderness: There is no abdominal tenderness.   Musculoskeletal:         General: Normal range of motion.      Cervical back: Normal range of motion and neck supple.   Skin:     General: Skin is warm and " dry.      Findings: No rash.   Neurological:      Mental Status: He is alert and oriented to person, place, and time.     IMPRESSION OF CT SCAN OF HEAD OF 8/31/2022:  1.  Slightly decreased size of LEFT supratentorial subdural hematoma overlying LEFT frontal, parietal and temporal lobes  2.  Trace extra-axial blood overlying the RIGHT frontal lobe, unchanged  3.  Unchanged 3 mm of LEFT-to-RIGHT midline shift    RESULTS OF ECHOCARDIOGRAM OF 7/12/2022 ([facility]):  Normal LV size  LVEF 55-60%  Normal RA ,LA and RV  Mild MR  Trace TR    CONCLUSIONS OF ECHOCARDIOGRAM OF 8/9/2020:  Compared to the images of the prior study done 8/3/2020, the EF appears   improved.  Left ventricular ejection fraction is visually estimated to be 60%.     CONCLUSIONS OF ECHOCARDIOGRAM OF 8/3/2020:  Limited contrast study to reassess for LV thrombus.  Thrombus is not observed in the left ventricular apex.  Left ventricular ejection fraction is visually estimated to be 60%.  Mild hypokinesis of the apex.     Cardiac Event Recorder; ZoranfromAtoBkevin 11/9/2020-11/23/2020:  1.  Sinus rhythm, average rate 63 bpm.   2.  Rare PVCs   3.  Rare PACs.   4.  Symptoms of heart racing, fluttering, skipping dizziness associated with sinus rhythm and normal rate.   5.  One triggered non-specific event associated with singular PVCs.   6.  No atrial fibrillation.      MPI OF 11/17/2020:  Normal myocardial perfusion study  No ischemia or infarct, normal wall motion     IMPRESSIONS OF CORONARY ANGIOGRAM OF 8/1/2020:  1.  Acute non-STEMI due to culprit right coronary artery  2.  Successful PCI of the mid right coronary artery using 1 drug-eluting stent and IVUS guidance  3.  Successful PCI of the posterior lateral branch using 1 drug-eluting stent  4.  Normal LV systolic function  5.  Moderately elevated LVEDP (21 mmHg)  6.  Poorly controlled hypertension    Lab Results   Component Value Date/Time    SODIUM 134 (L) 09/08/2022 05:07 AM    POTASSIUM 3.3 (L) 09/08/2022 05:07 AM     CHLORIDE 97 09/08/2022 05:07 AM    CO2 25 09/08/2022 05:07 AM    GLUCOSE 137 (H) 09/08/2022 05:07 AM    BUN 18 09/08/2022 05:07 AM    CREATININE 0.59 09/08/2022 05:07 AM      Lab Results   Component Value Date/Time    WBC 16.4 (H) 09/09/2022 02:44 AM    RBC 3.94 (L) 09/09/2022 02:44 AM    HEMOGLOBIN 11.8 (L) 09/09/2022 02:44 AM    HEMATOCRIT 36.1 (L) 09/09/2022 02:44 AM    MCV 91.6 09/09/2022 02:44 AM    MCH 29.9 09/09/2022 02:44 AM    MCHC 32.7 (L) 09/09/2022 02:44 AM    MPV 11.0 09/09/2022 02:44 AM              Assessment & Plan     1. Traumatic subdural hematoma, initial encounter        2. Coronary artery disease involving native coronary artery of native heart without angina pectoris        3. Status post insertion of drug-eluting stent into right coronary artery for coronary artery disease        4. Paroxysmal atrial fibrillation (HCC)  Holter Monitor Study      5. Current use of long term anticoagulation        6. Essential hypertension, benign  telmisartan (MICARDIS) 40 MG Tab      7. Mixed hyperlipidemia        8. Type 2 diabetes mellitus without complication, without long-term current use of insulin ([facility])            Medical Decision Making: Today's Assessment/Status/Plan:      1. Prolonged hospital stay for ground level fall and subsequent subdural hematoma, requiring craniotomy, seen by neurosurgery earlier today. He is getting serial CT scans of head.    2. CAD, status post PCI/NAOMY x 2 in August 2020, with normal MPI in November 2020, and normal echo in July 2022. Continue:  Coreg 25mg twice daily  Lipitor 40mg once daily  Can resume ASA 81mg once daily (per conversation with neurosurgery)    3. Paroxysmal atrial fibrillation, none seen on last holter monitor. To obtain ZioPatch to assess for arrhythmias. He is [id number] score 3 (age, CAD, HTN); will HOLD Eliquis for now.    4. Hypertension, treated. Continue:  Coreg 25mg twice daily  Amlodipine 5mg once daily  STOP Hydralazine  IF BP >135/85,  RESUME Micardis 40mg once daily    5. Diabetes mellitus, currently off of Metformin, followed by PCP.    6. Memory issues, progressive since recent fall/hematoma. He has been referred to neurology by his PCP, and I agree with this.    As above, to RESUME ASA 81mg once daily; obtain ZioPatch on Thursday in [place] to assess for AFib. HOLD Eliquis for now.    STOP Hydralazine, and if BP>135/85, to resume Micardis 40mg once daily    Follow-up with me in 4 weeks in [place].      ADDENDUM 11/2/2022:  Spoke with Aishwarya Ventura (with Neurosurgery Cell: [phone], Work: [phone])) and explained that patient can RESUME ASA 81mg once daily. He will be getting repeat CT scan of head in the near future. We will HOLD off resuming Eliquis at this time, until we get back results of ZioPatch.  She will also communicate with patient's PCP that he will be resuming ASA.  I have sent two [employer] messages communicating the above to the patient. To keep follow-up as scheduled.  GERARDO Oliver

## 2022-11-03 ENCOUNTER — OFFICE VISIT (OUTPATIENT)
Dept: CARDIOLOGY | Facility: PHYSICIAN GROUP | Age: 62
End: 2022-11-03
Payer: OTHER GOVERNMENT

## 2022-11-03 ENCOUNTER — NON-PROVIDER VISIT (OUTPATIENT)
Dept: CARDIOLOGY | Facility: PHYSICIAN GROUP | Age: 62
End: 2022-11-03
Attending: NURSE PRACTITIONER
Payer: OTHER GOVERNMENT

## 2022-11-03 VITALS
HEART RATE: 70 BPM | SYSTOLIC BLOOD PRESSURE: 72 MMHG | BODY MASS INDEX: 34.21 KG/M2 | WEIGHT: 218 LBS | HEIGHT: 67 IN | DIASTOLIC BLOOD PRESSURE: 50 MMHG | RESPIRATION RATE: 16 BRPM | OXYGEN SATURATION: 96 %

## 2022-11-03 DIAGNOSIS — I47.29 NSVT (NONSUSTAINED VENTRICULAR TACHYCARDIA) (HCC): ICD-10-CM

## 2022-11-03 DIAGNOSIS — I25.10 CORONARY ARTERY DISEASE INVOLVING NATIVE CORONARY ARTERY OF NATIVE HEART WITHOUT ANGINA PECTORIS: ICD-10-CM

## 2022-11-03 DIAGNOSIS — I47.10 SVT (SUPRAVENTRICULAR TACHYCARDIA) (HCC): ICD-10-CM

## 2022-11-03 DIAGNOSIS — Z79.01 CURRENT USE OF LONG TERM ANTICOAGULATION: ICD-10-CM

## 2022-11-03 DIAGNOSIS — I95.9 HYPOTENSION, UNSPECIFIED HYPOTENSION TYPE: ICD-10-CM

## 2022-11-03 DIAGNOSIS — E78.2 MIXED HYPERLIPIDEMIA: ICD-10-CM

## 2022-11-03 DIAGNOSIS — S06.5XAA TRAUMATIC SUBDURAL HEMATOMA, INITIAL ENCOUNTER (HCC): ICD-10-CM

## 2022-11-03 DIAGNOSIS — I48.0 PAROXYSMAL ATRIAL FIBRILLATION (HCC): ICD-10-CM

## 2022-11-03 DIAGNOSIS — Z95.5 STATUS POST INSERTION OF DRUG-ELUTING STENT INTO RIGHT CORONARY ARTERY FOR CORONARY ARTERY DISEASE: ICD-10-CM

## 2022-11-03 DIAGNOSIS — E11.9 TYPE 2 DIABETES MELLITUS WITHOUT COMPLICATION, WITHOUT LONG-TERM CURRENT USE OF INSULIN (HCC): ICD-10-CM

## 2022-11-03 PROCEDURE — 99214 OFFICE O/P EST MOD 30 MIN: CPT | Performed by: NURSE PRACTITIONER

## 2022-11-03 RX ORDER — CARVEDILOL 12.5 MG/1
12.5 TABLET ORAL 2 TIMES DAILY WITH MEALS
COMMUNITY
End: 2022-11-04 | Stop reason: SDUPTHER

## 2022-11-03 RX ORDER — VENLAFAXINE HYDROCHLORIDE 150 MG/1
150 CAPSULE, EXTENDED RELEASE ORAL DAILY
COMMUNITY

## 2022-11-03 ASSESSMENT — ENCOUNTER SYMPTOMS
MYALGIAS: 0
HEADACHES: 0
COUGH: 0
NAUSEA: 0
ABDOMINAL PAIN: 0
DIZZINESS: 1
BRUISES/BLEEDS EASILY: 0
PALPITATIONS: 0
ORTHOPNEA: 0
PND: 0
CHILLS: 0
SHORTNESS OF BREATH: 0
INSOMNIA: 0
FEVER: 0
LOSS OF CONSCIOUSNESS: 0

## 2022-11-03 ASSESSMENT — FIBROSIS 4 INDEX: FIB4 SCORE: 0.36

## 2022-11-03 NOTE — PROGRESS NOTES
Chief Complaint   Patient presents with    Follow-Up    Hypotension       Subjective     Karan Wiley is a 62 y.o. male who presents today for follow-up to have ZioPatch applied in clinic; he has dizziness and hypotension.    Karan is a 62 year old male with history of hypertension, dyslipidemia and CAD, with STEMI in August 2020 with PCI of RCA and PCI of posterior lateral branch.  He did have post-procedural complications, including acute hypoxic respiratory failure with flash pulmonary edema, and PEA cardiac arrest.  Amiodarone was added, which was stopped in December 2020, after ZioPatch showed no arrythmias. Outpatient stress test in November 2020 came back normal. He does also PAFib, and is anticoagulated with Eliquis.     I just saw him this past Monday, for follow-up of prolonged hospitalizaton for fall and subdural hematoma. ASA 81mg once daily was resumed, per discussion with neurosurgery.    On 8/25/2022, he suffered a mechanical ground leve fall, with unknown loss of consciousness; he was first seen at [facility] where CT scan showed subdural hemorrhage with a shift and mass effect; he was transferred to [facility]. There, ASA and Eliquis were held; he had left craniotomy by Dr. Luis, and was transferred to the ICU; serial CT scans of the head shows improvement. He did have some leukocystosis and bronchial levage came back with MSSA, and he was treated with antibiotics. [facility]. He only stayed there for a few days, as there were some patient issues, and he left early; but he was making good progress with PT/OT.    He is here today to have ZioPatch applied to assess for AFib burden. He is feeling quite dizzy and lightheaded; no falls or syncope. He denies any active bleeding: No epistaxis, no hemaria or melena.     He denies any chest pain, pressure or discomfort; no symptomatic palpitations; breathing is stable with no orthopnea or PND. No LE edema. BP is has been OK at  home; it just seemed to drop a lot this morning.     Past Medical History:   Diagnosis Date    CAD (coronary artery disease) 2020    STEMI. PCI/NAOMY ([facility] 3.5 x 25mm) the mid RCA, and PCI/NAOMY ([place] 2.5 x 12mm) the PDA.    Chronic anticoagulation     Depression     Hyperlipidemia     Hypertension     Low back pain     Paroxysmal atrial fibrillation (HCC)      Past Surgical History:   Procedure Laterality Date    CRANIOTOMY Left 2022    Procedure: CRANIOTOMY FOR SUBDURAL HEMATOMA;  Surgeon: Tesfaye Luther M.D.;  Location: SURGERY [facility];  Service: Neurosurgery    SPINAL CORD STIMULATOR  2019    Procedure: SPINAL CORD STIMULATOR-  STAGE 1:  RIGHT FLANK BATTERY REPLACEMENT/UPGRADE;  Surgeon: Stepan Hawkins M.D.;  Location: SURGERY [facility];  Service: Neurosurgery    FUSION, SPINE, LUMBAR, PLIF  2019    Procedure: LUMBAR FUSION POSTERIOR-  STAGE 2: ONLAY L5-S1 BONE;  Surgeon: Stepan Hawkins M.D.;  Location: SURGERY [facility];  Service: Neurosurgery    LAMINOTOMY  2019    Procedure: LAMINOTOMY-  STAGE 2:  REDO LEFT L4-S1;  Surgeon: Stepan Hawkins M.D.;  Location: SURGERY [facility];  Service: Neurosurgery     Family History   Problem Relation Age of Onset    Cancer Mother         breast     Social History     Socioeconomic History    Marital status:      Spouse name: Not on file    Number of children: Not on file    Years of education: Not on file    Highest education level: Not on file   Occupational History    Not on file   Tobacco Use    Smoking status: Former     Packs/day: 0.25     Years: 3.00     Pack years: 0.75     Types: Cigarettes     Quit date:      Years since quittin.8    Smokeless tobacco: Never   Vaping Use    Vaping Use: Never used   Substance and Sexual Activity    Alcohol use: No    Drug use: Never    Sexual activity: Not on file   Other Topics Concern    Not on file   Social History Narrative    Not on file     Social Determinants of Health      Financial Resource Strain: Not on file   Food Insecurity: Not on file   Transportation Needs: Not on file   Physical Activity: Not on file   Stress: Not on file   Social Connections: Not on file   Intimate Partner Violence: Not on file   Housing Stability: Not on file     Allergies   Allergen Reactions    Hctz [Hydrochlorothiazide W-Spironolactone]      Cannot breathe    Oxycodone Anaphylaxis and Swelling     Throat swelling    Pectin Anaphylaxis    Hydrochlorothiazide     Zolpidem      Outpatient Encounter Medications as of 11/3/2022   Medication Sig Dispense Refill    ASPIRIN 81 PO Take  by mouth every day.      venlafaxine (EFFEXOR-XR) 150 MG extended-release capsule Take 150 mg by mouth every day.      carvedilol (COREG) 25 MG Tab Take 25 mg by mouth 2 times a day with meals.      telmisartan (MICARDIS) 40 MG Tab Take 1 Tablet by mouth every day. 30 Tablet 3    acetaminophen (TYLENOL) 325 MG Tab 2 Tablets by Enteral Tube route every four hours as needed for Mild Pain or Fever. 30 Tablet 0    amantadine (SYMMETREL) 100 MG Tab 1 Tablet by Enteral Tube route 2 times a day. 60 Tablet 0    docusate sodium (COLACE) 100 MG Cap Take 100 mg by mouth 2 times a day.      atorvastatin (LIPITOR) 80 MG tablet Take 1 Tablet by mouth every day. (Patient taking differently: Take 40 mg by mouth every day.) 90 Tablet 3    tizanidine (ZANAFLEX) 4 MG capsule Take 4 mg by mouth 3 times a day.      nitroglycerin (NITROSTAT) 0.4 MG SL Tab Place 1 Tab under tongue as needed for Chest Pain. 25 Tab 3    HYDROcodone/acetaminophen (NORCO)  MG Tab Take 1 Tab by mouth 6 Times a Day.      finasteride (PROSCAR) 5 MG Tab Take 5 mg by mouth every day.      tamsulosin (FLOMAX) 0.4 MG capsule Take 0.4 mg by mouth every day.      [DISCONTINUED] venlafaxine (EFFEXOR) 75 MG Tab Take 150 mg by mouth every day. (Patient not taking: Reported on 11/3/2022)      [DISCONTINUED] amLODIPine (NORVASC) 10 MG Tab 1 Tablet by Enteral Tube route every  "day. (Patient taking differently: Take 5 mg by mouth every day.) 30 Tablet      No facility-administered encounter medications on file as of 11/3/2022.     Review of Systems   Constitutional:  Positive for malaise/fatigue. Negative for chills and fever.   HENT:  Negative for congestion.    Respiratory:  Negative for cough and shortness of breath.    Cardiovascular:  Negative for chest pain, palpitations, orthopnea, leg swelling and PND.   Gastrointestinal:  Negative for abdominal pain and nausea.   Musculoskeletal:  Negative for myalgias.   Skin:  Negative for rash.   Neurological:  Positive for dizziness. Negative for loss of consciousness and headaches.        Balances issues   Endo/Heme/Allergies:  Does not bruise/bleed easily.   Psychiatric/Behavioral:  The patient does not have insomnia.         More tearful than previous            Objective     BP (!) 72/50 (BP Location: Left arm, Patient Position: Sitting, BP Cuff Size: Adult)   Pulse 70   Resp 16   Ht 1.702 m (5' 7\")   Wt 98.9 kg (218 lb)   SpO2 96%   BMI 34.14 kg/m²     Physical Exam  Constitutional:       Appearance: He is well-developed.      Comments: In a wheelchair today.   HENT:      Head: Normocephalic.   Neck:      Vascular: No JVD.   Cardiovascular:      Rate and Rhythm: Normal rate and regular rhythm.      Heart sounds: Normal heart sounds.   Pulmonary:      Effort: Pulmonary effort is normal. No respiratory distress.      Breath sounds: Normal breath sounds. No wheezing or rales.   Abdominal:      General: Bowel sounds are normal. There is no distension.      Palpations: Abdomen is soft.      Tenderness: There is no abdominal tenderness.   Musculoskeletal:         General: Normal range of motion.      Cervical back: Normal range of motion and neck supple.   Skin:     General: Skin is warm and dry.      Coloration: Skin is pale.      Findings: No rash.   Neurological:      Mental Status: He is alert and oriented to person, place, and time. "     EKG today reveals sinus bradycardia at 62bpm.     IMPRESSION OF CT SCAN OF HEAD OF 8/31/2022:  1.  Slightly decreased size of LEFT supratentorial subdural hematoma overlying LEFT frontal, parietal and temporal lobes  2.  Trace extra-axial blood overlying the RIGHT frontal lobe, unchanged  3.  Unchanged 3 mm of LEFT-to-RIGHT midline shift     RESULTS OF ECHOCARDIOGRAM OF 7/12/2022 ([facility]):  Normal LV size  LVEF 55-60%  Normal RA ,LA and RV  Mild MR  Trace TR     CONCLUSIONS OF ECHOCARDIOGRAM OF 8/9/2020:  Compared to the images of the prior study done 8/3/2020, the EF appears   improved.  Left ventricular ejection fraction is visually estimated to be 60%.     CONCLUSIONS OF ECHOCARDIOGRAM OF 8/3/2020:  Limited contrast study to reassess for LV thrombus.  Thrombus is not observed in the left ventricular apex.  Left ventricular ejection fraction is visually estimated to be 60%.  Mild hypokinesis of the apex.    Cardiac Event Recorder; [facility] 11/9/2020-11/23/2020:  1.  Sinus rhythm, average rate 63 bpm.   2.  Rare PVCs   3.  Rare PACs.   4.  Symptoms of heart racing, fluttering, skipping dizziness associated with sinus rhythm and normal rate.   5.  One triggered non-specific event associated with singular PVCs.   6.  No atrial fibrillation.      MPI OF 11/17/2020:  Normal myocardial perfusion study  No ischemia or infarct, normal wall motion     IMPRESSIONS OF CORONARY ANGIOGRAM OF 8/1/2020:  1.  Acute non-STEMI due to culprit right coronary artery  2.  Successful PCI of the mid right coronary artery using 1 drug-eluting stent and IVUS guidance  3.  Successful PCI of the posterior lateral branch using 1 drug-eluting stent  4.  Normal LV systolic function  5.  Moderately elevated LVEDP (21 mmHg)  6.  Poorly controlled hypertension       Assessment & Plan     1. Hypotension, unspecified hypotension type  CBC WITH DIFFERENTIAL    Basic Metabolic Panel    EKG      2. Traumatic subdural hematoma, initial encounter         3. Coronary artery disease involving native coronary artery of native heart without angina pectoris        4. Status post insertion of drug-eluting stent into right coronary artery for coronary artery disease        5. Paroxysmal atrial fibrillation (HCC)        6. Current use of long term anticoagulation        7. Mixed hyperlipidemia        8. Type 2 diabetes mellitus without complication, without long-term current use of insulin (HCC)            Medical Decision Making: Today's Assessment/Status/Plan:      1. Hypotension, possibly overmedicated. To STOP Amlodipine for now; if BP still <100/60 over the weekend, CUT Coreg from 25mg BID to 12.5mg BID. Stay hydrated. We will also check CBC and BMP today.    2. Subdural hematoma, status post craniotomy, followed by neurosurgery. He will behaving repeat CT scan soon. Per my discussion with Aishwarya (GERARDO), we are resuming ASA 81mg once daily.    3. CAD, status post PCI/NAOMY to the RCA in 2020. NO angina or shortenss of breath. IF he has recurrence of chest pain, shortness of breath, he is to make use of ER at [facility]; he states understanding. Continue:  Coreg (cut to 12.5mg BID if BP is low)  Lipitor 40mg once daily  ASA 81mg once daily    4. PAFib, with ZioPatch applied today, to assess arrhythmia burden. He is currently OFF of Eliquis, due to hematoma.    5. Hypertension, as above, on the LOW side. HOLD Amlodipine; if BP still low, cut Coreg from 25mg BID to 12.5mg BID.    6. Hyperlipidemia, treated with Lipitor 40mg once daily.    7. Diabetes mellitus, currently OFF of Metformin.    8. Memory issues, progressive since hospitalization for subdural hematoma, has been referred to Neurology.    Plan as above: labs today, STOP Amlodipine; Cut Coreg to 12.5mg BID. Watch BP. Keep follow-up as scheduled. IF he has worsening symptoms, to make sure of ER right away.    ADDENDUM:  CBC of 11/3/2022:  WBC 6.4  RBC 3.76  Hgb 9.0  Hct 31.4  Platelets 465    He is advised to take Iron  325mg once daily. Watch for any bleeding. Will follow-up with additional labs; will also watch for BMP. His BP meds have been adjusted as above.

## 2022-11-03 NOTE — NON-PROVIDER
Patient enrolled in 14 day Zio patch program per AB.  In clinic hookup.   >Currently pending EOS.

## 2022-11-04 DIAGNOSIS — I95.9 HYPOTENSION, UNSPECIFIED HYPOTENSION TYPE: ICD-10-CM

## 2022-11-04 DIAGNOSIS — I25.10 CORONARY ARTERY DISEASE INVOLVING NATIVE CORONARY ARTERY OF NATIVE HEART WITHOUT ANGINA PECTORIS: ICD-10-CM

## 2022-11-04 RX ORDER — CARVEDILOL 12.5 MG/1
12.5 TABLET ORAL 2 TIMES DAILY WITH MEALS
Qty: 60 TABLET | Refills: 11 | Status: SHIPPED | OUTPATIENT
Start: 2022-11-04 | End: 2022-11-21

## 2022-11-18 NOTE — ASSESSMENT & PLAN NOTE
9/7  Na tabs initiated.  9/9 Increased Na tabs, 2 grams BID.   The INR is stable and therapeutic.    Continue Coumadin to 5 mg daily except 2.5 mg on Mon-Wed-Fri  Recheck INR in 4 weeks

## 2022-11-21 DIAGNOSIS — I10 ESSENTIAL HYPERTENSION, BENIGN: ICD-10-CM

## 2022-11-21 DIAGNOSIS — I25.10 CORONARY ARTERY DISEASE INVOLVING NATIVE CORONARY ARTERY OF NATIVE HEART WITHOUT ANGINA PECTORIS: ICD-10-CM

## 2022-11-21 RX ORDER — TELMISARTAN 80 MG/1
80 TABLET ORAL DAILY
Qty: 100 TABLET | Refills: 3 | Status: SHIPPED | OUTPATIENT
Start: 2022-11-21 | End: 2022-12-01 | Stop reason: SDUPTHER

## 2022-11-21 RX ORDER — CARVEDILOL 25 MG/1
25 TABLET ORAL 2 TIMES DAILY WITH MEALS
Qty: 200 TABLET | Refills: 3 | Status: SHIPPED | OUTPATIENT
Start: 2022-11-21 | End: 2022-12-01

## 2022-11-22 ENCOUNTER — TELEPHONE (OUTPATIENT)
Dept: CARDIOLOGY | Facility: MEDICAL CENTER | Age: 62
End: 2022-11-22
Payer: OTHER GOVERNMENT

## 2022-11-23 PROCEDURE — 93248 EXT ECG>7D<15D REV&INTERPJ: CPT | Performed by: INTERNAL MEDICINE

## 2022-11-23 PROCEDURE — 93246 EXT ECG>7D<15D RECORDING: CPT | Performed by: INTERNAL MEDICINE

## 2022-12-01 ENCOUNTER — OFFICE VISIT (OUTPATIENT)
Dept: CARDIOLOGY | Facility: PHYSICIAN GROUP | Age: 62
End: 2022-12-01
Payer: OTHER GOVERNMENT

## 2022-12-01 VITALS
DIASTOLIC BLOOD PRESSURE: 90 MMHG | WEIGHT: 232 LBS | HEIGHT: 67 IN | RESPIRATION RATE: 18 BRPM | SYSTOLIC BLOOD PRESSURE: 140 MMHG | HEART RATE: 78 BPM | OXYGEN SATURATION: 95 % | BODY MASS INDEX: 36.41 KG/M2

## 2022-12-01 DIAGNOSIS — D64.9 ANEMIA, UNSPECIFIED TYPE: ICD-10-CM

## 2022-12-01 DIAGNOSIS — J95.821 RESPIRATORY FAILURE FOLLOWING TRAUMA AND SURGERY (HCC): ICD-10-CM

## 2022-12-01 DIAGNOSIS — I10 ESSENTIAL HYPERTENSION, BENIGN: ICD-10-CM

## 2022-12-01 DIAGNOSIS — Z95.5 STATUS POST INSERTION OF DRUG-ELUTING STENT INTO RIGHT CORONARY ARTERY FOR CORONARY ARTERY DISEASE: ICD-10-CM

## 2022-12-01 DIAGNOSIS — S06.5XAA TRAUMATIC SUBDURAL HEMATOMA, INITIAL ENCOUNTER (HCC): ICD-10-CM

## 2022-12-01 DIAGNOSIS — E78.2 MIXED HYPERLIPIDEMIA: ICD-10-CM

## 2022-12-01 DIAGNOSIS — E11.9 TYPE 2 DIABETES MELLITUS WITHOUT COMPLICATION, WITHOUT LONG-TERM CURRENT USE OF INSULIN (HCC): ICD-10-CM

## 2022-12-01 DIAGNOSIS — I99.8 BLOOD PRESSURE INSTABILITY: ICD-10-CM

## 2022-12-01 DIAGNOSIS — I48.0 PAROXYSMAL ATRIAL FIBRILLATION (HCC): ICD-10-CM

## 2022-12-01 DIAGNOSIS — I25.10 CORONARY ARTERY DISEASE INVOLVING NATIVE CORONARY ARTERY OF NATIVE HEART WITHOUT ANGINA PECTORIS: ICD-10-CM

## 2022-12-01 PROCEDURE — 99214 OFFICE O/P EST MOD 30 MIN: CPT | Performed by: NURSE PRACTITIONER

## 2022-12-01 RX ORDER — ATORVASTATIN CALCIUM 40 MG/1
40 TABLET, FILM COATED ORAL DAILY
Qty: 100 TABLET | Refills: 3 | Status: SHIPPED | OUTPATIENT
Start: 2022-12-01 | End: 2023-01-12 | Stop reason: SDUPTHER

## 2022-12-01 RX ORDER — CARVEDILOL 25 MG/1
25 TABLET ORAL 2 TIMES DAILY WITH MEALS
Qty: 200 TABLET | Refills: 3
Start: 2022-12-01

## 2022-12-01 RX ORDER — TRAZODONE HYDROCHLORIDE 50 MG/1
50 TABLET ORAL NIGHTLY
COMMUNITY

## 2022-12-01 RX ORDER — DIVALPROEX SODIUM 125 MG/1
125 TABLET, DELAYED RELEASE ORAL 3 TIMES DAILY
COMMUNITY
Start: 2022-11-26

## 2022-12-01 RX ORDER — VENLAFAXINE HYDROCHLORIDE 75 MG/1
75 CAPSULE, EXTENDED RELEASE ORAL DAILY
COMMUNITY
Start: 2022-11-23

## 2022-12-01 RX ORDER — TELMISARTAN 40 MG/1
40 TABLET ORAL 2 TIMES DAILY
Qty: 200 TABLET | Refills: 3 | Status: SHIPPED | OUTPATIENT
Start: 2022-12-01

## 2022-12-01 RX ORDER — PNV NO.95/FERROUS FUM/FOLIC AC 28MG-0.8MG
TABLET ORAL
COMMUNITY
End: 2023-08-31

## 2022-12-01 ASSESSMENT — ENCOUNTER SYMPTOMS
HEADACHES: 0
ABDOMINAL PAIN: 0
LOSS OF CONSCIOUSNESS: 0
CHILLS: 0
PALPITATIONS: 0
MYALGIAS: 0
COUGH: 0
DIZZINESS: 0
FEVER: 0
PND: 0
INSOMNIA: 0
BRUISES/BLEEDS EASILY: 0
SHORTNESS OF BREATH: 0
ORTHOPNEA: 0
NAUSEA: 0

## 2022-12-01 ASSESSMENT — FIBROSIS 4 INDEX: FIB4 SCORE: 0.36

## 2022-12-01 NOTE — PROGRESS NOTES
Chief Complaint   Patient presents with    Follow-Up    Blood Pressure Problem    Evaluation Of Medication Change       Subjective     Karan Wiley is a 62 y.o. male who presents today for one month follow-up of labile BP.    Karan is a 62 year old male with history of hypertension, dyslipidemia and CAD, with STEMI in August 2020 with PCI of RCA and PCI of posterior lateral branch.  He did have post-procedural complications, including acute hypoxic respiratory failure with flash pulmonary edema, and PEA cardiac arrest.  Amiodarone was added, which was stopped in December 2020, after ZioPatch showed no arrythmias. Outpatient stress test in November 2020 came back normal. He does also PAFib, and is anticoagulated with Eliquis.      In late August 2022, he suffered a mechanical ground leve fall, with unknown loss of consciousness; he was first seen at [facility] where CT scan showed subdural hemorrhage with a shift and mass effect; he was transferred to [facility]. There, ASA and Eliquis were held; he had left craniotomy by Dr. Luis, and was transferred to the ICU; serial CT scans of the head shows improvement. He did have some leukocystosis and bronchial levage came back with MSSA, and he was treated with antibiotics. [facility] was [facility]. He only stayed there for a few days, as there were some patient issues, and he left early.    After discussion with neurology, he was restarted on ASA only; Eliquis is still held.    ZioPatch (11/3/2022-11/17/2022) showed NO Afib, but some short SVT episodes. He is maintained on Coreg 25.mg twice daily. He is also being treated for anemia, with daily Iron.     BP is doing better, generally well controlled, but seems to edis up several hours after taking his medications. He denies any chest pain, pressure or discomfort; no symptomatic palpitations; breathing is stable with no orthopnea or PND. No LE edema. No further falls or syncope.    Past Medical History:    Diagnosis Date    CAD (coronary artery disease) 2020    STEMI. PCI/NAOMY ([facility] 3.5 x 25mm) the mid RCA, and PCI/NAOMY ([facility] 2.5 x 12mm) the PDA.    Chronic anticoagulation     Depression     Hyperlipidemia     Hypertension     Low back pain     Paroxysmal atrial fibrillation (HCC)      Past Surgical History:   Procedure Laterality Date    CRANIOTOMY Left 2022    Procedure: CRANIOTOMY FOR SUBDURAL HEMATOMA;  Surgeon: Tesfaye Luther M.D.;  Location: SURGERY [facility];  Service: Neurosurgery    SPINAL CORD STIMULATOR  2019    Procedure: SPINAL CORD STIMULATOR-  STAGE 1:  RIGHT FLANK BATTERY REPLACEMENT/UPGRADE;  Surgeon: Stepan Hawkins M.D.;  Location: SURGERY [facility];  Service: Neurosurgery    FUSION, SPINE, LUMBAR, PLIF  2019    Procedure: LUMBAR FUSION POSTERIOR-  STAGE 2: ONLAY L5-S1 BONE;  Surgeon: Stepan Hawkins M.D.;  Location: SURGERY [facility];  Service: Neurosurgery    LAMINOTOMY  2019    Procedure: LAMINOTOMY-  STAGE 2:  REDO LEFT L4-S1;  Surgeon: Stepan Hawkins M.D.;  Location: SURGERY [facility];  Service: Neurosurgery     Family History   Problem Relation Age of Onset    Cancer Mother         breast     Social History     Socioeconomic History    Marital status:      Spouse name: Not on file    Number of children: Not on file    Years of education: Not on file    Highest education level: Not on file   Occupational History    Not on file   Tobacco Use    Smoking status: Former     Packs/day: 0.25     Years: 3.00     Pack years: 0.75     Types: Cigarettes     Quit date: 1980     Years since quittin.9    Smokeless tobacco: Never   Vaping Use    Vaping Use: Never used   Substance and Sexual Activity    Alcohol use: No    Drug use: Never    Sexual activity: Not on file   Other Topics Concern    Not on file   Social History Narrative    Not on file     Social Determinants of Health     Financial Resource Strain: Not on file   Food Insecurity: Not on file    Transportation Needs: Not on file   Physical Activity: Not on file   Stress: Not on file   Social Connections: Not on file   Intimate Partner Violence: Not on file   Housing Stability: Not on file     Allergies   Allergen Reactions    Hctz [Hydrochlorothiazide W-Spironolactone]      Cannot breathe    Oxycodone Anaphylaxis and Swelling     Throat swelling    Pectin Anaphylaxis    Hydrochlorothiazide     Zolpidem      Outpatient Encounter Medications as of 12/1/2022   Medication Sig Dispense Refill    divalproex (DEPAKOTE) 125 MG EC tablet Take 125 mg by mouth 3 times a day.      traZODone (DESYREL) 50 MG Tab Take 50 mg by mouth every evening.      Ferrous Sulfate (IRON) 325 (65 Fe) MG Tab Take  by mouth.      carvedilol (COREG) 25 MG Tab Take 1 Tablet by mouth 2 times a day with meals. 200 Tablet 3    telmisartan (MICARDIS) 40 MG Tab Take 1 Tablet by mouth 2 times a day. 200 Tablet 3    atorvastatin (LIPITOR) 40 MG Tab Take 1 Tablet by mouth every day. 100 Tablet 3    ASPIRIN 81 PO Take  by mouth every day.      venlafaxine (EFFEXOR-XR) 150 MG extended-release capsule Take 150 mg by mouth every day.      acetaminophen (TYLENOL) 325 MG Tab 2 Tablets by Enteral Tube route every four hours as needed for Mild Pain or Fever. 30 Tablet 0    docusate sodium (COLACE) 100 MG Cap Take 100 mg by mouth 2 times a day.      tizanidine (ZANAFLEX) 4 MG capsule Take 4 mg by mouth 3 times a day.      nitroglycerin (NITROSTAT) 0.4 MG SL Tab Place 1 Tab under tongue as needed for Chest Pain. 25 Tab 3    HYDROcodone/acetaminophen (NORCO)  MG Tab Take 1 Tab by mouth 6 Times a Day.      finasteride (PROSCAR) 5 MG Tab Take 5 mg by mouth every day.      tamsulosin (FLOMAX) 0.4 MG capsule Take 0.4 mg by mouth every day.      [DISCONTINUED] carvedilol (COREG) 25 MG Tab Take 1 Tablet by mouth 2 times a day with meals. (Patient taking differently: Take 12.5 mg by mouth 2 times a day with meals.) 200 Tablet 3    [DISCONTINUED] telmisartan  "(MICARDIS) 80 MG Tab Take 1 Tablet by mouth every day. (Patient taking differently: Take 40 mg by mouth every day.) 100 Tablet 3    [DISCONTINUED] amantadine (SYMMETREL) 100 MG Tab 1 Tablet by Enteral Tube route 2 times a day. (Patient not taking: Reported on 12/1/2022) 60 Tablet 0    [DISCONTINUED] atorvastatin (LIPITOR) 80 MG tablet Take 1 Tablet by mouth every day. (Patient taking differently: Take 40 mg by mouth every day.) 90 Tablet 3     No facility-administered encounter medications on file as of 12/1/2022.     Review of Systems   Constitutional:  Negative for chills, fever and malaise/fatigue.   HENT:  Negative for congestion.    Respiratory:  Negative for cough and shortness of breath.    Cardiovascular:  Negative for chest pain, palpitations, orthopnea, leg swelling and PND.   Gastrointestinal:  Negative for abdominal pain and nausea.   Musculoskeletal:  Negative for myalgias.   Skin:  Negative for rash.   Neurological:  Negative for dizziness, loss of consciousness and headaches.        Balances issues seems to be better.   Endo/Heme/Allergies:  Does not bruise/bleed easily.   Psychiatric/Behavioral:  The patient does not have insomnia.         Now back on Effexor 225mg XL, which is helping emotional lability.            Objective     BP (!) 140/90 (BP Location: Left arm, Patient Position: Sitting, BP Cuff Size: Adult)   Pulse 78   Resp 18   Ht 1.702 m (5' 7\")   Wt 105 kg (232 lb)   SpO2 95%   BMI 36.34 kg/m²     Physical Exam  Constitutional:       Appearance: He is well-developed.      Comments: Ambulates with a walker.   HENT:      Head: Normocephalic.   Neck:      Vascular: No JVD.   Cardiovascular:      Rate and Rhythm: Normal rate and regular rhythm.      Heart sounds: Normal heart sounds.   Pulmonary:      Effort: Pulmonary effort is normal. No respiratory distress.      Breath sounds: Normal breath sounds. No wheezing or rales.   Abdominal:      General: Bowel sounds are normal. There is no " distension.      Palpations: Abdomen is soft.      Tenderness: There is no abdominal tenderness.   Musculoskeletal:         General: Normal range of motion.      Cervical back: Normal range of motion and neck supple.   Skin:     General: Skin is warm and dry.      Coloration: Skin is not pale.      Findings: No rash.      Comments: Color seems improved.    Neurological:      Mental Status: He is alert and oriented to person, place, and time.     Impressions of ZioPatch of 11/3/2022-11/17/2022:  Predominantly sinus rhythm.   Symptoms associated with sinus rhythm and ectopy.   Asymptomatic, short SVT and NSVT.     IMPRESSION OF CT SCAN OF HEAD OF 8/31/2022:  1.  Slightly decreased size of LEFT supratentorial subdural hematoma overlying LEFT frontal, parietal and temporal lobes  2.  Trace extra-axial blood overlying the RIGHT frontal lobe, unchanged  3.  Unchanged 3 mm of LEFT-to-RIGHT midline shift     RESULTS OF ECHOCARDIOGRAM OF 7/12/2022 ([facility]):  Normal LV size  LVEF 55-60%  Normal RA ,LA and RV  Mild MR  Trace TR     CONCLUSIONS OF ECHOCARDIOGRAM OF 8/9/2020:  Compared to the images of the prior study done 8/3/2020, the EF appears improved.  Left ventricular ejection fraction is visually estimated to be 60%.     CONCLUSIONS OF ECHOCARDIOGRAM OF 8/3/2020:  Limited contrast study to reassess for LV thrombus.  Thrombus is not observed in the left ventricular apex.  Left ventricular ejection fraction is visually estimated to be 60%.  Mild hypokinesis of the apex.    MPI OF 11/17/2020:  Normal myocardial perfusion study  No ischemia or infarct, normal wall motion     IMPRESSIONS OF CORONARY ANGIOGRAM OF 8/1/2020:  1.  Acute non-STEMI due to culprit right coronary artery  2.  Successful PCI of the mid right coronary artery using 1 drug-eluting stent and IVUS guidance  3.  Successful PCI of the posterior lateral branch using 1 drug-eluting stent  4.  Normal LV systolic function  5.  Moderately elevated LVEDP (21  mmHg)  6.  Poorly controlled hypertension     RESULTS OF CBC OF 11/3/2022:  WBC 6.4  RBC 3.76  Hgb 9.0  Hct 31.4  Platelets     Lab Results   Component Value Date/Time    SODIUM 134 (L) 09/08/2022 05:07 AM    POTASSIUM 3.3 (L) 09/08/2022 05:07 AM    CHLORIDE 97 09/08/2022 05:07 AM    CO2 25 09/08/2022 05:07 AM    GLUCOSE 137 (H) 09/08/2022 05:07 AM    BUN 18 09/08/2022 05:07 AM    CREATININE 0.59 09/08/2022 05:07 AM      Lab Results   Component Value Date/Time    WBC 16.4 (H) 09/09/2022 02:44 AM    RBC 3.94 (L) 09/09/2022 02:44 AM    HEMOGLOBIN 11.8 (L) 09/09/2022 02:44 AM    HEMATOCRIT 36.1 (L) 09/09/2022 02:44 AM    MCV 91.6 09/09/2022 02:44 AM    MCH 29.9 09/09/2022 02:44 AM    MCHC 32.7 (L) 09/09/2022 02:44 AM    MPV 11.0 09/09/2022 02:44 AM         Assessment & Plan     1. Blood pressure instability        2. Essential hypertension, benign  carvedilol (COREG) 25 MG Tab    telmisartan (MICARDIS) 40 MG Tab      3. Traumatic subdural hematoma, initial encounter        4. Respiratory failure following trauma and surgery (HCC)        5. Anemia, unspecified type  CBC WITH DIFFERENTIAL      6. Coronary artery disease involving native coronary artery of native heart without angina pectoris  carvedilol (COREG) 25 MG Tab    Comp Metabolic Panel    Lipid Profile      7. Status post insertion of drug-eluting stent into right coronary artery for coronary artery disease  atorvastatin (LIPITOR) 40 MG Tab      8. Paroxysmal atrial fibrillation (HCC)        9. Mixed hyperlipidemia        10. Type 2 diabetes mellitus without complication, without long-term current use of insulin ([facility])            Medical Decision Making: Today's Assessment/Status/Plan:      1. BP lability, but slowly improving. Continue Coreg 25mg BID, and increase Micardis to 40mg BID. Monitor BP at home. ZioPatch last month showed NO AFib.    2. Traumatic subdural hematoma, now back on ASA only. He does have follow-up with neurology in March 2023. [facility]  showed NO AFib, but only short SVT episodes. He remains OFF of DOAC.    3. Respiratory failure during hospitalization, now improved.    4. Anemia, on Iron once daily. He did have CBC done at PCP's office, and he states Hct was 47, which is markedly improved. Repeat CBC before follow-up with me.    5. CAD, status post PCI/NAOMY to the RCA in 2020. No angina or shortness of breath. Echocardiogram in July 2022 was stable/normal. Contniue:  ASA 81mg once daily  Lipitor 40mg once daily  Coreg 25mg twice daily  Micardis 40mg twice daily    6. Hyperlipidemia, treated with Lipitor 40mg, to repeat fasting lipid panel.    7. Diabetes mellitus, currently OFF of Metformin, will monitor over time.    8. Insomnia, on Trazodone, to discuss with PCP.    9. Depression/anxiety, on Effexor XR 225mg, which seems to be helping.    Same medications for now, except increase Micardis to 40mg BID. Labs as above. Follow-up with us in 6 weeks.

## 2023-01-04 DIAGNOSIS — I25.10 CORONARY ARTERY DISEASE INVOLVING NATIVE CORONARY ARTERY OF NATIVE HEART WITHOUT ANGINA PECTORIS: ICD-10-CM

## 2023-01-12 ENCOUNTER — OFFICE VISIT (OUTPATIENT)
Dept: CARDIOLOGY | Facility: PHYSICIAN GROUP | Age: 63
End: 2023-01-12
Payer: OTHER GOVERNMENT

## 2023-01-12 VITALS
HEART RATE: 60 BPM | BODY MASS INDEX: 38.61 KG/M2 | SYSTOLIC BLOOD PRESSURE: 126 MMHG | RESPIRATION RATE: 12 BRPM | OXYGEN SATURATION: 96 % | WEIGHT: 246 LBS | HEIGHT: 67 IN | DIASTOLIC BLOOD PRESSURE: 88 MMHG

## 2023-01-12 DIAGNOSIS — S06.5XAA TRAUMATIC SUBDURAL HEMATOMA, INITIAL ENCOUNTER (HCC): ICD-10-CM

## 2023-01-12 DIAGNOSIS — I48.0 PAROXYSMAL ATRIAL FIBRILLATION (HCC): ICD-10-CM

## 2023-01-12 DIAGNOSIS — Z79.01 CURRENT USE OF LONG TERM ANTICOAGULATION: ICD-10-CM

## 2023-01-12 DIAGNOSIS — E11.9 TYPE 2 DIABETES MELLITUS WITHOUT COMPLICATION, WITHOUT LONG-TERM CURRENT USE OF INSULIN (HCC): ICD-10-CM

## 2023-01-12 DIAGNOSIS — M47.816 LUMBAR SPONDYLOSIS: ICD-10-CM

## 2023-01-12 DIAGNOSIS — I25.10 CORONARY ARTERY DISEASE INVOLVING NATIVE CORONARY ARTERY OF NATIVE HEART WITHOUT ANGINA PECTORIS: ICD-10-CM

## 2023-01-12 DIAGNOSIS — Z95.5 STATUS POST INSERTION OF DRUG-ELUTING STENT INTO RIGHT CORONARY ARTERY FOR CORONARY ARTERY DISEASE: ICD-10-CM

## 2023-01-12 DIAGNOSIS — I99.8 BLOOD PRESSURE INSTABILITY: ICD-10-CM

## 2023-01-12 DIAGNOSIS — E78.2 MIXED HYPERLIPIDEMIA: ICD-10-CM

## 2023-01-12 PROCEDURE — 99214 OFFICE O/P EST MOD 30 MIN: CPT | Performed by: NURSE PRACTITIONER

## 2023-01-12 RX ORDER — ATORVASTATIN CALCIUM 80 MG/1
80 TABLET, FILM COATED ORAL DAILY
Qty: 100 TABLET | Refills: 3 | Status: SHIPPED | OUTPATIENT
Start: 2023-01-12 | End: 2023-08-10

## 2023-01-12 ASSESSMENT — ENCOUNTER SYMPTOMS
LOSS OF CONSCIOUSNESS: 0
INSOMNIA: 0
BRUISES/BLEEDS EASILY: 0
ORTHOPNEA: 0
MYALGIAS: 0
PND: 0
ABDOMINAL PAIN: 0
CHILLS: 0
FEVER: 0
HEADACHES: 0
NAUSEA: 0
PALPITATIONS: 0
SHORTNESS OF BREATH: 0
COUGH: 0
DIZZINESS: 0

## 2023-01-12 ASSESSMENT — FIBROSIS 4 INDEX: FIB4 SCORE: 0.36

## 2023-01-12 NOTE — PROGRESS NOTES
Chief Complaint   Patient presents with    Follow-Up    Hypertension Follow-up    Coronary Artery Disease    Atrial Fibrillation    Anticoagulation    Hyperlipidemia       Subjective     Karan Wiley is a 62 y.o. male who presents today for one month follow-up of blood pressure.    Karan is a 62 year old male with history of hypertension, dyslipidemia and CAD, with STEMI in August 2020 with PCI of RCA and PCI of posterior lateral branch.  He did have post-procedural complications, including acute hypoxic respiratory failure with flash pulmonary edema, and PEA cardiac arrest.  Amiodarone was added, which was stopped in December 2020, after ZioPatch showed no arrythmias. Outpatient stress test in November 2020 came back normal. He does also PAFib, and is anticoagulated with Eliquis.      In late August 2022, he suffered a mechanical ground level fall, with unknown loss of consciousness; he was first seen at [facility] where CT scan showed subdural hemorrhage with a shift and mass effect; he was transferred to [facility]. There, ASA and Eliquis were held; he had left craniotomy by Dr. Luis, and was transferred to the ICU; serial CT scans of the head shows improvement. He did have some leukocystosis and bronchial levage came back with MSSA, and he was treated with antibiotics. He was then [facility]. He only stayed there for a few days, as there were some patient issues, and he left early.     After discussion with neurology, he was restarted on ASA only; Eliquis is still held. He does have follow-up with neurosurgery later this month.     ZioPatch (11/3/2022-11/17/2022) showed NO Afib, but some short SVT episodes. He is maintained on Coreg 25.mg twice daily. He is also being treated for anemia, with daily Iron.    We have been watching BP closely, as it has been quite labile.     He is here today for one month follow-up. BP has been more stable, and no further falls. He denies any chest pain,  pressure, tightness or discomfort; no symptomatic palpitations; breathing is stable with no orthopnea or PND. No LE edema. No syncope. He is compliant with his medications.    Past Medical History:   Diagnosis Date    CAD (coronary artery disease) 2020    STEMI. PCI/NAOMY (Raghav 3.5 x 25mm) the mid RCA, and PCI/NAOMY (Raghav 2.5 x 12mm) the PDA.    Chronic anticoagulation     Depression     Hyperlipidemia     Hypertension     Low back pain     Paroxysmal atrial fibrillation (HCC)      Past Surgical History:   Procedure Laterality Date    CRANIOTOMY Left 2022    Procedure: CRANIOTOMY FOR SUBDURAL HEMATOMA;  Surgeon: Tesfaye Luther M.D.;  Location: SURGERY [facility];  Service: Neurosurgery    SPINAL CORD STIMULATOR  2019    Procedure: SPINAL CORD STIMULATOR-  STAGE 1:  RIGHT FLANK BATTERY REPLACEMENT/UPGRADE;  Surgeon: Stepan Hawkins M.D.;  Location: [facility];  Service: Neurosurgery    FUSION, SPINE, LUMBAR, PLIF  2019    Procedure: LUMBAR FUSION POSTERIOR-  STAGE 2: ONLAY L5-S1 BONE;  Surgeon: Stepan Hawkins M.D.;  Location: [facility];  Service: Neurosurgery    LAMINOTOMY  2019    Procedure: LAMINOTOMY-  STAGE 2:  REDO LEFT L4-S1;  Surgeon: tSepan Hawkins M.D.;  Location: [facility];  Service: Neurosurgery     Family History   Problem Relation Age of Onset    Cancer Mother         breast     Social History     Socioeconomic History    Marital status:      Spouse name: Not on file    Number of children: Not on file    Years of education: Not on file    Highest education level: Not on file   Occupational History    Not on file   Tobacco Use    Smoking status: Former     Packs/day: 0.25     Years: 3.00     Pack years: 0.75     Types: Cigarettes     Quit date:      Years since quittin.0    Smokeless tobacco: Never   Vaping Use    Vaping Use: Never used   Substance and Sexual Activity    Alcohol use: No    Drug use: Never    Sexual activity: Not  on file   Other Topics Concern    Not on file   Social History Narrative    Not on file     Social Determinants of Health     Financial Resource Strain: Not on file   Food Insecurity: Not on file   Transportation Needs: Not on file   Physical Activity: Not on file   Stress: Not on file   Social Connections: Not on file   Intimate Partner Violence: Not on file   Housing Stability: Not on file     Allergies   Allergen Reactions    Hctz [Hydrochlorothiazide W-Spironolactone]      Cannot breathe    Oxycodone Anaphylaxis and Swelling     Throat swelling    Pectin Anaphylaxis    Hydrochlorothiazide     Zolpidem      Outpatient Encounter Medications as of 1/12/2023   Medication Sig Dispense Refill    atorvastatin (LIPITOR) 80 MG tablet Take 1 Tablet by mouth every day. 100 Tablet 3    divalproex (DEPAKOTE) 125 MG EC tablet Take 125 mg by mouth 3 times a day.      traZODone (DESYREL) 50 MG Tab Take 50 mg by mouth every evening.      Ferrous Sulfate (IRON) 325 (65 Fe) MG Tab Take  by mouth.      carvedilol (COREG) 25 MG Tab Take 1 Tablet by mouth 2 times a day with meals. 200 Tablet 3    telmisartan (MICARDIS) 40 MG Tab Take 1 Tablet by mouth 2 times a day. 200 Tablet 3    venlafaxine XR (EFFEXOR XR) 75 MG CAPSULE SR 24 HR Take 75 mg by mouth every day.      ASPIRIN 81 PO Take  by mouth every day.      venlafaxine (EFFEXOR-XR) 150 MG extended-release capsule Take 150 mg by mouth every day.      docusate sodium (COLACE) 100 MG Cap Take 100 mg by mouth 2 times a day.      tizanidine (ZANAFLEX) 4 MG capsule Take 4 mg by mouth 3 times a day.      nitroglycerin (NITROSTAT) 0.4 MG SL Tab Place 1 Tab under tongue as needed for Chest Pain. 25 Tab 3    HYDROcodone/acetaminophen (NORCO)  MG Tab Take 1 Tab by mouth 6 Times a Day.      finasteride (PROSCAR) 5 MG Tab Take 5 mg by mouth every day.      tamsulosin (FLOMAX) 0.4 MG capsule Take 0.4 mg by mouth every day.      [DISCONTINUED] atorvastatin (LIPITOR) 40 MG Tab Take 1  "Tablet by mouth every day. 100 Tablet 3    [DISCONTINUED] acetaminophen (TYLENOL) 325 MG Tab 2 Tablets by Enteral Tube route every four hours as needed for Mild Pain or Fever. 30 Tablet 0     No facility-administered encounter medications on file as of 1/12/2023.     Review of Systems   Constitutional:  Negative for chills, fever and malaise/fatigue.   HENT:  Negative for congestion.    Respiratory:  Negative for cough and shortness of breath.    Cardiovascular:  Negative for chest pain, palpitations, orthopnea, leg swelling and PND.   Gastrointestinal:  Negative for abdominal pain and nausea.   Musculoskeletal:  Negative for myalgias.   Skin:  Negative for rash.   Neurological:  Negative for dizziness, loss of consciousness and headaches.        Balances issues seems to be better.   Endo/Heme/Allergies:  Does not bruise/bleed easily.   Psychiatric/Behavioral:  The patient does not have insomnia.             Objective     /88 (BP Location: Left arm, Patient Position: Sitting, BP Cuff Size: Adult)   Pulse 60   Resp 12   Ht 1.702 m (5' 7\")   Wt 112 kg (246 lb)   SpO2 96%   BMI 38.53 kg/m²     Physical Exam  Constitutional:       Appearance: He is well-developed.      Comments: Ambulates with a walker.   HENT:      Head: Normocephalic.   Neck:      Vascular: No JVD.   Cardiovascular:      Rate and Rhythm: Normal rate and regular rhythm.      Heart sounds: Normal heart sounds.   Pulmonary:      Effort: Pulmonary effort is normal. No respiratory distress.      Breath sounds: Normal breath sounds. No wheezing or rales.   Abdominal:      General: Bowel sounds are normal. There is no distension.      Palpations: Abdomen is soft.      Tenderness: There is no abdominal tenderness.   Musculoskeletal:         General: Normal range of motion.      Cervical back: Normal range of motion and neck supple.   Skin:     General: Skin is warm and dry.      Coloration: Skin is not pale.      Findings: No rash.      Comments: " Color seems improved.    Neurological:      Mental Status: He is alert and oriented to person, place, and time.     Impressions of ZioPatch of 11/3/2022-11/17/2022:  Predominantly sinus rhythm.   Symptoms associated with sinus rhythm and ectopy.   Asymptomatic, short SVT and NSVT.      IMPRESSION OF CT SCAN OF HEAD OF 8/31/2022:  1.  Slightly decreased size of LEFT supratentorial subdural hematoma overlying LEFT frontal, parietal and temporal lobes  2.  Trace extra-axial blood overlying the RIGHT frontal lobe, unchanged  3.  Unchanged 3 mm of LEFT-to-RIGHT midline shift     RESULTS OF ECHOCARDIOGRAM OF 7/12/2022 ([facility]):  Normal LV size  LVEF 55-60%  Normal RA ,LA and RV  Mild MR  Trace TR     CONCLUSIONS OF ECHOCARDIOGRAM OF 8/9/2020:  Compared to the images of the prior study done 8/3/2020, the EF appears improved.  Left ventricular ejection fraction is visually estimated to be 60%.     CONCLUSIONS OF ECHOCARDIOGRAM OF 8/3/2020:  Limited contrast study to reassess for LV thrombus.  Thrombus is not observed in the left ventricular apex.  Left ventricular ejection fraction is visually estimated to be 60%.  Mild hypokinesis of the apex.     MPI OF 11/17/2020:  Normal myocardial perfusion study  No ischemia or infarct, normal wall motion     IMPRESSIONS OF CORONARY ANGIOGRAM OF 8/1/2020:  1.  Acute non-STEMI due to culprit right coronary artery  2.  Successful PCI of the mid right coronary artery using 1 drug-eluting stent and IVUS guidance  3.  Successful PCI of the posterior lateral branch using 1 drug-eluting stent  4.  Normal LV systolic function  5.  Moderately elevated LVEDP (21 mmHg)  6.  Poorly controlled hypertension     LABS AS OF 1/3/2022 ([facility]):  Glucose 115  BUN 17  Creatinine 0.98  GFR 87  Potassium 4.6  Cholesterol 234  Triglycerides 346  HDL 46    Cholesterol HDL ratio 5.0    RESULTS OF CBC OF 11/3/2022:  WBC 6.4  RBC 3.76  Hgb 9.0  Hct 31.4    Assessment & Plan     1. Blood pressure instability   CBC WITH DIFFERENTIAL      2. Coronary artery disease involving native coronary artery of native heart without angina pectoris        3. Status post insertion of drug-eluting stent into right coronary artery for coronary artery disease  atorvastatin (LIPITOR) 80 MG tablet      4. Mixed hyperlipidemia  atorvastatin (LIPITOR) 80 MG tablet    Comp Metabolic Panel    Lipid Profile      5. Paroxysmal atrial fibrillation (HCC)        6. Current use of long term anticoagulation        7. Traumatic subdural hematoma, initial encounter        8. Type 2 diabetes mellitus without complication, without long-term current use of insulin (HCC)        9. Lumbar spondylosis            Medical Decision Making: Today's Assessment/Status/Plan:      1. Hypertension, treated with Micardis 40mg twice daily, Coreg 25mg twice daily, stable. BP is doing better.    2. CAD, status post PCI/NAOMY to the RCA in 2020. No angina or shortness of breath. Continue:  ASA 81mg once daily  Coreg 25mg twice daily  Micardis 40mg twice daily  Lipitor 80mg (increase from 40mg) once daily.    3. Paroxysmal atrial fibrillation, with no episodes seen on ZioPatch in November 2022.    4. Chronic anticoagulation with ASA only, following traumatic subdural hematoma, to be seen by neurosurgery later this month. We will coordinate whether to resume Eliquis or not.    5. Hyperlipidemia, treated with Lipitor 40mg, suboptimal results. To increase to 80mg once daily; repeat lipid panel in 1 month.    6. Diabetes mellitus, off of Metformin for now, stable. Followed by PCP.    7. Anemia, on Iron. To repeat CBC.     8. Back pain, on pain medication (Norco), followed by pain management.    As above, increase Lipitor from 40mg to 80mg; repeat labs in 1 month, to include lipid panel and CBC (to assess anemia). Follow-up with me in 6-8 weeks, sooner if clinical condition changes.

## 2023-03-17 ENCOUNTER — OFFICE VISIT (OUTPATIENT)
Dept: NEUROLOGY | Facility: MEDICAL CENTER | Age: 63
End: 2023-03-17
Attending: PSYCHIATRY & NEUROLOGY
Payer: OTHER GOVERNMENT

## 2023-03-17 VITALS
DIASTOLIC BLOOD PRESSURE: 94 MMHG | HEART RATE: 75 BPM | SYSTOLIC BLOOD PRESSURE: 156 MMHG | TEMPERATURE: 99 F | OXYGEN SATURATION: 96 % | HEIGHT: 67 IN | WEIGHT: 263.89 LBS | BODY MASS INDEX: 41.42 KG/M2

## 2023-03-17 DIAGNOSIS — Z29.89 SEIZURE PROPHYLAXIS: ICD-10-CM

## 2023-03-17 DIAGNOSIS — I69.911 MEMORY DEFICIT AFTER CEREBROVASCULAR DISEASE: ICD-10-CM

## 2023-03-17 PROCEDURE — 99212 OFFICE O/P EST SF 10 MIN: CPT | Performed by: PSYCHIATRY & NEUROLOGY

## 2023-03-17 PROCEDURE — 99205 OFFICE O/P NEW HI 60 MIN: CPT | Performed by: PSYCHIATRY & NEUROLOGY

## 2023-03-17 RX ORDER — BLOOD-GLUCOSE METER
KIT MISCELLANEOUS
COMMUNITY
Start: 2023-03-02

## 2023-03-17 RX ORDER — APIXABAN 5 MG/1
TABLET, FILM COATED ORAL
COMMUNITY
Start: 2023-02-08

## 2023-03-17 RX ORDER — LANCETS 28 GAUGE
EACH MISCELLANEOUS
COMMUNITY
Start: 2023-03-02

## 2023-03-17 ASSESSMENT — ENCOUNTER SYMPTOMS
HEMOPTYSIS: 0
FALLS: 0
BRUISES/BLEEDS EASILY: 0
MEMORY LOSS: 1
LOSS OF CONSCIOUSNESS: 0
DEPRESSION: 0
SPEECH CHANGE: 1
SEIZURES: 0
BLOOD IN STOOL: 0

## 2023-03-17 ASSESSMENT — FIBROSIS 4 INDEX: FIB4 SCORE: 0.36

## 2023-03-17 ASSESSMENT — PATIENT HEALTH QUESTIONNAIRE - PHQ9: CLINICAL INTERPRETATION OF PHQ2 SCORE: 0

## 2023-03-18 NOTE — PROGRESS NOTES
Melisa Wiley is a 62 y.o. male who presents with his wife Alesia, from the office of Dr. Henri Kong MD, for consultation, for follow-up after he suffered from a traumatic left hemispheric SDH status post bur hole and then craniotomy drainage, and who has remained on Depakote for seizure prophylaxis, suffering from persistent cognitive difficulties and gait difficulties.  History is gotten from discussions with the patient, his wife, as well as review of the electronic health record surrounding his hospitalizations and surgery.     MAYKEL Russo is a pleasant 62-year-old right-handed gentleman who had suffered from a fall on August 25, 2022.  He presented to the local hospital in [place], with increasing right-sided weakness and aphasia, CT of the brain (I reviewed all images) revealed a left holohemispheric subdural hematoma involving the frontal, parietal and temporal lobes, extending posteriorly around the cerebellum ipsilaterally.  There was significant right to left shift.  Transferred to [facility], he underwent left craniotomy with evacuation, and initially without neurologic improvement, amantadine was started.  Placed on Depakote for seizure prophylaxis, EEG was performed, but revealed no seizure activity.  The patient eventually began to recover.    At the time of discharge, notes indicated his Glascow coma score was 11 without focal neurologic deficit or findings.  He was discharged to inpatient rehabilitation, transferring to 3 different facilities closer to home.    Already on Eliquis for atrial fibrillation, this was held during hospitalization but has been reintroduced.  At home, he has unfortunately suffered from a couple of falls, the first in early December, CT of the brain again was stable, his last was at the end of December, again CT was repeated without change.  With the spinal cord stimulator, MRI scans have been contraindicated.    He has continued to have problems with gait  ataxia and feelings of unsteadiness.  He denies focal deficits with right-sided sensory, visual or motor loss, other than some left lower extremity weakness related to his chronic lumbosacral radiculopathy and pain.  The cognitive deficits surround language with some difficulty word finding, but also comprehension and learning.  Multitasking is more difficult, because of slowed mental processing but also some newer distractibility.  His personality has not changed drastically.  Fatigue and decreased endurance have remained.    Suffering from chronic low back pain, he has been on chronic doses of Zanaflex, MS Contin, trazodone and Effexor.  Zanaflex and MS Contin have been held pending this evaluation, trazodone was decreased from 150 mg, now 50 mg nightly, but Effexor was originally 150 mg, now increased to 225 mg.  He is not overly convinced that any of these changes has made a big difference from a cognitive standpoint.    He has a history of a left Bell's palsy in 2012, hypertension, paroxysmal A-fib, CAD, dyslipidemia, BPH, and reflux disease.  He has no known history of CVA, neurodegenerative disease, MS, epilepsy, migraine, liver or kidney disease, blood dyscrasia, or autoimmune disease.    He has had multiple surgeries to the lumbosacral spine including tube to decompress and fuse, others to insert the spinal stimulator, replace batteries, etc.    His mother suffered from cancer and symptomatic seizures, his father from stroke.  No one in the family has suffered from dementia, or seizures.  He has 3 siblings.    He does not smoke or drink.  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    He is on Coreg, myocarditis, Lipitor 80 mg daily, Eliquis 5 mg twice daily, Depakote 125 mg 3 times daily, baby aspirin, Effexor  mg daily, trazodone 50 mg nightly, Flomax, Proscar, Colace and ferrous sulfate.  The Zanaflex and Vicodin have been held.    Review of  "Systems   Constitutional:  Positive for malaise/fatigue.   HENT:  Negative for nosebleeds.    Respiratory:  Negative for hemoptysis.    Cardiovascular:  Positive for leg swelling.   Gastrointestinal:  Negative for blood in stool and melena.   Genitourinary:  Negative for hematuria.   Musculoskeletal:  Negative for falls.   Neurological:  Positive for speech change. Negative for seizures and loss of consciousness.   Endo/Heme/Allergies:  Does not bruise/bleed easily.   Psychiatric/Behavioral:  Positive for memory loss. Negative for depression.    All other systems reviewed and are negative.    Objective     BP (!) 156/94 (BP Location: Right arm, Patient Position: Sitting, BP Cuff Size: Adult)   Pulse 75   Temp 37.2 °C (99 °F) (Temporal)   Ht 1.702 m (5' 7\")   Wt 120 kg (263 lb 14.3 oz)   SpO2 96%   BMI 41.33 kg/m²      Physical Exam    He appears in no acute distress.  Vital signs are stable.  There is no malar rash, jaw or temporal tenderness, or jaw claudication.  His neck is supple, range of motion is full, carotid pulses are present bilaterally without asymmetry. Cardiac evaluation is unremarkable, but his rhythm is irregular.     Neurological Exam    He is fully oriented, there is no aphasia, apraxia, inattention,  Stereoagnosia or extinction on DSS.  Mental processing speed is a little slowed, but he can perform multitask appropriately if given time.    PERRLA/EOMI, OS there seems to be an inferior nasal quadrantic loss, OD visual fields are full.  The left facial droop in all 3 divisions including some asymmetry with wrinkling of the forehead and eye closure is seen.  Sensory exam is intact bilaterally.  The tongue and uvula are midline, shoulder shrug and head rotation are symmetric.    Musculoskeletal exam reveals no drift or asterixis.  There is a fine tremulousness with the right upper extremity, seen with sustained posture against gravity and with action only.  It attenuates at rest.  Strength is " intact throughout except some minimal IP weakness bilaterally, and with the left lower extremity where there is some minimal weakness on dorsiflexion, EHL and inversion.    Reflexes reveal a slight right-sided predominance though everything is diminished.  The left ankle jerk is absent, the right trace present.  Both toes are downgoing.    Station is increased, he walks slowly with shortened stride length.  He does not shuffle.  There is some impairment of coordination in the lower extremities but proportion to weakness.  Upper extremities show no dystaxia.  Fine motor control is symmetric in the upper extremities, slightly weakened with the left lower extremity proportionate to weakness.    Sensory evaluation reveals JPS to be intact, vibration diminished below the ankles bilaterally.  Temperature and pinprick are diminished subjectively over the left lateral calf and lateral foot.  All modalities are intact in the upper extremities.    Assessment & Plan     1. 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  He has also been taken off narcotics and Zanaflex without change in mentation, these probably can be reintroduced safely.  Though I suspect his Effexor and trazodone are safe to continue, if the latter can be discontinued without distortion of sleep, although better.    The left hemisphere was more selectively involved, EEG will be helpful in determining whether there is subclinical seizure activity but also if there is a cortical, residual effect from the hemorrhage with disorganization and slowing.  I do not believe a repeat CT scan of the brain is necessary.    Neuropsychological testing will be ordered.  We will follow-up afterwards.    - Referral to Neurology    2. Seizure prophylaxis  Before I discontinued Depakote,  repeat EEG study will be done.  Though this can be helpful to a degree in determining a cause for his persistent cognitive impairment, it is more critical to allow us to discontinue Depakote safely.  Assuming no underlying susceptibility, Depakote can be reduced by a single tablet every week until he is off.  We will contact him once his EEG has been complete so that titration can be started if appropriate.  During that time he was told he cannot drive for 3 months.  We will follow-up in about 5 months.    - Referral to Neurodiagnostics (EEG,EP,EMG/NCS/DBS)    Time: 60 minutes in total spent on patient care including pre-charting, record review, discussion with healthcare staff and documentation.  This includes face-to-face time for exam, review, discussion, as well as counseling and coordinating care.

## 2023-03-22 ENCOUNTER — APPOINTMENT (OUTPATIENT)
Dept: NEUROLOGY | Facility: MEDICAL CENTER | Age: 63
End: 2023-03-22
Attending: PSYCHIATRY & NEUROLOGY
Payer: OTHER GOVERNMENT

## 2023-03-28 ENCOUNTER — NON-PROVIDER VISIT (OUTPATIENT)
Dept: NEUROLOGY | Facility: MEDICAL CENTER | Age: 63
End: 2023-03-28
Attending: PSYCHIATRY & NEUROLOGY
Payer: OTHER GOVERNMENT

## 2023-03-28 ENCOUNTER — TELEPHONE (OUTPATIENT)
Dept: CARDIOLOGY | Facility: MEDICAL CENTER | Age: 63
End: 2023-03-28
Payer: OTHER GOVERNMENT

## 2023-03-28 DIAGNOSIS — I69.911 MEMORY DEFICIT AFTER CEREBROVASCULAR DISEASE: ICD-10-CM

## 2023-03-28 PROCEDURE — 95816 EEG AWAKE AND DROWSY: CPT | Performed by: PSYCHIATRY & NEUROLOGY

## 2023-03-28 PROCEDURE — 95816 EEG AWAKE AND DROWSY: CPT | Mod: 26 | Performed by: PSYCHIATRY & NEUROLOGY

## 2023-03-28 NOTE — TELEPHONE ENCOUNTER
Called patient regarding lab work ordered at last OV by AB. Patient stated he had lab work done yesterday (3/27/23) at [facility]. Lab results requested from [facility]. Records pending.

## 2023-03-29 DIAGNOSIS — I99.8 BLOOD PRESSURE INSTABILITY: ICD-10-CM

## 2023-03-29 DIAGNOSIS — E78.2 MIXED HYPERLIPIDEMIA: ICD-10-CM

## 2023-03-29 NOTE — PROCEDURES
[facility]    Outpatient Standard Video Electroencephalogram Report      Patient Name: Karan Wiley  MRN: [id number]  Date of Service: 03/28/23  Total Recording Time: 0 hours and 35 minutes.  Referring Provider: Ramon Ya M.*    INDICATION:  Karan Wiley 62 y.o. male, with the history of left SDH s/p crani; this standard, outpatient, EEG was requested to evaluate for seizure(s).    CURRENT ANTI-SEIZURE AND OTHER PERTINENT MEDICATIONS:     Current Outpatient Medications:     Eliquis,     FREESTYLE LITE,     FreeStyle Lancets,     atorvastatin, 80 mg, Oral, DAILY    divalproex, 125 mg, Oral, TID    traZODone, 50 mg, Oral, Nightly    Iron, Take  by mouth.    carvedilol, 25 mg, Oral, BID WITH MEALS    telmisartan, 40 mg, Oral, BID    venlafaxine XR, 75 mg, Oral, DAILY    ASPIRIN 81 PO, Take  by mouth every day.    venlafaxine, 150 mg, Oral, DAILY    docusate sodium, 100 mg, Oral, BID    tizanidine, 4 mg, Oral, TID    nitroglycerin, 0.4 mg, Sublingual, PRN    HYDROcodone/acetaminophen, 1 Tablet, Oral, 6X/DAY    finasteride, 5 mg, Oral, DAILY    tamsulosin, 0.4 mg, Oral, DAILY    TECHNIQUE: Standard outpatient video EEG was set up by a Neurodiagnostic technologist who performed education to the patient and staff. A minimum of 23 electrodes and 23 channel recording was setup and performed by Neurodiagnostic technologist, in accordance with the international 10-20 system. Impedence, electrode integrity, and technical impressions were documented. The study was reviewed in bipolar and referential montages. The recording examined the patient in the awake and drowsy state(s).     DESCRIPTION OF THE RECORD:  EEG background: During maximal wakefulness, the background was continuous, asymmetrical, and showed a 9 Hz posterior dominant rhythm, with a mixture of alpha and some theta activity.  Reactivity  was seen. State changes were seen.  During drowsiness, a loss of myogenic artifact  and theta/delta  frequencies were seen.     N2 sleep architecture was not seen.    ACTIVATION PROCEDURES:   Intermittent Photic stimulation was performed in a stepwise fashion from 1 to 30 Hz and did not elicited any abnormalities on EEG.     ICTAL AND INTERICTAL FINDINGS:   There was continuous, left hemispheric, maximal over the left temporal region, focal slowing, with embedded sharply contoured waveforms, as well as the presence of higher amplitudes and faster overriding frequencies.     No electroclinical or electrographic seizures were reported or recorded during the study.     EKG: Sampling of the EKG recording showed occasional PACs and/or PVCs    EVENTS: No clinical events recorded or reported    INTERPRETATION: This was an abnormal EEG during wakefulness and drowsiness due to:  Very mild diffuse slowing of the background, suggestive of diffuse and/or multifocal cerebral dysfunction.  This finding might be seen in a myriad of encephalopathies and in itself is a nonspecific finding.  The presence of continuous, left hemispheric, maximal over the left temporal region, focal slowing, is suggestive of additional cerebral dysfunction in that region.  This finding might be seen in context of structural lesion, among other considerations.  Clinical and radiological correlation is recommended.  The presence of embedded sharply contoured waveforms in the above described left-sided focal slowing might be suggestive of increased propensity toward focal seizures arising from this region.  The presence of higher amplitudes and overriding faster frequencies over the left side is suggestive of breach rhythm, and is consistent with the provided surgical history.  There were no electrographic seizures captured.  Single lead EKG showed occasional PVCs - please correlate clinically.     Raman Hooper MD  Neurology Attending, Epilepsy Program  [facility]

## 2023-03-30 ENCOUNTER — APPOINTMENT (OUTPATIENT)
Dept: CARDIOLOGY | Facility: PHYSICIAN GROUP | Age: 63
End: 2023-03-30
Payer: OTHER GOVERNMENT

## 2023-04-06 ENCOUNTER — OFFICE VISIT (OUTPATIENT)
Dept: CARDIOLOGY | Facility: PHYSICIAN GROUP | Age: 63
End: 2023-04-06
Payer: OTHER GOVERNMENT

## 2023-04-06 VITALS
BODY MASS INDEX: 41.44 KG/M2 | HEIGHT: 67 IN | RESPIRATION RATE: 14 BRPM | OXYGEN SATURATION: 96 % | DIASTOLIC BLOOD PRESSURE: 76 MMHG | WEIGHT: 264 LBS | SYSTOLIC BLOOD PRESSURE: 124 MMHG | HEART RATE: 75 BPM

## 2023-04-06 DIAGNOSIS — I48.0 PAROXYSMAL ATRIAL FIBRILLATION (HCC): ICD-10-CM

## 2023-04-06 DIAGNOSIS — I25.10 CORONARY ARTERY DISEASE INVOLVING NATIVE CORONARY ARTERY OF NATIVE HEART WITHOUT ANGINA PECTORIS: ICD-10-CM

## 2023-04-06 DIAGNOSIS — R63.5 WEIGHT GAIN: ICD-10-CM

## 2023-04-06 DIAGNOSIS — Z29.89 SEIZURE PROPHYLAXIS: ICD-10-CM

## 2023-04-06 DIAGNOSIS — E78.2 MIXED HYPERLIPIDEMIA: ICD-10-CM

## 2023-04-06 DIAGNOSIS — Z79.01 CURRENT USE OF LONG TERM ANTICOAGULATION: ICD-10-CM

## 2023-04-06 DIAGNOSIS — I10 ESSENTIAL HYPERTENSION, BENIGN: ICD-10-CM

## 2023-04-06 DIAGNOSIS — E66.01 CLASS 2 SEVERE OBESITY DUE TO EXCESS CALORIES WITH SERIOUS COMORBIDITY IN ADULT, UNSPECIFIED BMI (HCC): ICD-10-CM

## 2023-04-06 DIAGNOSIS — Z95.5 STATUS POST INSERTION OF DRUG-ELUTING STENT INTO RIGHT CORONARY ARTERY FOR CORONARY ARTERY DISEASE: ICD-10-CM

## 2023-04-06 DIAGNOSIS — G89.29 CHRONIC BACK PAIN, UNSPECIFIED BACK LOCATION, UNSPECIFIED BACK PAIN LATERALITY: ICD-10-CM

## 2023-04-06 DIAGNOSIS — I69.911 MEMORY DEFICIT AFTER CEREBROVASCULAR DISEASE: ICD-10-CM

## 2023-04-06 DIAGNOSIS — S06.5XAA TRAUMATIC SUBDURAL HEMATOMA, INITIAL ENCOUNTER (HCC): ICD-10-CM

## 2023-04-06 DIAGNOSIS — M54.9 CHRONIC BACK PAIN, UNSPECIFIED BACK LOCATION, UNSPECIFIED BACK PAIN LATERALITY: ICD-10-CM

## 2023-04-06 PROCEDURE — 99214 OFFICE O/P EST MOD 30 MIN: CPT | Performed by: NURSE PRACTITIONER

## 2023-04-06 RX ORDER — EZETIMIBE 10 MG/1
10 TABLET ORAL DAILY
Qty: 100 TABLET | Refills: 3 | Status: SHIPPED | OUTPATIENT
Start: 2023-04-06

## 2023-04-06 ASSESSMENT — FIBROSIS 4 INDEX: FIB4 SCORE: 0.36

## 2023-04-06 ASSESSMENT — ENCOUNTER SYMPTOMS
ORTHOPNEA: 0
HEADACHES: 0
COUGH: 0
SHORTNESS OF BREATH: 0
NAUSEA: 0
LOSS OF CONSCIOUSNESS: 0
CHILLS: 0
INSOMNIA: 0
PALPITATIONS: 0
FEVER: 0
ABDOMINAL PAIN: 0
MYALGIAS: 0
PND: 0
BRUISES/BLEEDS EASILY: 0
DIZZINESS: 0
MEMORY LOSS: 1

## 2023-04-06 NOTE — PROGRESS NOTES
Chief Complaint   Patient presents with    Follow-Up    Coronary Artery Disease    Atrial Fibrillation    Anticoagulation    HTN (Controlled)    Hyperlipidemia       Subjective     Karan Wiley is a 62 y.o. male who presents today for three month follow-up of CAD, PAFib, anticoagulation, HTN, and hyperlipidemia.    Karan is a 62 year old male with history of hypertension, dyslipidemia and CAD, with STEMI in August 2020 with PCI of RCA and PCI of posterior lateral branch.  He did have post-procedural complications, including acute hypoxic respiratory failure with flash pulmonary edema, and PEA cardiac arrest.  Amiodarone was added, which was stopped in December 2020, after ZioPatch showed no arrythmias. Outpatient stress test in November 2020 came back normal. He does also PAFib, and is anticoagulated with Eliquis.      In late August 2022, he suffered a mechanical ground level fall, with unknown loss of consciousness; he was first seen at [facility] where CT scan showed subdural hemorrhage with a shift and mass effect; he was transferred to [facility]. There, ASA and Eliquis were held; he had left craniotomy by Dr. Luis, and was transferred to the ICU; serial CT scans of the head shows improvement. He did have some leukocystosis and bronchial levage came back with MSSA, and he was treated with antibiotics. He was then [facility] a [facility]. He only stayed there for a few days, as there were some patient issues, and he left early.     After discussion with neurology, he was restarted on ASA; Eliquis was eventually re-added too. ZioPatch (11/3/2022-11/17/2022) showed NO Afib, but some short SVT episodes. He is maintained on Coreg 25.mg twice daily.      In March 2023, he saw neurology (Dr. Ya) who recommended an EEG, before discontinuing Depakote; patient has not yet been notified of the results.     He is here today for three month follow-up. BP has been more stable with only very rare elevated readings  (140-150s systolic), and he has not had any further falls. He denies any chest pain, pressure, tightness or discomfort; no symptomatic palpitations; breathing is stable with no orthopnea or PND. No LE edema. No syncope. He is compliant with his medications; in January 2023, we increased Lipitor from 40mg to 80mg, for better lipid control.    Past Medical History:   Diagnosis Date    CAD (coronary artery disease) 08/2020    STEMI. PCI/NAOMY (Raghav 3.5 x 25mm) the mid RCA, and PCI/NAOMY (Raghav 2.5 x 12mm) the PDA.    Chronic anticoagulation     Depression     Hyperlipidemia     Hypertension     Low back pain     Paroxysmal atrial fibrillation (HCC)      Past Surgical History:   Procedure Laterality Date    CRANIOTOMY Left 8/25/2022    Procedure: CRANIOTOMY FOR SUBDURAL HEMATOMA;  Surgeon: Tesfaye Luther M.D.;  Location: [facility];  Service: Neurosurgery    SPINAL CORD STIMULATOR  2/26/2019    Procedure: SPINAL CORD STIMULATOR-  STAGE 1:  RIGHT FLANK BATTERY REPLACEMENT/UPGRADE;  Surgeon: Stepan Hawkins M.D.;  Location: [facility];  Service: Neurosurgery    FUSION, SPINE, LUMBAR, PLIF  2/26/2019    Procedure: LUMBAR FUSION POSTERIOR-  STAGE 2: ONLAY L5-S1 BONE;  Surgeon: Stepan Hawkins M.D.;  Location: [facility];  Service: Neurosurgery    LAMINOTOMY  2/26/2019    Procedure: LAMINOTOMY-  STAGE 2:  REDO LEFT L4-S1;  Surgeon: Stepan Hawkins M.D.;  Location: [facility];  Service: Neurosurgery     Family History   Problem Relation Age of Onset    Cancer Mother         breast     Social History     Socioeconomic History    Marital status:      Spouse name: Not on file    Number of children: Not on file    Years of education: Not on file    Highest education level: Not on file   Occupational History    Not on file   Tobacco Use    Smoking status: Former     Packs/day: 0.25     Years: 3.00     Pack years: 0.75     Types: Cigarettes     Quit date: 1980     Years since  quittin.2    Smokeless tobacco: Never   Vaping Use    Vaping Use: Never used   Substance and Sexual Activity    Alcohol use: No    Drug use: Never    Sexual activity: Not Currently     Partners: Female   Other Topics Concern    Not on file   Social History Narrative    Not on file     Social Determinants of Health     Financial Resource Strain: Not on file   Food Insecurity: Not on file   Transportation Needs: Not on file   Physical Activity: Not on file   Stress: Not on file   Social Connections: Not on file   Intimate Partner Violence: Not on file   Housing Stability: Not on file     Allergies   Allergen Reactions    Hctz [Hydrochlorothiazide W-Spironolactone]      Cannot breathe    Oxycodone Anaphylaxis and Swelling     Throat swelling    Pectin Anaphylaxis    Hydrochlorothiazide     Zolpidem      Outpatient Encounter Medications as of 2023   Medication Sig Dispense Refill    ezetimibe (ZETIA) 10 MG Tab Take 1 Tablet by mouth every day. 100 Tablet 3    ELIQUIS 5 MG Tab       FREESTYLE LITE strip       FreeStyle Lancets Misc       atorvastatin (LIPITOR) 80 MG tablet Take 1 Tablet by mouth every day. 100 Tablet 3    divalproex (DEPAKOTE) 125 MG EC tablet Take 125 mg by mouth 3 times a day.      traZODone (DESYREL) 50 MG Tab Take 50 mg by mouth every evening.      Ferrous Sulfate (IRON) 325 (65 Fe) MG Tab Take  by mouth.      carvedilol (COREG) 25 MG Tab Take 1 Tablet by mouth 2 times a day with meals. 200 Tablet 3    telmisartan (MICARDIS) 40 MG Tab Take 1 Tablet by mouth 2 times a day. 200 Tablet 3    venlafaxine XR (EFFEXOR XR) 75 MG CAPSULE SR 24 HR Take 75 mg by mouth every day.      ASPIRIN 81 PO Take  by mouth every day.      venlafaxine (EFFEXOR-XR) 150 MG extended-release capsule Take 150 mg by mouth every day.      docusate sodium (COLACE) 100 MG Cap Take 100 mg by mouth 2 times a day.      tizanidine (ZANAFLEX) 4 MG capsule Take 4 mg by mouth 3 times a day.      nitroglycerin (NITROSTAT) 0.4 MG  "SL Tab Place 1 Tab under tongue as needed for Chest Pain. 25 Tab 3    HYDROcodone/acetaminophen (NORCO)  MG Tab Take 1 Tab by mouth 6 Times a Day.      finasteride (PROSCAR) 5 MG Tab Take 5 mg by mouth every day.      tamsulosin (FLOMAX) 0.4 MG capsule Take 0.4 mg by mouth every day.       No facility-administered encounter medications on file as of 4/6/2023.     Review of Systems   Constitutional:  Negative for chills, fever and malaise/fatigue.   HENT:  Negative for congestion.    Respiratory:  Negative for cough and shortness of breath.    Cardiovascular:  Negative for chest pain, palpitations, orthopnea, leg swelling and PND.   Gastrointestinal:  Negative for abdominal pain and nausea.   Musculoskeletal:  Negative for myalgias.   Skin:  Negative for rash.   Neurological:  Negative for dizziness, loss of consciousness and headaches.        Balances issues seems to be better.   Endo/Heme/Allergies:  Does not bruise/bleed easily.   Psychiatric/Behavioral:  Positive for memory loss. The patient does not have insomnia.             Objective     /76 (BP Location: Left arm, Patient Position: Sitting, BP Cuff Size: Adult)   Pulse 75   Resp 14   Ht 1.702 m (5' 7\")   Wt 120 kg (264 lb)   SpO2 96%   BMI 41.35 kg/m²     Physical Exam  Constitutional:       Appearance: He is well-developed.      Comments: Ambulates with a cane.   HENT:      Head: Normocephalic.   Neck:      Vascular: No JVD.   Cardiovascular:      Rate and Rhythm: Normal rate and regular rhythm.      Heart sounds: Normal heart sounds.   Pulmonary:      Effort: Pulmonary effort is normal. No respiratory distress.      Breath sounds: Normal breath sounds. No wheezing or rales.   Abdominal:      General: Bowel sounds are normal. There is no distension.      Palpations: Abdomen is soft.      Tenderness: There is no abdominal tenderness.   Musculoskeletal:         General: Normal range of motion.      Cervical back: Normal range of motion and " neck supple.   Skin:     General: Skin is warm and dry.      Coloration: Skin is not pale.      Findings: No rash.   Neurological:      Mental Status: He is alert and oriented to person, place, and time.     Impressions of ZioPatch of 11/3/2022-11/17/2022:  Predominantly sinus rhythm.   Symptoms associated with sinus rhythm and ectopy.   Asymptomatic, short SVT and NSVT.      IMPRESSION OF CT SCAN OF HEAD OF 8/31/2022:  1.  Slightly decreased size of LEFT supratentorial subdural hematoma overlying LEFT frontal, parietal and temporal lobes  2.  Trace extra-axial blood overlying the RIGHT frontal lobe, unchanged  3.  Unchanged 3 mm of LEFT-to-RIGHT midline shift     RESULTS OF ECHOCARDIOGRAM OF 7/12/2022 ([facility]):  Normal LV size  LVEF 55-60%  Normal RA ,LA and RV  Mild MR  Trace TR     CONCLUSIONS OF ECHOCARDIOGRAM OF 8/9/2020:  Compared to the images of the prior study done 8/3/2020, the EF appears improved.  Left ventricular ejection fraction is visually estimated to be 60%.     CONCLUSIONS OF ECHOCARDIOGRAM OF 8/3/2020:  Limited contrast study to reassess for LV thrombus.  Thrombus is not observed in the left ventricular apex.  Left ventricular ejection fraction is visually estimated to be 60%.  Mild hypokinesis of the apex.     MPI OF 11/17/2020:  Normal myocardial perfusion study  No ischemia or infarct, normal wall motion     IMPRESSIONS OF CORONARY ANGIOGRAM OF 8/1/2020:  1.  Acute non-STEMI due to culprit right coronary artery  2.  Successful PCI of the mid right coronary artery using 1 drug-eluting stent and IVUS guidance  3.  Successful PCI of the posterior lateral branch using 1 drug-eluting stent  4.  Normal LV systolic function  5.  Moderately elevated LVEDP (21 mmHg)  6.  Poorly controlled hypertension    LABS AS OF 3.27/2023 ([facility]):  Glucose 113  BUN 11  Creatinine 0.96  GFR 89  Potassium 3.9  AST 14  ALT 13  Cholesterol 210  Triglycerides 213  HDL 55    Cholesterol/HDL ratio 4.0    LABS AS OF  1/3/2022 ([facility]):  Glucose 115  BUN 17  Creatinine 0.98  GFR 87  Potassium 4.6  Cholesterol 234  Triglycerides 346  HDL 46    Cholesterol HDL ratio 5.0           Assessment & Plan     1. Coronary artery disease involving native coronary artery of native heart without angina pectoris        2. Status post insertion of drug-eluting stent into right coronary artery for coronary artery disease        3. Paroxysmal atrial fibrillation (HCC)        4. Current use of long term anticoagulation        5. Essential hypertension, benign        6. Mixed hyperlipidemia  ezetimibe (ZETIA) 10 MG Tab    Comp Metabolic Panel    Lipid Profile      7. Weight gain  TSH      8. Class 2 severe obesity due to excess calories with serious comorbidity in adult, unspecified BMI ([facility])        9. Traumatic subdural hematoma, initial encounter ([facility])        10. Seizure prophylaxis        11. 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 Chronic back pain, unspecified back location, unspecified back pain laterality            Medical Decision Making: Today's Assessment/Status/Plan:      1. CAD, status post PCI/NAOMY to the RCA in 2020. No angina or shortness of breath. Continue:  ASA 81mg once daily  Coreg 25mg twice daily  Micardis 40mg twice daily  Lipitor 80mg once daily    2. Paroxysmal atrial fibrillation, with no episodes seen on ZioPatch in November 2022; no symptomatic palpitations on Coreg.    3. Chronic anticoagulation with Eliquis, no bleeding issues.    4. Hypertension, treated with Micardis and Coreg, stable. BP is much better.    5. Hyperlipidemia, treated with Lipitor 80mg. LDL is 121 (not at goal); to ADD some Zetia 10mg once daily; repeat lipid panel in 6 weeks.    6. Weight gain, possibly due to Depakote, to check TSH.    7. History of TBI, due to fall, with some ongoing memory issues, followed by neurology. He did have EEG and is awaiting results.    8. Chronic back pain, followed by pain management.    He is  stable from a cardiac standpoint. BP Is much better; no angina or shortness of breath. As above, add Zetia for better LDL control. Labs in 6 weeks. Follow-up with me in 3 months.

## 2023-05-08 ENCOUNTER — TELEPHONE (OUTPATIENT)
Dept: CARDIOLOGY | Facility: MEDICAL CENTER | Age: 63
End: 2023-05-08
Payer: OTHER GOVERNMENT

## 2023-05-08 NOTE — TELEPHONE ENCOUNTER
Called and spoke to patient's spouse Alesia. Alesia was confused about the call initially as she stated she was sleeping.   She states last year an Echocardiogram was ordered by [facility] and now they are getting a bill for $431.56.  Gerald is telling them it was not ordered correctly, no referral or auth placed. She states they told her [facility] was already paid for this exam and they will not cover at [place]. Patient states that she has already talked to multiple people, billing and gerald about this and she is getting frustrated because no one can help her. She was appreciative of call, however wants to speak to a supervisor.     Cleo, RN Supervisor: Are you able to help with this? Thank you!

## 2023-05-08 NOTE — TELEPHONE ENCOUNTER
[facility]    Caller: Alesia Wiley (Spouse)     Topic/issue: Spouse needs clarification on an EKG order that Caryn Mckinney had sent out last year because they are now getting billed for it. She said it was in June of 2022 when it was ordered but they got it done at [facility] in July of 2022. Patient already called billing and Gerald but she just needs further clarification on when the order was really sent with times and dates.     Callback Number: [phone] (Spouse's cell phone)    Thank you,  -Erica JANE

## 2023-05-09 NOTE — TELEPHONE ENCOUNTER
Phone Number Called: [phone]    Call outcome: Did not leave a detailed message. Requested patient to call back.    Message: Called to follow up with patient wife, Alesia, regarding echocardiogram. According to chart review echocardiogram was ordered on 1/13/22. Echocardiogram was completed at [facility] on 7/12/22.

## 2023-06-02 DIAGNOSIS — Z29.89 SEIZURE PROPHYLAXIS: ICD-10-CM

## 2023-06-29 ENCOUNTER — APPOINTMENT (OUTPATIENT)
Dept: NEUROLOGY | Facility: MEDICAL CENTER | Age: 63
End: 2023-06-29
Attending: PSYCHIATRY & NEUROLOGY
Payer: OTHER GOVERNMENT

## 2023-07-31 ENCOUNTER — APPOINTMENT (OUTPATIENT)
Dept: NEUROLOGY | Facility: MEDICAL CENTER | Age: 63
End: 2023-07-31
Attending: PSYCHIATRY & NEUROLOGY
Payer: OTHER GOVERNMENT

## 2023-08-08 ENCOUNTER — TELEPHONE (OUTPATIENT)
Dept: CARDIOLOGY | Facility: MEDICAL CENTER | Age: 63
End: 2023-08-08
Payer: OTHER GOVERNMENT

## 2023-08-08 LAB
CHOLEST SERPL-MCNC: 169 MG/DL
HDLC SERPL-MCNC: 47 MG/DL
LDLC SERPL CALC-MCNC: 93 MG/DL
TRIGL SERPL-MCNC: 216 MG/DL

## 2023-08-08 NOTE — TELEPHONE ENCOUNTER
[facility]    Caller: Karan Wiley    Where labs will be completed: [facility] in [place], [place]    Fax/Phone number for lab: Fx. [phone]    Upcoming Appointment Date: Patient is currently waiting at the lab right now.    Callback Number: [phone]    Thank you,  -Erica JANE

## 2023-08-09 DIAGNOSIS — E78.2 MIXED HYPERLIPIDEMIA: ICD-10-CM

## 2023-08-10 ENCOUNTER — OFFICE VISIT (OUTPATIENT)
Dept: CARDIOLOGY | Facility: PHYSICIAN GROUP | Age: 63
End: 2023-08-10
Payer: OTHER GOVERNMENT

## 2023-08-10 VITALS
HEART RATE: 80 BPM | RESPIRATION RATE: 12 BRPM | HEIGHT: 67 IN | OXYGEN SATURATION: 93 % | BODY MASS INDEX: 41.28 KG/M2 | SYSTOLIC BLOOD PRESSURE: 90 MMHG | WEIGHT: 263 LBS | DIASTOLIC BLOOD PRESSURE: 58 MMHG

## 2023-08-10 DIAGNOSIS — E78.2 MIXED HYPERLIPIDEMIA: ICD-10-CM

## 2023-08-10 DIAGNOSIS — I25.10 CORONARY ARTERY DISEASE INVOLVING NATIVE CORONARY ARTERY OF NATIVE HEART WITHOUT ANGINA PECTORIS: ICD-10-CM

## 2023-08-10 DIAGNOSIS — I48.0 PAROXYSMAL ATRIAL FIBRILLATION (HCC): ICD-10-CM

## 2023-08-10 DIAGNOSIS — R06.02 SHORTNESS OF BREATH: ICD-10-CM

## 2023-08-10 DIAGNOSIS — I10 ESSENTIAL HYPERTENSION, BENIGN: ICD-10-CM

## 2023-08-10 DIAGNOSIS — Z79.01 CURRENT USE OF LONG TERM ANTICOAGULATION: ICD-10-CM

## 2023-08-10 DIAGNOSIS — Z95.5 STATUS POST INSERTION OF DRUG-ELUTING STENT INTO RIGHT CORONARY ARTERY FOR CORONARY ARTERY DISEASE: ICD-10-CM

## 2023-08-10 DIAGNOSIS — E11.9 TYPE 2 DIABETES MELLITUS WITHOUT COMPLICATION, WITHOUT LONG-TERM CURRENT USE OF INSULIN (HCC): ICD-10-CM

## 2023-08-10 DIAGNOSIS — R53.83 OTHER FATIGUE: ICD-10-CM

## 2023-08-10 PROBLEM — Z78.9 NO CONTRAINDICATION TO DEEP VEIN THROMBOSIS (DVT) PROPHYLAXIS: Status: RESOLVED | Noted: 2022-08-28 | Resolved: 2023-08-10

## 2023-08-10 PROBLEM — Z87.891 HISTORY OF TOBACCO USE: Status: ACTIVE | Noted: 2020-08-01

## 2023-08-10 PROCEDURE — 99214 OFFICE O/P EST MOD 30 MIN: CPT | Performed by: NURSE PRACTITIONER

## 2023-08-10 PROCEDURE — 3078F DIAST BP <80 MM HG: CPT | Performed by: NURSE PRACTITIONER

## 2023-08-10 PROCEDURE — 3074F SYST BP LT 130 MM HG: CPT | Performed by: NURSE PRACTITIONER

## 2023-08-10 RX ORDER — ROSUVASTATIN CALCIUM 40 MG/1
40 TABLET, COATED ORAL DAILY
Qty: 100 TABLET | Refills: 3 | Status: SHIPPED | OUTPATIENT
Start: 2023-08-10

## 2023-08-10 ASSESSMENT — FIBROSIS 4 INDEX: FIB4 SCORE: 0.37

## 2023-08-10 ASSESSMENT — ENCOUNTER SYMPTOMS
ORTHOPNEA: 0
CHILLS: 0
SHORTNESS OF BREATH: 1
INSOMNIA: 0
ABDOMINAL PAIN: 0
HEADACHES: 0
MEMORY LOSS: 1
NAUSEA: 0
DIZZINESS: 0
MYALGIAS: 0
PND: 0
PALPITATIONS: 0
FEVER: 0
BRUISES/BLEEDS EASILY: 0
LOSS OF CONSCIOUSNESS: 0
COUGH: 0

## 2023-08-10 NOTE — LETTER
[facility] [address]  Phone: [phone]  Fax: [phone]              RE:  Karan Wiley  1960    Encounter Date: 8/10/2023    To Whom It May Concern:    Karan is under our care for management of his cardiac conditions. He is not physically able to hunt this year, due to underlying health conditions.     Please allow Mr. Wiley to return his tag, as allowed by law, due to the above.    If you have any questions, please don't hesitate to contact our office.    VANDANA Richardson.

## 2023-08-10 NOTE — PROGRESS NOTES
Chief Complaint   Patient presents with    Follow-Up    Coronary Artery Disease    Atrial Fibrillation    Anticoagulation    HTN (Controlled)    Hyperlipidemia    Diabetes (Controlled)       Subjective     Karan Wiley is a 63 y.o. male who presents today for four month follow-up of CAD, PAFib, anticoagulation, HTN, hyperlipidemia, and DM.    Karan is a 63 year old male with history of hypertension, dyslipidemia and CAD, with STEMI in August 2020 with PCI of RCA and PCI of posterior lateral branch.  He did have post-procedural complications, including acute hypoxic respiratory failure with flash pulmonary edema, and PEA cardiac arrest.  He does also PAFib, and is anticoagulated with Eliquis.      In late August 2022, he suffered a mechanical ground level fall, and had a subdural hemorrhage with a shift and mass effect. ASA and Eliquis were held; he had left craniotomy by Dr. Luis. He did have some leukocystosis and bronchial levage came back with MSSA, and he was treated with antibiotics. After discussion with neurology, he was restarted on ASA and Eliquis. ZioPatch (11/3/2022-11/17/2022) showed NO Afib, but some short SVT episodes. He is maintained on Coreg 25.mg twice daily.       At follow-up with me in April 2023, Zetia was added to Lipitor 80mg for better LDL control. We also finally got BP under control.    He is here today for four month follow-up. BP has been much better, even with some low readings. He has noticed some progressive shortness of breath, a lot of time with lying down. He does sleep propped up.   He denies any chest pain, pressure, tightness or discomfort; no symptomatic palpitations; no LE edema. No syncope.     Past Medical History:   Diagnosis Date    CAD (coronary artery disease) 08/2020    STEMI. PCI/NAOMY (Raghav 3.5 x 25mm) the mid RCA, and PCI/NAOMY (Raghav 2.5 x 12mm) the PDA.    Chronic anticoagulation     Depression     Hyperlipidemia     Hypertension     Low back pain      Paroxysmal atrial fibrillation (HCC)      Past Surgical History:   Procedure Laterality Date    CRANIOTOMY Left 2022    Procedure: CRANIOTOMY FOR SUBDURAL HEMATOMA;  Surgeon: Tesfaye Luther M.D.;  Location: SURGERY [facility];  Service: Neurosurgery    SPINAL CORD STIMULATOR  2019    Procedure: SPINAL CORD STIMULATOR-  STAGE 1:  RIGHT FLANK BATTERY REPLACEMENT/UPGRADE;  Surgeon: Stepan Hawkins M.D.;  Location: SURGERY [facility];  Service: Neurosurgery    FUSION, SPINE, LUMBAR, PLIF  2019    Procedure: LUMBAR FUSION POSTERIOR-  STAGE 2: ONLAY L5-S1 BONE;  Surgeon: Stepan Hawkins M.D.;  Location: SURGERY [facility];  Service: Neurosurgery    LAMINOTOMY  2019    Procedure: LAMINOTOMY-  STAGE 2:  REDO LEFT L4-S1;  Surgeon: Stepan Hawkins M.D.;  Location: SURGERY [facility];  Service: Neurosurgery     Family History   Problem Relation Age of Onset    Cancer Mother         breast     Social History     Socioeconomic History    Marital status:      Spouse name: Not on file    Number of children: Not on file    Years of education: Not on file    Highest education level: Not on file   Occupational History    Not on file   Tobacco Use    Smoking status: Former     Packs/day: 0.25     Years: 3.00     Pack years: 0.75     Types: Cigarettes     Quit date:      Years since quittin.6    Smokeless tobacco: Never   Vaping Use    Vaping Use: Never used   Substance and Sexual Activity    Alcohol use: No    Drug use: Never    Sexual activity: Not Currently     Partners: Female   Other Topics Concern    Not on file   Social History Narrative    Not on file     Social Determinants of Health     Financial Resource Strain: Not on file   Food Insecurity: Not on file   Transportation Needs: Not on file   Physical Activity: Not on file   Stress: Not on file   Social Connections: Not on file   Intimate Partner Violence: Not on file   Housing Stability: Not on file     Allergies   Allergen  Reactions    Hctz [Hydrochlorothiazide W-Spironolactone]      Cannot breathe    Oxycodone Anaphylaxis and Swelling     Throat swelling    Pectin Anaphylaxis    Hydrochlorothiazide     Zolpidem      Outpatient Encounter Medications as of 8/10/2023   Medication Sig Dispense Refill    rosuvastatin (CRESTOR) 40 MG tablet Take 1 Tablet by mouth every day. 100 Tablet 3    ezetimibe (ZETIA) 10 MG Tab Take 1 Tablet by mouth every day. 100 Tablet 3    ELIQUIS 5 MG Tab       FREESTYLE LITE strip       FreeStyle Lancets Misc       divalproex (DEPAKOTE) 125 MG EC tablet Take 125 mg by mouth 3 times a day.      traZODone (DESYREL) 50 MG Tab Take 50 mg by mouth every evening.      Ferrous Sulfate (IRON) 325 (65 Fe) MG Tab Take  by mouth.      carvedilol (COREG) 25 MG Tab Take 1 Tablet by mouth 2 times a day with meals. 200 Tablet 3    telmisartan (MICARDIS) 40 MG Tab Take 1 Tablet by mouth 2 times a day. 200 Tablet 3    venlafaxine XR (EFFEXOR XR) 75 MG CAPSULE SR 24 HR Take 75 mg by mouth every day.      ASPIRIN 81 PO Take  by mouth every day.      venlafaxine (EFFEXOR-XR) 150 MG extended-release capsule Take 150 mg by mouth every day.      docusate sodium (COLACE) 100 MG Cap Take 100 mg by mouth 2 times a day.      tizanidine (ZANAFLEX) 4 MG capsule Take 4 mg by mouth 3 times a day.      nitroglycerin (NITROSTAT) 0.4 MG SL Tab Place 1 Tab under tongue as needed for Chest Pain. 25 Tab 3    HYDROcodone/acetaminophen (NORCO)  MG Tab Take 1 Tab by mouth 6 Times a Day.      finasteride (PROSCAR) 5 MG Tab Take 5 mg by mouth every day.      tamsulosin (FLOMAX) 0.4 MG capsule Take 0.4 mg by mouth every day.      [DISCONTINUED] atorvastatin (LIPITOR) 80 MG tablet Take 1 Tablet by mouth every day. 100 Tablet 3     No facility-administered encounter medications on file as of 8/10/2023.     Review of Systems   Constitutional:  Positive for malaise/fatigue. Negative for chills and fever.   HENT:  Negative for congestion.   "  Respiratory:  Positive for shortness of breath. Negative for cough.    Cardiovascular:  Negative for chest pain, palpitations, orthopnea, leg swelling and PND.   Gastrointestinal:  Negative for abdominal pain and nausea.   Musculoskeletal:  Negative for myalgias.   Skin:  Negative for rash.   Neurological:  Negative for dizziness, loss of consciousness and headaches.        Balances issues seems to be better.   Endo/Heme/Allergies:  Does not bruise/bleed easily.   Psychiatric/Behavioral:  Positive for memory loss. The patient does not have insomnia.               Objective     BP 90/58 (BP Location: Left arm, Patient Position: Sitting, BP Cuff Size: Adult)   Pulse 80   Resp 12   Ht 1.702 m (5' 7\")   Wt 119 kg (263 lb)   SpO2 93%   BMI 41.19 kg/m²     Physical Exam  Constitutional:       Appearance: He is well-developed.      Comments: Ambulates with a cane.   HENT:      Head: Normocephalic.   Neck:      Vascular: No JVD.   Cardiovascular:      Rate and Rhythm: Normal rate and regular rhythm.      Heart sounds: Normal heart sounds.   Pulmonary:      Effort: Pulmonary effort is normal. No respiratory distress.      Breath sounds: Normal breath sounds. No wheezing or rales.   Abdominal:      General: Bowel sounds are normal. There is no distension.      Palpations: Abdomen is soft.      Tenderness: There is no abdominal tenderness.   Musculoskeletal:         General: Normal range of motion.      Cervical back: Normal range of motion and neck supple.   Skin:     General: Skin is warm and dry.      Coloration: Skin is not pale.      Findings: No rash.   Neurological:      Mental Status: He is alert and oriented to person, place, and time.       [facility]:  Predominantly sinus rhythm.   Symptoms associated with sinus rhythm and ectopy.   Asymptomatic, short SVT and NSVT.      IMPRESSION OF CT SCAN OF HEAD OF 8/31/2022:  1.  Slightly decreased size of LEFT supratentorial " subdural hematoma overlying LEFT frontal, parietal and temporal lobes  2.  Trace extra-axial blood overlying the RIGHT frontal lobe, unchanged  3.  Unchanged 3 mm of LEFT-to-RIGHT midline shift     RESULTS OF ECHOCARDIOGRAM OF 7/12/2022 ([facility]):  Normal LV size  LVEF 55-60%  Normal RA ,LA and RV  Mild MR  Trace TR     CONCLUSIONS OF ECHOCARDIOGRAM OF 8/9/2020:  Compared to the images of the prior study done 8/3/2020, the EF appears improved.  Left ventricular ejection fraction is visually estimated to be 60%.     CONCLUSIONS OF ECHOCARDIOGRAM OF 8/3/2020:  Limited contrast study to reassess for LV thrombus.  Thrombus is not observed in the left ventricular apex.  Left ventricular ejection fraction is visually estimated to be 60%.  Mild hypokinesis of the apex.     MPI OF 11/17/2020:  Normal myocardial perfusion study  No ischemia or infarct, normal wall motion     IMPRESSIONS OF CORONARY ANGIOGRAM OF 8/1/2020:  1.  Acute non-STEMI due to culprit right coronary artery  2.  Successful PCI of the mid right coronary artery using 1 drug-eluting stent and IVUS guidance  3.  Successful PCI of the posterior lateral branch using 1 drug-eluting stent  4.  Normal LV systolic function  5.  Moderately elevated LVEDP (21 mmHg)  6.  Poorly controlled hypertension     LABS OF AS 8/9/2023:  Glucose 102  BUN 15  Creatinine 1.04  GFR 81  Potassium 4.3  AST 17  ALT 17  Cholesterol 169  Triglycerides 216  HDL 47  LDL 93  Cholesterol/HDL ration 4.0    Assessment & Plan     1. Shortness of breath  DX-CHEST-2 VIEWS      2. Other fatigue  CBC WITHOUT DIFFERENTIAL      3. Coronary artery disease involving native coronary artery of native heart without angina pectoris  EC-ECHOCARDIOGRAM COMPLETE W/O CONT    DX-CHEST-2 VIEWS      4. Status post insertion of drug-eluting stent into right coronary artery for coronary artery disease        5. Paroxysmal atrial fibrillation (HCC)        6. Current use of long term anticoagulation        7.  Essential hypertension, benign        8. Mixed hyperlipidemia  Lipid Profile    rosuvastatin (CRESTOR) 40 MG tablet      9. Type 2 diabetes mellitus without complication, without long-term current use of insulin (HCC)            Medical Decision Making: Today's Assessment/Status/Plan:      1. Shortness of breath and fatigue, along with orthopnea. To repeat echo, as last one was in July 2022. To also check CBC. Recent TSH was normal.    2. CAD, status post PCI/NAOMY to the RCA in 2020. No angina or shortness of breath. Continue:  ASA 81mg once daily  Coreg 25mg twice daily  Micardis 40mg twice daily  Zetia 10mg once daily  CHANGE Lipitor 80mg to Crestor 40mg     3. Paroxysmal atrial fibrillation, with no episodes seen on ZioPatch in November 2022; no symptomatic palpitations on Coreg.     4. Chronic anticoagulation with Eliquis, no bleeding issues.     5. Hypertension, treated with Micardis and Coreg, stable. BP is on the lower side today, but generally much better.      6. Hyperlipidemia, treated with Lipitor 80mg and Zetia 10mg once daily. LDL is 93 (improved, but still not at goal); to CHANGE Lipitor 80mg to Crestor 40mg; continue Zetia 10mg. Repeat lipid panel in 1 month.     7. History of TBI, due to fall, with some ongoing memory issues, followed by neurology. He had follow-up later this month for repeat EEG with DR. Ya.     8. Chronic back pain, followed by pain management.     As above, to repeat echo and CBC. Change Lipitor to Crestor. Repeat labs in 1 month. Follow-up with me in 6-8 weeks to discuss echo results. Keep follow-up with other providers too.

## 2023-08-14 DIAGNOSIS — I25.10 CORONARY ARTERY DISEASE INVOLVING NATIVE CORONARY ARTERY OF NATIVE HEART WITHOUT ANGINA PECTORIS: ICD-10-CM

## 2023-08-14 DIAGNOSIS — R06.02 SHORTNESS OF BREATH: ICD-10-CM

## 2023-08-17 ENCOUNTER — NON-PROVIDER VISIT (OUTPATIENT)
Dept: NEUROLOGY | Facility: MEDICAL CENTER | Age: 63
End: 2023-08-17
Attending: STUDENT IN AN ORGANIZED HEALTH CARE EDUCATION/TRAINING PROGRAM
Payer: OTHER GOVERNMENT

## 2023-08-17 DIAGNOSIS — Z29.89 SEIZURE PROPHYLAXIS: ICD-10-CM

## 2023-08-17 PROCEDURE — 95819 EEG AWAKE AND ASLEEP: CPT | Performed by: STUDENT IN AN ORGANIZED HEALTH CARE EDUCATION/TRAINING PROGRAM

## 2023-08-17 PROCEDURE — 99999 PR NO CHARGE: CPT | Performed by: PSYCHIATRY & NEUROLOGY

## 2023-08-17 NOTE — PROCEDURES
OUTPATIENT ROUTINE VIDEO ELECTROENCEPHALOGRAM REPORT    REFERRING PROVIDER: Ramon Ya M.D.  [address] [address]  [place] [id number]  DOS: 08/17/2023    STUDY DURATION: 0 hours and 27 minutes of total recording time.     INDICATION:  Karan Wiley 63 y.o. male presenting with  left subdural hematoma s/p crani, with subsequent memory difficulty.     RELEVANT MEDICATIONS/TREATMENTS:   Current Outpatient Medications   Medication Instructions    ASPIRIN 81 PO Oral, DAILY    carvedilol (COREG) 25 mg, Oral, 2 TIMES DAILY WITH MEALS    divalproex (DEPAKOTE) 125 mg, Oral, 3 TIMES DAILY    docusate sodium (COLACE) 100 mg, Oral, 2 TIMES DAILY    ELIQUIS 5 MG Tab No dose, route, or frequency recorded.    ezetimibe (ZETIA) 10 mg, Oral, DAILY    Ferrous Sulfate (IRON) 325 (65 Fe) MG Tab Oral    finasteride (PROSCAR) 5 mg, Oral, DAILY    FreeStyle Lancets Misc No dose, route, or frequency recorded.    FREESTYLE LITE strip No dose, route, or frequency recorded.    HYDROcodone/acetaminophen (NORCO)  MG Tab 1 Tablet, Oral, 6 TIMES DAILY    nitroglycerin (NITROSTAT) 0.4 mg, Sublingual, PRN    rosuvastatin (CRESTOR) 40 mg, Oral, DAILY    tamsulosin (FLOMAX) 0.4 mg, Oral, DAILY    telmisartan (MICARDIS) 40 mg, Oral, 2 TIMES DAILY    tizanidine (ZANAFLEX) 4 mg, Oral, 3 TIMES DAILY    traZODone (DESYREL) 50 mg, Oral, NIGHTLY    venlafaxine (EFFEXOR-XR) 150 mg, Oral, DAILY    venlafaxine XR (EFFEXOR XR) 75 mg, Oral, DAILY        TECHNIQUE:   Routine VEEG was set up by a Neurodiagnostic technologist who performed education to the patient and staff. A minimum of 23 electrodes and 23 channel recording was setup and performed by Neurodiagnostic technologist, in accordance with the international 10-20 system. The study was reviewed in bipolar and referential montages. The recording examined the patient in the  awake, drowsy, and sleep state(s).     DESCRIPTION OF THE RECORD:  During wakefulness, the background was  continuous and showed a 8 Hz posterior dominant rhythm.  There was reactivity to eye closure/opening.  An anterior-posterior gradient was noted with faster beta frequencies seen anteriorly.  During drowsiness, theta/delta frequencies were seen.    There is amplitude asymmetry, with higher amplitude waveforms on the left compared to the right.    EEG Sleep: Sleep was captured and was characterized by diffuse background delta/theta activity with a loss of myogenic artifact.  N2 sleep transients in the form of sleep spindles and vertex waves were seen in the leads over the central regions.     ICTAL AND INTERICTAL FINDINGS:   1) Occasional left parietal sharp waves (maximal C3/P3 > O1). Best example at 09:45:12.    2) No seizures.     ACTIVATION PROCEDURES:   Intermittent Photic stimulation was performed in a stepwise fashion from 1 to 30 Hz and did not elicited any abnormalities on EEG.     EKG: Single lead EKG regular with occasional PVCs.     EVENTS:  None    Prior EEGs:     Routine EEG 3/28/23  INTERPRETATION: This was an abnormal EEG during wakefulness and drowsiness due to:  Very mild diffuse slowing of the background, suggestive of diffuse and/or multifocal cerebral dysfunction.  This finding might be seen in a myriad of encephalopathies and in itself is a nonspecific finding.  The presence of continuous, left hemispheric, maximal over the left temporal region, focal slowing, is suggestive of additional cerebral dysfunction in that region.  This finding might be seen in context of structural lesion, among other considerations.  Clinical and radiological correlation is recommended.  The presence of embedded sharply contoured waveforms in the above described left-sided focal slowing might be suggestive of increased propensity toward focal seizures arising from this region.  The presence of higher amplitudes and overriding faster frequencies over the left side is suggestive of breach rhythm, and is consistent with  the provided surgical history.  There were no electrographic seizures captured.  Single lead EKG showed occasional PVCs - please correlate clinically.        INTERPRETATION:   Abnormal video EEG recording in the awake, drowsy, and sleep state(s) due to:  1) Occasional left parietal sharp waves which suggests a predisposition for seizures to arise from this region.  2) Amplitude asymmetry, with higher amplitudes on left compared to right, a finding consistent with left breach rhythm in the setting of known structural abnormality.  3) Mild continuous generalized slowing of the background suggestive of mild diffuse cerebral dysfunction of a nonspecific etiology.   4) EKG with occasional PVCs.   5) No seizures.      Macy Martínez MD  Department of Neurology at [facility]  General Neurologist and Epileptologist   of Clinical Neurology, [employer].

## 2023-08-31 ENCOUNTER — OFFICE VISIT (OUTPATIENT)
Dept: NEUROLOGY | Facility: MEDICAL CENTER | Age: 63
End: 2023-08-31
Attending: PSYCHIATRY & NEUROLOGY
Payer: OTHER GOVERNMENT

## 2023-08-31 VITALS
DIASTOLIC BLOOD PRESSURE: 88 MMHG | BODY MASS INDEX: 42.73 KG/M2 | SYSTOLIC BLOOD PRESSURE: 128 MMHG | HEIGHT: 67 IN | WEIGHT: 272.27 LBS | TEMPERATURE: 99.2 F | OXYGEN SATURATION: 94 % | HEART RATE: 62 BPM

## 2023-08-31 DIAGNOSIS — I69.911 MEMORY DEFICIT AFTER CEREBROVASCULAR DISEASE: ICD-10-CM

## 2023-08-31 DIAGNOSIS — Z29.89 SEIZURE PROPHYLAXIS: ICD-10-CM

## 2023-08-31 DIAGNOSIS — S06.5XAS TRAUMATIC SUBDURAL HEMATOMA, SEQUELA (HCC): ICD-10-CM

## 2023-08-31 PROBLEM — S06.5XAA TRAUMATIC SUBDURAL HEMATOMA, INITIAL ENCOUNTER (HCC): Status: RESOLVED | Noted: 2022-08-25 | Resolved: 2023-08-31

## 2023-08-31 PROCEDURE — 3074F SYST BP LT 130 MM HG: CPT | Performed by: PSYCHIATRY & NEUROLOGY

## 2023-08-31 PROCEDURE — 99215 OFFICE O/P EST HI 40 MIN: CPT | Performed by: PSYCHIATRY & NEUROLOGY

## 2023-08-31 PROCEDURE — 3079F DIAST BP 80-89 MM HG: CPT | Performed by: PSYCHIATRY & NEUROLOGY

## 2023-08-31 PROCEDURE — 99212 OFFICE O/P EST SF 10 MIN: CPT | Performed by: PSYCHIATRY & NEUROLOGY

## 2023-08-31 ASSESSMENT — ENCOUNTER SYMPTOMS
DIZZINESS: 0
SEIZURES: 0
DEPRESSION: 0
TREMORS: 0
MEMORY LOSS: 1
HEADACHES: 0
SENSORY CHANGE: 0
LOSS OF CONSCIOUSNESS: 0
FALLS: 0
FOCAL WEAKNESS: 1

## 2023-08-31 ASSESSMENT — FIBROSIS 4 INDEX: FIB4 SCORE: 0.37

## 2023-08-31 NOTE — PROGRESS NOTES
Subjective     Karan Wiley is a 63 y.o. male who presents with Issue with his wife Alesia, for follow-up, with a history of a traumatic left subdural hematoma with residual balance difficulties, cognitive impairment, and seizures.  He is now status post EEG.     HPI:    Since he was seen, Karan states that the balance difficulties still remain a problem.  He feels he is even more unsteady, he simply describes it as a feeling that he is going to fall.  There is no directionality, he denies dizziness or vertigo.  He is hanging onto objects still.  Historically had always been a sense that he was going forwards and was falling over his feet.  He no longer has the sense.  He is more aware of it when he first stands up, he will wait a moment until things seem to improve and that he can get along.    The EPS systems review is negative.  He had been placed back on his oxycodone briefly, this made him very unsteady and cognitively slowed, he is back off that.  He has continued his Zanaflex and hydrocodone regimen unchanged.  He denies worsening right-sided weakness.  He denies persistent or worsening headaches with nausea or vomiting, photophobia, worsening language difficulties, new right-sided focal motor or sensory loss.  His last imaging study from August, 2022, revealed postoperative changes and a small residual left subdural hematoma with only minimal 3 mm left-to-right shift.  He has not followed up with his neurosurgeon.    He has had no seizure activity.  He remains on Depakote 125 mg, 3 times daily.  He did undergo 2 EEG studies since he was last seen, though I am not quite sure why the second was done recently.  Regardless, it revealed no underlying active seizure activity but there was a susceptibility on both tracings, from the left hemisphere where there was a more marked rhythmic slowing pattern.  The slowing was seen in more generalized sense.    Memory difficulties are still there, he requires  "frequent reminders, mental processing speed is slowed, word finding difficulties remain.  There have been no major issues with sudden loss of urinary or bowel control that have been continuing.  Personality and behavior have not changed.  He still has difficulty following more complex tasks and routines, distractions also slow him down.  More frequent reminders can be required.  Unfortunately, he was not able to schedule an evaluation with our neuropsychologist.    Medical, surgical and family histories are reviewed, there are no new drug allergies.  His chronic back pain has not worsened, there would always presents a problem for him.  The postoperative weakness in the left foot has not worsened.  The spinal stimulator is now being changed to an MRI compatible device.  He is on no new medications, still Depakote 125 mg, 3 times daily, Zanaflex 4 mg, 3 times daily, Effexor 225 mg daily, trazodone 50 mg nightly, and Vicodin 10/325, 1 tablet 5 times a day.  He is also on Zetia, Crestor, Coreg, my Cardis, baby aspirin, Eliquis, Proscar, and Flomax.    Review of Systems   Constitutional:  Negative for malaise/fatigue.   Musculoskeletal:  Negative for falls.   Neurological:  Positive for focal weakness. Negative for dizziness, tremors, sensory change, seizures, loss of consciousness and headaches.   Psychiatric/Behavioral:  Positive for memory loss. Negative for depression.    All other systems reviewed and are negative.    Objective     /88 (BP Location: Right arm, Patient Position: Sitting, BP Cuff Size: Large adult)   Pulse 62   Temp 37.3 °C (99.2 °F) (Temporal)   Ht 1.702 m (5' 7\")   Wt 124 kg (272 lb 4.3 oz)   SpO2 94%   BMI 42.64 kg/m²      Physical Exam    He appears in no acute distress.  Vital signs are stable.  There is no malar rash, sialorrhea, jaw or temporal tenderness, or jaw claudication.  The neck is supple.  Cardiac evaluation reveals a regular rhythm.     Neurological Exam    He is fully " oriented, he speaks easily, there is no clear reduction in word fluency, he names without paraphasic error, repeats easily, follows verbal commands and indicates good comprehension of verbal questioning.  There is no apraxia or inattention.    PERRLA/EOMI, visual fields are full to movement detection on confrontation bilaterally.  Funduscopic exam on the right does not visualize evidence of papilledema.  There is a subtle flattening of the right nasolabial fold when he smiles, though full movement range is symmetric with the left.  Sensory exam is intact to temperature and pinprick bilaterally.  The tongue deviates slightly to the right, no obvious bulbar function otherwise.  Jaw movements are intact.  Jaw jerk is absent.  Shoulder shrug and head rotation are symmetric.    Musculoskeletal exam reveals normal tone throughout, there is no tremor or drift.  Strength is symmetric in the upper extremities, and lower extremities there is some mild bilateral hip stabilizer weakness still, subtle weakness with dorsiflexion and EHL on the left.    Reflexes still show slight right-sided predominance, ankle jerks are absent bilaterally.  There are no pathologic reflexes.    Repetitive movements are still slowed in the left foot when compared to the right, proportionate to weakness.  There is no bradykinesia, there is no appendicular dystaxia in the upper extremities.  Repetitive movements are symmetric in the hands.    Gait is still notably distorted, he stands slowly, hangs onto an object briefly but then seems to move more easily.  Stride length is shortened but he is not shuffling.  He looks at the ground more consistently.  Station is slightly widened.  Standing on his heels and toes is problematic due to the left foot weakness.  Tandem walking is still an impossibility.    Sensory exam actually is quite good, he shows only subtle decrement of pin in the left lower extremity distally, vibration is intact throughout.  He  actually can stand without assistance, feet together, and his eyes closed.    Assessment & Plan     1. Traumatic subdural hematoma, sequela (HCC)  Though things seem clinically stable, there is nothing to suggest increased intracranial pressure, he has no new or worsening right-sided motor or sensory loss, worsening language deficits, etc.  Still, CT of the brain should be done for thoroughness and to compare to a study that was done a little over 1 year ago.  We will call him with the results if significant.  The unsteadiness is notable, also multifactoral, not just the sequelae of the traumatic subdural but also chronic low back pain and left lower extremity weakness, etc.  Medications or not likely playing a role.  He was offered physical therapy for gait stabilization, he passed on this given that it has already been used with some limited benefit.    - CT-HEAD W/O; Future    2. Seizure prophylaxis  We will need to continue the Depakote into the foreseeable future given the underlying susceptibility seen on EEG.  Fortunately without active seizure activity seen, t nonconvulsive seizure activity would not be a contributing factor for the cognitive symptoms or the balance difficulties he is having.  Depakote level will be checked as a baseline.  Ammonia will also be checked as elevated levels can affect cognition as well as balance.    - FREE VALPROIC ACID (DEPAKOTE)  - AMMONIA; Future    3. 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 For now we simply observe.  He is EEG ruled out nonconvulsive seizures.  Structural imaging is most important now.  This is not medication effect since these have not changed, and the issues started prior to the actual traumatic subdural hematoma from August, 2022.  We will follow-up in 6 months, communication via [facility] in the interim if needed about test results, etc.    Time: 40 minutes in total spent on  patient care including pre-charting, record review, discussion with healthcare staff and documentation.  This includes face-to-face time for exam, review, discussion, as well as counseling and coordinating care.

## 2023-09-12 ENCOUNTER — TELEPHONE (OUTPATIENT)
Dept: CARDIOLOGY | Facility: MEDICAL CENTER | Age: 63
End: 2023-09-12
Payer: OTHER GOVERNMENT

## 2023-09-12 NOTE — TELEPHONE ENCOUNTER
Called to confirm when pt scheduled to complete echo, pt stated not scheduled as [facility]. [place] will call [facility] base at: [phone] to confirm status.

## 2023-09-12 NOTE — TELEPHONE ENCOUNTER
I spoke with Chano who is a referrals manager at the base who stated we can submit request for authorization for pt echo cardiogram. I will route to Abs nurse for assistance

## 2023-09-12 NOTE — TELEPHONE ENCOUNTER
[facility] form [id number] filled out and sent to [facility] for AB signature and to fax to [facility] once signed. Completed form (minus AB sig) scanned in to [employer].     To Ed[place]: please have AB sign and fax form to [facility]. Thanks

## 2023-09-12 NOTE — TELEPHONE ENCOUNTER
Form faxed to [facility] [phone]. Received receipt confirmation and scanned in to [employer].

## 2023-09-14 ENCOUNTER — APPOINTMENT (OUTPATIENT)
Dept: RADIOLOGY | Facility: MEDICAL CENTER | Age: 63
End: 2023-09-14
Attending: PSYCHIATRY & NEUROLOGY
Payer: OTHER GOVERNMENT

## 2023-09-19 ENCOUNTER — HOSPITAL ENCOUNTER (OUTPATIENT)
Dept: LAB | Facility: MEDICAL CENTER | Age: 63
End: 2023-09-19
Attending: PSYCHIATRY & NEUROLOGY
Payer: OTHER GOVERNMENT

## 2023-09-19 DIAGNOSIS — Z29.89 SEIZURE PROPHYLAXIS: ICD-10-CM

## 2023-09-19 LAB
AMMONIA PLAS-SCNC: 23 UMOL/L (ref 11–45)
VALPROATE SERPL-MCNC: 35.2 UG/ML (ref 50–100)

## 2023-09-19 PROCEDURE — 80164 ASSAY DIPROPYLACETIC ACD TOT: CPT

## 2023-09-19 PROCEDURE — 36415 COLL VENOUS BLD VENIPUNCTURE: CPT

## 2023-09-19 PROCEDURE — 82140 ASSAY OF AMMONIA: CPT

## 2023-09-19 NOTE — TELEPHONE ENCOUNTER
"Phone Number Called: [phone] (main line-see Chano's number below)    Call outcome:  Spoke to Chano, referrals manager    Message:     Spoke to Chano (referrals manager) at [phone] to see what is needed for authorization for echo.    Referral for echo was processed Aug 31 by Chano but she does see that the order was sent for an echo but was approved for a \"stress test\" which wasn't ordered. Chano to follow up on this and call us back with more information.  "

## 2023-09-19 NOTE — TELEPHONE ENCOUNTER
Caller:  Alesia (spouse)    Topic/issue: They were told that Karan will need a referral for a echo - stress test.  Please give her a call to discuss.     Callback Number: [phone]    Thank you,   Elida ALLEN

## 2023-09-19 NOTE — TELEPHONE ENCOUNTER
Phone Number Called: [phone]    Call outcome:  Not able to reach pt's wife    Message:     Attempted to call pt's wife back. Phone rang and then went to a message saying voicemail box is not set up. Will try calling pt's phone.       Phone Number Called: [phone]    Call outcome: Spoke to patient regarding message below.    Message:     Spoke to pt's wife, Alesia. No authorization number from 9/12 that was sent per [facility] but there was one on 8/31. Pt's wife called [facility] and was trying to make an appt for the echo, but [facility] states they only have the authorization for a stress test from 8/31, but not for the echo.     Supposed to go to [facility] to [facility] for approval. Plan is to speak to the referrals specialistChano to get more info.     Pt's wife very appreciative of call.

## 2023-09-21 ENCOUNTER — APPOINTMENT (OUTPATIENT)
Dept: CARDIOLOGY | Facility: PHYSICIAN GROUP | Age: 63
End: 2023-09-21
Payer: OTHER GOVERNMENT

## 2023-10-10 ENCOUNTER — TELEPHONE (OUTPATIENT)
Dept: CARDIOLOGY | Facility: PHYSICIAN GROUP | Age: 63
End: 2023-10-10
Payer: OTHER GOVERNMENT

## 2023-10-10 NOTE — TELEPHONE ENCOUNTER
Phone number called: [phone]    Call outcome: Spoke with patient and he states he took his blood pressure when he got home and it was 147/86. He takes his blood pressure everyday twice a day. He states that his blood pressure has been mostly within goal range, with a few outliers. He will bring in his blood pressure log next week. Advised him to call back if he starts to consistently be above 130/80 and patient agreeable. All questions/concerns answered at this time, patient appreciative of information given.

## 2023-10-10 NOTE — TELEPHONE ENCOUNTER
Patient presented for echo in [facility], BP was 193/114, not experiencing symptoms, no complaints. Please advise/Follow up with patient per request

## 2023-10-17 ENCOUNTER — HOSPITAL ENCOUNTER (OUTPATIENT)
Dept: RADIOLOGY | Facility: MEDICAL CENTER | Age: 63
End: 2023-10-17
Attending: PSYCHIATRY & NEUROLOGY
Payer: OTHER GOVERNMENT

## 2023-10-17 DIAGNOSIS — S06.5XAS TRAUMATIC SUBDURAL HEMATOMA, SEQUELA (HCC): ICD-10-CM

## 2023-10-17 PROCEDURE — 70450 CT HEAD/BRAIN W/O DYE: CPT

## 2023-10-20 DIAGNOSIS — I25.10 CORONARY ARTERY DISEASE INVOLVING NATIVE CORONARY ARTERY OF NATIVE HEART WITHOUT ANGINA PECTORIS: ICD-10-CM

## 2023-10-23 ENCOUNTER — PATIENT MESSAGE (OUTPATIENT)
Dept: CARDIOLOGY | Facility: PHYSICIAN GROUP | Age: 63
End: 2023-10-23
Payer: OTHER GOVERNMENT

## 2023-11-01 NOTE — CARE PLAN
The patient is Watcher - Medium risk of patient condition declining or worsening         Progress made toward(s) clinical / shift goals:    Problem: Pain - Standard  Goal: Alleviation of pain or a reduction in pain to the patient’s comfort goal  Outcome: Progressing     Problem: Safety - Medical Restraint  Goal: Remains free of injury from restraints (Restraint for Interference with Medical Device)  Outcome: Progressing     Problem: Skin Integrity  Goal: Skin integrity is maintained or improved  Outcome: Progressing     Problem: Fall Risk  Goal: Patient will remain free from falls  Outcome: Progressing          No

## 2023-12-18 ENCOUNTER — TELEPHONE (OUTPATIENT)
Dept: CARDIOLOGY | Facility: MEDICAL CENTER | Age: 63
End: 2023-12-18
Payer: OTHER GOVERNMENT

## 2023-12-18 NOTE — LETTER
PROCEDURE/SURGERY CLEARANCE FORM    Date: 12/18/2023   Patient Name: Karan Wiley    Dear Surgeon or Proceduralist,      Thank you for your request for cardiac stratification of our mutual patient Karan Wiley 1960. We have reviewed their [facility] records; and to the best of our understanding this patient has not had stenting, ablation, cardiothoracic surgery or hospitalization for cardiovascular reasons in the past 6 months.  Karan Wiley has been seen within the past 18 months and is considered to have non-modifiable cardiac risk for this low-risk procedure/surgery (Spinal cord stimulator battery replacement). They may proceed from a cardiovascular standpoint and may hold their antiplatelet/anticoagulation as briefly as possible. Please have patient resume this medication when hemodynamically stable to do so.     Aspirin or Prasugrel   - hold 7 days prior to procedure/surgery, resume when hemodynamically stable      Clopidrogrel or Ticagrelor  - hold 7 days for all neurological procedures, hold 5 days prior to all other procedure/surgery,  resume when hemodynamically stable     Warfarin - hold 7 days for all neurological procedures, hold 5 days prior to all other procedure/surgery and coordinate with [facility] Anticoagulation [facility] ([phone]) INR testing and dose management.      Pradaxa/Xarelto/Eliquis/Savesya - hold 1 day prior to procedure for low bleeding risk procedure, 2 days for high bleeding risk procedure, or consider holding 3 days or longer for patients with reduced kidney function (CrCl <30mL/min) or spinal/cranial surgeries/procedures.      If they have a mechanical heart valve, please coordinate with [facility] Anticoagulation Service ([phone]) the proper management of their anticoagulant in the periprocedural or perioperative period.      Some patients have higher risk for cardiovascular complications or holding medication. If our patient has had prior complications of  holding antiplatelet or anticoagulants in the past and we have seen them after these events, we have addressed these concerns with the patient. They are at an unknown degree of increased risk for recurrent complication.  You may hold anticoagulation/antiplatelets for the procedure or surgery if the benefits of the procedure or surgery outweigh this nonmodifiable risk.      If Karan Wiley 1960 has new symptoms of heart failure decompensation, unstable arrythmia, or angina please reach out and we will assess the patient.      If you have other patient-specific concerns, please feel free to reach out to the patient's cardiologist directly at [phone].     Thank you,       [facility]

## 2023-12-18 NOTE — TELEPHONE ENCOUNTER
Last OV: 7.21.2022 (with TRIP) 8.10.2023 (with AB)  Proposed Surgery: Spinal cord stimulator battery replacement   Surgery Date: 2.7.2024  Requesting Office Name: [facility]   Fax Number: [phone]  Preference of Location (default is surgery center unless specified by Cardiologist or [facility])  Prior Clearance Addressed: No      Anticoags/Antiplatelets: Aspirin and Apixaban   Outstanding Cardiac Imaging : No  Stent, Cardiac Devices, or Catheterization: No  Ablation, TAVR/Valve (including open heart), Cardioversion: No  Recent Cardiac Hospitalization: No            When: N/A  History (cardiac history):   Past Medical History:   Diagnosis Date    CAD (coronary artery disease) 08/2020    STEMI. PCI/NAOMY ([place] 3.5 x 25mm) the mid RCA, and PCI/NAOMY ([place] 2.5 x 12mm) the PDA.    Chronic anticoagulation     Depression     Hyperlipidemia     Hypertension     Low back pain     Paroxysmal atrial fibrillation (HCC) 10/2023    Echocardiogram with normal LV size, LVEF 55-60%. Normal RA, LA and RV. No valvular problems.             Surgical Clearance Letter Sent: YES   **Scan clearance request letter into [facility].**

## 2024-03-21 ENCOUNTER — APPOINTMENT (OUTPATIENT)
Dept: NEUROLOGY | Facility: MEDICAL CENTER | Age: 64
End: 2024-03-21
Attending: CLINICAL NEUROPSYCHOLOGIST
Payer: OTHER GOVERNMENT

## 2024-06-19 PROBLEM — M17.9 OSTEOARTHRITIS, KNEE: Status: ACTIVE | Noted: 2024-06-19

## 2024-06-20 ENCOUNTER — TELEPHONE (OUTPATIENT)
Dept: CARDIOLOGY | Facility: MEDICAL CENTER | Age: 64
End: 2024-06-20
Payer: OTHER GOVERNMENT

## 2024-06-20 NOTE — TELEPHONE ENCOUNTER
AB    Caller: Karan Wiley    Topic/issue: MEDICAL ADVICE    Karan states that he will be dropping off his clearance form to AB's office in [place], [place]. Please be advsed    Thank you,  Ángel CURTIS    Callback Number: [phone] (home)

## 2024-06-25 ENCOUNTER — TELEPHONE (OUTPATIENT)
Dept: CARDIOLOGY | Facility: MEDICAL CENTER | Age: 64
End: 2024-06-25
Payer: OTHER GOVERNMENT

## 2024-06-25 NOTE — LETTER
PROCEDURE/SURGERY CLEARANCE FORM    Date: 6/25/2024   Patient Name: Karan Wiley    Dear Surgeon or Proceduralist,      Thank you for your request for cardiac stratification of our mutual patient Karan Wiley 1960. We have reviewed their [facility] records; and to the best of our understanding this patient has not had stenting, ablation, cardiothoracic surgery or hospitalization for cardiovascular reasons in the past 6 months.  Karan Wilye has been seen within the past 18 months and is considered to have non-modifiable cardiac risk for this low-risk procedure/surgery. They may proceed from a cardiovascular standpoint and may hold their antiplatelet/anticoagulation as briefly as possible. Please have patient resume this medication when hemodynamically stable to do so.     Aspirin or Prasugrel   - hold 7 days prior to procedure/surgery, resume when hemodynamically stable      Clopidrogrel or Ticagrelor  - hold 7 days for all neurological procedures, hold 5 days prior to all other procedure/surgery,  resume when hemodynamically stable     Warfarin - hold 7 days for all neurological procedures, hold 5 days prior to all other procedure/surgery and coordinate with [facility] Anticoagulation Clinic ([phone]) INR testing and dose management.      Pradaxa/Xarelto/Eliquis/Savesya - hold 1 day prior to procedure for low bleeding risk procedure, 2 days for high bleeding risk procedure, or consider holding 3 days or longer for patients with reduced kidney function (CrCl <30mL/min) or spinal/cranial surgeries/procedures.      If they have a mechanical heart valve, please coordinate with [facility] Anticoagulation Service ([phone]) the proper management of their anticoagulant in the periprocedural or perioperative period.      Some patients have higher risk for cardiovascular complications or holding medication. If our patient has had prior complications of holding antiplatelet or anticoagulants in the past  and we have seen them after these events, we have addressed these concerns with the patient. They are at an unknown degree of increased risk for recurrent complication.  You may hold anticoagulation/antiplatelets for the procedure or surgery if the benefits of the procedure or surgery outweigh this nonmodifiable risk.      If Karan Wiley 1960 has new symptoms of heart failure decompensation, unstable arrythmia, or angina please reach out and we will assess the patient.      If you have other patient-specific concerns, please feel free to reach out to the patient's cardiologist directly at [phone].     Thank you,       [facility]

## 2024-06-25 NOTE — TELEPHONE ENCOUNTER
Last OV: 8/10/23  Proposed Surgery: Right total knee arthroplasty   Surgery Date: TBD  Requesting Office Name: [facility]  Fax Number: [phone]  Preference of Location (default is surgery center unless specified by Cardiologist or [facility])  Prior Clearance Addressed: No      Anticoags/Antiplatelets: Aspirin and Apixaban   Anticoags/Antiplatelet managed by Cardiology? YES    Outstanding Cardiac Imaging : No  Stent, Cardiac Devices, or Catheterization: No  Ablation, TAVR/Valve (including open heart), Cardioversion: No  Recent Cardiac Hospitalization: No            When: N/A  History (cardiac history):   Past Medical History:   Diagnosis Date    CAD (coronary artery disease) 08/2020    STEMI. PCI/NAOYM ([place] 3.5 x 25mm) the mid RCA, and PCI/NAOMY ([place] 2.5 x 12mm) the PDA.    Chronic anticoagulation     Depression     Hyperlipidemia     Hypertension     Low back pain     Paroxysmal atrial fibrillation (HCC) 10/2023    Echocardiogram with normal LV size, LVEF 55-60%. Normal RA, LA and RV. No valvular problems.             Surgical Clearance Letter Sent: YES   **Scan clearance request letter into [facility].**

## 2024-07-10 ENCOUNTER — APPOINTMENT (OUTPATIENT)
Dept: ADMISSIONS | Facility: MEDICAL CENTER | Age: 64
End: 2024-07-10
Attending: ORTHOPAEDIC SURGERY
Payer: OTHER GOVERNMENT

## 2024-07-10 ENCOUNTER — HOSPITAL ENCOUNTER (OUTPATIENT)
Dept: RADIOLOGY | Facility: MEDICAL CENTER | Age: 64
End: 2024-07-10
Attending: STUDENT IN AN ORGANIZED HEALTH CARE EDUCATION/TRAINING PROGRAM
Payer: OTHER GOVERNMENT

## 2024-07-10 DIAGNOSIS — G89.29 CHRONIC PAIN OF RIGHT KNEE: ICD-10-CM

## 2024-07-10 DIAGNOSIS — M25.561 CHRONIC PAIN OF RIGHT KNEE: ICD-10-CM

## 2024-07-10 DIAGNOSIS — M17.11 PRIMARY OSTEOARTHRITIS OF RIGHT KNEE: ICD-10-CM

## 2024-07-10 PROCEDURE — 73700 CT LOWER EXTREMITY W/O DYE: CPT | Mod: RT

## 2024-07-11 ENCOUNTER — PRE-ADMISSION TESTING (OUTPATIENT)
Dept: ADMISSIONS | Facility: MEDICAL CENTER | Age: 64
End: 2024-07-11
Attending: ORTHOPAEDIC SURGERY
Payer: OTHER GOVERNMENT

## 2024-07-11 VITALS — BODY MASS INDEX: 43.07 KG/M2 | HEIGHT: 67 IN

## 2024-07-11 DIAGNOSIS — Z01.812 PRE-OPERATIVE LABORATORY EXAMINATION: ICD-10-CM

## 2024-07-11 DIAGNOSIS — Z01.810 PRE-OPERATIVE CARDIOVASCULAR EXAMINATION: ICD-10-CM

## 2024-07-11 RX ORDER — ALBUTEROL SULFATE 90 UG/1
1-2 AEROSOL, METERED RESPIRATORY (INHALATION) EVERY 4 HOURS PRN
Status: ON HOLD | COMMUNITY

## 2024-07-11 RX ORDER — ATORVASTATIN CALCIUM 40 MG/1
40 TABLET, FILM COATED ORAL DAILY
Status: ON HOLD | COMMUNITY
End: 2024-07-26

## 2024-07-11 RX ORDER — DIAZEPAM 5 MG/1
5 TABLET ORAL
Status: ON HOLD | COMMUNITY
End: 2024-07-26

## 2024-07-16 ENCOUNTER — TELEPHONE (OUTPATIENT)
Dept: CARDIOLOGY | Facility: MEDICAL CENTER | Age: 64
End: 2024-07-16
Payer: OTHER GOVERNMENT

## 2024-07-16 ENCOUNTER — APPOINTMENT (OUTPATIENT)
Dept: ADMISSIONS | Facility: MEDICAL CENTER | Age: 64
End: 2024-07-16
Attending: ORTHOPAEDIC SURGERY
Payer: OTHER GOVERNMENT

## 2024-07-18 ENCOUNTER — HOSPITAL ENCOUNTER (OUTPATIENT)
Dept: RADIOLOGY | Facility: MEDICAL CENTER | Age: 64
End: 2024-07-18
Attending: STUDENT IN AN ORGANIZED HEALTH CARE EDUCATION/TRAINING PROGRAM
Payer: OTHER GOVERNMENT

## 2024-07-18 DIAGNOSIS — M25.561 RIGHT KNEE PAIN, UNSPECIFIED CHRONICITY: ICD-10-CM

## 2024-07-18 PROCEDURE — 73700 CT LOWER EXTREMITY W/O DYE: CPT | Mod: RT

## 2024-07-22 RX ORDER — TRAMADOL HYDROCHLORIDE 50 MG/1
50-75 TABLET ORAL EVERY 6 HOURS PRN
Qty: 35 TABLET | Refills: 0 | Status: ON HOLD | OUTPATIENT
Start: 2024-07-22 | End: 2024-07-29

## 2024-07-23 ENCOUNTER — HOSPITAL ENCOUNTER (OUTPATIENT)
Facility: MEDICAL CENTER | Age: 64
End: 2024-07-23
Attending: ORTHOPAEDIC SURGERY | Admitting: ORTHOPAEDIC SURGERY
Payer: OTHER GOVERNMENT

## 2024-07-23 ENCOUNTER — ANESTHESIA EVENT (OUTPATIENT)
Dept: SURGERY | Facility: MEDICAL CENTER | Age: 64
End: 2024-07-23
Payer: OTHER GOVERNMENT

## 2024-07-23 ENCOUNTER — ANESTHESIA (OUTPATIENT)
Dept: SURGERY | Facility: MEDICAL CENTER | Age: 64
End: 2024-07-23
Payer: OTHER GOVERNMENT

## 2024-07-23 VITALS
HEIGHT: 67 IN | RESPIRATION RATE: 18 BRPM | OXYGEN SATURATION: 90 % | HEART RATE: 75 BPM | TEMPERATURE: 97.2 F | BODY MASS INDEX: 41.11 KG/M2 | DIASTOLIC BLOOD PRESSURE: 86 MMHG | SYSTOLIC BLOOD PRESSURE: 133 MMHG | WEIGHT: 261.91 LBS

## 2024-07-23 DIAGNOSIS — G89.18 POST-OPERATIVE PAIN: ICD-10-CM

## 2024-07-23 DIAGNOSIS — M17.11 ARTHRITIS OF RIGHT KNEE: ICD-10-CM

## 2024-07-23 DIAGNOSIS — M17.11 PRIMARY OSTEOARTHRITIS OF RIGHT KNEE: ICD-10-CM

## 2024-07-23 DIAGNOSIS — Z01.812 PRE-OPERATIVE LABORATORY EXAMINATION: ICD-10-CM

## 2024-07-23 PROCEDURE — 27447 TOTAL KNEE ARTHROPLASTY: CPT | Mod: ASROC,RT | Performed by: STUDENT IN AN ORGANIZED HEALTH CARE EDUCATION/TRAINING PROGRAM

## 2024-07-23 PROCEDURE — 160036 HCHG PACU - EA ADDL 30 MINS PHASE I: Performed by: ORTHOPAEDIC SURGERY

## 2024-07-23 PROCEDURE — C1713 ANCHOR/SCREW BN/BN,TIS/BN: HCPCS | Performed by: ORTHOPAEDIC SURGERY

## 2024-07-23 PROCEDURE — 64447 NJX AA&/STRD FEMORAL NRV IMG: CPT | Performed by: ORTHOPAEDIC SURGERY

## 2024-07-23 PROCEDURE — 160031 HCHG SURGERY MINUTES - 1ST 30 MINS LEVEL 5: Performed by: ORTHOPAEDIC SURGERY

## 2024-07-23 PROCEDURE — C1776 JOINT DEVICE (IMPLANTABLE): HCPCS | Performed by: ORTHOPAEDIC SURGERY

## 2024-07-23 PROCEDURE — 770030 HCHG ROOM/CARE - EXTENDED RECOVERY EACH 15 MIN

## 2024-07-23 PROCEDURE — 160035 HCHG PACU - 1ST 60 MINS PHASE I: Performed by: ORTHOPAEDIC SURGERY

## 2024-07-23 PROCEDURE — 97110 THERAPEUTIC EXERCISES: CPT

## 2024-07-23 PROCEDURE — 700111 HCHG RX REV CODE 636 W/ 250 OVERRIDE (IP): Performed by: ANESTHESIOLOGY

## 2024-07-23 PROCEDURE — 700101 HCHG RX REV CODE 250: Performed by: ANESTHESIOLOGY

## 2024-07-23 PROCEDURE — 97161 PT EVAL LOW COMPLEX 20 MIN: CPT

## 2024-07-23 PROCEDURE — 160002 HCHG RECOVERY MINUTES (STAT): Performed by: ORTHOPAEDIC SURGERY

## 2024-07-23 PROCEDURE — 502714 HCHG ROBOTIC SURGERY SERVICES: Performed by: ORTHOPAEDIC SURGERY

## 2024-07-23 PROCEDURE — 700101 HCHG RX REV CODE 250: Performed by: ORTHOPAEDIC SURGERY

## 2024-07-23 PROCEDURE — 97116 GAIT TRAINING THERAPY: CPT

## 2024-07-23 PROCEDURE — A9270 NON-COVERED ITEM OR SERVICE: HCPCS | Performed by: ANESTHESIOLOGY

## 2024-07-23 PROCEDURE — 700102 HCHG RX REV CODE 250 W/ 637 OVERRIDE(OP): Performed by: ANESTHESIOLOGY

## 2024-07-23 PROCEDURE — 160042 HCHG SURGERY MINUTES - EA ADDL 1 MIN LEVEL 5: Performed by: ORTHOPAEDIC SURGERY

## 2024-07-23 PROCEDURE — 27447 TOTAL KNEE ARTHROPLASTY: CPT | Mod: RT | Performed by: ORTHOPAEDIC SURGERY

## 2024-07-23 PROCEDURE — 20985 CPTR-ASST DIR MS PX: CPT | Mod: ASROC,RT | Performed by: STUDENT IN AN ORGANIZED HEALTH CARE EDUCATION/TRAINING PROGRAM

## 2024-07-23 PROCEDURE — 20985 CPTR-ASST DIR MS PX: CPT | Mod: RT | Performed by: ORTHOPAEDIC SURGERY

## 2024-07-23 PROCEDURE — 160048 HCHG OR STATISTICAL LEVEL 1-5: Performed by: ORTHOPAEDIC SURGERY

## 2024-07-23 PROCEDURE — 502000 HCHG MISC OR IMPLANTS RC 0278: Performed by: ORTHOPAEDIC SURGERY

## 2024-07-23 PROCEDURE — 160009 HCHG ANES TIME/MIN: Performed by: ORTHOPAEDIC SURGERY

## 2024-07-23 PROCEDURE — 700105 HCHG RX REV CODE 258: Performed by: ORTHOPAEDIC SURGERY

## 2024-07-23 PROCEDURE — 700111 HCHG RX REV CODE 636 W/ 250 OVERRIDE (IP): Mod: JZ | Performed by: ORTHOPAEDIC SURGERY

## 2024-07-23 PROCEDURE — 97165 OT EVAL LOW COMPLEX 30 MIN: CPT

## 2024-07-23 DEVICE — IMPLANTABLE DEVICE: Type: IMPLANTABLE DEVICE | Status: FUNCTIONAL

## 2024-07-23 DEVICE — COMPONENT FEMORAL TRIATHLON CRUCIATE RETAIN RIGHT SIZE 4 (1EA): Type: IMPLANTABLE DEVICE | Status: FUNCTIONAL

## 2024-07-23 RX ORDER — SCOLOPAMINE TRANSDERMAL SYSTEM 1 MG/1
1 PATCH, EXTENDED RELEASE TRANSDERMAL
Status: DISCONTINUED | OUTPATIENT
Start: 2024-07-23 | End: 2024-07-23 | Stop reason: HOSPADM

## 2024-07-23 RX ORDER — BISACODYL 10 MG
10 SUPPOSITORY, RECTAL RECTAL
Status: DISCONTINUED | OUTPATIENT
Start: 2024-07-23 | End: 2024-07-23 | Stop reason: HOSPADM

## 2024-07-23 RX ORDER — HALOPERIDOL 5 MG/ML
1 INJECTION INTRAMUSCULAR EVERY 6 HOURS PRN
Status: DISCONTINUED | OUTPATIENT
Start: 2024-07-23 | End: 2024-07-23 | Stop reason: HOSPADM

## 2024-07-23 RX ORDER — ACETAMINOPHEN 500 MG
1000 TABLET ORAL ONCE
Status: COMPLETED | OUTPATIENT
Start: 2024-07-23 | End: 2024-07-23

## 2024-07-23 RX ORDER — HYDROMORPHONE HYDROCHLORIDE 1 MG/ML
0.1 INJECTION, SOLUTION INTRAMUSCULAR; INTRAVENOUS; SUBCUTANEOUS
Status: DISCONTINUED | OUTPATIENT
Start: 2024-07-23 | End: 2024-07-23 | Stop reason: HOSPADM

## 2024-07-23 RX ORDER — ONDANSETRON 2 MG/ML
4 INJECTION INTRAMUSCULAR; INTRAVENOUS EVERY 4 HOURS PRN
Status: DISCONTINUED | OUTPATIENT
Start: 2024-07-23 | End: 2024-07-23 | Stop reason: HOSPADM

## 2024-07-23 RX ORDER — DEXAMETHASONE SODIUM PHOSPHATE 4 MG/ML
4 INJECTION, SOLUTION INTRA-ARTICULAR; INTRALESIONAL; INTRAMUSCULAR; INTRAVENOUS; SOFT TISSUE
Status: DISCONTINUED | OUTPATIENT
Start: 2024-07-23 | End: 2024-07-23 | Stop reason: HOSPADM

## 2024-07-23 RX ORDER — HYDROMORPHONE HYDROCHLORIDE 1 MG/ML
0.2 INJECTION, SOLUTION INTRAMUSCULAR; INTRAVENOUS; SUBCUTANEOUS
Status: DISCONTINUED | OUTPATIENT
Start: 2024-07-23 | End: 2024-07-23 | Stop reason: HOSPADM

## 2024-07-23 RX ORDER — DIPHENHYDRAMINE HYDROCHLORIDE 50 MG/ML
12.5 INJECTION INTRAMUSCULAR; INTRAVENOUS
Status: DISCONTINUED | OUTPATIENT
Start: 2024-07-23 | End: 2024-07-23 | Stop reason: HOSPADM

## 2024-07-23 RX ORDER — VANCOMYCIN HYDROCHLORIDE 1 G/20ML
INJECTION, POWDER, LYOPHILIZED, FOR SOLUTION INTRAVENOUS
Status: COMPLETED | OUTPATIENT
Start: 2024-07-23 | End: 2024-07-23

## 2024-07-23 RX ORDER — DEXAMETHASONE SODIUM PHOSPHATE 4 MG/ML
INJECTION, SOLUTION INTRA-ARTICULAR; INTRALESIONAL; INTRAMUSCULAR; INTRAVENOUS; SOFT TISSUE PRN
Status: DISCONTINUED | OUTPATIENT
Start: 2024-07-23 | End: 2024-07-23 | Stop reason: SURG

## 2024-07-23 RX ORDER — BUPIVACAINE HYDROCHLORIDE AND EPINEPHRINE 2.5; 5 MG/ML; UG/ML
INJECTION, SOLUTION EPIDURAL; INFILTRATION; INTRACAUDAL; PERINEURAL
Status: COMPLETED | OUTPATIENT
Start: 2024-07-23 | End: 2024-07-23

## 2024-07-23 RX ORDER — ENEMA 19; 7 G/133ML; G/133ML
1 ENEMA RECTAL
Status: DISCONTINUED | OUTPATIENT
Start: 2024-07-23 | End: 2024-07-23 | Stop reason: HOSPADM

## 2024-07-23 RX ORDER — KETOROLAC TROMETHAMINE 15 MG/ML
15 INJECTION, SOLUTION INTRAMUSCULAR; INTRAVENOUS EVERY 6 HOURS
Status: DISCONTINUED | OUTPATIENT
Start: 2024-07-23 | End: 2024-07-23 | Stop reason: HOSPADM

## 2024-07-23 RX ORDER — POLYETHYLENE GLYCOL 3350 17 G/17G
1 POWDER, FOR SOLUTION ORAL 2 TIMES DAILY PRN
Status: DISCONTINUED | OUTPATIENT
Start: 2024-07-23 | End: 2024-07-23 | Stop reason: HOSPADM

## 2024-07-23 RX ORDER — HALOPERIDOL 5 MG/ML
1 INJECTION INTRAMUSCULAR
Status: DISCONTINUED | OUTPATIENT
Start: 2024-07-23 | End: 2024-07-23 | Stop reason: HOSPADM

## 2024-07-23 RX ORDER — CELECOXIB 200 MG/1
200 CAPSULE ORAL ONCE
Status: COMPLETED | OUTPATIENT
Start: 2024-07-23 | End: 2024-07-23

## 2024-07-23 RX ORDER — DIPHENHYDRAMINE HYDROCHLORIDE 50 MG/ML
25 INJECTION INTRAMUSCULAR; INTRAVENOUS EVERY 6 HOURS PRN
Status: DISCONTINUED | OUTPATIENT
Start: 2024-07-23 | End: 2024-07-23 | Stop reason: HOSPADM

## 2024-07-23 RX ORDER — VALPROIC ACID 250 MG/5ML
125 SOLUTION ORAL 3 TIMES DAILY
Status: DISCONTINUED | OUTPATIENT
Start: 2024-07-23 | End: 2024-07-23 | Stop reason: HOSPADM

## 2024-07-23 RX ORDER — CEFAZOLIN SODIUM 1 G/3ML
3 INJECTION, POWDER, FOR SOLUTION INTRAMUSCULAR; INTRAVENOUS ONCE
Status: DISCONTINUED | OUTPATIENT
Start: 2024-07-23 | End: 2024-07-23 | Stop reason: HOSPADM

## 2024-07-23 RX ORDER — MEPERIDINE HYDROCHLORIDE 25 MG/ML
12.5 INJECTION INTRAMUSCULAR; INTRAVENOUS; SUBCUTANEOUS
Status: DISCONTINUED | OUTPATIENT
Start: 2024-07-23 | End: 2024-07-23 | Stop reason: HOSPADM

## 2024-07-23 RX ORDER — SODIUM CHLORIDE, SODIUM LACTATE, POTASSIUM CHLORIDE, CALCIUM CHLORIDE 600; 310; 30; 20 MG/100ML; MG/100ML; MG/100ML; MG/100ML
INJECTION, SOLUTION INTRAVENOUS CONTINUOUS
Status: ACTIVE | OUTPATIENT
Start: 2024-07-23 | End: 2024-07-23

## 2024-07-23 RX ORDER — AMOXICILLIN 250 MG
1 CAPSULE ORAL NIGHTLY
Status: DISCONTINUED | OUTPATIENT
Start: 2024-07-23 | End: 2024-07-23 | Stop reason: HOSPADM

## 2024-07-23 RX ORDER — HYDROMORPHONE HYDROCHLORIDE 1 MG/ML
1 INJECTION, SOLUTION INTRAMUSCULAR; INTRAVENOUS; SUBCUTANEOUS
Status: DISCONTINUED | OUTPATIENT
Start: 2024-07-23 | End: 2024-07-23 | Stop reason: HOSPADM

## 2024-07-23 RX ORDER — GABAPENTIN 300 MG/1
300 CAPSULE ORAL ONCE
Status: COMPLETED | OUTPATIENT
Start: 2024-07-23 | End: 2024-07-23

## 2024-07-23 RX ORDER — LIDOCAINE HYDROCHLORIDE 20 MG/ML
INJECTION, SOLUTION EPIDURAL; INFILTRATION; INTRACAUDAL; PERINEURAL PRN
Status: DISCONTINUED | OUTPATIENT
Start: 2024-07-23 | End: 2024-07-23 | Stop reason: SURG

## 2024-07-23 RX ORDER — DIPHENHYDRAMINE HCL 25 MG
25 TABLET ORAL EVERY 6 HOURS PRN
Status: DISCONTINUED | OUTPATIENT
Start: 2024-07-23 | End: 2024-07-23 | Stop reason: HOSPADM

## 2024-07-23 RX ORDER — OMEPRAZOLE 20 MG/1
20 CAPSULE, DELAYED RELEASE ORAL 2 TIMES DAILY PRN
Status: DISCONTINUED | OUTPATIENT
Start: 2024-07-23 | End: 2024-07-23 | Stop reason: HOSPADM

## 2024-07-23 RX ORDER — CARVEDILOL 25 MG/1
25 TABLET ORAL 2 TIMES DAILY WITH MEALS
Status: DISCONTINUED | OUTPATIENT
Start: 2024-07-23 | End: 2024-07-23 | Stop reason: HOSPADM

## 2024-07-23 RX ORDER — ROPIVACAINE HYDROCHLORIDE 5 MG/ML
INJECTION, SOLUTION EPIDURAL; INFILTRATION; PERINEURAL
Status: DISCONTINUED | OUTPATIENT
Start: 2024-07-23 | End: 2024-07-23 | Stop reason: HOSPADM

## 2024-07-23 RX ORDER — KETOROLAC TROMETHAMINE 15 MG/ML
INJECTION, SOLUTION INTRAMUSCULAR; INTRAVENOUS PRN
Status: DISCONTINUED | OUTPATIENT
Start: 2024-07-23 | End: 2024-07-23 | Stop reason: SURG

## 2024-07-23 RX ORDER — DIPHENHYDRAMINE HCL 25 MG
25 TABLET ORAL NIGHTLY PRN
Status: DISCONTINUED | OUTPATIENT
Start: 2024-07-24 | End: 2024-07-23 | Stop reason: HOSPADM

## 2024-07-23 RX ORDER — SODIUM CHLORIDE 9 MG/ML
INJECTION, SOLUTION INTRAMUSCULAR; INTRAVENOUS; SUBCUTANEOUS
Status: DISCONTINUED | OUTPATIENT
Start: 2024-07-23 | End: 2024-07-23 | Stop reason: HOSPADM

## 2024-07-23 RX ORDER — TAMSULOSIN HYDROCHLORIDE 0.4 MG/1
0.4 CAPSULE ORAL 2 TIMES DAILY
Status: DISCONTINUED | OUTPATIENT
Start: 2024-07-23 | End: 2024-07-23 | Stop reason: HOSPADM

## 2024-07-23 RX ORDER — AMOXICILLIN 250 MG
1 CAPSULE ORAL
Status: DISCONTINUED | OUTPATIENT
Start: 2024-07-23 | End: 2024-07-23 | Stop reason: HOSPADM

## 2024-07-23 RX ORDER — KETOROLAC TROMETHAMINE 30 MG/ML
INJECTION, SOLUTION INTRAMUSCULAR; INTRAVENOUS
Status: DISCONTINUED | OUTPATIENT
Start: 2024-07-23 | End: 2024-07-23 | Stop reason: HOSPADM

## 2024-07-23 RX ORDER — TRAMADOL HYDROCHLORIDE 50 MG/1
50 TABLET ORAL EVERY 4 HOURS PRN
Status: DISCONTINUED | OUTPATIENT
Start: 2024-07-23 | End: 2024-07-23 | Stop reason: HOSPADM

## 2024-07-23 RX ORDER — ALBUTEROL SULFATE 90 UG/1
2 AEROSOL, METERED RESPIRATORY (INHALATION) EVERY 4 HOURS PRN
Status: DISCONTINUED | OUTPATIENT
Start: 2024-07-23 | End: 2024-07-23 | Stop reason: HOSPADM

## 2024-07-23 RX ORDER — ONDANSETRON 2 MG/ML
4 INJECTION INTRAMUSCULAR; INTRAVENOUS
Status: DISCONTINUED | OUTPATIENT
Start: 2024-07-23 | End: 2024-07-23 | Stop reason: HOSPADM

## 2024-07-23 RX ORDER — VENLAFAXINE HYDROCHLORIDE 75 MG/1
150 CAPSULE, EXTENDED RELEASE ORAL DAILY
Status: DISCONTINUED | OUTPATIENT
Start: 2024-07-23 | End: 2024-07-23 | Stop reason: HOSPADM

## 2024-07-23 RX ORDER — ACETAMINOPHEN 325 MG/1
650 TABLET ORAL 2 TIMES DAILY
Status: DISCONTINUED | OUTPATIENT
Start: 2024-07-28 | End: 2024-07-23 | Stop reason: HOSPADM

## 2024-07-23 RX ORDER — CEFAZOLIN SODIUM 1 G/3ML
INJECTION, POWDER, FOR SOLUTION INTRAMUSCULAR; INTRAVENOUS PRN
Status: DISCONTINUED | OUTPATIENT
Start: 2024-07-23 | End: 2024-07-23 | Stop reason: SURG

## 2024-07-23 RX ORDER — EPHEDRINE SULFATE 50 MG/ML
INJECTION, SOLUTION INTRAVENOUS PRN
Status: DISCONTINUED | OUTPATIENT
Start: 2024-07-23 | End: 2024-07-23 | Stop reason: SURG

## 2024-07-23 RX ORDER — TRANEXAMIC ACID 100 MG/ML
INJECTION, SOLUTION INTRAVENOUS PRN
Status: DISCONTINUED | OUTPATIENT
Start: 2024-07-23 | End: 2024-07-23 | Stop reason: SURG

## 2024-07-23 RX ORDER — DOCUSATE SODIUM 100 MG/1
100 CAPSULE, LIQUID FILLED ORAL 2 TIMES DAILY
Status: DISCONTINUED | OUTPATIENT
Start: 2024-07-23 | End: 2024-07-23 | Stop reason: HOSPADM

## 2024-07-23 RX ORDER — EPINEPHRINE 1 MG/ML(1)
AMPUL (ML) INJECTION
Status: DISCONTINUED | OUTPATIENT
Start: 2024-07-23 | End: 2024-07-23 | Stop reason: HOSPADM

## 2024-07-23 RX ORDER — BUPIVACAINE HYDROCHLORIDE 2.5 MG/ML
INJECTION, SOLUTION EPIDURAL; INFILTRATION; INTRACAUDAL
Status: COMPLETED | OUTPATIENT
Start: 2024-07-23 | End: 2024-07-23

## 2024-07-23 RX ORDER — METOCLOPRAMIDE HYDROCHLORIDE 5 MG/ML
INJECTION INTRAMUSCULAR; INTRAVENOUS PRN
Status: DISCONTINUED | OUTPATIENT
Start: 2024-07-23 | End: 2024-07-23 | Stop reason: SURG

## 2024-07-23 RX ORDER — VENLAFAXINE HYDROCHLORIDE 75 MG/1
75 CAPSULE, EXTENDED RELEASE ORAL DAILY
Status: DISCONTINUED | OUTPATIENT
Start: 2024-07-23 | End: 2024-07-23 | Stop reason: HOSPADM

## 2024-07-23 RX ORDER — OXYCODONE HYDROCHLORIDE 10 MG/1
20 TABLET ORAL
Status: CANCELLED | OUTPATIENT
Start: 2024-07-23

## 2024-07-23 RX ORDER — ATORVASTATIN CALCIUM 40 MG/1
40 TABLET, FILM COATED ORAL DAILY
Status: DISCONTINUED | OUTPATIENT
Start: 2024-07-23 | End: 2024-07-23 | Stop reason: HOSPADM

## 2024-07-23 RX ORDER — TELMISARTAN 40 MG/1
40 TABLET ORAL 2 TIMES DAILY
Status: DISCONTINUED | OUTPATIENT
Start: 2024-07-23 | End: 2024-07-23 | Stop reason: HOSPADM

## 2024-07-23 RX ORDER — OXYCODONE HYDROCHLORIDE 10 MG/1
10 TABLET ORAL
Status: CANCELLED | OUTPATIENT
Start: 2024-07-23

## 2024-07-23 RX ORDER — HYDROCODONE BITARTRATE AND ACETAMINOPHEN 10; 325 MG/1; MG/1
1 TABLET ORAL EVERY 4 HOURS PRN
Status: DISCONTINUED | OUTPATIENT
Start: 2024-07-23 | End: 2024-07-23 | Stop reason: HOSPADM

## 2024-07-23 RX ORDER — HYDROMORPHONE HYDROCHLORIDE 1 MG/ML
0.4 INJECTION, SOLUTION INTRAMUSCULAR; INTRAVENOUS; SUBCUTANEOUS
Status: DISCONTINUED | OUTPATIENT
Start: 2024-07-23 | End: 2024-07-23 | Stop reason: HOSPADM

## 2024-07-23 RX ORDER — ACETAMINOPHEN 500 MG
1000 TABLET ORAL EVERY 6 HOURS
Status: DISCONTINUED | OUTPATIENT
Start: 2024-07-23 | End: 2024-07-23 | Stop reason: HOSPADM

## 2024-07-23 RX ADMIN — SODIUM CHLORIDE, POTASSIUM CHLORIDE, SODIUM LACTATE AND CALCIUM CHLORIDE: 600; 310; 30; 20 INJECTION, SOLUTION INTRAVENOUS at 11:11

## 2024-07-23 RX ADMIN — METOCLOPRAMIDE 10 MG: 5 INJECTION, SOLUTION INTRAMUSCULAR; INTRAVENOUS at 11:19

## 2024-07-23 RX ADMIN — EPHEDRINE SULFATE 25 MG: 50 INJECTION, SOLUTION INTRAVENOUS at 12:05

## 2024-07-23 RX ADMIN — LIDOCAINE HYDROCHLORIDE 100 MG: 20 INJECTION, SOLUTION EPIDURAL; INFILTRATION; INTRACAUDAL at 11:19

## 2024-07-23 RX ADMIN — ACETAMINOPHEN 1000 MG: 500 TABLET ORAL at 09:06

## 2024-07-23 RX ADMIN — TRANEXAMIC ACID 1000 MG: 100 INJECTION, SOLUTION INTRAVENOUS at 11:19

## 2024-07-23 RX ADMIN — GABAPENTIN 300 MG: 300 CAPSULE ORAL at 09:07

## 2024-07-23 RX ADMIN — SUGAMMADEX 200 MG: 100 INJECTION, SOLUTION INTRAVENOUS at 12:05

## 2024-07-23 RX ADMIN — CELECOXIB 200 MG: 200 CAPSULE ORAL at 09:07

## 2024-07-23 RX ADMIN — PROPOFOL 150 MG: 10 INJECTION, EMULSION INTRAVENOUS at 11:19

## 2024-07-23 RX ADMIN — EPHEDRINE SULFATE 25 MG: 50 INJECTION, SOLUTION INTRAVENOUS at 11:54

## 2024-07-23 RX ADMIN — ROCURONIUM BROMIDE 50 MG: 10 INJECTION, SOLUTION INTRAVENOUS at 11:19

## 2024-07-23 RX ADMIN — BUPIVACAINE HYDROCHLORIDE AND EPINEPHRINE BITARTRATE 10 ML: 2.5; .0091 INJECTION, SOLUTION EPIDURAL; INFILTRATION; INTRACAUDAL; PERINEURAL at 11:05

## 2024-07-23 RX ADMIN — CEFAZOLIN 2 G: 1 INJECTION, POWDER, FOR SOLUTION INTRAMUSCULAR; INTRAVENOUS at 11:19

## 2024-07-23 RX ADMIN — ALBUTEROL SULFATE 2.5 MG: 2.5 SOLUTION RESPIRATORY (INHALATION) at 14:35

## 2024-07-23 RX ADMIN — EPHEDRINE SULFATE 25 MG: 50 INJECTION, SOLUTION INTRAVENOUS at 12:00

## 2024-07-23 RX ADMIN — FENTANYL CITRATE 100 MCG: 50 INJECTION, SOLUTION INTRAMUSCULAR; INTRAVENOUS at 11:05

## 2024-07-23 RX ADMIN — BUPIVACAINE HYDROCHLORIDE 10 ML: 2.5 INJECTION, SOLUTION EPIDURAL; INFILTRATION; INTRACAUDAL at 11:05

## 2024-07-23 RX ADMIN — DEXAMETHASONE SODIUM PHOSPHATE 4 MG: 4 INJECTION INTRA-ARTICULAR; INTRALESIONAL; INTRAMUSCULAR; INTRAVENOUS; SOFT TISSUE at 11:19

## 2024-07-23 RX ADMIN — KETOROLAC TROMETHAMINE 15 MG: 15 INJECTION, SOLUTION INTRAMUSCULAR; INTRAVENOUS at 11:19

## 2024-07-23 RX ADMIN — FENTANYL CITRATE 100 MCG: 50 INJECTION, SOLUTION INTRAMUSCULAR; INTRAVENOUS at 11:19

## 2024-07-23 ASSESSMENT — COGNITIVE AND FUNCTIONAL STATUS - GENERAL
CLIMB 3 TO 5 STEPS WITH RAILING: A LITTLE
DRESSING REGULAR LOWER BODY CLOTHING: A LITTLE
DAILY ACTIVITIY SCORE: 22
SUGGESTED CMS G CODE MODIFIER DAILY ACTIVITY: CJ
MOBILITY SCORE: 21
SUGGESTED CMS G CODE MODIFIER MOBILITY: CJ
HELP NEEDED FOR BATHING: A LITTLE
STANDING UP FROM CHAIR USING ARMS: A LITTLE
WALKING IN HOSPITAL ROOM: A LITTLE

## 2024-07-23 ASSESSMENT — GAIT ASSESSMENTS
ASSISTIVE DEVICE: FRONT WHEEL WALKER
DISTANCE (FEET): 150
GAIT LEVEL OF ASSIST: SUPERVISED
DEVIATION: ANTALGIC

## 2024-07-23 ASSESSMENT — ACTIVITIES OF DAILY LIVING (ADL): TOILETING: INDEPENDENT

## 2024-07-23 ASSESSMENT — FIBROSIS 4 INDEX
FIB4 SCORE: 0.37
FIB4 SCORE: 0.37

## 2024-07-23 ASSESSMENT — PAIN DESCRIPTION - PAIN TYPE: TYPE: CHRONIC PAIN

## 2024-07-25 ENCOUNTER — APPOINTMENT (OUTPATIENT)
Dept: RADIOLOGY | Facility: MEDICAL CENTER | Age: 64
DRG: 682 | End: 2024-07-25
Attending: HOSPITALIST
Payer: OTHER GOVERNMENT

## 2024-07-25 ENCOUNTER — HOSPITAL ENCOUNTER (INPATIENT)
Facility: MEDICAL CENTER | Age: 64
End: 2024-07-25
Attending: HOSPITALIST | Admitting: HOSPITALIST
Payer: OTHER GOVERNMENT

## 2024-07-25 DIAGNOSIS — G47.00 INSOMNIA, UNSPECIFIED TYPE: ICD-10-CM

## 2024-07-25 DIAGNOSIS — Z79.01 CURRENT USE OF LONG TERM ANTICOAGULATION: ICD-10-CM

## 2024-07-25 DIAGNOSIS — R00.0 SINUS TACHYCARDIA: ICD-10-CM

## 2024-07-25 DIAGNOSIS — M17.11 ARTHRITIS OF RIGHT KNEE: ICD-10-CM

## 2024-07-25 DIAGNOSIS — G93.40 ACUTE ENCEPHALOPATHY: ICD-10-CM

## 2024-07-25 DIAGNOSIS — M17.11 PRIMARY OSTEOARTHRITIS OF RIGHT KNEE: ICD-10-CM

## 2024-07-25 DIAGNOSIS — N40.0 BENIGN PROSTATIC HYPERPLASIA, UNSPECIFIED WHETHER LOWER URINARY TRACT SYMPTOMS PRESENT: ICD-10-CM

## 2024-07-25 DIAGNOSIS — J18.9 PNEUMONIA OF RIGHT LOWER LOBE DUE TO INFECTIOUS ORGANISM: ICD-10-CM

## 2024-07-25 DIAGNOSIS — E66.01 CLASS 2 SEVERE OBESITY DUE TO EXCESS CALORIES WITH SERIOUS COMORBIDITY IN ADULT, UNSPECIFIED BMI (HCC): ICD-10-CM

## 2024-07-25 DIAGNOSIS — I48.91 ATRIAL FIBRILLATION WITH RAPID VENTRICULAR RESPONSE (HCC): ICD-10-CM

## 2024-07-25 DIAGNOSIS — I48.0 PAROXYSMAL ATRIAL FIBRILLATION (HCC): ICD-10-CM

## 2024-07-25 DIAGNOSIS — M54.9 CHRONIC BACK PAIN, UNSPECIFIED BACK LOCATION, UNSPECIFIED BACK PAIN LATERALITY: ICD-10-CM

## 2024-07-25 DIAGNOSIS — I69.911 MEMORY DEFICIT AFTER CEREBROVASCULAR DISEASE: ICD-10-CM

## 2024-07-25 DIAGNOSIS — F32.A DEPRESSION, UNSPECIFIED DEPRESSION TYPE: ICD-10-CM

## 2024-07-25 DIAGNOSIS — E78.2 MIXED HYPERLIPIDEMIA: ICD-10-CM

## 2024-07-25 DIAGNOSIS — G31.84 MILD COGNITIVE IMPAIRMENT: ICD-10-CM

## 2024-07-25 DIAGNOSIS — G40.909 SEIZURE DISORDER AS SEQUELA OF CEREBROVASCULAR ACCIDENT (HCC): ICD-10-CM

## 2024-07-25 DIAGNOSIS — R65.10 SIRS (SYSTEMIC INFLAMMATORY RESPONSE SYNDROME) (HCC): ICD-10-CM

## 2024-07-25 DIAGNOSIS — F05: ICD-10-CM

## 2024-07-25 DIAGNOSIS — I25.10 CORONARY ARTERY DISEASE INVOLVING NATIVE CORONARY ARTERY OF NATIVE HEART WITHOUT ANGINA PECTORIS: ICD-10-CM

## 2024-07-25 DIAGNOSIS — L03.115 CELLULITIS OF RIGHT LOWER EXTREMITY: ICD-10-CM

## 2024-07-25 DIAGNOSIS — G89.29 CHRONIC BACK PAIN, UNSPECIFIED BACK LOCATION, UNSPECIFIED BACK PAIN LATERALITY: ICD-10-CM

## 2024-07-25 DIAGNOSIS — Z96.651 STATUS POST RIGHT KNEE REPLACEMENT: ICD-10-CM

## 2024-07-25 DIAGNOSIS — Z79.02 ANTIPLATELET OR ANTITHROMBOTIC LONG-TERM USE: ICD-10-CM
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
## 2024-07-25 DIAGNOSIS — J95.821 RESPIRATORY FAILURE FOLLOWING TRAUMA AND SURGERY (HCC): ICD-10-CM

## 2024-07-25 DIAGNOSIS — I10 ESSENTIAL HYPERTENSION, BENIGN: ICD-10-CM

## 2024-07-25 DIAGNOSIS — Z95.5 STATUS POST INSERTION OF DRUG-ELUTING STENT INTO RIGHT CORONARY ARTERY FOR CORONARY ARTERY DISEASE: ICD-10-CM

## 2024-07-25 DIAGNOSIS — T14.90XA TRAUMA: ICD-10-CM

## 2024-07-25 DIAGNOSIS — Z79.899 POLYPHARMACY: ICD-10-CM

## 2024-07-25 DIAGNOSIS — N17.9 ACUTE KIDNEY INJURY (HCC): ICD-10-CM

## 2024-07-25 DIAGNOSIS — M47.816 LUMBAR SPONDYLOSIS: ICD-10-CM

## 2024-07-25 DIAGNOSIS — I69.398 SEIZURE DISORDER AS SEQUELA OF CEREBROVASCULAR ACCIDENT (HCC): ICD-10-CM

## 2024-07-25 DIAGNOSIS — D64.9 ANEMIA, UNSPECIFIED TYPE: ICD-10-CM

## 2024-07-25 DIAGNOSIS — E11.9 TYPE 2 DIABETES MELLITUS WITHOUT COMPLICATION, WITHOUT LONG-TERM CURRENT USE OF INSULIN (HCC): ICD-10-CM

## 2024-07-25 DIAGNOSIS — E87.6 HYPOKALEMIA: ICD-10-CM

## 2024-07-25 DIAGNOSIS — Z29.89 SEIZURE PROPHYLAXIS: ICD-10-CM

## 2024-07-25 DIAGNOSIS — S06.5XAS TRAUMATIC SUBDURAL HEMATOMA, SEQUELA (HCC): ICD-10-CM

## 2024-07-25 DIAGNOSIS — Z87.891 HISTORY OF TOBACCO USE: ICD-10-CM

## 2024-07-25 DIAGNOSIS — F03.918: ICD-10-CM

## 2024-07-25 LAB
ALBUMIN SERPL BCP-MCNC: 3.4 G/DL (ref 3.2–4.9)
ALBUMIN/GLOB SERPL: 1.1 G/DL
ALP SERPL-CCNC: 63 U/L (ref 30–99)
ALT SERPL-CCNC: 9 U/L (ref 2–50)
ANION GAP SERPL CALC-SCNC: 14 MMOL/L (ref 7–16)
AST SERPL-CCNC: 14 U/L (ref 12–45)
BILIRUB SERPL-MCNC: 0.5 MG/DL (ref 0.1–1.5)
BUN SERPL-MCNC: 42 MG/DL (ref 8–22)
CA-I SERPL-SCNC: 1.24 MMOL/L (ref 1.1–1.3)
CALCIUM ALBUM COR SERPL-MCNC: 9.8 MG/DL (ref 8.5–10.5)
CALCIUM SERPL-MCNC: 9.3 MG/DL (ref 8.4–10.2)
CHLORIDE SERPL-SCNC: 101 MMOL/L (ref 96–112)
CO2 SERPL-SCNC: 21 MMOL/L (ref 20–33)
CREAT SERPL-MCNC: 2.66 MG/DL (ref 0.5–1.4)
CRP SERPL HS-MCNC: 10.72 MG/DL (ref 0–0.75)
ERYTHROCYTE [DISTWIDTH] IN BLOOD BY AUTOMATED COUNT: 44.9 FL (ref 35.9–50)
ERYTHROCYTE [SEDIMENTATION RATE] IN BLOOD BY WESTERGREN METHOD: 35 MM/HOUR (ref 0–20)
GFR SERPLBLD CREATININE-BSD FMLA CKD-EPI: 26 ML/MIN/1.73 M 2
GLOBULIN SER CALC-MCNC: 3 G/DL (ref 1.9–3.5)
GLUCOSE BLD STRIP.AUTO-MCNC: 106 MG/DL (ref 65–99)
GLUCOSE BLD STRIP.AUTO-MCNC: 84 MG/DL (ref 65–99)
GLUCOSE SERPL-MCNC: 100 MG/DL (ref 65–99)
HCT VFR BLD AUTO: 35.3 % (ref 42–52)
HGB BLD-MCNC: 11.5 G/DL (ref 14–18)
LACTATE SERPL-SCNC: 0.9 MMOL/L (ref 0.5–2)
MAGNESIUM SERPL-MCNC: 1.9 MG/DL (ref 1.5–2.5)
MCH RBC QN AUTO: 30.6 PG (ref 27–33)
MCHC RBC AUTO-ENTMCNC: 32.6 G/DL (ref 32.3–36.5)
MCV RBC AUTO: 93.9 FL (ref 81.4–97.8)
PHOSPHATE SERPL-MCNC: 3.4 MG/DL (ref 2.5–4.5)
PLATELET # BLD AUTO: 181 K/UL (ref 164–446)
PMV BLD AUTO: 12.1 FL (ref 9–12.9)
POTASSIUM SERPL-SCNC: 4.1 MMOL/L (ref 3.6–5.5)
PROCALCITONIN SERPL-MCNC: 0.11 NG/ML
PROT SERPL-MCNC: 6.4 G/DL (ref 6–8.2)
RBC # BLD AUTO: 3.76 M/UL (ref 4.7–6.1)
SODIUM SERPL-SCNC: 136 MMOL/L (ref 135–145)
URATE SERPL-MCNC: 6.8 MG/DL (ref 2.5–8.3)
WBC # BLD AUTO: 8.1 K/UL (ref 4.8–10.8)

## 2024-07-25 PROCEDURE — 36415 COLL VENOUS BLD VENIPUNCTURE: CPT

## 2024-07-25 PROCEDURE — 700105 HCHG RX REV CODE 258: Performed by: HOSPITALIST

## 2024-07-25 PROCEDURE — 87040 BLOOD CULTURE FOR BACTERIA: CPT

## 2024-07-25 PROCEDURE — 85652 RBC SED RATE AUTOMATED: CPT

## 2024-07-25 PROCEDURE — 86140 C-REACTIVE PROTEIN: CPT

## 2024-07-25 PROCEDURE — 84550 ASSAY OF BLOOD/URIC ACID: CPT

## 2024-07-25 PROCEDURE — 99223 1ST HOSP IP/OBS HIGH 75: CPT | Performed by: HOSPITALIST

## 2024-07-25 PROCEDURE — A9270 NON-COVERED ITEM OR SERVICE: HCPCS | Performed by: HOSPITALIST

## 2024-07-25 PROCEDURE — 82962 GLUCOSE BLOOD TEST: CPT | Mod: 91

## 2024-07-25 PROCEDURE — 83735 ASSAY OF MAGNESIUM: CPT

## 2024-07-25 PROCEDURE — 84145 PROCALCITONIN (PCT): CPT

## 2024-07-25 PROCEDURE — 700102 HCHG RX REV CODE 250 W/ 637 OVERRIDE(OP): Performed by: HOSPITALIST

## 2024-07-25 PROCEDURE — 76775 US EXAM ABDO BACK WALL LIM: CPT

## 2024-07-25 PROCEDURE — 83605 ASSAY OF LACTIC ACID: CPT

## 2024-07-25 PROCEDURE — 80053 COMPREHEN METABOLIC PANEL: CPT

## 2024-07-25 PROCEDURE — 700111 HCHG RX REV CODE 636 W/ 250 OVERRIDE (IP): Mod: JZ | Performed by: HOSPITALIST

## 2024-07-25 PROCEDURE — 770001 HCHG ROOM/CARE - MED/SURG/GYN PRIV*

## 2024-07-25 PROCEDURE — 82330 ASSAY OF CALCIUM: CPT

## 2024-07-25 PROCEDURE — 84100 ASSAY OF PHOSPHORUS: CPT

## 2024-07-25 PROCEDURE — 94760 N-INVAS EAR/PLS OXIMETRY 1: CPT

## 2024-07-25 PROCEDURE — 85027 COMPLETE CBC AUTOMATED: CPT

## 2024-07-25 RX ORDER — SODIUM CHLORIDE 9 MG/ML
INJECTION, SOLUTION INTRAVENOUS CONTINUOUS
Status: DISCONTINUED | OUTPATIENT
Start: 2024-07-25 | End: 2024-07-27

## 2024-07-25 RX ORDER — PROMETHAZINE HYDROCHLORIDE 25 MG/1
12.5-25 SUPPOSITORY RECTAL EVERY 4 HOURS PRN
Status: DISCONTINUED | OUTPATIENT
Start: 2024-07-25 | End: 2024-08-07 | Stop reason: HOSPADM

## 2024-07-25 RX ORDER — PROMETHAZINE HYDROCHLORIDE 25 MG/1
12.5-25 TABLET ORAL EVERY 4 HOURS PRN
Status: DISCONTINUED | OUTPATIENT
Start: 2024-07-25 | End: 2024-08-07 | Stop reason: HOSPADM

## 2024-07-25 RX ORDER — CARVEDILOL 6.25 MG/1
6.25 TABLET ORAL 2 TIMES DAILY WITH MEALS
Status: DISCONTINUED | OUTPATIENT
Start: 2024-07-25 | End: 2024-07-28

## 2024-07-25 RX ORDER — TAMSULOSIN HYDROCHLORIDE 0.4 MG/1
0.4 CAPSULE ORAL 2 TIMES DAILY
Status: DISCONTINUED | OUTPATIENT
Start: 2024-07-25 | End: 2024-08-07 | Stop reason: HOSPADM

## 2024-07-25 RX ORDER — ONDANSETRON 2 MG/ML
4 INJECTION INTRAMUSCULAR; INTRAVENOUS EVERY 4 HOURS PRN
Status: DISCONTINUED | OUTPATIENT
Start: 2024-07-25 | End: 2024-08-07 | Stop reason: HOSPADM

## 2024-07-25 RX ORDER — ALBUTEROL SULFATE 90 UG/1
1 AEROSOL, METERED RESPIRATORY (INHALATION)
Status: DISCONTINUED | OUTPATIENT
Start: 2024-07-25 | End: 2024-08-07 | Stop reason: HOSPADM

## 2024-07-25 RX ORDER — ACETAMINOPHEN 325 MG/1
650 TABLET ORAL EVERY 6 HOURS PRN
Status: DISCONTINUED | OUTPATIENT
Start: 2024-07-25 | End: 2024-07-26

## 2024-07-25 RX ORDER — VALPROIC ACID 250 MG/5ML
125 SOLUTION ORAL 3 TIMES DAILY
Status: DISCONTINUED | OUTPATIENT
Start: 2024-07-25 | End: 2024-08-07 | Stop reason: HOSPADM

## 2024-07-25 RX ORDER — VENLAFAXINE HYDROCHLORIDE 75 MG/1
150 CAPSULE, EXTENDED RELEASE ORAL NIGHTLY
Status: DISCONTINUED | OUTPATIENT
Start: 2024-07-25 | End: 2024-07-26

## 2024-07-25 RX ORDER — LABETALOL HYDROCHLORIDE 5 MG/ML
10 INJECTION, SOLUTION INTRAVENOUS EVERY 4 HOURS PRN
Status: DISCONTINUED | OUTPATIENT
Start: 2024-07-25 | End: 2024-07-26

## 2024-07-25 RX ORDER — PROCHLORPERAZINE EDISYLATE 5 MG/ML
5-10 INJECTION INTRAMUSCULAR; INTRAVENOUS EVERY 4 HOURS PRN
Status: DISCONTINUED | OUTPATIENT
Start: 2024-07-25 | End: 2024-08-07 | Stop reason: HOSPADM

## 2024-07-25 RX ORDER — ONDANSETRON 4 MG/1
4 TABLET, ORALLY DISINTEGRATING ORAL EVERY 4 HOURS PRN
Status: DISCONTINUED | OUTPATIENT
Start: 2024-07-25 | End: 2024-08-07 | Stop reason: HOSPADM

## 2024-07-25 RX ORDER — FINASTERIDE 5 MG/1
5 TABLET, FILM COATED ORAL DAILY
Status: DISCONTINUED | OUTPATIENT
Start: 2024-07-26 | End: 2024-08-07 | Stop reason: HOSPADM

## 2024-07-25 RX ORDER — DEXTROSE MONOHYDRATE 25 G/50ML
25 INJECTION, SOLUTION INTRAVENOUS
Status: DISCONTINUED | OUTPATIENT
Start: 2024-07-25 | End: 2024-08-02

## 2024-07-25 RX ORDER — ASPIRIN 81 MG/1
81 TABLET ORAL DAILY
Status: DISCONTINUED | OUTPATIENT
Start: 2024-07-26 | End: 2024-07-26

## 2024-07-25 RX ORDER — ATORVASTATIN CALCIUM 40 MG/1
40 TABLET, FILM COATED ORAL NIGHTLY
Status: DISCONTINUED | OUTPATIENT
Start: 2024-07-25 | End: 2024-08-07 | Stop reason: HOSPADM

## 2024-07-25 RX ORDER — LINEZOLID 600 MG/1
600 TABLET, FILM COATED ORAL EVERY 12 HOURS
Status: DISCONTINUED | OUTPATIENT
Start: 2024-07-25 | End: 2024-07-26

## 2024-07-25 RX ORDER — EZETIMIBE 10 MG/1
10 TABLET ORAL NIGHTLY
Status: DISCONTINUED | OUTPATIENT
Start: 2024-07-25 | End: 2024-08-07 | Stop reason: HOSPADM

## 2024-07-25 RX ORDER — DOCUSATE SODIUM 100 MG/1
100 CAPSULE, LIQUID FILLED ORAL 2 TIMES DAILY
Status: DISCONTINUED | OUTPATIENT
Start: 2024-07-25 | End: 2024-08-07 | Stop reason: HOSPADM

## 2024-07-25 RX ADMIN — SODIUM CHLORIDE: 9 INJECTION, SOLUTION INTRAVENOUS at 21:05

## 2024-07-25 RX ADMIN — DOCUSATE SODIUM 100 MG: 100 CAPSULE, LIQUID FILLED ORAL at 21:23

## 2024-07-25 RX ADMIN — AMPICILLIN AND SULBACTAM 3 G: 1; 2 INJECTION, POWDER, FOR SOLUTION INTRAMUSCULAR; INTRAVENOUS at 21:09

## 2024-07-25 RX ADMIN — TAMSULOSIN HYDROCHLORIDE 0.4 MG: 0.4 CAPSULE ORAL at 21:23

## 2024-07-25 RX ADMIN — LINEZOLID 600 MG: 600 TABLET, FILM COATED ORAL at 21:23

## 2024-07-25 RX ADMIN — ATORVASTATIN CALCIUM 40 MG: 40 TABLET, FILM COATED ORAL at 21:23

## 2024-07-25 RX ADMIN — VENLAFAXINE HYDROCHLORIDE 150 MG: 75 CAPSULE, EXTENDED RELEASE ORAL at 21:24

## 2024-07-25 RX ADMIN — EZETIMIBE 10 MG: 10 TABLET ORAL at 21:23

## 2024-07-25 RX ADMIN — CARVEDILOL 6.25 MG: 6.25 TABLET, FILM COATED ORAL at 21:23

## 2024-07-25 RX ADMIN — VALPROIC ACID 125 MG: 250 SOLUTION ORAL at 21:24

## 2024-07-25 SDOH — ECONOMIC STABILITY: TRANSPORTATION INSECURITY
IN THE PAST 12 MONTHS, HAS LACK OF RELIABLE TRANSPORTATION KEPT YOU FROM MEDICAL APPOINTMENTS, MEETINGS, WORK OR FROM GETTING THINGS NEEDED FOR DAILY LIVING?: NO

## 2024-07-25 SDOH — ECONOMIC STABILITY: TRANSPORTATION INSECURITY
IN THE PAST 12 MONTHS, HAS THE LACK OF TRANSPORTATION KEPT YOU FROM MEDICAL APPOINTMENTS OR FROM GETTING MEDICATIONS?: NO

## 2024-07-25 ASSESSMENT — SOCIAL DETERMINANTS OF HEALTH (SDOH)
IN THE PAST 12 MONTHS, HAS THE ELECTRIC, GAS, OIL, OR WATER COMPANY THREATENED TO SHUT OFF SERVICE IN YOUR HOME?: NO
WITHIN THE LAST YEAR, HAVE TO BEEN RAPED OR FORCED TO HAVE ANY KIND OF SEXUAL ACTIVITY BY YOUR PARTNER OR EX-PARTNER?: NO
WITHIN THE LAST YEAR, HAVE YOU BEEN KICKED, HIT, SLAPPED, OR OTHERWISE PHYSICALLY HURT BY YOUR PARTNER OR EX-PARTNER?: NO
WITHIN THE LAST YEAR, HAVE YOU BEEN AFRAID OF YOUR PARTNER OR EX-PARTNER?: NO
WITHIN THE PAST 12 MONTHS, YOU WORRIED THAT YOUR FOOD WOULD RUN OUT BEFORE YOU GOT THE MONEY TO BUY MORE: NEVER TRUE
WITHIN THE PAST 12 MONTHS, THE FOOD YOU BOUGHT JUST DIDN'T LAST AND YOU DIDN'T HAVE MONEY TO GET MORE: NEVER TRUE
WITHIN THE LAST YEAR, HAVE YOU BEEN HUMILIATED OR EMOTIONALLY ABUSED IN OTHER WAYS BY YOUR PARTNER OR EX-PARTNER?: NO

## 2024-07-25 ASSESSMENT — COGNITIVE AND FUNCTIONAL STATUS - GENERAL
DRESSING REGULAR LOWER BODY CLOTHING: A LOT
DRESSING REGULAR UPPER BODY CLOTHING: A LOT
MOBILITY SCORE: 12
CLIMB 3 TO 5 STEPS WITH RAILING: A LOT
HELP NEEDED FOR BATHING: A LOT
TURNING FROM BACK TO SIDE WHILE IN FLAT BAD: A LOT
STANDING UP FROM CHAIR USING ARMS: A LOT
PERSONAL GROOMING: A LOT
TOILETING: A LOT
WALKING IN HOSPITAL ROOM: A LOT
SUGGESTED CMS G CODE MODIFIER DAILY ACTIVITY: CL
EATING MEALS: A LOT
DAILY ACTIVITIY SCORE: 12
MOVING TO AND FROM BED TO CHAIR: A LOT
MOVING FROM LYING ON BACK TO SITTING ON SIDE OF FLAT BED: A LOT
SUGGESTED CMS G CODE MODIFIER MOBILITY: CL

## 2024-07-25 ASSESSMENT — COPD QUESTIONNAIRES
HAVE YOU SMOKED AT LEAST 100 CIGARETTES IN YOUR ENTIRE LIFE: YES
DO YOU EVER COUGH UP ANY MUCUS OR PHLEGM?: NO/ONLY WITH OCCASIONAL COLDS OR INFECTIONS
COPD SCREENING SCORE: 5
DURING THE PAST 4 WEEKS HOW MUCH DID YOU FEEL SHORT OF BREATH: NONE/LITTLE OF THE TIME

## 2024-07-25 ASSESSMENT — LIFESTYLE VARIABLES
HAVE PEOPLE ANNOYED YOU BY CRITICIZING YOUR DRINKING: NO
TOTAL SCORE: 0
AVERAGE NUMBER OF DAYS PER WEEK YOU HAVE A DRINK CONTAINING ALCOHOL: 0
TOTAL SCORE: 0
HAVE YOU EVER FELT YOU SHOULD CUT DOWN ON YOUR DRINKING: NO
DOES PATIENT WANT TO STOP DRINKING: NO
EVER FELT BAD OR GUILTY ABOUT YOUR DRINKING: NO
ON A TYPICAL DAY WHEN YOU DRINK ALCOHOL HOW MANY DRINKS DO YOU HAVE: 0
CONSUMPTION TOTAL: NEGATIVE
HOW MANY TIMES IN THE PAST YEAR HAVE YOU HAD 5 OR MORE DRINKS IN A DAY: 0
TOTAL SCORE: 0
ALCOHOL_USE: NO
EVER HAD A DRINK FIRST THING IN THE MORNING TO STEADY YOUR NERVES TO GET RID OF A HANGOVER: NO

## 2024-07-25 ASSESSMENT — PATIENT HEALTH QUESTIONNAIRE - PHQ9
2. FEELING DOWN, DEPRESSED, IRRITABLE, OR HOPELESS: NOT AT ALL
SUM OF ALL RESPONSES TO PHQ9 QUESTIONS 1 AND 2: 0
1. LITTLE INTEREST OR PLEASURE IN DOING THINGS: NOT AT ALL

## 2024-07-25 ASSESSMENT — PAIN DESCRIPTION - PAIN TYPE
TYPE: ACUTE PAIN
TYPE: ACUTE PAIN

## 2024-07-25 ASSESSMENT — FIBROSIS 4 INDEX: FIB4 SCORE: 0.37

## 2024-07-25 NOTE — PROGRESS NOTES
[facility]PITAL[facility] TRIAGE OFFICER CONSULT REQUEST REPORT  Consult requested by: Dr Jimenez  Reason for consult: Altered mental status unable to manage at [facility]  Is consult for transition of care: Yes  Pertinent history/hospital Course: Patient about 8 days ago had total knee replacement.  He was sent home and at home was doing well but today the wife brought him into the emergency room because of altered mental status.  Patient was evaluated and found to have acute kidney injury with a creatinine over 3 and previously was normal.  Patient's knee at this point is hot to the touch and swollen.  Dr. Barragan who did the previous surgery is aware of the situation will see the patient on consult  Code Status: Full code  Can the hospitalist sign off upon completion of required consult/outcomes requested: No   Assigned hospitalist: Please call hospitalist after releasing signed and held orders, performing med rec, charting weight and height    For any question or concerns regarding the care of this patient, please reach out to the assigned hospitalist.

## 2024-07-26 ENCOUNTER — APPOINTMENT (OUTPATIENT)
Dept: RADIOLOGY | Facility: MEDICAL CENTER | Age: 64
DRG: 682 | End: 2024-07-26
Attending: INTERNAL MEDICINE
Payer: OTHER GOVERNMENT

## 2024-07-26 PROBLEM — D64.9 ANEMIA: Status: ACTIVE | Noted: 2024-07-26

## 2024-07-26 LAB
ALBUMIN SERPL BCP-MCNC: 3.4 G/DL (ref 3.2–4.9)
ALBUMIN/GLOB SERPL: 1.1 G/DL
ALP SERPL-CCNC: 66 U/L (ref 30–99)
ALT SERPL-CCNC: 8 U/L (ref 2–50)
ANION GAP SERPL CALC-SCNC: 15 MMOL/L (ref 7–16)
APPEARANCE UR: ABNORMAL
AST SERPL-CCNC: 15 U/L (ref 12–45)
BACTERIA #/AREA URNS HPF: ABNORMAL /HPF
BILIRUB SERPL-MCNC: 0.5 MG/DL (ref 0.1–1.5)
BILIRUB UR QL STRIP.AUTO: ABNORMAL
BUN SERPL-MCNC: 37 MG/DL (ref 8–22)
CALCIUM ALBUM COR SERPL-MCNC: 9.9 MG/DL (ref 8.5–10.5)
CALCIUM SERPL-MCNC: 9.4 MG/DL (ref 8.4–10.2)
CHLORIDE SERPL-SCNC: 102 MMOL/L (ref 96–112)
CO2 SERPL-SCNC: 19 MMOL/L (ref 20–33)
COLOR UR: YELLOW
CREAT SERPL-MCNC: 2.11 MG/DL (ref 0.5–1.4)
EKG IMPRESSION: NORMAL
EPI CELLS #/AREA URNS HPF: ABNORMAL /HPF
ERYTHROCYTE [DISTWIDTH] IN BLOOD BY AUTOMATED COUNT: 44.4 FL (ref 35.9–50)
GFR SERPLBLD CREATININE-BSD FMLA CKD-EPI: 34 ML/MIN/1.73 M 2
GLOBULIN SER CALC-MCNC: 3 G/DL (ref 1.9–3.5)
GLUCOSE BLD STRIP.AUTO-MCNC: 108 MG/DL (ref 65–99)
GLUCOSE BLD STRIP.AUTO-MCNC: 113 MG/DL (ref 65–99)
GLUCOSE BLD STRIP.AUTO-MCNC: 117 MG/DL (ref 65–99)
GLUCOSE BLD STRIP.AUTO-MCNC: 167 MG/DL (ref 65–99)
GLUCOSE SERPL-MCNC: 95 MG/DL (ref 65–99)
GLUCOSE UR STRIP.AUTO-MCNC: NEGATIVE MG/DL
GRAN CASTS #/AREA URNS LPF: ABNORMAL /LPF
HCT VFR BLD AUTO: 35.3 % (ref 42–52)
HGB BLD-MCNC: 11.4 G/DL (ref 14–18)
KETONES UR STRIP.AUTO-MCNC: 15 MG/DL
LEUKOCYTE ESTERASE UR QL STRIP.AUTO: NEGATIVE
MCH RBC QN AUTO: 30.4 PG (ref 27–33)
MCHC RBC AUTO-ENTMCNC: 32.3 G/DL (ref 32.3–36.5)
MCV RBC AUTO: 94.1 FL (ref 81.4–97.8)
MICRO URNS: ABNORMAL
MUCOUS THREADS #/AREA URNS HPF: ABNORMAL /HPF
NITRITE UR QL STRIP.AUTO: NEGATIVE
PH UR STRIP.AUTO: 6 [PH] (ref 5–8)
PLATELET # BLD AUTO: 198 K/UL (ref 164–446)
PMV BLD AUTO: 12.7 FL (ref 9–12.9)
POTASSIUM SERPL-SCNC: 3.8 MMOL/L (ref 3.6–5.5)
PROT SERPL-MCNC: 6.4 G/DL (ref 6–8.2)
PROT UR QL STRIP: 100 MG/DL
RBC # BLD AUTO: 3.75 M/UL (ref 4.7–6.1)
RBC # URNS HPF: ABNORMAL /HPF
RBC UR QL AUTO: ABNORMAL
SCCMEC + MECA PNL NOSE NAA+PROBE: NEGATIVE
SODIUM SERPL-SCNC: 136 MMOL/L (ref 135–145)
SP GR UR STRIP.AUTO: >=1.03
UNIDENT CRYS URNS QL MICRO: ABNORMAL /HPF
WBC # BLD AUTO: 7.9 K/UL (ref 4.8–10.8)
WBC #/AREA URNS HPF: ABNORMAL /HPF

## 2024-07-26 PROCEDURE — 700105 HCHG RX REV CODE 258: Performed by: INTERNAL MEDICINE

## 2024-07-26 PROCEDURE — 87040 BLOOD CULTURE FOR BACTERIA: CPT

## 2024-07-26 PROCEDURE — 87086 URINE CULTURE/COLONY COUNT: CPT

## 2024-07-26 PROCEDURE — 81001 URINALYSIS AUTO W/SCOPE: CPT

## 2024-07-26 PROCEDURE — 80307 DRUG TEST PRSMV CHEM ANLYZR: CPT

## 2024-07-26 PROCEDURE — 700102 HCHG RX REV CODE 250 W/ 637 OVERRIDE(OP): Performed by: INTERNAL MEDICINE

## 2024-07-26 PROCEDURE — 94760 N-INVAS EAR/PLS OXIMETRY 1: CPT

## 2024-07-26 PROCEDURE — 36415 COLL VENOUS BLD VENIPUNCTURE: CPT

## 2024-07-26 PROCEDURE — 99024 POSTOP FOLLOW-UP VISIT: CPT | Performed by: ORTHOPAEDIC SURGERY

## 2024-07-26 PROCEDURE — 700111 HCHG RX REV CODE 636 W/ 250 OVERRIDE (IP): Performed by: HOSPITALIST

## 2024-07-26 PROCEDURE — 51798 US URINE CAPACITY MEASURE: CPT

## 2024-07-26 PROCEDURE — 700111 HCHG RX REV CODE 636 W/ 250 OVERRIDE (IP): Mod: JZ | Performed by: INTERNAL MEDICINE

## 2024-07-26 PROCEDURE — 93005 ELECTROCARDIOGRAM TRACING: CPT | Performed by: HOSPITALIST

## 2024-07-26 PROCEDURE — 87641 MR-STAPH DNA AMP PROBE: CPT

## 2024-07-26 PROCEDURE — A9270 NON-COVERED ITEM OR SERVICE: HCPCS | Performed by: INTERNAL MEDICINE

## 2024-07-26 PROCEDURE — 85027 COMPLETE CBC AUTOMATED: CPT

## 2024-07-26 PROCEDURE — 93010 ELECTROCARDIOGRAM REPORT: CPT | Performed by: INTERNAL MEDICINE

## 2024-07-26 PROCEDURE — 700105 HCHG RX REV CODE 258: Performed by: HOSPITALIST

## 2024-07-26 PROCEDURE — 80053 COMPREHEN METABOLIC PANEL: CPT

## 2024-07-26 PROCEDURE — 82962 GLUCOSE BLOOD TEST: CPT | Mod: 91

## 2024-07-26 PROCEDURE — 700102 HCHG RX REV CODE 250 W/ 637 OVERRIDE(OP): Performed by: HOSPITALIST

## 2024-07-26 PROCEDURE — 99232 SBSQ HOSP IP/OBS MODERATE 35: CPT | Performed by: INTERNAL MEDICINE

## 2024-07-26 PROCEDURE — A9270 NON-COVERED ITEM OR SERVICE: HCPCS | Performed by: HOSPITALIST

## 2024-07-26 PROCEDURE — 770001 HCHG ROOM/CARE - MED/SURG/GYN PRIV*

## 2024-07-26 PROCEDURE — 73700 CT LOWER EXTREMITY W/O DYE: CPT | Mod: RT

## 2024-07-26 RX ORDER — VENLAFAXINE HYDROCHLORIDE 75 MG/1
225 CAPSULE, EXTENDED RELEASE ORAL NIGHTLY
Status: DISCONTINUED | OUTPATIENT
Start: 2024-07-26 | End: 2024-08-07 | Stop reason: HOSPADM

## 2024-07-26 RX ORDER — OMEPRAZOLE 40 MG/1
40 CAPSULE, DELAYED RELEASE ORAL NIGHTLY
COMMUNITY

## 2024-07-26 RX ORDER — TRAMADOL HYDROCHLORIDE 50 MG/1
50-75 TABLET ORAL EVERY 6 HOURS PRN
Status: DISCONTINUED | OUTPATIENT
Start: 2024-07-26 | End: 2024-07-27

## 2024-07-26 RX ORDER — VENLAFAXINE HYDROCHLORIDE 75 MG/1
75 CAPSULE, EXTENDED RELEASE ORAL EVERY EVENING
Status: DISCONTINUED | OUTPATIENT
Start: 2024-07-26 | End: 2024-07-26

## 2024-07-26 RX ORDER — ACETAMINOPHEN 500 MG
1000 TABLET ORAL EVERY 6 HOURS PRN
Status: DISCONTINUED | OUTPATIENT
Start: 2024-07-31 | End: 2024-07-26

## 2024-07-26 RX ORDER — LABETALOL HYDROCHLORIDE 5 MG/ML
10 INJECTION, SOLUTION INTRAVENOUS EVERY 4 HOURS PRN
Status: DISCONTINUED | OUTPATIENT
Start: 2024-07-26 | End: 2024-07-28

## 2024-07-26 RX ORDER — ACETAMINOPHEN 500 MG
1000 TABLET ORAL EVERY 6 HOURS
Status: DISCONTINUED | OUTPATIENT
Start: 2024-07-26 | End: 2024-07-26

## 2024-07-26 RX ORDER — HYDRALAZINE HYDROCHLORIDE 20 MG/ML
20 INJECTION INTRAMUSCULAR; INTRAVENOUS EVERY 6 HOURS PRN
Status: DISCONTINUED | OUTPATIENT
Start: 2024-07-26 | End: 2024-07-28

## 2024-07-26 RX ORDER — TRAZODONE HYDROCHLORIDE 50 MG/1
150 TABLET, FILM COATED ORAL NIGHTLY
Status: DISCONTINUED | OUTPATIENT
Start: 2024-07-26 | End: 2024-07-27

## 2024-07-26 RX ORDER — QUETIAPINE FUMARATE 25 MG/1
25 TABLET, FILM COATED ORAL
Status: DISCONTINUED | OUTPATIENT
Start: 2024-07-26 | End: 2024-07-30

## 2024-07-26 RX ORDER — HYDROCODONE BITARTRATE AND ACETAMINOPHEN 10; 325 MG/1; MG/1
1 TABLET ORAL EVERY 4 HOURS PRN
Status: DISCONTINUED | OUTPATIENT
Start: 2024-07-26 | End: 2024-08-01

## 2024-07-26 RX ORDER — ROSUVASTATIN CALCIUM 40 MG/1
40 TABLET, COATED ORAL EVERY EVENING
COMMUNITY

## 2024-07-26 RX ORDER — ACETAMINOPHEN 500 MG
1000 TABLET ORAL 2 TIMES DAILY
Status: DISCONTINUED | OUTPATIENT
Start: 2024-07-26 | End: 2024-07-27

## 2024-07-26 RX ORDER — ACETAMINOPHEN 500 MG
500 TABLET ORAL 2 TIMES DAILY
COMMUNITY

## 2024-07-26 RX ORDER — ACETAMINOPHEN 500 MG
1000 TABLET ORAL EVERY 6 HOURS PRN
Status: DISCONTINUED | OUTPATIENT
Start: 2024-07-31 | End: 2024-07-27

## 2024-07-26 RX ADMIN — EZETIMIBE 10 MG: 10 TABLET ORAL at 20:16

## 2024-07-26 RX ADMIN — CARVEDILOL 6.25 MG: 6.25 TABLET, FILM COATED ORAL at 08:13

## 2024-07-26 RX ADMIN — TRAZODONE HYDROCHLORIDE 150 MG: 50 TABLET ORAL at 20:16

## 2024-07-26 RX ADMIN — OMEPRAZOLE 40 MG: 20 CAPSULE, DELAYED RELEASE ORAL at 20:16

## 2024-07-26 RX ADMIN — LABETALOL HYDROCHLORIDE 10 MG: 5 INJECTION INTRAVENOUS at 01:44

## 2024-07-26 RX ADMIN — HYDRALAZINE HYDROCHLORIDE 20 MG: 20 INJECTION INTRAMUSCULAR; INTRAVENOUS at 04:10

## 2024-07-26 RX ADMIN — ACETAMINOPHEN 1000 MG: 500 TABLET ORAL at 17:14

## 2024-07-26 RX ADMIN — AMPICILLIN AND SULBACTAM 3 G: 1; 2 INJECTION, POWDER, FOR SOLUTION INTRAMUSCULAR; INTRAVENOUS at 01:34

## 2024-07-26 RX ADMIN — SODIUM CHLORIDE: 9 INJECTION, SOLUTION INTRAVENOUS at 19:37

## 2024-07-26 RX ADMIN — APIXABAN 2.5 MG: 2.5 TABLET, FILM COATED ORAL at 17:14

## 2024-07-26 RX ADMIN — VALPROIC ACID 125 MG: 250 SOLUTION ORAL at 05:38

## 2024-07-26 RX ADMIN — ATORVASTATIN CALCIUM 40 MG: 40 TABLET, FILM COATED ORAL at 20:16

## 2024-07-26 RX ADMIN — ASPIRIN 81 MG: 81 TABLET, COATED ORAL at 05:38

## 2024-07-26 RX ADMIN — VALPROIC ACID 125 MG: 250 SOLUTION ORAL at 17:14

## 2024-07-26 RX ADMIN — TAMSULOSIN HYDROCHLORIDE 0.4 MG: 0.4 CAPSULE ORAL at 17:14

## 2024-07-26 RX ADMIN — CARVEDILOL 6.25 MG: 6.25 TABLET, FILM COATED ORAL at 17:14

## 2024-07-26 RX ADMIN — HYDRALAZINE HYDROCHLORIDE 20 MG: 20 INJECTION INTRAMUSCULAR; INTRAVENOUS at 05:00

## 2024-07-26 RX ADMIN — QUETIAPINE FUMARATE 25 MG: 25 TABLET ORAL at 21:41

## 2024-07-26 RX ADMIN — VENLAFAXINE HYDROCHLORIDE 225 MG: 75 CAPSULE, EXTENDED RELEASE ORAL at 20:16

## 2024-07-26 RX ADMIN — AMPICILLIN AND SULBACTAM 3 G: 1; 2 INJECTION, POWDER, FOR SOLUTION INTRAMUSCULAR; INTRAVENOUS at 05:36

## 2024-07-26 RX ADMIN — APIXABAN 5 MG: 5 TABLET, FILM COATED ORAL at 08:12

## 2024-07-26 RX ADMIN — VALPROIC ACID 125 MG: 250 SOLUTION ORAL at 11:24

## 2024-07-26 RX ADMIN — FINASTERIDE 5 MG: 5 TABLET, FILM COATED ORAL at 05:38

## 2024-07-26 RX ADMIN — ACETAMINOPHEN 650 MG: 325 TABLET ORAL at 05:38

## 2024-07-26 RX ADMIN — TAMSULOSIN HYDROCHLORIDE 0.4 MG: 0.4 CAPSULE ORAL at 05:38

## 2024-07-26 RX ADMIN — LINEZOLID 600 MG: 600 TABLET, FILM COATED ORAL at 05:38

## 2024-07-26 RX ADMIN — INSULIN HUMAN 1 UNITS: 100 INJECTION, SOLUTION PARENTERAL at 20:14

## 2024-07-26 ASSESSMENT — PAIN DESCRIPTION - PAIN TYPE
TYPE: ACUTE PAIN
TYPE: ACUTE PAIN
TYPE: SURGICAL PAIN
TYPE: ACUTE PAIN
TYPE: ACUTE PAIN
TYPE: SURGICAL PAIN
TYPE: ACUTE PAIN

## 2024-07-26 ASSESSMENT — CHA2DS2 SCORE
CHA2DS2 VASC SCORE: 2
VASCULAR DISEASE: YES
SEX: MALE
PRIOR STROKE OR TIA OR THROMBOEMBOLISM: NO
AGE 65 TO 74: NO
DIABETES: NO
CHF OR LEFT VENTRICULAR DYSFUNCTION: NO
AGE 75 OR GREATER: NO
HYPERTENSION: YES

## 2024-07-26 NOTE — PROGRESS NOTES
4 Eyes Skin Assessment Completed by CAYETANO Rodrigez and Ma, RN.    Head WDL  Ears Redness and Blanching  Nose WDL  Mouth WDL  Neck WDL  Breast/Chest WDL  Shoulder Blades WDL  Spine Scar  (R) Arm/Elbow/Hand Redness and Blanching  (L) Arm/Elbow/Hand Redness and Blanching  Abdomen WDL  Groin WDL  Scrotum/Coccyx/Buttocks WDL  (R) Leg Previous R knee surgical site with honey comb dressing/transparent film/foam, Redness, Blanching, Bruising, and Swelling  (L) Leg WDL  (R) Heel/Foot/Toe WDL  (L) Heel/Foot/Toe WDL          Devices In Places Pulse Ox and Nasal Cannula      Interventions In Place NC W/Ear Foams and Pillows    Possible Skin Injury No    Pictures Uploaded Into Epic N/A  Wound Consult Placed N/A  RN Wound Prevention Protocol Ordered Yes

## 2024-07-26 NOTE — H&P
Hospital Medicine History & Physical Note    Date of Service  7/25/2024    Primary Care Physician  Henri Kong M.D.    Consultants  orthopedics    Specialist Names: Dr. Noe Barragan    Code Status  Full Code    Chief Complaint  No chief complaint on file.      History of Presenting Illness  Karan Wiley is a 63 y.o. male who presented 7/25/2024 on transfer from [facility] where the patient was brought in by his wife with altered mental status.  The patient recently had right knee replacement with Dr. Noe Barragan on Tuesday of this week at [facility].  The patient since then has become confused at home and had a fall.  The altered mental status has progressively gotten worse and family believe that the patient may have had a seizure at home.  Patient's altered mental status may be secondary to unknown source of infection versus possible infection in the right knee which was replaced.  Patient is found to have acute kidney injury at the outside facility with a creatinine of 3.88.  1 week ago it was 1.31 electrolytes are normal.  At this point we will start the patient on Unasyn and Zyvox and obtain blood cultures.  Urinalysis at the outside facility did not show an acute infection.  For now we will hold the Eliquis as Dr. Barragan may have to take the patient back to the OR.  If orthopedics clears the patient then we will resume Eliquis.  If the patient has to go back to the OR we will hold Eliquis and continue holding it..  At this point optimize pain management and monitor mental status.    I discussed the plan of care with patient, bedside RN, and transferring physician .    Review of Systems  Review of Systems   Unable to perform ROS: Acuity of condition       Past Medical History   has a past medical history of Anesthesia, Breath shortness, CAD (coronary artery disease) (08/2020), Chronic anticoagulation, Dental disorder, Depression, History of heart artery stent, Hyperlipidemia,  "Hypertension, Low back pain, Myocardial infarct (HCC), Paroxysmal atrial fibrillation (HCC) (10/2023), Subdural hematoma (HCC), and Type 2 diabetes mellitus (HCC).    Surgical History   has a past surgical history that includes spinal cord stimulator (2/26/2019); fusion, spine, lumbar, plif (2/26/2019); laminotomy (2/26/2019); craniotomy (Left, 8/25/2022); and arthroplasty, knee, robot-assisted (Right, 7/23/2024).     Family History  family history includes Cancer in his mother.   Family history reviewed with patient. There is no family history that is pertinent to the chief complaint.     Social History   reports that he quit smoking about 44 years ago. His smoking use included cigarettes. He started smoking about 47 years ago. He has a 0.8 pack-year smoking history. He has never used smokeless tobacco. He reports that he does not drink alcohol and does not use drugs.    Allergies  Allergies   Allergen Reactions    Hctz [Hydrochlorothiazide W-Spironolactone]      Cannot breathe    Oxycodone Anaphylaxis and Swelling     Throat swelling    Pectin Anaphylaxis    Hydrochlorothiazide     Zolpidem      \"Sleep walking\"        Medications  Prior to Admission Medications   Prescriptions Last Dose Informant Patient Reported? Taking?   ASPIRIN 81 PO 7/25/2024 at 0400  Yes Yes   Sig: Take  by mouth every day. FOLLOW INSTRUCTIONS FROM SURGEON OR SPECIALIST   ELIQUIS 5 MG Tab   Yes No   Sig: FOLLOW INSTRUCTIONS FROM SURGEON OR SPECIALIST   FREESTYLE LITE strip   Yes No   FreeStyle Lancets Misc   Yes No   HYDROcodone/acetaminophen (NORCO)  MG Tab  Patient's Home Pharmacy Yes No   Sig: Take 1 Tablet by mouth 6 Times a Day. MEDICATION INSTRUCTIONS FOR SURGERY/PROCEDURE SCHEDULED FOR 7/23/24   CONTINUE TAKING MED PRIOR TO SURGERY   albuterol 108 (90 Base) MCG/ACT Aero Soln inhalation aerosol   Yes No   Sig: MEDICATION INSTRUCTIONS FOR SURGERY/PROCEDURE SCHEDULED FOR 7/23/24   CONTINUE TAKING MED PRIOR TO SURGERY   apixaban " (ELIQUIS) 2.5mg Tab 2024 at 0400  No Yes   Sig: Take 1 Tablet by mouth 2 times a day for 7 days.   atorvastatin (LIPITOR) 40 MG Tab 2024 at 2000  Yes Yes   Sig: Take 40 mg by mouth every day.   carvedilol (COREG) 25 MG Tab 2024 at 0400  No Yes   Sig: Take 1 Tablet by mouth 2 times a day with meals.   Patient taking differently: Take 25 mg by mouth 2 times a day with meals. MEDICATION INSTRUCTIONS FOR SURGERY/PROCEDURE SCHEDULED FOR 24   CONTINUE TAKING MED PRIOR TO SURGERY   diazePAM (VALIUM) 5 MG Tab   Yes No   Si mg. MEDICATION INSTRUCTIONS FOR SURGERY/PROCEDURE SCHEDULED FOR 24   CONTINUE TAKING MED PRIOR TO SURGERY - Will Check with Prescribing MD   divalproex (DEPAKOTE) 125 MG EC tablet 2024 at 0400  Yes Yes   Sig: Take 125 mg by mouth 3 times a day. MEDICATION INSTRUCTIONS FOR SURGERY/PROCEDURE SCHEDULED FOR 24   CONTINUE TAKING MED PRIOR TO SURGERY   docusate sodium (COLACE) 100 MG Cap 2024 at 0400 Patient's Home Pharmacy Yes Yes   Sig: Take 100 mg by mouth 2 times a day. DO NOT TAKE DAY OF SURGERY   ezetimibe (ZETIA) 10 MG Tab 2024 at 2000  No Yes   Sig: Take 1 Tablet by mouth every day.   Patient taking differently: Take 10 mg by mouth every day. DO NOT TAKE 24 HOURS PRIOR TO SURGERY   finasteride (PROSCAR) 5 MG Tab 2024 at 2000 Patient's Home Pharmacy Yes Yes   Sig: Take 5 mg by mouth every day. MEDICATION INSTRUCTIONS FOR SURGERY/PROCEDURE SCHEDULED FOR 24   CONTINUE TAKING MED PRIOR TO SURGERY   nitroglycerin (NITROSTAT) 0.4 MG SL Tab  Patient's Home Pharmacy No No   Sig: Place 1 Tab under tongue as needed for Chest Pain.   Patient taking differently: Place 0.4 mg under the tongue as needed for Chest Pain.  MEDICATION INSTRUCTIONS FOR SURGERY/PROCEDURE SCHEDULED FOR 24   If NEEDED CONTINUE TAKING MED PRIOR TO SURGERY   tamsulosin (FLOMAX) 0.4 MG capsule 2024 at 2000 Patient's Home Pharmacy Yes Yes   Sig: Take 0.4 mg by mouth 2 times a  day. MEDICATION INSTRUCTIONS FOR SURGERY/PROCEDURE SCHEDULED FOR 7/23/24   CONTINUE TAKING MED PRIOR TO SURGERY   telmisartan (MICARDIS) 40 MG Tab   No No   Sig: Take 1 Tablet by mouth 2 times a day.   Patient taking differently: Take 40 mg by mouth 2 times a day. DO NOT TAKE 24 HOURS PRIOR TO SURGERY   tizanidine (ZANAFLEX) 4 MG capsule  Patient's Home Pharmacy Yes No   Sig: Take 4 mg by mouth 3 times a day. MEDICATION INSTRUCTIONS FOR SURGERY/PROCEDURE SCHEDULED FOR 7/23/24   CONTINUE TAKING MED PRIOR TO SURGERY   traMADol (ULTRAM) 50 MG Tab   No No   Sig: Take 1-1.5 Tablets by mouth every 6 hours as needed for Moderate Pain for up to 7 days. Max 5 tablet per day   traZODone (DESYREL) 50 MG Tab 7/24/2024 at 2000  Yes Yes   Sig: Take 150 mg by mouth every evening. MEDICATION INSTRUCTIONS FOR SURGERY/PROCEDURE SCHEDULED FOR 7/23/24   CONTINUE TAKING MED PRIOR TO SURGERY   venlafaxine (EFFEXOR-XR) 150 MG extended-release capsule 7/24/2024 at 2000  Yes Yes   Sig: Take 150 mg by mouth every day. And 75mg and 150mg for total of 225mg  MEDICATION INSTRUCTIONS FOR SURGERY/PROCEDURE SCHEDULED FOR 7/23/24   CONTINUE TAKING MED PRIOR TO SURGERY   venlafaxine XR (EFFEXOR XR) 75 MG CAPSULE SR 24 HR 7/24/2024 at 2000  Yes Yes   Sig: Take 75 mg by mouth every day.      Facility-Administered Medications: None       Physical Exam  Temp:  [37.6 °C (99.6 °F)] 37.6 °C (99.6 °F)  Pulse:  [77] 77  Resp:  [18] 18  BP: (122)/(79) 122/79  SpO2:  [93 %] 93 %  Blood Pressure: 122/79   Temperature: 37.6 °C (99.6 °F)   Pulse: 77   Respiration: 18   Pulse Oximetry: 93 %       Physical Exam  Vitals and nursing note reviewed. Exam conducted with a chaperone present.   Constitutional:       General: He is not in acute distress.     Appearance: He is well-developed. He is obese. He is ill-appearing.      Interventions: Nasal cannula in place.   HENT:      Head: Normocephalic and atraumatic.      Right Ear: External ear normal.      Left Ear:  External ear normal.      Nose: Nose normal.      Mouth/Throat:      Mouth: Mucous membranes are dry.      Pharynx: Oropharynx is clear. No oropharyngeal exudate or posterior oropharyngeal erythema.   Eyes:      General:         Right eye: No discharge.         Left eye: No discharge.      Extraocular Movements: Extraocular movements intact.      Conjunctiva/sclera: Conjunctivae normal.      Pupils: Pupils are equal, round, and reactive to light.   Neck:      Thyroid: No thyromegaly.      Vascular: No carotid bruit or JVD.   Cardiovascular:      Rate and Rhythm: Normal rate and regular rhythm.      Pulses: Normal pulses.      Heart sounds: Normal heart sounds. No murmur heard.  Pulmonary:      Effort: Pulmonary effort is normal.      Breath sounds: Decreased air movement present. Examination of the right-lower field reveals decreased breath sounds. Examination of the left-lower field reveals decreased breath sounds. Decreased breath sounds present.   Chest:      Chest wall: No tenderness.   Abdominal:      General: Abdomen is flat. Bowel sounds are normal. There is no distension.      Palpations: Abdomen is soft. There is no mass.      Tenderness: There is no abdominal tenderness. There is no guarding or rebound.   Musculoskeletal:      Cervical back: Normal range of motion and neck supple.      Right knee: Swelling present. Decreased range of motion.      Right lower leg: No edema.   Lymphadenopathy:      Cervical: No cervical adenopathy.   Skin:     General: Skin is warm and dry.      Capillary Refill: Capillary refill takes more than 3 seconds.      Findings: Bruising (Right knee) present. No rash.   Neurological:      General: No focal deficit present.      Mental Status: He is oriented to person, place, and time. Mental status is at baseline. He is lethargic.      GCS: GCS eye subscore is 4. GCS verbal subscore is 4. GCS motor subscore is 5.      Cranial Nerves: No cranial nerve deficit.      Motor: Weakness  "present.      Coordination: Coordination abnormal.      Gait: Gait abnormal.      Deep Tendon Reflexes: Reflexes are normal and symmetric.   Psychiatric:         Attention and Perception: He is inattentive.         Mood and Affect: Affect is flat.         Speech: Speech is delayed.         Behavior: Behavior is uncooperative and slowed.         Cognition and Memory: Cognition is impaired. Memory is impaired. He exhibits impaired recent memory and impaired remote memory.         Judgment: Judgment is impulsive.         Laboratory:          No results for input(s): \"ALTSGPT\", \"ASTSGOT\", \"ALKPHOSPHAT\", \"TBILIRUBIN\", \"DBILIRUBIN\", \"GAMMAGT\", \"AMYLASE\", \"LIPASE\", \"ALB\", \"PREALBUMIN\", \"GLUCOSE\" in the last 72 hours.      No results for input(s): \"NTPROBNP\" in the last 72 hours.      No results for input(s): \"TROPONINT\" in the last 72 hours.    Imaging:  OUTSIDE IMAGES-DX CHEST   Final Result      OUTSIDE IMAGES-CT HEAD   Final Result      OUTSIDE IMAGES-DX LOWER EXTREMITY, RIGHT   Final Result      OUTSIDE IMAGES-CT CERVICAL SPINE   Final Result      US-RENAL    (Results Pending)       X-Ray:  I have personally reviewed the images and compared with prior images.  EKG:  I have personally reviewed the images and compared with prior images.    Assessment/Plan:  Justification for Admission Status  I anticipate this patient will require at least two midnights for appropriate medical management, necessitating inpatient admission because patient has acute renal failure and will require at least 48 hours of inpatient management with necessity to adjust his medications and monitor labs patient's altered mental status will also need to be worked up    Patient will need a Med/Surg bed on MEDICAL service .  The need is secondary to acute kidney injury and altered mental status.    * Acute kidney injury (HCC)- (present on admission)  Assessment & Plan  Patient's creatinine is up to 3.88 with a BUN of 41  Previous to this patient had " normal labs  Check renal ultrasound  Fluid resuscitation  Avoid nephrotoxic medications  Patient did receive vancomycin which certainly can cause nephrotoxicity.    Acute encephalopathy- (present on admission)  Assessment & Plan  Patient was brought into the [facility] today because of acute encephalopathy.  The patient's acute encephalopathy seems to be acute kidney injury infection which was recently replaced as postoperative just a few days.  Monitor renal functions  Monitor for infection  Patient does have a history of seizures so we will continue with seizure monitoring.  Check lactic acid level to see if the patient did have a seizure    Status post right knee replacement  Assessment & Plan      Dr. Barragan has been notified and he will reevaluate the patient in the morning  For now patient will be placed on Unasyn and Zyvox to treat possible secondary infection and recent knee prosthesis.  Obtain blood cultures x 2  Obtain procalcitonin level  Obtain lactic acid level  Check CRP and ESR levels    Essential hypertension, benign- (present on admission)  Assessment & Plan  Optimize blood pressure management to keep systolic blood pressure less than 140 diastolic under 90  Continue Coreg 6.25 mg twice daily  As needed labetalol    CAD (coronary artery disease)- (present on admission)  Assessment & Plan  Optimize medication management  2020 patient had PCI x 2  Currently chest pain-free  Continue oxygen support      Paroxysmal atrial fibrillation (HCC)- (present on admission)  Assessment & Plan  Continue with rate control currently anticoagulation with Eliquis held status post surgery and with the potential of resurgery necessary.  If there is no resurgery necessary patient can resume Eliquis tomorrow    Type 2 diabetes mellitus without complication, without long-term current use of insulin (HCC)- (present on admission)  Assessment & Plan  -accus with sliding scale coverage  -diabetic diet  -diabetic  education  -follow glycohemoglobin levels long term, most recent hemoglobin A1c 6.2  -Hold metformin given acute renal failure  -monitor for hypoglycemic episodes and adjust control if he should get low    Body mass index 40.0-44.9, adult (HCC)- (present on admission)  Assessment & Plan  Body mass index is 42.37 kg/m².  Outpatient weight loss management program and lifestyle modification highly recommended    Hyperlipidemia- (present on admission)  Assessment & Plan  Low-fat low-cholesterol diet  Continue Lipitor 40 mg nightly and Zetia 10 mg nightly  Fasting lipid panel    Depression- (present on admission)  Assessment & Plan  Continue Effexor XR  Hold trazodone and Valium given altered mental status    BPH (benign prostatic hyperplasia)- (present on admission)  Assessment & Plan  Continue finasteride and Flomax        VTE prophylaxis: SCDs/TEDs

## 2024-07-26 NOTE — PROGRESS NOTES
Contacted pharmacy regarding Unasyn administration times because medication was given at around 2100. Per pharmacy, okay to give Unasyn again at 0100 then resume standard admin times.

## 2024-07-26 NOTE — PROGRESS NOTES
Hospital Medicine Daily Progress Note    Date of Service  7/26/2024    Chief Complaint  Karan Wiley is a 64 y.o. male admitted 7/25/2024 with AMS    Hospital Course  63 yo with Dm2, CAD, HTN, sp R TKA (7/24) presented to OSH with worsening confusion found to have AQUILES and transferred due to concern for R knee infection. Dr. Barragan his surgeon evaluated wound and did not believe pt had infection. CT of knee also reassuring with expected post op changes. Patient's delirium remains unclear. Polypharmacy likely contributing. However he is requiring narcotics for pain control.    Interval Problem Update  - patient attempted to answer questions but thinks it's 2007  - reports sig pain in R knee  -no pIV as of now, IVF not running    I have discussed this patient's plan of care and discharge plan at IDT rounds today with Case Management, Nursing, Nursing leadership, and other members of the IDT team.    Consultants/Specialty  orthopedics    Code Status  Full Code    Disposition  The patient is not medically cleared for discharge to home or a post-acute facility.  Anticipate discharge to: skilled nursing facility    I have placed the appropriate orders for post-discharge needs.    Review of Systems  Review of Systems   All other systems reviewed and are negative.       Physical Exam  Temp:  [36.4 °C (97.5 °F)-37.6 °C (99.6 °F)] 36.7 °C (98.1 °F)  Pulse:  [] 105  Resp:  [16-20] 20  BP: (122-188)/() 180/106  SpO2:  [91 %-97 %] 93 %    Physical Exam  General: shaky  HEENT: PERRLA, EOMI  Cards: regular, tachycardic  Pulm: normal respiratory effort, CTAB, no wheezes or rhonchi  Abdomen: soft, NTND, + bowel sounds, no rebound tenderness or guarding  MSK: R knee with incision site swollen/red/warm to touch  Neuro: CN II-XII grossly intact, sensation/strength intact, AAOx3  Psych: Appropriate mood   Fluids    Intake/Output Summary (Last 24 hours) at 7/26/2024 1623  Last data filed at 7/26/2024 1553  Gross per 24  hour   Intake 660 ml   Output 615 ml   Net 45 ml       Laboratory  Recent Labs     07/25/24  1931 07/26/24  0030   WBC 8.1 7.9   RBC 3.76* 3.75*   HEMOGLOBIN 11.5* 11.4*   HEMATOCRIT 35.3* 35.3*   MCV 93.9 94.1   MCH 30.6 30.4   MCHC 32.6 32.3   RDW 44.9 44.4   PLATELETCT 181 198   MPV 12.1 12.7     Recent Labs     07/25/24  1931 07/26/24  0030   SODIUM 136 136   POTASSIUM 4.1 3.8   CHLORIDE 101 102   CO2 21 19*   GLUCOSE 100* 95   BUN 42* 37*   CREATININE 2.66* 2.11*   CALCIUM 9.3 9.4                   Imaging  CT-KNEE W/O RIGHT   Final Result      1.  Findings in keeping with interval total knee arthroplasty without specific findings to indicate infection   2.  Osteopenia      US-RENAL   Final Result         1.  Normal renal ultrasound.      OUTSIDE IMAGES-DX CHEST   Final Result      OUTSIDE IMAGES-CT HEAD   Final Result      OUTSIDE IMAGES-DX LOWER EXTREMITY, RIGHT   Final Result      OUTSIDE IMAGES-CT CERVICAL SPINE   Final Result      IR-US GUIDED PIV    (Results Pending)        Assessment/Plan  * Acute kidney injury (HCC)- (present on admission)  Assessment & Plan  Cr up to 3.88 at OSH  Cr to 2.11 here improving with IVFs however pt wo pIV access still and not getting IVFs  Fluid resuscitation  Avoid nephrotoxic medications      Anemia  Assessment & Plan  Likely post op related  Op f/u    Acute encephalopathy- (present on admission)  Assessment & Plan  Patient's AMS is a bit puzzling without clear etkology  CTH reportedly nl at OSH  His AQUILES does not explain this level of confusion  Patient does have a history of seizures but this is not consistent with one  Obtain UA  Seroquel PRN  Avoid polypharmacy    Status post right knee replacement  Assessment & Plan  Patient complains of significant R knee pain  Although does have swelling, some warmth and erythema, I spoke with DR. Barragan who reassured that it looks as expected post op  I obtained CT of knee which looks reassuring  CRP/ESR elev but post op certainly  there is inflammatory state  For now I will discontinue IV unasyn and zyvox    Body mass index 40.0-44.9, adult (HCC)- (present on admission)  Assessment & Plan  Body mass index is 42.37 kg/m².  Outpatient weight loss management program and lifestyle modification highly recommended    Essential hypertension, benign- (present on admission)  Assessment & Plan  Optimize blood pressure management to keep systolic blood pressure less than 140 diastolic under 90  Continue Coreg 6.25 mg twice daily  As needed labetalol    Hyperlipidemia- (present on admission)  Assessment & Plan  Low-fat low-cholesterol diet  Continue Lipitor 40 mg nightly and Zetia 10 mg nightly      Depression- (present on admission)  Assessment & Plan  Continue Effexor XR  Hold trazodone and Valium given altered mental status    CAD (coronary artery disease)- (present on admission)  Assessment & Plan  Optimize medication management  2020 patient had PCI x 2  Currently chest pain-free  Continue oxygen support      Paroxysmal atrial fibrillation (HCC)- (present on admission)  Assessment & Plan  Continue with rate control currently anticoagulation with Eliquis held status post surgery and with the potential of resurgery necessary  Resume Eliquis today    BPH (benign prostatic hyperplasia)- (present on admission)  Assessment & Plan  Continue finasteride and Flomax    Type 2 diabetes mellitus without complication, without long-term current use of insulin (HCC)- (present on admission)  Assessment & Plan  -accus with sliding scale coverage  -diabetic diet  -diabetic education  -follow glycohemoglobin levels long term, most recent hemoglobin A1c 6.2  -Hold metformin given acute renal failure  -monitor for hypoglycemic episodes and adjust control if he should get low         VTE prophylaxis: Resume Eliquis    I have performed a physical exam and reviewed and updated ROS and Plan today (7/26/2024). In review of yesterday's note (7/25/2024), there are no changes  except as documented above.

## 2024-07-26 NOTE — PROGRESS NOTES
Med Rec complete per spouse via phone call   Allergies reviewed  Antibiotics in the past 30 days:no  Anticoagulant in past 14 days:yes  Anticoagulant:Eliquis 2.5 mg Last dose:07/24/24  Pharmacy patient utilizes:[facility] in [place]    Spouse states pt is on a 7 day course of Eliquis 2.5 mg BID started day after surgery which was on 07/23/24. Then is supposed to take Eliquis 5 mg BID thereafter

## 2024-07-26 NOTE — PROGRESS NOTES
BSSR received from night RN. Pt resting in bed not in any distress on RA 94%. AAOx1, confused and only oriented to self. Telesitter at bedside. R knee dressing in place, CDI. CMS noted. Fall risk precautions in place, locked bed in lowest position, strip and bed alarm on and call light within reach. All needs met at this time. Hourly rounding in place.

## 2024-07-26 NOTE — ASSESSMENT & PLAN NOTE
Body mass index is 42.37 kg/m².  Outpatient weight loss management program and lifestyle modification highly recommended

## 2024-07-26 NOTE — PROGRESS NOTES
Patient alert and oriented to self only at this time. Refusing IV insertion. Very adamant that he does not want to do anything with the staff at this time and just wants to be left alone.

## 2024-07-26 NOTE — PROGRESS NOTES
Attempted to call son cecil with number in chart. No answer and unable to leave voicemail. Will attempt again.

## 2024-07-26 NOTE — ASSESSMENT & PLAN NOTE
Likely post op related  Hb down to 10 so relatively stable  Hemoglobin has been stable will resume Eliquis as he is high risk given his recent knee surgery and his A-fib

## 2024-07-26 NOTE — DIETARY
NUTRITION SERVICES: BMI - Pt with BMI >40 (=Body mass index is 42.37 kg/m².), Class III obesity. Weight loss counseling not appropriate in acute care setting. RECOMMEND - If appropriate at DC please refer to outpatient nutrition services for weight management.

## 2024-07-26 NOTE — PROGRESS NOTES
Charge Note:    0352 Pt is confused, pulling lines, and attempting to get up without assistance. Telesitter started. High fall precautions in place.     0430 Pt yelling, requesting to talk to spouse. Spouse contacted and spoke to pt.    0453 Updated by primary RN Rain, pt's BP remained elevated after PRN interventions and pt reports chest discomfort. RRT charge Melissa updated and called at bedside, stat EKG done.

## 2024-07-26 NOTE — PROGRESS NOTES
0144 - Patient's /103 with . PRN labetalol given.    0229 - Pt's /107 with . Charge RN made aware. NOC hospitalist contacted. New orders for PRN labetalol and hydralazine received.

## 2024-07-26 NOTE — PROGRESS NOTES
Bedside report received from CAYETANO Lindo. Patient resting in bed. Call bell within reach, bed alarm on and in place. No further needs at this time.

## 2024-07-26 NOTE — ASSESSMENT & PLAN NOTE
-SSI  -follow glycohemoglobin levels long term, most recent hemoglobin A1c 6.2  -Hold metformin   -monitor for hypoglycemic episodes and adjust control if he should get low

## 2024-07-26 NOTE — PROGRESS NOTES
Report received from Manav RN, assumed care of pt 1915.   POC and medications reviewed with pt. Pt verbalized understanding.   AOx0  Denies pain, SOB, or dizziness at this time.   Safety measures in place.  Hourly rounding in place.

## 2024-07-26 NOTE — PROGRESS NOTES
4 Eyes Skin Assessment Completed by CAYETANO Moncada and CAYETANO Douglas.    Head WDL  Ears Redness and Blanching  Nose WDL  Mouth WDL  Neck WDL  Breast/Chest WDL  Shoulder Blades WDL  Spine Scar  (R) Arm/Elbow/Hand Redness and Blanching  (L) Arm/Elbow/Hand Redness and Blanching  Abdomen WDL  Groin WDL  Scrotum/Coccyx/Buttocks WDL  (R) Leg Incision R knee, dressing in place. Swelling. Redness. Blanching. Warm to touch.   (L) Leg WDL  (R) Heel/Foot/Toe WDL  (L) Heel/Foot/Toe WDL          Devices In Places Pulse Ox      Interventions In Place NC W/Ear Foams and Pillows    Possible Skin Injury No    Pictures Uploaded Into Epic N/A  Wound Consult Placed N/A  RN Wound Prevention Protocol Ordered No

## 2024-07-26 NOTE — ASSESSMENT & PLAN NOTE
Patient has a history of diagnosed dementia and has atrophy noted on outside CT. wife now countering that he does not have a diagnosis of dementia however cognitive impairment is listed on his problem list prior to this admission.  Patient seems improved with Zyprexa but still required soft wrist restraints for safety.  Will try to adjust Zyprexa dosing to hopefully avoid placement of wrist restraints  Wrist restraints are a barrier to discharge.

## 2024-07-26 NOTE — PROGRESS NOTES
"Subjective:    H is POD #3 from an uncomplicated TKA.  Readmitted from outside hospital with AQUILES and AMS.    Objective:  BP (!) 166/109   Pulse (!) 118   Temp 36.4 °C (97.5 °F) (Temporal)   Resp 18   Ht 1.702 m (5' 7\")   Wt 123 kg (270 lb 8.1 oz)   SpO2 95%     Recent Labs     07/25/24 1931 07/26/24  0030   WBC 8.1 7.9   RBC 3.76* 3.75*   HEMOGLOBIN 11.5* 11.4*   HEMATOCRIT 35.3* 35.3*   MCV 93.9 94.1   MCH 30.6 30.4   MCHC 32.6 32.3   RDW 44.9 44.4   PLATELETCT 181 198   MPV 12.1 12.7     Recent Labs     07/25/24 1931 07/26/24  0030   SODIUM 136 136   POTASSIUM 4.1 3.8   CHLORIDE 101 102   CO2 21 19*   GLUCOSE 100* 95   BUN 42* 37*   CREATININE 2.66* 2.11*   CALCIUM 9.3 9.4         Intake/Output Summary (Last 24 hours) at 7/26/2024 0611  Last data filed at 7/26/2024 0309  Gross per 24 hour   Intake 360 ml   Output 415 ml   Net -55 ml       Sleepy this morning  Dressing C/D/I  Bruising/swelling are wnl  No obvious sign of infection, the knee looks normal for POD#3      Assessment:  S/p TKA  AQUILES  Postop delirium    Plan:    No concern for infection at this time.   Continue to mobilize and work with PT, encourage knee ROM.  I placed a pillow under the heel to work on extension and ACE wraps to the leg to control postop swelling.  He would benefit from a polar ice machine as well.    "

## 2024-07-26 NOTE — ASSESSMENT & PLAN NOTE
Optimize medication management  2020 patient had PCI x 2  Currently chest pain-free  Continue oxygen support

## 2024-07-26 NOTE — ASSESSMENT & PLAN NOTE
Patient complains of significant R knee pain  He is only 1 week postoperative and he has extensive ecchymotic changes and swelling in the right leg.  I am confused about his mobility and discussed this with the nurse, awaiting callback from Dr. Barragan.  Apparently an immobilizer was ordered been propping the leg straight was ordered but typically after TKA they like to encourage mobility and flexion-so need clarification  PT/OT - SNF

## 2024-07-27 ENCOUNTER — APPOINTMENT (OUTPATIENT)
Dept: RADIOLOGY | Facility: MEDICAL CENTER | Age: 64
DRG: 682 | End: 2024-07-27
Attending: INTERNAL MEDICINE
Payer: OTHER GOVERNMENT

## 2024-07-27 PROBLEM — Z79.899 POLYPHARMACY: Status: ACTIVE | Noted: 2024-07-27

## 2024-07-27 PROBLEM — R00.0 SINUS TACHYCARDIA: Status: ACTIVE | Noted: 2024-07-27
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
## 2024-07-27 LAB
ALBUMIN SERPL BCP-MCNC: 3 G/DL (ref 3.2–4.9)
AMPHET UR QL SCN: NEGATIVE
BARBITURATES UR QL SCN: NEGATIVE
BASOPHILS # BLD AUTO: 0.2 % (ref 0–1.8)
BASOPHILS # BLD: 0.02 K/UL (ref 0–0.12)
BENZODIAZ UR QL SCN: POSITIVE
BUN SERPL-MCNC: 19 MG/DL (ref 8–22)
BZE UR QL SCN: NEGATIVE
CALCIUM ALBUM COR SERPL-MCNC: 10.4 MG/DL (ref 8.5–10.5)
CALCIUM SERPL-MCNC: 9.6 MG/DL (ref 8.4–10.2)
CANNABINOIDS UR QL SCN: NEGATIVE
CHLORIDE SERPL-SCNC: 103 MMOL/L (ref 96–112)
CO2 SERPL-SCNC: 17 MMOL/L (ref 20–33)
CREAT SERPL-MCNC: 0.92 MG/DL (ref 0.5–1.4)
EOSINOPHIL # BLD AUTO: 0.02 K/UL (ref 0–0.51)
EOSINOPHIL NFR BLD: 0.2 % (ref 0–6.9)
ERYTHROCYTE [DISTWIDTH] IN BLOOD BY AUTOMATED COUNT: 42.7 FL (ref 35.9–50)
FENTANYL UR QL: POSITIVE
FLUAV RNA SPEC QL NAA+PROBE: NEGATIVE
FLUBV RNA SPEC QL NAA+PROBE: NEGATIVE
GFR SERPLBLD CREATININE-BSD FMLA CKD-EPI: 93 ML/MIN/1.73 M 2
GLUCOSE BLD STRIP.AUTO-MCNC: 101 MG/DL (ref 65–99)
GLUCOSE BLD STRIP.AUTO-MCNC: 106 MG/DL (ref 65–99)
GLUCOSE BLD STRIP.AUTO-MCNC: 111 MG/DL (ref 65–99)
GLUCOSE BLD STRIP.AUTO-MCNC: 120 MG/DL (ref 65–99)
GLUCOSE SERPL-MCNC: 116 MG/DL (ref 65–99)
HCT VFR BLD AUTO: 32.7 % (ref 42–52)
HGB BLD-MCNC: 11 G/DL (ref 14–18)
IMM GRANULOCYTES # BLD AUTO: 0.06 K/UL (ref 0–0.11)
IMM GRANULOCYTES NFR BLD AUTO: 0.5 % (ref 0–0.9)
LYMPHOCYTES # BLD AUTO: 1.37 K/UL (ref 1–4.8)
LYMPHOCYTES NFR BLD: 12.1 % (ref 22–41)
MAGNESIUM SERPL-MCNC: 1.7 MG/DL (ref 1.5–2.5)
MCH RBC QN AUTO: 30.6 PG (ref 27–33)
MCHC RBC AUTO-ENTMCNC: 33.6 G/DL (ref 32.3–36.5)
MCV RBC AUTO: 91.1 FL (ref 81.4–97.8)
METHADONE UR QL SCN: NEGATIVE
MONOCYTES # BLD AUTO: 1.41 K/UL (ref 0–0.85)
MONOCYTES NFR BLD AUTO: 12.5 % (ref 0–13.4)
NEUTROPHILS # BLD AUTO: 8.43 K/UL (ref 1.82–7.42)
NEUTROPHILS NFR BLD: 74.5 % (ref 44–72)
NRBC # BLD AUTO: 0 K/UL
NRBC BLD-RTO: 0 /100 WBC (ref 0–0.2)
OPIATES UR QL SCN: POSITIVE
OXYCODONE UR QL SCN: NEGATIVE
PCP UR QL SCN: NEGATIVE
PHOSPHATE SERPL-MCNC: 1.1 MG/DL (ref 2.5–4.5)
PLATELET # BLD AUTO: 242 K/UL (ref 164–446)
PMV BLD AUTO: 12.2 FL (ref 9–12.9)
POTASSIUM SERPL-SCNC: 3.4 MMOL/L (ref 3.6–5.5)
PROPOXYPH UR QL SCN: NEGATIVE
RBC # BLD AUTO: 3.59 M/UL (ref 4.7–6.1)
RSV RNA SPEC QL NAA+PROBE: NEGATIVE
SARS-COV-2 RNA RESP QL NAA+PROBE: NOTDETECTED
SODIUM SERPL-SCNC: 137 MMOL/L (ref 135–145)
SPECIMEN SOURCE: NORMAL
WBC # BLD AUTO: 11.3 K/UL (ref 4.8–10.8)

## 2024-07-27 PROCEDURE — 770001 HCHG ROOM/CARE - MED/SURG/GYN PRIV*

## 2024-07-27 PROCEDURE — 0241U HCHG SARS-COV-2 COVID-19 NFCT DS RESP RNA 4 TRGT MIC: CPT

## 2024-07-27 PROCEDURE — 700102 HCHG RX REV CODE 250 W/ 637 OVERRIDE(OP): Performed by: INTERNAL MEDICINE

## 2024-07-27 PROCEDURE — 94640 AIRWAY INHALATION TREATMENT: CPT

## 2024-07-27 PROCEDURE — 97166 OT EVAL MOD COMPLEX 45 MIN: CPT

## 2024-07-27 PROCEDURE — 71045 X-RAY EXAM CHEST 1 VIEW: CPT

## 2024-07-27 PROCEDURE — A9270 NON-COVERED ITEM OR SERVICE: HCPCS | Performed by: INTERNAL MEDICINE

## 2024-07-27 PROCEDURE — 83735 ASSAY OF MAGNESIUM: CPT

## 2024-07-27 PROCEDURE — 85025 COMPLETE CBC W/AUTO DIFF WBC: CPT

## 2024-07-27 PROCEDURE — 71275 CT ANGIOGRAPHY CHEST: CPT

## 2024-07-27 PROCEDURE — 99232 SBSQ HOSP IP/OBS MODERATE 35: CPT | Performed by: INTERNAL MEDICINE

## 2024-07-27 PROCEDURE — 700105 HCHG RX REV CODE 258: Performed by: INTERNAL MEDICINE

## 2024-07-27 PROCEDURE — 700102 HCHG RX REV CODE 250 W/ 637 OVERRIDE(OP): Performed by: HOSPITALIST

## 2024-07-27 PROCEDURE — 99024 POSTOP FOLLOW-UP VISIT: CPT | Performed by: STUDENT IN AN ORGANIZED HEALTH CARE EDUCATION/TRAINING PROGRAM

## 2024-07-27 PROCEDURE — 700117 HCHG RX CONTRAST REV CODE 255: Performed by: INTERNAL MEDICINE

## 2024-07-27 PROCEDURE — 82962 GLUCOSE BLOOD TEST: CPT | Mod: 91

## 2024-07-27 PROCEDURE — 97162 PT EVAL MOD COMPLEX 30 MIN: CPT

## 2024-07-27 PROCEDURE — 700101 HCHG RX REV CODE 250: Performed by: INTERNAL MEDICINE

## 2024-07-27 PROCEDURE — 80069 RENAL FUNCTION PANEL: CPT

## 2024-07-27 PROCEDURE — A9270 NON-COVERED ITEM OR SERVICE: HCPCS | Performed by: HOSPITALIST

## 2024-07-27 PROCEDURE — 700111 HCHG RX REV CODE 636 W/ 250 OVERRIDE (IP): Mod: JZ | Performed by: INTERNAL MEDICINE

## 2024-07-27 PROCEDURE — 36415 COLL VENOUS BLD VENIPUNCTURE: CPT

## 2024-07-27 RX ORDER — ALBUTEROL SULFATE 5 MG/ML
2.5 SOLUTION RESPIRATORY (INHALATION)
Status: DISCONTINUED | OUTPATIENT
Start: 2024-07-27 | End: 2024-07-28

## 2024-07-27 RX ADMIN — EZETIMIBE 10 MG: 10 TABLET ORAL at 20:31

## 2024-07-27 RX ADMIN — ACETAMINOPHEN 1000 MG: 500 TABLET ORAL at 04:30

## 2024-07-27 RX ADMIN — ALBUTEROL SULFATE 2.5 MG: 2.5 SOLUTION RESPIRATORY (INHALATION) at 18:49

## 2024-07-27 RX ADMIN — SODIUM CHLORIDE: 9 INJECTION, SOLUTION INTRAVENOUS at 04:28

## 2024-07-27 RX ADMIN — ALBUTEROL SULFATE 2.5 MG: 2.5 SOLUTION RESPIRATORY (INHALATION) at 17:05

## 2024-07-27 RX ADMIN — OMEPRAZOLE 40 MG: 20 CAPSULE, DELAYED RELEASE ORAL at 20:30

## 2024-07-27 RX ADMIN — VALPROIC ACID 125 MG: 250 SOLUTION ORAL at 04:30

## 2024-07-27 RX ADMIN — ATORVASTATIN CALCIUM 40 MG: 40 TABLET, FILM COATED ORAL at 20:31

## 2024-07-27 RX ADMIN — HYDRALAZINE HYDROCHLORIDE 20 MG: 20 INJECTION INTRAMUSCULAR; INTRAVENOUS at 00:10

## 2024-07-27 RX ADMIN — VALPROIC ACID 125 MG: 250 SOLUTION ORAL at 13:30

## 2024-07-27 RX ADMIN — TAMSULOSIN HYDROCHLORIDE 0.4 MG: 0.4 CAPSULE ORAL at 17:33

## 2024-07-27 RX ADMIN — IOHEXOL 80 ML: 350 INJECTION, SOLUTION INTRAVENOUS at 10:04

## 2024-07-27 RX ADMIN — TAMSULOSIN HYDROCHLORIDE 0.4 MG: 0.4 CAPSULE ORAL at 04:30

## 2024-07-27 RX ADMIN — VENLAFAXINE HYDROCHLORIDE 225 MG: 75 CAPSULE, EXTENDED RELEASE ORAL at 20:31

## 2024-07-27 RX ADMIN — POTASSIUM PHOSPHATE, MONOBASIC AND POTASSIUM PHOSPHATE, DIBASIC 30 MMOL: 224; 236 INJECTION, SOLUTION, CONCENTRATE INTRAVENOUS at 06:21

## 2024-07-27 RX ADMIN — FINASTERIDE 5 MG: 5 TABLET, FILM COATED ORAL at 04:30

## 2024-07-27 RX ADMIN — APIXABAN 5 MG: 5 TABLET, FILM COATED ORAL at 17:33

## 2024-07-27 RX ADMIN — CARVEDILOL 6.25 MG: 6.25 TABLET, FILM COATED ORAL at 17:33

## 2024-07-27 RX ADMIN — VALPROIC ACID 125 MG: 250 SOLUTION ORAL at 17:32

## 2024-07-27 RX ADMIN — CARVEDILOL 6.25 MG: 6.25 TABLET, FILM COATED ORAL at 09:32

## 2024-07-27 RX ADMIN — APIXABAN 2.5 MG: 2.5 TABLET, FILM COATED ORAL at 04:30

## 2024-07-27 ASSESSMENT — COGNITIVE AND FUNCTIONAL STATUS - GENERAL
DRESSING REGULAR UPPER BODY CLOTHING: A LOT
TOILETING: TOTAL
HELP NEEDED FOR BATHING: A LOT
DRESSING REGULAR LOWER BODY CLOTHING: A LOT
EATING MEALS: A LITTLE
STANDING UP FROM CHAIR USING ARMS: A LOT
DAILY ACTIVITIY SCORE: 12
TURNING FROM BACK TO SIDE WHILE IN FLAT BAD: A LOT
SUGGESTED CMS G CODE MODIFIER DAILY ACTIVITY: CL
MOVING FROM LYING ON BACK TO SITTING ON SIDE OF FLAT BED: A LOT
WALKING IN HOSPITAL ROOM: A LOT
MOBILITY SCORE: 11
PERSONAL GROOMING: A LOT
CLIMB 3 TO 5 STEPS WITH RAILING: TOTAL
MOVING TO AND FROM BED TO CHAIR: A LOT
SUGGESTED CMS G CODE MODIFIER MOBILITY: CL

## 2024-07-27 ASSESSMENT — GAIT ASSESSMENTS
GAIT LEVEL OF ASSIST: MODERATE ASSIST
DEVIATION: SHUFFLED GAIT
ASSISTIVE DEVICE: FRONT WHEEL WALKER
DISTANCE (FEET): 3

## 2024-07-27 ASSESSMENT — ACTIVITIES OF DAILY LIVING (ADL): TOILETING: INDEPENDENT

## 2024-07-27 ASSESSMENT — PAIN DESCRIPTION - PAIN TYPE: TYPE: SURGICAL PAIN

## 2024-07-27 ASSESSMENT — PATIENT HEALTH QUESTIONNAIRE - PHQ9
2. FEELING DOWN, DEPRESSED, IRRITABLE, OR HOPELESS: NOT AT ALL
1. LITTLE INTEREST OR PLEASURE IN DOING THINGS: NOT AT ALL
SUM OF ALL RESPONSES TO PHQ9 QUESTIONS 1 AND 2: 0

## 2024-07-27 NOTE — PROGRESS NOTES
BSSR received from night RN. Pt resting in bed not in any distress on RA 92%. AAOx1, only oriented to self. Soft restraints in place. Telesitter at bedside. R knee dressing in place, old drainage noted. CMS noted. Fall risk precautions in place, locked bed in lowest position, strip and bed alarm on and call light within reach. All needs met at this time. Hourly rounding in place.     0940: Pt transferred to CT via bed with transport in a stable condition, restraints remained in place.   1015: Back on the floor from CT.  1152: PT recommended pt to use knee immobilizer. Voalted [facility] ortho, acknowledged and ordered.     Pt with mild effort in breathing, lung sounds with wheezing. On RA at 92%. Notified MD Arambula, maine IV fluids, CXR ordered.   Voalted RT for the nebulizer.

## 2024-07-27 NOTE — THERAPY
Physical Therapy   Initial Evaluation     Patient Name: Karan Wiley  Age:  64 y.o., Sex:  male  Medical Record #: [id number]  Today's Date: 7/27/2024     Precautions  Precautions: (P) Weight Bearing As Tolerated Right Lower Extremity;Fall Risk    Assessment  Patient is 64 y.o. male with a diagnosis of R TKR,confusion.Pt lives at home with wife and is not very active.S/p R TKR 7/23. Pt home after surgery but became increasingly more  confused and thus readmitted.Acute PT needed to improve bed mob,transfers,ambulation,ROM and strength.He might benefit from an immobiliser to keep knee ext at times    Plan    Physical Therapy Initial Treatment Plan   Treatment Plan : (P) Bed Mobility, Gait Training, Manual Therapy, Neuro Re-Education / Balance, Therapeutic Activities, Therapeutic Exercise  Treatment Frequency: (P) 5 Times per Week    DC Equipment Recommendations: (P) None  Discharge Recommendations: (P) Recommend post-acute placement for additional physical therapy services prior to discharge home        Objective       07/27/24 1100   Charge Group   PT Evaluation PT Evaluation Mod   Total Time Spent   PT Evaluation Time Spent (Mins) 30   Initial Contact Note    Initial Contact Note Order Received and Verified, Physical Therapy Evaluation in Progress with Full Report to Follow.   Precautions   Precautions Weight Bearing As Tolerated Right Lower Extremity;Fall Risk   Prior Living Situation   Prior Services Intermittent Physical Support for ADL Per Family   Housing / Facility 1 Story House   Steps Into Home 0   Steps In Home 0   Equipment Owned Front-Wheel Walker   Lives with - Patient's Self Care Capacity Significant Other   Prior Level of Functional Mobility   Bed Mobility Independent   Transfer Status Independent   Ambulation Independent   Assistive Devices Used None   Cognition    Cognition / Consciousness X   Comments confused   Passive ROM Lower Body   Passive ROM Lower Body X   Rt Knee Flexion Degrees 90   Rt  Knee Extension Degrees 0   Active ROM Lower Body    Active ROM Lower Body  X   Strength Lower Body   Lower Body Strength  X   Coordination Lower Body    Coordination Lower Body  WDL   Balance Assessment   Sitting Balance (Static) Fair +   Sitting Balance (Dynamic) Fair +   Standing Balance (Static) Fair   Standing Balance (Dynamic) Fair   Weight Shift Sitting Fair   Weight Shift Standing Fair   Bed Mobility    Supine to Sit Moderate Assist   Sit to Supine Moderate Assist   Scooting Moderate Assist   Gait Analysis   Gait Level Of Assist Moderate Assist   Assistive Device Front Wheel Walker   Distance (Feet) 3   # of Times Distance was Traveled 1   Deviation Shuffled Gait   Weight Bearing Status wbat R   Functional Mobility   Sit to Stand Moderate Assist   6 Clicks Assessment - How much HELP from from another person do you currently need... (If the patient hasn't done an activity recently, how much help from another person do you think he/she would need if he/she tried?)   Turning from your back to your side while in a flat bed without using bedrails? 2   Moving from lying on your back to sitting on the side of a flat bed without using bedrails? 2   Moving to and from a bed to a chair (including a wheelchair)? 2   Standing up from a chair using your arms (e.g., wheelchair, or bedside chair)? 2   Walking in hospital room? 2   Climbing 3-5 steps with a railing? 1   6 clicks Mobility Score 11   Activity Tolerance   Sitting Edge of Bed 10   Standing 2 x 2 mins   Patient / Family Goals    Patient / Family Goal #1 nt stated   Short Term Goals    Short Term Goal # 1 S with bed mob in 6 V   Short Term Goal # 2 S with transfers in 6 V   Short Term Goal # 3 S with amb x 100 feet in 6 V   Physical Therapy Initial Treatment Plan    Treatment Plan  Bed Mobility;Gait Training;Manual Therapy;Neuro Re-Education / Balance;Therapeutic Activities;Therapeutic Exercise   Treatment Frequency 5 Times per Week   Problem List    Problems  Impaired Bed Mobility;Impaired Transfers;Impaired Ambulation;Functional ROM Deficit;Functional Strength Deficit;Impaired Balance;Decreased Activity Tolerance   Anticipated Discharge Equipment and Recommendations   DC Equipment Recommendations None   Discharge Recommendations Recommend post-acute placement for additional physical therapy services prior to discharge home   Interdisciplinary Plan of Care Collaboration   IDT Collaboration with  Nursing   Session Information   Date / Session Number  7/27-1 1/5 8/2

## 2024-07-27 NOTE — ASSESSMENT & PLAN NOTE
Possibly from agitation  No clear w/d  per wife not drinking excessive etoh  Patient has a spine stimulator in place   CTA neg for PE  7/27 now in afib w rvr

## 2024-07-27 NOTE — CARE PLAN
The patient is Stable - Low risk of patient condition declining or worsening    Shift Goals  Clinical Goals: Pt safety  Patient Goals: maliha  Family Goals: n/a    Progress made toward(s) clinical / shift goals:  Pt is impulsive, A&OX 0-1, sometimes knows name. Pt pulled out IV fluids and slides out of the bed. Soft restraints placed on wrist, per MD order.Telesitter in room. Stimuli minimized. Pt did not sleep, restless all night.   HR in the low 100's, in teens. Hydralazine given once per prn protocol. Elevated BP resolved.          Problem: Pain - Standard  Goal: Alleviation of pain or a reduction in pain to the patient’s comfort goal  7/27/2024 0318 by Macy Landry, R.N.  Outcome: Progressing  7/27/2024 0318 by Macy Landry, R.N.  Outcome: Progressing     Problem: Safety - Medical Restraint  Goal: Remains free of injury from restraints (Restraint for Interference with Medical Device)  Outcome: Progressing

## 2024-07-27 NOTE — ASSESSMENT & PLAN NOTE
As above cf polypharmacy contributing to delirium  Patient now on only Norco and Tylenol for pain  Consider discontinuation of hydrocodone if possible however we will see how he improves on the Zyprexa

## 2024-07-27 NOTE — ASSESSMENT & PLAN NOTE
Had a fall at home  CTH/CT c spine at [facility] without acute findingds  Some contusion and ecchymoses are residual however CBC stable

## 2024-07-27 NOTE — THERAPY
Occupational Therapy   Initial Evaluation     Patient Name: Karan Wiley  Age:  64 y.o., Sex:  male  Medical Record #: [id number]  Today's Date: 7/27/2024     Precautions  Precautions: (P) Weight Bearing As Tolerated Right Lower Extremity, Fall Risk    Assessment  Patient is 64 y.o. male with a diagnosis of AQUILES, AMS. Additional factors influencing patient status / progress: s/p R TKA POD 4. Pt is confused, Ox1. Follows 1-step commands, agreeable to activity. Limited by poor cognition, poor balance, impaired functional mobility and activity tolerance which is impacting ADL performance. Lives with spouse. Pt is a high risk for falls; unsafe to return home at this time. OT will follow while in house.      Plan  Occupational Therapy Initial Treatment Plan   Treatment Interventions: (P) Self Care / Activities of Daily Living, Neuro Re-Education / Balance, Therapeutic Activity  Treatment Frequency: (P) 3 Times per Week  Duration: (P) Until Therapy Goals Met    DC Equipment Recommendations: (P) Unable to determine at this time  Discharge Recommendations: (P) Recommend post-acute placement for additional occupational therapy services prior to discharge home     Subjective  Agreeable     Objective     07/27/24 1135   Prior Living Situation   Prior Services Intermittent Physical Support for ADL Per Family;Skilled Home Health Services   Housing / Facility 1 Story House   Steps Into Home 0   Steps In Home 0   Bathroom Set up Walk In Shower;Grab Bars;Shower Chair   Equipment Owned Front-Wheel Walker;Tub / Shower Seat;Grab Bar(s) In Tub / Shower   Lives with - Patient's Self Care Capacity Spouse   Prior Level of ADL Function   Self Feeding Independent   Grooming / Hygiene Independent   Bathing Independent   Dressing Independent   Toileting Independent   Prior Level of IADL Function   Medication Management Independent   Laundry Requires Assist   Kitchen Mobility Independent   Finances Unable To Determine At This Time   Home  Management Independent   Shopping Requires Assist   Prior Level Of Mobility Independent With Device in Home   Driving / Transportation Unable To Determine At This Time   Occupation (Pre-Hospital Vocational) Unable To Determine At This Time   Leisure Interests Unable To Determine At This Time   Precautions   Precautions Weight Bearing As Tolerated Right Lower Extremity;Fall Risk   Vitals   O2 Delivery Device None - Room Air   Pain 0 - 10 Group   Location Knee   Location Orientation Right   Therapist Pain Assessment Post Activity Pain Same as Prior to Activity;Nurse Notified   Cognition    Cognition / Consciousness X   Level of Consciousness Alert   Ability To Follow Commands 1 Step   Safety Awareness Impaired;Impulsive   Attention Impaired   Comments Ox1   Passive ROM Upper Body   Passive ROM Upper Body WDL   Active ROM Upper Body   Active ROM Upper Body  WDL   Strength Upper Body   Upper Body Strength  WDL   Sensation Upper Body   Upper Extremity Sensation  WDL   Upper Body Muscle Tone   Upper Body Muscle Tone  WDL   Coordination Upper Body   Coordination WDL   Balance Assessment   Sitting Balance (Static) Fair +   Sitting Balance (Dynamic) Fair   Standing Balance (Static) Fair   Standing Balance (Dynamic) Fair -   Weight Shift Sitting Fair   Weight Shift Standing Fair   Bed Mobility    Supine to Sit Moderate Assist   Sit to Supine Moderate Assist   Scooting Moderate Assist   ADL Assessment   Eating Independent   Grooming Contact Guard Assist;Seated   Lower Body Dressing Maximal Assist   Toileting Total Assist   Comments incontinence   How much help from another person does the patient currently need...   Putting on and taking off regular lower body clothing? 2   Bathing (including washing, rinsing, and drying)? 2   Toileting, which includes using a toilet, bedpan, or urinal? 1   Putting on and taking off regular upper body clothing? 2   Taking care of personal grooming such as brushing teeth? 2   Eating meals? 3    6 Clicks Daily Activity Score 12   Functional Mobility   Sit to Stand Moderate Assist   Transfer Method Stand Step   Comments fww. side steps at bedside   Visual Perception   Visual Perception  Not Tested   Activity Tolerance   Sitting Edge of Bed 12   Standing 2x2   Short Term Goals   Short Term Goal # 1 ADL transfers with CGA within 5 days   Short Term Goal # 2 UB dressing with SBA within 5 days   Short Term Goal # 3 Toileting in br with CGA within 5 days   Short Term Goal # 4 Seated grooming with Spv within 5 days   Education Group   Education Provided Activities of Daily Living;Transfers   Role of Occupational Therapist Patient Response Patient;No Learning Evidence;Explanation;Acceptance   Home Safety Patient Response Patient;No Learning Evidence;Explanation;Acceptance   Transfers Patient Response Patient;No Learning Evidence;Explanation;Acceptance   ADL Patient Response Patient;No Learning Evidence;Explanation;Acceptance   Occupational Therapy Initial Treatment Plan    Treatment Interventions Self Care / Activities of Daily Living;Neuro Re-Education / Balance;Therapeutic Activity   Treatment Frequency 3 Times per Week   Duration Until Therapy Goals Met   Problem List   Problem List Decreased Active Daily Living Skills;Decreased Homemaking Skills;Decreased Activity Tolerance;Safety Awareness Deficits / Cognition;Impaired Cognitive Function;Impaired Postural Control / Balance   Anticipated Discharge Equipment and Recommendations   DC Equipment Recommendations Unable to determine at this time   Discharge Recommendations Recommend post-acute placement for additional occupational therapy services prior to discharge home   Interdisciplinary Plan of Care Collaboration   IDT Collaboration with  Certified Nursing Assistant;Nursing   Patient Position at End of Therapy In Bed;Bed Alarm On;Call Light within Reach;Tray Table within Reach;Phone within Reach   Collaboration Comments OT Nila.

## 2024-07-27 NOTE — PROGRESS NOTES
4 Eyes Skin Assessment Completed by CAYETANO Cavazos and CAYETANO Kline.    Head WDL  Ears WDL  Nose WDL  Mouth white sore under tongue  Neck WDL  Breast/Chest WDL  Shoulder Blades WDL  Spine Old scar  (R) Arm/Elbow/Hand WDL  (L) Arm/Elbow/Hand WDL  Abdomen WDL  Groin Redness and Blanching  Scrotum/Coccyx/Buttocks Redness and Blanching  (R) Leg Incision site:redness and swollen, bruising  (L) Leg WDL  (R) Heel/Foot/Toe WDL  (L) Heel/Foot/Toe WDL          Devices In Places pulse ox      Interventions In Place Waffle Overlay, TAP System, and Pillows    Possible Skin Injury Yes    Pictures Uploaded Into Epic N/A  Wound Consult Placed N/A  RN Wound Prevention Protocol Ordered Yes

## 2024-07-27 NOTE — CARE PLAN
The patient is Stable - Low risk of patient condition declining or worsening    Shift Goals  Clinical Goals: Pt's safety and neuro checks will be monitored,  free from falls during this shift  Patient Goals: maliha  Family Goals: n/a    Progress made toward(s) clinical / shift goals:  Pt remained on 4 rails restraints. Telesitter at bedside. Incontinent bowel and urine, cleaned. Aox0 during this shift. Pt did not sustain any fall during this shift.     Patient is not progressing towards the following goals:

## 2024-07-27 NOTE — PROGRESS NOTES
Pt placed on bilateral soft wrist restraints per order at hrs 2226. Updated wife via phone call at hrs 2300 that pt was removing PIV and has done so 2x today. Pt also briefly spoke to the wife over the phone. Q2 hrly rounding ongoing. Safety, seizure and fall precautions in place. Telesitter in room.

## 2024-07-27 NOTE — PROGRESS NOTES
BSSR from day RN  Assumed pt care.   Pt seen AOx 0, Pt is confused and attempting to get out of bed  POC and goals discussed.  Safety and fall precautions in place. Bed locked and lowest position.  Whiteboard updated.  Instructed pt to use call light  did not verbalize understanding.   Call light kept within reach.  No other needs reported at this time.   Care ongoing.

## 2024-07-27 NOTE — PROGRESS NOTES
Hospital Medicine Daily Progress Note    Date of Service  7/27/2024    Chief Complaint  Karan Wiley is a 64 y.o. male admitted 7/25/2024 with AMS    Hospital Course  65 yo with Dm2, CAD, HTN, sp R TKA (7/24) presented to OSH with worsening confusion found to have AQUILES and transferred due to concern for R knee infection. Dr. Barragan his surgeon evaluated wound and did not believe pt had infection. CT of knee also reassuring with expected post op changes. Patient's delirium remains unclear. Polypharmacy likely contributing. However he is requiring narcotics for pain control.    Interval Problem Update  - patient attempted to answer yet again not making a whole lot of sense  - still in restraints    I have discussed this patient's plan of care and discharge plan at IDT rounds today with Case Management, Nursing, Nursing leadership, and other members of the IDT team.    Consultants/Specialty  orthopedics    Code Status  Full Code    Disposition  The patient is not medically cleared for discharge to home or a post-acute facility.  Anticipate discharge to: skilled nursing facility    I have placed the appropriate orders for post-discharge needs.    Review of Systems  Review of Systems   All other systems reviewed and are negative.       Physical Exam  Temp:  [36.4 °C (97.5 °F)-37.1 °C (98.8 °F)] 37.1 °C (98.7 °F)  Pulse:  [] 111  Resp:  [18-25] 25  BP: (134-181)/() 178/119  SpO2:  [90 %-94 %] 92 %    Physical Exam  General: shaky  HEENT: PERRLA, EOMI  Cards: regular, tachycardic  Pulm: normal respiratory effort, CTAB, no wheezes or rhonchi  Abdomen: soft, NTND, + bowel sounds, no rebound tenderness or guarding  MSK: R knee with incision site swollen/red/warm to touch  Neuro: CN II-XII grossly intact, sensation/strength intact, AAOx1  Psych: confused  Fluids    Intake/Output Summary (Last 24 hours) at 7/27/2024 1619  Last data filed at 7/27/2024 1000  Gross per 24 hour   Intake 2344.07 ml   Output 100 ml    Net 2244.07 ml       Laboratory  Recent Labs     07/25/24  1931 07/26/24  0030 07/27/24  0138   WBC 8.1 7.9 11.3*   RBC 3.76* 3.75* 3.59*   HEMOGLOBIN 11.5* 11.4* 11.0*   HEMATOCRIT 35.3* 35.3* 32.7*   MCV 93.9 94.1 91.1   MCH 30.6 30.4 30.6   MCHC 32.6 32.3 33.6   RDW 44.9 44.4 42.7   PLATELETCT 181 198 242   MPV 12.1 12.7 12.2     Recent Labs     07/25/24 1931 07/26/24  0030 07/27/24  0138   SODIUM 136 136 137   POTASSIUM 4.1 3.8 3.4*   CHLORIDE 101 102 103   CO2 21 19* 17*   GLUCOSE 100* 95 116*   BUN 42* 37* 19   CREATININE 2.66* 2.11* 0.92   CALCIUM 9.3 9.4 9.6                   Imaging  DX-CHEST-PORTABLE (1 VIEW)   Final Result      Elevated right diaphragm.      CT-CTA CHEST PULMONARY ARTERY W/ RECONS   Final Result      1.  No pulmonary embolism detected.   2.  Elevated right diaphragm. Right basilar pulmonary opacity and volume loss suggesting atelectasis and/or mild infiltrate.            CT-KNEE W/O RIGHT   Final Result      1.  Findings in keeping with interval total knee arthroplasty without specific findings to indicate infection   2.  Osteopenia      US-RENAL   Final Result         1.  Normal renal ultrasound.      OUTSIDE IMAGES-DX CHEST   Final Result      OUTSIDE IMAGES-CT HEAD   Final Result      OUTSIDE IMAGES-DX LOWER EXTREMITY, RIGHT   Final Result      OUTSIDE IMAGES-CT CERVICAL SPINE   Final Result      IR-US GUIDED PIV    (Results Pending)        Assessment/Plan  * Acute encephalopathy- (present on admission)  Assessment & Plan  AMS is of unclear etiology but started acutely after his surgery  Per wife has some degree of dementia?  CTH at OSH was neg prior to transfer  Patient does have a history of seizures but this is not consistent with one  UA not strongly suggestive of UTI  Seroquel PRN for his agitation at this time  My best guess for his AMS is polypharmacy. At home he was taking norco, tramadol, he also takes depakote, tizanidine and most likely this all contributed to his  deliriu. I suspect patient to improve in the next 24-48 hours if this is the case. BCX without growth. R knee does NOT look infected.   I have taken him off tylenol to not mask fevers. I have stopped abx as no evidence of infection and perhaps pt will declare himself    Fall  Assessment & Plan  Had a fall at home  CTH/CT c spine at Osh without acute findingds    Sinus tachycardia  Assessment & Plan  Possibly from agitation  No clear w/d  per wife not drinking excessive etoh  Patient has a spine stimulator in place   CTA neg for PE    Polypharmacy  Assessment & Plan  As above cf polypharmacy contributing to delirium  Hold home trazodone  Hold home tramadol  Hold home tizanidine  Cw norco PRN severe pain  Try to use tylenol if able    Anemia  Assessment & Plan  Likely post op related  Op f/u    Status post right knee replacement  Assessment & Plan  Patient complains of significant R knee pain  Although does have swelling, some warmth and erythema, I spoke with DR. Barragan who reassured that it looks as expected post op  CT knee w/ suspected post op changes  CRP/ESR elev but post op certainly there is inflammatory state  DC abx, wbc slightly up monitor closey  PT/OT    Acute kidney injury (HCC)- (present on admission)  Assessment & Plan  Cr up to 3.88 at OSH  Cr to 2.11 here improving with IVFs however pt wo pIV access still and not getting IVFs  Fluid resuscitation  Avoid nephrotoxic medications  7/27 - resolved. DC IVFs      Body mass index 40.0-44.9, adult (HCC)- (present on admission)  Assessment & Plan  Body mass index is 42.37 kg/m².  Outpatient weight loss management program and lifestyle modification highly recommended    Essential hypertension, benign- (present on admission)  Assessment & Plan  Optimize blood pressure management to keep systolic blood pressure less than 140 diastolic under 90  Continue Coreg 6.25 mg twice daily  As needed labetalol    Hyperlipidemia- (present on admission)  Assessment &  Plan  Low-fat low-cholesterol diet  Continue Lipitor 40 mg nightly and Zetia 10 mg nightly      Depression- (present on admission)  Assessment & Plan  Continue Effexor XR  Hold trazodone and Valium given altered mental status    CAD (coronary artery disease)- (present on admission)  Assessment & Plan  Optimize medication management  2020 patient had PCI x 2  Currently chest pain-free  Continue oxygen support      Paroxysmal atrial fibrillation (HCC)- (present on admission)  Assessment & Plan  Continue with rate control currently anticoagulation with Eliquis held status post surgery and with the potential of resurgery necessary  Resume Eliquis today    BPH (benign prostatic hyperplasia)- (present on admission)  Assessment & Plan  Continue finasteride and Flomax    Type 2 diabetes mellitus without complication, without long-term current use of insulin (HCC)- (present on admission)  Assessment & Plan  -accus with sliding scale coverage  -diabetic diet  -diabetic education  -follow glycohemoglobin levels long term, most recent hemoglobin A1c 6.2  -Hold metformin given acute renal failure  -monitor for hypoglycemic episodes and adjust control if he should get low         VTE prophylaxis: Resume Eliquis    I have performed a physical exam and reviewed and updated ROS and Plan today (7/27/2024). In review of yesterday's note (7/26/2024), there are no changes except as documented above.

## 2024-07-27 NOTE — PROGRESS NOTES
"Subjective:    Readmitted from outside hospital with AQUILES and AMS. Increased confusion. Does not participate in interview. No fevers today    Objective:  BP (!) 159/111   Pulse (!) 114   Temp 36.4 °C (97.5 °F) (Axillary)   Resp 18   Ht 1.702 m (5' 7\")   Wt 123 kg (270 lb 8.1 oz)   SpO2 90%     Recent Labs     07/25/24 1931 07/26/24  0030 07/27/24  0138   WBC 8.1 7.9 11.3*   RBC 3.76* 3.75* 3.59*   HEMOGLOBIN 11.5* 11.4* 11.0*   HEMATOCRIT 35.3* 35.3* 32.7*   MCV 93.9 94.1 91.1   MCH 30.6 30.4 30.6   MCHC 32.6 32.3 33.6   RDW 44.9 44.4 42.7   PLATELETCT 181 198 242   MPV 12.1 12.7 12.2     Recent Labs     07/25/24 1931 07/26/24 0030 07/27/24  0138   SODIUM 136 136 137   POTASSIUM 4.1 3.8 3.4*   CHLORIDE 101 102 103   CO2 21 19* 17*   GLUCOSE 100* 95 116*   BUN 42* 37* 19   CREATININE 2.66* 2.11* 0.92   CALCIUM 9.3 9.4 9.6         Intake/Output Summary (Last 24 hours) at 7/27/2024 1032  Last data filed at 7/27/2024 0506  Gross per 24 hour   Intake 2344.07 ml   Output 100 ml   Net 2244.07 ml       Awake and alert but not oriented.  Neurologically and vascularly distally intact with palpable pedal pulses bilaterally.  Dressing C/D/I        Assessment:  s/p total knee arthroplasty with increased confusion after surgery    Plan:    Diet as tolerated  WBAT  PT/OT  Reviewed knee ROM including terminal flexion and extension exercises  Medicine is following patient  From ortho standpoint knee is doing well. CT of TKA did not suggest infection. Physical exam is routine for 4 days post op  Follow-up in approximately 2 weeks post-op.  [phone]    "

## 2024-07-27 NOTE — PROGRESS NOTES
06:30 called pts wife, updated on pt need ing CT with contrast. Pt is AO1. Per pts wife pt ok to have CT with contrast.

## 2024-07-28 ENCOUNTER — APPOINTMENT (OUTPATIENT)
Dept: CARDIOLOGY | Facility: MEDICAL CENTER | Age: 64
End: 2024-07-28
Attending: INTERNAL MEDICINE
Payer: OTHER GOVERNMENT

## 2024-07-28 ENCOUNTER — APPOINTMENT (OUTPATIENT)
Dept: RADIOLOGY | Facility: MEDICAL CENTER | Age: 64
DRG: 682 | End: 2024-07-28
Attending: INTERNAL MEDICINE
Payer: OTHER GOVERNMENT

## 2024-07-28 PROBLEM — G40.909 SEIZURE DISORDER AS SEQUELA OF CEREBROVASCULAR ACCIDENT (HCC): Status: ACTIVE | Noted: 2024-07-28

## 2024-07-28 PROBLEM — G31.84 MILD COGNITIVE IMPAIRMENT: Status: ACTIVE | Noted: 2024-07-28

## 2024-07-28 PROBLEM — I69.398 SEIZURE DISORDER AS SEQUELA OF CEREBROVASCULAR ACCIDENT (HCC): Status: ACTIVE | Noted: 2024-07-28

## 2024-07-28 PROBLEM — J18.9 PNEUMONIA OF RIGHT LOWER LOBE DUE TO INFECTIOUS ORGANISM: Status: ACTIVE | Noted: 2024-07-28

## 2024-07-28 PROBLEM — R65.10 SIRS (SYSTEMIC INFLAMMATORY RESPONSE SYNDROME) (HCC): Status: ACTIVE | Noted: 2024-07-28

## 2024-07-28 LAB
ALBUMIN SERPL BCP-MCNC: 3.3 G/DL (ref 3.2–4.9)
ALBUMIN/GLOB SERPL: 1.1 G/DL
ALP SERPL-CCNC: 61 U/L (ref 30–99)
ALT SERPL-CCNC: 27 U/L (ref 2–50)
AMMONIA PLAS-SCNC: 11 UMOL/L (ref 11–45)
ANION GAP SERPL CALC-SCNC: 20 MMOL/L (ref 7–16)
AST SERPL-CCNC: 36 U/L (ref 12–45)
BASOPHILS # BLD AUTO: 0.3 % (ref 0–1.8)
BASOPHILS # BLD: 0.04 K/UL (ref 0–0.12)
BILIRUB SERPL-MCNC: 0.8 MG/DL (ref 0.1–1.5)
BUN SERPL-MCNC: 15 MG/DL (ref 8–22)
CALCIUM ALBUM COR SERPL-MCNC: 10.6 MG/DL (ref 8.5–10.5)
CALCIUM SERPL-MCNC: 10 MG/DL (ref 8.4–10.2)
CHLORIDE SERPL-SCNC: 99 MMOL/L (ref 96–112)
CO2 SERPL-SCNC: 18 MMOL/L (ref 20–33)
CREAT SERPL-MCNC: 0.91 MG/DL (ref 0.5–1.4)
EKG IMPRESSION: NORMAL
EOSINOPHIL # BLD AUTO: 0.17 K/UL (ref 0–0.51)
EOSINOPHIL NFR BLD: 1.4 % (ref 0–6.9)
ERYTHROCYTE [DISTWIDTH] IN BLOOD BY AUTOMATED COUNT: 43.2 FL (ref 35.9–50)
GFR SERPLBLD CREATININE-BSD FMLA CKD-EPI: 94 ML/MIN/1.73 M 2
GLOBULIN SER CALC-MCNC: 3.1 G/DL (ref 1.9–3.5)
GLUCOSE BLD STRIP.AUTO-MCNC: 121 MG/DL (ref 65–99)
GLUCOSE BLD STRIP.AUTO-MCNC: 125 MG/DL (ref 65–99)
GLUCOSE BLD STRIP.AUTO-MCNC: 137 MG/DL (ref 65–99)
GLUCOSE SERPL-MCNC: 129 MG/DL (ref 65–99)
HCT VFR BLD AUTO: 32.7 % (ref 42–52)
HGB BLD-MCNC: 11.1 G/DL (ref 14–18)
IMM GRANULOCYTES # BLD AUTO: 0.08 K/UL (ref 0–0.11)
IMM GRANULOCYTES NFR BLD AUTO: 0.6 % (ref 0–0.9)
LACTATE SERPL-SCNC: 1.7 MMOL/L (ref 0.5–2)
LV EJECT FRACT  99904: 60
LYMPHOCYTES # BLD AUTO: 1.72 K/UL (ref 1–4.8)
LYMPHOCYTES NFR BLD: 14 % (ref 22–41)
MAGNESIUM SERPL-MCNC: 1.6 MG/DL (ref 1.5–2.5)
MCH RBC QN AUTO: 30.3 PG (ref 27–33)
MCHC RBC AUTO-ENTMCNC: 33.9 G/DL (ref 32.3–36.5)
MCV RBC AUTO: 89.3 FL (ref 81.4–97.8)
MONOCYTES # BLD AUTO: 1.43 K/UL (ref 0–0.85)
MONOCYTES NFR BLD AUTO: 11.6 % (ref 0–13.4)
NEUTROPHILS # BLD AUTO: 8.87 K/UL (ref 1.82–7.42)
NEUTROPHILS NFR BLD: 72.1 % (ref 44–72)
NRBC # BLD AUTO: 0 K/UL
NRBC BLD-RTO: 0 /100 WBC (ref 0–0.2)
PLATELET # BLD AUTO: 295 K/UL (ref 164–446)
PMV BLD AUTO: 11.5 FL (ref 9–12.9)
POTASSIUM SERPL-SCNC: 3.3 MMOL/L (ref 3.6–5.5)
PROT SERPL-MCNC: 6.4 G/DL (ref 6–8.2)
RBC # BLD AUTO: 3.66 M/UL (ref 4.7–6.1)
SODIUM SERPL-SCNC: 137 MMOL/L (ref 135–145)
TSH SERPL DL<=0.005 MIU/L-ACNC: 2.02 UIU/ML (ref 0.38–5.33)
WBC # BLD AUTO: 12.3 K/UL (ref 4.8–10.8)

## 2024-07-28 PROCEDURE — 93010 ELECTROCARDIOGRAM REPORT: CPT | Performed by: STUDENT IN AN ORGANIZED HEALTH CARE EDUCATION/TRAINING PROGRAM

## 2024-07-28 PROCEDURE — 700101 HCHG RX REV CODE 250: Performed by: INTERNAL MEDICINE

## 2024-07-28 PROCEDURE — 700117 HCHG RX CONTRAST REV CODE 255: Performed by: INTERNAL MEDICINE

## 2024-07-28 PROCEDURE — 99233 SBSQ HOSP IP/OBS HIGH 50: CPT | Performed by: INTERNAL MEDICINE

## 2024-07-28 PROCEDURE — 99292 CRITICAL CARE ADDL 30 MIN: CPT | Performed by: INTERNAL MEDICINE

## 2024-07-28 PROCEDURE — 700111 HCHG RX REV CODE 636 W/ 250 OVERRIDE (IP): Mod: JZ | Performed by: INTERNAL MEDICINE

## 2024-07-28 PROCEDURE — 93005 ELECTROCARDIOGRAM TRACING: CPT | Performed by: INTERNAL MEDICINE

## 2024-07-28 PROCEDURE — 94640 AIRWAY INHALATION TREATMENT: CPT

## 2024-07-28 PROCEDURE — 85025 COMPLETE CBC W/AUTO DIFF WBC: CPT

## 2024-07-28 PROCEDURE — 700102 HCHG RX REV CODE 250 W/ 637 OVERRIDE(OP): Performed by: HOSPITALIST

## 2024-07-28 PROCEDURE — 700105 HCHG RX REV CODE 258: Performed by: INTERNAL MEDICINE

## 2024-07-28 PROCEDURE — A9270 NON-COVERED ITEM OR SERVICE: HCPCS | Mod: JZ | Performed by: INTERNAL MEDICINE

## 2024-07-28 PROCEDURE — 83605 ASSAY OF LACTIC ACID: CPT

## 2024-07-28 PROCEDURE — 83735 ASSAY OF MAGNESIUM: CPT

## 2024-07-28 PROCEDURE — 700102 HCHG RX REV CODE 250 W/ 637 OVERRIDE(OP): Mod: JZ | Performed by: INTERNAL MEDICINE

## 2024-07-28 PROCEDURE — 99024 POSTOP FOLLOW-UP VISIT: CPT | Performed by: STUDENT IN AN ORGANIZED HEALTH CARE EDUCATION/TRAINING PROGRAM

## 2024-07-28 PROCEDURE — 700111 HCHG RX REV CODE 636 W/ 250 OVERRIDE (IP): Mod: JZ

## 2024-07-28 PROCEDURE — 700102 HCHG RX REV CODE 250 W/ 637 OVERRIDE(OP): Performed by: INTERNAL MEDICINE

## 2024-07-28 PROCEDURE — 93306 TTE W/DOPPLER COMPLETE: CPT

## 2024-07-28 PROCEDURE — 82140 ASSAY OF AMMONIA: CPT

## 2024-07-28 PROCEDURE — 93306 TTE W/DOPPLER COMPLETE: CPT | Mod: 26 | Performed by: INTERNAL MEDICINE

## 2024-07-28 PROCEDURE — 84443 ASSAY THYROID STIM HORMONE: CPT

## 2024-07-28 PROCEDURE — 94760 N-INVAS EAR/PLS OXIMETRY 1: CPT

## 2024-07-28 PROCEDURE — 770020 HCHG ROOM/CARE - TELE (206)

## 2024-07-28 PROCEDURE — A9270 NON-COVERED ITEM OR SERVICE: HCPCS | Performed by: INTERNAL MEDICINE

## 2024-07-28 PROCEDURE — 80053 COMPREHEN METABOLIC PANEL: CPT

## 2024-07-28 PROCEDURE — A9270 NON-COVERED ITEM OR SERVICE: HCPCS | Performed by: HOSPITALIST

## 2024-07-28 PROCEDURE — 99291 CRITICAL CARE FIRST HOUR: CPT | Performed by: INTERNAL MEDICINE

## 2024-07-28 PROCEDURE — 82962 GLUCOSE BLOOD TEST: CPT | Mod: 91

## 2024-07-28 RX ORDER — POTASSIUM CHLORIDE 1500 MG/1
40 TABLET, EXTENDED RELEASE ORAL ONCE
Status: COMPLETED | OUTPATIENT
Start: 2024-07-28 | End: 2024-07-28

## 2024-07-28 RX ORDER — DIGOXIN 0.25 MG/ML
500 INJECTION INTRAMUSCULAR; INTRAVENOUS ONCE
Status: DISCONTINUED | OUTPATIENT
Start: 2024-07-28 | End: 2024-07-28

## 2024-07-28 RX ORDER — METOPROLOL TARTRATE 25 MG/1
25 TABLET, FILM COATED ORAL 2 TIMES DAILY
Status: DISCONTINUED | OUTPATIENT
Start: 2024-07-28 | End: 2024-07-29

## 2024-07-28 RX ORDER — METOPROLOL TARTRATE 1 MG/ML
5 INJECTION, SOLUTION INTRAVENOUS ONCE
Status: DISCONTINUED | OUTPATIENT
Start: 2024-07-28 | End: 2024-07-28

## 2024-07-28 RX ORDER — POTASSIUM CHLORIDE 7.45 MG/ML
10 INJECTION INTRAVENOUS
Status: COMPLETED | OUTPATIENT
Start: 2024-07-28 | End: 2024-07-28

## 2024-07-28 RX ORDER — HALOPERIDOL 5 MG/ML
INJECTION INTRAMUSCULAR
Status: COMPLETED
Start: 2024-07-28 | End: 2024-07-28

## 2024-07-28 RX ORDER — LABETALOL HYDROCHLORIDE 5 MG/ML
10 INJECTION, SOLUTION INTRAVENOUS EVERY 4 HOURS PRN
Status: DISCONTINUED | OUTPATIENT
Start: 2024-07-28 | End: 2024-08-07 | Stop reason: HOSPADM

## 2024-07-28 RX ORDER — MAGNESIUM SULFATE HEPTAHYDRATE 40 MG/ML
4 INJECTION, SOLUTION INTRAVENOUS ONCE
Status: COMPLETED | OUTPATIENT
Start: 2024-07-28 | End: 2024-07-28

## 2024-07-28 RX ORDER — DEXTROSE MONOHYDRATE 50 MG/ML
INJECTION, SOLUTION INTRAVENOUS CONTINUOUS
Status: ACTIVE | OUTPATIENT
Start: 2024-07-28 | End: 2024-07-30

## 2024-07-28 RX ORDER — METOPROLOL TARTRATE 25 MG/1
25 TABLET, FILM COATED ORAL 2 TIMES DAILY
Status: DISCONTINUED | OUTPATIENT
Start: 2024-07-28 | End: 2024-07-28

## 2024-07-28 RX ORDER — DILTIAZEM HYDROCHLORIDE 5 MG/ML
10 INJECTION INTRAVENOUS EVERY 4 HOURS PRN
Status: DISCONTINUED | OUTPATIENT
Start: 2024-07-28 | End: 2024-07-28

## 2024-07-28 RX ORDER — HALOPERIDOL 5 MG/ML
5 INJECTION INTRAMUSCULAR ONCE
Status: COMPLETED | OUTPATIENT
Start: 2024-07-28 | End: 2024-07-28

## 2024-07-28 RX ORDER — FUROSEMIDE 10 MG/ML
10 INJECTION INTRAMUSCULAR; INTRAVENOUS ONCE
Status: COMPLETED | OUTPATIENT
Start: 2024-07-28 | End: 2024-07-28

## 2024-07-28 RX ORDER — DILTIAZEM HYDROCHLORIDE 30 MG/1
30 TABLET, FILM COATED ORAL EVERY 6 HOURS
Status: DISCONTINUED | OUTPATIENT
Start: 2024-07-28 | End: 2024-07-28

## 2024-07-28 RX ORDER — LEVALBUTEROL INHALATION SOLUTION 0.63 MG/3ML
0.63 SOLUTION RESPIRATORY (INHALATION)
Status: DISCONTINUED | OUTPATIENT
Start: 2024-07-28 | End: 2024-08-07 | Stop reason: HOSPADM

## 2024-07-28 RX ADMIN — TAMSULOSIN HYDROCHLORIDE 0.4 MG: 0.4 CAPSULE ORAL at 17:52

## 2024-07-28 RX ADMIN — DILTIAZEM HYDROCHLORIDE 10 MG: 5 INJECTION, SOLUTION INTRAVENOUS at 06:51

## 2024-07-28 RX ADMIN — DOCUSATE SODIUM 100 MG: 100 CAPSULE, LIQUID FILLED ORAL at 17:52

## 2024-07-28 RX ADMIN — AMIODARONE HYDROCHLORIDE 0.5 MG/MIN: 1.8 INJECTION, SOLUTION INTRAVENOUS at 15:22

## 2024-07-28 RX ADMIN — HALOPERIDOL LACTATE 5 MG: 5 INJECTION, SOLUTION INTRAMUSCULAR at 09:35

## 2024-07-28 RX ADMIN — EZETIMIBE 10 MG: 10 TABLET ORAL at 20:14

## 2024-07-28 RX ADMIN — AMPICILLIN AND SULBACTAM 3 G: 1; 2 INJECTION, POWDER, FOR SOLUTION INTRAMUSCULAR; INTRAVENOUS at 17:55

## 2024-07-28 RX ADMIN — ALBUTEROL SULFATE 1 PUFF: 90 AEROSOL, METERED RESPIRATORY (INHALATION) at 05:15

## 2024-07-28 RX ADMIN — AMIODARONE HYDROCHLORIDE 0.5 MG/MIN: 1.8 INJECTION, SOLUTION INTRAVENOUS at 16:11

## 2024-07-28 RX ADMIN — ALBUTEROL SULFATE 1 PUFF: 90 AEROSOL, METERED RESPIRATORY (INHALATION) at 21:19

## 2024-07-28 RX ADMIN — FUROSEMIDE 10 MG: 10 INJECTION, SOLUTION INTRAVENOUS at 10:51

## 2024-07-28 RX ADMIN — HYDRALAZINE HYDROCHLORIDE 20 MG: 20 INJECTION INTRAMUSCULAR; INTRAVENOUS at 04:27

## 2024-07-28 RX ADMIN — THIAMINE HYDROCHLORIDE 500 MG: 100 INJECTION, SOLUTION INTRAMUSCULAR; INTRAVENOUS at 15:09

## 2024-07-28 RX ADMIN — VALPROIC ACID 125 MG: 250 SOLUTION ORAL at 06:01

## 2024-07-28 RX ADMIN — POTASSIUM CHLORIDE 10 MEQ: 7.46 INJECTION, SOLUTION INTRAVENOUS at 12:57

## 2024-07-28 RX ADMIN — HALOPERIDOL 5 MG: 5 INJECTION INTRAMUSCULAR at 09:35

## 2024-07-28 RX ADMIN — DEXTROSE MONOHYDRATE: 50 INJECTION, SOLUTION INTRAVENOUS at 10:03

## 2024-07-28 RX ADMIN — AMIODARONE HYDROCHLORIDE 1 MG/MIN: 1.8 INJECTION, SOLUTION INTRAVENOUS at 09:44

## 2024-07-28 RX ADMIN — DEXTROSE MONOHYDRATE: 50 INJECTION, SOLUTION INTRAVENOUS at 09:33

## 2024-07-28 RX ADMIN — AMIODARONE HYDROCHLORIDE 150 MG: 1.5 INJECTION, SOLUTION INTRAVENOUS at 09:34

## 2024-07-28 RX ADMIN — CARVEDILOL 6.25 MG: 6.25 TABLET, FILM COATED ORAL at 08:12

## 2024-07-28 RX ADMIN — POTASSIUM CHLORIDE 10 MEQ: 7.46 INJECTION, SOLUTION INTRAVENOUS at 14:59

## 2024-07-28 RX ADMIN — QUETIAPINE FUMARATE 25 MG: 25 TABLET ORAL at 09:48

## 2024-07-28 RX ADMIN — METOPROLOL TARTRATE 25 MG: 25 TABLET, FILM COATED ORAL at 18:04

## 2024-07-28 RX ADMIN — ATORVASTATIN CALCIUM 40 MG: 40 TABLET, FILM COATED ORAL at 20:14

## 2024-07-28 RX ADMIN — ALBUTEROL SULFATE 2.5 MG: 2.5 SOLUTION RESPIRATORY (INHALATION) at 05:45

## 2024-07-28 RX ADMIN — POTASSIUM CHLORIDE 10 MEQ: 7.46 INJECTION, SOLUTION INTRAVENOUS at 16:20

## 2024-07-28 RX ADMIN — VENLAFAXINE HYDROCHLORIDE 225 MG: 75 CAPSULE, EXTENDED RELEASE ORAL at 20:14

## 2024-07-28 RX ADMIN — POTASSIUM CHLORIDE 40 MEQ: 1500 TABLET, EXTENDED RELEASE ORAL at 12:54

## 2024-07-28 RX ADMIN — HUMAN ALBUMIN MICROSPHERES AND PERFLUTREN 3 ML: 10; .22 INJECTION, SOLUTION INTRAVENOUS at 16:45

## 2024-07-28 RX ADMIN — VALPROIC ACID 125 MG: 250 SOLUTION ORAL at 18:05

## 2024-07-28 RX ADMIN — AMPICILLIN AND SULBACTAM 3 G: 1; 2 INJECTION, POWDER, FOR SOLUTION INTRAMUSCULAR; INTRAVENOUS at 11:20

## 2024-07-28 RX ADMIN — DILTIAZEM HYDROCHLORIDE 10 MG: 5 INJECTION, SOLUTION INTRAVENOUS at 07:02

## 2024-07-28 RX ADMIN — APIXABAN 5 MG: 5 TABLET, FILM COATED ORAL at 06:01

## 2024-07-28 RX ADMIN — LABETALOL HYDROCHLORIDE 10 MG: 5 INJECTION, SOLUTION INTRAVENOUS at 03:40

## 2024-07-28 RX ADMIN — FINASTERIDE 5 MG: 5 TABLET, FILM COATED ORAL at 06:00

## 2024-07-28 RX ADMIN — QUETIAPINE FUMARATE 25 MG: 25 TABLET ORAL at 20:44

## 2024-07-28 RX ADMIN — ALBUTEROL SULFATE 2.5 MG: 2.5 SOLUTION RESPIRATORY (INHALATION) at 07:00

## 2024-07-28 RX ADMIN — THIAMINE HYDROCHLORIDE 500 MG: 100 INJECTION, SOLUTION INTRAMUSCULAR; INTRAVENOUS at 11:58

## 2024-07-28 RX ADMIN — OMEPRAZOLE 40 MG: 20 CAPSULE, DELAYED RELEASE ORAL at 20:15

## 2024-07-28 RX ADMIN — HYDROCODONE BITARTRATE AND ACETAMINOPHEN 1 TABLET: 10; 325 TABLET ORAL at 20:14

## 2024-07-28 RX ADMIN — MAGNESIUM SULFATE HEPTAHYDRATE 4 G: 4 INJECTION, SOLUTION INTRAVENOUS at 13:04

## 2024-07-28 RX ADMIN — APIXABAN 5 MG: 5 TABLET, FILM COATED ORAL at 17:52

## 2024-07-28 RX ADMIN — TAMSULOSIN HYDROCHLORIDE 0.4 MG: 0.4 CAPSULE ORAL at 06:01

## 2024-07-28 RX ADMIN — POTASSIUM CHLORIDE 10 MEQ: 7.46 INJECTION, SOLUTION INTRAVENOUS at 14:00

## 2024-07-28 RX ADMIN — VALPROIC ACID 125 MG: 250 SOLUTION ORAL at 11:21

## 2024-07-28 ASSESSMENT — PAIN DESCRIPTION - PAIN TYPE
TYPE: SURGICAL PAIN
TYPE: SURGICAL PAIN

## 2024-07-28 NOTE — PROGRESS NOTES
"Subjective:    Pt transferred to ICU for A-fib with RVR. Sedated, nursing just administered haldol.     Objective:  /78   Pulse 96   Temp 36.7 °C (98.1 °F) (Temporal)   Resp (!) 25   Ht 1.702 m (5' 7\")   Wt 123 kg (270 lb 8.1 oz)   SpO2 94%     Recent Labs     07/26/24  0030 07/27/24  0138 07/28/24  0553   WBC 7.9 11.3* 12.3*   RBC 3.75* 3.59* 3.66*   HEMOGLOBIN 11.4* 11.0* 11.1*   HEMATOCRIT 35.3* 32.7* 32.7*   MCV 94.1 91.1 89.3   MCH 30.4 30.6 30.3   MCHC 32.3 33.6 33.9   RDW 44.4 42.7 43.2   PLATELETCT 198 242 295   MPV 12.7 12.2 11.5     Recent Labs     07/26/24  0030 07/27/24  0138 07/28/24  0553   SODIUM 136 137 137   POTASSIUM 3.8 3.4* 3.3*   CHLORIDE 102 103 99   CO2 19* 17* 18*   GLUCOSE 95 116* 129*   BUN 37* 19 15   CREATININE 2.11* 0.92 0.91   CALCIUM 9.4 9.6 10.0         Intake/Output Summary (Last 24 hours) at 7/28/2024 1135  Last data filed at 7/28/2024 0400  Gross per 24 hour   Intake 120 ml   Output 451 ml   Net -331 ml       Sedated, opens eyes for a brief moment when touched  Neurologically and vascularly intact with palpable pedal pulses bilaterally.  Dressing C/D/I        Assessment:  well s/p total knee arthroplasty with increased confusion .    Plan:    Diet as tolerated  WBAT  PT/OT  Reviewed knee ROM including terminal flexion and extension exercises  Medicine is following patient  From ortho standpoint knee is doing well. CT of TKA did not suggest infection. Physical exam is routine for 5 days post op  Placed in knee immobilizer to help patient keep leg straight (place for 20-30 minutes at a time, 2 -3 times a day), can use when ambulation to provide safety for patient.  Follow-up in approximately 2 weeks post-op.  [phone]    "

## 2024-07-28 NOTE — ASSESSMENT & PLAN NOTE
Suspect that this is delirium in the setting of recent surgery and cognitive impairement   Patient is very agitated and paranoid  TSH, ammonia WNL   Hx of etoh use, but he has been in patient for 3 days, etoh withdrawal is possible, wife denies significant recent drinking     S/p haldol x 1 today   Restraints as needed for aggressive or threatening behavior   Delirium precautions   Give high dose thiamine due to hx of etoh use   Seroquel as needed for aggressive behavior

## 2024-07-28 NOTE — ASSESSMENT & PLAN NOTE
Suspect that he is in RVR due to holding of his Coreg  Start metoprolol 25 mg twice daily  Continue with amiodarone drip to get her under control  Continue to monitor potassium and magnesium and replete as needed  Continue with anticoagulation

## 2024-07-28 NOTE — ASSESSMENT & PLAN NOTE
Patient has hx of paroxysmal afib  Tachycardic again this morning likely due to severe agitation, continue medications, patient is to finish out IV amiodarone today, for what ever reason bag was not running properly overnight  Will initiate amiodarone 200 twice daily tonight

## 2024-07-28 NOTE — PROGRESS NOTES
"Hospital Medicine Daily Progress Note    Date of Service  7/28/2024    Chief Complaint  Karan Wiley is a 64 y.o. male admitted 7/25/2024 with AMS    Hospital Course  65 yo with Dm2, CAD, HTN, sp R TKA (7/24) presented to OSH with worsening confusion found and a fall to have AQUILES and transferred due to concern for R knee infection. OSH did obtain CTH which was neg, CT c spine w/o acute findings, CXR w/ elevated R hemidiaphragm/atelectasis and XR of his knees w expected post op changes.  Dr. Barragan his surgeon evaluated wound and did not believe pt had infection. CT of knee also reassuring with expected post op changes. Upon admission, pt was started on IV unasyn/zyvox. This were dc'd and his wbc is rising. No obvious infection as of yet with negative work up. Patient's delirium was suspected to be due to polypharmacy given use of norco, tramadol, tizanidine and depakote. Wife had denied sig drinking but despite AMS pt did report drinking beer (unclear frequency/quantity). AM of 7/28 patient went into afib w/ RVR , responded slightly to IV dilt pushes but due to HR up to 150s-170s transferred to ICU for amio gtt and possible EtOH w/d. Of note IV Unasyn was restarted given rising wbc.      Interval Problem Update  - overnight with afib w RVR rates 120-170 sp IV dilt 10mg x 2  -once again rates are up to 150s-170s, remains delirius, diffusely wheezy  -yest cxr was relatively reassuring and ctpa neg for PE, I have low suspicion for PNA. I did put in RT protocol and albuterol nebs   -wbc up to 12 this morning , iv unasyn restarted given worsening clinical status  -I attempted to call wife three times, call went straight to voice mail \"voice mail box that has not been set up yet.\"  -suspicion now higher for possible etoh w/d    I have discussed this patient's plan of care and discharge plan at IDT rounds today with Case Management, Nursing, Nursing leadership, and other members of the IDT " team.    Consultants/Specialty  orthopedics    Code Status  Full Code    Disposition  The patient is not medically cleared for discharge to home or a post-acute facility.  Anticipate discharge to: skilled nursing facility    I have placed the appropriate orders for post-discharge needs.    Review of Systems  Review of Systems   Unable to perform ROS: Mental acuity   All other systems reviewed and are negative.       Physical Exam  Temp:  [36.6 °C (97.8 °F)-37.2 °C (99 °F)] 36.6 °C (97.8 °F)  Pulse:  [106-165] 165  Resp:  [20-25] 21  BP: (106-191)/() 126/95  SpO2:  [91 %-96 %] 94 %    Physical Exam  General: shaky  HEENT: PERRLA, EOMI  Cards:irregularly irregular, rapid  Pulm: diffuse wheezing throughout lung fields  Abdomen: soft, NTND, + bowel sounds, no rebound tenderness or guarding  MSK: R knee with incision site with ecchymotic changes, no sig MARIE  Neuro: CN II-XII grossly intact, sensation/strength intact, AAOx1  Psych: confused  Fluids    Intake/Output Summary (Last 24 hours) at 7/28/2024 0901  Last data filed at 7/28/2024 0400  Gross per 24 hour   Intake 240 ml   Output 451 ml   Net -211 ml       Laboratory  Recent Labs     07/26/24  0030 07/27/24  0138 07/28/24  0553   WBC 7.9 11.3* 12.3*   RBC 3.75* 3.59* 3.66*   HEMOGLOBIN 11.4* 11.0* 11.1*   HEMATOCRIT 35.3* 32.7* 32.7*   MCV 94.1 91.1 89.3   MCH 30.4 30.6 30.3   MCHC 32.3 33.6 33.9   RDW 44.4 42.7 43.2   PLATELETCT 198 242 295   MPV 12.7 12.2 11.5     Recent Labs     07/26/24  0030 07/27/24  0138 07/28/24  0553   SODIUM 136 137 137   POTASSIUM 3.8 3.4* 3.3*   CHLORIDE 102 103 99   CO2 19* 17* 18*   GLUCOSE 95 116* 129*   BUN 37* 19 15   CREATININE 2.11* 0.92 0.91   CALCIUM 9.4 9.6 10.0                   Imaging  DX-CHEST-PORTABLE (1 VIEW)   Final Result      Elevated right diaphragm.      CT-CTA CHEST PULMONARY ARTERY W/ RECONS   Final Result      1.  No pulmonary embolism detected.   2.  Elevated right diaphragm. Right basilar pulmonary opacity  and volume loss suggesting atelectasis and/or mild infiltrate.            CT-KNEE W/O RIGHT   Final Result      1.  Findings in keeping with interval total knee arthroplasty without specific findings to indicate infection   2.  Osteopenia      US-RENAL   Final Result         1.  Normal renal ultrasound.      OUTSIDE IMAGES-DX CHEST   Final Result      OUTSIDE IMAGES-CT HEAD   Final Result      OUTSIDE IMAGES-DX LOWER EXTREMITY, RIGHT   Final Result      OUTSIDE IMAGES-CT CERVICAL SPINE   Final Result      IR-US GUIDED PIV    (Results Pending)   EC-ECHOCARDIOGRAM COMPLETE W/O CONT    (Results Pending)        Assessment/Plan  * Acute encephalopathy- (present on admission)  Assessment & Plan  AMS is of unclear etiology but started acutely after his surgery  Per wife has some degree of dementia?  CTH at OSH was neg prior to transfer  Patient does have a history of seizures but this is not consistent with one  UA not strongly suggestive of UTI  Seroquel PRN for his agitation at this time  My best guess for his AMS is polypharmacy. At home he was taking norco, tramadol, he also takes depakote, tizanidine and most likely this all contributed to his delirium. I suspect patient to improve in the next 24-48 hours if this is the case. BCX without growth. R knee does NOT look infected.   I have taken him off tylenol to not mask fevers  7/28 ongoing delirium, some tremors, now afib w/ RVR. Unclear history of Etoh abuse but suspicion for etoh w/d is rising. I am transferring patient to ICU at this time due to afib rates up to 170s requiring a gtt and possible etoh w/d. Remains in non violent restraints    SIRS (systemic inflammatory response syndrome) (HCC)  Assessment & Plan  SIRS criteria identified on my evaluation include:  Tachycardia, with heart rate greater than 90 BPM and Leukocytosis, with WBC greater than 12,000  Unclear cause of SIRS  R knee does not appear infected  UA at OSH neg, here also not convincing, UCX  pending  BCX NGTD  CXR without PNA he did start diffuzely wheezing yest afternoon 7/27  He was initially on IV Unasyn/zyvox, zyvox dc'd given low suspicion for mrsa infection/mrsa pcr neg. I dc'd IV Unasyn 7/26 and his WBC is climbing up.  I have restarted IV unasyn at this time...  C/f Etoh w/d? Although pt is altered he did report to nursing he drinks beer. Wife had denies heavy consistent drinking prior to his surgery. Consider tx Etoh w/d      Atrial fibrillation with rapid ventricular response (HCC)- (present on admission)  Assessment & Plan  Patient has hx of paroxysmal afib  He takes Eliquis  this was held prior to surgery and again upon transfer but since no OR plans it was assumed 7/27  Patient's home coreg was restarted at low dose 6.25mg BID  AM of 7/28 pt went into afib w RVR. Received 2 pushes of IV Dilt 10mg with mild improvement  His HR went up to 170s this morning even up to 190s, -130s during this time  I was planning on a dilt gtt but per ICU request changed to amiodarone gtt to ensure he doesn't become hypotensive  TTE ordered but given sig elev rates will be delayed until pt has better rate control  I spoke with ICU regarding c/f delirium tremens and afib w rvr requiring gtt and he was accepted to ICU level of care    Fall  Assessment & Plan  Had a fall at home  CTH/CT c spine at [facility] without acute findingds    Sinus tachycardia  Assessment & Plan  Possibly from agitation  No clear w/d  per wife not drinking excessive etoh  Patient has a spine stimulator in place   CTA neg for PE  7/27 now in afib w rvr    Polypharmacy  Assessment & Plan  As above cf polypharmacy contributing to delirium  Hold home trazodone  Hold home tramadol  Hold home tizanidine  Cw norco PRN severe pain  Try to use tylenol if able    Anemia  Assessment & Plan  Likely post op related  Op f/u    Status post right knee replacement  Assessment & Plan  Patient complains of significant R knee pain  Although does have  swelling, some warmth and erythema, I spoke with DR. Barragan who reassured that it looks as expected post op  CT knee w/ suspected post op changes  CRP/ESR elev but post op certainly there is inflammatory state  DC abx, wbc slightly up monitor closey  PT/OT - SNF    Acute kidney injury (HCC)- (present on admission)  Assessment & Plan  Cr up to 3.88 at OSH  Cr to 2.11 here improving with IVFs however pt wo pIV access still and not getting IVFs  Fluid resuscitation  Avoid nephrotoxic medications  7/27 - resolved. DC IVFs      Body mass index 40.0-44.9, adult ([facility])- (present on admission)  Assessment & Plan  Body mass index is 42.37 kg/m².  Outpatient weight loss management program and lifestyle modification highly recommended    Essential hypertension, benign- (present on admission)  Assessment & Plan  Optimize blood pressure management to keep systolic blood pressure less than 140 diastolic under 90  Continue Coreg 6.25 mg twice daily  As needed labetalol    Hyperlipidemia- (present on admission)  Assessment & Plan  Low-fat low-cholesterol diet  Continue Lipitor 40 mg nightly and Zetia 10 mg nightly      Depression- (present on admission)  Assessment & Plan  Continue Effexor XR  Hold trazodone given altered mental status    CAD (coronary artery disease)- (present on admission)  Assessment & Plan  Optimize medication management  2020 patient had PCI x 2  Currently chest pain-free  Continue oxygen support      Hypokalemia- (present on admission)  Assessment & Plan  K of 3.3 likely from poor PO intake  REplete    Paroxysmal atrial fibrillation (HCC)- (present on admission)  Assessment & Plan  Continue with rate control currently anticoagulation with Eliquis held status post surgery and with the potential of resurgery necessary  Resume Eliquis today  As above      BPH (benign prostatic hyperplasia)- (present on admission)  Assessment & Plan  Continue finasteride and Flomax    Type 2 diabetes mellitus without complication,  without long-term current use of insulin (HCC)- (present on admission)  Assessment & Plan  -accus with sliding scale coverage  -diabetic diet  -diabetic education  -follow glycohemoglobin levels long term, most recent hemoglobin A1c 6.2  -Hold metformin   -monitor for hypoglycemic episodes and adjust control if he should get low         VTE prophylaxis:  Eliquis    I have performed a physical exam and reviewed and updated ROS and Plan today (7/28/2024). In review of yesterday's note (7/27/2024), there are no changes except as documented above.      I spent 60 minutes providing care for this critically ill patient.  This included face-to-face interview, physical examination.  Review of lab work including CBC, BMP, CXR, OSH records.. Discussion with multidisciplinary team including case management, nursing staff and pharmacy and ICU doc

## 2024-07-28 NOTE — CARE PLAN
The patient is Watcher - Medium risk of patient condition declining or worsening    Shift Goals  Clinical Goals: pt will not fall duirng this shift  Patient Goals: FELIPA  Family Goals: n/a    Progress made toward(s) clinical / shift goals:      Patient is not progressing towards the following goals:      Problem: Knowledge Deficit - Standard  Goal: Patient and family/care givers will demonstrate understanding of plan of care, disease process/condition, diagnostic tests and medications  Outcome: Progressing     Problem: Skin Integrity  Goal: Skin integrity is maintained or improved  Outcome: Progressing     Problem: Fall Risk  Goal: Patient will remain free from falls  Outcome: Progressing     Problem: Pain - Standard  Goal: Alleviation of pain or a reduction in pain to the patient’s comfort goal  Outcome: Progressing     Problem: Safety - Medical Restraint  Goal: Remains free of injury from restraints (Restraint for Interference with Medical Device)  Outcome: Progressing  Goal: Free from restraint(s) (Restraint for Interference with Medical Device)  Outcome: Progressing     Problem: Safety  Goal: Will remain free from injury  Outcome: Progressing  Goal: Will remain free from falls  Outcome: Progressing

## 2024-07-28 NOTE — PROGRESS NOTES
Was noted by bedside RN that patient was having intermittent tachycardia.  Remote telemetry was ordered.  Was then notified that patient was in atrial fibrillation with RVR consistently.  Rate was 120-170.  I did place as needed diltiazem order  Also placed telemetry orders.  I did discuss the case with the [facility] who is going to try to find a room on telemetry.  In the meantime, rapid response nurse did go to administer diltiazem with improvement.  Blood pressure remained stable.

## 2024-07-28 NOTE — ASSESSMENT & PLAN NOTE
SIRS criteria identified on my evaluation include:  Tachycardia, with heart rate greater than 90 BPM and Leukocytosis, with WBC greater than 12,000  Unclear cause of SIRS  R knee does not appear infected  UA at OSH neg, here also not convincing, UCX pending  BCX NGTD  CXR without PNA he did start diffuzely wheezing yest afternoon 7/27  He was initially on IV Unasyn/zyvox, zyvox dc'd given low suspicion for mrsa infection/mrsa pcr neg. I dc'd IV Unasyn 7/26 and his WBC is climbing up.  I have restarted IV unasyn at this time...  C/f Etoh w/d? Although pt is altered he did report to nursing he drinks beer. Wife had denies heavy consistent drinking prior to his surgery. Consider tx Etoh w/d

## 2024-07-28 NOTE — PROGRESS NOTES
4 Eyes Skin Assessment Completed by Rita HARRIS RN and CAYETANO Shepherd.    Head WDL  Ears WDL  Nose WDL  Mouth WDL  Neck WDL  Breast/Chest WDL  Shoulder Blades WDL  Spine WDL  (R) Arm/Elbow/Hand Bruising  (L) Arm/Elbow/Hand Bruising  Abdomen WDL  Groin WDL  Scrotum/Coccyx/Buttocks Redness and Blanching  (R) Leg Redness, Bruising, Swelling, and Edema  (L) Leg WDL  (R) Heel/Foot/Toe WDL  (L) Heel/Foot/Toe WDL          Devices In Places pulse ox,bp cuff, condom cath      Interventions In Place TAP System and Pillows    Possible Skin Injury Yes    Pictures Uploaded Into Epic N/A  Wound Consult Placed Yes  RN Wound Prevention Protocol Ordered Yes     Admit to telemetry unit for monitoring,send 3 sets of cardiac ensymes to rule out coronary event,obtain ECHO to evaluate LVEF,cardiology consult,continue current managmnet,O2 supply,anticoagulation plan as per cardiology consult

## 2024-07-28 NOTE — CARE PLAN
The patient is Stable - Low risk of patient condition declining or worsening    Shift Goals  Clinical Goals: pt will not fall duirng this shift  Patient Goals: FELIPA  Family Goals: n/a    Progress made toward(s) clinical / shift goals:  Pt very confuse and restless since beginning of shift. Reoriented pt each time I'm in the room with the pt. No complaints of pain/nausea. Bed in low position. Bed alarm in place. Call light within reach.      Patient is not progressing towards the following goals:

## 2024-07-28 NOTE — CONSULTS
Critical Care Consultation    Date of consult: 7/28/2024    Referring Physician  Aure Funk D.O.    Reason for Consultation  A fib with RVR    History of Presenting Illness  64 y.o. male who presented 7/25/2024 with confusion to outside hospital.  Patient had a right total knee arthroscopy on 7/23/2024.  At the outside hospital, there was a concern for infected knee joint and patient was transferred to our facility.  Patient was evaluated by surgeon and did not appreciate any infection, CT of the knee was also reassuring that patient did not have any signs of a joint infection.  Patient has slight consolidation on CT chest and has had a rising white count and has been treated with Unasyn.  He also had been receiving IV fluids for an AQUILES, which has since resolved.  He developed tachycardia during his admission and he is now in rapid a fib.  His mental status has continued to worsen, patient is very agitated and paranoid today.  He states that his nurses trying to kill him and thinks that I am someone named Kate.  Patient is also displaying some threatening behaviors.    Code Status  Full Code    Review of Systems   Unable to perform ROS: Mental status change       Past Medical History   has a past medical history of Anesthesia, Breath shortness, CAD (coronary artery disease) (08/2020), Chronic anticoagulation, Dental disorder, Depression, History of heart artery stent, Hyperlipidemia, Hypertension, Low back pain, Myocardial infarct (HCC), Paroxysmal atrial fibrillation (HCC) (10/2023), Subdural hematoma (HCC), and Type 2 diabetes mellitus (HCC).    Surgical History   has a past surgical history that includes spinal cord stimulator (2/26/2019); fusion, spine, lumbar, plif (2/26/2019); laminotomy (2/26/2019); craniotomy (Left, 8/25/2022); and arthroplasty, knee, robot-assisted (Right, 7/23/2024).    Family History  family history includes Cancer in his mother.    Social History   reports that he quit  smoking about 44 years ago. His smoking use included cigarettes. He started smoking about 47 years ago. He has a 0.8 pack-year smoking history. He has never used smokeless tobacco. He reports that he does not drink alcohol and does not use drugs.    Medications  Home Medications       Reviewed by Mei Carr (Pharmacy Tech) on 07/26/24 at 1005  Med List Status: Complete     Medication Last Dose Status   acetaminophen (TYLENOL) 500 MG Tab 7/25/2024 Active   albuterol 108 (90 Base) MCG/ACT Aero Soln inhalation aerosol PRN Active   apixaban (ELIQUIS) 2.5mg Tab 7/24/2024 Active   ASPIRIN 81 PO 7/24/2024 Active   carvedilol (COREG) 25 MG Tab 7/25/2024 Active   divalproex (DEPAKOTE) 125 MG EC tablet 7/25/2024 Active   docusate sodium (COLACE) 100 MG Cap 7/24/2024 Active   ezetimibe (ZETIA) 10 MG Tab 7/24/2024 Active   finasteride (PROSCAR) 5 MG Tab 7/24/2024 Active   HYDROcodone/acetaminophen (NORCO)  MG Tab 7/25/2024 Active   omeprazole (PRILOSEC) 40 MG delayed-release capsule 7/24/2024 Active   rosuvastatin (CRESTOR) 40 MG tablet 7/24/2024 Active   tamsulosin (FLOMAX) 0.4 MG capsule 7/24/2024 Active   telmisartan (MICARDIS) 40 MG Tab 7/24/2024 Active   tizanidine (ZANAFLEX) 4 MG capsule 7/24/2024 Active   traMADol (ULTRAM) 50 MG Tab 7/24/2024 Active   traZODone (DESYREL) 100 MG Tab 7/24/2024 Active   venlafaxine (EFFEXOR-XR) 150 MG extended-release capsule 7/24/2024 Active   venlafaxine XR (EFFEXOR XR) 75 MG CAPSULE SR 24 HR 7/24/2024 Active                  Audit from Redirected Encounters    **Home medications have not yet been reviewed for this encounter**       Current Facility-Administered Medications   Medication Dose Route Frequency Provider Last Rate Last Admin    ampicillin/sulbactam (Unasyn) 3 g in  mL IVPB  3 g Intravenous Q6HRS Germania Arambula M.D.        Metoprolol Tartrate (Lopressor) injection 5 mg  5 mg Intravenous Once Germania Arambula M.D.        amiodarone (Nexterone) IVPB  150 mg  150 mg Intravenous Once Germania Arambula M.D.        dextrose 5% infusion   Intravenous Continuous Germania Arambula M.D. 30 mL/hr at 07/28/24 0933 New Bag at 07/28/24 0933    amiodarone (Nexterone) 360 mg/200 mL infusion  1 mg/min Intravenous Once Germania Arambula M.D.        Followed by    amiodarone (Nexterone) 360 mg/200 mL infusion  0.5 mg/min Intravenous Continuous Germania Arambula M.D.        haloperidol lactate (Haldol) injection 5 mg  5 mg Intravenous Once Aure Funk D.O.        HALOPERIDOL LACTATE 5 MG/ML INJ SOLN             apixaban (Eliquis) tablet 5 mg  5 mg Oral BID Germania Arambula M.D.   5 mg at 07/28/24 0601    albuterol (Proventil) 2.5mg/0.5ml nebulizer solution 2.5 mg  2.5 mg Nebulization Q2HRS PRN (RT) Germania Arambula M.D.   2.5 mg at 07/28/24 0545    hydrALAZINE (Apresoline) injection 20 mg  20 mg Intravenous Q6HRS PRN Germania Arambula M.D.   20 mg at 07/28/24 0427    QUEtiapine (SEROquel) tablet 25 mg  25 mg Oral BID W/MEALS PRN Germania Arambula M.D.   25 mg at 07/26/24 2141    Pharmacy Consult Request ...Pain Management Review 1 Each  1 Each Other PHARMACY TO DOSE Germania Arambula M.D.        omeprazole (PriLOSEC) capsule 40 mg  40 mg Oral Nightly Germania Arambula M.D.   40 mg at 07/27/24 2030    HYDROcodone/acetaminophen (Norco)  MG per tablet 1 Tablet  1 Tablet Oral Q4HRS PRN Germania Arambula M.D.        venlafaxine XR (Effexor XR) capsule 225 mg  225 mg Oral Nightly Germania Arambula M.D.   225 mg at 07/27/24 2031    atorvastatin (Lipitor) tablet 40 mg  40 mg Oral Nightly Bob Campbell M.D.   40 mg at 07/27/24 2031    tamsulosin (Flomax) capsule 0.4 mg  0.4 mg Oral BID Bob Campbell M.D.   0.4 mg at 07/28/24 0601    albuterol inhaler 1 Puff  1 Puff Inhalation Q4H PRN (RT) Bob Campbell M.D.   1 Puff at 07/28/24 0515    valproic acid (Depakene) IR oral solution 125 mg  125 mg Oral TID Bob Campbell M.D.   125 mg at 07/28/24 0601    docusate  "sodium (Colace) capsule 100 mg  100 mg Oral BID Bob Cambpell M.D.   100 mg at 07/25/24 2123    ezetimibe (Zetia) tablet 10 mg  10 mg Oral Nightly Bob Campbell M.D.   10 mg at 07/27/24 2031    finasteride (Proscar) tablet 5 mg  5 mg Oral DAILY Bob Campbell M.D.   5 mg at 07/28/24 0600    Pharmacy Consult Request - to monitor for nephrotoxic agents  1 Each Other PHARMACY TO DOSE Bob Campbell M.D.        Respiratory Therapy Consult   Nebulization Continuous RT Bob Campbell M.D.        ondansetron (Zofran) syringe/vial injection 4 mg  4 mg Intravenous Q4HRS PRN Bob Campbell M.D.        ondansetron (Zofran ODT) dispertab 4 mg  4 mg Oral Q4HRS PRN Bob Campbell M.D.        promethazine (Phenergan) tablet 12.5-25 mg  12.5-25 mg Oral Q4HRS PRN Bob Campbell M.D.        promethazine (Phenergan) suppository 12.5-25 mg  12.5-25 mg Rectal Q4HRS PRN Bob Campbell M.D.        prochlorperazine (Compazine) injection 5-10 mg  5-10 mg Intravenous Q4HRS PRN Bob Campbell M.D.        insulin regular (HumuLIN R,NovoLIN R) injection  1-6 Units Subcutaneous 4X/DAY CATHERINE Campbell M.D.   1 Units at 07/26/24 2014    And    dextrose 50% (D50W) injection 25 g  25 g Intravenous Q15 MIN PRN Bob Campbell M.D.           Allergies  Allergies   Allergen Reactions    Hctz [Hydrochlorothiazide W-Spironolactone]      Cannot breathe    Oxycodone Anaphylaxis and Swelling     Throat swelling    Pectin Anaphylaxis    Hydrochlorothiazide     Zolpidem      \"Sleep walking\"        Vital Signs last 24 hours  Temp:  [36.6 °C (97.8 °F)-37.2 °C (99 °F)] 36.6 °C (97.8 °F)  Pulse:  [106-165] 165  Resp:  [20-25] 21  BP: (106-191)/() 126/95  SpO2:  [91 %-96 %] 94 %    Physical Exam  Vitals and nursing note reviewed.   Constitutional:       General: He is not in acute distress.     Appearance: He is obese. He is not toxic-appearing.   HENT:      Head: Normocephalic.   Eyes:      Conjunctiva/sclera: Conjunctivae normal.   Cardiovascular:     "  Rate and Rhythm: Tachycardia present. Rhythm irregular.   Pulmonary:      Breath sounds: Normal breath sounds.   Abdominal:      General: Bowel sounds are normal. There is no distension.      Tenderness: There is no abdominal tenderness.   Musculoskeletal:         General: No swelling.   Neurological:      Mental Status: He is alert. He is confused.      Comments: moving all 4 extremities, not following commands   Psychiatric:         Behavior: Behavior is uncooperative, agitated and aggressive.         Thought Content: Thought content is paranoid and delusional.         Cognition and Memory: Cognition is impaired. Memory is impaired.         Judgment: Judgment is inappropriate.       Fluids    Intake/Output Summary (Last 24 hours) at 7/28/2024 0934  Last data filed at 7/28/2024 0400  Gross per 24 hour   Intake 240 ml   Output 451 ml   Net -211 ml       Laboratory  Recent Results (from the past 48 hour(s))   POCT glucose device results    Collection Time: 07/26/24 11:23 AM   Result Value Ref Range    POC Glucose, Blood 113 (H) 65 - 99 mg/dL   POCT glucose device results    Collection Time: 07/26/24  5:13 PM   Result Value Ref Range    POC Glucose, Blood 117 (H) 65 - 99 mg/dL   POCT glucose device results    Collection Time: 07/26/24  8:13 PM   Result Value Ref Range    POC Glucose, Blood 167 (H) 65 - 99 mg/dL   URINALYSIS    Collection Time: 07/26/24  8:14 PM    Specimen: Urine, Clean Catch   Result Value Ref Range    Color Yellow     Character Cloudy (A)     Specific Gravity >=1.030 <1.035    Ph 6.0 5.0 - 8.0    Glucose Negative Negative mg/dL    Ketones 15 (A) Negative mg/dL    Protein 100 (A) Negative mg/dL    Bilirubin Small (A) Negative    Nitrite Negative Negative    Leukocyte Esterase Negative Negative    Occult Blood Moderate (A) Negative    Micro Urine Req Microscopic    URINE MICROSCOPIC (W/UA)    Collection Time: 07/26/24  8:14 PM   Result Value Ref Range    WBC 0-2 (A) /hpf    RBC 2-5 (A) /hpf     Bacteria Few (A) None /hpf    Epithelial Cells Rare Few /hpf    Mucous Threads Few /hpf    Urine Crystals Mod Amorphous /hpf    Granular Casts 0-2 /lpf   URINE DRUG SCREEN    Collection Time: 07/26/24  8:28 PM   Result Value Ref Range    Amphetamines Urine Negative Negative    Barbiturates Negative Negative    Benzodiazepines Positive (A) Negative    Cocaine Metabolite Negative Negative    Fentanyl, Urine Positive (A) Negative    Methadone Negative Negative    Opiates Positive (A) Negative    Oxycodone Negative Negative    Phencyclidine -Pcp Negative Negative    Propoxyphene Negative Negative    Cannabinoid Metab Negative Negative   Renal Function Panel    Collection Time: 07/27/24  1:38 AM   Result Value Ref Range    Sodium 137 135 - 145 mmol/L    Potassium 3.4 (L) 3.6 - 5.5 mmol/L    Chloride 103 96 - 112 mmol/L    Co2 17 (L) 20 - 33 mmol/L    Glucose 116 (H) 65 - 99 mg/dL    Creatinine 0.92 0.50 - 1.40 mg/dL    Bun 19 8 - 22 mg/dL    Calcium 9.6 8.4 - 10.2 mg/dL    Correct Calcium 10.4 8.5 - 10.5 mg/dL    Phosphorus 1.1 (L) 2.5 - 4.5 mg/dL    Albumin 3.0 (L) 3.2 - 4.9 g/dL   CBC WITH DIFFERENTIAL    Collection Time: 07/27/24  1:38 AM   Result Value Ref Range    WBC 11.3 (H) 4.8 - 10.8 K/uL    RBC 3.59 (L) 4.70 - 6.10 M/uL    Hemoglobin 11.0 (L) 14.0 - 18.0 g/dL    Hematocrit 32.7 (L) 42.0 - 52.0 %    MCV 91.1 81.4 - 97.8 fL    MCH 30.6 27.0 - 33.0 pg    MCHC 33.6 32.3 - 36.5 g/dL    RDW 42.7 35.9 - 50.0 fL    Platelet Count 242 164 - 446 K/uL    MPV 12.2 9.0 - 12.9 fL    Neutrophils-Polys 74.50 (H) 44.00 - 72.00 %    Lymphocytes 12.10 (L) 22.00 - 41.00 %    Monocytes 12.50 0.00 - 13.40 %    Eosinophils 0.20 0.00 - 6.90 %    Basophils 0.20 0.00 - 1.80 %    Immature Granulocytes 0.50 0.00 - 0.90 %    Nucleated RBC 0.00 0.00 - 0.20 /100 WBC    Neutrophils (Absolute) 8.43 (H) 1.82 - 7.42 K/uL    Lymphs (Absolute) 1.37 1.00 - 4.80 K/uL    Monos (Absolute) 1.41 (H) 0.00 - 0.85 K/uL    Eos (Absolute) 0.02 0.00 - 0.51  K/uL    Baso (Absolute) 0.02 0.00 - 0.12 K/uL    Immature Granulocytes (abs) 0.06 0.00 - 0.11 K/uL    NRBC (Absolute) 0.00 K/uL   MAGNESIUM    Collection Time: 24  1:38 AM   Result Value Ref Range    Magnesium 1.7 1.5 - 2.5 mg/dL   ESTIMATED GFR    Collection Time: 24  1:38 AM   Result Value Ref Range    GFR (CKD-EPI) 93 >60 mL/min/1.73 m 2   POCT glucose device results    Collection Time: 24  4:33 AM   Result Value Ref Range    POC Glucose, Blood 120 (H) 65 - 99 mg/dL   CoV-2, Flu A/B, And RSV by PCR ([employer]    Collection Time: 24 11:36 AM    Specimen: Nasopharyngeal; Respirate   Result Value Ref Range    Influenza virus A RNA Negative Negative    Influenza virus B, PCR Negative Negative    RSV, PCR Negative Negative    SARS-CoV-2 by PCR NotDetected     SARS-CoV-2 Source NP Swab    POCT glucose device results    Collection Time: 24 12:17 PM   Result Value Ref Range    POC Glucose, Blood 111 (H) 65 - 99 mg/dL   POCT glucose device results    Collection Time: 24  5:31 PM   Result Value Ref Range    POC Glucose, Blood 106 (H) 65 - 99 mg/dL   POCT glucose device results    Collection Time: 24  8:37 PM   Result Value Ref Range    POC Glucose, Blood 101 (H) 65 - 99 mg/dL   POCT glucose device results    Collection Time: 24  5:11 AM   Result Value Ref Range    POC Glucose, Blood 125 (H) 65 - 99 mg/dL   EKG    Collection Time: 24  5:31 AM   Result Value Ref Range    Report       [facility]    Test Date:  2024  Pt Name:    TOMMY WILLINGHAM                  Department: [facility] [place] [facility]  MRN:        [id number]                      Room:       2214  Gender:     Male                         Technician: [id number]  :        1960                   Requested By:SHIVAM CHAUHAN  Order #:    [id number]                    Reading MD:    Measurements  Intervals                                Axis  Rate:       154                          P:          0  DC:         0                             QRS:        -31  QRSD:       62                           T:          133  QT:         268  QTc:        429    Interpretive Statements  Atrial fibrillation  Ventricular premature complex  Inferior infarct, old  Repolarization abnormality, prob rate related  Artifact in lead(s) I,II,III,aVR,aVL,aVF,V1,V2,V3,V4,V5,V6  Compared to ECG 07/26/2024 05:05:44  Early repolarization now present  Sinus tachycardia no longer present  Myocardial infarct finding still present     CBC WITH DIFFERENTIAL    Collection Time: 07/28/24  5:53 AM   Result Value Ref Range    WBC 12.3 (H) 4.8 - 10.8 K/uL    RBC 3.66 (L) 4.70 - 6.10 M/uL    Hemoglobin 11.1 (L) 14.0 - 18.0 g/dL    Hematocrit 32.7 (L) 42.0 - 52.0 %    MCV 89.3 81.4 - 97.8 fL    MCH 30.3 27.0 - 33.0 pg    MCHC 33.9 32.3 - 36.5 g/dL    RDW 43.2 35.9 - 50.0 fL    Platelet Count 295 164 - 446 K/uL    MPV 11.5 9.0 - 12.9 fL    Neutrophils-Polys 72.10 (H) 44.00 - 72.00 %    Lymphocytes 14.00 (L) 22.00 - 41.00 %    Monocytes 11.60 0.00 - 13.40 %    Eosinophils 1.40 0.00 - 6.90 %    Basophils 0.30 0.00 - 1.80 %    Immature Granulocytes 0.60 0.00 - 0.90 %    Nucleated RBC 0.00 0.00 - 0.20 /100 WBC    Neutrophils (Absolute) 8.87 (H) 1.82 - 7.42 K/uL    Lymphs (Absolute) 1.72 1.00 - 4.80 K/uL    Monos (Absolute) 1.43 (H) 0.00 - 0.85 K/uL    Eos (Absolute) 0.17 0.00 - 0.51 K/uL    Baso (Absolute) 0.04 0.00 - 0.12 K/uL    Immature Granulocytes (abs) 0.08 0.00 - 0.11 K/uL    NRBC (Absolute) 0.00 K/uL   Comp Metabolic Panel    Collection Time: 07/28/24  5:53 AM   Result Value Ref Range    Sodium 137 135 - 145 mmol/L    Potassium 3.3 (L) 3.6 - 5.5 mmol/L    Chloride 99 96 - 112 mmol/L    Co2 18 (L) 20 - 33 mmol/L    Anion Gap 20.0 (H) 7.0 - 16.0    Glucose 129 (H) 65 - 99 mg/dL    Bun 15 8 - 22 mg/dL    Creatinine 0.91 0.50 - 1.40 mg/dL    Calcium 10.0 8.4 - 10.2 mg/dL    Correct Calcium 10.6 (H) 8.5 - 10.5 mg/dL    AST(SGOT) 36 12 - 45 U/L    ALT(SGPT) 27 2 - 50 U/L    Alkaline  Phosphatase 61 30 - 99 U/L    Total Bilirubin 0.8 0.1 - 1.5 mg/dL    Albumin 3.3 3.2 - 4.9 g/dL    Total Protein 6.4 6.0 - 8.2 g/dL    Globulin 3.1 1.9 - 3.5 g/dL    A-G Ratio 1.1 g/dL   ESTIMATED GFR    Collection Time: 07/28/24  5:53 AM   Result Value Ref Range    GFR (CKD-EPI) 94 >60 mL/min/1.73 m 2   MAGNESIUM    Collection Time: 07/28/24  5:53 AM   Result Value Ref Range    Magnesium 1.6 1.5 - 2.5 mg/dL       Imaging  DX-CHEST-PORTABLE (1 VIEW)   Final Result      Elevated right diaphragm.      CT-CTA CHEST PULMONARY ARTERY W/ RECONS   Final Result      1.  No pulmonary embolism detected.   2.  Elevated right diaphragm. Right basilar pulmonary opacity and volume loss suggesting atelectasis and/or mild infiltrate.            CT-KNEE W/O RIGHT   Final Result      1.  Findings in keeping with interval total knee arthroplasty without specific findings to indicate infection   2.  Osteopenia      US-RENAL   Final Result         1.  Normal renal ultrasound.      OUTSIDE IMAGES-DX CHEST   Final Result      OUTSIDE IMAGES-CT HEAD   Final Result      OUTSIDE IMAGES-DX LOWER EXTREMITY, RIGHT   Final Result      OUTSIDE IMAGES-CT CERVICAL SPINE   Final Result      IR-US GUIDED PIV    (Results Pending)   EC-ECHOCARDIOGRAM COMPLETE W/O CONT    (Results Pending)       Assessment/Plan  * Acute encephalopathy- (present on admission)  Assessment & Plan  Suspect that this is delirium in the setting of recent surgery and cognitive impairement   Patient is very agitated and paranoid  TSH, ammonia WNL   Hx of etoh use, but he has been in patient for 3 days, etoh withdrawal is possible, wife denies significant recent drinking     S/p haldol x 1 today   Restraints as needed for aggressive or threatening behavior   Delirium precautions   Give high dose thiamine due to hx of etoh use   Seroquel as needed for aggressive behavior       Pneumonia of right lower lobe due to infectious organism  Assessment & Plan  Continue Unasyn x 5  days    Seizure disorder as sequela of cerebrovascular accident (HCC)  Assessment & Plan  Continue valproic acid    Mild cognitive impairment  Assessment & Plan  Started after traumatic subdural hematoma   Per wife, he has a hx of being verbally abusive      Fall  Assessment & Plan  CT head and c spine without acute changes at OSH     Polypharmacy  Assessment & Plan  Holding trazadone, tramadol, tizanidine    Anemia  Assessment & Plan  Stable     Status post right knee replacement  Assessment & Plan  Continue with pain control and PT     Acute kidney injury (HCC)- (present on admission)  Assessment & Plan  Resolved     Essential hypertension, benign- (present on admission)  Assessment & Plan  BP too soft to restart coreg  Start Metoprolol for BP and mostly a fib     Hyperlipidemia- (present on admission)  Assessment & Plan  Continue atorvastatin and zetia    Depression- (present on admission)  Assessment & Plan  Continue venlafaxine    CAD (coronary artery disease)- (present on admission)  Assessment & Plan  Continue with atorvastatin    Hypokalemia- (present on admission)  Assessment & Plan  Replete    Paroxysmal atrial fibrillation (HCC)- (present on admission)  Assessment & Plan  Continue with Eliquis  Follow-up echo    Atrial fibrillation with rapid ventricular response (HCC)- (present on admission)  Assessment & Plan  Suspect that he is in RVR due to holding of his Coreg  Start metoprolol 25 mg twice daily  Continue with amiodarone drip to get her under control  Continue to monitor potassium and magnesium and replete as needed  Continue with anticoagulation    BPH (benign prostatic hyperplasia)- (present on admission)  Assessment & Plan  Continue finasteride and Flomax    Type 2 diabetes mellitus without complication, without long-term current use of insulin (HCC)- (present on admission)  Assessment & Plan  Continue with insulin correction scale    Discussed patient condition and risk of morbidity and/or  mortality with Hospitalist, Family, RN, RT, Pharmacy, and Patient.      The patient remains critically ill.  Critical care time = 79 minutes in directly providing and coordinating critical care and extensive data review.  No time overlap and excludes procedures.    __________  This note was generated using voice recognition software which has a chance of producing errors of grammar and content.  I have made every reasonable attempt to find and correct any errors, but it should be expected that some may not be found prior to finalization of this note.    Aure Funk, DO   Pulmonary and Critical Care

## 2024-07-28 NOTE — PROGRESS NOTES
0530 pts breathing started to become more labored. Hr on pulse ox started jumping from 80s to 150s. Pt was also more lethargic. BP had been high - labetalol and hydralazine given. Called Jada from [facility]. She same down - EKG done.showed afib 80s-150s hr. Respiratory came to give treatment for sob. Texted radha at 0539- tele box ordered. Texted radha again after tele monitor abdullahi said it was sustaining 120-170s hr. Awaiting orders.

## 2024-07-28 NOTE — CARE PLAN
Problem: Bronchoconstriction  Goal: Improve in air movement and diminished wheezing  Description: Target End Date:  2 to 3 days    1.  Implement inhaled treatments  2.  Evaluate and manage medication effects  Outcome: Met   Pt has transmitted upper airway sounds, breathing treatment given with no change, please re-assess in 24 hours.

## 2024-07-29 LAB
ANION GAP SERPL CALC-SCNC: 13 MMOL/L (ref 7–16)
BACTERIA UR CULT: NORMAL
BASE EXCESS BLDV CALC-SCNC: 0 MMOL/L
BODY TEMPERATURE: 36.9 CENTIGRADE
BUN SERPL-MCNC: 13 MG/DL (ref 8–22)
CALCIUM SERPL-MCNC: 9.7 MG/DL (ref 8.4–10.2)
CHLORIDE SERPL-SCNC: 97 MMOL/L (ref 96–112)
CO2 SERPL-SCNC: 25 MMOL/L (ref 20–33)
CREAT SERPL-MCNC: 0.97 MG/DL (ref 0.5–1.4)
ERYTHROCYTE [DISTWIDTH] IN BLOOD BY AUTOMATED COUNT: 44.2 FL (ref 35.9–50)
GFR SERPLBLD CREATININE-BSD FMLA CKD-EPI: 87 ML/MIN/1.73 M 2
GLUCOSE BLD STRIP.AUTO-MCNC: 122 MG/DL (ref 65–99)
GLUCOSE BLD STRIP.AUTO-MCNC: 124 MG/DL (ref 65–99)
GLUCOSE BLD STRIP.AUTO-MCNC: 124 MG/DL (ref 65–99)
GLUCOSE BLD STRIP.AUTO-MCNC: 133 MG/DL (ref 65–99)
GLUCOSE BLD STRIP.AUTO-MCNC: 141 MG/DL (ref 65–99)
GLUCOSE SERPL-MCNC: 121 MG/DL (ref 65–99)
HCO3 BLDV-SCNC: 24 MMOL/L (ref 24–28)
HCT VFR BLD AUTO: 32.4 % (ref 42–52)
HGB BLD-MCNC: 10.6 G/DL (ref 14–18)
INHALED O2 FLOW RATE: YES L/MIN
MCH RBC QN AUTO: 30.1 PG (ref 27–33)
MCHC RBC AUTO-ENTMCNC: 32.7 G/DL (ref 32.3–36.5)
MCV RBC AUTO: 92 FL (ref 81.4–97.8)
PCO2 BLDV: 37.9 MMHG (ref 41–51)
PCO2 TEMP ADJ BLDV: 37.7 MMHG (ref 41–51)
PH BLDV: 7.43 [PH] (ref 7.31–7.45)
PH TEMP ADJ BLDV: 7.43 [PH] (ref 7.31–7.45)
PLATELET # BLD AUTO: 309 K/UL (ref 164–446)
PMV BLD AUTO: 11.3 FL (ref 9–12.9)
PO2 BLDV: 26.5 MMHG (ref 25–40)
PO2 TEMP ADJ BLDV: 26.3 MMHG (ref 25–40)
POTASSIUM SERPL-SCNC: 3.7 MMOL/L (ref 3.6–5.5)
PROCALCITONIN SERPL-MCNC: 0.07 NG/ML
RBC # BLD AUTO: 3.52 M/UL (ref 4.7–6.1)
SAO2 % BLDV: 43.1 %
SIGNIFICANT IND 70042: NORMAL
SITE SITE: NORMAL
SODIUM SERPL-SCNC: 135 MMOL/L (ref 135–145)
SOURCE SOURCE: NORMAL
WBC # BLD AUTO: 11 K/UL (ref 4.8–10.8)

## 2024-07-29 PROCEDURE — A9270 NON-COVERED ITEM OR SERVICE: HCPCS | Performed by: INTERNAL MEDICINE

## 2024-07-29 PROCEDURE — 84145 PROCALCITONIN (PCT): CPT

## 2024-07-29 PROCEDURE — 700102 HCHG RX REV CODE 250 W/ 637 OVERRIDE(OP): Performed by: INTERNAL MEDICINE

## 2024-07-29 PROCEDURE — 700101 HCHG RX REV CODE 250: Mod: JZ | Performed by: INTERNAL MEDICINE

## 2024-07-29 PROCEDURE — 700105 HCHG RX REV CODE 258: Performed by: INTERNAL MEDICINE

## 2024-07-29 PROCEDURE — 82962 GLUCOSE BLOOD TEST: CPT

## 2024-07-29 PROCEDURE — 700111 HCHG RX REV CODE 636 W/ 250 OVERRIDE (IP): Performed by: INTERNAL MEDICINE

## 2024-07-29 PROCEDURE — 97530 THERAPEUTIC ACTIVITIES: CPT

## 2024-07-29 PROCEDURE — 94640 AIRWAY INHALATION TREATMENT: CPT

## 2024-07-29 PROCEDURE — 80048 BASIC METABOLIC PNL TOTAL CA: CPT

## 2024-07-29 PROCEDURE — 85027 COMPLETE CBC AUTOMATED: CPT

## 2024-07-29 PROCEDURE — 97140 MANUAL THERAPY 1/> REGIONS: CPT

## 2024-07-29 PROCEDURE — 94760 N-INVAS EAR/PLS OXIMETRY 1: CPT

## 2024-07-29 PROCEDURE — A9270 NON-COVERED ITEM OR SERVICE: HCPCS | Performed by: HOSPITALIST

## 2024-07-29 PROCEDURE — 700111 HCHG RX REV CODE 636 W/ 250 OVERRIDE (IP): Mod: JZ | Performed by: INTERNAL MEDICINE

## 2024-07-29 PROCEDURE — 99233 SBSQ HOSP IP/OBS HIGH 50: CPT | Performed by: INTERNAL MEDICINE

## 2024-07-29 PROCEDURE — 770020 HCHG ROOM/CARE - TELE (206)

## 2024-07-29 PROCEDURE — 82803 BLOOD GASES ANY COMBINATION: CPT

## 2024-07-29 PROCEDURE — 97110 THERAPEUTIC EXERCISES: CPT

## 2024-07-29 PROCEDURE — 700102 HCHG RX REV CODE 250 W/ 637 OVERRIDE(OP): Performed by: HOSPITALIST

## 2024-07-29 RX ORDER — PREDNISONE 20 MG/1
40 TABLET ORAL DAILY
Status: DISCONTINUED | OUTPATIENT
Start: 2024-07-29 | End: 2024-07-30

## 2024-07-29 RX ORDER — METOPROLOL TARTRATE 25 MG/1
25 TABLET, FILM COATED ORAL 4 TIMES DAILY
Status: DISCONTINUED | OUTPATIENT
Start: 2024-07-29 | End: 2024-08-03

## 2024-07-29 RX ADMIN — TAMSULOSIN HYDROCHLORIDE 0.4 MG: 0.4 CAPSULE ORAL at 05:19

## 2024-07-29 RX ADMIN — VALPROIC ACID 125 MG: 250 SOLUTION ORAL at 17:23

## 2024-07-29 RX ADMIN — APIXABAN 5 MG: 5 TABLET, FILM COATED ORAL at 05:19

## 2024-07-29 RX ADMIN — DOCUSATE SODIUM 100 MG: 100 CAPSULE, LIQUID FILLED ORAL at 17:22

## 2024-07-29 RX ADMIN — FINASTERIDE 5 MG: 5 TABLET, FILM COATED ORAL at 05:19

## 2024-07-29 RX ADMIN — DEXTROSE MONOHYDRATE: 50 INJECTION, SOLUTION INTRAVENOUS at 23:59

## 2024-07-29 RX ADMIN — TAMSULOSIN HYDROCHLORIDE 0.4 MG: 0.4 CAPSULE ORAL at 17:22

## 2024-07-29 RX ADMIN — VALPROIC ACID 125 MG: 250 SOLUTION ORAL at 12:02

## 2024-07-29 RX ADMIN — OMEPRAZOLE 40 MG: 20 CAPSULE, DELAYED RELEASE ORAL at 20:13

## 2024-07-29 RX ADMIN — PREDNISONE 40 MG: 20 TABLET ORAL at 10:03

## 2024-07-29 RX ADMIN — AMPICILLIN AND SULBACTAM 3 G: 1; 2 INJECTION, POWDER, FOR SOLUTION INTRAMUSCULAR; INTRAVENOUS at 05:20

## 2024-07-29 RX ADMIN — METOPROLOL TARTRATE 25 MG: 25 TABLET, FILM COATED ORAL at 17:22

## 2024-07-29 RX ADMIN — VALPROIC ACID 125 MG: 250 SOLUTION ORAL at 05:19

## 2024-07-29 RX ADMIN — AMPICILLIN AND SULBACTAM 3 G: 1; 2 INJECTION, POWDER, FOR SOLUTION INTRAMUSCULAR; INTRAVENOUS at 17:34

## 2024-07-29 RX ADMIN — AMPICILLIN AND SULBACTAM 3 G: 1; 2 INJECTION, POWDER, FOR SOLUTION INTRAMUSCULAR; INTRAVENOUS at 23:52

## 2024-07-29 RX ADMIN — AMIODARONE HYDROCHLORIDE 1 MG/MIN: 1.8 INJECTION, SOLUTION INTRAVENOUS at 14:23

## 2024-07-29 RX ADMIN — DEXTROSE MONOHYDRATE: 50 INJECTION, SOLUTION INTRAVENOUS at 00:30

## 2024-07-29 RX ADMIN — METOPROLOL TARTRATE 25 MG: 25 TABLET, FILM COATED ORAL at 05:19

## 2024-07-29 RX ADMIN — AMIODARONE HYDROCHLORIDE 0.5 MG/MIN: 1.8 INJECTION, SOLUTION INTRAVENOUS at 03:18

## 2024-07-29 RX ADMIN — LEVALBUTEROL HYDROCHLORIDE 0.63 MG: 0.63 SOLUTION RESPIRATORY (INHALATION) at 04:25

## 2024-07-29 RX ADMIN — ATORVASTATIN CALCIUM 40 MG: 40 TABLET, FILM COATED ORAL at 20:13

## 2024-07-29 RX ADMIN — AMPICILLIN AND SULBACTAM 3 G: 1; 2 INJECTION, POWDER, FOR SOLUTION INTRAMUSCULAR; INTRAVENOUS at 00:29

## 2024-07-29 RX ADMIN — AMPICILLIN AND SULBACTAM 3 G: 1; 2 INJECTION, POWDER, FOR SOLUTION INTRAMUSCULAR; INTRAVENOUS at 12:02

## 2024-07-29 RX ADMIN — AMIODARONE HYDROCHLORIDE 1 MG/MIN: 1.8 INJECTION, SOLUTION INTRAVENOUS at 20:53

## 2024-07-29 RX ADMIN — VENLAFAXINE HYDROCHLORIDE 225 MG: 75 CAPSULE, EXTENDED RELEASE ORAL at 20:13

## 2024-07-29 RX ADMIN — EZETIMIBE 10 MG: 10 TABLET ORAL at 20:13

## 2024-07-29 RX ADMIN — METOPROLOL TARTRATE 25 MG: 25 TABLET, FILM COATED ORAL at 20:17

## 2024-07-29 RX ADMIN — DOCUSATE SODIUM 100 MG: 100 CAPSULE, LIQUID FILLED ORAL at 05:19

## 2024-07-29 ASSESSMENT — COGNITIVE AND FUNCTIONAL STATUS - GENERAL
MOVING TO AND FROM BED TO CHAIR: A LOT
PERSONAL GROOMING: A LITTLE
TURNING FROM BACK TO SIDE WHILE IN FLAT BAD: A LITTLE
MOBILITY SCORE: 12
WALKING IN HOSPITAL ROOM: A LOT
DRESSING REGULAR UPPER BODY CLOTHING: A LITTLE
SUGGESTED CMS G CODE MODIFIER MOBILITY: CL
MOVING TO AND FROM BED TO CHAIR: A LOT
STANDING UP FROM CHAIR USING ARMS: A LITTLE
CLIMB 3 TO 5 STEPS WITH RAILING: TOTAL
SUGGESTED CMS G CODE MODIFIER DAILY ACTIVITY: CK
TOILETING: A LITTLE
DAILY ACTIVITIY SCORE: 17
WALKING IN HOSPITAL ROOM: A LOT
TURNING FROM BACK TO SIDE WHILE IN FLAT BAD: A LITTLE
MOBILITY SCORE: 14
EATING MEALS: A LITTLE
HELP NEEDED FOR BATHING: A LITTLE
SUGGESTED CMS G CODE MODIFIER MOBILITY: CL
MOVING FROM LYING ON BACK TO SITTING ON SIDE OF FLAT BED: A LITTLE
MOVING FROM LYING ON BACK TO SITTING ON SIDE OF FLAT BED: A LOT
CLIMB 3 TO 5 STEPS WITH RAILING: TOTAL
STANDING UP FROM CHAIR USING ARMS: A LOT
DRESSING REGULAR LOWER BODY CLOTHING: A LOT

## 2024-07-29 ASSESSMENT — GAIT ASSESSMENTS
DEVIATION: SHUFFLED GAIT;DECREASED HEEL STRIKE;DECREASED TOE OFF
GAIT LEVEL OF ASSIST: MINIMAL ASSIST
ASSISTIVE DEVICE: FRONT WHEEL WALKER
DISTANCE (FEET): 4

## 2024-07-29 ASSESSMENT — PAIN DESCRIPTION - PAIN TYPE
TYPE: ACUTE PAIN
TYPE: ACUTE PAIN;CHRONIC PAIN

## 2024-07-29 ASSESSMENT — FIBROSIS 4 INDEX: FIB4 SCORE: 1.5

## 2024-07-29 NOTE — THERAPY
Physical Therapy   Daily Treatment     Patient Name: Karan Wiley  Age:  64 y.o., Sex:  male  Medical Record #: [id number]  Today's Date: 7/29/2024     Precautions  Precautions: (P) Weight Bearing As Tolerated Right Lower Extremity    Assessment    Pt is progressing well with therapy and able to demonstrate with slightly improved upright mobility and activity tolerance. Pt was found up in supine upon txt session and noted knee was in flexed position. Pt was provided with manual therapy and therapeutic exercises to improve knee extension ROM and at end of session pt was able to reach 0 deg knee extension. Pt was provided with manual therapy to improve knee flexion/extension and was able to return demo in session improvements. Pt was provided with supine/seated therapeutic exercises and required frequent VC, TC, demo, and faciliation for appropriate mechanics. Pt was primarily limited by confusion and required frequent repetition and reinforcement. Pt required frequent VC, sequencing, and TC for appropriate sit<>stands mechanics along with transfers to chair. Recommend post-acute placement for continued physical therapy services prior to discharge home.        Plan    Treatment Plan Status: (P) Continue Current Treatment Plan  Type of Treatment: Bed Mobility, Gait Training, Manual Therapy, Neuro Re-Education / Balance, Therapeutic Activities, Therapeutic Exercise  Treatment Frequency: 5 Times per Week  Treatment Duration:      DC Equipment Recommendations: (P) Unable to determine at this time  Discharge Recommendations: (P) Recommend post-acute placement for additional physical therapy services prior to discharge home    Objective       07/29/24 1115   Precautions   Precautions Weight Bearing As Tolerated Right Lower Extremity   Vitals   O2 (LPM) 2   O2 Delivery Device Silicone Nasal Cannula   Pain 0 - 10 Group   Location Knee   Description Aching   Therapist Pain Assessment Prior to Activity;During Activity;Post  Activity;Nurse Notified  (c/o moderate pain, not rated)   Cognition    Cognition / Consciousness X   Level of Consciousness Alert   Ability To Follow Commands 1 Step   Safety Awareness Impaired;Impulsive   Attention Impaired   Comments pt continues to be confused and able to follow only simple commands   Passive ROM Lower Body   Passive ROM Lower Body X   Rt Knee Flexion Degrees 95   Rt Knee Extension Degrees -5   Comments provided with static extension stretch and posterior mobilization to tibia and able to improve to 0 deg extension in supine   Active ROM Lower Body    Active ROM Lower Body  X   Comments limited due to pain, unable to assess fully due to confusion and inability to follow instructions   Supine Lower Body Exercise   Supine Lower Body Exercises Yes   Straight Leg Raises 1 set of 10;Right   Heel Slide 1 set of 10;Right   Ankle Pumps 1 set of 10;Reciprocal   Comments pt required frequent active assist, VC, TC, and demonstration to return demo correct mechanics, primarily limited by confusion   Sitting Lower Body Exercises   Sitting Lower Body Exercises Yes   Long Arc Quad 1 set of 10;Right   Comments pt required frequent active assist, VC, TC, and demonstration to return demo correct mechanics, primarily limited by confusion   Other Treatments   Other Treatments Provided pt provided with PA mobilization to tibia to improve knee extension along with static stretch. Pt provided with flexion PROM in sitting to improve flexion ROM   Neurological Concerns   Neurological Concerns No   Balance   Sitting Balance (Static) Fair +   Sitting Balance (Dynamic) Fair   Standing Balance (Static) Fair   Standing Balance (Dynamic) Fair -   Weight Shift Sitting Fair   Weight Shift Standing Poor   Skilled Intervention Verbal Cuing;Postural Facilitation;Facilitation   Comments w/fww use   Bed Mobility    Supine to Sit Minimal Assist   Scooting Minimal Assist   Rolling Minimum Assist to Lt.   Skilled Intervention Verbal  Cuing;Tactile Cuing;Sequencing   Comments hob elevated and rails up   Gait Analysis   Gait Level Of Assist Minimal Assist   Assistive Device Front Wheel Walker   Distance (Feet) 4   # of Times Distance was Traveled 1   Deviation Shuffled Gait;Decreased Heel Strike;Decreased Toe Off   Weight Bearing Status wbat R   Skilled Intervention Verbal Cuing   Functional Mobility   Sit to Stand Minimal Assist   Bed, Chair, Wheelchair Transfer Moderate Assist   Transfer Method Stand Step   Mobility EOB, sit<>stand, few steps to chair for transfer   Skilled Intervention Verbal Cuing;Postural Facilitation;Tactile Cuing;Sequencing   Comments pt required frequent  VC, sequencing, and TC for appropriate sitting mechancis, pt tends to sit down very early and requires manual facililation to bring trunk forward to iniitate sitting mechanics   6 Clicks Assessment - How much HELP from from another person do you currently need... (If the patient hasn't done an activity recently, how much help from another person do you think he/she would need if he/she tried?)   Turning from your back to your side while in a flat bed without using bedrails? 3   Moving from lying on your back to sitting on the side of a flat bed without using bedrails? 3   Moving to and from a bed to a chair (including a wheelchair)? 2   Standing up from a chair using your arms (e.g., wheelchair, or bedside chair)? 3   Walking in hospital room? 2   Climbing 3-5 steps with a railing? 1   6 clicks Mobility Score 14   Activity Tolerance   Sitting in Chair functional   Sitting Edge of Bed 15 mins   Standing 5 mins x 2   Comments limited by confusion   Patient / Family Goals    Patient / Family Goal #1 nt stated   Short Term Goals    Short Term Goal # 1 S with bed mob in 6 V   Goal Outcome # 1 Progressing slower than expected   Short Term Goal # 2 S with transfers in 6 V   Goal Outcome # 2 Progressing slower than expected   Short Term Goal # 3 S with amb x 100 feet in 6 V    Goal Outcome # 3 Goal not met   Education Group   Education Provided Role of Physical Therapist   Role of Physical Therapist Patient Response Patient;Acceptance;Explanation;Demonstration;Verbal Demonstration;Action Demonstration;Reinforcement Needed;No Learning Evidence   Physical Therapy Treatment Plan   Physical Therapy Treatment Plan Continue Current Treatment Plan   Anticipated Discharge Equipment and Recommendations   DC Equipment Recommendations Unable to determine at this time   Discharge Recommendations Recommend post-acute placement for additional physical therapy services prior to discharge home   Interdisciplinary Plan of Care Collaboration   IDT Collaboration with  Nursing   Patient Position at End of Therapy Seated;Chair Alarm On;Other (Comments)  (left with RN and CNA for transport to first floor)   Collaboration Comments aware of visit and recs   Session Information   Date / Session Number  7/29-2 (2/5, 8/2)

## 2024-07-29 NOTE — PROGRESS NOTES
4 Eyes Skin Assessment Completed by Gisella RN and CAYETANO So.    Head WDL  Ears WDL  Nose WDL  Mouth WDL  Neck WDL  Breast/Chest WDL  Shoulder Blades WDL  Spine WDL old sx scar  (R) Arm/Elbow/Hand WDL  (L) Arm/Elbow/Hand WDL  Abdomen WDL  Groin WDL  Scrotum/Coccyx/Buttocks Scar old sc scar  (R) Leg Bruising and Incision sx site from knee replacement   (L) Leg WDL  (R) Heel/Foot/Toe WDL  (L) Heel/Foot/Toe WDL          Devices In Places Tele Box, Blood Pressure Cuff, and Pulse Ox      Interventions In Place Gray Ear Foams    Possible Skin Injury No    Pictures Uploaded Into Epic N/A  Wound Consult Placed N/A  RN Wound Prevention Protocol Ordered No

## 2024-07-29 NOTE — PROGRESS NOTES
Pt arrived to unit via chair, vital signs taken. Pt only oriented to self. Reoriented pt and instructed on call light use. Strip alarm in use.

## 2024-07-29 NOTE — CARE PLAN
The patient is Watcher - Medium risk of patient condition declining or worsening    Shift Goals  Clinical Goals: pt will not fall duirng this shift  Patient Goals: FELIPA  Family Goals: n/a    Problem: Fall Risk  Goal: Patient will remain free from falls  Outcome: Progressing     Problem: Safety  Goal: Will remain free from injury  Outcome: Progressing  Goal: Will remain free from falls  Outcome: Progressing

## 2024-07-29 NOTE — CARE PLAN
Problem: Knowledge Deficit - Standard  Goal: Patient and family/care givers will demonstrate understanding of plan of care, disease process/condition, diagnostic tests and medications  Outcome: Not Progressing     Problem: Skin Integrity  Goal: Skin integrity is maintained or improved  Outcome: Not Progressing     The patient is Watcher - Medium risk of patient condition declining or worsening    Shift Goals  Clinical Goals: pt will not fall duirng this shift  Patient Goals: FELIPA  Family Goals: n/a    Progress made toward(s) clinical / shift goals:      Patient is not progressing towards the following goals:  Patient still A&Ox1. Restless & fidgeting. Wife Alesia updated on the phone twice. Patient Karan up to chair with PT x 1 assist & FWW. Bruising noting on R knee/leg. Danaf aware, eliquis placed on hold. Wheezing noted & notable work of breathing this AM. RT contacted for xopenex treatment and PO prednisone added.    Problem: Knowledge Deficit - Standard  Goal: Patient and family/care givers will demonstrate understanding of plan of care, disease process/condition, diagnostic tests and medications  Outcome: Not Progressing     Problem: Skin Integrity  Goal: Skin integrity is maintained or improved  Outcome: Not Progressing

## 2024-07-29 NOTE — DISCHARGE PLANNING
Case Management Discharge Planning    Admission Date: 7/25/2024  GMLOS: 4.4  ALOS: 4    6-Clicks ADL Score: 12  6-Clicks Mobility Score: 11  PT and/or OT Eval ordered: Yes  Post-acute Referrals Ordered: Yes  Post-acute Choice Obtained: No  Has referral(s) been sent to post-acute provider:  Yes      Anticipated Discharge Dispo: Discharge Disposition: D/T to SNF with Medicare cert in anticipation of skilled care (03)    Action(s) Taken: DC Assessment Complete (See below) and Referral(s) sent    Escalations Completed: None    Medically Clear: No    Barriers to Discharge: Medical clearance and Pending Placement    1305  Attempted calling patient's spouse, Alesia. Phone goes straight to [facility]. Call to patient's son, Zurdo. Zurdo reports number we have for Alesia, his mom, is correct. He reports he will call his mom and have her call me.     1311  Received phone call from spouse, Alesia. Alesia reports it's been an ongoing problem with her phone provider. For whatever reason she does not get phone calls from [facility] telephone lines. Alesia reports she is more than agreeable for patient to be placed, per PT/OT notes. Verified patient's insurance: [employer]. Patient lives with spouse and son in [place]. Alesia was deeply apologetic about not being able to be reached by [facility] staff. States best plan is for call to be placed to son and for son to then call her and request she call us. PMR consult also placed, patient has family support.     Care Transition Team Assessment    Information Source  Orientation Level: Disoriented to time, Disoriented to situation, Disoriented to place, Oriented to person  Who is responsible for making decisions for patient? : Spouse  Name(s) of Primary Decision Maker: Alesia, while patient remains confused    Readmission Evaluation  Is this a readmission?: No    Elopement Risk  Legal Hold: No  Ambulatory or Self Mobile in Wheelchair: No-Not an Elopement Risk  Disoriented: Person-At  Risk for Elopement, Place-At Risk for Elopement, Time-At Risk for Elopement, Situation-At Risk for Elopement  Psychiatric Symptoms: Impulsivity-at Risk for Elopement  Elopement this Admit: No  Vocalizing Wanting to Leave: No  Displays Behaviors, Body Language Wanting to Leave: No-Not at Risk for Elopement  Elopement Risk: Not at Risk for Elopement    Interdisciplinary Discharge Planning  Lives with - Patient's Self Care Capacity: Spouse  Patient or legal guardian wants to designate a caregiver: Yes  Caregiver name: mariely  Caregiver contact info: [phone]  Support Systems: Family Member(s), Friends / Neighbors  Housing / Facility: [address] [facility]  Prior Services: Intermittent Physical Support for ADL Per Family, Skilled Home Health Services  Durable Medical Equipment: Not Applicable    Discharge Preparedness  What is your plan after discharge?: Skilled nursing facility  What are your discharge supports?: Child, Spouse  Prior Functional Level: Independent with Medication Management, Independent with Activities of Daily Living, Uses Walker    Functional Assesment  Prior Functional Level: Independent with Medication Management, Independent with Activities of Daily Living, Uses Walker    Finances  Financial Barriers to Discharge: No  Prescription Coverage: Yes    Vision / Hearing Impairment  Right Eye Vision: Impaired, Wears Glasses  Left Eye Vision: Impaired, Wears Glasses  Hearing Impairment: Both Ears, Hearing Device(s) Available    Advance Directive  Advance Directive?: None    Domestic Abuse  Possible Abuse/Neglect Reported to:: Not Applicable    Psychological Assessment  History of Substance Abuse: None  History of Psychiatric Problems: No    Discharge Risks or Barriers  Discharge risks or barriers?: No    Anticipated Discharge Information  Discharge Disposition: D/T to SNF with Medicare cert in anticipation of skilled care (03)

## 2024-07-29 NOTE — DOCUMENTATION QUERY
"                                                                         [facility]                                                                       Query Response Note      PATIENT:               TOMMY WILLINGHAM  ACCT #:                  [id number]  MRN:                     [id number]  :                      1960  ADMIT DATE:       2024 5:58 PM  DISCH DATE:          RESPONDING  PROVIDER #:        [id number]           QUERY TEXT:    Encephalopathy is documented in the Medical Record. Please specify type.    Note: If you agree with a diagnosis listed above, please remember to include it in your concurrent daily documentation and onto the Discharge Summary.    The patient's Clinical Indicators include:  (H&P) \"Acute encephalopathy - The patient's acute encephalopathy seems to be acute kidney injury infection which was recently replaced as postoperative just a few days.\"    (Ortho PN ) \"POD #3 from an uncomplicated TKA.  Readmitted from outside hospital with AQUILES and AMS. -Postop delirium\"    Labs: Creatinine : 2.66, : 2.11             C-Reactive Protein : 10.72             Co2 : 21, : 19    Risk Factors: Acute kidney injury, Status post right knee replacement, Paroxysmal atrial fibrillation, Type 2 diabetes mellitus, history of seizures      Tx: Fluid resuscitation, Unasyn and Zyvox, Monitor renal functions, Monitor for infection    If you have any questions, please contact:  Josue Pelayo RN CDI [facility]  [email or web address]  Josue Pelayo [address]  Options provided:   -- Metabolic encephalopathy   -- Toxic encephalopathy, Please specify toxin if known   -- Other type of encephalopathy   -- Postoperative delirium only, encephalopathy ruled out   -- Other explanation, (please specify other explanation)      Query created by: Josue Pelayo on 2024 6:13 AM    RESPONSE TEXT:    Metabolic encephalopathy          Electronically signed by:  MANDEEP FINE " 7/29/2024 11:34 AM

## 2024-07-29 NOTE — PROGRESS NOTES
HR sustaining 130-150's at 12:39PM. Notified Dr Will. Increased amio drip to 1 & increased lopressor dosing. Patient restless & fidgety. When patient resting HR low 100's.

## 2024-07-29 NOTE — DISCHARGE PLANNING
[facility] Transitional Care Coordination      PM&R referral from Dr. Arambula. Potential Rehab diagnosis for IRF NTBI acute encephalopathy. Chart reviewed for medical management as well as therapy need.  insurance provider. Potential spouse support for return to community. Call out to spouse Alesia to discuss post acute options. No voicemail has been set up. Physiatry to consult per protocol. Thank you for the consult.

## 2024-07-29 NOTE — PROGRESS NOTES
Telemetry Strip     Strip printed: 1211  Measurements from am strip were as follows:  Rhythm: Afib  HR: 107-146  Measurements: -/ 0.08/ -  Ectopy: f PVC, r big, r coup              Normal Values  Rhythm SR  HR Range    Measurements 0.12-0.20 / 0.06-0.10  / 0.30-0.52

## 2024-07-30 PROBLEM — F03.918: Status: ACTIVE | Noted: 2024-07-25

## 2024-07-30 PROBLEM — F05: Status: ACTIVE | Noted: 2024-07-25

## 2024-07-30 PROBLEM — G31.84 MILD COGNITIVE IMPAIRMENT: Status: RESOLVED | Noted: 2024-07-28 | Resolved: 2024-07-30

## 2024-07-30 LAB
ANION GAP SERPL CALC-SCNC: 14 MMOL/L (ref 7–16)
BACTERIA BLD CULT: NORMAL
BASOPHILS # BLD AUTO: 0.2 % (ref 0–1.8)
BASOPHILS # BLD: 0.02 K/UL (ref 0–0.12)
BUN SERPL-MCNC: 13 MG/DL (ref 8–22)
CALCIUM SERPL-MCNC: 9.8 MG/DL (ref 8.4–10.2)
CHLORIDE SERPL-SCNC: 97 MMOL/L (ref 96–112)
CO2 SERPL-SCNC: 25 MMOL/L (ref 20–33)
CREAT SERPL-MCNC: 0.91 MG/DL (ref 0.5–1.4)
EOSINOPHIL # BLD AUTO: 0.04 K/UL (ref 0–0.51)
EOSINOPHIL NFR BLD: 0.4 % (ref 0–6.9)
ERYTHROCYTE [DISTWIDTH] IN BLOOD BY AUTOMATED COUNT: 44.8 FL (ref 35.9–50)
GFR SERPLBLD CREATININE-BSD FMLA CKD-EPI: 94 ML/MIN/1.73 M 2
GLUCOSE BLD STRIP.AUTO-MCNC: 113 MG/DL (ref 65–99)
GLUCOSE BLD STRIP.AUTO-MCNC: 114 MG/DL (ref 65–99)
GLUCOSE BLD STRIP.AUTO-MCNC: 124 MG/DL (ref 65–99)
GLUCOSE BLD STRIP.AUTO-MCNC: 138 MG/DL (ref 65–99)
GLUCOSE BLD STRIP.AUTO-MCNC: 145 MG/DL (ref 65–99)
GLUCOSE SERPL-MCNC: 110 MG/DL (ref 65–99)
HCT VFR BLD AUTO: 32.1 % (ref 42–52)
HGB BLD-MCNC: 10.5 G/DL (ref 14–18)
IMM GRANULOCYTES # BLD AUTO: 0.08 K/UL (ref 0–0.11)
IMM GRANULOCYTES NFR BLD AUTO: 0.7 % (ref 0–0.9)
LYMPHOCYTES # BLD AUTO: 1.56 K/UL (ref 1–4.8)
LYMPHOCYTES NFR BLD: 14.4 % (ref 22–41)
MCH RBC QN AUTO: 30.2 PG (ref 27–33)
MCHC RBC AUTO-ENTMCNC: 32.7 G/DL (ref 32.3–36.5)
MCV RBC AUTO: 92.2 FL (ref 81.4–97.8)
MONOCYTES # BLD AUTO: 1.37 K/UL (ref 0–0.85)
MONOCYTES NFR BLD AUTO: 12.7 % (ref 0–13.4)
NEUTROPHILS # BLD AUTO: 7.74 K/UL (ref 1.82–7.42)
NEUTROPHILS NFR BLD: 71.6 % (ref 44–72)
NRBC # BLD AUTO: 0.03 K/UL
NRBC BLD-RTO: 0.3 /100 WBC (ref 0–0.2)
PLATELET # BLD AUTO: 321 K/UL (ref 164–446)
PMV BLD AUTO: 11.3 FL (ref 9–12.9)
POTASSIUM SERPL-SCNC: 3.8 MMOL/L (ref 3.6–5.5)
PROCALCITONIN SERPL-MCNC: 0.06 NG/ML
RBC # BLD AUTO: 3.48 M/UL (ref 4.7–6.1)
SIGNIFICANT IND 70042: NORMAL
SITE SITE: NORMAL
SODIUM SERPL-SCNC: 136 MMOL/L (ref 135–145)
SOURCE SOURCE: NORMAL
WBC # BLD AUTO: 10.8 K/UL (ref 4.8–10.8)

## 2024-07-30 PROCEDURE — 700102 HCHG RX REV CODE 250 W/ 637 OVERRIDE(OP): Performed by: INTERNAL MEDICINE

## 2024-07-30 PROCEDURE — 80048 BASIC METABOLIC PNL TOTAL CA: CPT

## 2024-07-30 PROCEDURE — 84145 PROCALCITONIN (PCT): CPT

## 2024-07-30 PROCEDURE — 700102 HCHG RX REV CODE 250 W/ 637 OVERRIDE(OP): Performed by: HOSPITALIST

## 2024-07-30 PROCEDURE — 700111 HCHG RX REV CODE 636 W/ 250 OVERRIDE (IP): Mod: JZ | Performed by: INTERNAL MEDICINE

## 2024-07-30 PROCEDURE — 82962 GLUCOSE BLOOD TEST: CPT

## 2024-07-30 PROCEDURE — A9270 NON-COVERED ITEM OR SERVICE: HCPCS | Performed by: HOSPITALIST

## 2024-07-30 PROCEDURE — 700105 HCHG RX REV CODE 258: Performed by: INTERNAL MEDICINE

## 2024-07-30 PROCEDURE — 770020 HCHG ROOM/CARE - TELE (206)

## 2024-07-30 PROCEDURE — 99233 SBSQ HOSP IP/OBS HIGH 50: CPT | Performed by: INTERNAL MEDICINE

## 2024-07-30 PROCEDURE — 85025 COMPLETE CBC W/AUTO DIFF WBC: CPT

## 2024-07-30 PROCEDURE — 94760 N-INVAS EAR/PLS OXIMETRY 1: CPT

## 2024-07-30 PROCEDURE — 99024 POSTOP FOLLOW-UP VISIT: CPT | Performed by: ORTHOPAEDIC SURGERY

## 2024-07-30 PROCEDURE — A9270 NON-COVERED ITEM OR SERVICE: HCPCS | Performed by: INTERNAL MEDICINE

## 2024-07-30 PROCEDURE — 700111 HCHG RX REV CODE 636 W/ 250 OVERRIDE (IP): Performed by: INTERNAL MEDICINE

## 2024-07-30 RX ORDER — LORAZEPAM 1 MG/1
1 TABLET ORAL EVERY 4 HOURS PRN
Status: DISCONTINUED | OUTPATIENT
Start: 2024-07-30 | End: 2024-08-01

## 2024-07-30 RX ORDER — OLANZAPINE 5 MG/1
5 TABLET, ORALLY DISINTEGRATING ORAL 2 TIMES DAILY
Status: DISCONTINUED | OUTPATIENT
Start: 2024-07-30 | End: 2024-07-31

## 2024-07-30 RX ORDER — AMIODARONE HYDROCHLORIDE 200 MG/1
200 TABLET ORAL TWICE DAILY
Status: DISCONTINUED | OUTPATIENT
Start: 2024-07-30 | End: 2024-08-07 | Stop reason: HOSPADM

## 2024-07-30 RX ORDER — OLANZAPINE 5 MG/1
10 TABLET, ORALLY DISINTEGRATING ORAL ONCE
Status: COMPLETED | OUTPATIENT
Start: 2024-07-30 | End: 2024-07-30

## 2024-07-30 RX ADMIN — AMPICILLIN AND SULBACTAM 3 G: 1; 2 INJECTION, POWDER, FOR SOLUTION INTRAMUSCULAR; INTRAVENOUS at 18:10

## 2024-07-30 RX ADMIN — EZETIMIBE 10 MG: 10 TABLET ORAL at 20:02

## 2024-07-30 RX ADMIN — AMPICILLIN AND SULBACTAM 3 G: 1; 2 INJECTION, POWDER, FOR SOLUTION INTRAMUSCULAR; INTRAVENOUS at 23:16

## 2024-07-30 RX ADMIN — VALPROIC ACID 125 MG: 250 SOLUTION ORAL at 18:12

## 2024-07-30 RX ADMIN — ATORVASTATIN CALCIUM 40 MG: 40 TABLET, FILM COATED ORAL at 20:02

## 2024-07-30 RX ADMIN — APIXABAN 5 MG: 5 TABLET, FILM COATED ORAL at 18:05

## 2024-07-30 RX ADMIN — AMPICILLIN AND SULBACTAM 3 G: 1; 2 INJECTION, POWDER, FOR SOLUTION INTRAMUSCULAR; INTRAVENOUS at 11:34

## 2024-07-30 RX ADMIN — OLANZAPINE 10 MG: 5 TABLET, ORALLY DISINTEGRATING ORAL at 09:25

## 2024-07-30 RX ADMIN — VENLAFAXINE HYDROCHLORIDE 225 MG: 75 CAPSULE, EXTENDED RELEASE ORAL at 20:02

## 2024-07-30 RX ADMIN — AMIODARONE HYDROCHLORIDE 1 MG/MIN: 1.8 INJECTION, SOLUTION INTRAVENOUS at 02:35

## 2024-07-30 RX ADMIN — FINASTERIDE 5 MG: 5 TABLET, FILM COATED ORAL at 04:38

## 2024-07-30 RX ADMIN — METOPROLOL TARTRATE 25 MG: 25 TABLET, FILM COATED ORAL at 18:04

## 2024-07-30 RX ADMIN — METOPROLOL TARTRATE 25 MG: 25 TABLET, FILM COATED ORAL at 20:02

## 2024-07-30 RX ADMIN — METOPROLOL TARTRATE 25 MG: 25 TABLET, FILM COATED ORAL at 13:50

## 2024-07-30 RX ADMIN — OLANZAPINE 5 MG: 5 TABLET, ORALLY DISINTEGRATING ORAL at 18:03

## 2024-07-30 RX ADMIN — TAMSULOSIN HYDROCHLORIDE 0.4 MG: 0.4 CAPSULE ORAL at 18:04

## 2024-07-30 RX ADMIN — AMPICILLIN AND SULBACTAM 3 G: 1; 2 INJECTION, POWDER, FOR SOLUTION INTRAMUSCULAR; INTRAVENOUS at 04:37

## 2024-07-30 RX ADMIN — PREDNISONE 40 MG: 20 TABLET ORAL at 04:38

## 2024-07-30 RX ADMIN — OMEPRAZOLE 40 MG: 20 CAPSULE, DELAYED RELEASE ORAL at 20:02

## 2024-07-30 RX ADMIN — VALPROIC ACID 125 MG: 250 SOLUTION ORAL at 11:35

## 2024-07-30 RX ADMIN — AMIODARONE HYDROCHLORIDE 1 MG/MIN: 1.8 INJECTION, SOLUTION INTRAVENOUS at 13:57

## 2024-07-30 RX ADMIN — AMIODARONE HYDROCHLORIDE 200 MG: 200 TABLET ORAL at 18:04

## 2024-07-30 RX ADMIN — METOPROLOL TARTRATE 25 MG: 25 TABLET, FILM COATED ORAL at 08:17

## 2024-07-30 RX ADMIN — DOCUSATE SODIUM 100 MG: 100 CAPSULE, LIQUID FILLED ORAL at 18:04

## 2024-07-30 RX ADMIN — VALPROIC ACID 125 MG: 250 SOLUTION ORAL at 04:38

## 2024-07-30 RX ADMIN — TAMSULOSIN HYDROCHLORIDE 0.4 MG: 0.4 CAPSULE ORAL at 04:37

## 2024-07-30 ASSESSMENT — FIBROSIS 4 INDEX: FIB4 SCORE: 1.38

## 2024-07-30 ASSESSMENT — PAIN DESCRIPTION - PAIN TYPE: TYPE: ACUTE PAIN

## 2024-07-30 NOTE — PROGRESS NOTES
Hospital Medicine Daily Progress Note    Date of Service  7/30/2024    Chief Complaint  Karan Wiley is a 64 y.o. male admitted 7/25/2024 with altered mentation    Hospital Course  64-year-old male with past medical history of mild cognitive impairment and recent TKA on the right 2 to 3 days prior to presentation patient apparently has had worsening mentation since returning home and presented here with elevated creatinine, hypokalemia, and no acute process on CT from outside facility    Interval Problem Update  Called to bedside this morning due to episode of agitation, diaphoresis, tremor, worsening confusion, tachycardia, elevated blood pressure.    When asked the patient if he had any pain he looked confused but never really answer the question, over further discussion patient was able to answer some questions appropriately but made odd and paranoid statements intermittently he to some extent would follow commands but not on a consistent basis, he had no evidence of lateralizing weakness, facial droop, speech disturbance or anything to suggest stroke.    I have discussed this patient's plan of care and discharge plan at IDT rounds today with Case Management, Nursing, Nursing leadership, and other members of the IDT team.    Consultants/Specialty  ortho    Code Status  Full Code    Disposition  The patient is not medically cleared for discharge to home or a post-acute facility.  Anticipate discharge to: skilled nursing facility    I have placed the appropriate orders for post-discharge needs.    Review of Systems  Review of Systems   Unable to perform ROS: Mental status change   Psychiatric/Behavioral:          When patient was seen this morning he was oriented x 2 but inappropriate and a little paranoid        Physical Exam  Temp:  [36.3 °C (97.3 °F)-37.2 °C (98.9 °F)] 36.8 °C (98.2 °F)  Pulse:  [] 78  Resp:  [18-24] 18  BP: (134-175)/() 134/95  SpO2:  [90 %-96 %] 93 %    Physical Exam  Vitals  and nursing note reviewed.   Constitutional:       General: He is in acute distress.      Appearance: He is obese. He is diaphoretic (Mild). He is not ill-appearing or toxic-appearing.      Comments: Patient seen urgently this morning during episode of tachycardia, hypertension, agitation, diaphoresis and confusion.  Apparently patient became confused and restless last night was placed in wrist restraints but no further action was taken, he was on Seroquel earlier in the stay but it was not given.       HENT:      Head: Normocephalic and atraumatic.      Nose: Nose normal.      Mouth/Throat:      Mouth: Mucous membranes are moist.   Eyes:      Extraocular Movements: Extraocular movements intact.      Pupils: Pupils are equal, round, and reactive to light.   Cardiovascular:      Rate and Rhythm: Regular rhythm. Tachycardia present.   Pulmonary:      Effort: No respiratory distress.      Breath sounds: Stridor present. No wheezing, rhonchi or rales.      Comments: Lower tracheal stridor-only when patient is trying to sit up and exert himself  Abdominal:      General: Bowel sounds are normal. There is distension (Due to obesity).      Tenderness: There is no abdominal tenderness.   Musculoskeletal:      Right lower leg: Edema (Mild to moderate) present.      Left lower leg: Edema (Mild) present.      Comments: Right knee, leg ecchymotic but incision appears to be free of any erythema or obvious drainage around the dressing   Skin:     Findings: Bruising present.   Neurological:      Comments: He is oriented to self and knows he is in the hospital, is able to identify that I am a physician  However intermittently with this he will make odd and sometimes paranoid statements and is inappropriate and tremulous    Tremor   Psychiatric:      Comments: Anxious, agitated, paranoid, confused         Fluids  No intake or output data in the 24 hours ending 07/30/24 1654    Laboratory  Recent Labs     07/28/24  0553 07/29/24  0970  07/30/24  0125   WBC 12.3* 11.0* 10.8   RBC 3.66* 3.52* 3.48*   HEMOGLOBIN 11.1* 10.6* 10.5*   HEMATOCRIT 32.7* 32.4* 32.1*   MCV 89.3 92.0 92.2   MCH 30.3 30.1 30.2   MCHC 33.9 32.7 32.7   RDW 43.2 44.2 44.8   PLATELETCT 295 309 321   MPV 11.5 11.3 11.3     Recent Labs     07/28/24  0553 07/29/24  0914 07/30/24  0125   SODIUM 137 135 136   POTASSIUM 3.3* 3.7 3.8   CHLORIDE 99 97 97   CO2 18* 25 25   GLUCOSE 129* 121* 110*   BUN 15 13 13   CREATININE 0.91 0.97 0.91   CALCIUM 10.0 9.7 9.8                   Imaging  EC-ECHOCARDIOGRAM COMPLETE W/ CONT   Final Result      DX-CHEST-PORTABLE (1 VIEW)   Final Result      Elevated right diaphragm.      CT-CTA CHEST PULMONARY ARTERY W/ RECONS   Final Result      1.  No pulmonary embolism detected.   2.  Elevated right diaphragm. Right basilar pulmonary opacity and volume loss suggesting atelectasis and/or mild infiltrate.            CT-KNEE W/O RIGHT   Final Result      1.  Findings in keeping with interval total knee arthroplasty without specific findings to indicate infection   2.  Osteopenia      US-RENAL   Final Result         1.  Normal renal ultrasound.      OUTSIDE IMAGES-DX CHEST   Final Result      OUTSIDE IMAGES-CT HEAD   Final Result      OUTSIDE IMAGES-DX LOWER EXTREMITY, RIGHT   Final Result      OUTSIDE IMAGES-CT CERVICAL SPINE   Final Result           Assessment/Plan  * Senile dementia, delirium, with behavioral disturbance (HCC)- (present on admission)  Assessment & Plan  Patient has a history of diagnosed dementia and has atrophy noted on outside CT. he however has been worse than baseline likely precipitated by recent surgery, anesthesia, polypharmacy, AQUILES, electrolyte disturbance, decreased oral intake and pain.    He did not respond to Seroquel and has been getting agitated particularly at night.  This morning patient was agitated, anxious, paranoid, diaphoretic, tachycardic, hypertensive.  Improved with Zyprexa    Will schedule Zyprexa twice daily and  continue to monitor.  I renewed wrist restraints for safety but they should be avoided if at all possible as they will worsen his paranoia and agitation/anxiety    Pneumonia of right lower lobe due to infectious organism  Assessment & Plan  CXR w elev R hemidiaphragm w possible infiltrate  Lower suspicion for PNA but IV unasyn initiated x 5 day course  Patient is not wheezing, when he is agitated this sounds like upper airway stridor  Have discontinued the prednisone as it could be contributing to his delirium and agitation.      Seizure disorder as sequela of cerebrovascular accident (HCC)  Assessment & Plan  On depakote  I do not suspect seizure at this time    SIRS (systemic inflammatory response syndrome) ([facility])  Assessment & Plan  SIRS criteria identified on my evaluation include:  Tachycardia, with heart rate greater than 90 BPM and Leukocytosis, with WBC greater than 12,000  Unclear cause of SIRS  R knee does not appear infected  UA at OSH neg, here also not convincing, UCX pending  BCX NGTD  CXR without PNA he did start diffuzely wheezing yest afternoon 7/27  He was initially on IV Unasyn/zyvox, zyvox dc'd given low suspicion for mrsa infection/mrsa pcr neg. I dc'd IV Unasyn 7/26 and his WBC is climbing up.  I have restarted IV unasyn at this time...  C/f Etoh w/d? Although pt is altered he did report to nursing he drinks beer. Wife had denies heavy consistent drinking prior to his surgery. Consider tx Etoh w/d      Fall  Assessment & Plan  Had a fall at home  CTH/CT c spine at [facility] without acute findingds  Some contusion and ecchymoses are residual however CBC stable    Sinus tachycardia  Assessment & Plan  Possibly from agitation  No clear w/d  per wife not drinking excessive etoh  Patient has a spine stimulator in place   CTA neg for PE  7/27 now in afib w rvr    Polypharmacy  Assessment & Plan  As above cf polypharmacy contributing to delirium  Patient now on only Norco and Tylenol for pain  Consider  discontinuation of hydrocodone if possible however we will see how he improves on the Zyprexa    Anemia  Assessment & Plan  Likely post op related  Hb down to 10 so relatively stable  Hemoglobin has been stable will resume Eliquis as he is high risk given his recent knee surgery and his A-fib    Status post right knee replacement  Assessment & Plan  Patient complains of significant R knee pain  Although does have swelling, some warmth and erythema, I spoke with DR. Barragan who reassured that it looks as expected post op  CT knee w/ suspected post op changes  CRP/ESR elev but post op certainly there is inflammatory state  PT/OT - SNF    Acute kidney injury (HCC)- (present on admission)  Assessment & Plan  Cr up to 3.88 at OSH  Resolved with IV fluids      Body mass index 40.0-44.9, adult (HCC)- (present on admission)  Assessment & Plan  Body mass index is 42.37 kg/m².  Outpatient weight loss management program and lifestyle modification highly recommended    Essential hypertension, benign- (present on admission)  Assessment & Plan  Hypertensive while agitated improved when he was more calm    Hyperlipidemia- (present on admission)  Assessment & Plan  Low-fat low-cholesterol diet  Continue Lipitor 40 mg nightly and Zetia 10 mg nightly      Depression- (present on admission)  Assessment & Plan  Continue Effexor XR  Holding trazodone given altered mental status    CAD (coronary artery disease)- (present on admission)  Assessment & Plan  Optimize medication management  2020 patient had PCI x 2  Currently chest pain-free  Continue oxygen support      Hypokalemia- (present on admission)  Assessment & Plan  K of 3.3 likely from poor PO intake  Repleted      Paroxysmal atrial fibrillation (HCC)- (present on admission)  Assessment & Plan  On amiodarone and metoprolol      Atrial fibrillation with rapid ventricular response (HCC)- (present on admission)  Assessment & Plan  Patient has hx of paroxysmal afib  Tachycardic again  this morning likely due to severe agitation, continue medications, patient is to finish out IV amiodarone today, for what ever reason bag was not running properly overnight  Will initiate amiodarone 200 twice daily tonight      BPH (benign prostatic hyperplasia)- (present on admission)  Assessment & Plan  Continue finasteride and Flomax    Type 2 diabetes mellitus without complication, without long-term current use of insulin (HCC)- (present on admission)  Assessment & Plan  -SSI  -follow glycohemoglobin levels long term, most recent hemoglobin A1c 6.2  -Hold metformin   -monitor for hypoglycemic episodes and adjust control if he should get low         VTE prophylaxis:    therapeutic anticoagulation with eliquis 5 mg BID      I have performed a physical exam and reviewed and updated ROS and Plan today (7/30/2024). In review of yesterday's note (7/29/2024), there are no changes except as documented above.

## 2024-07-30 NOTE — PROGRESS NOTES
Telemetry summary    Rhythm: afib  Rate:   Ectopy: r-o-fPVC, rCouplet, rTriplet  Measurements: -/0.08/-    Normal Values  Rhythm: SR  HR Range:   Measurement: 0.12-0.2/0.06-0.10/0.30-0.52

## 2024-07-30 NOTE — PROGRESS NOTES
At 0850: Pump stated infusion complete on Amiodarone infusion, however infusion bag was full. Per MD, ok to restart infusion. IV line free of kinks and verified to be infusing.

## 2024-07-30 NOTE — ASSESSMENT & PLAN NOTE
CXR w elev R hemidiaphragm w possible infiltrate  Lower suspicion for PNA but IV unasyn 5 day course

## 2024-07-30 NOTE — PROGRESS NOTES
At 0650: Received report from Janelle MONTEIRO.  Bed is locked and in lowest position. Fall and Safety precautions in place, patient currently in soft wrist restraints. Awake and talking but disoriented. Per NOC RN, this is patient's status overnight.     At 0740: Patient sustaining heart rate between 120's-130's and appears diaphoretic and tremulous. Vitals taken, blood glucose checked. Patient currently A&Ox0 and does not follow conversation. Assessment Completed. ICU and Tele Charge RN's at bedside.    At 0805: MD Kelly notified and arrived to bedside.

## 2024-07-30 NOTE — CONSULTS
"  Physical Medicine & Rehabilitation Chart Review      Please note that this is a chart review.    History of Present Illness:  5 yo with dementia, Dm2, CAD, HTN, sp R TKA (7/24) presented to OSH with worsening confusion found and a fall to have AQUILES and transferred due to concern for R knee infection. OSH did obtain CTH which was neg, CT c spine w/o acute findings, CXR w/ elevated R hemidiaphragm/atelectasis and XR of his knees w expected post op changes. Dr. Barragan his surgeon evaluated wound and did not believe pt had infection. CT of knee also reassuring with expected post op changes. Upon admission, pt was started on IV unasyn/zyvox. This were dc'd and his wbc is rising. No obvious infection as of yet with negative work up. Patient's delirium was suspected to be due to polypharmacy given use of norco, tramadol, tizanidine and depakote. Wife had denied sig drinking but despite AMS pt did report drinking beer (unclear frequency/quantity). AM of 7/28 patient went into afib w/ RVR , responded slightly to IV dilt pushes but due to HR up to 150s-170s transferred to ICU for amio gtt and possible EtOH w/d. Of note IV Unasyn was restarted given rising wbc.       Past Medical History:  Past Medical History:   Diagnosis Date    Anesthesia     \"difficulty urinated after anesthesia\"    Breath shortness     albuterol inhalers as needed    CAD (coronary artery disease) 08/2020    STEMI. PCI/NAOMY (Raghav 3.5 x 25mm) the mid RCA, and PCI/NAOMY (Raghav 2.5 x 12mm) the PDA.    Chronic anticoagulation     Dental disorder     dentures    Depression     History of heart artery stent     x2 on eloquis \"posterior side of heart\"    Hyperlipidemia     Hypertension     Low back pain     Myocardial infarct (HCC)     Caryn Mckinney - Cardiologist    Paroxysmal atrial fibrillation (HCC) 10/2023    Echocardiogram with normal LV size, LVEF 55-60%. Normal RA, LA and RV. No valvular problems.    Subdural hematoma (HCC)     on Depakote    Type 2 diabetes " "mellitus (HCC)     \"states hx of and was on metformin in past\"       Past Surgical History:  Past Surgical History:   Procedure Laterality Date    ARTHROPLASTY, KNEE, ROBOT-ASSISTED Right 7/23/2024    Procedure: ROBOTIC RIGHT TOTAL KNEE ARTHROPLASTY;  Surgeon: Noe Barragan M.D.;  Location: SURGERY [facility];  Service: Ortho Robotic    CRANIOTOMY Left 8/25/2022    Procedure: CRANIOTOMY FOR SUBDURAL HEMATOMA;  Surgeon: Tesfaye Luther M.D.;  Location: SURGERY [facility];  Service: Neurosurgery    SPINAL CORD STIMULATOR  2/26/2019    Procedure: SPINAL CORD STIMULATOR-  STAGE 1:  RIGHT FLANK BATTERY REPLACEMENT/UPGRADE;  Surgeon: Stepan Hawkins M.D.;  Location: SURGERY [facility];  Service: Neurosurgery    FUSION, SPINE, LUMBAR, PLIF  2/26/2019    Procedure: LUMBAR FUSION POSTERIOR-  STAGE 2: ONLAY L5-S1 BONE;  Surgeon: Stepan Hawkins M.D.;  Location: SURGERY [facility];  Service: Neurosurgery    LAMINOTOMY  2/26/2019    Procedure: LAMINOTOMY-  STAGE 2:  REDO LEFT L4-S1;  Surgeon: Stepan Hawkins M.D.;  Location: SURGERY [facility];  Service: Neurosurgery       Family History:  Family History   Problem Relation Age of Onset    Cancer Mother         breast       Medications:  Scheduled Medications   Medication Dose Frequency    OLANZapine zydis  5 mg BID    predniSONE  40 mg DAILY    metoprolol tartrate  25 mg 4X/DAY    ampicillin-sulbactam (UNASYN) IV  3 g Q6HRS    Pharmacy Consult Request  1 Each PHARMACY TO DOSE    omeprazole  40 mg Nightly    venlafaxine XR  225 mg Nightly    atorvastatin  40 mg Nightly    tamsulosin  0.4 mg BID    valproic acid  125 mg TID    docusate sodium  100 mg BID    ezetimibe  10 mg Nightly    finasteride  5 mg DAILY    Pharmacy  1 Each PHARMACY TO DOSE    insulin regular  1-6 Units 4X/DAY ACHS     PRN medications: LORazepam, labetalol, levalbuterol, HYDROcodone/acetaminophen, albuterol, Respiratory Therapy Consult, ondansetron, ondansetron, promethazine, promethazine, " prochlorperazine, insulin regular **AND** POC blood glucose manual result **AND** NOTIFY MD and PharmD **AND** Administer 20 grams of glucose (approximately 8 ounces of fruit juice) every 15 minutes PRN FSBG less than 70 mg/dL **AND** dextrose bolus    Allergies:  Hctz [hydrochlorothiazide w-spironolactone], Oxycodone, Pectin, Hydrochlorothiazide, and Zolpidem    Assessment & Plan:  The patient presents functional deficits in mobility and self-care as well as cognitive deficits and swallowing/speech, and Minimal  de-conditioning. Patient was previously Independent using None living with Spouse in a 1 Story House with 0 step(s) to enter. Per most recent PT note they are Minimal Assist for mobility with Front Wheel Walker and Moderate Assist for transfers.  Per most recent OT note they are Maximal Assist at LB dressing, Total Assist at toileting    The patient's current diet is:  Current Diet Order   Procedures    Diet Order Diet: Consistent CHO (Diabetic)       The patient is aFair  candidate for an acute inpatient rehabilitation program with a coordinated program of care at an intensity and frequency not available at a lower level of care.     Note: This recommendation requires that patient has at least CGA/Minimal Assistance needs in at least two therapy disciplines.  If patient progresses to no longer need CGA/Em with at least two therapy disciplines they may be more appropriate for Skilled nursing facility versus home with home health.      This recommendation is substantiated by the patient's current medical condition with intervention and assessment of medical issues requiring an acute level of care for patient's safety and maximum outcome. A coordinated program of care will be provided by an interdisciplinary team including physical therapy, occupational therapy, and speech language pathology. Rehab goals include improved cognition, speech, and swallowing, mobility, self-care management, strength and  conditioning/endurance, pain management, bowel and bladder management, mood and affect, and safety with independent home management including caregiver training. Estimated length of stay is approximately 7-14 days. Rehab potential: Fair. Disposition: to pre-morbid independent living setting with supportive care of patient's family. We will continue to follow with you in anticipation of discharge to acute inpatient rehabilitation when medically stable to do so at the discretion of the attending physician. Thank you for allowing us to participate in this patient's care. Please call with any questions regarding this recommendation.    ____________________________________    Tono Navarrete MD   ____________________________________

## 2024-07-30 NOTE — PROGRESS NOTES
Hospital Medicine Daily Progress Note    Date of Service  7/29/2024    Chief Complaint  Karan Wiley is a 64 y.o. male admitted 7/25/2024 with AMS    Hospital Course  63 yo with Dm2, CAD, HTN, sp R TKA (7/24) presented to OSH with worsening confusion found and a fall to have AQUILES and transferred due to concern for R knee infection. OSH did obtain CTH which was neg, CT c spine w/o acute findings, CXR w/ elevated R hemidiaphragm/atelectasis and XR of his knees w expected post op changes.  Dr. Barragan his surgeon evaluated wound and did not believe pt had infection. CT of knee also reassuring with expected post op changes. Upon admission, pt was started on IV unasyn/zyvox. This were dc'd and his wbc is rising. No obvious infection as of yet with negative work up. Patient's delirium was suspected to be due to polypharmacy given use of norco, tramadol, tizanidine and depakote. Wife had denied sig drinking but despite AMS pt did report drinking beer (unclear frequency/quantity). AM of 7/28 patient went into afib w/ RVR , responded slightly to IV dilt pushes but due to HR up to 150s-170s transferred to ICU for amio gtt and possible EtOH w/d. Of note IV Unasyn was restarted given rising wbc.      Interval Problem Update  - tx out of ICU  - pt able to answer some questions like what his name is and where he is but can't engage in conversation even though he tries  - denies sob although appears to have increased wob, wheezing on exam  - denies hx of smoking although hx not entirely reliable  - HR up to 130s sustaining so amio gtt increased in dose  - added prednisone given wheezes  - unable to reach wife, has to be called from patient's own phone apparently    I have discussed this patient's plan of care and discharge plan at IDT rounds today with Case Management, Nursing, Nursing leadership, and other members of the IDT team.    Consultants/Specialty  orthopedics    Code Status  Full Code    Disposition  The patient is  not medically cleared for discharge to home or a post-acute facility.  Anticipate discharge to: skilled nursing facility    I have placed the appropriate orders for post-discharge needs.    Review of Systems  Review of Systems   Unable to perform ROS: Mental acuity   All other systems reviewed and are negative.       Physical Exam  Temp:  [36.3 °C (97.3 °F)-36.9 °C (98.5 °F)] 36.9 °C (98.5 °F)  Pulse:  [] 130  Resp:  [18-20] 20  BP: (110-158)/(79-99) 149/94  SpO2:  [91 %-99 %] 95 %    Physical Exam  General: shaky  HEENT: PERRLA, EOMI  Cards:irregularly irregular, rapid  Pulm: diffuse wheezing throughout lung fields  Abdomen: soft, NTND, + bowel sounds, no rebound tenderness or guarding  MSK: R knee with incision site with ecchymotic changes that have increased, RLEE  Neuro: CN II-XII grossly intact, sensation/strength intact, AAOx1  Psych: confused  Fluids    Intake/Output Summary (Last 24 hours) at 7/29/2024 1758  Last data filed at 7/29/2024 1400  Gross per 24 hour   Intake 1382.34 ml   Output 850 ml   Net 532.34 ml       Laboratory  Recent Labs     07/27/24  0138 07/28/24  0553 07/29/24  0914   WBC 11.3* 12.3* 11.0*   RBC 3.59* 3.66* 3.52*   HEMOGLOBIN 11.0* 11.1* 10.6*   HEMATOCRIT 32.7* 32.7* 32.4*   MCV 91.1 89.3 92.0   MCH 30.6 30.3 30.1   MCHC 33.6 33.9 32.7   RDW 42.7 43.2 44.2   PLATELETCT 242 295 309   MPV 12.2 11.5 11.3     Recent Labs     07/27/24  0138 07/28/24  0553 07/29/24  0914   SODIUM 137 137 135   POTASSIUM 3.4* 3.3* 3.7   CHLORIDE 103 99 97   CO2 17* 18* 25   GLUCOSE 116* 129* 121*   BUN 19 15 13   CREATININE 0.92 0.91 0.97   CALCIUM 9.6 10.0 9.7                   Imaging  EC-ECHOCARDIOGRAM COMPLETE W/ CONT   Final Result      DX-CHEST-PORTABLE (1 VIEW)   Final Result      Elevated right diaphragm.      CT-CTA CHEST PULMONARY ARTERY W/ RECONS   Final Result      1.  No pulmonary embolism detected.   2.  Elevated right diaphragm. Right basilar pulmonary opacity and volume loss suggesting  atelectasis and/or mild infiltrate.            CT-KNEE W/O RIGHT   Final Result      1.  Findings in keeping with interval total knee arthroplasty without specific findings to indicate infection   2.  Osteopenia      US-RENAL   Final Result         1.  Normal renal ultrasound.      OUTSIDE IMAGES-DX CHEST   Final Result      OUTSIDE IMAGES-CT HEAD   Final Result      OUTSIDE IMAGES-DX LOWER EXTREMITY, RIGHT   Final Result      OUTSIDE IMAGES-CT CERVICAL SPINE   Final Result      IR-US GUIDED PIV    (Results Pending)        Assessment/Plan  * Acute encephalopathy- (present on admission)  Assessment & Plan  AMS is of unclear etiology but started acutely after his surgery  Per wife has some degree of dementia?  CTH at OSH was neg prior to transfer  Patient does have a history of seizures but this is not consistent with one  UA not strongly suggestive of UTI  Seroquel PRN for his agitation at this time  My best guess for his AMS is polypharmacy. At home he was taking norco, tramadol, he also takes depakote, tizanidine and most likely this all contributed to his delirium. BCX without growth. R knee does NOT look infected.   7/28 ongoing delirium, some tremors, now afib w/ RVR. Unclear history of Etoh abuse but suspicion for etoh w/d is rising. Tx patient to ICU at this time due to afib rates up to 170s requiring a gtt and possible etoh w/d. Remains in non violent restraints  7/29 out of ICU. Seems to be slighlty improved in mentation but still pleasantly confused. Some questions he's answering appropriately. IV Unasyn kept on board for possible pna.    Pneumonia of right lower lobe due to infectious organism  Assessment & Plan  CXR w elev R hemidiaphragm w possible infiltrate  Lower suspicion for PNA but IV unasyn initiated x 5 day course  Given wheezing I have added prednisone x 5 day course, no clear past hx of COPD  RT protocol, lev albuterol for wheezing  Check procalcitonin  COVID swab neg      SIRS (systemic  inflammatory response syndrome) (HCC)  Assessment & Plan  SIRS criteria identified on my evaluation include:  Tachycardia, with heart rate greater than 90 BPM and Leukocytosis, with WBC greater than 12,000  Unclear cause of SIRS  R knee does not appear infected  UA at OSH neg, here also not convincing, UCX pending  BCX NGTD  CXR without PNA he did start diffuzely wheezing yest afternoon 7/27  He was initially on IV Unasyn/zyvox, zyvox dc'd given low suspicion for mrsa infection/mrsa pcr neg. I dc'd IV Unasyn 7/26 and his WBC is climbing up.  I have restarted IV unasyn at this time...  C/f Etoh w/d? Although pt is altered he did report to nursing he drinks beer. Wife had denies heavy consistent drinking prior to his surgery. Consider tx Etoh w/d      Atrial fibrillation with rapid ventricular response (HCC)- (present on admission)  Assessment & Plan  Patient has hx of paroxysmal afib  He takes Eliquis  this was held prior to surgery and again upon transfer but since no OR plans it was assumed 7/27  Patient's home coreg was restarted at low dose 6.25mg BID  AM of 7/28 pt went into afib w RVR. Received 2 pushes of IV Dilt 10mg with mild improvement  His HR went up to 170s this morning even up to 190s, -130s during this time  I was planning on a dilt gtt but per ICU request changed to amiodarone gtt to ensure he doesn't become hypotensive  7/29 out of ICU on amio gtt. Rates this AM in 130s, so I increaed amio gtt to 1mg/hr. I had to stop Eliquis however since his R leg is quite ecchymotic which is not how he presented to us. TTE relatively reassuring. I do not believe he is in HF      Seizure disorder as sequela of cerebrovascular accident (HCC)  Assessment & Plan  On depakote  I do not suspect seizure at this time    Mild cognitive impairment  Assessment & Plan  Noted, appears off baseline still    Fall  Assessment & Plan  Had a fall at home  CTH/CT c spine at Osh without acute findingds    Sinus  tachycardia  Assessment & Plan  Possibly from agitation  No clear w/d  per wife not drinking excessive etoh  Patient has a spine stimulator in place   CTA neg for PE  7/27 now in afib w rvr    Polypharmacy  Assessment & Plan  As above cf polypharmacy contributing to delirium  Hold home trazodone  Hold home tramadol  Hold home tizanidine  Cw norco PRN severe pain  Try to use tylenol if able    Anemia  Assessment & Plan  Likely post op related  Hb down to 10 so relatively stable  However his R leg appears sig bruised c/f bleed so I am holding Eliquis. Upon arrival from OSH he did not have such ecchymotic R leg  Resume eliquis in 48 hours if hb remains stable    Status post right knee replacement  Assessment & Plan  Patient complains of significant R knee pain  Although does have swelling, some warmth and erythema, I spoke with DR. Barragan who reassured that it looks as expected post op  CT knee w/ suspected post op changes  CRP/ESR elev but post op certainly there is inflammatory state  PT/OT - SNF    Acute kidney injury (HCC)- (present on admission)  Assessment & Plan  Cr up to 3.88 at OSH  Cr to 2.11 here improving with IVFs however pt wo pIV access still and not getting IVFs  Fluid resuscitation  Avoid nephrotoxic medications  7/27 - resolved. DC IVFs      Body mass index 40.0-44.9, adult (HCC)- (present on admission)  Assessment & Plan  Body mass index is 42.37 kg/m².  Outpatient weight loss management program and lifestyle modification highly recommended    Essential hypertension, benign- (present on admission)  Assessment & Plan  Optimize blood pressure management to keep systolic blood pressure less than 140 diastolic under 90  Coreg was changed to metop in Icu  Monitor    Hyperlipidemia- (present on admission)  Assessment & Plan  Low-fat low-cholesterol diet  Continue Lipitor 40 mg nightly and Zetia 10 mg nightly      Depression- (present on admission)  Assessment & Plan  Continue Effexor XR  Hold trazodone  given altered mental status    CAD (coronary artery disease)- (present on admission)  Assessment & Plan  Optimize medication management  2020 patient had PCI x 2  Currently chest pain-free  Continue oxygen support      Hypokalemia- (present on admission)  Assessment & Plan  K of 3.3 likely from poor PO intake  Repleted  Check BMP tomorrow w Mg    Paroxysmal atrial fibrillation (HCC)- (present on admission)  Assessment & Plan  Continue with rate control  with amio gtt and bb  Home coreg was changed to metop rolol in ICU will keep as metop  Amio gtt dose increased as patient still not rate controlled  I noted that RLE w significant ecchymoses, c/f bleed, so I have to hold Eliquis  Trend H/H      BPH (benign prostatic hyperplasia)- (present on admission)  Assessment & Plan  Continue finasteride and Flomax    Type 2 diabetes mellitus without complication, without long-term current use of insulin (HCC)- (present on admission)  Assessment & Plan  -SSI  -follow glycohemoglobin levels long term, most recent hemoglobin A1c 6.2  -Hold metformin   -monitor for hypoglycemic episodes and adjust control if he should get low         VTE prophylaxis:  SCD hold eliquis    I have performed a physical exam and reviewed and updated ROS and Plan today (7/29/2024). In review of yesterday's note (7/28/2024), there are no changes except as documented above.      I spent 50 minutes providing care for this patient.  This included face-to-face interview, physical examination.  Review of lab work including CBC, BMP, CXR,Discussion with multidisciplinary team including case management, nursing staff and pharmacy

## 2024-07-30 NOTE — PROGRESS NOTES
"Subjective:    Remains disoriented.    Objective:  BP (!) 140/86   Pulse (!) 101   Temp 36.8 °C (98.3 °F) (Oral)   Resp 20   Ht 1.702 m (5' 7\")   Wt 121 kg (266 lb 5.1 oz)   SpO2 96%     Recent Labs     07/28/24  0553 07/29/24  0914 07/30/24  0125   WBC 12.3* 11.0* 10.8   RBC 3.66* 3.52* 3.48*   HEMOGLOBIN 11.1* 10.6* 10.5*   HEMATOCRIT 32.7* 32.4* 32.1*   MCV 89.3 92.0 92.2   MCH 30.3 30.1 30.2   MCHC 33.9 32.7 32.7   RDW 43.2 44.2 44.8   PLATELETCT 295 309 321   MPV 11.5 11.3 11.3     Recent Labs     07/28/24  0553 07/29/24  0914 07/30/24  0125   SODIUM 137 135 136   POTASSIUM 3.3* 3.7 3.8   CHLORIDE 99 97 97   CO2 18* 25 25   GLUCOSE 129* 121* 110*   BUN 15 13 13   CREATININE 0.91 0.97 0.91   CALCIUM 10.0 9.7 9.8       No intake or output data in the 24 hours ending 07/30/24 1404    Comfortable, no distress  Disoriented and not really following directions well  Neurologically and vascularly intact with palpable pedal pulses bilaterally.  Dressing C/D/I  Bruising around the knee and swelling are normal.  No signs of infection at the knee.      Assessment:  Delirium   Recent TKA    Plan:    Continue PT, working on terminal flexion and extension.  Considered a CPM, but I don't think he'd tolerate it with his current status.    "

## 2024-07-30 NOTE — CARE PLAN
The patient is Watcher - Medium risk of patient condition declining or worsening    Shift Goals  Clinical Goals: neuro checks, safety, HR  Patient Goals: rest and comfort  Family Goals: FELIPA    Progress made toward(s) clinical / shift goals:    Problem: Knowledge Deficit - Standard  Goal: Patient and family/care givers will demonstrate understanding of plan of care, disease process/condition, diagnostic tests and medications  Outcome: Progressing  Note: Pt updated on POC, agreeable. Pt is Aox3, disoriented to situation. Q4 neuros in place. Call light within reach.      Problem: Fall Risk  Goal: Patient will remain free from falls  Outcome: Progressing  Note: Pt is high fall risk. Bed and strip alarm in place. 3 bedrails up.      Problem: Safety  Goal: Will remain free from falls  Outcome: Progressing

## 2024-07-30 NOTE — PROGRESS NOTES
Spoke to patient's wife Alesia who states that patient has Dementia and see's Neurologist Dr. Vallejo. She also requests that we remove his phone from beside if he has altered mentation due to him having habit of purchasing items. Wife aware that he is in restraints. No further questions at this time. MD Kelly notified.

## 2024-07-30 NOTE — PROGRESS NOTES
Patient continues to get up without calling and is becoming more confused and agitated. Patient is currently Aox1, only oriented to self. Patient [place]. Reorientation attempted, continues to be confused. Patient ripped out left IV and took off telemetry monitor and is refusing the monitor. RN explained reasoning for telemetry monitoring including that the patient is currently on an amiodarone drip and is in afib, patient continues to be confused and is refusing. Dr. Horta notified. Bilateral soft wrist restraints order placed.

## 2024-07-31 VITALS
TEMPERATURE: 98.5 F | DIASTOLIC BLOOD PRESSURE: 87 MMHG | SYSTOLIC BLOOD PRESSURE: 125 MMHG | HEIGHT: 67 IN | WEIGHT: 265.88 LBS | OXYGEN SATURATION: 93 % | HEART RATE: 104 BPM | BODY MASS INDEX: 41.73 KG/M2 | RESPIRATION RATE: 19 BRPM

## 2024-07-31 PROBLEM — N40.1 URINARY RETENTION DUE TO BENIGN PROSTATIC HYPERPLASIA: Status: ACTIVE | Noted: 2024-07-31

## 2024-07-31 PROBLEM — R33.8 URINARY RETENTION DUE TO BENIGN PROSTATIC HYPERPLASIA: Status: ACTIVE | Noted: 2024-07-31

## 2024-07-31 LAB
BACTERIA BLD CULT: NORMAL
GLUCOSE BLD STRIP.AUTO-MCNC: 116 MG/DL (ref 65–99)
GLUCOSE BLD STRIP.AUTO-MCNC: 125 MG/DL (ref 65–99)
GLUCOSE BLD STRIP.AUTO-MCNC: 98 MG/DL (ref 65–99)
GLUCOSE BLD STRIP.AUTO-MCNC: 98 MG/DL (ref 65–99)
SIGNIFICANT IND 70042: NORMAL
SITE SITE: NORMAL
SOURCE SOURCE: NORMAL

## 2024-07-31 PROCEDURE — 700102 HCHG RX REV CODE 250 W/ 637 OVERRIDE(OP): Performed by: INTERNAL MEDICINE

## 2024-07-31 PROCEDURE — A9270 NON-COVERED ITEM OR SERVICE: HCPCS | Performed by: HOSPITALIST

## 2024-07-31 PROCEDURE — A9270 NON-COVERED ITEM OR SERVICE: HCPCS | Performed by: INTERNAL MEDICINE

## 2024-07-31 PROCEDURE — 51798 US URINE CAPACITY MEASURE: CPT

## 2024-07-31 PROCEDURE — 700111 HCHG RX REV CODE 636 W/ 250 OVERRIDE (IP): Mod: JZ | Performed by: INTERNAL MEDICINE

## 2024-07-31 PROCEDURE — 99232 SBSQ HOSP IP/OBS MODERATE 35: CPT | Performed by: INTERNAL MEDICINE

## 2024-07-31 PROCEDURE — 97110 THERAPEUTIC EXERCISES: CPT

## 2024-07-31 PROCEDURE — 700102 HCHG RX REV CODE 250 W/ 637 OVERRIDE(OP): Performed by: HOSPITALIST

## 2024-07-31 PROCEDURE — 82962 GLUCOSE BLOOD TEST: CPT | Mod: 91

## 2024-07-31 PROCEDURE — 94760 N-INVAS EAR/PLS OXIMETRY 1: CPT

## 2024-07-31 PROCEDURE — 700105 HCHG RX REV CODE 258: Performed by: INTERNAL MEDICINE

## 2024-07-31 PROCEDURE — 770020 HCHG ROOM/CARE - TELE (206)

## 2024-07-31 PROCEDURE — 97535 SELF CARE MNGMENT TRAINING: CPT

## 2024-07-31 PROCEDURE — 97530 THERAPEUTIC ACTIVITIES: CPT

## 2024-07-31 PROCEDURE — 97112 NEUROMUSCULAR REEDUCATION: CPT

## 2024-07-31 PROCEDURE — 97140 MANUAL THERAPY 1/> REGIONS: CPT

## 2024-07-31 RX ORDER — FUROSEMIDE 10 MG/ML
20 INJECTION INTRAMUSCULAR; INTRAVENOUS
Status: DISCONTINUED | OUTPATIENT
Start: 2024-07-31 | End: 2024-08-02

## 2024-07-31 RX ORDER — OLANZAPINE 5 MG/1
5 TABLET, ORALLY DISINTEGRATING ORAL DAILY
Status: DISCONTINUED | OUTPATIENT
Start: 2024-08-01 | End: 2024-08-01

## 2024-07-31 RX ORDER — POTASSIUM CHLORIDE 1500 MG/1
20 TABLET, EXTENDED RELEASE ORAL DAILY
Status: DISCONTINUED | OUTPATIENT
Start: 2024-07-31 | End: 2024-08-02

## 2024-07-31 RX ORDER — OLANZAPINE 5 MG/1
10 TABLET, ORALLY DISINTEGRATING ORAL EVERY EVENING
Status: DISCONTINUED | OUTPATIENT
Start: 2024-07-31 | End: 2024-08-01

## 2024-07-31 RX ADMIN — ATORVASTATIN CALCIUM 40 MG: 40 TABLET, FILM COATED ORAL at 20:50

## 2024-07-31 RX ADMIN — TAMSULOSIN HYDROCHLORIDE 0.4 MG: 0.4 CAPSULE ORAL at 16:55

## 2024-07-31 RX ADMIN — AMPICILLIN AND SULBACTAM 3 G: 1; 2 INJECTION, POWDER, FOR SOLUTION INTRAMUSCULAR; INTRAVENOUS at 11:42

## 2024-07-31 RX ADMIN — VALPROIC ACID 125 MG: 250 SOLUTION ORAL at 05:01

## 2024-07-31 RX ADMIN — VENLAFAXINE HYDROCHLORIDE 225 MG: 75 CAPSULE, EXTENDED RELEASE ORAL at 20:48

## 2024-07-31 RX ADMIN — METOPROLOL TARTRATE 25 MG: 25 TABLET, FILM COATED ORAL at 16:56

## 2024-07-31 RX ADMIN — OMEPRAZOLE 40 MG: 20 CAPSULE, DELAYED RELEASE ORAL at 20:49

## 2024-07-31 RX ADMIN — FINASTERIDE 5 MG: 5 TABLET, FILM COATED ORAL at 04:59

## 2024-07-31 RX ADMIN — FUROSEMIDE 20 MG: 10 INJECTION, SOLUTION INTRAVENOUS at 11:33

## 2024-07-31 RX ADMIN — AMIODARONE HYDROCHLORIDE 200 MG: 200 TABLET ORAL at 16:56

## 2024-07-31 RX ADMIN — VALPROIC ACID 125 MG: 250 SOLUTION ORAL at 11:33

## 2024-07-31 RX ADMIN — AMPICILLIN AND SULBACTAM 3 G: 1; 2 INJECTION, POWDER, FOR SOLUTION INTRAMUSCULAR; INTRAVENOUS at 23:48

## 2024-07-31 RX ADMIN — APIXABAN 5 MG: 5 TABLET, FILM COATED ORAL at 04:59

## 2024-07-31 RX ADMIN — AMPICILLIN AND SULBACTAM 3 G: 1; 2 INJECTION, POWDER, FOR SOLUTION INTRAMUSCULAR; INTRAVENOUS at 04:59

## 2024-07-31 RX ADMIN — OLANZAPINE 10 MG: 5 TABLET, ORALLY DISINTEGRATING ORAL at 17:09

## 2024-07-31 RX ADMIN — METOPROLOL TARTRATE 25 MG: 25 TABLET, FILM COATED ORAL at 13:59

## 2024-07-31 RX ADMIN — HYDROCODONE BITARTRATE AND ACETAMINOPHEN 1 TABLET: 10; 325 TABLET ORAL at 21:00

## 2024-07-31 RX ADMIN — EZETIMIBE 10 MG: 10 TABLET ORAL at 20:48

## 2024-07-31 RX ADMIN — DOCUSATE SODIUM 100 MG: 100 CAPSULE, LIQUID FILLED ORAL at 16:56

## 2024-07-31 RX ADMIN — OLANZAPINE 5 MG: 5 TABLET, ORALLY DISINTEGRATING ORAL at 04:59

## 2024-07-31 RX ADMIN — AMIODARONE HYDROCHLORIDE 200 MG: 200 TABLET ORAL at 04:59

## 2024-07-31 RX ADMIN — METOPROLOL TARTRATE 25 MG: 25 TABLET, FILM COATED ORAL at 20:49

## 2024-07-31 RX ADMIN — APIXABAN 5 MG: 5 TABLET, FILM COATED ORAL at 16:57

## 2024-07-31 RX ADMIN — METOPROLOL TARTRATE 25 MG: 25 TABLET, FILM COATED ORAL at 09:06

## 2024-07-31 RX ADMIN — TAMSULOSIN HYDROCHLORIDE 0.4 MG: 0.4 CAPSULE ORAL at 04:59

## 2024-07-31 RX ADMIN — VALPROIC ACID 125 MG: 250 SOLUTION ORAL at 16:53

## 2024-07-31 RX ADMIN — DOCUSATE SODIUM 100 MG: 100 CAPSULE, LIQUID FILLED ORAL at 04:59

## 2024-07-31 RX ADMIN — POTASSIUM CHLORIDE 20 MEQ: 1500 TABLET, EXTENDED RELEASE ORAL at 11:32

## 2024-07-31 RX ADMIN — AMPICILLIN AND SULBACTAM 3 G: 1; 2 INJECTION, POWDER, FOR SOLUTION INTRAMUSCULAR; INTRAVENOUS at 16:50

## 2024-07-31 ASSESSMENT — COGNITIVE AND FUNCTIONAL STATUS - GENERAL
TOILETING: A LOT
DRESSING REGULAR LOWER BODY CLOTHING: A LOT
PERSONAL GROOMING: A LITTLE
HELP NEEDED FOR BATHING: A LOT
DRESSING REGULAR UPPER BODY CLOTHING: A LOT
DAILY ACTIVITIY SCORE: 15
SUGGESTED CMS G CODE MODIFIER DAILY ACTIVITY: CK

## 2024-07-31 ASSESSMENT — GAIT ASSESSMENTS
DISTANCE (FEET): 3
DEVIATION: BRADYKINETIC
ASSISTIVE DEVICE: FRONT WHEEL WALKER
GAIT LEVEL OF ASSIST: MINIMAL ASSIST

## 2024-07-31 ASSESSMENT — FIBROSIS 4 INDEX: FIB4 SCORE: 1.38

## 2024-07-31 ASSESSMENT — ENCOUNTER SYMPTOMS
NAUSEA: 0
ABDOMINAL PAIN: 0
NERVOUS/ANXIOUS: 1
VOMITING: 0

## 2024-07-31 ASSESSMENT — PAIN DESCRIPTION - PAIN TYPE
TYPE: ACUTE PAIN;SURGICAL PAIN
TYPE: ACUTE PAIN
TYPE: ACUTE PAIN

## 2024-07-31 NOTE — PROGRESS NOTES
Telemetry Shift Summary    Rhythm : Afib  HR Range 104-128  Ectopy : oPVC, rBIG, rCoup   Measurements -/0.08/-    Normal Values  Rhythm SR  HR Range:   Measurements: 0.12-0.20/0.06-0.10/0.30-0.52

## 2024-07-31 NOTE — PROGRESS NOTES
At 1750: Spoke to patients spouse, Alesia, and provided updates. Spouse aware that patient remains in restraints. Answered spouses questions. She states to utilize patient's cell phone at bedside if we are unable to get a hold her through our phone lines. No other questions at this time.

## 2024-07-31 NOTE — PROGRESS NOTES
Patient bladder scanned and shown to be retaining 640cc despite 750cc UOP this shift. Straight cath order received. Output documented on I/Os.

## 2024-07-31 NOTE — THERAPY
Physical Therapy   Daily Treatment     Patient Name: Karan Wiley  Age:  64 y.o., Sex:  male  Medical Record #: [id number]  Today's Date: 7/31/2024          Assessment    Knee ROM improving,Pt still likes to internally rotate the R hip and flex the knee.Mentation a little improved today    Plan    Treatment Plan Status: Continue Current Treatment Plan  Type of Treatment: Bed Mobility, Gait Training, Manual Therapy, Neuro Re-Education / Balance, Therapeutic Activities, Therapeutic Exercise  Treatment Frequency: 5 Times per Week  Treatment Duration:      DC Equipment Recommendations: Unable to determine at this time  Discharge Recommendations: Recommend post-acute placement for additional physical therapy services prior to discharge home     Objective       07/31/24 1000   Charge Group   PT Therapeutic Activities (Units) 1   PT Therapeutic Exercise (Units) 1   PT Manual Therapy (Units) 1   Passive ROM Lower Body   Rt Knee Flexion Degrees 95   Rt Knee Extension Degrees 0   Balance   Sitting Balance (Static) Fair +   Sitting Balance (Dynamic) Fair   Standing Balance (Static) Fair   Standing Balance (Dynamic) Fair -   Weight Shift Sitting Fair   Weight Shift Standing Poor   Bed Mobility    Supine to Sit Minimal Assist   Sit to Supine Minimal Assist   Scooting Minimal Assist   Gait Analysis   Gait Level Of Assist Minimal Assist   Assistive Device Front Wheel Walker   Distance (Feet) 3   # of Times Distance was Traveled 1   Deviation Bradykinetic   # of Stairs Climbed 0   Activity Tolerance   Sitting Edge of Bed 15   Standing 2 x 4 mins   Short Term Goals    Short Term Goal # 1 S with bed mob in 6 V   Goal Outcome # 1 Progressing slower than expected   Short Term Goal # 2 S with transfers in 6 V   Goal Outcome # 2 Progressing slower than expected   Short Term Goal # 3 S with amb x 100 feet in 6 V   Goal Outcome # 3 Goal not met   Session Information   Date / Session Number  7/31-3 3/5 8/2

## 2024-07-31 NOTE — PROGRESS NOTES
Patient urine output post Lasix dose is 250 mL. Bladder scan shows 802 mL. MD Kelly notified. New orders for keith insertion.     At 1306 Shortly after, patient had 350 ml urine output. Bladder scanned again with result of 425ml. MD notified.     At 1345: Called patient's wife to update on plan for keith insertion per MD and to ask about dentures. Per wife, patient has dentures but typically does not wear them. RN notified wife about Keith. Education provided and questions answered. No other questions at this time.     At 1420: Keith catheter inserted. Patient had 380 mL urine output right away. Patient tolerated well. LDA added. MD notified.

## 2024-07-31 NOTE — CARE PLAN
The patient is Watcher - Medium risk of patient condition declining or worsening    Shift Goals  Clinical Goals: Neuro Checks, Restraint Charting, Rate Control  Patient Goals: Comfort  Family Goals: FELIPA    Progress made toward(s) clinical / shift goals:    Problem: Knowledge Deficit - Standard  Goal: Patient and family/care givers will demonstrate understanding of plan of care, disease process/condition, diagnostic tests and medications  Outcome: Progressing     Problem: Skin Integrity  Goal: Skin integrity is maintained or improved  Outcome: Progressing     Problem: Pain - Standard  Goal: Alleviation of pain or a reduction in pain to the patient’s comfort goal  Outcome: Progressing     Problem: Safety  Goal: Will remain free from injury  Outcome: Progressing       Patient is not progressing towards the following goals:

## 2024-07-31 NOTE — PROGRESS NOTES
Telemetry Shift Summary     Rhythm: Atrial Fibrillation  Rate: 80s-100s  Measurements: --/0.08/--  Ectopy (reported by Monitor Tech): rare PVC/PAC/Couplets     Normal Values  Rhythm: Sinus  HR:   Measurements: 0.12-0.20/0.06-0.10/0.30-0.52

## 2024-07-31 NOTE — PROGRESS NOTES
Telemetry Shift Summary    Rhythm AFib  HR Range   Ectopy ofPVC, rCoup, rPAC, rTrip  Measurements -/0.08/-    Normal Values  Rhythm SR  HR Range:   Measurements: 0.12-0.20/0.06-0.10/0.30-0.52

## 2024-07-31 NOTE — PROGRESS NOTES
At 0715: Received report from Kelvin MONTEIRO.  Bed is locked and in lowest position. Fall and Safety precautions in place. Patient currently in soft wrist restraints laying in bed, eyes close, chest rise noted, no signs of increase work of breathing.         At 0805: Assessment completed. Reviewed POC, patient alert, responding to RN conversations, oriented to person and time, however; disoriented to situation and place and is confused in conversation. Meal and oral fluid intake accepted by patient and tolerated well. No other needs at this time.

## 2024-07-31 NOTE — CARE PLAN
The patient is Watcher - Medium risk of patient condition declining or worsening    Shift Goals  Clinical Goals: Neuro Checks, Q2 Restraint checks, Saftey, Monitor HR  Patient Goals: FELIPA  Family Goals: FELIPA    Progress made toward(s) clinical / shift goals:      Problem: Knowledge Deficit - Standard  Goal: Patient and family/care givers will demonstrate understanding of plan of care, disease process/condition, diagnostic tests and medications  Outcome: Progressing  Note: Patient orientedx0 and no evidence of learning. Patient's spouse called for updates, updates provided. Discussed medications, restraints, and POC with spouse over phone call.      Problem: Safety - Medical Restraint  Goal: Remains free of injury from restraints (Restraint for Interference with Medical Device)  Outcome: Progressing  Flowsheets (Taken 7/30/2024 6044)  Addressed this shift: Remains free of injury from restraints (restraint for interference with medical device):   Every 2 hours: Monitor safety, psychosocial status, comfort, nutrition and hydration   Inform patient/family regarding the reason for restraint   Determine that other, less restrictive measures have been tried or would not be effective before applying the restraint  Goal: Free from restraint(s) (Restraint for Interference with Medical Device)  Outcome: Progressing  Flowsheets (Taken 7/30/2024 7989)  Addressed this shift: Free from restraint(s) (restraint for interference with medical device):   Every 24 hours: Continued use of restraint requires Licensed Independent Practitioner to perform face to face examination and written order   ONCE/SHIFT or MINIMUM Every 12 hours: Assess and document the continuing need for restraints     Problem: Safety  Goal: Will remain free from injury  Outcome: Progressing  Goal: Will remain free from falls  Outcome: Progressing  Note: Patient free from falls during shift.        Patient is not progressing towards the following goals:

## 2024-08-01 LAB
ANION GAP SERPL CALC-SCNC: 11 MMOL/L (ref 7–16)
BUN SERPL-MCNC: 15 MG/DL (ref 8–22)
CALCIUM SERPL-MCNC: 9 MG/DL (ref 8.4–10.2)
CHLORIDE SERPL-SCNC: 98 MMOL/L (ref 96–112)
CO2 SERPL-SCNC: 28 MMOL/L (ref 20–33)
CREAT SERPL-MCNC: 1.07 MG/DL (ref 0.5–1.4)
GFR SERPLBLD CREATININE-BSD FMLA CKD-EPI: 77 ML/MIN/1.73 M 2
GLUCOSE BLD STRIP.AUTO-MCNC: 103 MG/DL (ref 65–99)
GLUCOSE BLD STRIP.AUTO-MCNC: 104 MG/DL (ref 65–99)
GLUCOSE BLD STRIP.AUTO-MCNC: 113 MG/DL (ref 65–99)
GLUCOSE BLD STRIP.AUTO-MCNC: 121 MG/DL (ref 65–99)
GLUCOSE SERPL-MCNC: 123 MG/DL (ref 65–99)
POTASSIUM SERPL-SCNC: 3 MMOL/L (ref 3.6–5.5)
SODIUM SERPL-SCNC: 137 MMOL/L (ref 135–145)

## 2024-08-01 PROCEDURE — A9270 NON-COVERED ITEM OR SERVICE: HCPCS | Performed by: HOSPITALIST

## 2024-08-01 PROCEDURE — 94760 N-INVAS EAR/PLS OXIMETRY 1: CPT

## 2024-08-01 PROCEDURE — 700102 HCHG RX REV CODE 250 W/ 637 OVERRIDE(OP): Performed by: INTERNAL MEDICINE

## 2024-08-01 PROCEDURE — 700111 HCHG RX REV CODE 636 W/ 250 OVERRIDE (IP): Mod: JZ | Performed by: INTERNAL MEDICINE

## 2024-08-01 PROCEDURE — 82962 GLUCOSE BLOOD TEST: CPT | Mod: 91

## 2024-08-01 PROCEDURE — A9270 NON-COVERED ITEM OR SERVICE: HCPCS | Performed by: INTERNAL MEDICINE

## 2024-08-01 PROCEDURE — 99232 SBSQ HOSP IP/OBS MODERATE 35: CPT | Performed by: INTERNAL MEDICINE

## 2024-08-01 PROCEDURE — 770020 HCHG ROOM/CARE - TELE (206)

## 2024-08-01 PROCEDURE — 700105 HCHG RX REV CODE 258: Performed by: INTERNAL MEDICINE

## 2024-08-01 PROCEDURE — 700102 HCHG RX REV CODE 250 W/ 637 OVERRIDE(OP): Performed by: HOSPITALIST

## 2024-08-01 PROCEDURE — 99024 POSTOP FOLLOW-UP VISIT: CPT | Performed by: ORTHOPAEDIC SURGERY

## 2024-08-01 PROCEDURE — 80048 BASIC METABOLIC PNL TOTAL CA: CPT

## 2024-08-01 RX ORDER — OLANZAPINE 5 MG/1
5 TABLET, ORALLY DISINTEGRATING ORAL EVERY EVENING
Status: DISCONTINUED | OUTPATIENT
Start: 2024-08-01 | End: 2024-08-05

## 2024-08-01 RX ORDER — GABAPENTIN 100 MG/1
200 CAPSULE ORAL 3 TIMES DAILY PRN
Status: DISCONTINUED | OUTPATIENT
Start: 2024-08-01 | End: 2024-08-07 | Stop reason: HOSPADM

## 2024-08-01 RX ORDER — LORAZEPAM 0.5 MG/1
0.5 TABLET ORAL EVERY 8 HOURS PRN
Status: DISCONTINUED | OUTPATIENT
Start: 2024-08-01 | End: 2024-08-07 | Stop reason: HOSPADM

## 2024-08-01 RX ORDER — OLANZAPINE 5 MG/1
5 TABLET, ORALLY DISINTEGRATING ORAL EVERY EVENING
Status: DISCONTINUED | OUTPATIENT
Start: 2024-08-02 | End: 2024-08-01

## 2024-08-01 RX ORDER — OLANZAPINE 5 MG/1
2.5 TABLET, ORALLY DISINTEGRATING ORAL DAILY
Status: DISCONTINUED | OUTPATIENT
Start: 2024-08-02 | End: 2024-08-04

## 2024-08-01 RX ORDER — HYDROCODONE BITARTRATE AND ACETAMINOPHEN 5; 325 MG/1; MG/1
1 TABLET ORAL EVERY 6 HOURS PRN
Status: DISCONTINUED | OUTPATIENT
Start: 2024-08-01 | End: 2024-08-07 | Stop reason: HOSPADM

## 2024-08-01 RX ORDER — ACETAMINOPHEN 500 MG
1000 TABLET ORAL EVERY 8 HOURS
Status: DISCONTINUED | OUTPATIENT
Start: 2024-08-01 | End: 2024-08-03

## 2024-08-01 RX ADMIN — VALPROIC ACID 125 MG: 250 SOLUTION ORAL at 18:29

## 2024-08-01 RX ADMIN — OLANZAPINE 5 MG: 5 TABLET, ORALLY DISINTEGRATING ORAL at 05:40

## 2024-08-01 RX ADMIN — ACETAMINOPHEN 1000 MG: 500 TABLET ORAL at 21:20

## 2024-08-01 RX ADMIN — VALPROIC ACID 125 MG: 250 SOLUTION ORAL at 05:39

## 2024-08-01 RX ADMIN — AMPICILLIN AND SULBACTAM 3 G: 1; 2 INJECTION, POWDER, FOR SOLUTION INTRAMUSCULAR; INTRAVENOUS at 12:10

## 2024-08-01 RX ADMIN — OMEPRAZOLE 40 MG: 20 CAPSULE, DELAYED RELEASE ORAL at 21:21

## 2024-08-01 RX ADMIN — OLANZAPINE 5 MG: 5 TABLET, ORALLY DISINTEGRATING ORAL at 19:23

## 2024-08-01 RX ADMIN — TAMSULOSIN HYDROCHLORIDE 0.4 MG: 0.4 CAPSULE ORAL at 05:40

## 2024-08-01 RX ADMIN — FUROSEMIDE 20 MG: 10 INJECTION, SOLUTION INTRAVENOUS at 05:36

## 2024-08-01 RX ADMIN — HYDROCODONE BITARTRATE AND ACETAMINOPHEN 1 TABLET: 10; 325 TABLET ORAL at 18:11

## 2024-08-01 RX ADMIN — POTASSIUM CHLORIDE 20 MEQ: 1500 TABLET, EXTENDED RELEASE ORAL at 05:41

## 2024-08-01 RX ADMIN — METOPROLOL TARTRATE 25 MG: 25 TABLET, FILM COATED ORAL at 21:21

## 2024-08-01 RX ADMIN — AMPICILLIN AND SULBACTAM 3 G: 1; 2 INJECTION, POWDER, FOR SOLUTION INTRAMUSCULAR; INTRAVENOUS at 05:39

## 2024-08-01 RX ADMIN — METOPROLOL TARTRATE 25 MG: 25 TABLET, FILM COATED ORAL at 18:02

## 2024-08-01 RX ADMIN — METOPROLOL TARTRATE 25 MG: 25 TABLET, FILM COATED ORAL at 09:56

## 2024-08-01 RX ADMIN — TAMSULOSIN HYDROCHLORIDE 0.4 MG: 0.4 CAPSULE ORAL at 18:02

## 2024-08-01 RX ADMIN — ATORVASTATIN CALCIUM 40 MG: 40 TABLET, FILM COATED ORAL at 21:21

## 2024-08-01 RX ADMIN — AMIODARONE HYDROCHLORIDE 200 MG: 200 TABLET ORAL at 18:02

## 2024-08-01 RX ADMIN — FINASTERIDE 5 MG: 5 TABLET, FILM COATED ORAL at 05:41

## 2024-08-01 RX ADMIN — AMIODARONE HYDROCHLORIDE 200 MG: 200 TABLET ORAL at 05:41

## 2024-08-01 RX ADMIN — APIXABAN 5 MG: 5 TABLET, FILM COATED ORAL at 18:02

## 2024-08-01 RX ADMIN — APIXABAN 5 MG: 5 TABLET, FILM COATED ORAL at 05:40

## 2024-08-01 RX ADMIN — EZETIMIBE 10 MG: 10 TABLET ORAL at 21:21

## 2024-08-01 RX ADMIN — AMPICILLIN AND SULBACTAM 3 G: 1; 2 INJECTION, POWDER, FOR SOLUTION INTRAMUSCULAR; INTRAVENOUS at 18:10

## 2024-08-01 RX ADMIN — VENLAFAXINE HYDROCHLORIDE 225 MG: 75 CAPSULE, EXTENDED RELEASE ORAL at 21:21

## 2024-08-01 RX ADMIN — DOCUSATE SODIUM 100 MG: 100 CAPSULE, LIQUID FILLED ORAL at 18:02

## 2024-08-01 ASSESSMENT — PAIN DESCRIPTION - PAIN TYPE
TYPE: ACUTE PAIN
TYPE: ACUTE PAIN
TYPE: ACUTE PAIN;SURGICAL PAIN;CHRONIC PAIN

## 2024-08-01 ASSESSMENT — FIBROSIS 4 INDEX: FIB4 SCORE: 1.38

## 2024-08-01 NOTE — PROGRESS NOTES
"Subjective:    No new events.    Objective:  BP (!) 136/91   Pulse 100   Temp 37 °C (98.6 °F) (Oral)   Resp 20   Ht 1.702 m (5' 7\")   Wt 121 kg (265 lb 10.5 oz)   SpO2 93%     Recent Labs     07/29/24  0914 07/30/24  0125   WBC 11.0* 10.8   RBC 3.52* 3.48*   HEMOGLOBIN 10.6* 10.5*   HEMATOCRIT 32.4* 32.1*   MCV 92.0 92.2   MCH 30.1 30.2   MCHC 32.7 32.7   RDW 44.2 44.8   PLATELETCT 309 321   MPV 11.3 11.3     Recent Labs     07/29/24  0914 07/30/24  0125 08/01/24  0244   SODIUM 135 136 137   POTASSIUM 3.7 3.8 3.0*   CHLORIDE 97 97 98   CO2 25 25 28   GLUCOSE 121* 110* 123*   BUN 13 13 15   CREATININE 0.97 0.91 1.07   CALCIUM 9.7 9.8 9.0         Intake/Output Summary (Last 24 hours) at 8/1/2024 0640  Last data filed at 7/31/2024 2351  Gross per 24 hour   Intake 450 ml   Output 1680 ml   Net -1230 ml       Sleepy this morning  Knee is not concerning      Assessment:  s/p total knee arthroplasty.  Delirium    Plan:    Mobilize once able  Pillow under the heel, encourage straightening frequently  Help with knee ROM frequently.    "

## 2024-08-01 NOTE — PROGRESS NOTES
At 1022 : Restraint order . MD Aware. Per MD, remove restraints and discontinue. RN removed restraints.       At 1223: MD notfied that patient does not stay awake for PO meds. PO medictions held at this time.

## 2024-08-01 NOTE — PROGRESS NOTES
Telemetry summary    Rhythm: afib  Rate:   Ectopy: r-oPVC, rTrigem, rCouplet  Measurements: -/0.08/-    Normal Values  Rhythm: SR  HR Range:   Measurement: 0.12-0.2/0.06-0.10/0.30-0.52

## 2024-08-01 NOTE — CARE PLAN
The patient is Watcher - Medium risk of patient condition declining or worsening    Shift Goals  Clinical Goals: mentation, restraints, safety  Patient Goals: rest and comfort  Family Goals: FELIPA    Progress made toward(s) clinical / shift goals:    Problem: Knowledge Deficit - Standard  Goal: Patient and family/care givers will demonstrate understanding of plan of care, disease process/condition, diagnostic tests and medications  Outcome: Progressing     Problem: Fall Risk  Goal: Patient will remain free from falls  Outcome: Progressing  Note: Pt is a high fall risk. Bed and strip alarm in place. Pt educated on use of call light for any needed assistance.      Problem: Pain - Standard  Goal: Alleviation of pain or a reduction in pain to the patient’s comfort goal  Outcome: Progressing  Note: Pt reports shoulder pain, medicated per MAR. Pt educated on use of call light for any pain management needs.      Problem: Urinary Elimination  Goal: Establish and maintain regular urinary output  Outcome: Progressing  Note: White catheter in place.

## 2024-08-01 NOTE — DISCHARGE PLANNING
Case Management Discharge Planning    Admission Date: 7/25/2024  GMLOS: 4.4  ALOS: 7    6-Clicks ADL Score: 15  6-Clicks Mobility Score: 12  PT and/or OT Eval ordered: Yes  Post-acute Referrals Ordered: Yes  Post-acute Choice Obtained: Yes  Has referral(s) been sent to post-acute provider:  Yes      Anticipated Discharge Dispo: Discharge Disposition: D/T to SNF with Medicare cert in anticipation of skilled care (03)    DME Needed: No    Action(s) Taken:     Plan for pt to DC to post-acute placement. IRF following. [facility] accepted. Restraints discontinued today at 1022. Pending medical clearance.    Escalations Completed: None    Medically Clear: No    Next Steps: Follow-up with medical team to discuss DC needs and barriers.    Barriers to Discharge: Medical clearance

## 2024-08-01 NOTE — PROGRESS NOTES
"At 0700 Received report from Tiffany MONTEIRO.   Bed is locked and in lowest position. Fall and Safety precautions in place. Patient currently in restraints.       At 0800: Assessment completed. Patient drowsy, responds to voice commands intermittently. Disoriented x3, able to respond that he is at the hospital. When asked if he has pain he states \"in my back and hip\" and when asked if he would like anything for pain he responded \" not right now\". He accepted oral fluid intake but does not remain alert enough for breakfast. He states \"no\" when asked if he would like food. Patient in bed, resting, calm and unlabored breathing noted, chest rise noted. No other needs at this time.     "

## 2024-08-01 NOTE — ASSESSMENT & PLAN NOTE
Patient with intermittent incomplete bladder emptying and sometimes overt retention  He is maxed out on meds -on Proscar as well as 0.8 Flomax  White catheter ordered

## 2024-08-01 NOTE — PROGRESS NOTES
Hospital Medicine Daily Progress Note    Date of Service  7/31/2024    Chief Complaint  Karan Wiley is a 64 y.o. male admitted 7/25/2024 with altered mentation    Hospital Course  64-year-old male with past medical history of mild cognitive impairment and recent TKA on the right 2 to 3 days prior to presentation patient apparently has had worsening mentation since returning home and presented here with elevated creatinine, hypokalemia, and no acute process on CT from outside facility    Interval Problem Update  Patient's mentation is somewhat improved, adjusting Zyprexa dosing to optimize him and he does not require restraints.  Patient has had repeated episodes of urinary retention therefore White catheter has been ordered, there is nothing further we can do with medications for his prostate as he is already on full dose Flomax and Proscar     When his mobility improves he will probably be more able to void    Confusing recommendations regarding his right knee mobility, typically range of motion is encouraged early on postoperative however RN states that propping legs straight on a pillow and or placing in a knee immobilizer was recommended earlier.  I have a message out to orthopedics and I will await their response and clarification on this.    I have discussed this patient's plan of care and discharge plan at IDT rounds today with Case Management, Nursing, Nursing leadership, and other members of the IDT team.    Consultants/Specialty  ortho    Code Status  Full Code    Disposition  The patient is not medically cleared for discharge to home or a post-acute facility.  Anticipate discharge to: skilled nursing facility    I have placed the appropriate orders for post-discharge needs.    Review of Systems  Review of Systems   Cardiovascular:  Positive for leg swelling. Negative for chest pain.   Gastrointestinal:  Negative for abdominal pain, nausea and vomiting.   Genitourinary:         Urinary retention  "  Musculoskeletal:         Leg/knee pain   Neurological:         Less confused but continues to make odd statements   Psychiatric/Behavioral:  The patient is nervous/anxious.         Physical Exam  Temp:  [36.4 °C (97.5 °F)-37.3 °C (99.2 °F)] 36.6 °C (97.9 °F)  Pulse:  [] 101  Resp:  [18-20] 18  BP: (119-153)/() 119/79  SpO2:  [91 %-94 %] 91 %    Physical Exam  Vitals and nursing note reviewed.   Constitutional:       General: He is not in acute distress.     Appearance: He is obese. He is not ill-appearing, toxic-appearing or diaphoretic.      Comments:      HENT:      Head: Normocephalic and atraumatic.      Nose: Nose normal.      Mouth/Throat:      Mouth: Mucous membranes are moist.   Eyes:      Extraocular Movements: Extraocular movements intact.      Pupils: Pupils are equal, round, and reactive to light.   Cardiovascular:      Rate and Rhythm: Regular rhythm. Tachycardia present.   Pulmonary:      Effort: No respiratory distress.      Breath sounds: No stridor. No wheezing, rhonchi or rales.   Abdominal:      General: Bowel sounds are normal. There is distension (Due to obesity).      Tenderness: There is no abdominal tenderness.   Musculoskeletal:      Right lower leg: Edema (Moderate to severe, worse with worsening discoloration) present.      Left lower leg: Edema (Mild) present.   Skin:     Findings: Bruising (Worsening discoloration, more diffuse) present.   Neurological:      General: No focal deficit present.      Comments: The patient knows the year, that he had knee surgery last week, oriented to self however thinks he is in \"the clinic in [place]\"  Overall he is improved since yesterday but still required restraints overnight  Eliquis was resumed last night which is good because his leg is significantly more swollen today     Psychiatric:      Comments: He remains a little bit off and makes intermittent strange statements still between lucid statements         Fluids    Intake/Output " Summary (Last 24 hours) at 7/31/2024 1831  Last data filed at 7/31/2024 1500  Gross per 24 hour   Intake 300 ml   Output 2330 ml   Net -2030 ml       Laboratory  Recent Labs     07/29/24  0914 07/30/24  0125   WBC 11.0* 10.8   RBC 3.52* 3.48*   HEMOGLOBIN 10.6* 10.5*   HEMATOCRIT 32.4* 32.1*   MCV 92.0 92.2   MCH 30.1 30.2   MCHC 32.7 32.7   RDW 44.2 44.8   PLATELETCT 309 321   MPV 11.3 11.3     Recent Labs     07/29/24  0914 07/30/24  0125   SODIUM 135 136   POTASSIUM 3.7 3.8   CHLORIDE 97 97   CO2 25 25   GLUCOSE 121* 110*   BUN 13 13   CREATININE 0.97 0.91   CALCIUM 9.7 9.8                   Imaging  EC-ECHOCARDIOGRAM COMPLETE W/ CONT   Final Result      DX-CHEST-PORTABLE (1 VIEW)   Final Result      Elevated right diaphragm.      CT-CTA CHEST PULMONARY ARTERY W/ RECONS   Final Result      1.  No pulmonary embolism detected.   2.  Elevated right diaphragm. Right basilar pulmonary opacity and volume loss suggesting atelectasis and/or mild infiltrate.            CT-KNEE W/O RIGHT   Final Result      1.  Findings in keeping with interval total knee arthroplasty without specific findings to indicate infection   2.  Osteopenia      US-RENAL   Final Result         1.  Normal renal ultrasound.      OUTSIDE IMAGES-DX CHEST   Final Result      OUTSIDE IMAGES-CT HEAD   Final Result      OUTSIDE IMAGES-DX LOWER EXTREMITY, RIGHT   Final Result      OUTSIDE IMAGES-CT CERVICAL SPINE   Final Result           Assessment/Plan  * Senile dementia, delirium, with behavioral disturbance (HCC)- (present on admission)  Assessment & Plan  Patient has a history of diagnosed dementia and has atrophy noted on outside CT. he however has been worse than baseline likely precipitated by recent surgery, anesthesia, polypharmacy, AQUILES, electrolyte disturbance, decreased oral intake and pain.    He did not respond to Seroquel and has been getting agitated particularly at night.  This morning patient was agitated, anxious, paranoid, diaphoretic,  tachycardic, hypertensive.  Improved with Zyprexa    Will schedule Zyprexa twice daily and continue to monitor.  I renewed wrist restraints for safety but they should be avoided if at all possible as they will worsen his paranoia and agitation/anxiety    Pneumonia of right lower lobe due to infectious organism  Assessment & Plan  CXR w elev R hemidiaphragm w possible infiltrate  Lower suspicion for PNA but IV unasyn initiated x 5 day course  Patient is not wheezing, when he is agitated this sounds like upper airway stridor  Have discontinued the prednisone as it could be contributing to his delirium and agitation.      Seizure disorder as sequela of cerebrovascular accident (HCC)  Assessment & Plan  On depakote  I do not suspect seizure at this time    SIRS (systemic inflammatory response syndrome) ([facility])  Assessment & Plan  SIRS criteria identified on my evaluation include:  Tachycardia, with heart rate greater than 90 BPM and Leukocytosis, with WBC greater than 12,000  Unclear cause of SIRS  R knee does not appear infected  UA at OSH neg, here also not convincing, UCX pending  BCX NGTD  CXR without PNA he did start diffuzely wheezing yest afternoon 7/27  He was initially on IV Unasyn/zyvox, zyvox dc'd given low suspicion for mrsa infection/mrsa pcr neg. I dc'd IV Unasyn 7/26 and his WBC is climbing up.  I have restarted IV unasyn at this time...  C/f Etoh w/d? Although pt is altered he did report to nursing he drinks beer. Wife had denies heavy consistent drinking prior to his surgery. Consider tx Etoh w/d      Fall  Assessment & Plan  Had a fall at home  CTH/CT c spine [facility] without acute findingds  Some contusion and ecchymoses are residual however CBC stable    Sinus tachycardia  Assessment & Plan  Possibly from agitation  No clear w/d  per wife not drinking excessive etoh  Patient has a spine stimulator in place   CTA neg for PE  7/27 now in afib w rvr    Polypharmacy  Assessment & Plan  As above cf  polypharmacy contributing to delirium  Patient now on only Norco and Tylenol for pain  Consider discontinuation of hydrocodone if possible however we will see how he improves on the Zyprexa    Anemia  Assessment & Plan  Likely post op related  Hb down to 10 so relatively stable  Hemoglobin has been stable will resume Eliquis as he is high risk given his recent knee surgery and his A-fib    Status post right knee replacement  Assessment & Plan  Patient complains of significant R knee pain  Although does have swelling, some warmth and erythema, I spoke with DR. Barragan who reassured that it looks as expected post op  CT knee w/ suspected post op changes  CRP/ESR elev but post op certainly there is inflammatory state  PT/OT - SNF    Acute kidney injury (HCC)- (present on admission)  Assessment & Plan  Cr up to 3.88 at OSH  Resolved with IV fluids      Body mass index 40.0-44.9, adult (HCC)- (present on admission)  Assessment & Plan  Body mass index is 42.37 kg/m².  Outpatient weight loss management program and lifestyle modification highly recommended    Essential hypertension, benign- (present on admission)  Assessment & Plan  Hypertensive while agitated improved when he was more calm    Hyperlipidemia- (present on admission)  Assessment & Plan  Low-fat low-cholesterol diet  Continue Lipitor 40 mg nightly and Zetia 10 mg nightly      Depression- (present on admission)  Assessment & Plan  Continue Effexor XR  Holding trazodone given altered mental status    CAD (coronary artery disease)- (present on admission)  Assessment & Plan  Optimize medication management  2020 patient had PCI x 2  Currently chest pain-free  Continue oxygen support      Hypokalemia- (present on admission)  Assessment & Plan  K of 3.3 likely from poor PO intake  Repleted      Paroxysmal atrial fibrillation (HCC)- (present on admission)  Assessment & Plan  On amiodarone and metoprolol      Atrial fibrillation with rapid ventricular response (HCC)-  (present on admission)  Assessment & Plan  Patient has hx of paroxysmal afib  Tachycardic again this morning likely due to severe agitation, continue medications, patient is to finish out IV amiodarone today, for what ever reason bag was not running properly overnight  Will initiate amiodarone 200 twice daily tonight      BPH (benign prostatic hyperplasia)- (present on admission)  Assessment & Plan  Continue finasteride and Flomax    Type 2 diabetes mellitus without complication, without long-term current use of insulin (HCC)- (present on admission)  Assessment & Plan  -SSI  -follow glycohemoglobin levels long term, most recent hemoglobin A1c 6.2  -Hold metformin   -monitor for hypoglycemic episodes and adjust control if he should get low         VTE prophylaxis:    therapeutic anticoagulation with eliquis 5 mg BID      I have performed a physical exam and reviewed and updated ROS and Plan today (7/31/2024). In review of yesterday's note (7/30/2024), there are no changes except as documented above.

## 2024-08-01 NOTE — THERAPY
Occupational Therapy  Daily Treatment     Patient Name: Karan Wiley  Age:  64 y.o., Sex:  male  Medical Record #: [id number]  Today's Date:  7/31/2024     Precautions  Precautions: Weight Bearing As Tolerated Right Lower Extremity, Fall Risk    Assessment  Pt seen for OT treatment. Ox1, confused. Improved participation, command following. MaxA with most ADL's, functional mobility; see below for CLOF. Limited by impaired cognition, balance, R knee pain. OT will continue to follow.       Plan  Treatment Plan Status: Continue Current Treatment Plan  Type of Treatment: Self Care / Activities of Daily Living, Neuro Re-Education / Balance, Therapeutic Activity  Treatment Frequency: 3 Times per Week  Treatment Duration: Until Therapy Goals Met    DC Equipment Recommendations: Unable to determine at this time  Discharge Recommendations: Recommend post-acute placement for additional occupational therapy services prior to discharge home    Subjective  Agreeable     Objective   07/31/24 1055   Pain 0 - 10 Group   Location Knee   Location Orientation Right   Therapist Pain Assessment Post Activity Pain Same as Prior to Activity;Nurse Notified   Cognition    Cognition / Consciousness X   Ability To Follow Commands 1 Step   Safety Awareness Impaired   Attention Impaired   Balance   Sitting Balance (Static) Fair   Sitting Balance (Dynamic) Fair -   Standing Balance (Static) Fair   Standing Balance (Dynamic) Fair -   Weight Shift Sitting Fair   Weight Shift Standing Poor   Skilled Intervention Verbal Cuing   Bed Mobility    Supine to Sit Moderate Assist   Sit to Supine Minimal Assist   Scooting Minimal Assist   Skilled Intervention Verbal Cuing   Activities of Daily Living   Grooming Contact Guard Assist;Seated   Upper Body Dressing Minimal Assist   Lower Body Dressing Maximal Assist   Skilled Intervention Verbal Cuing   How much help from another person does the patient currently need...   Putting on and taking off regular  "lower body clothing? 2   Bathing (including washing, rinsing, and drying)? 2   Toileting, which includes using a toilet, bedpan, or urinal? 2   Putting on and taking off regular upper body clothing? 2   Taking care of personal grooming such as brushing teeth? 3   Eating meals? 4   6 Clicks Daily Activity Score 15   Functional Mobility   Sit to Stand Minimal Assist   Bed, Chair, Wheelchair Transfer Moderate Assist   Transfer Method Stand Step   Comments fww, 2-assist   Activity Tolerance   Sitting Edge of Bed 15\"   Standing 7-8\"   Short Term Goals   Goal Outcome # 1 Progressing as expected   Goal Outcome # 2 Progressing as expected   Goal Outcome # 3 Progressing as expected   Goal Outcome # 4 Progressing as expected   Education Group   Role of Occupational Therapist Patient Response Patient;Verbal Demonstration;Acceptance;No Learning Evidence;Explanation   Home Safety Patient Response Patient   Transfers Patient Response Patient;Action Demonstration;Explanation;Verbal Demonstration;Acceptance;Reinforcement Needed   ADL Patient Response Patient;Verbal Demonstration;Reinforcement Needed;Explanation;Acceptance;Action Demonstration   Occupational Therapy Treatment Plan    O.T. Treatment Plan Continue Current Treatment Plan   Treatment Interventions Self Care / Activities of Daily Living;Neuro Re-Education / Balance;Therapeutic Activity   Treatment Frequency 3 Times per Week   Duration Until Therapy Goals Met   Anticipated Discharge Equipment and Recommendations   DC Equipment Recommendations Unable to determine at this time   Discharge Recommendations Recommend post-acute placement for additional occupational therapy services prior to discharge home   Interdisciplinary Plan of Care Collaboration   IDT Collaboration with  Nursing;Certified Nursing Assistant   Patient Position at End of Therapy In Bed;Bed Alarm On;Call Light within Reach;Tray Table within Reach;Phone within Reach   Collaboration Comments aware of visit. "

## 2024-08-01 NOTE — CARE PLAN
The patient is Watcher - Medium risk of patient condition declining or worsening    Shift Goals  Clinical Goals: Neuro Checks, Restraints, Monitor Heart Rate and BP  Patient Goals: Comfort  Family Goals: FELIPA    Progress made toward(s) clinical / shift goals:    Problem: Knowledge Deficit - Standard  Goal: Patient and family/care givers will demonstrate understanding of plan of care, disease process/condition, diagnostic tests and medications  Outcome: Progressing  Note: Reviewed POC with patient and wife; discussed and provided education on medications, restraints, reorientation, Q4 Neuro checks, PT/OT involved, and oxygen monitoring.      Problem: Safety - Medical Restraint  Goal: Remains free of injury from restraints (Restraint for Interference with Medical Device)  Outcome: Progressing  Flowsheets (Taken 7/31/2024 1840)  Addressed this shift: Remains free of injury from restraints (restraint for interference with medical device):   Determine that other, less restrictive measures have been tried or would not be effective before applying the restraint   Inform patient/family regarding the reason for restraint   Every 2 hours: Monitor safety, psychosocial status, comfort, nutrition and hydration  Goal: Free from restraint(s) (Restraint for Interference with Medical Device)  Outcome: Progressing  Flowsheets (Taken 7/31/2024 1840)  Addressed this shift: Free from restraint(s) (restraint for interference with medical device):   ONCE/SHIFT or MINIMUM Every 12 hours: Assess and document the continuing need for restraints   Every 24 hours: Continued use of restraint requires Licensed Independent Practitioner to perform face to face examination and written order   Identify and implement measures to help patient regain control     Problem: Urinary Elimination  Goal: Establish and maintain regular urinary output  Outcome: Progressing  Note: Patient retaining urine. White ordered and placed. Medications in MAR to aid with  urinary elimination.        Patient is not progressing towards the following goals:

## 2024-08-01 NOTE — DISCHARGE PLANNING
Follow up with Spouse about post acute options.I was able to reach her on my personal cell.  Alesia tells me that Karan was failing at home after his initial surgery. Not participating in a home exercise program. Per Alesia his dementia and confusion has been a gradually increasing. Non compliance with home  to a fall. Per Alesia he may have had a seizure associated with the fall. Alesia tells me the Karan would regularly stay in bed 24/7 prior to surgery.  Alesia tells me that there was some plan for his other knee to be surgically repaired. Discussed IRF level of care Alesia agreed that he would not tolerate 3 hours per day 5 days a week. Alesia said that his confusing would be a barrier to new learning also. Discussed skilled nursing. Alesia is agreeable to skilled nursing placement I discussed that per chart review referral sent to [facility]. [facility] no longer following Choice for skilled nursing.

## 2024-08-02 LAB
ANION GAP SERPL CALC-SCNC: 12 MMOL/L (ref 7–16)
BUN SERPL-MCNC: 14 MG/DL (ref 8–22)
CALCIUM SERPL-MCNC: 8.9 MG/DL (ref 8.4–10.2)
CHLORIDE SERPL-SCNC: 97 MMOL/L (ref 96–112)
CO2 SERPL-SCNC: 30 MMOL/L (ref 20–33)
CREAT SERPL-MCNC: 0.89 MG/DL (ref 0.5–1.4)
EKG IMPRESSION: NORMAL
GFR SERPLBLD CREATININE-BSD FMLA CKD-EPI: 96 ML/MIN/1.73 M 2
GLUCOSE BLD STRIP.AUTO-MCNC: 101 MG/DL (ref 65–99)
GLUCOSE BLD STRIP.AUTO-MCNC: 114 MG/DL (ref 65–99)
GLUCOSE BLD STRIP.AUTO-MCNC: 115 MG/DL (ref 65–99)
GLUCOSE SERPL-MCNC: 95 MG/DL (ref 65–99)
POTASSIUM SERPL-SCNC: 3 MMOL/L (ref 3.6–5.5)
SODIUM SERPL-SCNC: 139 MMOL/L (ref 135–145)

## 2024-08-02 PROCEDURE — 700102 HCHG RX REV CODE 250 W/ 637 OVERRIDE(OP): Performed by: INTERNAL MEDICINE

## 2024-08-02 PROCEDURE — A9270 NON-COVERED ITEM OR SERVICE: HCPCS | Performed by: INTERNAL MEDICINE

## 2024-08-02 PROCEDURE — 770020 HCHG ROOM/CARE - TELE (206)

## 2024-08-02 PROCEDURE — 82962 GLUCOSE BLOOD TEST: CPT | Mod: 91

## 2024-08-02 PROCEDURE — 99232 SBSQ HOSP IP/OBS MODERATE 35: CPT | Performed by: HOSPITALIST

## 2024-08-02 PROCEDURE — 700102 HCHG RX REV CODE 250 W/ 637 OVERRIDE(OP): Performed by: HOSPITALIST

## 2024-08-02 PROCEDURE — 700105 HCHG RX REV CODE 258: Performed by: INTERNAL MEDICINE

## 2024-08-02 PROCEDURE — 700102 HCHG RX REV CODE 250 W/ 637 OVERRIDE(OP): Mod: JZ | Performed by: HOSPITALIST

## 2024-08-02 PROCEDURE — 93010 ELECTROCARDIOGRAM REPORT: CPT | Performed by: INTERNAL MEDICINE

## 2024-08-02 PROCEDURE — 93005 ELECTROCARDIOGRAM TRACING: CPT | Performed by: HOSPITALIST

## 2024-08-02 PROCEDURE — 94760 N-INVAS EAR/PLS OXIMETRY 1: CPT

## 2024-08-02 PROCEDURE — 700111 HCHG RX REV CODE 636 W/ 250 OVERRIDE (IP): Mod: JZ | Performed by: INTERNAL MEDICINE

## 2024-08-02 PROCEDURE — A9270 NON-COVERED ITEM OR SERVICE: HCPCS | Performed by: HOSPITALIST

## 2024-08-02 PROCEDURE — 97110 THERAPEUTIC EXERCISES: CPT

## 2024-08-02 PROCEDURE — 80048 BASIC METABOLIC PNL TOTAL CA: CPT

## 2024-08-02 PROCEDURE — A9270 NON-COVERED ITEM OR SERVICE: HCPCS | Mod: JZ | Performed by: HOSPITALIST

## 2024-08-02 PROCEDURE — 97140 MANUAL THERAPY 1/> REGIONS: CPT

## 2024-08-02 PROCEDURE — 97116 GAIT TRAINING THERAPY: CPT

## 2024-08-02 RX ORDER — FUROSEMIDE 20 MG
20 TABLET ORAL
Status: DISCONTINUED | OUTPATIENT
Start: 2024-08-03 | End: 2024-08-05

## 2024-08-02 RX ORDER — POTASSIUM CHLORIDE 1500 MG/1
20 TABLET, EXTENDED RELEASE ORAL 3 TIMES DAILY
Status: COMPLETED | OUTPATIENT
Start: 2024-08-02 | End: 2024-08-03

## 2024-08-02 RX ADMIN — OMEPRAZOLE 40 MG: 20 CAPSULE, DELAYED RELEASE ORAL at 21:22

## 2024-08-02 RX ADMIN — FUROSEMIDE 20 MG: 10 INJECTION, SOLUTION INTRAVENOUS at 06:15

## 2024-08-02 RX ADMIN — APIXABAN 5 MG: 5 TABLET, FILM COATED ORAL at 18:10

## 2024-08-02 RX ADMIN — POTASSIUM CHLORIDE 20 MEQ: 1500 TABLET, EXTENDED RELEASE ORAL at 06:18

## 2024-08-02 RX ADMIN — OLANZAPINE 5 MG: 5 TABLET, ORALLY DISINTEGRATING ORAL at 18:11

## 2024-08-02 RX ADMIN — EZETIMIBE 10 MG: 10 TABLET ORAL at 21:22

## 2024-08-02 RX ADMIN — VENLAFAXINE HYDROCHLORIDE 225 MG: 75 CAPSULE, EXTENDED RELEASE ORAL at 21:22

## 2024-08-02 RX ADMIN — VALPROIC ACID 125 MG: 250 SOLUTION ORAL at 12:36

## 2024-08-02 RX ADMIN — AMIODARONE HYDROCHLORIDE 200 MG: 200 TABLET ORAL at 06:18

## 2024-08-02 RX ADMIN — ACETAMINOPHEN 1000 MG: 500 TABLET ORAL at 21:22

## 2024-08-02 RX ADMIN — ACETAMINOPHEN 1000 MG: 500 TABLET ORAL at 06:18

## 2024-08-02 RX ADMIN — FINASTERIDE 5 MG: 5 TABLET, FILM COATED ORAL at 06:18

## 2024-08-02 RX ADMIN — METOPROLOL TARTRATE 25 MG: 25 TABLET, FILM COATED ORAL at 12:35

## 2024-08-02 RX ADMIN — AMPICILLIN AND SULBACTAM 3 G: 1; 2 INJECTION, POWDER, FOR SOLUTION INTRAMUSCULAR; INTRAVENOUS at 06:20

## 2024-08-02 RX ADMIN — VALPROIC ACID 125 MG: 250 SOLUTION ORAL at 06:30

## 2024-08-02 RX ADMIN — GABAPENTIN 200 MG: 100 CAPSULE ORAL at 13:25

## 2024-08-02 RX ADMIN — APIXABAN 5 MG: 5 TABLET, FILM COATED ORAL at 06:18

## 2024-08-02 RX ADMIN — POTASSIUM CHLORIDE 20 MEQ: 1500 TABLET, EXTENDED RELEASE ORAL at 18:10

## 2024-08-02 RX ADMIN — POTASSIUM CHLORIDE 20 MEQ: 1500 TABLET, EXTENDED RELEASE ORAL at 12:33

## 2024-08-02 RX ADMIN — TAMSULOSIN HYDROCHLORIDE 0.4 MG: 0.4 CAPSULE ORAL at 18:10

## 2024-08-02 RX ADMIN — DOCUSATE SODIUM 100 MG: 100 CAPSULE, LIQUID FILLED ORAL at 06:18

## 2024-08-02 RX ADMIN — ATORVASTATIN CALCIUM 40 MG: 40 TABLET, FILM COATED ORAL at 21:22

## 2024-08-02 RX ADMIN — OLANZAPINE 2.5 MG: 5 TABLET, ORALLY DISINTEGRATING ORAL at 06:18

## 2024-08-02 RX ADMIN — VALPROIC ACID 125 MG: 250 SOLUTION ORAL at 18:11

## 2024-08-02 RX ADMIN — METOPROLOL TARTRATE 25 MG: 25 TABLET, FILM COATED ORAL at 21:22

## 2024-08-02 RX ADMIN — METOPROLOL TARTRATE 25 MG: 25 TABLET, FILM COATED ORAL at 18:11

## 2024-08-02 RX ADMIN — METOPROLOL TARTRATE 25 MG: 25 TABLET, FILM COATED ORAL at 08:27

## 2024-08-02 RX ADMIN — AMPICILLIN AND SULBACTAM 3 G: 1; 2 INJECTION, POWDER, FOR SOLUTION INTRAMUSCULAR; INTRAVENOUS at 00:09

## 2024-08-02 RX ADMIN — TAMSULOSIN HYDROCHLORIDE 0.4 MG: 0.4 CAPSULE ORAL at 06:18

## 2024-08-02 RX ADMIN — AMIODARONE HYDROCHLORIDE 200 MG: 200 TABLET ORAL at 18:10

## 2024-08-02 RX ADMIN — HYDROCODONE BITARTRATE AND ACETAMINOPHEN 1 TABLET: 5; 325 TABLET ORAL at 08:26

## 2024-08-02 RX ADMIN — POTASSIUM CHLORIDE 20 MEQ: 1500 TABLET, EXTENDED RELEASE ORAL at 08:26

## 2024-08-02 RX ADMIN — ACETAMINOPHEN 1000 MG: 500 TABLET ORAL at 13:26

## 2024-08-02 RX ADMIN — HYDROCODONE BITARTRATE AND ACETAMINOPHEN 1 TABLET: 5; 325 TABLET ORAL at 18:09

## 2024-08-02 ASSESSMENT — ENCOUNTER SYMPTOMS
ORTHOPNEA: 0
PALPITATIONS: 0
HEMOPTYSIS: 0
NAUSEA: 0
CHILLS: 0
SPUTUM PRODUCTION: 0
VOMITING: 0
COUGH: 0
MEMORY LOSS: 1
DIZZINESS: 0

## 2024-08-02 ASSESSMENT — COGNITIVE AND FUNCTIONAL STATUS - GENERAL
WALKING IN HOSPITAL ROOM: A LITTLE
CLIMB 3 TO 5 STEPS WITH RAILING: A LOT
MOVING TO AND FROM BED TO CHAIR: A LITTLE
MOBILITY SCORE: 17
TURNING FROM BACK TO SIDE WHILE IN FLAT BAD: A LITTLE
MOVING FROM LYING ON BACK TO SITTING ON SIDE OF FLAT BED: A LITTLE
SUGGESTED CMS G CODE MODIFIER MOBILITY: CK
STANDING UP FROM CHAIR USING ARMS: A LITTLE

## 2024-08-02 ASSESSMENT — PAIN DESCRIPTION - PAIN TYPE
TYPE: ACUTE PAIN

## 2024-08-02 ASSESSMENT — GAIT ASSESSMENTS
GAIT LEVEL OF ASSIST: CONTACT GUARD ASSIST
DEVIATION: ATAXIC;BRADYKINETIC
ASSISTIVE DEVICE: FRONT WHEEL WALKER
DISTANCE (FEET): 100

## 2024-08-02 ASSESSMENT — FIBROSIS 4 INDEX: FIB4 SCORE: 1.38

## 2024-08-02 NOTE — CARE PLAN
The patient is Stable - Low risk of patient condition declining or worsening    Shift Goals  Clinical Goals: monitor mentation; patient safety; pain control  Patient Goals: rest  Family Goals: FELIPA    Progress made toward(s) clinical / shift goals:    Problem: Knowledge Deficit - Standard  Goal: Patient and family/care givers will demonstrate understanding of plan of care, disease process/condition, diagnostic tests and medications  Outcome: Progressing  Note: Patient updated on plan of care. Increase activity as tolerated; pain control; monitor vitals/labs; monitor mentation and re-orient as needed. Family updated on plan of care via phone call, family verbalized understanding of plan of care.      Problem: Fall Risk  Goal: Patient will remain free from falls  Outcome: Progressing  Note: Appropriate fall risk prevention interventions in place. Remain free of falls.        Patient is not progressing towards the following goals:

## 2024-08-02 NOTE — THERAPY
Physical Therapy   Daily Treatment     Patient Name: Karan Wiley  Age:  64 y.o., Sex:  male  Medical Record #: [id number]  Today's Date: 8/2/2024     Precautions  Precautions: (P) Weight Bearing As Tolerated Right Lower Extremity;Fall Risk    Assessment    Pt is progressing very well and able to demonstrate improved upright mobility. Pt was able to demonstrate an increase in ambulation distance to a total of 200 ft with frequent seated/standing rest breaks. Pt was limited by fatigue and SOB and demonstrated with peak HR of 160's, RN aware. Pt was able to tolerate supine and seated exercises well, however, requires frequent VC and demo due to poor following at times secondary to confusion/[place]. Pt able to tolerate manual therapy and demonstrate improved knee flexion ROM from prior session. Pt was able to tolerate gait training and does well with demonstration for heel to toe and increasing stride length. Recommend post-acute placement for continued physical therapy services prior to discharge home.       Plan    Treatment Plan Status: (P) Continue Current Treatment Plan  Type of Treatment: Bed Mobility, Gait Training, Manual Therapy, Neuro Re-Education / Balance, Therapeutic Activities, Therapeutic Exercise  Treatment Frequency: 5 Times per Week  Treatment Duration:      DC Equipment Recommendations: (P) Unable to determine at this time  Discharge Recommendations: (P) Recommend post-acute placement for additional physical therapy services prior to discharge home    Objective       08/02/24 1048    Services   Is patient using  services for this encounter? No   Precautions   Precautions Weight Bearing As Tolerated Right Lower Extremity;Fall Risk   Vitals   Pulse (!) 160   O2 (LPM) 2   O2 Delivery Device Nasal Cannula   Vitals Comments after ambulation peak HR of 160s, RN aware   Pain 0 - 10 Group   Location Leg   Location Orientation Lower;Right   Description Aching   Therapist Pain Assessment  Prior to Activity;During Activity;Post Activity;Nurse Notified  (c/o minimal pain, not rated formally)   Cognition    Cognition / Consciousness X   Level of Consciousness Alert   Ability To Follow Commands 1 Step   Safety Awareness Impaired   Comments improved mentation able to follow commands consistently, however, [place]   Passive ROM Lower Body   Passive ROM Lower Body X   Rt Knee Flexion Degrees 100   Rt Knee Extension Degrees 0   Comments provided with static extension stretch and posterior mobilization to tibia and able to improve to 0 deg extension in supine   Supine Lower Body Exercise   Straight Leg Raises 1 set of 10;Right   Heel Slide 1 set of 10;Right   Ankle Pumps Reciprocal;1 set of 10   Quadriceps Isometrics 1 set of 10;Right   Sitting Lower Body Exercises   Long Arc Quad 1 set of 10;Right   Comments seated heel slides 20 reps   Home Exercise Program   Home Exercise Program Patient Requires Assistance / Guarding to Complete Home Program   Other Treatments   Other Treatments Provided pt provided with PA mobilization to tibia to improve knee extension along with static stretch. Pt provided with flexion PROM in sitting to improve flexion ROM   Neurological Concerns   Neurological Concerns No   Balance   Sitting Balance (Static) Fair +   Sitting Balance (Dynamic) Fair   Standing Balance (Static) Fair   Standing Balance (Dynamic) Fair   Weight Shift Sitting Fair   Weight Shift Standing Fair   Skilled Intervention Verbal Cuing;Postural Facilitation;Facilitation   Comments w/fww use   Bed Mobility    Supine to Sit Contact Guard Assist   Sit to Supine Contact Guard Assist   Scooting Contact Guard Assist   Skilled Intervention Verbal Cuing;Sequencing;Facilitation   Gait Analysis   Gait Level Of Assist Contact Guard Assist   Assistive Device Front Wheel Walker   Distance (Feet) 100   # of Times Distance was Traveled 2   Deviation Ataxic;Bradykinetic   Weight Bearing Status wbat R   Skilled Intervention Verbal  Cuing;Postural Facilitation;Facilitation   Comments requires frequent standing and seated rest breaks due to high HR and SOB with ambulation   Functional Mobility   Sit to Stand Minimal Assist   Bed, Chair, Wheelchair Transfer Minimal Assist   Transfer Method Stand Step   Mobility EOB, sit<>stand, ambulation, transfer to chair   Skilled Intervention Verbal Cuing;Sequencing   6 Clicks Assessment - How much HELP from from another person do you currently need... (If the patient hasn't done an activity recently, how much help from another person do you think he/she would need if he/she tried?)   Turning from your back to your side while in a flat bed without using bedrails? 3   Moving from lying on your back to sitting on the side of a flat bed without using bedrails? 3   Moving to and from a bed to a chair (including a wheelchair)? 3   Standing up from a chair using your arms (e.g., wheelchair, or bedside chair)? 3   Walking in hospital room? 3   Climbing 3-5 steps with a railing? 2   6 clicks Mobility Score 17   Activity Tolerance   Sitting in Chair left up in chair, RN aware   Sitting Edge of Bed 15 mins   Standing 6 mins x 3   Comments limited by fatigue and high HR   Patient / Family Goals    Patient / Family Goal #1 nt stated   Short Term Goals    Short Term Goal # 1 S with bed mob in 6 V   Goal Outcome # 1 Progressing as expected   Short Term Goal # 2 S with transfers in 6 V   Goal Outcome # 2 Progressing as expected   Short Term Goal # 3 S with amb x 100 feet in 6 V   Goal Outcome # 3 Goal not met   Education Group   Role of Physical Therapist Patient Response Patient;Acceptance;Explanation;Demonstration;Verbal Demonstration;Action Demonstration;Reinforcement Needed;No Learning Evidence   Physical Therapy Treatment Plan   Physical Therapy Treatment Plan Continue Current Treatment Plan   Anticipated Discharge Equipment and Recommendations   DC Equipment Recommendations Unable to determine at this time   Discharge  Recommendations Recommend post-acute placement for additional physical therapy services prior to discharge home   Interdisciplinary Plan of Care Collaboration   IDT Collaboration with  Nursing   Patient Position at End of Therapy Seated;Chair Alarm On;Call Light within Reach;Tray Table within Reach;Phone within Reach   Collaboration Comments aware of visit and recs   Session Information   Date / Session Number  8/2-4 (4/5, 8/2)

## 2024-08-02 NOTE — HOSPITAL COURSE
Karan Wiley has a history of mild cognitive impairment.  He underwent total knee replacement on 7/23/2024.  Patient presented initially to an outside hospital with confusion.  He was transferred to [facility] for evaluation of potential joint infection.  His knee has been evaluated and workup is reassuring that there is no infection.  He has developed agitation but this has since resolved.  He had A-fib with RVR medications have been adjusted and is now under good control.  Patient is not appropriate to discharge home given his limited tolerance secondary to shortness of breath and elevated heart rate.  He requires continued cues and has poor insight and judgment.  He would benefit from continued skilled nursing after discharge from the hospital.

## 2024-08-02 NOTE — CARE PLAN
The patient is Stable - Low risk of patient condition declining or worsening    Shift Goals  Clinical Goals: mentation, remain free from restraints  Patient Goals: rest  Family Goals: FELIPA    Progress made toward(s) clinical / shift goals:    Problem: Knowledge Deficit - Standard  Goal: Patient and family/care givers will demonstrate understanding of plan of care, disease process/condition, diagnostic tests and medications  Outcome: Progressing  Note: Pt updated on POC, agreeable. Pt is currently Aox3, disoriented to situation. Pt is currently free from restraints.      Problem: Fall Risk  Goal: Patient will remain free from falls  Outcome: Progressing  Note: Pt is a high fall risk. Fall precautions in place. Call light within reach.      Problem: Pain - Standard  Goal: Alleviation of pain or a reduction in pain to the patient’s comfort goal  Outcome: Progressing  Note: Pt reported mild pain, denied needing any pain management interventions. Education provided on use of call light for any pain management needs.

## 2024-08-02 NOTE — PROGRESS NOTES
Telemetry Shift Summary    Rhythm AFib  HR Range   Ectopy orPVC, rBig, rTrig, rCoup  Measurements -/0.06/-    Normal Values  Rhythm SR  HR Range:   Measurements: 0.12-0.20/0.06-0.10/0.30-0.52

## 2024-08-02 NOTE — PROGRESS NOTES
Telemetry Shift Summary     Rhythm Afib/Aflutter  HR Range   Ectopy r-oPVC; rCoup; rBig; rTrig  Measurements -/0.06/-     Ectopy as reported by monitor tech         Normal Values  Rhythm SR  HR Range    Measurements 0.12-0.20 / 0.06-0.10  / 0.30-0.52

## 2024-08-02 NOTE — PROGRESS NOTES
Hospital Medicine Daily Progress Note    Date of Service  8/1/2024    Chief Complaint  Karan Wiley is a 64 y.o. male admitted 7/25/2024 with altered mentation    Hospital Course  64-year-old male with past medical history of mild cognitive impairment and recent TKA on the right 2 to 3 days prior to presentation patient apparently has had worsening mentation since returning home and presented here with elevated creatinine, hypokalemia, and no acute process on CT from outside facility    Interval Problem Update  Patient's very drowsy all day. Was watching TV some in afternoon.    Multiple med changes made    I have discussed this patient's plan of care and discharge plan at IDT rounds today with Case Management, Nursing, Nursing leadership, and other members of the IDT team.    Consultants/Specialty  ortho    Code Status  Full Code    Disposition  The patient is not medically cleared for discharge to home or a post-acute facility.  Anticipate discharge to: skilled nursing facility    I have placed the appropriate orders for post-discharge needs.    Review of Systems  Review of Systems   Unable to perform ROS: Other    (Too sedated)    Physical Exam  Temp:  [36.3 °C (97.4 °F)-37.6 °C (99.6 °F)] 36.3 °C (97.4 °F)  Pulse:  [] 105  Resp:  [18-20] 18  BP: ()/() 137/91  SpO2:  [90 %-93 %] 91 %    Physical Exam  Vitals and nursing note reviewed.   Constitutional:       General: He is not in acute distress.     Appearance: He is obese. He is not ill-appearing, toxic-appearing or diaphoretic.      Comments:      HENT:      Head: Normocephalic and atraumatic.      Nose: Nose normal.      Mouth/Throat:      Mouth: Mucous membranes are moist.   Eyes:      Extraocular Movements: Extraocular movements intact.   Cardiovascular:      Rate and Rhythm: Regular rhythm. Tachycardia present.   Pulmonary:      Effort: No respiratory distress.      Breath sounds: No stridor. No wheezing, rhonchi or rales.    Abdominal:      General: Bowel sounds are normal. There is distension (Due to obesity).      Tenderness: There is no abdominal tenderness.   Musculoskeletal:      Right lower leg: Edema (3+) present.      Left lower leg: Edema (Mild) present.   Skin:     Findings: Bruising (entire RLE) present.   Neurological:      Comments: Very groggy, slurring and cannot stay awake to answer questions   Psychiatric:      Comments: Unable to determine         Fluids    Intake/Output Summary (Last 24 hours) at 8/1/2024 1846  Last data filed at 8/1/2024 1811  Gross per 24 hour   Intake 270 ml   Output 1400 ml   Net -1130 ml       Laboratory  Recent Labs     07/30/24  0125   WBC 10.8   RBC 3.48*   HEMOGLOBIN 10.5*   HEMATOCRIT 32.1*   MCV 92.2   MCH 30.2   MCHC 32.7   RDW 44.8   PLATELETCT 321   MPV 11.3     Recent Labs     07/30/24  0125 08/01/24  0244   SODIUM 136 137   POTASSIUM 3.8 3.0*   CHLORIDE 97 98   CO2 25 28   GLUCOSE 110* 123*   BUN 13 15   CREATININE 0.91 1.07   CALCIUM 9.8 9.0                   Imaging  EC-ECHOCARDIOGRAM COMPLETE W/ CONT   Final Result      DX-CHEST-PORTABLE (1 VIEW)   Final Result      Elevated right diaphragm.      CT-CTA CHEST PULMONARY ARTERY W/ RECONS   Final Result      1.  No pulmonary embolism detected.   2.  Elevated right diaphragm. Right basilar pulmonary opacity and volume loss suggesting atelectasis and/or mild infiltrate.            CT-KNEE W/O RIGHT   Final Result      1.  Findings in keeping with interval total knee arthroplasty without specific findings to indicate infection   2.  Osteopenia      US-RENAL   Final Result         1.  Normal renal ultrasound.      OUTSIDE IMAGES-DX CHEST   Final Result      OUTSIDE IMAGES-CT HEAD   Final Result      OUTSIDE IMAGES-DX LOWER EXTREMITY, RIGHT   Final Result      OUTSIDE IMAGES-CT CERVICAL SPINE   Final Result           Assessment/Plan  * Senile dementia, delirium, with behavioral disturbance (HCC)- (present on admission)  Assessment &  "Plan  Patient has a history of diagnosed dementia and has atrophy noted on outside CT. wife now countering that he does not have a diagnosis of dementia however memory deficit due to vascular disease is listed on his problem list prior to this admission. Also \"sequelae\" due to SDH  Too drowsy this AM- 6pm zyprexa reduced  Trying to go w/o restraints but went after IV, keith and Leads last night  Reducing Norco    Urinary retention due to benign prostatic hyperplasia  Assessment & Plan  Patient with intermittent incomplete bladder emptying and sometimes overt retention  He is maxed out on meds -on Proscar as well as 0.8 Flomax  Keith catheter     Pneumonia of right lower lobe due to infectious organism  Assessment & Plan  CXR w elev R hemidiaphragm w possible infiltrate  Lower suspicion for PNA s/p IV unasyn 5 day course        Seizure disorder as sequela of cerebrovascular accident (HCC)  Assessment & Plan  On depakote  I do not suspect seizure at this time    SIRS (systemic inflammatory response syndrome) ([facility])  Assessment & Plan  SIRS criteria identified on my evaluation include:  Tachycardia, with heart rate greater than 90 BPM and Leukocytosis, with WBC greater than 12,000  Unclear cause of SIRS  R knee does not appear infected  UA at OSH neg, here also not convincing, UCX pending  BCX NGTD  CXR without PNA he did start diffuzely wheezing yest afternoon 7/27  He was initially on IV Unasyn/zyvox, zyvox dc'd given low suspicion for mrsa infection/mrsa pcr neg. I dc'd IV Unasyn 7/26 and his WBC is climbing up.  I have restarted IV unasyn at this time...  C/f Etoh w/d? Although pt is altered he did report to nursing he drinks beer. Wife had denies heavy consistent drinking prior to his surgery. Consider tx Etoh w/d      Fall  Assessment & Plan  Had a fall at home  CTH/[facility] without acute findingds  Some contusion and ecchymoses are residual however CBC stable    Sinus tachycardia  Assessment & " Plan  Possibly from agitation  No clear w/d  per wife not drinking excessive etoh  Patient has a spine stimulator in place   CTA neg for PE  7/27 now in afib w rvr    Polypharmacy  Assessment & Plan  As above cf polypharmacy contributing to delirium  Patient now on only Norco and Tylenol for pain  Delirium not clearing- will reduce norco to 5mg    Anemia  Assessment & Plan  Likely post op related  Hb down to 10 so relatively stable  Hemoglobin has been stable will resume Eliquis as he is high risk given his recent knee surgery and his A-fib    Status post right knee replacement  Assessment & Plan  Patient complains of significant R knee pain  He is only 9d post op  Ortho rec leg elevation while in bed  ROM as tolerated with assistance    Acute kidney injury (HCC)- (present on admission)  Assessment & Plan  Cr up to 3.88 at OSH  Resolved with IV fluids      Body mass index 40.0-44.9, adult (HCC)- (present on admission)  Assessment & Plan  Body mass index is 42.37 kg/m².  Outpatient weight loss management program and lifestyle modification highly recommended    Essential hypertension, benign- (present on admission)  Assessment & Plan  Hypertensive while agitated improved when he was more calm    Hyperlipidemia- (present on admission)  Assessment & Plan  Low-fat low-cholesterol diet  Continue Lipitor 40 mg nightly and Zetia 10 mg nightly      Depression- (present on admission)  Assessment & Plan  Continue Effexor XR  Holding trazodone given altered mental status    CAD (coronary artery disease)- (present on admission)  Assessment & Plan  Optimize medication management  2020 patient had PCI x 2  Currently chest pain-free  Continue oxygen support      Hypokalemia- (present on admission)  Assessment & Plan  K of 3.3 likely from poor PO intake  Repleted      Paroxysmal atrial fibrillation (HCC)- (present on admission)  Assessment & Plan  On amiodarone and metoprolol      Atrial fibrillation with rapid ventricular response  (HCC)- (present on admission)  Assessment & Plan  Patient has hx of paroxysmal afib  Tachycardic again this morning likely due to severe agitation, continue medications, patient is to finish out IV amiodarone today, for what ever reason bag was not running properly overnight  Will initiate amiodarone 200 twice daily tonight      BPH (benign prostatic hyperplasia)- (present on admission)  Assessment & Plan  Continue finasteride and Flomax    Type 2 diabetes mellitus without complication, without long-term current use of insulin (HCC)- (present on admission)  Assessment & Plan  -SSI  -follow glycohemoglobin levels long term, most recent hemoglobin A1c 6.2  -Hold metformin   -monitor for hypoglycemic episodes and adjust control if he should get low         VTE prophylaxis:    therapeutic anticoagulation with eliquis 5 mg BID      I have performed a physical exam and reviewed and updated ROS and Plan today (8/1/2024). In review of yesterday's note (7/31/2024), there are no changes except as documented above.

## 2024-08-02 NOTE — CARE PLAN
The patient is Watcher - Medium risk of patient condition declining or worsening    Shift Goals  Clinical Goals: Monitor mentation, restraint checks, Saftey  Patient Goals: FELIPA  Family Goals: FELIPA    Progress made toward(s) clinical / shift goals:    Problem: Knowledge Deficit - Standard  Goal: Patient and family/care givers will demonstrate understanding of plan of care, disease process/condition, diagnostic tests and medications  Outcome: Progressing  Note: Patient restraints removed during shift and was able to follow commands more proficiently. Occasional confusion in conversation. Patient increased in alertness. Q4 Neuro checks completed. Discussed medications, oxygen needs, and keith.      Problem: Safety - Medical Restraint  Goal: Remains free of injury from restraints (Restraint for Interference with Medical Device)  Outcome: Progressing  Flowsheets (Taken 8/1/2024 1929)  Addressed this shift: Remains free of injury from restraints (restraint for interference with medical device):   Determine that other, less restrictive measures have been tried or would not be effective before applying the restraint   Evaluate the patient's condition at the time of restraint application   Inform patient/family regarding the reason for restraint   Every 2 hours: Monitor safety, psychosocial status, comfort, nutrition and hydration  Goal: Free from restraint(s) (Restraint for Interference with Medical Device)  Outcome: Progressing  Flowsheets (Taken 8/1/2024 1929)  Addressed this shift: Free from restraint(s) (restraint for interference with medical device):   Identify and implement measures to help patient regain control   Every 24 hours: Continued use of restraint requires Licensed Independent Practitioner to perform face to face examination and written order   ONCE/SHIFT or MINIMUM Every 12 hours: Assess and document the continuing need for restraints       Patient is not progressing towards the following goals:

## 2024-08-02 NOTE — PROGRESS NOTES
Patient working with  physical therapy. Patient sustaining A-fib with rate in [id number] with exertion. Patient sustaining A-fib with a rate in the 120's-130s after working with physical therapy, patient sitting up in chair. Vitals stable. Dr. Porras notified.     Metoprolol given as ordered, see MAR.   Patient in A-fib, rate of 80s-90s at 1530.

## 2024-08-03 ENCOUNTER — APPOINTMENT (OUTPATIENT)
Dept: RADIOLOGY | Facility: MEDICAL CENTER | Age: 64
DRG: 682 | End: 2024-08-03
Attending: HOSPITALIST
Payer: OTHER GOVERNMENT

## 2024-08-03 PROBLEM — L03.90 CELLULITIS: Status: ACTIVE | Noted: 2024-08-03

## 2024-08-03 LAB
ANION GAP SERPL CALC-SCNC: 10 MMOL/L (ref 7–16)
BUN SERPL-MCNC: 14 MG/DL (ref 8–22)
CALCIUM SERPL-MCNC: 9.4 MG/DL (ref 8.4–10.2)
CHLORIDE SERPL-SCNC: 99 MMOL/L (ref 96–112)
CO2 SERPL-SCNC: 29 MMOL/L (ref 20–33)
CREAT SERPL-MCNC: 0.92 MG/DL (ref 0.5–1.4)
ERYTHROCYTE [DISTWIDTH] IN BLOOD BY AUTOMATED COUNT: 48.9 FL (ref 35.9–50)
GFR SERPLBLD CREATININE-BSD FMLA CKD-EPI: 93 ML/MIN/1.73 M 2
GLUCOSE SERPL-MCNC: 86 MG/DL (ref 65–99)
HCT VFR BLD AUTO: 33.9 % (ref 42–52)
HGB BLD-MCNC: 10.8 G/DL (ref 14–18)
MCH RBC QN AUTO: 30.9 PG (ref 27–33)
MCHC RBC AUTO-ENTMCNC: 31.9 G/DL (ref 32.3–36.5)
MCV RBC AUTO: 96.9 FL (ref 81.4–97.8)
PLATELET # BLD AUTO: 435 K/UL (ref 164–446)
PMV BLD AUTO: 11 FL (ref 9–12.9)
POTASSIUM SERPL-SCNC: 3.5 MMOL/L (ref 3.6–5.5)
RBC # BLD AUTO: 3.5 M/UL (ref 4.7–6.1)
SODIUM SERPL-SCNC: 138 MMOL/L (ref 135–145)
WBC # BLD AUTO: 11.5 K/UL (ref 4.8–10.8)

## 2024-08-03 PROCEDURE — 97110 THERAPEUTIC EXERCISES: CPT

## 2024-08-03 PROCEDURE — 700102 HCHG RX REV CODE 250 W/ 637 OVERRIDE(OP): Mod: JZ | Performed by: HOSPITALIST

## 2024-08-03 PROCEDURE — 700102 HCHG RX REV CODE 250 W/ 637 OVERRIDE(OP): Performed by: HOSPITALIST

## 2024-08-03 PROCEDURE — 700102 HCHG RX REV CODE 250 W/ 637 OVERRIDE(OP): Performed by: INTERNAL MEDICINE

## 2024-08-03 PROCEDURE — A9270 NON-COVERED ITEM OR SERVICE: HCPCS | Performed by: INTERNAL MEDICINE

## 2024-08-03 PROCEDURE — 80048 BASIC METABOLIC PNL TOTAL CA: CPT

## 2024-08-03 PROCEDURE — A9270 NON-COVERED ITEM OR SERVICE: HCPCS | Performed by: HOSPITALIST

## 2024-08-03 PROCEDURE — 97116 GAIT TRAINING THERAPY: CPT

## 2024-08-03 PROCEDURE — 94760 N-INVAS EAR/PLS OXIMETRY 1: CPT

## 2024-08-03 PROCEDURE — 85027 COMPLETE CBC AUTOMATED: CPT

## 2024-08-03 PROCEDURE — 700117 HCHG RX CONTRAST REV CODE 255: Performed by: HOSPITALIST

## 2024-08-03 PROCEDURE — 99232 SBSQ HOSP IP/OBS MODERATE 35: CPT | Performed by: HOSPITALIST

## 2024-08-03 PROCEDURE — 770020 HCHG ROOM/CARE - TELE (206)

## 2024-08-03 PROCEDURE — 73701 CT LOWER EXTREMITY W/DYE: CPT | Mod: RT

## 2024-08-03 PROCEDURE — 97530 THERAPEUTIC ACTIVITIES: CPT

## 2024-08-03 PROCEDURE — A9270 NON-COVERED ITEM OR SERVICE: HCPCS | Mod: JZ | Performed by: HOSPITALIST

## 2024-08-03 RX ORDER — METOPROLOL SUCCINATE 100 MG/1
100 TABLET, EXTENDED RELEASE ORAL
Status: DISCONTINUED | OUTPATIENT
Start: 2024-08-03 | End: 2024-08-05

## 2024-08-03 RX ORDER — CEPHALEXIN 500 MG/1
500 CAPSULE ORAL EVERY 6 HOURS
Status: DISCONTINUED | OUTPATIENT
Start: 2024-08-03 | End: 2024-08-07 | Stop reason: HOSPADM

## 2024-08-03 RX ORDER — ACETAMINOPHEN 500 MG
1000 TABLET ORAL EVERY 4 HOURS PRN
Status: DISCONTINUED | OUTPATIENT
Start: 2024-08-03 | End: 2024-08-07 | Stop reason: HOSPADM

## 2024-08-03 RX ADMIN — DOCUSATE SODIUM 100 MG: 100 CAPSULE, LIQUID FILLED ORAL at 17:52

## 2024-08-03 RX ADMIN — IOHEXOL 100 ML: 350 INJECTION, SOLUTION INTRAVENOUS at 16:46

## 2024-08-03 RX ADMIN — VALPROIC ACID 125 MG: 250 SOLUTION ORAL at 12:43

## 2024-08-03 RX ADMIN — VENLAFAXINE HYDROCHLORIDE 225 MG: 75 CAPSULE, EXTENDED RELEASE ORAL at 21:00

## 2024-08-03 RX ADMIN — AMIODARONE HYDROCHLORIDE 200 MG: 200 TABLET ORAL at 17:52

## 2024-08-03 RX ADMIN — ATORVASTATIN CALCIUM 40 MG: 40 TABLET, FILM COATED ORAL at 20:59

## 2024-08-03 RX ADMIN — OMEPRAZOLE 40 MG: 20 CAPSULE, DELAYED RELEASE ORAL at 20:59

## 2024-08-03 RX ADMIN — AMIODARONE HYDROCHLORIDE 200 MG: 200 TABLET ORAL at 05:08

## 2024-08-03 RX ADMIN — HYDROCODONE BITARTRATE AND ACETAMINOPHEN 1 TABLET: 5; 325 TABLET ORAL at 12:42

## 2024-08-03 RX ADMIN — CEPHALEXIN 500 MG: 500 CAPSULE ORAL at 23:32

## 2024-08-03 RX ADMIN — VALPROIC ACID 125 MG: 250 SOLUTION ORAL at 05:09

## 2024-08-03 RX ADMIN — METOPROLOL TARTRATE 25 MG: 25 TABLET, FILM COATED ORAL at 12:43

## 2024-08-03 RX ADMIN — APIXABAN 5 MG: 5 TABLET, FILM COATED ORAL at 05:08

## 2024-08-03 RX ADMIN — POTASSIUM CHLORIDE 20 MEQ: 1500 TABLET, EXTENDED RELEASE ORAL at 05:08

## 2024-08-03 RX ADMIN — METOPROLOL SUCCINATE 100 MG: 100 TABLET, EXTENDED RELEASE ORAL at 17:57

## 2024-08-03 RX ADMIN — CEPHALEXIN 500 MG: 500 CAPSULE ORAL at 17:53

## 2024-08-03 RX ADMIN — POTASSIUM CHLORIDE 20 MEQ: 1500 TABLET, EXTENDED RELEASE ORAL at 17:52

## 2024-08-03 RX ADMIN — VALPROIC ACID 125 MG: 250 SOLUTION ORAL at 17:54

## 2024-08-03 RX ADMIN — METOPROLOL TARTRATE 25 MG: 25 TABLET, FILM COATED ORAL at 09:18

## 2024-08-03 RX ADMIN — FINASTERIDE 5 MG: 5 TABLET, FILM COATED ORAL at 05:08

## 2024-08-03 RX ADMIN — OLANZAPINE 2.5 MG: 5 TABLET, ORALLY DISINTEGRATING ORAL at 05:08

## 2024-08-03 RX ADMIN — POTASSIUM CHLORIDE 20 MEQ: 1500 TABLET, EXTENDED RELEASE ORAL at 12:42

## 2024-08-03 RX ADMIN — TAMSULOSIN HYDROCHLORIDE 0.4 MG: 0.4 CAPSULE ORAL at 17:52

## 2024-08-03 RX ADMIN — TAMSULOSIN HYDROCHLORIDE 0.4 MG: 0.4 CAPSULE ORAL at 05:08

## 2024-08-03 RX ADMIN — HYDROCODONE BITARTRATE AND ACETAMINOPHEN 1 TABLET: 5; 325 TABLET ORAL at 23:32

## 2024-08-03 RX ADMIN — OLANZAPINE 5 MG: 5 TABLET, ORALLY DISINTEGRATING ORAL at 17:51

## 2024-08-03 RX ADMIN — FUROSEMIDE 20 MG: 20 TABLET ORAL at 05:08

## 2024-08-03 RX ADMIN — EZETIMIBE 10 MG: 10 TABLET ORAL at 20:59

## 2024-08-03 ASSESSMENT — ENCOUNTER SYMPTOMS
CHILLS: 0
MEMORY LOSS: 1
NAUSEA: 0
DIZZINESS: 0
COUGH: 0
ORTHOPNEA: 0
SPUTUM PRODUCTION: 0
VOMITING: 0
PALPITATIONS: 0
HEMOPTYSIS: 0

## 2024-08-03 ASSESSMENT — COGNITIVE AND FUNCTIONAL STATUS - GENERAL
MOBILITY SCORE: 20
STANDING UP FROM CHAIR USING ARMS: A LITTLE
SUGGESTED CMS G CODE MODIFIER MOBILITY: CJ
CLIMB 3 TO 5 STEPS WITH RAILING: A LITTLE
WALKING IN HOSPITAL ROOM: A LITTLE
MOVING TO AND FROM BED TO CHAIR: A LITTLE

## 2024-08-03 ASSESSMENT — GAIT ASSESSMENTS
GAIT LEVEL OF ASSIST: CONTACT GUARD ASSIST
DISTANCE (FEET): 90
ASSISTIVE DEVICE: FRONT WHEEL WALKER
DEVIATION: ATAXIC

## 2024-08-03 ASSESSMENT — PAIN DESCRIPTION - PAIN TYPE
TYPE: ACUTE PAIN

## 2024-08-03 ASSESSMENT — FIBROSIS 4 INDEX: FIB4 SCORE: 1.38

## 2024-08-03 NOTE — CARE PLAN
The patient is Stable - Low risk of patient condition declining or worsening    Shift Goals  Clinical Goals: mentation, safety  Patient Goals: rest and comfort  Family Goals: FELIPA    Progress made toward(s) clinical / shift goals:    Problem: Knowledge Deficit - Standard  Goal: Patient and family/care givers will demonstrate understanding of plan of care, disease process/condition, diagnostic tests and medications  Outcome: Progressing  Note: Pt updated on POC, agreeable. Pt is currently Aox3, disoriented to situation. All questions and concerns addressed at this time.      Problem: Fall Risk  Goal: Patient will remain free from falls  Outcome: Progressing  Note: Pt is high fall risk. Bed and strip alarm on. Pt educated on use of call light for any pain management needs. Call light within reach.

## 2024-08-03 NOTE — THERAPY
Physical Therapy   Daily Treatment     Patient Name: Karan Wiley  Age:  64 y.o., Sex:  male  Medical Record #: [id number]  Today's Date: 8/3/2024          Assessment    Mentation improved today,ROM 0-100.Pt still has significant bruising.Transfers and gait improving.SOB ambulating after 90 feet     Plan    Treatment Plan Status: Continue Current Treatment Plan  Type of Treatment: Bed Mobility, Gait Training, Manual Therapy, Neuro Re-Education / Balance, Therapeutic Activities, Therapeutic Exercise  Treatment Frequency: 5 Times per Week  Treatment Duration:      DC Equipment Recommendations: Unable to determine at this time  Discharge Recommendations: Recommend post-acute placement for additional physical therapy services prior to discharge home       Objective       08/03/24 1100   Charge Group   PT Gait Training (Units) 1   PT Therapeutic Activities (Units) 1   PT Therapeutic Exercise (Units) 1   Balance   Sitting Balance (Static) Fair +   Sitting Balance (Dynamic) Fair +   Standing Balance (Static) Fair   Standing Balance (Dynamic) Fair   Weight Shift Sitting Fair   Weight Shift Standing Fair   Bed Mobility    Supine to Sit Modified Independent   Gait Analysis   Gait Level Of Assist Contact Guard Assist   Assistive Device Front Wheel Walker   Distance (Feet) 90   # of Times Distance was Traveled 2   Deviation Ataxic   # of Stairs Climbed 0   Functional Mobility   Sit to Stand Contact Guard Assist   Bed, Chair, Wheelchair Transfer Contact Guard Assist   Transfer Method Stand Step   6 Clicks Assessment - How much HELP from from another person do you currently need... (If the patient hasn't done an activity recently, how much help from another person do you think he/she would need if he/she tried?)   Turning from your back to your side while in a flat bed without using bedrails? 4   Moving from lying on your back to sitting on the side of a flat bed without using bedrails? 4   Moving to and from a bed to a chair  (including a wheelchair)? 3   Standing up from a chair using your arms (e.g., wheelchair, or bedside chair)? 3   Walking in hospital room? 3   Climbing 3-5 steps with a railing? 3   6 clicks Mobility Score 20   Activity Tolerance   Sitting in Chair > 1hr   Sitting Edge of Bed 10   Standing 8   Short Term Goals    Short Term Goal # 1 S with bed mob in 6 V   Goal Outcome # 1 Progressing as expected   Short Term Goal # 2 S with transfers in 6 V   Goal Outcome # 2 Progressing as expected   Short Term Goal # 3 S with amb x 100 feet in 6 V   Goal Outcome # 3 Progressing as expected   Session Information   Date / Session Number  8/3-5 1/5 8/9

## 2024-08-03 NOTE — PROGRESS NOTES
Telemetry summary    Rhythm: afib  Rate: 66-91  Ectopy: o-rPVC  Measurements: -/0.08/-    Normal Values  Rhythm: SR  HR Range:   Measurement: 0.12-0.2/0.06-0.10/0.30-0.52

## 2024-08-03 NOTE — PROGRESS NOTES
Hospital Medicine Daily Progress Note    Date of Service  8/2/2024    Chief Complaint  Karan Wiley is a 64 y.o. male admitted 7/25/2024 with confusion    Hospital Course  Karan Wiley has a history of mild cognitive impairment.  He underwent total knee replacement on 7/23/2024.  Patient presented initially to an outside hospital with confusion.  He was transferred to [facility] for evaluation of potential joint infection.  His knee has been evaluated and workup is reassuring that there is no infection.  He has developed agitation    Interval Problem Update  Patient is more cooperative, out of restraints  Follows commands, denies significant pain today  The rest of our conversation is very tangential, he is unable to provide a linear history of events    I have discussed this patient's plan of care and discharge plan at IDT rounds today with Case Management, Nursing, Nursing leadership, and other members of the IDT team.    Consultants/Specialty  orthopedics    Code Status  Full Code    Disposition  The patient is not medically cleared for discharge to home or a post-acute facility.  Anticipate discharge to: skilled nursing facility    I have placed the appropriate orders for post-discharge needs.    Review of Systems  Review of Systems   Constitutional:  Negative for chills and malaise/fatigue.   Respiratory:  Negative for cough, hemoptysis and sputum production.    Cardiovascular:  Negative for chest pain, palpitations and orthopnea.   Gastrointestinal:  Negative for nausea and vomiting.   Skin:  Negative for itching and rash.   Neurological:  Negative for dizziness.   Psychiatric/Behavioral:  Positive for memory loss.    All other systems reviewed and are negative.       Physical Exam  Temp:  [36.3 °C (97.3 °F)-36.9 °C (98.5 °F)] 36.7 °C (98 °F)  Pulse:  [] 90  Resp:  [18-19] 18  BP: (101-145)/() 130/96  SpO2:  [89 %-94 %] 93 %    Physical Exam  Constitutional:       General: He is not  in acute distress.     Appearance: Normal appearance. He is normal weight.   HENT:      Head: Normocephalic and atraumatic.      Right Ear: External ear normal.      Left Ear: External ear normal.      Nose: Nose normal.   Eyes:      Extraocular Movements: Extraocular movements intact.   Cardiovascular:      Rate and Rhythm: Normal rate and regular rhythm.      Pulses: Normal pulses.   Pulmonary:      Effort: Pulmonary effort is normal.      Breath sounds: Normal breath sounds.   Abdominal:      General: Abdomen is flat. Bowel sounds are normal.      Palpations: Abdomen is soft.   Musculoskeletal:         General: Normal range of motion.      Cervical back: Normal range of motion and neck supple.   Skin:     General: Skin is warm and dry.   Neurological:      General: No focal deficit present.      Mental Status: He is alert.      Cranial Nerves: No cranial nerve deficit.      Comments: Oriented to person  Follows commands with all extremities   Psychiatric:         Mood and Affect: Mood normal.         Behavior: Behavior normal.      Comments: Cooperative         Fluids    Intake/Output Summary (Last 24 hours) at 8/2/2024 1839  Last data filed at 8/2/2024 1050  Gross per 24 hour   Intake --   Output 1200 ml   Net -1200 ml       Laboratory      Recent Labs     08/01/24  0244 08/02/24  0139   SODIUM 137 139   POTASSIUM 3.0* 3.0*   CHLORIDE 98 97   CO2 28 30   GLUCOSE 123* 95   BUN 15 14   CREATININE 1.07 0.89   CALCIUM 9.0 8.9                   Imaging  EC-ECHOCARDIOGRAM COMPLETE W/ CONT   Final Result      DX-CHEST-PORTABLE (1 VIEW)   Final Result      Elevated right diaphragm.      CT-CTA CHEST PULMONARY ARTERY W/ RECONS   Final Result      1.  No pulmonary embolism detected.   2.  Elevated right diaphragm. Right basilar pulmonary opacity and volume loss suggesting atelectasis and/or mild infiltrate.            CT-KNEE W/O RIGHT   Final Result      1.  Findings in keeping with interval total knee arthroplasty  without specific findings to indicate infection   2.  Osteopenia      US-RENAL   Final Result         1.  Normal renal ultrasound.      OUTSIDE IMAGES-DX CHEST   Final Result      OUTSIDE IMAGES-CT HEAD   Final Result      OUTSIDE IMAGES-DX LOWER EXTREMITY, RIGHT   Final Result      OUTSIDE IMAGES-CT CERVICAL SPINE   Final Result           Assessment/Plan  * Senile dementia, delirium, with behavioral disturbance (HCC)- (present on admission)  Assessment & Plan  Patient has a history of diagnosed dementia and has atrophy noted on outside CT    8/2:  Patient is more cooperative today, out of restraints  Still disoriented  Continue Zyprexa    Status post right knee replacement- (present on admission)  Assessment & Plan  Patient complains of significant R knee pain  He is only 9d post op  Ortho rec leg elevation while in bed  ROM as tolerated with assistance    Hypokalemia- (present on admission)  Assessment & Plan  8/2:  Potassium 3.0 today  I ordered replacement  I ordered follow-up labs to assess response to treatment    Urinary retention due to benign prostatic hyperplasia  Assessment & Plan  Patient with intermittent incomplete bladder emptying and sometimes overt retention  He is maxed out on meds -on Proscar as well as 0.8 Flomax  White catheter     Pneumonia of right lower lobe due to infectious organism- (present on admission)  Assessment & Plan  CXR w elev R hemidiaphragm w possible infiltrate  Lower suspicion for PNA s/p IV unasyn 5 day course        Seizure disorder as sequela of cerebrovascular accident (HCC)- (present on admission)  Assessment & Plan  On depakote  I do not suspect seizure at this time    SIRS (systemic inflammatory response syndrome) ([facility])  Assessment & Plan  SIRS criteria identified on my evaluation include:  Tachycardia, with heart rate greater than 90 BPM and Leukocytosis, with WBC greater than 12,000  Unclear cause of SIRS  R knee does not appear infected  UA at OSH neg, here also not  convincing, UCX pending  BCX NGTD  CXR without PNA he did start diffuzely wheezing yest afternoon 7/27  He was initially on IV Unasyn/zyvox, zyvox dc'd given low suspicion for mrsa infection/mrsa pcr neg. I dc'd IV Unasyn 7/26 and his WBC is climbing up.  I have restarted IV unasyn at this time...  C/f Etoh w/d? Although pt is altered he did report to nursing he drinks beer. Wife had denies heavy consistent drinking prior to his surgery. Consider tx Etoh w/d      Fall- (present on admission)  Assessment & Plan  Had a fall at home  CTH/CT c spine at [facility] without acute findingds  Some contusion and ecchymoses are residual however CBC stable    Sinus tachycardia  Assessment & Plan  Possibly from agitation  No clear w/d  per wife not drinking excessive etoh  Patient has a spine stimulator in place   CTA neg for PE  7/27 now in afib w rvr    Polypharmacy- (present on admission)  Assessment & Plan  As above cf polypharmacy contributing to delirium  Patient now on only Norco and Tylenol for pain  Delirium not clearing- will reduce norco to 5mg    Anemia- (present on admission)  Assessment & Plan  Likely post op related  Hb down to 10 so relatively stable  Hemoglobin has been stable will resume Eliquis as he is high risk given his recent knee surgery and his A-fib    Acute kidney injury (HCC)- (present on admission)  Assessment & Plan  Resolved with IV fluids      CAD (coronary artery disease)- (present on admission)  Assessment & Plan  Optimize medication management  2020 patient had PCI x 2  Currently chest pain-free  Continue oxygen support      Paroxysmal atrial fibrillation (HCC)- (present on admission)  Assessment & Plan  As above    Atrial fibrillation with rapid ventricular response (HCC)- (present on admission)  Assessment & Plan  8/2:  Recurrence of A-fib/RVR today  Improved from patient received p.o. amiodarone and metoprolol  Continue p.o. medications for rate control    BPH (benign prostatic hyperplasia)-  (present on admission)  Assessment & Plan  Finasteride and Flomax    Body mass index 40.0-44.9, adult (HCC)- (present on admission)  Assessment & Plan  Body mass index is 42.37 kg/m².    Essential hypertension, benign- (present on admission)  Assessment & Plan  Hypertensive while agitated improved when he was more calm    Hyperlipidemia- (present on admission)  Assessment & Plan  Low-fat low-cholesterol diet  Continue Lipitor 40 mg nightly and Zetia 10 mg nightly      Depression- (present on admission)  Assessment & Plan  Continue Effexor XR  Holding trazodone given altered mental status    Type 2 diabetes mellitus without complication, without long-term current use of insulin (HCC)- (present on admission)  Assessment & Plan  -SSI  -follow glycohemoglobin levels long term, most recent hemoglobin A1c 6.2  -Hold metformin   -monitor for hypoglycemic episodes and adjust control if he should get low         VTE prophylaxis:    therapeutic anticoagulation with eliquis 5 mg BID      I have performed a physical exam and reviewed and updated ROS and Plan today (8/2/2024). In review of yesterday's note (8/1/2024), there are no changes except as documented above.

## 2024-08-03 NOTE — PROGRESS NOTES
Discussed plan of care with Dr. Porras during morning rounds. Verbal order for CBC and discontinued neuro checks received. Orders verified with read back.

## 2024-08-03 NOTE — CARE PLAN
The patient is Stable - Low risk of patient condition declining or worsening    Shift Goals  Clinical Goals: (P) Q4 neuro, safety, monitor pain, R knee, and vitals  Patient Goals: (P) comfort, shower today  Family Goals: (P) FELIPA    Progress made toward(s) clinical / shift goals:    Problem: Knowledge Deficit - Standard  Goal: Patient and family/care givers will demonstrate understanding of plan of care, disease process/condition, diagnostic tests and medications  Outcome: Progressing  Note: Patient updated on plan of care. Monitor vitals and HR on tele; increase activity as tolerated; knee immobilizer as ordered. Patient verbalized understanding of plan of care.      Problem: Fall Risk  Goal: Patient will remain free from falls  Outcome: Progressing  Note: Patient educated on preventing falls in the hospital. Appropriate fall risk prevention interventions in place. Call light within reach, patient calls appropriately. Bed/strip alarm on, bed locked in lowest position, patient instructed to call for assistance before ambulation.        Patient is not progressing towards the following goals:

## 2024-08-04 LAB
ANION GAP SERPL CALC-SCNC: 13 MMOL/L (ref 7–16)
BASOPHILS # BLD AUTO: 0.5 % (ref 0–1.8)
BASOPHILS # BLD: 0.05 K/UL (ref 0–0.12)
BUN SERPL-MCNC: 12 MG/DL (ref 8–22)
CALCIUM SERPL-MCNC: 9.5 MG/DL (ref 8.4–10.2)
CHLORIDE SERPL-SCNC: 99 MMOL/L (ref 96–112)
CO2 SERPL-SCNC: 25 MMOL/L (ref 20–33)
CREAT SERPL-MCNC: 1.02 MG/DL (ref 0.5–1.4)
EOSINOPHIL # BLD AUTO: 0.23 K/UL (ref 0–0.51)
EOSINOPHIL NFR BLD: 2.2 % (ref 0–6.9)
ERYTHROCYTE [DISTWIDTH] IN BLOOD BY AUTOMATED COUNT: 48.7 FL (ref 35.9–50)
GFR SERPLBLD CREATININE-BSD FMLA CKD-EPI: 82 ML/MIN/1.73 M 2
GLUCOSE SERPL-MCNC: 100 MG/DL (ref 65–99)
HCT VFR BLD AUTO: 32.9 % (ref 42–52)
HGB BLD-MCNC: 10.4 G/DL (ref 14–18)
IMM GRANULOCYTES # BLD AUTO: 0.09 K/UL (ref 0–0.11)
IMM GRANULOCYTES NFR BLD AUTO: 0.8 % (ref 0–0.9)
LYMPHOCYTES # BLD AUTO: 1.97 K/UL (ref 1–4.8)
LYMPHOCYTES NFR BLD: 18.6 % (ref 22–41)
MCH RBC QN AUTO: 30.5 PG (ref 27–33)
MCHC RBC AUTO-ENTMCNC: 31.6 G/DL (ref 32.3–36.5)
MCV RBC AUTO: 96.5 FL (ref 81.4–97.8)
MONOCYTES # BLD AUTO: 1.07 K/UL (ref 0–0.85)
MONOCYTES NFR BLD AUTO: 10.1 % (ref 0–13.4)
NEUTROPHILS # BLD AUTO: 7.19 K/UL (ref 1.82–7.42)
NEUTROPHILS NFR BLD: 67.8 % (ref 44–72)
NRBC # BLD AUTO: 0.02 K/UL
NRBC BLD-RTO: 0.2 /100 WBC (ref 0–0.2)
PLATELET # BLD AUTO: 407 K/UL (ref 164–446)
PMV BLD AUTO: 11.2 FL (ref 9–12.9)
POTASSIUM SERPL-SCNC: 3.8 MMOL/L (ref 3.6–5.5)
RBC # BLD AUTO: 3.41 M/UL (ref 4.7–6.1)
SODIUM SERPL-SCNC: 137 MMOL/L (ref 135–145)
WBC # BLD AUTO: 10.6 K/UL (ref 4.8–10.8)

## 2024-08-04 PROCEDURE — A9270 NON-COVERED ITEM OR SERVICE: HCPCS | Performed by: HOSPITALIST

## 2024-08-04 PROCEDURE — A9270 NON-COVERED ITEM OR SERVICE: HCPCS | Performed by: INTERNAL MEDICINE

## 2024-08-04 PROCEDURE — 700102 HCHG RX REV CODE 250 W/ 637 OVERRIDE(OP): Performed by: INTERNAL MEDICINE

## 2024-08-04 PROCEDURE — 85025 COMPLETE CBC W/AUTO DIFF WBC: CPT

## 2024-08-04 PROCEDURE — 99232 SBSQ HOSP IP/OBS MODERATE 35: CPT | Performed by: HOSPITALIST

## 2024-08-04 PROCEDURE — 99024 POSTOP FOLLOW-UP VISIT: CPT | Performed by: ORTHOPAEDIC SURGERY

## 2024-08-04 PROCEDURE — 700102 HCHG RX REV CODE 250 W/ 637 OVERRIDE(OP): Performed by: HOSPITALIST

## 2024-08-04 PROCEDURE — 94760 N-INVAS EAR/PLS OXIMETRY 1: CPT

## 2024-08-04 PROCEDURE — 80048 BASIC METABOLIC PNL TOTAL CA: CPT

## 2024-08-04 PROCEDURE — 770020 HCHG ROOM/CARE - TELE (206)

## 2024-08-04 RX ADMIN — OLANZAPINE 5 MG: 5 TABLET, ORALLY DISINTEGRATING ORAL at 17:09

## 2024-08-04 RX ADMIN — DOCUSATE SODIUM 100 MG: 100 CAPSULE, LIQUID FILLED ORAL at 05:23

## 2024-08-04 RX ADMIN — CEPHALEXIN 500 MG: 500 CAPSULE ORAL at 11:51

## 2024-08-04 RX ADMIN — DOCUSATE SODIUM 100 MG: 100 CAPSULE, LIQUID FILLED ORAL at 17:09

## 2024-08-04 RX ADMIN — VALPROIC ACID 125 MG: 250 SOLUTION ORAL at 11:51

## 2024-08-04 RX ADMIN — ATORVASTATIN CALCIUM 40 MG: 40 TABLET, FILM COATED ORAL at 20:13

## 2024-08-04 RX ADMIN — CEPHALEXIN 500 MG: 500 CAPSULE ORAL at 17:09

## 2024-08-04 RX ADMIN — FINASTERIDE 5 MG: 5 TABLET, FILM COATED ORAL at 05:24

## 2024-08-04 RX ADMIN — EZETIMIBE 10 MG: 10 TABLET ORAL at 20:13

## 2024-08-04 RX ADMIN — AMIODARONE HYDROCHLORIDE 200 MG: 200 TABLET ORAL at 05:24

## 2024-08-04 RX ADMIN — METOPROLOL SUCCINATE 100 MG: 100 TABLET, EXTENDED RELEASE ORAL at 17:09

## 2024-08-04 RX ADMIN — VENLAFAXINE HYDROCHLORIDE 225 MG: 75 CAPSULE, EXTENDED RELEASE ORAL at 20:12

## 2024-08-04 RX ADMIN — OLANZAPINE 2.5 MG: 5 TABLET, ORALLY DISINTEGRATING ORAL at 05:24

## 2024-08-04 RX ADMIN — HYDROCODONE BITARTRATE AND ACETAMINOPHEN 1 TABLET: 5; 325 TABLET ORAL at 08:32

## 2024-08-04 RX ADMIN — TAMSULOSIN HYDROCHLORIDE 0.4 MG: 0.4 CAPSULE ORAL at 17:09

## 2024-08-04 RX ADMIN — AMIODARONE HYDROCHLORIDE 200 MG: 200 TABLET ORAL at 17:09

## 2024-08-04 RX ADMIN — VALPROIC ACID 125 MG: 250 SOLUTION ORAL at 05:23

## 2024-08-04 RX ADMIN — FUROSEMIDE 20 MG: 20 TABLET ORAL at 05:24

## 2024-08-04 RX ADMIN — OMEPRAZOLE 40 MG: 20 CAPSULE, DELAYED RELEASE ORAL at 20:13

## 2024-08-04 RX ADMIN — CEPHALEXIN 500 MG: 500 CAPSULE ORAL at 23:15

## 2024-08-04 RX ADMIN — APIXABAN 5 MG: 5 TABLET, FILM COATED ORAL at 17:09

## 2024-08-04 RX ADMIN — VALPROIC ACID 125 MG: 250 SOLUTION ORAL at 17:09

## 2024-08-04 RX ADMIN — CEPHALEXIN 500 MG: 500 CAPSULE ORAL at 05:24

## 2024-08-04 RX ADMIN — TAMSULOSIN HYDROCHLORIDE 0.4 MG: 0.4 CAPSULE ORAL at 05:24

## 2024-08-04 ASSESSMENT — ENCOUNTER SYMPTOMS
SPUTUM PRODUCTION: 0
HEMOPTYSIS: 0
VOMITING: 0
ORTHOPNEA: 0
DIZZINESS: 0
COUGH: 0
CHILLS: 0
PALPITATIONS: 0
NAUSEA: 0

## 2024-08-04 ASSESSMENT — PAIN DESCRIPTION - PAIN TYPE
TYPE: ACUTE PAIN

## 2024-08-04 NOTE — PROGRESS NOTES
Hospital Medicine Daily Progress Note    Date of Service  8/4/2024    Chief Complaint  Karan Wiley is a 64 y.o. male admitted 7/25/2024 with confusion    Hospital Course  Karan Wiley has a history of mild cognitive impairment.  He underwent total knee replacement on 7/23/2024.  Patient presented initially to an outside hospital with confusion.  He was transferred to [facility] for evaluation of potential joint infection.  His knee has been evaluated and workup is reassuring that there is no infection.  He has developed agitation    Interval Problem Update  Mental status test improved again, he is conversant and cooperative  Agreeable to placement at a rehab facility  Right leg and knee are not particularly painful  No longer impulsive or agitated    I have discussed this patient's plan of care and discharge plan at IDT rounds today with Case Management, Nursing, Nursing leadership, and other members of the IDT team.    Consultants/Specialty  orthopedics    Code Status  Full Code    Disposition  The patient is not medically cleared for discharge to home or a post-acute facility.  Anticipate discharge to: skilled nursing facility    I have placed the appropriate orders for post-discharge needs.    Review of Systems  Review of Systems   Constitutional:  Negative for chills.   Respiratory:  Negative for cough, hemoptysis and sputum production.    Cardiovascular:  Negative for chest pain, palpitations and orthopnea.   Gastrointestinal:  Negative for nausea and vomiting.   Skin:  Negative for itching and rash.   Neurological:  Negative for dizziness.   All other systems reviewed and are negative.       Physical Exam  Temp:  [36.3 °C (97.3 °F)-37.2 °C (98.9 °F)] 37.2 °C (98.9 °F)  Pulse:  [] 100  Resp:  [18] 18  BP: (122-146)/(71-99) 125/93  SpO2:  [90 %-98 %] 94 %    Physical Exam  Constitutional:       General: He is in acute distress.      Appearance: Normal appearance. He is normal weight.    HENT:      Head: Normocephalic and atraumatic.      Right Ear: External ear normal.      Left Ear: External ear normal.      Nose: Nose normal.   Eyes:      Extraocular Movements: Extraocular movements intact.   Cardiovascular:      Rate and Rhythm: Normal rate and regular rhythm.      Pulses: Normal pulses.   Pulmonary:      Effort: Pulmonary effort is normal. No respiratory distress.      Breath sounds: Normal breath sounds.   Abdominal:      General: Abdomen is flat. Bowel sounds are normal.      Palpations: Abdomen is soft.   Musculoskeletal:         General: Normal range of motion.      Cervical back: Normal range of motion and neck supple.   Skin:     General: Skin is warm and dry.   Neurological:      General: No focal deficit present.      Mental Status: He is alert and oriented to person, place, and time.      Cranial Nerves: No cranial nerve deficit.   Psychiatric:         Mood and Affect: Mood normal.         Behavior: Behavior normal.      Comments: Cooperative         Fluids    Intake/Output Summary (Last 24 hours) at 8/4/2024 1345  Last data filed at 8/4/2024 0932  Gross per 24 hour   Intake 80 ml   Output 1650 ml   Net -1570 ml       Laboratory  Recent Labs     08/03/24  1227 08/04/24  0310   WBC 11.5* 10.6   RBC 3.50* 3.41*   HEMOGLOBIN 10.8* 10.4*   HEMATOCRIT 33.9* 32.9*   MCV 96.9 96.5   MCH 30.9 30.5   MCHC 31.9* 31.6*   RDW 48.9 48.7   PLATELETCT 435 407   MPV 11.0 11.2     Recent Labs     08/02/24  0139 08/03/24  0217 08/04/24  0310   SODIUM 139 138 137   POTASSIUM 3.0* 3.5* 3.8   CHLORIDE 97 99 99   CO2 30 29 25   GLUCOSE 95 86 100*   BUN 14 14 12   CREATININE 0.89 0.92 1.02   CALCIUM 8.9 9.4 9.5                   Imaging  CT-EXTREMITY, LOWER WITH RIGHT   Final Result      1.  Induration and subcutaneous fat right lateral thigh is noted which could be due to edema or cellulitis or minimal subcutaneous hematoma.      2.  No significant hematoma mass or fluid collection identified.      3.   Right knee arthroplasty with postoperative fluid collection noted.      EC-ECHOCARDIOGRAM COMPLETE W/ CONT   Final Result      DX-CHEST-PORTABLE (1 VIEW)   Final Result      Elevated right diaphragm.      CT-CTA CHEST PULMONARY ARTERY W/ RECONS   Final Result      1.  No pulmonary embolism detected.   2.  Elevated right diaphragm. Right basilar pulmonary opacity and volume loss suggesting atelectasis and/or mild infiltrate.            CT-KNEE W/O RIGHT   Final Result      1.  Findings in keeping with interval total knee arthroplasty without specific findings to indicate infection   2.  Osteopenia      US-RENAL   Final Result         1.  Normal renal ultrasound.      OUTSIDE IMAGES-DX CHEST   Final Result      OUTSIDE IMAGES-CT HEAD   Final Result      OUTSIDE IMAGES-DX LOWER EXTREMITY, RIGHT   Final Result      OUTSIDE IMAGES-CT CERVICAL SPINE   Final Result           Assessment/Plan  * Senile dementia, delirium, with behavioral disturbance (HCC)- (present on admission)  Assessment & Plan  Chart history of diagnosed dementia and has atrophy noted on outside CT  Initially very confused and agitated during this hospital stay, this has resolved    8/4:  Again continues to improve, the patient is oriented to person, place, month, and situation  Taper Zyprexa    Cellulitis  Assessment & Plan  8/3:  Right thigh is erythematous and warm to the touch  Start Keflex    Anemia- (present on admission)  Assessment & Plan  8/4:  I ordered serial labs over the last 24 hours, his hemoglobin has remained stable despite extensive bruising in his right leg  Resume Eliquis  Monitor hemoglobin daily    Status post right knee replacement- (present on admission)  Assessment & Plan  PT/OT, postacute care    Atrial fibrillation with rapid ventricular response (HCC)- (present on admission)  Assessment & Plan  8/4:  Continue amiodarone and metoprolol, resume apixaban    Hypokalemia- (present on admission)  Assessment &  Plan  Replaced    Urinary retention due to benign prostatic hyperplasia  Assessment & Plan  White catheter    Pneumonia of right lower lobe due to infectious organism- (present on admission)  Assessment & Plan  CXR w elev R hemidiaphragm w possible infiltrate  Lower suspicion for PNA s/p IV unasyn 5 day course        Seizure disorder as sequela of cerebrovascular accident (HCC)- (present on admission)  Assessment & Plan  Continue to Vicky    SIRS (systemic inflammatory response syndrome) ([facility])  Assessment & Plan  SIRS criteria identified on my evaluation include:  Tachycardia, with heart rate greater than 90 BPM and Leukocytosis, with WBC greater than 12,000  Unclear cause of SIRS  R knee does not appear infected  UA at OSH neg, here also not convincing, UCX pending  BCX NGTD  CXR without PNA he did start diffuzely wheezing yest afternoon 7/27  He was initially on IV Unasyn/zyvox, zyvox dc'd given low suspicion for mrsa infection/mrsa pcr neg. I dc'd IV Unasyn 7/26 and his WBC is climbing up.  I have restarted IV unasyn at this time...  C/f Etoh w/d? Although pt is altered he did report to nursing he drinks beer. Wife had denies heavy consistent drinking prior to his surgery. Consider tx Etoh w/d      Fall- (present on admission)  Assessment & Plan  Had a fall at home  CTH/CT c spine at [facility] without acute findingds  Some contusion and ecchymoses are residual however CBC stable    Sinus tachycardia  Assessment & Plan  Possibly from agitation  No clear w/d  per wife not drinking excessive etoh  Patient has a spine stimulator in place   CTA neg for PE  7/27 now in afib w rvr    Polypharmacy- (present on admission)  Assessment & Plan  As above cf polypharmacy contributing to delirium  Patient now on only Norco and Tylenol for pain  Delirium not clearing- will reduce norco to 5mg    Acute kidney injury (HCC)- (present on admission)  Assessment & Plan  Resolved with IV fluids      CAD (coronary artery disease)- (present  on admission)  Assessment & Plan  2020 patient had PCI x 2        Paroxysmal atrial fibrillation (HCC)- (present on admission)  Assessment & Plan  As above    BPH (benign prostatic hyperplasia)- (present on admission)  Assessment & Plan  Finasteride and Flomax    Body mass index 40.0-44.9, adult (HCC)- (present on admission)  Assessment & Plan  Body mass index is 42.37 kg/m².    Essential hypertension, benign- (present on admission)  Assessment & Plan  Hypertensive while agitated improved when he was more calm    Hyperlipidemia- (present on admission)  Assessment & Plan  Low-fat low-cholesterol diet  Continue Lipitor 40 mg nightly and Zetia 10 mg nightly      Depression- (present on admission)  Assessment & Plan  Continue Effexor XR  Holding trazodone given altered mental status    Type 2 diabetes mellitus without complication, without long-term current use of insulin (HCC)- (present on admission)  Assessment & Plan  Insulin sliding scale         VTE prophylaxis:    therapeutic anticoagulation with eliquis 5 mg BID      I have performed a physical exam and reviewed and updated ROS and Plan today (8/4/2024). In review of yesterday's note (8/3/2024), there are no changes except as documented above.

## 2024-08-04 NOTE — DISCHARGE PLANNING
Spoke with Alisha at [facility], patient is 80% service connected. Per Alisha she will request insurance auth.  Per Alisha she is questioning skilled needs.

## 2024-08-04 NOTE — DISCHARGE PLANNING
Attempted reaching patient's spouse to discuss SNF acceptance at [place]. Voicemail has not been set up. Unable to reach Alesia at this time.

## 2024-08-04 NOTE — CARE PLAN
The patient is Watcher - Medium risk of patient condition declining or worsening    Shift Goals  Clinical Goals: pain <4/10  Patient Goals: pain management, rest  Family Goals: FELIPA    Progress made toward(s) clinical / shift goals:    Problem: Knowledge Deficit - Standard  Goal: Patient and family/care givers will demonstrate understanding of plan of care, disease process/condition, diagnostic tests and medications  Outcome: Progressing       Patient is not progressing towards the following goals:      Problem: Pain - Standard  Goal: Alleviation of pain or a reduction in pain to the patient’s comfort goal  Outcome: Not Progressing  Patient's pain >4/10 at this time. PRN pain medication given with moderate relief. Ice applied. Patient declined more intervention at this time.

## 2024-08-04 NOTE — PROGRESS NOTES
12-hour chart check complete.    Monitor Summary  Rhythm: afib  Rate: 104-124  Ectopy: r PVC, r bigem  Measurements: --/0.08/--

## 2024-08-04 NOTE — PROGRESS NOTES
"Subjective:    No acute events.  Feeling better.    Objective:  BP (!) 133/91   Pulse 100   Temp 36.5 °C (97.7 °F) (Oral)   Resp 18   Ht 1.702 m (5' 7\")   Wt 123 kg (271 lb 9.7 oz)   SpO2 93%     Recent Labs     08/03/24  1227 08/04/24  0310   WBC 11.5* 10.6   RBC 3.50* 3.41*   HEMOGLOBIN 10.8* 10.4*   HEMATOCRIT 33.9* 32.9*   MCV 96.9 96.5   MCH 30.9 30.5   MCHC 31.9* 31.6*   RDW 48.9 48.7   PLATELETCT 435 407   MPV 11.0 11.2     Recent Labs     08/02/24  0139 08/03/24  0217 08/04/24  0310   SODIUM 139 138 137   POTASSIUM 3.0* 3.5* 3.8   CHLORIDE 97 99 99   CO2 30 29 25   GLUCOSE 95 86 100*   BUN 14 14 12   CREATININE 0.89 0.92 1.02   CALCIUM 8.9 9.4 9.5         Intake/Output Summary (Last 24 hours) at 8/4/2024 0902  Last data filed at 8/4/2024 0840  Gross per 24 hour   Intake 680 ml   Output 1500 ml   Net -820 ml       Much more oriented and conversant today  Bruising throughout right leg, but no concerns for infection or hematoma formation  ROM 5-100  NVI  Dressing c/d/i      Assessment:  s/p total knee arthroplasty.    Plan:    Appreciate the medicine team  WBAT, encourage ROM for the knee  Dispo planning  "

## 2024-08-04 NOTE — PROGRESS NOTES
Telemetry Strip     Strip printed: 1246  Measurements from am strip were as follows:  Rhythm: Afib/Aflutter  HR:   Measurements: -/ 0.10/ -  Ectopy: r-o PVC, r trig             Normal Values  Rhythm SR  HR Range    Measurements 0.12-0.20 / 0.06-0.10  / 0.30-0.52

## 2024-08-04 NOTE — CARE PLAN
The patient is Stable - Low risk of patient condition declining or worsening    Shift Goals  Clinical Goals: pain <5/10,   Patient Goals: pain management, rest  Family Goals: FELIPA    Progress made toward(s) clinical / shift goals: PRN pain medication given as ordered. Patient reports pain relief with PRN medication and non-pharmacological interventions including ice and repositioning.       Problem: Knowledge Deficit - Standard  Goal: Patient and family/care givers will demonstrate understanding of plan of care, disease process/condition, diagnostic tests and medications  Outcome: Progressing     Problem: Fall Risk  Goal: Patient will remain free from falls  8/4/2024 1144 by Patricia Dillard R.N.  Outcome: Progressing       Problem: Safety  Goal: Will remain free from injury  8/4/2024 1144 by Patricia Dillard R.N.  Outcome: Progressing      Patient is not progressing towards the following goals:     White catheter remains in place for urinary retention.     Problem: Urinary Elimination  Goal: Establish and maintain regular urinary output  Outcome: Not Progressing

## 2024-08-04 NOTE — PROGRESS NOTES
Hospital Medicine Daily Progress Note    Date of Service  8/3/2024    Chief Complaint  Karan Wiley is a 64 y.o. male admitted 7/25/2024 with confusion    Hospital Course  Karan Wiley has a history of mild cognitive impairment.  He underwent total knee replacement on 7/23/2024.  Patient presented initially to an outside hospital with confusion.  He was transferred to [facility] for evaluation of potential joint infection.  His knee has been evaluated and workup is reassuring that there is no infection.  He has developed agitation    Interval Problem Update  Improving mental status,  patient is conversant and cooperative  Some pain in right thigh and knee, he is unable to say if worse or better, there does appear to be significant new bruising at the right leg  Remains in A-fib, heart rates 80s to 110s    I have discussed this patient's plan of care and discharge plan at IDT rounds today with Case Management, Nursing, Nursing leadership, and other members of the IDT team.    Consultants/Specialty  orthopedics    Code Status  Full Code    Disposition  The patient is not medically cleared for discharge to home or a post-acute facility.  Anticipate discharge to: skilled nursing facility    I have placed the appropriate orders for post-discharge needs.    Review of Systems  Review of Systems   Constitutional:  Negative for chills and malaise/fatigue.   Respiratory:  Negative for cough, hemoptysis and sputum production.    Cardiovascular:  Negative for chest pain, palpitations and orthopnea.   Gastrointestinal:  Negative for nausea and vomiting.   Skin:  Negative for itching and rash.   Neurological:  Negative for dizziness.   Psychiatric/Behavioral:  Positive for memory loss.    All other systems reviewed and are negative.       Physical Exam  Temp:  [36.3 °C (97.3 °F)-36.8 °C (98.3 °F)] 36.5 °C (97.7 °F)  Pulse:  [] 86  Resp:  [18] 18  BP: (103-147)/(69-91) 103/69  SpO2:  [92 %] 92 %    Physical  Exam  Constitutional:       General: He is not in acute distress.     Appearance: Normal appearance. He is normal weight.   HENT:      Right Ear: External ear normal.      Left Ear: External ear normal.      Nose: Nose normal.   Eyes:      Extraocular Movements: Extraocular movements intact.   Cardiovascular:      Rate and Rhythm: Normal rate. Rhythm irregular.      Pulses: Normal pulses.   Pulmonary:      Effort: Pulmonary effort is normal.      Breath sounds: Normal breath sounds.   Abdominal:      General: Abdomen is flat. Bowel sounds are normal.      Palpations: Abdomen is soft.   Musculoskeletal:         General: Normal range of motion.      Cervical back: Normal range of motion and neck supple.      Comments: Extensive bruising of the right leg from right calf to right upper thigh  Area on right upper thigh is warm to the touch and indurated   Skin:     General: Skin is warm and dry.   Neurological:      General: No focal deficit present.      Mental Status: He is alert.      Cranial Nerves: No cranial nerve deficit.      Comments: Oriented to person  Follows commands with all extremities   Psychiatric:         Mood and Affect: Mood normal.         Behavior: Behavior normal.      Comments: Cooperative         Fluids    Intake/Output Summary (Last 24 hours) at 8/3/2024 1751  Last data filed at 8/3/2024 1200  Gross per 24 hour   Intake 600 ml   Output 1600 ml   Net -1000 ml       Laboratory  Recent Labs     08/03/24  1227   WBC 11.5*   RBC 3.50*   HEMOGLOBIN 10.8*   HEMATOCRIT 33.9*   MCV 96.9   MCH 30.9   MCHC 31.9*   RDW 48.9   PLATELETCT 435   MPV 11.0     Recent Labs     08/01/24  0244 08/02/24  0139 08/03/24  0217   SODIUM 137 139 138   POTASSIUM 3.0* 3.0* 3.5*   CHLORIDE 98 97 99   CO2 28 30 29   GLUCOSE 123* 95 86   BUN 15 14 14   CREATININE 1.07 0.89 0.92   CALCIUM 9.0 8.9 9.4                   Imaging  CT-EXTREMITY, LOWER WITH RIGHT   Final Result      1.  Induration and subcutaneous fat right lateral  thigh is noted which could be due to edema or cellulitis or minimal subcutaneous hematoma.      2.  No significant hematoma mass or fluid collection identified.      3.  Right knee arthroplasty with postoperative fluid collection noted.      EC-ECHOCARDIOGRAM COMPLETE W/ CONT   Final Result      DX-CHEST-PORTABLE (1 VIEW)   Final Result      Elevated right diaphragm.      CT-CTA CHEST PULMONARY ARTERY W/ RECONS   Final Result      1.  No pulmonary embolism detected.   2.  Elevated right diaphragm. Right basilar pulmonary opacity and volume loss suggesting atelectasis and/or mild infiltrate.            CT-KNEE W/O RIGHT   Final Result      1.  Findings in keeping with interval total knee arthroplasty without specific findings to indicate infection   2.  Osteopenia      US-RENAL   Final Result         1.  Normal renal ultrasound.      OUTSIDE IMAGES-DX CHEST   Final Result      OUTSIDE IMAGES-CT HEAD   Final Result      OUTSIDE IMAGES-DX LOWER EXTREMITY, RIGHT   Final Result      OUTSIDE IMAGES-CT CERVICAL SPINE   Final Result           Assessment/Plan  * Senile dementia, delirium, with behavioral disturbance (HCC)- (present on admission)  Assessment & Plan  Patient has a history of diagnosed dementia and has atrophy noted on outside CT    8/3:  Much improved today, out of restraints  Continue Zyprexa    Anemia- (present on admission)  Assessment & Plan  8/3:  Monitor hemoglobin daily    Status post right knee replacement- (present on admission)  Assessment & Plan  PT/OT, postacute care    Hypokalemia- (present on admission)  Assessment & Plan  8/3:  Replace potassium  I ordered follow-up labs to assess response to treatment    Cellulitis  Assessment & Plan  8/3:  Right thigh is erythematous and warm to the touch  Start Keflex    Urinary retention due to benign prostatic hyperplasia  Assessment & Plan  White catheter    Pneumonia of right lower lobe due to infectious organism- (present on admission)  Assessment &  Plan  CXR w elev R hemidiaphragm w possible infiltrate  Lower suspicion for PNA s/p IV unasyn 5 day course        Seizure disorder as sequela of cerebrovascular accident (HCC)- (present on admission)  Assessment & Plan  On depakote  I do not suspect seizure at this time    SIRS (systemic inflammatory response syndrome) ([facility])  Assessment & Plan  SIRS criteria identified on my evaluation include:  Tachycardia, with heart rate greater than 90 BPM and Leukocytosis, with WBC greater than 12,000  Unclear cause of SIRS  R knee does not appear infected  UA at [facility] neg, here also not convincing, UCX pending  BCX NGTD  CXR without PNA he did start diffuzely wheezing yest afternoon 7/27  He was initially on IV Unasyn/zyvox, zyvox dc'd given low suspicion for mrsa infection/mrsa pcr neg. I dc'd IV Unasyn 7/26 and his WBC is climbing up.  I have restarted IV unasyn at this time...  C/f Etoh w/d? Although pt is altered he did report to nursing he drinks beer. Wife had denies heavy consistent drinking prior to his surgery. Consider tx Etoh w/d      Fall- (present on admission)  Assessment & Plan  Had a fall at home  CTH/CT c spine at [facility] without acute findingds  Some contusion and ecchymoses are residual however CBC stable    Sinus tachycardia  Assessment & Plan  Possibly from agitation  No clear w/d  per wife not drinking excessive etoh  Patient has a spine stimulator in place   CTA neg for PE  7/27 now in afib w rvr    Polypharmacy- (present on admission)  Assessment & Plan  As above cf polypharmacy contributing to delirium  Patient now on only Norco and Tylenol for pain  Delirium not clearing- will reduce norco to 5mg    Acute kidney injury (HCC)- (present on admission)  Assessment & Plan  Resolved with IV fluids      CAD (coronary artery disease)- (present on admission)  Assessment & Plan  2020 patient had PCI x 2        Paroxysmal atrial fibrillation (HCC)- (present on admission)  Assessment & Plan  As above    Atrial  fibrillation with rapid ventricular response (HCC)- (present on admission)  Assessment & Plan  8/3:  Continue amiodarone and metoprolol  Hold apixaban overnight due to extensive bruising at the right leg    BPH (benign prostatic hyperplasia)- (present on admission)  Assessment & Plan  Finasteride and Flomax    Body mass index 40.0-44.9, adult (HCC)- (present on admission)  Assessment & Plan  Body mass index is 42.37 kg/m².    Essential hypertension, benign- (present on admission)  Assessment & Plan  Hypertensive while agitated improved when he was more calm    Hyperlipidemia- (present on admission)  Assessment & Plan  Low-fat low-cholesterol diet  Continue Lipitor 40 mg nightly and Zetia 10 mg nightly      Depression- (present on admission)  Assessment & Plan  Continue Effexor XR  Holding trazodone given altered mental status    Type 2 diabetes mellitus without complication, without long-term current use of insulin (HCC)- (present on admission)  Assessment & Plan  -SSI  -follow glycohemoglobin levels long term, most recent hemoglobin A1c 6.2  -Hold metformin   -monitor for hypoglycemic episodes and adjust control if he should get low         VTE prophylaxis:   SCDs/TEDs      I have performed a physical exam and reviewed and updated ROS and Plan today (8/3/2024). In review of yesterday's note (8/2/2024), there are no changes except as documented above.

## 2024-08-05 LAB
ANION GAP SERPL CALC-SCNC: 13 MMOL/L (ref 7–16)
BASOPHILS # BLD AUTO: 0.6 % (ref 0–1.8)
BASOPHILS # BLD: 0.07 K/UL (ref 0–0.12)
BUN SERPL-MCNC: 11 MG/DL (ref 8–22)
CALCIUM SERPL-MCNC: 9.5 MG/DL (ref 8.4–10.2)
CHLORIDE SERPL-SCNC: 99 MMOL/L (ref 96–112)
CO2 SERPL-SCNC: 26 MMOL/L (ref 20–33)
CREAT SERPL-MCNC: 0.96 MG/DL (ref 0.5–1.4)
EOSINOPHIL # BLD AUTO: 0.25 K/UL (ref 0–0.51)
EOSINOPHIL NFR BLD: 2.3 % (ref 0–6.9)
ERYTHROCYTE [DISTWIDTH] IN BLOOD BY AUTOMATED COUNT: 50.1 FL (ref 35.9–50)
GFR SERPLBLD CREATININE-BSD FMLA CKD-EPI: 88 ML/MIN/1.73 M 2
GLUCOSE SERPL-MCNC: 103 MG/DL (ref 65–99)
HCT VFR BLD AUTO: 35.5 % (ref 42–52)
HGB BLD-MCNC: 11.1 G/DL (ref 14–18)
IMM GRANULOCYTES # BLD AUTO: 0.13 K/UL (ref 0–0.11)
IMM GRANULOCYTES NFR BLD AUTO: 1.2 % (ref 0–0.9)
LYMPHOCYTES # BLD AUTO: 1.73 K/UL (ref 1–4.8)
LYMPHOCYTES NFR BLD: 15.8 % (ref 22–41)
MCH RBC QN AUTO: 30.2 PG (ref 27–33)
MCHC RBC AUTO-ENTMCNC: 31.3 G/DL (ref 32.3–36.5)
MCV RBC AUTO: 96.7 FL (ref 81.4–97.8)
MONOCYTES # BLD AUTO: 1.18 K/UL (ref 0–0.85)
MONOCYTES NFR BLD AUTO: 10.8 % (ref 0–13.4)
NEUTROPHILS # BLD AUTO: 7.57 K/UL (ref 1.82–7.42)
NEUTROPHILS NFR BLD: 69.3 % (ref 44–72)
NRBC # BLD AUTO: 0.03 K/UL
NRBC BLD-RTO: 0.3 /100 WBC (ref 0–0.2)
PLATELET # BLD AUTO: 414 K/UL (ref 164–446)
PMV BLD AUTO: 11.1 FL (ref 9–12.9)
POTASSIUM SERPL-SCNC: 3.4 MMOL/L (ref 3.6–5.5)
RBC # BLD AUTO: 3.67 M/UL (ref 4.7–6.1)
SODIUM SERPL-SCNC: 138 MMOL/L (ref 135–145)
WBC # BLD AUTO: 10.9 K/UL (ref 4.8–10.8)

## 2024-08-05 PROCEDURE — 770020 HCHG ROOM/CARE - TELE (206)

## 2024-08-05 PROCEDURE — 99232 SBSQ HOSP IP/OBS MODERATE 35: CPT | Performed by: HOSPITALIST

## 2024-08-05 PROCEDURE — 700102 HCHG RX REV CODE 250 W/ 637 OVERRIDE(OP): Performed by: HOSPITALIST

## 2024-08-05 PROCEDURE — 97116 GAIT TRAINING THERAPY: CPT

## 2024-08-05 PROCEDURE — 700102 HCHG RX REV CODE 250 W/ 637 OVERRIDE(OP): Performed by: INTERNAL MEDICINE

## 2024-08-05 PROCEDURE — 51798 US URINE CAPACITY MEASURE: CPT

## 2024-08-05 PROCEDURE — 94760 N-INVAS EAR/PLS OXIMETRY 1: CPT

## 2024-08-05 PROCEDURE — 80048 BASIC METABOLIC PNL TOTAL CA: CPT

## 2024-08-05 PROCEDURE — A9270 NON-COVERED ITEM OR SERVICE: HCPCS | Performed by: HOSPITALIST

## 2024-08-05 PROCEDURE — 700105 HCHG RX REV CODE 258: Performed by: HOSPITALIST

## 2024-08-05 PROCEDURE — 85025 COMPLETE CBC W/AUTO DIFF WBC: CPT

## 2024-08-05 PROCEDURE — 97110 THERAPEUTIC EXERCISES: CPT

## 2024-08-05 PROCEDURE — A9270 NON-COVERED ITEM OR SERVICE: HCPCS | Performed by: INTERNAL MEDICINE

## 2024-08-05 RX ORDER — POTASSIUM CHLORIDE 1500 MG/1
40 TABLET, EXTENDED RELEASE ORAL ONCE
Status: COMPLETED | OUTPATIENT
Start: 2024-08-05 | End: 2024-08-05

## 2024-08-05 RX ORDER — SODIUM CHLORIDE 9 MG/ML
INJECTION, SOLUTION INTRAVENOUS CONTINUOUS
Status: ACTIVE | OUTPATIENT
Start: 2024-08-05 | End: 2024-08-06

## 2024-08-05 RX ORDER — SODIUM CHLORIDE 9 MG/ML
500 INJECTION, SOLUTION INTRAVENOUS ONCE
Status: COMPLETED | OUTPATIENT
Start: 2024-08-05 | End: 2024-08-05

## 2024-08-05 RX ORDER — METOPROLOL SUCCINATE 25 MG/1
50 TABLET, EXTENDED RELEASE ORAL ONCE
Status: COMPLETED | OUTPATIENT
Start: 2024-08-05 | End: 2024-08-05

## 2024-08-05 RX ORDER — METOPROLOL SUCCINATE 100 MG/1
100 TABLET, EXTENDED RELEASE ORAL
Status: DISCONTINUED | OUTPATIENT
Start: 2024-08-05 | End: 2024-08-05

## 2024-08-05 RX ADMIN — TAMSULOSIN HYDROCHLORIDE 0.4 MG: 0.4 CAPSULE ORAL at 17:13

## 2024-08-05 RX ADMIN — FUROSEMIDE 20 MG: 20 TABLET ORAL at 05:19

## 2024-08-05 RX ADMIN — SODIUM CHLORIDE: 9 INJECTION, SOLUTION INTRAVENOUS at 18:33

## 2024-08-05 RX ADMIN — DOCUSATE SODIUM 100 MG: 100 CAPSULE, LIQUID FILLED ORAL at 05:19

## 2024-08-05 RX ADMIN — FINASTERIDE 5 MG: 5 TABLET, FILM COATED ORAL at 05:19

## 2024-08-05 RX ADMIN — ATORVASTATIN CALCIUM 40 MG: 40 TABLET, FILM COATED ORAL at 20:05

## 2024-08-05 RX ADMIN — OMEPRAZOLE 40 MG: 20 CAPSULE, DELAYED RELEASE ORAL at 20:05

## 2024-08-05 RX ADMIN — HYDROCODONE BITARTRATE AND ACETAMINOPHEN 1 TABLET: 5; 325 TABLET ORAL at 02:16

## 2024-08-05 RX ADMIN — VENLAFAXINE HYDROCHLORIDE 225 MG: 75 CAPSULE, EXTENDED RELEASE ORAL at 20:05

## 2024-08-05 RX ADMIN — POTASSIUM CHLORIDE 40 MEQ: 1500 TABLET, EXTENDED RELEASE ORAL at 10:11

## 2024-08-05 RX ADMIN — OLANZAPINE 5 MG: 5 TABLET, ORALLY DISINTEGRATING ORAL at 17:13

## 2024-08-05 RX ADMIN — METOPROLOL SUCCINATE 50 MG: 25 TABLET, EXTENDED RELEASE ORAL at 17:13

## 2024-08-05 RX ADMIN — HYDROCODONE BITARTRATE AND ACETAMINOPHEN 1 TABLET: 5; 325 TABLET ORAL at 12:08

## 2024-08-05 RX ADMIN — VALPROIC ACID 125 MG: 250 SOLUTION ORAL at 17:13

## 2024-08-05 RX ADMIN — VALPROIC ACID 125 MG: 250 SOLUTION ORAL at 05:20

## 2024-08-05 RX ADMIN — SODIUM CHLORIDE 500 ML: 9 INJECTION, SOLUTION INTRAVENOUS at 10:15

## 2024-08-05 RX ADMIN — METOPROLOL SUCCINATE 100 MG: 100 TABLET, EXTENDED RELEASE ORAL at 09:12

## 2024-08-05 RX ADMIN — TAMSULOSIN HYDROCHLORIDE 0.4 MG: 0.4 CAPSULE ORAL at 05:23

## 2024-08-05 RX ADMIN — APIXABAN 5 MG: 5 TABLET, FILM COATED ORAL at 17:13

## 2024-08-05 RX ADMIN — HYDROCODONE BITARTRATE AND ACETAMINOPHEN 1 TABLET: 5; 325 TABLET ORAL at 23:02

## 2024-08-05 RX ADMIN — CEPHALEXIN 500 MG: 500 CAPSULE ORAL at 05:19

## 2024-08-05 RX ADMIN — APIXABAN 5 MG: 5 TABLET, FILM COATED ORAL at 05:19

## 2024-08-05 RX ADMIN — CEPHALEXIN 500 MG: 500 CAPSULE ORAL at 17:13

## 2024-08-05 RX ADMIN — AMIODARONE HYDROCHLORIDE 200 MG: 200 TABLET ORAL at 17:13

## 2024-08-05 RX ADMIN — VALPROIC ACID 125 MG: 250 SOLUTION ORAL at 12:09

## 2024-08-05 RX ADMIN — EZETIMIBE 10 MG: 10 TABLET ORAL at 20:05

## 2024-08-05 RX ADMIN — CEPHALEXIN 500 MG: 500 CAPSULE ORAL at 12:08

## 2024-08-05 RX ADMIN — CEPHALEXIN 500 MG: 500 CAPSULE ORAL at 23:02

## 2024-08-05 RX ADMIN — AMIODARONE HYDROCHLORIDE 200 MG: 200 TABLET ORAL at 05:19

## 2024-08-05 ASSESSMENT — COGNITIVE AND FUNCTIONAL STATUS - GENERAL
TURNING FROM BACK TO SIDE WHILE IN FLAT BAD: A LITTLE
MOVING FROM LYING ON BACK TO SITTING ON SIDE OF FLAT BED: A LITTLE
STANDING UP FROM CHAIR USING ARMS: A LITTLE
CLIMB 3 TO 5 STEPS WITH RAILING: A LITTLE
SUGGESTED CMS G CODE MODIFIER MOBILITY: CK
WALKING IN HOSPITAL ROOM: A LITTLE
MOVING TO AND FROM BED TO CHAIR: A LITTLE
MOBILITY SCORE: 18

## 2024-08-05 ASSESSMENT — FIBROSIS 4 INDEX: FIB4 SCORE: 1.07

## 2024-08-05 ASSESSMENT — PAIN DESCRIPTION - PAIN TYPE
TYPE: ACUTE PAIN

## 2024-08-05 ASSESSMENT — GAIT ASSESSMENTS
DISTANCE (FEET): 70
ASSISTIVE DEVICE: FRONT WHEEL WALKER
DEVIATION: DECREASED HEEL STRIKE;DECREASED TOE OFF
GAIT LEVEL OF ASSIST: CONTACT GUARD ASSIST

## 2024-08-05 ASSESSMENT — ENCOUNTER SYMPTOMS
ORTHOPNEA: 0
HEMOPTYSIS: 0
VOMITING: 0
NAUSEA: 0
SPUTUM PRODUCTION: 0
COUGH: 0
DIZZINESS: 0
CHILLS: 0
PALPITATIONS: 0

## 2024-08-05 NOTE — CARE PLAN
Problem: Knowledge Deficit - Standard  Goal: Patient and family/care givers will demonstrate understanding of plan of care, disease process/condition, diagnostic tests and medications  Outcome: Progressing     Problem: Pain - Standard  Goal: Alleviation of pain or a reduction in pain to the patient’s comfort goal  Outcome: Progressing   The patient is Stable - Low risk of patient condition declining or worsening    Shift Goals  Clinical Goals: pain control, HR control  Patient Goals: rest  Family Goals: FELIPA    Progress made toward(s) clinical / shift goals:  updated pt on plan of care, encourage mobilization of right knee. Continue pain control as needed. Pending SNF placement.     Patient is not progressing towards the following goals:

## 2024-08-05 NOTE — PROGRESS NOTES
Telemetry Shift Summary      Rhythm: Afib   HR:   Ectopy: r-o PVC  Measurements: -/.08/-     Normal Values  Rhythm: SR  HR:   Measurements: 0.12-0.20/0.08-0.10/0.30-0.52

## 2024-08-05 NOTE — THERAPY
Physical Therapy   Daily Treatment     Patient Name: aKran Wiley  Age:  64 y.o., Sex:  male  Medical Record #: [id number]  Today's Date: 8/5/2024     Precautions  Precautions: Weight Bearing As Tolerated Right Lower Extremity    Assessment    Pt agreeable for PT, activity tolerance is limited with increased SOB and HR with activity/ ambulation. Pt requires cues for safety, poor insight and judgement.  CGA for safety when ambulating with FWW. Pt also requires max cueing to complete HEP, handout issued. Pts knee with -10 deg flex, tends to want to let it rest on bed in flexion and ER at hip, educated on importance of knee ext at rest. Knee immobilizer applied to be used only when pt is at rest in bed.  RN updated.  Given the above, do not feel pt is safe to DC home, recommend further skilled PT to improve strength R LE and overall balance so can return home safely.      Plan    Treatment Plan Status: Continue Current Treatment Plan  Type of Treatment: Bed Mobility, Gait Training, Manual Therapy, Neuro Re-Education / Balance, Therapeutic Activities, Therapeutic Exercise  Treatment Frequency: 5 Times per Week  Treatment Duration:      DC Equipment Recommendations: Unable to determine at this time  Discharge Recommendations: Recommend post-acute placement for additional physical therapy services prior to discharge home       08/05/24 1632   Cognition    Comments Pt cooperative, some confusion and is very [place]   Passive ROM Lower Body   Rt Knee Flexion Degrees 95   Rt Knee Extension Degrees -10   Supine Lower Body Exercise   Short Arc Quad 1 set of 10;Right    Heel Slide 1 set of 10;Right   Quadriceps Isometrics 1 set of 10;Right   Sitting Lower Body Exercises   Long Arc Quad 1 set of 10;Right   Comments seated knee flex x10 reps   Home Exercise Program   Home Exercise Program Patient Requires Assistance / Guarding to Complete Home Program   Balance   Sitting Balance (Static) Fair +   Sitting Balance (Dynamic) Fair    Standing Balance (Static) Fair   Standing Balance (Dynamic) Fair -   Weight Shift Sitting Fair   Weight Shift Standing Fair   Skilled Intervention Postural Facilitation;Sequencing;Tactile Cuing;Verbal Cuing   Comments stdg with FWW   Bed Mobility    Supine to Sit Standby Assist  (use of bed rail to assist)   Sit to Supine Standby Assist   Gait Analysis   Gait Level Of Assist Contact Guard Assist   Assistive Device Front Wheel Walker   Distance (Feet) 70   # of Times Distance was Traveled 2   Deviation Decreased Heel Strike;Decreased Toe Off   Weight Bearing Status WBAT R LE   Skilled Intervention Postural Facilitation;Sequencing;Tactile Cuing;Verbal Cuing   Comments Pt required seated rest break x 5 min after amb 70 ft   Functional Mobility   Sit to Stand Contact Guard Assist   Bed, Chair, Wheelchair Transfer Contact Guard Assist   Transfer Method Stand Step  (with FWW)   Activity Tolerance   Standing mod SOB with activity on RA, HR up to 150s with ambulation   Short Term Goals    Short Term Goal # 1 S with bed mob in 6 V   Goal Outcome # 1 goal not met   Short Term Goal # 2 S with transfers in 6 V   Goal Outcome # 2 Goal not met   Short Term Goal # 3 S with amb x 100 feet in 6 V   Goal Outcome # 3 Goal not met

## 2024-08-05 NOTE — CARE PLAN
The patient is Stable - Low risk of patient condition declining or worsening    Shift Goals  Clinical Goals: Pain goal <4  Patient Goals: Rest and get to go home  Family Goals: FELIPA    Progress made toward(s) clinical / shift goals:    Problem: Fall Risk  Goal: Patient will remain free from falls  Outcome: Progressing  Note: The patient has remained free from falls during my shift and tolerates ambulating well with a FWW.      Problem: Pain - Standard  Goal: Alleviation of pain or a reduction in pain to the patient’s comfort goal  Outcome: Progressing  Note: The patient has remained free from pain thus far     Problem: Safety - Medical Restraint  Goal: Remains free of injury from restraints (Restraint for Interference with Medical Device)  Outcome: Met     Problem: Safety  Goal: Will remain free from injury  Outcome: Progressing     Problem: Urinary Elimination  Goal: Establish and maintain regular urinary output  Outcome: Progressing  Note: Indwelling catheter remains in place for urinary retention. Output has been adequate.        Patient is not progressing towards the following goals:

## 2024-08-05 NOTE — DISCHARGE PLANNING
Case Management Discharge Planning    Admission Date: 7/25/2024  GMLOS: 4.4  ALOS: 11    6-Clicks ADL Score: 15  6-Clicks Mobility Score: 20  PT and/or OT Eval ordered: Yes  Post-acute Referrals Ordered: Yes  Post-acute Choice Obtained: Yes  Has referral(s) been sent to post-acute provider:  Yes      Anticipated Discharge Dispo: Discharge Disposition: D/T to home under A care in anticipation of covered skilled care (06)    DME Needed: No    Action(s) Taken:     Spoke to Alisha from [employer] to follow-up on auth status. Per Alisha, insurance inquiring about pt's skilled need as per PT note, pt is contact guard assist and was able to ambulate 90 ft, 200 ft two days ago. RNCM informed medical team during IDT rounds, inquired about pt's ability to DC home with HH.    Escalations Completed: None    Medically Clear: No    Next Steps: Follow-up with medical team to discuss DC needs and barriers.    Barriers to Discharge: Medical clearance

## 2024-08-05 NOTE — PROGRESS NOTES
Hospital Medicine Daily Progress Note    Date of Service  8/5/2024    Chief Complaint  Karan Wiley is a 64 y.o. male admitted 7/25/2024 with confusion    Hospital Course  Karan Wiley has a history of mild cognitive impairment.  He underwent total knee replacement on 7/23/2024.  Patient presented initially to an outside hospital with confusion.  He was transferred to [facility] for evaluation of potential joint infection.  His knee has been evaluated and workup is reassuring that there is no infection.  He has developed agitation    Interval Problem Update  Patient really has no signs of confusion at this time, he is oriented x 3 and able to recall recent events  Patient is tachycardic this morning with atrial fibrillation in the 130s/40s, he is asymptomatic and has no palpitations or chest pain  Patient has good mobility in his knee    I have discussed this patient's plan of care and discharge plan at IDT rounds today with Case Management, Nursing, Nursing leadership, and other members of the IDT team.    Consultants/Specialty  orthopedics    Code Status  Full Code    Disposition  The patient is not medically cleared for discharge to home or a post-acute facility.  Anticipate discharge to: home with organized home healthcare and close outpatient follow-up    I have placed the appropriate orders for post-discharge needs.    Review of Systems  Review of Systems   Constitutional:  Negative for chills.   Respiratory:  Negative for cough, hemoptysis and sputum production.    Cardiovascular:  Negative for chest pain, palpitations and orthopnea.   Gastrointestinal:  Negative for nausea and vomiting.   Skin:  Negative for itching and rash.   Neurological:  Negative for dizziness.   All other systems reviewed and are negative.       Physical Exam  Temp:  [36.7 °C (98 °F)-37.4 °C (99.3 °F)] 37 °C (98.6 °F)  Pulse:  [] 77  Resp:  [12-20] 16  BP: (112-142)/(65-99) 132/97  SpO2:  [89 %-94 %] 93  %    Physical Exam  Constitutional:       Appearance: Normal appearance. He is normal weight.   HENT:      Head: Normocephalic and atraumatic.      Right Ear: External ear normal.      Left Ear: External ear normal.      Nose: Nose normal.   Eyes:      Extraocular Movements: Extraocular movements intact.   Cardiovascular:      Rate and Rhythm: Normal rate and regular rhythm.      Pulses: Normal pulses.   Pulmonary:      Effort: Pulmonary effort is normal. No respiratory distress.      Breath sounds: Normal breath sounds.   Abdominal:      General: Abdomen is flat. Bowel sounds are normal.      Palpations: Abdomen is soft.   Musculoskeletal:         General: Normal range of motion.      Cervical back: Normal range of motion and neck supple.   Skin:     General: Skin is dry.   Neurological:      General: No focal deficit present.      Mental Status: He is alert and oriented to person, place, and time.      Cranial Nerves: No cranial nerve deficit.      Comments: Extremely hard of hearing, oriented x 3   Psychiatric:         Mood and Affect: Mood normal.         Behavior: Behavior normal.      Comments: Cooperative         Fluids    Intake/Output Summary (Last 24 hours) at 8/5/2024 1641  Last data filed at 8/5/2024 0354  Gross per 24 hour   Intake 800 ml   Output 900 ml   Net -100 ml       Laboratory  Recent Labs     08/03/24  1227 08/04/24  0310 08/05/24  0110   WBC 11.5* 10.6 10.9*   RBC 3.50* 3.41* 3.67*   HEMOGLOBIN 10.8* 10.4* 11.1*   HEMATOCRIT 33.9* 32.9* 35.5*   MCV 96.9 96.5 96.7   MCH 30.9 30.5 30.2   MCHC 31.9* 31.6* 31.3*   RDW 48.9 48.7 50.1*   PLATELETCT 435 407 414   MPV 11.0 11.2 11.1     Recent Labs     08/03/24  0217 08/04/24  0310 08/05/24  0110   SODIUM 138 137 138   POTASSIUM 3.5* 3.8 3.4*   CHLORIDE 99 99 99   CO2 29 25 26   GLUCOSE 86 100* 103*   BUN 14 12 11   CREATININE 0.92 1.02 0.96   CALCIUM 9.4 9.5 9.5                   Imaging  CT-EXTREMITY, LOWER WITH RIGHT   Final Result      1.   Induration and subcutaneous fat right lateral thigh is noted which could be due to edema or cellulitis or minimal subcutaneous hematoma.      2.  No significant hematoma mass or fluid collection identified.      3.  Right knee arthroplasty with postoperative fluid collection noted.      EC-ECHOCARDIOGRAM COMPLETE W/ CONT   Final Result      DX-CHEST-PORTABLE (1 VIEW)   Final Result      Elevated right diaphragm.      CT-CTA CHEST PULMONARY ARTERY W/ RECONS   Final Result      1.  No pulmonary embolism detected.   2.  Elevated right diaphragm. Right basilar pulmonary opacity and volume loss suggesting atelectasis and/or mild infiltrate.            CT-KNEE W/O RIGHT   Final Result      1.  Findings in keeping with interval total knee arthroplasty without specific findings to indicate infection   2.  Osteopenia      US-RENAL   Final Result         1.  Normal renal ultrasound.      OUTSIDE IMAGES-DX CHEST   Final Result      OUTSIDE IMAGES-CT HEAD   Final Result      OUTSIDE IMAGES-DX LOWER EXTREMITY, RIGHT   Final Result      OUTSIDE IMAGES-CT CERVICAL SPINE   Final Result           Assessment/Plan  * Senile dementia, delirium, with behavioral disturbance (HCC)- (present on admission)  Assessment & Plan  Chart history of diagnosed dementia and has atrophy noted on outside CT  Initially very confused and agitated during this hospital stay, this has resolved    8/5  Discontinue zyprexa    Cellulitis  Assessment & Plan  8/5:  Right thigh, improving  5 day course of keflex    Urinary retention due to benign prostatic hyperplasia  Assessment & Plan  8/5:  Patient is more mobile  Voiding trial    Anemia- (present on admission)  Assessment & Plan  8/5:  Continue eliquis  Monitor hemoglobin daily    Status post right knee replacement- (present on admission)  Assessment & Plan  PT/OT, postacute care    Atrial fibrillation with rapid ventricular response (HCC)- (present on admission)  Assessment & Plan  8/5:  Amiodarone,  metoprolol, apixaban  Continues to have episodes of uncontrolled A-fib/RVR  Increase metoprolol  Consider addition of digoxin if remains uncontrolled    Hypokalemia- (present on admission)  Assessment & Plan  Replaced    Pneumonia of right lower lobe due to infectious organism- (present on admission)  Assessment & Plan  CXR w elev R hemidiaphragm w possible infiltrate  Lower suspicion for PNA s/p IV unasyn 5 day course        Seizure disorder as sequela of cerebrovascular accident (HCC)- (present on admission)  Assessment & Plan  Continue anti-epileptics     SIRS (systemic inflammatory response syndrome) (Former[facility])  Assessment & Plan  SIRS criteria identified on my evaluation include:  Tachycardia, with heart rate greater than 90 BPM and Leukocytosis, with WBC greater than 12,000  Unclear cause of SIRS  R knee does not appear infected  UA at OSH neg, here also not convincing, UCX pending  BCX NGTD  CXR without PNA he did start diffuzely wheezing yest afternoon 7/27  He was initially on IV Unasyn/zyvox, zyvox dc'd given low suspicion for mrsa infection/mrsa pcr neg. I dc'd IV Unasyn 7/26 and his WBC is climbing up.  I have restarted IV unasyn at this time...  C/f Etoh w/d? Although pt is altered he did report to nursing he drinks beer. Wife had denies heavy consistent drinking prior to his surgery. Consider tx Etoh w/d      Fall- (present on admission)  Assessment & Plan  PT/OT    Sinus tachycardia  Assessment & Plan  Possibly from agitation  No clear w/d  per wife not drinking excessive etoh  Patient has a spine stimulator in place   CTA neg for PE  7/27 now in afib w rvr    Polypharmacy- (present on admission)  Assessment & Plan  Mental status improved    Acute kidney injury (HCC)- (present on admission)  Assessment & Plan  Resolved with IV fluids      CAD (coronary artery disease)- (present on admission)  Assessment & Plan  2020 patient had PCI x 2        Paroxysmal atrial fibrillation (HCC)- (present on  admission)  Assessment & Plan  As above    BPH (benign prostatic hyperplasia)- (present on admission)  Assessment & Plan  Finasteride and Flomax    Body mass index 40.0-44.9, adult (HCC)- (present on admission)  Assessment & Plan  Body mass index is 42.37 kg/m².    Essential hypertension, benign- (present on admission)  Assessment & Plan  Hypertensive while agitated improved when he was more calm    Hyperlipidemia- (present on admission)  Assessment & Plan  Low-fat low-cholesterol diet  Continue Lipitor 40 mg nightly and Zetia 10 mg nightly      Depression- (present on admission)  Assessment & Plan  Continue Effexor XR  Holding trazodone given altered mental status    Type 2 diabetes mellitus without complication, without long-term current use of insulin (HCC)- (present on admission)  Assessment & Plan  Insulin sliding scale         VTE prophylaxis:    therapeutic anticoagulation with eliquis 5 mg BID      I have performed a physical exam and reviewed and updated ROS and Plan today (8/5/2024). In review of yesterday's note (8/4/2024), there are no changes except as documented above.

## 2024-08-06 LAB
ANION GAP SERPL CALC-SCNC: 14 MMOL/L (ref 7–16)
BASOPHILS # BLD AUTO: 0.5 % (ref 0–1.8)
BASOPHILS # BLD: 0.05 K/UL (ref 0–0.12)
BUN SERPL-MCNC: 11 MG/DL (ref 8–22)
CALCIUM SERPL-MCNC: 9.1 MG/DL (ref 8.4–10.2)
CHLORIDE SERPL-SCNC: 99 MMOL/L (ref 96–112)
CO2 SERPL-SCNC: 22 MMOL/L (ref 20–33)
CREAT SERPL-MCNC: 0.91 MG/DL (ref 0.5–1.4)
EOSINOPHIL # BLD AUTO: 0.2 K/UL (ref 0–0.51)
EOSINOPHIL NFR BLD: 2.1 % (ref 0–6.9)
ERYTHROCYTE [DISTWIDTH] IN BLOOD BY AUTOMATED COUNT: 50.2 FL (ref 35.9–50)
GFR SERPLBLD CREATININE-BSD FMLA CKD-EPI: 94 ML/MIN/1.73 M 2
GLUCOSE SERPL-MCNC: 94 MG/DL (ref 65–99)
HCT VFR BLD AUTO: 34.3 % (ref 42–52)
HGB BLD-MCNC: 10.9 G/DL (ref 14–18)
IMM GRANULOCYTES # BLD AUTO: 0.1 K/UL (ref 0–0.11)
IMM GRANULOCYTES NFR BLD AUTO: 1 % (ref 0–0.9)
LYMPHOCYTES # BLD AUTO: 1.61 K/UL (ref 1–4.8)
LYMPHOCYTES NFR BLD: 16.8 % (ref 22–41)
MCH RBC QN AUTO: 30.9 PG (ref 27–33)
MCHC RBC AUTO-ENTMCNC: 31.8 G/DL (ref 32.3–36.5)
MCV RBC AUTO: 97.2 FL (ref 81.4–97.8)
MONOCYTES # BLD AUTO: 0.98 K/UL (ref 0–0.85)
MONOCYTES NFR BLD AUTO: 10.2 % (ref 0–13.4)
NEUTROPHILS # BLD AUTO: 6.64 K/UL (ref 1.82–7.42)
NEUTROPHILS NFR BLD: 69.4 % (ref 44–72)
NRBC # BLD AUTO: 0.04 K/UL
NRBC BLD-RTO: 0.4 /100 WBC (ref 0–0.2)
PLATELET # BLD AUTO: 388 K/UL (ref 164–446)
PMV BLD AUTO: 11.2 FL (ref 9–12.9)
POTASSIUM SERPL-SCNC: 4 MMOL/L (ref 3.6–5.5)
RBC # BLD AUTO: 3.53 M/UL (ref 4.7–6.1)
SODIUM SERPL-SCNC: 135 MMOL/L (ref 135–145)
WBC # BLD AUTO: 9.6 K/UL (ref 4.8–10.8)

## 2024-08-06 PROCEDURE — 97140 MANUAL THERAPY 1/> REGIONS: CPT

## 2024-08-06 PROCEDURE — A9270 NON-COVERED ITEM OR SERVICE: HCPCS | Performed by: INTERNAL MEDICINE

## 2024-08-06 PROCEDURE — 700102 HCHG RX REV CODE 250 W/ 637 OVERRIDE(OP): Performed by: INTERNAL MEDICINE

## 2024-08-06 PROCEDURE — 770020 HCHG ROOM/CARE - TELE (206)

## 2024-08-06 PROCEDURE — A9270 NON-COVERED ITEM OR SERVICE: HCPCS | Performed by: HOSPITALIST

## 2024-08-06 PROCEDURE — 700102 HCHG RX REV CODE 250 W/ 637 OVERRIDE(OP): Performed by: HOSPITALIST

## 2024-08-06 PROCEDURE — 94760 N-INVAS EAR/PLS OXIMETRY 1: CPT

## 2024-08-06 PROCEDURE — 700105 HCHG RX REV CODE 258: Performed by: HOSPITALIST

## 2024-08-06 PROCEDURE — 97110 THERAPEUTIC EXERCISES: CPT

## 2024-08-06 PROCEDURE — 80048 BASIC METABOLIC PNL TOTAL CA: CPT

## 2024-08-06 PROCEDURE — 85025 COMPLETE CBC W/AUTO DIFF WBC: CPT

## 2024-08-06 PROCEDURE — 36415 COLL VENOUS BLD VENIPUNCTURE: CPT

## 2024-08-06 PROCEDURE — 99024 POSTOP FOLLOW-UP VISIT: CPT | Performed by: ORTHOPAEDIC SURGERY

## 2024-08-06 PROCEDURE — 99232 SBSQ HOSP IP/OBS MODERATE 35: CPT | Performed by: INTERNAL MEDICINE

## 2024-08-06 RX ADMIN — HYDROCODONE BITARTRATE AND ACETAMINOPHEN 1 TABLET: 5; 325 TABLET ORAL at 14:21

## 2024-08-06 RX ADMIN — DOCUSATE SODIUM 100 MG: 100 CAPSULE, LIQUID FILLED ORAL at 17:25

## 2024-08-06 RX ADMIN — METOPROLOL SUCCINATE 150 MG: 100 TABLET, EXTENDED RELEASE ORAL at 17:24

## 2024-08-06 RX ADMIN — AMIODARONE HYDROCHLORIDE 200 MG: 200 TABLET ORAL at 17:26

## 2024-08-06 RX ADMIN — VALPROIC ACID 125 MG: 250 SOLUTION ORAL at 04:54

## 2024-08-06 RX ADMIN — VALPROIC ACID 125 MG: 250 SOLUTION ORAL at 12:53

## 2024-08-06 RX ADMIN — OMEPRAZOLE 40 MG: 20 CAPSULE, DELAYED RELEASE ORAL at 20:48

## 2024-08-06 RX ADMIN — SODIUM CHLORIDE: 9 INJECTION, SOLUTION INTRAVENOUS at 04:54

## 2024-08-06 RX ADMIN — ATORVASTATIN CALCIUM 40 MG: 40 TABLET, FILM COATED ORAL at 20:48

## 2024-08-06 RX ADMIN — VALPROIC ACID 125 MG: 250 SOLUTION ORAL at 17:26

## 2024-08-06 RX ADMIN — CEPHALEXIN 500 MG: 500 CAPSULE ORAL at 04:54

## 2024-08-06 RX ADMIN — EZETIMIBE 10 MG: 10 TABLET ORAL at 20:48

## 2024-08-06 RX ADMIN — APIXABAN 5 MG: 5 TABLET, FILM COATED ORAL at 04:55

## 2024-08-06 RX ADMIN — TAMSULOSIN HYDROCHLORIDE 0.4 MG: 0.4 CAPSULE ORAL at 04:55

## 2024-08-06 RX ADMIN — APIXABAN 5 MG: 5 TABLET, FILM COATED ORAL at 17:25

## 2024-08-06 RX ADMIN — DOCUSATE SODIUM 100 MG: 100 CAPSULE, LIQUID FILLED ORAL at 04:55

## 2024-08-06 RX ADMIN — CEPHALEXIN 500 MG: 500 CAPSULE ORAL at 17:26

## 2024-08-06 RX ADMIN — CEPHALEXIN 500 MG: 500 CAPSULE ORAL at 12:53

## 2024-08-06 RX ADMIN — TAMSULOSIN HYDROCHLORIDE 0.4 MG: 0.4 CAPSULE ORAL at 17:26

## 2024-08-06 RX ADMIN — AMIODARONE HYDROCHLORIDE 200 MG: 200 TABLET ORAL at 04:54

## 2024-08-06 RX ADMIN — CEPHALEXIN 500 MG: 500 CAPSULE ORAL at 23:46

## 2024-08-06 RX ADMIN — VENLAFAXINE HYDROCHLORIDE 225 MG: 75 CAPSULE, EXTENDED RELEASE ORAL at 20:48

## 2024-08-06 RX ADMIN — FINASTERIDE 5 MG: 5 TABLET, FILM COATED ORAL at 04:55

## 2024-08-06 RX ADMIN — HYDROCODONE BITARTRATE AND ACETAMINOPHEN 1 TABLET: 5; 325 TABLET ORAL at 22:27

## 2024-08-06 ASSESSMENT — ENCOUNTER SYMPTOMS
INSOMNIA: 0
VOMITING: 0
HEADACHES: 0
FEVER: 0
HEARTBURN: 0
CONSTIPATION: 0
BLURRED VISION: 0
SPEECH CHANGE: 0
CHILLS: 0
PHOTOPHOBIA: 0
SENSORY CHANGE: 0
SHORTNESS OF BREATH: 0
DIZZINESS: 0
MYALGIAS: 0
DEPRESSION: 0
WEAKNESS: 0
COUGH: 0
CLAUDICATION: 0
NERVOUS/ANXIOUS: 0
DIARRHEA: 0
ABDOMINAL PAIN: 0

## 2024-08-06 ASSESSMENT — COGNITIVE AND FUNCTIONAL STATUS - GENERAL
SUGGESTED CMS G CODE MODIFIER MOBILITY: CK
MOVING TO AND FROM BED TO CHAIR: A LITTLE
STANDING UP FROM CHAIR USING ARMS: A LITTLE
CLIMB 3 TO 5 STEPS WITH RAILING: A LITTLE
MOBILITY SCORE: 19
WALKING IN HOSPITAL ROOM: A LITTLE
MOVING FROM LYING ON BACK TO SITTING ON SIDE OF FLAT BED: A LITTLE

## 2024-08-06 ASSESSMENT — GAIT ASSESSMENTS
GAIT LEVEL OF ASSIST: CONTACT GUARD ASSIST
DISTANCE (FEET): 60
ASSISTIVE DEVICE: FRONT WHEEL WALKER
DEVIATION: DECREASED HEEL STRIKE;DECREASED TOE OFF

## 2024-08-06 ASSESSMENT — FIBROSIS 4 INDEX: FIB4 SCORE: 1.14

## 2024-08-06 ASSESSMENT — PAIN DESCRIPTION - PAIN TYPE
TYPE: ACUTE PAIN
TYPE: ACUTE PAIN

## 2024-08-06 NOTE — PROGRESS NOTES
Telemetry Shift Summary     Rhythm: Atrial Flutter   Rate: 90s-110s  Measurements: --/0.10/--  Ectopy (reported by Monitor Tech): rare PVC/Bigeminy      Normal Values  Rhythm: Sinus  HR:   Measurements: 0.12-0.20/0.06-0.10/0.30-0.52

## 2024-08-06 NOTE — PROGRESS NOTES
6 hour post void residual checked after keith removal; patient only shown to be retaining 12ccs. Patient up to void several times since 1900 today, see I/Os.

## 2024-08-06 NOTE — PROGRESS NOTES
Telemetry Strip     Strip printed: 1427  Measurements from am strip were as follows:  Rhythm: Afib/Aflutter  HR:   Measurements: -/ 0.08/ -  Ectopy: f-o PVC, r big, r coup              Normal Values  Rhythm SR  HR Range    Measurements 0.12-0.20 / 0.06-0.10  / 0.30-0.52

## 2024-08-06 NOTE — THERAPY
Physical Therapy   Daily Treatment     Patient Name: Karan Wiley  Age:  64 y.o., Sex:  male  Medical Record #: [id number]  Today's Date: 8/6/2024     Precautions  Precautions: (P) Weight Bearing As Tolerated Right Lower Extremity    Assessment    Pt was able to tolerate supine and seated therapeutic exercises today, however, requires active assist to reach full terminal extension during LAQ and SAQ. Pt continues to have limited extension and was able to improve to 0 deg after applying overpressure and static stretch, however, continues to have about -5 to -8 deg of extension when in a rested supine position. Pt was provided with PA mobilization to tibia to improve knee extension along with static stretch. Pt provided with flexion PROM in sitting to improve flexion ROM. Provided with PA during seated knee extension stretch with heel propped on chair, 5 reps with 30 second static holds. Pt was able to ambulate for short distance and required standing/seated rest breaks throughout session due to high HR concerns. Pt demonstrated with HR of 158 during ambulation and 104 in supine. Recommend post-acute placement for continued physical therapy services prior to discharge home.       Plan    Treatment Plan Status: (P) Continue Current Treatment Plan  Type of Treatment: Bed Mobility, Gait Training, Manual Therapy, Neuro Re-Education / Balance, Therapeutic Activities, Therapeutic Exercise  Treatment Frequency: 5 Times per Week  Treatment Duration:      DC Equipment Recommendations: (P) Unable to determine at this time  Discharge Recommendations: (P) Recommend post-acute placement for additional physical therapy services prior to discharge home    Objective       08/06/24 1638   Precautions   Precautions Weight Bearing As Tolerated Right Lower Extremity   Vitals   O2 (LPM) 1.5   O2 Delivery Device Silicone Nasal Cannula   Pain 0 - 10 Group   Location Knee   Location Orientation Right   Description Sharp   Therapist Pain  Assessment Prior to Activity;During Activity;Post Activity;Nurse Notified  (c/o pain primarily during extension stretch, not formally rated)   Cognition    Cognition / Consciousness X   Speech/ Communication Hard of Hearing   Level of Consciousness Alert   Attention Impaired   Comments pt is cooperative, appears to be primarily [place] and mentation improved   Passive ROM Lower Body   Passive ROM Lower Body X   Rt Knee Flexion Degrees 100   Rt Knee Extension Degrees -10   Comments able to reach full extension with overpressure and static stretching   Active ROM Lower Body    Active ROM Lower Body  X   Comments had a difficult time reaching terminal extension during AROM, otherwise functional   Supine Lower Body Exercise   Supine Lower Body Exercises Yes   Straight Leg Raises 1 set of 10;Right  (has some quad lag)   Short Arc Quad 1 set of 10;Right    Heel Slide 1 set of 10;Right   Ankle Pumps Reciprocal;1 set of 10   Quadriceps Isometrics 1 set of 10;Right   Sitting Lower Body Exercises   Sitting Lower Body Exercises Yes   Long Arc Quad 1 set of 10;Right   Other Treatments   Other Treatments Provided pt provided with PA mobilization to tibia to improve knee extension along with static stretch. Pt provided with flexion PROM in sitting to improve flexion ROM. Provided with PA during seated knee extension stretch with heel propped on chair, 5 reps with 30 second static holds   Balance   Sitting Balance (Static) Good   Sitting Balance (Dynamic) Fair +   Standing Balance (Static) Fair   Standing Balance (Dynamic) Fair   Weight Shift Sitting Fair   Weight Shift Standing Fair   Skilled Intervention Verbal Cuing;Postural Facilitation;Facilitation   Comments w/fww use   Bed Mobility    Supine to Sit Standby Assist   Sit to Supine Standby Assist   Scooting Standby Assist   Skilled Intervention Verbal Cuing   Comments HOB flat and rails up   Gait Analysis   Gait Level Of Assist Contact Guard Assist   Assistive Device Front Wheel  Walker   Distance (Feet) 60   # of Times Distance was Traveled 2   Deviation Decreased Heel Strike;Decreased Toe Off   Weight Bearing Status WBAT R LE   Skilled Intervention Verbal Cuing;Postural Facilitation   Comments seated rest breaks due to high HR   Functional Mobility   Sit to Stand Contact Guard Assist   Bed, Chair, Wheelchair Transfer Contact Guard Assist   Transfer Method Stand Step   Mobility EOB, sit<>stand, ambulation, transfer to chair, back to bed   Skilled Intervention Verbal Cuing   6 Clicks Assessment - How much HELP from from another person do you currently need... (If the patient hasn't done an activity recently, how much help from another person do you think he/she would need if he/she tried?)   Turning from your back to your side while in a flat bed without using bedrails? 4   Moving from lying on your back to sitting on the side of a flat bed without using bedrails? 3   Moving to and from a bed to a chair (including a wheelchair)? 3   Standing up from a chair using your arms (e.g., wheelchair, or bedside chair)? 3   Walking in hospital room? 3   Climbing 3-5 steps with a railing? 3   6 clicks Mobility Score 19   Activity Tolerance   Sitting in Chair 5 mins   Sitting Edge of Bed 10 mins   Standing 4 mins x 2   Comments limited by SOB and high HR   Patient / Family Goals    Patient / Family Goal #1 nt stated   Short Term Goals    Short Term Goal # 1 S with bed mob in 6 V   Goal Outcome # 1 goal not met   Short Term Goal # 2 S with transfers in 6 V   Goal Outcome # 2 Goal not met   Short Term Goal # 3 S with amb x 100 feet in 6 V   Goal Outcome # 3 Goal not met   Education Group   Role of Physical Therapist Patient Response Patient;Acceptance;Explanation;Demonstration;Verbal Demonstration;Action Demonstration;Reinforcement Needed;No Learning Evidence   Physical Therapy Treatment Plan   Physical Therapy Treatment Plan Continue Current Treatment Plan   Anticipated Discharge Equipment and  Recommendations   DC Equipment Recommendations Unable to determine at this time   Discharge Recommendations Recommend post-acute placement for additional physical therapy services prior to discharge home   Interdisciplinary Plan of Care Collaboration   IDT Collaboration with  Nursing   Patient Position at End of Therapy In Bed;Tray Table within Reach;Phone within Reach;Call Light within Reach;Bed Alarm On   Collaboration Comments aware of visit and recs   Session Information   Date / Session Number  8/6-7 (3/5, 8/9)

## 2024-08-06 NOTE — CARE PLAN
The patient is Stable - Low risk of patient condition declining or worsening    Shift Goals  Clinical Goals: Pain management  Patient Goals: Comfort  Family Goals: FELIPA    Progress made toward(s) clinical / shift goals: Pt educated on pain scale rating and medication, pt verbalizes understanding. Assessment completed, bruising and dressing visualized on R leg. Pt denies immobilizer being placed, education provided.      Problem: Knowledge Deficit - Standard  Goal: Patient and family/care givers will demonstrate understanding of plan of care, disease process/condition, diagnostic tests and medications  Outcome: Progressing     Problem: Skin Integrity  Goal: Skin integrity is maintained or improved  Outcome: Progressing     Problem: Pain - Standard  Goal: Alleviation of pain or a reduction in pain to the patient’s comfort goal  Outcome: Progressing       Patient is not progressing towards the following goals:

## 2024-08-06 NOTE — CARE PLAN
The patient is Watcher - Medium risk of patient condition declining or worsening    Shift Goals  Clinical Goals: Rate Control  Patient Goals: Comfort  Family Goals: FELIPA    Progress made toward(s) clinical / shift goals:    Problem: Knowledge Deficit - Standard  Goal: Patient and family/care givers will demonstrate understanding of plan of care, disease process/condition, diagnostic tests and medications  Outcome: Progressing     Problem: Skin Integrity  Goal: Skin integrity is maintained or improved  Outcome: Progressing     Problem: Fall Risk  Goal: Patient will remain free from falls  Outcome: Progressing     Problem: Pain - Standard  Goal: Alleviation of pain or a reduction in pain to the patient’s comfort goal  Outcome: Progressing       Patient is not progressing towards the following goals:

## 2024-08-06 NOTE — PROGRESS NOTES
Hospital Medicine Daily Progress Note    Date of Service  8/6/2024    Chief Complaint  Karan Wiley is a 64 y.o. male admitted 7/25/2024 with confusion.    Hospital Course  Karan Wiley has a history of mild cognitive impairment.  He underwent total knee replacement on 7/23/2024.  Patient presented initially to an outside hospital with confusion.  He was transferred to [facility] for evaluation of potential joint infection.  His knee has been evaluated and workup is reassuring that there is no infection.  He has developed agitation but this has since resolved.  He had A-fib with RVR medications have been adjusted and is now under good control.  Patient is not appropriate to discharge home given his limited tolerance secondary to shortness of breath and elevated heart rate.  He requires continued cues and has poor insight and judgment.  He would benefit from continued skilled nursing after discharge from the hospital.    Interval Problem Update  8/6: Patient's mental status at reported baseline.  He is very somnolent at time of examination.  He had White catheter removed yesterday and has been able to have adequate voids with very low postvoid residual.  He is medically cleared for skilled nursing and pending acceptance at this time.    I have discussed this patient's plan of care and discharge plan at IDT rounds today with Case Management, Nursing, Nursing leadership, and other members of the IDT team.    Consultants/Specialty  orthopedics    Code Status  Full Code    Disposition  The patient is medically cleared for discharge to home or a post-acute facility.  Anticipate discharge to: skilled nursing facility    I have placed the appropriate orders for post-discharge needs.    Review of Systems  Review of Systems   Constitutional:  Negative for chills and fever.   HENT:  Negative for congestion.    Eyes:  Negative for blurred vision and photophobia.   Respiratory:  Negative for cough and shortness of  breath.    Cardiovascular:  Negative for chest pain, claudication and leg swelling.   Gastrointestinal:  Negative for abdominal pain, constipation, diarrhea, heartburn and vomiting.   Genitourinary:  Negative for dysuria and hematuria.   Musculoskeletal:  Negative for joint pain and myalgias.   Skin:  Negative for itching and rash.   Neurological:  Negative for dizziness, sensory change, speech change, weakness and headaches.   Psychiatric/Behavioral:  Negative for depression. The patient is not nervous/anxious and does not have insomnia.         Physical Exam  Temp:  [36.6 °C (97.9 °F)-37.4 °C (99.4 °F)] 36.6 °C (97.9 °F)  Pulse:  [77-99] 78  Resp:  [16-20] 20  BP: (112-141)/(78-97) 128/78  SpO2:  [86 %-99 %] 93 %    Physical Exam  Vitals and nursing note reviewed.   Constitutional:       General: He is not in acute distress.     Appearance: Normal appearance.   HENT:      Head: Normocephalic and atraumatic.   Eyes:      General: No scleral icterus.     Extraocular Movements: Extraocular movements intact.   Cardiovascular:      Rate and Rhythm: Normal rate and regular rhythm.      Pulses: Normal pulses.      Heart sounds: Normal heart sounds. No murmur heard.  Pulmonary:      Effort: Pulmonary effort is normal. No respiratory distress.      Breath sounds: Normal breath sounds. No wheezing, rhonchi or rales.   Abdominal:      General: Abdomen is flat. Bowel sounds are normal. There is no distension.      Palpations: Abdomen is soft.      Tenderness: There is no rebound.   Musculoskeletal:         General: No swelling or tenderness.      Cervical back: Normal range of motion and neck supple.   Lymphadenopathy:      Cervical: No cervical adenopathy.   Skin:     Coloration: Skin is not jaundiced.      Findings: No erythema.   Neurological:      General: No focal deficit present.      Mental Status: He is alert and oriented to person, place, and time. Mental status is at baseline.      Cranial Nerves: No cranial nerve  deficit.   Psychiatric:         Mood and Affect: Mood normal.         Behavior: Behavior normal.         Fluids    Intake/Output Summary (Last 24 hours) at 8/6/2024 1348  Last data filed at 8/6/2024 0639  Gross per 24 hour   Intake --   Output 800 ml   Net -800 ml       Laboratory  Recent Labs     08/04/24  0310 08/05/24  0110 08/06/24  0102   WBC 10.6 10.9* 9.6   RBC 3.41* 3.67* 3.53*   HEMOGLOBIN 10.4* 11.1* 10.9*   HEMATOCRIT 32.9* 35.5* 34.3*   MCV 96.5 96.7 97.2   MCH 30.5 30.2 30.9   MCHC 31.6* 31.3* 31.8*   RDW 48.7 50.1* 50.2*   PLATELETCT 407 414 388   MPV 11.2 11.1 11.2     Recent Labs     08/04/24 0310 08/05/24  0110 08/06/24  0102   SODIUM 137 138 135   POTASSIUM 3.8 3.4* 4.0   CHLORIDE 99 99 99   CO2 25 26 22   GLUCOSE 100* 103* 94   BUN 12 11 11   CREATININE 1.02 0.96 0.91   CALCIUM 9.5 9.5 9.1                   Imaging  CT-EXTREMITY, LOWER WITH RIGHT   Final Result      1.  Induration and subcutaneous fat right lateral thigh is noted which could be due to edema or cellulitis or minimal subcutaneous hematoma.      2.  No significant hematoma mass or fluid collection identified.      3.  Right knee arthroplasty with postoperative fluid collection noted.      EC-ECHOCARDIOGRAM COMPLETE W/ CONT   Final Result      DX-CHEST-PORTABLE (1 VIEW)   Final Result      Elevated right diaphragm.      CT-CTA CHEST PULMONARY ARTERY W/ RECONS   Final Result      1.  No pulmonary embolism detected.   2.  Elevated right diaphragm. Right basilar pulmonary opacity and volume loss suggesting atelectasis and/or mild infiltrate.            CT-KNEE W/O RIGHT   Final Result      1.  Findings in keeping with interval total knee arthroplasty without specific findings to indicate infection   2.  Osteopenia      US-RENAL   Final Result         1.  Normal renal ultrasound.      OUTSIDE IMAGES-DX CHEST   Final Result      OUTSIDE IMAGES-CT HEAD   Final Result      OUTSIDE IMAGES-DX LOWER EXTREMITY, RIGHT   Final Result      OUTSIDE  IMAGES-CT CERVICAL SPINE   Final Result           Assessment/Plan  * Senile dementia, delirium, with behavioral disturbance (HCC)- (present on admission)  Assessment & Plan  Chart history of diagnosed dementia and has atrophy noted on outside CT  Initially very confused and agitated during this hospital stay, this has resolved    8/5  Discontinue zyprexa    8/6  Patient without complaints, remains very somnolent    Cellulitis  Assessment & Plan  8/5:  Right thigh, improving  5 day course of keflex    8/6: Keflex completes tomorrow      Urinary retention due to benign prostatic hyperplasia  Assessment & Plan  8/5:  Patient is more mobile  Voiding trial    8/6:   Continued minimal amount of post void residual     Pneumonia of right lower lobe due to infectious organism- (present on admission)  Assessment & Plan  CXR w elev R hemidiaphragm w possible infiltrate  Lower suspicion for PNA s/p IV unasyn 5 day course        Seizure disorder as sequela of cerebrovascular accident (HCC)- (present on admission)  Assessment & Plan  Continue anti-epileptics     SIRS (systemic inflammatory response syndrome) ([facility])  Assessment & Plan  SIRS criteria identified on my evaluation include:  Tachycardia, with heart rate greater than 90 BPM and Leukocytosis, with WBC greater than 12,000  Unclear cause of SIRS  R knee does not appear infected  UA at OSH neg, here also not convincing, UCX pending  BCX NGTD  CXR without PNA he did start diffuzely wheezing yest afternoon 7/27  He was initially on IV Unasyn/zyvox, zyvox dc'd given low suspicion for mrsa infection/mrsa pcr neg. I dc'd IV Unasyn 7/26 and his WBC is climbing up.  I have restarted IV unasyn at this time...  C/f Etoh w/d? Although pt is altered he did report to nursing he drinks beer. Wife had denies heavy consistent drinking prior to his surgery. Consider tx Etoh w/d      Fall- (present on admission)  Assessment & Plan  PT/OT    Sinus tachycardia  Assessment & Plan  Possibly from  agitation  No clear w/d  per wife not drinking excessive etoh  Patient has a spine stimulator in place   CTA neg for PE  7/27 now in afib w rvr    Polypharmacy- (present on admission)  Assessment & Plan  Mental status improved    Anemia- (present on admission)  Assessment & Plan  8/5:  Continue eliquis  Monitor hemoglobin daily    Status post right knee replacement- (present on admission)  Assessment & Plan  PT/OT, postacute care    Acute kidney injury (HCC)- (present on admission)  Assessment & Plan  Resolved with IV fluids      Body mass index 40.0-44.9, adult (HCC)- (present on admission)  Assessment & Plan  Body mass index is 42.37 kg/m².    Essential hypertension, benign- (present on admission)  Assessment & Plan  Hypertensive while agitated improved when he was more calm    Hyperlipidemia- (present on admission)  Assessment & Plan  Low-fat low-cholesterol diet  Continue Lipitor 40 mg nightly and Zetia 10 mg nightly      Depression- (present on admission)  Assessment & Plan  Continue Effexor XR  Holding trazodone given altered mental status    CAD (coronary artery disease)- (present on admission)  Assessment & Plan  2020 patient had PCI x 2        Hypokalemia- (present on admission)  Assessment & Plan  Replaced    Paroxysmal atrial fibrillation (HCC)- (present on admission)  Assessment & Plan  As above    Atrial fibrillation with rapid ventricular response (HCC)- (present on admission)  Assessment & Plan  8/5:  Amiodarone, metoprolol, apixaban  Continues to have episodes of uncontrolled A-fib/RVR  Increase metoprolol  Consider addition of digoxin if remains uncontrolled    8/6:   Dose of metoprolol adequate, rate controlled now    BPH (benign prostatic hyperplasia)- (present on admission)  Assessment & Plan  Finasteride and Flomax    Type 2 diabetes mellitus without complication, without long-term current use of insulin (HCC)- (present on admission)  Assessment & Plan  Insulin sliding scale         VTE  prophylaxis:    therapeutic anticoagulation with eliquis 5 mg BID      I have performed a physical exam and reviewed and updated ROS and Plan today (8/6/2024). In review of yesterday's note (8/5/2024), there are no changes except as documented above.

## 2024-08-06 NOTE — PROGRESS NOTES
"Subjective:    No acute events.  Feeling better.  Minimal knee pain.   Objective:  /78   Pulse 78   Temp 36.6 °C (97.9 °F) (Temporal)   Resp 20   Ht 1.702 m (5' 7\")   Wt (!) 126 kg (278 lb 3.5 oz)   SpO2 93%     Recent Labs     08/04/24 0310 08/05/24  0110 08/06/24  0102   WBC 10.6 10.9* 9.6   RBC 3.41* 3.67* 3.53*   HEMOGLOBIN 10.4* 11.1* 10.9*   HEMATOCRIT 32.9* 35.5* 34.3*   MCV 96.5 96.7 97.2   MCH 30.5 30.2 30.9   MCHC 31.6* 31.3* 31.8*   RDW 48.7 50.1* 50.2*   PLATELETCT 407 414 388   MPV 11.2 11.1 11.2     Recent Labs     08/04/24 0310 08/05/24  0110 08/06/24  0102   SODIUM 137 138 135   POTASSIUM 3.8 3.4* 4.0   CHLORIDE 99 99 99   CO2 25 26 22   GLUCOSE 100* 103* 94   BUN 12 11 11   CREATININE 1.02 0.96 0.91   CALCIUM 9.5 9.5 9.1         Intake/Output Summary (Last 24 hours) at 8/6/2024 1151  Last data filed at 8/6/2024 0639  Gross per 24 hour   Intake --   Output 800 ml   Net -800 ml       Comfortable, no distress  Neurologically and vascularly intact with palpable pedal pulses bilaterally.  Dressing C/D/I      Assessment:  s/p total knee arthroplasty.    Plan:    WBAT  Encourage ROM    "

## 2024-08-07 VITALS
HEIGHT: 67 IN | WEIGHT: 277.78 LBS | DIASTOLIC BLOOD PRESSURE: 93 MMHG | OXYGEN SATURATION: 92 % | BODY MASS INDEX: 43.6 KG/M2 | HEART RATE: 77 BPM | SYSTOLIC BLOOD PRESSURE: 143 MMHG | TEMPERATURE: 97.3 F | RESPIRATION RATE: 18 BRPM

## 2024-08-07 LAB
ANION GAP SERPL CALC-SCNC: 12 MMOL/L (ref 7–16)
BASOPHILS # BLD AUTO: 0.7 % (ref 0–1.8)
BASOPHILS # BLD: 0.05 K/UL (ref 0–0.12)
BUN SERPL-MCNC: 8 MG/DL (ref 8–22)
CALCIUM SERPL-MCNC: 9.5 MG/DL (ref 8.4–10.2)
CHLORIDE SERPL-SCNC: 99 MMOL/L (ref 96–112)
CO2 SERPL-SCNC: 26 MMOL/L (ref 20–33)
CREAT SERPL-MCNC: 0.91 MG/DL (ref 0.5–1.4)
EOSINOPHIL # BLD AUTO: 0.18 K/UL (ref 0–0.51)
EOSINOPHIL NFR BLD: 2.5 % (ref 0–6.9)
ERYTHROCYTE [DISTWIDTH] IN BLOOD BY AUTOMATED COUNT: 51.2 FL (ref 35.9–50)
GFR SERPLBLD CREATININE-BSD FMLA CKD-EPI: 94 ML/MIN/1.73 M 2
GLUCOSE SERPL-MCNC: 148 MG/DL (ref 65–99)
HCT VFR BLD AUTO: 34.8 % (ref 42–52)
HGB BLD-MCNC: 11.1 G/DL (ref 14–18)
IMM GRANULOCYTES # BLD AUTO: 0.04 K/UL (ref 0–0.11)
IMM GRANULOCYTES NFR BLD AUTO: 0.6 % (ref 0–0.9)
LYMPHOCYTES # BLD AUTO: 1.33 K/UL (ref 1–4.8)
LYMPHOCYTES NFR BLD: 18.4 % (ref 22–41)
MCH RBC QN AUTO: 31 PG (ref 27–33)
MCHC RBC AUTO-ENTMCNC: 31.9 G/DL (ref 32.3–36.5)
MCV RBC AUTO: 97.2 FL (ref 81.4–97.8)
MONOCYTES # BLD AUTO: 0.6 K/UL (ref 0–0.85)
MONOCYTES NFR BLD AUTO: 8.3 % (ref 0–13.4)
NEUTROPHILS # BLD AUTO: 5.04 K/UL (ref 1.82–7.42)
NEUTROPHILS NFR BLD: 69.5 % (ref 44–72)
NRBC # BLD AUTO: 0.02 K/UL
NRBC BLD-RTO: 0.3 /100 WBC (ref 0–0.2)
PLATELET # BLD AUTO: 425 K/UL (ref 164–446)
PMV BLD AUTO: 11.5 FL (ref 9–12.9)
POTASSIUM SERPL-SCNC: 3.3 MMOL/L (ref 3.6–5.5)
RBC # BLD AUTO: 3.58 M/UL (ref 4.7–6.1)
SODIUM SERPL-SCNC: 137 MMOL/L (ref 135–145)
WBC # BLD AUTO: 7.2 K/UL (ref 4.8–10.8)

## 2024-08-07 PROCEDURE — 85025 COMPLETE CBC W/AUTO DIFF WBC: CPT

## 2024-08-07 PROCEDURE — 700102 HCHG RX REV CODE 250 W/ 637 OVERRIDE(OP): Mod: JZ | Performed by: INTERNAL MEDICINE

## 2024-08-07 PROCEDURE — A9270 NON-COVERED ITEM OR SERVICE: HCPCS | Performed by: HOSPITALIST

## 2024-08-07 PROCEDURE — 700102 HCHG RX REV CODE 250 W/ 637 OVERRIDE(OP): Performed by: INTERNAL MEDICINE

## 2024-08-07 PROCEDURE — A9270 NON-COVERED ITEM OR SERVICE: HCPCS | Performed by: INTERNAL MEDICINE

## 2024-08-07 PROCEDURE — 700102 HCHG RX REV CODE 250 W/ 637 OVERRIDE(OP): Performed by: HOSPITALIST

## 2024-08-07 PROCEDURE — 36415 COLL VENOUS BLD VENIPUNCTURE: CPT

## 2024-08-07 PROCEDURE — 99239 HOSP IP/OBS DSCHRG MGMT >30: CPT | Performed by: INTERNAL MEDICINE

## 2024-08-07 PROCEDURE — 80048 BASIC METABOLIC PNL TOTAL CA: CPT

## 2024-08-07 PROCEDURE — A9270 NON-COVERED ITEM OR SERVICE: HCPCS | Mod: JZ | Performed by: INTERNAL MEDICINE

## 2024-08-07 RX ORDER — CEPHALEXIN 500 MG/1
500 CAPSULE ORAL EVERY 6 HOURS
Status: ACTIVE | DISCHARGE
Start: 2024-08-07 | End: 2024-08-08

## 2024-08-07 RX ORDER — AMIODARONE HYDROCHLORIDE 200 MG/1
200 TABLET ORAL 2 TIMES DAILY
Status: SHIPPED
Start: 2024-08-07

## 2024-08-07 RX ORDER — POTASSIUM CHLORIDE 1500 MG/1
40 TABLET, EXTENDED RELEASE ORAL ONCE
Status: COMPLETED | OUTPATIENT
Start: 2024-08-07 | End: 2024-08-07

## 2024-08-07 RX ORDER — HYDROCODONE BITARTRATE AND ACETAMINOPHEN 5; 325 MG/1; MG/1
1 TABLET ORAL EVERY 6 HOURS PRN
Qty: 6 TABLET | Refills: 0 | Status: SHIPPED | OUTPATIENT
Start: 2024-08-07 | End: 2024-08-09

## 2024-08-07 RX ORDER — METOPROLOL SUCCINATE 50 MG/1
150 TABLET, EXTENDED RELEASE ORAL DAILY
Status: SHIPPED
Start: 2024-08-07 | End: 2024-08-23

## 2024-08-07 RX ADMIN — AMIODARONE HYDROCHLORIDE 200 MG: 200 TABLET ORAL at 05:22

## 2024-08-07 RX ADMIN — HYDROCODONE BITARTRATE AND ACETAMINOPHEN 1 TABLET: 5; 325 TABLET ORAL at 08:50

## 2024-08-07 RX ADMIN — CEPHALEXIN 500 MG: 500 CAPSULE ORAL at 11:47

## 2024-08-07 RX ADMIN — VALPROIC ACID 125 MG: 250 SOLUTION ORAL at 11:47

## 2024-08-07 RX ADMIN — VALPROIC ACID 125 MG: 250 SOLUTION ORAL at 05:27

## 2024-08-07 RX ADMIN — FINASTERIDE 5 MG: 5 TABLET, FILM COATED ORAL at 05:22

## 2024-08-07 RX ADMIN — APIXABAN 5 MG: 5 TABLET, FILM COATED ORAL at 05:22

## 2024-08-07 RX ADMIN — DOCUSATE SODIUM 100 MG: 100 CAPSULE, LIQUID FILLED ORAL at 05:22

## 2024-08-07 RX ADMIN — TAMSULOSIN HYDROCHLORIDE 0.4 MG: 0.4 CAPSULE ORAL at 05:22

## 2024-08-07 RX ADMIN — POTASSIUM CHLORIDE 40 MEQ: 1500 TABLET, EXTENDED RELEASE ORAL at 08:50

## 2024-08-07 RX ADMIN — CEPHALEXIN 500 MG: 500 CAPSULE ORAL at 05:22

## 2024-08-07 ASSESSMENT — FIBROSIS 4 INDEX: FIB4 SCORE: 1.04

## 2024-08-07 NOTE — CARE PLAN
The patient is Stable - Low risk of patient condition declining or worsening    Shift Goals  Clinical Goals: Pain management, safety  Patient Goals: Rest  Family Goals: FELIPA    Progress made toward(s) clinical / shift goals:      Problem: Knowledge Deficit - Standard  Goal: Patient and family/care givers will demonstrate understanding of plan of care, disease process/condition, diagnostic tests and medications  Outcome: Progressing  Note: Pt updated on POC.     Problem: Skin Integrity  Goal: Skin integrity is maintained or improved  Outcome: Progressing  Note: Pt skin integrity has been maintained, no new breakdown is present.     Problem: Fall Risk  Goal: Patient will remain free from falls  Outcome: Progressing  Note: Pt has remained free from falls and calls appropriately.     Problem: Pain - Standard  Goal: Alleviation of pain or a reduction in pain to the patient’s comfort goal  Outcome: Progressing  Note: Pt reports pain that is controlled by rest and medication.        Patient is not progressing towards the following goals:

## 2024-08-07 NOTE — DISCHARGE PLANNING
FAZAL received call from Alisha at [facility] stating she had obtained insurance authorization from [facility] and Pt is okay to be transferred to [facility] around 1300 if possible.    JOHN WONG notified

## 2024-08-07 NOTE — DISCHARGE PLANNING
DC Transport Scheduled    Transport Company Scheduled:  [facility]  Spoke with Micheal at [employer] to schedule transport.  Trip #: [id number]    Scheduled Date: 8/72024  Scheduled Time: 1300    Destination: [facility]   Destination address: [address] [place] [address] [place] [id number]    Notified care team of scheduled transport via Vo[employer].     If there are any changes needed to the DC transportation scheduled, please contact [facility] at ext. [phone] between the hours of 3787-6955 Mon-Fri. If outside those hours, contact the ED Case Manager at ext. [phone].

## 2024-08-07 NOTE — PROGRESS NOTES
Pt discharged to [facility]. Discharge instructions provided to pt. Pt verbalizes understanding. Pt states all questions have been answered. Signed copy in chart.Controlled substance consent formed signed, opiate printed prescription given to pt, placed in packet Pt states that all personal belongings are in possession. Pt off unit via wheelchair, escorted by [facility].

## 2024-08-07 NOTE — DISCHARGE PLANNING
JUDITH spoke with Alisha at [facility]. She is aware of transport time and will have [facility] nursing staff call bedside RN for report.

## 2024-08-07 NOTE — DISCHARGE PLANNING
Case Management Discharge Planning    Admission Date: 7/25/2024  GMLOS: 4.4  ALOS: 13    JUDITH sent rideline request for 1:00pm today to [facility]. Awaiting response. PASRR complete. JUDITH will speak with pt/family shortly.     Update: JUDITH attempted multiple times to reach wife Alesia to notify of transport at 1300 to [facility]. No answer. JUDITH spoke with son Zurdo who will call to notify Alesia. Transfer packet given to bedside RN.

## 2024-08-07 NOTE — PROGRESS NOTES
Attempted to call multiple times to [facility] to give report, no answer. Message left to [facility] and call back number provided.

## 2024-08-07 NOTE — PROGRESS NOTES
Telemetry Shift Summary     Rhythm Atrial Flutter  HR Range   Ectopy r-oPVC/rBig/rCoup  Measurements  -/0.10/-       Normal Values  Rhythm SR  HR Range    Measurements 0.12-0.20 / 0.06-0.10  / 0.30-0.52

## 2024-08-07 NOTE — DISCHARGE SUMMARY
Discharge Summary    CHIEF COMPLAINT ON ADMISSION  No chief complaint on file.      Reason for Admission  AQUILES and confusion    Admission Date  7/25/2024     CODE STATUS  Full Code    HPI & HOSPITAL COURSE  Karan Wiley has a history of mild cognitive impairment.  He underwent total knee replacement on 7/23/2024.  Patient presented initially to an outside hospital with confusion.  He was transferred to [facility] for evaluation of potential joint infection.  His knee has been evaluated and workup is reassuring that there is no infection.  He has developed agitation but this has since resolved.  He had A-fib with RVR medications have been adjusted and is now under good control.  Patient is not appropriate to discharge home given his limited tolerance secondary to shortness of breath and elevated heart rate.  He requires continued cues and has poor insight and judgment.  He would benefit from continued skilled nursing after discharge from the hospital.  Patient is medically cleared to transition to SNF for continued rehab and increased activity tolerance.      Therefore, he is discharged in good and stable condition to skilled nursing facility.    The patient met 2-midnight criteria for an inpatient stay at the time of discharge.      FOLLOW UP ITEMS POST DISCHARGE  PCP and [facility] in 1 week for staple removal.    DISCHARGE DIAGNOSES  Principal Problem:    Senile dementia, delirium, with behavioral disturbance (HCC) (POA: Yes)  Active Problems:    Type 2 diabetes mellitus without complication, without long-term current use of insulin (HCC) (POA: Yes)    BPH (benign prostatic hyperplasia) (POA: Yes)    Atrial fibrillation with rapid ventricular response (HCC) (POA: Yes)    Paroxysmal atrial fibrillation (HCC) (POA: Yes)      Overview: October 2023: Echocardiogram with normal LV size, LVEF 55-60%. Normal RA,       LA and RV. No valvular problems.    Hypokalemia (POA: Yes)    CAD (coronary artery  disease) (POA: Yes)      Overview: August 2020: STEMI. PCI/NAOMY ([place] 3.5 x 25mm) the mid RCA, and PCI/NAOMY       ([place] 2.5 x 12mm) the PDA.      November 2020: MPI with no ischemia or infarct.      July 2022: Echocardiogram with normal LV size, LVEF 55-60%. Normal RA, LA       and RV. Mild MR, trace TR.    Depression (POA: Yes)    Hyperlipidemia (POA: Yes)    Essential hypertension, benign (POA: Yes)    Body mass index 40.0-44.9, adult (HCC) (POA: Yes)    Acute kidney injury (HCC) (POA: Yes)    Status post right knee replacement (POA: Yes)    Anemia (POA: Yes)    Polypharmacy (POA: Yes)    Fall (POA: Yes)    Seizure disorder as sequela of cerebrovascular accident (HCC) (POA: Yes)    Pneumonia of right lower lobe due to infectious organism (POA: Yes)    Urinary retention due to benign prostatic hyperplasia (POA: No)    Cellulitis (POA: No)  Resolved Problems:    Mild cognitive impairment (POA: Unknown)      FOLLOW UP  Future Appointments   Date Time Provider Department Center   8/7/2024  2:00 PM [facility]   8/13/2024  7:30 AM Oscar Maloney, Ph.D. [facility] None   10/10/2024 11:00 AM MARCELA Richardson [facility] None     Henri Kong M.D.  [address]  [facility] [address]  [phone]    Follow up in 2 week(s)      [facility] [place]  [address] [facility] [address]  [phone]  Follow up in 1 week(s)  staple removal and post op follow up      MEDICATIONS ON DISCHARGE     Medication List        START taking these medications        Instructions   amiodarone 200 MG Tabs  Commonly known as: Cordarone   Take 1 Tablet by mouth 2 times a day.  Dose: 200 mg     cephALEXin 500 MG Caps  Commonly known as: Keflex   Take 1 Capsule by mouth every 6 hours for 1 day.  Dose: 500 mg     HYDROcodone-acetaminophen 5-325 MG Tabs per tablet  Commonly known as: Norco  Replaces: HYDROcodone/acetaminophen  MG Tabs   Take 1 Tablet by mouth every 6 hours as needed (severe pain) for  up to 2 days.  Dose: 1 Tablet     metoprolol SR 50 MG Tb24  Commonly known as: Toprol XL   Take 3 Tablets by mouth every day.  Dose: 150 mg            CHANGE how you take these medications        Instructions   apixaban 5mg Tabs  What changed:   medication strength  how much to take  Commonly known as: Eliquis   Take 1 Tablet by mouth 2 times a day.  Dose: 5 mg            CONTINUE taking these medications        Instructions   acetaminophen 500 MG Tabs  Commonly known as: Tylenol   Take 500 mg by mouth 2 times a day.  Dose: 500 mg     albuterol 108 (90 Base) MCG/ACT Aers inhalation aerosol   Inhale 1-2 Puffs every four hours as needed for Shortness of Breath.  Dose: 1-2 Puff     divalproex 125 MG EC tablet  Commonly known as: Depakote   Take 125 mg by mouth 3 times a day. MEDICATION INSTRUCTIONS FOR SURGERY/PROCEDURE SCHEDULED FOR 7/23/24   CONTINUE TAKING MED PRIOR TO SURGERY  Dose: 125 mg     docusate sodium 100 MG Caps  Commonly known as: Colace   Take 100 mg by mouth 2 times a day. DO NOT TAKE DAY OF SURGERY  Dose: 100 mg     ezetimibe 10 MG Tabs  Commonly known as: Zetia   Take 1 Tablet by mouth every day.  Dose: 10 mg     finasteride 5 MG Tabs  Commonly known as: Proscar   Take 5 mg by mouth every evening.  Dose: 5 mg     omeprazole 40 MG delayed-release capsule  Commonly known as: PriLOSEC   Take 40 mg by mouth every evening.  Dose: 40 mg     rosuvastatin 40 MG tablet  Commonly known as: Crestor   Take 40 mg by mouth every evening.  Dose: 40 mg     tamsulosin 0.4 MG capsule  Commonly known as: Flomax   Take 0.4 mg by mouth 2 times a day. MEDICATION INSTRUCTIONS FOR SURGERY/PROCEDURE SCHEDULED FOR 7/23/24   CONTINUE TAKING MED PRIOR TO SURGERY  Dose: 0.4 mg     tizanidine 4 MG capsule  Commonly known as: Zanaflex   Take 4 mg by mouth 4 times a day.  Dose: 4 mg     traZODone 100 MG Tabs  Commonly known as: Desyrel   Take 150 mg by mouth every evening. 150 mg = 1.5 tabs  Dose: 150 mg     * venlafaxine 150 MG  "extended-release capsule  Commonly known as: Effexor-XR   Take 150 mg by mouth every evening. Take with 75 mg for total daily dose of 225 mg  Dose: 150 mg     * venlafaxine XR 75 MG Cp24  Commonly known as: Effexor XR   Take 75 mg by mouth every evening. Take with 150 mg for total daily dose of 225 mg  Dose: 75 mg           * This list has 2 medication(s) that are the same as other medications prescribed for you. Read the directions carefully, and ask your doctor or other care provider to review them with you.                STOP taking these medications      ASPIRIN 81 PO     carvedilol 25 MG Tabs  Commonly known as: Coreg     HYDROcodone/acetaminophen  MG Tabs  Commonly known as: Norco  Replaced by: HYDROcodone-acetaminophen 5-325 MG Tabs per tablet     telmisartan 40 MG Tabs  Commonly known as: Micardis     traMADol 50 MG Tabs  Commonly known as: Ultram              Allergies  Allergies   Allergen Reactions    Hctz [Hydrochlorothiazide W-Spironolactone]      Cannot breathe    Oxycodone Anaphylaxis and Swelling     Throat swelling    Pectin Anaphylaxis    Hydrochlorothiazide     Zolpidem      \"Sleep walking\"        DIET  Orders Placed This Encounter   Procedures    Diet Order Diet: Consistent CHO (Diabetic)     Standing Status:   Standing     Number of Occurrences:   1     Order Specific Question:   Diet:     Answer:   Consistent CHO (Diabetic) [4]       ACTIVITY  As tolerated.  Weight bearing as tolerated    LINES, DRAINS, AND WOUNDS  This is an automated list. Peripheral IVs will be removed prior to discharge.  Peripheral IV 08/02/24 20 G Anterior;Left Upper arm (Active)   Site Assessment Clean;Dry;Intact 08/07/24 1100   Dressing Type Transparent 08/07/24 1100   Line Status Saline locked 08/07/24 1100   Dressing Status Clean;Dry;Intact 08/07/24 1100   Dressing Intervention N/A 08/07/24 1100   Date Primary Tubing Changed 08/10/24 08/06/24 1955   Infiltration Grading ([facility]) 0 08/07/24 1100   Phlebitis " Scale ([facility] Only) 0 08/07/24 1100       Wound 08/25/22 Incision Head Left Staples, xeroform, 4x4, medipore tape. (Active)       Wound 07/23/24 Incision Knee Right opsite ([facility]) (Active)   Wound Image     08/02/24 0826   Site Assessment FELIPA 08/06/24 2050   Periwound Assessment [facility] 08/04/24 2000   Margins FELIPA 08/04/24 2000   Closure FELIPA 08/04/24 2000   Drainage Amount FELIPA 08/04/24 2000   Treatments Ice applied;Offloading 08/04/24 2000   Offloading/DME Other (comment) 08/04/24 2000   Wound Cleansing Not Applicable 08/04/24 2000   Periwound Protectant Not Applicable 08/04/24 2000   Dressing Status Clean;Dry;Intact 08/06/24 2050   Dressing Changed Observed 08/06/24 2050   Dressing Options Transparent Film 08/04/24 2000       Peripheral IV 08/02/24 20 G Anterior;Left Upper arm (Active)   Site Assessment Clean;Dry;Intact 08/07/24 1100   Dressing Type Transparent 08/07/24 1100   Line Status Saline locked 08/07/24 1100   Dressing Status Clean;Dry;Intact 08/07/24 1100   Dressing Intervention N/A 08/07/24 1100   Date Primary Tubing Changed 08/10/24 08/06/24 1955   Infiltration Grading ([facility]) 0 08/07/24 1100   Phlebitis Scale ([facility] Only) 0 08/07/24 1100               MENTAL STATUS ON TRANSFER             CONSULTATIONS  Critical Care [facility]  PMR - Gut    PROCEDURES  none    LABORATORY  Lab Results   Component Value Date    SODIUM 137 08/07/2024    POTASSIUM 3.3 (L) 08/07/2024    CHLORIDE 99 08/07/2024    CO2 26 08/07/2024    GLUCOSE 148 (H) 08/07/2024    BUN 8 08/07/2024    CREATININE 0.91 08/07/2024        Lab Results   Component Value Date    WBC 7.2 08/07/2024    HEMOGLOBIN 11.1 (L) 08/07/2024    HEMATOCRIT 34.8 (L) 08/07/2024    PLATELETCT 425 08/07/2024        Total time of the discharge process exceeds 36 minutes.

## 2024-08-23 ENCOUNTER — TELEPHONE (OUTPATIENT)
Dept: CARDIOLOGY | Facility: MEDICAL CENTER | Age: 64
End: 2024-08-23
Payer: OTHER GOVERNMENT

## 2024-08-23 ENCOUNTER — OFFICE VISIT (OUTPATIENT)
Dept: CARDIOLOGY | Facility: MEDICAL CENTER | Age: 64
End: 2024-08-23
Payer: OTHER GOVERNMENT

## 2024-08-23 ENCOUNTER — APPOINTMENT (OUTPATIENT)
Dept: NEUROLOGY | Facility: MEDICAL CENTER | Age: 64
End: 2024-08-23
Attending: CLINICAL NEUROPSYCHOLOGIST
Payer: OTHER GOVERNMENT

## 2024-08-23 VITALS
WEIGHT: 255 LBS | RESPIRATION RATE: 16 BRPM | BODY MASS INDEX: 38.65 KG/M2 | HEART RATE: 71 BPM | OXYGEN SATURATION: 94 % | SYSTOLIC BLOOD PRESSURE: 94 MMHG | DIASTOLIC BLOOD PRESSURE: 58 MMHG | HEIGHT: 68 IN

## 2024-08-23 DIAGNOSIS — I48.0 PAROXYSMAL ATRIAL FIBRILLATION (HCC): ICD-10-CM

## 2024-08-23 DIAGNOSIS — Z79.01 CURRENT USE OF LONG TERM ANTICOAGULATION: ICD-10-CM

## 2024-08-23 DIAGNOSIS — Z95.5 STATUS POST INSERTION OF DRUG-ELUTING STENT INTO RIGHT CORONARY ARTERY FOR CORONARY ARTERY DISEASE: ICD-10-CM

## 2024-08-23 DIAGNOSIS — I10 ESSENTIAL HYPERTENSION, BENIGN: ICD-10-CM

## 2024-08-23 DIAGNOSIS — I48.3 TYPICAL ATRIAL FLUTTER (HCC): ICD-10-CM

## 2024-08-23 DIAGNOSIS — I25.10 CORONARY ARTERY DISEASE INVOLVING NATIVE CORONARY ARTERY OF NATIVE HEART WITHOUT ANGINA PECTORIS: ICD-10-CM

## 2024-08-23 PROCEDURE — 93005 ELECTROCARDIOGRAM TRACING: CPT

## 2024-08-23 PROCEDURE — 99213 OFFICE O/P EST LOW 20 MIN: CPT

## 2024-08-23 RX ORDER — METOPROLOL SUCCINATE 50 MG/1
50 TABLET, EXTENDED RELEASE ORAL DAILY
Qty: 90 TABLET | Refills: 3 | Status: SHIPPED | OUTPATIENT
Start: 2024-08-23

## 2024-08-23 ASSESSMENT — ENCOUNTER SYMPTOMS
SYNCOPE: 0
DIZZINESS: 1
DYSPNEA ON EXERTION: 0
LIGHT-HEADEDNESS: 1
PND: 0
HEADACHES: 0
ORTHOPNEA: 0
SHORTNESS OF BREATH: 0
NEAR-SYNCOPE: 0
PALPITATIONS: 0

## 2024-08-23 ASSESSMENT — FIBROSIS 4 INDEX: FIB4 SCORE: 1.04

## 2024-08-23 NOTE — PATIENT INSTRUCTIONS
Decrease metoprolol to 50 mg every evening    Take his blood pressure twice a day. Morning and in the evening. Keep a log of blood pressure.

## 2024-08-23 NOTE — PROGRESS NOTES
Chief Complaint   Patient presents with    Atrial Fibrillation    Tachycardia          Subjective:   Karan Wiley is a 64 y.o. male who presents today for follow-up.     Patient of Caryn Mckinney.  Current medical problems include  hypertension, dyslipidemia and CAD, with STEMI in August 2020 with PCI of RCA and PCI of posterior lateral branch.  He did have post-procedural complications, including acute hypoxic respiratory failure with flash pulmonary edema, and PEA cardiac arrest.  He does also PAFib, and is anticoagulated with Eliquis. Their last clinic visit was 8/10/2023 with Caryn Mckinney.    He had a right total knee arthroplasty and was was discharged home. On 7/25/2024 he presented to [facility] with altered mental status. He was found to have AQUILES and knee was hot ans swollen. He was directed readmitted to [facility]. Patient continued to have a complicated hospitalization with continued agitation and delirium which polypharmacy was concern, pneumonia, afib with RVR, and see notes for further detail of hospitalization.    On 7/28/2024 his heart rate was increasing and an EKG showed atrial fibrillation with RVR. His coreg had been held during his hospitalization. Cardiology was not consulted during the hospitalization. They started him on an amiodarone drip and PO metoprolol. He was transitioned to PO amiodarone and continued on anticoagulation.    Patient was discharged to SNF on 8/7/2024.    Today's visit:  Patient presents today for his appointment and follow-up with his wife.  Patient reports doing overall well from a cardiac perspective.  Patient is still tired and fatigued after recent hospitalization and stay at skilled nursing facility.  Patient was discharged from [facility] on 8/14/2024.  Patient denies chest pain, palpitations, orthopnea, PND, swelling in his legs or feet, or shortness of breath.  Patient does report dizziness and lightheadedness and recent blood pressures have been low at  "home.  Blood pressures have been ranging 80 to 90s systolically but has had blood pressures reading 67 and 74 systolically.  Patient reports trying to stay hydrated since being home from hospital.  Patient reports taking all his medications as prescribed without missed doses.  Patient denies any signs of bleeding.     Cardiovascular Risk Factors:  1. Smoking status: Former smoker  2. Type II Diabetes Mellitus: No Prediabetes  Lab Results   Component Value Date/Time    HBA1C 6.2 (H) 08/25/2022 05:21 AM    HBA1C 6.1 (H) 08/04/2020 05:45 PM     3. Hypertension: Yes  4. Dyslipidemia: Yes   Cholesterol,Tot   Date Value Ref Range Status   08/08/2023 169  Final     LDL   Date Value Ref Range Status   08/08/2023 93  Final     HDL   Date Value Ref Range Status   08/08/2023 47  Final     Triglycerides   Date Value Ref Range Status   08/08/2023 216  Final       Past Medical History:   Diagnosis Date    Anesthesia     \"difficulty urinated after anesthesia\"    Breath shortness     albuterol inhalers as needed    CAD (coronary artery disease) 08/2020    STEMI. PCI/NAOMY (Rag[place] 3.5 x 25mm) the mid RCA, and PCI/NAOMY ([place] 2.5 x 12mm) the PDA.    Chronic anticoagulation     Dental disorder     dentures    Depression     History of heart artery stent     x2 on eloquis \"posterior side of heart\"    Hyperlipidemia     Hypertension     Low back pain     Myocardial infarct (HCC)     Caryn Mckinney - Cardiologist    Paroxysmal atrial fibrillation (HCC) 10/2023    Echocardiogram with normal LV size, LVEF 55-60%. Normal RA, LA and RV. No valvular problems.    Subdural hematoma (HCC)     on Depakote    Type 2 diabetes mellitus (HCC)     \"states hx of and was on metformin in past\"         Family History   Problem Relation Age of Onset    Cancer Mother         breast         Social History     Tobacco Use    Smoking status: Former     Current packs/day: 0.00     Average packs/day: 0.3 packs/day for 3.0 years (0.8 ttl pk-yrs)     Types: Cigarettes     " "Start date:      Quit date:      Years since quittin.6    Smokeless tobacco: Never   Vaping Use    Vaping status: Never Used   Substance Use Topics    Alcohol use: No    Drug use: Never         Allergies   Allergen Reactions    Hctz [Hydrochlorothiazide W-Spironolactone]      Cannot breathe    Oxycodone Anaphylaxis and Swelling     Throat swelling    Pectin Anaphylaxis    Hydrochlorothiazide     Zolpidem      \"Sleep walking\"          Current Outpatient Medications   Medication Sig    metoprolol SR (TOPROL XL) 50 MG TABLET SR 24 HR Take 1 Tablet by mouth every day.    apixaban (ELIQUIS) 5mg Tab Take 1 Tablet by mouth 2 times a day.    amiodarone (CORDARONE) 200 MG Tab Take 1 Tablet by mouth 2 times a day.    rosuvastatin (CRESTOR) 40 MG tablet Take 40 mg by mouth every evening.    omeprazole (PRILOSEC) 40 MG delayed-release capsule Take 40 mg by mouth every evening.    acetaminophen (TYLENOL) 500 MG Tab Take 500 mg by mouth 2 times a day.    albuterol 108 (90 Base) MCG/ACT Aero Soln inhalation aerosol Inhale 1-2 Puffs every four hours as needed for Shortness of Breath.    ezetimibe (ZETIA) 10 MG Tab Take 1 Tablet by mouth every day.    divalproex (DEPAKOTE) 125 MG EC tablet Take 125 mg by mouth 3 times a day. MEDICATION INSTRUCTIONS FOR SURGERY/PROCEDURE SCHEDULED FOR 24   CONTINUE TAKING MED PRIOR TO SURGERY    traZODone (DESYREL) 100 MG Tab Take 150 mg by mouth every evening. 150 mg = 1.5 tabs    venlafaxine XR (EFFEXOR XR) 75 MG CAPSULE SR 24 HR Take 75 mg by mouth every evening. Take with 150 mg for total daily dose of 225 mg    venlafaxine (EFFEXOR-XR) 150 MG extended-release capsule Take 150 mg by mouth every evening. Take with 75 mg for total daily dose of 225 mg    tizanidine (ZANAFLEX) 4 MG capsule Take 4 mg by mouth 4 times a day.    finasteride (PROSCAR) 5 MG Tab Take 5 mg by mouth every evening.    tamsulosin (FLOMAX) 0.4 MG capsule Take 0.4 mg by mouth 2 times a day. MEDICATION " "INSTRUCTIONS FOR SURGERY/PROCEDURE SCHEDULED FOR 7/23/24   CONTINUE TAKING MED PRIOR TO SURGERY         Review of Systems   Constitutional: Positive for malaise/fatigue.   Cardiovascular:  Negative for chest pain, dyspnea on exertion, leg swelling, near-syncope, orthopnea, palpitations, paroxysmal nocturnal dyspnea and syncope.   Respiratory:  Negative for shortness of breath.    Neurological:  Positive for dizziness and light-headedness. Negative for headaches.           Objective:   BP 94/58 (BP Location: Left arm, Patient Position: Sitting, BP Cuff Size: Adult)   Pulse 71   Resp 16   Ht 1.727 m (5' 8\")   Wt 116 kg (255 lb)   SpO2 94%  Body mass index is 38.77 kg/m².         Physical Exam  Constitutional:       General: He is not in acute distress.  HENT:      Head: Normocephalic and atraumatic.   Cardiovascular:      Rate and Rhythm: Normal rate and regular rhythm.      Pulses: Normal pulses.      Heart sounds: No murmur heard.  Pulmonary:      Effort: Pulmonary effort is normal. No respiratory distress.      Breath sounds: Normal breath sounds.   Musculoskeletal:      Right lower leg: No edema.      Left lower leg: No edema.   Neurological:      Mental Status: He is alert.      Gait: Gait normal.             Lab Results   Component Value Date/Time    SODIUM 137 08/07/2024 01:42 AM    POTASSIUM 3.3 (L) 08/07/2024 01:42 AM    CHLORIDE 99 08/07/2024 01:42 AM    CO2 26 08/07/2024 01:42 AM    GLUCOSE 148 (H) 08/07/2024 01:42 AM    BUN 8 08/07/2024 01:42 AM    CREATININE 0.91 08/07/2024 01:42 AM      Lab Results   Component Value Date/Time    WBC 7.2 08/07/2024 01:42 AM    RBC 3.58 (L) 08/07/2024 01:42 AM    HEMOGLOBIN 11.1 (L) 08/07/2024 01:42 AM    HEMATOCRIT 34.8 (L) 08/07/2024 01:42 AM    MCV 97.2 08/07/2024 01:42 AM    MCH 31.0 08/07/2024 01:42 AM    MCHC 31.9 (L) 08/07/2024 01:42 AM    MPV 11.5 08/07/2024 01:42 AM    NEUTSPOLYS 69.50 08/07/2024 01:42 AM    LYMPHOCYTES 18.40 (L) 08/07/2024 01:42 AM    " MONOCYTES 8.30 08/07/2024 01:42 AM    EOSINOPHILS 2.50 08/07/2024 01:42 AM    BASOPHILS 0.70 08/07/2024 01:42 AM    ANISOCYTOSIS 1+ 08/09/2020 05:58 AM      Lab Results   Component Value Date/Time    CHOLSTRLTOT 169 08/08/2023 12:00 AM    LDL 93 08/08/2023 12:00 AM    HDL 47 08/08/2023 12:00 AM    TRIGLYCERIDE 216 08/08/2023 12:00 AM       Lab Results   Component Value Date/Time    TROPONINT 465 (H) 08/02/2020 0850     Lab Results   Component Value Date/Time    NTPROBNP 466 (H) 08/01/2020 1100     Assessment:   1. Paroxysmal atrial fibrillation (HCC)  - EKG  - metoprolol SR (TOPROL XL) 50 MG TABLET SR 24 HR; Take 1 Tablet by mouth every day.  Dispense: 90 Tablet; Refill: 3  - Comp Metabolic Panel; Future  - CBC WITHOUT DIFFERENTIAL; Future  - CL-CARDIOVERSION; Future    2. Typical atrial flutter (HCC)    3. Current use of long term anticoagulation  - CBC WITHOUT DIFFERENTIAL; Future    4. Essential hypertension, benign    5. Coronary artery disease involving native coronary artery of native heart without angina pectoris    6. Status post insertion of drug-eluting stent into right coronary artery for coronary artery disease        Medical Decision Making:  Today's Assessment / Plan:   Paroxysmal atrial fibrillation  Chronic use of anticoagulation  -EKG in office today atrial flutter rate 72  -Continue amiodarone 200 mg daily  -Decrease metoprolol 50 mg every evening for rate control due to low blood pressures. Monitor heart rate at home.  -Continue eliquis 5 mg twice a day for stroke prevention  -Cardioversion ordered for return to normal sinus rhythm  -Discussed consult to EP for ablation. Patient and wife wish to wait at this time for the referral till after cardioversion and follow up with primary cardiologist Caryn Mckinney  -CMP, CBC labs ordered to be completed next week    Hypertension  - Over controlled per patient home readings  - Decrease metoprolol to 50 mg every evening  - goal < 130/80  -Continue to monitor  blood pressure twice a day and keep a log of readings. Will reach out in one week via [facility] for home blood pressure readings and will adjust medication as needed  -Focus on hydration and electrolytes    CAD s/p remote PCI with NAOMY 2020  Hyperlipidemia  -Most recent LDL 93  -Continue rosuvastatin 40 mg every evening  -Continue zetia 10 mg daily  -Goal of less than 70  -Check lipid panel annually  -Consider PCSK9i if still above goal    Return in about 2 months (around 10/23/2024) for Caryn Mckinney.  Sooner if problems.    JAIRO Soto.

## 2024-08-24 LAB — EKG IMPRESSION: NORMAL

## 2024-08-26 NOTE — TELEPHONE ENCOUNTER
HL,    Called patient to get procedure scheduled. Spoke with wife and she wants to know since the metorprolol was changed, his systolic has improved to about 100. She is wondering if it keeps improving, is this test still needed?    Can someone call them and discuss please?    Readings:    Friday 95/65  Saturday morning - 100/67  Saturday night - 84/55  Sunday - 101/67  Sunday Evening - 106/60  Monday morning 110/72

## 2024-08-26 NOTE — TELEPHONE ENCOUNTER
Patient is scheduled for a Cardioversion with anesthesia on 09- with Dr. Snell. Patient has been instructed to check in at 9:00 am for 11:00 procedure. No meds to hold. Patient has been advised they will need a  home and with them after since they are sedated. Message sent to authorizations. Sent [employer] message to pt with instructions.  No device. FYI sent to Rylie

## 2024-08-26 NOTE — TELEPHONE ENCOUNTER
Phone Number Called: [phone]     Call outcome:  Spoke with pt's wife    Discussed BP readings and need for cardioversion. Explained cardioversion is for heart rhythm and not BP. Advised that EKG will be done prior to cardioversion and if back in rhythm the procedure would not be needed. Verbalized understanding and states they have gone ahead and scheduled the cardioversion.     To Edita HO. Thank you!

## 2024-08-28 ENCOUNTER — HOSPITAL ENCOUNTER (OUTPATIENT)
Facility: MEDICAL CENTER | Age: 64
End: 2024-08-28
Attending: INTERNAL MEDICINE | Admitting: INTERNAL MEDICINE
Payer: OTHER GOVERNMENT

## 2024-08-28 NOTE — TELEPHONE ENCOUNTER
Case scheduled with Linda. Paperwork emailed to [facility] lab scheduling.

## 2024-09-03 ENCOUNTER — TELEPHONE (OUTPATIENT)
Dept: CARDIOLOGY | Facility: MEDICAL CENTER | Age: 64
End: 2024-09-03
Payer: OTHER GOVERNMENT

## 2024-09-03 NOTE — TELEPHONE ENCOUNTER
Caller: Alesia Wiley (wife)    Topic/issue: MEDICAL ADVICE    Per Alesia;    Karan has been having some issues with his BP. She also states that he is not drinking or eating and he is groaning all the time as if his stomach is eating itself. She states that he was recently discharged from the hospital a few days ago and he was told to change how he has been taking his METOPROLOL. Alesia is requesting a call back asap. Please advise.    Thank you,  Ángel CURTIS    Callback Number: [phone]

## 2024-09-03 NOTE — TELEPHONE ENCOUNTER
Phone Number Called: [phone], [phone]    Call outcome: No answer and no ability to LVM.  Subscriber has mail box that has not been set up.

## 2024-09-04 NOTE — TELEPHONE ENCOUNTER
Phone Number Called: [phone]     Call outcome:  Call went to  and  box is full and cannot accept messages at this time.     Phone Number Called: [phone]      Call outcome: No answer and no ability to LVM. Call goes directly to  but mail box that has not been set up.     Calling to attempt to speak with pt directly to advise ER. If pt refuses, this RN to contact Shiela GILLIAM for well check.       Phone Number Called: [place] police department [phone]    Call outcome:  Spoke with non emergency dispatch     Advised that patient has not been eating or drinking for the last three weeks and wife states Karan is trying to commit suicide by starving himself. Advised that he is refusing ER according to wife but this RN is unable to speak directly to the patient. Well check requested.     Shiela GILLIAM to call and update us after well check.

## 2024-09-04 NOTE — TELEPHONE ENCOUNTER
Caller:  Aelsia     Topic/issue:   Alesia is calling back,   Karan BP is 180/110    Callback Number: [phone]    Thank you,   Elida ALLEN

## 2024-09-04 NOTE — TELEPHONE ENCOUNTER
Phone Number Called: [phone]     Call outcome: No answer and no ability to LVM. Call goes directly to  but mail box that has not been set up.     Phone Number Called: [phone]    Call outcome:  Spoke with Alesia    Pt has not been eating or drinking for the last three weeks. Alesia states he has had 7 seizures in the last 3 weeks. Alesia states that she believes Karan is trying to commit suicide by starving himself.    For the last three weeks he has eaten three times. He had a bite of steak one night, a cup of noodles 4 nights ago but did not eat any of it, and one muffin over the course of 4 days.    Alesia states he sleeps all day and does not get up to do anything.     He went to the ER on Tuesday in [facility], he was given IV hydration and sent home.     Wife reports very labile BPs ranging from 61/45 at night and 174/100. This morning's BP is 180/110, HR 68. She states he does not report any s/sx.    Wife states he has had diarrhea for the last few days and is using diapers. He has never needed diapers before.     Alesia states he has appt with PCP tomorrow.     Pt scheduled for OV with [facility] on 9/11.     ER advised at this time. Alesia states he refuses to go to ER.     To Mady, please advise of any further recommendations. Thank you!    To Hien, please request all ER records from [facility]. Thank you!

## 2024-09-04 NOTE — TELEPHONE ENCOUNTER
Per Dr. Jeffers, pt can stop his oral iron as his Hgb has improved. Pt asked if he could take ibuprofen for pain since he just stopped his Eliquis. Per Dr. Jeffers, pt can take ibuprofen as long as he's been off the Eliquis for 3 days. He is also OK to stop the Bactrim. Pt informed of this and he v/u.    Spoke with Officer Lencho from [facility]. Officer Lencho states that they checked on the patient and the patient states he is okay and does not need assistance.

## 2024-09-06 ENCOUNTER — APPOINTMENT (OUTPATIENT)
Dept: ADMISSIONS | Facility: MEDICAL CENTER | Age: 64
End: 2024-09-06
Attending: INTERNAL MEDICINE
Payer: OTHER GOVERNMENT

## 2024-09-06 DIAGNOSIS — Z79.01 CURRENT USE OF LONG TERM ANTICOAGULATION: ICD-10-CM

## 2024-09-06 DIAGNOSIS — I48.0 PAROXYSMAL ATRIAL FIBRILLATION (HCC): ICD-10-CM

## 2024-09-11 ENCOUNTER — OFFICE VISIT (OUTPATIENT)
Dept: CARDIOLOGY | Facility: MEDICAL CENTER | Age: 64
End: 2024-09-11
Payer: OTHER GOVERNMENT

## 2024-09-11 VITALS
BODY MASS INDEX: 36.98 KG/M2 | DIASTOLIC BLOOD PRESSURE: 64 MMHG | OXYGEN SATURATION: 92 % | WEIGHT: 244 LBS | HEIGHT: 68 IN | HEART RATE: 60 BPM | SYSTOLIC BLOOD PRESSURE: 96 MMHG | RESPIRATION RATE: 12 BRPM

## 2024-09-11 DIAGNOSIS — I48.91 ATRIAL FIBRILLATION WITH RAPID VENTRICULAR RESPONSE (HCC): ICD-10-CM

## 2024-09-11 DIAGNOSIS — I48.0 PAROXYSMAL ATRIAL FIBRILLATION (HCC): ICD-10-CM

## 2024-09-11 DIAGNOSIS — I10 ESSENTIAL HYPERTENSION, BENIGN: ICD-10-CM

## 2024-09-11 DIAGNOSIS — Z79.01 CURRENT USE OF LONG TERM ANTICOAGULATION: ICD-10-CM

## 2024-09-11 DIAGNOSIS — Z95.5 STATUS POST INSERTION OF DRUG-ELUTING STENT INTO RIGHT CORONARY ARTERY FOR CORONARY ARTERY DISEASE: ICD-10-CM

## 2024-09-11 DIAGNOSIS — E78.2 MIXED HYPERLIPIDEMIA: ICD-10-CM

## 2024-09-11 DIAGNOSIS — I25.10 CORONARY ARTERY DISEASE INVOLVING NATIVE CORONARY ARTERY OF NATIVE HEART WITHOUT ANGINA PECTORIS: ICD-10-CM

## 2024-09-11 PROCEDURE — 3074F SYST BP LT 130 MM HG: CPT

## 2024-09-11 PROCEDURE — 99214 OFFICE O/P EST MOD 30 MIN: CPT

## 2024-09-11 PROCEDURE — 93005 ELECTROCARDIOGRAM TRACING: CPT

## 2024-09-11 PROCEDURE — 99213 OFFICE O/P EST LOW 20 MIN: CPT

## 2024-09-11 PROCEDURE — 3078F DIAST BP <80 MM HG: CPT

## 2024-09-11 RX ORDER — TELMISARTAN 40 MG/1
40 TABLET ORAL DAILY
Qty: 90 TABLET | Refills: 3 | Status: SHIPPED | OUTPATIENT
Start: 2024-09-11

## 2024-09-11 RX ORDER — AMIODARONE HYDROCHLORIDE 100 MG/1
100 TABLET ORAL DAILY
Qty: 90 TABLET | Refills: 3 | Status: SHIPPED | OUTPATIENT
Start: 2024-09-11

## 2024-09-11 ASSESSMENT — ENCOUNTER SYMPTOMS
NEAR-SYNCOPE: 0
PND: 0
DYSPNEA ON EXERTION: 0
HEADACHES: 0
DIZZINESS: 0
SYNCOPE: 0
PALPITATIONS: 0
SHORTNESS OF BREATH: 0
LIGHT-HEADEDNESS: 0
ORTHOPNEA: 0

## 2024-09-11 ASSESSMENT — FIBROSIS 4 INDEX: FIB4 SCORE: 1.04

## 2024-09-11 NOTE — PROGRESS NOTES
Chief Complaint   Patient presents with    Atrial Fibrillation     F/V DX: Paroxysmal atrial fibrillation (HCC)    Hyperlipidemia     F/V DX: Hyperlipidemia    Hypertension     F/V DX: Essential hypertension, benign          Subjective:   Karan Wiley is a 64 y.o. male who presents today for follow-up.     Patient of Caryn Mckinney.  Current medical problems include  hypertension, dyslipidemia and CAD, with STEMI in August 2020 with PCI of RCA and PCI of posterior lateral branch.  He did have post-procedural complications, including acute hypoxic respiratory failure with flash pulmonary edema, and PEA cardiac arrest.  He does also PAFib, and is anticoagulated with Eliquis. Their last clinic visit was 8/10/2023 with Caryn Mckinney.    He had a right total knee arthroplasty and was was discharged home. On 7/25/2024 he presented to [facility] with altered mental status. He was found to have AQUILES and knee was hot ans swollen. He was directed readmitted to [facility]. Patient continued to have a complicated hospitalization with continued agitation and delirium which polypharmacy was concern, pneumonia, afib with RVR, and see notes for further detail of hospitalization.    On 7/28/2024 his heart rate was increasing and an EKG showed atrial fibrillation with RVR. His coreg had been held during his hospitalization. Cardiology was not consulted during the hospitalization. They started him on an amiodarone drip and PO metoprolol. He was transitioned to PO amiodarone and continued on anticoagulation.    Patient was discharged to SNF on 8/7/2024.    8/23/2024 visit:  Patient presents today for his appointment and follow-up with his wife.  Patient reports doing overall well from a cardiac perspective.  Patient is still tired and fatigued after recent hospitalization and stay at skilled nursing facility.  Patient was discharged from [facility] on 8/14/2024.  Patient denies chest pain, palpitations, orthopnea, PND, swelling in  "his legs or feet, or shortness of breath.  Patient does report dizziness and lightheadedness and recent blood pressures have been low at home.  Blood pressures have been ranging 80 to 90s systolically but has had blood pressures reading 67 and 74 systolically.  Patient reports trying to stay hydrated since being home from hospital.  Patient reports taking all his medications as prescribed without missed doses.  Patient denies any signs of bleeding.     Today's visit:  Patient is accompanied by his wife. Patient wife reports that patients blood pressures have been very elevated in the mornings ranging from 140-180s/. Blood pressure in the evenings with metoprolol are normal. Patient denies chest pain, dizziness, palpitations, shortness of breath or edema. After our last follow up patient had diarrhea and needed to go to the ER for IV hydration. He has not had any further episodes of diarrhea and has increased his food intake.     Cardiovascular Risk Factors:  1. Smoking status: Former smoker  2. Type II Diabetes Mellitus: No Prediabetes  Lab Results   Component Value Date/Time    HBA1C 6.2 (H) 08/25/2022 05:21 AM    HBA1C 6.1 (H) 08/04/2020 05:45 PM     3. Hypertension: Yes  4. Dyslipidemia: Yes   Cholesterol,Tot   Date Value Ref Range Status   08/08/2023 169  Final     LDL   Date Value Ref Range Status   08/08/2023 93  Final     HDL   Date Value Ref Range Status   08/08/2023 47  Final     Triglycerides   Date Value Ref Range Status   08/08/2023 216  Final       Past Medical History:   Diagnosis Date    Anesthesia     \"difficulty urinated after anesthesia\"    Breath shortness     albuterol inhalers as needed    CAD (coronary artery disease) 08/2020    STEMI. PCI/NAOMY (Raghav 3.5 x 25mm) the mid RCA, and PCI/NAOMY ([place] 2.5 x 12mm) the PDA.    Chronic anticoagulation     Dental disorder     dentures    Depression     History of heart artery stent     x2 on eloquis \"posterior side of heart\"    Hyperlipidemia     " "Hypertension     Low back pain     Myocardial infarct (HCC)     Caryn Mckinney - Cardiologist    Paroxysmal atrial fibrillation (HCC) 10/2023    Echocardiogram with normal LV size, LVEF 55-60%. Normal RA, LA and RV. No valvular problems.    Subdural hematoma (HCC)     on Depakote    Type 2 diabetes mellitus (HCC)     \"states hx of and was on metformin in past\"         Family History   Problem Relation Age of Onset    Cancer Mother         breast         Social History     Tobacco Use    Smoking status: Former     Current packs/day: 0.00     Average packs/day: 0.3 packs/day for 3.0 years (0.8 ttl pk-yrs)     Types: Cigarettes     Start date:      Quit date:      Years since quittin.7    Smokeless tobacco: Never   Vaping Use    Vaping status: Never Used   Substance Use Topics    Alcohol use: No    Drug use: Never         Allergies   Allergen Reactions    Hctz [Hydrochlorothiazide W-Spironolactone]      Cannot breathe    Oxycodone Anaphylaxis and Swelling     Throat swelling    Pectin Anaphylaxis    Hydrochlorothiazide     Zolpidem      \"Sleep walking\"          Current Outpatient Medications   Medication Sig    telmisartan (MICARDIS) 40 MG Tab Take 1 Tablet by mouth every day.    amiodarone (CORDARONE) 100 MG tablet Take 1 Tablet by mouth every day.    metoprolol SR (TOPROL XL) 50 MG TABLET SR 24 HR Take 1 Tablet by mouth every day. (Patient taking differently: Take 25 mg by mouth every day.)    apixaban (ELIQUIS) 5mg Tab Take 1 Tablet by mouth 2 times a day.    rosuvastatin (CRESTOR) 40 MG tablet Take 40 mg by mouth every evening.    albuterol 108 (90 Base) MCG/ACT Aero Soln inhalation aerosol Inhale 1-2 Puffs every four hours as needed for Shortness of Breath.    ezetimibe (ZETIA) 10 MG Tab Take 1 Tablet by mouth every day.    divalproex (DEPAKOTE) 125 MG EC tablet Take 125 mg by mouth 3 times a day. MEDICATION INSTRUCTIONS FOR SURGERY/PROCEDURE SCHEDULED FOR 24   CONTINUE TAKING MED PRIOR TO SURGERY    " "traZODone (DESYREL) 100 MG Tab Take 150 mg by mouth every evening. 150 mg = 1.5 tabs    venlafaxine XR (EFFEXOR XR) 75 MG CAPSULE SR 24 HR Take 75 mg by mouth every evening. Take with 150 mg for total daily dose of 225 mg    venlafaxine (EFFEXOR-XR) 150 MG extended-release capsule Take 150 mg by mouth every evening. Take with 75 mg for total daily dose of 225 mg    tizanidine (ZANAFLEX) 4 MG capsule Take 4 mg by mouth 4 times a day.    finasteride (PROSCAR) 5 MG Tab Take 5 mg by mouth every evening.    tamsulosin (FLOMAX) 0.4 MG capsule Take 0.4 mg by mouth 2 times a day. MEDICATION INSTRUCTIONS FOR SURGERY/PROCEDURE SCHEDULED FOR 7/23/24   CONTINUE TAKING MED PRIOR TO SURGERY    omeprazole (PRILOSEC) 40 MG delayed-release capsule Take 40 mg by mouth every evening. (Patient not taking: Reported on 9/11/2024)         Review of Systems   Constitutional: Positive for malaise/fatigue.   Cardiovascular:  Negative for chest pain, dyspnea on exertion, leg swelling, near-syncope, orthopnea, palpitations, paroxysmal nocturnal dyspnea and syncope.   Respiratory:  Negative for shortness of breath.    Neurological:  Negative for dizziness, headaches and light-headedness.           Objective:   BP 96/64 (BP Location: Left arm, Patient Position: Sitting, BP Cuff Size: Adult)   Pulse 60   Resp 12   Ht 1.727 m (5' 8\")   Wt 111 kg (244 lb)   SpO2 92%  Body mass index is 37.1 kg/m².         Physical Exam  Constitutional:       General: He is not in acute distress.  HENT:      Head: Normocephalic and atraumatic.   Cardiovascular:      Rate and Rhythm: Normal rate and regular rhythm.      Pulses: Normal pulses.      Heart sounds: No murmur heard.  Pulmonary:      Effort: Pulmonary effort is normal. No respiratory distress.      Breath sounds: Normal breath sounds.   Musculoskeletal:      Right lower leg: No edema.      Left lower leg: No edema.   Neurological:      Mental Status: He is alert.      Gait: Gait normal.           "   Lab Results   Component Value Date/Time    SODIUM 137 08/07/2024 01:42 AM    POTASSIUM 3.3 (L) 08/07/2024 01:42 AM    CHLORIDE 99 08/07/2024 01:42 AM    CO2 26 08/07/2024 01:42 AM    GLUCOSE 148 (H) 08/07/2024 01:42 AM    BUN 8 08/07/2024 01:42 AM    CREATININE 0.91 08/07/2024 01:42 AM      Lab Results   Component Value Date/Time    WBC 7.2 08/07/2024 01:42 AM    RBC 3.58 (L) 08/07/2024 01:42 AM    HEMOGLOBIN 11.1 (L) 08/07/2024 01:42 AM    HEMATOCRIT 34.8 (L) 08/07/2024 01:42 AM    MCV 97.2 08/07/2024 01:42 AM    MCH 31.0 08/07/2024 01:42 AM    MCHC 31.9 (L) 08/07/2024 01:42 AM    MPV 11.5 08/07/2024 01:42 AM    NEUTSPOLYS 69.50 08/07/2024 01:42 AM    LYMPHOCYTES 18.40 (L) 08/07/2024 01:42 AM    MONOCYTES 8.30 08/07/2024 01:42 AM    EOSINOPHILS 2.50 08/07/2024 01:42 AM    BASOPHILS 0.70 08/07/2024 01:42 AM    ANISOCYTOSIS 1+ 08/09/2020 05:58 AM      Lab Results   Component Value Date/Time    CHOLSTRLTOT 169 08/08/2023 12:00 AM    LDL 93 08/08/2023 12:00 AM    HDL 47 08/08/2023 12:00 AM    TRIGLYCERIDE 216 08/08/2023 12:00 AM       Lab Results   Component Value Date/Time    TROPONINT 465 (H) 08/02/2020 0850     Lab Results   Component Value Date/Time    NTPROBNP 466 (H) 08/01/2020 1100     Assessment:   1. Atrial fibrillation with rapid ventricular response (HCC)  - amiodarone (CORDARONE) 100 MG tablet; Take 1 Tablet by mouth every day.  Dispense: 90 Tablet; Refill: 3    2. Paroxysmal atrial fibrillation (HCC)  - EKG    3. Essential hypertension, benign  - telmisartan (MICARDIS) 40 MG Tab; Take 1 Tablet by mouth every day.  Dispense: 90 Tablet; Refill: 3    4. Current use of long term anticoagulation    5. Coronary artery disease involving native coronary artery of native heart without angina pectoris    6. Status post insertion of drug-eluting stent into right coronary artery for coronary artery disease    7. Mixed hyperlipidemia          Medical Decision Making:  Today's Assessment / Plan:   Paroxysmal atrial  fibrillation  Chronic use of anticoagulation  -EKG in office today sinus bradycardia  -Decrease amiodarone 100 mg daily  -Continue metoprolol 25 mg mg every evening. Monitor heart rate at home.  -Continue eliquis 5 mg twice a day for stroke prevention  -Cardioversion ordered for return to normal sinus rhythm but in sinus rhythm at appointment today. Will return in 2 weeks for EKG and blood pressure check. If in sinus rhythm will cancel cardioversion      Hypertension  - controlled in office but elevated on home monitor  -Continue metoprolol 25 mg every evening  -Start Telmesartan 40 mg daily  - goal < 130/80  -Continue to monitor blood pressure twice a day and keep a log of readings.   -Non provider visit for blood pressure check and bring home blood pressure cuff   -Focus on hydration and electrolytes    CAD s/p remote PCI with NAOMY 2020  Hyperlipidemia  -Most recent LDL 93  -Continue rosuvastatin 40 mg every evening  -Continue zetia 10 mg daily  -Goal of less than 70  -Check lipid panel annually  -Consider PCSK9i if still above goal    Return in about 12 days (around 9/23/2024) for Caryn Mckinney in October.  Sooner if problems.    JAIRO Soto.

## 2024-09-11 NOTE — PATIENT INSTRUCTIONS
START telmisartan 40 mg every morning    DECREASE the amiodarone to 100 mg daily    Take your blood pressure in the morning and an hour after taking your medication    Two week visit on Monday September 23rd for EKG and blood pressure check.

## 2024-09-12 LAB — EKG IMPRESSION: NORMAL

## 2024-09-12 PROCEDURE — 93010 ELECTROCARDIOGRAM REPORT: CPT | Performed by: INTERNAL MEDICINE

## 2024-09-17 ENCOUNTER — OFFICE VISIT (OUTPATIENT)
Dept: NEUROLOGY | Facility: MEDICAL CENTER | Age: 64
End: 2024-09-17
Attending: CLINICAL NEUROPSYCHOLOGIST
Payer: OTHER GOVERNMENT

## 2024-09-17 VITALS
HEIGHT: 67 IN | OXYGEN SATURATION: 95 % | WEIGHT: 248.24 LBS | SYSTOLIC BLOOD PRESSURE: 120 MMHG | TEMPERATURE: 98.3 F | BODY MASS INDEX: 38.96 KG/M2 | HEART RATE: 70 BPM | RESPIRATION RATE: 12 BRPM | DIASTOLIC BLOOD PRESSURE: 82 MMHG

## 2024-09-17 DIAGNOSIS — F01.A0 MAJOR NEUROCOGNITIVE DISORDER, DUE TO VASCULAR DISEASE, WITHOUT BEHAVIORAL DISTURBANCE, MILD (HCC): ICD-10-CM

## 2024-09-17 DIAGNOSIS — S06.5XAS TRAUMATIC SUBDURAL HEMATOMA, SEQUELA (HCC): ICD-10-CM

## 2024-09-17 DIAGNOSIS — F02.80 MAJOR NEUROCOGNITIVE DISORDER DUE TO MULTIPLE ETIOLOGIES WITHOUT BEHAVIORAL DISTURBANCE (HCC): ICD-10-CM

## 2024-09-17 PROCEDURE — 96139 PSYCL/NRPSYC TST TECH EA: CPT | Performed by: CLINICAL NEUROPSYCHOLOGIST

## 2024-09-17 PROCEDURE — 96138 PSYCL/NRPSYC TECH 1ST: CPT | Performed by: CLINICAL NEUROPSYCHOLOGIST

## 2024-09-17 RX ORDER — ATORVASTATIN CALCIUM 20 MG/1
40 TABLET, FILM COATED ORAL DAILY
COMMUNITY

## 2024-09-17 RX ORDER — DOCUSATE SODIUM 100 MG/1
100 CAPSULE, LIQUID FILLED ORAL 2 TIMES DAILY
COMMUNITY

## 2024-09-17 ASSESSMENT — FIBROSIS 4 INDEX: FIB4 SCORE: 1.04

## 2024-09-18 ENCOUNTER — OFFICE VISIT (OUTPATIENT)
Dept: NEUROLOGY | Facility: MEDICAL CENTER | Age: 64
End: 2024-09-18
Attending: PSYCHIATRY & NEUROLOGY
Payer: OTHER GOVERNMENT

## 2024-09-18 VITALS
TEMPERATURE: 96.8 F | HEIGHT: 68 IN | BODY MASS INDEX: 37.46 KG/M2 | DIASTOLIC BLOOD PRESSURE: 90 MMHG | WEIGHT: 247.14 LBS | HEART RATE: 80 BPM | RESPIRATION RATE: 12 BRPM | SYSTOLIC BLOOD PRESSURE: 148 MMHG | OXYGEN SATURATION: 92 %

## 2024-09-18 DIAGNOSIS — R56.9 SEIZURES (HCC): ICD-10-CM

## 2024-09-18 DIAGNOSIS — S06.5XAS TRAUMATIC SUBDURAL HEMATOMA, SEQUELA (HCC): ICD-10-CM

## 2024-09-18 DIAGNOSIS — I69.911 MEMORY DEFICIT AFTER CEREBROVASCULAR DISEASE: ICD-10-CM

## 2024-09-18 PROCEDURE — 99215 OFFICE O/P EST HI 40 MIN: CPT | Performed by: PSYCHIATRY & NEUROLOGY

## 2024-09-18 PROCEDURE — 3077F SYST BP >= 140 MM HG: CPT | Performed by: PSYCHIATRY & NEUROLOGY

## 2024-09-18 PROCEDURE — G2212 PROLONG OUTPT/OFFICE VIS: HCPCS | Performed by: PSYCHIATRY & NEUROLOGY

## 2024-09-18 PROCEDURE — 3080F DIAST BP >= 90 MM HG: CPT | Performed by: PSYCHIATRY & NEUROLOGY

## 2024-09-18 PROCEDURE — 99212 OFFICE O/P EST SF 10 MIN: CPT | Performed by: PSYCHIATRY & NEUROLOGY

## 2024-09-18 RX ORDER — DIVALPROEX SODIUM 125 MG/1
125 TABLET, DELAYED RELEASE ORAL 3 TIMES DAILY
Qty: 90 TABLET | Refills: 3 | Status: SHIPPED | OUTPATIENT
Start: 2024-09-18

## 2024-09-18 ASSESSMENT — FIBROSIS 4 INDEX: FIB4 SCORE: 1.04

## 2024-09-18 ASSESSMENT — PATIENT HEALTH QUESTIONNAIRE - PHQ9: CLINICAL INTERPRETATION OF PHQ2 SCORE: 0

## 2024-09-18 NOTE — PROGRESS NOTES
"[facility] [place] [facility]  New Patient Visit      Patient's Name: Karan Wiley  YOB: 1960  MRN: [id number]  Date of Service: 09/18/24.    Referring Provider: Raman Hooper M.D.  [address] [place] [id number]    Chief Concern: Seizures.     HPI: The patient is a 64 y.o., right-handed male, who initially presented to my epilepsy clinic for evaluation of seizures on 09/18/2024.    The patient used to follow-up with Dr. Ya.    The patient was in his usual health, in context of his known medical history, when he fell and hit his head in August 2022.  This led to transfer to [facility] due to acute left subdural hematoma.  He was treated with bur hole and then craniotomy drainage.  It seems that the patient had no seizures at that time, and Depakote was started prophylactically.  This occurred while he was on Eliquis for A-fib.    Then, in July 2024, she started having episodes suspicious for seizures versus syncope, among other considerations.  These are described under event type #1.      He never had myoclonus, clear convulsions, no isolated staring spells/oral/manual automatisms times, sensations of strange smell/taste/gastric uprising, no clear psychic phenomena.    He is taking Depakote regularly, and except significant weight gain and tremor, no other adverse effects.    Semiology:    Event type #1: \"Seizure\".  Year/Age of Onset: July 2024  Initial features: Feel \"aura\" - described as dizziness, lightheadedness, and like he is going to pass out. Aware while falling.   Event features: Falls back (typically on the bed - never fell on the floor). Eyes are open. Unresponsive. He experiences brief upper body jerking. No tongue bite nor bladder/bowel incontinence.   Post-ictal features: At times some confusion.   Duration: 4-5 seconds.   Frequency: Initially couple times per week.   Precipitating factors: Typically when getting up too quickly.     History of status " "epilepticus: no  History of physical injury related to seizures: no  History of surgery related to epilepsy: no  Family Planning: N/A  Current Driving Status: He is not driving.   Seizure Clusters: Not clear.   Longest Seizure Freedom: Not clear.     Pertinent Ancillary Test Results:    MRI brain studies:   - MRI brain - none available for my review.     CT head studies:   - CT head wo contrast (08/25/2022 at [facility]): \"Stable LEFT hemispheric acute subdural hematoma with associated mass effect and 8 mm LEFT to RIGHT midline shift.  No significant change from prior.\"    EEG studies:   - Standard EEG (08/31/2022, Dr. Blanca): This is an abnormal video EEG recording in the obtunded state.   1)The diffuse background slowing is consistent with moderate encephalopathy.   2)The presence of intermittent left hemisphere slowing is suggestive of focal neuronal dysfunction.   3) Upward gaze and sometime left gaze deviation were noted, no ictal EEG correlate. No seizure seen.   4) No epileptiform discharges or seizures were seen. This does not preclude a diagnosis of epilepsy.     - Standard EEG (03/28/2023, Dr. Hooper):  This was an abnormal EEG during wakefulness and drowsiness due to:  Very mild diffuse slowing of the background, suggestive of diffuse and/or multifocal cerebral dysfunction.  This finding might be seen in a myriad of encephalopathies and in itself is a nonspecific finding.  The presence of continuous, left hemispheric, maximal over the left temporal region, focal slowing, is suggestive of additional cerebral dysfunction in that region.  This finding might be seen in context of structural lesion, among other considerations.  Clinical and radiological correlation is recommended.  The presence of embedded sharply contoured waveforms in the above described left-sided focal slowing might be suggestive of increased propensity toward focal seizures arising from this region.  The presence of higher amplitudes and " "overriding faster frequencies over the left side is suggestive of breach rhythm, and is consistent with the provided surgical history.  There were no electrographic seizures captured.  Single lead EKG showed occasional PVCs - please correlate clinically.      - Standard EEG (08/17/2023, Dr. Martínez):  This was an abnormal EEG during wakefulness and drowsiness due to:  Very mild diffuse slowing of the background, suggestive of diffuse and/or multifocal cerebral dysfunction.  This finding might be seen in a myriad of encephalopathies and in itself is a nonspecific finding.  The presence of continuous, left hemispheric, maximal over the left temporal region, focal slowing, is suggestive of additional cerebral dysfunction in that region.  This finding might be seen in context of structural lesion, among other considerations.  Clinical and radiological correlation is recommended.  The presence of embedded sharply contoured waveforms in the above described left-sided focal slowing might be suggestive of increased propensity toward focal seizures arising from this region.  The presence of higher amplitudes and overriding faster frequencies over the left side is suggestive of breach rhythm, and is consistent with the provided surgical history.  There were no electrographic seizures captured.  Single lead EKG showed occasional PVCs - please correlate clinically.     Current Antiseizure Medications: Depakote  mg TID.     Previously Tried Antiseizure Medications: None.     Review of Systems: No recent fevers. He lost 36 lbs around hospital stay in July 2024 (knee surgery). The mood is overall stable. No SI/HI.    Risk Factors For Epilepsy/Seizure Disorder: Subdural hematoma.     Past Medical History:  Past Medical History:   Diagnosis Date    Anesthesia     \"difficulty urinated after anesthesia\"    Breath shortness     albuterol inhalers as needed    CAD (coronary artery disease) 08/2020    STEMI. PCI/NAOMY (Raghav 3.5 x " "25mm) the mid RCA, and PCI/NAOMY (Raghav 2.5 x 12mm) the PDA.    Chronic anticoagulation     takes eliquis bid    Dental disorder     dentures - upper dentures    Depression     High cholesterol     medicated    History of heart artery stent     x2 on eliquis \"posterior side of heart\"    Hyperlipidemia     Hypertension     medicated    Low back pain     Myocardial infarct (HCC)     Caryn Mckinney - Cardiologist    Paroxysmal atrial fibrillation (HCC) 10/2023    Echocardiogram with normal LV size, LVEF 55-60%. Normal RA, LA and RV. No valvular problems.    Subdural hematoma (HCC)     on Depakote    Type 2 diabetes mellitus (HCC)     \"states hx of and was on metformin in past\"     Past Surgical History:  Past Surgical History:   Procedure Laterality Date    ARTHROPLASTY, KNEE, ROBOT-ASSISTED Right 7/23/2024    Procedure: ROBOTIC RIGHT TOTAL KNEE ARTHROPLASTY;  Surgeon: Noe Barragan M.D.;  Location: [facility];  Service: Ortho Robotic    CRANIOTOMY Left 8/25/2022    Procedure: CRANIOTOMY FOR SUBDURAL HEMATOMA;  Surgeon: Tesfaye Luther M.D.;  Location: [facility];  Service: Neurosurgery    SPINAL CORD STIMULATOR  2/26/2019    Procedure: SPINAL CORD STIMULATOR-  STAGE 1:  RIGHT FLANK BATTERY REPLACEMENT/UPGRADE;  Surgeon: Stepan Hawkins M.D.;  Location: [facility];  Service: Neurosurgery    FUSION, SPINE, LUMBAR, PLIF  2/26/2019    Procedure: LUMBAR FUSION POSTERIOR-  STAGE 2: ONLAY L5-S1 BONE;  Surgeon: Stepan Hawkins M.D.;  Location: [facility];  Service: Neurosurgery    LAMINOTOMY  2/26/2019    Procedure: LAMINOTOMY-  STAGE 2:  REDO LEFT L4-S1;  Surgeon: Stepan Hawkins M.D.;  Location: [facility];  Service: Neurosurgery      Social History:  Social History     Tobacco Use    Smoking status: Former     Current packs/day: 0.00     Average packs/day: 0.3 packs/day for 3.0 years (0.8 ttl pk-yrs)     Types: Cigarettes     Start date: 1977     Quit date: 1980     Years " "since quittin.7     Passive exposure: Never    Smokeless tobacco: Never   Vaping Use    Vaping status: Never Used   Substance Use Topics    Alcohol use: No    Drug use: Never     Family History:  His mother had seizures.   Family History   Problem Relation Age of Onset    Cancer Mother         breast         2024     9:00 AM 3/17/2023    10:00 AM   PHQ-9 Screening   Little interest or pleasure in doing things 0 - not at all 0 - not at all   Feeling down, depressed, or hopeless 0 - not at all 0 - not at all   PHQ-2 Total Score 0 0     [place] Suicide Severity Rating Scale     Wish to be Dead?: No  Suicidal Thoughts: No    Suicidal Thoughts with Method Without Specific Plan or Intent to Act:    Suicidal Intent Without Specific Plan:    Suicide Intent with Specific Plan:    Suicide Behavior Question: No  How long ago did you do any of these?:    C-SSRS Risk Level: No Risk    Additional Suicide Screening Questions    Suspected or Confirmed Suicide Attempted?: No  Harming or killing others?: No    Allergies:  Allergies   Allergen Reactions    Oxycodone Anaphylaxis and Swelling     Throat swelling    Pectin Anaphylaxis and Swelling     Throat swelling, cannot breathe    Hctz [Hydrochlorothiazide W-Spironolactone]      hypotension    Zolpidem      \"Sleep walking\", excessive eating, drives/cooks during the night     Current Medications:    Current Outpatient Medications:     docusate sodium, 100 mg, Oral, BID, Taking    atorvastatin, 40 mg, Oral, DAILY, Taking    telmisartan, 40 mg, Oral, DAILY (Patient taking differently: 40 mg, Oral, DAILY, FOR PROCEDURE SCHEDULED 2024 - HOLD 24 HOURS PRIOR TO PROCEDURE), Taking    amiodarone, 100 mg, Oral, DAILY (Patient taking differently: 100 mg, Oral, DAILY, FOR PROCEDURE SCHEDULED 2024 - CONTINUE AS PRESCRIBED), Taking    metoprolol SR, 50 mg, Oral, DAILY (Patient taking differently: 25 mg, Oral, EVERY EVENING, FOR PROCEDURE SCHEDULED 2024 - CONTINUE AS " "PRESCRIBED), Taking    apixaban, 5 mg, Oral, BID (Patient taking differently: 5 mg, Oral, 2 TIMES DAILY, FOR PROCEDURE SCHEDULED 9/27/2024 - CONTINUE AS PRESCRIBED PER CARDIOLOGY INSTRUCTIONS ON SCHEDULING FAX FORM), Taking    rosuvastatin, 40 mg, Oral, Q EVENING, Taking    omeprazole, 40 mg, Oral, Nightly, Taking    albuterol, 1-2 Puff, Inhalation, Q4HRS PRN, PRN    ezetimibe, 10 mg, Oral, DAILY (Patient taking differently: 10 mg, Oral, DAILY, FOR PROCEDURE SCHEDULED 9/27/2024 - HOLD 24 HOURS PRIOR TO PROCEDURE), Taking    divalproex, 125 mg, Oral, TID, Taking    traZODone, 100-150 mg, Oral, Nightly, Taking    venlafaxine XR, 75 mg, Oral, Q EVENING, Taking    venlafaxine, 150 mg, Oral, Nightly, Taking    tizanidine, 4 mg, Oral, 4X/DAY, Taking    finasteride, 5 mg, Oral, Q EVENING, Taking    tamsulosin, 0.4 mg, Oral, DAILY, Taking    Physical Examination:    Ambulatory Vitals  Encounter Vitals  Temperature: 36 °C (96.8 °F)  Temp src: Temporal  Blood Pressure: (!) 148/90  Pulse: 80  Respiration: 12  Pulse Oximetry: 92 %  Weight: 112 kg (247 lb 2.2 oz)  Height: 172.7 cm (5' 8\")  BMI (Calculated): 37.58    Neurological Examination:  Mental Status: The patient is alert and oriented to person, place, time, and situation. Speech is fluent, with no aphasia nor dysarthria noted. Affect is normal.    Cranial Nerve Examination:  CN I: Olfaction examination is deferred.  CN II: Visual fields are full to confrontation examination and show no visual field defect.   CN III, IV, VI: Eye movements are normal in all directions. Pupils are reactive to direct and consensual light. There is no relative afferent pupillary defect. There is no nystagmus.  CN V: Facial sensation to light touch is intact throughout.   CN VII: No significant facial muscle or other soft tissue asymmetry.  CN VIII: Hearing decreased bilaterally.   CN IX, X: Soft palate elevates symmetrically.  CN XI: Symmetrical shoulder shrug exam.  CN XII: Midline tongue " protrusion and moves symmetrically to each side.     Motor Examination:  Muscle strength is intact throughout. Muscle tone is normal throughout. Noted postural tremor. No pronator drift is noted.     Muscle Stretch Reflexes Examination:  Deferred.     Sensory Examination:  Preserved sensation to light touch in all extremities.     Coordination:  Mild tremor on FNF testing b/l.    Stance/gait:  Uses walker.         ASSESSMENT AND PLAN:  1. Seizures (HCC)  Clinical presentation of his event may be consistent with seizures, but convulsive syncope is also a consideration.    In context of patient having complex medical history, including being on oral anticoagulation, as well as having a history of head trauma and subdural hematoma, which increases risk for seizures, and the presence of abnormalities on prior standard EEGs, we will proceed with 5-day EMU to classify his events, and also switched from Depakote to another medication that the patient might tolerate better.  We discussed this in detail and the patient and his wife verbalized understanding and agreement.  - 5-day EMU: Referral to Neurodiagnostics (EEG,EP,EMG/NCS/DBS)    We will continue current dose of Depakote with no changes, but we will try to switch it to another antiseizure medication that the patient will tolerate better during EMU.  - divalproex (DEPAKOTE) 125 MG EC tablet; Take 1 Tablet by mouth 3 times a day. FOR PROCEDURE SCHEDULED 9/27/2024 - CONTINUE AS PRESCRIBED  Dispense: 90 Tablet; Refill: 3    The patient had CT head in late July 2024,  but had additional suspected fall, and is on oral anticoagulation, so we will proceed with CT head wo contrast STAT.  - CT-HEAD W/O; Future    We will also obtain blood work:  - AMMONIA; Future  - VALPROIC ACID; Future    Epilepsy counseling provided in writing.    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 Rodgers.   - we we will follow the  above results, as well as clinical progress of the patient.    3. Traumatic subdural hematoma, sequela (HCC)  We will obtain CT head as noted above for any recurrent bleed, and we will follow this problem clinically.    Patient Instructions   Please continue Depakote  mg three times daily with no changes at this time.    Please reach out to our office if you do not hear from us regarding elective hospital stay for EEG monitoring.     Please let our office know if you have any changes in your seizure frequency and/characteristics. Otherwise, please keep the diary of your events and bring it with you at the time of your next follow up visit with our office.     Please take vitamin D3 [id number] internation units daily.     Please note that the following might precipitate seizures: missed doses of antiseizure medications, being sick with fever, stress, fatigue, sleep deprivation, not eating regularly, not drinking enough water, drinking too much alcohol, stopping alcohol suddenly if you are currently using it on a regular/daily basis, and/or using recreational drugs, among others.    Please note that the following might lead to an injury, potentially a life-threatening injury, in case you have a seizure and/or lose awareness while:   - being in a large body of water by yourself, such as bath, pool, lake, ocean, among others (risk of drowning)   - being on unprotected heights (risk of fall)   - being around and/or operating heavy machinery (risk of injury)   - being around open fire/hot surfaces (risk of burns)   - any other activities/circumstances, in which if you lose awareness, you might injure yourself and/or others.    Please call for help (crisis line and/or 911) in case you have thoughts of harming yourself and/or others.    Please abstain from driving until further notice.    ------------------------------------------------------------------------------------------  Instructions for your  family/caregivers:  Please call 911 if the patient has a seizure longer than 2-3 minutes, if seizures are back to back without him recovering to his baseline, or he does not start recovering within 5-10 minutes after the seizure stops. During the seizure - please turn him on his side, please make sure his head is protected (for example, you should put a pillow under his head, if one is available), and please do not put anything in his mouth.   -------------------------------------------------------------------------------------------    It is important that your seizures are well controlled and you have none or have them rarely. In addition to avoiding injury related to breakthrough seizures, frequent seizures increase risk of SUDEP (sudden unexpected death in epilepsy), where a person goes into a seizure and then never wakes up - this is a rare complication of seizure disorder; one of the best available ways to prevent it is to control your seizures well.     Due to the high volume of patients we are trying to help, your physician will not be able to respond by phone or in [place] to your routine concerns between appointments.  This does not reflect a lack of interest or concern for you or your diagnosis.  Please bring these questions and concerns to your appointment where your physician can answer.  Please relay more pressing concerns to our office, either via [employer], or by phone; if not able to reach us please visit nearby Urgent Care Center or Emergency Department.  If any emergent medical needs, please seek emergent medical help and/or call 911.    Please note that we are not able to fill out paperwork that might be related to your work, utility company, disability, and/or driving, among others, in between the visits.  Please schedule a dedicated appointment to address your paperwork, so we can do that in a timely manner.  This is not due to lack of concern or interest for your disease-related  work/administrative problems, but to make sure that we provide the best possible care and to fill out your paperwork in a correct and timely manner.    Thank you for entrusting your neurological care to [facility] and we look forward to continuing to serve you.     Follow up in 2 months.     My total time spent caring for the patient on the day of the encounter was 63 minutes.   This does not include time spent on separately billable procedures/tests.      Raman Hooper MD  Outpatient Neurology   [facility]

## 2024-09-18 NOTE — PATIENT INSTRUCTIONS
Please continue Depakote  mg three times daily with no changes at this time.    Please reach out to our office if you do not hear from us regarding elective hospital stay for EEG monitoring.     Please let our office know if you have any changes in your seizure frequency and/characteristics. Otherwise, please keep the diary of your events and bring it with you at the time of your next follow up visit with our office.     Please take vitamin D3 [id number] internation units daily.     Please note that the following might precipitate seizures: missed doses of antiseizure medications, being sick with fever, stress, fatigue, sleep deprivation, not eating regularly, not drinking enough water, drinking too much alcohol, stopping alcohol suddenly if you are currently using it on a regular/daily basis, and/or using recreational drugs, among others.    Please note that the following might lead to an injury, potentially a life-threatening injury, in case you have a seizure and/or lose awareness while:   - being in a large body of water by yourself, such as bath, pool, lake, ocean, among others (risk of drowning)   - being on unprotected heights (risk of fall)   - being around and/or operating heavy machinery (risk of injury)   - being around open fire/hot surfaces (risk of burns)   - any other activities/circumstances, in which if you lose awareness, you might injure yourself and/or others.    Please call for help (crisis line and/or 911) in case you have thoughts of harming yourself and/or others.    Please abstain from driving until further notice.    ------------------------------------------------------------------------------------------  Instructions for your family/caregivers:  Please call 911 if the patient has a seizure longer than 2-3 minutes, if seizures are back to back without him recovering to his baseline, or he does not start recovering within 5-10 minutes after the seizure stops. During the seizure -  please turn him on his side, please make sure his head is protected (for example, you should put a pillow under his head, if one is available), and please do not put anything in his mouth.   -------------------------------------------------------------------------------------------    It is important that your seizures are well controlled and you have none or have them rarely. In addition to avoiding injury related to breakthrough seizures, frequent seizures increase risk of SUDEP (sudden unexpected death in epilepsy), where a person goes into a seizure and then never wakes up - this is a rare complication of seizure disorder; one of the best available ways to prevent it is to control your seizures well.     Due to the high volume of patients we are trying to help, your physician will not be able to respond by phone or in [place] to your routine concerns between appointments.  This does not reflect a lack of interest or concern for you or your diagnosis.  Please bring these questions and concerns to your appointment where your physician can answer.  Please relay more pressing concerns to our office, either via [place], or by phone; if not able to reach us please visit nearby Urgent Care Center or Emergency Department.  If any emergent medical needs, please seek emergent medical help and/or call 911.    Please note that we are not able to fill out paperwork that might be related to your work, utility company, disability, and/or driving, among others, in between the visits.  Please schedule a dedicated appointment to address your paperwork, so we can do that in a timely manner.  This is not due to lack of concern or interest for your disease-related work/administrative problems, but to make sure that we provide the best possible care and to fill out your paperwork in a correct and timely manner.    Thank you for entrusting your neurological care to [facility] and we look forward to continuing to serve you.

## 2024-09-19 NOTE — PATIENT INSTRUCTIONS
Thank you for your effort during today's evaluation. We will have a feedback session to discuss your results on 10/14/2024 at 1 PM.

## 2024-09-19 NOTE — PROGRESS NOTES
CONFIDENTIAL NEUROPSYCHOLOGICAL EVALUATION REPORT    Patient name: Karan Wiley  Referral source: Ramon Ya M.D.   MRN: [id number]  Evaluation date: 9/17/2024   YOB: 1960  Neuropsychologist: Oscar Rodgers, Ph.D.         This evaluation was conducted for clinical treatment planning and may not be valid for other purposes. Potential risks and benefits, limits of confidentiality, and test procedures were discussed. Following this discussion, the patient consented to complete the evaluation. Information for this report was obtained from the medical record, neuropsychological testing, and a clinical interview with the patient and his wife (Mrs. Alesia Wiley) conducted on 09/17/2024.    Background and Referral Information: The patient is a 64-year-old, , right-handed, non- White, male, retired [employer] , with 12 years of education, who has experienced cognitive decline in the context of a traumatic left hemispheric subdural hematoma (SDH) status post bur hole and craniotomy drainage in August 2022. Dr. Ya referred the patient for a neuropsychological evaluation to characterize his cognitive concerns, aid in differential diagnosis, and treatment planning.    The patient and his wife reported that he fell at home and hit his head against the floor in August 2022. The fall was unwitnessed, and it is unclear whether he lost consciousness. His wife noted the patient was initially mumbling but was able to talk a couple of minutes after the fall. Approximately one hour later the patient fell asleep and woke up with a headache, which prompted them to go to the emergency department. Per his medical record, he presented to the local hospital in [place], [place], with increasing right-sided weakness and aphasia. CT of the brain revealed a left holohemispheric subdural hematoma involving the frontal, parietal, and temporal lobes, extending posteriorly around the cerebellum  ipsilaterally. There was significant right to left midline shift. He was transferred to [facility] where he was hospitalized for 15 days. He underwent a bur hole and a left craniotomy with evacuation. Head CTs were stable and improved. Despite this, there was no neurological improvement and amantadine was started. He was also given divalproex for seizure prophylaxis. EEG revealed no seizure activity. The patient eventually began to recover. He received physical and occupational therapy while in the hospital. They recommended post-acute placement for additional therapy services prior to discharge home. At the time of discharge, his Neola coma scale score was 11 without focal neurologic deficit or findings. He was discharged to inpatient rehabilitation and transferred to 3 different facilities closer to home. His wife noted the patient underwent approximately 2 months of inpatient rehabilitation all together. She indicated it has been difficult to find outpatient rehabilitation services (e.g., speech or physical therapy) in [place], [place]. The patient has residual cognitive difficulties, balance problems, tremors in his hands and legs, and seizure-like events.    His wife reported the patient underwent a total right knee replacement on 07/23/2024 and experienced post-operative delirium following the surgery. She noted the patient was agitated and confused. For example, he thought he was 29 years old, and Hernandez was president. This led to another hospitalization. Per his medical record, he was hospitalized at [facility] from 07/25/2024 to 08/07/2024.  It was noted the patient was initially agitated but this resolved. However, he continued to have poor insight and judgment throughout the hospitalization. He also had A-fib, which was controlled, shortness of breath, and elevated heart rate; therefore, was recommended to be discharged to a skilled nursing facility (SNF). His wife  "noted that he was at the SNF from August 7th to the 14th.  She reported that approximately a day after his discharge, the patient fell at home and had 2 seizure-like episodes. At the time, he was refusing to use the walker and do physical therapy exercises, but this has improved.  His wife reported that during the seizure-like episodes, the patient typically falls back in the bed, clenches his hands, and experiences brief body jerking while he is unresponsive with his eyes open. Prior to the episodes, he experiences dizziness, lightheadedness, and a feeling like he “is going to pass out.” After the episodes, he has tremors, fatigue, and mild confusion. Getting up rapidly from a sitting position tends to precipitate these episodes. His wife noted he has had approximately 10 seizure-like episodes since August 2024, and they usually last less than 30 seconds. The patient was recently evaluated by Dr. Hooper, an epileptologist in our department. He noted the patient's episodes are concerning for seizures or convulsive syncope. Considering the patient's complex medical history including abnormal normal EEGs and SDH, he recommended an epilepsy monitoring unit (EMU) stay to better classify the patient's events and switch to an anti-seizure medication that he might tolerate better. In the meantime, the patient will continue to take divalproex. Adverse side effects of divalproex for the patient have included weight gain and tremors.    Previous Studies: Neurological exam (08/31/2024) was remarkable for: \"There is a subtle flattening of the right nasolabial fold when he smiles, though full movement range is symmetric with the left.” “The tongue deviates slightly to the right, no obvious bulbar function otherwise.” “Strength is symmetric in the upper extremities, and lower extremities there is some mild bilateral hip stabilizer weakness still, subtle weakness with dorsiflexion and EHL on the left.” “Reflexes still show " "slight right-sided predominance, ankle jerks are absent bilaterally.” “Repetitive movements are still slowed in the left foot when compared to the right, proportionate to weakness. Gait is still notably distorted, he stands slowly, hangs onto an object briefly but then seems to move more easily. Stride length is shortened but he is not shuffling. He looks at the ground more consistently. Station is slightly widened. Standing on his heels and toes is problematic due to the left foot weakness. Tandem walking is still an impossibility.” “Sensory exam actually is quite good, he shows only subtle decrement of pin in the left lower extremity distally, vibration is intact throughout.\" CT of the brain (10/17/2023) revealed: \"1. No evidence of residual or new intracranial hemorrhage. Interval resolution of previously demonstrated left-sided subdural hematoma as well as trace extra-axial hemorrhage overlying the right frontal lobe. 2. Status post left craniotomy.\" CT of the brain (08/31/2022) revealed: \"1. Slightly decreased size of LEFT supratentorial subdural hematoma overlying LEFT frontal, parietal and temporal lobes 2. Trace extra-axial blood overlying the RIGHT frontal lobe, unchanged 3. Unchanged 3 mm of LEFT-to-RIGHT midline shift.\" CT of the brain (08/2/2024) revealed: \"LEFT lateral ventricle is effaced. Effacement of LEFT hemispheric sulci. LEFT to RIGHT midline shift measuring 8 mm, stable. Basal cisterns are patent. LEFT holohemispheric subdural hematoma measuring up to 10 mm in thickness, similar to prior. Calvaria are intact. Visualized orbits are unremarkable. Visualized mastoid air cells are clear. LEFT inferior frontal sinus polyp or retention cyst.\" EEG (08/17/2023) documented: \"Abnormal video EEG recording in the awake, drowsy, and sleep state(s) due to: 1) Occasional left parietal sharp waves which suggests a predisposition for seizures to arise from this region. 2) Amplitude asymmetry, with higher " "amplitudes on left compared to right, a finding consistent with left breach rhythm in the setting of known structural abnormality. 3) Mild continuous generalized slowing of the background suggestive of mild diffuse cerebral dysfunction of a nonspecific etiology. 4) EKG with occasional PVCs. 5) No seizures.\" EEG (03/28/2023) revealed: \"This was an abnormal EEG during wakefulness and drowsiness due to: 1. Very mild diffuse slowing of the background, suggestive of diffuse and/or multifocal cerebral dysfunction. This finding might be seen in a myriad of encephalopathies and in itself is a nonspecific finding. 2. The presence of continuous, left hemispheric, maximal over the left temporal region, focal slowing, is suggestive of additional cerebral dysfunction in that region. This finding might be seen in context of structural lesion, among other considerations. Clinical and radiological correlation is recommended. 3. The presence of embedded sharply contoured waveforms in the above described left-sided focal slowing might be suggestive of increased propensity toward focal seizures arising from this region. 3. The presence of higher amplitudes and overriding faster frequencies over the left side is suggestive of breach rhythm, and is consistent with the provided surgical history. 4. There were no electrographic seizures captured. 5. Single lead EKG showed occasional PVCs - please correlate clinically.\" EEG (08/31/2022) documented: \"This is an abnormal video EEG recording in the obtunded state. 1) The diffuse background slowing is consistent with moderate encephalopathy. 2) The presence of intermittent left hemisphere slowing is suggestive of focal neuronal dysfunction. 3) Upward gaze and sometime left gaze deviation were noted, no ictal EEG correlate. No seizure seen. 4) No epileptiform discharges or seizures were seen. This does not preclude a diagnosis of epilepsy.\" MRI scans have been contraindicated due to the patient " having a spinal cord stimulator.    Presenting Concerns:     Cognitive Functioning: His wife reported the patient began having mild short-term memory and attention problems following a myocardial infarction in August 2020. Per his medical record, he was admitted for a non-ST-elevation myocardial infarction (NSTEMI) and underwent cardiac catheterization. He subsequently developed acute hypoxic respiratory failure with flash pulmonary edema following percutaneous coronary intervention requiring intubation and was transferred to the intensive care unit (ICU). He had temporary agitation and confusion during the ICU stay. He was hospitalized for 12 days and was discharged home in stable condition. Since then, his wife reported that the patient's cognitive functioning mildly worsened following the SDH in 2022, with a more prominent cognitive decline following the knee surgery in July 2024 and subsequent seizure-like episodes. Examples of the patient's current concerns included misplacing items, repeating questions, and forgetting recent conversations, details of recent events, and planned activities. Reminders are partially beneficial. The patient also reported reduced processing speed and increased difficulty multitasking. He noted he typically can focus on one thing at a time, such as a conversation and watching TV without problems. The patient indicated he is no longer engaging in planning or organizing frequently. His wife agreed, noting they typically make complex decisions together. Additionally, she reported observing occasional comprehension problems that appear to be related to hearing difficulties or not paying attention. There were no reports of significant problems with expressive language, visual perception, or navigation.    Functional Abilities: His wife reported she has been managing their finances since the patient's myocardial infarction in 2020. She stated that currently, the patient could not manage  finances and bill payments independently. She noted she also manages medications and appointments for the patient. She indicated the patient can complete simple household responsibilities but is not engaging in many of these tasks due to his mobility problems. He is no longer engaging in cooking for the same reason. She also reported he is taking longer to fix things around the house compared to before. For example, he has been having difficulty finishing the installation of a new Jacuzzi in the backyard. The patient has not driven since he began having seizures. Prior to this, there were no significant problems with driving (no tickets or accidents). His wife reported the patient remains capable of completing self-care activities of daily living (ADLs) but requires oversight for showering and dressing due to his mobility problems. Home modifications include a bench and grab bars in the shower. He uses a 2-wheeled walker when he is at home in a 4-wheeled walker when he is outside.    Emotional Functioning: The patient reported occasional frustration and feeling down associated with his multiple health problems. His wife agreed and noted observing increased irritability. She reported the patient's appetite has been variable for the past couple of years due to his health problems, hospitalizations, and medication effects. She noted there was weight gain with the divalproex but there has been some weight loss within the past 2 weeks. The patient denied sleep problems, noting he takes trazodone as a sleep aid. However, he indicated he sleeps approximately 18 hours per day. His wife stated the patient has been sleeping this amount daily since approximately 2009. The patient denied loss of interest in activities, persistent sadness, anhedonia, suicidal ideation, increased anxiety, and post-traumatic stress related symptoms. There were no reports of manic symptoms, niko personality changes, hallucinations, or  delusions.    Sensory/Physical/Motor Changes: The patient reported he has been wearing bilateral hearing aids since 1997 and updated them 2 years ago. He denied recent declines in his vision, taste, or smell. He reported longstanding chronic back pain (average intensity: 7-8/10) that he has been managing with hydrocodone/acetaminophen, venlafaxine, and tizanidine for approximately 15 years. As noted above, he has had balance and gait problems since the SDH. His wife noted he has become more unsteady while walking since the knee surgery. She stated the patient fell 10 days ago, without significant injury. She denied observing frequent falls recently. The patient reported he has a bilateral hand tremor (at rest and with action) that has become more prominent within the past year. This interferes with fine motor skills including using utensils (e.g., cutting meat and writing). The patient noted he began doing physical therapy sessions for his knee but had to stop due to atrial fibrillation. There were no reports of incontinence, dysphagia, or rigidity.    Medical History: Per his medical record, it includes traumatic SDH, seizure-like episodes, NSTEMI, coronary artery disease, shortness of breath, chronic anticoagulation, hypercholesteremia, stented heart artery (x2), hyperlipidemia, hypertension, type 2 diabetes, paroxysmal atrial fibrillation, atrial fibrillation with rapid ventricular response, benign prostatic hyperplasia, primary osteoarthritis of right knee, chronic low back pain, lumbar spondylosis, and acute kidney injury. His wife reported the patient snores at night and his primary care physician has expressed concern for possible sleep apnea in the past, but he has never undergone a sleep study. Surgical history includes robot-assisted right knee arthroplasty (July 2024), left craniotomy for subdural hematoma (August 2022), spinal cord stimulator battery replacement (February 2019), posterior lumbar fusion  L5-S1 (February 2019), and laminotomy (February 2019). Hospitalizations within the past month were denied.    Mental Health History: The patient reported he underwent a psychiatry consultation for clearance to use hydrocodone several years ago. He denied any other history of formal mental health treatment or diagnosis. His wife corroborated his report.    Family Medical History: Remarkable for grand mal seizures (mother) and breast cancer (mother). Known family history of dementia was denied.    Medications and Supplements: Docusate sodium, atorvastatin, telmisartan, amiodarone, metoprolol, apixaban, rosuvastatin, omeprazole, albuterol, ezetimibe, trazodone, venlafaxine XR, tizanidine, finasteride, tamsulosin, divalproex, and hydrocodone-acetaminophen.     Psychosocial History: The patient was born and raised in [place], [place]. He denied known history of birth complications or early developmental delays. He earned a high school diploma. 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 He denied a history of learning, attention, or academic difficulties during his school years. 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 Additional occupational history included pharmacy technician and . He retired approximately in 2003. He has resided in [place], [place], since 2003. He currently lives with his wife of 42 years and one of his children in a private residence. He has 3 children. He reported adequate social support from his family. Hobbies and activities include hunting, fishing, and woodworking, though he has not been able to engage in these activities recently due to his health problems.    Substance Use: The patient denied current use of alcohol, illicit drugs,  marijuana, and nicotine products. He reported he used marijuana and cigarettes sporadically while he was in high school. There is no reported history of substance use disorder or treatment.     Measures Administered: West Diagnostic Aphasia Examination - 3rd Ed. (BDAE-3): Word Repetition & Sentence Repetition; West Naming Test (BNT); Brief Visuospatial Memory Test - Revised (BVMT-R); Chrissy-Hernandez Executive Functioning System (DKEFS): Color-Word Interference (CWI) & Verbal Fluency (VF); Executive Clock Drawing Test (CLOX); Generalized Anxiety Disorder 7 Item Scale (OXANA-7); Grooved Pegboard (GP); Young Verbal Learning Test - Revised (HVLT-R); Mini-Mental State Examination - 2nd Ed. Orientation (MMSE-2); Neuropsychological Assessment Battery (NAB) Form 1: Dots; Patient Health Questionnaire (PHQ-9); Repeatable Battery for the Assessment of Neuropsychological Status Line Orientation (RBANS LO); Test of Practical Judgment (TOPJ); Test of Premorbid Functioning (ToPF); Trail Making Test A & B (TMT); Wechsler Adult Intelligence Scale - 4th Ed. (WAIS-IV) Block Design (BD), Digit Span (DS), Matrix Reasoning (MR), Similarities (SI), Information (IN), & Coding (CD); and Wechsler Memory Scale - 4th Ed. Logical Memory (WMS-IV LM). Informant Questionnaires: Activities of Daily Living Questionnaire (ADLQ) and Neuropsychiatric Inventory Questionnaire (NPI-Q).    Behavioral Observations: The patient arrived at the clinic on time and was accompanied by his wife, who participated in the clinical interview. He was well groomed and dressed. He was alert and fully oriented to situation, person, place, and time (MMSE-2 Orientation = 10/10). He was polite and always maintained appropriate interpersonal boundaries. Rapport was easily established. Gait was slow and notable for reduced balance. Ambulation was aided with a walker. He exhibited a bilateral hand tremor with action and at rest throughout the appointment. The tremor did not  significantly interfere with performance on tasks involving simple drawing or writing. However, a task measuring fine motor dexterity speed (GP) was discontinued with both hands as he had significant difficulties completing the first row due to tremor and trouble picking up/manipulating the pegs. No other gross abnormal movements or motor symptoms were observed. Speech rate, volume, articulation, and prosody were normal. Speech content was logical and appropriate to context. Expressive and receptive language were intact during conversation. Mood was euthymic during the appointment. Affect was mood-congruent and appropriate to the situation. Insight into cognitive and emotional functioning was adequate. There was no indication of hallucinations, delusions, or thought disorder. Vision and hearing (corrected with bilateral hearing aids) were adequate for the evaluation. He was attentive throughout the evaluation and had no difficulties with retention of task demands. His rate of task execution was slow. Overall, he was engaged and cooperative throughout testing.    Results & Key Findings: Please note that scores reported are for professional use only. For diagnostic purposes, a performance score that falls below the 9th percentile of the reference group for that measure may be considered a cognitive deficit depending on the overall pattern of performance. The following clinical descriptors identify performance within the range of percentile scores indicated in the parentheses: Exceptionally High Score (>98th), Above Average Score (91st-97th), High Average Score (75th-90th), Average Score (25th-74th), Low Average Score (9th-24th), Below Average Score (3rd-8th), and Exceptionally Low Score (<2nd). Please see the attached test results summary table in the appendix for a list of measures administered as well as raw and normative scores, which are listed in the designated columns. All such scores are based on age-corrected  norms and certain scores may be adjusted for education and/or other demographic factors as appropriate.     Data Validity: The patient's performance on embedded and freestanding validity measures was within the valid range, suggesting he appropriately engaged in testing. The following test results are considered an accurate representation of his current level of cognitive functioning.    Premorbid and Estimated Intelligence: A predicted estimate of premorbid intellectual functioning based on age and his performance on a single word-reading test fell within the average range (TOPF). Based on demographic factors, his premorbid ability was also predicted in the average range (TOPF). Commensurate with this, current general intellectual ability was estimated in the average range compared to similarly aged peers (WAIS-IV; prorated General Ability Index; GAI). Among underlying abilities, performance on indices of verbal comprehension/reasoning (prorated VCI) and visuospatial/perceptual reasoning (prorated AVERY) was average compared to similarly aged peers. There was no clinically meaningful discrepancy among these index scores.    Cognitive Screening: On the MMSE-2, the patient's raw score was 29/30, which falls in the average range given his age and level of education. One point was lost for sentence repetition (-1).    Cognitive Functioning:     The patient's performance on some measures in the following domains was below expectation:    Processing Speed: Visuomotor number sequencing speed (TMT A), rapid color naming (DKEFS CWI), simple word reading speed (DKEFS CWI), and rapid code transcription (WAIS-IV CD) were all in the exceptionally low range.  Language: Semantic verbal fluency was exceptionally low (DKEFS VF). Single word reading (TOPF), visual confrontation naming (BNT), general fund of knowledge (WAIS-IV IN), and abstract verbal reasoning (WAIS-IV SI) were all average. Word repetition and sentence repetition were  both within normal limits (BDAE-3).  Memory: Total recall of a wordlist across three repeated learning trials was below average. Delayed recall was exceptionally low, with 38% retention rate of information. Recognition discrimination of the wordlist remained exceptionally low, with 10/12 target words correctly identified and 3 false positive errors (HVLT-R). Immediate recall of short stories was average. Delayed recall was average (77% retention). Delayed recognition of story details was average to high average (WMS LM). Total recall of an array of simple geometric figures across three repeated learning trials was average. Delayed recall was average (70% retention). Delayed recognition discrimination of the figures was within normal limits with 6/6 target figures correctly identified and nil false positive errors (BVMT-R).   Executive Functioning: Visuomotor set shifting (TMT B), phonemic verbal fluency (DKEFS VF), semantic verbal set shifting (DKEFS VF), response inhibition (DKEFS CWI), and response inhibition with a set shifting component (DKEFS CWI) were all exceptionally low. Freehand clock drawing/conceptualization (CLOX 1), abstract verbal reasoning (WAIS-IV SI), and abstract/deductive visual reasoning (WAIS-IV MR) were all average. Practical judgment in response to hypothetical scenarios was exceptionally high (TOPJ).   Motor Functioning: Fine motor dexterity speed was discontinued with both hands. He had significant difficulties completing the first row due to tremor and trouble picking up/manipulating the pegs.     The patient's performance in the following domains was within to above expectation:     Attention/Working Memory: Overall performance on a measure of basic auditory attention span (forward number repetition) and working memory (backward/sequenced auditory number repetition; WAIS-IV DS). Visual working memory (NAB Dots).  Visual Perception/Construction: Judgment of line orientation (RBANS LO),  simple figure copy (BVMT-R Copy), copy of a clock (CLOX 2), abstract/deductive visual reasoning (WAIS-IV MR), and construction of block designs (WAIS-IV BD).    Self-Report Questionnaires: The patient's responses on self-report inventories of emotional functioning were indicative of mild levels of depression (PHQ-9) and minimal, levels of anxiety (OXANA-7) over the past two weeks.    Informant Questionnaires: His wife completed a questionnaire about the patient's ability to function independently, implying a severe level of impairment in activities of daily living, with particular difficulty in the areas of shopping/money management, employment/recreation, household-care, and transportation (ADLQ). On a questionnaire regarding neuropsychiatric symptoms, his wife reported observing agitation, apathy, nighttime behaviors, and appetite changes (NPI-Q).    Neuropsychological Findings and Impressions    Results Summary/Interpretation: The patient's general intellectual ability was estimated in the average range, consistent with his predicted premorbid ability. Processing speed was impaired. His reduced processing speed likely interfered with performance on timed tasks across all domains, including verbal fluency measures. As such, semantic verbal fluency was below expectation, while all other aspects of language were intact. Additional deficits were detected in aspects of executive functioning including visuomotor set shifting, phonemic verbal fluency, semantic verbal set shifting, response inhibition, and response inhibition with a set shifting component. These deficits were indicative of reduced cognitive flexibility. Memory was variable. Encoding, consolidation, and retrieval of rote verbal information (wordlist) were deficient. In contrast, encoding, consolidation, and retrieval of contextual verbal (stories) and visual information (simple figures) were within normal limits. This pattern of improvement in memory  performance with added structure in the form of stories was indicative of executive dysfunction interfering with encoding and retrieval of rote verbal information. He also exhibited significant difficulties with fine motor dexterity speed, bilaterally. His performance on all the remaining measures across cognitive domains was within normal limits including visual perception/construction. Practical judgement in response to hypothetical scenarios was a cognitive strength.     Impression: The patient's cognitive profile revealed impaired processing speed coupled with deficits in aspects of executive function that likely interfered with memory performance. His pattern of deficits was suggestive of diffuse frontal-subcortical networks dysfunction and was not lateralizing or localizing. Based on his history, cognitive profile, and reported functional status, he meets criteria for major neurocognitive disorder (dementia) in the mild severity range. Etiology is likely multifactorial. His frontal subcortical pattern of deficits is consistent with what is typically seen in patients with vascular dementia. This is corroborated by his history of multiple vascular risk factors and SDH, although this was traumatic in nature. Other possible contributing factors to his cognitive decline include his history of myocardial infarction with hypoxia and seizure-like episodes. Medication effects (divalproex, tizanidine, and hydrocodone-acetaminophen) may also be contributory, particularly to his reduced processing speed. Additional possible exacerbating factors include chronic pain, hypersomnia, and possible untreated sleep apnea. His overall pattern of performance including largely intact language, memory for stories, and visual memory is not consistent with a neurodegenerative disorder such as Alzheimer's disease. Most of the cognitive recovery following traumatic SDHs occurs within the first year, with milder recovery possible during  the second year. As such, he is encouraged to engage in rehabilitation therapies for his residual deficits as needed (e.g., cognitive, speech, physical, and occupational). This evaluation may serve as a baseline for longitudinal tracking.    Recommendations:    Based on the present results, the patient is recommended for a repeat neuropsychological evaluation in the next 18 to 24 months or sooner if there is a considerable change in cognitive or emotional status. At that time, diagnostic impressions and treatment recommendations will be revised and updated as necessary. Additional testing will permit comparisons over time to better identify stability and possible decline.  Continued oversight and management of instrumental activities of daily living, particularly financial and medication/health management, are advised in light of his cognitive deficits. Based on the test results, it is not recommended that the patient engage in these types of activities without supervision. Increase in-home assistance (e.g., hiring a caregiver or additional support from family members) or placement in a location with increased support (e.g., senior living or assisted living) may be options to consider in the future. Information about home health agencies can be found on the Alzheimer's Association's website (www.alz.org). The current level of assistance with daily activities should not be reduced.  Continued accompaniment to medical appointments by trusted others and receipt of written instructions is recommended to ensure comprehension, later recall, and follow-through of advice given by providers. In light of his cognitive deficits, increased monitoring of the patient when he is outside his home is recommended to ensure his safety, particularly in unfamiliar locations.  Continued oversight and management from family members or trusted others with complex decision-making (e.g., legal, financial, medical) are also recommended. Given  his cognitive deficits, it is not recommended that he engage in this type of decision making unsupervised.   Given his processing speed and executive function deficits and seizure-like episodes, it is recommended that the patient continue to refrain from driving and other potentially hazardous activities. If this becomes an area of concern, it is advised that he undergo a formal driving evaluation conducted by an occupational therapist with training and expertise in this area to ensure his safety and that of others on the road:  [facility] ([email or web address]; [phone])  [facility] request for  reevaluation ([email or web address])   The patient is encouraged to follow through with Dr. Hooper's recommendation for an EMU stay to further characterize his seizure-like episodes and adjust medications accordingly.   The patient is encouraged to continue using hearing aids consistently to rule out possible sensory contributions to his cognitive decline.  Given his report of chronic pain that may exacerbate cognitive difficulties, he is encouraged to continue engaging in pain management treatment. A discussion of the possible cognitive burden of pain medications with his prescribing physicians may also be beneficial.  The patient reported significant hypersomnia (sleeping 18 hours daily) and possible untreated sleep apnea, which may be contributing to his cognitive difficulties. As such, he may benefit from sleep medicine consultation. He may also benefit from education regarding sleep hygiene and healthy sleep habits, including maintenance of a regular sleep/wake cycle and integrating relaxation exercises before bed. This was provided during the feedback session. Additional strategies include:  The light emitted by phone screens, TVs, and iPads can mimic daylight and suppress the body's natural release of melatonin, making it harder  to fall asleep. It is recommended to stop screen time about an hour before bed.  Develop a bedtime routine.  Keep the bedroom at a comfortable temperature.  Use low lighting in the evenings.  Avoid consuming large meals and caffeine close to bedtime.  Avoid sleeping during the day, as it can disturb the normal pattern of sleep and wakefulness.  Exercise can promote good sleep, particularly when completed in the morning or early afternoon.  Given reported balance problems, in-home changes to minimize fall risk are recommended. These may include increasing lighting throughout the house, especially at the top and bottom of stairs, and ensuring that lighting is readily available when getting up in the middle of the night. Keeping floors clutter-free and removing small throw rugs or using double-sided tape to keep the rugs from slipping. Making sure there are two secure rails on all stairs and installing grab bars in locations where they will be conveniently usable. Use of ambulation aids (e.g., walker), particularly in outdoor locations, is also advised.  The patient may benefit from the use of compensatory strategies to help outsource some of the difficulties associated with his cognitive decline. These may include having/setting scheduled routines, having a specific place for important items (e.g., phone, keys, wallet), doing one task at a time, minimizing distractions, and using external aids for reminders (e.g., planner, setting alarms, labels, notebooks, checklists/to-do lists) consistently.   In light of his medical history, cerebrovascular disease represents a significant risk factor for future cognitive decline. As such, the patient is encouraged to reduce vascular risk factors per his providers' treatment recommendations (e.g., healthy diet, exercise, and medication adherence).  The patient is encouraged to regularly engage in social and physical recreation, as well as cognitively stimulating activities (e.g.,  puzzles, games, reading, exercise, social gatherings) to promote brain health and emotional well-being. The following are options for adult day programs in our community:  [facility] (https://www.[place].gov/seniorsrv/adult_day_health/index.php; 437.808.6266)  [facility] (https://www.i-drive.[employer]/; 590-452-9531)   If not already addressed, the patient and his family members are encouraged to discuss legal issues such as power of , guardianship, advanced directives, and/or establishing a will to assist him in future financial and healthcare decisions. Information regarding these issues can be found at www.caringinfo.org or www.agingwithdignity.org/5wishes.html. Consultation with an elder care  can also be helpful.   The following books may be helpful for The patient and his family: Undo It!: How Simple Lifestyle Changes Can Reverse Most Chronic Diseases by Sg Hurd and Maite Hurd and The 36 Hour Day: A Family Guide to Caring for Persons with Alzheimer's Disease, Related Dementing Illnesses, and Memory Loss in Later Life by Meera Melara and Parag Doe.  The patient and his family may benefit from resources available through Dementia Friendly [place] (https://dementiafriendly[place].org/), the Alzheimer's Association (www.alz.org or 2.346.473.0520), and the American Heart Association (https://www.heart.org/ or 2-224-408-0258), which offer psychoeducational information and services for individuals with major neurocognitive disorder and vascular risk factors.   Additional local resources include:  [place] 2-1-1 (www.wbrwne733.org/dementia-support) is an online resource connecting community members with needed , including dementia care.  [place] [facility] (www.[place]eniorservices.org) provides information about adult day health care centers and community-based services and programs.  The patient's family may benefit from  information and resources available through the Family Caregiver [place] (www.caregiver.org), which include support groups. The following book may also be beneficial: The Traumatized Brain: A Family Guide to Understanding Mood, Memory, and Behavior after Brain Injury (A [facility] Health Book) by Carol Epstein, Aj Payne, and Parag Doe (Foreword).   Many individuals who have experienced TBI and seizures-like events struggle with changes in cognition, emotional functioning, and family adjustment. Education about recovery from brain injuries and seizure disorders, as well as individual and family-based emotional support, such as support groups, individual counseling, or supportive educational opportunities may be beneficial:   The Brain Injury Association of [place] website (www.biausa.org) includes education and additional resources.  The Head Injury Association of [facility] (https://www.hiann.org/ or 480-668-3318) offers psychoeducational information and services for individuals with a history of TBI.  The Epilepsy Foundation of [place] website (www.epilepsy.com) includes information about seizure disorders and opportunities for clinical trials.       Thank you for allowing me to participate in the patient's care. If I can be of further assistance, please do not hesitate to contact me.      Oscar Rodgers, Ph.D.          Clinical Neuropsychologist          Department of Neurology          [facility]             Appendix:    Test Results Summary Table:            This report was created using voice recognition software. I have made every reasonable attempt to avoid dictation errors, but this document may contain an error not identified before finalizing. If the error changes the accuracy of the document, I would appreciate it being brought to my attention. Thank you.    I spent one hour and 40 minutes interviewing the patient and his wife (neurobehavioral status exam: 14079 = 1, 50270 = 1).  Five hours and 19 minutes of technician time were spent in test administration and scoring under my supervision (64900 = 1, 54282 = 10). I spent 3 hours and 37 minutes in clinical decision-making, chart review, records reviews, interpretation of test results and clinical data, integration of patient data, and report preparation (test evaluation services: 01005 = 1, 51474 = 3).

## 2024-09-23 ENCOUNTER — NON-PROVIDER VISIT (OUTPATIENT)
Dept: CARDIOLOGY | Facility: MEDICAL CENTER | Age: 64
End: 2024-09-23
Payer: OTHER GOVERNMENT

## 2024-09-23 VITALS — SYSTOLIC BLOOD PRESSURE: 119 MMHG | DIASTOLIC BLOOD PRESSURE: 96 MMHG | HEART RATE: 80 BPM

## 2024-09-23 DIAGNOSIS — I48.0 PAROXYSMAL ATRIAL FIBRILLATION (HCC): ICD-10-CM

## 2024-09-23 LAB — EKG IMPRESSION: NORMAL

## 2024-09-23 PROCEDURE — 93005 ELECTROCARDIOGRAM TRACING: CPT

## 2024-09-23 PROCEDURE — 93010 ELECTROCARDIOGRAM REPORT: CPT | Performed by: INTERNAL MEDICINE

## 2024-09-23 NOTE — PROGRESS NOTES
Patient was here today for BP check and EKG. EKG and BP readings located in vital sign section. Informed patient we will forward readings to nurse and they will receive a call with recommendations.  Did patient present with home cuff? Yes  Is patient reporting any symptoms? No  If Yes, RN to visit exam room    Pt was wondering if to cancel their cardioversion this Friday since they are Sinus. Please advise. Thank you.

## 2024-09-23 NOTE — PROGRESS NOTES
Noted from OV:  Cardioversion ordered for return to normal sinus rhythm but in sinus rhythm at appointment today. Will return in 2 weeks for EKG and blood pressure check. If in sinus rhythm will cancel cardioversion     Avg BP with office equipment: 110/75  Avg BP with home equipment : 121/89    Called pt to recommend [employer] home BP equipment and discuss plan to cancel cardioversion if in sinus rhythm.     Pt is on speaker phone and RN unable to hear or understand the pt. Advised to return our call to discuss.     To HL as FYI. Thank you!

## 2024-09-25 ENCOUNTER — TELEPHONE (OUTPATIENT)
Dept: NEUROLOGY | Facility: MEDICAL CENTER | Age: 64
End: 2024-09-25
Payer: OTHER GOVERNMENT

## 2024-09-25 NOTE — TELEPHONE ENCOUNTER
Patient's wife called saying that his MRI order was not sent to [facility] [place] she would like a call back at [phone].

## 2024-09-26 ENCOUNTER — TELEPHONE (OUTPATIENT)
Dept: CARDIOLOGY | Facility: PHYSICIAN GROUP | Age: 64
End: 2024-09-26
Payer: OTHER GOVERNMENT

## 2024-09-26 PROBLEM — F03.918: Status: RESOLVED | Noted: 2024-07-25 | Resolved: 2024-09-26

## 2024-09-26 PROBLEM — F02.80 MAJOR NEUROCOGNITIVE DISORDER DUE TO MULTIPLE ETIOLOGIES WITHOUT BEHAVIORAL DISTURBANCE (HCC): Status: ACTIVE | Noted: 2024-07-28

## 2024-09-26 PROBLEM — F05: Status: RESOLVED | Noted: 2024-07-25 | Resolved: 2024-09-26

## 2024-09-26 NOTE — TELEPHONE ENCOUNTER
Called pt's wife in regards to lab work that was ordered at previous OV. Patients wife states they would like the lab orders to be sent to their mailing address. Pt has follow up appointment scheduled with  AB on 10.10.2024.

## 2024-09-27 ENCOUNTER — APPOINTMENT (OUTPATIENT)
Dept: CARDIOLOGY | Facility: MEDICAL CENTER | Age: 64
End: 2024-09-27
Payer: OTHER GOVERNMENT

## 2024-10-01 ENCOUNTER — APPOINTMENT (OUTPATIENT)
Dept: NEUROLOGY | Facility: MEDICAL CENTER | Age: 64
End: 2024-10-01
Attending: PSYCHIATRY & NEUROLOGY
Payer: OTHER GOVERNMENT

## 2024-10-14 ENCOUNTER — OFFICE VISIT (OUTPATIENT)
Dept: BEHAVIORAL HEALTH | Facility: CLINIC | Age: 64
End: 2024-10-14
Attending: CLINICAL NEUROPSYCHOLOGIST
Payer: OTHER GOVERNMENT

## 2024-10-14 DIAGNOSIS — S06.5XAS TRAUMATIC SUBDURAL HEMATOMA, SEQUELA (HCC): ICD-10-CM

## 2024-10-14 DIAGNOSIS — F02.80 MAJOR NEUROCOGNITIVE DISORDER DUE TO MULTIPLE ETIOLOGIES WITHOUT BEHAVIORAL DISTURBANCE (HCC): ICD-10-CM

## 2024-10-14 DIAGNOSIS — F01.A0 MAJOR NEUROCOGNITIVE DISORDER, DUE TO VASCULAR DISEASE, WITHOUT BEHAVIORAL DISTURBANCE, MILD (HCC): ICD-10-CM

## 2024-10-14 PROCEDURE — 96132 NRPSYC TST EVAL PHYS/QHP 1ST: CPT | Performed by: CLINICAL NEUROPSYCHOLOGIST

## 2024-10-16 ENCOUNTER — APPOINTMENT (OUTPATIENT)
Dept: CARDIOLOGY | Facility: PHYSICIAN GROUP | Age: 64
End: 2024-10-16
Payer: OTHER GOVERNMENT

## 2024-10-16 DIAGNOSIS — I25.10 CORONARY ARTERY DISEASE INVOLVING NATIVE CORONARY ARTERY OF NATIVE HEART WITHOUT ANGINA PECTORIS: ICD-10-CM

## 2024-10-16 DIAGNOSIS — I48.91 ATRIAL FIBRILLATION WITH RAPID VENTRICULAR RESPONSE (HCC): ICD-10-CM

## 2024-10-16 DIAGNOSIS — I48.0 PAROXYSMAL ATRIAL FIBRILLATION (HCC): ICD-10-CM

## 2024-10-16 DIAGNOSIS — I10 ESSENTIAL HYPERTENSION, BENIGN: ICD-10-CM

## 2024-10-16 DIAGNOSIS — E78.2 MIXED HYPERLIPIDEMIA: ICD-10-CM

## 2024-10-16 DIAGNOSIS — Z79.01 CURRENT USE OF LONG TERM ANTICOAGULATION: ICD-10-CM

## 2024-10-30 ENCOUNTER — TELEPHONE (OUTPATIENT)
Dept: CARDIOLOGY | Facility: MEDICAL CENTER | Age: 64
End: 2024-10-30

## 2024-10-30 ENCOUNTER — OFFICE VISIT (OUTPATIENT)
Dept: CARDIOLOGY | Facility: PHYSICIAN GROUP | Age: 64
End: 2024-10-30
Payer: OTHER GOVERNMENT

## 2024-10-30 ENCOUNTER — NON-PROVIDER VISIT (OUTPATIENT)
Dept: CARDIOLOGY | Facility: PHYSICIAN GROUP | Age: 64
End: 2024-10-30
Attending: NURSE PRACTITIONER
Payer: OTHER GOVERNMENT

## 2024-10-30 VITALS
OXYGEN SATURATION: 96 % | HEART RATE: 56 BPM | HEIGHT: 67 IN | SYSTOLIC BLOOD PRESSURE: 102 MMHG | WEIGHT: 224.87 LBS | BODY MASS INDEX: 35.29 KG/M2 | DIASTOLIC BLOOD PRESSURE: 58 MMHG | RESPIRATION RATE: 12 BRPM

## 2024-10-30 DIAGNOSIS — I48.91 ATRIAL FIBRILLATION WITH RAPID VENTRICULAR RESPONSE (HCC): ICD-10-CM

## 2024-10-30 DIAGNOSIS — E78.2 MIXED HYPERLIPIDEMIA: ICD-10-CM

## 2024-10-30 DIAGNOSIS — I48.0 PAROXYSMAL ATRIAL FIBRILLATION (HCC): ICD-10-CM

## 2024-10-30 DIAGNOSIS — F02.80 MAJOR NEUROCOGNITIVE DISORDER DUE TO MULTIPLE ETIOLOGIES WITHOUT BEHAVIORAL DISTURBANCE (HCC): ICD-10-CM

## 2024-10-30 DIAGNOSIS — J95.821 RESPIRATORY FAILURE FOLLOWING TRAUMA AND SURGERY (HCC): ICD-10-CM

## 2024-10-30 DIAGNOSIS — I10 ESSENTIAL HYPERTENSION, BENIGN: ICD-10-CM

## 2024-10-30 DIAGNOSIS — R53.83 OTHER FATIGUE: ICD-10-CM

## 2024-10-30 DIAGNOSIS — Z79.899 HIGH RISK MEDICATION USE: ICD-10-CM

## 2024-10-30 DIAGNOSIS — Z96.651 STATUS POST RIGHT KNEE REPLACEMENT: ICD-10-CM

## 2024-10-30 DIAGNOSIS — E11.9 TYPE 2 DIABETES MELLITUS WITHOUT COMPLICATION, WITHOUT LONG-TERM CURRENT USE OF INSULIN (HCC): ICD-10-CM

## 2024-10-30 DIAGNOSIS — I25.10 CORONARY ARTERY DISEASE INVOLVING NATIVE CORONARY ARTERY OF NATIVE HEART WITHOUT ANGINA PECTORIS: ICD-10-CM

## 2024-10-30 DIAGNOSIS — Z79.01 CURRENT USE OF LONG TERM ANTICOAGULATION: ICD-10-CM

## 2024-10-30 PROBLEM — L03.90 CELLULITIS: Status: RESOLVED | Noted: 2024-08-03 | Resolved: 2024-10-30

## 2024-10-30 PROBLEM — T14.90XA TRAUMA: Status: RESOLVED | Noted: 2022-08-25 | Resolved: 2024-10-30

## 2024-10-30 PROBLEM — E87.6 HYPOKALEMIA: Status: RESOLVED | Noted: 2020-08-10 | Resolved: 2024-10-30

## 2024-10-30 PROBLEM — D64.9 ANEMIA: Status: RESOLVED | Noted: 2024-07-26 | Resolved: 2024-10-30

## 2024-10-30 PROBLEM — J18.9 PNEUMONIA OF RIGHT LOWER LOBE DUE TO INFECTIOUS ORGANISM: Status: RESOLVED | Noted: 2024-07-28 | Resolved: 2024-10-30

## 2024-10-30 PROBLEM — N17.9 ACUTE KIDNEY INJURY (HCC): Status: RESOLVED | Noted: 2024-07-25 | Resolved: 2024-10-30

## 2024-10-30 PROBLEM — R00.0 SINUS TACHYCARDIA: Status: RESOLVED | Noted: 2024-07-27 | Resolved: 2024-10-30

## 2024-10-30 RX ORDER — METOPROLOL SUCCINATE 25 MG/1
25 TABLET, EXTENDED RELEASE ORAL EVERY EVENING
Qty: 100 TABLET | Refills: 3 | Status: SHIPPED | OUTPATIENT
Start: 2024-10-30

## 2024-10-30 RX ORDER — AMIODARONE HYDROCHLORIDE 100 MG/1
100 TABLET ORAL DAILY
Qty: 100 TABLET | Refills: 3 | Status: SHIPPED | OUTPATIENT
Start: 2024-10-30

## 2024-10-30 RX ORDER — HYDROCODONE BITARTRATE AND ACETAMINOPHEN 10; 325 MG/1; MG/1
TABLET ORAL
COMMUNITY
Start: 2024-08-29

## 2024-10-30 RX ORDER — EZETIMIBE 10 MG/1
10 TABLET ORAL DAILY
Qty: 100 TABLET | Refills: 3 | Status: SHIPPED | OUTPATIENT
Start: 2024-10-30

## 2024-10-30 RX ORDER — TELMISARTAN 40 MG/1
40 TABLET ORAL EVERY MORNING
Qty: 100 TABLET | Refills: 3 | Status: SHIPPED | OUTPATIENT
Start: 2024-10-30

## 2024-10-30 ASSESSMENT — ENCOUNTER SYMPTOMS
PALPITATIONS: 0
NAUSEA: 0
WEAKNESS: 1
DIZZINESS: 1
BRUISES/BLEEDS EASILY: 0
MYALGIAS: 0
ABDOMINAL PAIN: 0
PND: 0
CHILLS: 0
ORTHOPNEA: 0
FEVER: 0
COUGH: 1
LOSS OF CONSCIOUSNESS: 0
MEMORY LOSS: 1
HEADACHES: 0
SHORTNESS OF BREATH: 0
INSOMNIA: 0

## 2024-10-30 ASSESSMENT — FIBROSIS 4 INDEX: FIB4 SCORE: 1.04

## 2024-11-01 ENCOUNTER — TELEPHONE (OUTPATIENT)
Dept: NEUROLOGY | Facility: MEDICAL CENTER | Age: 64
End: 2024-11-01
Payer: OTHER GOVERNMENT

## 2024-11-01 NOTE — TELEPHONE ENCOUNTER
Dario Pennington V, called and talked to patients wife. She wanted to know if we had patients ct scan which we did. She also wanted to know what's going on with the 5 day EMU. I told her see needs to call [facility] to figure out the details of Kimmie stay. The patient Karan has a referral and has an appointment with [facility] but his wife was confused about what is going on. Patients wife wanted me to ask if Karan had to have his stimulator off when getting ct scans or for the EMU tests. Please advise. Thank you.

## 2024-11-03 NOTE — TELEPHONE ENCOUNTER
Noted. I see only the report from CT head, but not images. Per the report, no new changes (the test was ordered because of his fall in the interim).    We had a detailed discussion about the goals of EMU, as outlined in my note. It is not clear if the patient has seizures or not, so in context of him being a high risk if he experiences a seizure, we need to obtain definite diagnosis in the hospital. Please advise the patient as above. Thank you.

## 2024-11-08 RX ORDER — OMEPRAZOLE 40 MG/1
40 CAPSULE, DELAYED RELEASE ORAL EVERY EVENING
Status: ON HOLD | COMMUNITY

## 2024-11-08 NOTE — PROGRESS NOTES
Pre-admission med rec completed per patient's spouse Alesia ([phone]), and [facility] in [place] ([phone]) to verify the dosing for patient's divalproex.    Allergies reviewed with patient's spouse.    Outpatient antibiotics within the last 30 days: none.    ANTICOAGULANTS: Patient is on ELIQUIS.    Preferred pharmacy: Per [facility] in [place] for patient's divalproex and tizanidine; all other medications are through [facility] (mail delivery).

## 2024-11-10 DIAGNOSIS — Z79.02 ANTIPLATELET OR ANTITHROMBOTIC LONG-TERM USE: ICD-10-CM

## 2024-11-10 DIAGNOSIS — R52 PAIN: ICD-10-CM

## 2024-11-10 DIAGNOSIS — Z29.9 PROPHYLACTIC MEASURE: ICD-10-CM

## 2024-11-10 DIAGNOSIS — R11.2 NAUSEA AND VOMITING, UNSPECIFIED VOMITING TYPE: ICD-10-CM

## 2024-11-10 DIAGNOSIS — R56.9 SEIZURES (HCC): ICD-10-CM

## 2024-11-10 DIAGNOSIS — I48.91 ATRIAL FIBRILLATION WITH RAPID VENTRICULAR RESPONSE (HCC): ICD-10-CM

## 2024-11-10 RX ORDER — POLYETHYLENE GLYCOL 3350 17 G/17G
1 POWDER, FOR SOLUTION ORAL
Status: CANCELLED | OUTPATIENT
Start: 2024-11-10

## 2024-11-10 RX ORDER — ACETAMINOPHEN 325 MG/1
650 TABLET ORAL EVERY 4 HOURS PRN
Status: CANCELLED | OUTPATIENT
Start: 2024-11-10

## 2024-11-10 RX ORDER — ONDANSETRON 4 MG/1
4 TABLET, ORALLY DISINTEGRATING ORAL EVERY 4 HOURS PRN
Status: CANCELLED | OUTPATIENT
Start: 2024-11-10

## 2024-11-10 RX ORDER — AMOXICILLIN 250 MG
2 CAPSULE ORAL EVERY EVENING
Status: CANCELLED | OUTPATIENT
Start: 2024-11-10

## 2024-11-10 RX ORDER — LORAZEPAM 2 MG/ML
2 INJECTION INTRAMUSCULAR
Status: CANCELLED | OUTPATIENT
Start: 2024-11-10

## 2024-11-10 RX ORDER — ONDANSETRON 2 MG/ML
4 INJECTION INTRAMUSCULAR; INTRAVENOUS EVERY 4 HOURS PRN
Status: CANCELLED | OUTPATIENT
Start: 2024-11-10

## 2024-11-10 RX ORDER — LORAZEPAM 2 MG/ML
1 INJECTION INTRAMUSCULAR
Status: CANCELLED | OUTPATIENT
Start: 2024-11-10

## 2024-11-11 ENCOUNTER — HOSPITAL ENCOUNTER (INPATIENT)
Facility: MEDICAL CENTER | Age: 64
LOS: 4 days | DRG: 101 | End: 2024-11-15
Attending: PSYCHIATRY & NEUROLOGY | Admitting: STUDENT IN AN ORGANIZED HEALTH CARE EDUCATION/TRAINING PROGRAM
Payer: OTHER GOVERNMENT

## 2024-11-11 DIAGNOSIS — G40.909 SEIZURE DISORDER AS SEQUELA OF CEREBROVASCULAR ACCIDENT (HCC): ICD-10-CM

## 2024-11-11 DIAGNOSIS — Z29.9 PROPHYLACTIC MEASURE: ICD-10-CM

## 2024-11-11 DIAGNOSIS — I48.91 ATRIAL FIBRILLATION WITH RAPID VENTRICULAR RESPONSE (HCC): ICD-10-CM

## 2024-11-11 DIAGNOSIS — R56.9 SEIZURES (HCC): ICD-10-CM

## 2024-11-11 DIAGNOSIS — R11.2 NAUSEA AND VOMITING, UNSPECIFIED VOMITING TYPE: ICD-10-CM

## 2024-11-11 DIAGNOSIS — I69.398 SEIZURE DISORDER AS SEQUELA OF CEREBROVASCULAR ACCIDENT (HCC): ICD-10-CM

## 2024-11-11 DIAGNOSIS — Z29.89 SEIZURE PROPHYLAXIS: ICD-10-CM

## 2024-11-11 DIAGNOSIS — Z79.02 ANTIPLATELET OR ANTITHROMBOTIC LONG-TERM USE: ICD-10-CM

## 2024-11-11 DIAGNOSIS — R52 PAIN: ICD-10-CM

## 2024-11-11 LAB
ALBUMIN SERPL BCP-MCNC: 3.4 G/DL (ref 3.2–4.9)
ALBUMIN/GLOB SERPL: 1.4 G/DL
ALP SERPL-CCNC: 55 U/L (ref 30–99)
ALT SERPL-CCNC: 7 U/L (ref 2–50)
AMMONIA PLAS-SCNC: 28 UMOL/L (ref 11–45)
ANION GAP SERPL CALC-SCNC: 10 MMOL/L (ref 7–16)
AST SERPL-CCNC: 17 U/L (ref 12–45)
BASOPHILS # BLD AUTO: 0.7 % (ref 0–1.8)
BASOPHILS # BLD: 0.04 K/UL (ref 0–0.12)
BILIRUB SERPL-MCNC: 0.3 MG/DL (ref 0.1–1.5)
BUN SERPL-MCNC: 8 MG/DL (ref 8–22)
CALCIUM ALBUM COR SERPL-MCNC: 9.9 MG/DL (ref 8.5–10.5)
CALCIUM SERPL-MCNC: 9.4 MG/DL (ref 8.5–10.5)
CHLORIDE SERPL-SCNC: 101 MMOL/L (ref 96–112)
CO2 SERPL-SCNC: 29 MMOL/L (ref 20–33)
CREAT SERPL-MCNC: 0.93 MG/DL (ref 0.5–1.4)
EOSINOPHIL # BLD AUTO: 0.13 K/UL (ref 0–0.51)
EOSINOPHIL NFR BLD: 2.1 % (ref 0–6.9)
ERYTHROCYTE [DISTWIDTH] IN BLOOD BY AUTOMATED COUNT: 47.7 FL (ref 35.9–50)
GFR SERPLBLD CREATININE-BSD FMLA CKD-EPI: 92 ML/MIN/1.73 M 2
GLOBULIN SER CALC-MCNC: 2.4 G/DL (ref 1.9–3.5)
GLUCOSE SERPL-MCNC: 98 MG/DL (ref 65–99)
HCT VFR BLD AUTO: 41.7 % (ref 42–52)
HGB BLD-MCNC: 13.4 G/DL (ref 14–18)
IMM GRANULOCYTES # BLD AUTO: 0.02 K/UL (ref 0–0.11)
IMM GRANULOCYTES NFR BLD AUTO: 0.3 % (ref 0–0.9)
LYMPHOCYTES # BLD AUTO: 1.28 K/UL (ref 1–4.8)
LYMPHOCYTES NFR BLD: 21.1 % (ref 22–41)
MAGNESIUM SERPL-MCNC: 1.8 MG/DL (ref 1.5–2.5)
MCH RBC QN AUTO: 30.2 PG (ref 27–33)
MCHC RBC AUTO-ENTMCNC: 32.1 G/DL (ref 32.3–36.5)
MCV RBC AUTO: 93.9 FL (ref 81.4–97.8)
MONOCYTES # BLD AUTO: 0.48 K/UL (ref 0–0.85)
MONOCYTES NFR BLD AUTO: 7.9 % (ref 0–13.4)
NEUTROPHILS # BLD AUTO: 4.12 K/UL (ref 1.82–7.42)
NEUTROPHILS NFR BLD: 67.9 % (ref 44–72)
NRBC # BLD AUTO: 0 K/UL
NRBC BLD-RTO: 0 /100 WBC (ref 0–0.2)
PLATELET # BLD AUTO: 204 K/UL (ref 164–446)
PMV BLD AUTO: 11.8 FL (ref 9–12.9)
POTASSIUM SERPL-SCNC: 3 MMOL/L (ref 3.6–5.5)
PROT SERPL-MCNC: 5.8 G/DL (ref 6–8.2)
RBC # BLD AUTO: 4.44 M/UL (ref 4.7–6.1)
SODIUM SERPL-SCNC: 140 MMOL/L (ref 135–145)
VALPROATE SERPL-MCNC: 34.4 UG/ML (ref 50–100)
WBC # BLD AUTO: 6.1 K/UL (ref 4.8–10.8)

## 2024-11-11 PROCEDURE — 700102 HCHG RX REV CODE 250 W/ 637 OVERRIDE(OP): Performed by: NURSE PRACTITIONER

## 2024-11-11 PROCEDURE — 4A10X4Z MONITORING OF CENTRAL NERVOUS ELECTRICAL ACTIVITY, EXTERNAL APPROACH: ICD-10-PCS | Performed by: STUDENT IN AN ORGANIZED HEALTH CARE EDUCATION/TRAINING PROGRAM

## 2024-11-11 PROCEDURE — 770020 HCHG ROOM/CARE - TELE (206)

## 2024-11-11 PROCEDURE — 36415 COLL VENOUS BLD VENIPUNCTURE: CPT

## 2024-11-11 PROCEDURE — 80164 ASSAY DIPROPYLACETIC ACD TOT: CPT

## 2024-11-11 PROCEDURE — A9270 NON-COVERED ITEM OR SERVICE: HCPCS | Performed by: PSYCHIATRY & NEUROLOGY

## 2024-11-11 PROCEDURE — A9270 NON-COVERED ITEM OR SERVICE: HCPCS | Performed by: STUDENT IN AN ORGANIZED HEALTH CARE EDUCATION/TRAINING PROGRAM

## 2024-11-11 PROCEDURE — 700102 HCHG RX REV CODE 250 W/ 637 OVERRIDE(OP): Performed by: STUDENT IN AN ORGANIZED HEALTH CARE EDUCATION/TRAINING PROGRAM

## 2024-11-11 PROCEDURE — 700102 HCHG RX REV CODE 250 W/ 637 OVERRIDE(OP): Performed by: PSYCHIATRY & NEUROLOGY

## 2024-11-11 PROCEDURE — 85025 COMPLETE CBC W/AUTO DIFF WBC: CPT

## 2024-11-11 PROCEDURE — 83735 ASSAY OF MAGNESIUM: CPT

## 2024-11-11 PROCEDURE — 95716 VEEG EA 12-26HR CONT MNTR: CPT | Performed by: STUDENT IN AN ORGANIZED HEALTH CARE EDUCATION/TRAINING PROGRAM

## 2024-11-11 PROCEDURE — 95720 EEG PHY/QHP EA INCR W/VEEG: CPT | Performed by: STUDENT IN AN ORGANIZED HEALTH CARE EDUCATION/TRAINING PROGRAM

## 2024-11-11 PROCEDURE — 95700 EEG CONT REC W/VID EEG TECH: CPT | Performed by: STUDENT IN AN ORGANIZED HEALTH CARE EDUCATION/TRAINING PROGRAM

## 2024-11-11 PROCEDURE — A9270 NON-COVERED ITEM OR SERVICE: HCPCS | Performed by: NURSE PRACTITIONER

## 2024-11-11 PROCEDURE — 99223 1ST HOSP IP/OBS HIGH 75: CPT | Mod: 25 | Performed by: NURSE PRACTITIONER

## 2024-11-11 PROCEDURE — 80053 COMPREHEN METABOLIC PANEL: CPT

## 2024-11-11 PROCEDURE — 82140 ASSAY OF AMMONIA: CPT

## 2024-11-11 RX ORDER — EZETIMIBE 10 MG/1
10 TABLET ORAL DAILY
Status: DISCONTINUED | OUTPATIENT
Start: 2024-11-12 | End: 2024-11-15 | Stop reason: HOSPADM

## 2024-11-11 RX ORDER — POLYETHYLENE GLYCOL 3350 17 G/17G
1 POWDER, FOR SOLUTION ORAL
Status: DISCONTINUED | OUTPATIENT
Start: 2024-11-11 | End: 2024-11-15 | Stop reason: HOSPADM

## 2024-11-11 RX ORDER — AMIODARONE HYDROCHLORIDE 200 MG/1
100 TABLET ORAL DAILY
Status: DISCONTINUED | OUTPATIENT
Start: 2024-11-12 | End: 2024-11-15 | Stop reason: HOSPADM

## 2024-11-11 RX ORDER — AMOXICILLIN 250 MG
2 CAPSULE ORAL EVERY EVENING
Status: DISCONTINUED | OUTPATIENT
Start: 2024-11-11 | End: 2024-11-15 | Stop reason: HOSPADM

## 2024-11-11 RX ORDER — ROSUVASTATIN CALCIUM 20 MG/1
40 TABLET, COATED ORAL EVERY EVENING
Status: DISCONTINUED | OUTPATIENT
Start: 2024-11-11 | End: 2024-11-15 | Stop reason: HOSPADM

## 2024-11-11 RX ORDER — VALPROIC ACID 250 MG/5ML
125 SOLUTION ORAL 3 TIMES DAILY
Status: DISCONTINUED | OUTPATIENT
Start: 2024-11-11 | End: 2024-11-11

## 2024-11-11 RX ORDER — DIVALPROEX SODIUM 125 MG/1
125 TABLET, DELAYED RELEASE ORAL 3 TIMES DAILY
Status: DISCONTINUED | OUTPATIENT
Start: 2024-11-11 | End: 2024-11-11

## 2024-11-11 RX ORDER — VENLAFAXINE HYDROCHLORIDE 75 MG/1
75 CAPSULE, EXTENDED RELEASE ORAL EVERY EVENING
Status: DISCONTINUED | OUTPATIENT
Start: 2024-11-11 | End: 2024-11-11

## 2024-11-11 RX ORDER — VENLAFAXINE HYDROCHLORIDE 75 MG/1
225 CAPSULE, EXTENDED RELEASE ORAL DAILY
Status: DISCONTINUED | OUTPATIENT
Start: 2024-11-11 | End: 2024-11-11

## 2024-11-11 RX ORDER — DOCUSATE SODIUM 100 MG/1
100 CAPSULE, LIQUID FILLED ORAL 2 TIMES DAILY
Status: DISCONTINUED | OUTPATIENT
Start: 2024-11-11 | End: 2024-11-11

## 2024-11-11 RX ORDER — EZETIMIBE 10 MG/1
10 TABLET ORAL DAILY
Status: DISCONTINUED | OUTPATIENT
Start: 2024-11-12 | End: 2024-11-11

## 2024-11-11 RX ORDER — VENLAFAXINE HYDROCHLORIDE 150 MG/1
150 CAPSULE, EXTENDED RELEASE ORAL EVERY EVENING
Status: DISCONTINUED | OUTPATIENT
Start: 2024-11-11 | End: 2024-11-11

## 2024-11-11 RX ORDER — LABETALOL 200 MG/1
100 TABLET, FILM COATED ORAL 2 TIMES DAILY PRN
Status: DISCONTINUED | OUTPATIENT
Start: 2024-11-11 | End: 2024-11-15 | Stop reason: HOSPADM

## 2024-11-11 RX ORDER — TELMISARTAN 80 MG/1
40 TABLET ORAL DAILY
Status: DISCONTINUED | OUTPATIENT
Start: 2024-11-12 | End: 2024-11-15 | Stop reason: HOSPADM

## 2024-11-11 RX ORDER — ONDANSETRON 2 MG/ML
4 INJECTION INTRAMUSCULAR; INTRAVENOUS EVERY 4 HOURS PRN
Status: DISCONTINUED | OUTPATIENT
Start: 2024-11-11 | End: 2024-11-15 | Stop reason: HOSPADM

## 2024-11-11 RX ORDER — METOPROLOL SUCCINATE 25 MG/1
25 TABLET, EXTENDED RELEASE ORAL EVERY EVENING
Status: DISCONTINUED | OUTPATIENT
Start: 2024-11-11 | End: 2024-11-15 | Stop reason: HOSPADM

## 2024-11-11 RX ORDER — LORAZEPAM 2 MG/ML
2 INJECTION INTRAMUSCULAR
Status: DISCONTINUED | OUTPATIENT
Start: 2024-11-11 | End: 2024-11-15 | Stop reason: HOSPADM

## 2024-11-11 RX ORDER — ACETAMINOPHEN 325 MG/1
650 TABLET ORAL EVERY 4 HOURS PRN
Status: DISCONTINUED | OUTPATIENT
Start: 2024-11-11 | End: 2024-11-15 | Stop reason: HOSPADM

## 2024-11-11 RX ORDER — HYDROCODONE BITARTRATE AND ACETAMINOPHEN 10; 325 MG/1; MG/1
1 TABLET ORAL EVERY 6 HOURS PRN
Status: DISCONTINUED | OUTPATIENT
Start: 2024-11-11 | End: 2024-11-11

## 2024-11-11 RX ORDER — HYDROCODONE BITARTRATE AND ACETAMINOPHEN 10; 325 MG/1; MG/1
1 TABLET ORAL SEE ADMIN INSTRUCTIONS
Status: DISCONTINUED | OUTPATIENT
Start: 2024-11-11 | End: 2024-11-11

## 2024-11-11 RX ORDER — LORAZEPAM 2 MG/ML
1 INJECTION INTRAMUSCULAR
Status: DISCONTINUED | OUTPATIENT
Start: 2024-11-11 | End: 2024-11-15 | Stop reason: HOSPADM

## 2024-11-11 RX ORDER — TAMSULOSIN HYDROCHLORIDE 0.4 MG/1
0.4 CAPSULE ORAL DAILY
Status: DISCONTINUED | OUTPATIENT
Start: 2024-11-12 | End: 2024-11-15 | Stop reason: HOSPADM

## 2024-11-11 RX ORDER — DOCUSATE SODIUM 100 MG/1
100 CAPSULE, LIQUID FILLED ORAL 2 TIMES DAILY
Status: DISCONTINUED | OUTPATIENT
Start: 2024-11-11 | End: 2024-11-15 | Stop reason: HOSPADM

## 2024-11-11 RX ORDER — ONDANSETRON 4 MG/1
4 TABLET, ORALLY DISINTEGRATING ORAL EVERY 4 HOURS PRN
Status: DISCONTINUED | OUTPATIENT
Start: 2024-11-11 | End: 2024-11-15 | Stop reason: HOSPADM

## 2024-11-11 RX ORDER — FINASTERIDE 5 MG/1
5 TABLET, FILM COATED ORAL DAILY
Status: DISCONTINUED | OUTPATIENT
Start: 2024-11-12 | End: 2024-11-15 | Stop reason: HOSPADM

## 2024-11-11 RX ORDER — VENLAFAXINE HYDROCHLORIDE 75 MG/1
225 CAPSULE, EXTENDED RELEASE ORAL DAILY
Status: DISCONTINUED | OUTPATIENT
Start: 2024-11-11 | End: 2024-11-15 | Stop reason: HOSPADM

## 2024-11-11 RX ORDER — HYDROCODONE BITARTRATE AND ACETAMINOPHEN 10; 325 MG/1; MG/1
1 TABLET ORAL 4 TIMES DAILY
Status: DISCONTINUED | OUTPATIENT
Start: 2024-11-11 | End: 2024-11-15 | Stop reason: HOSPADM

## 2024-11-11 RX ADMIN — TIZANIDINE 4 MG: 4 TABLET ORAL at 12:57

## 2024-11-11 RX ADMIN — TIZANIDINE 4 MG: 4 TABLET ORAL at 20:14

## 2024-11-11 RX ADMIN — OMEPRAZOLE 40 MG: 20 CAPSULE, DELAYED RELEASE ORAL at 20:14

## 2024-11-11 RX ADMIN — HYDROCODONE BITARTRATE AND ACETAMINOPHEN 1 TABLET: 10; 325 TABLET ORAL at 20:13

## 2024-11-11 RX ADMIN — HYDROCODONE BITARTRATE AND ACETAMINOPHEN 1 TABLET: 10; 325 TABLET ORAL at 15:00

## 2024-11-11 RX ADMIN — APIXABAN 5 MG: 5 TABLET, FILM COATED ORAL at 20:26

## 2024-11-11 RX ADMIN — ROSUVASTATIN CALCIUM 40 MG: 20 TABLET, FILM COATED ORAL at 20:13

## 2024-11-11 RX ADMIN — SENNOSIDES AND DOCUSATE SODIUM 2 TABLET: 50; 8.6 TABLET ORAL at 20:14

## 2024-11-11 RX ADMIN — TIZANIDINE 4 MG: 4 TABLET ORAL at 17:19

## 2024-11-11 RX ADMIN — METOPROLOL SUCCINATE 25 MG: 25 TABLET, EXTENDED RELEASE ORAL at 20:14

## 2024-11-11 RX ADMIN — VENLAFAXINE HYDROCHLORIDE 225 MG: 75 CAPSULE, EXTENDED RELEASE ORAL at 20:12

## 2024-11-11 RX ADMIN — DOCUSATE SODIUM 100 MG: 100 CAPSULE, LIQUID FILLED ORAL at 20:14

## 2024-11-11 RX ADMIN — VALPROIC ACID 125 MG: 250 SOLUTION ORAL at 12:58

## 2024-11-11 ASSESSMENT — PAIN DESCRIPTION - PAIN TYPE
TYPE: ACUTE PAIN
TYPE: CHRONIC PAIN
TYPE: CHRONIC PAIN

## 2024-11-11 ASSESSMENT — VISUAL ACUITY: VA_NORMAL: 1

## 2024-11-11 ASSESSMENT — FIBROSIS 4 INDEX: FIB4 SCORE: 1.04

## 2024-11-11 NOTE — H&P
"NEUROLOGY EMU H&P 11/11/2024     REFERRING PROVIDER: Dr Daniels    REASON FOR ADMISSION: Reason for EMU Admission: Characterization of paroxysmal events, Medication titration/optimization, Determine degree of epileptic burden, if any, Determine presence of unrecognized and/or elecrographic seizures, and Control seizure     HISTORY OF PRESENT ILLNESS: Karan Wiley is a 64 y.o. male who presents to the EMU for evaluation.    RELEVANT PMHx:     Per Dr Raman Daniels Outpatient Consult 9/18/2024 which is determined to be accurate per patient verbalization:      The patient is a 64 y.o., right-handed male, who was in his usual health, in context of his known medical history, when he fell and hit his head in August 2022.  This led to transfer to [facility] due to acute left subdural hematoma.  He was treated with bur hole and then craniotomy drainage.  It seems that the patient had no seizures at that time, and Depakote was started prophylactically.  This occurred while he was on Eliquis for A-fibrillation.     Then, in July 2024, he started having episodes suspicious for seizures versus syncope, among other considerations.  These are described under event type #1.       He never had myoclonus, clear convulsions, no isolated staring spells/oral/manual automatisms times, sensations of strange smell/taste/gastric uprising, no clear psychic phenomena.     He is taking low dose Depakote regularly, and except significant weight gain and tremor, no other adverse effects.    Semiology:     Event type #1: \"Seizure\".  Year/Age of Onset: July 2024  Initial features: Feel \"aura\" - described as dizziness, lightheadedness, and like he is going to pass out. Aware while falling.   Event features: Falls back (typically on the bed - never fell on the floor). Eyes are open. Unresponsive. He experiences brief upper body jerking. No tongue bite nor bladder/bowel incontinence.   Post-ictal features: At times some confusion. "   Duration: 4-5 seconds.   Frequency: Initially couple times per week.   Precipitating factors: Typically when getting up too quickly. Walking out of the bathroom or down the hallway for a short distance.  The spell will dissipate if he sits down.     LAST KNOWN SPELL: 11/2/2024 10am Saturday-- he was ambulating through the hallway and felt dizziness.    History of status epilepticus: no  History of physical injury related to seizures: no  History of surgery related to epilepsy: no  Family Planning: N/A  Current Driving Status: He is not driving. [place].  Would like to resume some driving.  Seizure Clusters: Not clear.   Longest Seizure Freedom: Not clear.       VNS: None    Psychiatric History: occasionally frustrated with home life and marital discontent-- frustrated sexually.  Historically enjoys firearm practice.    Current Psychiatric Comorbidites: weight gain of 90 pounds in 1 year and now with a loss of similar proportion.    Sleep: Insomnia is moderate, sleeps soundly.    History of Sleep Apnea: denies snoring, wife reports snoring.  Alcohol use: No  Tobacco Use:No  Marijuana Use: No      CARDIOLOGY:    Status post right knee replacement in July 2024, with subsequent neurological complications/confusion, pneumonia and AFib.     AFib with RVR, now in sinus rhythm on Amiodarone 100mg and Toprol XL 25mg QHS. Doses are clarified with patient. ZioPatch to be obtained and 14 day monitoring to be completed on 11/13/2024.    Chronic anticoagulation with Eliquis 5mg BID.  CAD, status post PCI/NAOMY to the RCA in 2020. No angina or shortness of breath.     Continue:  ASA 81mg once daily-- NOW ELEQUIS 5mg BID  Toprol XL 25mg QHS  Telmisartan 40mg in the AM  Zetia 10mg once daily  Crestor 40mg once daily    5. Hypertension, treated with Toprol and Telmisartan, stable. BP is good today.  6. Hyperlipidemia, treated with Zetia and Crestor. To repeat fasting lipid panel.  7. Diabetes mellitus, managed with diet for  "now.       COMPLETE HOME MED LIST:     Current Facility-Administered Medications:     LORazepam **OR** LORazepam    apixaban    acetaminophen    ondansetron **OR** ondansetron    senna-docusate **AND** polyethylene glycol/lytes    [START ON 11/12/2024] amiodarone    divalproex    docusate sodium    [START ON 11/12/2024] ezetimibe    [START ON 11/12/2024] finasteride    venlafaxine XR    venlafaxine     MEDICATION ALLERGIES:  Allergies   Allergen Reactions    Oxycodone Anaphylaxis and Swelling     Throat swelling    Pectin Anaphylaxis and Swelling     Throat swelling, cannot breathe    Hctz [Hydrochlorothiazide W-Spironolactone]      hypotension    Zolpidem Unspecified     \"Sleep walking\", excessive eating, drives/cooks during the night       REVIEW OF SYSTEMS:   ROS negative except that which is mentioned above    PAST MEDICAL HISTORY:   Past Medical History:   Diagnosis Date    Anesthesia     \"Difficulty urinated after anesthesia\"    Breath shortness     Albuterol inhalers as needed    CAD (coronary artery disease) 08/2020    STEMI. PCI/NAOMY (Raghav 3.5 x 25mm) the mid RCA, and PCI/NAOMY (Raghav 2.5 x 12mm) the PDA.    Chronic anticoagulation     Takes eliquis BID    Dental disorder     Dentures - upper dentures    Depression     History of heart artery stent     Hyperlipidemia     Hypertension     Low back pain     Myocardial infarct (HCC)     Caryn Mckinney - Cardiologist    Paroxysmal atrial fibrillation (HCC) 10/2023    Echocardiogram with normal LV size, LVEF 55-60%. Normal RA, LA and RV. No valvular problems.    S/P drug eluting coronary stent placement     Subdural hematoma (HCC)     on Depakote    Type 2 diabetes mellitus (HCC)      PAST SURGICAL HISTORY:   Past Surgical History:   Procedure Laterality Date    ARTHROPLASTY, KNEE, ROBOT-ASSISTED Right 7/23/2024    Procedure: ROBOTIC RIGHT TOTAL KNEE ARTHROPLASTY;  Surgeon: Noe Barragan M.D.;  Location: SURGERY [facility];  Service: Ortho Robotic    CRANIOTOMY Left " 2022    Procedure: CRANIOTOMY FOR SUBDURAL HEMATOMA;  Surgeon: Tesfaye Luther M.D.;  Location: SURGERY [facility];  Service: Neurosurgery    SPINAL CORD STIMULATOR  2019    Procedure: SPINAL CORD STIMULATOR-  STAGE 1:  RIGHT FLANK BATTERY REPLACEMENT/UPGRADE;  Surgeon: Stepan Hawkins M.D.;  Location: SURGERY [place] [facility];  Service: Neurosurgery    FUSION, SPINE, LUMBAR, PLIF  2019    Procedure: LUMBAR FUSION POSTERIOR-  STAGE 2: ONLAY L5-S1 BONE;  Surgeon: Stepan Hawkins M.D.;  Location: SURGERY [place] [facility];  Service: Neurosurgery    LAMINOTOMY  2019    Procedure: LAMINOTOMY-  STAGE 2:  REDO LEFT L4-S1;  Surgeon: Stepan Hawkins M.D.;  Location: SURGERY [place] [facility];  Service: Neurosurgery     FAMILY HISTORY:   Family History   Problem Relation Age of Onset    Cancer Mother         breast     SOCIAL HISTORY:   Social History     Socioeconomic History    Marital status:      Spouse name: Not on file    Number of children: Not on file    Years of education: Not on file    Highest education level: Not on file   Occupational History    Not on file   Tobacco Use    Smoking status: Former     Current packs/day: 0.00     Average packs/day: 0.3 packs/day for 3.0 years (0.8 ttl pk-yrs)     Types: Cigarettes     Start date:      Quit date:      Years since quittin.8     Passive exposure: Past    Smokeless tobacco: Never   Vaping Use    Vaping status: Never Used   Substance and Sexual Activity    Alcohol use: No    Drug use: Never    Sexual activity: Not Currently     Partners: Female   Other Topics Concern    Not on file   Social History Narrative    Not on file     Social Drivers of Health     Financial Resource Strain: Not on file   Food Insecurity: No Food Insecurity (2024)    Hunger Vital Sign     Worried About Running Out of Food in the Last Year: Never true     Ran Out of Food in the Last Year: Never true   Transportation Needs: No Transportation Needs (2024)     PRAPARE - Transportation     Lack of Transportation (Medical): No     Lack of Transportation (Non-Medical): No   Physical Activity: Not on file   Stress: Not on file   Social Connections: Not on file   Intimate Partner Violence: Not At Risk (7/25/2024)    Humiliation, Afraid, Rape, and Kick questionnaire     Fear of Current or Ex-Partner: No     Emotionally Abused: No     Physically Abused: No     Sexually Abused: No   Housing Stability: Low Risk  (7/25/2024)    Housing Stability Vital Sign     Unable to Pay for Housing in the Last Year: No     Number of Places Lived in the Last Year: 1     Unstable Housing in the Last Year: No       PRIOR WORK-UP TO DATE:   Latest Reference Range & Units 09/19/23 12:13   Valproic Acid 50.0 - 100.0 ug/mL 35.2 (L)        Latest Reference Range & Units 08/07/24 01:42   WBC 4.8 - 10.8 K/uL 7.2   RBC 4.70 - 6.10 M/uL 3.58 (L)   Hemoglobin 14.0 - 18.0 g/dL 11.1 (L)   Hematocrit 42.0 - 52.0 % 34.8 (L)   MCV 81.4 - 97.8 fL 97.2   MCH 27.0 - 33.0 pg 31.0   MCHC 32.3 - 36.5 g/dL 31.9 (L)   RDW 35.9 - 50.0 fL 51.2 (H)   Platelet Count 164 - 446 K/uL 425   MPV 9.0 - 12.9 fL 11.5   Neutrophils-Polys 44.00 - 72.00 % 69.50   Neutrophils (Absolute) 1.82 - 7.42 K/uL 5.04   Lymphocytes 22.00 - 41.00 % 18.40 (L)   Lymphs (Absolute) 1.00 - 4.80 K/uL 1.33   Monocytes 0.00 - 13.40 % 8.30   Monos (Absolute) 0.00 - 0.85 K/uL 0.60   Eosinophils 0.00 - 6.90 % 2.50   Eos (Absolute) 0.00 - 0.51 K/uL 0.18   Basophils 0.00 - 1.80 % 0.70   Baso (Absolute) 0.00 - 0.12 K/uL 0.05   Immature Granulocytes 0.00 - 0.90 % 0.60   Immature Granulocytes (abs) 0.00 - 0.11 K/uL 0.04   Nucleated RBC 0.00 - 0.20 /100 WBC 0.30 (H)   NRBC (Absolute) K/uL 0.02      Latest Reference Range & Units 08/07/24 01:42   Sodium 135 - 145 mmol/L 137   Potassium 3.6 - 5.5 mmol/L 3.3 (L)   Chloride 96 - 112 mmol/L 99   Co2 20 - 33 mmol/L 26   Anion Gap 7.0 - 16.0  12.0   Glucose 65 - 99 mg/dL 148 (H)   Bun 8 - 22 mg/dL 8   Creatinine 0.50  "- 1.40 mg/dL 0.91   GFR (CKD-EPI) >60 mL/min/1.73 m 2 94   Calcium 8.4 - 10.2 mg/dL 9.5          Latest Reference Range & Units 07/25/24 19:31   Stat C-Reactive Protein 0.00 - 0.75 mg/dL 10.72 (H)       MRI brain studies:              - MRI brain - none available for my review.      CT head studies:              - CT head wo contrast (08/25/2022 at [facility]): \"Stable LEFT hemispheric acute subdural hematoma with associated mass effect and 8 mm LEFT to RIGHT midline shift.  No significant change from prior.\"      EEG studies:              - Standard EEG (08/31/2022, Dr. Blanca): This is an abnormal video EEG recording in the obtunded state.   1)The diffuse background slowing is consistent with moderate encephalopathy.   2)The presence of intermittent left hemisphere slowing is suggestive of focal neuronal dysfunction.   3) Upward gaze and sometime left gaze deviation were noted, no ictal EEG correlate. No seizure seen.   4) No epileptiform discharges or seizures were seen. This does not preclude a diagnosis of epilepsy.                 - Standard EEG (03/28/2023, Dr. Hooper):  This was an abnormal EEG during wakefulness and drowsiness due to:  Very mild diffuse slowing of the background, suggestive of diffuse and/or multifocal cerebral dysfunction.  This finding might be seen in a myriad of encephalopathies and in itself is a nonspecific finding.  The presence of continuous, left hemispheric, maximal over the left temporal region, focal slowing, is suggestive of additional cerebral dysfunction in that region.  This finding might be seen in context of structural lesion, among other considerations.  Clinical and radiological correlation is recommended.  The presence of embedded sharply contoured waveforms in the above described left-sided focal slowing might be suggestive of increased propensity toward focal seizures arising from this region.  The presence of higher amplitudes and overriding faster frequencies over the " left side is suggestive of breach rhythm, and is consistent with the provided surgical history.  There were no electrographic seizures captured.  Single lead EKG showed occasional PVCs - please correlate clinically.                  - Standard EEG (08/17/2023, Dr. Martínez):  This was an abnormal EEG during wakefulness and drowsiness due to:  Very mild diffuse slowing of the background, suggestive of diffuse and/or multifocal cerebral dysfunction.  This finding might be seen in a myriad of encephalopathies and in itself is a nonspecific finding.  The presence of continuous, left hemispheric, maximal over the left temporal region, focal slowing, is suggestive of additional cerebral dysfunction in that region.  This finding might be seen in context of structural lesion, among other considerations.  Clinical and radiological correlation is recommended.  The presence of embedded sharply contoured waveforms in the above described left-sided focal slowing might be suggestive of increased propensity toward focal seizures arising from this region.  The presence of higher amplitudes and overriding faster frequencies over the left side is suggestive of breach rhythm, and is consistent with the provided surgical history.  There were no electrographic seizures captured.  Single lead EKG showed occasional PVCs - please correlate clinically.          PHYSICAL EXAM:   Wt 104 kg (228 lb 2.8 oz)     Physical Exam  Constitutional:       Appearance: He is well-developed.   HENT:      Head: Normocephalic and atraumatic.      Comments: Bilateral hearing aids  Reading glasses     Nose: Nose normal.   Eyes:      General: Lids are normal.      Extraocular Movements: Extraocular movements intact.      Pupils: Pupils are equal, round, and reactive to light.      Comments: No double vision or loss of vision.   Cardiovascular:      Rate and Rhythm: Normal rate and regular rhythm.      Heart sounds: Normal heart sounds.      Comments: No recent  chest pain.  Pulmonary:      Effort: Pulmonary effort is normal.      Breath sounds: Normal breath sounds.      Comments: Mild cough  Oral mucosa dryness  Abdominal:      General: There is distension (mild).      Palpations: Abdomen is soft.      Tenderness: There is no abdominal tenderness.   Genitourinary:     Comments: No recent infections.  Musculoskeletal:      Cervical back: Normal range of motion and neck supple.      Comments: Bilateral lower extremity weakness   Lymphadenopathy:      Cervical: No cervical adenopathy.   Skin:     General: Skin is warm and dry.      Coloration: Skin is pale.   Neurological:      Mental Status: He is oriented to person, place, and time.      Cranial Nerves: No cranial nerve deficit.      Motor: Weakness present.      Gait: Gait abnormal (ambulates with walker).      Deep Tendon Reflexes:      Reflex Scores:       Patellar reflexes are 0 on the right side and 0 on the left side.  Psychiatric:         Mood and Affect: Mood normal. Mood is not anxious or depressed.         Speech: Speech normal.         Behavior: Behavior normal.        NEUROLOGICAL EXAM:   Neurological Exam  Mental Status  Awake, alert and oriented to person, place and time. Oriented to person, place, time and situation. Oriented to person, place, and time. Recent and remote memory are intact. Speech is normal. Language is fluent with no aphasia. Attention and concentration are normal.    Cranial Nerves  CN II: Vision test: No double vision or loss of vision.. Visual acuity is normal. Visual fields full to confrontation.  CN III, IV, VI: Extraocular movements intact bilaterally. Normal lids and orbits bilaterally. Pupils equal round and reactive to light bilaterally.  CN V: Facial sensation is normal.  CN VII: Full and symmetric facial movement.  CN VIII: Hearing is normal.  Right: L<R hearing.  CN IX, X: Palate elevates symmetrically. Normal gag reflex.  CN XI: Shoulder shrug strength is normal.  CN XII: Tongue  midline without atrophy or fasciculations.    Motor  Decreased muscle bulk throughout. Decreased muscle tone. The following abnormal movements were seen:  Generalized weakness in upper extremities.    Bilateral upper extremity tremor, at rest, worsens with pincer grasp.  4th and 5th digits and lateral aspect of hand is worse.    Strength: RUE 5-/5, LUE 5+/5, LLE/RLE 5-/5  .    Sensory  Sensation is intact to light touch, pinprick, vibration and proprioception in all four extremities.  + 4-5th Right digits with paresthesias.    Reflexes                                            Right                      Left  Brachioradialis                    Tr                         Tr  Biceps                                 Tr                         Tr  Triceps                                Tr                         Tr  Patellar                                0                         0  Achilles                                Tr                         Tr  Jaw jerk absent.    Coordination  Right: Finger-to-nose abnormality:Left: Finger-to-nose abnormality:  Mild bilateral tremor.    Gait   Abnormal gait (ambulates with walker).Casual gait:  Uses walker for ambulation.       Assessment & Plan  , AND EDUCATION/COUNSELING  This is a 64 y.o. male who presents to the EMU to characterize and localize epileptic activity, identify the degree of epileptic burden, if any, evaluate for unrecognized seizures, and to optimize medications.     I had an extensive discussion with the patient about the purpose of the admission. Discussed the purpose of provocative maneuvers such as photic stimulation, hyperventilation, and/or sleep deprivation which may unmask epileptic activity. We discussed that it may be necessary to decrease, or discontinue altogether, any and all anti-seizure medications to achieve the goals of the admission. I explained that this places patient at higher risk for seizures and status epilepticus, a serious  life-threatening emergency with the potential for serious injury or death. It is therefore critical to the patient's safety that he/she monitored in the epilepsy monitoring unit for multiple days to mitigate the risk for serious injury or death. Discussed that the EMU and its personnel mitigate risk as there are rescue medications on hand if needed for any prolonged or repetitive seizures; trained nursing staff, EEG technicians, and a board certified epileptologist available 24/7; and continuous EEG and video surveillance being monitoring live by trained personnel at all times.      Discussed the importance of maintaining seizure precautions at all times. Discussed the importance of notifying nursing staff by using the call button for any concerns or needs, especially to assist with ambulation or transtions into and out of bed. Emphasized that the patient is at a higher for falling and potential subsequent injury due to the cumbersome equipment being worn, as well as other potential factors that may impair balance. It is therefore critical that the patient refrain from getting out of bed or walking without staff assistance.     Discussed the importance and rationale for DVT prophylaxis as well.     Finally, counseled the patient on using the push-button should she/he have any events concerning for seizure. Encourage visitors to press the button as well if a seizure or aura is witnessed and the patient is unable to press the button herself/himself.     Patient agreed to the above discussion. All questions/concerns were addressed.    PLAN:      Mr Karan Wiley is a 64 y.o., right-handed male, who in his usual health, fell and hit his head in August 2022.  He sustained an acute left subdural hematoma and was treated with bur hole and then craniotomy drainage.  He was started on depakote prophylactically in the setting of diagnosis of a-fib managed with Eliquis 5mg BID.    Since July 2024, he has had paroxysmal events  "concerning for seizure versus syncope and has fallen with the events.  The patient endorses that most events have occurred while ambulating for a short distance with the last occurring on November 2, 2024 at 10am in which he did not have loss of consciousness.       The patient underwent Head CT in 2022 with stable LEFT hemispheric acute subdural hematoma with 8mm shift.  There is not a MRI to review.  He currently is being monitored with Zio-Patch for a 14 day course with removal on November 13, 2024.        Clinical presentation is consistent with seizure versus syncopal event verus hypotension or hypertension variances.  He may also be experiencing peak dose side effects.  Endorses deconditioning and RUE hemiparesis which is mild as well as requiring a walker for ambulation due to feeling weak and fearful of falling.  He endorses use of oxygen in the past but not currently and suspects sleep apnea.     Other chronic conditions being monitored include status post right knee replacement in July 2024, with subsequent neurological complications/confusion, pneumonia and AFib. He is managed per cardiologist with ZioPatch to be obtained and 14 day monitoring to be completed on 11/13/2024.  He continues with chronic anticoagulation with Eliquis as well as CAD, status post PCI/NAOMY to the RCA in 2020. No angina or shortness of breath. Hypertension and hyperlipidemia has been well controlled as well as Type 2 DM.       We have discussed the EMU elective admission and close monitoring of spells with the goal of characterization of spells and to optimize anti-seizure medication.  Ideally, the depakote monotherapy will be transitioned to a different ASM to optimize quality of life.       Events in questions to capture    Event type #1: \"Seizure\".  Year/Age of Onset: July 2024  Initial features: Feel \"aura\" - described as dizziness, lightheadedness, and like he is going to pass out. Aware while falling.   Event features: Falls " back (typically on the bed - never fell on the floor). Eyes are open. Unresponsive. He experiences brief upper body jerking. No tongue bite nor bladder/bowel incontinence.   Post-ictal features: At times some confusion.   Duration: 4-5 seconds.   Frequency: Initially couple times per week.   Precipitating factors: Typically when getting up too quickly.        Continuous VEEG monitoring  Vitals and neurological checks as ordered  Telemetry  Routine Admission labs: CBC, CMP, antiseizure drug levels: YES  Other diagnostics if applicable: NA  HV and PS to be performed on Day 1 of admission and at the discretion of the attending epileptologist on subsequent days  Rescue Ativan Protocol ordered  Sleep deprivation: YES Day 1  Active antiseizure regimen:  Lorazepam 1mg PIV     DAY 1: Depakote 250mg TID      Complex Cardiology Management:  AFib-- with ZioPatch to be obtained and 14 day monitoring to be completed on 11/13/2024.    Chronic anticoagulation   CAD, status post PCI/NAOMY to the RCA in 2020  Hypertension   Hyperlipidemia  Type 2 DM        Continue Meds as Prescribed:  ELEQUIS 5mg BID  Toprol XL 25mg QHS  Telmisartan 40mg in the AM  Zetia 10mg once daily  Crestor 40mg once daily    Chronic Lower Back Pain:  Baseline pain of 7/10 with Norco and Tizanadine QID    Imbalance and Mobility Compromised:  Stable with last fall 1-3 months ago.  Using a walker with assist.  Deconditioned with lower extremity weakness    Tremor:  Bilateral upper extremity tremor worsens with sustention or fine motor however persistent at rest-- probable physiologic tremor.  Consider peak dose side effect of Depakote        OTHER ITEMS:  DVT Ppx: Lovenox and/or SCDs  DIET: Regular  Bowel regimen  PRN analgesics available for pain  PRN antiemetics available    CODE STATUS:   FULL CODE    BILLING DOCUMENTATION:     I spent a total of 75+ minutes of face-to-face time in this visit. Over 50% of the time of the visit today was spent on counseling  and/or coordination of care wtih the patient and/or family, as above in assessment in plan.      Zelda Mclaughlin, MSN, FNP-BC, APRN  Department of Neurology at [facility]  Phone: [phone]  Fax: [phone]  E-mail: [email or web address]

## 2024-11-11 NOTE — PROGRESS NOTES
4 Eyes Skin Assessment Completed by CAYETANO Vasquez and CAYETANO Cortez.    Head WDL  Ears WDL  Nose WDL  Mouth WDL  Neck old trach site  Breast/Chest Scar  Shoulder Blades WDL  Spine scarring down spine and scar on right lower back   (R) Arm/Elbow/Hand Scar  (L) Arm/Elbow/Hand Scar  Abdomen Scar  Groin WDL  Scrotum/Coccyx/Buttocks Redness and Blanching  (R) Leg Scar  (L) Leg Scar  (R) Heel/Foot/Toe WDL  (L) Heel/Foot/Toe WDL          Devices In Places ECG, Tele Box, Blood Pressure Cuff, Pulse Ox, and SCD's      Interventions In Place Pillows    Possible Skin Injury No    Pictures Uploaded Into Epic N/A  Wound Consult Placed N/A  RN Wound Prevention Protocol Ordered No

## 2024-11-11 NOTE — PROCEDURES
VIDEO ELECTROENCEPHALOGRAM REPORT - EPILEPSY MONITORING UNIT (EMU)     REFERRING PROVIDER: Dr. Hooper  DOS: 11/11/2024   ROOM: [place] [address]   TOTAL RECORDING TIME: 22 hours and 0 minutes of total recording time    INDICATION:  Karan Wiley 64 y.o. male presenting with seizure(s) and altered mental status    RELEVANT MEDICATIONS/TREATMENTS:   Valproic acid 125 mg once in AM of 11/11 then stopped    TECHNIQUE:   CVEEG was set up by a Neurodiagnostic technologist who performed education to the patient and staff. A minimum of 23 electrodes and 23 channel recording was setup and performed by Neurodiagnostic technologist, in accordance with the international 10-20 system. Impedence, electrode integrity, and technical impressions were documented a minimum of every 2-24 hour period by a Neurodiagnostic Technologist and reviewed by Interpreting Physician. The study was reviewed in bipolar and referential montages. The recording examined the patient in the awake, drowsy, and sleep state(s).     DESCRIPTION OF THE RECORD:  During wakefulness, the background was continuous and showed a 8-9 Hz posterior dominant rhythm, with a mixture of mostly excess theta>delta, alpha, and overriding faster frequencies.  There was reactivity to eye closure/opening.  A normal anterior-posterior gradient was noted with faster beta frequencies seen anteriorly.  During drowsiness, theta/delta frequencies were seen.    Sleep was captured and was characterized by diffuse background delta/theta activity with a loss of myogenic artifact.  N2 sleep transients in the form of sleep spindles and vertex waves were seen in the leads over the central regions.     ICTAL AND INTERICTAL FINDINGS:   No focal or generalized epileptiform activity noted.     Intermittent bitemporal synchronous as well independent left>right theta>delta slowing.     No seizures    ACTIVATION PROCEDURES:   Hyperventilation was performed by the patient for a total of 3 minutes.  The technician performing the test noted good effort. This technique did not elicited any abnormalities on EEG.  and Intermittent Photic stimulation was performed in a stepwise fashion from 1 to 30 Hz and did not elicited any abnormalities on EEG.     EKG: Sampling of the EKG recording showed sinus rhythm was rare/occasional PACs and/or PVCs    EVENTS:    No clinical events were captured or reported.    INTERPRETATION:  Abnormal, technically-limited video EEG recording in the awake, drowsy, and sleep state(s):  -Mild background slowing suggestive of diffuse/multifocal cerebral dysfunction and consistent with a non-specific encephalopathy. Clinical correlation recommended.   -No seizures.   -Intermittent bitemporal synchronous as well independent left>right slowing suggestive of nonspecific focal dysfunction in these regions. Clinical and radiographic correaltion recommended.  -No definitive epileptiform discharges were seen.  -No clinical events      Braden Saunders MD  Department of Neurology at [facility]  General Neurologist and Epileptologist  Director of [facility]'s Level III Comprehensive Epilepsy Program  Professor of Clinical Neurology, [facility].   Phone: [phone]  Fax: [phone]  E-mail: [email or web address]

## 2024-11-12 PROCEDURE — 95716 VEEG EA 12-26HR CONT MNTR: CPT | Performed by: STUDENT IN AN ORGANIZED HEALTH CARE EDUCATION/TRAINING PROGRAM

## 2024-11-12 PROCEDURE — 95720 EEG PHY/QHP EA INCR W/VEEG: CPT | Performed by: STUDENT IN AN ORGANIZED HEALTH CARE EDUCATION/TRAINING PROGRAM

## 2024-11-12 PROCEDURE — A9270 NON-COVERED ITEM OR SERVICE: HCPCS | Performed by: STUDENT IN AN ORGANIZED HEALTH CARE EDUCATION/TRAINING PROGRAM

## 2024-11-12 PROCEDURE — A9270 NON-COVERED ITEM OR SERVICE: HCPCS | Performed by: NURSE PRACTITIONER

## 2024-11-12 PROCEDURE — 700102 HCHG RX REV CODE 250 W/ 637 OVERRIDE(OP): Performed by: STUDENT IN AN ORGANIZED HEALTH CARE EDUCATION/TRAINING PROGRAM

## 2024-11-12 PROCEDURE — 99232 SBSQ HOSP IP/OBS MODERATE 35: CPT | Mod: 25 | Performed by: NURSE PRACTITIONER

## 2024-11-12 PROCEDURE — 770020 HCHG ROOM/CARE - TELE (206)

## 2024-11-12 PROCEDURE — 700102 HCHG RX REV CODE 250 W/ 637 OVERRIDE(OP): Performed by: NURSE PRACTITIONER

## 2024-11-12 RX ORDER — TRAZODONE HYDROCHLORIDE 100 MG/1
150 TABLET ORAL
Status: COMPLETED | OUTPATIENT
Start: 2024-11-12 | End: 2024-11-14

## 2024-11-12 RX ADMIN — HYDROCODONE BITARTRATE AND ACETAMINOPHEN 1 TABLET: 10; 325 TABLET ORAL at 19:43

## 2024-11-12 RX ADMIN — APIXABAN 5 MG: 5 TABLET, FILM COATED ORAL at 19:42

## 2024-11-12 RX ADMIN — METOPROLOL SUCCINATE 25 MG: 25 TABLET, EXTENDED RELEASE ORAL at 19:43

## 2024-11-12 RX ADMIN — HYDROCODONE BITARTRATE AND ACETAMINOPHEN 1 TABLET: 10; 325 TABLET ORAL at 10:14

## 2024-11-12 RX ADMIN — TIZANIDINE 4 MG: 4 TABLET ORAL at 12:45

## 2024-11-12 RX ADMIN — EZETIMIBE 10 MG: 10 TABLET ORAL at 05:14

## 2024-11-12 RX ADMIN — TIZANIDINE 4 MG: 4 TABLET ORAL at 19:42

## 2024-11-12 RX ADMIN — OMEPRAZOLE 40 MG: 20 CAPSULE, DELAYED RELEASE ORAL at 19:42

## 2024-11-12 RX ADMIN — HYDROCODONE BITARTRATE AND ACETAMINOPHEN 1 TABLET: 10; 325 TABLET ORAL at 05:13

## 2024-11-12 RX ADMIN — AMIODARONE HYDROCHLORIDE 100 MG: 200 TABLET ORAL at 05:14

## 2024-11-12 RX ADMIN — TELMISARTAN 40 MG: 80 TABLET ORAL at 05:14

## 2024-11-12 RX ADMIN — FINASTERIDE 5 MG: 5 TABLET, FILM COATED ORAL at 05:14

## 2024-11-12 RX ADMIN — HYDROCODONE BITARTRATE AND ACETAMINOPHEN 1 TABLET: 10; 325 TABLET ORAL at 14:58

## 2024-11-12 RX ADMIN — APIXABAN 5 MG: 5 TABLET, FILM COATED ORAL at 05:14

## 2024-11-12 RX ADMIN — VENLAFAXINE HYDROCHLORIDE 225 MG: 75 CAPSULE, EXTENDED RELEASE ORAL at 19:42

## 2024-11-12 RX ADMIN — TAMSULOSIN HYDROCHLORIDE 0.4 MG: 0.4 CAPSULE ORAL at 05:14

## 2024-11-12 RX ADMIN — DOCUSATE SODIUM 100 MG: 100 CAPSULE, LIQUID FILLED ORAL at 05:14

## 2024-11-12 RX ADMIN — TIZANIDINE 4 MG: 4 TABLET ORAL at 17:15

## 2024-11-12 RX ADMIN — ROSUVASTATIN CALCIUM 40 MG: 20 TABLET, FILM COATED ORAL at 19:42

## 2024-11-12 RX ADMIN — TIZANIDINE 4 MG: 4 TABLET ORAL at 05:14

## 2024-11-12 ASSESSMENT — COGNITIVE AND FUNCTIONAL STATUS - GENERAL
DAILY ACTIVITIY SCORE: 24
CLIMB 3 TO 5 STEPS WITH RAILING: A LITTLE
SUGGESTED CMS G CODE MODIFIER MOBILITY: CJ
SUGGESTED CMS G CODE MODIFIER DAILY ACTIVITY: CH
MOBILITY SCORE: 22
WALKING IN HOSPITAL ROOM: A LITTLE

## 2024-11-12 ASSESSMENT — PAIN DESCRIPTION - PAIN TYPE
TYPE: CHRONIC PAIN
TYPE: CHRONIC PAIN
TYPE: ACUTE PAIN
TYPE: CHRONIC PAIN

## 2024-11-12 ASSESSMENT — VISUAL ACUITY: VA_NORMAL: 1

## 2024-11-12 ASSESSMENT — FIBROSIS 4 INDEX: FIB4 SCORE: 2.015810522715878545

## 2024-11-12 NOTE — CARE PLAN
The patient is Stable - Low risk of patient condition declining or worsening    Shift Goals  Clinical Goals: Monitor for seizure like activity  Patient Goals: Comfort  Family Goals: FELIPA    Progress made toward(s) clinical / shift goals:  No seizure like activity noted. Up to bathroom [id number] with FWW.     Patient is not progressing towards the following goals:

## 2024-11-12 NOTE — PROCEDURES
Beata Maldonado MA, RDN, CDN, [facility]  [phone] (office number) VIDEO ELECTROENCEPHALOGRAM REPORT - EPILEPSY MONITORING UNIT (EMU)     REFERRING PROVIDER: Dr. Hooper  DOS: 11/12/2024   ROOM: [facility]/   TOTAL RECORDING TIME: 23 hours and 43 minutes of total recording time    INDICATION:  Karan Wiley 64 y.o. male presenting with seizure(s) and altered mental status    RELEVANT MEDICATIONS/TREATMENTS:   No AEDs    TECHNIQUE:   CVEEG was set up by a Neurodiagnostic technologist who performed education to the patient and staff. A minimum of 23 electrodes and 23 channel recording was setup and performed by Neurodiagnostic technologist, in accordance with the international 10-20 system. Impedence, electrode integrity, and technical impressions were documented a minimum of every 2-24 hour period by a Neurodiagnostic Technologist and reviewed by Interpreting Physician. The study was reviewed in bipolar and referential montages. The recording examined the patient in the awake, drowsy, and sleep state(s).     DESCRIPTION OF THE RECORD:  During wakefulness, the background was continuous and showed a 8-9 Hz posterior dominant rhythm, with a mixture of mostly excess theta>delta, alpha, and overriding faster frequencies.  There was reactivity to eye closure/opening.  A normal anterior-posterior gradient was noted with faster beta frequencies seen anteriorly.  During drowsiness, theta/delta frequencies were seen.    Sleep was captured and was characterized by diffuse background delta/theta activity with a loss of myogenic artifact.  N2 sleep transients in the form of sleep spindles and vertex waves were seen in the leads over the central regions.     ICTAL AND INTERICTAL FINDINGS:   No focal or generalized epileptiform activity noted.     Frequent left temporal>right fronto-central theta/delta slowing, rarely becoming quasi-rhythmic or rhythmic at 1-1.5 Hz for 2-4 seconds. There was also occasional independent right theta>delta slowing.     No seizures    ACTIVATION PROCEDURES:    NA    EKG: Sampling of the EKG recording showed sinus rhythm was rare/occasional PACs and/or PVCs    EVENTS:    No clinical events were captured or reported.    INTERPRETATION:  Abnormal, technically-limited video EEG recording in the awake, drowsy, and sleep state(s):  -Mild intermittent background slowing suggestive of diffuse/multifocal cerebral dysfunction and consistent with a non-specific encephalopathy. Clinical correlation recommended.   -No seizures.   -Frequent left temporal>right fronto-central theta/delta slowing, rarely becoming quasi-rhythmic or rhythmic at 1-1.5 Hz for 2-4 seconds. There was also occasional independent right theta>delta slowing. These findings are indicative of focal dysfunction in these regions, worse over the left. Also, the presence of focal rhythmic slowing may indicate an increased risk for focal seizures over the left temporal region.  Clinical and radiographic correaltion recommended.  -No definitive epileptiform discharges were seen.  -No clinical events  -Compared to yesterday's study, the focal slowing over the left is more frequent. Also, the left temporal slowing rarely becomes rhythmic.      Braden Saunders MD  Department of Neurology at [facility]  General Neurologist and Epileptologist  Director of [facility]'s Level III Comprehensive Epilepsy Program  Professor of Clinical Neurology, [facility].   Phone: [phone]  Fax: [phone]  E-mail: [email or web address]

## 2024-11-12 NOTE — PROGRESS NOTES
EMU DAILY PROGRESS NOTE-        ID: This is a 64 y.o. male who presents to the EMU for evaluation.    INTERVAL HISTORY:      EVENTS: None        EEG DAY 1 per Dr Saunders:  Mildly slow with intermittent left >right bitemporal slowing.  No seizures or epileptiform.  Refer to report for complete details.     Medical condition changes: 1400 yesterday-- BP hypertensive.  Given labetalol.      Headache: this morning with a headache.    Mood quality: stable.  Appropriate with sleep deprivation.        PAIN ASSESSMENT PAST 24 HOURS:  Pain Assessment for the past 24 hrs:   Location Location Orientation Pain Rating Scale (NPRS) Description Comfort Goal Intervention   11/12/24 1014 Back Posterior 6 Aching Comfort with Movement Medication (see MAR)   11/12/24 0900 Back Lower 4 Aching;Constant Comfort with Movement Declines;Repositioned   11/12/24 0513 Back Lower 7 Aching;Constant 3 Medication (see MAR)   11/11/24 2200 Back Mid;Lower 4 Aching;Constant -- Repositioned;Rest   11/11/24 2013 Back Mid;Lower 8 Aching;Constant 3 Medication (see MAR)   11/11/24 1700 Back Posterior 7 Aching Comfort with Movement --   11/11/24 1500 Back Posterior 8 Aching Comfort with Movement Medication (see MAR)   11/11/24 1200 Back Lower 7 Constant;Aching Comfort with Movement Emotional Support        Labs reviews this visit - Notably abnormal were the following:  Abnormal Labs Reviewed   CBC WITH DIFFERENTIAL - Abnormal; Notable for the following components:       Result Value    RBC 4.44 (*)     Hemoglobin 13.4 (*)     Hematocrit 41.7 (*)     MCHC 32.1 (*)     Lymphocytes 21.10 (*)     All other components within normal limits   COMP METABOLIC PANEL - Abnormal; Notable for the following components:    Potassium 3.0 (*)     Total Protein 5.8 (*)     All other components within normal limits   VALPROIC ACID - Abnormal; Notable for the following components:    Valproic Acid 34.4 (*)     All other components within normal limits        CURRENT MEDICATIONS  AT THE TIME OF THIS ENCOUNTER:  Scheduled Medications   Medication Dose Frequency    apixaban  5 mg BID    senna-docusate  2 Tablet Q EVENING    amiodarone  100 mg DAILY    finasteride  5 mg DAILY    metoprolol SR  25 mg Q EVENING    omeprazole  40 mg Q EVENING    rosuvastatin  40 mg Q EVENING    tamsulosin  0.4 mg DAILY    telmisartan  40 mg DAILY    docusate sodium  100 mg BID    HYDROcodone/acetaminophen  1 Tablet 4X/DAY    tizanidine  4 mg 4X/DAY    venlafaxine XR  225 mg DAILY AT 1800    ezetimibe  10 mg DAILY     LORazepam, 1 mg, Once PRN   Or  LORazepam, 2 mg, Once PRN  acetaminophen, 650 mg, Q4HRS PRN  ondansetron, 4 mg, Q4HRS PRN   Or  ondansetron, 4 mg, Q4HRS PRN  polyethylene glycol/lytes, 1 Packet, QDAY PRN  labetalol, 100 mg, BID PRN        EXAM:   Patient Vitals for the past 24 hrs:   BP Temp Temp src Pulse Resp SpO2 Weight   11/12/24 1014 -- -- -- -- 16 -- --   11/12/24 0815 (!) 147/91 36.3 °C (97.3 °F) Temporal (!) 54 17 92 % --   11/12/24 0421 (!) 178/105 36.5 °C (97.7 °F) Temporal (!) 58 17 94 % 107 kg (235 lb 0.2 oz)   11/11/24 2200 -- -- -- -- 18 -- --   11/11/24 2013 -- -- -- -- 18 -- --   11/11/24 1959 (!) 155/99 36.3 °C (97.3 °F) Temporal (!) 59 17 91 % --   11/11/24 1700 -- -- Temporal -- 17 -- --   11/11/24 1530 (!) 175/103 -- -- -- -- -- --   11/11/24 1457 (!) 192/106 -- -- 60 -- -- --   11/11/24 1320 (!) 203/110 -- Temporal 66 17 96 % --        .Temp:  [36.3 °C (97.3 °F)-36.5 °C (97.7 °F)] 36.3 °C (97.3 °F)  Pulse:  [54-66] 54  Resp:  [16-18] 16  BP: (147-203)/() 147/91  SpO2:  [91 %-96 %] 92 %     Physical Exam:  Physical Exam  Constitutional:       General: He is not in acute distress.     Appearance: He is obese.      Comments: Appears older than stated age.   HENT:      Head: Normocephalic and atraumatic.      Mouth/Throat:      Mouth: Mucous membranes are dry.   Eyes:      General: Lids are normal.      Extraocular Movements: Extraocular movements intact.   Cardiovascular:       Rate and Rhythm: Normal rate and regular rhythm.   Pulmonary:      Effort: Pulmonary effort is normal. No respiratory distress.      Breath sounds: Normal breath sounds.   Abdominal:      Palpations: Abdomen is soft.      Comments: rounded   Musculoskeletal:         General: Normal range of motion.      Cervical back: Normal range of motion.   Skin:     General: Skin is warm and dry.      Coloration: Skin is pale.   Neurological:      Mental Status: He is oriented to person, place, and time.      Cranial Nerves: No cranial nerve deficit.      Motor: No abnormal muscle tone.      Coordination: Coordination normal.      Deep Tendon Reflexes:      Reflex Scores:       Tricep reflexes are 2+ on the right side and 2+ on the left side.       Bicep reflexes are 2+ on the right side and 2+ on the left side.       Patellar reflexes are 1+ on the right side and 1+ on the left side.  Psychiatric:         Mood and Affect: Mood normal.         Speech: Speech normal.        Neurological Exam   Neurological Exam  Mental Status  Awake, alert and oriented to person, place and time. Oriented to person, place, time and situation. Oriented to person, place, and time. Recent and remote memory are intact. Speech is normal. Language is fluent with no aphasia. Attention and concentration are normal.    Cranial Nerves  CN II: Visual acuity is normal. Visual fields full to confrontation.  CN III, IV, VI: Extraocular movements intact bilaterally. Normal lids and orbits bilaterally.   Right pupil: Reactive to accommodation.   Left pupil: Reactive to accommodation.  CN V: Facial sensation is normal.  CN VII: Full and symmetric facial movement.  CN VIII: Hearing is normal.  CN IX, X: Palate elevates symmetrically. Normal gag reflex.  CN XI: Shoulder shrug strength is normal.  CN XII: Tongue midline without atrophy or fasciculations.    Motor  Decreased muscle bulk throughout. Mild global weakness.. Decreased muscle tone. Mild for age. The  following abnormal movements were seen: Strength is 5/5 in all four extremities except as noted.  Bilateral arm tremor R>L, calm at rest and activated with sustention.  5-/5 RUE, 5+/5 LUE, 5-/5 RLE and LLE.    Sensory  Sensation is intact to light touch, pinprick, vibration and proprioception in all four extremities.    Reflexes                                            Right                      Left  Biceps                                 2+                         2+  Triceps                                2+                         2+  Patellar                                1+                         1+    Coordination  Right: Finger-to-nose abnormality: Heel-to-shin normal.Left: Finger-to-nose abnormality: Mild tremor. Heel-to-shin normal.    Gait    Ambulates with walker.       I/O Current Shift  Date 11/12/24 0700 - 11/13/24 0659   Shift 0163-2459 3526-1833 [id number] 24 Hour Total   INTAKE   P.O. 200   200     P.O. 200   200   Shift Total 200   200   OUTPUT   Urine         Number of Times Voided 1 x   1 x   Stool         Number of Times Stooled 1 x   1 x   Shift Total          200        I/O Past 3 Shifts:  I/O last 3 completed shifts:  In: 240 [P.O.:240]  Out: 1000 [Urine:1000]     I/O NET Since Admission  Net IO Since Admission: -560 mL [11/12/24 1107]     BOWEL OUTPUT LAST 7 DAYS  Bowel Output for the past 168 hrs:   Last BM Number of Times Stooled   11/12/24 1007 11/12/24 1   11/11/24 0800 11/07/24 --           ASSESSMENT, EDUCATION, AND/OR COUNSELING  This is a 64 y.o.  male who presents to the EMU to characterize and localize epileptic activity, identify the degree of epileptic burden, if any, evaluate for unrecognized seizures, and to optimize medications.     I had an extensive discussion with the patient about the purpose of the admission. Discussed the purpose of provocative maneuvers such as photic stimulation, hyperventilation, and/or sleep deprivation which may unmask epileptic activity.  We discussed that it may be necessary to decrease, or discontinue altogether, any and all anti-seizure medications to achieve the goals of the admission. I explained that this places patient at higher risk for seizures and status epilepticus, a serious life-threatening emergency with the potential for serious injury or death. It is therefore critical to the patient's safety that he/she monitored in the epilepsy monitoring unit for multiple days to mitigate the risk for serious injury or death. Discussed that the EMU and its personnel mitigate risk as there are rescue medications on hand if needed for any prolonged or repetitive seizures; trained nursing staff, EEG technicians, and a board certified epileptologist available 24/7; and continuous EEG and video surveillance being monitoring live by trained personnel at all times.       Discussed the importance of maintaining seizure precautions at all times. Discussed the importance of notifying nursing staff by using the call button for any concerns or needs, especially to assist with ambulation or transtions into and out of bed. Emphasized that the patient is at a higher for falling and potential subsequent injury due to the cumbersome equipment being worn, as well as other potential factors that may impair balance. It is therefore critical that the patient refrain from getting out of bed or walking without staff assistance.      Discussed the importance and rationale for DVT prophylaxis as well.      Finally, counseled the patient on using the push-button should she/he have any events concerning for seizure. Encourage visitors to press the button as well if a seizure or aura is witnessed and the patient is unable to press the button herself/himself.      Patient agreed to the above discussion. All questions/concerns were addressed.    PLAN:    Mr Karan Wiley is a 64 y.o., right-handed male, who in his usual health, fell and hit his head in August 2022.  He sustained  an acute left subdural hematoma and was treated with bur hole and then craniotomy drainage.  He was started on depakote prophylactically in the setting of diagnosis of a-fib managed with Eliquis 5mg BID.     Since July 2024, he has had paroxysmal events concerning for seizure versus syncope and has fallen with the events.  The patient endorses that most events have occurred while ambulating for a short distance with the last occurring on November 2, 2024 at 10am in which he did not have loss of consciousness.        The patient underwent Head CT in 2022 with stable LEFT hemispheric acute subdural hematoma with 8mm shift.  There is not a MRI to review.  He currently is being monitored with Zio-Patch for a 14 day course with removal on November 13, 2024.        Clinical presentation is consistent with seizure versus syncopal event verus hypotension or hypertension variances.  He may also be experiencing peak dose side effects.  Endorses deconditioning and RUE hemiparesis which is mild as well as requiring a walker for ambulation due to feeling weak and fearful of falling.  He endorses use of oxygen in the past but not currently and suspects sleep apnea.     Other chronic conditions being monitored include status post right knee replacement in July 2024, with subsequent neurological complications/confusion, pneumonia and AFib. He is managed per cardiologist with ZioPatch to be obtained and 14 day monitoring to be completed on 11/13/2024.  He continues with chronic anticoagulation with Eliquis as well as CAD, status post PCI/NAOMY to the RCA in 2020. No angina or shortness of breath. Hypertension and hyperlipidemia has been well controlled as well as Type 2 DM.        We have discussed the EMU elective admission and close monitoring of spells with the goal of characterization of spells and to optimize anti-seizure medication.  Ideally, the depakote monotherapy will be transitioned to a different ASM to optimize quality of  "life.     Events in questions to capture    Event type #1: \"Seizure\".  Year/Age of Onset: July 2024  Initial features: Feel \"aura\" - described as dizziness, lightheadedness, and like he is going to pass out. Aware while falling.   Event features: Falls back (typically on the bed - never fell on the floor). Eyes are open. Unresponsive. He experiences brief upper body jerking. No tongue bite nor bladder/bowel incontinence.   Post-ictal features: At times some confusion.   Duration: 4-5 seconds.   Frequency: Initially couple times per week.   Precipitating factors: Typically when getting up too quickly.         Continuous VEEG monitoring  Vitals and neurological checks as ordered  Telemetry  Routine Admission labs: CBC, CMP, antiseizure drug levels:   11/11/2024 VPA level 34.4. CBC with RBC 4.44/Hem 13.4L/Hemat 41.7L, MCHC 32.1L, lymph 21.1, K 3.0L    Other diagnostics if applicable: NA  HV and PS to be performed on Day 1 of admission and at the discretion of the attending epileptologist on subsequent days  Rescue Ativan Protocol ordered  Lorazepam 1mg PRN  Sleep deprivation: DAY 2 NO  Active antiseizure regimen:  DAY 1: Depakote 250mg TID -- HELD after night dose.  DAY 2: Depakote on hold      Complex Cardiology Management:  AFib-- with ZioPatch to be obtained and 14 day monitoring to be completed on 11/13/2024.    Chronic anticoagulation   CAD, status post PCI/NAOMY to the RCA in 2020  Hypertension   Hyperlipidemia  Type 2 DM         Continue Meds as Prescribed:  ELEQUIS 5mg BID  Toprol XL 25mg QHS  Telmisartan 40mg in the AM  Zetia 10mg once daily  Crestor 40mg once daily     Chronic Lower Back Pain:  Baseline pain of 7/10 with Norco and Tizanadine QID     Imbalance and Mobility Compromised:  Stable with last fall 1-3 months ago.  Using a walker with assist.  Deconditioned with lower extremity weakness     Tremor:  Bilateral upper extremity tremor worsens with sustention or fine motor however persistent at rest-- " probable physiologic tremor.  Consider peak dose side effect of Depakote         OTHER ITEMS:  DVT Ppx: Lovenox and/or SCDs  DIET: Reglar  Bowel regimen  PRN analgesics available for pain  PRN antiemetics available    CODE STATUS:   FULL CODE       BILLING DOCUMENTATION:     I spent a total of 30+ minutes of face-to-face time in this visit. Over 50% of the time of the visit today was spent on counseling and/or coordination of care wtih the patient and/or family, as above in assessment in plan. This does not include time spent on separately billable procedures/tests.       Zelda Mclaughlin, MSN, FNP-BC, APRN  Department of Neurology at [facility]  Phone: [phone]  Fax: [phone]  E-mail: [email or web address]

## 2024-11-12 NOTE — CARE PLAN
The patient is Stable - Low risk of patient condition declining or worsening    Shift Goals  Clinical Goals: Monitor for seizures, safet, sleep dep  Patient Goals: Comfort  Family Goals: FELIPA    Progress made toward(s) clinical / shift goals:  Pt is AAOx4, fall/aspiration/seizure precautions in place, q6 neuro checks while awake, cts eeg monitoring in use. Pt updated on POC for sleep deprivation.     Problem: Seizure Precautions  Goal: Implementation of seizure precautions  Outcome: Progressing  Note:   1. Padded side rails up at all times   2. Suction equipment and oxygen delivery system at bedside   3. Continuous pulse oximeter in use   4. Implement fall precautions, bed alarm on, bed in lowest position   5. IV access (per order)   6. Provide low stimulus environment, avoid exposure to triggers   7. Instruct patient to use call light/seizure button if having warning signs of impending seizure      Problem: Seizure EEG Monitoring  Goal: Appropriate Monitoring of EEG patient  Outcome: Progressing  Note: Continuous EEG monitoring in place.      Problem: Knowledge Deficit - Standard  Goal: Patient and family/care givers will demonstrate understanding of plan of care, disease process/condition, diagnostic tests and medications  Outcome: Progressing  Note: Pt updated on POC for sleep deprivation from 12-4. Pt verbalized understanding.        Patient is not progressing towards the following goals:

## 2024-11-12 NOTE — PROGRESS NOTES
Monitor Summary: SB 51-59, NJ 0.17, QRS 0.09, QT 0.48, with rare PVCs per strip from monitor room.

## 2024-11-12 NOTE — PROGRESS NOTES
Monitor Summary: SB/SR 49-72 ND 0.19 QRS 0.10 QT 0.46 with rare PVCs, rare bigem,rare PVCs per strip from monitor room.

## 2024-11-12 NOTE — CARE PLAN
The patient is Stable - Low risk of patient condition declining or worsening    Shift Goals  Clinical Goals: Monitor for seizure like activity.    Progress made toward(s) clinical / shift goals:  No seizure like activity noted.     Patient is not progressing towards the following goals:

## 2024-11-12 NOTE — PROGRESS NOTES
1315: Notified per Bhavani Ge, RN-  Pt with elevated BP to 203/110 and HR 50's with anxiousness.      Close monitoring continued and MAR reviewed.      Labetolol prn ordered.  Cardiology consult ordered.    1630: /104

## 2024-11-13 PROCEDURE — 95720 EEG PHY/QHP EA INCR W/VEEG: CPT | Performed by: STUDENT IN AN ORGANIZED HEALTH CARE EDUCATION/TRAINING PROGRAM

## 2024-11-13 PROCEDURE — 770020 HCHG ROOM/CARE - TELE (206)

## 2024-11-13 PROCEDURE — 95716 VEEG EA 12-26HR CONT MNTR: CPT | Performed by: STUDENT IN AN ORGANIZED HEALTH CARE EDUCATION/TRAINING PROGRAM

## 2024-11-13 PROCEDURE — A9270 NON-COVERED ITEM OR SERVICE: HCPCS | Performed by: STUDENT IN AN ORGANIZED HEALTH CARE EDUCATION/TRAINING PROGRAM

## 2024-11-13 PROCEDURE — 700102 HCHG RX REV CODE 250 W/ 637 OVERRIDE(OP): Performed by: STUDENT IN AN ORGANIZED HEALTH CARE EDUCATION/TRAINING PROGRAM

## 2024-11-13 PROCEDURE — 700102 HCHG RX REV CODE 250 W/ 637 OVERRIDE(OP): Performed by: NURSE PRACTITIONER

## 2024-11-13 PROCEDURE — A9270 NON-COVERED ITEM OR SERVICE: HCPCS | Performed by: NURSE PRACTITIONER

## 2024-11-13 RX ADMIN — FINASTERIDE 5 MG: 5 TABLET, FILM COATED ORAL at 04:21

## 2024-11-13 RX ADMIN — TAMSULOSIN HYDROCHLORIDE 0.4 MG: 0.4 CAPSULE ORAL at 04:21

## 2024-11-13 RX ADMIN — TIZANIDINE 4 MG: 4 TABLET ORAL at 12:41

## 2024-11-13 RX ADMIN — APIXABAN 5 MG: 5 TABLET, FILM COATED ORAL at 20:46

## 2024-11-13 RX ADMIN — TIZANIDINE 4 MG: 4 TABLET ORAL at 04:21

## 2024-11-13 RX ADMIN — HYDROCODONE BITARTRATE AND ACETAMINOPHEN 1 TABLET: 10; 325 TABLET ORAL at 04:20

## 2024-11-13 RX ADMIN — TIZANIDINE 4 MG: 4 TABLET ORAL at 20:46

## 2024-11-13 RX ADMIN — TELMISARTAN 40 MG: 80 TABLET ORAL at 04:21

## 2024-11-13 RX ADMIN — TIZANIDINE 4 MG: 4 TABLET ORAL at 17:34

## 2024-11-13 RX ADMIN — VENLAFAXINE HYDROCHLORIDE 225 MG: 75 CAPSULE, EXTENDED RELEASE ORAL at 20:46

## 2024-11-13 RX ADMIN — HYDROCODONE BITARTRATE AND ACETAMINOPHEN 1 TABLET: 10; 325 TABLET ORAL at 15:00

## 2024-11-13 RX ADMIN — HYDROCODONE BITARTRATE AND ACETAMINOPHEN 1 TABLET: 10; 325 TABLET ORAL at 20:44

## 2024-11-13 RX ADMIN — ROSUVASTATIN CALCIUM 40 MG: 20 TABLET, FILM COATED ORAL at 20:45

## 2024-11-13 RX ADMIN — OMEPRAZOLE 40 MG: 20 CAPSULE, DELAYED RELEASE ORAL at 20:45

## 2024-11-13 RX ADMIN — SENNOSIDES AND DOCUSATE SODIUM 2 TABLET: 50; 8.6 TABLET ORAL at 20:46

## 2024-11-13 RX ADMIN — EZETIMIBE 10 MG: 10 TABLET ORAL at 04:21

## 2024-11-13 RX ADMIN — METOPROLOL SUCCINATE 25 MG: 25 TABLET, EXTENDED RELEASE ORAL at 20:45

## 2024-11-13 RX ADMIN — HYDROCODONE BITARTRATE AND ACETAMINOPHEN 1 TABLET: 10; 325 TABLET ORAL at 12:23

## 2024-11-13 RX ADMIN — AMIODARONE HYDROCHLORIDE 100 MG: 200 TABLET ORAL at 04:20

## 2024-11-13 RX ADMIN — APIXABAN 5 MG: 5 TABLET, FILM COATED ORAL at 04:19

## 2024-11-13 RX ADMIN — DOCUSATE SODIUM 100 MG: 100 CAPSULE, LIQUID FILLED ORAL at 20:46

## 2024-11-13 ASSESSMENT — VISUAL ACUITY: VA_NORMAL: 1

## 2024-11-13 ASSESSMENT — PAIN DESCRIPTION - PAIN TYPE
TYPE: ACUTE PAIN
TYPE: CHRONIC PAIN
TYPE: ACUTE PAIN
TYPE: CHRONIC PAIN

## 2024-11-13 ASSESSMENT — FIBROSIS 4 INDEX: FIB4 SCORE: 2.015810522715878545

## 2024-11-13 NOTE — PROGRESS NOTES
Monitor Summary: SB-SR 49-85, NJ .0.19, QRS .0.10, QT .0.43 with rare PVCs and HR down to 41 bpm per strip from monitor room

## 2024-11-13 NOTE — PROGRESS NOTES
Monitor Summary: SB 50-57, MO 0.17, QRS 0.10, QT 0.51, with rare PACs per strip from monitor room.

## 2024-11-13 NOTE — PROGRESS NOTES
Monitor room notified this RN that pt's heart rate was sustaining at 45. Upon assessment, pt was sleeping, this RN woke pt up and pt stated that he had an episode of dizziness that had resolved. Vital signs: /63, HR 50, RR 18, O2 92% on 2 LNC. Dr. Saunders notified.

## 2024-11-13 NOTE — DISCHARGE PLANNING
Met with pt who reports that he has a walker at home and sometimes uses it. He does not have home O2. He is independent with ADLs but no longer drives so wife drives him to appts. Wife helps with medication management. He has no HH services.     PCP is Dr. Kelly Navarro  Pharmacy is [facility] in [facility].   Wife can provide transportation .     Care Transition Team Assessment    Information Source  Orientation Level: Oriented X4  Information Given By: Patient  Who is responsible for making decisions for patient? : Patient    Readmission Evaluation  Is this a readmission?: No    Elopement Risk  Legal Hold: No  Ambulatory or Self Mobile in Wheelchair: Yes  Disoriented: No  Psychiatric Symptoms: None  History of Wandering: No  Elopement this Admit: No  Vocalizing Wanting to Leave: No  Displays Behaviors, Body Language Wanting to Leave: No-Not at Risk for Elopement  Elopement Risk: Not at Risk for Elopement    Interdisciplinary Discharge Planning  Does Admitting Nurse Feel This Could be a Complex Discharge?: No  Primary Care Physician: Dr Melanie Mendoza  Lives with - Patient's Self Care Capacity: Spouse  Support Systems: Spouse / Significant Other  Do You Take your Prescribed Medications Regularly: Yes  Able to Return to Previous ADL's: Yes  Mobility Issues: No  Prior Services: None, Home-Independent  Patient Prefers to be Discharged to:: Home  Assistance Needed: No    Discharge Preparedness  What is your plan after discharge?: Home with help  What are your discharge supports?: Child, Spouse  Prior Functional Level: Ambulatory, Needs Assist with Activities of Daily Living, Needs Assist with Medication Management  Difficulity with ADLs: None  Difficulity with IADLs: Driving    Functional Assesment  Prior Functional Level: Ambulatory, Needs Assist with Activities of Daily Living, Needs Assist with Medication Management    Finances  Financial Barriers to Discharge: No  Prescription Coverage: Yes    Vision / Hearing  Impairment  Vision Impairment : Yes  Hearing Impairment : Yes    Values / Beliefs / Concerns  Values / Beliefs Concerns : No    Advance Directive  Advance Directive?: None    Domestic Abuse  Have you ever been the victim of abuse or violence?: No         Discharge Risks or Barriers  Discharge risks or barriers?: No  Patient risk factors: Complex medical needs    Anticipated Discharge Information  Discharge Disposition: Discharged to home/self care (01)

## 2024-11-13 NOTE — CARE PLAN
The patient is Stable - Low risk of patient condition declining or worsening    Shift Goals  Clinical Goals: Monitor for seizures, safety  Patient Goals: Sleep  Family Goals: FELIPA    Progress made toward(s) clinical / shift goals: Pt is AAOx4, fall/aspiration/seizure precautions in place, q6h neuro checks while awake, continuous EEG monitoring in use.       Problem: Seizure Precautions  Goal: Implementation of seizure precautions  Outcome: Progressing     Problem: Seizure EEG Monitoring  Goal: Appropriate Monitoring of EEG patient  Outcome: Progressing     Problem: Pain - Standard  Goal: Alleviation of pain or a reduction in pain to the patient’s comfort goal  Outcome: Progressing     Problem: Knowledge Deficit - Standard  Goal: Patient and family/care givers will demonstrate understanding of plan of care, disease process/condition, diagnostic tests and medications  Outcome: Progressing       Patient is not progressing towards the following goals:

## 2024-11-13 NOTE — PROCEDURES
VIDEO ELECTROENCEPHALOGRAM REPORT - EPILEPSY MONITORING UNIT ([facility])     REFERRING PROVIDER: Dr. Hooper  DOS: 11/13/2024   ROOM: [facility]/   TOTAL RECORDING TIME: 24 hours and 10 minutes of total recording time    INDICATION:  Karan Wiley 64 y.o. male presenting with seizure(s) and altered mental status    RELEVANT MEDICATIONS/TREATMENTS:   No AEDs    TECHNIQUE:   CVEEG was set up by a Neurodiagnostic technologist who performed education to the patient and staff. A minimum of 23 electrodes and 23 channel recording was setup and performed by Neurodiagnostic technologist, in accordance with the international 10-20 system. Impedence, electrode integrity, and technical impressions were documented a minimum of every 2-24 hour period by a Neurodiagnostic Technologist and reviewed by Interpreting Physician. The study was reviewed in bipolar and referential montages. The recording examined the patient in the awake, drowsy, and sleep state(s).     DESCRIPTION OF THE RECORD:  During wakefulness, the background was continuous and showed a 9 Hz posterior dominant rhythm, with a mixture of mostly excess theta>delta, alpha, and overriding faster frequencies.  There was reactivity to eye closure/opening.  A normal anterior-posterior gradient was noted with faster beta frequencies seen anteriorly.  During drowsiness, theta/delta frequencies were seen.    Sleep was captured and was characterized by diffuse background delta/theta activity with a loss of myogenic artifact.  N2 sleep transients in the form of sleep spindles and vertex waves were seen in the leads over the central regions.     ICTAL AND INTERICTAL FINDINGS:   No focal or generalized epileptiform activity noted.     Frequent left temporal>right fronto-central breach rhythm and theta/delta slowing, occasional  quasi-rhythmic or rhythmic at 1.5-4 Hz for 2-4 seconds. There was also occasional to frequent independent right temporal or right hemispheric theta>delta  slowing and amplitude attenuation. There was intermittent rhythmic 1 Hz right temporal slowing which appears artifactual from faulty T4 electrode.      No seizures    ACTIVATION PROCEDURES:   NA    EKG: Sampling of the EKG recording showed sinus rhythm was occasional to frequent PACs (See Figure 1 below)    Figure 1      EVENTS:    Patient reported one clinical event around 8:25 PM when he was walking over with the nurse to the bathroom. Patient reported feeling shaky, weak in the legs, and lightheaded. Symptoms lasted several seconds and resolved after lying back down in the hospital bed. There were no abnormal EEG changes during this clinical event.    INTERPRETATION:  Abnormal, technically-limited video EEG recording in the awake, drowsy, and sleep state(s):  -Mild intermittent background slowing suggestive of diffuse/multifocal cerebral dysfunction and consistent with a non-specific encephalopathy. Clinical correlation recommended.   -No seizures.   -Frequent left temporal>right fronto-central breach rhythm and theta/delta slowing, occasional quasi-rhythmic or rhythmic at 1.5-4 Hz for 2-4 seconds. There was also occasional to frequent independent right temporal or right hemispheric theta>delta slowing and amplitude attenuation. There was intermittent rhythmic 1 Hz right temporal slowing which appeared artifactual from faulty T4 electrode.   These findings are indicative of focal dysfunction in these regions, worse over the left. Also, the presence of focal rhythmic slowing over the left may indicate an increased risk for focal seizures over that region.  Clinical and radiographic correaltion recommended.  -No definitive epileptiform discharges were seen.  -Clinical events: Patient reported one clinical event around 8:25 PM when he was walking over with the nurse to the bathroom. Patient reported feeling shaky, weak in the legs, and lightheaded. Symptoms lasted several seconds and resolved after lying back down  in the hospital bed. There were no abnormal EEG changes during this clinical event. Clinical correlation advised  -Also note that the EKG showed a slight increase in PVC burden compared to the prior studies (See Figure 1 in the body of the report). Clinical correlation recommended.  -Compared to yesterday's study, focal rhythmic slowing over the left fronto-temporal region is slightly more frequent.      Braden Saunders MD  Department of Neurology at [facility]  General Neurologist and Epileptologist  Director of [facility]'s Level III Comprehensive Epilepsy Program  Professor of Clinical Neurology, [facility].   Phone: [phone]  Fax: [phone]  E-mail: [email or web address]

## 2024-11-13 NOTE — PROGRESS NOTES
"EMU DAILY PROGRESS NOTE-        ID: This is a 64 y.o. male who presents to the EMU for evaluation.    INTERVAL HISTORY:    EVENTS: Approximately 0300 he \"startled awake\" and was unaware of why.  He has not slept well since then.      EEG DAY 2-3 per Dr Saunders: Left sided slowing increased in frequency and more predominant compared to the Right.  Left temporal slowing with rare rhythmic changes.  Background remains mildly slow.  No seizures or epileptiform.  Refer to report for complete details.     Medical condition changes: slight cough.       Headache: none     Mood quality: stable.         PAIN ASSESSMENT PAST 24 HOURS:  Pain Assessment for the past 24 hrs:   Location Location Orientation Pain Rating Scale (NPRS) Description Comfort Goal Intervention   11/13/24 0600 Back Lower 4 Aching;Constant -- Repositioned;Rest   11/13/24 0420 Back Lower 7 Aching;Constant 3 Medication (see MAR)   11/12/24 2143 Back Lower 4 Aching;Constant -- --   11/12/24 1943 Back Lower 7 Aching;Constant 3 Medication (see MAR)   11/12/24 1458 Back Posterior 8 Aching Comfort with Movement Medication (see MAR)   11/12/24 1014 Back Posterior 6 Aching Comfort with Movement Medication (see MAR)        Labs reviews this visit - Notably abnormal were the following:  Abnormal Labs Reviewed   CBC WITH DIFFERENTIAL - Abnormal; Notable for the following components:       Result Value    RBC 4.44 (*)     Hemoglobin 13.4 (*)     Hematocrit 41.7 (*)     MCHC 32.1 (*)     Lymphocytes 21.10 (*)     All other components within normal limits   COMP METABOLIC PANEL - Abnormal; Notable for the following components:    Potassium 3.0 (*)     Total Protein 5.8 (*)     All other components within normal limits   VALPROIC ACID - Abnormal; Notable for the following components:    Valproic Acid 34.4 (*)     All other components within normal limits        CURRENT MEDICATIONS AT THE TIME OF THIS ENCOUNTER:  Scheduled Medications   Medication Dose Frequency    apixaban  " 5 mg BID    senna-docusate  2 Tablet Q EVENING    amiodarone  100 mg DAILY    finasteride  5 mg DAILY    metoprolol SR  25 mg Q EVENING    omeprazole  40 mg Q EVENING    rosuvastatin  40 mg Q EVENING    tamsulosin  0.4 mg DAILY    telmisartan  40 mg DAILY    docusate sodium  100 mg BID    HYDROcodone/acetaminophen  1 Tablet 4X/DAY    tizanidine  4 mg 4X/DAY    venlafaxine XR  225 mg DAILY AT 1800    ezetimibe  10 mg DAILY     traZODone, 150 mg, 1X PRN  LORazepam, 1 mg, Once PRN   Or  LORazepam, 2 mg, Once PRN  acetaminophen, 650 mg, Q4HRS PRN  ondansetron, 4 mg, Q4HRS PRN   Or  ondansetron, 4 mg, Q4HRS PRN  polyethylene glycol/lytes, 1 Packet, QDAY PRN  labetalol, 100 mg, BID PRN        EXAM:   Patient Vitals for the past 24 hrs:   BP Temp Temp src Pulse Resp SpO2 Weight   11/13/24 0809 (!) 150/86 36.5 °C (97.7 °F) Temporal (!) 51 17 94 % --   11/13/24 0600 117/63 -- -- (!) 45 18 -- --   11/13/24 0411 -- -- -- -- -- -- 107 kg (235 lb 7.2 oz)   11/13/24 0408 (!) 175/100 36.5 °C (97.7 °F) Temporal (!) 59 17 92 % --   11/12/24 2143 -- -- -- -- 18 -- --   11/12/24 1943 -- -- -- -- 18 -- --   11/12/24 1931 (!) 148/94 36.4 °C (97.5 °F) Temporal (!) 57 17 91 % --   11/12/24 1550 (!) 146/94 -- Temporal 62 17 96 % --   11/12/24 1458 -- -- -- -- 18 -- --   11/12/24 1226 (!) 176/103 36.6 °C (97.8 °F) Temporal (!) 58 18 94 % --   11/12/24 1014 -- -- -- -- 16 -- --        .Temp:  [36.4 °C (97.5 °F)-36.6 °C (97.8 °F)] 36.5 °C (97.7 °F)  Pulse:  [45-62] 51  Resp:  [16-18] 17  BP: (117-176)/() 150/86  SpO2:  [91 %-96 %] 94 %     Physical Exam:  Physical Exam  Constitutional:       General: He is not in acute distress.     Appearance: He is obese.      Comments: Appears older than stated age.   HENT:      Head: Normocephalic and atraumatic.      Mouth/Throat:      Mouth: Mucous membranes are dry.   Eyes:      General: Lids are normal.      Extraocular Movements: Extraocular movements intact.   Cardiovascular:      Rate and  Rhythm: Normal rate and regular rhythm.   Pulmonary:      Effort: Pulmonary effort is normal. No respiratory distress.      Breath sounds: Normal breath sounds.   Abdominal:      Palpations: Abdomen is soft.      Comments: rounded   Musculoskeletal:         General: Normal range of motion.      Cervical back: Normal range of motion.   Skin:     General: Skin is warm and dry.      Coloration: Skin is pale.   Neurological:      Mental Status: He is oriented to person, place, and time.      Cranial Nerves: No cranial nerve deficit.      Motor: No abnormal muscle tone.      Coordination: Coordination normal.      Deep Tendon Reflexes:      Reflex Scores:       Tricep reflexes are 2+ on the right side and 2+ on the left side.       Bicep reflexes are 2+ on the right side and 2+ on the left side.       Patellar reflexes are 1+ on the right side and 1+ on the left side.  Psychiatric:         Mood and Affect: Mood normal.         Speech: Speech normal.        Neurological Exam   Neurological Exam  Mental Status  Awake, alert and oriented to person, place and time. Oriented to person, place, time and situation. Oriented to person, place, and time. Recent and remote memory are intact. Speech is normal. Language is fluent with no aphasia. Attention and concentration are normal.    Cranial Nerves  CN II: Visual acuity is normal. Visual fields full to confrontation.  CN III, IV, VI: Extraocular movements intact bilaterally. Normal lids and orbits bilaterally.   Right pupil: Reactive to accommodation.   Left pupil: Reactive to accommodation.  CN V: Facial sensation is normal.  CN VII: Full and symmetric facial movement.  CN VIII: Hearing is normal.  CN IX, X: Palate elevates symmetrically. Normal gag reflex.  CN XI: Shoulder shrug strength is normal.  CN XII: Tongue midline without atrophy or fasciculations.    Motor  Decreased muscle bulk throughout. Mild global weakness.. Decreased muscle tone. Mild for age. The following  abnormal movements were seen: Strength is 5/5 in all four extremities except as noted.  Bilateral arm tremor R>L, calm at rest and mild activation.  5-/5 RUE, 5+/5 LUE, 5-/5 RLE and LLE.    Sensory  Sensation is intact to light touch, pinprick, vibration and proprioception in all four extremities.    Reflexes                                            Right                      Left  Biceps                                 2+                         2+  Triceps                                2+                         2+  Patellar                                1+                         1+    Coordination  Right: Finger-to-nose abnormality: Heel-to-shin normal.Left: Finger-to-nose abnormality: Mild tremor. Heel-to-shin normal.    Gait    Ambulates with walker.       I/O Current Shift       I/O Past 3 Shifts:  I/O last 3 completed shifts:  In: 560 [P.O.:560]  Out: 600 [Urine:600]     I/O NET Since Admission  Net IO Since Admission: -440 mL [11/13/24 0956]     BOWEL OUTPUT LAST 7 DAYS  Bowel Output for the past 168 hrs:   Last BM Number of Times Stooled   11/12/24 1007 11/12/24 1   11/11/24 0800 11/07/24 --           ASSESSMENT, EDUCATION, AND/OR COUNSELING  This is a 64 y.o.  male who presents to the EMU to characterize and localize epileptic activity, identify the degree of epileptic burden, if any, evaluate for unrecognized seizures, and to optimize medications.     I had an extensive discussion with the patient about the purpose of the admission. Discussed the purpose of provocative maneuvers such as photic stimulation, hyperventilation, and/or sleep deprivation which may unmask epileptic activity. We discussed that it may be necessary to decrease, or discontinue altogether, any and all anti-seizure medications to achieve the goals of the admission. I explained that this places patient at higher risk for seizures and status epilepticus, a serious life-threatening emergency with the potential for serious injury or  death. It is therefore critical to the patient's safety that he/she monitored in the epilepsy monitoring unit for multiple days to mitigate the risk for serious injury or death. Discussed that the EMU and its personnel mitigate risk as there are rescue medications on hand if needed for any prolonged or repetitive seizures; trained nursing staff, EEG technicians, and a board certified epileptologist available 24/7; and continuous EEG and video surveillance being monitoring live by trained personnel at all times.       Discussed the importance of maintaining seizure precautions at all times. Discussed the importance of notifying nursing staff by using the call button for any concerns or needs, especially to assist with ambulation or transtions into and out of bed. Emphasized that the patient is at a higher for falling and potential subsequent injury due to the cumbersome equipment being worn, as well as other potential factors that may impair balance. It is therefore critical that the patient refrain from getting out of bed or walking without staff assistance.      Discussed the importance and rationale for DVT prophylaxis as well.      Finally, counseled the patient on using the push-button should she/he have any events concerning for seizure. Encourage visitors to press the button as well if a seizure or aura is witnessed and the patient is unable to press the button herself/himself.     Patient agreed to the above discussion. All questions/concerns were addressed.    PLAN:  Mr Karan Wiley is a 64 y.o., right-handed male, who in his usual health, fell and hit his head in August 2022.  He sustained an acute left subdural hematoma and was treated with bur hole and then craniotomy drainage.  He was started on depakote prophylactically in the setting of diagnosis of a-fib managed with Eliquis 5mg BID.     Since July 2024, he has had paroxysmal events concerning for seizure versus syncope and has fallen with the events.   "The patient endorses that most events have occurred while ambulating for a short distance with the last occurring on November 2, 2024 at 10am in which he did not have loss of consciousness.        The patient underwent Head CT in 2022 with stable LEFT hemispheric acute subdural hematoma with 8mm shift.  There is not a MRI to review.  He currently is being monitored with Zio-Patch for a 14 day course with removal on November 13, 2024.        Clinical presentation is consistent with seizure versus syncopal event verus hypotension or hypertension variances.  He may also be experiencing peak dose side effects.  Endorses deconditioning and RUE hemiparesis which is mild as well as requiring a walker for ambulation due to feeling weak and fearful of falling.  He endorses use of oxygen in the past but not currently and suspects sleep apnea.     Other chronic conditions being monitored include status post right knee replacement in July 2024, with subsequent neurological complications/confusion, pneumonia and AFib. He is managed per cardiologist with ZioPatch to be obtained and 14 day monitoring to be completed on 11/13/2024.  He continues with chronic anticoagulation with Eliquis as well as CAD, status post PCI/NAOMY to the RCA in 2020. No angina or shortness of breath. Hypertension and hyperlipidemia has been well controlled as well as Type 2 DM.        We have discussed the EMU elective admission and close monitoring of spells with the goal of characterization of spells and to optimize anti-seizure medication.  Ideally, the depakote monotherapy will be transitioned to a different ASM to optimize quality of life.       Events in questions to capture    Event type #1: \"Seizure\".  Year/Age of Onset: July 2024  Initial features: Feel \"aura\" - described as dizziness, lightheadedness, and like he is going to pass out. Aware while falling.   Event features: Falls back (typically on the bed - never fell on the floor). Eyes are open. " Unresponsive. He experiences brief upper body jerking. No tongue bite nor bladder/bowel incontinence.   Post-ictal features: At times some confusion.   Duration: 4-5 seconds.   Frequency: Initially couple times per week.   Precipitating factors: Typically when getting up too quickly.       Continuous VEEG monitoring  Vitals and neurological checks as ordered  Telemetry  Routine Admission labs: CBC, CMP, antiseizure drug levels:     11/11/2024 VPA level 34.4. CBC with RBC 4.44/Hem 13.4L/Hemat 41.7L, MCHC 32.1L, lymph 21.1, K 3.0L     Other diagnostics if applicable: NA  HV and PS to be performed on Day 1 of admission and at the discretion of the attending epileptologist on subsequent days  Rescue Ativan Protocol ordered  Lorazepam 1mg PIV prn  Sleep deprivation: DAY 3-4 YES  Active antiseizure regimen:    DAY 1: Depakote 250mg TID -- HELD after night dose.  DAY 2: Depakote on hold  DAY 3: Depakote on hold      - Consideration to include but not limited to: Briviact, lamotrigine, zonisamide, Aptiom      Microcytic Anemia:  Per labs drawn on admission.  Long standing history of anemia.  Not currently prescribed an iron supplement.      Mild Hypokalemia:  Stable. Per labs drawn on admission.       Complex Cardiology Management:  AFib-- with ZioPatch to be obtained and 14 day monitoring to be completed on 11/13/2024.    Chronic anticoagulation   CAD, status post PCI/NAOMY to the RCA in 2020  Hypertension   Hyperlipidemia  Type 2 DM         Continue Meds as Prescribed:  ELEQUIS 5mg BID  Toprol XL 25mg QHS  Telmisartan 40mg in the AM  Zetia 10mg once daily  Crestor 40mg once daily     Chronic Lower Back Pain:  Baseline pain of 7/10 with Norco and Tizanadine QID     Imbalance and Mobility Compromised:  Stable with last fall 1-3 months ago.  Using a walker with assist.  Deconditioned with lower extremity weakness     Tremor:  Bilateral upper extremity tremor worsens with sustention or fine motor however persistent at rest--  probable physiologic tremor.  Consider peak dose side effect of Depakote      OTHER ITEMS:  DVT Ppx: Lovenox and/or SCDs  DIET: Regular  Bowel regimen  PRN analgesics available for pain  PRN antiemetics available    CODE STATUS:   FULL CODE       BILLING DOCUMENTATION:     I spent a total of 30+ minutes of face-to-face time in this visit. Over 50% of the time of the visit today was spent on counseling and/or coordination of care wtih the patient and/or family, as above in assessment in plan. This does not include time spent on separately billable procedures/tests.         Zelda Mclaughlin, MSN, FNP-BC, APRN  Department of Neurology at [facility]  Phone: [phone]  Fax: [phone]  E-mail: [email or web address]

## 2024-11-14 PROCEDURE — 770020 HCHG ROOM/CARE - TELE (206)

## 2024-11-14 PROCEDURE — 95716 VEEG EA 12-26HR CONT MNTR: CPT | Performed by: STUDENT IN AN ORGANIZED HEALTH CARE EDUCATION/TRAINING PROGRAM

## 2024-11-14 PROCEDURE — 700102 HCHG RX REV CODE 250 W/ 637 OVERRIDE(OP): Performed by: STUDENT IN AN ORGANIZED HEALTH CARE EDUCATION/TRAINING PROGRAM

## 2024-11-14 PROCEDURE — 700102 HCHG RX REV CODE 250 W/ 637 OVERRIDE(OP): Performed by: NURSE PRACTITIONER

## 2024-11-14 PROCEDURE — A9270 NON-COVERED ITEM OR SERVICE: HCPCS | Performed by: NURSE PRACTITIONER

## 2024-11-14 PROCEDURE — A9270 NON-COVERED ITEM OR SERVICE: HCPCS | Performed by: STUDENT IN AN ORGANIZED HEALTH CARE EDUCATION/TRAINING PROGRAM

## 2024-11-14 PROCEDURE — 95720 EEG PHY/QHP EA INCR W/VEEG: CPT | Performed by: STUDENT IN AN ORGANIZED HEALTH CARE EDUCATION/TRAINING PROGRAM

## 2024-11-14 RX ORDER — LAMOTRIGINE 25 MG/1
25 TABLET ORAL ONCE
Status: COMPLETED | OUTPATIENT
Start: 2024-11-14 | End: 2024-11-14

## 2024-11-14 RX ORDER — LAMOTRIGINE 25 MG/1
25 TABLET ORAL DAILY
Status: DISCONTINUED | OUTPATIENT
Start: 2024-11-15 | End: 2024-11-15 | Stop reason: HOSPADM

## 2024-11-14 RX ADMIN — APIXABAN 5 MG: 5 TABLET, FILM COATED ORAL at 21:28

## 2024-11-14 RX ADMIN — HYDROCODONE BITARTRATE AND ACETAMINOPHEN 1 TABLET: 10; 325 TABLET ORAL at 21:27

## 2024-11-14 RX ADMIN — TRAZODONE HYDROCHLORIDE 150 MG: 100 TABLET ORAL at 21:29

## 2024-11-14 RX ADMIN — SENNOSIDES AND DOCUSATE SODIUM 2 TABLET: 50; 8.6 TABLET ORAL at 21:29

## 2024-11-14 RX ADMIN — TIZANIDINE 4 MG: 4 TABLET ORAL at 04:25

## 2024-11-14 RX ADMIN — DOCUSATE SODIUM 100 MG: 100 CAPSULE, LIQUID FILLED ORAL at 21:29

## 2024-11-14 RX ADMIN — ROSUVASTATIN CALCIUM 40 MG: 20 TABLET, FILM COATED ORAL at 21:28

## 2024-11-14 RX ADMIN — HYDROCODONE BITARTRATE AND ACETAMINOPHEN 1 TABLET: 10; 325 TABLET ORAL at 15:18

## 2024-11-14 RX ADMIN — APIXABAN 5 MG: 5 TABLET, FILM COATED ORAL at 04:23

## 2024-11-14 RX ADMIN — TIZANIDINE 4 MG: 4 TABLET ORAL at 17:05

## 2024-11-14 RX ADMIN — VENLAFAXINE HYDROCHLORIDE 225 MG: 75 CAPSULE, EXTENDED RELEASE ORAL at 21:28

## 2024-11-14 RX ADMIN — TELMISARTAN 40 MG: 80 TABLET ORAL at 04:25

## 2024-11-14 RX ADMIN — OMEPRAZOLE 40 MG: 20 CAPSULE, DELAYED RELEASE ORAL at 21:28

## 2024-11-14 RX ADMIN — TAMSULOSIN HYDROCHLORIDE 0.4 MG: 0.4 CAPSULE ORAL at 04:24

## 2024-11-14 RX ADMIN — EZETIMIBE 10 MG: 10 TABLET ORAL at 04:24

## 2024-11-14 RX ADMIN — TIZANIDINE 4 MG: 4 TABLET ORAL at 21:29

## 2024-11-14 RX ADMIN — FINASTERIDE 5 MG: 5 TABLET, FILM COATED ORAL at 04:25

## 2024-11-14 RX ADMIN — TIZANIDINE 4 MG: 4 TABLET ORAL at 11:11

## 2024-11-14 RX ADMIN — METOPROLOL SUCCINATE 25 MG: 25 TABLET, EXTENDED RELEASE ORAL at 21:29

## 2024-11-14 RX ADMIN — HYDROCODONE BITARTRATE AND ACETAMINOPHEN 1 TABLET: 10; 325 TABLET ORAL at 10:07

## 2024-11-14 RX ADMIN — AMIODARONE HYDROCHLORIDE 100 MG: 200 TABLET ORAL at 04:23

## 2024-11-14 RX ADMIN — HYDROCODONE BITARTRATE AND ACETAMINOPHEN 1 TABLET: 10; 325 TABLET ORAL at 04:24

## 2024-11-14 RX ADMIN — DOCUSATE SODIUM 100 MG: 100 CAPSULE, LIQUID FILLED ORAL at 04:23

## 2024-11-14 RX ADMIN — LAMOTRIGINE 25 MG: 25 TABLET ORAL at 15:18

## 2024-11-14 ASSESSMENT — PAIN DESCRIPTION - PAIN TYPE
TYPE: CHRONIC PAIN
TYPE: ACUTE PAIN
TYPE: CHRONIC PAIN
TYPE: CHRONIC PAIN

## 2024-11-14 ASSESSMENT — VISUAL ACUITY: VA_NORMAL: 1

## 2024-11-14 ASSESSMENT — FIBROSIS 4 INDEX: FIB4 SCORE: 2.015810522715878545

## 2024-11-14 NOTE — CARE PLAN
The patient is Stable - Low risk of patient condition declining or worsening    Shift Goals  Clinical Goals: Monitor for seizure activity, sleep deprivation  Patient Goals: Stay awake until 12  Family Goals: maliha    Progress made toward(s) clinical / shift goals:      Problem: Seizure Precautions  Goal: Implementation of seizure precautions  Outcome: Progressing  Seizure precautions in place (i.e. padded side rails, suction and supplemental oxygen available at bedside). PRNs available for breakthrough seizures.      Problem: Seizure EEG Monitoring  Goal: Appropriate Monitoring of EEG patient  Outcome: Progressing    Pt on continuous EEG monitoring, along with sitter to monitor seizure-like activity.     Patient is not progressing towards the following goals:

## 2024-11-14 NOTE — PROCEDURES
VIDEO ELECTROENCEPHALOGRAM REPORT - EPILEPSY MONITORING UNIT (EMU)     REFERRING PROVIDER: Dr. Hooper  DOS: 11/14/2024   ROOM: [facility]/   TOTAL RECORDING TIME: 23 hours and 8 minutes of total recording time    INDICATION:  Karan Wiley 64 y.o. male presenting with seizure(s) and altered mental status    RELEVANT MEDICATIONS/TREATMENTS:   No AEDs    TECHNIQUE:   CVEEG was set up by a Neurodiagnostic technologist who performed education to the patient and staff. A minimum of 23 electrodes and 23 channel recording was setup and performed by Neurodiagnostic technologist, in accordance with the international 10-20 system. Impedence, electrode integrity, and technical impressions were documented a minimum of every 2-24 hour period by a Neurodiagnostic Technologist and reviewed by Interpreting Physician. The study was reviewed in bipolar and referential montages. The recording examined the patient in the awake, drowsy, and sleep state(s).     DESCRIPTION OF THE RECORD:  During wakefulness, the background was continuous and showed a 9 Hz posterior dominant rhythm, with a mixture of mostly excess theta>delta, alpha, and overriding faster frequencies.  There was reactivity to eye closure/opening.  A normal anterior-posterior gradient was noted with faster beta frequencies seen anteriorly.  During drowsiness, theta/delta frequencies were seen.    Sleep was captured and was characterized by diffuse background delta/theta activity with a loss of myogenic artifact.  N2 sleep transients in the form of sleep spindles and vertex waves were seen in the leads over the central regions.     ICTAL AND INTERICTAL FINDINGS:   No focal or generalized epileptiform activity noted.     Frequent left temporal>right fronto-central breach rhythm and theta/delta slowing, occasional quasi-rhythmic or rhythmic at 1-1.5 Hz for 2-4 seconds. There was also occasional independent right temporal or right hemispheric theta>delta slowing and  amplitude attenuation.   These findings are indicative of focal dysfunction in these regions, worse over the left. Also, the presence of focal rhythmic slowing over the left may indicate an increased risk for focal seizures over that region.    No seizures    ACTIVATION PROCEDURES:   NA    EKG: Sampling of the EKG recording showed sinus rhythm was occasional PACs       EVENTS:    No clinical events    INTERPRETATION:  Abnormal, technically-limited video EEG recording in the awake, drowsy, and sleep state(s):  -Mild intermittent background slowing suggestive of diffuse/multifocal cerebral dysfunction and consistent with a non-specific encephalopathy. Clinical correlation recommended.   -No seizures.   -Frequent left temporal>right fronto-central breach rhythm and theta/delta slowing, occasional quasi-rhythmic or rhythmic at 1-1.5 Hz for 2-4 seconds. There was also occasional independent right temporal or right hemispheric theta>delta slowing and amplitude attenuation.   These findings are indicative of focal dysfunction in these regions, worse over the left. Also, the presence of focal rhythmic slowing over the left may indicate an increased risk for focal seizures over that region.  Clinical and radiographic correaltion recommended.  -No definitive epileptiform discharges were seen.  -Clinical events: None  -Compared to yesterday's study, no major differences were seen      Braden Saunders MD  Department of Neurology at [facility]  General Neurologist and Epileptologist  Director of [facility]'s Level III Comprehensive Epilepsy Program  Professor of Clinical Neurology, [facility].   Phone: [phone]  Fax: [phone]  E-mail: [email or web address]

## 2024-11-14 NOTE — PROGRESS NOTES
EMU DAILY PROGRESS NOTE-        ID: This is a 64 y.o. male who presents to the EMU for evaluation.    INTERVAL HISTORY:      EVENTS: 2025 last night, pt was up to the bathroom,  He endorsed dizziness, leg weakness, anxiousness and exhibited upper body shakiness. BP: 134/83, HR 63-- orthostatic presyncope.      EEG DAY 3-4: Abnormal, technically-limited video EEG recording in the awake, drowsy, and sleep state(s):  -Mild intermittent background slowing suggestive of diffuse/multifocal cerebral dysfunction and consistent with a non-specific encephalopathy. Clinical correlation recommended.   -No seizures.   -Frequent left temporal>right fronto-central theta/delta slowing, occasional quasi-rhythmic or rhythmic at 1.5-4 Hz for 2-4 seconds. There was also occasional to frequent independent right temporal theta>delta slowing, with prolonged rhythmic 1 Hz runs although rhythmic electrode artifact at T4 is not entirely excluded. These findings are indicative of focal dysfunction in these regions, worse over the left. Also, the presence of focal rhythmic slowing may indicate an increased risk for focal seizures over the left temporal region.  Clinical and radiographic correaltion recommended.  -No definitive epileptiform discharges were seen.  -Clinical events: Patient reported one clinical event around 8:25 PM when he was walking over with the nurse to the bathroom. Patient reported feeling shaky, weak in the legs, and lightheaded. Symptoms lasted several seconds and resolved after lying back down in the hospital bed. There were no abnormal EEG changes during this clinical event. Clinical correlation advised  -Also note that the EKG showed a slight increase in PVC burden compared to the prior studies (See Figure 1 in the body of the report). Clinical correlation recommended.  -Compared to yesterday's study, focal rhythmic slowing over the left fronto-temporal region is slightly more frequent.      Medical condition changes:  slight cough.       Headache: none     Mood quality: stable          PAIN ASSESSMENT PAST 24 HOURS:  Pain Assessment for the past 24 hrs:   Location Location Orientation Pain Rating Scale (NPRS) Description Comfort Goal Intervention   11/14/24 0700 -- -- 0 -- -- Declines   11/14/24 0624 -- -- 3 -- -- --   11/14/24 0424 Back Posterior 7 Stabbing Comfort with Movement Medication (see MAR)   11/13/24 2244 Head Anterior 0 -- Sleep Comfortably Rest   11/13/24 2044 Head Anterior 3 Aching;Sharp Sleep Comfortably Medication (see MAR)   11/13/24 1600 -- -- 0 -- -- Declines   11/13/24 1500 Other (Comments) -- 0 Other (Comments) 0 Medication (see MAR)   11/13/24 1223 Other (Comments) -- 0 Other (Comments) 0 Medication (see MAR)        Labs reviews this visit - Notably abnormal were the following:  Abnormal Labs Reviewed   CBC WITH DIFFERENTIAL - Abnormal; Notable for the following components:       Result Value    RBC 4.44 (*)     Hemoglobin 13.4 (*)     Hematocrit 41.7 (*)     MCHC 32.1 (*)     Lymphocytes 21.10 (*)     All other components within normal limits   COMP METABOLIC PANEL - Abnormal; Notable for the following components:    Potassium 3.0 (*)     Total Protein 5.8 (*)     All other components within normal limits   VALPROIC ACID - Abnormal; Notable for the following components:    Valproic Acid 34.4 (*)     All other components within normal limits        CURRENT MEDICATIONS AT THE TIME OF THIS ENCOUNTER:  Scheduled Medications   Medication Dose Frequency    apixaban  5 mg BID    senna-docusate  2 Tablet Q EVENING    amiodarone  100 mg DAILY    finasteride  5 mg DAILY    metoprolol SR  25 mg Q EVENING    omeprazole  40 mg Q EVENING    rosuvastatin  40 mg Q EVENING    tamsulosin  0.4 mg DAILY    telmisartan  40 mg DAILY    docusate sodium  100 mg BID    HYDROcodone/acetaminophen  1 Tablet 4X/DAY    tizanidine  4 mg 4X/DAY    venlafaxine XR  225 mg DAILY AT 1800    ezetimibe  10 mg DAILY     traZODone, 150 mg, 1X  PRN  LORazepam, 1 mg, Once PRN   Or  LORazepam, 2 mg, Once PRN  acetaminophen, 650 mg, Q4HRS PRN  ondansetron, 4 mg, Q4HRS PRN   Or  ondansetron, 4 mg, Q4HRS PRN  polyethylene glycol/lytes, 1 Packet, QDAY PRN  labetalol, 100 mg, BID PRN        EXAM:   Patient Vitals for the past 24 hrs:   BP Temp Temp src Pulse Resp SpO2 Weight   11/14/24 0850 (!) 130/92 36.4 °C (97.5 °F) Temporal 67 17 90 % --   11/14/24 0421 -- -- -- -- -- -- 104 kg (228 lb 9.9 oz)   11/14/24 0418 (!) 170/95 36.6 °C (97.9 °F) Temporal 68 17 93 % --   11/13/24 2307 108/68 36.3 °C (97.3 °F) Temporal (!) 56 18 93 % --   11/13/24 2244 -- -- -- -- 16 -- --   11/13/24 2044 -- -- -- -- 18 -- --   11/13/24 2037 134/83 36.6 °C (97.8 °F) Temporal 63 20 94 % --   11/13/24 1952 (!) 141/84 36.6 °C (97.9 °F) Temporal 70 18 92 % --   11/13/24 1615 (!) 166/111 36.8 °C (98.2 °F) Temporal 79 17 92 % --   11/13/24 1245 (!) 176/99 36.8 °C (98.2 °F) Temporal 67 17 96 % --        .Temp:  [36.3 °C (97.3 °F)-36.8 °C (98.2 °F)] 36.4 °C (97.5 °F)  Pulse:  [56-79] 67  Resp:  [16-20] 17  BP: (108-176)/() 130/92  SpO2:  [90 %-96 %] 90 %     Physical Exam:  Physical Exam  Constitutional:       General: He is not in acute distress.     Appearance: He is obese.      Comments: Appears older than stated age.   HENT:      Head: Normocephalic and atraumatic.      Mouth/Throat:      Mouth: Mucous membranes are dry.   Eyes:      General: Lids are normal.      Extraocular Movements: Extraocular movements intact.   Cardiovascular:      Rate and Rhythm: Normal rate and regular rhythm.   Pulmonary:      Effort: Pulmonary effort is normal. No respiratory distress.      Breath sounds: Normal breath sounds.   Abdominal:      Palpations: Abdomen is soft.      Comments: rounded   Musculoskeletal:         General: Normal range of motion.      Cervical back: Normal range of motion.   Skin:     General: Skin is warm and dry.      Coloration: Skin is pale.   Neurological:      Mental Status:  He is alert and oriented to person, place, and time.      Cranial Nerves: No cranial nerve deficit.      Motor: No abnormal muscle tone.      Coordination: Coordination normal.      Deep Tendon Reflexes:      Reflex Scores:       Tricep reflexes are 2+ on the right side and 2+ on the left side.       Bicep reflexes are 2+ on the right side and 2+ on the left side.       Patellar reflexes are 1+ on the right side and 1+ on the left side.  Psychiatric:         Mood and Affect: Mood normal.         Speech: Speech normal.        Neurological Exam   Neurological Exam  Mental Status  Alert. Oriented to person, place, time and situation. Oriented to person, place, and time. Recent and remote memory are intact. Speech is normal. Language is fluent with no aphasia. Attention and concentration are normal.    Cranial Nerves  CN II: Visual acuity is normal. Visual fields full to confrontation.  CN III, IV, VI: Extraocular movements intact bilaterally. Normal lids and orbits bilaterally.   Right pupil: Reactive to accommodation.   Left pupil: Reactive to accommodation.  CN V: Facial sensation is normal.  CN VII: Full and symmetric facial movement.  CN VIII: Hearing is normal.  CN IX, X: Palate elevates symmetrically. Normal gag reflex.  CN XI: Shoulder shrug strength is normal.  CN XII: Tongue midline without atrophy or fasciculations.    Motor  Decreased muscle bulk throughout. Mild global weakness.. Decreased muscle tone. Mild for age. The following abnormal movements were seen: Strength is 5/5 in all four extremities except as noted.  Bilateral arm tremor R>L, calm at rest and mild activation.  Increased tremor when under stress such as up ambulating or talking with wife.  5-/5 RUE, 5+/5 LUE, 5-/5 RLE and LLE.    Sensory  Sensation is intact to light touch, pinprick, vibration and proprioception in all four extremities.    Reflexes                                            Right                      Left  Biceps                                  2+                         2+  Triceps                                2+                         2+  Patellar                                1+                         1+    Coordination  Right: Finger-to-nose abnormality: Heel-to-shin normal.Left: Finger-to-nose abnormality: Mild tremor. Heel-to-shin normal.    Gait    Ambulates with walker.       I/O Current Shift       I/O Past 3 Shifts:  I/O last 3 completed shifts:  In: 357 [P.O.:357]  Out: 1450 [Urine:1450]     I/O NET Since Admission  Net IO Since Admission: -1,653 mL [11/14/24 0854]     BOWEL OUTPUT LAST 7 DAYS  Bowel Output for the past 168 hrs:   Last BM Number of Times Stooled   11/14/24 0723 11/12/24 --   11/12/24 1007 11/12/24 1   11/11/24 0800 11/07/24 --           ASSESSMENT, EDUCATION, AND/OR COUNSELING  This is a 64 y.o.  male who presents to the EMU to characterize and localize epileptic activity, identify the degree of epileptic burden, if any, evaluate for unrecognized seizures, and to optimize medications.     I had an extensive discussion with the patient about the purpose of the admission. Discussed the purpose of provocative maneuvers such as photic stimulation, hyperventilation, and/or sleep deprivation which may unmask epileptic activity. We discussed that it may be necessary to decrease, or discontinue altogether, any and all anti-seizure medications to achieve the goals of the admission. I explained that this places patient at higher risk for seizures and status epilepticus, a serious life-threatening emergency with the potential for serious injury or death. It is therefore critical to the patient's safety that he/she monitored in the epilepsy monitoring unit for multiple days to mitigate the risk for serious injury or death. Discussed that the EMU and its personnel mitigate risk as there are rescue medications on hand if needed for any prolonged or repetitive seizures; trained nursing staff, EEG technicians, and a  board certified epileptologist available 24/7; and continuous EEG and video surveillance being monitoring live by trained personnel at all times.       Discussed the importance of maintaining seizure precautions at all times. Discussed the importance of notifying nursing staff by using the call button for any concerns or needs, especially to assist with ambulation or transtions into and out of bed. Emphasized that the patient is at a higher for falling and potential subsequent injury due to the cumbersome equipment being worn, as well as other potential factors that may impair balance. It is therefore critical that the patient refrain from getting out of bed or walking without staff assistance.      Discussed the importance and rationale for DVT prophylaxis as well.      Finally, counseled the patient on using the push-button should she/he have any events concerning for seizure. Encourage visitors to press the button as well if a seizure or aura is witnessed and the patient is unable to press the button herself/himself.     Patient agreed to the above discussion. All questions/concerns were addressed.    PLAN:    Mr Karan Wiley is a 64 y.o., right-handed male, who in his usual health, fell and hit his head in August 2022.  He sustained an acute left subdural hematoma and was treated with bur hole and then craniotomy drainage.  He was started on depakote prophylactically in the setting of diagnosis of a-fib managed with Eliquis 5mg BID.     Since July 2024, he has had paroxysmal events concerning for seizure versus syncope and has fallen with the events.  The patient endorses that most events have occurred while ambulating for a short distance with the last occurring on November 2, 2024 at 10am in which he did not have loss of consciousness.        The patient underwent Head CT in 2022 with stable LEFT hemispheric acute subdural hematoma with 8mm shift.  There is not a MRI to review.  He currently is being monitored  "with Zio-Patch for a 14 day course with removal on November 13, 2024.        Clinical presentation is consistent with seizure versus syncopal event verus hypotension or hypertension variances.  He may also be experiencing peak dose side effects.  Endorses deconditioning and RUE hemiparesis which is mild as well as requiring a walker for ambulation due to feeling weak and fearful of falling.  He endorses use of oxygen in the past but not currently and suspects sleep apnea.     Other chronic conditions being monitored include status post right knee replacement in July 2024, with subsequent neurological complications/confusion, pneumonia and AFib. He is managed per cardiologist with ZioPatch to be obtained and 14 day monitoring to be completed on 11/13/2024.  He continues with chronic anticoagulation with Eliquis as well as CAD, status post PCI/NAOMY to the RCA in 2020. No angina or shortness of breath. Hypertension and hyperlipidemia has been well controlled as well as Type 2 DM.        We have discussed the EMU elective admission and close monitoring of spells with the goal of characterization of spells and to optimize anti-seizure medication.  Ideally, the depakote monotherapy will be transitioned to a different ASM to optimize quality of life.        Events in questions to capture    Event type #1: \"Seizure\".  Year/Age of Onset: July 2024  Initial features: Feel \"aura\" - described as dizziness, lightheadedness, and like he is going to pass out. Aware while falling.   Event features: Falls back (typically on the bed - never fell on the floor). Eyes are open. Unresponsive. He experiences brief upper body jerking. No tongue bite nor bladder/bowel incontinence.   Post-ictal features: At times some confusion.   Duration: 4-5 seconds.   Frequency: Initially couple times per week.   Precipitating factors: Typically when getting up too quickly.        Continuous VEEG monitoring  Vitals and neurological checks as " ordered  Telemetry  Routine Admission labs: CBC, CMP, antiseizure drug levels: DONE  Other diagnostics if applicable: NA  HV and PS to be performed on Day 1 of admission and at the discretion of the attending epileptologist on subsequent days  Rescue Ativan Protocol ordered  Lorazepam 1mg PIV prn    Sleep deprivation: DAY 4: NO    Active antiseizure regimen:      DAY 1: Depakote 250mg TID -- HELD after night dose.  DAY 2: Depakote on hold  DAY 3: Depakote on hold  DAY 4: Depakote on hold ** NEW START Lamotrigine 25mg PM and tonight then 25mg qam.     - Consideration to include but not limited to: Briviact, lamotrigine ER, zonisamide, Aptiom        Microcytic Anemia:  Per labs drawn on admission.  Long standing history of anemia.  Not currently prescribed an iron supplement.        Mild Hypokalemia:  Stable. Per labs drawn on admission.        Complex Cardiology Management:  AFib-- with ZioPatch to be obtained and 14 day monitoring to be completed on 11/13/2024.    Chronic anticoagulation   CAD, status post PCI/NAOMY to the RCA in 2020  Hypertension   Hyperlipidemia  Type 2 DM         Continue Meds as Prescribed:  ELEQUIS 5mg BID  Toprol XL 25mg QHS  Telmisartan 40mg in the AM  Zetia 10mg once daily  Crestor 40mg once daily     Chronic Lower Back Pain:  Baseline pain of 7/10 with Norco and Tizanadine QID     Imbalance and Mobility Compromised:  Stable with last fall 1-3 months ago.  Using a walker with assist.  Deconditioned with lower extremity weakness     Tremor:  Bilateral upper extremity tremor worsens with sustention or fine motor however persistent at rest-- probable physiologic tremor.  Consider peak dose side effect of Depakote    Sexual Dysfunction:  Conversation with wife and patient today.      OTHER ITEMS:  DVT Ppx: Lovenox and/or SCDs  DIET: Regular  Bowel regimen  PRN analgesics available for pain  PRN antiemetics available    CODE STATUS:   FULL CODE      BILLING DOCUMENTATION:     I spent a total of  30+ minutes of face-to-face time in this visit. Over 50% of the time of the visit today was spent on counseling and/or coordination of care wtih the patient and/or family, as above in assessment in plan. This does not include time spent on separately billable procedures/tests.     Zelda Mclaughlin, EMIGDIO, FNP-BC, APRN  Department of Neurology at [facility]  Phone: [phone]  Fax: [phone]  E-mail: [email or web address]

## 2024-11-14 NOTE — PROGRESS NOTES
Monitor Summary: SB,SR 56-76, OR 0.18, QRS 0.09, QT 0.49, with frequent PVCs, bigeminy, and regular PVCs per strip from monitor room.

## 2024-11-14 NOTE — CARE PLAN
The patient is Stable.    Shift Goals  Clinical Goals: Monitor for seizures, sleep deprivations  Patient Goals: Stay awake until 12  Family Goals: maliha    Progress made toward(s) clinical / shift goals:      Sleep deprivation: discontinued.       Problem: Knowledge Deficit - Standard  Goal: Patient and family/care givers will demonstrate understanding of plan of care, disease process/condition, diagnostic tests and medications  Outcome: Progressing  Note: Pt educated on continued seizure precautions.        Problem: Pain - Standard  Goal: Alleviation of pain or a reduction in pain to the patient’s comfort goal  Outcome: Progressing  Note: Pain medication and cold packs in use for pain control      Problem: Skin Integrity  Goal: Skin integrity is maintained or improved  Outcome: Progressing     Problem: Seizure Precautions  Goal: Implementation of seizure precautions  Outcome: Met  Note: Seizure precautions are in place.

## 2024-11-14 NOTE — PROGRESS NOTES
Monitor Summary: SB/SR 49-67, ID 0.19, QRS 0.09, QT 0.37, with rare couplets, rare bigeminy and rare PVCs per strip from monitor room.

## 2024-11-15 ENCOUNTER — PHARMACY VISIT (OUTPATIENT)
Dept: PHARMACY | Facility: MEDICAL CENTER | Age: 64
End: 2024-11-15
Payer: COMMERCIAL

## 2024-11-15 VITALS
RESPIRATION RATE: 17 BRPM | OXYGEN SATURATION: 95 % | BODY MASS INDEX: 36.67 KG/M2 | HEART RATE: 65 BPM | TEMPERATURE: 97.5 F | WEIGHT: 234.13 LBS | SYSTOLIC BLOOD PRESSURE: 125 MMHG | DIASTOLIC BLOOD PRESSURE: 72 MMHG

## 2024-11-15 PROBLEM — D63.8 ANEMIA IN OTHER CHRONIC DISEASES CLASSIFIED ELSEWHERE: Status: ACTIVE | Noted: 2024-07-26

## 2024-11-15 PROBLEM — G25.0 ESSENTIAL TREMOR: Status: ACTIVE | Noted: 2024-11-15

## 2024-11-15 PROCEDURE — RXMED WILLOW AMBULATORY MEDICATION CHARGE: Performed by: NURSE PRACTITIONER

## 2024-11-15 PROCEDURE — 700102 HCHG RX REV CODE 250 W/ 637 OVERRIDE(OP): Performed by: STUDENT IN AN ORGANIZED HEALTH CARE EDUCATION/TRAINING PROGRAM

## 2024-11-15 PROCEDURE — A9270 NON-COVERED ITEM OR SERVICE: HCPCS | Performed by: NURSE PRACTITIONER

## 2024-11-15 PROCEDURE — 700102 HCHG RX REV CODE 250 W/ 637 OVERRIDE(OP): Performed by: NURSE PRACTITIONER

## 2024-11-15 PROCEDURE — 95718 EEG PHYS/QHP 2-12 HR W/VEEG: CPT | Performed by: STUDENT IN AN ORGANIZED HEALTH CARE EDUCATION/TRAINING PROGRAM

## 2024-11-15 PROCEDURE — 95713 VEEG 2-12 HR CONT MNTR: CPT | Performed by: STUDENT IN AN ORGANIZED HEALTH CARE EDUCATION/TRAINING PROGRAM

## 2024-11-15 PROCEDURE — 90656 IIV3 VACC NO PRSV 0.5 ML IM: CPT | Performed by: STUDENT IN AN ORGANIZED HEALTH CARE EDUCATION/TRAINING PROGRAM

## 2024-11-15 PROCEDURE — 90471 IMMUNIZATION ADMIN: CPT

## 2024-11-15 PROCEDURE — A9270 NON-COVERED ITEM OR SERVICE: HCPCS | Performed by: STUDENT IN AN ORGANIZED HEALTH CARE EDUCATION/TRAINING PROGRAM

## 2024-11-15 PROCEDURE — 700111 HCHG RX REV CODE 636 W/ 250 OVERRIDE (IP): Performed by: STUDENT IN AN ORGANIZED HEALTH CARE EDUCATION/TRAINING PROGRAM

## 2024-11-15 PROCEDURE — 3E02340 INTRODUCTION OF INFLUENZA VACCINE INTO MUSCLE, PERCUTANEOUS APPROACH: ICD-10-PCS | Performed by: STUDENT IN AN ORGANIZED HEALTH CARE EDUCATION/TRAINING PROGRAM

## 2024-11-15 RX ORDER — LAMOTRIGINE 25 MG/1
TABLET ORAL
Qty: 60 TABLET | Refills: 0 | Status: ON HOLD | OUTPATIENT
Start: 2024-11-16 | End: 2024-11-27

## 2024-11-15 RX ORDER — LAMOTRIGINE 25 MG/1
50 TABLET ORAL EVERY MORNING
Qty: 60 TABLET | Refills: 5 | Status: ON HOLD | OUTPATIENT
Start: 2024-11-15 | End: 2024-11-27

## 2024-11-15 RX ORDER — LORAZEPAM 1 MG/1
TABLET ORAL
Qty: 10 TABLET | Refills: 0 | Status: ON HOLD | OUTPATIENT
Start: 2024-11-15 | End: 2025-05-15

## 2024-11-15 RX ADMIN — HYDROCODONE BITARTRATE AND ACETAMINOPHEN 1 TABLET: 10; 325 TABLET ORAL at 05:17

## 2024-11-15 RX ADMIN — EZETIMIBE 10 MG: 10 TABLET ORAL at 05:17

## 2024-11-15 RX ADMIN — INFLUENZA A VIRUS A/VICTORIA/4897/2022 IVR-238 (H1N1) ANTIGEN (FORMALDEHYDE INACTIVATED), INFLUENZA A VIRUS A/CALIFORNIA/122/2022 SAN-022 (H3N2) ANTIGEN (FORMALDEHYDE INACTIVATED), AND INFLUENZA B VIRUS B/MICHIGAN/01/2021 ANTIGEN (FORMALDEHYDE INACTIVATED) 0.5 ML: 15; 15; 15 INJECTION, SUSPENSION INTRAMUSCULAR at 09:57

## 2024-11-15 RX ADMIN — AMIODARONE HYDROCHLORIDE 100 MG: 200 TABLET ORAL at 05:17

## 2024-11-15 RX ADMIN — HYDROCODONE BITARTRATE AND ACETAMINOPHEN 1 TABLET: 10; 325 TABLET ORAL at 09:57

## 2024-11-15 RX ADMIN — FINASTERIDE 5 MG: 5 TABLET, FILM COATED ORAL at 05:17

## 2024-11-15 RX ADMIN — TIZANIDINE 4 MG: 4 TABLET ORAL at 12:22

## 2024-11-15 RX ADMIN — DOCUSATE SODIUM 100 MG: 100 CAPSULE, LIQUID FILLED ORAL at 05:17

## 2024-11-15 RX ADMIN — TELMISARTAN 40 MG: 80 TABLET ORAL at 05:18

## 2024-11-15 RX ADMIN — APIXABAN 5 MG: 5 TABLET, FILM COATED ORAL at 05:17

## 2024-11-15 RX ADMIN — TAMSULOSIN HYDROCHLORIDE 0.4 MG: 0.4 CAPSULE ORAL at 05:18

## 2024-11-15 RX ADMIN — TIZANIDINE 4 MG: 4 TABLET ORAL at 05:18

## 2024-11-15 RX ADMIN — LAMOTRIGINE 25 MG: 25 TABLET ORAL at 05:17

## 2024-11-15 ASSESSMENT — FIBROSIS 4 INDEX: FIB4 SCORE: 2.015810522715878545

## 2024-11-15 ASSESSMENT — PAIN DESCRIPTION - PAIN TYPE
TYPE: ACUTE PAIN
TYPE: CHRONIC PAIN
TYPE: CHRONIC PAIN

## 2024-11-15 NOTE — CARE PLAN
The patient is Stable - Low risk of patient condition declining or worsening    Shift Goals  Clinical Goals: Monitor for seizure activity, safety  Patient Goals: Sleep  Family Goals: amliha    Progress made toward(s) clinical / shift goals:      Problem: Fall Risk  Goal: Patient will remain free from falls  Outcome: Progressing    Bed alarm in place. Pt calls for assistance and is provided appropriate assistive devices when needed. Treaded socks used when ambulating.      Problem: Seizure EEG Monitoring  Goal: Appropriate Monitoring of EEG patient  Outcome: Progressing   Pt on continuous EEG monitoring, along with sitter to monitor seizure-like activity. Seizure precautions in place (i.e. padded side rails, suction and supplemental oxygen available at bedside). PRNs available for breakthrough seizures.      Patient is not progressing towards the following goals:

## 2024-11-15 NOTE — PROGRESS NOTES
Monitor summary: SB/SR 49-78, NC -0.19, QRS -0.10, QT -0.43, with (F) PVCs and (O) bigeminal PVCs per strip from the monitor room.

## 2024-11-15 NOTE — PROGRESS NOTES
Monitor Summary: SB-SR 54-78, RI .0.15, QRS .0.10, QT .0.35 with rare PVCs and rare bigem per strip from monitor room

## 2024-11-15 NOTE — PROGRESS NOTES
Discharge instructions reviewed with pt and his wife. They both verbalized understanding of seizure precautions, follow up appointments, and how to take the lamictal. Pt is dressed and belongings are gathered. Meds to beds are in progress.

## 2024-11-15 NOTE — DISCHARGE SUMMARY
EMU Discharge Summary    ADMISSION DATE: 11/11/2024  6:17 AM    DISCHARGE DATE:: 11/15/2024 Mid-day    REFERRING PROVIDER: Dr Raman Hooper    REASON(S) FOR ADMISSION: Reason for EMU Admission: Characterization of paroxysmal events, Medication titration/optimization, Determine degree of epileptic burden, if any, Determine presence of unrecognized and/or elecrographic seizures, Seizure localization and lateralization, and Control seizure     FOLLOW-UP ITEMS AFTER DISCHARGE:  Outpatient Neurology: Dr Hooper November 25, 2024 at 2:30pm  Outpatient Cardiology GERARDO Richardson December 12, 2024 at 2:30pm  Primary Care Physician    MEDICATIONS ON DISCHARGE:      Medication List        START taking these medications        Instructions   * lamoTRIgine 25 MG Tabs  Commonly known as: LaMICtal   Take 2 Tablets by mouth every morning.  Dose: 50 mg     * lamoTRIgine 25 MG Tabs  Start taking on: November 16, 2024  Commonly known as: LaMICtal   Take 25mg qam X 2 weeks then 50mg qam thereafter.     LORazepam 1 MG Tabs  Commonly known as: Ativan   Take 1/2-1 tablet PO PRN breakthrough seizures.  Do not exceed more than 2 tablets in 24 hours unless directed otherwise by physician.           * This list has 2 medication(s) that are the same as other medications prescribed for you. Read the directions carefully, and ask your doctor or other care provider to review them with you.                CONTINUE taking these medications        Instructions   amiodarone 100 MG tablet  Commonly known as: Cordarone   Take 1 Tablet by mouth every day.  Dose: 100 mg     apixaban 5mg Tabs  Commonly known as: Eliquis   Take 1 Tablet by mouth 2 times a day.  Dose: 5 mg     docusate sodium 100 MG Caps  Commonly known as: Colace   Take 100 mg by mouth 2 times a day.  Dose: 100 mg     ezetimibe 10 MG Tabs  Commonly known as: Zetia   Take 1 Tablet by mouth every day.  Dose: 10 mg     finasteride 5 MG Tabs  Commonly known as: Proscar   Take 5 mg by  mouth every day.  Dose: 5 mg     HYDROcodone/acetaminophen  MG Tabs  Commonly known as: Norco   Take 1 Tablet by mouth see administration instructions. Take 1 tablet SCHEDULED at 05:00, 10:00, 15:00, and 20:00. Max take up to 2 additional tablets throughout the day as needed for pain, up to a maximum of 6 tablets within 24 hours.  Dose: 1 Tablet     metoprolol SR 25 MG Tb24  Commonly known as: Toprol XL   Take 1 Tablet by mouth every evening.  Dose: 25 mg     omeprazole 40 MG delayed-release capsule  Commonly known as: PriLOSEC   Take 40 mg by mouth every evening.  Dose: 40 mg     rosuvastatin 40 MG tablet  Commonly known as: Crestor   Take 40 mg by mouth every evening.  Dose: 40 mg     tamsulosin 0.4 MG capsule  Commonly known as: Flomax   Take 0.4 mg by mouth every day.  Dose: 0.4 mg     telmisartan 40 MG Tabs  Commonly known as: Micardis   Take 1 Tablet by mouth every morning.  Dose: 40 mg     tizanidine 4 MG Tabs  Commonly known as: Zanaflex   Take 4 mg by mouth 4 times a day. Take 1 tablet (4 mg) at 05:00, 10:00, 15:00, and 20:00.  Dose: 4 mg     traZODone 100 MG Tabs  Commonly known as: Desyrel   Take 150 mg by mouth every evening. 1.5 tablets = 150 mg.  Dose: 150 mg     * venlafaxine 150 MG extended-release capsule  Commonly known as: Effexor-XR   Take 1 Capsule by mouth every evening. Take with 1 capsule of venlafaxine XR 75 mg, for a total dose of 225 mg.  Dose: 1 Capsule     * venlafaxine XR 75 MG Cp24  Commonly known as: Effexor XR   Take 1 Capsule by mouth every evening. Take with 1 capsule of venlafaxine  mg, for a total dose of 225 mg.  Dose: 1 Capsule           * This list has 2 medication(s) that are the same as other medications prescribed for you. Read the directions carefully, and ask your doctor or other care provider to review them with you.                STOP taking these medications      divalproex 125 MG EC tablet  Commonly known as: Depakote            ASK your doctor about  "these medications        Instructions   albuterol 108 (90 Base) MCG/ACT Aers inhalation aerosol   Inhale 1-2 Puffs every four hours as needed for Shortness of Breath.  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  This led to transfer to [facility] due to acute left subdural hematoma.  He was treated with bur hole and then craniotomy drainage.  It seems that the patient had no seizures at that time, and Depakote was started prophylactically.  This occurred while he was on Eliquis for A-fibrillation.     Then, in July 2024, he started having episodes suspicious for seizures versus syncope, among other considerations.  These are described under event type #1.       He never had myoclonus, clear convulsions, no isolated staring spells/oral/manual automatisms times, sensations of strange smell/taste/gastric uprising, no clear psychic phenomena.     He is taking low dose Depakote regularly, and except significant weight gain and tremor, no other adverse effects.     Semiology:     Event type #1: \"Seizure\".  Year/Age of Onset: July 2024  Initial features: Feel \"aura\" - described as dizziness, lightheadedness, and like " he is going to pass out. Aware while falling.   Event features: Falls back (typically on the bed - never fell on the floor). Eyes are open. Unresponsive. He experiences brief upper body jerking. No tongue bite nor bladder/bowel incontinence.   Post-ictal features: At times some confusion.   Duration: 4-5 seconds.   Frequency: Initially couple times per week.   Precipitating factors: Typically when getting up too quickly. Walking out of the bathroom or down the hallway for a short distance.  The spell will dissipate if he sits down.     LAST KNOWN SPELL: 11/2/2024 10am Saturday-- he was ambulating through the hallway and felt dizziness.     History of status epilepticus: no  History of physical injury related to seizures: no  History of surgery related to epilepsy: no  Family Planning: N/A  Current Driving Status: He is not driving. [place].  Would like to resume some driving.  Seizure Clusters: Not clear.   Longest Seizure Freedom: Not clear.         VNS: None     Psychiatric History: occasionally frustrated with home life and marital discontent-- frustrated sexually.  Historically enjoys firearm practice.     Current Psychiatric Comorbidites: weight gain of 90 pounds in 1 year and now with a loss of similar proportion.     Sleep: Insomnia is moderate, sleeps soundly.     History of Sleep Apnea: denies snoring, wife reports snoring.  Alcohol use: No  Tobacco Use:No  Marijuana Use: No        CARDIOLOGY:     Status post right knee replacement in July 2024, with subsequent neurological complications/confusion, pneumonia and AFib.      AFib with RVR, now in sinus rhythm on Amiodarone 100mg and Toprol XL 25mg QHS. Doses are clarified with patient. ZioPatch to be obtained and 14 day monitoring to be completed on 11/13/2024.     Chronic anticoagulation with Eliquis 5mg BID.  CAD, status post PCI/NAOMY to the RCA in 2020. No angina or shortness of breath.      Continue:  ASA 81mg once daily-- NOW ELEQUIS 5mg  "BID  Toprol XL 25mg QHS  Telmisartan 40mg in the AM  Zetia 10mg once daily  Crestor 40mg once daily     5. Hypertension, treated with Toprol and Telmisartan, stable. BP is good today.  6. Hyperlipidemia, treated with Zetia and Crestor. To repeat fasting lipid panel.  7. Diabetes mellitus, managed with diet for now.          MRI brain studies:              - MRI brain - none available for my review.      CT head studies:              - CT head wo contrast (08/25/2022 at [facility]): \"Stable LEFT hemispheric acute subdural hematoma with associated mass effect and 8 mm LEFT to RIGHT midline shift.  No significant change from prior.\"        EEG studies:              - Standard EEG (08/31/2022, Dr. Blanca): This is an abnormal video EEG recording in the obtunded state.   1)The diffuse background slowing is consistent with moderate encephalopathy.   2)The presence of intermittent left hemisphere slowing is suggestive of focal neuronal dysfunction.   3) Upward gaze and sometime left gaze deviation were noted, no ictal EEG correlate. No seizure seen.   4) No epileptiform discharges or seizures were seen. This does not preclude a diagnosis of epilepsy.                 - Standard EEG (03/28/2023, Dr. Hooper):  This was an abnormal EEG during wakefulness and drowsiness due to:  Very mild diffuse slowing of the background, suggestive of diffuse and/or multifocal cerebral dysfunction.  This finding might be seen in a myriad of encephalopathies and in itself is a nonspecific finding.  The presence of continuous, left hemispheric, maximal over the left temporal region, focal slowing, is suggestive of additional cerebral dysfunction in that region.  This finding might be seen in context of structural lesion, among other considerations.  Clinical and radiological correlation is recommended.  The presence of embedded sharply contoured waveforms in the above described left-sided focal slowing might be suggestive of increased propensity " toward focal seizures arising from this region.  The presence of higher amplitudes and overriding faster frequencies over the left side is suggestive of breach rhythm, and is consistent with the provided surgical history.  There were no electrographic seizures captured.  Single lead EKG showed occasional PVCs - please correlate clinically.                  - Standard EEG (08/17/2023, Dr. Martínez):  This was an abnormal EEG during wakefulness and drowsiness due to:  Very mild diffuse slowing of the background, suggestive of diffuse and/or multifocal cerebral dysfunction.  This finding might be seen in a myriad of encephalopathies and in itself is a nonspecific finding.  The presence of continuous, left hemispheric, maximal over the left temporal region, focal slowing, is suggestive of additional cerebral dysfunction in that region.  This finding might be seen in context of structural lesion, among other considerations.  Clinical and radiological correlation is recommended.  The presence of embedded sharply contoured waveforms in the above described left-sided focal slowing might be suggestive of increased propensity toward focal seizures arising from this region.  The presence of higher amplitudes and overriding faster frequencies over the left side is suggestive of breach rhythm, and is consistent with the provided surgical history.  There were no electrographic seizures captured.  Single lead EKG showed occasional PVCs - please correlate clinically.     HOSPITAL COURSE:    DAY 1:    Ms Wiley admitted for cVEEG monitoring and without concerns until 1315.  He developed a hypertensive crisis with /110 and HR in 50's as well as endorsed anxiousness.  Labetalol X 1 oral given.  1630: BP sustained at 172/104.  Zio Patch 14 Day course being worn with removal on Wednesday.    Of note, surveillance labs revealed microcytic anemia and mild hypokalemia.      DAY 2:    No concern for events.  EEG findings per Dr Saunders:   "Mildly slow with intermittent left >right bitemporal slowing.  No seizures or epileptiform.  Refer to report for complete details.  Tapered off Depakote with last dose on Day 1.  Mild headache however bilateral upper extremity tremor greatly improved.         DAY 3:   Approximately 0300 he \"startled awake\" and was unaware of why.  He has not slept well since then.        2025, pt was up to the bathroom,  He endorsed dizziness, leg weakness, anxiousness and exhibited upper body shakiness. BP: 134/83, HR 63-- orthostatic presyncope.     Medical condition changes: slight cough.        DAY 4-5:    Patient endorsed no further events or concerns.  He slept well however Pain scale persisted as average of 7/10 for chronic lower back pain.  Essential tremor mild after tapering off Depakote.  Tolerating new start low dose of Lamotrigine which was started in the hopes of side effects of energy and mental clarity.  Hypertension persisted throughout the 5 day stay.  ZIO patch was mailed back per instructions, on Wednesday, 11/13/2024.    Discussed with patient his concern for sexual dysfunction and performance.    Discussed that he is not do drive until cleared per cardiology.  Fluctuations of blood pressure and physiologic response needs to be addressed.  He is medically not cleared to drive at this time.  Instructed to further discuss with Cardiology and PCP.  He is also encouraged to increase his activity level, healthy nutrition and hydration, and stress reduction with reducing social isolationism.    DISCHARGE PLAN:  Lamotrigine 25mg qam X 2 weeks then 50mg qam thereafter.  Ideally will transition to an extended tablet with further titration to 75mg-100mg daily.      EEG SUMMARY: Per Dr Saunders      TECHNIQUE:   CVEEG was set up by a Neurodiagnostic technologist who performed education to the patient and staff. A minimum of 23 electrodes and 23 channel recording was setup and performed by Neurodiagnostic technologist, in " accordance with the international 10-20 system. Impedence, electrode integrity, and technical impressions were documented a minimum of every 2-24 hour period by a Neurodiagnostic Technologist and reviewed by Interpreting Physician. The study was reviewed in bipolar and referential montages. The recording examined the patient in the awake, drowsy, and sleep state(s).      DESCRIPTION OF THE RECORD:  During wakefulness, the background was continuous and showed a 9 Hz posterior dominant rhythm, with a mixture of mostly excess theta>delta, alpha, and overriding faster frequencies.  There was reactivity to eye closure/opening.  A normal anterior-posterior gradient was noted with faster beta frequencies seen anteriorly.  During drowsiness, theta/delta frequencies were seen.     Sleep was captured and was characterized by diffuse background delta/theta activity with a loss of myogenic artifact.  N2 sleep transients in the form of sleep spindles and vertex waves were seen in the leads over the central regions.      ICTAL AND INTERICTAL FINDINGS:   No focal or generalized epileptiform activity noted.      Occasional  left temporal>right fronto-central breach rhythm and theta/delta slowing, occasional quasi-rhythmic or rhythmic at 1-1.5 Hz for 2-4 seconds. There was also rare to occasional independent right temporal or right hemispheric theta>delta slowing and amplitude attenuation.   These findings are indicative of focal dysfunction in these regions, worse over the left. Also, the presence of focal rhythmic slowing over the left may indicate an increased risk for focal seizures over that region.     No seizures     ACTIVATION PROCEDURES:   NA     EKG: Sampling of the EKG recording showed sinus rhythm was occasional PACs         EVENTS:    No clinical events     INTERPRETATION:  Abnormal, technically-limited video EEG recording in the awake, drowsy, and sleep state(s):  -Mild intermittent background slowing suggestive of  diffuse/multifocal cerebral dysfunction and consistent with a non-specific encephalopathy. Clinical correlation recommended.   -No seizures.   -Occasional  left temporal>right fronto-central breach rhythm and theta/delta slowing, occasional quasi-rhythmic or rhythmic at 1-1.5 Hz for 2-4 seconds. There was also rare to occasional independent right temporal or right hemispheric theta>delta slowing and amplitude attenuation.   These findings are indicative of focal dysfunction in these regions, worse over the left. Also, the presence of focal rhythmic slowing over the left may indicate an increased risk for focal seizures over that region.  Clinical and radiographic correlation recommended.  -No definitive epileptiform discharges were seen.  -Clinical events: None  -Compared to yesterday's study, this study is similar        EXAM ON DATE OF DISCHARGE:    Physical/Neurological Exam at discharge if different than Admission Exam: NA    Therefore,  patient is discharged in fair and stable condition to home with close outpatient follow-up.    The patient met 2-midnight criteria for an inpatient stay at the time of discharge.    CODE STATUS: Full Code    POST DISCHARGE DIET RECOMMENDATION: Regular Diet    POST-DISCHARGE ACTIVITY RECOMMENDATIONS:  As tolerated.  Exercise encouraged.  Weight bearing as tolerated    RECOMMENDATIONS FROM CONSULTANTS IF APPLICABLE:   Follow-up with all specialist.    PROCEDURES: Long-Term Video EEG    BILLING DOCUMENTATION:  Total time of the discharge process exceeded 30+ minutes.    Zelda Mclaughlin, EMIGDIO, FNP-BC, APRN  Department of Neurology at [facility]  Phone: [phone]  Fax: [phone]  E-mail: [email or web address]

## 2024-11-15 NOTE — PROCEDURES
VIDEO ELECTROENCEPHALOGRAM REPORT - EPILEPSY MONITORING UNIT (EMU)     REFERRING PROVIDER: Dr. Hooper  DOS: 11/15/2024   ROOM: [place] [id number]   TOTAL RECORDING TIME: 2 hours and 26 minutes of total recording time    INDICATION:  Karan Wiley 64 y.o. male presenting with seizure(s) and altered mental status    RELEVANT MEDICATIONS/TREATMENTS:   Lamictal 25 mg esdras    TECHNIQUE:   CVEEG was set up by a Neurodiagnostic technologist who performed education to the patient and staff. A minimum of 23 electrodes and 23 channel recording was setup and performed by Neurodiagnostic technologist, in accordance with the international 10-20 system. Impedence, electrode integrity, and technical impressions were documented a minimum of every 2-24 hour period by a Neurodiagnostic Technologist and reviewed by Interpreting Physician. The study was reviewed in bipolar and referential montages. The recording examined the patient in the awake, drowsy, and sleep state(s).     DESCRIPTION OF THE RECORD:  During wakefulness, the background was continuous and showed a 9 Hz posterior dominant rhythm, with a mixture of mostly excess theta>delta, alpha, and overriding faster frequencies.  There was reactivity to eye closure/opening.  A normal anterior-posterior gradient was noted with faster beta frequencies seen anteriorly.  During drowsiness, theta/delta frequencies were seen.    Sleep was captured and was characterized by diffuse background delta/theta activity with a loss of myogenic artifact.  N2 sleep transients in the form of sleep spindles and vertex waves were seen in the leads over the central regions.     ICTAL AND INTERICTAL FINDINGS:   No focal or generalized epileptiform activity noted.     Occasional  left temporal>right fronto-central breach rhythm and theta/delta slowing, occasional quasi-rhythmic or rhythmic at 1-1.5 Hz for 2-4 seconds. There was also rare to occasional independent right temporal or right hemispheric  theta>delta slowing and amplitude attenuation.   These findings are indicative of focal dysfunction in these regions, worse over the left. Also, the presence of focal rhythmic slowing over the left may indicate an increased risk for focal seizures over that region.    No seizures    ACTIVATION PROCEDURES:   NA    EKG: Sampling of the EKG recording showed sinus rhythm was occasional PACs       EVENTS:    No clinical events    INTERPRETATION:  Abnormal, technically-limited video EEG recording in the awake, drowsy, and sleep state(s):  -Mild intermittent background slowing suggestive of diffuse/multifocal cerebral dysfunction and consistent with a non-specific encephalopathy. Clinical correlation recommended.   -No seizures.   -Occasional  left temporal>right fronto-central breach rhythm and theta/delta slowing, occasional quasi-rhythmic or rhythmic at 1-1.5 Hz for 2-4 seconds. There was also rare to occasional independent right temporal or right hemispheric theta>delta slowing and amplitude attenuation.   These findings are indicative of focal dysfunction in these regions, worse over the left. Also, the presence of focal rhythmic slowing over the left may indicate an increased risk for focal seizures over that region.  Clinical and radiographic correaltion recommended.  -No definitive epileptiform discharges were seen.  -Clinical events: None  -Compared to yesterday's study, this study is similar      Braden Saunders MD  Department of Neurology at [facility]  General Neurologist and Epileptologist  Director of [facility]'s Level III Comprehensive Epilepsy Program  Professor of Clinical Neurology, [facility].   Phone: [phone]  Fax: [phone]  E-mail: [email or web address]

## 2024-11-15 NOTE — DISCHARGE INSTRUCTIONS
PATIENT INSTRUCTIONS:    Continue Lamotrigine 25mg each morning X 14 days then increase to 50mg each morning until appointment with Neurology.  New prescription for lorazepam 1mg tablet for urgent seizure rescue only.      Please let our office know if you have any changes in your seizure frequency and/characteristics. Otherwise, please keep the diary of your events and bring it with you at the time of your next follow up visit with our office.     Please take vitamin D3 [id number] internation units daily.     Please note that the following might precipitate seizures: missed doses of antiseizure medications, being sick with fever, stress, fatigue, sleep deprivation, not eating regularly, not drinking enough water, drinking too much alcohol, stopping alcohol suddenly if you are currently using it on a regular/daily basis, and/or using recreational drugs, among others.    Please note that the following might lead to an injury, potentially a life-threatening injury, in case you have a seizure and/or lose awareness while:   - being in a large body of water by yourself, such as bath, pool, lake, ocean, among others (risk of drowning)   - being on unprotected heights (risk of fall)   - being around and/or operating heavy machinery (risk of injury)   - being around open fire/hot surfaces (risk of burns)   - any other activities/circumstances, in which if you lose awareness, you might injure yourself and/or others.    Please call for help (crisis line and/or 911) in case you have thoughts of harming yourself and/or others.    Please abstain from driving until further notice.    ------------------------------------------------------------------------------------------  Instructions for your family/caregivers:  Please call 911 if the patient has a seizure longer than 2-3 minutes, if seizures are back to back without him recovering to his baseline, or he does not start recovering within 5-10 minutes after the seizure stops.  During the seizure - please turn him on his side, please make sure his head is protected (for example, you should put a pillow under his head, if one is available), and please do not put anything in his mouth.   -------------------------------------------------------------------------------------------    It is important that your seizures are well controlled and you have none or have them rarely. In addition to avoiding injury related to breakthrough seizures, frequent seizures increase risk of SUDEP (sudden unexpected death in epilepsy), where a person goes into a seizure and then never wakes up - this is a rare complication of seizure disorder; one of the best available ways to prevent it is to control your seizures well.     Due to the high volume of patients we are trying to help, your physician will not be able to respond by phone or in [place] to your routine concerns between appointments.  This does not reflect a lack of interest or concern for you or your diagnosis.  Please bring these questions and concerns to your appointment where your physician can answer.  Please relay more pressing concerns to our office, either via [facility], or by phone; if not able to reach us please visit nearby Urgent Care Center or Emergency Department.  If any emergent medical needs, please seek emergent medical help and/or call 911.    Please note that we are not able to fill out paperwork that might be related to your work, utility company, disability, and/or driving, among others, in between the visits.  Please schedule a dedicated appointment to address your paperwork, so we can do that in a timely manner.  This is not due to lack of concern or interest for your disease-related work/administrative problems, but to make sure that we provide the best possible care and to fill out your paperwork in a correct and timely manner.    Thank you for entrusting your neurological care to [facility] and we look forward to  continuing to serve you.

## 2024-11-18 ENCOUNTER — TELEPHONE (OUTPATIENT)
Dept: HEALTH INFORMATION MANAGEMENT | Facility: OTHER | Age: 64
End: 2024-11-18
Payer: OTHER GOVERNMENT

## 2024-11-18 ENCOUNTER — TELEPHONE (OUTPATIENT)
Dept: CARDIOLOGY | Facility: MEDICAL CENTER | Age: 64
End: 2024-11-18
Payer: OTHER GOVERNMENT

## 2024-11-18 NOTE — TELEPHONE ENCOUNTER
MARY JANE EOS to AB's nurse, Scarlet, on 11/18/2024    Preliminary findings:    Sinus rhythm  with an avg rate of 55 bpm    Rare isolated SVE(s), no noted couplets or triplets    Rare ventricular ectopy with the presence of bigeminy and trigeminy also noted    4 patient events associated with:  SR 49-73  SVE(s)  VE(s)

## 2024-11-24 ENCOUNTER — HOSPITAL ENCOUNTER (OUTPATIENT)
Dept: RADIOLOGY | Facility: MEDICAL CENTER | Age: 64
End: 2024-11-24
Payer: OTHER GOVERNMENT

## 2024-11-24 ENCOUNTER — HOSPITAL ENCOUNTER (INPATIENT)
Facility: MEDICAL CENTER | Age: 64
End: 2024-11-24
Attending: STUDENT IN AN ORGANIZED HEALTH CARE EDUCATION/TRAINING PROGRAM | Admitting: STUDENT IN AN ORGANIZED HEALTH CARE EDUCATION/TRAINING PROGRAM
Payer: OTHER GOVERNMENT

## 2024-11-24 DIAGNOSIS — I77.810 AORTIC ROOT DILATATION (HCC): ICD-10-CM

## 2024-11-24 PROBLEM — R56.9 SEIZURES (HCC): Status: ACTIVE | Noted: 2024-11-24

## 2024-11-24 LAB
BASOPHILS # BLD AUTO: 0.5 % (ref 0–1.8)
BASOPHILS # BLD: 0.04 K/UL (ref 0–0.12)
EOSINOPHIL # BLD AUTO: 0.04 K/UL (ref 0–0.51)
EOSINOPHIL NFR BLD: 0.5 % (ref 0–6.9)
ERYTHROCYTE [DISTWIDTH] IN BLOOD BY AUTOMATED COUNT: 45.1 FL (ref 35.9–50)
HCT VFR BLD AUTO: 42.9 % (ref 42–52)
HGB BLD-MCNC: 13.8 G/DL (ref 14–18)
IMM GRANULOCYTES # BLD AUTO: 0.04 K/UL (ref 0–0.11)
IMM GRANULOCYTES NFR BLD AUTO: 0.5 % (ref 0–0.9)
LYMPHOCYTES # BLD AUTO: 1.36 K/UL (ref 1–4.8)
LYMPHOCYTES NFR BLD: 16.4 % (ref 22–41)
MCH RBC QN AUTO: 28.6 PG (ref 27–33)
MCHC RBC AUTO-ENTMCNC: 32.2 G/DL (ref 32.3–36.5)
MCV RBC AUTO: 89 FL (ref 81.4–97.8)
MONOCYTES # BLD AUTO: 0.56 K/UL (ref 0–0.85)
MONOCYTES NFR BLD AUTO: 6.7 % (ref 0–13.4)
NEUTROPHILS # BLD AUTO: 6.27 K/UL (ref 1.82–7.42)
NEUTROPHILS NFR BLD: 75.4 % (ref 44–72)
NRBC # BLD AUTO: 0 K/UL
NRBC BLD-RTO: 0 /100 WBC (ref 0–0.2)
PLATELET # BLD AUTO: 260 K/UL (ref 164–446)
PMV BLD AUTO: 11.5 FL (ref 9–12.9)
RBC # BLD AUTO: 4.82 M/UL (ref 4.7–6.1)
WBC # BLD AUTO: 8.3 K/UL (ref 4.8–10.8)

## 2024-11-24 PROCEDURE — 84100 ASSAY OF PHOSPHORUS: CPT

## 2024-11-24 PROCEDURE — 770020 HCHG ROOM/CARE - TELE (206)

## 2024-11-24 PROCEDURE — 36415 COLL VENOUS BLD VENIPUNCTURE: CPT

## 2024-11-24 PROCEDURE — 80175 DRUG SCREEN QUAN LAMOTRIGINE: CPT

## 2024-11-24 PROCEDURE — 93005 ELECTROCARDIOGRAM TRACING: CPT | Performed by: STUDENT IN AN ORGANIZED HEALTH CARE EDUCATION/TRAINING PROGRAM

## 2024-11-24 PROCEDURE — 83735 ASSAY OF MAGNESIUM: CPT

## 2024-11-24 PROCEDURE — 85025 COMPLETE CBC W/AUTO DIFF WBC: CPT

## 2024-11-24 PROCEDURE — 84146 ASSAY OF PROLACTIN: CPT

## 2024-11-24 PROCEDURE — 84443 ASSAY THYROID STIM HORMONE: CPT

## 2024-11-24 PROCEDURE — 83605 ASSAY OF LACTIC ACID: CPT

## 2024-11-24 PROCEDURE — 80053 COMPREHEN METABOLIC PANEL: CPT

## 2024-11-24 PROCEDURE — 99223 1ST HOSP IP/OBS HIGH 75: CPT | Performed by: STUDENT IN AN ORGANIZED HEALTH CARE EDUCATION/TRAINING PROGRAM

## 2024-11-24 RX ORDER — LORAZEPAM 2 MG/ML
2 INJECTION INTRAMUSCULAR
Status: DISCONTINUED | OUTPATIENT
Start: 2024-11-24 | End: 2024-12-01 | Stop reason: HOSPADM

## 2024-11-24 RX ORDER — LEVETIRACETAM 500 MG/5ML
1500 INJECTION, SOLUTION, CONCENTRATE INTRAVENOUS ONCE
Status: COMPLETED | OUTPATIENT
Start: 2024-11-25 | End: 2024-11-25

## 2024-11-24 RX ORDER — LABETALOL HYDROCHLORIDE 5 MG/ML
20 INJECTION, SOLUTION INTRAVENOUS EVERY 4 HOURS PRN
Status: DISCONTINUED | OUTPATIENT
Start: 2024-11-24 | End: 2024-11-25

## 2024-11-24 ASSESSMENT — PAIN DESCRIPTION - PAIN TYPE: TYPE: CHRONIC PAIN

## 2024-11-24 ASSESSMENT — FIBROSIS 4 INDEX
FIB4 SCORE: 6.49
FIB4 SCORE: 2.015810522715878545

## 2024-11-25 ENCOUNTER — TELEPHONE (OUTPATIENT)
Dept: NEUROLOGY | Facility: MEDICAL CENTER | Age: 64
End: 2024-11-25
Payer: OTHER GOVERNMENT

## 2024-11-25 ENCOUNTER — TELEPHONE (OUTPATIENT)
Dept: CARDIOLOGY | Facility: MEDICAL CENTER | Age: 64
End: 2024-11-25
Payer: OTHER GOVERNMENT

## 2024-11-25 ENCOUNTER — APPOINTMENT (OUTPATIENT)
Dept: NEUROLOGY | Facility: MEDICAL CENTER | Age: 64
End: 2024-11-25
Attending: PSYCHIATRY & NEUROLOGY
Payer: OTHER GOVERNMENT

## 2024-11-25 PROBLEM — Z71.89 ADVANCE CARE PLANNING: Status: ACTIVE | Noted: 2023-03-17

## 2024-11-25 PROBLEM — G40.919 BREAKTHROUGH SEIZURE (HCC): Status: ACTIVE | Noted: 2024-11-24

## 2024-11-25 LAB
ALBUMIN SERPL BCP-MCNC: 3.6 G/DL (ref 3.2–4.9)
ALBUMIN/GLOB SERPL: 1.4 G/DL
ALP SERPL-CCNC: 72 U/L (ref 30–99)
ALT SERPL-CCNC: 20 U/L (ref 2–50)
ANION GAP SERPL CALC-SCNC: 11 MMOL/L (ref 7–16)
AST SERPL-CCNC: 118 U/L (ref 12–45)
BILIRUB SERPL-MCNC: 0.4 MG/DL (ref 0.1–1.5)
BUN SERPL-MCNC: 6 MG/DL (ref 8–22)
CALCIUM ALBUM COR SERPL-MCNC: 9.5 MG/DL (ref 8.5–10.5)
CALCIUM SERPL-MCNC: 9.2 MG/DL (ref 8.5–10.5)
CHLORIDE SERPL-SCNC: 103 MMOL/L (ref 96–112)
CO2 SERPL-SCNC: 24 MMOL/L (ref 20–33)
CREAT SERPL-MCNC: 0.79 MG/DL (ref 0.5–1.4)
EKG IMPRESSION: NORMAL
GFR SERPLBLD CREATININE-BSD FMLA CKD-EPI: 99 ML/MIN/1.73 M 2
GLOBULIN SER CALC-MCNC: 2.6 G/DL (ref 1.9–3.5)
GLUCOSE SERPL-MCNC: 104 MG/DL (ref 65–99)
LACTATE SERPL-SCNC: 1.6 MMOL/L (ref 0.5–2)
MAGNESIUM SERPL-MCNC: 2 MG/DL (ref 1.5–2.5)
PHOSPHATE SERPL-MCNC: 3.3 MG/DL (ref 2.5–4.5)
POTASSIUM SERPL-SCNC: 3.2 MMOL/L (ref 3.6–5.5)
PROLACTIN SERPL-MCNC: 9.39 NG/ML (ref 2.1–17.7)
PROT SERPL-MCNC: 6.2 G/DL (ref 6–8.2)
SODIUM SERPL-SCNC: 138 MMOL/L (ref 135–145)
TSH SERPL DL<=0.005 MIU/L-ACNC: 2.13 UIU/ML (ref 0.38–5.33)

## 2024-11-25 PROCEDURE — 700111 HCHG RX REV CODE 636 W/ 250 OVERRIDE (IP): Mod: JG | Performed by: STUDENT IN AN ORGANIZED HEALTH CARE EDUCATION/TRAINING PROGRAM

## 2024-11-25 PROCEDURE — 700102 HCHG RX REV CODE 250 W/ 637 OVERRIDE(OP): Performed by: STUDENT IN AN ORGANIZED HEALTH CARE EDUCATION/TRAINING PROGRAM

## 2024-11-25 PROCEDURE — 99497 ADVNCD CARE PLAN 30 MIN: CPT | Performed by: STUDENT IN AN ORGANIZED HEALTH CARE EDUCATION/TRAINING PROGRAM

## 2024-11-25 PROCEDURE — 95819 EEG AWAKE AND ASLEEP: CPT | Performed by: STUDENT IN AN ORGANIZED HEALTH CARE EDUCATION/TRAINING PROGRAM

## 2024-11-25 PROCEDURE — A9270 NON-COVERED ITEM OR SERVICE: HCPCS | Performed by: STUDENT IN AN ORGANIZED HEALTH CARE EDUCATION/TRAINING PROGRAM

## 2024-11-25 PROCEDURE — 770020 HCHG ROOM/CARE - TELE (206)

## 2024-11-25 PROCEDURE — 99233 SBSQ HOSP IP/OBS HIGH 50: CPT | Performed by: STUDENT IN AN ORGANIZED HEALTH CARE EDUCATION/TRAINING PROGRAM

## 2024-11-25 PROCEDURE — 93010 ELECTROCARDIOGRAM REPORT: CPT | Performed by: INTERNAL MEDICINE

## 2024-11-25 PROCEDURE — 4A10X4Z MONITORING OF CENTRAL NERVOUS ELECTRICAL ACTIVITY, EXTERNAL APPROACH: ICD-10-PCS | Performed by: STUDENT IN AN ORGANIZED HEALTH CARE EDUCATION/TRAINING PROGRAM

## 2024-11-25 PROCEDURE — 95819 EEG AWAKE AND ASLEEP: CPT | Mod: 26 | Performed by: STUDENT IN AN ORGANIZED HEALTH CARE EDUCATION/TRAINING PROGRAM

## 2024-11-25 PROCEDURE — 99222 1ST HOSP IP/OBS MODERATE 55: CPT | Mod: 25 | Performed by: STUDENT IN AN ORGANIZED HEALTH CARE EDUCATION/TRAINING PROGRAM

## 2024-11-25 RX ORDER — LAMOTRIGINE 100 MG/1
100 TABLET ORAL 2 TIMES DAILY
Status: DISCONTINUED | OUTPATIENT
Start: 2024-12-10 | End: 2024-12-01 | Stop reason: HOSPADM

## 2024-11-25 RX ORDER — LAMOTRIGINE 100 MG/1
50 TABLET ORAL 2 TIMES DAILY
Status: DISCONTINUED | OUTPATIENT
Start: 2024-12-03 | End: 2024-12-01 | Stop reason: HOSPADM

## 2024-11-25 RX ORDER — HYDRALAZINE HYDROCHLORIDE 20 MG/ML
10 INJECTION INTRAMUSCULAR; INTRAVENOUS EVERY 6 HOURS PRN
Status: DISCONTINUED | OUTPATIENT
Start: 2024-11-25 | End: 2024-11-30

## 2024-11-25 RX ORDER — ALBUTEROL SULFATE 90 UG/1
1-2 INHALANT RESPIRATORY (INHALATION) EVERY 4 HOURS PRN
Status: DISCONTINUED | OUTPATIENT
Start: 2024-11-25 | End: 2024-12-01 | Stop reason: HOSPADM

## 2024-11-25 RX ORDER — HYDROCODONE BITARTRATE AND ACETAMINOPHEN 10; 325 MG/1; MG/1
1 TABLET ORAL 4 TIMES DAILY
Status: DISCONTINUED | OUTPATIENT
Start: 2024-11-25 | End: 2024-11-29

## 2024-11-25 RX ORDER — LAMOTRIGINE 100 MG/1
50 TABLET ORAL DAILY
Status: DISCONTINUED | OUTPATIENT
Start: 2024-11-25 | End: 2024-11-25

## 2024-11-25 RX ORDER — PROMETHAZINE HYDROCHLORIDE 25 MG/1
12.5-25 TABLET ORAL EVERY 4 HOURS PRN
Status: DISCONTINUED | OUTPATIENT
Start: 2024-11-25 | End: 2024-12-01 | Stop reason: HOSPADM

## 2024-11-25 RX ORDER — ROSUVASTATIN CALCIUM 20 MG/1
40 TABLET, COATED ORAL EVERY EVENING
Status: DISCONTINUED | OUTPATIENT
Start: 2024-11-25 | End: 2024-12-01 | Stop reason: HOSPADM

## 2024-11-25 RX ORDER — AMIODARONE HYDROCHLORIDE 200 MG/1
100 TABLET ORAL DAILY
Status: DISCONTINUED | OUTPATIENT
Start: 2024-11-25 | End: 2024-12-01 | Stop reason: HOSPADM

## 2024-11-25 RX ORDER — ACETAMINOPHEN 325 MG/1
650 TABLET ORAL EVERY 6 HOURS PRN
Status: DISCONTINUED | OUTPATIENT
Start: 2024-11-25 | End: 2024-12-01 | Stop reason: HOSPADM

## 2024-11-25 RX ORDER — LEVETIRACETAM 500 MG/1
1000 TABLET ORAL 2 TIMES DAILY
Status: DISCONTINUED | OUTPATIENT
Start: 2024-11-25 | End: 2024-12-01 | Stop reason: HOSPADM

## 2024-11-25 RX ORDER — LORAZEPAM 2 MG/ML
0.5 INJECTION INTRAMUSCULAR EVERY 4 HOURS PRN
Status: DISCONTINUED | OUTPATIENT
Start: 2024-11-25 | End: 2024-12-01 | Stop reason: HOSPADM

## 2024-11-25 RX ORDER — ONDANSETRON 2 MG/ML
4 INJECTION INTRAMUSCULAR; INTRAVENOUS EVERY 4 HOURS PRN
Status: DISCONTINUED | OUTPATIENT
Start: 2024-11-25 | End: 2024-11-25

## 2024-11-25 RX ORDER — LAMOTRIGINE 25 MG/1
25 TABLET ORAL 2 TIMES DAILY
Status: DISCONTINUED | OUTPATIENT
Start: 2024-11-25 | End: 2024-11-25

## 2024-11-25 RX ORDER — LABETALOL HYDROCHLORIDE 5 MG/ML
10 INJECTION, SOLUTION INTRAVENOUS EVERY 4 HOURS PRN
Status: DISCONTINUED | OUTPATIENT
Start: 2024-11-25 | End: 2024-12-01 | Stop reason: HOSPADM

## 2024-11-25 RX ORDER — TELMISARTAN 80 MG/1
40 TABLET ORAL EVERY MORNING
Status: DISCONTINUED | OUTPATIENT
Start: 2024-11-25 | End: 2024-11-27

## 2024-11-25 RX ORDER — VENLAFAXINE HYDROCHLORIDE 75 MG/1
75 CAPSULE, EXTENDED RELEASE ORAL EVERY EVENING
Status: DISCONTINUED | OUTPATIENT
Start: 2024-11-25 | End: 2024-11-25

## 2024-11-25 RX ORDER — EZETIMIBE 10 MG/1
10 TABLET ORAL DAILY
Status: DISCONTINUED | OUTPATIENT
Start: 2024-11-25 | End: 2024-12-01 | Stop reason: HOSPADM

## 2024-11-25 RX ORDER — TRAZODONE HYDROCHLORIDE 100 MG/1
150 TABLET ORAL EVERY EVENING
Status: DISCONTINUED | OUTPATIENT
Start: 2024-11-25 | End: 2024-12-01 | Stop reason: HOSPADM

## 2024-11-25 RX ORDER — POTASSIUM CHLORIDE 1500 MG/1
40 TABLET, EXTENDED RELEASE ORAL
Status: COMPLETED | OUTPATIENT
Start: 2024-11-25 | End: 2024-11-25

## 2024-11-25 RX ORDER — PROCHLORPERAZINE EDISYLATE 5 MG/ML
5-10 INJECTION INTRAMUSCULAR; INTRAVENOUS EVERY 4 HOURS PRN
Status: DISCONTINUED | OUTPATIENT
Start: 2024-11-25 | End: 2024-12-01 | Stop reason: HOSPADM

## 2024-11-25 RX ORDER — ONDANSETRON 4 MG/1
4 TABLET, ORALLY DISINTEGRATING ORAL EVERY 4 HOURS PRN
Status: DISCONTINUED | OUTPATIENT
Start: 2024-11-25 | End: 2024-11-25

## 2024-11-25 RX ORDER — VENLAFAXINE 75 MG/1
225 TABLET ORAL EVERY EVENING
Status: DISCONTINUED | OUTPATIENT
Start: 2024-11-25 | End: 2024-12-01 | Stop reason: HOSPADM

## 2024-11-25 RX ORDER — PROMETHAZINE HYDROCHLORIDE 25 MG/1
12.5-25 SUPPOSITORY RECTAL EVERY 4 HOURS PRN
Status: DISCONTINUED | OUTPATIENT
Start: 2024-11-25 | End: 2024-12-01 | Stop reason: HOSPADM

## 2024-11-25 RX ORDER — LAMOTRIGINE 25 MG/1
25 TABLET ORAL 2 TIMES DAILY
Status: DISCONTINUED | OUTPATIENT
Start: 2024-11-26 | End: 2024-12-01 | Stop reason: HOSPADM

## 2024-11-25 RX ORDER — TAMSULOSIN HYDROCHLORIDE 0.4 MG/1
0.4 CAPSULE ORAL DAILY
Status: DISCONTINUED | OUTPATIENT
Start: 2024-11-25 | End: 2024-12-01 | Stop reason: HOSPADM

## 2024-11-25 RX ORDER — METOPROLOL SUCCINATE 25 MG/1
25 TABLET, EXTENDED RELEASE ORAL EVERY EVENING
Status: DISCONTINUED | OUTPATIENT
Start: 2024-11-25 | End: 2024-11-29

## 2024-11-25 RX ORDER — FINASTERIDE 5 MG/1
5 TABLET, FILM COATED ORAL DAILY
Status: DISCONTINUED | OUTPATIENT
Start: 2024-11-25 | End: 2024-12-01 | Stop reason: HOSPADM

## 2024-11-25 RX ADMIN — TIZANIDINE 4 MG: 4 TABLET ORAL at 08:16

## 2024-11-25 RX ADMIN — APIXABAN 5 MG: 5 TABLET, FILM COATED ORAL at 06:34

## 2024-11-25 RX ADMIN — EZETIMIBE 10 MG: 10 TABLET ORAL at 06:35

## 2024-11-25 RX ADMIN — APIXABAN 5 MG: 5 TABLET, FILM COATED ORAL at 18:03

## 2024-11-25 RX ADMIN — HYDROCODONE BITARTRATE AND ACETAMINOPHEN 1 TABLET: 10; 325 TABLET ORAL at 18:03

## 2024-11-25 RX ADMIN — TIZANIDINE 4 MG: 4 TABLET ORAL at 20:34

## 2024-11-25 RX ADMIN — METOPROLOL SUCCINATE 25 MG: 25 TABLET, EXTENDED RELEASE ORAL at 18:03

## 2024-11-25 RX ADMIN — VENLAFAXINE 225 MG: 75 TABLET ORAL at 18:03

## 2024-11-25 RX ADMIN — TRAZODONE HYDROCHLORIDE 150 MG: 100 TABLET ORAL at 20:32

## 2024-11-25 RX ADMIN — TELMISARTAN 40 MG: 80 TABLET ORAL at 06:34

## 2024-11-25 RX ADMIN — HYDROCODONE BITARTRATE AND ACETAMINOPHEN 1 TABLET: 10; 325 TABLET ORAL at 13:54

## 2024-11-25 RX ADMIN — LEVETIRACETAM 1500 MG: 100 INJECTION, SOLUTION INTRAVENOUS at 00:22

## 2024-11-25 RX ADMIN — FINASTERIDE 5 MG: 5 TABLET, FILM COATED ORAL at 06:34

## 2024-11-25 RX ADMIN — TIZANIDINE 4 MG: 4 TABLET ORAL at 18:03

## 2024-11-25 RX ADMIN — ROSUVASTATIN CALCIUM 40 MG: 20 TABLET, FILM COATED ORAL at 18:03

## 2024-11-25 RX ADMIN — TAMSULOSIN HYDROCHLORIDE 0.4 MG: 0.4 CAPSULE ORAL at 06:34

## 2024-11-25 RX ADMIN — HYDROCODONE BITARTRATE AND ACETAMINOPHEN 1 TABLET: 10; 325 TABLET ORAL at 20:33

## 2024-11-25 RX ADMIN — TIZANIDINE 4 MG: 4 TABLET ORAL at 13:55

## 2024-11-25 RX ADMIN — LEVETIRACETAM 1000 MG: 500 TABLET, FILM COATED ORAL at 13:54

## 2024-11-25 RX ADMIN — AMIODARONE HYDROCHLORIDE 100 MG: 200 TABLET ORAL at 06:34

## 2024-11-25 RX ADMIN — POTASSIUM CHLORIDE 40 MEQ: 1500 TABLET, EXTENDED RELEASE ORAL at 06:44

## 2024-11-25 RX ADMIN — POTASSIUM CHLORIDE 40 MEQ: 1500 TABLET, EXTENDED RELEASE ORAL at 08:16

## 2024-11-25 RX ADMIN — LABETALOL HYDROCHLORIDE 10 MG: 5 INJECTION, SOLUTION INTRAVENOUS at 08:15

## 2024-11-25 RX ADMIN — HYDROCODONE BITARTRATE AND ACETAMINOPHEN 1 TABLET: 10; 325 TABLET ORAL at 08:15

## 2024-11-25 RX ADMIN — LAMOTRIGINE 50 MG: 100 TABLET ORAL at 06:34

## 2024-11-25 ASSESSMENT — ENCOUNTER SYMPTOMS
WEAKNESS: 1
LOSS OF CONSCIOUSNESS: 1

## 2024-11-25 ASSESSMENT — CHA2DS2 SCORE
AGE 75 OR GREATER: NO
SEX: MALE
CHF OR LEFT VENTRICULAR DYSFUNCTION: NO
CHA2DS2 VASC SCORE: 3
PRIOR STROKE OR TIA OR THROMBOEMBOLISM: NO
AGE 65 TO 74: NO
VASCULAR DISEASE: YES
DIABETES: YES
HYPERTENSION: YES

## 2024-11-25 ASSESSMENT — PAIN DESCRIPTION - PAIN TYPE
TYPE: CHRONIC PAIN
TYPE: ACUTE PAIN

## 2024-11-25 ASSESSMENT — FIBROSIS 4 INDEX: FIB4 SCORE: 6.49

## 2024-11-25 ASSESSMENT — SOCIAL DETERMINANTS OF HEALTH (SDOH)
WITHIN THE LAST YEAR, HAVE TO BEEN RAPED OR FORCED TO HAVE ANY KIND OF SEXUAL ACTIVITY BY YOUR PARTNER OR EX-PARTNER?: NO
WITHIN THE LAST YEAR, HAVE YOU BEEN KICKED, HIT, SLAPPED, OR OTHERWISE PHYSICALLY HURT BY YOUR PARTNER OR EX-PARTNER?: NO
WITHIN THE LAST YEAR, HAVE YOU BEEN AFRAID OF YOUR PARTNER OR EX-PARTNER?: NO
WITHIN THE LAST YEAR, HAVE YOU BEEN HUMILIATED OR EMOTIONALLY ABUSED IN OTHER WAYS BY YOUR PARTNER OR EX-PARTNER?: NO

## 2024-11-25 NOTE — CONSULTS
[facility]   DEPARTMENT OF NEUROLOGY    INITIAL ENCOUNTER     MRN: [id number]  Patient seen at the request of: Dr. Mayuri Stevenson M.D.  Chief Complaint/Reason for Consult:  seizure    IMPRESSION & RECOMMENDATIONS:   65 yo rh male with a past medical hx of atrial fibrillation and left subdural hematoma (2022) s/p crani who started having spells this year concerning for seizure vs. syncope. Transferred from OSH due to LOC episode concerning for seizure. Patient was recently switched from depakote to lamotrigine, though given slow titration schedule has likely been subtherapeutic.    Event description by Alesia certainly is concerning for seizure. Syncope would be less likely given patient was already in a supine position. Also with urinary incontinence and post-ictal confusion.     Was recently monitored with ziopatch, recommend following up with cardiology to discuss the results of this.     Dx: Seizure vs. Syncope. Favor the former.     Recommendations:  -Start Keppra 1000 mg PO BID  -Resume lamotrigine titration schedule:  - Weeks 1 and 2: 25mg BID.  - If tolerated, Weeks 3 and 4: increase to 50mg BID.  - If tolerated, Weeks 5 and after: 100mg BID.  - Check level at ~6 weeks, and adjust dosage accordingly.  *Usual maintenance dose: 300 to 500 mg/day in 2 divided doses (immediate release) or 400 to 600 mg once daily (extended release).   -Utox, Ua, CMP, Mg, Phos, CBC, CXR, BCX, lactate, Ck, hcg  -please consult pharmacy, discontinue any meds that lower the seizure threshold  -seizure precautions  -routine EEG  -follow up neurology as outpatient   -follow up with cardiology as an outpatient    Signed:  Luke Stanley DO  Department of Neurology  [facility]      HISTORY OF PRESENT ILLNESS:   Karan Wiley is an 64 y.o. right-handed male with a past medical history of traumatic subdural hematoma s/p left hemicrani and seizures vs. Convulsive syncope (followed by   "[facility] outpatient), recently admitted for 5-day EMU stay for event characterization and subsequently switched from depakote to lamotrigine, who presents as a transfer after he \"presented to outside facility after an unwitnessed seizure where he remained postictal and altered, blood pressure up to 230/40s, lactic acid initially 16 on repeat down to 2 head CT negative labs grossly within normal limits. Remains confused and unable to ambulate, BP down to 190s. No neurological services there and there hospitalist uncomfortable admitting there without neurology.\"    Once at [facility], patient was loaded with Keppra 1.5 grams. Lamictal levels were sent and patient was continued on 50 mg qhs of lamictal. EEG was ordered as well.     Patient states he passed out yesterday and woke up in the hospital. States he was sleeping at around 1400 and next thing he remembers is being at [facility]. Wife mariely states that yesterday morning, they were all in the bedroom around 0900, she left, came back to the bedroom 10 minutes later she found Karan snoring really loudly and about to fall out of the bed. She shaked him to try and wake him up, but he wouldn't. Son Zurdo tried to wake him up and he couldn't as well. 911 and EMS was called. Before they got to the home, she states she was able to wake him up, and he was combative, and \"wet himself\". Once paramedics got there, they placed Karan on the floor.     PAST MEDICAL HISTORY:     Active Ambulatory Problems     Diagnosis Date Noted    Type 2 diabetes mellitus without complication, without long-term current use of insulin (HCC) 08/01/2020    Chronic back pain 08/01/2020    Obese 08/01/2020    History of tobacco use 08/01/2020    BPH (benign prostatic hyperplasia) 08/01/2020    Memory deficit after cerebrovascular disease 08/01/2020    Atrial fibrillation with rapid ventricular response (HCC) 08/07/2020    Paroxysmal atrial fibrillation (HCC) 08/07/2020    Current use of long term " anticoagulation 08/07/2020    Status post insertion of drug-eluting stent into right coronary artery for coronary artery disease 08/08/2020    Hypokalemia 08/10/2020    CAD (coronary artery disease) 08/20/2020    Insomnia 09/17/2020    Antiplatelet or antithrombotic long-term use 08/25/2022    Traumatic subdural hematoma, sequela ([facility]) 08/25/2022    Depression     Hyperlipidemia     Respiratory failure following trauma and surgery ([facility]) 08/26/2022    Lumbar spondylosis 10/04/2022    Hypertension 12/01/2022    Seizure prophylaxis 03/17/2023    BMI 35.0-35.9,adult 04/06/2023    Osteoarthritis, knee 06/19/2024    Primary osteoarthritis of right knee 06/19/2024    Arthritis of right knee 07/23/2024    Status post right knee replacement 07/25/2024    Anemia in other chronic diseases classified elsewhere 07/26/2024    Polypharmacy 07/27/2024    Fall 07/27/2024    SIRS (systemic inflammatory response syndrome) ([facility]) 07/28/2024    Major neurocognitive disorder due to multiple etiologies without behavioral disturbance ([facility]) 07/28/2024    Seizure disorder as sequela of cerebrovascular accident ([facility]) 07/28/2024    Urinary retention due to benign prostatic hyperplasia 07/31/2024    Major neurocognitive disorder, due to vascular disease, without behavioral disturbance, mild ([facility]) 10/14/2024    Essential tremor 11/15/2024     Resolved Ambulatory Problems     Diagnosis Date Noted    NSTEMI (non-ST elevated myocardial infarction) ([facility]) 08/01/2020    Hypertensive emergency 08/01/2020    Acute respiratory failure with hypoxia ([facility]) 08/01/2020    Hypertensive disease 08/01/2020    Cardiac arrest ([facility]) 08/01/2020    Hypotension 08/01/2020    Left ventricular thrombus with acute MI ([facility]) 08/01/2020    Gross hematuria 08/04/2020    Hematuria 08/09/2020    Pneumonia 08/10/2020    Trauma 08/25/2022    No contraindication to deep vein thrombosis (DVT) prophylaxis 08/28/2022    Oropharyngeal dysphagia 09/01/2022    Leukocytosis 09/08/2022     Oral thrush 09/08/2022    Cerebral hyponatremia 09/08/2022    Acute kidney injury (HCC) 07/25/2024    Senile dementia, delirium, with behavioral disturbance (HCC) 07/25/2024    Sinus tachycardia 07/27/2024    Pneumonia of right lower lobe due to infectious organism 07/28/2024    Cellulitis 08/03/2024     Past Medical History:   Diagnosis Date    Anesthesia     Breath shortness     Chronic anticoagulation     Dental disorder     History of heart artery stent     Low back pain     Myocardial infarct (HCC)     S/P drug eluting coronary stent placement     Subdural hematoma (HCC)     Type 2 diabetes mellitus (HCC)         Patient denies any medical history    PAST SURGICAL HISTORY:     Past Surgical History:   Procedure Laterality Date    ARTHROPLASTY, KNEE, ROBOT-ASSISTED Right 7/23/2024    Procedure: ROBOTIC RIGHT TOTAL KNEE ARTHROPLASTY;  Surgeon: Noe Barragan M.D.;  Location: [facility];  Service: Ortho Robotic    CRANIOTOMY Left 8/25/2022    Procedure: CRANIOTOMY FOR SUBDURAL HEMATOMA;  Surgeon: Tesfaye Luther M.D.;  Location: [facility];  Service: Neurosurgery    SPINAL CORD STIMULATOR  2/26/2019    Procedure: SPINAL CORD STIMULATOR-  STAGE 1:  RIGHT FLANK BATTERY REPLACEMENT/UPGRADE;  Surgeon: Stepan Hawkins M.D.;  Location: [facility];  Service: Neurosurgery    FUSION, SPINE, LUMBAR, PLIF  2/26/2019    Procedure: LUMBAR FUSION POSTERIOR-  STAGE 2: ONLAY L5-S1 BONE;  Surgeon: Stepan Hawkins M.D.;  Location: [facility];  Service: Neurosurgery    LAMINOTOMY  2/26/2019    Procedure: LAMINOTOMY-  STAGE 2:  REDO LEFT L4-S1;  Surgeon: Stepan Hawkins M.D.;  Location: [facility];  Service: Neurosurgery       Patient denies any surgical history    CURRENT MEDICATIONS:     Current Facility-Administered Medications   Medication Dose Route Frequency Provider Last Rate Last Admin    albuterol inhaler 1-2 Puff  1-2 Puff Inhalation Q4HRS PRN Jorge Doty M.D.         amiodarone (Cordarone) tablet 100 mg  100 mg Oral DAILY Jorge Doty M.D.   100 mg at 11/25/24 0634    apixaban (Eliquis) tablet 5 mg  5 mg Oral BID Jorge Doty M.D.   5 mg at 11/25/24 0634    ezetimibe (Zetia) tablet 10 mg  10 mg Oral DAILY Jorge Doty M.D.   10 mg at 11/25/24 0635    finasteride (Proscar) tablet 5 mg  5 mg Oral DAILY Jorge Doty M.D.   5 mg at 11/25/24 0634    HYDROcodone/acetaminophen (Norco)  MG per tablet 1 Tablet  1 Tablet Oral 4X/DAY Jorge Doty M.D.   1 Tablet at 11/25/24 0815    lamoTRIgine (LaMICtal) tablet 50 mg  50 mg Oral DAILY Jorge Doty M.D.   50 mg at 11/25/24 0634    metoprolol SR (Toprol XL) tablet 25 mg  25 mg Oral Q EVENING Jorge Doty M.D.        rosuvastatin (Crestor) tablet 40 mg  40 mg Oral Q EVENING Jorge Doty M.D.        tamsulosin (Flomax) capsule 0.4 mg  0.4 mg Oral DAILY Jorge Doty M.D.   0.4 mg at 11/25/24 0634    telmisartan (Micardis) tablet 40 mg  40 mg Oral QAM Jorge Doty M.D.   40 mg at 11/25/24 0634    tizanidine (Zanaflex) tablet 4 mg  4 mg Oral 4X/DAY Jorge Doty M.D.   4 mg at 11/25/24 0816    venlafaxine (Effexor) tablet 225 mg  225 mg Oral Q EVENING Jorge Doty M.D.        traZODone (Desyrel) tablet 150 mg  150 mg Oral Q EVENING Jorge Doty M.D.        acetaminophen (Tylenol) tablet 650 mg  650 mg Oral Q6HRS PRN Jorge Doty M.D.        labetalol (Normodyne/Trandate) injection 10 mg  10 mg Intravenous Q4HRS PRN Jorge Doty M.D.   10 mg at 11/25/24 0815    promethazine (Phenergan) tablet 12.5-25 mg  12.5-25 mg Oral Q4HRS PRN Jorge Doty M.D.        promethazine (Phenergan) suppository 12.5-25 mg  12.5-25 mg Rectal Q4HRS PRALEJANDRO Doty M.D.        prochlorperazine (Compazine) injection 5-10 mg  5-10 mg Intravenous Q4HRS PRALEJANDRO Doty M.D.        LORazepam (Ativan) injection 0.5 mg  0.5 mg Intravenous Q4HRS PRALEJANDRO Pierre  "JORJE Doty        hydrALAZINE (Apresoline) injection 10 mg  10 mg Intravenous Q6HRS PRN Mayuri Stevenson M.D.        LORazepam (Ativan) injection 2 mg  2 mg Intravenous Q5 MIN PRN Jorge Doty M.D.           ALLERGIES:     Allergies   Allergen Reactions    Oxycodone Anaphylaxis and Swelling     Throat swelling    Pectin Anaphylaxis and Swelling     Throat swelling, cannot breathe    Hctz [Hydrochlorothiazide W-Spironolactone]      hypotension    Zolpidem Unspecified     \"Sleep walking\", excessive eating, drives/cooks during the night        SOCIAL HISTORY:   Lives with wife  No drug, alcohol, or tobacco use    FAMILY HISTORY:     Family History   Problem Relation Age of Onset    Cancer Mother         breast        Patient denies any pertinent family history    REVIEW OF SYSTEMS:   For the following ROS, pertinent positives are in bold; pertinent negatives are in italics.  CONSTITUTIONAL: fevers, chills, sweats, weight loss, fatigue  EYES: vision changes, double vision, blurring  ENMT: hearing loss, tinnitus, epistaxis, sores, voice changes, sore throat  CV: chest pain, dyspnea, palpitations, orthopnea, lower extremity edema  RESP: cough, sputum, dyspnea, hemoptysis, pleuritic pain  GI: abdominal pain, nausea, vomiting, hematemesis, diarrhea, constipation, BRBPR, melena  : hematuria, dysuria, frequency, urgency  MSKL: pain, muscle weakness, joint pain, joint swelling, decreased ROM  SKIN: rash, pain, pruritis, lesions, lumps, skin breakdown  NEURO: See HPI  PSYCH: depression, anxiety, suicidal ideation, homicidal ideation     PHYSICAL EXAM:     Vitals:    11/25/24 0700   BP: (!) 202/137   Pulse: 62   Resp: 18   Temp: 36.5 °C (97.7 °F)   SpO2: 93%       NEUROLOGIC EXAM    Mental Status:  Level of alertness: Awake  Orientation: Oriented to self, location, time & situation  Attention:  Able to perform serial 7s, able to spell WORLD forwards and backwards  Speech: Normal quality, quantity, rate, volume and " "prosody  Language: Fluent, no aphasia or paraphasic errors, normal semantic fluency  Short Term Memory: Intact to 5 objects after 5 minutes.   Higher Cognitive Function: No apraxia, no finger agnosia  Fund of Knowledge: Knows current president and , able to name 3 current events    Cranial Nerves:   Pupils equally round 3 mm and reactive to light 2 mm  Extraocular movements intact without nystagmus  Full visual fields bilaterally to confrontation  Facial sensation to light touch intact bilaterally over V1-V3  No facial asymmetry  Hearing intact   No dysarthria  Tongue and palate midline  Shoulder shrug and neck turn intact.    Motor: Normal bulk and tone  No fasciculations. No spasticity. No cogwheel. No pronator drift. Sustained AG x4    Reflexes: deferred    Sensory:   SILT x4    Coordination: deferred    Gait:  deferred       DATA:     Labs  Lab Results   Component Value Date    GLUCOSE 104 (H) 11/24/2024    BUN 6 (L) 11/24/2024    CO2 24 11/24/2024     Lab Results   Component Value Date/Time    WBC 8.3 11/24/2024 2326    MCV 89.0 11/24/2024 2326      No results found for: \"TSH\"  No components found for: \"HUKSTO4BM9\", \"J1ASPFV\", \"T4AUTO\", \"T3DYKOG\"  No components found for: \"HGBA1C\"  Lab Results   Component Value Date    HDL 47 08/08/2023    LDL 93 08/08/2023     No results found for: \"QMGH69FM\"  No results found for: \"FERRITIN\", \"FOLATE\"  Lab Results   Component Value Date    INR 1.12 08/25/2022     No results found for: \"UZWJEUHT47\", \"RPR\"  Calcium   Date Value Ref Range Status   11/24/2024 9.2 8.5 - 10.5 mg/dL Final     Albumin   Date Value Ref Range Status   11/24/2024 3.6 3.2 - 4.9 g/dL Final     Hemoglobin   Date Value Ref Range Status   11/24/2024 13.8 (L) 14.0 - 18.0 g/dL Final       Imaging:   Images were personally reviewed by me.     Total time spent: 60 minutes    "

## 2024-11-25 NOTE — PROGRESS NOTES
Monitor Summary: SB-SR 58-63, AR .15, QRS .09, QT .43 with rare PVC per strip from monitor room

## 2024-11-25 NOTE — TELEPHONE ENCOUNTER
AB    Caller: Alesia Salmon ([email or web address]    Topic/issue: MEDICAL ADVICE    Alesia states that Karan has been having seizures while he is in the hospital and she has a theory as to what may be the cause. Karan is currently at the [facility] and it was determined that the seizure are being caused by some kind of Cardiological anomaly rather than a Neurological issue. Please advise.    Thank you,  Ángel CURTIS    Callback Number: [phone]

## 2024-11-25 NOTE — TELEPHONE ENCOUNTER
Patient's wife called saying her  having multiple seizures on Saturday and Sunday she called the ambulance and 911 because he got very violent and his arm was shaking very bad he didn't really know what was going on when he woke up she just wanted Dr. OH to know that he was admitted he would like him to let her know if he would like him to come in when he's discharged.

## 2024-11-25 NOTE — TELEPHONE ENCOUNTER
Noted per chart, pt currently admitted. Called to advise consult from rounding cardiologist, LM requesting call back.

## 2024-11-25 NOTE — ASSESSMENT & PLAN NOTE
Labile.  Patient was hypotensive overnight, now hypertensive.  Also in pain  Control his migraine and pain, migraine cocktail given  Continue on telmisartan  I ordered for metoprolol 25 mg twice daily for now  IV hydralazine and labetalol as needed

## 2024-11-25 NOTE — PROGRESS NOTES
Hospital Medicine Daily Progress Note    Date of Service  11/25/2024    Chief Complaint  Karan Wiley is a 64 y.o. male admitted 11/24/2024 with seizure vs syncope     Hospital Course  63 yo male with hx of Afib on Eliquis and amiodarone, traumatic left subdural hematoma (2022) s/p crani, seizure following Dr. Hooper outpatient, HLD, BPH, who presented on 11/24 as a transfer after he was found suddenly lost consciousness and woke up in the hospital. He was apparently postictal in the hospital. He is unsure of whether or not he was noted to have seizure-like activity while unconscious.     At outside facility, CT head showing no acute intracranial abnormality.  Lactic acid 16.9, repeat lactic acid 2.1.   Of note, he was recently admitted for 5-day EMU stay for event characterization and subsequently switched from depakote to lamotrigine.     Once at [facility], patient was loaded with Keppra 1.5 grams. Lamictal levels were sent and patient was continued on 50 mg qhs of lamictal. EEG was ordered as well.     Neurology was consulted    Interval Problem Update  -Notes were extensively reviewed from primary team, radiology, ED, surgery, specialists, etc.   -Reviewed labs to date, microbiology for current admit and prior  -Imaging was independently reviewed and interpreted    Seen patient at bedside  Patient reports he had more than 10 episodes in the past week.   Denies chest pain, sob  He could not recall the event  I have consulted and discussed with neurology. Likely seizure, less likely syncope  EEG, Mri  Check urine drug screen  Start Keppra and taper lamotrigine dose up  Check echo, tele   Check orthostatic vital   PT/OT    I have discussed this patient's plan of care and discharge plan at IDT rounds today with Case Management, Nursing, Nursing leadership, and other members of the IDT team.    Consultants/Specialty  neurology    Code Status  Full Code    Disposition  The patient is not medically cleared for  discharge to home or a post-acute facility.      I have placed the appropriate orders for post-discharge needs.    Review of Systems  Review of Systems   Constitutional:  Positive for malaise/fatigue.   Neurological:  Positive for loss of consciousness and weakness.   All other systems reviewed and are negative.       Physical Exam  Temp:  [36.2 °C (97.1 °F)-36.6 °C (97.9 °F)] 36.5 °C (97.7 °F)  Pulse:  [59-64] 64  Resp:  [16-18] 17  BP: (100-226)/() 124/84  SpO2:  [93 %-97 %] 94 %    Physical Exam  Vitals and nursing note reviewed.   Constitutional:       Appearance: Normal appearance. He is ill-appearing.      Comments: Hard hearing   HENT:      Head: Normocephalic and atraumatic.      Mouth/Throat:      Pharynx: Oropharynx is clear.   Eyes:      Pupils: Pupils are equal, round, and reactive to light.   Cardiovascular:      Rate and Rhythm: Normal rate and regular rhythm.   Pulmonary:      Effort: Pulmonary effort is normal.      Breath sounds: Normal breath sounds.   Abdominal:      General: Abdomen is flat. Bowel sounds are normal.      Palpations: Abdomen is soft. There is no mass.   Musculoskeletal:         General: Normal range of motion.      Cervical back: Neck supple.   Skin:     General: Skin is warm and dry.   Neurological:      General: No focal deficit present.      Mental Status: He is alert and oriented to person, place, and time.   Psychiatric:         Mood and Affect: Mood normal.         Behavior: Behavior normal.         Fluids    Intake/Output Summary (Last 24 hours) at 11/25/2024 1558  Last data filed at 11/25/2024 0645  Gross per 24 hour   Intake 0 ml   Output 210 ml   Net -210 ml        Laboratory  Recent Labs     11/24/24  2326   WBC 8.3   RBC 4.82   HEMOGLOBIN 13.8*   HEMATOCRIT 42.9   MCV 89.0   MCH 28.6   MCHC 32.2*   RDW 45.1   PLATELETCT 260   MPV 11.5     Recent Labs     11/24/24  2326   SODIUM 138   POTASSIUM 3.2*   CHLORIDE 103   CO2 24   GLUCOSE 104*   BUN 6*   CREATININE 0.79    CALCIUM 9.2                   Imaging  EC-ECHOCARDIOGRAM COMPLETE W/O CONT    (Results Pending)        Assessment/Plan  * Breakthrough seizure (HCC)- (present on admission)  Assessment & Plan  Likely patient had a breakthrough seizure vs. Syncope.   He recently had his antiepileptic medication switched, had lactic acid of 16.9 that trended down to 2.1 at outside hospital.  Lactic acid 1.6 here  Less likely syncope. Per patient he had more than 10 episodes in the past week  CT head outside hospital no acute intracranial abnormality.    Check EEG, echo, UDS, TSH, cortisol am  Keppra, lamictal dose trending up per neurology  Ativan as needed for breakthrough seizures  Seizure precaution  Neurology consulted   Tele monitoring     Urinary retention due to benign prostatic hyperplasia- (present on admission)  Assessment & Plan  Continue proscar and flomax  Following with urology outpatient    Fall- (present on admission)  Assessment & Plan  Reports multiple falls  Fall precautions  PT/OT    Advance care planning- (present on admission)  Assessment & Plan  65 yo male with multiple comorbidities including Afib, traumatic left subdural hematoma (2022) s/p crani, seizure, frequent falls, admitted with seizure vs syncope. Requiring neurology evaluation. Discussed goal of care and code status with patient. He confirms FULL CODE. ACP 16 mins    Hypertension  Assessment & Plan  Resume home medications metoprolol, telmisartan    Hyperlipidemia- (present on admission)  Assessment & Plan  Continue crestor     Paroxysmal atrial fibrillation (HCC)- (present on admission)  Assessment & Plan  Continue eliquis and other home cardiac medications  Last TTE in July 2024 with EF 60%  Repeat echo         VTE prophylaxis: Eliquis  I have performed a physical exam and reviewed and updated ROS and Plan today (11/25/2024). In review of yesterday's note (11/24/2024), there are no changes except as documented above.    Patient is has a high  medical complexity, complex decision making and is at high risk for complication, morbidity, and mortality.  I spent 68 minutes, reviewing the chart, obtaining and/or reviewing separately obtained history. Performing a medically appropriate examination and evaluation.  Counseling and educating the patient. Ordering and reviewing medications, tests, or procedures.  Discussing the case with neurology.  Documenting clinical information in EPIC. Independently interpreting results and communicating results to patient. Discussing future disposition of care with patient, RN and case management.  This does not include time spent on separately billable procedures/tests.

## 2024-11-25 NOTE — PROCEDURES
INPATIENT ROUTINE VIDEO ELECTROENCEPHALOGRAM REPORT    REFERRING PROVIDER: Mayuri Stevenson M.d.  DOS: 11/25/2024  STUDY DURATION: 0 hours and 30 minutes of total recording time.     INDICATION:  Karan Wiley 64 y.o. male presenting with seizure(s)    RELEVANT MEDICATIONS/TREATMENTS:   Scheduled Medications   Medication Dose Frequency    amiodarone  100 mg DAILY    apixaban  5 mg BID    ezetimibe  10 mg DAILY    finasteride  5 mg DAILY    HYDROcodone/acetaminophen  1 Tablet 4X/DAY    lamoTRIgine  50 mg DAILY    metoprolol SR  25 mg Q EVENING    rosuvastatin  40 mg Q EVENING    tamsulosin  0.4 mg DAILY    telmisartan  40 mg QAM    tizanidine  4 mg 4X/DAY    venlafaxine  225 mg Q EVENING    traZODone  150 mg Q EVENING       TECHNIQUE:   Routine VEEG was set up by a Neurodiagnostic technologist who performed education to the patient and staff. A minimum of 23 electrodes and 23 channel recording was setup and performed by Neurodiagnostic technologist, in accordance with the international 10-20 system. The study was reviewed in bipolar and referential montages. The recording examined the patient in the  awake, drowsy, and sleep state(s).     DESCRIPTION OF THE RECORD:  During wakefulness, the background was continuous and showed a 8 Hz posterior dominant rhythm.  There was reactivity to eye closure/opening.  An anterior-posterior gradient was noted with faster beta frequencies seen anteriorly.  During drowsiness, theta/delta frequencies were seen.    Excess beta is present diffusely.     EEG Sleep: Sleep was captured and was characterized by diffuse background delta/theta activity with a loss of myogenic artifact.  N2 sleep transients in the form of sleep spindles and vertex waves were seen in the leads over the central regions.     ICTAL AND INTERICTAL FINDINGS:   1) Rare left temporal sharp waves  2) No seizures    ACTIVATION PROCEDURES:   Intermittent Photic stimulation was performed in a stepwise fashion from 1 to  30 Hz and did not elicited any abnormalities on EEG.  and NA    EKG: Sampling of the EKG recording showed sinus rhythm    EVENTS:  None    Most recent EEG results:  11/14/2024 EMU EEG  INTERPRETATION:  Abnormal, technically-limited video EEG recording in the awake, drowsy, and sleep state(s):  -Mild intermittent background slowing suggestive of diffuse/multifocal cerebral dysfunction and consistent with a non-specific encephalopathy. Clinical correlation recommended.   -No seizures.   -Occasional  left temporal>right fronto-central breach rhythm and theta/delta slowing, occasional quasi-rhythmic or rhythmic at 1-1.5 Hz for 2-4 seconds. There was also rare to occasional independent right temporal or right hemispheric theta>delta slowing and amplitude attenuation.   These findings are indicative of focal dysfunction in these regions, worse over the left. Also, the presence of focal rhythmic slowing over the left may indicate an increased risk for focal seizures over that region.  Clinical and radiographic correaltion recommended.  -No definitive epileptiform discharges were seen.  -Clinical events: None  -Compared to yesterday's study, this study is similar        INTERPRETATION:   Abnormal video EEG recording in the awake, drowsy, and sleep state(s):  1) Rare left temporal sharp waves. This finding indicates a propensity for seizures to arise from the left temporal region, or alternatively may be observed in the setting of a structural abnormality, or post-ictal state. Clinical correlation recommended.   2) Mild continuous generalized slowing of the background, af finding suggestive of mild diffuse cerebral dysfunction of a nonspecific etiology.  3) Diffuse excess beta activity is present, a finding which may be observed in the setting of sedative/medication use.       Compared to most recent EEG results from 11/14/2024, there has been an overall improvement in the abnormal interictal findings.     Macy Alvarado  MD Mauricio  Department of Neurology at [facility]  General Neurologist and Epileptologist   of Clinical Neurology, [facility].

## 2024-11-25 NOTE — ASSESSMENT & PLAN NOTE
Continue eliquis  Heart rate has been 40-70s.  Hold amiodarone to allow titration of metoprolol for HTN control

## 2024-11-25 NOTE — PROGRESS NOTES
[facility] DIRECT ADMISSION REPORT  Transferring facility: [facility]  Transferring physician: Dr. Marshall    Chief complaint: Recurrent seizures  Pertinent history & patient course: Patient with recent admission to EMU to help characterize paroxysmal events, medication adjustment, determine degree of epileptic burden if any etc.  His Depakote was discontinued and he was transitioned to lamotrigine.  Today presented to outside facility after an unwitnessed seizure where he remained postictal and altered, blood pressure up to 230/40s, lactic acid initially 16 on repeat down to 2 head CT negative labs grossly within normal limits.  Remains confused and unable to ambulate, BP down to 190s.  No neurological services there and there hospitalist uncomfortable admitting there without neurology.    Pertinent imaging & lab results: As above  Consultants called prior to transfer and pertinent input from consultants: NA  Code Status: Full per transferring provider, I personally verified with the transferring provider patient's code status and the transferring provider has confirmed this with the patient.  Reason for Transfer: Neurology  Further work up or recommendations requested prior to transfer: Discussed further AED's with ERP however he did not feel patient was actively seizing any longer, more post ictal, wife at bedside.     Patient accepted for transfer: Yes  Accepting [facility] Facility: [facility] to notify the Triage Coordinator in the RTOC via Voalte or Phone ext. [phone] when patient arrives to the unit. The Triage Coordinator will assign the admitting provider.    Consultants to be called upon arrival: Timing of neurology consult to be determined by admitting physician  Admission status: Inpatient.   Floor requested: Telemetry   If ICU transfer, name of intensivist case discussed with and pertinent input from critical care: NA    The admitting provider is the point of contact for  questions or concerns regarding patient's care.

## 2024-11-25 NOTE — H&P
Hospital Medicine History & Physical Note    Date of Service  11/24/2024    Primary Care Physician  Kelly Navarro D.O.    Consultants  None    Code Status  Full Code    Chief Complaint  Seizure episode    History of Presenting Illness  Karan Wiley is a 64 y.o. male who presented 11/24/2024 with a likely seizure episode.    Patient has a history of atrial fibrillation on Eliquis and amiodarone, hyperlipidemia, benign prostatic hyperplasia, epilepsy.  He is following with neurology outpatient.  He was recently on Depakote as his only AED but was taken off about a week ago due to weight gain.  He was switched to lamotrigine.  He reports compliance with lamotrigine.    Patient was at home this morning.  He reported feeling well and was eating breakfast.  He took a nap shortly after for about 2 hours.  He then was doing errands around the house when he suddenly lost consciousness and woke up in the hospital.  He states that his wife was with him at home, but he was apparently postictal in the hospital and was not not able to converse with her about what happened.  He is unsure of whether or not he was noted to have seizure-like activity while unconscious.  He does not notice any bite marks or urinary or bowel incontinence.    At outside facility,  Pertinent labs include white blood cell count 11,000, glucose 169, urinalysis negative.  Chest x-ray showing no acute cardiopulmonary abnormality.  CT head showing no acute intracranial abnormality.  Lactic acid 16.9, repeat lactic acid 2.1    Transfer to [facility] for neurology consult.    I discussed the plan of care with patient.    Review of Systems  ROS    Past Medical History   has a past medical history of Anesthesia, Breath shortness, CAD (coronary artery disease) (08/2020), Chronic anticoagulation, Dental disorder, Depression, History of heart artery stent, Hyperlipidemia, Hypertension, Low back pain, Myocardial infarct (HCC), Paroxysmal atrial fibrillation (HCC)  "(10/2023), S/P drug eluting coronary stent placement, Subdural hematoma (HCC), and Type 2 diabetes mellitus (HCC).    Surgical History   has a past surgical history that includes spinal cord stimulator (2/26/2019); fusion, spine, lumbar, plif (2/26/2019); laminotomy (2/26/2019); craniotomy (Left, 8/25/2022); and arthroplasty, knee, robot-assisted (Right, 7/23/2024).     Family History  family history includes Cancer in his mother.   Family history reviewed with patient. There is no family history that is pertinent to the chief complaint.     Social History   reports that he quit smoking about 44 years ago. His smoking use included cigarettes. He started smoking about 47 years ago. He has a 0.8 pack-year smoking history. He has been exposed to tobacco smoke. He has never used smokeless tobacco. He reports that he does not drink alcohol and does not use drugs.    Allergies  Allergies   Allergen Reactions    Oxycodone Anaphylaxis and Swelling     Throat swelling    Pectin Anaphylaxis and Swelling     Throat swelling, cannot breathe    Hctz [Hydrochlorothiazide W-Spironolactone]      hypotension    Zolpidem Unspecified     \"Sleep walking\", excessive eating, drives/cooks during the night       Medications  Prior to Admission Medications   Prescriptions Last Dose Informant Patient Reported? Taking?   HYDROcodone/acetaminophen (NORCO)  MG Tab  Significant Other Yes No   Sig: Take 1 Tablet by mouth see administration instructions. Take 1 tablet SCHEDULED at 05:00, 10:00, 15:00, and 20:00. Max take up to 2 additional tablets throughout the day as needed for pain, up to a maximum of 6 tablets within 24 hours.   LORazepam (ATIVAN) 1 MG Tab   No No   Sig: Take 1/2-1 tablet PO PRN breakthrough seizures.  Do not exceed more than 2 tablets in 24 hours unless directed otherwise by physician.   albuterol 108 (90 Base) MCG/ACT Aero Soln inhalation aerosol  Significant Other Yes No   Sig: Inhale 1-2 Puffs every four hours as " needed for Shortness of Breath.   amiodarone (CORDARONE) 100 MG tablet  Significant Other No No   Sig: Take 1 Tablet by mouth every day.   apixaban (ELIQUIS) 5mg Tab  Significant Other No No   Sig: Take 1 Tablet by mouth 2 times a day.   docusate sodium (COLACE) 100 MG Cap  Significant Other Yes No   Sig: Take 100 mg by mouth 2 times a day.   ezetimibe (ZETIA) 10 MG Tab  Significant Other No No   Sig: Take 1 Tablet by mouth every day.   finasteride (PROSCAR) 5 MG Tab  Significant Other Yes No   Sig: Take 5 mg by mouth every day.   lamoTRIgine (LAMICTAL) 25 MG Tab   No No   Sig: Take 1 tablet by mouth every morning for 2 weeks then take 2 tablets by mouth every morning thereafter.   lamoTRIgine (LAMICTAL) 25 MG Tab   No No   Sig: Take 2 Tablets by mouth every morning.   metoprolol SR (TOPROL XL) 25 MG TABLET SR 24 HR  Significant Other No No   Sig: Take 1 Tablet by mouth every evening.   omeprazole (PRILOSEC) 40 MG delayed-release capsule  Significant Other Yes No   Sig: Take 40 mg by mouth every evening.   rosuvastatin (CRESTOR) 40 MG tablet  Significant Other Yes No   Sig: Take 40 mg by mouth every evening.   tamsulosin (FLOMAX) 0.4 MG capsule  Significant Other Yes No   Sig: Take 0.4 mg by mouth every day.   telmisartan (MICARDIS) 40 MG Tab  Significant Other No No   Sig: Take 1 Tablet by mouth every morning.   tizanidine (ZANAFLEX) 4 MG Tab  Significant Other Yes No   Sig: Take 4 mg by mouth 4 times a day. Take 1 tablet (4 mg) at 05:00, 10:00, 15:00, and 20:00.   traZODone (DESYREL) 100 MG Tab  Significant Other Yes No   Sig: Take 150 mg by mouth every evening. 1.5 tablets = 150 mg.   venlafaxine (EFFEXOR-XR) 150 MG extended-release capsule  Significant Other Yes No   Sig: Take 1 Capsule by mouth every evening. Take with 1 capsule of venlafaxine XR 75 mg, for a total dose of 225 mg.   venlafaxine XR (EFFEXOR XR) 75 MG CAPSULE SR 24 HR  Significant Other Yes No   Sig: Take 1 Capsule by mouth every evening. Take  "with 1 capsule of venlafaxine  mg, for a total dose of 225 mg.      Facility-Administered Medications: None       Physical Exam  Temp:  [36.6 °C (97.9 °F)] 36.6 °C (97.9 °F)  Pulse:  [62] 62  Resp:  [16] 16  BP: (113)/(72) 113/72  SpO2:  [95 %] 95 %  Blood Pressure: 113/72   Temperature: 36.6 °C (97.9 °F)   Pulse: 62   Respiration: 16   Pulse Oximetry: 95 %       Physical Exam  Constitutional:       Appearance: He is obese.      Comments: Hard of hearing   HENT:      Head: Normocephalic.      Nose: Nose normal.      Mouth/Throat:      Mouth: Mucous membranes are dry.      Comments: No bite marks noted  Cardiovascular:      Rate and Rhythm: Normal rate and regular rhythm.   Pulmonary:      Effort: Pulmonary effort is normal.      Breath sounds: Normal breath sounds.   Abdominal:      General: Abdomen is flat. Bowel sounds are normal.      Palpations: Abdomen is soft.   Musculoskeletal:         General: Normal range of motion.      Cervical back: Neck supple.   Skin:     General: Skin is warm.   Neurological:      General: No focal deficit present.      Mental Status: He is alert and oriented to person, place, and time. Mental status is at baseline.   Psychiatric:         Mood and Affect: Mood normal.         Behavior: Behavior normal.         Thought Content: Thought content normal.         Judgment: Judgment normal.         Laboratory:          No results for input(s): \"ALTSGPT\", \"ASTSGOT\", \"ALKPHOSPHAT\", \"TBILIRUBIN\", \"DBILIRUBIN\", \"GAMMAGT\", \"AMYLASE\", \"LIPASE\", \"ALB\", \"PREALBUMIN\", \"GLUCOSE\" in the last 72 hours.      No results for input(s): \"NTPROBNP\" in the last 72 hours.      No results for input(s): \"TROPONINT\" in the last 72 hours.    Imaging:  No orders to display       no X-Ray or EKG requiring interpretation    Assessment/Plan:  Justification for Admission Status  I anticipate this patient will require at least two midnights for appropriate medical management, necessitating inpatient admission " because patient has suspected breakthrough seizure    Patient will need a Telemetry bed on MEDICAL service .  The need is secondary to breakthrough seizure.    * Breakthrough seizure (HCC)- (present on admission)  Assessment & Plan  Likely patient had a breakthrough seizure.  He recently had his antiepileptic medication switched, had lactic acid of 16.9 that trended down to 2.1 at outside hospital.  Lactic acid 1.6 here  I will load patient with 1.5 g of Keppra  Continue Lamictal, Lamictal level sent  Ativan as needed for breakthrough seizures  Seizure precaution  EEG  Neurology consult in a.m.    Urinary retention due to benign prostatic hyperplasia- (present on admission)  Assessment & Plan  Continue proscar and flomax  Following with urology outpatient    Hypertension  Assessment & Plan  Resume home medications    Hyperlipidemia- (present on admission)  Assessment & Plan  Continue crestor     Paroxysmal atrial fibrillation (HCC)- (present on admission)  Assessment & Plan  Continue eliquis and other home cardiac medications  Last TTE in July 2024 with EF 60%        VTE prophylaxis: therapeutic anticoagulation with Eliquis

## 2024-11-25 NOTE — ASSESSMENT & PLAN NOTE
Neurology consulted  EEG showed rare left temporal sharp waves.  Neurology recommended Keppra 1000 mg twice daily with lamotrigine titration.  Has not had recurring seizure

## 2024-11-25 NOTE — CARE PLAN
The patient is Watcher - Medium risk of patient condition declining or worsening    Shift Goals  Clinical Goals: monitor for seizures  Patient Goals: rest  Family Goals: maliha    Progress made toward(s) clinical / shift goals:    Problem: Fall Risk  Goal: Patient will remain free from falls  Outcome: Progressing     Problem: Seizure Precautions  Goal: Implementation of seizure precautions  Outcome: Progressing     Problem: Knowledge Deficit - Seizures  Goal: Knowledge of seizure treatment regimen will improve  Outcome: Progressing       Patient is not progressing towards the following goals: n/a

## 2024-11-26 ENCOUNTER — APPOINTMENT (OUTPATIENT)
Dept: CARDIOLOGY | Facility: MEDICAL CENTER | Age: 64
End: 2024-11-26
Attending: STUDENT IN AN ORGANIZED HEALTH CARE EDUCATION/TRAINING PROGRAM
Payer: OTHER GOVERNMENT

## 2024-11-26 LAB
AMPHET UR QL SCN: POSITIVE
ANION GAP SERPL CALC-SCNC: 11 MMOL/L (ref 7–16)
APPEARANCE UR: ABNORMAL
BACTERIA #/AREA URNS HPF: ABNORMAL /HPF
BARBITURATES UR QL SCN: NEGATIVE
BENZODIAZ UR QL SCN: POSITIVE
BILIRUB UR QL STRIP.AUTO: NEGATIVE
BUN SERPL-MCNC: 13 MG/DL (ref 8–22)
BZE UR QL SCN: NEGATIVE
CALCIUM SERPL-MCNC: 9.5 MG/DL (ref 8.5–10.5)
CANNABINOIDS UR QL SCN: NEGATIVE
CASTS URNS QL MICRO: ABNORMAL /LPF (ref 0–2)
CHLORIDE SERPL-SCNC: 108 MMOL/L (ref 96–112)
CO2 SERPL-SCNC: 25 MMOL/L (ref 20–33)
COLOR UR: YELLOW
CORTIS SERPL-MCNC: 4.3 UG/DL (ref 0–23)
CREAT SERPL-MCNC: 1.51 MG/DL (ref 0.5–1.4)
CREAT UR-MCNC: 254 MG/DL
EPITHELIAL CELLS 1715: ABNORMAL /HPF (ref 0–5)
ERYTHROCYTE [DISTWIDTH] IN BLOOD BY AUTOMATED COUNT: 49.2 FL (ref 35.9–50)
FENTANYL UR QL: NEGATIVE
GFR SERPLBLD CREATININE-BSD FMLA CKD-EPI: 51 ML/MIN/1.73 M 2
GLUCOSE SERPL-MCNC: 103 MG/DL (ref 65–99)
GLUCOSE UR STRIP.AUTO-MCNC: NEGATIVE MG/DL
HCT VFR BLD AUTO: 40.8 % (ref 42–52)
HGB BLD-MCNC: 12.9 G/DL (ref 14–18)
KETONES UR STRIP.AUTO-MCNC: NEGATIVE MG/DL
LAMOTRIGINE SERPL-MCNC: 1.4 UG/ML (ref 3–15)
LEUKOCYTE ESTERASE UR QL STRIP.AUTO: ABNORMAL
LV EJECT FRACT  99904: 58
LV EJECT FRACT MOD 2C 99903: 57.47
LV EJECT FRACT MOD 4C 99902: 54.9
LV EJECT FRACT MOD BP 99901: 58.73
MCH RBC QN AUTO: 29.5 PG (ref 27–33)
MCHC RBC AUTO-ENTMCNC: 31.6 G/DL (ref 32.3–36.5)
MCV RBC AUTO: 93.4 FL (ref 81.4–97.8)
METHADONE UR QL SCN: NEGATIVE
MICRO URNS: ABNORMAL
NITRITE UR QL STRIP.AUTO: POSITIVE
OPIATES UR QL SCN: POSITIVE
OXYCODONE UR QL SCN: NEGATIVE
PCP UR QL SCN: POSITIVE
PH UR STRIP.AUTO: 5.5 [PH] (ref 5–8)
PLATELET # BLD AUTO: 248 K/UL (ref 164–446)
PMV BLD AUTO: 11.3 FL (ref 9–12.9)
POTASSIUM SERPL-SCNC: 3.9 MMOL/L (ref 3.6–5.5)
PROPOXYPH UR QL SCN: NEGATIVE
PROT UR QL STRIP: 30 MG/DL
RBC # BLD AUTO: 4.37 M/UL (ref 4.7–6.1)
RBC # URNS HPF: ABNORMAL /HPF (ref 0–2)
RBC UR QL AUTO: ABNORMAL
SODIUM SERPL-SCNC: 144 MMOL/L (ref 135–145)
SODIUM UR-SCNC: 60 MMOL/L
SP GR UR STRIP.AUTO: 1.02
UROBILINOGEN UR STRIP.AUTO-MCNC: 1 EU/DL
WBC # BLD AUTO: 7.5 K/UL (ref 4.8–10.8)
WBC #/AREA URNS HPF: ABNORMAL /HPF

## 2024-11-26 PROCEDURE — 80048 BASIC METABOLIC PNL TOTAL CA: CPT

## 2024-11-26 PROCEDURE — 700102 HCHG RX REV CODE 250 W/ 637 OVERRIDE(OP): Performed by: STUDENT IN AN ORGANIZED HEALTH CARE EDUCATION/TRAINING PROGRAM

## 2024-11-26 PROCEDURE — 97535 SELF CARE MNGMENT TRAINING: CPT

## 2024-11-26 PROCEDURE — A9270 NON-COVERED ITEM OR SERVICE: HCPCS | Performed by: STUDENT IN AN ORGANIZED HEALTH CARE EDUCATION/TRAINING PROGRAM

## 2024-11-26 PROCEDURE — 93306 TTE W/DOPPLER COMPLETE: CPT | Mod: 26 | Performed by: INTERNAL MEDICINE

## 2024-11-26 PROCEDURE — 82570 ASSAY OF URINE CREATININE: CPT

## 2024-11-26 PROCEDURE — 36415 COLL VENOUS BLD VENIPUNCTURE: CPT

## 2024-11-26 PROCEDURE — 93306 TTE W/DOPPLER COMPLETE: CPT

## 2024-11-26 PROCEDURE — 97166 OT EVAL MOD COMPLEX 45 MIN: CPT

## 2024-11-26 PROCEDURE — 82533 TOTAL CORTISOL: CPT

## 2024-11-26 PROCEDURE — 84300 ASSAY OF URINE SODIUM: CPT

## 2024-11-26 PROCEDURE — 81001 URINALYSIS AUTO W/SCOPE: CPT

## 2024-11-26 PROCEDURE — 51798 US URINE CAPACITY MEASURE: CPT

## 2024-11-26 PROCEDURE — 80307 DRUG TEST PRSMV CHEM ANLYZR: CPT

## 2024-11-26 PROCEDURE — 99232 SBSQ HOSP IP/OBS MODERATE 35: CPT | Performed by: INTERNAL MEDICINE

## 2024-11-26 PROCEDURE — 770020 HCHG ROOM/CARE - TELE (206)

## 2024-11-26 PROCEDURE — 85027 COMPLETE CBC AUTOMATED: CPT

## 2024-11-26 PROCEDURE — 97162 PT EVAL MOD COMPLEX 30 MIN: CPT

## 2024-11-26 RX ADMIN — TIZANIDINE 4 MG: 4 TABLET ORAL at 14:33

## 2024-11-26 RX ADMIN — APIXABAN 5 MG: 5 TABLET, FILM COATED ORAL at 17:17

## 2024-11-26 RX ADMIN — LAMOTRIGINE 25 MG: 25 TABLET ORAL at 05:19

## 2024-11-26 RX ADMIN — TELMISARTAN 40 MG: 80 TABLET ORAL at 05:19

## 2024-11-26 RX ADMIN — LAMOTRIGINE 25 MG: 25 TABLET ORAL at 17:17

## 2024-11-26 RX ADMIN — TIZANIDINE 4 MG: 4 TABLET ORAL at 17:17

## 2024-11-26 RX ADMIN — VENLAFAXINE 225 MG: 75 TABLET ORAL at 17:17

## 2024-11-26 RX ADMIN — TIZANIDINE 4 MG: 4 TABLET ORAL at 10:06

## 2024-11-26 RX ADMIN — HYDROCODONE BITARTRATE AND ACETAMINOPHEN 1 TABLET: 10; 325 TABLET ORAL at 10:06

## 2024-11-26 RX ADMIN — HYDROCODONE BITARTRATE AND ACETAMINOPHEN 1 TABLET: 10; 325 TABLET ORAL at 14:33

## 2024-11-26 RX ADMIN — TRAZODONE HYDROCHLORIDE 150 MG: 100 TABLET ORAL at 20:12

## 2024-11-26 RX ADMIN — ROSUVASTATIN CALCIUM 40 MG: 20 TABLET, FILM COATED ORAL at 17:16

## 2024-11-26 RX ADMIN — HYDROCODONE BITARTRATE AND ACETAMINOPHEN 1 TABLET: 10; 325 TABLET ORAL at 17:17

## 2024-11-26 RX ADMIN — EZETIMIBE 10 MG: 10 TABLET ORAL at 05:19

## 2024-11-26 RX ADMIN — FINASTERIDE 5 MG: 5 TABLET, FILM COATED ORAL at 05:18

## 2024-11-26 RX ADMIN — APIXABAN 5 MG: 5 TABLET, FILM COATED ORAL at 05:19

## 2024-11-26 RX ADMIN — LEVETIRACETAM 1000 MG: 500 TABLET, FILM COATED ORAL at 17:18

## 2024-11-26 RX ADMIN — TAMSULOSIN HYDROCHLORIDE 0.4 MG: 0.4 CAPSULE ORAL at 05:19

## 2024-11-26 RX ADMIN — TIZANIDINE 4 MG: 4 TABLET ORAL at 20:12

## 2024-11-26 RX ADMIN — LEVETIRACETAM 1000 MG: 500 TABLET, FILM COATED ORAL at 05:20

## 2024-11-26 RX ADMIN — AMIODARONE HYDROCHLORIDE 100 MG: 200 TABLET ORAL at 05:18

## 2024-11-26 RX ADMIN — HYDROCODONE BITARTRATE AND ACETAMINOPHEN 1 TABLET: 10; 325 TABLET ORAL at 20:12

## 2024-11-26 ASSESSMENT — COGNITIVE AND FUNCTIONAL STATUS - GENERAL
WALKING IN HOSPITAL ROOM: A LITTLE
SUGGESTED CMS G CODE MODIFIER DAILY ACTIVITY: CJ
DRESSING REGULAR LOWER BODY CLOTHING: A LITTLE
MOVING TO AND FROM BED TO CHAIR: A LITTLE
CLIMB 3 TO 5 STEPS WITH RAILING: A LITTLE
MOVING FROM LYING ON BACK TO SITTING ON SIDE OF FLAT BED: A LITTLE
WALKING IN HOSPITAL ROOM: A LITTLE
TOILETING: A LITTLE
SUGGESTED CMS G CODE MODIFIER MOBILITY: CJ
HELP NEEDED FOR BATHING: A LITTLE
DAILY ACTIVITIY SCORE: 21
MOBILITY SCORE: 20
HELP NEEDED FOR BATHING: A LITTLE
CLIMB 3 TO 5 STEPS WITH RAILING: A LITTLE
MOVING TO AND FROM BED TO CHAIR: A LITTLE
SUGGESTED CMS G CODE MODIFIER MOBILITY: CJ
MOBILITY SCORE: 21
TOILETING: A LITTLE
DAILY ACTIVITIY SCORE: 21
DRESSING REGULAR LOWER BODY CLOTHING: A LITTLE
SUGGESTED CMS G CODE MODIFIER DAILY ACTIVITY: CJ

## 2024-11-26 ASSESSMENT — PAIN DESCRIPTION - PAIN TYPE
TYPE: CHRONIC PAIN
TYPE: ACUTE PAIN
TYPE: CHRONIC PAIN
TYPE: ACUTE PAIN
TYPE: CHRONIC PAIN

## 2024-11-26 ASSESSMENT — ENCOUNTER SYMPTOMS
SHORTNESS OF BREATH: 0
WEAKNESS: 0
ABDOMINAL PAIN: 0
BACK PAIN: 1

## 2024-11-26 ASSESSMENT — GAIT ASSESSMENTS
DISTANCE (FEET): 500
DEVIATION: BRADYKINETIC;SHUFFLED GAIT
ASSISTIVE DEVICE: FRONT WHEEL WALKER
GAIT LEVEL OF ASSIST: SUPERVISED

## 2024-11-26 ASSESSMENT — SOCIAL DETERMINANTS OF HEALTH (SDOH)
IN THE PAST 12 MONTHS, HAS THE ELECTRIC, GAS, OIL, OR WATER COMPANY THREATENED TO SHUT OFF SERVICE IN YOUR HOME?: NO
WITHIN THE PAST 12 MONTHS, THE FOOD YOU BOUGHT JUST DIDN'T LAST AND YOU DIDN'T HAVE MONEY TO GET MORE: NEVER TRUE
WITHIN THE PAST 12 MONTHS, YOU WORRIED THAT YOUR FOOD WOULD RUN OUT BEFORE YOU GOT THE MONEY TO BUY MORE: NEVER TRUE

## 2024-11-26 ASSESSMENT — FIBROSIS 4 INDEX: FIB4 SCORE: 6.49

## 2024-11-26 ASSESSMENT — ACTIVITIES OF DAILY LIVING (ADL): TOILETING: INDEPENDENT

## 2024-11-26 NOTE — CARE PLAN
The patient is Stable - Low risk of patient condition declining or worsening    Shift Goals  Clinical Goals: Pain control  Patient Goals: Comfort  Family Goals: FELIPA    Progress made toward(s) clinical / shift goals:      Problem: Knowledge Deficit - Standard  Goal: Patient and family/care givers will demonstrate understanding of plan of care, disease process/condition, diagnostic tests and medications  Outcome: Progressing  Note: Patient updated on plan of care for the evening. All questions answered at this time. Hourly rounding in place, call light within reach.     Problem: Fall Risk  Goal: Patient will remain free from falls  Outcome: Progressing  Note: Fall precautions in place. Bed alarm on, bed locked and in lowest position. Staff present when patient ambulates.      Problem: Seizure Precautions  Goal: Implementation of seizure precautions  Outcome: Progressing  Note: 3 padded side rails up at all times. Suction and oxygen available in the room. Continuous pulse ox in place. IV access available if needed.       Patient is not progressing towards the following goals:

## 2024-11-26 NOTE — THERAPY
"Physical Therapy   Initial Evaluation     Patient Name: Karan Wiley  Age:  64 y.o., Sex:  male  Medical Record #: [id number]  Today's Date: 11/26/2024     Precautions  Precautions: Fall Risk  Comments: seizures    Assessment  Patient is 64 y.o. male presenting after he lost consciousness at home. Pt admitted for likely breakthrough seizure vs syncope. 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 Pt is independent with functional mobility at baseline using no AD vs 4WW. Lives in 1[facility] ramped entry with wife and adult son who can help as needed. During current session, pt presents near functional baseline requiring overall SPV for mobility as detailed below. Able to walk 500 ft with FWW, no LOB. Educated pt about using 4WW at home as below. Encouraged pt to continue ambulating to the toilet and in the hallway with nursing as tolerated. Recommend d/c home with OPPT. Pt denies any mobility concerns with d/c home. Patient will not be actively followed for physical therapy services at this time, however may be seen if requested by physician for 1 more visit within 30 days to address any discharge or equipment needs.    Plan    Physical Therapy Initial Treatment Plan   Duration: Discharge Needs Only    DC Equipment Recommendations: None (pt owns 4WW)  Discharge Recommendations: Recommend outpatient physical therapy services to address higher level deficits       Subjective    Pt received resting in bed, agreeable to participate. \"Sometimes\" when asked if he can identify when he is about to lose consciousness.     Objective       11/26/24 0914   Initial Contact Note    Initial Contact Note Order Received and Verified, Evaluation Only - Patient Does Not Require Further Acute Physical Therapy at this Time.  However, May Benefit from Post Acute Therapy for Higher Level Functional Deficits.   Precautions   Precautions Fall Risk "   Comments seizures   Vitals   Pulse Oximetry 93 %   O2 Delivery Device None - Room Air   Vitals Comments pt denied O2 use at baseline   Pain 0 - 10 Group   Therapist Pain Assessment Post Activity Pain Same as Prior to Activity;Nurse Notified  (no c/o pain)   Prior Living Situation   Prior Services Home-Independent   Housing / Facility 1 Story House   Steps Into Home   (ramped)   Equipment Owned 4-Wheel Walker   Lives with - Patient's Self Care Capacity Spouse;Adult Children   Comments Lives in [place] with wife and 29 yo son. Wife is home most of the time and can help as needed. Pt is retired   Prior Level of Functional Mobility   Bed Mobility Independent   Transfer Status Independent   Ambulation Independent   Ambulation Distance limited community   Assistive Devices Used 4-Wheel Walker   Stairs Independent   Comments pt reports that he weaned off the 4WW at home starting last week but still uses 4WW in the community   History of Falls   History of Falls Yes   Date of Last Fall   (reason for admit, pt lost consciousness at home. Pt also endorses 4-5 falls in the last month d/t same reason)   Cognition    Cognition / Consciousness X   Speech/ Communication Hard of Hearing   Level of Consciousness Alert   Comments pleasant and cooperative, receptive to edu   Passive ROM Lower Body   Passive ROM Lower Body WDL   Active ROM Lower Body    Active ROM Lower Body  WDL   Strength Lower Body   Lower Body Strength  WDL   Comments functional for ambulation   Sensation Lower Body   Comments no c/o altered sensation BLE   Coordination Lower Body    Coordination Lower Body  WDL   Balance Assessment   Sitting Balance (Static) Fair +   Sitting Balance (Dynamic) Fair +   Standing Balance (Static) Fair +   Standing Balance (Dynamic) Fair   Weight Shift Sitting Good   Weight Shift Standing Fair   Comments FWW   Bed Mobility    Supine to Sit Supervised   Scooting Supervised   Comments HOBE slightly, up with OT post   Gait Analysis    Gait Level Of Assist Supervised   Assistive Device Front Wheel Walker   Distance (Feet) 500   # of Times Distance was Traveled 1   Deviation Bradykinetic;Shuffled Gait   # of Stairs Climbed 0   Functional Mobility   Sit to Stand Supervised   Bed, Chair, Wheelchair Transfer Supervised   Transfer Method Stand Step   Mobility bed>up in hallway FWW>handoff to OT in doorway of pt room   6 Clicks Assessment - How much HELP from from another person do you currently need... (If the patient hasn't done an activity recently, how much help from another person do you think he/she would need if he/she tried?)   Turning from your back to your side while in a flat bed without using bedrails? 4   Moving from lying on your back to sitting on the side of a flat bed without using bedrails? 4   Moving to and from a bed to a chair (including a wheelchair)? 3   Standing up from a chair using your arms (e.g., wheelchair, or bedside chair)? 4   Walking in hospital room? 3   Climbing 3-5 steps with a railing? 3   6 clicks Mobility Score 21   Education Group   Education Provided Role of Physical Therapist;Use of Assistive Device   Role of Physical Therapist Patient Response Patient;Acceptance;Explanation;Demonstration;Verbal Demonstration;Action Demonstration   Use of Assistive Device Patient Response Patient;Acceptance;Explanation;Demonstration;Verbal Demonstration;Action Demonstration   Additional Comments educated pt about using 4WW at home for improved balance and decreased fall risk, pt agreeable   Physical Therapy Initial Treatment Plan    Duration Discharge Needs Only   Problem List    Problems None   Anticipated Discharge Equipment and Recommendations   DC Equipment Recommendations None  (pt owns 4WW)   Discharge Recommendations Recommend outpatient physical therapy services to address higher level deficits   Interdisciplinary Plan of Care Collaboration   IDT Collaboration with  Nursing;Occupational Therapist   Patient Position at  End of Therapy Other (Comments)  (handoff to OT in doorway of pt room)   Collaboration Comments RN and OT updated   Session Information   Date / Session Number  11/26- d/c needs only

## 2024-11-26 NOTE — THERAPY
Occupational Therapy   Initial Evaluation     Patient Name: Karan Wiley  Age:  64 y.o., Sex:  male  Medical Record #: [id number]  Today's Date: 11/26/2024     Precautions  Precautions: Fall Risk  Comments: seizures    Assessment    Patient is 64 y.o. male admitted for ALOC, workup for seizure vs syncope; PMH of afib, SDH w/ crani, seizure. Pt normally independent with functional mobility and ADLs living in a SLH with spouse and son. Pt able to complete all functional mobility and ADLs with supervision, use of FWW. Anticipate pt is at his functional baseline.        Plan    DC Equipment Recommendations: None  Discharge Recommendations: Anticipate that the patient will have no further occupational therapy needs after discharge from the hospital     Objective       11/26/24 0928   Prior Living Situation   Prior Services Home-Independent   Housing / Facility 1 Story House   Steps Into Home   (ramp)   Bathroom Set up Bathtub / Shower Combination   Equipment Owned 4-Wheel Walker   Lives with - Patient's Self Care Capacity Spouse;Adult Children   Prior Level of ADL Function   Self Feeding Independent   Grooming / Hygiene Independent   Bathing Independent   Dressing Independent   Toileting Independent   Prior Level of IADL Function   Medication Management Independent   Laundry Independent   Kitchen Mobility Independent   Finances Independent   Home Management Independent   Shopping Independent   Prior Level Of Mobility Independent With Device in Home   Driving / Transportation Relatives / Others Provide Transportation   Occupation (Pre-Hospital Vocational) Retired Due To Age   History of Falls   History of Falls Yes   Date of Last Fall   (reason for admission)   Precautions   Precautions Fall Risk   Pain 0 - 10 Group   Therapist Pain Assessment Nurse Notified;Post Activity Pain Same as Prior to Activity   Cognition    Cognition / Consciousness X   Speech/ Communication Hard of Hearing   Level of Consciousness Alert    Active ROM Upper Body   Active ROM Upper Body  WDL   Dominant Hand Right   Strength Upper Body   Upper Body Strength  WDL   Sensation Upper Body   Upper Extremity Sensation  WDL   Upper Body Muscle Tone   Upper Body Muscle Tone  WDL   Neurological Concerns   Neurological Concerns No   Coordination Upper Body   Coordination WDL   Balance Assessment   Sitting Balance (Static) Fair +   Sitting Balance (Dynamic) Fair +   Standing Balance (Static) Fair +   Standing Balance (Dynamic) Fair   Weight Shift Sitting Good   Weight Shift Standing Good   Comments w/ FWW   Bed Mobility    Supine to Sit Supervised   Scooting Supervised   ADL Assessment   Grooming Supervision;Standing   Upper Body Dressing Supervision   Lower Body Dressing Supervision   Toileting Supervision   How much help from another person does the patient currently need...   Putting on and taking off regular lower body clothing? 3   Bathing (including washing, rinsing, and drying)? 3   Toileting, which includes using a toilet, bedpan, or urinal? 3   Putting on and taking off regular upper body clothing? 4   Taking care of personal grooming such as brushing teeth? 4   Eating meals? 4   6 Clicks Daily Activity Score 21   Functional Mobility   Sit to Stand Supervised   Bed, Chair, Wheelchair Transfer Supervised   Toilet Transfers Supervised   Transfer Method Stand Step   Mobility taken from PT, up to sink, toilet, returned back to bed   Comments w/ FWW   Activity Tolerance   Sitting in Chair 5 min  (toilet)   Standing 8 min   Education Group   Education Provided Role of Occupational Therapist   Role of Occupational Therapist Patient Response Patient;Acceptance;Explanation   Problem List   Problem List None   Anticipated Discharge Equipment and Recommendations   DC Equipment Recommendations None   Discharge Recommendations Anticipate that the patient will have no further occupational therapy needs after discharge from the hospital   Interdisciplinary Plan of Care  Collaboration   IDT Collaboration with  Nursing;Physical Therapist   Patient Position at End of Therapy In Bed;Bed Alarm On;Call Light within Reach;Tray Table within Reach;Phone within Reach   Collaboration Comments RN updated

## 2024-11-26 NOTE — PROGRESS NOTES
Hospital Medicine Daily Progress Note    Date of Service  11/26/2024    Chief Complaint  Karan Wiley is a 64 y.o. male admitted 11/24/2024 with seizure    Hospital Course  63 yo man with A-fib, epilepsy who was recently taken off Depakote and switched to lamotrigine who had seizure event while running errands around the house.  He presented to [facility] and was transferred to [facility].  Neurology was consulted.  EEG showed rare left temporal sharp waves.  Neurology recommended Keppra 1000 mg twice daily with lamotrigine titration.    Interval Problem Update  Sinus on telemetry  Creatinine is increased.  He says he has been straining to urinate.  Denies dysuria or abdominal pain.  He has chronic back pain from a spinal surgery.  TTE is pending    I have discussed this patient's plan of care and discharge plan at IDT rounds today with Case Management, Nursing, Nursing leadership, and other members of the IDT team.    Consultants/Specialty  neurology    Code Status  Full Code    Disposition  The patient is not medically cleared for discharge to home or a post-acute facility.  Anticipate discharge to: home with close outpatient follow-up    I have placed the appropriate orders for post-discharge needs.    Review of Systems  Review of Systems   Constitutional:  Negative for malaise/fatigue.   Respiratory:  Negative for shortness of breath.    Cardiovascular:  Negative for chest pain.   Gastrointestinal:  Negative for abdominal pain.   Genitourinary:  Negative for dysuria, frequency and urgency.   Musculoskeletal:  Positive for back pain.   Neurological:  Negative for weakness.        Physical Exam  Temp:  [36.5 °C (97.7 °F)-36.6 °C (97.9 °F)] 36.5 °C (97.7 °F)  Pulse:  [51-65] 55  Resp:  [17-18] 17  BP: ()/(61-95) 114/71  SpO2:  [90 %-94 %] 93 %    Physical Exam  Vitals and nursing note reviewed.   Constitutional:       General: He is not in acute distress.     Appearance: He is not toxic-appearing.    HENT:      Head: Normocephalic.      Mouth/Throat:      Mouth: Mucous membranes are moist.   Eyes:      General:         Right eye: No discharge.         Left eye: No discharge.   Cardiovascular:      Rate and Rhythm: Normal rate and regular rhythm.   Pulmonary:      Effort: Pulmonary effort is normal. No respiratory distress.      Breath sounds: No wheezing or rales.   Abdominal:      Palpations: Abdomen is soft.      Tenderness: There is no abdominal tenderness. There is no guarding or rebound.   Musculoskeletal:         General: No swelling.      Cervical back: Neck supple.   Skin:     General: Skin is warm and dry.   Neurological:      Mental Status: He is alert and oriented to person, place, and time.         Fluids    Intake/Output Summary (Last 24 hours) at 11/26/2024 1408  Last data filed at 11/26/2024 0400  Gross per 24 hour   Intake 0 ml   Output 0 ml   Net 0 ml        Laboratory  Recent Labs     11/24/24 2326 11/26/24  0446   WBC 8.3 7.5   RBC 4.82 4.37*   HEMOGLOBIN 13.8* 12.9*   HEMATOCRIT 42.9 40.8*   MCV 89.0 93.4   MCH 28.6 29.5   MCHC 32.2* 31.6*   RDW 45.1 49.2   PLATELETCT 260 248   MPV 11.5 11.3     Recent Labs     11/24/24 2326 11/26/24  0446   SODIUM 138 144   POTASSIUM 3.2* 3.9   CHLORIDE 103 108   CO2 24 25   GLUCOSE 104* 103*   BUN 6* 13   CREATININE 0.79 1.51*   CALCIUM 9.2 9.5                   Imaging  EC-ECHOCARDIOGRAM COMPLETE W/O CONT    (Results Pending)        Assessment/Plan  * Breakthrough seizure (HCC)- (present on admission)  Assessment & Plan  Neurology consulted  EEG showed rare left temporal sharp waves.  Neurology recommended Keppra 1000 mg twice daily with lamotrigine titration.  UDS is pending    Urinary retention due to benign prostatic hyperplasia- (present on admission)  Assessment & Plan  Continue proscar and flomax  Patient says symptoms of urinary straining and creatinine is increased.  Check bladder scan postvoid to see if he is retaining.  Also hold telmisartan  given the rising creatinine.  Recheck BMP tomorrow    Fall- (present on admission)  Assessment & Plan  Reports multiple falls  Fall precautions  PT/OT    Advance care planning- (present on admission)  Assessment & Plan  Full code    Hypertension  Assessment & Plan  Continue home metoprolol    Hyperlipidemia- (present on admission)  Assessment & Plan  Continue crestor     Paroxysmal atrial fibrillation (HCC)- (present on admission)  Assessment & Plan  Continue eliquis and metoprolol and amiodarone  Last TTE in July 2024 with EF 60%  Repeat echo is pending         VTE prophylaxis:    therapeutic anticoagulation with eliquis 5 mg BID      I have performed a physical exam and reviewed and updated ROS and Plan today (11/26/2024). In review of yesterday's note (11/25/2024), there are no changes except as documented above.

## 2024-11-26 NOTE — CARE PLAN
Problem: Knowledge Deficit - Standard  Goal: Patient and family/care givers will demonstrate understanding of plan of care, disease process/condition, diagnostic tests and medications  Outcome: Progressing     Problem: Pain - Standard  Goal: Alleviation of pain or a reduction in pain to the patient’s comfort goal  Outcome: Progressing   The patient is Stable - Low risk of patient condition declining or worsening    Shift Goals  Clinical Goals: Monitor for seizures, pain control, BP control  Patient Goals: comfort  Family Goals: maliha    Progress made toward(s) clinical / shift goals:  progressing    Patient is not progressing towards the following goals:

## 2024-11-26 NOTE — PROGRESS NOTES
Monitor Summary: SB/SR 54-66 [place] 0.16 QRS 0.09 QT 0.43 with occasional/rare PVCs, rare bigem per strip from monitor room.

## 2024-11-26 NOTE — TELEPHONE ENCOUNTER
Noted. The patient should follow up in about a month after the discharge from the hospital, unless any other developments in the interim. Thank you.

## 2024-11-26 NOTE — CARE PLAN
The patient is Stable - Low risk of patient condition declining or worsening    Shift Goals  Clinical Goals: pain control, monitor neuro  Patient Goals: comfort  Family Goals: maliha    Progress made toward(s) clinical / shift goals:  progressing  Problem: Pain - Standard  Goal: Alleviation of pain or a reduction in pain to the patient’s comfort goal  Outcome: Progressing     Problem: Fall Risk  Goal: Patient will remain free from falls  Outcome: Progressing       Patient is not progressing towards the following goals:

## 2024-11-26 NOTE — PROGRESS NOTES
Monitor Summary: SB/SR 48-66, [place] 0.20, QRS 0.09, QT 0.43, with rare/occasional PVCs per strip from monitor room.

## 2024-11-27 PROBLEM — I77.810 AORTIC ROOT DILATATION (HCC): Status: ACTIVE | Noted: 2024-11-27

## 2024-11-27 LAB
ALBUMIN SERPL BCP-MCNC: 3.2 G/DL (ref 3.2–4.9)
BASOPHILS # BLD AUTO: 0.7 % (ref 0–1.8)
BASOPHILS # BLD: 0.05 K/UL (ref 0–0.12)
BUN SERPL-MCNC: 14 MG/DL (ref 8–22)
CALCIUM ALBUM COR SERPL-MCNC: 9.7 MG/DL (ref 8.5–10.5)
CALCIUM SERPL-MCNC: 9.1 MG/DL (ref 8.5–10.5)
CHLORIDE SERPL-SCNC: 102 MMOL/L (ref 96–112)
CO2 SERPL-SCNC: 25 MMOL/L (ref 20–33)
CREAT SERPL-MCNC: 1.09 MG/DL (ref 0.5–1.4)
EKG IMPRESSION: NORMAL
EOSINOPHIL # BLD AUTO: 0.25 K/UL (ref 0–0.51)
EOSINOPHIL NFR BLD: 3.6 % (ref 0–6.9)
ERYTHROCYTE [DISTWIDTH] IN BLOOD BY AUTOMATED COUNT: 48.7 FL (ref 35.9–50)
GFR SERPLBLD CREATININE-BSD FMLA CKD-EPI: 76 ML/MIN/1.73 M 2
GLUCOSE SERPL-MCNC: 106 MG/DL (ref 65–99)
HCT VFR BLD AUTO: 38 % (ref 42–52)
HGB BLD-MCNC: 12.1 G/DL (ref 14–18)
IMM GRANULOCYTES # BLD AUTO: 0.03 K/UL (ref 0–0.11)
IMM GRANULOCYTES NFR BLD AUTO: 0.4 % (ref 0–0.9)
LYMPHOCYTES # BLD AUTO: 1.7 K/UL (ref 1–4.8)
LYMPHOCYTES NFR BLD: 24.2 % (ref 22–41)
MCH RBC QN AUTO: 29.5 PG (ref 27–33)
MCHC RBC AUTO-ENTMCNC: 31.8 G/DL (ref 32.3–36.5)
MCV RBC AUTO: 92.7 FL (ref 81.4–97.8)
MONOCYTES # BLD AUTO: 0.64 K/UL (ref 0–0.85)
MONOCYTES NFR BLD AUTO: 9.1 % (ref 0–13.4)
NEUTROPHILS # BLD AUTO: 4.35 K/UL (ref 1.82–7.42)
NEUTROPHILS NFR BLD: 62 % (ref 44–72)
NRBC # BLD AUTO: 0 K/UL
NRBC BLD-RTO: 0 /100 WBC (ref 0–0.2)
PHOSPHATE SERPL-MCNC: 4.2 MG/DL (ref 2.5–4.5)
PLATELET # BLD AUTO: 239 K/UL (ref 164–446)
PMV BLD AUTO: 11.7 FL (ref 9–12.9)
POTASSIUM SERPL-SCNC: 4.1 MMOL/L (ref 3.6–5.5)
RBC # BLD AUTO: 4.1 M/UL (ref 4.7–6.1)
SODIUM SERPL-SCNC: 135 MMOL/L (ref 135–145)
WBC # BLD AUTO: 7 K/UL (ref 4.8–10.8)

## 2024-11-27 PROCEDURE — RXMED WILLOW AMBULATORY MEDICATION CHARGE: Performed by: INTERNAL MEDICINE

## 2024-11-27 PROCEDURE — 99232 SBSQ HOSP IP/OBS MODERATE 35: CPT | Performed by: INTERNAL MEDICINE

## 2024-11-27 PROCEDURE — A9270 NON-COVERED ITEM OR SERVICE: HCPCS | Performed by: STUDENT IN AN ORGANIZED HEALTH CARE EDUCATION/TRAINING PROGRAM

## 2024-11-27 PROCEDURE — 93010 ELECTROCARDIOGRAM REPORT: CPT | Performed by: INTERNAL MEDICINE

## 2024-11-27 PROCEDURE — 700102 HCHG RX REV CODE 250 W/ 637 OVERRIDE(OP): Performed by: STUDENT IN AN ORGANIZED HEALTH CARE EDUCATION/TRAINING PROGRAM

## 2024-11-27 PROCEDURE — 36415 COLL VENOUS BLD VENIPUNCTURE: CPT

## 2024-11-27 PROCEDURE — 80069 RENAL FUNCTION PANEL: CPT

## 2024-11-27 PROCEDURE — 85025 COMPLETE CBC W/AUTO DIFF WBC: CPT

## 2024-11-27 PROCEDURE — 770020 HCHG ROOM/CARE - TELE (206)

## 2024-11-27 RX ORDER — LEVETIRACETAM 1000 MG/1
1000 TABLET ORAL 2 TIMES DAILY
Qty: 60 TABLET | Refills: 1 | Status: SHIPPED | OUTPATIENT
Start: 2024-11-27 | End: 2024-12-18 | Stop reason: SDUPTHER

## 2024-11-27 RX ORDER — LAMOTRIGINE 25 MG/1
TABLET ORAL
Qty: 30 TABLET | Refills: 0 | Status: SHIPPED | OUTPATIENT
Start: 2024-11-27 | End: 2024-12-01

## 2024-11-27 RX ORDER — TELMISARTAN 80 MG/1
40 TABLET ORAL EVERY MORNING
Status: DISCONTINUED | OUTPATIENT
Start: 2024-11-27 | End: 2024-12-01 | Stop reason: HOSPADM

## 2024-11-27 RX ADMIN — TAMSULOSIN HYDROCHLORIDE 0.4 MG: 0.4 CAPSULE ORAL at 04:51

## 2024-11-27 RX ADMIN — HYDROCODONE BITARTRATE AND ACETAMINOPHEN 1 TABLET: 10; 325 TABLET ORAL at 18:03

## 2024-11-27 RX ADMIN — ROSUVASTATIN CALCIUM 40 MG: 20 TABLET, FILM COATED ORAL at 18:02

## 2024-11-27 RX ADMIN — EZETIMIBE 10 MG: 10 TABLET ORAL at 04:51

## 2024-11-27 RX ADMIN — TIZANIDINE 4 MG: 4 TABLET ORAL at 14:08

## 2024-11-27 RX ADMIN — LAMOTRIGINE 25 MG: 25 TABLET ORAL at 04:53

## 2024-11-27 RX ADMIN — TIZANIDINE 4 MG: 4 TABLET ORAL at 20:29

## 2024-11-27 RX ADMIN — FINASTERIDE 5 MG: 5 TABLET, FILM COATED ORAL at 04:51

## 2024-11-27 RX ADMIN — TRAZODONE HYDROCHLORIDE 150 MG: 100 TABLET ORAL at 20:29

## 2024-11-27 RX ADMIN — LEVETIRACETAM 1000 MG: 500 TABLET, FILM COATED ORAL at 04:50

## 2024-11-27 RX ADMIN — VENLAFAXINE 225 MG: 75 TABLET ORAL at 18:03

## 2024-11-27 RX ADMIN — HYDROCODONE BITARTRATE AND ACETAMINOPHEN 1 TABLET: 10; 325 TABLET ORAL at 09:10

## 2024-11-27 RX ADMIN — LEVETIRACETAM 1000 MG: 500 TABLET, FILM COATED ORAL at 18:03

## 2024-11-27 RX ADMIN — APIXABAN 5 MG: 5 TABLET, FILM COATED ORAL at 18:03

## 2024-11-27 RX ADMIN — TIZANIDINE 4 MG: 4 TABLET ORAL at 09:10

## 2024-11-27 RX ADMIN — HYDROCODONE BITARTRATE AND ACETAMINOPHEN 1 TABLET: 10; 325 TABLET ORAL at 14:08

## 2024-11-27 RX ADMIN — TIZANIDINE 4 MG: 4 TABLET ORAL at 18:03

## 2024-11-27 RX ADMIN — AMIODARONE HYDROCHLORIDE 100 MG: 200 TABLET ORAL at 04:51

## 2024-11-27 RX ADMIN — HYDROCODONE BITARTRATE AND ACETAMINOPHEN 1 TABLET: 10; 325 TABLET ORAL at 20:30

## 2024-11-27 RX ADMIN — APIXABAN 5 MG: 5 TABLET, FILM COATED ORAL at 04:51

## 2024-11-27 RX ADMIN — LAMOTRIGINE 25 MG: 25 TABLET ORAL at 18:02

## 2024-11-27 ASSESSMENT — ENCOUNTER SYMPTOMS
ABDOMINAL PAIN: 0
SHORTNESS OF BREATH: 0
BACK PAIN: 1
WEAKNESS: 0

## 2024-11-27 ASSESSMENT — PAIN DESCRIPTION - PAIN TYPE
TYPE: CHRONIC PAIN

## 2024-11-27 NOTE — PROGRESS NOTES
4 Eyes Skin Assessment Completed by CAYETANO Plunkett and CAYETANO Hollins.    Head WDL  Ears WDL  Nose WDL  Mouth WDL  Neck WDL  Breast/Chest WDL  Shoulder Blades WDL  Spine WDL  (R) Arm/Elbow/Hand Bruising  (L) Arm/Elbow/Hand WDL  Abdomen WDL  Groin WDL  Scrotum/Coccyx/Buttocks Redness and Blanching  (R) Leg WDL  (L) Leg WDL  (R) Heel/Foot/Toe Redness and Blanching  (L) Heel/Foot/Toe Redness and Blanching          Devices In Places Tele Box and Pulse Ox      Interventions In Place NC W/Ear Foams, Pillows, and Q2 Turns    Possible Skin Injury No    Pictures Uploaded Into Epic N/A  Wound Consult Placed N/A  RN Wound Prevention Protocol Ordered No

## 2024-11-27 NOTE — CARE PLAN
Problem: Knowledge Deficit - Standard  Goal: Patient and family/care givers will demonstrate understanding of plan of care, disease process/condition, diagnostic tests and medications  Outcome: Progressing     Problem: Pain - Standard  Goal: Alleviation of pain or a reduction in pain to the patient’s comfort goal  Outcome: Progressing   The patient is Stable - Low risk of patient condition declining or worsening    Shift Goals  Clinical Goals: Monitor for neuro changes, control BP  Patient Goals: comfort  Family Goals: FELIPA    Progress made toward(s) clinical / shift goals:  progressing    Patient is not progressing towards the following goals:

## 2024-11-27 NOTE — PROGRESS NOTES
Monitor Summary: SB 45-49, [place] 0.16, QRS 0.11, QT 0.41, with rare PVCs per strip from monitor room.

## 2024-11-27 NOTE — PROGRESS NOTES
Hospital Medicine Daily Progress Note    Date of Service  11/27/2024    Chief Complaint  Karan Wiley is a 64 y.o. male admitted 11/24/2024 with seizure    Hospital Course  65 yo man with A-fib, epilepsy who was recently taken off Depakote and switched to lamotrigine who had seizure event while running errands around the house.  He presented to [facility] and was transferred to [facility].  Neurology was consulted.  EEG showed rare left temporal sharp waves.  Neurology recommended Keppra 1000 mg twice daily with lamotrigine titration.    Interval Problem Update  Creatinine improved.  He denies any trouble urinating  TTE showed aortic root dilated at 4.5 cm at sinuses.  He is intermittently hypertensive.  I will restart on telmisartan and monitor creatinine.  I updated his wife    I have discussed this patient's plan of care and discharge plan at IDT rounds today with Case Management, Nursing, Nursing leadership, and other members of the IDT team.    Consultants/Specialty  neurology    Code Status  Full Code    Disposition  The patient is not medically cleared for discharge to home or a post-acute facility.  Anticipate discharge to: home with close outpatient follow-up    I have placed the appropriate orders for post-discharge needs.    Review of Systems  Review of Systems   Constitutional:  Negative for malaise/fatigue.   Respiratory:  Negative for shortness of breath.    Cardiovascular:  Negative for chest pain.   Gastrointestinal:  Negative for abdominal pain.   Genitourinary:  Negative for urgency.   Musculoskeletal:  Positive for back pain.   Neurological:  Negative for weakness.        Physical Exam  Temp:  [36.5 °C (97.7 °F)-36.6 °C (97.9 °F)] 36.5 °C (97.7 °F)  Pulse:  [50-61] 50  Resp:  [16-18] 16  BP: ()/() 105/56  SpO2:  [92 %-95 %] 93 %    Physical Exam  Vitals and nursing note reviewed.   Constitutional:       General: He is not in acute distress.     Appearance: He is not  toxic-appearing.      Comments: Hard of hearing   HENT:      Head: Normocephalic.      Mouth/Throat:      Mouth: Mucous membranes are moist.   Eyes:      General:         Right eye: No discharge.         Left eye: No discharge.   Cardiovascular:      Rate and Rhythm: Normal rate and regular rhythm.   Pulmonary:      Effort: Pulmonary effort is normal. No respiratory distress.      Breath sounds: No wheezing or rales.   Abdominal:      Palpations: Abdomen is soft.      Tenderness: There is no abdominal tenderness. There is no guarding or rebound.   Musculoskeletal:         General: No swelling.      Cervical back: Neck supple.   Skin:     General: Skin is warm and dry.   Neurological:      Mental Status: He is alert and oriented to person, place, and time.         Fluids    Intake/Output Summary (Last 24 hours) at 11/27/2024 1604  Last data filed at 11/27/2024 1053  Gross per 24 hour   Intake 118 ml   Output 600 ml   Net -482 ml        Laboratory  Recent Labs     11/24/24 2326 11/26/24  0446 11/27/24  0048   WBC 8.3 7.5 7.0   RBC 4.82 4.37* 4.10*   HEMOGLOBIN 13.8* 12.9* 12.1*   HEMATOCRIT 42.9 40.8* 38.0*   MCV 89.0 93.4 92.7   MCH 28.6 29.5 29.5   MCHC 32.2* 31.6* 31.8*   RDW 45.1 49.2 48.7   PLATELETCT 260 248 239   MPV 11.5 11.3 11.7     Recent Labs     11/24/24 2326 11/26/24 0446 11/27/24  0048   SODIUM 138 144 135   POTASSIUM 3.2* 3.9 4.1   CHLORIDE 103 108 102   CO2 24 25 25   GLUCOSE 104* 103* 106*   BUN 6* 13 14   CREATININE 0.79 1.51* 1.09   CALCIUM 9.2 9.5 9.1                   Imaging  EC-ECHOCARDIOGRAM COMPLETE W/O CONT   Final Result           Assessment/Plan  * Breakthrough seizure (HCC)- (present on admission)  Assessment & Plan  Neurology consulted  EEG showed rare left temporal sharp waves.  Neurology recommended Keppra 1000 mg twice daily with lamotrigine titration.  UDS is pending    Aortic root dilatation (HCC)  Assessment & Plan  TTE showed aortic root dilated at 4.5 cm at sinuses  He will  need good blood pressure control.  Continue metoprolol and restart telmisartan  He will need close follow-up with cardiology    Urinary retention due to benign prostatic hyperplasia- (present on admission)  Assessment & Plan  Continue proscar and flomax  Patient says symptoms of urinary straining and creatinine is increased.  Monitor for urinary retention.  Restarting telmisartan and monitoring his renal function    Fall- (present on admission)  Assessment & Plan  Reports multiple falls  Fall precautions  PT/OT    Advance care planning- (present on admission)  Assessment & Plan  Full code    Hypertension  Assessment & Plan  Intermittently hypertensive. continue home metoprolol, restarting telmisartan    Hyperlipidemia- (present on admission)  Assessment & Plan  Continue crestor     Paroxysmal atrial fibrillation (HCC)- (present on admission)  Assessment & Plan  Continue eliquis and metoprolol and amiodarone         VTE prophylaxis:    therapeutic anticoagulation with eliquis 5 mg BID      I have performed a physical exam and reviewed and updated ROS and Plan today (11/27/2024). In review of yesterday's note (11/26/2024), there are no changes except as documented above.

## 2024-11-27 NOTE — PROGRESS NOTES
Monitor Summary: SB-SR 50-64, [place] .0.19, QRS .0.05, QT .0.47 with rare PVCs per strip from monitor room

## 2024-11-27 NOTE — CARE PLAN
The patient is Stable - Low risk of patient condition declining or worsening    Shift Goals  Clinical Goals: Monitor for neuro changes  Patient Goals: Sleep  Family Goals: FELIPA    Progress made toward(s) clinical / shift goals:    Problem: Knowledge Deficit - Standard  Goal: Patient and family/care givers will demonstrate understanding of plan of care, disease process/condition, diagnostic tests and medications  Outcome: Progressing  Note: Patient updated on plan of care. All questions answered at this time. Call light within reach, hourly rounding in place.     Problem: Fall Risk  Goal: Patient will remain free from falls  Outcome: Progressing  Note: Bed alarm on, bed locked and in lowest position. Call light within reach. Hourly rounding in place.     Problem: Seizure Precautions  Goal: Implementation of seizure precautions  Outcome: Progressing  Note: Seizure precautions in place. 3 padded side rails up at all times, suction and oxygen in the room if needed. Patent IV acces available. Fall precautions in place.       Patient is not progressing towards the following goals:

## 2024-11-28 ENCOUNTER — APPOINTMENT (OUTPATIENT)
Dept: RADIOLOGY | Facility: MEDICAL CENTER | Age: 64
DRG: 101 | End: 2024-11-28
Attending: INTERNAL MEDICINE
Payer: OTHER GOVERNMENT

## 2024-11-28 LAB
ALBUMIN SERPL BCP-MCNC: 3.5 G/DL (ref 3.2–4.9)
AMPHET UR QL SCN: NEGATIVE
APPEARANCE UR: CLEAR
BACTERIA #/AREA URNS HPF: ABNORMAL /HPF
BARBITURATES UR QL SCN: NEGATIVE
BENZODIAZ UR QL SCN: NEGATIVE
BILIRUB UR QL STRIP.AUTO: NEGATIVE
BUN SERPL-MCNC: 12 MG/DL (ref 8–22)
BZE UR QL SCN: NEGATIVE
CALCIUM ALBUM COR SERPL-MCNC: 9.7 MG/DL (ref 8.5–10.5)
CALCIUM SERPL-MCNC: 9.3 MG/DL (ref 8.5–10.5)
CANNABINOIDS UR QL SCN: NEGATIVE
CASTS URNS QL MICRO: ABNORMAL /LPF (ref 0–2)
CHLORIDE SERPL-SCNC: 102 MMOL/L (ref 96–112)
CO2 SERPL-SCNC: 27 MMOL/L (ref 20–33)
COLOR UR: ABNORMAL
CREAT SERPL-MCNC: 1.03 MG/DL (ref 0.5–1.4)
EPITHELIAL CELLS 1715: ABNORMAL /HPF (ref 0–5)
FENTANYL UR QL: NEGATIVE
GFR SERPLBLD CREATININE-BSD FMLA CKD-EPI: 81 ML/MIN/1.73 M 2
GLUCOSE SERPL-MCNC: 97 MG/DL (ref 65–99)
GLUCOSE UR STRIP.AUTO-MCNC: NEGATIVE MG/DL
KETONES UR STRIP.AUTO-MCNC: NEGATIVE MG/DL
LEUKOCYTE ESTERASE UR QL STRIP.AUTO: ABNORMAL
METHADONE UR QL SCN: NEGATIVE
MICRO URNS: ABNORMAL
NITRITE UR QL STRIP.AUTO: POSITIVE
OPIATES UR QL SCN: POSITIVE
OXYCODONE UR QL SCN: NEGATIVE
PCP UR QL SCN: NEGATIVE
PH UR STRIP.AUTO: 7 [PH] (ref 5–8)
PHOSPHATE SERPL-MCNC: 3.3 MG/DL (ref 2.5–4.5)
POTASSIUM SERPL-SCNC: 4.4 MMOL/L (ref 3.6–5.5)
PROPOXYPH UR QL SCN: NEGATIVE
PROT UR QL STRIP: 100 MG/DL
RBC # URNS HPF: ABNORMAL /HPF (ref 0–2)
RBC UR QL AUTO: ABNORMAL
SODIUM SERPL-SCNC: 138 MMOL/L (ref 135–145)
SP GR UR STRIP.AUTO: 1.01
UROBILINOGEN UR STRIP.AUTO-MCNC: 0.2 EU/DL
WBC #/AREA URNS HPF: ABNORMAL /HPF

## 2024-11-28 PROCEDURE — 80069 RENAL FUNCTION PANEL: CPT

## 2024-11-28 PROCEDURE — 87086 URINE CULTURE/COLONY COUNT: CPT

## 2024-11-28 PROCEDURE — A9270 NON-COVERED ITEM OR SERVICE: HCPCS | Performed by: INTERNAL MEDICINE

## 2024-11-28 PROCEDURE — 700102 HCHG RX REV CODE 250 W/ 637 OVERRIDE(OP): Performed by: INTERNAL MEDICINE

## 2024-11-28 PROCEDURE — 81001 URINALYSIS AUTO W/SCOPE: CPT

## 2024-11-28 PROCEDURE — 700111 HCHG RX REV CODE 636 W/ 250 OVERRIDE (IP): Mod: JZ | Performed by: STUDENT IN AN ORGANIZED HEALTH CARE EDUCATION/TRAINING PROGRAM

## 2024-11-28 PROCEDURE — 700111 HCHG RX REV CODE 636 W/ 250 OVERRIDE (IP): Performed by: INTERNAL MEDICINE

## 2024-11-28 PROCEDURE — 36415 COLL VENOUS BLD VENIPUNCTURE: CPT

## 2024-11-28 PROCEDURE — 74176 CT ABD & PELVIS W/O CONTRAST: CPT

## 2024-11-28 PROCEDURE — A9270 NON-COVERED ITEM OR SERVICE: HCPCS | Performed by: STUDENT IN AN ORGANIZED HEALTH CARE EDUCATION/TRAINING PROGRAM

## 2024-11-28 PROCEDURE — 700105 HCHG RX REV CODE 258: Performed by: INTERNAL MEDICINE

## 2024-11-28 PROCEDURE — 700102 HCHG RX REV CODE 250 W/ 637 OVERRIDE(OP): Performed by: STUDENT IN AN ORGANIZED HEALTH CARE EDUCATION/TRAINING PROGRAM

## 2024-11-28 PROCEDURE — 99232 SBSQ HOSP IP/OBS MODERATE 35: CPT | Performed by: INTERNAL MEDICINE

## 2024-11-28 PROCEDURE — 80307 DRUG TEST PRSMV CHEM ANLYZR: CPT

## 2024-11-28 PROCEDURE — 770020 HCHG ROOM/CARE - TELE (206)

## 2024-11-28 RX ADMIN — APIXABAN 5 MG: 5 TABLET, FILM COATED ORAL at 04:18

## 2024-11-28 RX ADMIN — TIZANIDINE 4 MG: 4 TABLET ORAL at 21:01

## 2024-11-28 RX ADMIN — LEVETIRACETAM 1000 MG: 500 TABLET, FILM COATED ORAL at 16:35

## 2024-11-28 RX ADMIN — LEVETIRACETAM 1000 MG: 500 TABLET, FILM COATED ORAL at 04:18

## 2024-11-28 RX ADMIN — TIZANIDINE 4 MG: 4 TABLET ORAL at 13:56

## 2024-11-28 RX ADMIN — HYDROCODONE BITARTRATE AND ACETAMINOPHEN 1 TABLET: 10; 325 TABLET ORAL at 21:01

## 2024-11-28 RX ADMIN — APIXABAN 5 MG: 5 TABLET, FILM COATED ORAL at 16:35

## 2024-11-28 RX ADMIN — TELMISARTAN 40 MG: 80 TABLET ORAL at 04:18

## 2024-11-28 RX ADMIN — CEFAZOLIN 2 G: 2 INJECTION, POWDER, FOR SOLUTION INTRAMUSCULAR; INTRAVENOUS at 16:43

## 2024-11-28 RX ADMIN — CEFAZOLIN 2 G: 2 INJECTION, POWDER, FOR SOLUTION INTRAMUSCULAR; INTRAVENOUS at 22:34

## 2024-11-28 RX ADMIN — LAMOTRIGINE 25 MG: 25 TABLET ORAL at 16:35

## 2024-11-28 RX ADMIN — AMIODARONE HYDROCHLORIDE 100 MG: 200 TABLET ORAL at 04:18

## 2024-11-28 RX ADMIN — TIZANIDINE 4 MG: 4 TABLET ORAL at 16:36

## 2024-11-28 RX ADMIN — FINASTERIDE 5 MG: 5 TABLET, FILM COATED ORAL at 04:18

## 2024-11-28 RX ADMIN — HYDROCODONE BITARTRATE AND ACETAMINOPHEN 1 TABLET: 10; 325 TABLET ORAL at 16:36

## 2024-11-28 RX ADMIN — EZETIMIBE 10 MG: 10 TABLET ORAL at 04:18

## 2024-11-28 RX ADMIN — HYDROCODONE BITARTRATE AND ACETAMINOPHEN 1 TABLET: 10; 325 TABLET ORAL at 08:23

## 2024-11-28 RX ADMIN — TIZANIDINE 4 MG: 4 TABLET ORAL at 08:24

## 2024-11-28 RX ADMIN — LABETALOL HYDROCHLORIDE 10 MG: 5 INJECTION, SOLUTION INTRAVENOUS at 08:11

## 2024-11-28 RX ADMIN — TAMSULOSIN HYDROCHLORIDE 0.4 MG: 0.4 CAPSULE ORAL at 04:18

## 2024-11-28 RX ADMIN — HYDROCODONE BITARTRATE AND ACETAMINOPHEN 1 TABLET: 10; 325 TABLET ORAL at 13:56

## 2024-11-28 RX ADMIN — VENLAFAXINE 225 MG: 75 TABLET ORAL at 16:35

## 2024-11-28 RX ADMIN — METOPROLOL SUCCINATE 25 MG: 25 TABLET, EXTENDED RELEASE ORAL at 16:37

## 2024-11-28 RX ADMIN — HYDRALAZINE HYDROCHLORIDE 10 MG: 20 INJECTION INTRAMUSCULAR; INTRAVENOUS at 06:34

## 2024-11-28 RX ADMIN — ROSUVASTATIN CALCIUM 40 MG: 20 TABLET, FILM COATED ORAL at 16:35

## 2024-11-28 RX ADMIN — TRAZODONE HYDROCHLORIDE 150 MG: 100 TABLET ORAL at 16:36

## 2024-11-28 RX ADMIN — LAMOTRIGINE 25 MG: 25 TABLET ORAL at 04:18

## 2024-11-28 ASSESSMENT — PAIN DESCRIPTION - PAIN TYPE
TYPE: CHRONIC PAIN
TYPE: CHRONIC PAIN
TYPE: ACUTE PAIN
TYPE: CHRONIC PAIN

## 2024-11-28 ASSESSMENT — ENCOUNTER SYMPTOMS
WEAKNESS: 0
ABDOMINAL PAIN: 0
SHORTNESS OF BREATH: 0

## 2024-11-28 ASSESSMENT — FIBROSIS 4 INDEX: FIB4 SCORE: 7.07

## 2024-11-28 NOTE — DISCHARGE INSTRUCTIONS
Discharge Instructions per Farhana Hodgson M.D.    1.  For your seizure, neurology recommends you take Keppra 1000 mg twice a day and continue on lamotrigine with a gradual increase as prescribed.  Follow-up with the neurologist in the next 1 to 2 weeks.  2.  You also had an echocardiogram of your heart that showed your aortic root is dilated at 4.5 cm.  You will need to follow-up with cardiology for monitoring because if this increases you may need surgical correction.  Please follow-up with your cardiologist in the next 1 to 2 weeks as well.             Form faxed

## 2024-11-28 NOTE — PROGRESS NOTES
Hospital Medicine Daily Progress Note    Date of Service  11/28/2024    Chief Complaint  Karan Wiley is a 64 y.o. male admitted 11/24/2024 with seizure    Hospital Course  63 yo man with A-fib, epilepsy who was recently taken off Depakote and switched to lamotrigine who had seizure event while running errands around the house.  He presented to [facility] and was transferred to [facility].  Neurology was consulted.  EEG showed rare left temporal sharp waves.  Neurology recommended Keppra 1000 mg twice daily with lamotrigine titration.  While hospitalized he had a TTE that showed aortic root dilated 4.5 cm at sinuses and 3.4 cm ascending aorta.  He was also intermittently hypertensive.    Interval Problem Update  Creatinine stable.  He he says he was urinating 10 times last night.  Though some suprapubic tenderness on exam, plan for bladder scan  His SBP was 217 this morning, he did miss his metoprolol last night    I have discussed this patient's plan of care and discharge plan at IDT rounds today with Case Management, Nursing, Nursing leadership, and other members of the IDT team.    Consultants/Specialty  neurology    Code Status  Full Code    Disposition  The patient is not medically cleared for discharge to home or a post-acute facility.  Anticipate discharge to: home with close outpatient follow-up    I have placed the appropriate orders for post-discharge needs.    Review of Systems  Review of Systems   Constitutional:  Negative for malaise/fatigue.   Respiratory:  Negative for shortness of breath.    Cardiovascular:  Negative for chest pain.   Gastrointestinal:  Negative for abdominal pain.   Genitourinary:  Positive for frequency.   Neurological:  Negative for weakness.        Physical Exam  Temp:  [36.4 °C (97.5 °F)-36.7 °C (98.1 °F)] 36.6 °C (97.9 °F)  Pulse:  [50-78] 61  Resp:  [16-18] 18  BP: (106-217)/() 132/76  SpO2:  [92 %-96 %] 96 %    Physical Exam  Vitals and nursing note reviewed.    Constitutional:       General: He is not in acute distress.     Appearance: He is not toxic-appearing.      Comments: Hard of hearing   HENT:      Head: Normocephalic.      Mouth/Throat:      Mouth: Mucous membranes are moist.   Eyes:      General:         Right eye: No discharge.         Left eye: No discharge.   Cardiovascular:      Rate and Rhythm: Normal rate and regular rhythm.   Pulmonary:      Effort: Pulmonary effort is normal. No respiratory distress.      Breath sounds: No wheezing or rales.   Abdominal:      Palpations: Abdomen is soft.      Tenderness: There is abdominal tenderness (Suprapubic). There is no guarding or rebound.   Musculoskeletal:         General: No swelling.      Cervical back: Neck supple.   Skin:     General: Skin is warm and dry.   Neurological:      Mental Status: He is alert and oriented to person, place, and time.         Fluids    Intake/Output Summary (Last 24 hours) at 11/28/2024 1254  Last data filed at 11/28/2024 0806  Gross per 24 hour   Intake 240 ml   Output 1050 ml   Net -810 ml        Laboratory  Recent Labs     11/26/24  0446 11/27/24  0048   WBC 7.5 7.0   RBC 4.37* 4.10*   HEMOGLOBIN 12.9* 12.1*   HEMATOCRIT 40.8* 38.0*   MCV 93.4 92.7   MCH 29.5 29.5   MCHC 31.6* 31.8*   RDW 49.2 48.7   PLATELETCT 248 239   MPV 11.3 11.7     Recent Labs     11/26/24  0446 11/27/24  0048 11/28/24  0244   SODIUM 144 135 138   POTASSIUM 3.9 4.1 4.4   CHLORIDE 108 102 102   CO2 25 25 27   GLUCOSE 103* 106* 97   BUN 13 14 12   CREATININE 1.51* 1.09 1.03   CALCIUM 9.5 9.1 9.3                   Imaging  EC-ECHOCARDIOGRAM COMPLETE W/O CONT   Final Result           Assessment/Plan  * Breakthrough seizure (HCC)- (present on admission)  Assessment & Plan  Neurology consulted  EEG showed rare left temporal sharp waves.  Neurology recommended Keppra 1000 mg twice daily with lamotrigine titration.  Has not had recurring seizure    Aortic root dilatation (HCC)  Assessment & Plan  TTE showed aortic  root dilated at 4.5 cm at sinuses  He will need good blood pressure control.  SBP more than 200 this morning and he missed his metoprolol dose.  Continue metoprolol and restarted telmisartan.  Trend blood pressure today  He will need close follow-up with cardiology, I discussed with the patient and his wife    Urinary retention due to benign prostatic hyperplasia- (present on admission)  Assessment & Plan  Continue proscar and flomax  Increased urinary frequency today.  Check UA and bladder scan    Fall- (present on admission)  Assessment & Plan  Reports multiple falls  Fall precautions  PT/OT    Advance care planning- (present on admission)  Assessment & Plan  Full code    Hypertension  Assessment & Plan  Intermittently hypertensive. continue home metoprolol, restarting telmisartan    Hyperlipidemia- (present on admission)  Assessment & Plan  Continue crestor     Paroxysmal atrial fibrillation (HCC)- (present on admission)  Assessment & Plan  Continue eliquis  He missed his metoprolol due to low heart rate.  I will hold amiodarone         VTE prophylaxis:    therapeutic anticoagulation with eliquis 5 mg BID      I have performed a physical exam and reviewed and updated ROS and Plan today (11/28/2024). In review of yesterday's note (11/27/2024), there are no changes except as documented above.

## 2024-11-28 NOTE — CARE PLAN
The patient is Stable - Low risk of patient condition declining or worsening    Shift Goals  Clinical Goals: Monitor neuro status and SBP  Patient Goals: Rest  Family Goals: maliha    Progress made toward(s) clinical / shift goals:      Problem: Knowledge Deficit - Standard  Goal: Patient and family/care givers will demonstrate understanding of plan of care, disease process/condition, diagnostic tests and medications  Outcome: Progressing   Pt's blood pressure closely monitored throughout shift. PRN blood pressure medications available to keep SBP within set parameters.        Problem: Seizure Precautions  Goal: Implementation of seizure precautions  Outcome: Progressing   Seizure precautions in place (i.e. padded side rails, suction and supplemental oxygen available at bedside). PRNs available for breakthrough seizures.      Patient is not progressing towards the following goals:

## 2024-11-28 NOTE — ASSESSMENT & PLAN NOTE
TTE showed aortic root dilated at 4.5 cm at sinuses  I consulted cardiology who recommended outpatient follow-up  He will need good blood pressure control.

## 2024-11-28 NOTE — PROGRESS NOTES
Monitor summary: SB/SR 52-76, UT 0.14, QRS 0.06, QT 0.40, with rare PVCs and PACs per strip from monitor room.

## 2024-11-28 NOTE — DISCHARGE PLANNING
Care Transition Team Assessment    LMSW met with pt at bedside.  Pt able to verify information for face sheet.  Pt lives in a one level, one step to enter home with spouse and son, ramp access.  Spouse and son are good sources of support.  Pt's PCP is Kelly Navarro.  Pt has no AD on file, ACP booklet given.  For DME pt has a walker.  Pt independent with ADLS.  Pt receives monthly SS and VA pension income of about $5000-$6000 per month.  Pt has previous LTAC placement in [facility]", SNF placement at [facility] however declines return, and [facility]  services.  Pt denies SA and MH concerns.  Pt's spouse provides pt with transportation at baseline.  Spouse or son able to provide transportation at discharge.  Pt is a retired [employer], 100% service connected.     Information Source  Orientation Level: Oriented X4  Information Given By: Patient  Who is responsible for making decisions for patient? : Patient    Readmission Evaluation  Is this a readmission?: Yes - unplanned readmission    Elopement Risk  Legal Hold: No  Ambulatory or Self Mobile in Wheelchair: Yes  Disoriented: No  Psychiatric Symptoms: None  History of Wandering: No  Elopement this Admit: No  Vocalizing Wanting to Leave: No  Displays Behaviors, Body Language Wanting to Leave: No-Not at Risk for Elopement  Elopement Risk: Not at Risk for Elopement    Interdisciplinary Discharge Planning  Lives with - Patient's Self Care Capacity: Spouse, Adult Children  Patient or legal guardian wants to designate a caregiver: No  Support Systems: Spouse / Significant Other  Housing / Facility: [address] [facility]  Prior Services: Home-Independent    Discharge Preparedness  What is your plan after discharge?: Home with help  What are your discharge supports?: Child, Spouse  Prior Functional Level: Independent with Activities of Daily Living  Difficulity with ADLs: Walking    Functional Assesment  Prior Functional Level: Independent with Activities of Daily Living    Finances  Financial  Barriers to Discharge: No  Prescription Coverage: Yes    Vision / Hearing Impairment  Hearing Impairment: Both Ears, Hearing Device(s) Available    Advance Directive  Advance Directive?: None  Advance Directive offered?: AD Booklet given    Domestic Abuse  Possible Abuse/Neglect Reported to:: Not Applicable    Discharge Risks or Barriers  Discharge risks or barriers?: Complex medical needs  Patient risk factors: Complex medical needs, Readmission    Anticipated Discharge Information  Discharge Disposition: Discharged to home/self care (01)  Discharge Address: [address] [place] [id number]  Discharge Contact Phone Number: [phone]

## 2024-11-29 ENCOUNTER — APPOINTMENT (OUTPATIENT)
Dept: RADIOLOGY | Facility: MEDICAL CENTER | Age: 64
DRG: 101 | End: 2024-11-29
Attending: INTERNAL MEDICINE
Payer: OTHER GOVERNMENT

## 2024-11-29 PROBLEM — R05.1 ACUTE COUGH: Status: ACTIVE | Noted: 2024-11-29

## 2024-11-29 PROBLEM — R19.7 DIARRHEA OF PRESUMED INFECTIOUS ORIGIN: Status: ACTIVE | Noted: 2024-11-29

## 2024-11-29 PROBLEM — N30.01 ACUTE CYSTITIS WITH HEMATURIA: Status: ACTIVE | Noted: 2024-11-29

## 2024-11-29 LAB
ALBUMIN SERPL BCP-MCNC: 3.6 G/DL (ref 3.2–4.9)
BUN SERPL-MCNC: 9 MG/DL (ref 8–22)
C DIFF DNA SPEC QL NAA+PROBE: NEGATIVE
C DIFF TOX A+B STL QL IA: NEGATIVE
C DIFF TOX GENS STL QL NAA+PROBE: NORMAL
CALCIUM ALBUM COR SERPL-MCNC: 10.4 MG/DL (ref 8.5–10.5)
CALCIUM SERPL-MCNC: 10.1 MG/DL (ref 8.5–10.5)
CHLORIDE SERPL-SCNC: 103 MMOL/L (ref 96–112)
CO2 SERPL-SCNC: 23 MMOL/L (ref 20–33)
CREAT SERPL-MCNC: 0.92 MG/DL (ref 0.5–1.4)
EKG IMPRESSION: NORMAL
FLUAV RNA SPEC QL NAA+PROBE: NEGATIVE
FLUBV RNA SPEC QL NAA+PROBE: NEGATIVE
GFR SERPLBLD CREATININE-BSD FMLA CKD-EPI: 93 ML/MIN/1.73 M 2
GLUCOSE SERPL-MCNC: 127 MG/DL (ref 65–99)
PHOSPHATE SERPL-MCNC: 3 MG/DL (ref 2.5–4.5)
POTASSIUM SERPL-SCNC: 4.2 MMOL/L (ref 3.6–5.5)
RSV RNA SPEC QL NAA+PROBE: NEGATIVE
SARS-COV-2 RNA RESP QL NAA+PROBE: NOTDETECTED
SODIUM SERPL-SCNC: 137 MMOL/L (ref 135–145)
SPECIMEN SOURCE: NORMAL

## 2024-11-29 PROCEDURE — 71275 CT ANGIOGRAPHY CHEST: CPT

## 2024-11-29 PROCEDURE — A9270 NON-COVERED ITEM OR SERVICE: HCPCS | Performed by: STUDENT IN AN ORGANIZED HEALTH CARE EDUCATION/TRAINING PROGRAM

## 2024-11-29 PROCEDURE — 71045 X-RAY EXAM CHEST 1 VIEW: CPT

## 2024-11-29 PROCEDURE — 87324 CLOSTRIDIUM AG IA: CPT

## 2024-11-29 PROCEDURE — 99222 1ST HOSP IP/OBS MODERATE 55: CPT | Mod: 25 | Performed by: INTERNAL MEDICINE

## 2024-11-29 PROCEDURE — 700105 HCHG RX REV CODE 258

## 2024-11-29 PROCEDURE — 700102 HCHG RX REV CODE 250 W/ 637 OVERRIDE(OP): Performed by: STUDENT IN AN ORGANIZED HEALTH CARE EDUCATION/TRAINING PROGRAM

## 2024-11-29 PROCEDURE — 36415 COLL VENOUS BLD VENIPUNCTURE: CPT

## 2024-11-29 PROCEDURE — 93005 ELECTROCARDIOGRAM TRACING: CPT | Performed by: INTERNAL MEDICINE

## 2024-11-29 PROCEDURE — 700111 HCHG RX REV CODE 636 W/ 250 OVERRIDE (IP): Performed by: INTERNAL MEDICINE

## 2024-11-29 PROCEDURE — 700111 HCHG RX REV CODE 636 W/ 250 OVERRIDE (IP): Mod: JZ | Performed by: STUDENT IN AN ORGANIZED HEALTH CARE EDUCATION/TRAINING PROGRAM

## 2024-11-29 PROCEDURE — 700117 HCHG RX CONTRAST REV CODE 255: Performed by: INTERNAL MEDICINE

## 2024-11-29 PROCEDURE — 87493 C DIFF AMPLIFIED PROBE: CPT

## 2024-11-29 PROCEDURE — 700102 HCHG RX REV CODE 250 W/ 637 OVERRIDE(OP): Performed by: INTERNAL MEDICINE

## 2024-11-29 PROCEDURE — 80069 RENAL FUNCTION PANEL: CPT

## 2024-11-29 PROCEDURE — 700105 HCHG RX REV CODE 258: Performed by: INTERNAL MEDICINE

## 2024-11-29 PROCEDURE — 99232 SBSQ HOSP IP/OBS MODERATE 35: CPT | Performed by: INTERNAL MEDICINE

## 2024-11-29 PROCEDURE — A9270 NON-COVERED ITEM OR SERVICE: HCPCS | Performed by: INTERNAL MEDICINE

## 2024-11-29 PROCEDURE — 0241U HCHG SARS-COV-2 COVID-19 NFCT DS RESP RNA 4 TRGT MIC: CPT

## 2024-11-29 PROCEDURE — 93010 ELECTROCARDIOGRAM REPORT: CPT | Performed by: INTERNAL MEDICINE

## 2024-11-29 PROCEDURE — 770020 HCHG ROOM/CARE - TELE (206)

## 2024-11-29 RX ORDER — GUAIFENESIN 200 MG/10ML
10 LIQUID ORAL EVERY 4 HOURS PRN
Status: DISCONTINUED | OUTPATIENT
Start: 2024-11-29 | End: 2024-12-01 | Stop reason: HOSPADM

## 2024-11-29 RX ORDER — SODIUM CHLORIDE 9 MG/ML
1000 INJECTION, SOLUTION INTRAVENOUS ONCE
Status: COMPLETED | OUTPATIENT
Start: 2024-11-29 | End: 2024-11-29

## 2024-11-29 RX ORDER — LOPERAMIDE HYDROCHLORIDE 2 MG/1
2 CAPSULE ORAL 4 TIMES DAILY PRN
Status: DISCONTINUED | OUTPATIENT
Start: 2024-11-29 | End: 2024-12-01 | Stop reason: HOSPADM

## 2024-11-29 RX ORDER — HYDROCODONE BITARTRATE AND ACETAMINOPHEN 10; 325 MG/1; MG/1
1 TABLET ORAL EVERY 4 HOURS PRN
Status: DISCONTINUED | OUTPATIENT
Start: 2024-11-29 | End: 2024-12-01 | Stop reason: HOSPADM

## 2024-11-29 RX ORDER — METOPROLOL SUCCINATE 25 MG/1
50 TABLET, EXTENDED RELEASE ORAL EVERY EVENING
Status: DISCONTINUED | OUTPATIENT
Start: 2024-11-29 | End: 2024-11-30

## 2024-11-29 RX ADMIN — LEVETIRACETAM 1000 MG: 500 TABLET, FILM COATED ORAL at 06:10

## 2024-11-29 RX ADMIN — TRAZODONE HYDROCHLORIDE 150 MG: 100 TABLET ORAL at 17:22

## 2024-11-29 RX ADMIN — FINASTERIDE 5 MG: 5 TABLET, FILM COATED ORAL at 06:11

## 2024-11-29 RX ADMIN — APIXABAN 5 MG: 5 TABLET, FILM COATED ORAL at 06:11

## 2024-11-29 RX ADMIN — APIXABAN 5 MG: 5 TABLET, FILM COATED ORAL at 17:22

## 2024-11-29 RX ADMIN — EZETIMIBE 10 MG: 10 TABLET ORAL at 06:10

## 2024-11-29 RX ADMIN — CEFAZOLIN 2 G: 2 INJECTION, POWDER, FOR SOLUTION INTRAMUSCULAR; INTRAVENOUS at 06:22

## 2024-11-29 RX ADMIN — PROCHLORPERAZINE EDISYLATE 5 MG: 5 INJECTION INTRAMUSCULAR; INTRAVENOUS at 00:28

## 2024-11-29 RX ADMIN — TAMSULOSIN HYDROCHLORIDE 0.4 MG: 0.4 CAPSULE ORAL at 06:11

## 2024-11-29 RX ADMIN — IOHEXOL 80 ML: 350 INJECTION, SOLUTION INTRAVENOUS at 21:15

## 2024-11-29 RX ADMIN — LAMOTRIGINE 25 MG: 25 TABLET ORAL at 06:10

## 2024-11-29 RX ADMIN — CEFAZOLIN 2 G: 2 INJECTION, POWDER, FOR SOLUTION INTRAMUSCULAR; INTRAVENOUS at 13:40

## 2024-11-29 RX ADMIN — METOPROLOL SUCCINATE 50 MG: 25 TABLET, EXTENDED RELEASE ORAL at 17:21

## 2024-11-29 RX ADMIN — TIZANIDINE 4 MG: 4 TABLET ORAL at 13:29

## 2024-11-29 RX ADMIN — HYDROCODONE BITARTRATE AND ACETAMINOPHEN 1 TABLET: 10; 325 TABLET ORAL at 17:21

## 2024-11-29 RX ADMIN — CEFAZOLIN 2 G: 2 INJECTION, POWDER, FOR SOLUTION INTRAMUSCULAR; INTRAVENOUS at 23:04

## 2024-11-29 RX ADMIN — LAMOTRIGINE 25 MG: 25 TABLET ORAL at 17:21

## 2024-11-29 RX ADMIN — HYDROCODONE BITARTRATE AND ACETAMINOPHEN 1 TABLET: 10; 325 TABLET ORAL at 08:24

## 2024-11-29 RX ADMIN — HYDROCODONE BITARTRATE AND ACETAMINOPHEN 1 TABLET: 10; 325 TABLET ORAL at 13:29

## 2024-11-29 RX ADMIN — VENLAFAXINE 225 MG: 75 TABLET ORAL at 17:21

## 2024-11-29 RX ADMIN — SODIUM CHLORIDE 1000 ML: 9 INJECTION, SOLUTION INTRAVENOUS at 21:51

## 2024-11-29 RX ADMIN — TELMISARTAN 40 MG: 80 TABLET ORAL at 06:10

## 2024-11-29 RX ADMIN — TIZANIDINE 4 MG: 4 TABLET ORAL at 17:22

## 2024-11-29 RX ADMIN — LEVETIRACETAM 1000 MG: 500 TABLET, FILM COATED ORAL at 17:22

## 2024-11-29 RX ADMIN — TIZANIDINE 4 MG: 4 TABLET ORAL at 08:26

## 2024-11-29 RX ADMIN — ROSUVASTATIN CALCIUM 40 MG: 20 TABLET, FILM COATED ORAL at 17:22

## 2024-11-29 ASSESSMENT — ENCOUNTER SYMPTOMS
SHORTNESS OF BREATH: 0
ABDOMINAL PAIN: 0
WEAKNESS: 0
VOMITING: 0
COUGH: 1
NAUSEA: 0
DIARRHEA: 1

## 2024-11-29 ASSESSMENT — PAIN DESCRIPTION - PAIN TYPE
TYPE: ACUTE PAIN

## 2024-11-29 ASSESSMENT — FIBROSIS 4 INDEX: FIB4 SCORE: 7.07

## 2024-11-29 NOTE — PROGRESS NOTES
Monitor Summary: SB/SR 56-79, PA 0.22, QRS 0.06, QT 0.37, with rare PVCs, bigem and trigem, per strip from monitor room

## 2024-11-29 NOTE — PROGRESS NOTES
Hospital Medicine Daily Progress Note    Date of Service  11/29/2024    Chief Complaint  Karan Wiley is a 64 y.o. male admitted 11/24/2024 with seizure    Hospital Course  65 yo man with A-fib, epilepsy who was recently taken off Depakote and switched to lamotrigine who had seizure event while running errands around the house.  He presented to [facility] and was transferred to [facility].  Neurology was consulted.  EEG showed rare left temporal sharp waves.  Neurology recommended Keppra 1000 mg twice daily with lamotrigine titration.  While hospitalized he had a TTE that showed aortic root dilated 4.5 cm at sinuses and 3.4 cm ascending aorta.  Cardiology recommended outpatient follow-up.  He was also intermittently hypertensive.    Interval Problem Update  He says he is still urinating frequently.  Creatinine improving.  He is having diarrhea today, has a history of C. difficile.  Also has a cough    I have discussed this patient's plan of care and discharge plan at IDT rounds today with Case Management, Nursing, Nursing leadership, and other members of the IDT team.    Consultants/Specialty  neurology, cardiology    Code Status  Full Code    Disposition  The patient is not medically cleared for discharge to home or a post-acute facility.  Anticipate discharge to: home with close outpatient follow-up    I have placed the appropriate orders for post-discharge needs.    Review of Systems  Review of Systems   Constitutional:  Negative for malaise/fatigue.   Respiratory:  Positive for cough. Negative for shortness of breath.    Cardiovascular:  Negative for chest pain.   Gastrointestinal:  Positive for diarrhea. Negative for abdominal pain, nausea and vomiting.   Genitourinary:  Positive for frequency.   Neurological:  Negative for weakness.        Physical Exam  Temp:  [36.5 °C (97.7 °F)-36.6 °C (97.9 °F)] 36.5 °C (97.7 °F)  Pulse:  [53-81] 53  Resp:  [14-18] 14  BP: (101-169)/() 103/69  SpO2:  [90 %-95  %] 91 %    Physical Exam  Vitals and nursing note reviewed.   Constitutional:       General: He is not in acute distress.     Appearance: He is not toxic-appearing.      Comments: Hard of hearing   HENT:      Head: Normocephalic.      Mouth/Throat:      Mouth: Mucous membranes are moist.   Eyes:      General:         Right eye: No discharge.         Left eye: No discharge.   Cardiovascular:      Rate and Rhythm: Normal rate and regular rhythm.   Pulmonary:      Breath sounds: No wheezing or rales.      Comments: Coughing  Abdominal:      General: There is no distension.      Palpations: Abdomen is soft.      Tenderness: There is no abdominal tenderness. There is no guarding or rebound.   Musculoskeletal:         General: No swelling.      Cervical back: Neck supple.   Skin:     General: Skin is warm and dry.   Neurological:      Mental Status: He is alert and oriented to person, place, and time.         Fluids    Intake/Output Summary (Last 24 hours) at 11/29/2024 1442  Last data filed at 11/28/2024 2000  Gross per 24 hour   Intake 120 ml   Output 550 ml   Net -430 ml        Laboratory  Recent Labs     11/27/24  0048   WBC 7.0   RBC 4.10*   HEMOGLOBIN 12.1*   HEMATOCRIT 38.0*   MCV 92.7   MCH 29.5   MCHC 31.8*   RDW 48.7   PLATELETCT 239   MPV 11.7     Recent Labs     11/27/24  0048 11/28/24  0244 11/29/24  0401   SODIUM 135 138 137   POTASSIUM 4.1 4.4 4.2   CHLORIDE 102 102 103   CO2 25 27 23   GLUCOSE 106* 97 127*   BUN 14 12 9   CREATININE 1.09 1.03 0.92   CALCIUM 9.1 9.3 10.1                   Imaging  DX-CHEST-PORTABLE (1 VIEW)   Final Result   Impression:      1. Mild right base atelectasis. Chronically elevated right hemidiaphragm. No effusion or dense consolidation.      2. Lower thoracic spine dorsal column stimulator wires.                     CT-RENAL COLIC EVALUATION(A/P W/O)   Final Result      1.  Bilateral nonobstructing nephrolithiasis. No ureteral stones or hydronephrosis.   2.  Colonic  diverticulosis.   3.  Partial atelectasis of the right lower lobe.      EC-ECHOCARDIOGRAM COMPLETE W/O CONT   Final Result           Assessment/Plan  * Breakthrough seizure (HCC)- (present on admission)  Assessment & Plan  Neurology consulted  EEG showed rare left temporal sharp waves.  Neurology recommended Keppra 1000 mg twice daily with lamotrigine titration.  Has not had recurring seizure    Acute cough  Assessment & Plan  Chest x-ray negative for pneumonia, showed atelectasis.  Screening for COVID/flu/RSV is negative  Cough syrup as needed    Diarrhea of presumed infectious origin  Assessment & Plan  He has a history of C. difficile, rule out C. difficile    Acute cystitis with hematuria  Assessment & Plan  Nonobstructing nephrolithiasis on CT.  Patient says he has a history of very frequent kidney stones  Urine culture pending  Continued for cefazolin    Aortic root dilatation (HCC)  Assessment & Plan  TTE showed aortic root dilated at 4.5 cm at sinuses  I consulted cardiology who recommended outpatient follow-up  He will need good blood pressure control.   Still with intermittent hypertension.  Increase metoprolol.  Continue telmisartan    Urinary retention due to benign prostatic hyperplasia- (present on admission)  Assessment & Plan  Continue proscar and flomax    Fall- (present on admission)  Assessment & Plan  Reports multiple falls  Fall precautions  PT/OT    Advance care planning- (present on admission)  Assessment & Plan  Full code    Hypertension  Assessment & Plan  Uncontrolled.  Continue telmisartan.  Increase metoprolol from 25 mg to 50 mg daily    Hyperlipidemia- (present on admission)  Assessment & Plan  Continue crestor     Paroxysmal atrial fibrillation (HCC)- (present on admission)  Assessment & Plan  Continue eliquis  Heart rate has been 50-70s.  Hold amiodarone to allow titration of metoprolol for HTN control         VTE prophylaxis:    therapeutic anticoagulation with eliquis 5 mg  BID      I have performed a physical exam and reviewed and updated ROS and Plan today (11/29/2024). In review of yesterday's note (11/28/2024), there are no changes except as documented above.

## 2024-11-29 NOTE — ASSESSMENT & PLAN NOTE
Nonobstructing nephrolithiasis on CT.  Patient says he has a history of very frequent kidney stones  Urine culture had mixed anitha  Complete a course of cefazolin

## 2024-11-29 NOTE — CARE PLAN
The patient is Stable - Low risk of patient condition declining or worsening    Shift Goals  Clinical Goals: neuro exam, monitor BP trend  Patient Goals: updates  Family Goals: updates    Progress made toward(s) clinical / shift goals:  q4 VS in place to monitor BP trend  Problem: Pain - Standard  Goal: Alleviation of pain or a reduction in pain to the patient’s comfort goal  Outcome: Progressing     Problem: Fall Risk  Goal: Patient will remain free from falls  Outcome: Progressing     Problem: Seizure EEG Monitoring  Goal: Appropriate Monitoring of EEG patient  Outcome: Progressing       Patient is not progressing towards the following goals:

## 2024-11-29 NOTE — ASSESSMENT & PLAN NOTE
Chest x-ray and CT chest negative for pneumonia, showed atelectasis.  Screening for COVID/flu/RSV is negative  Cough syrup as needed  Mobilize, encourage I-S

## 2024-11-29 NOTE — CONSULTS
Cardiology Initial Consultation    Date of Service  11/29/2024    Referring Physician  Farhana Hodgson M.D.    Reason for Consultation  Ascending aortic aneurysm.    History of Presenting Illness  Karan Wiley is a 64 y.o. male with a past medical history of coronary artery disease with prior stent placement, atrial fibrillation on chronic anticoagulation therapy, hypertension and dyslipidemia, previous tobacco abuse, no family history of coronary artery disease who presented 11/24/2024 with seizure. He underwent echocardiogram evaluation showing ascending aortic aneurysm measuring 4.5 cm.     Review of Systems  Review of Systems   Unable to perform ROS: Other       Past Medical History   has a past medical history of Anesthesia, Breath shortness, CAD (coronary artery disease) (08/2020), Chronic anticoagulation, Dental disorder, Depression, History of heart artery stent, Hyperlipidemia, Hypertension, Low back pain, Myocardial infarct (HCC), Paroxysmal atrial fibrillation (HCC) (10/2023), S/P drug eluting coronary stent placement, Subdural hematoma (HCC), and Type 2 diabetes mellitus (HCC).    Surgical History   has a past surgical history that includes spinal cord stimulator (2/26/2019); fusion, spine, lumbar, plif (2/26/2019); laminotomy (2/26/2019); craniotomy (Left, 8/25/2022); and arthroplasty, knee, robot-assisted (Right, 7/23/2024).    Family History  family history includes Cancer in his mother.    Social History   reports that he quit smoking about 44 years ago. His smoking use included cigarettes. He started smoking about 47 years ago. He has a 0.8 pack-year smoking history. He has been exposed to tobacco smoke. He has never used smokeless tobacco. He reports that he does not drink alcohol and does not use drugs.    Medications  Prior to Admission Medications   Prescriptions Last Dose Informant Patient Reported? Taking?   HYDROcodone/acetaminophen (NORCO)  MG Tab  Significant Other Yes No   Sig:  Take 1 Tablet by mouth see administration instructions. Take 1 tablet SCHEDULED at 05:00, 10:00, 15:00, and 20:00. Max take up to 2 additional tablets throughout the day as needed for pain, up to a maximum of 6 tablets within 24 hours.   LORazepam (ATIVAN) 1 MG Tab   No No   Sig: Take 1/2-1 tablet PO PRN breakthrough seizures.  Do not exceed more than 2 tablets in 24 hours unless directed otherwise by physician.   albuterol 108 (90 Base) MCG/ACT Aero Soln inhalation aerosol 11/24/2024 Morning Significant Other Yes Yes   Sig: Inhale 1-2 Puffs every four hours as needed for Shortness of Breath.   amiodarone (CORDARONE) 100 MG tablet  Significant Other No No   Sig: Take 1 Tablet by mouth every day.   apixaban (ELIQUIS) 5mg Tab 11/24/2024 Morning Significant Other No Yes   Sig: Take 1 Tablet by mouth 2 times a day.   docusate sodium (COLACE) 100 MG Cap  Significant Other Yes No   Sig: Take 100 mg by mouth 2 times a day.   ezetimibe (ZETIA) 10 MG Tab  Significant Other No No   Sig: Take 1 Tablet by mouth every day.   finasteride (PROSCAR) 5 MG Tab  Significant Other Yes No   Sig: Take 5 mg by mouth every day.   lamoTRIgine (LAMICTAL) 25 MG Tab   No No   Sig: Take 1 tablet by mouth every morning for 2 weeks then take 2 tablets by mouth every morning thereafter.   lamoTRIgine (LAMICTAL) 25 MG Tab   No No   Sig: Take 2 Tablets by mouth every morning.   metoprolol SR (TOPROL XL) 25 MG TABLET SR 24 HR  Significant Other No No   Sig: Take 1 Tablet by mouth every evening.   omeprazole (PRILOSEC) 40 MG delayed-release capsule  Significant Other Yes No   Sig: Take 40 mg by mouth every evening.   rosuvastatin (CRESTOR) 40 MG tablet  Significant Other Yes No   Sig: Take 40 mg by mouth every evening.   tamsulosin (FLOMAX) 0.4 MG capsule  Significant Other Yes No   Sig: Take 0.4 mg by mouth every day.   telmisartan (MICARDIS) 40 MG Tab  Significant Other No No   Sig: Take 1 Tablet by mouth every morning.   tizanidine (ZANAFLEX) 4 MG  "Tab  Significant Other Yes No   Sig: Take 4 mg by mouth 4 times a day. Take 1 tablet (4 mg) at 05:00, 10:00, 15:00, and 20:00.   traZODone (DESYREL) 100 MG Tab  Significant Other Yes No   Sig: Take 150 mg by mouth every evening. 1.5 tablets = 150 mg.   venlafaxine (EFFEXOR-XR) 150 MG extended-release capsule  Significant Other Yes No   Sig: Take 1 Capsule by mouth every evening. Take with 1 capsule of venlafaxine XR 75 mg, for a total dose of 225 mg.   venlafaxine XR (EFFEXOR XR) 75 MG CAPSULE SR 24 HR  Significant Other Yes No   Sig: Take 1 Capsule by mouth every evening. Take with 1 capsule of venlafaxine  mg, for a total dose of 225 mg.      Facility-Administered Medications: None       Allergies  Allergies   Allergen Reactions    Oxycodone Anaphylaxis and Swelling     Throat swelling    Pectin Anaphylaxis and Swelling     Throat swelling, cannot breathe    Hctz [Hydrochlorothiazide W-Spironolactone]      hypotension    Zolpidem Unspecified     \"Sleep walking\", excessive eating, drives/cooks during the night       Vital signs in last 24 hours  Temp:  [36.5 °C (97.7 °F)-36.6 °C (97.9 °F)] 36.6 °C (97.9 °F)  Pulse:  [61-81] 76  Resp:  [16-18] 16  BP: (101-169)/() 169/111  SpO2:  [90 %-96 %] 93 %    Physical Exam  Physical Exam  Constitutional:       Appearance: Normal appearance. He is well-developed and normal weight.   HENT:      Head: Normocephalic and atraumatic.      Mouth/Throat:      Mouth: Mucous membranes are moist.   Eyes:      Extraocular Movements: Extraocular movements intact.      Conjunctiva/sclera: Conjunctivae normal.   Cardiovascular:      Rate and Rhythm: Normal rate and regular rhythm.      Pulses: Normal pulses.      Heart sounds: Normal heart sounds.   Pulmonary:      Effort: Pulmonary effort is normal.      Breath sounds: Normal breath sounds.   Abdominal:      General: Bowel sounds are normal.      Palpations: Abdomen is soft.   Musculoskeletal:         General: Normal range of " "motion.      Cervical back: Normal range of motion and neck supple.   Skin:     General: Skin is warm and dry.   Neurological:      General: No focal deficit present.      Mental Status: He is alert and oriented to person, place, and time. Mental status is at baseline.   Psychiatric:         Mood and Affect: Mood normal.         Behavior: Behavior normal.         Thought Content: Thought content normal.         Judgment: Judgment normal.         Lab Review  Lab Results   Component Value Date/Time    WBC 7.0 11/27/2024 12:48 AM    RBC 4.10 (L) 11/27/2024 12:48 AM    HEMOGLOBIN 12.1 (L) 11/27/2024 12:48 AM    HEMATOCRIT 38.0 (L) 11/27/2024 12:48 AM    MCV 92.7 11/27/2024 12:48 AM    MCH 29.5 11/27/2024 12:48 AM    MCHC 31.8 (L) 11/27/2024 12:48 AM    MPV 11.7 11/27/2024 12:48 AM      Lab Results   Component Value Date/Time    SODIUM 137 11/29/2024 04:01 AM    POTASSIUM 4.2 11/29/2024 04:01 AM    CHLORIDE 103 11/29/2024 04:01 AM    CO2 23 11/29/2024 04:01 AM    GLUCOSE 127 (H) 11/29/2024 04:01 AM    BUN 9 11/29/2024 04:01 AM    CREATININE 0.92 11/29/2024 04:01 AM      Lab Results   Component Value Date/Time    ASTSGOT 118 (H) 11/24/2024 11:26 PM    ALTSGPT 20 11/24/2024 11:26 PM     Lab Results   Component Value Date/Time    CHOLSTRLTOT 169 08/08/2023 12:00 AM    LDL 93 08/08/2023 12:00 AM    HDL 47 08/08/2023 12:00 AM    TRIGLYCERIDE 216 08/08/2023 12:00 AM    TROPONINT 465 (H) 08/02/2020 08:50 AM       No results for input(s): \"NTPROBNP\" in the last 72 hours.    Cardiac Imaging and Procedures Review  CARDIAC STUDIES/PROCEDURES:     CARDIAC CATHETERIZATION CONCLUSIONS by Chilo Solis (08/01/20)  1. Widely patent coronary arteries  2. Cardiogenic shock, likely due to stress cardiomyopathy  3. Successful placement of IABP  4. Successful placement of left radial artery line     CARDIAC CATHETERIZATION CONCLUSIONS by Chilo Solis (08/01/20)  1.  Acute non-STEMI due to culprit right coronary artery  2.  " Successful PCI of the mid right coronary artery using 1 drug-eluting stent and IVUS guidance  3.  Successful PCI of the posterior lateral branch using 1 drug-eluting stent  4.  Normal LV systolic function  5.  Moderately elevated LVEDP (21 mmHg)  6.  Poorly controlled hypertension    ECHOCARDIOGRAM CONCLUSIONS (11/26/24)  Normal left ventricular systolic function.  Normal right ventricular size and systolic function.  Aortic valve sclerosis without significant stenosis.  The aortic root is dilated with a diameter of 4.5 cm at the sinuses,   3.4 cm at the ascending aorta.  (study result reviewed)     ECHOCARDIOGRAM CONCLUSIONS (07/28/24)  Technically difficult study as patient is not able to follow commands.   Patient is in atrial fibrillation rates 110s-130s which limits accurate   LVEF and wall motion interpretation.   Normal left and right ventricular systolic function.  Visually   estimated LVEF of 60-65%.   Valves are not well seen, but no significant valvular stenosis or   regurgitation.  The ascending aorta is not well visualized.   Compared to the prior study on 10/23/2023.   (study result reviewed)      ECHOCARDIOGRAM CONCLUSIONS (08/09/20)  Compared to the images of the prior study done 8/3/2020, the EF appears improved.  Left ventricular ejection fraction is visually estimated to be 60%.     ECHOCARDIOGRAM CONCLUSIONS (08/01/20)  Technically difficult due to body habitus, positioning, pt vented and   in restraints but adequate study for interpretation.   Contrast was used to enhance definition of the endocardial borders.   Severely reduced left ventricular systolic function.  Left ventricular ejection fraction is visually estimated to be 25%.  The apex is akinetic.  Thrombus is observed in the left ventricular apex.     EKG performed on (11/25/24) was reviewed: EKG personally interpreted shows sinus rhythm.  EKG performed on (08/08/20) EKG shows sinus tachycardia and  ethan.    Assessment/Plan  Ascending aortic aneurysm: He is a 64 y.o. male with a past medical history of coronary artery disease with prior stent placement, atrial fibrillation on chronic anticoagulation therapy, hypertension and dyslipidemia who presented with a seizure. He underwent echocardiogram evaluation showing ascending aortic aneurysm measuring 4.5 cm. He will be treated with beta blockade therapy and followed as outpatient with echocardiogram. His aortic arch aneurysm will need to be closer to 5.0 cm prior to surgical repair consideration due to overall high risk.       Thank you for allowing me to participate in the care of this patient.    Cardiology will sign off on this patient    Please contact me with any questions.    Servando Mckinney M.D.   Cardiologist, [facility]  [phone]

## 2024-11-29 NOTE — CARE PLAN
The patient is Stable - Low risk of patient condition declining or worsening    Shift Goals  Clinical Goals: monitor neuro exam, monitor BP trend  Patient Goals: updates  Family Goals: maliha    Progress made toward(s) clinical / shift goals:        Problem: Fall Risk  Goal: Patient will remain free from falls  Outcome: Progressing     Problem: Seizure Precautions  Goal: Implementation of seizure precautions  Outcome: Progressing     Problem: Pain - Standard  Goal: Alleviation of pain or a reduction in pain to the patient’s comfort goal  Outcome: Progressing     Problem: Knowledge Deficit - Standard  Goal: Patient and family/care givers will demonstrate understanding of plan of care, disease process/condition, diagnostic tests and medications  Outcome: Progressing       Patient is not progressing towards the following goals:

## 2024-11-29 NOTE — CARE PLAN
The patient is Stable - Low risk of patient condition declining or worsening    Shift Goals  Clinical Goals: monitor neuro exam, monitor BP trend  Patient Goals: updates  Family Goals: maliha    Progress made toward(s) clinical / shift goals:    Problem: Knowledge Deficit - Standard  Goal: Patient and family/care givers will demonstrate understanding of plan of care, disease process/condition, diagnostic tests and medications  Outcome: Progressing     Problem: Pain - Standard  Goal: Alleviation of pain or a reduction in pain to the patient’s comfort goal  Outcome: Progressing     Problem: Seizure Precautions  Goal: Implementation of seizure precautions  Outcome: Progressing       Patient is not progressing towards the following goals:

## 2024-11-29 NOTE — PROGRESS NOTES
Monitor summary: SB/SR, HR 50-86, ME 0.20, QRS 0.07, QT 0.47 with (R)PAC and trigem per strip from monitor room

## 2024-11-30 VITALS
WEIGHT: 225.31 LBS | RESPIRATION RATE: 16 BRPM | DIASTOLIC BLOOD PRESSURE: 81 MMHG | HEART RATE: 60 BPM | TEMPERATURE: 97.9 F | BODY MASS INDEX: 35.29 KG/M2 | OXYGEN SATURATION: 91 % | SYSTOLIC BLOOD PRESSURE: 123 MMHG

## 2024-11-30 LAB
ALBUMIN SERPL BCP-MCNC: 3.6 G/DL (ref 3.2–4.9)
BACTERIA UR CULT: NORMAL
BASOPHILS # BLD AUTO: 0.6 % (ref 0–1.8)
BASOPHILS # BLD: 0.05 K/UL (ref 0–0.12)
BUN SERPL-MCNC: 11 MG/DL (ref 8–22)
CALCIUM ALBUM COR SERPL-MCNC: 9.8 MG/DL (ref 8.5–10.5)
CALCIUM SERPL-MCNC: 9.5 MG/DL (ref 8.5–10.5)
CHLORIDE SERPL-SCNC: 105 MMOL/L (ref 96–112)
CO2 SERPL-SCNC: 27 MMOL/L (ref 20–33)
CREAT SERPL-MCNC: 1.31 MG/DL (ref 0.5–1.4)
EOSINOPHIL # BLD AUTO: 0.22 K/UL (ref 0–0.51)
EOSINOPHIL NFR BLD: 2.7 % (ref 0–6.9)
ERYTHROCYTE [DISTWIDTH] IN BLOOD BY AUTOMATED COUNT: 50.9 FL (ref 35.9–50)
GFR SERPLBLD CREATININE-BSD FMLA CKD-EPI: 61 ML/MIN/1.73 M 2
GLUCOSE SERPL-MCNC: 103 MG/DL (ref 65–99)
HCT VFR BLD AUTO: 41.6 % (ref 42–52)
HGB BLD-MCNC: 13.1 G/DL (ref 14–18)
IMM GRANULOCYTES # BLD AUTO: 0.02 K/UL (ref 0–0.11)
IMM GRANULOCYTES NFR BLD AUTO: 0.2 % (ref 0–0.9)
LYMPHOCYTES # BLD AUTO: 1.59 K/UL (ref 1–4.8)
LYMPHOCYTES NFR BLD: 19.4 % (ref 22–41)
MCH RBC QN AUTO: 29.8 PG (ref 27–33)
MCHC RBC AUTO-ENTMCNC: 31.5 G/DL (ref 32.3–36.5)
MCV RBC AUTO: 94.5 FL (ref 81.4–97.8)
MONOCYTES # BLD AUTO: 0.8 K/UL (ref 0–0.85)
MONOCYTES NFR BLD AUTO: 9.8 % (ref 0–13.4)
NEUTROPHILS # BLD AUTO: 5.51 K/UL (ref 1.82–7.42)
NEUTROPHILS NFR BLD: 67.3 % (ref 44–72)
NRBC # BLD AUTO: 0 K/UL
NRBC BLD-RTO: 0 /100 WBC (ref 0–0.2)
PHOSPHATE SERPL-MCNC: 3.7 MG/DL (ref 2.5–4.5)
PLATELET # BLD AUTO: 234 K/UL (ref 164–446)
PMV BLD AUTO: 11.5 FL (ref 9–12.9)
POTASSIUM SERPL-SCNC: 5.3 MMOL/L (ref 3.6–5.5)
RBC # BLD AUTO: 4.4 M/UL (ref 4.7–6.1)
SIGNIFICANT IND 70042: NORMAL
SITE SITE: NORMAL
SODIUM SERPL-SCNC: 139 MMOL/L (ref 135–145)
SOURCE SOURCE: NORMAL
WBC # BLD AUTO: 8.2 K/UL (ref 4.8–10.8)

## 2024-11-30 PROCEDURE — 700102 HCHG RX REV CODE 250 W/ 637 OVERRIDE(OP): Performed by: STUDENT IN AN ORGANIZED HEALTH CARE EDUCATION/TRAINING PROGRAM

## 2024-11-30 PROCEDURE — 700105 HCHG RX REV CODE 258: Performed by: INTERNAL MEDICINE

## 2024-11-30 PROCEDURE — 36415 COLL VENOUS BLD VENIPUNCTURE: CPT

## 2024-11-30 PROCEDURE — 99232 SBSQ HOSP IP/OBS MODERATE 35: CPT | Performed by: INTERNAL MEDICINE

## 2024-11-30 PROCEDURE — 85025 COMPLETE CBC W/AUTO DIFF WBC: CPT

## 2024-11-30 PROCEDURE — 700111 HCHG RX REV CODE 636 W/ 250 OVERRIDE (IP): Mod: JZ | Performed by: STUDENT IN AN ORGANIZED HEALTH CARE EDUCATION/TRAINING PROGRAM

## 2024-11-30 PROCEDURE — A9270 NON-COVERED ITEM OR SERVICE: HCPCS

## 2024-11-30 PROCEDURE — A9270 NON-COVERED ITEM OR SERVICE: HCPCS | Performed by: INTERNAL MEDICINE

## 2024-11-30 PROCEDURE — 80069 RENAL FUNCTION PANEL: CPT

## 2024-11-30 PROCEDURE — 770020 HCHG ROOM/CARE - TELE (206)

## 2024-11-30 PROCEDURE — 700102 HCHG RX REV CODE 250 W/ 637 OVERRIDE(OP): Performed by: INTERNAL MEDICINE

## 2024-11-30 PROCEDURE — A9270 NON-COVERED ITEM OR SERVICE: HCPCS | Performed by: STUDENT IN AN ORGANIZED HEALTH CARE EDUCATION/TRAINING PROGRAM

## 2024-11-30 PROCEDURE — 700102 HCHG RX REV CODE 250 W/ 637 OVERRIDE(OP)

## 2024-11-30 PROCEDURE — 700111 HCHG RX REV CODE 636 W/ 250 OVERRIDE (IP): Performed by: INTERNAL MEDICINE

## 2024-11-30 RX ORDER — METOCLOPRAMIDE HYDROCHLORIDE 5 MG/ML
10 INJECTION INTRAMUSCULAR; INTRAVENOUS ONCE
Status: COMPLETED | OUTPATIENT
Start: 2024-11-30 | End: 2024-11-30

## 2024-11-30 RX ORDER — METOPROLOL TARTRATE 25 MG/1
25 TABLET, FILM COATED ORAL 2 TIMES DAILY
Status: DISCONTINUED | OUTPATIENT
Start: 2024-11-30 | End: 2024-12-01 | Stop reason: HOSPADM

## 2024-11-30 RX ORDER — DEXAMETHASONE SODIUM PHOSPHATE 4 MG/ML
10 INJECTION, SOLUTION INTRA-ARTICULAR; INTRALESIONAL; INTRAMUSCULAR; INTRAVENOUS; SOFT TISSUE ONCE
Status: COMPLETED | OUTPATIENT
Start: 2024-11-30 | End: 2024-11-30

## 2024-11-30 RX ORDER — MAGNESIUM SULFATE HEPTAHYDRATE 40 MG/ML
2 INJECTION, SOLUTION INTRAVENOUS ONCE
Status: COMPLETED | OUTPATIENT
Start: 2024-11-30 | End: 2024-11-30

## 2024-11-30 RX ORDER — ENALAPRILAT 1.25 MG/ML
1.25 INJECTION INTRAVENOUS EVERY 6 HOURS PRN
Status: DISCONTINUED | OUTPATIENT
Start: 2024-11-30 | End: 2024-12-01 | Stop reason: HOSPADM

## 2024-11-30 RX ADMIN — APIXABAN 5 MG: 5 TABLET, FILM COATED ORAL at 17:57

## 2024-11-30 RX ADMIN — LOPERAMIDE HYDROCHLORIDE 2 MG: 2 CAPSULE ORAL at 14:09

## 2024-11-30 RX ADMIN — LEVETIRACETAM 1000 MG: 500 TABLET, FILM COATED ORAL at 17:56

## 2024-11-30 RX ADMIN — ACETAMINOPHEN 650 MG: 325 TABLET ORAL at 14:16

## 2024-11-30 RX ADMIN — APIXABAN 5 MG: 5 TABLET, FILM COATED ORAL at 06:07

## 2024-11-30 RX ADMIN — TELMISARTAN 40 MG: 80 TABLET ORAL at 05:54

## 2024-11-30 RX ADMIN — METOPROLOL TARTRATE 25 MG: 25 TABLET, FILM COATED ORAL at 11:31

## 2024-11-30 RX ADMIN — CEFAZOLIN 2 G: 2 INJECTION, POWDER, FOR SOLUTION INTRAMUSCULAR; INTRAVENOUS at 14:08

## 2024-11-30 RX ADMIN — VENLAFAXINE 225 MG: 75 TABLET ORAL at 17:57

## 2024-11-30 RX ADMIN — HYDRALAZINE HYDROCHLORIDE 10 MG: 20 INJECTION INTRAMUSCULAR; INTRAVENOUS at 14:09

## 2024-11-30 RX ADMIN — DEXAMETHASONE SODIUM PHOSPHATE 10 MG: 4 INJECTION INTRA-ARTICULAR; INTRALESIONAL; INTRAMUSCULAR; INTRAVENOUS; SOFT TISSUE at 11:30

## 2024-11-30 RX ADMIN — FINASTERIDE 5 MG: 5 TABLET, FILM COATED ORAL at 05:54

## 2024-11-30 RX ADMIN — TIZANIDINE 4 MG: 4 TABLET ORAL at 21:28

## 2024-11-30 RX ADMIN — TAMSULOSIN HYDROCHLORIDE 0.4 MG: 0.4 CAPSULE ORAL at 05:55

## 2024-11-30 RX ADMIN — TIZANIDINE 4 MG: 4 TABLET ORAL at 17:56

## 2024-11-30 RX ADMIN — ROSUVASTATIN CALCIUM 40 MG: 20 TABLET, FILM COATED ORAL at 17:56

## 2024-11-30 RX ADMIN — MAGNESIUM SULFATE HEPTAHYDRATE 2 G: 2 INJECTION, SOLUTION INTRAVENOUS at 11:58

## 2024-11-30 RX ADMIN — TIZANIDINE 4 MG: 4 TABLET ORAL at 14:09

## 2024-11-30 RX ADMIN — HYDROCODONE BITARTRATE AND ACETAMINOPHEN 1 TABLET: 10; 325 TABLET ORAL at 11:26

## 2024-11-30 RX ADMIN — CEFAZOLIN 2 G: 2 INJECTION, POWDER, FOR SOLUTION INTRAMUSCULAR; INTRAVENOUS at 21:36

## 2024-11-30 RX ADMIN — TRAZODONE HYDROCHLORIDE 150 MG: 100 TABLET ORAL at 17:55

## 2024-11-30 RX ADMIN — CEFAZOLIN 2 G: 2 INJECTION, POWDER, FOR SOLUTION INTRAMUSCULAR; INTRAVENOUS at 05:58

## 2024-11-30 RX ADMIN — LAMOTRIGINE 25 MG: 25 TABLET ORAL at 17:57

## 2024-11-30 RX ADMIN — LEVETIRACETAM 1000 MG: 500 TABLET, FILM COATED ORAL at 06:07

## 2024-11-30 RX ADMIN — LABETALOL HYDROCHLORIDE 10 MG: 5 INJECTION, SOLUTION INTRAVENOUS at 12:05

## 2024-11-30 RX ADMIN — GUAIFENESIN 200 MG: 100 LIQUID ORAL at 14:29

## 2024-11-30 RX ADMIN — METOCLOPRAMIDE 10 MG: 5 INJECTION, SOLUTION INTRAMUSCULAR; INTRAVENOUS at 11:30

## 2024-11-30 RX ADMIN — HYDRALAZINE HYDROCHLORIDE 10 MG: 20 INJECTION INTRAMUSCULAR; INTRAVENOUS at 07:57

## 2024-11-30 RX ADMIN — LAMOTRIGINE 25 MG: 25 TABLET ORAL at 05:55

## 2024-11-30 RX ADMIN — EZETIMIBE 10 MG: 10 TABLET ORAL at 05:54

## 2024-11-30 ASSESSMENT — ENCOUNTER SYMPTOMS
ABDOMINAL PAIN: 0
HEADACHES: 1
SHORTNESS OF BREATH: 0
DIARRHEA: 1
NAUSEA: 1
PHOTOPHOBIA: 1
BLURRED VISION: 0
DOUBLE VISION: 0
WEAKNESS: 0
VOMITING: 1
DIZZINESS: 1

## 2024-11-30 ASSESSMENT — PAIN DESCRIPTION - PAIN TYPE
TYPE: ACUTE PAIN

## 2024-11-30 ASSESSMENT — FIBROSIS 4 INDEX: FIB4 SCORE: 7.22

## 2024-11-30 NOTE — CARE PLAN
The patient is Watcher - Medium risk of patient condition declining or worsening    Shift Goals  Clinical Goals: neuro exam, monitor BP  Patient Goals: updates  Family Goals: updates    Progress made toward(s) clinical / shift goals:  q4 and prn vital sign checks in place  Problem: Knowledge Deficit - Standard  Goal: Patient and family/care givers will demonstrate understanding of plan of care, disease process/condition, diagnostic tests and medications  Outcome: Progressing     Problem: Seizure Precautions  Goal: Implementation of seizure precautions  Outcome: Progressing     Problem: Neuro Status  Goal: Neuro status will remain stable or improve  Outcome: Progressing       Patient is not progressing towards the following goals:

## 2024-11-30 NOTE — PROGRESS NOTES
NOC APRN CROSS COVER NOTE      Notified by nursing of hypotension and bradycardia, patient is not symptomatic.     Primary MD was also aware with orders placed for EKG and CT/CTA chest ordered.    Upon review of chart, at 1727 patient received 25 mg of lamotrigine, 1000 mg of keppra, 50 mg of metoprolol XL, 4 mg of tizanidine,  mg of Norco, 150 mg of trazodone and 225 mg of effexor.     Orders for 1 L NS bolus. Additionally, norco changed to PRN frequency and MAR hold placed on trazodone, tizanidine, and metoprolol. Can unhold when BP and HR recovers.     Jamia Chauhan ACN-AG, NOC Hospitalist APRN

## 2024-11-30 NOTE — PROGRESS NOTES
Monitor summary: SB/SR, HR 47-77, FL 0.20, QRS 0.08, QT 0.40 with rare PVCs per strip from monitor room

## 2024-11-30 NOTE — CARE PLAN
The patient is Stable - Low risk of patient condition declining or worsening    Shift Goals  Clinical Goals: Monitor BP  Patient Goals: Sleep  Family Goals: FELIPA    Progress made toward(s) clinical / shift goals:    Problem: Fall Risk  Goal: Patient will remain free from falls  Description: Target End Date:  Prior to discharge or change in level of care    Document interventions on the Dalal Daryn Fall Risk Assessment    1.  Assess for fall risk factors  2.  Implement fall precautions  Outcome: Progressing     Problem: Pain - Standard  Goal: Alleviation of pain or a reduction in pain to the patient’s comfort goal  Description: Target End Date:  Prior to discharge or change in level of care    Document on Vitals flowsheet    1.  Document pain using the appropriate pain scale per order or unit policy  2.  Educate and implement non-pharmacologic comfort measures (i.e. relaxation, distraction, massage, cold/heat therapy, etc.)  3.  Pain management medications as ordered  4.  Reassess pain after pain med administration per policy  5.  If opiods administered assess patient's response to pain medication is appropriate per POSS sedation scale  6.  Follow pain management plan developed in collaboration with patient and interdisciplinary team (including palliative care or pain specialists if applicable)  Outcome: Progressing     Problem: Knowledge Deficit - Standard  Goal: Patient and family/care givers will demonstrate understanding of plan of care, disease process/condition, diagnostic tests and medications  Description: Target End Date:  1-3 days or as soon as patient condition allows    Document in Patient Education    1.  Patient and family/caregiver oriented to unit, equipment, visitation policy and means for communicating concern  2.  Complete/review Learning Assessment  3.  Assess knowledge level of disease process/condition, treatment plan, diagnostic tests and medications  4.  Explain disease process/condition,  treatment plan, diagnostic tests and medications  Outcome: Progressing       Patient is not progressing towards the following goals:

## 2024-11-30 NOTE — PROGRESS NOTES
Hospital Medicine Daily Progress Note    Date of Service  11/30/2024    Chief Complaint  Karan Wiley is a 64 y.o. male admitted 11/24/2024 with seizure    Hospital Course  65 yo man with A-fib, epilepsy who was recently taken off Depakote and switched to lamotrigine who had seizure event while running errands around the house.  He presented to [facility] and was transferred to [facility].  Neurology was consulted.  EEG showed rare left temporal sharp waves.  Neurology recommended Keppra 1000 mg twice daily with lamotrigine titration.  While hospitalized he had a TTE that showed aortic root dilated 4.5 cm at sinuses and 3.4 cm ascending aorta.  Cardiology recommended outpatient follow-up.  He was also intermittently hypertensive.    Interval Problem Update  Patient became hypotensive overnight to 73/47 with heart rate 40s he was asymptomatic.  He was given 1 L NS bolus with improvement.  He was also placed on 3 L O2 for some hypoxia.  CTA aorta showed normal aorta with atelectasis.  EKG with sinus bradycardia  This morning heart rate has been 60s, blood pressure up to .  He complains of right temporal headache with photophobia and nausea.  He denies any visual changes or new weakness  He still has some diarrhea.  Denies abdominal pain    I have discussed this patient's plan of care and discharge plan at IDT rounds today with Case Management, Nursing, Nursing leadership, and other members of the IDT team.    Consultants/Specialty  neurology, cardiology    Code Status  Full Code    Disposition  The patient is not medically cleared for discharge to home or a post-acute facility.  Anticipate discharge to: home with close outpatient follow-up    I have placed the appropriate orders for post-discharge needs.    Review of Systems  Review of Systems   Constitutional:  Negative for malaise/fatigue.   Eyes:  Positive for photophobia. Negative for blurred vision and double vision.   Respiratory:  Negative for  shortness of breath.    Cardiovascular:  Negative for chest pain.   Gastrointestinal:  Positive for diarrhea, nausea and vomiting. Negative for abdominal pain.   Genitourinary:  Positive for frequency.   Neurological:  Positive for dizziness and headaches. Negative for weakness.        Physical Exam  Temp:  [36.3 °C (97.3 °F)-37 °C (98.6 °F)] 36.8 °C (98.2 °F)  Pulse:  [48-84] 74  Resp:  [12-20] 18  BP: ()/() 184/113  SpO2:  [88 %-98 %] 97 %    Physical Exam  Vitals and nursing note reviewed.   Constitutional:       Appearance: He is ill-appearing.      Comments: Appears uncomfortable, hard of hearing   HENT:      Head: Normocephalic.      Mouth/Throat:      Mouth: Mucous membranes are moist.   Eyes:      General:         Right eye: No discharge.         Left eye: No discharge.      Extraocular Movements: Extraocular movements intact.   Cardiovascular:      Rate and Rhythm: Normal rate and regular rhythm.   Pulmonary:      Effort: No respiratory distress.      Breath sounds: No wheezing or rales.   Abdominal:      General: There is no distension.      Palpations: Abdomen is soft.      Tenderness: There is no abdominal tenderness. There is no guarding or rebound.   Musculoskeletal:         General: No swelling.      Cervical back: Neck supple.   Skin:     General: Skin is warm and dry.   Neurological:      Mental Status: He is oriented to person, place, and time.      Comments: No facial droop, normal speech, equal strength upper extremities, equal strength lower extremities         Fluids  No intake or output data in the 24 hours ending 11/30/24 1307       Laboratory  Recent Labs     11/30/24  0126   WBC 8.2   RBC 4.40*   HEMOGLOBIN 13.1*   HEMATOCRIT 41.6*   MCV 94.5   MCH 29.8   MCHC 31.5*   RDW 50.9*   PLATELETCT 234   MPV 11.5     Recent Labs     11/28/24  0244 11/29/24  0401 11/30/24  0126   SODIUM 138 137 139   POTASSIUM 4.4 4.2 5.3   CHLORIDE 102 103 105   CO2 27 23 27   GLUCOSE 97 127* 103*   BUN  12 9 11   CREATININE 1.03 0.92 1.31   CALCIUM 9.3 10.1 9.5                   Imaging  CT-CTA CHEST WITH & W/O-POST PROCESS   Final Result      1.  Unremarkable aorta   2.  RIGHT greater than LEFT lower lobe atelectasis with persistent prominent elevation of the RIGHT diaphragm            DX-CHEST-PORTABLE (1 VIEW)   Final Result   Impression:      1. Mild right base atelectasis. Chronically elevated right hemidiaphragm. No effusion or dense consolidation.      2. Lower thoracic spine dorsal column stimulator wires.                     CT-RENAL COLIC EVALUATION(A/P W/O)   Final Result      1.  Bilateral nonobstructing nephrolithiasis. No ureteral stones or hydronephrosis.   2.  Colonic diverticulosis.   3.  Partial atelectasis of the right lower lobe.      EC-ECHOCARDIOGRAM COMPLETE W/O CONT   Final Result           Assessment/Plan  * Breakthrough seizure (HCC)- (present on admission)  Assessment & Plan  Neurology consulted  EEG showed rare left temporal sharp waves.  Neurology recommended Keppra 1000 mg twice daily with lamotrigine titration.  Has not had recurring seizure    Acute cough  Assessment & Plan  Chest x-ray and CT chest negative for pneumonia, showed atelectasis.  Screening for COVID/flu/RSV is negative  Cough syrup as needed  Mobilize, encourage I-S    Diarrhea  Assessment & Plan  C. difficile was negative, antibiotic likely causing his diarrhea.  Imodium as needed    Acute cystitis with hematuria  Assessment & Plan  Nonobstructing nephrolithiasis on CT.  Patient says he has a history of very frequent kidney stones  Urine culture had mixed anitha  Complete a course of cefazolin    Aortic root dilatation (HCC)  Assessment & Plan  TTE showed aortic root dilated at 4.5 cm at sinuses  I consulted cardiology who recommended outpatient follow-up  He will need good blood pressure control.     Urinary retention due to benign prostatic hyperplasia- (present on admission)  Assessment & Plan  Continue proscar and  flomax  Monitor for signs of urinary retention    Fall- (present on admission)  Assessment & Plan  Reports multiple falls  Fall precautions  PT/OT    Advance care planning- (present on admission)  Assessment & Plan  Full code    Hypertension  Assessment & Plan  Labile.  Patient was hypotensive overnight, now hypertensive.  Also in pain  Control his migraine and pain, migraine cocktail given  Continue on telmisartan  I ordered for metoprolol 25 mg twice daily for now  IV hydralazine and labetalol as needed    Hyperlipidemia- (present on admission)  Assessment & Plan  Continue crestor     Paroxysmal atrial fibrillation (HCC)- (present on admission)  Assessment & Plan  Continue eliquis  Heart rate has been 40-70s.  Hold amiodarone to allow titration of metoprolol for HTN control         VTE prophylaxis:    therapeutic anticoagulation with eliquis 5 mg BID      I have performed a physical exam and reviewed and updated ROS and Plan today (11/30/2024). In review of yesterday's note (11/29/2024), there are no changes except as documented above.

## 2024-11-30 NOTE — PROGRESS NOTES
Patient desats to 83-85%, 12 RPM. Placed on 3 L NC.   Hypotensive.   BP trending down overall.  No changes in neuro, A&O 4.  MD notified.   MD at the bedside.

## 2024-12-01 ENCOUNTER — PHARMACY VISIT (OUTPATIENT)
Dept: PHARMACY | Facility: MEDICAL CENTER | Age: 64
End: 2024-12-01
Payer: COMMERCIAL

## 2024-12-01 VITALS
DIASTOLIC BLOOD PRESSURE: 76 MMHG | SYSTOLIC BLOOD PRESSURE: 124 MMHG | RESPIRATION RATE: 16 BRPM | TEMPERATURE: 97.7 F | WEIGHT: 231.48 LBS | HEART RATE: 60 BPM | OXYGEN SATURATION: 92 % | BODY MASS INDEX: 36.26 KG/M2

## 2024-12-01 LAB
ALBUMIN SERPL BCP-MCNC: 3.4 G/DL (ref 3.2–4.9)
B PARAP IS1001 DNA NPH QL NAA+NON-PROBE: NOT DETECTED
B PERT.PT PRMT NPH QL NAA+NON-PROBE: NOT DETECTED
BUN SERPL-MCNC: 15 MG/DL (ref 8–22)
C PNEUM DNA NPH QL NAA+NON-PROBE: NOT DETECTED
CALCIUM ALBUM COR SERPL-MCNC: 10.4 MG/DL (ref 8.5–10.5)
CALCIUM SERPL-MCNC: 9.9 MG/DL (ref 8.5–10.5)
CHLORIDE SERPL-SCNC: 104 MMOL/L (ref 96–112)
CO2 SERPL-SCNC: 22 MMOL/L (ref 20–33)
CREAT SERPL-MCNC: 1.19 MG/DL (ref 0.5–1.4)
FLUAV RNA NPH QL NAA+NON-PROBE: NOT DETECTED
FLUBV RNA NPH QL NAA+NON-PROBE: NOT DETECTED
GFR SERPLBLD CREATININE-BSD FMLA CKD-EPI: 68 ML/MIN/1.73 M 2
GLUCOSE SERPL-MCNC: 136 MG/DL (ref 65–99)
HADV DNA NPH QL NAA+NON-PROBE: NOT DETECTED
HCOV 229E RNA NPH QL NAA+NON-PROBE: NOT DETECTED
HCOV HKU1 RNA NPH QL NAA+NON-PROBE: NOT DETECTED
HCOV NL63 RNA NPH QL NAA+NON-PROBE: NOT DETECTED
HCOV OC43 RNA NPH QL NAA+NON-PROBE: NOT DETECTED
HMPV RNA NPH QL NAA+NON-PROBE: NOT DETECTED
HPIV1 RNA NPH QL NAA+NON-PROBE: NOT DETECTED
HPIV2 RNA NPH QL NAA+NON-PROBE: NOT DETECTED
HPIV3 RNA NPH QL NAA+NON-PROBE: NOT DETECTED
HPIV4 RNA NPH QL NAA+NON-PROBE: NOT DETECTED
M PNEUMO DNA NPH QL NAA+NON-PROBE: NOT DETECTED
PHOSPHATE SERPL-MCNC: 2.1 MG/DL (ref 2.5–4.5)
POTASSIUM SERPL-SCNC: 4.6 MMOL/L (ref 3.6–5.5)
RSV RNA NPH QL NAA+NON-PROBE: NOT DETECTED
RV+EV RNA NPH QL NAA+NON-PROBE: DETECTED
SARS-COV-2 RNA NPH QL NAA+NON-PROBE: NOTDETECTED
SODIUM SERPL-SCNC: 135 MMOL/L (ref 135–145)

## 2024-12-01 PROCEDURE — 700102 HCHG RX REV CODE 250 W/ 637 OVERRIDE(OP): Performed by: STUDENT IN AN ORGANIZED HEALTH CARE EDUCATION/TRAINING PROGRAM

## 2024-12-01 PROCEDURE — 80069 RENAL FUNCTION PANEL: CPT

## 2024-12-01 PROCEDURE — 700102 HCHG RX REV CODE 250 W/ 637 OVERRIDE(OP): Performed by: INTERNAL MEDICINE

## 2024-12-01 PROCEDURE — A9270 NON-COVERED ITEM OR SERVICE: HCPCS | Performed by: INTERNAL MEDICINE

## 2024-12-01 PROCEDURE — 36415 COLL VENOUS BLD VENIPUNCTURE: CPT

## 2024-12-01 PROCEDURE — A9270 NON-COVERED ITEM OR SERVICE: HCPCS | Performed by: STUDENT IN AN ORGANIZED HEALTH CARE EDUCATION/TRAINING PROGRAM

## 2024-12-01 PROCEDURE — 700102 HCHG RX REV CODE 250 W/ 637 OVERRIDE(OP)

## 2024-12-01 PROCEDURE — 99239 HOSP IP/OBS DSCHRG MGMT >30: CPT | Performed by: INTERNAL MEDICINE

## 2024-12-01 PROCEDURE — 0202U NFCT DS 22 TRGT SARS-COV-2: CPT

## 2024-12-01 PROCEDURE — A9270 NON-COVERED ITEM OR SERVICE: HCPCS

## 2024-12-01 PROCEDURE — RXMED WILLOW AMBULATORY MEDICATION CHARGE: Performed by: INTERNAL MEDICINE

## 2024-12-01 RX ORDER — LAMOTRIGINE 25 MG/1
TABLET ORAL
Qty: 30 TABLET | Refills: 0 | Status: SHIPPED | OUTPATIENT
Start: 2024-12-01 | End: 2024-12-18

## 2024-12-01 RX ORDER — HYDRALAZINE HYDROCHLORIDE 10 MG/1
5 TABLET, FILM COATED ORAL 3 TIMES DAILY PRN
Qty: 20 TABLET | Refills: 0 | Status: SHIPPED | OUTPATIENT
Start: 2024-12-01

## 2024-12-01 RX ADMIN — LEVETIRACETAM 1000 MG: 500 TABLET, FILM COATED ORAL at 06:03

## 2024-12-01 RX ADMIN — HYDROCODONE BITARTRATE AND ACETAMINOPHEN 1 TABLET: 10; 325 TABLET ORAL at 08:38

## 2024-12-01 RX ADMIN — EZETIMIBE 10 MG: 10 TABLET ORAL at 06:04

## 2024-12-01 RX ADMIN — GUAIFENESIN 200 MG: 100 LIQUID ORAL at 08:33

## 2024-12-01 RX ADMIN — LAMOTRIGINE 25 MG: 25 TABLET ORAL at 06:04

## 2024-12-01 RX ADMIN — METOPROLOL TARTRATE 25 MG: 25 TABLET, FILM COATED ORAL at 06:04

## 2024-12-01 RX ADMIN — APIXABAN 5 MG: 5 TABLET, FILM COATED ORAL at 06:04

## 2024-12-01 RX ADMIN — TIZANIDINE 4 MG: 4 TABLET ORAL at 08:34

## 2024-12-01 RX ADMIN — TIZANIDINE 4 MG: 4 TABLET ORAL at 12:28

## 2024-12-01 RX ADMIN — FINASTERIDE 5 MG: 5 TABLET, FILM COATED ORAL at 06:04

## 2024-12-01 RX ADMIN — LABETALOL HYDROCHLORIDE 10 MG: 5 INJECTION, SOLUTION INTRAVENOUS at 06:47

## 2024-12-01 RX ADMIN — TELMISARTAN 40 MG: 80 TABLET ORAL at 06:03

## 2024-12-01 RX ADMIN — TAMSULOSIN HYDROCHLORIDE 0.4 MG: 0.4 CAPSULE ORAL at 06:03

## 2024-12-01 ASSESSMENT — PAIN DESCRIPTION - PAIN TYPE
TYPE: ACUTE PAIN
TYPE: ACUTE PAIN

## 2024-12-01 ASSESSMENT — FIBROSIS 4 INDEX: FIB4 SCORE: 7.22

## 2024-12-01 NOTE — CARE PLAN
The patient is Stable - Low risk of patient condition declining or worsening    Shift Goals  Clinical Goals: Monitor BP  Patient Goals: Sleep  Family Goals: FELIPA    Progress made toward(s) clinical / shift goals:  patient continues to verbalizes necessities, emotions, and pain. Patient comfortably resting in bed with bed alarm on, bed at the lowest position, and call light within reach    Patient is not progressing towards the following goals:

## 2024-12-01 NOTE — PROGRESS NOTES
Patient has received home medication from [facility]. Medications at bedside, provided education to both patient and wife on the phone. No other questions or concerns about medications and discharge planning at this time. Awaiting son for .

## 2024-12-01 NOTE — DISCHARGE SUMMARY
Discharge Summary    CHIEF COMPLAINT ON ADMISSION  No chief complaint on file.      Reason for Admission  Seizures     Admission Date  11/24/2024    CODE STATUS  Full Code    HPI & HOSPITAL COURSE  63 yo man with A-fib, epilepsy who was recently taken off Depakote and switched to lamotrigine who had seizure event while running errands around the house. He presented to [facility] and was transferred to [facility]. Neurology was consulted. EEG showed rare left temporal sharp waves. Neurology recommended Keppra 1000 mg twice daily with ongoing lamotrigine titration.   While hospitalized he had a TTE that showed aortic root dilated 4.5 cm at sinuses and 3.4 cm ascending aorta. Cardiology recommended outpatient follow-up. He was also intermittently hypertensive up to SBP>200.  As his antihypertensives were adjusted, he had a lot of fluctuations with low and high blood pressure and seems to be sensitive to HTN medications.  I had a long conversation with his wife who keeps a very detailed blood pressure log at home and the highest blood pressure he would go to his SBP 160s.  It seems that his blood pressure is much better controlled at home and perhaps he has a component of whitecoat syndrome in the hospital.  I discussed with the wife to continue on his home metoprolol and telmisartan, I gave her instructions to give hydralazine as needed if he were to become hypertensive over .  He will also be following up with cardiology in the next 1 to 2 weeks.  His heart rate was 50-60s and occasionally 40s while hospitalized, I will have him discontinue amiodarone.  He also had some urinary frequency and urgency with hematuria.  CT showed small nonobstructing renal stones.  He was treated empirically for UTI with cefazolin.  His urine culture showed anitha.  He is being seen by urology for urinary symptoms and he will follow-up.    He also developed cough and was positive for enterovirus/rhinovirus.  Lung imaging showed  atelectasis.    Therefore, he is discharged in good and stable condition to home with close outpatient follow-up.    The patient met 2-midnight criteria for an inpatient stay at the time of discharge.    Discharge Date  12/1/24    FOLLOW UP ITEMS POST DISCHARGE  Aortic root dilation and uncontrolled hypertension, discharging with as needed hydralazine and follow-up with cardiology  A-fib-amiodarone discontinued for bradycardia, follow-up with cardiology  Follow-up with neurology for further seizure management  Follow-up urology for urinary frequency    DISCHARGE DIAGNOSES  Principal Problem:    Breakthrough seizure (HCC) (POA: Yes)  Active Problems:    Paroxysmal atrial fibrillation (HCC) (POA: Yes)      Overview: October 2023: Echocardiogram with normal LV size, LVEF 55-60%. Normal RA,       LA and RV. No valvular problems.    Hyperlipidemia (POA: Yes)    Hypertension (POA: Unknown)    Advance care planning (POA: Yes)    Fall (POA: Yes)    Urinary retention due to benign prostatic hyperplasia (POA: Yes)    Aortic root dilatation (HCC) (POA: Unknown)    Acute cystitis with hematuria (POA: Unknown)    Diarrhea (POA: Unknown)    Acute cough (POA: Unknown)  Resolved Problems:    * No resolved hospital problems. *      FOLLOW UP  Future Appointments   Date Time Provider Department Center   12/12/2024  2:30 PM MARCELA Richardson [facility] None   12/18/2024  2:00 PM Raman Hooper M.D. [facility] None     Kelly Navarro D.O.  [address] [facility] [address]  [phone]    Schedule an appointment as soon as possible for a visit  As needed, If symptoms worsen      MEDICATIONS ON DISCHARGE     Medication List        START taking these medications        Instructions   hydrALAZINE 10 MG Tabs  Commonly known as: Apresoline   Take 0.5 Tablets by mouth 3 times a day as needed (For systolic blood pressure greater than 180).  Dose: 5 mg     levetiracetam 1000 MG tablet  Commonly known as: Keppra   Take 1 Tablet by mouth 2 times a  day.  Dose: 1,000 mg            CHANGE how you take these medications        Instructions   lamoTRIgine 25 MG Tabs  Start taking on: December 1, 2024  What changed:   See the new instructions.  Another medication with the same name was removed. Continue taking this medication, and follow the directions you see here.  Commonly known as: LaMICtal   Take 1 Tablet by mouth 2 times a day for 3 days, THEN 2 Tablets 2 times a day for 7 days, THEN 4 Tablets 2 times a day for 30 days.            CONTINUE taking these medications        Instructions   albuterol 108 (90 Base) MCG/ACT Aers inhalation aerosol   Inhale 1-2 Puffs every four hours as needed for Shortness of Breath.  Dose: 1-2 Puff     apixaban 5mg Tabs  Commonly known as: Eliquis   Take 1 Tablet by mouth 2 times a day.  Dose: 5 mg     docusate sodium 100 MG Caps  Commonly known as: Colace   Take 100 mg by mouth 2 times a day.  Dose: 100 mg     ezetimibe 10 MG Tabs  Commonly known as: Zetia   Take 1 Tablet by mouth every day.  Dose: 10 mg     finasteride 5 MG Tabs  Commonly known as: Proscar   Take 5 mg by mouth every day.  Dose: 5 mg     HYDROcodone/acetaminophen  MG Tabs  Commonly known as: Norco   Take 1 Tablet by mouth see administration instructions. Take 1 tablet SCHEDULED at 05:00, 10:00, 15:00, and 20:00. Max take up to 2 additional tablets throughout the day as needed for pain, up to a maximum of 6 tablets within 24 hours.  Dose: 1 Tablet     LORazepam 1 MG Tabs  Commonly known as: Ativan   Take 1/2-1 tablet PO PRN breakthrough seizures.  Do not exceed more than 2 tablets in 24 hours unless directed otherwise by physician.     metoprolol SR 25 MG Tb24  Commonly known as: Toprol XL   Take 1 Tablet by mouth every evening.  Dose: 25 mg     omeprazole 40 MG delayed-release capsule  Commonly known as: PriLOSEC   Take 40 mg by mouth every evening.  Dose: 40 mg     rosuvastatin 40 MG tablet  Commonly known as: Crestor   Take 40 mg by mouth every  "evening.  Dose: 40 mg     tamsulosin 0.4 MG capsule  Commonly known as: Flomax   Take 0.4 mg by mouth every day.  Dose: 0.4 mg     telmisartan 40 MG Tabs  Commonly known as: Micardis   Take 1 Tablet by mouth every morning.  Dose: 40 mg     tizanidine 4 MG Tabs  Commonly known as: Zanaflex   Take 4 mg by mouth 4 times a day. Take 1 tablet (4 mg) at 05:00, 10:00, 15:00, and 20:00.  Dose: 4 mg     traZODone 100 MG Tabs  Commonly known as: Desyrel   Take 150 mg by mouth every evening. 1.5 tablets = 150 mg.  Dose: 150 mg     * venlafaxine 150 MG extended-release capsule  Commonly known as: Effexor-XR   Take 1 Capsule by mouth every evening. Take with 1 capsule of venlafaxine XR 75 mg, for a total dose of 225 mg.  Dose: 1 Capsule     * venlafaxine XR 75 MG Cp24  Commonly known as: Effexor XR   Take 1 Capsule by mouth every evening. Take with 1 capsule of venlafaxine  mg, for a total dose of 225 mg.  Dose: 1 Capsule           * This list has 2 medication(s) that are the same as other medications prescribed for you. Read the directions carefully, and ask your doctor or other care provider to review them with you.                STOP taking these medications      amiodarone 100 MG tablet  Commonly known as: Cordarone              Allergies  Allergies   Allergen Reactions    Oxycodone Anaphylaxis and Swelling     Throat swelling    Pectin Anaphylaxis and Swelling     Throat swelling, cannot breathe    Hctz [Hydrochlorothiazide W-Spironolactone]      hypotension    Zolpidem Unspecified     \"Sleep walking\", excessive eating, drives/cooks during the night       DIET  Orders Placed This Encounter   Procedures    Diet Order Diet: Cardiac     Standing Status:   Standing     Number of Occurrences:   1     Order Specific Question:   Diet:     Answer:   Cardiac [6]       ACTIVITY  No driving    CONSULTATIONS  Neurology, cardiology    PROCEDURES  CT-CTA CHEST WITH & W/O-POST PROCESS   Final Result      1.  Unremarkable aorta   2. "  RIGHT greater than LEFT lower lobe atelectasis with persistent prominent elevation of the RIGHT diaphragm            DX-CHEST-PORTABLE (1 VIEW)   Final Result   Impression:      1. Mild right base atelectasis. Chronically elevated right hemidiaphragm. No effusion or dense consolidation.      2. Lower thoracic spine dorsal column stimulator wires.                     CT-RENAL COLIC EVALUATION(A/P W/O)   Final Result      1.  Bilateral nonobstructing nephrolithiasis. No ureteral stones or hydronephrosis.   2.  Colonic diverticulosis.   3.  Partial atelectasis of the right lower lobe.      EC-ECHOCARDIOGRAM COMPLETE W/O CONT   Final Result            LABORATORY  Lab Results   Component Value Date    SODIUM 135 12/01/2024    POTASSIUM 4.6 12/01/2024    CHLORIDE 104 12/01/2024    CO2 22 12/01/2024    GLUCOSE 136 (H) 12/01/2024    BUN 15 12/01/2024    CREATININE 1.19 12/01/2024        Lab Results   Component Value Date    WBC 8.2 11/30/2024    HEMOGLOBIN 13.1 (L) 11/30/2024    HEMATOCRIT 41.6 (L) 11/30/2024    PLATELETCT 234 11/30/2024        Total time of the discharge process exceeds 40 minutes.

## 2024-12-01 NOTE — CARE PLAN
The patient is Stable - Low risk of patient condition declining or worsening    Shift Goals  Clinical Goals: Monitor BP  Patient Goals: Sleep  Family Goals: FELIPA    Progress made toward(s) clinical / shift goals:      Problem: Knowledge Deficit - Standard  Goal: Patient and family/care givers will demonstrate understanding of plan of care, disease process/condition, diagnostic tests and medications  Outcome: Progressing  Note: Patient updated on plan of care for the evening. All questions answered. Call light within reach, hourly rounding in place. Whiteboard updated.     Problem: Pain - Standard  Goal: Alleviation of pain or a reduction in pain to the patient’s comfort goal  Outcome: Progressing  Note: Pain assessed on a 0-10 scale during hourly rounding. Patient denies pain overnight. Medication and non-pharm interventions available if needed.     Problem: Neuro Status  Goal: Neuro status will remain stable or improve  Outcome: Progressing  Note: Q4 hour neuro checks in place. Patient remains AO x 4 overnight. No new changes to strength or sensation during shift.        Patient is not progressing towards the following goals:

## 2024-12-02 NOTE — PROGRESS NOTES
INPATIENT NEPHROLOGY PROGRESS  [facility] NEPHROLOGY    Isabella Boone  03/04/2023    Reason for consultation:    esrd    Chief Complaint:   Chief Complaint   Patient presents with    Irregular Heart Beat     Pt went into SVT while doing her dialysis treatment.  Pt tx to ed via ems.  Ems advised pt converted from SVT during transport with no interventions.            History of Present Illness:    Per H and P     Isablela Boone is a 75 y.o. female who presents to the ED via EMS with an irregular heartbeat when she was at dialysis before arriving to the ED. History obtained from an independent historian: EMS states that the patient was in the process of completing dialysis when her heart rate went to 160 bpm. The patient denies chest pain, SOB, or any other symptoms at this time. She reports making very little urine as her baseline. The patient has a PMHx of CHF, DM, and HTN. Patient lives at [facility] in [place], [place].      On workup, CXR significant for pulmonary edema. EKG was NSR. No hyperkalemia present and CBC in normal range. Nephrology was consulted with plans to do HD. The patient will be monitored on telemetry    3/2  HD stopped earlier due to AFIB rvr.   No nausea, chest pain, sob, fever, urinary or bowel complaint, new neurologic symptoms, new joint pain  3/3  hd stopped again due to AFIB rvr.  No chest pain or sob at this time  3/4  Lab results reviewed, pressures stable.  No complaints.  HD later today.      Plan of Care:       Assessment:      esrd  --reattempt hd 3/4  --fluid restrict  --renal dose medication per routine  --continue outpt medication  --continue binders with meals    Anemia  --erythropoiesis stimulating agent with renal replacement therapy if hg goes below 10    Hyperphosphatemia--normal today  --renal diet  --continue binders if phos goes above 5.5    Hypertension  --uf with hd  --fluid restrict  --low salt diet  --continue home medication    Edema  --fluid restrict  --uf as tolerated  Monitor summary: SB/SR 51-83, FL 0.17, QRS 0.08, QT 0.44, with rare PVCs per strip from monitor room.      at hd    Afib  --cardiology consulted.  Metoprolol adjustments per cards  --on apixaban             Thank you for allowing us to participate in this patient's care. We will continue to follow.    Vital Signs:  Temp Readings from Last 3 Encounters:   03/04/23 97.2 °F (36.2 °C) (Tympanic)   09/27/17 97 °F (36.1 °C)       Pulse Readings from Last 3 Encounters:   03/03/23 85   09/27/17 70       BP Readings from Last 3 Encounters:   03/03/23 (!) 126/58   09/27/17 (!) 116/59       Weight:  Wt Readings from Last 3 Encounters:   03/03/23 119.7 kg (264 lb)   09/22/17 123.4 kg (272 lb)       Past Medical & Surgical History:  Past Medical History:   Diagnosis Date    Arthritis     CHF (congestive heart failure)     Diabetes mellitus     Encounter for blood transfusion     Hypertension        Past Surgical History:   Procedure Laterality Date    EYE SURGERY      cataracts    HYSTERECTOMY      patial        Past Social History:  Social History     Socioeconomic History    Marital status: Single   Tobacco Use    Smoking status: Never    Smokeless tobacco: Never   Substance and Sexual Activity    Alcohol use: No    Drug use: No    Sexual activity: Never       Medications:  No current facility-administered medications on file prior to encounter.     Current Outpatient Medications on File Prior to Encounter   Medication Sig Dispense Refill    ascorbic acid, vitamin C, (VITAMIN C) 500 MG tablet Take 500 mg by mouth once daily. Give 2 tablets daily      calcium carbonate (TUMS) 200 mg calcium (500 mg) chewable tablet Take 1 tablet by mouth once daily. Take 2 tablets with meals and 3 tablets at night      gabapentin (NEURONTIN) 100 MG capsule Take 100 mg by mouth every evening.      loratadine (CLARITIN) 10 mg tablet Take 10 mg by mouth once daily.      megestroL (MEGACE) 400 mg/10 mL (40 mg/mL) Susp Take 400 mg by mouth 2 (two) times daily.      midodrine (PROAMATINE) 5 MG Tab Take 5 mg by mouth every 12 (twelve) hours.      VIT  "BCOMP,C/FOLIC ACID/ZINC (B COMPLEX-C-FOLIC ACID-ZN ORAL) Take by mouth.      hydrocodone-acetaminophen 5-325mg (NORCO) 5-325 mg per tablet Take 1 tablet by mouth every 6 (six) hours as needed. 15 tablet 0    ipratropium (ATROVENT HFA) 17 mcg/actuation inhaler Inhale 1 puff into the lungs every 6 (six) hours as needed for Wheezing. Rescue      oxyCODONE (OXY-IR) 5 mg Cap Take 5 mg by mouth every 4 (four) hours as needed for Pain. Give 0.5 tablet by mouth every 4hrs as needed for pain       Scheduled Meds:   apixaban  5 mg Oral BID    metoprolol succinate  25 mg Oral Daily    mupirocin   Nasal BID    senna-docusate 8.6-50 mg  1 tablet Oral BID     Continuous Infusions:  PRN Meds:.acetaminophen, acetaminophen, dextrose 10%, dextrose 10%, dextrose, dextrose, glucagon (human recombinant), HYDROcodone-acetaminophen, HYDROcodone-acetaminophen, melatonin, naloxone, ondansetron, sodium chloride 0.9%    Allergies:  Ace inhibitors, Betadine [povidone-iodine], Dye, and Gentamicin    Past Family History:  Reviewed; refer to Hospitalist Admission Note    Review of Systems:  Review of Systems - All 14 systems reviewed and negative, except as noted in HPI    Physical Exam:    BP (!) 126/58   Pulse 85   Temp 97.2 °F (36.2 °C) (Tympanic)   Resp 11   Ht 5' 5" (1.651 m)   Wt 119.7 kg (264 lb)   LMP  (LMP Unknown)   SpO2 95%   Breastfeeding No   BMI 43.93 kg/m²     General Appearance:    Alert, cooperative, no distress, appears stated age   Head:    Normocephalic, without obvious abnormality, atraumatic   Eyes:    PER, conjunctiva/corneas clear, EOM's intact in both eyes        Throat:   Lips, mucosa, and tongue normal; teeth and gums normal   Back:     Symmetric, no curvature, ROM normal, no CVA tenderness   Lungs:     Clear to auscultation bilaterally, respirations unlabored   Chest wall:    No tenderness or deformity   Heart:    Regular rate and rhythm, S1 and S2 normal, no murmur, rub   or gallop   Abdomen:     Soft, " non-tender, bowel sounds active all four quadrants,     no masses, no organomegaly   Extremities:   Extremities normal, atraumatic, no cyanosis.  2 plus edema   Pulses:   2+ and symmetric all extremities   MSK:   No joint or muscle swelling, tenderness or deformity   Skin:   Skin color, texture, turgor normal, no rashes or lesions   Neurologic:   CNII-XII intact, normal strength and sensation       Throughout.  No flap     Results:  Lab Results   Component Value Date     03/04/2023    K 5.0 03/04/2023     03/04/2023    CO2 24 03/04/2023    BUN 37 (H) 03/04/2023    CREATININE 7.0 (H) 03/04/2023    CALCIUM 8.0 (L) 03/04/2023    ANIONGAP 14 03/04/2023    ESTGFRAFRICA 5 (A) 09/27/2017    EGFRNONAA 4 (A) 09/27/2017       Lab Results   Component Value Date    CALCIUM 8.0 (L) 03/04/2023    PHOS 5.0 (H) 03/04/2023       Recent Labs   Lab 03/04/23  0449   WBC 6.50   RBC 3.66*   HGB 11.3*   HCT 35.4*      MCV 97   MCH 30.9   MCHC 31.9*          Imaging Results              X-Ray Chest AP Portable (Final result)  Result time 03/01/23 16:43:22      Final result by Candy Diaz MD (03/01/23 16:43:22)                   Impression:      Central pulmonary vascular congestion and probable mild pulmonary edema.      Electronically signed by: Candy Diaz  Date:    03/01/2023  Time:    16:43               Narrative:    EXAMINATION:  XR CHEST AP PORTABLE    CLINICAL HISTORY:  Tachycardia, unspecified    TECHNIQUE:  Single view of the chest was obtained.    COMPARISON:  None    FINDINGS:  Low lung volumes accentuating the cardiomediastinal contour.  Central pulmonary vascular congestion.  Atherosclerotic calcification of the aortic arch.  Perihilar and basilar predominant central opacities.  No large pleural effusion or pneumothorax.                                    Patient care was time spent personally by me on the following activities:   Obtaining a history  Examination of patient.  Providing medical care at  the patients bedside.  Developing a treatment plan with patient or surrogate and bedside caregivers  Ordering and reviewing laboratory studies, radiographic studies, pulse oximetry.  Ordering and performing treatments and interventions.  Evaluation of patient's response to treatment.  Discussions with consultants while on the unit and immediately available to the patient.  Re-evaluation of the patient's condition.  Documentation in the medical record.     Piedad Campos NP    Nephrology  [facility]  [phone]

## 2024-12-03 ENCOUNTER — TELEPHONE (OUTPATIENT)
Dept: HEALTH INFORMATION MANAGEMENT | Facility: OTHER | Age: 64
End: 2024-12-03
Payer: OTHER GOVERNMENT

## 2024-12-03 NOTE — DOCUMENTATION QUERY
"                                                                         [facility]                                                                       Query Response Note      PATIENT:               TOMMY WILLINGHAM  ACCT #:                  [id number]  MRN:                     [id number]  :                      1960  ADMIT DATE:       2024 10:57 PM  DISCH DATE:        2024 5:10 PM  RESPONDING  PROVIDER #:        [id number]           QUERY TEXT:    \"Rising creatinine\" is documented in the Medical Record.    Please clarify the clinical/diagnostic relevance for the documented findings.    The patient's clinical indicators include:    PN: \"Creatinine is increased.  He says he has been straining to urinate. ... Urinary retention due to benign prostatic hyperplasia ... Patient says symptoms of urinary straining and creatinine is increased. Check bladder scan postvoid to see if he is retaining.  Also hold telmisartan given the rising creatinine.  Recheck BMP tomorrow\"      PN: \"Creatinine improved.\"    Creatinine: 0.79 () > 1.51 () > 1.09 ()    Risk Factors: 64 y.o. M with BPH, urinary straining, HTN on telmisartan, diarrhea of presumed infectious origin, acute cystitis.  Treatment: Serial labs, hold telmisartan.    Thank you,  Paris Ashraf RN, CCS  Clinical    Connect via [employer] or [email or web address]  Options provided:   -- Acute kidney injury (AQUILES)   -- Findings of no clinical significance   -- Other explanation, (Please specify the other explanation)   -- Unable to determine      Query created by: Paris Ashraf on 2024 1:42 PM    RESPONSE TEXT:    Acute kidney injury (AQUILES)          Electronically signed by:  RAYSHAWN CAO MD 2024 6:13 PM              "

## 2024-12-11 LAB
CHOLEST SERPL-MCNC: 130 MG/DL
HDLC SERPL-MCNC: 54 MG/DL
LDLC SERPL CALC-MCNC: 53 MG/DL
TRIGL SERPL-MCNC: 128 MG/DL

## 2024-12-12 ENCOUNTER — OFFICE VISIT (OUTPATIENT)
Dept: CARDIOLOGY | Facility: PHYSICIAN GROUP | Age: 64
End: 2024-12-12
Payer: OTHER GOVERNMENT

## 2024-12-12 VITALS
SYSTOLIC BLOOD PRESSURE: 116 MMHG | BODY MASS INDEX: 36.57 KG/M2 | RESPIRATION RATE: 12 BRPM | DIASTOLIC BLOOD PRESSURE: 60 MMHG | HEIGHT: 67 IN | HEART RATE: 52 BPM | OXYGEN SATURATION: 94 % | WEIGHT: 233 LBS

## 2024-12-12 DIAGNOSIS — I69.911 MEMORY DEFICIT AFTER CEREBROVASCULAR DISEASE: ICD-10-CM

## 2024-12-12 DIAGNOSIS — E78.2 MIXED HYPERLIPIDEMIA: ICD-10-CM

## 2024-12-12 DIAGNOSIS — I77.810 AORTIC ROOT DILATATION (HCC): ICD-10-CM

## 2024-12-12 DIAGNOSIS — I69.398 SEIZURE DISORDER AS SEQUELA OF CEREBROVASCULAR ACCIDENT (HCC): ICD-10-CM

## 2024-12-12 DIAGNOSIS — Z95.5 STATUS POST INSERTION OF DRUG-ELUTING STENT INTO RIGHT CORONARY ARTERY FOR CORONARY ARTERY DISEASE: ICD-10-CM

## 2024-12-12 DIAGNOSIS — G40.909 SEIZURE DISORDER AS SEQUELA OF CEREBROVASCULAR ACCIDENT (HCC): ICD-10-CM

## 2024-12-12 DIAGNOSIS — Z79.01 CURRENT USE OF LONG TERM ANTICOAGULATION: ICD-10-CM

## 2024-12-12 DIAGNOSIS — G25.0 ESSENTIAL TREMOR: ICD-10-CM

## 2024-12-12 DIAGNOSIS — I48.0 PAROXYSMAL ATRIAL FIBRILLATION (HCC): ICD-10-CM

## 2024-12-12 DIAGNOSIS — I10 PRIMARY HYPERTENSION: ICD-10-CM

## 2024-12-12 DIAGNOSIS — S06.5XAS TRAUMATIC SUBDURAL HEMATOMA, SEQUELA (HCC): ICD-10-CM

## 2024-12-12 DIAGNOSIS — I25.10 CORONARY ARTERY DISEASE INVOLVING NATIVE CORONARY ARTERY OF NATIVE HEART WITHOUT ANGINA PECTORIS: ICD-10-CM

## 2024-12-12 DIAGNOSIS — E11.9 TYPE 2 DIABETES MELLITUS WITHOUT COMPLICATION, WITHOUT LONG-TERM CURRENT USE OF INSULIN (HCC): ICD-10-CM

## 2024-12-12 PROCEDURE — 99214 OFFICE O/P EST MOD 30 MIN: CPT | Performed by: NURSE PRACTITIONER

## 2024-12-12 PROCEDURE — 3074F SYST BP LT 130 MM HG: CPT | Performed by: NURSE PRACTITIONER

## 2024-12-12 PROCEDURE — 3078F DIAST BP <80 MM HG: CPT | Performed by: NURSE PRACTITIONER

## 2024-12-12 ASSESSMENT — FIBROSIS 4 INDEX: FIB4 SCORE: 7.22

## 2024-12-13 ENCOUNTER — TELEPHONE (OUTPATIENT)
Dept: CARDIOLOGY | Facility: MEDICAL CENTER | Age: 64
End: 2024-12-13
Payer: OTHER GOVERNMENT

## 2024-12-13 PROBLEM — R19.7 DIARRHEA: Status: RESOLVED | Noted: 2024-11-29 | Resolved: 2024-12-13

## 2024-12-13 PROBLEM — N30.01 ACUTE CYSTITIS WITH HEMATURIA: Status: RESOLVED | Noted: 2024-11-29 | Resolved: 2024-12-13

## 2024-12-13 PROBLEM — I48.91 ATRIAL FIBRILLATION WITH RAPID VENTRICULAR RESPONSE (HCC): Status: RESOLVED | Noted: 2020-08-07 | Resolved: 2024-12-13

## 2024-12-13 PROBLEM — E87.6 HYPOKALEMIA: Status: RESOLVED | Noted: 2020-08-10 | Resolved: 2024-12-13

## 2024-12-13 ASSESSMENT — ENCOUNTER SYMPTOMS
FEVER: 0
DIZZINESS: 1
BRUISES/BLEEDS EASILY: 0
NAUSEA: 0
MEMORY LOSS: 1
PALPITATIONS: 0
MYALGIAS: 0
COUGH: 1
ORTHOPNEA: 0
LOSS OF CONSCIOUSNESS: 0
PND: 0
ABDOMINAL PAIN: 0
SHORTNESS OF BREATH: 0
HEADACHES: 0
WEAKNESS: 1
INSOMNIA: 0
CHILLS: 0

## 2024-12-13 NOTE — TELEPHONE ENCOUNTER
Phone Number Called: [phone]     Call outcome: Did not leave a detailed message. Requested patient to call back.    Message: Called to inform patient of AB recommendations below. Unable to reach. Left VM for patient to call back when able.     ----- Message from Nurse Practitioner MARCELA Richardson sent at 12/13/2024 11:48 AM PST -----  Regarding: Viagra use?  Elsa,    This is a patient I saw in [place] yesterday; he inquired about Viagra use.    I was able to go through his last two hospitalizations and see where Dr. Hodgson mentioned this.    This is my concern, and I expressed this to him and his wife yesterday:  He has very labile BP (goes very high and can go very low); he also has weakness and tremors (neurological - known) and has a history of syncope.    Viagra can affect all of these conditions, so I'm leary of his using it, as it may make things worse.    I would prefer that we get things stabilized first (mainly BP) before Rx'ing this to him.    Can please call him and let him know?    Thank you!  AB

## 2024-12-13 NOTE — PROGRESS NOTES
Chief Complaint   Patient presents with    Hospital Follow-up    HTN (Controlled)    Coronary Artery Disease    Hyperlipidemia    Diabetes (Controlled)    Seizure       Subjective     Karan Wiley is a 64 y.o. male who presents today to discuss results of ZioPatch and medication titration response.    Karan is a 64 year old male with history of hypertension, dyslipidemia and CAD, with STEMI in August 2020 with PCI of RCA and PCI of posterior lateral branch.  He did have post-procedural complications, including acute hypoxic respiratory failure with flash pulmonary edema, and PEA cardiac arrest.  He does also PAFib, and is anticoagulated with Eliquis, last seen by me in October 2024. We applied ZioPatch at that visit, and reviewed all his medications/doses.    To review:    In late August 2022, he suffered a mechanical ground level fall, and had a subdural hemorrhage with a shift and mass effect. ASA and Eliquis were held; he had left craniotomy by Dr. Luis. He did have some leukocystosis and bronchial levage came back with MSSA, and he was treated with antibiotics. Eliquis was eventually restarted.     In late July 2024, he had right knee replacement by Dr. Barragan.  Two days later, on 7/25/2024, he presented to [facility] with altered mental status. He had AQUILES and red/swollen knee; he was transferred back to [facility] for ongoing confusion. He was treated for pneumonia and AFIb with RVR, treated with Amiodarone. On 8/7/2024, [facility].    In late August 2024, BP was running low and medications were adjusted. There was also concern for recurrence of AFib, and discussion of possible DCCV, but EKGs showed sinus rhythm.    Our at last visit, ZioPatch was applied for ongoing palpitations. This showed NO AFib, and sinus rhythm with appropriate HR ranges.    On 11/11/2024, he was admitted to [facility] for EEG, and started on Lamictal.  On 11/24/2024, he was readmitted to [facility] (transferred from  "[facility]) for breakthrough seizures, and started on Keppra 1000mg BID (in addition to Lamictal). Echocardiogram also showed dilated aortic root at 4.5cm. Hydrdalazine 0.5mg TID was added for better BP control.     He is here today for follow-up. BP is better, not running 110-160 systolic at home; he is currently taking BP 3 times a day. He still has a lot of fatigue and some mild memory issues. He denies any chest pain, pressure, tightness or discomfort; still occasional short episodes symptomatic palpitations, but he also has a tremor (known by neurology); no LE edema. No recent syncope, but a lot of unsteadiness on his feet.     He is inquiring about the use of Viagra.    Past Medical History:   Diagnosis Date    Anesthesia     \"Difficulty urinated after anesthesia\"    Breath shortness     Albuterol inhalers as needed    CAD (coronary artery disease) 08/2020    STEMI. PCI/NAOMY ([place] 3.5 x 25mm) the mid RCA, and PCI/NAOMY ([place] 2.5 x 12mm) the PDA.    Chronic anticoagulation     Takes eliquis BID    Dental disorder     Dentures - upper dentures    Depression     History of heart artery stent     Hyperlipidemia     Hypertension     Low back pain     Myocardial infarct (HCC)     Caryn Mckinney - Cardiologist    Paroxysmal atrial fibrillation ([facility]) 10/2023    Echocardiogram with normal LV size, LVEF 55-60%. Normal RA, LA and RV. No valvular problems.    S/P drug eluting coronary stent placement     Subdural hematoma (HCC)     on Depakote    Type 2 diabetes mellitus (HCC)      Past Surgical History:   Procedure Laterality Date    ARTHROPLASTY, KNEE, ROBOT-ASSISTED Right 7/23/2024    Procedure: ROBOTIC RIGHT TOTAL KNEE ARTHROPLASTY;  Surgeon: Noe Barragan M.D.;  Location: SURGERY [facility];  Service: Ortho Robotic    CRANIOTOMY Left 8/25/2022    Procedure: CRANIOTOMY FOR SUBDURAL HEMATOMA;  Surgeon: Tesfaye Luther M.D.;  Location: SURGERY [facility];  Service: Neurosurgery    SPINAL CORD STIMULATOR  2/26/2019    Procedure: " SPINAL CORD STIMULATOR-  STAGE 1:  RIGHT FLANK BATTERY REPLACEMENT/UPGRADE;  Surgeon: Stepan Hawkins M.D.;  Location: SURGERY [facility];  Service: Neurosurgery    FUSION, SPINE, LUMBAR, PLIF  2019    Procedure: LUMBAR FUSION POSTERIOR-  STAGE 2: ONLAY L5-S1 BONE;  Surgeon: Stepan Hawkins M.D.;  Location: SURGERY [facility];  Service: Neurosurgery    LAMINOTOMY  2019    Procedure: LAMINOTOMY-  STAGE 2:  REDO LEFT L4-S1;  Surgeon: Stepan Hawkins M.D.;  Location: SURGERY [facility];  Service: Neurosurgery     Family History   Problem Relation Age of Onset    Cancer Mother         breast     Social History     Socioeconomic History    Marital status:      Spouse name: Not on file    Number of children: Not on file    Years of education: Not on file    Highest education level: Not on file   Occupational History    Not on file   Tobacco Use    Smoking status: Former     Current packs/day: 0.00     Average packs/day: 0.3 packs/day for 3.0 years (0.8 ttl pk-yrs)     Types: Cigarettes     Start date:      Quit date:      Years since quittin.9     Passive exposure: Past    Smokeless tobacco: Never   Vaping Use    Vaping status: Never Used   Substance and Sexual Activity    Alcohol use: No    Drug use: Never    Sexual activity: Not Currently     Partners: Female   Other Topics Concern    Not on file   Social History Narrative    Not on file     Social Drivers of Health     Financial Resource Strain: Not on file   Food Insecurity: No Food Insecurity (2024)    Hunger Vital Sign     Worried About Running Out of Food in the Last Year: Never true     Ran Out of Food in the Last Year: Never true   Transportation Needs: No Transportation Needs (2024)    PRAPARE - Transportation     Lack of Transportation (Medical): No     Lack of Transportation (Non-Medical): No   Physical Activity: Not on file   Stress: Not on file   Social Connections: Not on file   Intimate Partner  "Violence: Not At Risk (11/25/2024)    Humiliation, Afraid, Rape, and Kick questionnaire     Fear of Current or Ex-Partner: No     Emotionally Abused: No     Physically Abused: No     Sexually Abused: No   Housing Stability: Low Risk  (11/26/2024)    Housing Stability Vital Sign     Unable to Pay for Housing in the Last Year: No     Number of Times Moved in the Last Year: 0     Homeless in the Last Year: No     Allergies   Allergen Reactions    Oxycodone Anaphylaxis and Swelling     Throat swelling    Pectin Anaphylaxis and Swelling     Throat swelling, cannot breathe    Hctz [Hydrochlorothiazide W-Spironolactone]      hypotension    Zolpidem Unspecified     \"Sleep walking\", excessive eating, drives/cooks during the night     Outpatient Encounter Medications as of 12/12/2024   Medication Sig Dispense Refill    Pseudoephedrine-guaiFENesin (MUCINEX D PO) Take  by mouth.      Dextromethorphan-guaiFENesin (ROBITUSSIN DM PO) Take  by mouth.      lamoTRIgine (LAMICTAL) 25 MG Tab Take 1 Tablet by mouth 2 times a day for 3 days, THEN 2 Tablets 2 times a day for 7 days, THEN 4 Tablets 2 times a day for 30 days. 30 Tablet 0    hydrALAZINE (APRESOLINE) 10 MG Tab Take 0.5 Tablets by mouth 3 times a day as needed (For systolic blood pressure greater than 180). 20 Tablet 0    levetiracetam (KEPPRA) 1000 MG tablet Take 1 Tablet by mouth 2 times a day. 60 Tablet 1    LORazepam (ATIVAN) 1 MG Tab Take 1/2-1 tablet PO PRN breakthrough seizures.  Do not exceed more than 2 tablets in 24 hours unless directed otherwise by physician. 10 Tablet 0    omeprazole (PRILOSEC) 40 MG delayed-release capsule Take 40 mg by mouth every evening.      HYDROcodone/acetaminophen (NORCO)  MG Tab Take 1 Tablet by mouth see administration instructions. Take 1 tablet SCHEDULED at 05:00, 10:00, 15:00, and 20:00. Max take up to 2 additional tablets throughout the day as needed for pain, up to a maximum of 6 tablets within 24 hours.      metoprolol SR " (TOPROL XL) 25 MG TABLET SR 24 HR Take 1 Tablet by mouth every evening. 100 Tablet 3    apixaban (ELIQUIS) 5mg Tab Take 1 Tablet by mouth 2 times a day. 200 Tablet 3    telmisartan (MICARDIS) 40 MG Tab Take 1 Tablet by mouth every morning. 100 Tablet 3    ezetimibe (ZETIA) 10 MG Tab Take 1 Tablet by mouth every day. 100 Tablet 3    docusate sodium (COLACE) 100 MG Cap Take 100 mg by mouth 2 times a day.      rosuvastatin (CRESTOR) 40 MG tablet Take 40 mg by mouth every evening.      albuterol 108 (90 Base) MCG/ACT Aero Soln inhalation aerosol Inhale 1-2 Puffs every four hours as needed for Shortness of Breath.      traZODone (DESYREL) 100 MG Tab Take 150 mg by mouth every evening. 1.5 tablets = 150 mg.      venlafaxine XR (EFFEXOR XR) 75 MG CAPSULE SR 24 HR Take 1 Capsule by mouth every evening. Take with 1 capsule of venlafaxine  mg, for a total dose of 225 mg.      venlafaxine (EFFEXOR-XR) 150 MG extended-release capsule Take 1 Capsule by mouth every evening. Take with 1 capsule of venlafaxine XR 75 mg, for a total dose of 225 mg.      tizanidine (ZANAFLEX) 4 MG Tab Take 4 mg by mouth 4 times a day. Take 1 tablet (4 mg) at 05:00, 10:00, 15:00, and 20:00.      finasteride (PROSCAR) 5 MG Tab Take 5 mg by mouth every day.      tamsulosin (FLOMAX) 0.4 MG capsule Take 0.4 mg by mouth every day.       No facility-administered encounter medications on file as of 12/12/2024.     Review of Systems   Constitutional:  Positive for malaise/fatigue. Negative for chills and fever.   HENT:  Negative for congestion.    Respiratory:  Positive for cough. Negative for shortness of breath.    Cardiovascular:  Negative for chest pain, palpitations, orthopnea, leg swelling and PND.   Gastrointestinal:  Negative for abdominal pain and nausea.   Musculoskeletal:  Negative for myalgias.   Skin:  Negative for rash.   Neurological:  Positive for dizziness and weakness. Negative for loss of consciousness and headaches.        Balances  "issues seems to be better.   Endo/Heme/Allergies:  Does not bruise/bleed easily.   Psychiatric/Behavioral:  Positive for memory loss. The patient does not have insomnia.               Objective     /60 (BP Location: Left arm, Patient Position: Sitting, BP Cuff Size: Adult)   Pulse (!) 52   Resp 12   Ht 1.702 m (5' 7\")   Wt 106 kg (233 lb)   SpO2 94%   BMI 36.49 kg/m²     Physical Exam  Constitutional:       Appearance: He is well-developed.      Comments: Ambulates with a walker.   HENT:      Head: Normocephalic.   Neck:      Vascular: No JVD.   Cardiovascular:      Rate and Rhythm: Normal rate and regular rhythm.      Heart sounds: Normal heart sounds.   Pulmonary:      Effort: Pulmonary effort is normal. No respiratory distress.      Breath sounds: Normal breath sounds. No wheezing or rales.   Abdominal:      General: Bowel sounds are normal. There is no distension.      Palpations: Abdomen is soft.      Tenderness: There is no abdominal tenderness.   Musculoskeletal:         General: Normal range of motion.      Cervical back: Normal range of motion and neck supple.   Skin:     General: Skin is warm and dry.      Coloration: Skin is not pale.      Findings: No rash.   Neurological:      Mental Status: He is alert and oriented to person, place, and time.       CONCLUSIONS OF TTE OF 11/26/2024:  Normal left ventricular systolic function.  Normal right ventricular size and systolic function.  Aortic valve sclerosis without significant stenosis.  The aortic root is dilated with a diameter of 4.5 cm at the sinuses, 3.4 cm at the ascending aorta.    RESULTS OF ZIOPATCH OF 10/30/2024-11/13/2024:  Minimum HR 38bpm  Average HR 55bpm  Maximum   No VT  No pauses/AV block  No AFib/flutter  PACs/PVCs <1%    CONCLUSIONS OF TTE OF 7/28/2024:  Technically difficult study as patient is not able to follow commands.   Patient is in atrial fibrillation rates 110s-130s which limits accurate LVEF and wall motion " interpretation.   Normal left and right ventricular systolic function.  Visually   estimated LVEF of 60-65%.   Valves are not well seen, but no significant valvular stenosis or regurgitation.  Compared to the prior study on 10/23/2023.      Impressions of ZioPatch of 11/3/2022-11/17/2022:  Predominantly sinus rhythm.   Symptoms associated with sinus rhythm and ectopy.   Asymptomatic, short SVT and NSVT.      IMPRESSION OF CT SCAN OF HEAD OF 8/31/2022:  1.  Slightly decreased size of LEFT supratentorial subdural hematoma overlying LEFT frontal, parietal and temporal lobes  2.  Trace extra-axial blood overlying the RIGHT frontal lobe, unchanged  3.  Unchanged 3 mm of LEFT-to-RIGHT midline shift      MPI OF 11/17/2020:  Normal myocardial perfusion study  No ischemia or infarct, normal wall motion     IMPRESSIONS OF CORONARY ANGIOGRAM OF 8/1/2020:  1.  Acute non-STEMI due to culprit right coronary artery  2.  Successful PCI of the mid right coronary artery using 1 drug-eluting stent and IVUS guidance  3.  Successful PCI of the posterior lateral branch using 1 drug-eluting stent  4.  Normal LV systolic function  5.  Moderately elevated LVEDP (21 mmHg)  6.  Poorly controlled hypertension     Lab Results   Component Value Date/Time    CHOLSTRLTOT 130 12/11/2024 12:00 AM    LDL 53 12/11/2024 12:00 AM    HDL 54 12/11/2024 12:00 AM    TRIGLYCERIDE 128 12/11/2024 12:00 AM        Lab Results   Component Value Date/Time    SODIUM 135 12/01/2024 12:44 AM    POTASSIUM 4.6 12/01/2024 12:44 AM    CHLORIDE 104 12/01/2024 12:44 AM    CO2 22 12/01/2024 12:44 AM    GLUCOSE 136 (H) 12/01/2024 12:44 AM    BUN 15 12/01/2024 12:44 AM    CREATININE 1.19 12/01/2024 12:44 AM      Lab Results   Component Value Date/Time    ASTSGOT 118 (H) 11/24/2024 11:26 PM    ALTSGPT 20 11/24/2024 11:26 PM       Lab Results   Component Value Date/Time    WBC 8.2 11/30/2024 01:26 AM    RBC 4.40 (L) 11/30/2024 01:26 AM    HEMOGLOBIN 13.1 (L) 11/30/2024 01:26 AM     HEMATOCRIT 41.6 (L) 11/30/2024 01:26 AM    MCV 94.5 11/30/2024 01:26 AM    MCH 29.8 11/30/2024 01:26 AM    MCHC 31.5 (L) 11/30/2024 01:26 AM    MPV 11.5 11/30/2024 01:26 AM         Assessment & Plan     1. Primary hypertension        2. Aortic root dilatation (HCC)        3. Coronary artery disease involving native coronary artery of native heart without angina pectoris        4. Status post insertion of drug-eluting stent into right coronary artery for coronary artery disease        5. Paroxysmal atrial fibrillation (HCC)        6. Current use of long term anticoagulation        7. Mixed hyperlipidemia        8. Type 2 diabetes mellitus without complication, without long-term current use of insulin (HCC)        9. Traumatic subdural hematoma, sequela (HCC)        10. Seizure disorder as sequela of cerebrovascular accident (HCC)        11. Essential tremor        12. 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 We discussed the importance of good BP control. Readings are slowly improving at home. Continue:  Toprol XL 25mg once daily  Micardis 40mg once daily  Hydralazine 5mg TID PRN BP>160/90    2. CAD, status post PCI/NAOMY to the RCA in 2020. No angina or shortness of breath. Continue:  Eliquis 5mg twice daily  Toprol XL 25mg QHS  Telmisartan 40mg in the AM  Zetia 10mg once daily  Crestor 40mg once daily    3. Paroxysmal atrial fibrillation, in sinus rhythm today. ZioPatch did not show any AFib episodes. He is now OFF of Amiodarone.    4. Chronic anticoagulation with Eliquis 5mg twice daily.    5. Hyperlipidemia, treated with Crestor 40mg and Zetia 10mg once daily.  Recent LDL was 53 (at goal).    6. Diabetes mellitus, treated with diet alone at this time.    7. History of SDH, with subsequent seizures/tremor and memory impairment, now on Lamictal and Keppra, followed by  neurology.    Continue to monitor BP at home, 3-4 times per week.  Continue Hydralazine 5mg TID BP>160/90.    Reviewed echocardiogram and ZioPatch results with patient.  Amiodarone has been stopped.    I am somewhat reluctant for him to use Viagra, due to BP swings (highs and lows), and he has had syncope in the past. We discussed using carefully and if he has to change positions, he is to do so very carefully and slowly. He understands this risk and is willing to proceed forward.    I will see him back in 3 months, to assess for any breakthrough AFib.  He is to keep follow-up with other providers too.

## 2024-12-18 ENCOUNTER — TELEMEDICINE (OUTPATIENT)
Dept: NEUROLOGY | Facility: MEDICAL CENTER | Age: 64
End: 2024-12-18
Attending: PSYCHIATRY & NEUROLOGY
Payer: OTHER GOVERNMENT

## 2024-12-18 VITALS — BODY MASS INDEX: 35.08 KG/M2 | DIASTOLIC BLOOD PRESSURE: 82 MMHG | SYSTOLIC BLOOD PRESSURE: 134 MMHG | WEIGHT: 224 LBS

## 2024-12-18 DIAGNOSIS — R56.9 SEIZURES (HCC): ICD-10-CM

## 2024-12-18 DIAGNOSIS — S06.5XAS TRAUMATIC SUBDURAL HEMATOMA, SEQUELA (HCC): ICD-10-CM

## 2024-12-18 PROCEDURE — 99215 OFFICE O/P EST HI 40 MIN: CPT | Mod: 95 | Performed by: PSYCHIATRY & NEUROLOGY

## 2024-12-18 PROCEDURE — 99999 PR NO CHARGE: CPT | Performed by: PSYCHIATRY & NEUROLOGY

## 2024-12-18 PROCEDURE — 99417 PROLNG OP E/M EACH 15 MIN: CPT | Mod: 95 | Performed by: PSYCHIATRY & NEUROLOGY

## 2024-12-18 RX ORDER — LEVETIRACETAM 1000 MG/1
1000 TABLET ORAL 2 TIMES DAILY
Qty: 180 TABLET | Refills: 1 | Status: SHIPPED | OUTPATIENT
Start: 2024-12-18

## 2024-12-18 RX ORDER — LAMOTRIGINE 150 MG/1
75 TABLET ORAL 2 TIMES DAILY
Qty: 90 TABLET | Refills: 1 | Status: SHIPPED | OUTPATIENT
Start: 2024-12-18

## 2024-12-18 ASSESSMENT — PATIENT HEALTH QUESTIONNAIRE - PHQ9: CLINICAL INTERPRETATION OF PHQ2 SCORE: 0

## 2024-12-18 ASSESSMENT — FIBROSIS 4 INDEX: FIB4 SCORE: 7.22

## 2024-12-18 NOTE — PROGRESS NOTES
[facility] Epilepsy Program  Follow Up Visit      Patient's Name: Karan Wiley  YOB: 1960  MRN: [id number]  Date of Service: 12/18/24.    Referring Provider: Raman Hooper M.D.  [address] [place] [id number]    This Remote Face to Face encounter was conducted via Teams.  I am providing medical care to this patient via audio/video visit in place of an in person visit at the request of the patient. Verbal consent to telehealth, risks, benefits, and consequences were discussed. Patient retains the right to withdraw at any time. All existing confidentiality protections apply. The patient has access to all transmitted medical information. No dissemination of any patient images or information to other entities without further written consent.     This evaluation was conducted via Teams using secure and encrypted videoconferencing technology. The patient was in a private location in the [facility], at their home.    The patient's identity was confirmed and verbal consent was obtained for this virtual visit. The provider, myself, was located at the [facility] Office, [place], [place], in private setting, to ensure confidentiality of the encounter.     Chief Concern: Seizures.     The patient presents for a follow up visit. The patient presents with his wife and provides verbal consent for her to be present and provide additional information as needed.     HPI (as obtained at the time of the initial visit and updated as necessary): The patient is a 64 y.o., right-handed male, who initially presented to my epilepsy clinic for evaluation of seizures on 09/18/2024.    The patient used to follow-up with Dr. Ya.    The patient was in his usual health, in context of his known medical history, when he fell and hit his head in August 2022.  This led to transfer to [facility] due to acute left subdural hematoma.  He was treated with bur hole and then craniotomy drainage.   "It seems that the patient had no seizures at that time, and Depakote was started prophylactically.  This occurred while he was on Eliquis for A-fib.    Then, in July 2024, he started having episodes suspicious for seizures versus syncope, among other considerations.  These are described under event type #1.      He never had myoclonus, clear convulsions, no isolated staring spells/oral/manual automatisms times, sensations of strange smell/taste/gastric uprising, no clear psychic phenomena.    He is taking Depakote regularly, and except significant weight gain and tremor, no other adverse effects.    He underwent EMU in November 2024, when his Depakote was switched to lamotrigine. He unfortunately had breakthrough seizures shortly after EMU discharge (found unresponsive, wetted, aggressive as he was slowly waking up after the event), he was readmitted to the hospital, neurology was consulted, and he is antiseizure medication regimen was optimized to lamotrigine 50 mg twice daily and Keppra 1000 mg twice daily.  The patient remained seizure-free since then.     During the second hospitalization in November 2024, as per the hospitalist note: \" While hospitalized he had a TTE that showed aortic root dilated 4.5 cm at sinuses and 3.4 cm ascending aorta. Cardiology recommended outpatient follow-up. He was also intermittently hypertensive up to SBP>200.\"    Semiology:    Event type #1: \"Seizure\".  Year/Age of Onset: July 2024  Initial features: Feel \"aura\" - described as dizziness, lightheadedness, and like he is going to pass out. Aware while falling.   Event features: Falls back (typically on the bed - never fell on the floor). Eyes are open. Unresponsive. He experiences brief upper body jerking. No tongue bite nor bladder/bowel incontinence.   Post-ictal features: At times some confusion, agitation.   Duration: 4-5 seconds.   Frequency: Initially couple times per week. The last event was in November 2024. " "  Precipitating factors: Typically when getting up too quickly.     History of status epilepticus: no  History of physical injury related to seizures: no  History of surgery related to epilepsy: no  Family Planning: N/A  Current Driving Status: He is not driving.   Seizure Clusters: Not clear.   Longest Seizure Freedom: Not clear.     Pertinent Ancillary Test Results:    MRI brain studies:   - MRI brain - none available for my review.     CT head studies:   - CT head wo contrast ([facility]/25/[facility] at [facility]): \"Stable LEFT hemispheric acute subdural hematoma with associated mass effect and 8 mm LEFT to RIGHT midline shift.  No significant change from prior.\"     - CT head wo contrast was reportedly unrevealing in November 2024 (per prior notes).     EEG studies:   - Standard EEG (08/31/2022, Dr. Blanca): This is an abnormal video EEG recording in the obtunded state.   1)The diffuse background slowing is consistent with moderate encephalopathy.   2)The presence of intermittent left hemisphere slowing is suggestive of focal neuronal dysfunction.   3) Upward gaze and sometime left gaze deviation were noted, no ictal EEG correlate. No seizure seen.   4) No epileptiform discharges or seizures were seen. This does not preclude a diagnosis of epilepsy.     - Standard EEG (03/28/2023, Dr. Hooper):  This was an abnormal EEG during wakefulness and drowsiness due to:  Very mild diffuse slowing of the background, suggestive of diffuse and/or multifocal cerebral dysfunction.  This finding might be seen in a myriad of encephalopathies and in itself is a nonspecific finding.  The presence of continuous, left hemispheric, maximal over the left temporal region, focal slowing, is suggestive of additional cerebral dysfunction in that region.  This finding might be seen in context of structural lesion, among other considerations.  Clinical and radiological correlation is recommended.  The presence of embedded sharply contoured waveforms in " the above described left-sided focal slowing might be suggestive of increased propensity toward focal seizures arising from this region.  The presence of higher amplitudes and overriding faster frequencies over the left side is suggestive of breach rhythm, and is consistent with the provided surgical history.  There were no electrographic seizures captured.  Single lead EKG showed occasional PVCs - please correlate clinically.      - Standard EEG (08/17/2023, Dr. Martínez):  This was an abnormal EEG during wakefulness and drowsiness due to:  Very mild diffuse slowing of the background, suggestive of diffuse and/or multifocal cerebral dysfunction.  This finding might be seen in a myriad of encephalopathies and in itself is a nonspecific finding.  The presence of continuous, left hemispheric, maximal over the left temporal region, focal slowing, is suggestive of additional cerebral dysfunction in that region.  This finding might be seen in context of structural lesion, among other considerations.  Clinical and radiological correlation is recommended.  The presence of embedded sharply contoured waveforms in the above described left-sided focal slowing might be suggestive of increased propensity toward focal seizures arising from this region.  The presence of higher amplitudes and overriding faster frequencies over the left side is suggestive of breach rhythm, and is consistent with the provided surgical history.  There were no electrographic seizures captured.  Single lead EKG showed occasional PVCs - please correlate clinically.      - Inpatient Spot EEG (11/25/2024, Dr. Martínez): Abnormal video EEG recording in the awake, drowsy, and sleep state(s):  1) Rare left temporal sharp waves. This finding indicates a propensity for seizures to arise from the left temporal region, or alternatively may be observed in the setting of a structural abnormality, or post-ictal state. Clinical correlation recommended.   2) Mild  "continuous generalized slowing of the background, af finding suggestive of mild diffuse cerebral dysfunction of a nonspecific etiology.  3) Diffuse excess beta activity is present, a finding which may be observed in the setting of sedative/medication use.     5-day EMU November 2024, Dr. Saunders/Zelda Mclaughlin: During this admission, Depakote was switched to lamotrigine.  There were no electrographic seizures, but there were interictal abnormalities.  The patient had hypertensive crisis during day 1 of admission.  He also underwent Zio patch monitoring for 14 days around this admission. Of note, surveillance labs revealed microcytic anemia and mild hypokalemia.  On the day #2, he experienced \"startled awake\" and was unaware of why; 20:25, pt was up to the bathroom,  He endorsed dizziness, leg weakness, anxiousness and exhibited upper body shakiness. BP: 134/83, HR 63-- orthostatic presyncope.   EEG SUMMARY: Per Dr Saunders   TECHNIQUE:   CVEEG was set up by a Neurodiagnostic technologist who performed education to the patient and staff. A minimum of 23 electrodes and 23 channel recording was setup and performed by Neurodiagnostic technologist, in accordance with the international 10-20 system. Impedence, electrode integrity, and technical impressions were documented a minimum of every 2-24 hour period by a Neurodiagnostic Technologist and reviewed by Interpreting Physician. The study was reviewed in bipolar and referential montages. The recording examined the patient in the awake, drowsy, and sleep state(s).   DESCRIPTION OF THE RECORD:  During wakefulness, the background was continuous and showed a 9 Hz posterior dominant rhythm, with a mixture of mostly excess theta>delta, alpha, and overriding faster frequencies.  There was reactivity to eye closure/opening.  A normal anterior-posterior gradient was noted with faster beta frequencies seen anteriorly.  During drowsiness, theta/delta frequencies were seen. Sleep was " "captured and was characterized by diffuse background delta/theta activity with a loss of myogenic artifact.  N2 sleep transients in the form of sleep spindles and vertex waves were seen in the leads over the central regions.   ICTAL AND INTERICTAL FINDINGS:   No focal or generalized epileptiform activity noted.   Occasional  left temporal>right fronto-central breach rhythm and theta/delta slowing, occasional quasi-rhythmic or rhythmic at 1-1.5 Hz for 2-4 seconds. There was also rare to occasional independent right temporal or right hemispheric theta>delta slowing and amplitude attenuation.   These findings are indicative of focal dysfunction in these regions, worse over the left. Also, the presence of focal rhythmic slowing over the left may indicate an increased risk for focal seizures over that region. No seizures. Sampling of the EKG recording showed sinus rhythm was occasional PACs         INTERIM HISTORY (12/18/2024): The patient underwent EMU admission in mid November 2024, with no seizures captured, but interictal findings, as noted above; his Depakote was switched to lamotrigine.  He unfortunately had breakthrough seizures shortly after EMU discharge (found unresponsive, wetted, aggressive as he was slowly waking up after the event), he was readmitted to the hospital, neurology was consulted, and he is antiseizure medication regimen was optimized to lamotrigine 50 mg twice daily and Keppra 1000 mg twice daily.  The patient remained seizure-free since then. During the second hospitalization in November 2024, as per the hospitalist note: \" While hospitalized he had a TTE that showed aortic root dilated 4.5 cm at sinuses and 3.4 cm ascending aorta. Cardiology recommended outpatient follow-up. He was also intermittently hypertensive up to SBP>200.\"    As of the time of this visit, the patient is on Lamictal 50 mg twice daily for about 10 days now, and he is also taking Keppra 1000 mg twice daily.  No adverse " "effects with either of these medications.    Current Antiseizure Medications: Keppra 1000 mg BID. Lamotrigine 50 mg BID.     Previously Tried Antiseizure Medications: Depakote  mg TID.     Review of Systems: No recent fevers. He lost weight in the interim. The mood is overall stable. No SI/HI.    Risk Factors For Epilepsy/Seizure Disorder: Subdural hematoma.     Past Medical History:  Past Medical History:   Diagnosis Date    Anesthesia     \"Difficulty urinated after anesthesia\"    Breath shortness     Albuterol inhalers as needed    CAD (coronary artery disease) 08/2020    STEMI. PCI/NAOMY (Raghav 3.5 x 25mm) the mid RCA, and PCI/NAOMY (Raghav 2.5 x 12mm) the PDA.    Chronic anticoagulation     Takes eliquis BID    Dental disorder     Dentures - upper dentures    Depression     Dilated aortic root (HCC) 11/2024    Echocardiogram with dilated aortic root 4.5cm.    History of heart artery stent     Hyperlipidemia     Hypertension     Low back pain     Myocardial infarct (HCC)     Caryn Mckinney - Cardiologist    Paroxysmal atrial fibrillation (HCC) 10/2023    Echocardiogram with normal LV size, LVEF 55-60%. Normal RA, LA and RV. No valvular problems.    S/P drug eluting coronary stent placement     Subdural hematoma (HCC)     on Depakote    Type 2 diabetes mellitus (HCC)      Past Surgical History:  Past Surgical History:   Procedure Laterality Date    ARTHROPLASTY, KNEE, ROBOT-ASSISTED Right 7/23/2024    Procedure: ROBOTIC RIGHT TOTAL KNEE ARTHROPLASTY;  Surgeon: Noe Barragan M.D.;  Location: [facility];  Service: Ortho Robotic    CRANIOTOMY Left 8/25/2022    Procedure: CRANIOTOMY FOR SUBDURAL HEMATOMA;  Surgeon: Tesfaye Luther M.D.;  Location: [facility];  Service: Neurosurgery    SPINAL CORD STIMULATOR  2/26/2019    Procedure: SPINAL CORD STIMULATOR-  STAGE 1:  RIGHT FLANK BATTERY REPLACEMENT/UPGRADE;  Surgeon: Stepan Hawkins M.D.;  Location: [facility];  Service: Neurosurgery    " "FUSION, SPINE, LUMBAR, PLIF  2019    Procedure: LUMBAR FUSION POSTERIOR-  STAGE 2: ONLAY L5-S1 BONE;  Surgeon: Stepan Hawkins M.D.;  Location: [facility];  Service: Neurosurgery    LAMINOTOMY  2019    Procedure: LAMINOTOMY-  STAGE 2:  REDO LEFT L4-S1;  Surgeon: Stepan Hawkins M.D.;  Location: SURGERY [facility];  Service: Neurosurgery      Social History:  Social History     Tobacco Use    Smoking status: Former     Current packs/day: 0.00     Average packs/day: 0.3 packs/day for 3.0 years (0.8 ttl pk-yrs)     Types: Cigarettes     Start date:      Quit date:      Years since quittin.9     Passive exposure: Past    Smokeless tobacco: Never   Vaping Use    Vaping status: Never Used   Substance Use Topics    Alcohol use: No    Drug use: Never     Family History:  His mother had seizures.   Family History   Problem Relation Age of Onset    Cancer Mother         breast         2024     2:00 PM 2024     9:00 AM 3/17/2023    10:00 AM   PHQ-9 Screening   Little interest or pleasure in doing things 0 - not at all 0 - not at all 0 - not at all   Feeling down, depressed, or hopeless 0 - not at all 0 - not at all 0 - not at all   PHQ-2 Total Score 0 0 0     [place] Suicide Reassessment  New or continued thoughts about killing self?: No  Preparing to end life?: No    Allergies:  Allergies   Allergen Reactions    Oxycodone Anaphylaxis and Swelling     Throat swelling    Pectin Anaphylaxis and Swelling     Throat swelling, cannot breathe    Hctz [Hydrochlorothiazide W-Spironolactone]      hypotension    Zolpidem Unspecified     \"Sleep walking\", excessive eating, drives/cooks during the night     Current Medications:    Current Outpatient Medications:     Pseudoephedrine-guaiFENesin (MUCINEX D PO), Take  by mouth., Taking    Dextromethorphan-guaiFENesin (ROBITUSSIN DM PO), Take  by mouth., Taking    lamoTRIgine, Take 1 Tablet by mouth 2 times a day for 3 days, THEN 2 Tablets 2 " times a day for 7 days, THEN 4 Tablets 2 times a day for 30 days., Taking    hydrALAZINE, 5 mg, Oral, TID PRN, PRN    levETIRAcetam, 1,000 mg, Oral, BID, Taking    LORazepam, Take 1/2-1 tablet PO PRN breakthrough seizures.  Do not exceed more than 2 tablets in 24 hours unless directed otherwise by physician., Taking    omeprazole, 40 mg, Oral, Q EVENING, Taking    HYDROcodone/acetaminophen, 1 Tablet, Oral, See Admin Instructions, PRN    metoprolol SR, 25 mg, Oral, Q EVENING, Taking    apixaban, 5 mg, Oral, BID, Taking    telmisartan, 40 mg, Oral, QAM, Taking    ezetimibe, 10 mg, Oral, DAILY, Taking    docusate sodium, 100 mg, Oral, BID, Taking    rosuvastatin, 40 mg, Oral, Q EVENING, Taking    albuterol, 1-2 Puff, Inhalation, Q4HRS PRN, PRN    traZODone, 150 mg, Oral, Q EVENING, Taking    venlafaxine XR, 1 Capsule, Oral, Q EVENING, Taking    venlafaxine, 1 Capsule, Oral, Q EVENING, Taking    tizanidine, 4 mg, Oral, 4X/DAY, Taking    finasteride, 5 mg, Oral, DAILY, Taking    tamsulosin, 0.4 mg, Oral, DAILY, Taking    Physical Examination:    Ambulatory Vitals  Not available since video visit.    Limited neuro exam overall stable, as observed via video.     Labs:   - Ammonia (11/11/2024): 28      ASSESSMENT AND PLAN:  1. Seizures (HCC)  Clinical presentation of his event may be consistent with seizures, but convulsive syncope is also a consideration, though much less likely.  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    I reviewed in detail and to the best of my ability the data from both hospitalizations in November 2024, which took a considerable amount of time.  The patient had a breakthrough convulsive seizure in the meantime, which is a severe condition, and the need to monitor intensely for his antiseizure medications toxicity. I also reviewed the most recent, pertinent labs.     We will continue Keppra with no changes, and increase  lamotrigine to 75 mg twice daily when he is on the lower dose for a full 2 weeks.  Discussed in detail adverse effects of lamotrigine and Keppra.  The patient and his wife verbalized understanding and agreement.  - lamotrigine (LAMICTAL) 150 MG tablet; Take 0.5 Tablets by mouth 2 times a day.  Dispense: 90 Tablet; Refill: 1  - levetiracetam (KEPPRA) 1000 MG tablet; Take 1 Tablet by mouth 2 times a day.  Dispense: 180 Tablet; Refill: 1    We will obtain routine blood work to ensure safety of continuing current antiseizure medication regimen.  - CBC WITH DIFFERENTIAL; Future  - Comp Metabolic Panel; Future  - LAMOTRIGINE; Future  - LEVETIRACETAM (KEPPRA), S    In context of recent workup for seizures, as well as of late November 2024, we will defer repeat MRI brain/EEG.    Epilepsy counseling provided in writing.    2. Traumatic subdural hematoma, sequela (HCC)  Stable at this time. CT head reportedly with no acute change, but no images available for my review.   - we will follow this problem clinically.     3. 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 Rodgers.   - the result was noted - major neurocognitive disorder, mild in severity range.     Follow up with closely with cardiology.    Follow up in 3 months.     Patient Instructions   Please continue Keppra 1000 mg twice daily with no changes. Please let my office know if you experience any mood issues with Keppra.     Please take Lamotrigine 75 mg twice daily starting 12/22/2204.     Please seek emergent medical attention and stop lamotrigine if you experience spreading skin rash, fever, and/or lesions in your mouth.     Please reach out to our office if you do not hear from us regarding elective hospital stay for EEG monitoring.     Please let our office know if you have any changes in your seizure frequency and/characteristics. Otherwise, please keep the diary of your events and bring it with you  at the time of your next follow up visit with our office.     Please take vitamin D3 9213-5912 internation units daily.     Please note that the following might precipitate seizures: missed doses of antiseizure medications, being sick with fever, stress, fatigue, sleep deprivation, not eating regularly, not drinking enough water, drinking too much alcohol, stopping alcohol suddenly if you are currently using it on a regular/daily basis, and/or using recreational drugs, among others.    Please note that the following might lead to an injury, potentially a life-threatening injury, in case you have a seizure and/or lose awareness while:   - being in a large body of water by yourself, such as bath, pool, lake, ocean, among others (risk of drowning)   - being on unprotected heights (risk of fall)   - being around and/or operating heavy machinery (risk of injury)   - being around open fire/hot surfaces (risk of burns)   - any other activities/circumstances, in which if you lose awareness, you might injure yourself and/or others.    Please call for help (crisis line and/or 911) in case you have thoughts of harming yourself and/or others.    Please abstain from driving until further notice.    ------------------------------------------------------------------------------------------  Instructions for your family/caregivers:  Please call 911 if the patient has a seizure longer than 2-3 minutes, if seizures are back to back without him recovering to his baseline, or he does not start recovering within 5-10 minutes after the seizure stops. During the seizure - please turn him on his side, please make sure his head is protected (for example, you should put a pillow under his head, if one is available), and please do not put anything in his mouth.   -------------------------------------------------------------------------------------------    It is important that your seizures are well controlled and you have none or have them  rarely. In addition to avoiding injury related to breakthrough seizures, frequent seizures increase risk of SUDEP (sudden unexpected death in epilepsy), where a person goes into a seizure and then never wakes up - this is a rare complication of seizure disorder; one of the best available ways to prevent it is to control your seizures well.     Due to the high volume of patients we are trying to help, your physician will not be able to respond by phone or in [employer] to your routine concerns between appointments.  This does not reflect a lack of interest or concern for you or your diagnosis.  Please bring these questions and concerns to your appointment where your physician can answer.  Please relay more pressing concerns to our office, either via MyChart, or by phone; if not able to reach us please visit nearby Urgent Care Center or Emergency Department.  If any emergent medical needs, please seek emergent medical help and/or call 911.    Please note that we are not able to fill out paperwork that might be related to your work, utility company, disability, and/or driving, among others, in between the visits.  Please schedule a dedicated appointment to address your paperwork, so we can do that in a timely manner.  This is not due to lack of concern or interest for your disease-related work/administrative problems, but to make sure that we provide the best possible care and to fill out your paperwork in a correct and timely manner.    Thank you for entrusting your neurological care to [facility] and we look forward to continuing to serve you.     My total time spent caring for the patient on the day of the encounter was 72 minutes.   This does not include time spent on separately billable procedures/tests.      Raman Hooper MD  Outpatient Neurology   [facility]

## 2024-12-18 NOTE — PATIENT INSTRUCTIONS
Please continue Keppra 1000 mg twice daily with no changes. Please let my office know if you experience any mood issues with Keppra.     Please take Lamotrigine 75 mg twice daily starting 12/22/2204.     Please seek emergent medical attention and stop lamotrigine if you experience spreading skin rash, fever, and/or lesions in your mouth.     Please reach out to our office if you do not hear from us regarding elective hospital stay for EEG monitoring.     Please let our office know if you have any changes in your seizure frequency and/characteristics. Otherwise, please keep the diary of your events and bring it with you at the time of your next follow up visit with our office.     Please take vitamin D3 [id number] internation units daily.     Please note that the following might precipitate seizures: missed doses of antiseizure medications, being sick with fever, stress, fatigue, sleep deprivation, not eating regularly, not drinking enough water, drinking too much alcohol, stopping alcohol suddenly if you are currently using it on a regular/daily basis, and/or using recreational drugs, among others.    Please note that the following might lead to an injury, potentially a life-threatening injury, in case you have a seizure and/or lose awareness while:   - being in a large body of water by yourself, such as bath, pool, lake, ocean, among others (risk of drowning)   - being on unprotected heights (risk of fall)   - being around and/or operating heavy machinery (risk of injury)   - being around open fire/hot surfaces (risk of burns)   - any other activities/circumstances, in which if you lose awareness, you might injure yourself and/or others.    Please call for help (crisis line and/or 911) in case you have thoughts of harming yourself and/or others.    Please abstain from driving until further notice.    ------------------------------------------------------------------------------------------  Instructions for your  family/caregivers:  Please call 911 if the patient has a seizure longer than 2-3 minutes, if seizures are back to back without him recovering to his baseline, or he does not start recovering within 5-10 minutes after the seizure stops. During the seizure - please turn him on his side, please make sure his head is protected (for example, you should put a pillow under his head, if one is available), and please do not put anything in his mouth.   -------------------------------------------------------------------------------------------    It is important that your seizures are well controlled and you have none or have them rarely. In addition to avoiding injury related to breakthrough seizures, frequent seizures increase risk of SUDEP (sudden unexpected death in epilepsy), where a person goes into a seizure and then never wakes up - this is a rare complication of seizure disorder; one of the best available ways to prevent it is to control your seizures well.     Due to the high volume of patients we are trying to help, your physician will not be able to respond by phone or in [employer] to your routine concerns between appointments.  This does not reflect a lack of interest or concern for you or your diagnosis.  Please bring these questions and concerns to your appointment where your physician can answer.  Please relay more pressing concerns to our office, either via [employer], or by phone; if not able to reach us please visit nearby Urgent Care Center or Emergency Department.  If any emergent medical needs, please seek emergent medical help and/or call 911.    Please note that we are not able to fill out paperwork that might be related to your work, utility company, disability, and/or driving, among others, in between the visits.  Please schedule a dedicated appointment to address your paperwork, so we can do that in a timely manner.  This is not due to lack of concern or interest for your disease-related  work/administrative problems, but to make sure that we provide the best possible care and to fill out your paperwork in a correct and timely manner.    Thank you for entrusting your neurological care to [facility] and we look forward to continuing to serve you.

## 2024-12-30 ENCOUNTER — TELEPHONE (OUTPATIENT)
Dept: NEUROLOGY | Facility: MEDICAL CENTER | Age: 64
End: 2024-12-30
Payer: OTHER GOVERNMENT

## 2024-12-30 NOTE — TELEPHONE ENCOUNTER
PT wife called to get a medication fixed (lamotrigine- lamictal) he missed the up of his meds going from 25 mg  to 50 mg and now there is a another up to 75 mg  but they wont to stay at 25 mg pls give a call back to the wife at [phone]  12/30/24  9:40 AM

## 2024-12-31 NOTE — TELEPHONE ENCOUNTER
Noted. The patient was recently hospitalized for breakthrough seizures. We need to discuss his medication doses during a visit since it seems that what he and his wife reported during the visit with me on 12/18/2024 does not correlate with what the patient is taking. Please double book the patient for the next week Monday or Wednesday. Thank you.

## 2025-01-08 ENCOUNTER — OFFICE VISIT (OUTPATIENT)
Dept: NEUROLOGY | Facility: MEDICAL CENTER | Age: 65
End: 2025-01-08
Attending: PSYCHIATRY & NEUROLOGY
Payer: OTHER GOVERNMENT

## 2025-01-08 VITALS
SYSTOLIC BLOOD PRESSURE: 132 MMHG | WEIGHT: 233 LBS | HEIGHT: 67 IN | OXYGEN SATURATION: 96 % | DIASTOLIC BLOOD PRESSURE: 78 MMHG | HEART RATE: 47 BPM | BODY MASS INDEX: 36.57 KG/M2 | RESPIRATION RATE: 16 BRPM | TEMPERATURE: 97.5 F

## 2025-01-08 DIAGNOSIS — T50.905D ADVERSE EFFECT OF DRUG, SUBSEQUENT ENCOUNTER: ICD-10-CM

## 2025-01-08 DIAGNOSIS — R56.9 SEIZURES (HCC): ICD-10-CM

## 2025-01-08 PROCEDURE — 99214 OFFICE O/P EST MOD 30 MIN: CPT | Performed by: PSYCHIATRY & NEUROLOGY

## 2025-01-08 PROCEDURE — 99212 OFFICE O/P EST SF 10 MIN: CPT | Performed by: PSYCHIATRY & NEUROLOGY

## 2025-01-08 PROCEDURE — 3078F DIAST BP <80 MM HG: CPT | Performed by: PSYCHIATRY & NEUROLOGY

## 2025-01-08 PROCEDURE — 3075F SYST BP GE 130 - 139MM HG: CPT | Performed by: PSYCHIATRY & NEUROLOGY

## 2025-01-08 RX ORDER — LAMOTRIGINE 25 MG/1
25 TABLET ORAL 2 TIMES DAILY
Qty: 60 TABLET | Refills: 5 | Status: SHIPPED | OUTPATIENT
Start: 2025-01-08

## 2025-01-08 RX ORDER — LEVETIRACETAM 1000 MG/1
1000 TABLET ORAL 2 TIMES DAILY
Qty: 180 TABLET | Refills: 1 | Status: SHIPPED | OUTPATIENT
Start: 2025-01-08

## 2025-01-08 ASSESSMENT — FIBROSIS 4 INDEX: FIB4 SCORE: 7.22

## 2025-01-08 ASSESSMENT — PATIENT HEALTH QUESTIONNAIRE - PHQ9: CLINICAL INTERPRETATION OF PHQ2 SCORE: 0

## 2025-01-08 NOTE — PATIENT INSTRUCTIONS
Please continue Keppra 1000 mg twice daily with no changes. Please let my office know if you experience any mood issues with Keppra.     Please take Lamotrigine 25 mg twice daily.    Please seek emergent medical attention and stop lamotrigine if you experience spreading skin rash, fever, and/or lesions in your mouth.     Please reach out to our office if you do not hear from us regarding elective hospital stay for EEG monitoring.     Please let our office know if you have any changes in your seizure frequency and/characteristics. Otherwise, please keep the diary of your events and bring it with you at the time of your next follow up visit with our office.     Please take vitamin D3 [id number] internation units daily.     Please note that the following might precipitate seizures: missed doses of antiseizure medications, being sick with fever, stress, fatigue, sleep deprivation, not eating regularly, not drinking enough water, drinking too much alcohol, stopping alcohol suddenly if you are currently using it on a regular/daily basis, and/or using recreational drugs, among others.    Please note that the following might lead to an injury, potentially a life-threatening injury, in case you have a seizure and/or lose awareness while:   - being in a large body of water by yourself, such as bath, pool, lake, ocean, among others (risk of drowning)   - being on unprotected heights (risk of fall)   - being around and/or operating heavy machinery (risk of injury)   - being around open fire/hot surfaces (risk of burns)   - any other activities/circumstances, in which if you lose awareness, you might injure yourself and/or others.    Please call for help (crisis line and/or 911) in case you have thoughts of harming yourself and/or others.    Please abstain from driving until further notice.    ------------------------------------------------------------------------------------------  Instructions for your  family/caregivers:  Please call 911 if the patient has a seizure longer than 2-3 minutes, if seizures are back to back without him recovering to his baseline, or he does not start recovering within 5-10 minutes after the seizure stops. During the seizure - please turn him on his side, please make sure his head is protected (for example, you should put a pillow under his head, if one is available), and please do not put anything in his mouth.   -------------------------------------------------------------------------------------------    It is important that your seizures are well controlled and you have none or have them rarely. In addition to avoiding injury related to breakthrough seizures, frequent seizures increase risk of SUDEP (sudden unexpected death in epilepsy), where a person goes into a seizure and then never wakes up - this is a rare complication of seizure disorder; one of the best available ways to prevent it is to control your seizures well.     Due to the high volume of patients we are trying to help, your physician will not be able to respond by phone or in [employer] to your routine concerns between appointments.  This does not reflect a lack of interest or concern for you or your diagnosis.  Please bring these questions and concerns to your appointment where your physician can answer.  Please relay more pressing concerns to our office, either via [employer], or by phone; if not able to reach us please visit nearby Urgent Care Center or Emergency Department.  If any emergent medical needs, please seek emergent medical help and/or call 911.    Please note that we are not able to fill out paperwork that might be related to your work, utility company, disability, and/or driving, among others, in between the visits.  Please schedule a dedicated appointment to address your paperwork, so we can do that in a timely manner.  This is not due to lack of concern or interest for your disease-related  work/administrative problems, but to make sure that we provide the best possible care and to fill out your paperwork in a correct and timely manner.    Thank you for entrusting your neurological care to [facility] and we look forward to continuing to serve you.

## 2025-01-08 NOTE — PROGRESS NOTES
[facility] Epilepsy Program  Follow Up Visit      Patient's Name: Karan Wiley  YOB: 1960  MRN: [id number]  Date of Service: 01/08/25.    Referring Provider: Raman Hooper M.D.  [address] [place] [address],  [place] [id number]    Chief Concern: Seizures.     The patient presents for a follow up visit. The patient presents with his wife and provides verbal consent for her to be present and provide additional information as needed.     HPI (as obtained at the time of the initial visit and updated as necessary): The patient is a 64 y.o., right-handed male, who initially presented to my epilepsy clinic for evaluation of seizures on 09/18/2024.    The patient used to follow-up with Dr. Ya.    The patient was in his usual health, in context of his known medical history, when he fell and hit his head in August 2022.  This led to transfer to [facility] due to acute left subdural hematoma.  He was treated with bur hole and then craniotomy drainage.  It seems that the patient had no seizures at that time, and Depakote was started prophylactically.  This occurred while he was on Eliquis for A-fib.    Then, in July 2024, he started having episodes suspicious for seizures versus syncope, among other considerations.  These are described under event type #1.      He never had myoclonus, clear convulsions, no isolated staring spells/oral/manual automatisms times, sensations of strange smell/taste/gastric uprising, no clear psychic phenomena.    He is taking Depakote regularly, and except significant weight gain and tremor, no other adverse effects.    He underwent EMU in November 2024, when his Depakote was switched to lamotrigine. He unfortunately had breakthrough seizures shortly after EMU discharge (found unresponsive, wetted, aggressive as he was slowly waking up after the event), he was readmitted to the hospital, neurology was consulted, and he is antiseizure medication regimen was optimized to  "lamotrigine 50 mg twice daily and Keppra 1000 mg twice daily.  The patient remained seizure-free since then.     During the second hospitalization in November 2024, as per the hospitalist note: \" While hospitalized he had a TTE that showed aortic root dilated 4.5 cm at sinuses and 3.4 cm ascending aorta. Cardiology recommended outpatient follow-up. He was also intermittently hypertensive up to SBP>200.\"    Semiology:    Event type #1: \"Seizure\".  Year/Age of Onset: July 2024  Initial features: Feel \"aura\" - described as dizziness, lightheadedness, and like he is going to pass out. Aware while falling.   Event features: Falls back (typically on the bed - never fell on the floor). Eyes are open. Unresponsive. He experiences brief upper body jerking. No tongue bite nor bladder/bowel incontinence.   Post-ictal features: At times some confusion, agitation.   Duration: 4-5 seconds.   Frequency: Initially couple times per week. The last event was in November 2024.   Precipitating factors: Typically when getting up too quickly.     History of status epilepticus: no  History of physical injury related to seizures: no  History of surgery related to epilepsy: no  Family Planning: N/A  Current Driving Status: He is not driving.   Seizure Clusters: Not clear.   Longest Seizure Freedom: Not clear.     Pertinent Ancillary Test Results:    MRI brain studies:   - MRI brain - none available for my review.     CT head studies:   - CT head wo contrast (08/25/2022 at [facility]): \"Stable LEFT hemispheric acute subdural hematoma with associated mass effect and 8 mm LEFT to RIGHT midline shift.  No significant change from prior.\"     - CT head wo contrast was reportedly unrevealing in November 2024 (per prior notes).     EEG studies:   - Standard EEG (08/31/2022, Dr. Blanca): This is an abnormal video EEG recording in the obtunded state.   1)The diffuse background slowing is consistent with moderate encephalopathy.   2)The presence of " intermittent left hemisphere slowing is suggestive of focal neuronal dysfunction.   3) Upward gaze and sometime left gaze deviation were noted, no ictal EEG correlate. No seizure seen.   4) No epileptiform discharges or seizures were seen. This does not preclude a diagnosis of epilepsy.     - Standard EEG (03/28/2023, Dr. Hooper):  This was an abnormal EEG during wakefulness and drowsiness due to:  Very mild diffuse slowing of the background, suggestive of diffuse and/or multifocal cerebral dysfunction.  This finding might be seen in a myriad of encephalopathies and in itself is a nonspecific finding.  The presence of continuous, left hemispheric, maximal over the left temporal region, focal slowing, is suggestive of additional cerebral dysfunction in that region.  This finding might be seen in context of structural lesion, among other considerations.  Clinical and radiological correlation is recommended.  The presence of embedded sharply contoured waveforms in the above described left-sided focal slowing might be suggestive of increased propensity toward focal seizures arising from this region.  The presence of higher amplitudes and overriding faster frequencies over the left side is suggestive of breach rhythm, and is consistent with the provided surgical history.  There were no electrographic seizures captured.  Single lead EKG showed occasional PVCs - please correlate clinically.      - Standard EEG (08/17/2023, Dr. Martínez):  This was an abnormal EEG during wakefulness and drowsiness due to:  Very mild diffuse slowing of the background, suggestive of diffuse and/or multifocal cerebral dysfunction.  This finding might be seen in a myriad of encephalopathies and in itself is a nonspecific finding.  The presence of continuous, left hemispheric, maximal over the left temporal region, focal slowing, is suggestive of additional cerebral dysfunction in that region.  This finding might be seen in context of  "structural lesion, among other considerations.  Clinical and radiological correlation is recommended.  The presence of embedded sharply contoured waveforms in the above described left-sided focal slowing might be suggestive of increased propensity toward focal seizures arising from this region.  The presence of higher amplitudes and overriding faster frequencies over the left side is suggestive of breach rhythm, and is consistent with the provided surgical history.  There were no electrographic seizures captured.  Single lead EKG showed occasional PVCs - please correlate clinically.      - Inpatient Spot EEG (11/25/2024, Dr. Martínez): Abnormal video EEG recording in the awake, drowsy, and sleep state(s):  1) Rare left temporal sharp waves. This finding indicates a propensity for seizures to arise from the left temporal region, or alternatively may be observed in the setting of a structural abnormality, or post-ictal state. Clinical correlation recommended.   2) Mild continuous generalized slowing of the background, af finding suggestive of mild diffuse cerebral dysfunction of a nonspecific etiology.  3) Diffuse excess beta activity is present, a finding which may be observed in the setting of sedative/medication use.     5-day EMU November 2024, Dr. Saunders/Zelda Mclaughlin: During this admission, Depakote was switched to lamotrigine.  There were no electrographic seizures, but there were interictal abnormalities.  The patient had hypertensive crisis during day 1 of admission.  He also underwent Zio patch monitoring for 14 days around this admission. Of note, surveillance labs revealed microcytic anemia and mild hypokalemia.  On the day #2, he experienced \"startled awake\" and was unaware of why; 20:25, pt was up to the bathroom,  He endorsed dizziness, leg weakness, anxiousness and exhibited upper body shakiness. BP: 134/83, HR 63-- orthostatic presyncope.   EEG SUMMARY: Per Dr Saunders   TECHNIQUE:   CVEEG was set up " by a Neurodiagnostic technologist who performed education to the patient and staff. A minimum of 23 electrodes and 23 channel recording was setup and performed by Neurodiagnostic technologist, in accordance with the international 10-20 system. Impedence, electrode integrity, and technical impressions were documented a minimum of every 2-24 hour period by a Neurodiagnostic Technologist and reviewed by Interpreting Physician. The study was reviewed in bipolar and referential montages. The recording examined the patient in the awake, drowsy, and sleep state(s).   DESCRIPTION OF THE RECORD:  During wakefulness, the background was continuous and showed a 9 Hz posterior dominant rhythm, with a mixture of mostly excess theta>delta, alpha, and overriding faster frequencies.  There was reactivity to eye closure/opening.  A normal anterior-posterior gradient was noted with faster beta frequencies seen anteriorly.  During drowsiness, theta/delta frequencies were seen. Sleep was captured and was characterized by diffuse background delta/theta activity with a loss of myogenic artifact.  N2 sleep transients in the form of sleep spindles and vertex waves were seen in the leads over the central regions.   ICTAL AND INTERICTAL FINDINGS:   No focal or generalized epileptiform activity noted.   Occasional  left temporal>right fronto-central breach rhythm and theta/delta slowing, occasional quasi-rhythmic or rhythmic at 1-1.5 Hz for 2-4 seconds. There was also rare to occasional independent right temporal or right hemispheric theta>delta slowing and amplitude attenuation.   These findings are indicative of focal dysfunction in these regions, worse over the left. Also, the presence of focal rhythmic slowing over the left may indicate an increased risk for focal seizures over that region. No seizures. Sampling of the EKG recording showed sinus rhythm was occasional PACs     INTERIM HISTORY (01/08/2025): The patient remains seizure-free.   "He has no events suspicious for seizures since November 2024.    The patient is taking Keppra 1000 mg twice daily and has no adverse effects from it.  On the other hand, the patient tried to be on Lamictal 50 mg twice daily but experienced significant adverse effects, including headache and dizziness (the patient's wife thought that the patient was on 50 mg twice daily at the time of the last visit, but he was actually 25 mg twice daily).  The patient has switched to lamotrigine 25 mg twice daily, and along with Keppra, this regimen has been controlling his seizures.    Current Antiseizure Medications: Keppra 1000 mg BID. Lamotrigine 25 mg BID.     Previously Tried Antiseizure Medications: Depakote  mg TID.     Review of Systems: No recent fevers. His weight is overall stable, though it fluctuates. The mood is overall stable. No SI/HI.    Risk Factors For Epilepsy/Seizure Disorder: Subdural hematoma.     Past Medical History:  Past Medical History:   Diagnosis Date    Anesthesia     \"Difficulty urinated after anesthesia\"    Breath shortness     Albuterol inhalers as needed    CAD (coronary artery disease) 08/2020    STEMI. PCI/NAOMY (Raghav 3.5 x 25mm) the mid RCA, and PCI/NAOMY (Raghav 2.5 x 12mm) the PDA.    Chronic anticoagulation     Takes eliquis BID    Dental disorder     Dentures - upper dentures    Depression     Dilated aortic root (HCC) 11/2024    Echocardiogram with dilated aortic root 4.5cm.    History of heart artery stent     Hyperlipidemia     Hypertension     Low back pain     Myocardial infarct (HCC)     Caryn Mckinney - Cardiologist    Paroxysmal atrial fibrillation (HCC) 10/2023    Echocardiogram with normal LV size, LVEF 55-60%. Normal RA, LA and RV. No valvular problems.    S/P drug eluting coronary stent placement     Subdural hematoma (HCC)     on Depakote    Type 2 diabetes mellitus (HCC)      Past Surgical History:  Past Surgical History:   Procedure Laterality Date    ARTHROPLASTY, KNEE, " ROBOT-ASSISTED Right 2024    Procedure: ROBOTIC RIGHT TOTAL KNEE ARTHROPLASTY;  Surgeon: Noe Barragan M.D.;  Location: SURGERY [facility];  Service: Ortho Robotic    CRANIOTOMY Left 2022    Procedure: CRANIOTOMY FOR SUBDURAL HEMATOMA;  Surgeon: Tesfaye Luther M.D.;  Location: SURGERY [facility];  Service: Neurosurgery    SPINAL CORD STIMULATOR  2019    Procedure: SPINAL CORD STIMULATOR-  STAGE 1:  RIGHT FLANK BATTERY REPLACEMENT/UPGRADE;  Surgeon: Stepan Hawkins M.D.;  Location: SURGERY [facility];  Service: Neurosurgery    FUSION, SPINE, LUMBAR, PLIF  2019    Procedure: LUMBAR FUSION POSTERIOR-  STAGE 2: ONLAY L5-S1 BONE;  Surgeon: Stepan Hawkins M.D.;  Location: SURGERY [facility];  Service: Neurosurgery    LAMINOTOMY  2019    Procedure: LAMINOTOMY-  STAGE 2:  REDO LEFT L4-S1;  Surgeon: Stepan Hawkins M.D.;  Location: SURGERY [facility];  Service: Neurosurgery      Social History:  Social History     Tobacco Use    Smoking status: Former     Current packs/day: 0.00     Average packs/day: 0.3 packs/day for 3.0 years (0.8 ttl pk-yrs)     Types: Cigarettes     Start date:      Quit date:      Years since quittin.0     Passive exposure: Past    Smokeless tobacco: Never   Vaping Use    Vaping status: Never Used   Substance Use Topics    Alcohol use: No    Drug use: Never     Family History:  His mother had seizures.   Family History   Problem Relation Age of Onset    Cancer Mother         breast         2025    11:45 AM 2024     2:00 PM 2024     9:00 AM 3/17/2023    10:00 AM   PHQ-9 Screening   Little interest or pleasure in doing things 0 - not at all 0 - not at all 0 - not at all 0 - not at all   Feeling down, depressed, or hopeless 0 - not at all 0 - not at all 0 - not at all 0 - not at all   PHQ-2 Total Score 0 0 0 0     Carter Suicide Reassessment  New or continued thoughts about killing self?: No  Preparing to end life?:  "No    Allergies:  Allergies   Allergen Reactions    Oxycodone Anaphylaxis and Swelling     Throat swelling    Pectin Anaphylaxis and Swelling     Throat swelling, cannot breathe    Hctz [Hydrochlorothiazide W-Spironolactone]      hypotension    Zolpidem Unspecified     \"Sleep walking\", excessive eating, drives/cooks during the night     Current Medications:    Current Outpatient Medications:     lamotrigine, 75 mg, Oral, BID, Taking    levetiracetam, 1,000 mg, Oral, BID, Taking    Pseudoephedrine-guaiFENesin (MUCINEX D PO), Take  by mouth., Taking    hydrALAZINE, 5 mg, Oral, TID PRN, PRN    LORazepam, Take 1/2-1 tablet PO PRN breakthrough seizures.  Do not exceed more than 2 tablets in 24 hours unless directed otherwise by physician., PRN    omeprazole, 40 mg, Oral, Q EVENING, Taking    HYDROcodone/acetaminophen, 1 Tablet, Oral, See Admin Instructions, Taking    metoprolol SR, 25 mg, Oral, Q EVENING, Taking    apixaban, 5 mg, Oral, BID, Taking    telmisartan, 40 mg, Oral, QAM, Taking    ezetimibe, 10 mg, Oral, DAILY, Taking    docusate sodium, 100 mg, Oral, BID, Taking    rosuvastatin, 40 mg, Oral, Q EVENING, Taking    albuterol, 1-2 Puff, Inhalation, Q4HRS PRN, PRN    traZODone, 150 mg, Oral, Q EVENING, Taking    venlafaxine XR, 1 Capsule, Oral, Q EVENING, Taking    venlafaxine, 1 Capsule, Oral, Q EVENING, Taking    tizanidine, 4 mg, Oral, 4X/DAY, Taking    finasteride, 5 mg, Oral, DAILY, Taking    tamsulosin, 0.4 mg, Oral, DAILY, Taking    Dextromethorphan-guaiFENesin (ROBITUSSIN DM PO), Take  by mouth. (Patient not taking: Reported on 1/8/2025), Not Taking    Physical Examination:    Ambulatory Vitals  Encounter Vitals  Temperature: 36.4 °C (97.5 °F)  Temp src: Temporal  Blood Pressure: 132/78  Pulse: (!) 47  Respiration: 16  Pulse Oximetry: 96 %  Weight: 106 kg (233 lb)  Height: 170.2 cm (5' 7\")  BMI (Calculated): 36.49    Neurological Examination:  Mental Status: The patient is alert and oriented to person, " place, time, and situation. Speech is fluent, with no aphasia nor dysarthria noted. Affect is normal.     Cranial Nerve Examination:  CN I: Olfaction examination is deferred.  CN II: Visual fields are full to confrontation examination and show no visual field defect.   CN III, IV, VI: Eye movements are normal in all directions. Pupils are reactive to direct and consensual light. There is no relative afferent pupillary defect. There is no nystagmus.  CN V: Facial sensation to light touch is intact throughout.   CN VII: No significant facial muscle or other soft tissue asymmetry.  CN VIII: Hearing decreased bilaterally.   CN IX, X: Soft palate elevates symmetrically.  CN XI: Symmetrical shoulder shrug exam.  CN XII: Midline tongue protrusion and moves symmetrically to each side.      Motor Examination:  Muscle strength is intact throughout. Muscle tone is normal throughout. Noted postural tremor. No pronator drift is noted.      Muscle Stretch Reflexes Examination:  Deferred.      Sensory Examination:  Preserved sensation to light touch in all extremities.      Coordination:  Mild tremor on FNF testing b/l.     Stance/gait:  Uses walker.     Labs:   - Ammonia (11/11/2024): 28      ASSESSMENT AND PLAN:  1. Seizures (HCC)  Clinical presentation of his event may be consistent with seizures, but convulsive syncope is also a consideration, though much less likely.  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    We will continue Keppra with no changes, as well as lamotrigine 25 mg twice daily (not able to tolerate higher dose). Discussed in adverse effects of lamotrigine.  The patient and his wife verbalized understanding and agreement. Clarified dosing in detail with the patient and his wife.   - lamoTRIgine (LAMICTAL) 25 MG Tab; Take 1 Tablet by mouth 2 times a day.  Dispense: 60 Tablet; Refill: 5  - levetiracetam (KEPPRA) 1000 MG tablet; Take 1  Tablet by mouth 2 times a day.  Dispense: 180 Tablet; Refill: 1    We will obtain routine blood work to ensure safety of continuing current antiseizure medication regimen [orders placed at the time of the prior visit].  - CBC WITH DIFFERENTIAL; Future  - Comp Metabolic Panel; Future  - LAMOTRIGINE; Future  - LEVETIRACETAM (KEPPRA), S    In context of recent workup for seizures, as well as of late November 2024, we will defer repeat MRI brain/EEG.    Epilepsy counseling provided in writing.    2. Adverse effect of drug, subsequent encounter  The patient.  Significant adverse effects of lamotrigine at 50 mg twice daily: Represented by headaches, vision changes, and dizziness.   - we decreased her lamotrigine dose to 25 mg twice daily, and confirmed with the patient and the wife the understanding of the dose.    3. Traumatic subdural hematoma, sequela (HCC)  Stable at this time.   - we will follow this problem clinically.     4. 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 Rodgers.   - the result was noted - major neurocognitive disorder, mild in severity range.     Follow up with closely with cardiology.    Patient Instructions   Please continue Keppra 1000 mg twice daily with no changes. Please let my office know if you experience any mood issues with Keppra.     Please take Lamotrigine 25 mg twice daily.    Please seek emergent medical attention and stop lamotrigine if you experience spreading skin rash, fever, and/or lesions in your mouth.     Please reach out to our office if you do not hear from us regarding elective hospital stay for EEG monitoring.     Please let our office know if you have any changes in your seizure frequency and/characteristics. Otherwise, please keep the diary of your events and bring it with you at the time of your next follow up visit with our office.     Please take vitamin D3 9629-2317 internation units daily.     Please  note that the following might precipitate seizures: missed doses of antiseizure medications, being sick with fever, stress, fatigue, sleep deprivation, not eating regularly, not drinking enough water, drinking too much alcohol, stopping alcohol suddenly if you are currently using it on a regular/daily basis, and/or using recreational drugs, among others.    Please note that the following might lead to an injury, potentially a life-threatening injury, in case you have a seizure and/or lose awareness while:   - being in a large body of water by yourself, such as bath, pool, lake, ocean, among others (risk of drowning)   - being on unprotected heights (risk of fall)   - being around and/or operating heavy machinery (risk of injury)   - being around open fire/hot surfaces (risk of burns)   - any other activities/circumstances, in which if you lose awareness, you might injure yourself and/or others.    Please call for help (crisis line and/or 911) in case you have thoughts of harming yourself and/or others.    Please abstain from driving until further notice.    ------------------------------------------------------------------------------------------  Instructions for your family/caregivers:  Please call 911 if the patient has a seizure longer than 2-3 minutes, if seizures are back to back without him recovering to his baseline, or he does not start recovering within 5-10 minutes after the seizure stops. During the seizure - please turn him on his side, please make sure his head is protected (for example, you should put a pillow under his head, if one is available), and please do not put anything in his mouth.   -------------------------------------------------------------------------------------------    It is important that your seizures are well controlled and you have none or have them rarely. In addition to avoiding injury related to breakthrough seizures, frequent seizures increase risk of SUDEP (sudden unexpected  death in epilepsy), where a person goes into a seizure and then never wakes up - this is a rare complication of seizure disorder; one of the best available ways to prevent it is to control your seizures well.     Due to the high volume of patients we are trying to help, your physician will not be able to respond by phone or in [id number] to your routine concerns between appointments.  This does not reflect a lack of interest or concern for you or your diagnosis.  Please bring these questions and concerns to your appointment where your physician can answer.  Please relay more pressing concerns to our office, either via MyChart, or by phone; if not able to reach us please visit nearby Urgent Care Center or Emergency Department.  If any emergent medical needs, please seek emergent medical help and/or call 911.    Please note that we are not able to fill out paperwork that might be related to your work, utility company, disability, and/or driving, among others, in between the visits.  Please schedule a dedicated appointment to address your paperwork, so we can do that in a timely manner.  This is not due to lack of concern or interest for your disease-related work/administrative problems, but to make sure that we provide the best possible care and to fill out your paperwork in a correct and timely manner.    Thank you for entrusting your neurological care to [facility] and we look forward to continuing to serve you.     Follow up in 5 months.     Raman Hooper MD  Outpatient Neurology   [facility]

## 2025-01-10 ENCOUNTER — TELEPHONE (OUTPATIENT)
Dept: HEALTH INFORMATION MANAGEMENT | Facility: OTHER | Age: 65
End: 2025-01-10
Payer: OTHER GOVERNMENT

## 2025-02-05 ENCOUNTER — TELEPHONE (OUTPATIENT)
Dept: CARDIOLOGY | Facility: MEDICAL CENTER | Age: 65
End: 2025-02-05
Payer: OTHER GOVERNMENT

## 2025-02-05 DIAGNOSIS — I48.0 PAROXYSMAL ATRIAL FIBRILLATION (HCC): ICD-10-CM

## 2025-02-05 DIAGNOSIS — E11.9 TYPE 2 DIABETES MELLITUS WITHOUT COMPLICATION, WITHOUT LONG-TERM CURRENT USE OF INSULIN (HCC): ICD-10-CM

## 2025-02-05 DIAGNOSIS — I10 PRIMARY HYPERTENSION: ICD-10-CM

## 2025-02-05 NOTE — TELEPHONE ENCOUNTER
Lab orders placed per AB recommendations.    Phone Number Called: [phone]     Call outcome: Spoke to patient regarding message below.    Message: Called to discuss above with pt's spouse Alesia. Pt prefers labs to be done at [facility]. Advised will fax over orders. She also mentioned pt's urologist is ordering a CT scan of abdomen and pt will be completing this at [facility]. Declined to receive copy of lab slips. Advised fasting is not required for labs. She verbalized understanding and was appreciative.    Labs sent via RightFax to:    [facility]  [phone] F    Received transmission confirmation.

## 2025-02-05 NOTE — TELEPHONE ENCOUNTER
AB    Caller: Alesia -Wife    Topic/issue: Patients wife is asking if he has any CT scans or Labs that need to be done before 3/13/25.    Please advise.    Callback Number: [phone]    Thank you,  Petrona MADERA

## 2025-03-04 ENCOUNTER — TELEPHONE (OUTPATIENT)
Dept: CARDIOLOGY | Facility: PHYSICIAN GROUP | Age: 65
End: 2025-03-04
Payer: OTHER GOVERNMENT

## 2025-03-04 NOTE — TELEPHONE ENCOUNTER
Called pt in regards to lab work that was ordered at previous OV. Patient wife states she has lab orders but not sure if they are for Ab. She would like the lab orders faxed to [facility] to get done. Pt has follow up appointment scheduled with AB on 03.13.2025.

## 2025-03-10 ENCOUNTER — APPOINTMENT (OUTPATIENT)
Dept: NEUROLOGY | Facility: MEDICAL CENTER | Age: 65
End: 2025-03-10
Attending: PSYCHIATRY & NEUROLOGY
Payer: OTHER GOVERNMENT

## 2025-03-10 LAB
CHOLEST SERPL-MCNC: 157 MG/DL
HDLC SERPL-MCNC: 61 MG/DL
LDLC SERPL CALC-MCNC: 69 MG/DL
TRIGL SERPL-MCNC: 178 MG/DL

## 2025-03-12 DIAGNOSIS — R53.83 OTHER FATIGUE: ICD-10-CM

## 2025-03-12 DIAGNOSIS — R56.9 SEIZURES (HCC): ICD-10-CM

## 2025-03-12 DIAGNOSIS — Z79.899 HIGH RISK MEDICATION USE: ICD-10-CM

## 2025-03-13 ENCOUNTER — OFFICE VISIT (OUTPATIENT)
Dept: CARDIOLOGY | Facility: PHYSICIAN GROUP | Age: 65
End: 2025-03-13
Payer: OTHER GOVERNMENT

## 2025-03-13 ENCOUNTER — RESULTS FOLLOW-UP (OUTPATIENT)
Dept: CARDIOLOGY | Facility: MEDICAL CENTER | Age: 65
End: 2025-03-13

## 2025-03-13 VITALS
BODY MASS INDEX: 37.04 KG/M2 | OXYGEN SATURATION: 93 % | SYSTOLIC BLOOD PRESSURE: 114 MMHG | HEIGHT: 67 IN | WEIGHT: 236 LBS | HEART RATE: 54 BPM | RESPIRATION RATE: 16 BRPM | DIASTOLIC BLOOD PRESSURE: 66 MMHG

## 2025-03-13 DIAGNOSIS — F02.80 MAJOR NEUROCOGNITIVE DISORDER DUE TO MULTIPLE ETIOLOGIES WITHOUT BEHAVIORAL DISTURBANCE (HCC): ICD-10-CM

## 2025-03-13 DIAGNOSIS — I69.911 MEMORY DEFICIT AFTER CEREBROVASCULAR DISEASE: ICD-10-CM

## 2025-03-13 DIAGNOSIS — Z79.01 CURRENT USE OF LONG TERM ANTICOAGULATION: ICD-10-CM

## 2025-03-13 DIAGNOSIS — I10 PRIMARY HYPERTENSION: ICD-10-CM

## 2025-03-13 DIAGNOSIS — I77.810 AORTIC ROOT DILATATION (HCC): ICD-10-CM

## 2025-03-13 DIAGNOSIS — I48.0 PAROXYSMAL ATRIAL FIBRILLATION (HCC): ICD-10-CM

## 2025-03-13 DIAGNOSIS — E78.2 MIXED HYPERLIPIDEMIA: ICD-10-CM

## 2025-03-13 DIAGNOSIS — I25.10 CORONARY ARTERY DISEASE INVOLVING NATIVE CORONARY ARTERY OF NATIVE HEART WITHOUT ANGINA PECTORIS: ICD-10-CM

## 2025-03-13 DIAGNOSIS — S06.5XAS TRAUMATIC SUBDURAL HEMATOMA, SEQUELA (HCC): ICD-10-CM

## 2025-03-13 DIAGNOSIS — E11.9 TYPE 2 DIABETES MELLITUS WITHOUT COMPLICATION, WITHOUT LONG-TERM CURRENT USE OF INSULIN (HCC): ICD-10-CM

## 2025-03-13 DIAGNOSIS — R56.9 SEIZURES (HCC): ICD-10-CM

## 2025-03-13 DIAGNOSIS — Z95.5 STATUS POST INSERTION OF DRUG-ELUTING STENT INTO RIGHT CORONARY ARTERY FOR CORONARY ARTERY DISEASE: ICD-10-CM

## 2025-03-13 PROBLEM — R05.1 ACUTE COUGH: Status: RESOLVED | Noted: 2024-11-29 | Resolved: 2025-03-13

## 2025-03-13 PROCEDURE — 99214 OFFICE O/P EST MOD 30 MIN: CPT | Performed by: NURSE PRACTITIONER

## 2025-03-13 PROCEDURE — 3074F SYST BP LT 130 MM HG: CPT | Performed by: NURSE PRACTITIONER

## 2025-03-13 PROCEDURE — 3078F DIAST BP <80 MM HG: CPT | Performed by: NURSE PRACTITIONER

## 2025-03-13 RX ORDER — GUAIFENESIN 600 MG/1
600 TABLET, EXTENDED RELEASE ORAL
COMMUNITY
Start: 2025-03-03 | End: 2025-03-23

## 2025-03-13 ASSESSMENT — ENCOUNTER SYMPTOMS
MYALGIAS: 0
LOSS OF CONSCIOUSNESS: 0
COUGH: 0
FEVER: 0
NAUSEA: 0
SHORTNESS OF BREATH: 0
MEMORY LOSS: 1
ORTHOPNEA: 0
HEADACHES: 0
DIZZINESS: 0
ABDOMINAL PAIN: 0
CHILLS: 0
BRUISES/BLEEDS EASILY: 0
PND: 0
PALPITATIONS: 0
INSOMNIA: 0
WEAKNESS: 1

## 2025-03-13 ASSESSMENT — FIBROSIS 4 INDEX: FIB4 SCORE: 7.22

## 2025-03-13 NOTE — PROGRESS NOTES
Chief Complaint   Patient presents with    Follow-Up    Coronary Artery Disease    Atrial Fibrillation    Anticoagulation    HTN (Controlled)    Hyperlipidemia    Diabetes (Controlled)    Seizure       Subjective     Karan Wiley is a 64 y.o. male who presents today for three month follow-up of CAD, PAFIb, anticoagulation, HTN, hyperlipidemia, DM and TBI, with subsequent seizures.    Karan is a 64 year old male with history of hypertension, dyslipidemia and CAD, with STEMI in August 2020 with PCI of RCA and PCI of posterior lateral branch.  He did have post-procedural complications, including acute hypoxic respiratory failure with flash pulmonary edema, and PEA cardiac arrest.  He does also PAFib (now off of Amiodarone), and is anticoagulated with Eliquis, last seen by me in December 2024.     Holter monitor in November 2024 showed no AFib, and sinus rhythm.  Echocardiogram in November 2024 showed normal LV size and function, aortic root 4.5cm.     Complex history review:  In August 2022, he suffered a mechanical ground level fall, and had a subdural hemorrhage with a shift and mass effect. ASA and Eliquis were held; he had left craniotomy by Dr. Luis. He did have some leukocystosis and bronchial levage came back with MSSA, and he was treated with antibiotics. Eliquis was eventually restarted.     In July 2024, he had right knee replacement by Dr. Barragan.  Two days later, on 7/25/2024, he presented to [facility] with altered mental status. He had AQUILES and red/swollen knee; he was transferred back to [facility] for ongoing confusion. He was treated for pneumonia and AFIb with RVR, treated with Amiodarone. On 8/7/2024, [facility].     In November 2024, he was readmitted to [facility] (transferred from [facility]) for breakthrough seizures, and started on Keppra 1000mg BID (in addition to Lamictal). He follows closely with neurology.     He is here today for three month follow-up. BP has been more  "stable. He did try Viagra (Rx'd by PCP at the [facility]), with some success with first dose, but then limited success with further uses.    He denies any chest pain, pressure, tightness or discomfort; still occasional short episodes symptomatic palpitations, but he also has a tremor (known by neurology); no LE edema. No recent falls or syncope. Per his wife, he has progressive memory issues and fatigue/brain fog.     Past Medical History:   Diagnosis Date    Anesthesia     \"Difficulty urinated after anesthesia\"    Breath shortness     Albuterol inhalers as needed    CAD (coronary artery disease) 08/2020    STEMI. PCI/NAOMY ([place] 3.5 x 25mm) the mid RCA, and PCI/NAOMY (Raghav 2.5 x 12mm) the PDA.    Chronic anticoagulation     Takes eliquis BID    Dental disorder     Dentures - upper dentures    Depression     Dilated aortic root (HCC) 11/2024    Echocardiogram with dilated aortic root 4.5cm.    History of heart artery stent     Hyperlipidemia     Hypertension     Low back pain     Myocardial infarct (HCC)     Caryn Mckinney - Cardiologist    Paroxysmal atrial fibrillation (HCC) 10/2023    Echocardiogram with normal LV size, LVEF 55-60%. Normal RA, LA and RV. No valvular problems.    S/P drug eluting coronary stent placement     Subdural hematoma (HCC)     on Depakote    Type 2 diabetes mellitus ([facility])      Past Surgical History:   Procedure Laterality Date    ARTHROPLASTY, KNEE, ROBOT-ASSISTED Right 7/23/2024    Procedure: ROBOTIC RIGHT TOTAL KNEE ARTHROPLASTY;  Surgeon: Noe Barragan M.D.;  Location: SURGERY [facility];  Service: Ortho Robotic    CRANIOTOMY Left 8/25/2022    Procedure: CRANIOTOMY FOR SUBDURAL HEMATOMA;  Surgeon: Tesfaye Luther M.D.;  Location: [facility];  Service: Neurosurgery    SPINAL CORD STIMULATOR  2/26/2019    Procedure: SPINAL CORD STIMULATOR-  STAGE 1:  RIGHT FLANK BATTERY REPLACEMENT/UPGRADE;  Surgeon: Stepan Hawkins M.D.;  Location: [facility];  Service: " Neurosurgery    FUSION, SPINE, LUMBAR, PLIF  2019    Procedure: LUMBAR FUSION POSTERIOR-  STAGE 2: ONLAY L5-S1 BONE;  Surgeon: Stepan Hawkins M.D.;  Location: SURGERY [facility];  Service: Neurosurgery    LAMINOTOMY  2019    Procedure: LAMINOTOMY-  STAGE 2:  REDO LEFT L4-S1;  Surgeon: Stepan Hawkins M.D.;  Location: SURGERY [facility];  Service: Neurosurgery     Family History   Problem Relation Age of Onset    Cancer Mother         breast     Social History     Socioeconomic History    Marital status:      Spouse name: Not on file    Number of children: Not on file    Years of education: Not on file    Highest education level: Not on file   Occupational History    Not on file   Tobacco Use    Smoking status: Former     Current packs/day: 0.00     Average packs/day: 0.3 packs/day for 3.0 years (0.8 ttl pk-yrs)     Types: Cigarettes     Start date:      Quit date:      Years since quittin.2     Passive exposure: Past    Smokeless tobacco: Never   Vaping Use    Vaping status: Never Used   Substance and Sexual Activity    Alcohol use: No    Drug use: Never    Sexual activity: Not Currently     Partners: Female   Other Topics Concern    Not on file   Social History Narrative    Not on file     Social Drivers of Health     Financial Resource Strain: Not on file   Food Insecurity: No Food Insecurity (2024)    Hunger Vital Sign     Worried About Running Out of Food in the Last Year: Never true     Ran Out of Food in the Last Year: Never true   Transportation Needs: No Transportation Needs (2024)    PRAPARE - Transportation     Lack of Transportation (Medical): No     Lack of Transportation (Non-Medical): No   Physical Activity: Not on file   Stress: Not on file   Social Connections: Not on file   Intimate Partner Violence: Not At Risk (2024)    Humiliation, Afraid, Rape, and Kick questionnaire     Fear of Current or Ex-Partner: No     Emotionally Abused: No      "Physically Abused: No     Sexually Abused: No   Housing Stability: Low Risk  (11/26/2024)    Housing Stability Vital Sign     Unable to Pay for Housing in the Last Year: No     Number of Times Moved in the Last Year: 0     Homeless in the Last Year: No     Allergies   Allergen Reactions    Oxycodone Anaphylaxis and Swelling     Throat swelling    Pectin Anaphylaxis and Swelling     Throat swelling, cannot breathe    Hctz [Hydrochlorothiazide W-Spironolactone]      hypotension    Zolpidem Unspecified     \"Sleep walking\", excessive eating, drives/cooks during the night     Outpatient Encounter Medications as of 3/13/2025   Medication Sig Dispense Refill    MUCINEX 600 MG TABLET SR 12 HR Take 600 mg by mouth.      lamoTRIgine (LAMICTAL) 25 MG Tab Take 1 Tablet by mouth 2 times a day. 60 Tablet 5    levetiracetam (KEPPRA) 1000 MG tablet Take 1 Tablet by mouth 2 times a day. 180 Tablet 1    Pseudoephedrine-guaiFENesin (MUCINEX D PO) Take  by mouth.      hydrALAZINE (APRESOLINE) 10 MG Tab Take 0.5 Tablets by mouth 3 times a day as needed (For systolic blood pressure greater than 180). 20 Tablet 0    LORazepam (ATIVAN) 1 MG Tab Take 1/2-1 tablet PO PRN breakthrough seizures.  Do not exceed more than 2 tablets in 24 hours unless directed otherwise by physician. 10 Tablet 0    omeprazole (PRILOSEC) 40 MG delayed-release capsule Take 40 mg by mouth every evening.      HYDROcodone/acetaminophen (NORCO)  MG Tab Take 1 Tablet by mouth see administration instructions. Take 1 tablet SCHEDULED at 05:00, 10:00, 15:00, and 20:00. Max take up to 2 additional tablets throughout the day as needed for pain, up to a maximum of 6 tablets within 24 hours.      metoprolol SR (TOPROL XL) 25 MG TABLET SR 24 HR Take 1 Tablet by mouth every evening. 100 Tablet 3    apixaban (ELIQUIS) 5mg Tab Take 1 Tablet by mouth 2 times a day. 200 Tablet 3    telmisartan (MICARDIS) 40 MG Tab Take 1 Tablet by mouth every morning. 100 Tablet 3    ezetimibe " (ZETIA) 10 MG Tab Take 1 Tablet by mouth every day. 100 Tablet 3    docusate sodium (COLACE) 100 MG Cap Take 100 mg by mouth 2 times a day.      rosuvastatin (CRESTOR) 40 MG tablet Take 40 mg by mouth every evening.      albuterol 108 (90 Base) MCG/ACT Aero Soln inhalation aerosol Inhale 1-2 Puffs every four hours as needed for Shortness of Breath.      traZODone (DESYREL) 100 MG Tab Take 150 mg by mouth every evening. 1.5 tablets = 150 mg.      venlafaxine XR (EFFEXOR XR) 75 MG CAPSULE SR 24 HR Take 1 Capsule by mouth every evening. Take with 1 capsule of venlafaxine  mg, for a total dose of 225 mg.      venlafaxine (EFFEXOR-XR) 150 MG extended-release capsule Take 1 Capsule by mouth every evening. Take with 1 capsule of venlafaxine XR 75 mg, for a total dose of 225 mg.      tizanidine (ZANAFLEX) 4 MG Tab Take 4 mg by mouth 4 times a day. Take 1 tablet (4 mg) at 05:00, 10:00, 15:00, and 20:00.      finasteride (PROSCAR) 5 MG Tab Take 5 mg by mouth every day.      tamsulosin (FLOMAX) 0.4 MG capsule Take 0.4 mg by mouth every day.      [DISCONTINUED] Dextromethorphan-guaiFENesin (ROBITUSSIN DM PO) Take  by mouth. (Patient not taking: Reported on 3/13/2025)       No facility-administered encounter medications on file as of 3/13/2025.     Review of Systems   Constitutional:  Positive for malaise/fatigue. Negative for chills and fever.   HENT:  Negative for congestion.    Respiratory:  Negative for cough and shortness of breath.    Cardiovascular:  Negative for chest pain, palpitations, orthopnea, leg swelling and PND.   Gastrointestinal:  Negative for abdominal pain and nausea.   Musculoskeletal:  Negative for myalgias.   Skin:  Negative for rash.   Neurological:  Positive for weakness. Negative for dizziness, loss of consciousness and headaches.        Balances issues seems to be better.   Endo/Heme/Allergies:  Does not bruise/bleed easily.   Psychiatric/Behavioral:  Positive for memory loss. The patient does  "not have insomnia.               Objective     /66 (BP Location: Left arm, Patient Position: Sitting, BP Cuff Size: Adult)   Pulse (!) 54   Resp 16   Ht 1.702 m (5' 7\")   Wt 107 kg (236 lb)   SpO2 93%   BMI 36.96 kg/m²     Physical Exam  Constitutional:       Appearance: He is well-developed.      Comments: Ambulates with a walker.  Seems tired today.   HENT:      Head: Normocephalic.   Neck:      Vascular: No JVD.   Cardiovascular:      Rate and Rhythm: Normal rate and regular rhythm.      Heart sounds: Normal heart sounds.   Pulmonary:      Effort: Pulmonary effort is normal. No respiratory distress.      Breath sounds: Normal breath sounds. No wheezing or rales.   Abdominal:      General: Bowel sounds are normal. There is no distension.      Palpations: Abdomen is soft.      Tenderness: There is no abdominal tenderness.   Musculoskeletal:         General: Normal range of motion.      Cervical back: Normal range of motion and neck supple.   Skin:     General: Skin is warm and dry.      Coloration: Skin is not pale.      Findings: No rash.   Neurological:      Mental Status: He is alert and oriented to person, place, and time.     ECHOCARDIOGRAPHY:    CONCLUSIONS OF TTE OF 11/26/2024:  Normal left ventricular systolic function.  Normal right ventricular size and systolic function.  Aortic valve sclerosis without significant stenosis.  The aortic root is dilated with a diameter of 4.5 cm at the sinuses, 3.4 cm at the ascending aorta.    CONCLUSIONS OF TTE OF 7/28/2024:  Technically difficult study as patient is not able to follow commands.   Patient is in atrial fibrillation rates 110s-130s which limits accurate LVEF and wall motion interpretation.   Normal left and right ventricular systolic function.  Visually   estimated LVEF of 60-65%.   Valves are not well seen, but no significant valvular stenosis or regurgitation.  Compared to the prior study on 10/23/2023.      HOLTER MONITORING:    RESULTS OF " ZIOPATCH OF 10/30/2024-11/13/2024:  Minimum HR 38bpm  Average HR 55bpm  Maximum   No VT  No pauses/AV block  No AFib/flutter  PACs/PVCs <1%      Impressions of ZioPatch of 11/3/2022-11/17/2022:  Predominantly sinus rhythm.   Symptoms associated with sinus rhythm and ectopy.   Asymptomatic, short SVT and NSVT.      CT SCANS/MRI:    IMPRESSION OF CT SCAN OF HEAD OF 8/31/2022:  1.  Slightly decreased size of LEFT supratentorial subdural hematoma overlying LEFT frontal, parietal and temporal lobes  2.  Trace extra-axial blood overlying the RIGHT frontal lobe, unchanged  3.  Unchanged 3 mm of LEFT-to-RIGHT midline shift      MPI OF 11/17/2020:  Normal myocardial perfusion study  No ischemia or infarct, normal wall motion     CORONARY ANGIOGRAPHY:    IMPRESSIONS OF CORONARY ANGIOGRAM OF 8/1/2020:  1.  Acute non-STEMI due to culprit right coronary artery  2.  Successful PCI of the mid right coronary artery using 1 drug-eluting stent and IVUS guidance  3.  Successful PCI of the posterior lateral branch using 1 drug-eluting stent  4.  Normal LV systolic function  5.  Moderately elevated LVEDP (21 mmHg)  6.  Poorly controlled hypertension     Lab Results   Component Value Date/Time    CHOLSTRLTOT 157 03/10/2025 12:00 AM    LDL 69 03/10/2025 12:00 AM    HDL 61 03/10/2025 12:00 AM    TRIGLYCERIDE 178 03/10/2025 12:00 AM        LABS AS OF 3/10/2025:  TSH 3.68  Glucose 107  BUN 13  Creatinine 1.13  GFR 73  Potassium 4.1  AST 20  ALT 11    Assessment & Plan     1. Coronary artery disease involving native coronary artery of native heart without angina pectoris        2. Status post insertion of drug-eluting stent into right coronary artery for coronary artery disease        3. Paroxysmal atrial fibrillation (HCC)        4. Current use of long term anticoagulation        5. Primary hypertension        6. Aortic root dilatation (HCC)        7. Mixed hyperlipidemia        8. Type 2 diabetes mellitus without complication, without  long-term current use of insulin (HCC)        9. Traumatic subdural hematoma, sequela (HCC)        10. Major neurocognitive disorder due to multiple etiologies without behavioral disturbance (HCC)        11. 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 Seizures (HCC)            Medical Decision Making: Today's Assessment/Status/Plan:        1. CAD, status post PCI/NAOMY to the RCA in 2020. No angina or shortness of breath. Continue:  Eliquis 5mg twice daily  Toprol XL 25mg QHS  Telmisartan 40mg in the AM  Zetia 10mg once daily  Crestor 40mg once daily     2. Paroxysmal atrial fibrillation, in sinus rhythm today. ZioPatch in November 2024 did not show any AFib episodes. He is now OFF of Amiodarone. To continue to monitor for any breakthrough.     3. Chronic anticoagulation with Eliquis 5mg twice daily.    4. Hypertension, treated with Toprol XL 25mg once daily, Micardia 40mg once daily and Hydralazine 5mg TID PRN. BP is good today.     5. Hyperlipidemia, treated with Crestor 40mg and Zetia 10mg once daily.  Recent LDL was 69 (at goal)>     6. Diabetes mellitus, treated with diet alone at this time.     7. History of SDH, with subsequent seizures/tremor and memory impairment, now on Lamictal and Keppra, followed by neurology.     8. Erectile dysfunction, has trialed Viagra 100mg per PCP; can try increasing 150mg, but again, to be careful with any positions changes, and watch BP carefully. No use with NTG.      Same medications for now.  Reviewed recent labs, all good/stable.     I will see him back in 3-4 months, to assess for any breakthrough AFib.  He is to keep follow-up with other providers too.

## 2025-03-27 ENCOUNTER — TELEPHONE (OUTPATIENT)
Dept: NEUROLOGY | Facility: MEDICAL CENTER | Age: 65
End: 2025-03-27
Payer: OTHER GOVERNMENT

## 2025-03-28 NOTE — TELEPHONE ENCOUNTER
Noted. The patient is not clear to drive at this time from neurological perspective. Happy to discuss this further at the time of the next visit. Thank you.   
Patients wife called and is raising concern about Karan. Karan has been seizure free for more than 3 months and believes that is able to drive. Dr. Hooper has not cleared him to drive. Patients wife also that she does not want him to drive as he has been diagnosed with cardiovascular dementia. The wife listed a couple instances where his dementia has been very noticeable and she does not feel it is safe for him to operate a vehicle. She believes that his dementia is progressing. Please advise     
6

## 2025-05-29 ENCOUNTER — TELEPHONE (OUTPATIENT)
Dept: NEUROLOGY | Facility: MEDICAL CENTER | Age: 65
End: 2025-05-29
Payer: OTHER GOVERNMENT

## 2025-06-01 NOTE — THERAPY
Speech Language Pathology   Speaking Valve     Patient Name: Karan Wiley  AGE:  62 y.o., SEX:  male  Medical Record #: [id number]  Today's Date: 9/8/2022     Precautions  Precautions: Fall Risk, Tracheostomy , Nasogastric Tube  Comments: ETT    HPI:Patient is a 62 year-old man who was injured in a fall. S/p Left Crani 8/25. Pt admitted 8/25  as a Trauma Yellow Transfer activation. The patient was initially evaluated at [facility].  Intubated 8/25-9/4. Portex 8.0 cuffed 9/4    Head CT 8/31-  1.  Slightly decreased size of LEFT supratentorial subdural hematoma overlying LEFT frontal, parietal and temporal lobes  2.  Trace extra-axial blood overlying the RIGHT frontal lobe, unchanged  3.  Unchanged 3 mm of LEFT-to-RIGHT midline shift    Last seen by SLP 8/2020 for a cognitive evaluation and was WNL.      PMHx:CAD (coronary artery disease) (08/2020), Chronic anticoagulation, Depression, Hyperlipidemia, Hypertension, Low back pain, and Paroxysmal atrial fibrillation (HCC). has a past surgical history that includes spinal cord stimulator (2/26/2019); fusion, spine, lumbar, plif (2/26/2019); and laminotomy (2/26/2019).         Level of Consciousness: Alert  Affect/Behavior: Appropriate, Cooperative  Follows Directives: Yes - simple commands only, inconsistent  Orientation: Self, Current year  Hearing: Hearing impaired  Vision: Functional vision      Prior Living Situation & Level of Function: Pt reports he lives alone.  He states he has stairs, but did not quantify number of steps.    Oral Mechanism Evaluation  Facial Symmetry: Equal  Facial Sensation: Equal  Labial Observations: Pt did not follow commands to assess  Lingual Observations: Pt did not follow commands for assessment  Dentition: Pt did not follow commands to assess  Comments:      Subjective  Patient sitting up in bed agreeable to Evaluation.  RN and RT cleared patient to be seen.       Assessment  Cardiopulmonary Vitals  Initial:  HR 83, /76, O2 95, RR 20  End: HR 71, /76, O2 90, RR 26  Vital Changes Observed: None      Speaking Valve Assessment  Tracheostomy: Portex  Size: 8.0  Cuff Position: Inflated  Air in Cuff (cc): 6 cc  Oxygen Requirements: 5 L 30 FiO2 T-Piece  Oral Suctioning Provided: Yes   Tracheal Suctioning Provided: Pre cuff deflation, Post cuff deflation, Subglottic suction, Prior to speaking valve placement, After completion of the evaluation  Upper Airway Patency with Speaking Valve: Adequate airflow, functional phonation at the word level  Vocal Quality: WFL  Max Phonation Time: Did not follow direction  Breath Stacking Present: No  Behavior During Speaking Valve Trial: fatigue at 15 mins  Duration of Speaking Valve Trial(s): 15 minutes  Speaking Valve Left on Patient at Conclusion: No    Comments: Evaluation completed under the supervision of Estrellita Mathew [facility]-SLP. SLP provided suctioning prior to PMSV placement with mild amount of secretions removed.  He tolerated cuff deflation with stable vitals. SLP donned speaking valve.  Patient was able to give 1-2 word answers with maximum  cues to vocalize.  He did have one 3 word spontaneous response to RN asking if he had pain.  Patient reported fatigue at 15 minutes.  SLP removed PMSV and cuff was reinflated with T-piece in place.  He had a nickel sized mucous plug removed with suction at the end of the session.     Clinical Impressions:  Patient tolerated PMSV for 15 minutes with stable vitals, but reported fatigue at 15 minutes.  He had a large mucous removed at the end of session.        Recommendations:  1. Patient to use speaking valve under the following conditions:             Placement: Trained staff only             Duration: 15 minutes             Cuff: MUST be deflated prior to speaking valve placement  2.   Remove speaking valve with any signs of respiratory distress.  3.  SLP to follow for PMSV treatment and to complete a clinical swallow evaluation as  appropriate.      Plan    Recommend Speech Therapy 5 times per week until therapy goals are met for the following treatments:   PMSV training .    Discharge Recommendations: Recommend post-acute placement for additional speech therapy services prior to discharge home         Objective       09/08/22 1401   Precautions   Precautions Fall Risk;Tracheostomy ;Nasogastric Tube   Vitals   O2 (LPM) 5   O2 Delivery Device T-Piece   Pain 0 - 10 Group   Therapist Pain Assessment Post Activity Pain Same as Prior to Activity;0   Prior Living Situation   Prior Services Home-Independent   Steps Into Home   (unable to determine.  Stated steps, but not specifically how many.)   Lives with - Patient's Self Care Capacity Other (Comments)  (Pt reported lives alone.)   Prior Level Of Function   Communication Unknown   Swallow Within Functional Limits   Hearing Impaired Both Ears   Vision Within Functional Limits for Evaluation   Patient's Primary Language English   Patient / Family Goals   Patient / Family Goal #1 Patient did not state a goal.   Short Term Goals   Short Term Goal # 1 Patient will tolerate PMSV for 15 minutes with stable vital signs with trained staff.   Short Term Goal # 2 Patient will verbalize answers with 90% accuracy while wearing valve.      213.8

## 2025-06-09 ENCOUNTER — OFFICE VISIT (OUTPATIENT)
Dept: NEUROLOGY | Facility: MEDICAL CENTER | Age: 65
End: 2025-06-09
Attending: PSYCHIATRY & NEUROLOGY
Payer: OTHER GOVERNMENT

## 2025-06-09 VITALS
DIASTOLIC BLOOD PRESSURE: 86 MMHG | RESPIRATION RATE: 12 BRPM | SYSTOLIC BLOOD PRESSURE: 122 MMHG | TEMPERATURE: 98.5 F | BODY MASS INDEX: 39.07 KG/M2 | HEIGHT: 67 IN | OXYGEN SATURATION: 95 % | WEIGHT: 248.9 LBS | HEART RATE: 66 BPM

## 2025-06-09 DIAGNOSIS — F01.A0 MAJOR NEUROCOGNITIVE DISORDER, DUE TO VASCULAR DISEASE, WITHOUT BEHAVIORAL DISTURBANCE, MILD (HCC): ICD-10-CM

## 2025-06-09 DIAGNOSIS — T50.905D ADVERSE EFFECT OF DRUG, SUBSEQUENT ENCOUNTER: ICD-10-CM

## 2025-06-09 DIAGNOSIS — R56.9 SEIZURES (HCC): Primary | ICD-10-CM

## 2025-06-09 PROCEDURE — 3074F SYST BP LT 130 MM HG: CPT | Performed by: PSYCHIATRY & NEUROLOGY

## 2025-06-09 PROCEDURE — 99215 OFFICE O/P EST HI 40 MIN: CPT | Performed by: PSYCHIATRY & NEUROLOGY

## 2025-06-09 PROCEDURE — 99417 PROLNG OP E/M EACH 15 MIN: CPT | Performed by: PSYCHIATRY & NEUROLOGY

## 2025-06-09 PROCEDURE — 99213 OFFICE O/P EST LOW 20 MIN: CPT | Performed by: PSYCHIATRY & NEUROLOGY

## 2025-06-09 PROCEDURE — 3079F DIAST BP 80-89 MM HG: CPT | Performed by: PSYCHIATRY & NEUROLOGY

## 2025-06-09 RX ORDER — LAMOTRIGINE 25 MG/1
25 TABLET ORAL 2 TIMES DAILY
Qty: 60 TABLET | Refills: 5 | Status: SHIPPED | OUTPATIENT
Start: 2025-06-09

## 2025-06-09 RX ORDER — LEVETIRACETAM 1000 MG/1
1000 TABLET ORAL 2 TIMES DAILY
Qty: 180 TABLET | Refills: 1 | Status: SHIPPED | OUTPATIENT
Start: 2025-06-09

## 2025-06-09 ASSESSMENT — PATIENT HEALTH QUESTIONNAIRE - PHQ9
CLINICAL INTERPRETATION OF PHQ2 SCORE: 4
5. POOR APPETITE OR OVEREATING: 1 - SEVERAL DAYS
SUM OF ALL RESPONSES TO PHQ QUESTIONS 1-9: 18

## 2025-06-09 ASSESSMENT — FIBROSIS 4 INDEX: FIB4 SCORE: 7.22

## 2025-06-09 NOTE — PROGRESS NOTES
[facility]  Follow Up Visit      Patient's Name: Karan Wiley  YOB: 1960  MRN: [id number]  Date of Service: 06/09/25.    Referring Provider: Raman Hooper M.D.  [address] [place] [address],  [place] [id number]    Chief Concern: Seizures.     The patient presents for a follow up visit. The patient presents with his wife and provides verbal consent for her to be present and provide additional information as needed.     HPI (as obtained at the time of the initial visit and updated as necessary): The patient is a 64 y.o., right-handed male, who initially presented to my epilepsy clinic for evaluation of seizures on 09/18/2024.    The patient used to follow-up with Dr. Ya.    The patient was in his usual health, in context of his known medical history, when he fell and hit his head in August 2022.  This led to transfer to [facility] due to acute left subdural hematoma.  He was treated with bur hole and then craniotomy drainage.  It seems that the patient had no seizures at that time, and Depakote was started prophylactically.  This occurred while he was on Eliquis for A-fib.    Then, in July 2024, he started having episodes suspicious for seizures versus syncope, among other considerations.  These are described under event type #1.      He never had myoclonus, clear convulsions, no isolated staring spells/oral/manual automatisms times, sensations of strange smell/taste/gastric uprising, no clear psychic phenomena.    He is taking Depakote regularly, and except significant weight gain and tremor, no other adverse effects.    He underwent EMU in November 2024, when his Depakote was switched to lamotrigine. He unfortunately had breakthrough seizures shortly after EMU discharge (found unresponsive, wetted, aggressive as he was slowly waking up after the event), he was readmitted to the hospital, neurology was consulted, and he is antiseizure medication regimen was optimized to  "lamotrigine 50 mg twice daily and Keppra 1000 mg twice daily.  The patient remained seizure-free since then until May 2025.     During the second hospitalization in November 2024, as per the hospitalist note: \" While hospitalized he had a TTE that showed aortic root dilated 4.5 cm at sinuses and 3.4 cm ascending aorta. Cardiology recommended outpatient follow-up. He was also intermittently hypertensive up to SBP>200.\"    He has a history of Bell's palsy, with facial weakness over the left.     The patient has a major neurocognitive disorder, as diagnosed by Dr. Rodgers on 09/17/2024: \"Based on his history, cognitive profile, and reported functional status, he meets criteria for major neurocognitive disorder (dementia) in the mild severity range. Etiology is likely multifactorial. His frontal subcortical pattern of deficits is consistent with what is typically seen in patients with vascular dementia. This is corroborated by his history of multiple vascular risk factors and SDH, although this was traumatic in nature. Other possible contributing factors to his cognitive decline include his history of myocardial infarction with hypoxia and seizure-like episodes. Medication effects (divalproex, tizanidine, and hydrocodone-acetaminophen) may also be contributory, particularly to his reduced processing speed. Additional possible exacerbating factors include chronic pain, hypersomnia, and possible untreated sleep apnea. His overall pattern of performance including largely intact language, memory for stories, and visual memory is not consistent with a neurodegenerative disorder such as Alzheimer's disease. Most of the cognitive recovery following traumatic SDHs occurs within the first year, with milder recovery possible during the second year. As such, he is encouraged to engage in rehabilitation therapies for his residual deficits as needed (e.g., cognitive, speech, physical, and occupational). This evaluation may serve as a " "baseline for longitudinal tracking. \"     Semiology:    Event type #1: \"Seizure\".  Year/Age of Onset: July 2024  Initial features: Feel \"aura\" - described as dizziness, lightheadedness, and like he is going to pass out. Aware while falling.   Event features: Falls back (typically on the bed - never fell on the floor). Eyes are open. Unresponsive. He experiences brief upper body jerking. No tongue bite nor bladder/bowel incontinence.   Post-ictal features: At times some confusion, agitation.   Duration: 4-5 seconds.   Frequency: Initially couple times per week. There was an event was in November 2024. There was also an event in May 2025.   Precipitating factors: Typically when getting up too quickly.     History of status epilepticus: no  History of physical injury related to seizures: no  History of surgery related to epilepsy: no  Family Planning: N/A  Current Driving Status: He is not driving.   Seizure Clusters: Not clear.   Longest Seizure Freedom: Not clear.     Pertinent Ancillary Test Results:    MRI brain studies:   - MRI brain - none available for my review.     CT head studies:   - CT head wo contrast (08/25/2022 at [facility]): \"Stable LEFT hemispheric acute subdural hematoma with associated mass effect and 8 mm LEFT to RIGHT midline shift.  No significant change from prior.\"     - CT head wo contrast was reportedly unrevealing in November 2024 (per prior notes).     EEG studies:   - Standard EEG (08/31/2022, Dr. Blanca): This is an abnormal video EEG recording in the obtunded state.   1)The diffuse background slowing is consistent with moderate encephalopathy.   2)The presence of intermittent left hemisphere slowing is suggestive of focal neuronal dysfunction.   3) Upward gaze and sometime left gaze deviation were noted, no ictal EEG correlate. No seizure seen.   4) No epileptiform discharges or seizures were seen. This does not preclude a diagnosis of epilepsy.     - Standard EEG (03/28/2023, Dr. Hooper):  " This was an abnormal EEG during wakefulness and drowsiness due to:  Very mild diffuse slowing of the background, suggestive of diffuse and/or multifocal cerebral dysfunction.  This finding might be seen in a myriad of encephalopathies and in itself is a nonspecific finding.  The presence of continuous, left hemispheric, maximal over the left temporal region, focal slowing, is suggestive of additional cerebral dysfunction in that region.  This finding might be seen in context of structural lesion, among other considerations.  Clinical and radiological correlation is recommended.  The presence of embedded sharply contoured waveforms in the above described left-sided focal slowing might be suggestive of increased propensity toward focal seizures arising from this region.  The presence of higher amplitudes and overriding faster frequencies over the left side is suggestive of breach rhythm, and is consistent with the provided surgical history.  There were no electrographic seizures captured.  Single lead EKG showed occasional PVCs - please correlate clinically.      - Standard EEG (08/17/2023, Dr. Martínez):  This was an abnormal EEG during wakefulness and drowsiness due to:  Very mild diffuse slowing of the background, suggestive of diffuse and/or multifocal cerebral dysfunction.  This finding might be seen in a myriad of encephalopathies and in itself is a nonspecific finding.  The presence of continuous, left hemispheric, maximal over the left temporal region, focal slowing, is suggestive of additional cerebral dysfunction in that region.  This finding might be seen in context of structural lesion, among other considerations.  Clinical and radiological correlation is recommended.  The presence of embedded sharply contoured waveforms in the above described left-sided focal slowing might be suggestive of increased propensity toward focal seizures arising from this region.  The presence of higher amplitudes and overriding  "faster frequencies over the left side is suggestive of breach rhythm, and is consistent with the provided surgical history.  There were no electrographic seizures captured.  Single lead EKG showed occasional PVCs - please correlate clinically.      - Inpatient Spot EEG (11/25/2024, Dr. Martínez): Abnormal video EEG recording in the awake, drowsy, and sleep state(s):  1) Rare left temporal sharp waves. This finding indicates a propensity for seizures to arise from the left temporal region, or alternatively may be observed in the setting of a structural abnormality, or post-ictal state. Clinical correlation recommended.   2) Mild continuous generalized slowing of the background, af finding suggestive of mild diffuse cerebral dysfunction of a nonspecific etiology.  3) Diffuse excess beta activity is present, a finding which may be observed in the setting of sedative/medication use.     5-day EMU November 2024, Dr. Saunders/Zelda Mclaughlin: During this admission, Depakote was switched to lamotrigine.  There were no electrographic seizures, but there were interictal abnormalities.  The patient had hypertensive crisis during day 1 of admission.  He also underwent Zio patch monitoring for 14 days around this admission. Of note, surveillance labs revealed microcytic anemia and mild hypokalemia.  On the day #2, he experienced \"startled awake\" and was unaware of why; 20:25, pt was up to the bathroom,  He endorsed dizziness, leg weakness, anxiousness and exhibited upper body shakiness. BP: 134/83, HR 63-- orthostatic presyncope.   EEG SUMMARY: Per Dr Saunders   TECHNIQUE:   CVEEG was set up by a Neurodiagnostic technologist who performed education to the patient and staff. A minimum of 23 electrodes and 23 channel recording was setup and performed by Neurodiagnostic technologist, in accordance with the international 10-20 system. Impedence, electrode integrity, and technical impressions were documented a minimum of every 2-24 hour " period by a Neurodiagnostic Technologist and reviewed by Interpreting Physician. The study was reviewed in bipolar and referential montages. The recording examined the patient in the awake, drowsy, and sleep state(s).   DESCRIPTION OF THE RECORD:  During wakefulness, the background was continuous and showed a 9 Hz posterior dominant rhythm, with a mixture of mostly excess theta>delta, alpha, and overriding faster frequencies.  There was reactivity to eye closure/opening.  A normal anterior-posterior gradient was noted with faster beta frequencies seen anteriorly.  During drowsiness, theta/delta frequencies were seen. Sleep was captured and was characterized by diffuse background delta/theta activity with a loss of myogenic artifact.  N2 sleep transients in the form of sleep spindles and vertex waves were seen in the leads over the central regions.   ICTAL AND INTERICTAL FINDINGS:   No focal or generalized epileptiform activity noted.   Occasional  left temporal>right fronto-central breach rhythm and theta/delta slowing, occasional quasi-rhythmic or rhythmic at 1-1.5 Hz for 2-4 seconds. There was also rare to occasional independent right temporal or right hemispheric theta>delta slowing and amplitude attenuation.   These findings are indicative of focal dysfunction in these regions, worse over the left. Also, the presence of focal rhythmic slowing over the left may indicate an increased risk for focal seizures over that region. No seizures. Sampling of the EKG recording showed sinus rhythm was occasional PACs     INTERIM HISTORY (06/09/2025): The patient unfortunately had a breakthrough seizure on 5/5/2025.  It seems that the event was not witnessed, but the patient felt like he had a seizure, was in a strange position in bed, and appeared confused.  This occurred in context of him taking higher dose of Viagra than usual, and also being on lower dose of Keppra.    It seems that the patient has been on Keppra once  "daily, unclear dose, and unclear when this was changed.  He is taking lamotrigine 25 mg twice daily, and has no adverse effects from it.    The patient reports that he has marital challenges due to his own behavior.  The wife corroborates this, and though it seems that there is a long history of marital faithfulness issues on the patient's side, it seems that things got much worse since 2022, when he had his head injury.    The patient's wife shared the challenges in their marriage in detail, although acknowledged that she is aware that I am not a therapist, but wanted me to be aware of the stressors that they are facing as a couple, and that she particularly is facing in context of patient's cognitive challenges.    Currently, it seems that the patient does not have significant adverse effects of his medications.  It does appear that his mood challenges are mostly due to his cognitive challenges and marital challenges.    Current Antiseizure Medications: Keppra 1000 mg BID (it seems he is taking only once per day in PM, but not clear why). Lamotrigine 25 mg BID.     Previously Tried Antiseizure Medications: Depakote  mg TID.     Review of Systems: No recent fevers. He gained 15 lbs in the interim. His mood is worse. No SI/HI.    Risk Factors For Epilepsy/Seizure Disorder: Subdural hematoma.     Past Medical History:  Past Medical History:   Diagnosis Date    Anesthesia     \"Difficulty urinated after anesthesia\"    Breath shortness     Albuterol inhalers as needed    CAD (coronary artery disease) 08/2020    STEMI. PCI/NAOMY ([place] 3.5 x 25mm) the mid RCA, and PCI/NAOMY ([place] 2.5 x 12mm) the PDA.    Chronic anticoagulation     Takes eliquis BID    Dental disorder     Dentures - upper dentures    Depression     Dilated aortic root (HCC) 11/2024    Echocardiogram with dilated aortic root 4.5cm.    History of heart artery stent     Hyperlipidemia     Hypertension     Low back pain     Myocardial infarct (HCC)     Caryn " Mckinney - Cardiologist    Paroxysmal atrial fibrillation (HCC) 10/2023    Echocardiogram with normal LV size, LVEF 55-60%. Normal RA, LA and RV. No valvular problems.    S/P drug eluting coronary stent placement     Subdural hematoma (HCC)     on Depakote    Type 2 diabetes mellitus (HCC)      Past Surgical History:  Past Surgical History:   Procedure Laterality Date    ARTHROPLASTY, KNEE, ROBOT-ASSISTED Right 2024    Procedure: ROBOTIC RIGHT TOTAL KNEE ARTHROPLASTY;  Surgeon: Noe Barragan M.D.;  Location: SURGERY [facility];  Service: Ortho Robotic    CRANIOTOMY Left 2022    Procedure: CRANIOTOMY FOR SUBDURAL HEMATOMA;  Surgeon: Tesfaye Luther M.D.;  Location: SURGERY [facility];  Service: Neurosurgery    SPINAL CORD STIMULATOR  2019    Procedure: SPINAL CORD STIMULATOR-  STAGE 1:  RIGHT FLANK BATTERY REPLACEMENT/UPGRADE;  Surgeon: Stepan Hawkins M.D.;  Location: SURGERY [facility];  Service: Neurosurgery    FUSION, SPINE, LUMBAR, PLIF  2019    Procedure: LUMBAR FUSION POSTERIOR-  STAGE 2: ONLAY L5-S1 BONE;  Surgeon: Stepan Hawkins M.D.;  Location: SURGERY [facility];  Service: Neurosurgery    LAMINOTOMY  2019    Procedure: LAMINOTOMY-  STAGE 2:  REDO LEFT L4-S1;  Surgeon: Stepan Hawkins M.D.;  Location: [facility];  Service: Neurosurgery      Social History:  Social History     Tobacco Use    Smoking status: Former     Current packs/day: 0.00     Average packs/day: 0.3 packs/day for 3.0 years (0.8 ttl pk-yrs)     Types: Cigarettes     Start date:      Quit date:      Years since quittin.4     Passive exposure: Past    Smokeless tobacco: Never   Vaping Use    Vaping status: Never Used   Substance Use Topics    Alcohol use: No    Drug use: Never     Family History:  His mother had seizures.   Family History   Problem Relation Age of Onset    Cancer Mother         breast         2025    11:00 AM 2025    11:45 AM 2024     2:00 PM 2024  "    9:00 AM 3/17/2023    10:00 AM   PHQ-9 Screening   Little interest or pleasure in doing things 2 - more than half the days 0 - not at all 0 - not at all 0 - not at all 0 - not at all   Feeling down, depressed, or hopeless 2 - more than half the days 0 - not at all 0 - not at all 0 - not at all 0 - not at all   Trouble falling or staying asleep, or sleeping too much 3 - nearly every day       Feeling tired or having little energy 3 - nearly every day       Poor appetite or overeating 1 - several days       Feeling bad about yourself - or that you are a failure or have let yourself or your family down 3 - nearly every day       Trouble concentrating on things, such as reading the newspaper or watching television 3 - nearly every day       Moving or speaking so slowly that other people could have noticed. Or the opposite - being so fidgety or restless that you have been moving around a lot more than usual 1 - several days       Thoughts that you would be better off dead, or of hurting yourself in some way 0 - not at all       PHQ-2 Total Score 4 0 0 0 0   PHQ-9 Total Score 18         [place] Suicide Reassessment  New or continued thoughts about killing self?: No  Preparing to end life?: No    Allergies:  Allergies   Allergen Reactions    Oxycodone Anaphylaxis and Swelling     Throat swelling    Pectin Anaphylaxis and Swelling     Throat swelling, cannot breathe    Hctz [Hydrochlorothiazide W-Spironolactone]      hypotension    Zolpidem Unspecified     \"Sleep walking\", excessive eating, drives/cooks during the night     Current Medications:    Current Outpatient Medications:     lamoTRIgine, 25 mg, Oral, BID, Taking    levetiracetam, 1,000 mg, Oral, BID, Taking    Pseudoephedrine-guaiFENesin (MUCINEX D PO), Take  by mouth., Taking    omeprazole, 40 mg, Oral, Q EVENING, Taking    HYDROcodone/acetaminophen, 1 Tablet, Oral, See Admin Instructions, Taking    metoprolol SR, 25 mg, Oral, Q EVENING, Taking    apixaban, 5 " "mg, Oral, BID, Taking    ezetimibe, 10 mg, Oral, DAILY, Taking    docusate sodium, 100 mg, Oral, BID, Taking    rosuvastatin, 40 mg, Oral, Q EVENING, Taking    albuterol, 1-2 Puff, Inhalation, Q4HRS PRN, Taking As Needed    traZODone, 150 mg, Oral, Q EVENING, Taking    venlafaxine XR, 1 Capsule, Oral, Q EVENING, Taking    venlafaxine, 1 Capsule, Oral, Q EVENING, Taking    tizanidine, 4 mg, Oral, 4X/DAY, Taking    finasteride, 5 mg, Oral, DAILY, Taking    tamsulosin, 0.4 mg, Oral, DAILY, Taking    hydrALAZINE, 5 mg, Oral, TID PRN (Patient not taking: Reported on 6/9/2025), Not Taking    telmisartan, 40 mg, Oral, QAM (Patient not taking: Reported on 6/9/2025), Not Taking    Physical Examination:    Ambulatory Vitals  Encounter Vitals  Temperature: 36.9 °C (98.5 °F)  Temp src: Temporal  Blood Pressure: 122/86  Pulse: 66  Respiration: 12  Pulse Oximetry: 95 %  Weight: 113 kg (248 lb 14.4 oz)  Height: 170.2 cm (5' 7\")  BMI (Calculated): 38.98    Neurological Examination:  Mental Status: The patient is alert and oriented to person, place, time, and situation. Speech is somewhat slow, with no aphasia nor dysarthria noted. Affect is normal.     Cranial Nerve Examination:  CN I: Olfaction examination is deferred.  CN II: Visual fields are full to confrontation examination and show no visual field defect.   CN III, IV, VI: Eye movements are normal in all directions. Pupils are reactive to direct and consensual light. There is no relative afferent pupillary defect. There is no nystagmus.  CN V: Facial sensation to light touch is intact throughout.   CN VII: No significant facial muscle or other soft tissue asymmetry.  CN VIII: Hearing decreased bilaterally.   CN IX, X: Soft palate elevates symmetrically.  CN XI: Symmetrical shoulder shrug exam.  CN XII: Midline tongue protrusion and moves symmetrically to each side.      Motor Examination:  Muscle strength is intact throughout. Muscle tone is normal throughout. Noted postural " tremor. No pronator drift is noted.      Muscle Stretch Reflexes Examination:  Deferred.      Sensory Examination:  Preserved sensation to light touch in all extremities.      Coordination:  Mild tremor on FNF testing b/l.     Stance/gait:  Deferred.     Labs:   - Ammonia (11/11/2024): 28      ASSESSMENT AND PLAN:  1. Seizures (HCC)  Clinical presentation of his event may be consistent with seizures, but convulsive syncope is also a consideration, though much less likely.  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    The patient had a breakthrough seizures in May 2025, which is a severe condition.  It seems that the possible precipitating factors are lower dose of Keppra and higher dose of Viagra.  It is not clear why the patient's Keppra dose was decreased, and the patient's wife was advised to send me a [place] message after his Keppra dose is clarified based on pillboxes.  At this time, we will continue Keppra 1000 mg twice daily, lamotrigine 25 mg twice daily, and follow closely for any adverse effects of Keppra.  The patient and his wife verbalized understanding and agreement.  - lamoTRIgine (LAMICTAL) 25 MG Tab; Take 1 Tablet by mouth 2 times a day.  Dispense: 60 Tablet; Refill: 5  - levetiracetam (KEPPRA) 1000 MG tablet; Take 1 Tablet by mouth 2 times a day.  Dispense: 180 Tablet; Refill: 1    Noted the results of CBC and CMP ordered by PCP.  We still need levels of his antiseizure medications.  - LEVETIRACETAM (KEPPRA), S  - LAMOTRIGINE; Future    In context of workup for seizures in 2024, we will defer repeat MRI brain/EEG.    The patient was advised that most likely he will have to abstain from driving permanently, mostly due to his cognitive challenges.  The patient and his wife verbalized understanding and agreement.    Epilepsy counseling provided in writing.    2. Adverse effect of drug, subsequent encounter  The patient.   Significant adverse effects of lamotrigine at 50 mg twice daily: Represented by headaches, vision changes, and dizziness.   - we decreased her lamotrigine dose to 25 mg twice daily, and confirmed with the patient and the wife the understanding of the dose. Doing much better on the lower dose of lamotrigine.     3. Traumatic subdural hematoma, sequela (HCC)  Stable at this time.   - we will follow this problem clinically.     4. 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 Rodgers.   - the result was noted - major neurocognitive disorder, mild in severity range.    - Patient identified as fall risk.  Appropriate orders and counseling given.   - we will continue to follow this problems clinically; his blood pressure is well controlled at this time.     Follow up with closely with cardiology.    Patient Instructions   Please continue Keppra 1000 mg twice daily with no changes. Please let my office know if you experience any mood issues with Keppra.     Please take Lamotrigine 25 mg twice daily.    Please seek emergent medical attention and stop lamotrigine if you experience spreading skin rash, fever, and/or lesions in your mouth.     Please reach out to our office if you do not hear from us regarding elective hospital stay for EEG monitoring.     Please let our office know if you have any changes in your seizure frequency and/characteristics. Otherwise, please keep the diary of your events and bring it with you at the time of your next follow up visit with our office.     Please take vitamin D3 [id number] internation units daily.     Please note that the following might precipitate seizures: missed doses of antiseizure medications, being sick with fever, stress, fatigue, sleep deprivation, not eating regularly, not drinking enough water, drinking too much  alcohol, stopping alcohol suddenly if you are currently using it on a regular/daily basis, and/or using recreational drugs, among others.    Please note that the following might lead to an injury, potentially a life-threatening injury, in case you have a seizure and/or lose awareness while:   - being in a large body of water by yourself, such as bath, pool, lake, ocean, among others (risk of drowning)   - being on unprotected heights (risk of fall)   - being around and/or operating heavy machinery (risk of injury)   - being around open fire/hot surfaces (risk of burns)   - any other activities/circumstances, in which if you lose awareness, you might injure yourself and/or others.    Please call for help (crisis line and/or 911) in case you have thoughts of harming yourself and/or others.    Please abstain from driving until further notice.    ------------------------------------------------------------------------------------------  Instructions for your family/caregivers:  Please call 911 if the patient has a seizure longer than 2-3 minutes, if seizures are back to back without him recovering to his baseline, or he does not start recovering within 5-10 minutes after the seizure stops. During the seizure - please turn him on his side, please make sure his head is protected (for example, you should put a pillow under his head, if one is available), and please do not put anything in his mouth.   -------------------------------------------------------------------------------------------    It is important that your seizures are well controlled and you have none or have them rarely. In addition to avoiding injury related to breakthrough seizures, frequent seizures increase risk of SUDEP (sudden unexpected death in epilepsy), where a person goes into a seizure and then never wakes up - this is a rare complication of seizure disorder; one of the best available ways to prevent it is to control your seizures well.     Due  to the high volume of patients we are trying to help, your physician will not be able to respond by phone or in MyChart to your routine concerns between appointments.  This does not reflect a lack of interest or concern for you or your diagnosis.  Please bring these questions and concerns to your appointment where your physician can answer.  Please relay more pressing concerns to our office, either via MyChart, or by phone; if not able to reach us please visit nearby Urgent Care Center or Emergency Department.  If any emergent medical needs, please seek emergent medical help and/or call 911.    Please note that we are not able to fill out paperwork that might be related to your work, utility company, disability, and/or driving, among others, in between the visits.  Please schedule a dedicated appointment to address your paperwork, so we can do that in a timely manner.  This is not due to lack of concern or interest for your disease-related work/administrative problems, but to make sure that we provide the best possible care and to fill out your paperwork in a correct and timely manner.    Thank you for entrusting your neurological care to [facility] and we look forward to continuing to serve you.     Follow up in 6 months.     My total time spent caring for the patient on the day of the encounter was 65 minutes.   This does not include time spent on separately billable procedures/tests.      Raman Hooper MD  Outpatient Neurology   [facility]

## 2025-06-09 NOTE — PATIENT INSTRUCTIONS
Please continue Keppra 1000 mg twice daily with no changes. Please let my office know if you experience any mood issues with Keppra.     Please take Lamotrigine 25 mg twice daily.    Please seek emergent medical attention and stop lamotrigine if you experience spreading skin rash, fever, and/or lesions in your mouth.     Please reach out to our office if you do not hear from us regarding elective hospital stay for EEG monitoring.     Please let our office know if you have any changes in your seizure frequency and/characteristics. Otherwise, please keep the diary of your events and bring it with you at the time of your next follow up visit with our office.     Please take vitamin D3 [id number] internation units daily.     Please note that the following might precipitate seizures: missed doses of antiseizure medications, being sick with fever, stress, fatigue, sleep deprivation, not eating regularly, not drinking enough water, drinking too much alcohol, stopping alcohol suddenly if you are currently using it on a regular/daily basis, and/or using recreational drugs, among others.    Please note that the following might lead to an injury, potentially a life-threatening injury, in case you have a seizure and/or lose awareness while:   - being in a large body of water by yourself, such as bath, pool, lake, ocean, among others (risk of drowning)   - being on unprotected heights (risk of fall)   - being around and/or operating heavy machinery (risk of injury)   - being around open fire/hot surfaces (risk of burns)   - any other activities/circumstances, in which if you lose awareness, you might injure yourself and/or others.    Please call for help (crisis line and/or 911) in case you have thoughts of harming yourself and/or others.    Please abstain from driving until further notice.    ------------------------------------------------------------------------------------------  Instructions for your  family/caregivers:  Please call 911 if the patient has a seizure longer than 2-3 minutes, if seizures are back to back without him recovering to his baseline, or he does not start recovering within 5-10 minutes after the seizure stops. During the seizure - please turn him on his side, please make sure his head is protected (for example, you should put a pillow under his head, if one is available), and please do not put anything in his mouth.   -------------------------------------------------------------------------------------------    It is important that your seizures are well controlled and you have none or have them rarely. In addition to avoiding injury related to breakthrough seizures, frequent seizures increase risk of SUDEP (sudden unexpected death in epilepsy), where a person goes into a seizure and then never wakes up - this is a rare complication of seizure disorder; one of the best available ways to prevent it is to control your seizures well.     Due to the high volume of patients we are trying to help, your physician will not be able to respond by phone or in [facility] to your routine concerns between appointments.  This does not reflect a lack of interest or concern for you or your diagnosis.  Please bring these questions and concerns to your appointment where your physician can answer.  Please relay more pressing concerns to our office, either via [facility], or by phone; if not able to reach us please visit nearby Urgent Care Center or Emergency Department.  If any emergent medical needs, please seek emergent medical help and/or call 911.    Please note that we are not able to fill out paperwork that might be related to your work, utility company, disability, and/or driving, among others, in between the visits.  Please schedule a dedicated appointment to address your paperwork, so we can do that in a timely manner.  This is not due to lack of concern or interest for your disease-related  work/administrative problems, but to make sure that we provide the best possible care and to fill out your paperwork in a correct and timely manner.    Thank you for entrusting your neurological care to [facility] and we look forward to continuing to serve you.

## 2025-06-18 NOTE — ASSESSMENT & PLAN NOTE
Holding trazadone, tramadol, tizanidine   Detail Level: Detailed Depth Of Biopsy: dermis Was A Bandage Applied: Yes Size Of Lesion In Cm: 0 Biopsy Type: H and E Biopsy Method: Dermablade Anesthesia Type: 1% lidocaine with epinephrine Anesthesia Volume In Cc: 0.5 Hemostasis: Drysol Wound Care: Petrolatum Dressing: bandage Destruction After The Procedure: No Type Of Destruction Used: Curettage Curettage Text: The wound bed was treated with curettage after the biopsy was performed. Cryotherapy Text: The wound bed was treated with cryotherapy after the biopsy was performed. Electrodesiccation Text: The wound bed was treated with electrodesiccation after the biopsy was performed. Electrodesiccation And Curettage Text: The wound bed was treated with electrodesiccation and curettage after the biopsy was performed. Silver Nitrate Text: The wound bed was treated with silver nitrate after the biopsy was performed. Lab: [id number] Lab Facility: [id number] Medical Necessity Information: It is in your best interest to select a reason for this procedure from the list below. All of these items fulfill various CMS LCD requirements except the new and changing color options. Consent: Written consent was obtained and risks were reviewed including but not limited to scarring, infection, bleeding, scabbing, incomplete removal, nerve damage and allergy to anesthesia. Post-Care Instructions: I reviewed with the patient in detail post-care instructions. Patient is to keep the biopsy site dry overnight, and then apply bacitracin twice daily until healed. Patient may apply hydrogen peroxide soaks to remove any crusting. Notification Instructions: Patient will be notified of biopsy results. However, patient instructed to call the office if not contacted within 2 weeks. Billing Type: Third-Party Bill Information: Selecting Yes will display possible errors in your note based on the variables you have selected. This validation is only offered as a suggestion for you. PLEASE NOTE THAT THE VALIDATION TEXT WILL BE REMOVED WHEN YOU FINALIZE YOUR NOTE. IF YOU WANT TO FAX A PRELIMINARY NOTE YOU WILL NEED TO TOGGLE THIS TO 'NO' IF YOU DO NOT WANT IT IN YOUR FAXED NOTE.

## 2025-06-25 ENCOUNTER — APPOINTMENT (OUTPATIENT)
Dept: SLEEP MEDICINE | Facility: MEDICAL CENTER | Age: 65
End: 2025-06-25
Attending: CLINICAL NEUROPSYCHOLOGIST
Payer: OTHER GOVERNMENT

## 2025-07-15 ENCOUNTER — OFFICE VISIT (OUTPATIENT)
Dept: SLEEP MEDICINE | Facility: MEDICAL CENTER | Age: 65
End: 2025-07-15
Attending: CLINICAL NEUROPSYCHOLOGIST
Payer: MEDICARE

## 2025-07-15 VITALS
HEIGHT: 67 IN | OXYGEN SATURATION: 93 % | SYSTOLIC BLOOD PRESSURE: 126 MMHG | HEART RATE: 66 BPM | DIASTOLIC BLOOD PRESSURE: 72 MMHG | WEIGHT: 250 LBS | BODY MASS INDEX: 39.24 KG/M2 | RESPIRATION RATE: 14 BRPM

## 2025-07-15 DIAGNOSIS — G47.30 SLEEP-DISORDERED BREATHING: Primary | ICD-10-CM

## 2025-07-15 PROCEDURE — 3078F DIAST BP <80 MM HG: CPT | Performed by: PHYSICIAN ASSISTANT

## 2025-07-15 PROCEDURE — 99213 OFFICE O/P EST LOW 20 MIN: CPT | Performed by: PHYSICIAN ASSISTANT

## 2025-07-15 PROCEDURE — 3074F SYST BP LT 130 MM HG: CPT | Performed by: PHYSICIAN ASSISTANT

## 2025-07-15 PROCEDURE — 99214 OFFICE O/P EST MOD 30 MIN: CPT | Performed by: PHYSICIAN ASSISTANT

## 2025-07-15 ASSESSMENT — ENCOUNTER SYMPTOMS
SHORTNESS OF BREATH: 1
SPUTUM PRODUCTION: 1
DIZZINESS: 0
PALPITATIONS: 1
COUGH: 1
FEVER: 0
SORE THROAT: 0
TREMORS: 1
HEARTBURN: 1
HEADACHES: 1
WHEEZING: 0
CHILLS: 0
WEIGHT LOSS: 0
ORTHOPNEA: 0
SINUS PAIN: 0

## 2025-07-15 ASSESSMENT — FIBROSIS 4 INDEX: FIB4 SCORE: 7.22

## 2025-07-15 NOTE — PROGRESS NOTES
"Chief Complaint   Patient presents with    Establish Care     Ref by Oscar Maloney, Ph.D. Dx: G47.10 (ICD-10-CM) - Hypersomnia  Z91.89 (ICD-10-CM) - At risk for sleep apnea       HPI:  Karan Wiley is a 64 y.o. year old male here today for follow-up on sleep disordered breathing and [facility] sleep medicine.     Past Medical History: Type 2 diabetes, memory deficit, traumatic subdural hematoma, paroxysmal atrial fibrillation, coronary artery disease, insomnia, respiratory failure following trauma and surgery, SIRS, major neurocognitive disorder multiple etiologies, seizure disorder post CVA, essential tremor, MI.    Vitals:  /72 (BP Location: Left arm, Patient Position: Sitting, BP Cuff Size: Adult)   Pulse 66   Resp 14   Ht 1.702 m (5' 7\")   Wt 113 kg (250 lb)   SpO2 93% BMI of 39.16 kg/m².    Recent Imaging: Chest x-ray 11/29/2024 demonstrated chronically elevated right hemidiaphragm, mild right basilar atelectasis, no acute cardiopulmonary changes.  [id number]  Stop Bang Score 6 (7/25/2024  7:36 PM)     As per supplemental questionnaire to be scanned or imported into chart:    [place] Sleepiness Score: 18    Sleep Schedule  Bedtime: 8 PM   Wake time: 12 noon, gets up at 5 AM for minutes and goes back to sleep  Sleep-onset latency: Varies may take a few hours  Awakenings from sleep: 5-6 to void  Difficulty falling back asleep: Yes  Bedroom partner: No  Naps: Yes, 1-3 hours    DAYTIME SYMPTOMS:   Excessive daytime sleepiness: Yes  Daytime fatigue: Yes  Difficulty concentrating: Yes  Memory problems: Yes  Irritability:No  from head injury  Work/school performance issues: Not applicable  Sleepiness with driving: N/A  Caffeine/stimulant use: Yes diet pepsi x>6   Alcohol use:No     SLEEP RELATED SYMPTOMS  Snoring: No   Witnessed apnea or gasping/choking: No   Dry mouth or mouth breathing: No   Night Sweating: No   Teeth grinding/biting: No   Morning headaches: Yes occasionally   Refreshed Upon Awakening: " "No      SLEEP RELATED BEHAVIORS:  Parasomnias (walking, talking, eating, violence): Yes, sleep talking   Leg kicking: No  rare  Restless legs - \"urge to move\": No   Nightmares: No  Recurrent: No   Dream enactment: No      NARCOLEPSY:  Cataplexy: No   Sleep paralysis: No   Sleep attacks: No   Hypnagogic/hypnopompic hallucinations: No       Review of Systems   Constitutional:  Positive for malaise/fatigue (moderate to severe). Negative for chills, fever and weight loss.   HENT:  Positive for hearing loss. Negative for congestion, nosebleeds, sinus pain, sore throat and tinnitus.    Eyes:         Presc glasses    Respiratory:  Positive for cough, sputum production (white to green) and shortness of breath (with activity). Negative for wheezing.    Cardiovascular:  Positive for palpitations. Negative for chest pain, orthopnea and leg swelling.   Gastrointestinal:  Positive for heartburn (occasional).        Upper and lower dentures, lowers doesn't wear, some swallowing issues    Neurological:  Positive for tremors and headaches (moderate). Negative for dizziness.   Psychiatric/Behavioral:  The patient has insomnia (Sometimes falling asleep at night, difficulty staying asleep).        Past Medical History[1]    Past Surgical History[2]    Family History   Problem Relation Age of Onset    Cancer Mother         breast       Social History     Socioeconomic History    Marital status:      Spouse name: Not on file    Number of children: Not on file    Years of education: Not on file    Highest education level: Not on file   Occupational History    Not on file   Tobacco Use    Smoking status: Former     Current packs/day: 0.00     Average packs/day: 0.3 packs/day for 3.0 years (0.8 ttl pk-yrs)     Types: Cigarettes     Start date:      Quit date:      Years since quittin.5     Passive exposure: Past    Smokeless tobacco: Never   Vaping Use    Vaping status: Never Used   Substance and Sexual Activity    " Alcohol use: No    Drug use: Never    Sexual activity: Not Currently     Partners: Female   Other Topics Concern    Not on file   Social History Narrative    Not on file     Social Drivers of Health     Financial Resource Strain: Not on file   Food Insecurity: No Food Insecurity (11/26/2024)    Hunger Vital Sign     Worried About Running Out of Food in the Last Year: Never true     Ran Out of Food in the Last Year: Never true   Transportation Needs: No Transportation Needs (11/26/2024)    PRAPARE - Transportation     Lack of Transportation (Medical): No     Lack of Transportation (Non-Medical): No   Physical Activity: Not on file   Stress: Not on file   Social Connections: Not on file   Intimate Partner Violence: Not At Risk (11/25/2024)    Humiliation, Afraid, Rape, and Kick questionnaire     Fear of Current or Ex-Partner: No     Emotionally Abused: No     Physically Abused: No     Sexually Abused: No   Housing Stability: Low Risk  (11/26/2024)    Housing Stability Vital Sign     Unable to Pay for Housing in the Last Year: No     Number of Times Moved in the Last Year: 0     Homeless in the Last Year: No       Allergies as of 07/15/2025 - Reviewed 07/15/2025   Allergen Reaction Noted    Oxycodone Anaphylaxis and Swelling 12/05/2011    Pectin Anaphylaxis and Swelling 08/25/2022    Hctz [hydrochlorothiazide w-spironolactone]  02/26/2019    Zolpidem Unspecified 10/31/2022          Current medications as of today Current Medications[3]      Physical Exam:   Gen:           Alert and oriented, No apparent distress. Mood and affect appropriate, normal interaction with examiner.   Hearing:     Grossly intact.  Nose:          Normal, no lesions or deformities.  Dentition:    Edentulous   Oropharynx:   Tongue normal, posterior pharynx without erythema or exudate.  Mallampati Classification: II   Neck:        Supple, trachea midline, no masses.  Respiratory Effort: No intercostal retractions or use of accessory muscles.  "  Gait and Station: Altered  Digits and Nails: No clubbing, cyanosis, petechiae, or nodes.   Skin:        No rashes, lesions or ulcers noted.               Ext:           No cyanosis or edema.      Immunizations:  Flu: 11/15/2024  PCV 20: Recommended  SARS CoV2 Vaccine: 10/10/2023, 6/3/2022, 12/3/2021, 3/26/2021, 2/26/2021    Assessment / Plan:  1. Sleep-disordered breathing  - Polysomnography, 4 or More    Reviewed new patient history, high [place] sleepiness scale reported.  Patient history suspicious for both central sleep apnea as well as obstructive sleep apnea due to history of head injury x 2 and CVA.  No prior sleep testing has been completed.  Patient will require in-lab sleep study to best evaluate.  Reviewed risks associated with untreated sleep apnea.  Short-term follow-up to review results.  He is advised to avoid caffeine, naps, drug or alcohol use day of testing.  Okay to continue prescribed medications.  Patient states understanding.      Follow-up:   Return in about 3 months (around 10/15/2025) for Return with Barbara Elizondo PA-C.     Please note that this dictation was created using voice recognition software. I have made every reasonable attempt to correct obvious errors, but it is possible there are errors of grammar and possibly content that I did not discover before finalizing the note.         [1]   Past Medical History:  Diagnosis Date    Anesthesia     \"Difficulty urinated after anesthesia\"    Breath shortness     Albuterol inhalers as needed    CAD (coronary artery disease) 08/2020    STEMI. PCI/NAOMY (Raghav 3.5 x 25mm) the mid RCA, and PCI/NAOMY (Raghav 2.5 x 12mm) the PDA.    Chronic anticoagulation     Takes eliquis BID    Dental disorder     Dentures - upper dentures    Depression     Dilated aortic root (HCC) 11/2024    Echocardiogram with dilated aortic root 4.5cm.    History of heart artery stent     Hyperlipidemia     Hypertension     Low back pain     Myocardial infarct (HCC)     Caryn " Mckinney - Cardiologist    Paroxysmal atrial fibrillation (HCC) 10/2023    Echocardiogram with normal LV size, LVEF 55-60%. Normal RA, LA and RV. No valvular problems.    S/P drug eluting coronary stent placement     Subdural hematoma (HCC)     on Depakote    Type 2 diabetes mellitus ([facility])    [2]   Past Surgical History:  Procedure Laterality Date    ARTHROPLASTY, KNEE, ROBOT-ASSISTED Right 7/23/2024    Procedure: ROBOTIC RIGHT TOTAL KNEE ARTHROPLASTY;  Surgeon: Noe Barragan M.D.;  Location: SURGERY [facility];  Service: Ortho Robotic    CRANIOTOMY Left 8/25/2022    Procedure: CRANIOTOMY FOR SUBDURAL HEMATOMA;  Surgeon: Tesfaye Luther M.D.;  Location: SURGERY [facility];  Service: Neurosurgery    SPINAL CORD STIMULATOR  2/26/2019    Procedure: SPINAL CORD STIMULATOR-  STAGE 1:  RIGHT FLANK BATTERY REPLACEMENT/UPGRADE;  Surgeon: Stepan Hawkins M.D.;  Location: SURGERY [facility];  Service: Neurosurgery    FUSION, SPINE, LUMBAR, PLIF  2/26/2019    Procedure: LUMBAR FUSION POSTERIOR-  STAGE 2: ONLAY L5-S1 BONE;  Surgeon: Stepan Hawkins M.D.;  Location: SURGERY [facility];  Service: Neurosurgery    LAMINOTOMY  2/26/2019    Procedure: LAMINOTOMY-  STAGE 2:  REDO LEFT L4-S1;  Surgeon: Stepan Hawkins M.D.;  Location: [facility];  Service: Neurosurgery   [3]   Current Outpatient Medications   Medication Sig Dispense Refill    lamoTRIgine (LAMICTAL) 25 MG Tab Take 1 Tablet by mouth 2 times a day. 60 Tablet 5    levetiracetam (KEPPRA) 1000 MG tablet Take 1 Tablet by mouth 2 times a day. 180 Tablet 1    Pseudoephedrine-guaiFENesin (MUCINEX D PO) Take  by mouth.      omeprazole (PRILOSEC) 40 MG delayed-release capsule Take 40 mg by mouth every evening.      HYDROcodone/acetaminophen (NORCO)  MG Tab Take 1 Tablet by mouth see administration instructions. Take 1 tablet SCHEDULED at 05:00, 10:00, 15:00, and 20:00. Max take up to 2 additional tablets throughout the day as needed for pain, up to a  maximum of 6 tablets within 24 hours.      metoprolol SR (TOPROL XL) 25 MG TABLET SR 24 HR Take 1 Tablet by mouth every evening. 100 Tablet 3    apixaban (ELIQUIS) 5mg Tab Take 1 Tablet by mouth 2 times a day. 200 Tablet 3    ezetimibe (ZETIA) 10 MG Tab Take 1 Tablet by mouth every day. 100 Tablet 3    docusate sodium (COLACE) 100 MG Cap Take 100 mg by mouth 2 times a day.      rosuvastatin (CRESTOR) 40 MG tablet Take 40 mg by mouth every evening.      albuterol 108 (90 Base) MCG/ACT Aero Soln inhalation aerosol Inhale 1-2 Puffs every four hours as needed for Shortness of Breath.      traZODone (DESYREL) 100 MG Tab Take 150 mg by mouth every evening. 1.5 tablets = 150 mg.      venlafaxine XR (EFFEXOR XR) 75 MG CAPSULE SR 24 HR Take 1 Capsule by mouth every evening. Take with 1 capsule of venlafaxine  mg, for a total dose of 225 mg.      venlafaxine (EFFEXOR-XR) 150 MG extended-release capsule Take 1 Capsule by mouth every evening. Take with 1 capsule of venlafaxine XR 75 mg, for a total dose of 225 mg.      tizanidine (ZANAFLEX) 4 MG Tab Take 4 mg by mouth 4 times a day. Take 1 tablet (4 mg) at 05:00, 10:00, 15:00, and 20:00.      finasteride (PROSCAR) 5 MG Tab Take 5 mg by mouth every day.      tamsulosin (FLOMAX) 0.4 MG capsule Take 0.4 mg by mouth every day.      hydrALAZINE (APRESOLINE) 10 MG Tab Take 0.5 Tablets by mouth 3 times a day as needed (For systolic blood pressure greater than 180). (Patient not taking: No sig reported) 20 Tablet 0    telmisartan (MICARDIS) 40 MG Tab Take 1 Tablet by mouth every morning. (Patient not taking: No sig reported) 100 Tablet 3     No current facility-administered medications for this visit.

## 2025-07-15 NOTE — PATIENT INSTRUCTIONS
1-reviewed patient history  2-high epworth sleepiness scale at 18  3-suspicious for both central sleep apnea as well as AMPARO due to history of head injury x 2/CVA  4-will need in lab sleep study   5-short term follow up to review results  6-advised to avoid caffeine, naps, drug or alcohol use day of testing  7-okay to continue prescribed medications

## 2025-07-17 ASSESSMENT — ENCOUNTER SYMPTOMS: INSOMNIA: 1

## 2025-07-24 ENCOUNTER — OFFICE VISIT (OUTPATIENT)
Dept: CARDIOLOGY | Facility: PHYSICIAN GROUP | Age: 65
End: 2025-07-24
Payer: MEDICARE

## 2025-07-24 VITALS
WEIGHT: 257 LBS | DIASTOLIC BLOOD PRESSURE: 70 MMHG | OXYGEN SATURATION: 95 % | BODY MASS INDEX: 40.34 KG/M2 | RESPIRATION RATE: 16 BRPM | SYSTOLIC BLOOD PRESSURE: 122 MMHG | HEIGHT: 67 IN | HEART RATE: 70 BPM

## 2025-07-24 DIAGNOSIS — Z95.5 STATUS POST INSERTION OF DRUG-ELUTING STENT INTO RIGHT CORONARY ARTERY FOR CORONARY ARTERY DISEASE: ICD-10-CM

## 2025-07-24 DIAGNOSIS — E11.9 TYPE 2 DIABETES MELLITUS WITHOUT COMPLICATION, WITHOUT LONG-TERM CURRENT USE OF INSULIN (HCC): ICD-10-CM

## 2025-07-24 DIAGNOSIS — R53.83 OTHER FATIGUE: Primary | ICD-10-CM

## 2025-07-24 DIAGNOSIS — I77.810 AORTIC ROOT DILATATION (HCC): ICD-10-CM

## 2025-07-24 DIAGNOSIS — I10 PRIMARY HYPERTENSION: ICD-10-CM

## 2025-07-24 DIAGNOSIS — Z79.01 CURRENT USE OF LONG TERM ANTICOAGULATION: ICD-10-CM

## 2025-07-24 DIAGNOSIS — E78.2 MIXED HYPERLIPIDEMIA: ICD-10-CM

## 2025-07-24 DIAGNOSIS — I25.10 CORONARY ARTERY DISEASE INVOLVING NATIVE CORONARY ARTERY OF NATIVE HEART WITHOUT ANGINA PECTORIS: ICD-10-CM

## 2025-07-24 DIAGNOSIS — I48.0 PAROXYSMAL ATRIAL FIBRILLATION (HCC): ICD-10-CM

## 2025-07-24 PROCEDURE — 3074F SYST BP LT 130 MM HG: CPT | Performed by: NURSE PRACTITIONER

## 2025-07-24 PROCEDURE — 99214 OFFICE O/P EST MOD 30 MIN: CPT | Performed by: NURSE PRACTITIONER

## 2025-07-24 PROCEDURE — 3078F DIAST BP <80 MM HG: CPT | Performed by: NURSE PRACTITIONER

## 2025-07-24 ASSESSMENT — ENCOUNTER SYMPTOMS
MEMORY LOSS: 1
HEADACHES: 0
NAUSEA: 0
WEAKNESS: 1
SHORTNESS OF BREATH: 0
COUGH: 0
FEVER: 0
CHILLS: 0
ABDOMINAL PAIN: 0
BRUISES/BLEEDS EASILY: 0
INSOMNIA: 0
ORTHOPNEA: 0
MYALGIAS: 0
PND: 0
PALPITATIONS: 0
DIZZINESS: 0
LOSS OF CONSCIOUSNESS: 0

## 2025-07-24 ASSESSMENT — FIBROSIS 4 INDEX: FIB4 SCORE: 7.22

## 2025-07-24 NOTE — PROGRESS NOTES
Chief Complaint   Patient presents with    Follow-Up    Coronary Artery Disease    Atrial Fibrillation    Anticoagulation    HTN (Controlled)    Hyperlipidemia    Diabetes (Controlled)       Subjective     Karan Wiley is a 64 y.o. male who presents today for four month follow-up of CAD, PAFib, anticoagulation, HTN and hyperlipidemia.    Karan is a 64 year old male with history of hypertension, dyslipidemia and CAD, with STEMI in August 2020 with PCI of RCA and PCI of posterior lateral branch.  He did have post-procedural complications, including acute hypoxic respiratory failure with flash pulmonary edema, and PEA cardiac arrest.  He does also PAFib (off of Amiodarone), and is anticoagulated with Eliquis, last seen by me in March 2025.     Holter monitor in November 2024 showed no AFib, and sinus rhythm.  Echocardiogram in November 2024 showed normal LV size and function, aortic root 4.5cm.     Complex history review:  In August 2022, he suffered a mechanical ground level fall, and had a subdural hemorrhage with a shift and mass effect. ASA and Eliquis were held; he had left craniotomy by Dr. Luis. He did have some leukocystosis and bronchial levage came back with MSSA, and he was treated with antibiotics. Eliquis was eventually restarted.     In July 2024, he had right knee replacement by Dr. Barragan.  Two days later, on 7/25/2024, he presented to [facility] with altered mental status. He had AQUILES and red/swollen knee; he was transferred back to [facility] for ongoing confusion. He was treated for pneumonia and AFIb with RVR, treated with Amiodarone.      In November 2024, he was readmitted to [facility] (transferred from [facility]) for breakthrough seizures, and started on Keppra 1000mg BID (in addition to Lamictal). Echocardiogram at that time was stable/normal.    In early June 2025, he saw neurology, as he has had some breakthrough seizures; he was told to abstain from driving at this time.    In late June 2025, he was  "hospitalized at [facility] for sepsis and pneumonia, treated with antibiotics.     He is here today for four month follow-up. His main issue is ongoing fatigue. No fever or chills; no known seizures. He does have follow-up with a new PCP after his 65th birthday.    BP has been stable. He denies any chest pain, pressure, tightness or discomfort; still occasional short episodes symptomatic palpitations, but he also has a tremor (known by neurology); no LE edema. No recent falls or syncope. Per his wife, he does have ongoing memory issues and \"brain fog.\"       Past Medical History[1]  Past Surgical History[2]  Family History   Problem Relation Age of Onset    Cancer Mother         breast     Social History     Socioeconomic History    Marital status:      Spouse name: Not on file    Number of children: Not on file    Years of education: Not on file    Highest education level: Not on file   Occupational History    Not on file   Tobacco Use    Smoking status: Former     Current packs/day: 0.00     Average packs/day: 0.3 packs/day for 3.0 years (0.8 ttl pk-yrs)     Types: Cigarettes     Start date:      Quit date:      Years since quittin.5     Passive exposure: Past    Smokeless tobacco: Never   Vaping Use    Vaping status: Never Used   Substance and Sexual Activity    Alcohol use: No    Drug use: Never    Sexual activity: Not Currently     Partners: Female   Other Topics Concern    Not on file   Social History Narrative    Not on file     Social Drivers of Health     Financial Resource Strain: Not on file   Food Insecurity: No Food Insecurity (2024)    Hunger Vital Sign     Worried About Running Out of Food in the Last Year: Never true     Ran Out of Food in the Last Year: Never true   Transportation Needs: No Transportation Needs (2024)    PRAPARE - Transportation     Lack of Transportation (Medical): No     Lack of Transportation (Non-Medical): No   Physical Activity: Not on file   Stress: " "Not on file   Social Connections: Not on file   Intimate Partner Violence: Not At Risk (11/25/2024)    Humiliation, Afraid, Rape, and Kick questionnaire     Fear of Current or Ex-Partner: No     Emotionally Abused: No     Physically Abused: No     Sexually Abused: No   Housing Stability: Low Risk  (11/26/2024)    Housing Stability Vital Sign     Unable to Pay for Housing in the Last Year: No     Number of Times Moved in the Last Year: 0     Homeless in the Last Year: No     Allergies[3]  Encounter Medications[4]  Review of Systems   Constitutional:  Positive for malaise/fatigue. Negative for chills and fever.   HENT:  Negative for congestion.    Respiratory:  Negative for cough and shortness of breath.    Cardiovascular:  Negative for chest pain, palpitations, orthopnea, leg swelling and PND.   Gastrointestinal:  Negative for abdominal pain and nausea.   Musculoskeletal:  Negative for myalgias.   Skin:  Negative for rash.   Neurological:  Positive for weakness. Negative for dizziness, loss of consciousness and headaches.   Endo/Heme/Allergies:  Does not bruise/bleed easily.   Psychiatric/Behavioral:  Positive for memory loss. The patient does not have insomnia.               Objective     /70 (BP Location: Left arm, Patient Position: Sitting, BP Cuff Size: Adult)   Pulse 70   Resp 16   Ht 1.702 m (5' 7\")   Wt 117 kg (257 lb)   SpO2 95%   BMI 40.25 kg/m²     Physical Exam  Constitutional:       Appearance: He is well-developed.      Comments: Seems tired today.  BMI 40.25   HENT:      Head: Normocephalic.   Neck:      Vascular: No JVD.   Cardiovascular:      Rate and Rhythm: Normal rate and regular rhythm.      Heart sounds: Normal heart sounds.   Pulmonary:      Effort: Pulmonary effort is normal. No respiratory distress.      Breath sounds: Normal breath sounds. No wheezing or rales.   Abdominal:      General: Bowel sounds are normal. There is no distension.      Palpations: Abdomen is soft.      " Tenderness: There is no abdominal tenderness.   Musculoskeletal:         General: Normal range of motion.      Cervical back: Normal range of motion and neck supple.   Skin:     General: Skin is warm and dry.      Coloration: Skin is not pale.      Findings: No rash.   Neurological:      Mental Status: He is alert and oriented to person, place, and time.       ECHOCARDIOGRAPHY:     CONCLUSIONS OF TTE OF 11/26/2024:  Normal left ventricular systolic function.  Normal right ventricular size and systolic function.  Aortic valve sclerosis without significant stenosis.  The aortic root is dilated with a diameter of 4.5 cm at the sinuses, 3.4 cm at the ascending aorta.     CONCLUSIONS OF TTE OF 7/28/2024:  Technically difficult study as patient is not able to follow commands.   Patient is in atrial fibrillation rates 110s-130s which limits accurate LVEF and wall motion interpretation.   Normal left and right ventricular systolic function.  Visually   estimated LVEF of 60-65%.   Valves are not well seen, but no significant valvular stenosis or regurgitation.  Compared to the prior study on 10/23/2023.      HOLTER MONITORING:     RESULTS OF ZIOPATCH OF 10/30/2024-11/13/2024:  Minimum HR 38bpm  Average HR 55bpm  Maximum   No VT  No pauses/AV block  No AFib/flutter  PACs/PVCs <1%      Impressions of ZioPatch of 11/3/2022-11/17/2022:  Predominantly sinus rhythm.   Symptoms associated with sinus rhythm and ectopy.   Asymptomatic, short SVT and NSVT.      CT SCANS/MRI:     IMPRESSION OF CT SCAN OF HEAD OF 8/31/2022:  1.  Slightly decreased size of LEFT supratentorial subdural hematoma overlying LEFT frontal, parietal and temporal lobes  2.  Trace extra-axial blood overlying the RIGHT frontal lobe, unchanged  3.  Unchanged 3 mm of LEFT-to-RIGHT midline shift      MPI OF 11/17/2020:  Normal myocardial perfusion study  No ischemia or infarct, normal wall motion     CORONARY ANGIOGRAPHY:     IMPRESSIONS OF CORONARY ANGIOGRAM  OF 8/1/2020:  1.  Acute non-STEMI due to culprit right coronary artery  2.  Successful PCI of the mid right coronary artery using 1 drug-eluting stent and IVUS guidance  3.  Successful PCI of the posterior lateral branch using 1 drug-eluting stent  4.  Normal LV systolic function  5.  Moderately elevated LVEDP (21 mmHg)  6.  Poorly controlled hypertension     LABS:    Lab Results   Component Value Date/Time    CHOLSTRLTOT 157 03/10/2025 12:00 AM    LDL 69 03/10/2025 12:00 AM    HDL 61 03/10/2025 12:00 AM    TRIGLYCERIDE 178 03/10/2025 12:00 AM        Labs as of 6/18/2025:  Glucose 102  BUN 15  Creatinine 0.94  GFR 91  Potassium 3.8    Assessment & Plan     1. Other fatigue  CBC WITHOUT DIFFERENTIAL    Comp Metabolic Panel    TSH      2. Coronary artery disease involving native coronary artery of native heart without angina pectoris        3. Status post insertion of drug-eluting stent into right coronary artery for coronary artery disease        4. Paroxysmal atrial fibrillation (HCC)        5. Current use of long term anticoagulation        6. Primary hypertension        7. Mixed hyperlipidemia        8. Aortic root dilatation (HCC)        9. Type 2 diabetes mellitus without complication, without long-term current use of insulin (HCC)            Medical Decision Making: Today's Assessment/Status/Plan:        Fatigue, probably multifactorial, including due to recent hospitalization for infection/sepsis and history of seizures. To check CBC, CMP and TSH today.    CAD, status post PCI/NAOMY to the RCA in 2020. No angina or shortness of breath. Echocardiogram in November 2024 was stable. Continue:  Eliquis 5mg twice daily  Toprol XL 25mg QHS  Telmisartan 40mg in the AM  Zetia 10mg once daily  Crestor 40mg once daily    Paroxysmal atrial fibrillation, in sinus rhythm today. ZioPatch in November 2024 did not show any AFib episodes. He is now OFF of Amiodarone. To continue to monitor for any breakthrough.     Chronic  "anticoagulation with Eliquis 5mg twice daily.    Hypertension, treated with Toprol XL 25mg once daily, Micardia 40mg once daily and Hydralazine 5mg TID PRN. BP is good today, and is more stable at home.    Hyperlipidemia, treated with Crestor 40mg and Zetia 10mg once daily.  Recent LDL was 69 (at goal)>    Diabetes mellitus, treated with diet alone at this time. He is asking about GLP1a today for possible weight loss; urged him to discuss with new PCP.    History of SDH, with subsequent seizures/tremor and memory impairment, now on Lamictal and Keppra, followed by neurology. He has follow-up in June 2025; he is to abstain from driving at this time.     Same medications for now.  To check CBC, CMP and TSH today, given recent fatigue and last hospitalization for infection/sepsis.     I will see him back in 4-5 months, to assess for any breakthrough AFib.  He is to keep follow-up with other providers too.                     [1]   Past Medical History:  Diagnosis Date    Anesthesia     \"Difficulty urinated after anesthesia\"    Breath shortness     Albuterol inhalers as needed    CAD (coronary artery disease) 08/2020    STEMI. PCI/NAOMY (Raghav 3.5 x 25mm) the mid RCA, and PCI/NAOMY (Raghav 2.5 x 12mm) the PDA.    Chronic anticoagulation     Takes eliquis BID    Dental disorder     Dentures - upper dentures    Depression     Dilated aortic root (HCC) 11/2024    Echocardiogram with dilated aortic root 4.5cm.    History of heart artery stent     Hyperlipidemia     Hypertension     Low back pain     Myocardial infarct (HCC)     Caryn Mckinney - Cardiologist    Paroxysmal atrial fibrillation (HCC) 10/2023    Echocardiogram with normal LV size, LVEF 55-60%. Normal RA, LA and RV. No valvular problems.    S/P drug eluting coronary stent placement     Subdural hematoma (HCC)     on Depakote    Type 2 diabetes mellitus (HCC)    [2]   Past Surgical History:  Procedure Laterality Date    ARTHROPLASTY, KNEE, ROBOT-ASSISTED Right 7/23/2024    " "Procedure: ROBOTIC RIGHT TOTAL KNEE ARTHROPLASTY;  Surgeon: Noe Barragan M.D.;  Location: SURGERY [facility];  Service: Ortho Robotic    CRANIOTOMY Left 8/25/2022    Procedure: CRANIOTOMY FOR SUBDURAL HEMATOMA;  Surgeon: Tesfaye Luther M.D.;  Location: SURGERY [facility];  Service: Neurosurgery    SPINAL CORD STIMULATOR  2/26/2019    Procedure: SPINAL CORD STIMULATOR-  STAGE 1:  RIGHT FLANK BATTERY REPLACEMENT/UPGRADE;  Surgeon: Stepan Hawkins M.D.;  Location: SURGERY [facility];  Service: Neurosurgery    FUSION, SPINE, LUMBAR, PLIF  2/26/2019    Procedure: LUMBAR FUSION POSTERIOR-  STAGE 2: ONLAY L5-S1 BONE;  Surgeon: Stepan Hawkins M.D.;  Location: SURGERY [facility];  Service: Neurosurgery    LAMINOTOMY  2/26/2019    Procedure: LAMINOTOMY-  STAGE 2:  REDO LEFT L4-S1;  Surgeon: Stepan Hawkins M.D.;  Location: SURGERY [facility];  Service: Neurosurgery   [3]   Allergies  Allergen Reactions    Oxycodone Anaphylaxis and Swelling     Throat swelling    Pectin Anaphylaxis and Swelling     Throat swelling, cannot breathe    Hctz [Hydrochlorothiazide W-Spironolactone]      hypotension    Zolpidem Unspecified     \"Sleep walking\", excessive eating, drives/cooks during the night   [4]   Outpatient Encounter Medications as of 7/24/2025   Medication Sig Dispense Refill    lamoTRIgine (LAMICTAL) 25 MG Tab Take 1 Tablet by mouth 2 times a day. 60 Tablet 5    levetiracetam (KEPPRA) 1000 MG tablet Take 1 Tablet by mouth 2 times a day. 180 Tablet 1    Pseudoephedrine-guaiFENesin (MUCINEX D PO) Take  by mouth.      hydrALAZINE (APRESOLINE) 10 MG Tab Take 0.5 Tablets by mouth 3 times a day as needed (For systolic blood pressure greater than 180). 20 Tablet 0    omeprazole (PRILOSEC) 40 MG delayed-release capsule Take 40 mg by mouth every evening.      HYDROcodone/acetaminophen (NORCO)  MG Tab Take 1 Tablet by mouth see administration instructions. Take 1 tablet SCHEDULED at 05:00, 10:00, 15:00, and 20:00. " Max take up to 2 additional tablets throughout the day as needed for pain, up to a maximum of 6 tablets within 24 hours.      metoprolol SR (TOPROL XL) 25 MG TABLET SR 24 HR Take 1 Tablet by mouth every evening. 100 Tablet 3    apixaban (ELIQUIS) 5mg Tab Take 1 Tablet by mouth 2 times a day. 200 Tablet 3    telmisartan (MICARDIS) 40 MG Tab Take 1 Tablet by mouth every morning. 100 Tablet 3    ezetimibe (ZETIA) 10 MG Tab Take 1 Tablet by mouth every day. 100 Tablet 3    docusate sodium (COLACE) 100 MG Cap Take 100 mg by mouth 2 times a day.      rosuvastatin (CRESTOR) 40 MG tablet Take 40 mg by mouth every evening.      albuterol 108 (90 Base) MCG/ACT Aero Soln inhalation aerosol Inhale 1-2 Puffs every four hours as needed for Shortness of Breath.      traZODone (DESYREL) 100 MG Tab Take 150 mg by mouth every evening. 1.5 tablets = 150 mg.      venlafaxine XR (EFFEXOR XR) 75 MG CAPSULE SR 24 HR Take 1 Capsule by mouth every evening. Take with 1 capsule of venlafaxine  mg, for a total dose of 225 mg.      venlafaxine (EFFEXOR-XR) 150 MG extended-release capsule Take 1 Capsule by mouth every evening. Take with 1 capsule of venlafaxine XR 75 mg, for a total dose of 225 mg.      tizanidine (ZANAFLEX) 4 MG Tab Take 4 mg by mouth 4 times a day. Take 1 tablet (4 mg) at 05:00, 10:00, 15:00, and 20:00.      finasteride (PROSCAR) 5 MG Tab Take 5 mg by mouth every day.      tamsulosin (FLOMAX) 0.4 MG capsule Take 0.4 mg by mouth every day.       No facility-administered encounter medications on file as of 7/24/2025.

## 2025-07-28 ENCOUNTER — RESULTS FOLLOW-UP (OUTPATIENT)
Facility: MEDICAL CENTER | Age: 65
End: 2025-07-28
Payer: MEDICARE

## 2025-07-28 DIAGNOSIS — R53.83 OTHER FATIGUE: ICD-10-CM

## 2025-07-28 NOTE — TELEPHONE ENCOUNTER
Noted AB response/recommendation, below.  ----- Message from Nurse Practitioner VERONICA Richardson sent at 7/28/2025  3:50 PM PDT -----  These labs are all good/normal (glucose is a little high, but he wasn't fasting).  No changes in cardiac meds.  Thanks, AB  =========================  Phone Number Called: [phone]     Call outcome: Spoke to patient regarding message below.    Message: Called to update pt on AB response/recommendation     Pt verbalized understanding.

## 2025-08-13 ENCOUNTER — HOSPITAL ENCOUNTER (INPATIENT)
Facility: MEDICAL CENTER | Age: 65
LOS: 3 days | DRG: 322 | End: 2025-08-16
Admitting: STUDENT IN AN ORGANIZED HEALTH CARE EDUCATION/TRAINING PROGRAM
Payer: COMMERCIAL

## 2025-08-13 ENCOUNTER — APPOINTMENT (OUTPATIENT)
Dept: RADIOLOGY | Facility: MEDICAL CENTER | Age: 65
DRG: 322 | End: 2025-08-13
Attending: STUDENT IN AN ORGANIZED HEALTH CARE EDUCATION/TRAINING PROGRAM
Payer: COMMERCIAL

## 2025-08-13 DIAGNOSIS — Z79.02 ANTIPLATELET OR ANTITHROMBOTIC LONG-TERM USE: Primary | ICD-10-CM

## 2025-08-13 DIAGNOSIS — I24.9 ACUTE CORONARY SYNDROMES (HCC): ICD-10-CM

## 2025-08-13 PROBLEM — E66.813 CLASS 3 SEVERE OBESITY DUE TO EXCESS CALORIES IN ADULT: Status: ACTIVE | Noted: 2025-08-13

## 2025-08-13 PROBLEM — R07.9 CHEST PAIN, RULE OUT ACUTE MYOCARDIAL INFARCTION: Status: ACTIVE | Noted: 2025-08-13

## 2025-08-13 LAB
AMPHET UR QL SCN: NEGATIVE
ANION GAP SERPL CALC-SCNC: 12 MMOL/L (ref 7–16)
BARBITURATES UR QL SCN: NEGATIVE
BENZODIAZ UR QL SCN: NEGATIVE
BUN SERPL-MCNC: 13 MG/DL (ref 8–22)
BZE UR QL SCN: NEGATIVE
CALCIUM SERPL-MCNC: 9.9 MG/DL (ref 8.5–10.5)
CANNABINOIDS UR QL SCN: NEGATIVE
CHLORIDE SERPL-SCNC: 102 MMOL/L (ref 96–112)
CO2 SERPL-SCNC: 23 MMOL/L (ref 20–33)
CREAT SERPL-MCNC: 0.97 MG/DL (ref 0.5–1.4)
EKG IMPRESSION: NORMAL
ERYTHROCYTE [DISTWIDTH] IN BLOOD BY AUTOMATED COUNT: 42.7 FL (ref 35.9–50)
EST. AVERAGE GLUCOSE BLD GHB EST-MCNC: 140 MG/DL
FENTANYL UR QL: NEGATIVE
GFR SERPLBLD CREATININE-BSD FMLA CKD-EPI: 87 ML/MIN/1.73 M 2
GLUCOSE SERPL-MCNC: 112 MG/DL (ref 65–99)
HBA1C MFR BLD: 6.5 % (ref 4–5.6)
HCT VFR BLD AUTO: 42 % (ref 42–52)
HGB BLD-MCNC: 13.8 G/DL (ref 14–18)
MAGNESIUM SERPL-MCNC: 1.9 MG/DL (ref 1.5–2.5)
MCH RBC QN AUTO: 28.9 PG (ref 27–33)
MCHC RBC AUTO-ENTMCNC: 32.9 G/DL (ref 32.3–36.5)
MCV RBC AUTO: 87.9 FL (ref 81.4–97.8)
METHADONE UR QL SCN: NEGATIVE
NT-PROBNP SERPL IA-MCNC: 60 PG/ML (ref 0–125)
OPIATES UR QL SCN: POSITIVE
OXYCODONE UR QL SCN: NEGATIVE
PCP UR QL SCN: NEGATIVE
PLATELET # BLD AUTO: 234 K/UL (ref 164–446)
PMV BLD AUTO: 11.1 FL (ref 9–12.9)
POTASSIUM SERPL-SCNC: 4.3 MMOL/L (ref 3.6–5.5)
PROPOXYPH UR QL SCN: NEGATIVE
RBC # BLD AUTO: 4.78 M/UL (ref 4.7–6.1)
SODIUM SERPL-SCNC: 137 MMOL/L (ref 135–145)
TROPONIN T SERPL-MCNC: 18 NG/L (ref 6–19)
TROPONIN T SERPL-MCNC: 20 NG/L (ref 6–19)
TROPONIN T SERPL-MCNC: 20 NG/L (ref 6–19)
WBC # BLD AUTO: 7.1 K/UL (ref 4.8–10.8)

## 2025-08-13 PROCEDURE — 83880 ASSAY OF NATRIURETIC PEPTIDE: CPT

## 2025-08-13 PROCEDURE — 83036 HEMOGLOBIN GLYCOSYLATED A1C: CPT

## 2025-08-13 PROCEDURE — 80307 DRUG TEST PRSMV CHEM ANLYZR: CPT

## 2025-08-13 PROCEDURE — 84484 ASSAY OF TROPONIN QUANT: CPT

## 2025-08-13 PROCEDURE — A9502 TC99M TETROFOSMIN: HCPCS

## 2025-08-13 PROCEDURE — 700111 HCHG RX REV CODE 636 W/ 250 OVERRIDE (IP)

## 2025-08-13 PROCEDURE — A9270 NON-COVERED ITEM OR SERVICE: HCPCS | Performed by: STUDENT IN AN ORGANIZED HEALTH CARE EDUCATION/TRAINING PROGRAM

## 2025-08-13 PROCEDURE — 700111 HCHG RX REV CODE 636 W/ 250 OVERRIDE (IP): Mod: JZ | Performed by: STUDENT IN AN ORGANIZED HEALTH CARE EDUCATION/TRAINING PROGRAM

## 2025-08-13 PROCEDURE — 36415 COLL VENOUS BLD VENIPUNCTURE: CPT

## 2025-08-13 PROCEDURE — 83735 ASSAY OF MAGNESIUM: CPT

## 2025-08-13 PROCEDURE — 700102 HCHG RX REV CODE 250 W/ 637 OVERRIDE(OP): Performed by: STUDENT IN AN ORGANIZED HEALTH CARE EDUCATION/TRAINING PROGRAM

## 2025-08-13 PROCEDURE — 85027 COMPLETE CBC AUTOMATED: CPT

## 2025-08-13 PROCEDURE — 99223 1ST HOSP IP/OBS HIGH 75: CPT | Performed by: STUDENT IN AN ORGANIZED HEALTH CARE EDUCATION/TRAINING PROGRAM

## 2025-08-13 PROCEDURE — 93010 ELECTROCARDIOGRAM REPORT: CPT | Performed by: INTERNAL MEDICINE

## 2025-08-13 PROCEDURE — 93005 ELECTROCARDIOGRAM TRACING: CPT | Mod: TC

## 2025-08-13 PROCEDURE — 80048 BASIC METABOLIC PNL TOTAL CA: CPT

## 2025-08-13 PROCEDURE — 80061 LIPID PANEL: CPT

## 2025-08-13 PROCEDURE — 700111 HCHG RX REV CODE 636 W/ 250 OVERRIDE (IP): Mod: JZ

## 2025-08-13 PROCEDURE — 770020 HCHG ROOM/CARE - TELE (206)

## 2025-08-13 RX ORDER — LABETALOL HYDROCHLORIDE 5 MG/ML
20 INJECTION, SOLUTION INTRAVENOUS ONCE
Status: ACTIVE | OUTPATIENT
Start: 2025-08-13 | End: 2025-08-14

## 2025-08-13 RX ORDER — LEVETIRACETAM 500 MG/1
1000 TABLET ORAL 2 TIMES DAILY
Status: DISCONTINUED | OUTPATIENT
Start: 2025-08-13 | End: 2025-08-16 | Stop reason: HOSPADM

## 2025-08-13 RX ORDER — OMEPRAZOLE 20 MG/1
40 CAPSULE, DELAYED RELEASE ORAL DAILY
Status: DISCONTINUED | OUTPATIENT
Start: 2025-08-13 | End: 2025-08-16 | Stop reason: HOSPADM

## 2025-08-13 RX ORDER — METOPROLOL SUCCINATE 25 MG/1
25 TABLET, EXTENDED RELEASE ORAL EVERY EVENING
Status: DISCONTINUED | OUTPATIENT
Start: 2025-08-13 | End: 2025-08-15

## 2025-08-13 RX ORDER — FINASTERIDE 5 MG/1
5 TABLET, FILM COATED ORAL DAILY
Status: DISCONTINUED | OUTPATIENT
Start: 2025-08-13 | End: 2025-08-16 | Stop reason: HOSPADM

## 2025-08-13 RX ORDER — HYDRALAZINE HYDROCHLORIDE 10 MG/1
5 TABLET, FILM COATED ORAL 3 TIMES DAILY PRN
Status: DISCONTINUED | OUTPATIENT
Start: 2025-08-13 | End: 2025-08-13

## 2025-08-13 RX ORDER — LABETALOL HYDROCHLORIDE 5 MG/ML
INJECTION, SOLUTION INTRAVENOUS
Status: COMPLETED
Start: 2025-08-13 | End: 2025-08-13

## 2025-08-13 RX ORDER — IPRATROPIUM BROMIDE AND ALBUTEROL SULFATE 2.5; .5 MG/3ML; MG/3ML
3 SOLUTION RESPIRATORY (INHALATION)
Status: DISCONTINUED | OUTPATIENT
Start: 2025-08-13 | End: 2025-08-16 | Stop reason: HOSPADM

## 2025-08-13 RX ORDER — MORPHINE SULFATE 4 MG/ML
4 INJECTION INTRAVENOUS EVERY 4 HOURS PRN
Status: DISCONTINUED | OUTPATIENT
Start: 2025-08-13 | End: 2025-08-16 | Stop reason: HOSPADM

## 2025-08-13 RX ORDER — HYDROCODONE BITARTRATE AND ACETAMINOPHEN 10; 325 MG/1; MG/1
1 TABLET ORAL EVERY 6 HOURS
Refills: 0 | Status: DISCONTINUED | OUTPATIENT
Start: 2025-08-13 | End: 2025-08-16 | Stop reason: HOSPADM

## 2025-08-13 RX ORDER — METOPROLOL SUCCINATE 25 MG/1
25 TABLET, EXTENDED RELEASE ORAL EVERY EVENING
Status: DISCONTINUED | OUTPATIENT
Start: 2025-08-13 | End: 2025-08-13

## 2025-08-13 RX ORDER — POLYETHYLENE GLYCOL 3350 17 G/17G
1 POWDER, FOR SOLUTION ORAL
Status: DISCONTINUED | OUTPATIENT
Start: 2025-08-13 | End: 2025-08-16 | Stop reason: HOSPADM

## 2025-08-13 RX ORDER — TELMISARTAN 40 MG/1
40 TABLET ORAL EVERY MORNING
Status: DISCONTINUED | OUTPATIENT
Start: 2025-08-13 | End: 2025-08-16 | Stop reason: HOSPADM

## 2025-08-13 RX ORDER — HYDROCODONE BITARTRATE AND ACETAMINOPHEN 5; 325 MG/1; MG/1
1 TABLET ORAL EVERY 6 HOURS PRN
Refills: 0 | Status: DISCONTINUED | OUTPATIENT
Start: 2025-08-13 | End: 2025-08-16 | Stop reason: HOSPADM

## 2025-08-13 RX ORDER — ONDANSETRON 4 MG/1
4 TABLET, ORALLY DISINTEGRATING ORAL EVERY 4 HOURS PRN
Status: DISCONTINUED | OUTPATIENT
Start: 2025-08-13 | End: 2025-08-16 | Stop reason: HOSPADM

## 2025-08-13 RX ORDER — REGADENOSON 0.08 MG/ML
0.4 INJECTION, SOLUTION INTRAVENOUS
Status: COMPLETED | OUTPATIENT
Start: 2025-08-13 | End: 2025-08-13

## 2025-08-13 RX ORDER — AMINOPHYLLINE 25 MG/ML
100 INJECTION, SOLUTION INTRAVENOUS
Status: DISCONTINUED | OUTPATIENT
Start: 2025-08-13 | End: 2025-08-16 | Stop reason: HOSPADM

## 2025-08-13 RX ORDER — LABETALOL HYDROCHLORIDE 5 MG/ML
20 INJECTION, SOLUTION INTRAVENOUS EVERY 4 HOURS PRN
Status: DISCONTINUED | OUTPATIENT
Start: 2025-08-13 | End: 2025-08-16 | Stop reason: HOSPADM

## 2025-08-13 RX ORDER — CARVEDILOL 25 MG/1
25 TABLET ORAL 2 TIMES DAILY WITH MEALS
Status: ON HOLD | COMMUNITY
End: 2025-08-16

## 2025-08-13 RX ORDER — ROSUVASTATIN CALCIUM 20 MG/1
40 TABLET, COATED ORAL EVERY EVENING
Status: DISCONTINUED | OUTPATIENT
Start: 2025-08-13 | End: 2025-08-16 | Stop reason: HOSPADM

## 2025-08-13 RX ORDER — ALBUTEROL SULFATE 90 UG/1
1 INHALANT RESPIRATORY (INHALATION) EVERY 4 HOURS PRN
Status: DISCONTINUED | OUTPATIENT
Start: 2025-08-13 | End: 2025-08-16 | Stop reason: HOSPADM

## 2025-08-13 RX ORDER — VENLAFAXINE HYDROCHLORIDE 75 MG/1
225 CAPSULE, EXTENDED RELEASE ORAL EVERY EVENING
Status: DISCONTINUED | OUTPATIENT
Start: 2025-08-13 | End: 2025-08-16 | Stop reason: HOSPADM

## 2025-08-13 RX ORDER — REGADENOSON 0.08 MG/ML
INJECTION, SOLUTION INTRAVENOUS
Status: COMPLETED
Start: 2025-08-13 | End: 2025-08-13

## 2025-08-13 RX ORDER — TAMSULOSIN HYDROCHLORIDE 0.4 MG/1
0.4 CAPSULE ORAL DAILY
Status: DISCONTINUED | OUTPATIENT
Start: 2025-08-13 | End: 2025-08-16 | Stop reason: HOSPADM

## 2025-08-13 RX ORDER — HYDRALAZINE HYDROCHLORIDE 20 MG/ML
20 INJECTION INTRAMUSCULAR; INTRAVENOUS EVERY 6 HOURS PRN
Status: DISCONTINUED | OUTPATIENT
Start: 2025-08-13 | End: 2025-08-16 | Stop reason: HOSPADM

## 2025-08-13 RX ORDER — MORPHINE SULFATE 4 MG/ML
INJECTION INTRAVENOUS
Status: COMPLETED
Start: 2025-08-13 | End: 2025-08-13

## 2025-08-13 RX ORDER — ASPIRIN 81 MG/1
81 TABLET ORAL DAILY
Status: DISCONTINUED | OUTPATIENT
Start: 2025-08-14 | End: 2025-08-15

## 2025-08-13 RX ORDER — ACETAMINOPHEN 325 MG/1
650 TABLET ORAL EVERY 6 HOURS PRN
Status: DISCONTINUED | OUTPATIENT
Start: 2025-08-13 | End: 2025-08-16 | Stop reason: HOSPADM

## 2025-08-13 RX ORDER — EZETIMIBE 10 MG/1
10 TABLET ORAL DAILY
Status: DISCONTINUED | OUTPATIENT
Start: 2025-08-13 | End: 2025-08-16 | Stop reason: HOSPADM

## 2025-08-13 RX ORDER — CARVEDILOL 25 MG/1
25 TABLET ORAL 2 TIMES DAILY WITH MEALS
Status: DISCONTINUED | OUTPATIENT
Start: 2025-08-13 | End: 2025-08-13

## 2025-08-13 RX ORDER — AMOXICILLIN 250 MG
2 CAPSULE ORAL EVERY EVENING
Status: DISCONTINUED | OUTPATIENT
Start: 2025-08-13 | End: 2025-08-16 | Stop reason: HOSPADM

## 2025-08-13 RX ORDER — AMLODIPINE BESYLATE 5 MG/1
5 TABLET ORAL 2 TIMES DAILY
Status: DISCONTINUED | OUTPATIENT
Start: 2025-08-13 | End: 2025-08-16 | Stop reason: HOSPADM

## 2025-08-13 RX ORDER — AMLODIPINE BESYLATE 5 MG/1
5 TABLET ORAL 2 TIMES DAILY
COMMUNITY

## 2025-08-13 RX ORDER — ONDANSETRON 2 MG/ML
4 INJECTION INTRAMUSCULAR; INTRAVENOUS EVERY 4 HOURS PRN
Status: DISCONTINUED | OUTPATIENT
Start: 2025-08-13 | End: 2025-08-16 | Stop reason: HOSPADM

## 2025-08-13 RX ORDER — NITROGLYCERIN 0.4 MG/1
0.4 TABLET SUBLINGUAL
Status: DISCONTINUED | OUTPATIENT
Start: 2025-08-13 | End: 2025-08-16 | Stop reason: HOSPADM

## 2025-08-13 RX ORDER — LAMOTRIGINE 25 MG/1
25 TABLET ORAL 2 TIMES DAILY
Status: DISCONTINUED | OUTPATIENT
Start: 2025-08-13 | End: 2025-08-16 | Stop reason: HOSPADM

## 2025-08-13 RX ADMIN — TRAZODONE HYDROCHLORIDE 150 MG: 100 TABLET ORAL at 17:54

## 2025-08-13 RX ADMIN — REGADENOSON 0.4 MG: 0.08 INJECTION, SOLUTION INTRAVENOUS at 15:03

## 2025-08-13 RX ADMIN — TIZANIDINE 4 MG: 4 TABLET ORAL at 20:20

## 2025-08-13 RX ADMIN — OMEPRAZOLE 40 MG: 20 CAPSULE, DELAYED RELEASE ORAL at 05:59

## 2025-08-13 RX ADMIN — SENNOSIDES, DOCUSATE SODIUM 2 TABLET: 50; 8.6 TABLET, FILM COATED ORAL at 17:51

## 2025-08-13 RX ADMIN — HYDROCODONE BITARTRATE AND ACETAMINOPHEN 1 TABLET: 10; 325 TABLET ORAL at 17:52

## 2025-08-13 RX ADMIN — AMLODIPINE BESYLATE 5 MG: 5 TABLET ORAL at 17:53

## 2025-08-13 RX ADMIN — MORPHINE SULFATE: 4 INJECTION, SOLUTION INTRAMUSCULAR; INTRAVENOUS at 02:30

## 2025-08-13 RX ADMIN — HYDROCODONE BITARTRATE AND ACETAMINOPHEN 1 TABLET: 10; 325 TABLET ORAL at 12:34

## 2025-08-13 RX ADMIN — CARVEDILOL 25 MG: 25 TABLET, FILM COATED ORAL at 08:37

## 2025-08-13 RX ADMIN — ROSUVASTATIN CALCIUM 40 MG: 20 TABLET, FILM COATED ORAL at 17:52

## 2025-08-13 RX ADMIN — LABETALOL HYDROCHLORIDE 20 MG: 5 INJECTION INTRAVENOUS at 06:07

## 2025-08-13 RX ADMIN — HYDROCODONE BITARTRATE AND ACETAMINOPHEN 1 TABLET: 10; 325 TABLET ORAL at 05:58

## 2025-08-13 RX ADMIN — METOPROLOL SUCCINATE 25 MG: 25 TABLET, EXTENDED RELEASE ORAL at 17:53

## 2025-08-13 RX ADMIN — TELMISARTAN 40 MG: 40 TABLET ORAL at 07:11

## 2025-08-13 RX ADMIN — TIZANIDINE 4 MG: 4 TABLET ORAL at 12:34

## 2025-08-13 RX ADMIN — TAMSULOSIN HYDROCHLORIDE 0.4 MG: 0.4 CAPSULE ORAL at 05:59

## 2025-08-13 RX ADMIN — EZETIMIBE 10 MG: 10 TABLET ORAL at 05:58

## 2025-08-13 RX ADMIN — HYDROCODONE BITARTRATE AND ACETAMINOPHEN 1 TABLET: 5; 325 TABLET ORAL at 20:20

## 2025-08-13 RX ADMIN — AMLODIPINE BESYLATE 5 MG: 5 TABLET ORAL at 05:58

## 2025-08-13 RX ADMIN — MORPHINE SULFATE 4 MG: 4 INJECTION, SOLUTION INTRAMUSCULAR; INTRAVENOUS at 08:36

## 2025-08-13 RX ADMIN — TIZANIDINE 4 MG: 4 TABLET ORAL at 17:53

## 2025-08-13 RX ADMIN — LAMOTRIGINE 25 MG: 25 TABLET ORAL at 17:53

## 2025-08-13 RX ADMIN — FINASTERIDE 5 MG: 5 TABLET, FILM COATED ORAL at 05:59

## 2025-08-13 RX ADMIN — LEVETIRACETAM 1000 MG: 500 TABLET, FILM COATED ORAL at 17:51

## 2025-08-13 RX ADMIN — LAMOTRIGINE 25 MG: 25 TABLET ORAL at 07:11

## 2025-08-13 RX ADMIN — LEVETIRACETAM 1000 MG: 500 TABLET, FILM COATED ORAL at 05:59

## 2025-08-13 RX ADMIN — LABETALOL HYDROCHLORIDE 20 MG: 5 INJECTION, SOLUTION INTRAVENOUS at 02:25

## 2025-08-13 RX ADMIN — VENLAFAXINE HYDROCHLORIDE 225 MG: 75 CAPSULE, EXTENDED RELEASE ORAL at 17:54

## 2025-08-13 RX ADMIN — TIZANIDINE 4 MG: 4 TABLET ORAL at 06:07

## 2025-08-13 ASSESSMENT — ENCOUNTER SYMPTOMS
PSYCHIATRIC NEGATIVE: 1
NEUROLOGICAL NEGATIVE: 1
GASTROINTESTINAL NEGATIVE: 1
CONSTITUTIONAL NEGATIVE: 1
MUSCULOSKELETAL NEGATIVE: 1
EYES NEGATIVE: 1
SPUTUM PRODUCTION: 1

## 2025-08-13 ASSESSMENT — PAIN DESCRIPTION - PAIN TYPE
TYPE: ACUTE PAIN
TYPE: ACUTE PAIN;CHRONIC PAIN
TYPE: ACUTE PAIN
TYPE: ACUTE PAIN;CHRONIC PAIN
TYPE: CHRONIC PAIN
TYPE: CHRONIC PAIN

## 2025-08-13 ASSESSMENT — CHA2DS2 SCORE
CHF OR LEFT VENTRICULAR DYSFUNCTION: NO
VASCULAR DISEASE: YES
PRIOR STROKE OR TIA OR THROMBOEMBOLISM: YES
CHA2DS2 VASC SCORE: 6
DIABETES: YES
AGE 75 OR GREATER: NO
HYPERTENSION: YES
AGE 65 TO 74: YES
SEX: MALE

## 2025-08-13 ASSESSMENT — COGNITIVE AND FUNCTIONAL STATUS - GENERAL
MOBILITY SCORE: 23
CLIMB 3 TO 5 STEPS WITH RAILING: A LITTLE
SUGGESTED CMS G CODE MODIFIER DAILY ACTIVITY: CH
SUGGESTED CMS G CODE MODIFIER MOBILITY: CI
DAILY ACTIVITIY SCORE: 24

## 2025-08-13 ASSESSMENT — FIBROSIS 4 INDEX: FIB4 SCORE: 7.33

## 2025-08-14 ENCOUNTER — APPOINTMENT (OUTPATIENT)
Dept: CARDIOLOGY | Facility: MEDICAL CENTER | Age: 65
DRG: 322 | End: 2025-08-14
Payer: COMMERCIAL

## 2025-08-14 ENCOUNTER — TELEPHONE (OUTPATIENT)
Dept: CARDIOLOGY | Facility: MEDICAL CENTER | Age: 65
End: 2025-08-14

## 2025-08-14 LAB
ALBUMIN SERPL BCP-MCNC: 3.3 G/DL (ref 3.2–4.9)
ALBUMIN/GLOB SERPL: 1.3 G/DL
ALP SERPL-CCNC: 65 U/L (ref 30–99)
ALT SERPL-CCNC: 11 U/L (ref 2–50)
ANION GAP SERPL CALC-SCNC: 10 MMOL/L (ref 7–16)
AST SERPL-CCNC: 18 U/L (ref 12–45)
BASOPHILS # BLD AUTO: 0.5 % (ref 0–1.8)
BASOPHILS # BLD: 0.03 K/UL (ref 0–0.12)
BILIRUB SERPL-MCNC: 0.5 MG/DL (ref 0.1–1.5)
BUN SERPL-MCNC: 16 MG/DL (ref 8–22)
CALCIUM ALBUM COR SERPL-MCNC: 10 MG/DL (ref 8.5–10.5)
CALCIUM SERPL-MCNC: 9.4 MG/DL (ref 8.5–10.5)
CHLORIDE SERPL-SCNC: 102 MMOL/L (ref 96–112)
CHOLEST SERPL-MCNC: 150 MG/DL (ref 100–199)
CO2 SERPL-SCNC: 24 MMOL/L (ref 20–33)
CREAT SERPL-MCNC: 1.04 MG/DL (ref 0.5–1.4)
EOSINOPHIL # BLD AUTO: 0.17 K/UL (ref 0–0.51)
EOSINOPHIL NFR BLD: 3 % (ref 0–6.9)
ERYTHROCYTE [DISTWIDTH] IN BLOOD BY AUTOMATED COUNT: 43.5 FL (ref 35.9–50)
GFR SERPLBLD CREATININE-BSD FMLA CKD-EPI: 80 ML/MIN/1.73 M 2
GLOBULIN SER CALC-MCNC: 2.6 G/DL (ref 1.9–3.5)
GLUCOSE SERPL-MCNC: 109 MG/DL (ref 65–99)
HCT VFR BLD AUTO: 39.7 % (ref 42–52)
HDLC SERPL-MCNC: 54 MG/DL
HGB BLD-MCNC: 12.8 G/DL (ref 14–18)
IMM GRANULOCYTES # BLD AUTO: 0.03 K/UL (ref 0–0.11)
IMM GRANULOCYTES NFR BLD AUTO: 0.5 % (ref 0–0.9)
LDLC SERPL CALC-MCNC: 53 MG/DL
LV EJECT FRACT MOD 2C 99903: 72.92
LV EJECT FRACT MOD 4C 99902: 69.79
LV EJECT FRACT MOD BP 99901: 72.07
LYMPHOCYTES # BLD AUTO: 1.34 K/UL (ref 1–4.8)
LYMPHOCYTES NFR BLD: 23.4 % (ref 22–41)
MCH RBC QN AUTO: 28.9 PG (ref 27–33)
MCHC RBC AUTO-ENTMCNC: 32.2 G/DL (ref 32.3–36.5)
MCV RBC AUTO: 89.6 FL (ref 81.4–97.8)
MONOCYTES # BLD AUTO: 0.73 K/UL (ref 0–0.85)
MONOCYTES NFR BLD AUTO: 12.8 % (ref 0–13.4)
NEUTROPHILS # BLD AUTO: 3.42 K/UL (ref 1.82–7.42)
NEUTROPHILS NFR BLD: 59.8 % (ref 44–72)
NRBC # BLD AUTO: 0 K/UL
NRBC BLD-RTO: 0 /100 WBC (ref 0–0.2)
PLATELET # BLD AUTO: 218 K/UL (ref 164–446)
PMV BLD AUTO: 10.7 FL (ref 9–12.9)
POTASSIUM SERPL-SCNC: 4 MMOL/L (ref 3.6–5.5)
PROT SERPL-MCNC: 5.9 G/DL (ref 6–8.2)
RBC # BLD AUTO: 4.43 M/UL (ref 4.7–6.1)
SODIUM SERPL-SCNC: 136 MMOL/L (ref 135–145)
TRIGL SERPL-MCNC: 217 MG/DL (ref 0–149)
TSH SERPL DL<=0.005 MIU/L-ACNC: 0.8 UIU/ML (ref 0.38–5.33)
WBC # BLD AUTO: 5.7 K/UL (ref 4.8–10.8)

## 2025-08-14 PROCEDURE — 93306 TTE W/DOPPLER COMPLETE: CPT

## 2025-08-14 PROCEDURE — 770020 HCHG ROOM/CARE - TELE (206)

## 2025-08-14 PROCEDURE — 700111 HCHG RX REV CODE 636 W/ 250 OVERRIDE (IP): Mod: JZ | Performed by: STUDENT IN AN ORGANIZED HEALTH CARE EDUCATION/TRAINING PROGRAM

## 2025-08-14 PROCEDURE — 85025 COMPLETE CBC W/AUTO DIFF WBC: CPT

## 2025-08-14 PROCEDURE — A9270 NON-COVERED ITEM OR SERVICE: HCPCS | Performed by: INTERNAL MEDICINE

## 2025-08-14 PROCEDURE — 93306 TTE W/DOPPLER COMPLETE: CPT | Mod: 26 | Performed by: INTERNAL MEDICINE

## 2025-08-14 PROCEDURE — 99233 SBSQ HOSP IP/OBS HIGH 50: CPT | Performed by: STUDENT IN AN ORGANIZED HEALTH CARE EDUCATION/TRAINING PROGRAM

## 2025-08-14 PROCEDURE — A9270 NON-COVERED ITEM OR SERVICE: HCPCS | Performed by: STUDENT IN AN ORGANIZED HEALTH CARE EDUCATION/TRAINING PROGRAM

## 2025-08-14 PROCEDURE — 36415 COLL VENOUS BLD VENIPUNCTURE: CPT

## 2025-08-14 PROCEDURE — 99222 1ST HOSP IP/OBS MODERATE 55: CPT | Mod: GC | Performed by: INTERNAL MEDICINE

## 2025-08-14 PROCEDURE — 700117 HCHG RX CONTRAST REV CODE 255

## 2025-08-14 PROCEDURE — 80053 COMPREHEN METABOLIC PANEL: CPT

## 2025-08-14 PROCEDURE — 700102 HCHG RX REV CODE 250 W/ 637 OVERRIDE(OP): Performed by: STUDENT IN AN ORGANIZED HEALTH CARE EDUCATION/TRAINING PROGRAM

## 2025-08-14 PROCEDURE — 700102 HCHG RX REV CODE 250 W/ 637 OVERRIDE(OP): Performed by: INTERNAL MEDICINE

## 2025-08-14 PROCEDURE — 84443 ASSAY THYROID STIM HORMONE: CPT

## 2025-08-14 RX ORDER — RANOLAZINE 500 MG/1
500 TABLET, EXTENDED RELEASE ORAL 2 TIMES DAILY
Status: DISCONTINUED | OUTPATIENT
Start: 2025-08-14 | End: 2025-08-16 | Stop reason: HOSPADM

## 2025-08-14 RX ADMIN — FINASTERIDE 5 MG: 5 TABLET, FILM COATED ORAL at 05:57

## 2025-08-14 RX ADMIN — POLYETHYLENE GLYCOL 3350 1 PACKET: 17 POWDER, FOR SOLUTION ORAL at 18:04

## 2025-08-14 RX ADMIN — RANOLAZINE 500 MG: 500 TABLET, EXTENDED RELEASE ORAL at 12:39

## 2025-08-14 RX ADMIN — MORPHINE SULFATE 4 MG: 4 INJECTION, SOLUTION INTRAMUSCULAR; INTRAVENOUS at 08:37

## 2025-08-14 RX ADMIN — TRAZODONE HYDROCHLORIDE 150 MG: 100 TABLET ORAL at 17:58

## 2025-08-14 RX ADMIN — LAMOTRIGINE 25 MG: 25 TABLET ORAL at 05:57

## 2025-08-14 RX ADMIN — HUMAN ALBUMIN MICROSPHERES AND PERFLUTREN 3 ML: 10; .22 INJECTION, SOLUTION INTRAVENOUS at 18:15

## 2025-08-14 RX ADMIN — TELMISARTAN 40 MG: 40 TABLET ORAL at 05:58

## 2025-08-14 RX ADMIN — EZETIMIBE 10 MG: 10 TABLET ORAL at 05:57

## 2025-08-14 RX ADMIN — LEVETIRACETAM 1000 MG: 500 TABLET, FILM COATED ORAL at 18:00

## 2025-08-14 RX ADMIN — HYDROCODONE BITARTRATE AND ACETAMINOPHEN 1 TABLET: 10; 325 TABLET ORAL at 05:57

## 2025-08-14 RX ADMIN — ROSUVASTATIN CALCIUM 40 MG: 20 TABLET, FILM COATED ORAL at 17:59

## 2025-08-14 RX ADMIN — ASPIRIN 81 MG: 81 TABLET, COATED ORAL at 05:56

## 2025-08-14 RX ADMIN — VENLAFAXINE HYDROCHLORIDE 225 MG: 75 CAPSULE, EXTENDED RELEASE ORAL at 18:01

## 2025-08-14 RX ADMIN — TAMSULOSIN HYDROCHLORIDE 0.4 MG: 0.4 CAPSULE ORAL at 05:57

## 2025-08-14 RX ADMIN — OMEPRAZOLE 40 MG: 20 CAPSULE, DELAYED RELEASE ORAL at 05:57

## 2025-08-14 RX ADMIN — AMLODIPINE BESYLATE 5 MG: 5 TABLET ORAL at 17:59

## 2025-08-14 RX ADMIN — LAMOTRIGINE 25 MG: 25 TABLET ORAL at 18:01

## 2025-08-14 RX ADMIN — TIZANIDINE 4 MG: 4 TABLET ORAL at 05:58

## 2025-08-14 RX ADMIN — LEVETIRACETAM 1000 MG: 500 TABLET, FILM COATED ORAL at 05:57

## 2025-08-14 RX ADMIN — TIZANIDINE 4 MG: 4 TABLET ORAL at 18:01

## 2025-08-14 RX ADMIN — HYDROCODONE BITARTRATE AND ACETAMINOPHEN 1 TABLET: 10; 325 TABLET ORAL at 12:39

## 2025-08-14 RX ADMIN — SENNOSIDES, DOCUSATE SODIUM 2 TABLET: 50; 8.6 TABLET, FILM COATED ORAL at 18:01

## 2025-08-14 RX ADMIN — METOPROLOL SUCCINATE 25 MG: 25 TABLET, EXTENDED RELEASE ORAL at 18:01

## 2025-08-14 RX ADMIN — AMLODIPINE BESYLATE 5 MG: 5 TABLET ORAL at 05:56

## 2025-08-14 RX ADMIN — HYDROCODONE BITARTRATE AND ACETAMINOPHEN 1 TABLET: 10; 325 TABLET ORAL at 18:00

## 2025-08-14 RX ADMIN — TIZANIDINE 4 MG: 4 TABLET ORAL at 21:54

## 2025-08-14 RX ADMIN — HYDROCODONE BITARTRATE AND ACETAMINOPHEN 1 TABLET: 10; 325 TABLET ORAL at 00:26

## 2025-08-14 RX ADMIN — TIZANIDINE 4 MG: 4 TABLET ORAL at 12:41

## 2025-08-14 ASSESSMENT — PAIN DESCRIPTION - PAIN TYPE
TYPE: ACUTE PAIN;CHRONIC PAIN
TYPE: CHRONIC PAIN
TYPE: CHRONIC PAIN
TYPE: ACUTE PAIN
TYPE: CHRONIC PAIN
TYPE: ACUTE PAIN;CHRONIC PAIN
TYPE: CHRONIC PAIN

## 2025-08-14 ASSESSMENT — ENCOUNTER SYMPTOMS
GASTROINTESTINAL NEGATIVE: 1
NEUROLOGICAL NEGATIVE: 1
CONSTITUTIONAL NEGATIVE: 1
MUSCULOSKELETAL NEGATIVE: 1
EYES NEGATIVE: 1
PSYCHIATRIC NEGATIVE: 1
SPUTUM PRODUCTION: 1

## 2025-08-14 ASSESSMENT — FIBROSIS 4 INDEX: FIB4 SCORE: 1.62

## 2025-08-15 ENCOUNTER — APPOINTMENT (OUTPATIENT)
Dept: CARDIOLOGY | Facility: MEDICAL CENTER | Age: 65
DRG: 322 | End: 2025-08-15
Payer: COMMERCIAL

## 2025-08-15 LAB
ACT BLD: 245 S (ref 74–137)
ACT BLD: 251 S (ref 74–137)
EKG IMPRESSION: NORMAL

## 2025-08-15 PROCEDURE — 700102 HCHG RX REV CODE 250 W/ 637 OVERRIDE(OP): Performed by: STUDENT IN AN ORGANIZED HEALTH CARE EDUCATION/TRAINING PROGRAM

## 2025-08-15 PROCEDURE — 85347 COAGULATION TIME ACTIVATED: CPT | Performed by: STUDENT IN AN ORGANIZED HEALTH CARE EDUCATION/TRAINING PROGRAM

## 2025-08-15 PROCEDURE — 92928 PRQ TCAT PLMT NTRAC ST 1 LES: CPT | Mod: LD | Performed by: INTERNAL MEDICINE

## 2025-08-15 PROCEDURE — B2111ZZ FLUOROSCOPY OF MULTIPLE CORONARY ARTERIES USING LOW OSMOLAR CONTRAST: ICD-10-PCS | Performed by: INTERNAL MEDICINE

## 2025-08-15 PROCEDURE — 92978 ENDOLUMINL IVUS OCT C 1ST: CPT | Mod: 26,LD | Performed by: INTERNAL MEDICINE

## 2025-08-15 PROCEDURE — 93571 IV DOP VEL&/PRESS C FLO 1ST: CPT | Mod: 26,LD | Performed by: INTERNAL MEDICINE

## 2025-08-15 PROCEDURE — 99233 SBSQ HOSP IP/OBS HIGH 50: CPT | Performed by: STUDENT IN AN ORGANIZED HEALTH CARE EDUCATION/TRAINING PROGRAM

## 2025-08-15 PROCEDURE — 4A023N7 MEASUREMENT OF CARDIAC SAMPLING AND PRESSURE, LEFT HEART, PERCUTANEOUS APPROACH: ICD-10-PCS | Performed by: INTERNAL MEDICINE

## 2025-08-15 PROCEDURE — 93005 ELECTROCARDIOGRAM TRACING: CPT | Mod: TC | Performed by: INTERNAL MEDICINE

## 2025-08-15 PROCEDURE — 93458 L HRT ARTERY/VENTRICLE ANGIO: CPT | Mod: 26,59 | Performed by: INTERNAL MEDICINE

## 2025-08-15 PROCEDURE — 700105 HCHG RX REV CODE 258

## 2025-08-15 PROCEDURE — 93010 ELECTROCARDIOGRAM REPORT: CPT | Performed by: INTERNAL MEDICINE

## 2025-08-15 PROCEDURE — 700101 HCHG RX REV CODE 250

## 2025-08-15 PROCEDURE — 700111 HCHG RX REV CODE 636 W/ 250 OVERRIDE (IP)

## 2025-08-15 PROCEDURE — 027034Z DILATION OF CORONARY ARTERY, ONE ARTERY WITH DRUG-ELUTING INTRALUMINAL DEVICE, PERCUTANEOUS APPROACH: ICD-10-PCS | Performed by: INTERNAL MEDICINE

## 2025-08-15 PROCEDURE — B240ZZ3 ULTRASONOGRAPHY OF SINGLE CORONARY ARTERY, INTRAVASCULAR: ICD-10-PCS | Performed by: INTERNAL MEDICINE

## 2025-08-15 PROCEDURE — 700117 HCHG RX CONTRAST REV CODE 255: Performed by: INTERNAL MEDICINE

## 2025-08-15 PROCEDURE — A9270 NON-COVERED ITEM OR SERVICE: HCPCS | Performed by: STUDENT IN AN ORGANIZED HEALTH CARE EDUCATION/TRAINING PROGRAM

## 2025-08-15 PROCEDURE — 770020 HCHG ROOM/CARE - TELE (206)

## 2025-08-15 PROCEDURE — 4A033BC MEASUREMENT OF ARTERIAL PRESSURE, CORONARY, PERCUTANEOUS APPROACH: ICD-10-PCS | Performed by: INTERNAL MEDICINE

## 2025-08-15 PROCEDURE — A9270 NON-COVERED ITEM OR SERVICE: HCPCS | Performed by: INTERNAL MEDICINE

## 2025-08-15 PROCEDURE — C1887 CATHETER, GUIDING: HCPCS

## 2025-08-15 PROCEDURE — 700102 HCHG RX REV CODE 250 W/ 637 OVERRIDE(OP): Performed by: INTERNAL MEDICINE

## 2025-08-15 PROCEDURE — 700102 HCHG RX REV CODE 250 W/ 637 OVERRIDE(OP)

## 2025-08-15 PROCEDURE — A9270 NON-COVERED ITEM OR SERVICE: HCPCS

## 2025-08-15 PROCEDURE — 99152 MOD SED SAME PHYS/QHP 5/>YRS: CPT | Performed by: INTERNAL MEDICINE

## 2025-08-15 PROCEDURE — 99232 SBSQ HOSP IP/OBS MODERATE 35: CPT | Mod: 25,GC | Performed by: INTERNAL MEDICINE

## 2025-08-15 RX ORDER — DAPAGLIFLOZIN 10 MG/1
10 TABLET, FILM COATED ORAL DAILY
Status: DISCONTINUED | OUTPATIENT
Start: 2025-08-16 | End: 2025-08-16 | Stop reason: HOSPADM

## 2025-08-15 RX ORDER — HEPARIN SODIUM 1000 [USP'U]/ML
INJECTION, SOLUTION INTRAVENOUS; SUBCUTANEOUS
Status: COMPLETED
Start: 2025-08-15 | End: 2025-08-15

## 2025-08-15 RX ORDER — NOREPINEPHRINE BITARTRATE 0.03 MG/ML
INJECTION, SOLUTION INTRAVENOUS
Status: COMPLETED
Start: 2025-08-15 | End: 2025-08-15

## 2025-08-15 RX ORDER — METOPROLOL SUCCINATE 50 MG/1
50 TABLET, EXTENDED RELEASE ORAL EVERY EVENING
Status: DISCONTINUED | OUTPATIENT
Start: 2025-08-15 | End: 2025-08-16 | Stop reason: HOSPADM

## 2025-08-15 RX ORDER — ASPIRIN 81 MG/1
81 TABLET ORAL DAILY
Status: DISCONTINUED | OUTPATIENT
Start: 2025-08-16 | End: 2025-08-16 | Stop reason: HOSPADM

## 2025-08-15 RX ORDER — HEPARIN SODIUM 200 [USP'U]/100ML
INJECTION, SOLUTION INTRAVENOUS
Status: COMPLETED
Start: 2025-08-15 | End: 2025-08-15

## 2025-08-15 RX ORDER — CLOPIDOGREL 300 MG/1
TABLET, FILM COATED ORAL
Status: COMPLETED
Start: 2025-08-15 | End: 2025-08-15

## 2025-08-15 RX ORDER — MIDAZOLAM HYDROCHLORIDE 1 MG/ML
INJECTION INTRAMUSCULAR; INTRAVENOUS
Status: COMPLETED
Start: 2025-08-15 | End: 2025-08-15

## 2025-08-15 RX ORDER — FUROSEMIDE 20 MG/1
20 TABLET ORAL
Status: DISCONTINUED | OUTPATIENT
Start: 2025-08-16 | End: 2025-08-16 | Stop reason: HOSPADM

## 2025-08-15 RX ORDER — DIPHENHYDRAMINE HYDROCHLORIDE 50 MG/ML
INJECTION, SOLUTION INTRAMUSCULAR; INTRAVENOUS
Status: COMPLETED
Start: 2025-08-15 | End: 2025-08-15

## 2025-08-15 RX ORDER — PHENYLEPHRINE HCL IN 0.9% NACL 1 MG/10 ML
SYRINGE (ML) INTRAVENOUS
Status: COMPLETED
Start: 2025-08-15 | End: 2025-08-15

## 2025-08-15 RX ORDER — CLOPIDOGREL 300 MG/1
600 TABLET, FILM COATED ORAL ONCE
Status: DISCONTINUED | OUTPATIENT
Start: 2025-08-15 | End: 2025-08-15

## 2025-08-15 RX ORDER — CLOPIDOGREL BISULFATE 75 MG/1
75 TABLET ORAL DAILY
Status: DISCONTINUED | OUTPATIENT
Start: 2025-08-16 | End: 2025-08-16 | Stop reason: HOSPADM

## 2025-08-15 RX ORDER — VERAPAMIL HYDROCHLORIDE 2.5 MG/ML
INJECTION INTRAVENOUS
Status: COMPLETED
Start: 2025-08-15 | End: 2025-08-15

## 2025-08-15 RX ORDER — LIDOCAINE HYDROCHLORIDE 20 MG/ML
INJECTION, SOLUTION INFILTRATION; PERINEURAL
Status: COMPLETED
Start: 2025-08-15 | End: 2025-08-15

## 2025-08-15 RX ADMIN — OMEPRAZOLE 40 MG: 20 CAPSULE, DELAYED RELEASE ORAL at 06:02

## 2025-08-15 RX ADMIN — HEPARIN SODIUM: 1000 INJECTION, SOLUTION INTRAVENOUS; SUBCUTANEOUS at 13:56

## 2025-08-15 RX ADMIN — LAMOTRIGINE 25 MG: 25 TABLET ORAL at 18:04

## 2025-08-15 RX ADMIN — HYDROCODONE BITARTRATE AND ACETAMINOPHEN 1 TABLET: 10; 325 TABLET ORAL at 17:55

## 2025-08-15 RX ADMIN — SENNOSIDES, DOCUSATE SODIUM 2 TABLET: 50; 8.6 TABLET, FILM COATED ORAL at 17:55

## 2025-08-15 RX ADMIN — TIZANIDINE 4 MG: 4 TABLET ORAL at 06:03

## 2025-08-15 RX ADMIN — MIDAZOLAM HYDROCHLORIDE 2 MG: 1 INJECTION, SOLUTION INTRAMUSCULAR; INTRAVENOUS at 14:56

## 2025-08-15 RX ADMIN — HYDROCODONE BITARTRATE AND ACETAMINOPHEN 1 TABLET: 5; 325 TABLET ORAL at 09:26

## 2025-08-15 RX ADMIN — TAMSULOSIN HYDROCHLORIDE 0.4 MG: 0.4 CAPSULE ORAL at 06:03

## 2025-08-15 RX ADMIN — NITROGLYCERIN 10 ML: 5 INJECTION, SOLUTION INTRAVENOUS at 13:57

## 2025-08-15 RX ADMIN — HEPARIN SODIUM: 1000 INJECTION, SOLUTION INTRAVENOUS; SUBCUTANEOUS at 15:08

## 2025-08-15 RX ADMIN — LEVETIRACETAM 1000 MG: 500 TABLET, FILM COATED ORAL at 06:04

## 2025-08-15 RX ADMIN — FINASTERIDE 5 MG: 5 TABLET, FILM COATED ORAL at 06:04

## 2025-08-15 RX ADMIN — HYDROCODONE BITARTRATE AND ACETAMINOPHEN 1 TABLET: 10; 325 TABLET ORAL at 00:11

## 2025-08-15 RX ADMIN — LEVETIRACETAM 1000 MG: 500 TABLET, FILM COATED ORAL at 17:56

## 2025-08-15 RX ADMIN — TIZANIDINE 4 MG: 4 TABLET ORAL at 17:56

## 2025-08-15 RX ADMIN — VENLAFAXINE HYDROCHLORIDE 225 MG: 75 CAPSULE, EXTENDED RELEASE ORAL at 17:56

## 2025-08-15 RX ADMIN — ASPIRIN 81 MG: 81 TABLET, COATED ORAL at 06:02

## 2025-08-15 RX ADMIN — TELMISARTAN 40 MG: 40 TABLET ORAL at 06:04

## 2025-08-15 RX ADMIN — AMLODIPINE BESYLATE 5 MG: 5 TABLET ORAL at 17:55

## 2025-08-15 RX ADMIN — DIPHENHYDRAMINE HYDROCHLORIDE 50 MG: 50 INJECTION, SOLUTION INTRAMUSCULAR; INTRAVENOUS at 14:06

## 2025-08-15 RX ADMIN — VERAPAMIL HYDROCHLORIDE 5 MG: 2.5 INJECTION, SOLUTION INTRAVENOUS at 13:56

## 2025-08-15 RX ADMIN — ROSUVASTATIN CALCIUM 40 MG: 20 TABLET, FILM COATED ORAL at 17:56

## 2025-08-15 RX ADMIN — FENTANYL CITRATE 50 MCG: 50 INJECTION, SOLUTION INTRAMUSCULAR; INTRAVENOUS at 15:18

## 2025-08-15 RX ADMIN — HEPARIN SODIUM 2000 UNITS: 200 INJECTION, SOLUTION INTRAVENOUS at 13:56

## 2025-08-15 RX ADMIN — LIDOCAINE HYDROCHLORIDE: 20 INJECTION, SOLUTION INFILTRATION; PERINEURAL at 13:56

## 2025-08-15 RX ADMIN — TIZANIDINE 4 MG: 4 TABLET ORAL at 20:17

## 2025-08-15 RX ADMIN — METOPROLOL SUCCINATE 50 MG: 50 TABLET, EXTENDED RELEASE ORAL at 17:55

## 2025-08-15 RX ADMIN — MIDAZOLAM HYDROCHLORIDE 1 MG: 1 INJECTION, SOLUTION INTRAMUSCULAR; INTRAVENOUS at 15:18

## 2025-08-15 RX ADMIN — FENTANYL CITRATE 100 MCG: 50 INJECTION, SOLUTION INTRAMUSCULAR; INTRAVENOUS at 14:06

## 2025-08-15 RX ADMIN — TIZANIDINE 4 MG: 4 TABLET ORAL at 12:03

## 2025-08-15 RX ADMIN — HYDROCODONE BITARTRATE AND ACETAMINOPHEN 1 TABLET: 10; 325 TABLET ORAL at 12:03

## 2025-08-15 RX ADMIN — RANOLAZINE 500 MG: 500 TABLET, EXTENDED RELEASE ORAL at 06:03

## 2025-08-15 RX ADMIN — EZETIMIBE 10 MG: 10 TABLET ORAL at 06:04

## 2025-08-15 RX ADMIN — LAMOTRIGINE 25 MG: 25 TABLET ORAL at 06:04

## 2025-08-15 RX ADMIN — IOHEXOL 111 ML: 350 INJECTION, SOLUTION INTRAVENOUS at 15:19

## 2025-08-15 RX ADMIN — AMLODIPINE BESYLATE 5 MG: 5 TABLET ORAL at 06:04

## 2025-08-15 RX ADMIN — TRAZODONE HYDROCHLORIDE 150 MG: 100 TABLET ORAL at 17:55

## 2025-08-15 RX ADMIN — HYDROCODONE BITARTRATE AND ACETAMINOPHEN 1 TABLET: 10; 325 TABLET ORAL at 06:03

## 2025-08-15 RX ADMIN — Medication 300 MCG: at 15:18

## 2025-08-15 RX ADMIN — RANOLAZINE 500 MG: 500 TABLET, EXTENDED RELEASE ORAL at 17:56

## 2025-08-15 RX ADMIN — CLOPIDOGREL BISULFATE 600 MG: 300 TABLET, FILM COATED ORAL at 15:26

## 2025-08-15 ASSESSMENT — PAIN DESCRIPTION - PAIN TYPE
TYPE: ACUTE PAIN
TYPE: CHRONIC PAIN

## 2025-08-15 ASSESSMENT — ENCOUNTER SYMPTOMS
EYES NEGATIVE: 1
MUSCULOSKELETAL NEGATIVE: 1
NEUROLOGICAL NEGATIVE: 1
CONSTITUTIONAL NEGATIVE: 1
PSYCHIATRIC NEGATIVE: 1
GASTROINTESTINAL NEGATIVE: 1
SPUTUM PRODUCTION: 1

## 2025-08-15 ASSESSMENT — PATIENT HEALTH QUESTIONNAIRE - PHQ9
1. LITTLE INTEREST OR PLEASURE IN DOING THINGS: NOT AT ALL
SUM OF ALL RESPONSES TO PHQ9 QUESTIONS 1 AND 2: 0
2. FEELING DOWN, DEPRESSED, IRRITABLE, OR HOPELESS: NOT AT ALL

## 2025-08-15 ASSESSMENT — FIBROSIS 4 INDEX: FIB4 SCORE: 1.62

## 2025-08-16 ENCOUNTER — PHARMACY VISIT (OUTPATIENT)
Dept: PHARMACY | Facility: MEDICAL CENTER | Age: 65
End: 2025-08-16
Payer: COMMERCIAL

## 2025-08-16 VITALS
DIASTOLIC BLOOD PRESSURE: 92 MMHG | TEMPERATURE: 97.5 F | BODY MASS INDEX: 41.09 KG/M2 | SYSTOLIC BLOOD PRESSURE: 134 MMHG | OXYGEN SATURATION: 92 % | RESPIRATION RATE: 16 BRPM | WEIGHT: 262.35 LBS | HEART RATE: 69 BPM

## 2025-08-16 LAB
ANION GAP SERPL CALC-SCNC: 9 MMOL/L (ref 7–16)
BUN SERPL-MCNC: 16 MG/DL (ref 8–22)
CALCIUM SERPL-MCNC: 10 MG/DL (ref 8.5–10.5)
CHLORIDE SERPL-SCNC: 102 MMOL/L (ref 96–112)
CO2 SERPL-SCNC: 25 MMOL/L (ref 20–33)
CREAT SERPL-MCNC: 1.03 MG/DL (ref 0.5–1.4)
ERYTHROCYTE [DISTWIDTH] IN BLOOD BY AUTOMATED COUNT: 44 FL (ref 35.9–50)
GFR SERPLBLD CREATININE-BSD FMLA CKD-EPI: 81 ML/MIN/1.73 M 2
GLUCOSE SERPL-MCNC: 84 MG/DL (ref 65–99)
HCT VFR BLD AUTO: 42.3 % (ref 42–52)
HGB BLD-MCNC: 13.3 G/DL (ref 14–18)
MAGNESIUM SERPL-MCNC: 2.1 MG/DL (ref 1.5–2.5)
MCH RBC QN AUTO: 28.5 PG (ref 27–33)
MCHC RBC AUTO-ENTMCNC: 31.4 G/DL (ref 32.3–36.5)
MCV RBC AUTO: 90.6 FL (ref 81.4–97.8)
PHOSPHATE SERPL-MCNC: 3.4 MG/DL (ref 2.5–4.5)
PLATELET # BLD AUTO: 216 K/UL (ref 164–446)
PMV BLD AUTO: 10.9 FL (ref 9–12.9)
POTASSIUM SERPL-SCNC: 4.5 MMOL/L (ref 3.6–5.5)
RBC # BLD AUTO: 4.67 M/UL (ref 4.7–6.1)
SODIUM SERPL-SCNC: 136 MMOL/L (ref 135–145)
WBC # BLD AUTO: 5.9 K/UL (ref 4.8–10.8)

## 2025-08-16 PROCEDURE — 83735 ASSAY OF MAGNESIUM: CPT

## 2025-08-16 PROCEDURE — 85027 COMPLETE CBC AUTOMATED: CPT

## 2025-08-16 PROCEDURE — 84100 ASSAY OF PHOSPHORUS: CPT

## 2025-08-16 PROCEDURE — A9270 NON-COVERED ITEM OR SERVICE: HCPCS | Performed by: INTERNAL MEDICINE

## 2025-08-16 PROCEDURE — 700102 HCHG RX REV CODE 250 W/ 637 OVERRIDE(OP): Performed by: INTERNAL MEDICINE

## 2025-08-16 PROCEDURE — RXMED WILLOW AMBULATORY MEDICATION CHARGE: Performed by: STUDENT IN AN ORGANIZED HEALTH CARE EDUCATION/TRAINING PROGRAM

## 2025-08-16 PROCEDURE — 80048 BASIC METABOLIC PNL TOTAL CA: CPT

## 2025-08-16 PROCEDURE — A9270 NON-COVERED ITEM OR SERVICE: HCPCS | Performed by: STUDENT IN AN ORGANIZED HEALTH CARE EDUCATION/TRAINING PROGRAM

## 2025-08-16 PROCEDURE — 770006 HCHG ROOM/CARE - MED/SURG/GYN SEMI*

## 2025-08-16 PROCEDURE — 700102 HCHG RX REV CODE 250 W/ 637 OVERRIDE(OP): Performed by: STUDENT IN AN ORGANIZED HEALTH CARE EDUCATION/TRAINING PROGRAM

## 2025-08-16 PROCEDURE — 99239 HOSP IP/OBS DSCHRG MGMT >30: CPT | Performed by: STUDENT IN AN ORGANIZED HEALTH CARE EDUCATION/TRAINING PROGRAM

## 2025-08-16 RX ORDER — ASPIRIN 81 MG/1
81 TABLET ORAL DAILY
Qty: 13 TABLET | Refills: 0 | Status: SHIPPED | OUTPATIENT
Start: 2025-08-17 | End: 2025-08-30

## 2025-08-16 RX ORDER — DAPAGLIFLOZIN 10 MG/1
10 TABLET, FILM COATED ORAL DAILY
Qty: 100 TABLET | Refills: 3 | Status: SHIPPED | OUTPATIENT
Start: 2025-08-17 | End: 2026-09-21

## 2025-08-16 RX ORDER — RANOLAZINE 500 MG/1
500 TABLET, EXTENDED RELEASE ORAL 2 TIMES DAILY
Qty: 60 TABLET | Refills: 3 | Status: SHIPPED | OUTPATIENT
Start: 2025-08-16

## 2025-08-16 RX ORDER — FUROSEMIDE 20 MG/1
20 TABLET ORAL DAILY
Qty: 30 TABLET | Refills: 0 | Status: SHIPPED | OUTPATIENT
Start: 2025-08-17

## 2025-08-16 RX ORDER — ICOSAPENT ETHYL 1 G/1
2 CAPSULE ORAL 2 TIMES DAILY
Qty: 120 CAPSULE | Refills: 0 | Status: SHIPPED | OUTPATIENT
Start: 2025-08-16 | End: 2025-08-16

## 2025-08-16 RX ORDER — CLOPIDOGREL BISULFATE 75 MG/1
75 TABLET ORAL DAILY
Qty: 30 TABLET | Refills: 0 | Status: SHIPPED | OUTPATIENT
Start: 2025-08-17

## 2025-08-16 RX ADMIN — CLOPIDOGREL BISULFATE 75 MG: 75 TABLET, FILM COATED ORAL at 06:00

## 2025-08-16 RX ADMIN — HYDROCODONE BITARTRATE AND ACETAMINOPHEN 1 TABLET: 10; 325 TABLET ORAL at 05:57

## 2025-08-16 RX ADMIN — FINASTERIDE 5 MG: 5 TABLET, FILM COATED ORAL at 05:57

## 2025-08-16 RX ADMIN — DAPAGLIFLOZIN 10 MG: 10 TABLET, FILM COATED ORAL at 05:56

## 2025-08-16 RX ADMIN — EZETIMIBE 10 MG: 10 TABLET ORAL at 05:56

## 2025-08-16 RX ADMIN — TAMSULOSIN HYDROCHLORIDE 0.4 MG: 0.4 CAPSULE ORAL at 05:56

## 2025-08-16 RX ADMIN — RANOLAZINE 500 MG: 500 TABLET, EXTENDED RELEASE ORAL at 05:56

## 2025-08-16 RX ADMIN — LAMOTRIGINE 25 MG: 25 TABLET ORAL at 06:05

## 2025-08-16 RX ADMIN — ASPIRIN 81 MG: 81 TABLET, COATED ORAL at 05:56

## 2025-08-16 RX ADMIN — TIZANIDINE 4 MG: 4 TABLET ORAL at 06:30

## 2025-08-16 RX ADMIN — OMEPRAZOLE 40 MG: 20 CAPSULE, DELAYED RELEASE ORAL at 05:56

## 2025-08-16 RX ADMIN — TELMISARTAN 40 MG: 40 TABLET ORAL at 06:05

## 2025-08-16 RX ADMIN — AMLODIPINE BESYLATE 5 MG: 5 TABLET ORAL at 05:56

## 2025-08-16 RX ADMIN — APIXABAN 5 MG: 5 TABLET, FILM COATED ORAL at 05:57

## 2025-08-16 RX ADMIN — HYDROCODONE BITARTRATE AND ACETAMINOPHEN 1 TABLET: 5; 325 TABLET ORAL at 08:43

## 2025-08-16 RX ADMIN — LEVETIRACETAM 1000 MG: 500 TABLET, FILM COATED ORAL at 05:56

## 2025-08-16 RX ADMIN — FUROSEMIDE 20 MG: 20 TABLET ORAL at 05:57

## 2025-08-16 ASSESSMENT — PAIN DESCRIPTION - PAIN TYPE
TYPE: CHRONIC PAIN
TYPE: ACUTE PAIN

## 2025-08-16 ASSESSMENT — FIBROSIS 4 INDEX: FIB4 SCORE: 1.63

## 2025-10-03 ENCOUNTER — APPOINTMENT (OUTPATIENT)
Dept: SLEEP MEDICINE | Facility: MEDICAL CENTER | Age: 65
End: 2025-10-03
Attending: PHYSICIAN ASSISTANT
Payer: MEDICARE

## (undated) DEVICE — STOCKINETTE IMPERVIOUS 12X48 - STERILELF (10/CA)"

## (undated) DEVICE — DRAPE STRLE REG TOWEL 18X24 - (10/BX 4BX/CA)"

## (undated) DEVICE — DRAPE IOBAN II INCISE 23X17 - (10EA/BX 4BX/CA)

## (undated) DEVICE — SUCTION INSTRUMENT YANKAUER BULBOUS TIP W/O VENT (50EA/CA)

## (undated) DEVICE — BLADE SAGITTAL SAW DUAL CUT 25.0 X 90.0 X 1.27MM (1/EA)

## (undated) DEVICE — PAD BABY LAP 4X18 W/O - RINGS PREWASHED 5/PK 40PK/CS

## (undated) DEVICE — GOWN SURGICAL XX-LARGE - (28EA/CA) SIRUS NON REINFORCED

## (undated) DEVICE — SENSOR OXIMETER ADULT SPO2 RD SET (20EA/BX)

## (undated) DEVICE — Device

## (undated) DEVICE — SUTURE 3-0 VICRYL PLUS SH - 8X 18 INCH (12/BX)

## (undated) DEVICE — DRAPE LAPAROTOMY T SHEET - (12EA/CA)

## (undated) DEVICE — SUTURE GENERAL

## (undated) DEVICE — SUTURE 5 ETHIBOND V-37 (12PK/BX)

## (undated) DEVICE — KIT ANESTHESIA W/CIRCUIT & 3/LT BAG W/FILTER (20EA/CA)

## (undated) DEVICE — HEADREST PRONEVIEW LARGE - (10/CA)

## (undated) DEVICE — DRESSING XEROFORM 1X8 - (50/BX 4BX/CA)

## (undated) DEVICE — SET LEADWIRE 5 LEAD BEDSIDE DISPOSABLE ECG (1SET OF 5/EA)

## (undated) DEVICE — CLOSURE SKIN STRIP 1/2 X 4 IN - (STERI STRIP) (50/BX 4BX/CA)

## (undated) DEVICE — ELECTRODE 850 FOAM ADHESIVE - HYDROGEL RADIOTRNSPRNT (50/PK)

## (undated) DEVICE — RUBBERBAND STERILE #64 LATEX FREE (100/CA)

## (undated) DEVICE — CHLORAPREP 26 ML APPLICATOR - ORANGE TINT(25/CA)

## (undated) DEVICE — MIDAS LUBRICATOR DIFFUSER PACK (4EA/CA)

## (undated) DEVICE — GLOVE SZ 6.5 BIOGEL PI MICRO - PF LF (50PR/BX)

## (undated) DEVICE — SUTURE 2-0 VICRYL PLUS CT-1 - 8 X 18 INCH(12/BX)

## (undated) DEVICE — GLOVE BIOGEL PI INDICATOR SZ 7.0 SURGICAL PF LF - (50/BX 4BX/CA)

## (undated) DEVICE — GLOVE BIOGEL PI INDICATOR SZ 8.0 SURGICAL PF LF -(50/BX 4BX/CA)

## (undated) DEVICE — GLOVE BIOGEL SZ 8 SURGICAL PF LTX - (50PR/BX 4BX/CA)

## (undated) DEVICE — SYRINGE 12 CC LUER TIP - (80/BX) OBSOLETE ITEM

## (undated) DEVICE — STAPLER SKIN DISP - (6/BX 10BX/CA) VISISTAT

## (undated) DEVICE — ELECTRODE DUAL RETURN W/ CORD - (50/PK)

## (undated) DEVICE — NEPTUNE 4 PORT MANIFOLD - (20/PK)

## (undated) DEVICE — SOD. CHL. INJ. 0.9% 1000 ML - (14EA/CA 60CA/PF)

## (undated) DEVICE — GOWN SURGEONS X-LARGE - DISP. (30/CA)

## (undated) DEVICE — SET EXTENSION WITH 2 PORTS (48EA/CA) ***PART #2C8610 IS A SUBSTITUTE*****

## (undated) DEVICE — TOOL MR8 2.3CM F2/7CM TAPER (1/EA)

## (undated) DEVICE — SUTURE 2-0 MONOCRYL PLUS UNDYED CT-1 1 X 36 (36EA/BX)"

## (undated) DEVICE — BLADE CLIPPER FITS 2501 CLIPPER (BLUE)  (20EA/CA)

## (undated) DEVICE — TOWELS CLOTH SURGICAL - (4/PK 20PK/CA)

## (undated) DEVICE — SUTURE 3-0 VICRYL PLUS FS-1 27 (36PK/BX)"

## (undated) DEVICE — SUTURE 1 VICRYL PLUS CT-1 - 18 INCH (12/BX)

## (undated) DEVICE — GLOVE BIOGEL PI INDICATOR SZ 6.5 SURGICAL PF LF - (50/BX 4BX/CA)

## (undated) DEVICE — PACK MINOR BASIN - (2EA/CA)

## (undated) DEVICE — GLOVE SZ 7 BIOGEL PI MICRO - PF LF (50PR/BX 4BX/CA)

## (undated) DEVICE — INTRAOP NEURO IN OR 1:1 PER 15 MIN

## (undated) DEVICE — TUBING CLEARLINK DUO-VENT - C-FLO (48EA/CA)

## (undated) DEVICE — GLOVE BIOGEL SZ 7 SURGICAL PF LTX - (50PR/BX 4BX/CA)

## (undated) DEVICE — TUBE E-T HI-LO CUFF 7.0MM (10EA/PK)

## (undated) DEVICE — LIGHT SOURCE MIS 12FT

## (undated) DEVICE — GLOVE BIOGEL INDICATOR SZ 7.5 SURGICAL PF LTX - (50PR/BX 4BX/CA)

## (undated) DEVICE — HEAD HOLDER JUNIOR/ADULT

## (undated) DEVICE — TOWEL STOP TIMEOUT SAFETY FLAG (40EA/CA)

## (undated) DEVICE — LACTATED RINGERS INJ 1000 ML - (14EA/CA 60CA/PF)

## (undated) DEVICE — TUBING C&T SET FLYING LEADS DRAIN TUBING (10EA/BX)

## (undated) DEVICE — KIT EVACUATER 3 SPRING PVC LF 1/8 DRAIN SIZE (10EA/CA)"

## (undated) DEVICE — INSTRUMENT JAWCOVER SUTURE - BOOTS (5PK/BX)

## (undated) DEVICE — LACTATED RINGERS INJ. 500 ML - (24EA/CA)

## (undated) DEVICE — PIN FIXATION BONE STERILE 3.2MM X 110MM (2EA/PK)

## (undated) DEVICE — KIT SURGIFLO W/OUT THROMBIN - (6EA/CA)

## (undated) DEVICE — BOVIE  BLADE 6 EXTENDED - (50/PK)

## (undated) DEVICE — BONE WAX (12PK/BX)

## (undated) DEVICE — SYSTEM NAVIGATION VIZADISC KNEE PROCEDURE TRACKING KIT (1EA)

## (undated) DEVICE — HUMID-VENT HEAT AND MOISTURE EXCHANGE- (50/BX)

## (undated) DEVICE — CANISTER SUCTION 3000ML MECHANICAL FILTER AUTO SHUTOFF MEDI-VAC NONSTERILE LF DISP  (40EA/CA)

## (undated) DEVICE — KIT DRAPE RIO ONE PIECE WITH POCKETS (1EA)

## (undated) DEVICE — GOWN SURGEONS LARGE - (32/CA)

## (undated) DEVICE — ARMREST CRADLE FOAM - (2PR/PK 12PR/CA)

## (undated) DEVICE — NEEDLE SPINAL NON-SAFETY 25GA X 3 1/2 (25/BX)

## (undated) DEVICE — PIN FIXATION BONE STERILE 3.2MM X 140MM (2EA/PK)

## (undated) DEVICE — DERMABOND ADVANCED - (12EA/BX)

## (undated) DEVICE — DRAPE C-ARM LARGE 41IN X 74 IN - (10/BX 2BX/CA)

## (undated) DEVICE — SUTURE 2-0 SILK 12 X 18" (36PK/BX)"

## (undated) DEVICE — NEEDLE NON SAFETY 25 GA X 1 1/2 IN HYPO (100EA/BX)

## (undated) DEVICE — PACK CRANI - (1EA/CA)

## (undated) DEVICE — SYRINGE 30 ML LL (56/BX)

## (undated) DEVICE — CONNECTOR 5 IN 1 STRAIGHT - (50/BX)

## (undated) DEVICE — TOOL DISSECT MATCH HEAD

## (undated) DEVICE — DRAPE MAYO STAND - (30/CA)

## (undated) DEVICE — HEMOSTAT SURG ABSORBABLE - 4 X 8 IN SURGICEL (24EA/CA)

## (undated) DEVICE — GLOVE PROTEXIS W/NEU-THERA SZ 8.5 (50PR/BX)

## (undated) DEVICE — PATTIES SURG X-RAYCOTTONOID - 1/2 X 3 IN (200/CA)

## (undated) DEVICE — SUTURE ETHILON 2-0 FSLX 30 (36PK/BX)"

## (undated) DEVICE — CATHETER EPIDURAL PORTEX (10/BX) ***DISCONTINUED LOOKING INTO SUB***

## (undated) DEVICE — SUTURE 3-0 MONOCRYL PLUS PS-1 - 27 INCH (36/BX)

## (undated) DEVICE — SYRINGE SAFETY 10 ML 18 GA X 1 1/2 BLUNT LL (100/BX 4BX/CA)

## (undated) DEVICE — NEEDLE NON-SAFETY HYPO 21 GA X 1 1/2 IN HYPO (100/BX)

## (undated) DEVICE — GLOVE BIOGEL ECLIPSE PF LATEX SIZE 8.0  (50PR/BX)

## (undated) DEVICE — SUTURE 2-0 VICRYL CT-2  8 X 18 INCH (12EA/BX)

## (undated) DEVICE — PERFORATER DISP TIP DGR-0

## (undated) DEVICE — FIBRILLAR SURGICEL 4X4 - 10/CA

## (undated) DEVICE — COVER FOOT UNIVERSAL DISP. - (25EA/CA)

## (undated) DEVICE — SENSOR SPO2 NEO LNCS ADHESIVE (20/BX) SEE USER NOTES

## (undated) DEVICE — BLADE 90X18X1.27MM SAW SAGITTAL

## (undated) DEVICE — DRESSING,POST OP BORDER 4INX6IN AG

## (undated) DEVICE — TIP INTPLS HFLO ML ORFC BTRY - (12/CS)  FOR SURGILAV

## (undated) DEVICE — TORQUE WRENCH

## (undated) DEVICE — GLOVE BIOGEL PI ORTHO SZ 7.5 PF LF (40PR/BX)

## (undated) DEVICE — BLADE SURGICAL CLIPPER - (50EA/CA)

## (undated) DEVICE — PAD LAP STERILE 18 X 18 - (5/PK 40PK/CA)

## (undated) DEVICE — SLEEVE, VASO, THIGH, MED

## (undated) DEVICE — PIN HEAD MAYFIELD DISP. (3EA/PK 12PK/BX)

## (undated) DEVICE — GOWN WARMING STANDARD FLEX - (30/CA)

## (undated) DEVICE — SPONGE GAUZESTER 4 X 4 4PLY - (128PK/CA)

## (undated) DEVICE — DRESSING PETROLEUM GAUZE 5 X 9" (50EA/BX 4BX/CA)"

## (undated) DEVICE — SUTURE GOR-TEX CV-6 TT-9 (36PK/BX)

## (undated) DEVICE — PACK JACKSON TABLE KIT W/OUT - HR (6EA/CA)

## (undated) DEVICE — PAD PREP 24 X 48 CUFFED - (100/CA)

## (undated) DEVICE — TRAY CATHETER FOLEY URINE METER W/STATLOCK 350ML (10EA/CA)

## (undated) DEVICE — SODIUM CHL IRRIGATION 0.9% 1000ML (12EA/CA)

## (undated) DEVICE — DRAPE LARGE 3 QUARTER - (20/CA)

## (undated) DEVICE — SUTURE 2-0 SILK SH (36PK/BX)

## (undated) DEVICE — SYRINGE LOSS OF RESIST. 50/BX - (50/CA)

## (undated) DEVICE — KIT ROOM DECONTAMINATION

## (undated) DEVICE — NEEDLE NON SAFETY HYPO 22 GA X 1 1/2 IN (100/BX)

## (undated) DEVICE — PACK NEURO - (2EA/CA)

## (undated) DEVICE — WATER IRRIG. STER. 1500 ML - (9/CA)

## (undated) DEVICE — SCALP CLIP RANEY 20-1037 (10EA/PK 20PK/CA)

## (undated) DEVICE — TOOL MR8 F3/9CM TAPER 3.0MM DIAMETER (1/EA)

## (undated) DEVICE — NEEDLE SPINAL NON-SAFETY 22 GA X 3 (25EA/BX)"

## (undated) DEVICE — GLOVE BIOGEL ECLIPSE PF LATEX SIZE 8.5 (50PR/BX)

## (undated) DEVICE — HANDPIECE 10FT INTPLS SCT PLS IRRIGATION HAND CONTROL SET (6/PK)

## (undated) DEVICE — SUTURE 4-0 NUROLON CR/8 TF - (12/BX) ETHICON

## (undated) DEVICE — BLADE SAGITTAL 34MM

## (undated) DEVICE — COVER PROBE STERILE CONE (12EA/CA)

## (undated) DEVICE — DRAPE MICROSCOPE X-LONG (10EA/CA)

## (undated) DEVICE — CANISTER SUCTION RIGID RED 1500CC (40EA/CA)

## (undated) DEVICE — BOVIE BLADE COATED &INSULATED - 25/PK

## (undated) DEVICE — SUTURE CV

## (undated) DEVICE — SURGIFOAM (SIZE 100) - (6EA/CA)

## (undated) DEVICE — BONE PRESS SPINAL EDITION HENSLER (10EA/CA)

## (undated) DEVICE — BOVIE NEEDLE TIP INSULATD NON-SAFETY 2CM (50/PK)

## (undated) DEVICE — SHEET TRANSVERSE LAP - (12EA/CA)

## (undated) DEVICE — MASK ANESTHESIA ADULT  - (100/CA)

## (undated) DEVICE — PROTECTOR ULNA NERVE - (36PR/CA)